# Patient Record
Sex: FEMALE | Race: OTHER | HISPANIC OR LATINO | URBAN - METROPOLITAN AREA
[De-identification: names, ages, dates, MRNs, and addresses within clinical notes are randomized per-mention and may not be internally consistent; named-entity substitution may affect disease eponyms.]

---

## 2017-06-06 RX ORDER — METFORMIN HYDROCHLORIDE 850 MG/1
0 TABLET ORAL
Qty: 30 | Refills: 0 | COMMUNITY
Start: 2017-06-06

## 2017-06-06 RX ORDER — PANTOPRAZOLE SODIUM 20 MG/1
0 TABLET, DELAYED RELEASE ORAL
Qty: 30 | Refills: 0 | COMMUNITY
Start: 2017-06-06

## 2017-06-06 RX ORDER — MECLIZINE HCL 12.5 MG
0 TABLET ORAL
Qty: 30 | Refills: 0 | COMMUNITY
Start: 2017-06-06

## 2017-06-06 RX ORDER — FERROUS GLUCONATE 100 %
0 POWDER (GRAM) MISCELLANEOUS
Qty: 30 | Refills: 0 | COMMUNITY
Start: 2017-06-06

## 2017-06-06 RX ORDER — SIMVASTATIN 20 MG/1
0 TABLET, FILM COATED ORAL
Qty: 30 | Refills: 0 | COMMUNITY
Start: 2017-06-06

## 2017-06-27 ENCOUNTER — INPATIENT (INPATIENT)
Facility: HOSPITAL | Age: 82
LOS: 2 days | Discharge: HOME | DRG: 149 | End: 2017-06-30
Attending: INTERNAL MEDICINE | Admitting: INTERNAL MEDICINE
Payer: MEDICAID

## 2017-06-27 VITALS
WEIGHT: 139.99 LBS | OXYGEN SATURATION: 97 % | SYSTOLIC BLOOD PRESSURE: 161 MMHG | TEMPERATURE: 99 F | HEART RATE: 81 BPM | RESPIRATION RATE: 18 BRPM | HEIGHT: 61 IN | DIASTOLIC BLOOD PRESSURE: 78 MMHG

## 2017-06-27 DIAGNOSIS — Z90.710 ACQUIRED ABSENCE OF BOTH CERVIX AND UTERUS: Chronic | ICD-10-CM

## 2017-06-27 DIAGNOSIS — Z98.51 TUBAL LIGATION STATUS: Chronic | ICD-10-CM

## 2017-06-27 LAB
APPEARANCE UR: CLEAR — SIGNIFICANT CHANGE UP
BASOPHILS # BLD AUTO: 0.1 K/UL — SIGNIFICANT CHANGE UP (ref 0–0.2)
BASOPHILS NFR BLD AUTO: 0.9 % — SIGNIFICANT CHANGE UP (ref 0–2)
BILIRUB UR-MCNC: NEGATIVE — SIGNIFICANT CHANGE UP
COLOR SPEC: YELLOW — SIGNIFICANT CHANGE UP
DIFF PNL FLD: NEGATIVE — SIGNIFICANT CHANGE UP
EOSINOPHIL # BLD AUTO: 0.1 K/UL — SIGNIFICANT CHANGE UP (ref 0–0.5)
EOSINOPHIL NFR BLD AUTO: 1.8 % — SIGNIFICANT CHANGE UP (ref 0–6)
GLUCOSE UR QL: NEGATIVE — SIGNIFICANT CHANGE UP
HCT VFR BLD CALC: 36.5 % — SIGNIFICANT CHANGE UP (ref 34.5–45)
HGB BLD-MCNC: 12.2 G/DL — SIGNIFICANT CHANGE UP (ref 11.5–15.5)
INR BLD: 1.03 RATIO — SIGNIFICANT CHANGE UP (ref 0.88–1.16)
KETONES UR-MCNC: NEGATIVE — SIGNIFICANT CHANGE UP
LEUKOCYTE ESTERASE UR-ACNC: ABNORMAL
LYMPHOCYTES # BLD AUTO: 3.3 K/UL — SIGNIFICANT CHANGE UP (ref 1–3.3)
LYMPHOCYTES # BLD AUTO: 43.2 % — SIGNIFICANT CHANGE UP (ref 13–44)
MCHC RBC-ENTMCNC: 31.8 PG — SIGNIFICANT CHANGE UP (ref 27–34)
MCHC RBC-ENTMCNC: 33.4 GM/DL — SIGNIFICANT CHANGE UP (ref 32–36)
MCV RBC AUTO: 95.3 FL — SIGNIFICANT CHANGE UP (ref 80–100)
MONOCYTES # BLD AUTO: 0.4 K/UL — SIGNIFICANT CHANGE UP (ref 0–0.9)
MONOCYTES NFR BLD AUTO: 5.5 % — SIGNIFICANT CHANGE UP (ref 2–14)
NEUTROPHILS # BLD AUTO: 3.8 K/UL — SIGNIFICANT CHANGE UP (ref 1.8–7.4)
NEUTROPHILS NFR BLD AUTO: 48.7 % — SIGNIFICANT CHANGE UP (ref 43–77)
NITRITE UR-MCNC: NEGATIVE — SIGNIFICANT CHANGE UP
PH UR: 8 — SIGNIFICANT CHANGE UP (ref 5–8)
PLATELET # BLD AUTO: 199 K/UL — SIGNIFICANT CHANGE UP (ref 150–400)
PROT UR-MCNC: NEGATIVE — SIGNIFICANT CHANGE UP
PROTHROM AB SERPL-ACNC: 11.2 SEC — SIGNIFICANT CHANGE UP (ref 9.8–12.7)
RBC # BLD: 3.83 M/UL — SIGNIFICANT CHANGE UP (ref 3.8–5.2)
RBC # FLD: 13 % — SIGNIFICANT CHANGE UP (ref 10.3–14.5)
SP GR SPEC: 1.01 — SIGNIFICANT CHANGE UP (ref 1.01–1.02)
UROBILINOGEN FLD QL: NEGATIVE — SIGNIFICANT CHANGE UP
WBC # BLD: 7.7 K/UL — SIGNIFICANT CHANGE UP (ref 3.8–10.5)
WBC # FLD AUTO: 7.7 K/UL — SIGNIFICANT CHANGE UP (ref 3.8–10.5)

## 2017-06-27 PROCEDURE — 70450 CT HEAD/BRAIN W/O DYE: CPT | Mod: 26

## 2017-06-27 PROCEDURE — 71010: CPT | Mod: 26

## 2017-06-27 RX ORDER — MECLIZINE HCL 12.5 MG
50 TABLET ORAL ONCE
Qty: 0 | Refills: 0 | Status: COMPLETED | OUTPATIENT
Start: 2017-06-27 | End: 2017-06-27

## 2017-06-27 RX ORDER — SODIUM CHLORIDE 9 MG/ML
1000 INJECTION INTRAMUSCULAR; INTRAVENOUS; SUBCUTANEOUS ONCE
Qty: 0 | Refills: 0 | Status: COMPLETED | OUTPATIENT
Start: 2017-06-27 | End: 2017-06-27

## 2017-06-27 RX ORDER — TOBRAMYCIN AND DEXAMETHASONE 1; 3 MG/ML; MG/ML
0 SUSPENSION/ DROPS OPHTHALMIC
Qty: 5 | Refills: 0 | COMMUNITY
Start: 2017-06-27

## 2017-06-27 RX ORDER — ONDANSETRON 8 MG/1
4 TABLET, FILM COATED ORAL ONCE
Qty: 0 | Refills: 0 | Status: COMPLETED | OUTPATIENT
Start: 2017-06-27 | End: 2017-06-27

## 2017-06-27 RX ADMIN — ONDANSETRON 4 MILLIGRAM(S): 8 TABLET, FILM COATED ORAL at 23:48

## 2017-06-27 RX ADMIN — Medication 50 MILLIGRAM(S): at 23:48

## 2017-06-27 RX ADMIN — SODIUM CHLORIDE 1000 MILLILITER(S): 9 INJECTION INTRAMUSCULAR; INTRAVENOUS; SUBCUTANEOUS at 23:43

## 2017-06-27 NOTE — ED PROVIDER NOTE - PROGRESS NOTE DETAILS
Reexamined, still unable to ambulate, unsteady, ataxic. Will admit for further w/u, MRI r/o posterior circulation CVA/insufficiency. Unsafe to DC

## 2017-06-27 NOTE — ED PROVIDER NOTE - MEDICAL DECISION MAKING DETAILS
81 y/o female presents to the ED c/o dizziness x 2 days. Possible vertigo vs posterior. Will get labs, CT head and will likely admit.

## 2017-06-27 NOTE — ED PROVIDER NOTE - INTERPRETATION
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 Per dad, Mom believes  can't see contrast well. + photosensitive. No strabismus. Used to have watery eyes BE, but it is better now         
normal sinus rhythm

## 2017-06-27 NOTE — ED PROVIDER NOTE - CRANIAL NERVE AND PUPILLARY EXAM
tongue is midline/Neural intact, ataxia, positive Romberg/corneal reflex intact/central and peripheral vision intact/central vision intact/extra-ocular movements intact/cranial nerves 2-12 intact

## 2017-06-27 NOTE — ED PROVIDER NOTE - OBJECTIVE STATEMENT
83 y/o female with PMHx of DM, HTN, hypercholesteremia, vertigo and PSHx of hysterectomy and tubal ligation presents to the ED c/o dizziness x 2 days. Pt notes ataxia, nausea, trouble ambulating. Pt denies HA, blurry vision, fever, or any other complaints. NKDA.

## 2017-06-27 NOTE — ED PROVIDER NOTE - PMH
DM (diabetes mellitus)    Gastric adenocarcinoma  s/p resection  HTN (hypertension)    Hypercholesteremia    Vertigo

## 2017-06-28 DIAGNOSIS — I10 ESSENTIAL (PRIMARY) HYPERTENSION: ICD-10-CM

## 2017-06-28 DIAGNOSIS — N39.0 URINARY TRACT INFECTION, SITE NOT SPECIFIED: ICD-10-CM

## 2017-06-28 DIAGNOSIS — Z29.9 ENCOUNTER FOR PROPHYLACTIC MEASURES, UNSPECIFIED: ICD-10-CM

## 2017-06-28 DIAGNOSIS — C16.9 MALIGNANT NEOPLASM OF STOMACH, UNSPECIFIED: ICD-10-CM

## 2017-06-28 DIAGNOSIS — R42 DIZZINESS AND GIDDINESS: ICD-10-CM

## 2017-06-28 DIAGNOSIS — R27.0 ATAXIA, UNSPECIFIED: ICD-10-CM

## 2017-06-28 DIAGNOSIS — E11.9 TYPE 2 DIABETES MELLITUS WITHOUT COMPLICATIONS: ICD-10-CM

## 2017-06-28 DIAGNOSIS — E78.00 PURE HYPERCHOLESTEROLEMIA, UNSPECIFIED: ICD-10-CM

## 2017-06-28 LAB
24R-OH-CALCIDIOL SERPL-MCNC: 40.3 NG/ML — SIGNIFICANT CHANGE UP (ref 30–100)
ALBUMIN SERPL ELPH-MCNC: 3.3 G/DL — LOW (ref 3.5–5)
ALBUMIN SERPL ELPH-MCNC: 3.4 G/DL — LOW (ref 3.5–5)
ALP SERPL-CCNC: 88 U/L — SIGNIFICANT CHANGE UP (ref 40–120)
ALP SERPL-CCNC: 93 U/L — SIGNIFICANT CHANGE UP (ref 40–120)
ALT FLD-CCNC: 26 U/L DA — SIGNIFICANT CHANGE UP (ref 10–60)
ALT FLD-CCNC: 28 U/L DA — SIGNIFICANT CHANGE UP (ref 10–60)
ANION GAP SERPL CALC-SCNC: 6 MMOL/L — SIGNIFICANT CHANGE UP (ref 5–17)
ANION GAP SERPL CALC-SCNC: 7 MMOL/L — SIGNIFICANT CHANGE UP (ref 5–17)
APTT BLD: 25.4 SEC — LOW (ref 27.5–37.4)
AST SERPL-CCNC: 26 U/L — SIGNIFICANT CHANGE UP (ref 10–40)
AST SERPL-CCNC: 29 U/L — SIGNIFICANT CHANGE UP (ref 10–40)
BACTERIA # UR AUTO: ABNORMAL /HPF
BASOPHILS # BLD AUTO: 0 K/UL — SIGNIFICANT CHANGE UP (ref 0–0.2)
BASOPHILS NFR BLD AUTO: 0.6 % — SIGNIFICANT CHANGE UP (ref 0–2)
BILIRUB SERPL-MCNC: 0.2 MG/DL — SIGNIFICANT CHANGE UP (ref 0.2–1.2)
BILIRUB SERPL-MCNC: 0.3 MG/DL — SIGNIFICANT CHANGE UP (ref 0.2–1.2)
BUN SERPL-MCNC: 15 MG/DL — SIGNIFICANT CHANGE UP (ref 7–18)
BUN SERPL-MCNC: 16 MG/DL — SIGNIFICANT CHANGE UP (ref 7–18)
CALCIUM SERPL-MCNC: 8.5 MG/DL — SIGNIFICANT CHANGE UP (ref 8.4–10.5)
CALCIUM SERPL-MCNC: 8.5 MG/DL — SIGNIFICANT CHANGE UP (ref 8.4–10.5)
CHLORIDE SERPL-SCNC: 110 MMOL/L — HIGH (ref 96–108)
CHLORIDE SERPL-SCNC: 110 MMOL/L — HIGH (ref 96–108)
CHOLEST SERPL-MCNC: 125 MG/DL — SIGNIFICANT CHANGE UP (ref 10–199)
CO2 SERPL-SCNC: 26 MMOL/L — SIGNIFICANT CHANGE UP (ref 22–31)
CO2 SERPL-SCNC: 27 MMOL/L — SIGNIFICANT CHANGE UP (ref 22–31)
COMMENT - URINE: SIGNIFICANT CHANGE UP
CREAT SERPL-MCNC: 0.77 MG/DL — SIGNIFICANT CHANGE UP (ref 0.5–1.3)
CREAT SERPL-MCNC: 0.84 MG/DL — SIGNIFICANT CHANGE UP (ref 0.5–1.3)
EOSINOPHIL # BLD AUTO: 0.2 K/UL — SIGNIFICANT CHANGE UP (ref 0–0.5)
EOSINOPHIL NFR BLD AUTO: 1.8 % — SIGNIFICANT CHANGE UP (ref 0–6)
EPI CELLS # UR: ABNORMAL (ref 0–10)
FOLATE SERPL-MCNC: 17.3 NG/ML — SIGNIFICANT CHANGE UP (ref 4.8–24.2)
GLUCOSE SERPL-MCNC: 104 MG/DL — HIGH (ref 70–99)
GLUCOSE SERPL-MCNC: 105 MG/DL — HIGH (ref 70–99)
HBA1C BLD-MCNC: 6.8 % — HIGH (ref 4–5.6)
HCT VFR BLD CALC: 35.9 % — SIGNIFICANT CHANGE UP (ref 34.5–45)
HDLC SERPL-MCNC: 45 MG/DL — SIGNIFICANT CHANGE UP (ref 40–125)
HGB BLD-MCNC: 12.3 G/DL — SIGNIFICANT CHANGE UP (ref 11.5–15.5)
LIPID PNL WITH DIRECT LDL SERPL: 60 MG/DL — SIGNIFICANT CHANGE UP
LYMPHOCYTES # BLD AUTO: 3.8 K/UL — HIGH (ref 1–3.3)
LYMPHOCYTES # BLD AUTO: 44.6 % — HIGH (ref 13–44)
MAGNESIUM SERPL-MCNC: 2.2 MG/DL — SIGNIFICANT CHANGE UP (ref 1.6–2.6)
MCHC RBC-ENTMCNC: 32.3 PG — SIGNIFICANT CHANGE UP (ref 27–34)
MCHC RBC-ENTMCNC: 34.3 GM/DL — SIGNIFICANT CHANGE UP (ref 32–36)
MCV RBC AUTO: 94.3 FL — SIGNIFICANT CHANGE UP (ref 80–100)
MONOCYTES # BLD AUTO: 0.5 K/UL — SIGNIFICANT CHANGE UP (ref 0–0.9)
MONOCYTES NFR BLD AUTO: 5.7 % — SIGNIFICANT CHANGE UP (ref 2–14)
NEUTROPHILS # BLD AUTO: 4 K/UL — SIGNIFICANT CHANGE UP (ref 1.8–7.4)
NEUTROPHILS NFR BLD AUTO: 47.3 % — SIGNIFICANT CHANGE UP (ref 43–77)
PHOSPHATE SERPL-MCNC: 3.2 MG/DL — SIGNIFICANT CHANGE UP (ref 2.5–4.5)
PLATELET # BLD AUTO: 185 K/UL — SIGNIFICANT CHANGE UP (ref 150–400)
POTASSIUM SERPL-MCNC: 4 MMOL/L — SIGNIFICANT CHANGE UP (ref 3.5–5.3)
POTASSIUM SERPL-MCNC: 4 MMOL/L — SIGNIFICANT CHANGE UP (ref 3.5–5.3)
POTASSIUM SERPL-SCNC: 4 MMOL/L — SIGNIFICANT CHANGE UP (ref 3.5–5.3)
POTASSIUM SERPL-SCNC: 4 MMOL/L — SIGNIFICANT CHANGE UP (ref 3.5–5.3)
PROT SERPL-MCNC: 7 G/DL — SIGNIFICANT CHANGE UP (ref 6–8.3)
PROT SERPL-MCNC: 7.6 G/DL — SIGNIFICANT CHANGE UP (ref 6–8.3)
RBC # BLD: 3.81 M/UL — SIGNIFICANT CHANGE UP (ref 3.8–5.2)
RBC # FLD: 13.5 % — SIGNIFICANT CHANGE UP (ref 10.3–14.5)
RBC CASTS # UR COMP ASSIST: SIGNIFICANT CHANGE UP /HPF (ref 0–2)
SODIUM SERPL-SCNC: 143 MMOL/L — SIGNIFICANT CHANGE UP (ref 135–145)
SODIUM SERPL-SCNC: 143 MMOL/L — SIGNIFICANT CHANGE UP (ref 135–145)
TOTAL CHOLESTEROL/HDL RATIO MEASUREMENT: 2.8 RATIO — LOW (ref 3.3–7.1)
TRIGL SERPL-MCNC: 102 MG/DL — SIGNIFICANT CHANGE UP (ref 10–149)
TSH SERPL-MCNC: 1.01 UU/ML — SIGNIFICANT CHANGE UP (ref 0.34–4.82)
VIT B12 SERPL-MCNC: 536 PG/ML — SIGNIFICANT CHANGE UP (ref 243–894)
WBC # BLD: 8.5 K/UL — SIGNIFICANT CHANGE UP (ref 3.8–10.5)
WBC # FLD AUTO: 8.5 K/UL — SIGNIFICANT CHANGE UP (ref 3.8–10.5)
WBC UR QL: ABNORMAL /HPF (ref 0–5)

## 2017-06-28 PROCEDURE — 99285 EMERGENCY DEPT VISIT HI MDM: CPT | Mod: 25

## 2017-06-28 PROCEDURE — 99223 1ST HOSP IP/OBS HIGH 75: CPT

## 2017-06-28 PROCEDURE — 70551 MRI BRAIN STEM W/O DYE: CPT | Mod: 26

## 2017-06-28 RX ORDER — INSULIN LISPRO 100/ML
VIAL (ML) SUBCUTANEOUS
Qty: 0 | Refills: 0 | Status: DISCONTINUED | OUTPATIENT
Start: 2017-06-28 | End: 2017-06-30

## 2017-06-28 RX ORDER — DEXTROSE 50 % IN WATER 50 %
12.5 SYRINGE (ML) INTRAVENOUS ONCE
Qty: 0 | Refills: 0 | Status: DISCONTINUED | OUTPATIENT
Start: 2017-06-28 | End: 2017-06-30

## 2017-06-28 RX ORDER — DEXTROSE 50 % IN WATER 50 %
25 SYRINGE (ML) INTRAVENOUS ONCE
Qty: 0 | Refills: 0 | Status: DISCONTINUED | OUTPATIENT
Start: 2017-06-28 | End: 2017-06-30

## 2017-06-28 RX ORDER — SODIUM CHLORIDE 9 MG/ML
1000 INJECTION, SOLUTION INTRAVENOUS
Qty: 0 | Refills: 0 | Status: DISCONTINUED | OUTPATIENT
Start: 2017-06-28 | End: 2017-06-30

## 2017-06-28 RX ORDER — SIMVASTATIN 20 MG/1
40 TABLET, FILM COATED ORAL AT BEDTIME
Qty: 0 | Refills: 0 | Status: DISCONTINUED | OUTPATIENT
Start: 2017-06-28 | End: 2017-06-30

## 2017-06-28 RX ORDER — PANTOPRAZOLE SODIUM 20 MG/1
40 TABLET, DELAYED RELEASE ORAL
Qty: 0 | Refills: 0 | Status: DISCONTINUED | OUTPATIENT
Start: 2017-06-28 | End: 2017-06-30

## 2017-06-28 RX ORDER — ACETAMINOPHEN 500 MG
650 TABLET ORAL ONCE
Qty: 0 | Refills: 0 | Status: COMPLETED | OUTPATIENT
Start: 2017-06-28 | End: 2017-06-28

## 2017-06-28 RX ORDER — CEFTRIAXONE 500 MG/1
1 INJECTION, POWDER, FOR SOLUTION INTRAMUSCULAR; INTRAVENOUS EVERY 24 HOURS
Qty: 0 | Refills: 0 | Status: DISCONTINUED | OUTPATIENT
Start: 2017-06-28 | End: 2017-06-30

## 2017-06-28 RX ORDER — DEXTROSE 50 % IN WATER 50 %
1 SYRINGE (ML) INTRAVENOUS ONCE
Qty: 0 | Refills: 0 | Status: DISCONTINUED | OUTPATIENT
Start: 2017-06-28 | End: 2017-06-30

## 2017-06-28 RX ORDER — ASPIRIN/CALCIUM CARB/MAGNESIUM 324 MG
81 TABLET ORAL DAILY
Qty: 0 | Refills: 0 | Status: DISCONTINUED | OUTPATIENT
Start: 2017-06-28 | End: 2017-06-30

## 2017-06-28 RX ORDER — ENOXAPARIN SODIUM 100 MG/ML
40 INJECTION SUBCUTANEOUS DAILY
Qty: 0 | Refills: 0 | Status: DISCONTINUED | OUTPATIENT
Start: 2017-06-28 | End: 2017-06-30

## 2017-06-28 RX ORDER — GLUCAGON INJECTION, SOLUTION 0.5 MG/.1ML
1 INJECTION, SOLUTION SUBCUTANEOUS ONCE
Qty: 0 | Refills: 0 | Status: DISCONTINUED | OUTPATIENT
Start: 2017-06-28 | End: 2017-06-30

## 2017-06-28 RX ORDER — TOBRAMYCIN AND DEXAMETHASONE 1; 3 MG/ML; MG/ML
1 SUSPENSION/ DROPS OPHTHALMIC DAILY
Qty: 0 | Refills: 0 | Status: DISCONTINUED | OUTPATIENT
Start: 2017-06-28 | End: 2017-06-28

## 2017-06-28 RX ORDER — ATORVASTATIN CALCIUM 80 MG/1
40 TABLET, FILM COATED ORAL AT BEDTIME
Qty: 0 | Refills: 0 | Status: DISCONTINUED | OUTPATIENT
Start: 2017-06-28 | End: 2017-06-28

## 2017-06-28 RX ORDER — MECLIZINE HCL 12.5 MG
25 TABLET ORAL THREE TIMES A DAY
Qty: 0 | Refills: 0 | Status: DISCONTINUED | OUTPATIENT
Start: 2017-06-28 | End: 2017-06-30

## 2017-06-28 RX ADMIN — Medication 650 MILLIGRAM(S): at 14:43

## 2017-06-28 RX ADMIN — SIMVASTATIN 40 MILLIGRAM(S): 20 TABLET, FILM COATED ORAL at 22:15

## 2017-06-28 RX ADMIN — ENOXAPARIN SODIUM 40 MILLIGRAM(S): 100 INJECTION SUBCUTANEOUS at 14:43

## 2017-06-28 RX ADMIN — CEFTRIAXONE 100 GRAM(S): 500 INJECTION, POWDER, FOR SOLUTION INTRAMUSCULAR; INTRAVENOUS at 12:00

## 2017-06-28 RX ADMIN — Medication 2: at 22:14

## 2017-06-28 RX ADMIN — Medication 650 MILLIGRAM(S): at 15:40

## 2017-06-28 RX ADMIN — Medication: at 14:44

## 2017-06-28 RX ADMIN — Medication 81 MILLIGRAM(S): at 14:43

## 2017-06-28 NOTE — PROGRESS NOTE ADULT - SUBJECTIVE AND OBJECTIVE BOX
Patient is a 82y old  Female who presents with a chief complaint of dizziness (2017 06:25)      INTERVAL HPI/OVERNIGHT EVENTS:    no events     MEDICATIONS  (STANDING):  enoxaparin Injectable 40 milliGRAM(s) SubCutaneous daily  insulin lispro (HumaLOG) corrective regimen sliding scale   SubCutaneous Before meals and at bedtime  dextrose 5%. 1000 milliLiter(s) (50 mL/Hr) IV Continuous <Continuous>  dextrose 50% Injectable 12.5 Gram(s) IV Push once  dextrose 50% Injectable 25 Gram(s) IV Push once  dextrose 50% Injectable 25 Gram(s) IV Push once  aspirin  chewable 81 milliGRAM(s) Oral daily  atorvastatin 40 milliGRAM(s) Oral at bedtime    MEDICATIONS  (PRN):  meclizine 25 milliGRAM(s) Oral three times a day PRN Dizziness  dextrose Gel 1 Dose(s) Oral once PRN Blood Glucose LESS THAN 70 milliGRAM(s)/deciLiter  glucagon  Injectable 1 milliGRAM(s) IntraMuscular once PRN Glucose <70 milliGRAM(s)/deciLiter      Allergies    No Known Allergies    Intolerances        Vital Signs Last 24 Hrs  T(C): 36.8 (2017 09:33), Max: 37.1 (2017 19:25)  T(F): 98.2 (2017 09:33), Max: 98.7 (2017 19:25)  HR: 67 (2017 03:26) (66 - 81)  BP: 136/56 (2017 03:26) (136/56 - 161/78)  BP(mean): --  RR: 16 (2017 09:33) (16 - 18)  SpO2: 98% (2017 09:33) (97% - 100%)    PHYSICAL EXAM:  GENERAL: NAD, well-groomed, well-developed  HEENT: Supple, No JVD, Normal thyroid  NERVOUS SYSTEM:  Alert & Oriented X3, Good concentration  CHEST/LUNG: Clear to percussion bilaterally; No rales, rhonchi, wheezing, or rubs  HEART: Regular rate and rhythm; No murmurs, rubs, or gallops  ABDOMEN: Soft, Nontender, Nondistended; Bowel sounds present, surgery scar at abdomomen   EXTREMITIES:  No clubbing, cyanosis, or edema  SKIN: no rashes      LABS:                        12.3   8.5   )-----------( 185      ( 2017 07:14 )             35.9         143  |  110<H>  |  15  ----------------------------<  104<H>  4.0   |  26  |  0.77    Ca    8.5      2017 07:14  Phos  3.2       Mg     2.2         TPro  7.0  /  Alb  3.3<L>  /  TBili  0.3  /  DBili  x   /  AST  26  /  ALT  26  /  AlkPhos  88      PT/INR - ( 2017 23:47 )   PT: 11.2 sec;   INR: 1.03 ratio         PTT - ( 2017 23:47 )  PTT:25.4 sec  Urinalysis Basic - ( 2017 23:47 )    Color: Yellow / Appearance: Clear / S.015 / pH: x  Gluc: x / Ketone: Negative  / Bili: Negative / Urobili: Negative   Blood: x / Protein: Negative / Nitrite: Negative   Leuk Esterase: Small / RBC: 0-2 /HPF / WBC 6-10 /HPF   Sq Epi: x / Non Sq Epi: Few / Bacteria: Few /HPF      CAPILLARY BLOOD GLUCOSE  137 (2017 08:23)          RADIOLOGY & ADDITIONAL TESTS:    Imaging Personally Reviewed:  [ ] YES  [ ] NO    Consultant(s) Notes Reviewed:  [ ] YES  [ ] NO    Care Discussed with Consultants/Other Providers [ ] YES  [ ] NO    Plan of Care discussed with Attending/PGY2 [+ ]YES [ ] NO Patient is a 82y old  Female who presents with a chief complaint of dizziness (2017 06:25)      INTERVAL HPI/OVERNIGHT EVENTS:    no events     MEDICATIONS  (STANDING):  enoxaparin Injectable 40 milliGRAM(s) SubCutaneous daily  insulin lispro (HumaLOG) corrective regimen sliding scale   SubCutaneous Before meals and at bedtime  dextrose 5%. 1000 milliLiter(s) (50 mL/Hr) IV Continuous <Continuous>  dextrose 50% Injectable 12.5 Gram(s) IV Push once  dextrose 50% Injectable 25 Gram(s) IV Push once  dextrose 50% Injectable 25 Gram(s) IV Push once  aspirin  chewable 81 milliGRAM(s) Oral daily  atorvastatin 40 milliGRAM(s) Oral at bedtime    MEDICATIONS  (PRN):  meclizine 25 milliGRAM(s) Oral three times a day PRN Dizziness  dextrose Gel 1 Dose(s) Oral once PRN Blood Glucose LESS THAN 70 milliGRAM(s)/deciLiter  glucagon  Injectable 1 milliGRAM(s) IntraMuscular once PRN Glucose <70 milliGRAM(s)/deciLiter      Allergies    No Known Allergies    Intolerances        Vital Signs Last 24 Hrs  T(C): 36.8 (2017 09:33), Max: 37.1 (2017 19:25)  T(F): 98.2 (2017 09:33), Max: 98.7 (2017 19:25)  HR: 67 (2017 03:26) (66 - 81)  BP: 136/56 (2017 03:26) (136/56 - 161/78)  BP(mean): --  RR: 16 (2017 09:33) (16 - 18)  SpO2: 98% (2017 09:33) (97% - 100%)    PHYSICAL EXAM:  GENERAL: NAD, well-groomed, well-developed  HEENT: Supple, No JVD, Normal thyroid  NERVOUS SYSTEM:  Alert & Oriented X3, Good concentration  CHEST/LUNG: Clear to percussion bilaterally; No rales, rhonchi, wheezing, or rubs  HEART: Regular rate and rhythm; No murmurs, rubs, or gallops  ABDOMEN: Soft, Nontender, Nondistended; Bowel sounds present, surgery scar at abdomomen   EXTREMITIES:  No clubbing, cyanosis, or edema  SKIN: no rashes      LABS:                        12.3   8.5   )-----------( 185      ( 2017 07:14 )             35.9         143  |  110<H>  |  15  ----------------------------<  104<H>  4.0   |  26  |  0.77    Ca    8.5      2017 07:14  Phos  3.2       Mg     2.2         TPro  7.0  /  Alb  3.3<L>  /  TBili  0.3  /  DBili  x   /  AST  26  /  ALT  26  /  AlkPhos  88      PT/INR - ( 2017 23:47 )   PT: 11.2 sec;   INR: 1.03 ratio         PTT - ( 2017 23:47 )  PTT:25.4 sec  Urinalysis Basic - ( 2017 23:47 )    Color: Yellow / Appearance: Clear / S.015 / pH: x  Gluc: x / Ketone: Negative  / Bili: Negative / Urobili: Negative   Blood: x / Protein: Negative / Nitrite: Negative   Leuk Esterase: Small / RBC: 0-2 /HPF / WBC 6-10 /HPF   Sq Epi: x / Non Sq Epi: Few / Bacteria: Few /HPF      CAPILLARY BLOOD GLUCOSE  137 (2017 08:23)          RADIOLOGY & ADDITIONAL TESTS:    Imaging Personally Reviewed:  [ +] YES  [ ] NO    Consultant(s) Notes Reviewed:  [ +] YES  [ ] NO    Care Discussed with Consultants/Other Providers [+ ] YES  [ ] NO    Plan of Care discussed with Attending/PGY2 [+ ]YES [ ] NO

## 2017-06-28 NOTE — H&P ADULT - PROBLEM SELECTOR PLAN 6
appears stable s/p gastrectomy.  -Primary team to follow-up with family to confirm current management of this issue

## 2017-06-28 NOTE — H&P ADULT - PROBLEM SELECTOR PLAN 1
Symptoms appear to be worsened with movement positional. No discernable ear symptoms. BPPV likely. Though acute onset concerning for CVA.  -Meclizine PRN  -F/up Neuro (Dr. Bello)

## 2017-06-28 NOTE — H&P ADULT - HISTORY OF PRESENT ILLNESS
83 yo F from home, lives with daughter, PMHx DM II, HTN, HLD, Vertigo, Gastric Cancer s/p gastrectomy in 2015 s/p chemotherapy ~ 8 months ago, s/p open cholecystectomy presents with complaint of dizziness. History obtained from patient (Indonesian speaking, poor historian,  phone used) and from chart review; attempted to call patient's daughter, left a message). Patient reports that yesterday morning she had sudden on onset of dizziness when she got out of bed in the morning. Endorses room spinning sensation. Says she has a history of vertigo. She says she was able to get up and go to the bathroom but then she felt. Denies LOC or head trauma. This prompted her coming to the hospital. Patient endorses lightheadedness. Also says she is having trouble walking and that feel she is leaning to one side (to the right). She denies fevers, chills, nausea, vomiting, chest pain, SOB.    In the ED, noted to be ataxic by ED attending, vss, labs wnl, UA-ve, s/p 1L bolus, s/p IV Zofran and PO Meclizine.    Seen by admitting resident on the floor, feeling OK until asked to get up from bed and then felt the dizziness come on. Was not able to get up and stand or walk 2/2 to the dizziness and feeling weak. 83 yo F from home, lives with daughter, PMHx DM II, HTN, HLD, Vertigo, Gastric Cancer s/p gastrectomy in 2015 s/p chemotherapy ~ 8 months ago, s/p open cholecystectomy presents with complaint of dizziness. History obtained from patient ([place]ian speaking, poor historian,  phone used) and from chart review; attempted to call patient's daughter, left a message). Patient reports that yesterday morning she had sudden on onset of dizziness when she got out of bed in the morning. Endorses room spinning sensation. Says she has a history of vertigo. She says she was able to get up and go to the bathroom but then she felt. Denies LOC or head trauma. This prompted her coming to the hospital. Patient endorses lightheadedness. Also says she is having trouble walking and that feel she is leaning to one side (to the right). She denies fevers, chills, nausea, vomiting, chest pain, SOB.    In the ED, noted to be ataxic by ED attending, vss, labs wnl, UA +ve, s/p 1L bolus, s/p IV Zofran and PO Meclizine.    Seen by admitting resident on the floor, feeling OK until asked to get up from bed and then felt the dizziness come on. Was not able to get up and stand or walk 2/2 to the dizziness and feeling weak.

## 2017-06-28 NOTE — H&P ADULT - ASSESSMENT
82 F PMHx DM II, HTN, HLD, Vertigo, Gastric Cancer s/p, s/p cholecystecomy p/w acute onset dizziness c/b fall and concerns for ataxia.

## 2017-06-28 NOTE — CONSULT NOTE ADULT - SUBJECTIVE AND OBJECTIVE BOX
Patient is a 82y old  Female who presents with a chief complaint of dizziness (2017 06:25)      HPI:  81 yo F from home, lives with daughter, PMHx DM II, HTN, HLD, Vertigo, Gastric Cancer s/p gastrectomy in 2015 s/p chemotherapy ~ 8 months ago, s/p open cholecystectomy presents with complaint of dizziness. History obtained from patient (Tanzanian speaking, poor historian,  phone used) and from chart review; attempted to call patient's daughter, left a message). Patient reports that yesterday morning she had sudden on onset of dizziness when she got out of bed in the morning. Endorses room spinning sensation. Says she has a history of vertigo. She says she was able to get up and go to the bathroom but then she felt. Denies LOC or head trauma. This prompted her coming to the hospital. Patient endorses lightheadedness. Also says she is having trouble walking and that feel she is leaning to one side (to the right). She denies fevers, chills, nausea, vomiting, chest pain, SOB.    In the ED, noted to be ataxic by ED attending, vss, labs wnl, UA +ve, s/p 1L bolus, s/p IV Zofran and PO Meclizine.    Seen by admitting resident on the floor, feeling OK until asked to get up from bed and then felt the dizziness come on. Was not able to get up and stand or walk 2/2 to the dizziness and feeling weak. (2017 06:25)         Neurological Review of Systems:  No difficulty with language.  No vision loss or double vision.  No dizziness, vertigo or new hearing loss.  No difficulty with speech or swallowing.  No focal weakness.  No focal sensory changes.  No numbness or tingling in the bilateral lower extremities.  No difficulty with balance.  No difficulty with ambulation.        MEDICATIONS  (STANDING):  enoxaparin Injectable 40 milliGRAM(s) SubCutaneous daily  insulin lispro (HumaLOG) corrective regimen sliding scale   SubCutaneous Before meals and at bedtime  dextrose 5%. 1000 milliLiter(s) (50 mL/Hr) IV Continuous <Continuous>  dextrose 50% Injectable 12.5 Gram(s) IV Push once  dextrose 50% Injectable 25 Gram(s) IV Push once  dextrose 50% Injectable 25 Gram(s) IV Push once  aspirin  chewable 81 milliGRAM(s) Oral daily  simvastatin 40 milliGRAM(s) Oral at bedtime  pantoprazole    Tablet 40 milliGRAM(s) Oral before breakfast  cefTRIAXone   IVPB 1 Gram(s) IV Intermittent every 24 hours    MEDICATIONS  (PRN):  meclizine 25 milliGRAM(s) Oral three times a day PRN Dizziness  dextrose Gel 1 Dose(s) Oral once PRN Blood Glucose LESS THAN 70 milliGRAM(s)/deciLiter  glucagon  Injectable 1 milliGRAM(s) IntraMuscular once PRN Glucose <70 milliGRAM(s)/deciLiter    Allergies    No Known Allergies    Intolerances      PAST MEDICAL & SURGICAL HISTORY:  Vertigo  Gastric adenocarcinoma: s/p resection  Hypercholesteremia  DM (diabetes mellitus)  HTN (hypertension)  S/P tubal ligation  S/P hysterectomy    FAMILY HISTORY:  Family history of early CAD (Grandparent)  Family history of diabetes mellitus (Sibling)    SOCIAL HISTORY: non smoker/ former smoker/ active smoker    Review of Systems:  Constitutional: No generalized weakness. No fevers or chills.                    Eyes, Ears, Mouth, Throat: No vision loss   Respiratory: No shortness of breath or cough.                                Cardiovascular: No chest pain or palpitations  Gastrointestinal: No nausea or vomiting.                                         Genitourinary: No urinary incontinence or burning on urination.  Musculoskeletal: No joint pain.                                                           Dermatologic: No rash.  Neurological: as per HPI                                                                      Psychiatric: No behavioral problems.  Endocrine: No known hypoglycemia.               Hematologic/Lymphatic: No easy bleeding.    O:  Vital Signs Last 24 Hrs  T(C): 36.7 (2017 14:12), Max: 37.1 (2017 19:25)  T(F): 98 (2017 14:12), Max: 98.7 (2017 19:25)  HR: 73 (2017 14:12) (66 - 81)  BP: 133/62 (2017 14:12) (133/62 - 161/78)  BP(mean): --  RR: 16 (2017 14:12) (16 - 18)  SpO2: 97% (2017 14:12) (97% - 100%)    General Exam:   General appearance: No acute distress                 Cardiovascular: Pedal dorsalis pulses intact bilaterally    Mental Status: Orientated to self, date and place.  Attention intact.  No dysarthria, aphasia or neglect.  Knowledge intact.  Registration intact.  Short and long term memory grossly intact.      Cranial Nerves: CN I - not tested.  PERRL, EOMI, VFF, no nystagmus or diplopia.  No APD.  Fundi not visualized.  CN V1-3 intact to light touch and pinprick.  No facial asymmetry.  Hearing intact to finger rub bilaterally.  Tongue, uvula and palate midline.  Sternocleidomastoid and Trapezius intact bilaterally.    Motor:   Tone: normal.                  Strength intact throughout  No pronator drift bilaterally                      No dysmetria on finger-nose-finger or heel-shin-heel  No truncal ataxia.  No resting, postural or action tremor.  No myoclonus.    Sensation: intact to light touch, pinprick, vibration and proprioception    Deep Tendon Reflexes: 1+ bilateral biceps, triceps, brachioradialis, knee and ankle  Toes flexor bilaterally    Gait: normal and stable.  Rhomberg -emily.    Other:     LABS:                        12.3   8.5   )-----------( 185      ( 2017 07:14 )             35.9         143  |  110<H>  |  15  ----------------------------<  104<H>  4.0   |  26  |  0.77    Ca    8.5      2017 07:14  Phos  3.2       Mg     2.2         TPro  7.0  /  Alb  3.3<L>  /  TBili  0.3  /  DBili  x   /  AST  26  /  ALT  26  /  AlkPhos  88      PT/INR - ( 2017 23:47 )   PT: 11.2 sec;   INR: 1.03 ratio         PTT - ( 2017 23:47 )  PTT:25.4 sec  Urinalysis Basic - ( 2017 23:47 )    Color: Yellow / Appearance: Clear / S.015 / pH: x  Gluc: x / Ketone: Negative  / Bili: Negative / Urobili: Negative   Blood: x / Protein: Negative / Nitrite: Negative   Leuk Esterase: Small / RBC: 0-2 /HPF / WBC 6-10 /HPF   Sq Epi: x / Non Sq Epi: Few / Bacteria: Few /HPF    Hemoglobin A1C, Whole Blood in AM (17 @ 09:29)    Hemoglobin A1C, Whole Blood: 6.8: Method: Immunoassay       Reference Range                4.0-5.6%       High risk (prediabetic)        5.7-6.4%       Diabetic, diagnostic             >=6.5%       ADA diabetic treatment goal       <7.0%  The Hemoglobin A1c reference ranges are based6.8: on the 2010 recommendations  of  The American Diabetes Association.  Interpretation may vary for children  and  adolescent %    Lipid Profile (17 @ 07:14)    HDL/Total Cholesterol Ratio Measurement: 2.8 RATIO    Cholesterol, Serum: 125 mg/dL    Triglycerides, Serum: 102 mg/dL    HDL Cholesterol, Serum: 45 mg/dL    Direct LDL: 60: LDL Cholesterol --- Interpretive Comment (for adults 18 and over)  Optimal LDL Level may vary based on clinical situation  Below 70                  Ideal for people at very high risk of heart  disease  Below 100                Ideal for people at ris60: k of heart disease  100 - 129                   Near [place]  130 - 159                   Borderline high  160 - 189                   High  190 and Above          Very high mg/dL          RADIOLOGY & ADDITIONAL STUDIES:    EKG: < from: 12 Lead ECG (17 @ 19:39) >  Diagnosis Line Normal sinus rhythm  Normal ECG    < end of copied text >    CTH (images reviewed < from: CT Head No Cont (17 @ 23:27) >  IMPRESSION:    No intracranial hemorrhage, mass effect or large acute cortical infarct.    < end of copied text >  [id number]    HPI:  83 yo F from home, lives with daughter, PMHx DM II, HTN, HLD, Vertigo, Gastric Cancer s/p gastrectomy in 2015 s/p chemotherapy ~ 8 months ago, s/p open cholecystectomy presents with complaint of vertigo. Patient also feels unsteady and falls to the left side.  Denies dizziness or double vision.  Has recent URI, has tinnitus in left ear.  No hearing loss.  No change of vertigo with position.    Patient also had nausea and vomiting improved.  No change of vertigo.       Neurological Review of Systems:  No difficulty with language.  No vision loss or double vision.  No new hearing loss.  No difficulty with speech or swallowing.  No focal weakness.  No focal sensory changes.  No numbness or tingling in the bilateral lower extremities.  Has difficulty with balance and ambulation.        MEDICATIONS  (STANDING):  enoxaparin Injectable 40 milliGRAM(s) SubCutaneous daily  insulin lispro (HumaLOG) corrective regimen sliding scale   SubCutaneous Before meals and at bedtime  dextrose 5%. 1000 milliLiter(s) (50 mL/Hr) IV Continuous <Continuous>  dextrose 50% Injectable 12.5 Gram(s) IV Push once  dextrose 50% Injectable 25 Gram(s) IV Push once  dextrose 50% Injectable 25 Gram(s) IV Push once  aspirin  chewable 81 milliGRAM(s) Oral daily  simvastatin 40 milliGRAM(s) Oral at bedtime  pantoprazole    Tablet 40 milliGRAM(s) Oral before breakfast  cefTRIAXone   IVPB 1 Gram(s) IV Intermittent every 24 hours    MEDICATIONS  (PRN):  meclizine 25 milliGRAM(s) Oral three times a day PRN Dizziness  dextrose Gel 1 Dose(s) Oral once PRN Blood Glucose LESS THAN 70 milliGRAM(s)/deciLiter  glucagon  Injectable 1 milliGRAM(s) IntraMuscular once PRN Glucose <70 milliGRAM(s)/deciLiter    Allergies    No Known Allergies    Intolerances      PAST MEDICAL & SURGICAL HISTORY:  Vertigo  Gastric adenocarcinoma: s/p resection  Hypercholesteremia  DM (diabetes mellitus)  HTN (hypertension)  S/P tubal ligation  S/P hysterectomy    FAMILY HISTORY:  Family history of early CAD (Grandparent)  Family history of diabetes mellitus (Sibling)    SOCIAL HISTORY: active smoker    Review of Systems:  Constitutional: No generalized weakness. No fevers or chills.                    Eyes, Ears, Mouth, Throat: No vision loss   Respiratory: No shortness of breath or cough.                                Cardiovascular: No chest pain or palpitations  Gastrointestinal: + nausea or vomiting.                                         Genitourinary: No urinary incontinence or burning on urination.  Musculoskeletal: + joint pain.                                                           Dermatologic: No rash.  Neurological: as per HPI                                                                      Psychiatric: No behavioral problems.  Endocrine: No known hypoglycemia.               Hematologic/Lymphatic: No easy bleeding.    O:  Vital Signs Last 24 Hrs  T(C): 36.7 (2017 14:12), Max: 37.1 (2017 19:25)  T(F): 98 (2017 14:12), Max: 98.7 (2017 19:25)  HR: 73 (2017 14:12) (66 - 81)  BP: 133/62 (2017 14:12) (133/62 - 161/78)  BP(mean): --  RR: 16 (2017 14:12) (16 - 18)  SpO2: 97% (2017 14:12) (97% - 100%)    General Exam:   General appearance: No acute distress                 Cardiovascular: Pedal dorsalis pulses intact bilaterally    Mental Status: Orientated to self, date and place.  Attention intact.  No dysarthria, aphasia or neglect.  Knowledge intact.  Registration intact.  Short and long term memory grossly intact.      Cranial Nerves: CN I - not tested.  PERRL, EOMI, VFF.  +nystagmus on left gaze.  No diplopia.  No APD.  Fundi not visualized.  CN V1-3 intact to light touch and pinprick.  No facial asymmetry.  Hearing intact to finger rub bilaterally.  Tongue, uvula and palate midline.  Sternocleidomastoid and Trapezius intact bilaterally.    Motor:   Tone: normal.                  Strength intact throughout  No pronator drift bilaterally                      No dysmetria on finger-nose-finger or heel-shin-heel  No truncal ataxia.  No resting, postural or action tremor.  No myoclonus.    Sensation: intact to light touch, pinprick    Deep Tendon Reflexes: 1+ bilateral biceps, triceps, brachioradialis, knee and ankle  Toes flexor bilaterally    Gait: ataxic, falls to left side    Other:     LABS:                        12.3   8.5   )-----------( 185      ( 2017 07:14 )             35.9     -    143  |  110<H>  |  15  ----------------------------<  104<H>  4.0   |  26  |  0.77    Ca    8.5      2017 07:14  Phos  3.2       Mg     2.2         TPro  7.0  /  Alb  3.3<L>  /  TBili  0.3  /  DBili  x   /  AST  26  /  ALT  26  /  AlkPhos  88      PT/INR - ( 2017 23:47 )   PT: 11.2 sec;   INR: 1.03 ratio         PTT - ( 2017 23:47 )  PTT:25.4 sec  Urinalysis Basic - ( 2017 23:47 )    Color: Yellow / Appearance: Clear / S.015 / pH: x  Gluc: x / Ketone: Negative  / Bili: Negative / Urobili: Negative   Blood: x / Protein: Negative / Nitrite: Negative   Leuk Esterase: Small / RBC: 0-2 /HPF / WBC 6-10 /HPF   Sq Epi: x / Non Sq Epi: Few / Bacteria: Few /HPF    Hemoglobin A1C, Whole Blood in AM (17 @ 09:29)    Hemoglobin A1C, Whole Blood: 6.8: Method: Immunoassay       Reference Range                4.0-5.6%       High risk (prediabetic)        5.7-6.4%       Diabetic, diagnostic             >=6.5%       ADA diabetic treatment goal       <7.0%  The Hemoglobin A1c reference ranges are based6.8: on the 2010 recommendations  of  The American Diabetes Association.  Interpretation may vary for children  and  adolescent %    Lipid Profile (17 @ 07:14)    HDL/Total Cholesterol Ratio Measurement: 2.8 RATIO    Cholesterol, Serum: 125 mg/dL    Triglycerides, Serum: 102 mg/dL    HDL Cholesterol, Serum: 45 mg/dL    Direct LDL: 60: LDL Cholesterol --- Interpretive Comment (for adults 18 and over)  Optimal LDL Level may vary based on clinical situation  Below 70                  Ideal for people at very high risk of heart  disease  Below 100                Ideal for people at ris60: k of heart disease  100 - 129                   [place]  130 - 159                   Borderline high  160 - 189                   High  190 and Above          Very high mg/dL      Hemoglobin A1C, Whole Blood in AM (17 @ 09:29)    Hemoglobin A1C, Whole Blood: 6.8: Method: Immunoassay       Reference Range                4.0-5.6%       High risk (prediabetic)        5.7-6.4%       Diabetic, diagnostic             >=6.5%       ADA diabetic treatment goal       <7.0%  The Hemoglobin A1c reference ranges are based6.8: on the 2010 recommendations  of  The American Diabetes Association.  Interpretation may vary for children  and  adolescent %        RADIOLOGY & ADDITIONAL STUDIES:    EKG: < from: 12 Lead ECG (17 @ 19:39) >  Diagnosis Line Normal sinus rhythm  Normal ECG    < end of copied text >    CTH (images reviewed < from: CT Head No Cont (17 @ 23:27) >  IMPRESSION:    No intracranial hemorrhage, mass effect or large acute cortical infarct.    < end of copied text >

## 2017-06-28 NOTE — PROGRESS NOTE ADULT - PROBLEM SELECTOR PLAN 1
Symptoms appear to be worsened with movement positional. No discernable ear symptoms. BPPV likely. Though acute onset concerning for CVA.  -Meclizine PRN  -will amanda MRI since CT-H is negative   -F/up Neuro (Dr. Bello)

## 2017-06-28 NOTE — H&P ADULT - ATTENDING COMMENTS
81 yo F from home, lives with daughter, PMHx DM II, HTN, HLD, Vertigo, Gastric Cancer s/p gastrectomy in 2015 s/p chemotherapy ~ 8 months ago, s/p open cholecystectomy presents with complaint of dizziness. History obtained from patient (Kaz[place] speaking, poor historian,) and from chart review; resident attempted to call patient's daughter, left a message). Patient reports that yesterday morning she had sudden on onset of dizziness when she got out of bed in the morning. Endorses room spinning sensation. Says she has a history of vertigo. She says she was able to get up and go to the bathroom but then she felt. Denies LOC or head trauma. This prompted her coming to the hospital. Patient endorses lightheadedness. Also says she is having trouble walking and that feel she is leaning to one side (to the right). She denies fevers, chills, nausea, vomiting, chest pain, SOB.    In the ED, noted to be ataxic by ED attending, vss, labs wnl, UA +ve, s/p 1L bolus, s/p IV Zofran and PO Meclizine.    pt seen in bed, vitals stable except for elevated bp on adm, physical exam reveals ataxic gait with right drift, lungs cta b/l, heart s1s2, abd soft nd nt bs+, ext no edema. labs and diagnostic test result reviewed.    assessment  --  ataxic gait 2nd to vbi vs positional vertigo, s/p fall, uti, h/o DM II, HTN, HLD, Vertigo, Gastric Cancer s/p gastrectomy     plan  --  admit to med, meclizine, rocephin, cont preadmit home meds, gi and dvt profilaxis,  cbc, bmp, mg, phos, lipids, tsh, bld cx, ua, ucx, vit b12, folate    mra head and neck    neuro cons

## 2017-06-28 NOTE — CONSULT NOTE ADULT - ASSESSMENT
Vertigo and falling to left concerning for left cerebellar stroke  1.             telemetry   2.             MRI brain, MRA head without contrast, Carotid duplex (CD).  If unable to get MR imaging, please consider CTA head and neck in 24hours (no need for CD in this case).  If the patient is unable to get MR and unable to get IV contrast please repeat the CTH in 24hours and get a CD.  3.             TTE  4.             ASA 81mg and Lipitor 40mg HS  5. LDL goal <70  6.             BP goal of normal  7.             Frequent neurocheck  8.          PT evaluation  9.          STAT CTH IF the patient has sudden change in mental status or neurological exam  10.          DVT PPx    Thank you for the courtesy of this consult.

## 2017-06-28 NOTE — H&P ADULT - PROBLEM SELECTOR PLAN 2
Appears patient may have ataxic gait with R sided drift. Unable to fully confirm with exam.  -F/up Neuro (Dr. Bello)  -F/up MRI-H to r/o CVA and vertebrobasilar insufficiency  -C/w ASA and Statin

## 2017-06-28 NOTE — H&P ADULT - NSHPPHYSICALEXAM_GEN_ALL_CORE
GEN: elderly F, NAD  HEENT: NC AT no nystagmus  CARD: RRR no m r g  RESP: CTAB  ABD: SOFT NT ND +BS  EXT: NO EDEMA  Neuro: grossly non-focal though patient weak and unable to stand 2/2 to dizziness. unable to assess gait.

## 2017-06-28 NOTE — H&P ADULT - PROBLEM SELECTOR PLAN 3
Patient reportedly on metformin at home per pharmacy records, however not confirmed.  -Accuchecks + SSI  -F/up HbA1c  -Primary team to confirm home meds.

## 2017-06-29 ENCOUNTER — TRANSCRIPTION ENCOUNTER (OUTPATIENT)
Age: 82
End: 2017-06-29

## 2017-06-29 LAB
CULTURE RESULTS: SIGNIFICANT CHANGE UP
SPECIMEN SOURCE: SIGNIFICANT CHANGE UP

## 2017-06-29 RX ORDER — METFORMIN HYDROCHLORIDE 850 MG/1
500 TABLET ORAL
Qty: 0 | Refills: 0 | Status: DISCONTINUED | OUTPATIENT
Start: 2017-06-29 | End: 2017-06-30

## 2017-06-29 RX ORDER — CEFUROXIME AXETIL 250 MG
1 TABLET ORAL
Qty: 8 | Refills: 0 | OUTPATIENT
Start: 2017-06-29 | End: 2017-07-03

## 2017-06-29 RX ADMIN — Medication 81 MILLIGRAM(S): at 12:06

## 2017-06-29 RX ADMIN — Medication 1: at 12:06

## 2017-06-29 RX ADMIN — METFORMIN HYDROCHLORIDE 500 MILLIGRAM(S): 850 TABLET ORAL at 18:20

## 2017-06-29 RX ADMIN — PANTOPRAZOLE SODIUM 40 MILLIGRAM(S): 20 TABLET, DELAYED RELEASE ORAL at 06:24

## 2017-06-29 RX ADMIN — SIMVASTATIN 40 MILLIGRAM(S): 20 TABLET, FILM COATED ORAL at 22:25

## 2017-06-29 RX ADMIN — CEFTRIAXONE 100 GRAM(S): 500 INJECTION, POWDER, FOR SOLUTION INTRAMUSCULAR; INTRAVENOUS at 12:06

## 2017-06-29 RX ADMIN — ENOXAPARIN SODIUM 40 MILLIGRAM(S): 100 INJECTION SUBCUTANEOUS at 12:06

## 2017-06-29 NOTE — DISCHARGE NOTE ADULT - PLAN OF CARE
avoid falls Head MRI came back negative for acute stroke. Physical therapy thinks that you are very independent and Symptoms most likely due to vertigo. Don't need any physical therapy at home. Continue with meclizine for dizziness as needed . If symptoms persistent can follow up with outpatient neurologist maintain A1c<7 Your A1c is 6.8 which means your sugars are well controlled. Continue with metformin 500 mg daily maintain BP<140/90 Your BP was under controlled during hospitalization. You can restart your current home Bloor pressure medications and follow up with primary care physician to monitor continue with simvastatin You were found to have an urinary track infection, Continue with oral antibiotics x 3 days. Your BP was under controlled during hospitalization. You can restart your current home Blood pressure medications and follow up with primary care physician to monitor You were found to have an urinary track infection, Continue with oral antibiotics x 4 days. Follow with primary care physician to assess resolutions of symptoms Head MRI came back negative for acute stroke. Physical therapy thinks that you are very independent and Symptoms most likely due to vertigo. Head MRI negative for stroke or acute pathology.  Don't need any physical therapy at home. Continue with meclizine for dizziness as needed . If symptoms persistent can follow up with outpatient neurologist

## 2017-06-29 NOTE — PROGRESS NOTE ADULT - SUBJECTIVE AND OBJECTIVE BOX
Patient is a 82y old  Female who presents with a chief complaint of dizziness (29 Jun 2017 15:26)    pt seen in icu [  ], reg med floor [   ], bed [  ], chair at bedside [   ], a+o x3 [  ], lethargic [  ],  nad [  ]    shea [  ], ngt [  ], peg [  ], et tube [  ], cent line [  ], picc line [  ]        Allergies    No Known Allergies        Vitals    T(F): 98.4 (06-29-17 @ 14:16), Max: 98.4 (06-29-17 @ 14:16)  HR: 73 (06-29-17 @ 14:16) (66 - 83)  BP: 145/67 (06-29-17 @ 14:16) (129/64 - 146/71)  RR: 16 (06-29-17 @ 14:16) (16 - 16)  SpO2: 98% (06-29-17 @ 14:16) (97% - 100%)  Wt(kg): --  CAPILLARY BLOOD GLUCOSE  162 (29 Jun 2017 11:11)          Labs                          12.3   8.5   )-----------( 185      ( 28 Jun 2017 07:14 )             35.9       06-28    143  |  110<H>  |  15  ----------------------------<  104<H>  4.0   |  26  |  0.77    Ca    8.5      28 Jun 2017 07:14  Phos  3.2     06-28  Mg     2.2     06-28    TPro  7.0  /  Alb  3.3<L>  /  TBili  0.3  /  DBili  x   /  AST  26  /  ALT  26  /  AlkPhos  88  06-28    Culture - Urine (06.28.17 @ 09:34)    Specimen Source: .Urine Clean Catch (Midstream)    Culture Results:   <10,000 CFU/ml Normal Urogenital sean present    Culture - Blood in AM (04.03.16 @ 14:47)    Specimen Source: .Blood Blood    Culture Results:   No growth at 5 days.      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 No acute abnormality identified. No   space-occupying lesion seen. No posterior fossa abnormality identified..  2)  mucosal thickening noted in the sinuses. Bilateral mastoid partial   opacification noted. No significant middle ear disease..     < end of copied text >      Meds    MEDICATIONS  (STANDING):  enoxaparin Injectable 40 milliGRAM(s) SubCutaneous daily  insulin lispro (HumaLOG) corrective regimen sliding scale   SubCutaneous Before meals and at bedtime  dextrose 5%. 1000 milliLiter(s) (50 mL/Hr) IV Continuous <Continuous>  dextrose 50% Injectable 12.5 Gram(s) IV Push once  dextrose 50% Injectable 25 Gram(s) IV Push once  dextrose 50% Injectable 25 Gram(s) IV Push once  aspirin  chewable 81 milliGRAM(s) Oral daily  simvastatin 40 milliGRAM(s) Oral at bedtime  pantoprazole    Tablet 40 milliGRAM(s) Oral before breakfast  cefTRIAXone   IVPB 1 Gram(s) IV Intermittent every 24 hours  metFORMIN 500 milliGRAM(s) Oral two times a day with meals      MEDICATIONS  (PRN):  meclizine 25 milliGRAM(s) Oral three times a day PRN Dizziness  dextrose Gel 1 Dose(s) Oral once PRN Blood Glucose LESS THAN 70 milliGRAM(s)/deciLiter  glucagon  Injectable 1 milliGRAM(s) IntraMuscular once PRN Glucose <70 milliGRAM(s)/deciLiter      Physical Exam    Neuro :  no focal deficits  Respiratory: CTA B/L  CV: RRR, S1S2, no murmurs,   Abdominal: Soft, NT, ND +BS,  Extremities: No edema, + peripheral pulses    ASSESSMENT    Ataxic gait 2nd to vbi vs positional vertigo,   s/p fall,   uti  sinusitis,   h/o Vertigo  Gastric adenocarcinoma  Hypercholesteremia  DM (diabetes mellitus)  HTN (hypertension)  S/P tubal ligation  S/P hysterectomy      PLAN    neuro f/u   f/u echo  cont ASA 81mg and Lipitor 40mg HS  PT evaluation  f/u mra  cont rocephin  ucx witn norm sean noted above  bld cx neg noted above  cont current meds

## 2017-06-29 NOTE — DISCHARGE NOTE ADULT - MEDICATION SUMMARY - MEDICATIONS TO STOP TAKING
I will STOP taking the medications listed below when I get home from the hospital:    insulin glargine 100 units/mL subcutaneous solution  -- 15 unit(s) subcutaneous once a day    metoprolol tartrate 25 mg oral tablet  -- 1 tab(s) by mouth 2 times a day  hold if sbp<110 or hr <55    glimepiride  --  by mouth    metroNIDAZOLE 500 mg oral tablet  -- 1 tab(s) by mouth every 8 hours

## 2017-06-29 NOTE — DISCHARGE NOTE ADULT - MEDICATION SUMMARY - MEDICATIONS TO TAKE
I will START or STAY ON the medications listed below when I get home from the hospital:    aspirin 81 mg oral tablet, chewable  -- 1 tab(s) by mouth once a day  -- Indication: For Need for prophylactic measure    lisinopril  --  by mouth   -- Indication: For Hypertension     METFORMIN    TAB 500MG  -- Indication: For Diabetes    MECLIZINE    TAB 25MG  -- Indication: For Dizziness    SIMVASTATIN  TAB 40MG  -- Indication: For Need for prophylactic measure    ferrous gluconate 325 mg oral tablet  --  by mouth 2 times a day  -- Indication: For Anemia    pantoprazole 40 mg oral delayed release tablet  -- 1 tab(s) by mouth once a day (before a meal)  -- Indication: For Need for prophylactic measure I will START or STAY ON the medications listed below when I get home from the hospital:    aspirin 81 mg oral tablet, chewable  -- 1 tab(s) by mouth once a day  -- Indication: For Need for prophylactic measure    lisinopril  --  by mouth   -- Indication: For Hypertension     METFORMIN    TAB 500MG  -- Indication: For Diabetes    MECLIZINE    TAB 25MG  -- Indication: For Dizziness    SIMVASTATIN  TAB 40MG  -- Indication: For Need for prophylactic measure    cefuroxime 500 mg oral tablet  -- 1 tab(s) by mouth every 12 hours  -- Indication: For UTI    ferrous gluconate 325 mg oral tablet  --  by mouth 2 times a day  -- Indication: For Anemia    pantoprazole 40 mg oral delayed release tablet  -- 1 tab(s) by mouth once a day (before a meal)  -- Indication: For Need for prophylactic measure

## 2017-06-29 NOTE — DISCHARGE NOTE ADULT - CARE PLAN
Principal Discharge DX:	Ataxia  Goal:	avoid falls  Instructions for follow-up, activity and diet:	Head MRI came back negative for acute stroke. Physical therapy thinks that you are very independent and Symptoms most likely due to vertigo. Don't need any physical therapy at home. Continue with meclizine for dizziness as needed . If symptoms persistent can follow up with outpatient neurologist  Secondary Diagnosis:	DM (diabetes mellitus)  Goal:	maintain A1c<7  Instructions for follow-up, activity and diet:	Your A1c is 6.8 which means your sugars are well controlled. Continue with metformin 500 mg daily  Secondary Diagnosis:	HTN (hypertension)  Goal:	maintain BP<140/90  Instructions for follow-up, activity and diet:	Your BP was under controlled during hospitalization. You can restart your current home [place] pressure medications and follow up with primary care physician to monitor  Secondary Diagnosis:	Hypercholesteremia  Instructions for follow-up, activity and diet:	continue with simvastatin  Secondary Diagnosis:	Urinary tract infection  Instructions for follow-up, activity and diet:	You were found to have an urinary track infection, Continue with oral antibiotics x 3 days. Principal Discharge DX:	Ataxia  Goal:	avoid falls  Instructions for follow-up, activity and diet:	Head MRI came back negative for acute stroke. Physical therapy thinks that you are very independent and Symptoms most likely due to vertigo. Don't need any physical therapy at home. Continue with meclizine for dizziness as needed . If symptoms persistent can follow up with outpatient neurologist  Secondary Diagnosis:	DM (diabetes mellitus)  Goal:	maintain A1c<7  Instructions for follow-up, activity and diet:	Your A1c is 6.8 which means your sugars are well controlled. Continue with metformin 500 mg daily  Secondary Diagnosis:	HTN (hypertension)  Goal:	maintain BP<140/90  Instructions for follow-up, activity and diet:	Your BP was under controlled during hospitalization. You can restart your current home Blood pressure medications and follow up with primary care physician to monitor  Secondary Diagnosis:	Hypercholesteremia  Instructions for follow-up, activity and diet:	continue with simvastatin  Secondary Diagnosis:	Urinary tract infection  Instructions for follow-up, activity and diet:	You were found to have an urinary track infection, Continue with oral antibiotics x 4 days. Follow with primary care physician to assess resolutions of symptoms Principal Discharge DX:	Ataxia  Goal:	avoid falls  Instructions for follow-up, activity and diet:	Head MRI came back negative for acute stroke. Physical therapy thinks that you are very independent and Symptoms most likely due to vertigo. Head MRI negative for stroke or acute pathology.  Don't need any physical therapy at home. Continue with meclizine for dizziness as needed . If symptoms persistent can follow up with outpatient neurologist  Secondary Diagnosis:	DM (diabetes mellitus)  Goal:	maintain A1c<7  Instructions for follow-up, activity and diet:	Your A1c is 6.8 which means your sugars are well controlled. Continue with metformin 500 mg daily  Secondary Diagnosis:	HTN (hypertension)  Goal:	maintain BP<140/90  Instructions for follow-up, activity and diet:	Your BP was under controlled during hospitalization. You can restart your current home Blood pressure medications and follow up with primary care physician to monitor  Secondary Diagnosis:	Hypercholesteremia  Instructions for follow-up, activity and diet:	continue with simvastatin  Secondary Diagnosis:	Urinary tract infection  Instructions for follow-up, activity and diet:	You were found to have an urinary track infection, Continue with oral antibiotics x 4 days. Follow with primary care physician to assess resolutions of symptoms

## 2017-06-29 NOTE — PROGRESS NOTE ADULT - SUBJECTIVE AND OBJECTIVE BOX
Patient is a 82y old  Female who presents with a chief complaint of dizziness (2017 15:26)      INTERVAL HPI/OVERNIGHT EVENTS:    dizziness improving       MEDICATIONS  (STANDING):  enoxaparin Injectable 40 milliGRAM(s) SubCutaneous daily  insulin lispro (HumaLOG) corrective regimen sliding scale   SubCutaneous Before meals and at bedtime  dextrose 5%. 1000 milliLiter(s) (50 mL/Hr) IV Continuous <Continuous>  dextrose 50% Injectable 12.5 Gram(s) IV Push once  dextrose 50% Injectable 25 Gram(s) IV Push once  dextrose 50% Injectable 25 Gram(s) IV Push once  aspirin  chewable 81 milliGRAM(s) Oral daily  simvastatin 40 milliGRAM(s) Oral at bedtime  pantoprazole    Tablet 40 milliGRAM(s) Oral before breakfast  cefTRIAXone   IVPB 1 Gram(s) IV Intermittent every 24 hours  metFORMIN 500 milliGRAM(s) Oral two times a day with meals    MEDICATIONS  (PRN):  meclizine 25 milliGRAM(s) Oral three times a day PRN Dizziness  dextrose Gel 1 Dose(s) Oral once PRN Blood Glucose LESS THAN 70 milliGRAM(s)/deciLiter  glucagon  Injectable 1 milliGRAM(s) IntraMuscular once PRN Glucose <70 milliGRAM(s)/deciLiter      Allergies    No Known Allergies    Intolerances        Vital Signs Last 24 Hrs  T(C): 36.9 (2017 14:16), Max: 36.9 (2017 14:16)  T(F): 98.4 (2017 14:16), Max: 98.4 (2017 14:16)  HR: 73 (2017 14:16) (66 - 83)  BP: 145/67 (2017 14:16) (129/64 - 146/71)  BP(mean): --  RR: 16 (2017 14:16) (16 - 16)  SpO2: 98% (2017 14:16) (97% - 100%)    PHYSICAL EXAM:  GENERAL: NAD, well-groomed, well-developed  HEENT: Supple, No JVD, Normal thyroid  NERVOUS SYSTEM:  Alert & Oriented X3  CHEST/LUNG: Clear to percussion bilaterally; No rales, rhonchi, wheezing, or rubs  HEART: Regular rate and rhythm; No murmurs, rubs, or gallops  ABDOMEN: Soft, Nontender, Nondistended; Bowel sounds present  EXTREMITIES:  No clubbing, cyanosis, or edema  SKIN: no rashes      LABS:                        12.3   8.5   )-----------( 185      ( 2017 07:14 )             35.9         143  |  110<H>  |  15  ----------------------------<  104<H>  4.0   |  26  |  0.77    Ca    8.5      2017 07:14  Phos  3.2       Mg     2.2         TPro  7.0  /  Alb  3.3<L>  /  TBili  0.3  /  DBili  x   /  AST  26  /  ALT  26  /  AlkPhos  88      PT/INR - ( 2017 23:47 )   PT: 11.2 sec;   INR: 1.03 ratio         PTT - ( 2017 23:47 )  PTT:25.4 sec  Urinalysis Basic - ( 2017 23:47 )    Color: Yellow / Appearance: Clear / S.015 / pH: x  Gluc: x / Ketone: Negative  / Bili: Negative / Urobili: Negative   Blood: x / Protein: Negative / Nitrite: Negative   Leuk Esterase: Small / RBC: 0-2 /HPF / WBC 6-10 /HPF   Sq Epi: x / Non Sq Epi: Few / Bacteria: Few /HPF      CAPILLARY BLOOD GLUCOSE  162 (2017 11:11)  125 (2017 08:42)  242 (2017 21:47)  145 (2017 16:33)          RADIOLOGY & ADDITIONAL TESTS:    Imaging Personally Reviewed:  [ ] YES  [ ] NO    Consultant(s) Notes Reviewed:  [ +] YES  [ ] NO    Care Discussed with Consultants/Other Providers [ +] YES  [ ] NO    Plan of Care discussed with Attending/PGY2 [+ ]YES [ ] NO

## 2017-06-29 NOTE — DISCHARGE NOTE ADULT - HOSPITAL COURSE
83 yo F from home, lives with daughter, PMHx DM II, HTN, HLD, Vertigo, Gastric Cancer s/p gastrectomy in 2015 s/p chemotherapy ~ 8 months ago, s/p open cholecystectomy presents with complaint of dizziness. Patient reports that yesterday morning she had sudden on onset of dizziness when she got out of bed in the morning. Endorses room spinning sensation. Says she has a history of vertigo. She says she was able to get up and go to the bathroom but then she felt. Denies LOC or head trauma. This prompted her coming to the hospital. Patient endorses lightheadedness. Also says she is having trouble walking and that feel she is leaning to one side (to the right). She denies fevers, chills, nausea, vomiting, chest pain, SOB.In the ED, noted to be ataxic by ED attending, vss, labs wnl, UA +ve, s/p 1L bolus, s/p IV Zofran and PO Meclizine. Admitted for further work up and rule out stroke     Problem/Plan - 1:  ·  Problem: Vertigo.  Plan: Symptoms appear to be worsened with movement positional. No discernable ear symptoms. BPPV likely. Though acute onset concerning for CVA Head CT and Head MRI was done which came back negative for acute pathology    -Meclizine PRN  - Physical therapy says no need for PT at home   -c/w ASA and statins           Problem/Plan - 2:  ·  Problem: DM (diabetes mellitus).    Plan:A1c: 6.8   c/w  metformin       Problem/Plan - 4:  ·  Problem: HTN (hypertension).    BP controlled during admission  Follow up with primary care physician to monitor     Problem/Plan - 5:  ·  Problem: Hypercholesteremia.   -C/w simvastatin      Problem/Plan - 7:  ·  Problem: Urinary tract infection.    c/w Ceftin 500 mg BID x 3 days      .

## 2017-06-29 NOTE — DISCHARGE NOTE ADULT - PATIENT PORTAL LINK FT
“You can access the [facility] Patient Portal, offered by [facility], by registering with the following website: [email or web address]”

## 2017-06-29 NOTE — PROGRESS NOTE ADULT - PROBLEM SELECTOR PLAN 1
Dizziness has improved   Symptoms appear to be worsened with movement positional. No discernable ear symptoms. BPPV likely.   Though acute onset concerning for CVA. -Head MRI negative for stroke   -Meclizine PRN   Neuro eval noted. c/w ASA and statins. no need for CD  PT recommended home Dizziness has improved   Symptoms appear to be worsened with movement positional. No discernable ear symptoms. BPPV likely.   Though acute onset concerning for CVA. -Head MRI negative for stroke   -Meclizine PRN   Neuro eval noted. c/w ASA and statins  PT recommended home

## 2017-06-29 NOTE — PHYSICAL THERAPY INITIAL EVALUATION ADULT - PERTINENT HX OF CURRENT PROBLEM, REHAB EVAL
Patient was brought to ED from home secondary to feeling dizzy.MRI Brain negative for acute findings

## 2017-06-30 VITALS
RESPIRATION RATE: 16 BRPM | OXYGEN SATURATION: 98 % | SYSTOLIC BLOOD PRESSURE: 128 MMHG | TEMPERATURE: 98 F | HEART RATE: 73 BPM | DIASTOLIC BLOOD PRESSURE: 61 MMHG

## 2017-06-30 PROCEDURE — 93880 EXTRACRANIAL BILAT STUDY: CPT | Mod: 26

## 2017-06-30 PROCEDURE — 70544 MR ANGIOGRAPHY HEAD W/O DYE: CPT | Mod: 26

## 2017-06-30 RX ORDER — CEFUROXIME AXETIL 250 MG
1 TABLET ORAL
Qty: 8 | Refills: 0 | OUTPATIENT
Start: 2017-06-30 | End: 2017-07-04

## 2017-06-30 RX ADMIN — Medication 25 MILLIGRAM(S): at 06:14

## 2017-06-30 RX ADMIN — METFORMIN HYDROCHLORIDE 500 MILLIGRAM(S): 850 TABLET ORAL at 08:33

## 2017-06-30 RX ADMIN — CEFTRIAXONE 100 GRAM(S): 500 INJECTION, POWDER, FOR SOLUTION INTRAMUSCULAR; INTRAVENOUS at 11:53

## 2017-06-30 RX ADMIN — Medication 81 MILLIGRAM(S): at 11:53

## 2017-06-30 RX ADMIN — ENOXAPARIN SODIUM 40 MILLIGRAM(S): 100 INJECTION SUBCUTANEOUS at 11:53

## 2017-06-30 RX ADMIN — PANTOPRAZOLE SODIUM 40 MILLIGRAM(S): 20 TABLET, DELAYED RELEASE ORAL at 06:15

## 2017-06-30 NOTE — PROGRESS NOTE ADULT - SUBJECTIVE AND OBJECTIVE BOX
Patient is a 82y old  Female who presents with a chief complaint of dizziness (29 Jun 2017 15:26)    pt seen in icu [  ], reg med floor [  x ], bed [ x ], chair at bedside [   ], a+o x3 [x  ], lethargic [  ],  nad [x  ]    Allergies    No Known Allergies        Vitals    T(F): 98.6 (06-30-17 @ 05:54), Max: 98.6 (06-30-17 @ 05:54)  HR: 68 (06-30-17 @ 05:54) (65 - 73)  BP: 128/65 (06-30-17 @ 05:54) (128/65 - 145/67)  RR: 16 (06-30-17 @ 05:54) (16 - 16)  SpO2: 98% (06-30-17 @ 05:54) (98% - 98%)  Wt(kg): --  CAPILLARY BLOOD GLUCOSE  125 (30 Jun 2017 12:18)    Labs    Radiology Results      Meds    MEDICATIONS  (STANDING):  enoxaparin Injectable 40 milliGRAM(s) SubCutaneous daily  insulin lispro (HumaLOG) corrective regimen sliding scale   SubCutaneous Before meals and at bedtime  dextrose 5%. 1000 milliLiter(s) (50 mL/Hr) IV Continuous <Continuous>  dextrose 50% Injectable 12.5 Gram(s) IV Push once  dextrose 50% Injectable 25 Gram(s) IV Push once  dextrose 50% Injectable 25 Gram(s) IV Push once  aspirin  chewable 81 milliGRAM(s) Oral daily  simvastatin 40 milliGRAM(s) Oral at bedtime  pantoprazole    Tablet 40 milliGRAM(s) Oral before breakfast  cefTRIAXone   IVPB 1 Gram(s) IV Intermittent every 24 hours  metFORMIN 500 milliGRAM(s) Oral two times a day with meals      MEDICATIONS  (PRN):  meclizine 25 milliGRAM(s) Oral three times a day PRN Dizziness  dextrose Gel 1 Dose(s) Oral once PRN Blood Glucose LESS THAN 70 milliGRAM(s)/deciLiter  glucagon  Injectable 1 milliGRAM(s) IntraMuscular once PRN Glucose <70 milliGRAM(s)/deciLiter      Physical Exam    Neuro :  no focal deficits  Respiratory: CTA B/L  CV: RRR, S1S2, no murmurs,   Abdominal: Soft, NT, ND +BS,  Extremities: No edema, + peripheral pulses    ASSESSMENT    Ataxic gait 2nd to vbi vs positional vertigo,   s/p fall,   uti  sinusitis,   h/o Vertigo  Gastric adenocarcinoma  Hypercholesteremia  DM (diabetes mellitus)  HTN (hypertension)  S/P tubal ligation  S/P hysterectomy      PLAN    neuro f/u   f/u echo  cont ASA 81mg and Lipitor 40mg HS  PT evaluation  f/u mra  cont rocephin  ucx witn norm sean noted above  bld cx neg noted above  cont current meds Patient is a 82y old  Female who presents with a chief complaint of dizziness (29 Jun 2017 15:26)    pt seen in icu [  ], reg med floor [  x ], bed [ x ], chair at bedside [   ], a+o x3 [x  ], lethargic [  ],  nad [x  ]    Allergies    No Known Allergies        Vitals    T(F): 98.6 (06-30-17 @ 05:54), Max: 98.6 (06-30-17 @ 05:54)  HR: 68 (06-30-17 @ 05:54) (65 - 73)  BP: 128/65 (06-30-17 @ 05:54) (128/65 - 145/67)  RR: 16 (06-30-17 @ 05:54) (16 - 16)  SpO2: 98% (06-30-17 @ 05:54) (98% - 98%)  Wt(kg): --  CAPILLARY BLOOD GLUCOSE  125 (30 Jun 2017 12:18)    Labs    Radiology Results    < from: MRA Head w/o Cont (06.30.17 @ 13:32) >  There is no flow disturbance that would imply stenosis or aneurysm on   either side. The anterior, posterior and middle cerebral circulation   appears roughly symmetric.    Impression:  Normal study.    < end of copied text >        Meds    MEDICATIONS  (STANDING):  enoxaparin Injectable 40 milliGRAM(s) SubCutaneous daily  insulin lispro (HumaLOG) corrective regimen sliding scale   SubCutaneous Before meals and at bedtime  dextrose 5%. 1000 milliLiter(s) (50 mL/Hr) IV Continuous <Continuous>  dextrose 50% Injectable 12.5 Gram(s) IV Push once  dextrose 50% Injectable 25 Gram(s) IV Push once  dextrose 50% Injectable 25 Gram(s) IV Push once  aspirin  chewable 81 milliGRAM(s) Oral daily  simvastatin 40 milliGRAM(s) Oral at bedtime  pantoprazole    Tablet 40 milliGRAM(s) Oral before breakfast  cefTRIAXone   IVPB 1 Gram(s) IV Intermittent every 24 hours  metFORMIN 500 milliGRAM(s) Oral two times a day with meals      MEDICATIONS  (PRN):  meclizine 25 milliGRAM(s) Oral three times a day PRN Dizziness  dextrose Gel 1 Dose(s) Oral once PRN Blood Glucose LESS THAN 70 milliGRAM(s)/deciLiter  glucagon  Injectable 1 milliGRAM(s) IntraMuscular once PRN Glucose <70 milliGRAM(s)/deciLiter      Physical Exam    Neuro :  no focal deficits  Respiratory: CTA B/L  CV: RRR, S1S2, no murmurs,   Abdominal: Soft, NT, ND +BS,  Extremities: No edema, + peripheral pulses    ASSESSMENT    Ataxic gait 2nd to vbi vs positional vertigo,   s/p fall,   uti  sinusitis,   h/o Vertigo  Gastric adenocarcinoma  Hypercholesteremia  DM (diabetes mellitus)  HTN (hypertension)  S/P tubal ligation  S/P hysterectomy      PLAN    neuro f/u   f/u echo  cont ASA 81mg and Lipitor 40mg HS  PT evaluation  f/u mra  d/c rocephin and start ceftin 500 q12 to complete 7 days  ucx witn norm sean noted above  bld cx neg noted above  cont current meds Patient is a 82y old  Female who presents with a chief complaint of dizziness (29 Jun 2017 15:26)    pt seen in icu [  ], reg med floor [  x ], bed [ x ], chair at bedside [   ], a+o x3 [x  ], lethargic [  ],  nad [x  ]    Allergies    No Known Allergies        Vitals    T(F): 98.6 (06-30-17 @ 05:54), Max: 98.6 (06-30-17 @ 05:54)  HR: 68 (06-30-17 @ 05:54) (65 - 73)  BP: 128/65 (06-30-17 @ 05:54) (128/65 - 145/67)  RR: 16 (06-30-17 @ 05:54) (16 - 16)  SpO2: 98% (06-30-17 @ 05:54) (98% - 98%)  Wt(kg): --  CAPILLARY BLOOD GLUCOSE  125 (30 Jun 2017 12:18)    Labs    Radiology Results    < from: MRA Head w/o Cont (06.30.17 @ 13:32) >  There is no flow disturbance that would imply stenosis or aneurysm on   either side. The anterior, posterior and middle cerebral circulation   appears roughly symmetric.    Impression:  Normal study.    < end of copied text >        Meds    MEDICATIONS  (STANDING):  enoxaparin Injectable 40 milliGRAM(s) SubCutaneous daily  insulin lispro (HumaLOG) corrective regimen sliding scale   SubCutaneous Before meals and at bedtime  dextrose 5%. 1000 milliLiter(s) (50 mL/Hr) IV Continuous <Continuous>  dextrose 50% Injectable 12.5 Gram(s) IV Push once  dextrose 50% Injectable 25 Gram(s) IV Push once  dextrose 50% Injectable 25 Gram(s) IV Push once  aspirin  chewable 81 milliGRAM(s) Oral daily  simvastatin 40 milliGRAM(s) Oral at bedtime  pantoprazole    Tablet 40 milliGRAM(s) Oral before breakfast  cefTRIAXone   IVPB 1 Gram(s) IV Intermittent every 24 hours  metFORMIN 500 milliGRAM(s) Oral two times a day with meals      MEDICATIONS  (PRN):  meclizine 25 milliGRAM(s) Oral three times a day PRN Dizziness  dextrose Gel 1 Dose(s) Oral once PRN Blood Glucose LESS THAN 70 milliGRAM(s)/deciLiter  glucagon  Injectable 1 milliGRAM(s) IntraMuscular once PRN Glucose <70 milliGRAM(s)/deciLiter      Physical Exam    Neuro :  no focal deficits  Respiratory: CTA B/L  CV: RRR, S1S2, no murmurs,   Abdominal: Soft, NT, ND +BS,  Extremities: No edema, + peripheral pulses    ASSESSMENT    Ataxic gait 2nd to vbi vs positional vertigo,   s/p fall,   uti  sinusitis,   h/o Vertigo  Gastric adenocarcinoma  Hypercholesteremia  DM (diabetes mellitus)  HTN (hypertension)  S/P tubal ligation  S/P hysterectomy      PLAN    neuro f/u   f/u echo  cont ASA 81mg and Lipitor 40mg HS  PT evaluation  f/u mra  d/c rocephin and start ceftin 500 q12 to complete 7 days  ucx witn norm sean noted above  bld cx neg noted above  cont current meds  d/c planning with disposition as per phys tx eval

## 2017-06-30 NOTE — PROGRESS NOTE ADULT - SUBJECTIVE AND OBJECTIVE BOX
Patient is a 82y old  Female who presents with a chief complaint of dizziness (29 Jun 2017 15:26)      INTERVAL HPI/OVERNIGHT EVENTS:    MEDICATIONS  (STANDING):  enoxaparin Injectable 40 milliGRAM(s) SubCutaneous daily  insulin lispro (HumaLOG) corrective regimen sliding scale   SubCutaneous Before meals and at bedtime  dextrose 5%. 1000 milliLiter(s) (50 mL/Hr) IV Continuous <Continuous>  dextrose 50% Injectable 12.5 Gram(s) IV Push once  dextrose 50% Injectable 25 Gram(s) IV Push once  dextrose 50% Injectable 25 Gram(s) IV Push once  aspirin  chewable 81 milliGRAM(s) Oral daily  simvastatin 40 milliGRAM(s) Oral at bedtime  pantoprazole    Tablet 40 milliGRAM(s) Oral before breakfast  cefTRIAXone   IVPB 1 Gram(s) IV Intermittent every 24 hours  metFORMIN 500 milliGRAM(s) Oral two times a day with meals    MEDICATIONS  (PRN):  meclizine 25 milliGRAM(s) Oral three times a day PRN Dizziness  dextrose Gel 1 Dose(s) Oral once PRN Blood Glucose LESS THAN 70 milliGRAM(s)/deciLiter  glucagon  Injectable 1 milliGRAM(s) IntraMuscular once PRN Glucose <70 milliGRAM(s)/deciLiter      Allergies    No Known Allergies    Intolerances        Vital Signs Last 24 Hrs  T(C): 37 (30 Jun 2017 05:54), Max: 37 (30 Jun 2017 05:54)  T(F): 98.6 (30 Jun 2017 05:54), Max: 98.6 (30 Jun 2017 05:54)  HR: 68 (30 Jun 2017 05:54) (65 - 73)  BP: 128/65 (30 Jun 2017 05:54) (128/65 - 145/67)  BP(mean): --  RR: 16 (30 Jun 2017 05:54) (16 - 16)  SpO2: 98% (30 Jun 2017 05:54) (98% - 98%)    PHYSICAL EXAM:  GENERAL: NAD, well-groomed, well-developed  HEENT: Supple, No JVD, Normal thyroid  NERVOUS SYSTEM:  Alert & Oriented X3  CHEST/LUNG: Clear to percussion bilaterally; No rales, rhonchi, wheezing, or rubs  HEART: Regular rate and rhythm; No murmurs, rubs, or gallops  ABDOMEN: Soft, Nontender, Nondistended; Bowel sounds present  EXTREMITIES:   No clubbing, cyanosis, or edema  SKIN: no rashes      LABS:              CAPILLARY BLOOD GLUCOSE  125 (30 Jun 2017 12:18)  127 (30 Jun 2017 07:33)  126 (29 Jun 2017 21:42)  100 (29 Jun 2017 17:00)          RADIOLOGY & ADDITIONAL TESTS:    Imaging Personally Reviewed:  [ ] YES  [ ] NO    Consultant(s) Notes Reviewed:  [ ] YES  [ ] NO    Care Discussed with Consultants/Other Providers [ ] YES  [ ] NO    Plan of Care discussed with Attending/PGY2 [ ]YES [ ] NO

## 2017-06-30 NOTE — PROGRESS NOTE ADULT - PROBLEM SELECTOR PLAN 1
Dizziness has improved   Symptoms appear to be worsened with movement positional. No discernable ear symptoms. BPPV likely.   Though acute onset concerning for CVA. -Head MRI negative for stroke   -Meclizine PRN   Neuro eval noted. c/w ASA and statins  PT recommended home  f/u MRA results   CD and echo pending

## 2017-06-30 NOTE — PROGRESS NOTE ADULT - PROBLEM SELECTOR PLAN 3
Maintain BP<140/90  -Monitor BP
Maintain BP<140/90  -Monitor BP
Patient reportedly on metformin at home per pharmacy records  - c/w HSS  -f/u A1c

## 2017-06-30 NOTE — PROGRESS NOTE ADULT - PROBLEM SELECTOR PLAN 5
appears stable s/p gastrectomy.  - s/p chemo
appears stable s/p gastrectomy.  - s/p chemo
C/w simvastatin

## 2017-06-30 NOTE — PROGRESS NOTE ADULT - PROBLEM SELECTOR PLAN 7
DVT IMPROVE 2: Lovenox SubQ
DVT IMPROVE 2: Lovenox SubQ
marginally +ve UA. No other signs of infection  -F/up UCx + BCx  -IV Ceftriaxone

## 2017-07-03 DIAGNOSIS — I10 ESSENTIAL (PRIMARY) HYPERTENSION: ICD-10-CM

## 2017-07-03 DIAGNOSIS — Z90.710 ACQUIRED ABSENCE OF BOTH CERVIX AND UTERUS: ICD-10-CM

## 2017-07-03 DIAGNOSIS — Z92.21 PERSONAL HISTORY OF ANTINEOPLASTIC CHEMOTHERAPY: ICD-10-CM

## 2017-07-03 DIAGNOSIS — N39.0 URINARY TRACT INFECTION, SITE NOT SPECIFIED: ICD-10-CM

## 2017-07-03 DIAGNOSIS — Z90.3 ACQUIRED ABSENCE OF STOMACH [PART OF]: ICD-10-CM

## 2017-07-03 DIAGNOSIS — Z79.4 LONG TERM (CURRENT) USE OF INSULIN: ICD-10-CM

## 2017-07-03 DIAGNOSIS — Z90.49 ACQUIRED ABSENCE OF OTHER SPECIFIED PARTS OF DIGESTIVE TRACT: ICD-10-CM

## 2017-07-03 DIAGNOSIS — Z85.028 PERSONAL HISTORY OF OTHER MALIGNANT NEOPLASM OF STOMACH: ICD-10-CM

## 2017-07-03 DIAGNOSIS — H81.10 BENIGN PAROXYSMAL VERTIGO, UNSPECIFIED EAR: ICD-10-CM

## 2017-07-03 DIAGNOSIS — Z79.84 LONG TERM (CURRENT) USE OF ORAL HYPOGLYCEMIC DRUGS: ICD-10-CM

## 2017-07-03 DIAGNOSIS — E11.9 TYPE 2 DIABETES MELLITUS WITHOUT COMPLICATIONS: ICD-10-CM

## 2017-07-03 DIAGNOSIS — E78.5 HYPERLIPIDEMIA, UNSPECIFIED: ICD-10-CM

## 2017-07-03 DIAGNOSIS — Z79.82 LONG TERM (CURRENT) USE OF ASPIRIN: ICD-10-CM

## 2017-07-03 DIAGNOSIS — E78.00 PURE HYPERCHOLESTEROLEMIA, UNSPECIFIED: ICD-10-CM

## 2017-07-03 LAB
CULTURE RESULTS: SIGNIFICANT CHANGE UP
CULTURE RESULTS: SIGNIFICANT CHANGE UP
SPECIMEN SOURCE: SIGNIFICANT CHANGE UP
SPECIMEN SOURCE: SIGNIFICANT CHANGE UP

## 2017-07-23 PROCEDURE — 80061 LIPID PANEL: CPT

## 2017-07-23 PROCEDURE — 36415 COLL VENOUS BLD VENIPUNCTURE: CPT

## 2017-07-23 PROCEDURE — 96374 THER/PROPH/DIAG INJ IV PUSH: CPT

## 2017-07-23 PROCEDURE — 87040 BLOOD CULTURE FOR BACTERIA: CPT

## 2017-07-23 PROCEDURE — 82306 VITAMIN D 25 HYDROXY: CPT

## 2017-07-23 PROCEDURE — 84100 ASSAY OF PHOSPHORUS: CPT

## 2017-07-23 PROCEDURE — 82746 ASSAY OF FOLIC ACID SERUM: CPT

## 2017-07-23 PROCEDURE — 83036 HEMOGLOBIN GLYCOSYLATED A1C: CPT

## 2017-07-23 PROCEDURE — 83735 ASSAY OF MAGNESIUM: CPT

## 2017-07-23 PROCEDURE — 93005 ELECTROCARDIOGRAM TRACING: CPT

## 2017-07-23 PROCEDURE — 85610 PROTHROMBIN TIME: CPT

## 2017-07-23 PROCEDURE — 81001 URINALYSIS AUTO W/SCOPE: CPT

## 2017-07-23 PROCEDURE — 93880 EXTRACRANIAL BILAT STUDY: CPT

## 2017-07-23 PROCEDURE — 99285 EMERGENCY DEPT VISIT HI MDM: CPT | Mod: 25

## 2017-07-23 PROCEDURE — 70450 CT HEAD/BRAIN W/O DYE: CPT

## 2017-07-23 PROCEDURE — 84443 ASSAY THYROID STIM HORMONE: CPT

## 2017-07-23 PROCEDURE — 85027 COMPLETE CBC AUTOMATED: CPT

## 2017-07-23 PROCEDURE — 80053 COMPREHEN METABOLIC PANEL: CPT

## 2017-07-23 PROCEDURE — 70544 MR ANGIOGRAPHY HEAD W/O DYE: CPT

## 2017-07-23 PROCEDURE — 71045 X-RAY EXAM CHEST 1 VIEW: CPT

## 2017-07-23 PROCEDURE — 82607 VITAMIN B-12: CPT

## 2017-07-23 PROCEDURE — 85730 THROMBOPLASTIN TIME PARTIAL: CPT

## 2017-07-23 PROCEDURE — 87086 URINE CULTURE/COLONY COUNT: CPT

## 2017-07-23 PROCEDURE — 70551 MRI BRAIN STEM W/O DYE: CPT

## 2018-05-15 ENCOUNTER — EMERGENCY (EMERGENCY)
Facility: HOSPITAL | Age: 83
LOS: 1 days | Discharge: HOME | End: 2018-05-15
Attending: EMERGENCY MEDICINE
Payer: MEDICAID

## 2018-05-15 VITALS
TEMPERATURE: 98 F | HEIGHT: 59 IN | RESPIRATION RATE: 20 BRPM | HEART RATE: 80 BPM | WEIGHT: 130.07 LBS | OXYGEN SATURATION: 98 % | SYSTOLIC BLOOD PRESSURE: 157 MMHG | DIASTOLIC BLOOD PRESSURE: 75 MMHG

## 2018-05-15 DIAGNOSIS — Z98.51 TUBAL LIGATION STATUS: Chronic | ICD-10-CM

## 2018-05-15 DIAGNOSIS — Z90.710 ACQUIRED ABSENCE OF BOTH CERVIX AND UTERUS: Chronic | ICD-10-CM

## 2018-05-15 PROCEDURE — 73610 X-RAY EXAM OF ANKLE: CPT

## 2018-05-15 PROCEDURE — 73610 X-RAY EXAM OF ANKLE: CPT | Mod: 26,RT

## 2018-05-15 PROCEDURE — 73630 X-RAY EXAM OF FOOT: CPT

## 2018-05-15 PROCEDURE — 99284 EMERGENCY DEPT VISIT MOD MDM: CPT | Mod: 25

## 2018-05-15 PROCEDURE — 73630 X-RAY EXAM OF FOOT: CPT | Mod: 26,RT

## 2018-05-15 PROCEDURE — 99284 EMERGENCY DEPT VISIT MOD MDM: CPT

## 2018-05-15 RX ORDER — IBUPROFEN 200 MG
400 TABLET ORAL ONCE
Qty: 0 | Refills: 0 | Status: COMPLETED | OUTPATIENT
Start: 2018-05-15 | End: 2018-05-15

## 2018-05-15 RX ADMIN — Medication 400 MILLIGRAM(S): at 18:41

## 2018-05-15 NOTE — ED PROVIDER NOTE - MUSCULOSKELETAL, MLM
Spine appears normal, range of motion is not limited, Rt foot-dorsal/lat tenderness to palp with swelling, Rt lat ankle-sl tenderness, pulses intact, Tai-neg

## 2018-05-15 NOTE — ED PROVIDER NOTE - PROGRESS NOTE DETAILS
x-ray-no fracture, will d/c home with aircast & send home with son in law.  Advised to f/u with ortho

## 2018-05-15 NOTE — ED PROVIDER NOTE - PMH
Dementia    DM (diabetes mellitus)    Gastric adenocarcinoma  s/p resection  HTN (hypertension)    Hypercholesteremia    Vertigo

## 2018-05-23 ENCOUNTER — TRANSCRIPTION ENCOUNTER (OUTPATIENT)
Age: 83
End: 2018-05-23

## 2018-05-23 ENCOUNTER — INPATIENT (INPATIENT)
Facility: HOSPITAL | Age: 83
LOS: 1 days | Discharge: HOME | DRG: 854 | End: 2018-05-25
Attending: INTERNAL MEDICINE | Admitting: INTERNAL MEDICINE
Payer: MEDICAID

## 2018-05-23 VITALS
DIASTOLIC BLOOD PRESSURE: 63 MMHG | SYSTOLIC BLOOD PRESSURE: 153 MMHG | RESPIRATION RATE: 18 BRPM | HEART RATE: 92 BPM | TEMPERATURE: 103 F | WEIGHT: 130.07 LBS

## 2018-05-23 DIAGNOSIS — Z98.51 TUBAL LIGATION STATUS: Chronic | ICD-10-CM

## 2018-05-23 DIAGNOSIS — Z90.710 ACQUIRED ABSENCE OF BOTH CERVIX AND UTERUS: Chronic | ICD-10-CM

## 2018-05-23 DIAGNOSIS — R10.9 UNSPECIFIED ABDOMINAL PAIN: ICD-10-CM

## 2018-05-23 DIAGNOSIS — R10.31 RIGHT LOWER QUADRANT PAIN: ICD-10-CM

## 2018-05-23 LAB
ALBUMIN SERPL ELPH-MCNC: 3.6 G/DL — SIGNIFICANT CHANGE UP (ref 3.5–5)
ALP SERPL-CCNC: 94 U/L — SIGNIFICANT CHANGE UP (ref 40–120)
ALT FLD-CCNC: 19 U/L DA — SIGNIFICANT CHANGE UP (ref 10–60)
ANION GAP SERPL CALC-SCNC: 7 MMOL/L — SIGNIFICANT CHANGE UP (ref 5–17)
APPEARANCE UR: CLEAR — SIGNIFICANT CHANGE UP
APTT BLD: 24.8 SEC — LOW (ref 27.5–37.4)
AST SERPL-CCNC: 24 U/L — SIGNIFICANT CHANGE UP (ref 10–40)
BASOPHILS # BLD AUTO: 0.1 K/UL — SIGNIFICANT CHANGE UP (ref 0–0.2)
BASOPHILS NFR BLD AUTO: 0.5 % — SIGNIFICANT CHANGE UP (ref 0–2)
BILIRUB SERPL-MCNC: 0.2 MG/DL — SIGNIFICANT CHANGE UP (ref 0.2–1.2)
BILIRUB UR-MCNC: NEGATIVE — SIGNIFICANT CHANGE UP
BUN SERPL-MCNC: 18 MG/DL — SIGNIFICANT CHANGE UP (ref 7–18)
CALCIUM SERPL-MCNC: 8.9 MG/DL — SIGNIFICANT CHANGE UP (ref 8.4–10.5)
CHLORIDE SERPL-SCNC: 106 MMOL/L — SIGNIFICANT CHANGE UP (ref 96–108)
CO2 SERPL-SCNC: 26 MMOL/L — SIGNIFICANT CHANGE UP (ref 22–31)
COLOR SPEC: YELLOW — SIGNIFICANT CHANGE UP
CREAT SERPL-MCNC: 0.79 MG/DL — SIGNIFICANT CHANGE UP (ref 0.5–1.3)
DIFF PNL FLD: NEGATIVE — SIGNIFICANT CHANGE UP
EOSINOPHIL # BLD AUTO: 0.1 K/UL — SIGNIFICANT CHANGE UP (ref 0–0.5)
EOSINOPHIL NFR BLD AUTO: 0.5 % — SIGNIFICANT CHANGE UP (ref 0–6)
GLUCOSE SERPL-MCNC: 125 MG/DL — HIGH (ref 70–99)
GLUCOSE UR QL: 50 MG/DL
HCT VFR BLD CALC: 38.5 % — SIGNIFICANT CHANGE UP (ref 34.5–45)
HGB BLD-MCNC: 12.5 G/DL — SIGNIFICANT CHANGE UP (ref 11.5–15.5)
INR BLD: 1.03 RATIO — SIGNIFICANT CHANGE UP (ref 0.88–1.16)
KETONES UR-MCNC: NEGATIVE — SIGNIFICANT CHANGE UP
LACTATE SERPL-SCNC: 1.8 MMOL/L — SIGNIFICANT CHANGE UP (ref 0.7–2)
LACTATE SERPL-SCNC: 3.3 MMOL/L — HIGH (ref 0.7–2)
LEUKOCYTE ESTERASE UR-ACNC: NEGATIVE — SIGNIFICANT CHANGE UP
LIDOCAIN IGE QN: 110 U/L — SIGNIFICANT CHANGE UP (ref 73–393)
LYMPHOCYTES # BLD AUTO: 17.5 % — SIGNIFICANT CHANGE UP (ref 13–44)
LYMPHOCYTES # BLD AUTO: 2 K/UL — SIGNIFICANT CHANGE UP (ref 1–3.3)
MCHC RBC-ENTMCNC: 30.9 PG — SIGNIFICANT CHANGE UP (ref 27–34)
MCHC RBC-ENTMCNC: 32.4 GM/DL — SIGNIFICANT CHANGE UP (ref 32–36)
MCV RBC AUTO: 95.4 FL — SIGNIFICANT CHANGE UP (ref 80–100)
MONOCYTES # BLD AUTO: 0.6 K/UL — SIGNIFICANT CHANGE UP (ref 0–0.9)
MONOCYTES NFR BLD AUTO: 5.6 % — SIGNIFICANT CHANGE UP (ref 2–14)
NEUTROPHILS # BLD AUTO: 8.7 K/UL — HIGH (ref 1.8–7.4)
NEUTROPHILS NFR BLD AUTO: 76 % — SIGNIFICANT CHANGE UP (ref 43–77)
NITRITE UR-MCNC: NEGATIVE — SIGNIFICANT CHANGE UP
PH UR: 7 — SIGNIFICANT CHANGE UP (ref 5–8)
PLATELET # BLD AUTO: 233 K/UL — SIGNIFICANT CHANGE UP (ref 150–400)
POTASSIUM SERPL-MCNC: 4.8 MMOL/L — SIGNIFICANT CHANGE UP (ref 3.5–5.3)
POTASSIUM SERPL-SCNC: 4.8 MMOL/L — SIGNIFICANT CHANGE UP (ref 3.5–5.3)
PROT SERPL-MCNC: 8.1 G/DL — SIGNIFICANT CHANGE UP (ref 6–8.3)
PROT UR-MCNC: NEGATIVE — SIGNIFICANT CHANGE UP
PROTHROM AB SERPL-ACNC: 11.2 SEC — SIGNIFICANT CHANGE UP (ref 9.8–12.7)
RBC # BLD: 4.04 M/UL — SIGNIFICANT CHANGE UP (ref 3.8–5.2)
RBC # FLD: 12.5 % — SIGNIFICANT CHANGE UP (ref 10.3–14.5)
SODIUM SERPL-SCNC: 139 MMOL/L — SIGNIFICANT CHANGE UP (ref 135–145)
SP GR SPEC: 1.01 — SIGNIFICANT CHANGE UP (ref 1.01–1.02)
UROBILINOGEN FLD QL: NEGATIVE — SIGNIFICANT CHANGE UP
WBC # BLD: 11.4 K/UL — HIGH (ref 3.8–10.5)
WBC # FLD AUTO: 11.4 K/UL — HIGH (ref 3.8–10.5)

## 2018-05-23 PROCEDURE — 99285 EMERGENCY DEPT VISIT HI MDM: CPT | Mod: 25

## 2018-05-23 PROCEDURE — 74177 CT ABD & PELVIS W/CONTRAST: CPT | Mod: 26

## 2018-05-23 PROCEDURE — 71045 X-RAY EXAM CHEST 1 VIEW: CPT | Mod: 26

## 2018-05-23 RX ORDER — SODIUM CHLORIDE 9 MG/ML
1000 INJECTION INTRAMUSCULAR; INTRAVENOUS; SUBCUTANEOUS ONCE
Qty: 0 | Refills: 0 | Status: COMPLETED | OUTPATIENT
Start: 2018-05-23 | End: 2018-05-23

## 2018-05-23 RX ORDER — PIPERACILLIN AND TAZOBACTAM 4; .5 G/20ML; G/20ML
3.38 INJECTION, POWDER, LYOPHILIZED, FOR SOLUTION INTRAVENOUS ONCE
Qty: 0 | Refills: 0 | Status: COMPLETED | OUTPATIENT
Start: 2018-05-23 | End: 2018-05-23

## 2018-05-23 RX ORDER — ACETAMINOPHEN 500 MG
975 TABLET ORAL ONCE
Qty: 0 | Refills: 0 | Status: COMPLETED | OUTPATIENT
Start: 2018-05-23 | End: 2018-05-23

## 2018-05-23 RX ORDER — PIPERACILLIN AND TAZOBACTAM 4; .5 G/20ML; G/20ML
3.38 INJECTION, POWDER, LYOPHILIZED, FOR SOLUTION INTRAVENOUS ONCE
Qty: 0 | Refills: 0 | Status: DISCONTINUED | OUTPATIENT
Start: 2018-05-23 | End: 2018-05-23

## 2018-05-23 RX ORDER — VANCOMYCIN HCL 1 G
1000 VIAL (EA) INTRAVENOUS ONCE
Qty: 0 | Refills: 0 | Status: COMPLETED | OUTPATIENT
Start: 2018-05-23 | End: 2018-05-23

## 2018-05-23 RX ORDER — SODIUM CHLORIDE 9 MG/ML
250 INJECTION INTRAMUSCULAR; INTRAVENOUS; SUBCUTANEOUS ONCE
Qty: 0 | Refills: 0 | Status: COMPLETED | OUTPATIENT
Start: 2018-05-23 | End: 2018-05-23

## 2018-05-23 RX ADMIN — Medication 975 MILLIGRAM(S): at 16:48

## 2018-05-23 RX ADMIN — SODIUM CHLORIDE 250 MILLILITER(S): 9 INJECTION INTRAMUSCULAR; INTRAVENOUS; SUBCUTANEOUS at 17:33

## 2018-05-23 RX ADMIN — PIPERACILLIN AND TAZOBACTAM 200 GRAM(S): 4; .5 INJECTION, POWDER, LYOPHILIZED, FOR SOLUTION INTRAVENOUS at 16:48

## 2018-05-23 RX ADMIN — SODIUM CHLORIDE 1000 MILLILITER(S): 9 INJECTION INTRAMUSCULAR; INTRAVENOUS; SUBCUTANEOUS at 16:40

## 2018-05-23 RX ADMIN — Medication 250 MILLIGRAM(S): at 16:53

## 2018-05-23 NOTE — CONSULT NOTE ADULT - SUBJECTIVE AND OBJECTIVE BOX
HPI: 82 y/o F pt with a significant PMHx of dementia, DM, HTN, HLD, gastric adenocarcinoma s/p resection, vertigo and a significant PSHx of hysterectomy and tubal ligation, presents to the ED c/o mild, non-radiating rlq abd pain x today. Pt notes she did not eat anything unusual. Pt found to be febrile in the ED. Pt also reports a mild cough. Pt denies aggravating or relieving factors, N/V/D, back pain, urinary symptoms, fever, chills  or any other complaints. NKDA.      PAST MEDICAL & SURGICAL HISTORY:  Dementia  Vertigo  Gastric adenocarcinoma: s/p resection  Hypercholesteremia  DM (diabetes mellitus)  HTN (hypertension)  S/P tubal ligation  S/P hysterectomy      MEDICATIONS  (STANDING):    MEDICATIONS  (PRN):      Allergies    No Known Allergies    Intolerances        SOCIAL HISTORY:  No drug use    FAMILY HISTORY:  Family history of early CAD (Grandparent)  Family history of diabetes mellitus (Sibling)      REVIEW OF SYSTEMS:  CONSTITUTIONAL: In no acute distress.        Vital Signs Last 24 Hrs  T(C): 37.7 (23 May 2018 18:00), Max: 39.4 (23 May 2018 15:35)  T(F): 99.9 (23 May 2018 18:00), Max: 103 (23 May 2018 15:35)  HR: 81 (23 May 2018 18:00) (81 - 94)  BP: 115/45 (23 May 2018 18:00) (115/45 - 153/63)  BP(mean): --  RR: 18 (23 May 2018 18:00) (18 - 18)  SpO2: 100% (23 May 2018 18:00) (97% - 100%)    Daily     Daily     PHYSICAL EXAM:  GENERAL: NAD, well-groomed, well-developed  HEAD:  Atraumatic, Normocephalic  EYES: EOMI, PERRL, conjunctiva and sclera clear  ENMT: Moist mucous membranes, Good dentition, No lesions  NECK: Supple, No JVD  NERVOUS SYSTEM:  Alert & Oriented X3, Good concentration  CHEST/LUNG: Clear to percussion bilaterally; Normal respiratory effort  HEART: Regular rate and rhythm  ABDOMEN: Soft, RLQ tender, Nondistended; upper transverse abd surgical scar, Bowel sounds present  : normal external genitalia  BREASTS: no breast lumps  EXTREMITIES:  No clubbing, cyanosis, or edema  VASC: equal peripheral pulses  LYMPH: No lymphadenopathy noted  SKIN: No rashes or lesions  PSYCH: normal affect    LABS:                        12.5   11.4  )-----------( 233      ( 23 May 2018 16:44 )             38.5     Neutrophil %:       139  |  106  |  18  ----------------------------<  125<H>  4.8   |  26  |  0.79    Ca    8.9      23 May 2018 16:44    TPro  8.1  /  Alb  3.6  /  TBili  0.2  /  DBili  x   /  AST  24  /  ALT  19  /  AlkPhos  94      PT/INR - ( 23 May 2018 16:44 )   PT: 11.2 sec;   INR: 1.03 ratio         PTT - ( 23 May 2018 16:44 )  PTT:24.8 sec  Urinalysis Basic - ( 23 May 2018 18:08 )    Color: Yellow / Appearance: Clear / S.010 / pH: x  Gluc: x / Ketone: Negative  / Bili: Negative / Urobili: Negative   Blood: x / Protein: Negative / Nitrite: Negative   Leuk Esterase: Negative / RBC: x / WBC x   Sq Epi: x / Non Sq Epi: x / Bacteria: x        RADIOLOGY & ADDITIONAL STUDIES:  < from: CT Abdomen and Pelvis w/ Oral Cont and w/ IV Cont (18 @ 20:50) >  BOWEL: The stomach is incompletely distended.  There is no small bowel   obstruction, focal bowel wall thickeningor diverticulitis. The appendix   is mildly dilated up to 9 mm with intraluminal fluid, mild wall   hyperenhancement without surrounding fat stranding. This finding is new   compared to multiple previous studies where it was clearly not dilated   andfilled with air and contrast. Scattered sigmoid diverticulosis.    URINARY BLADDER: Unremarkable.  PELVIC ORGANS: Hysterectomy.    There is no significant adenopathy.  VASCULATURE: The aorta is not dilated. There is mild atherosclerotic   vascular calcification.  RETROPERITONEUM:  There is no mass.  BONES: Chronic degenerative changes of the spine.  ABDOMINAL WALL: Tiny fat-containing umbilical hernia. The containing   supraumbilical ventral hernias.    IMPRESSION:    Equivocal appendix.  The appendix was clearly normal on previous studies   but appears mildly dilated with wall hyperenhancement and intraluminal   fluid on the current exam.  Early acute appendicitis should be excluded   on clinical grounds.    Scattered diverticulosis.  Statuspost hysterectomy without small bowel obstruction.    < end of copied text >

## 2018-05-23 NOTE — ED PROVIDER NOTE - MEDICAL DECISION MAKING DETAILS
82 y/o F pt presents with abd pain and fever. Pt does have a Hx of gastric adenocarcinoma with resection. Will order sepsis workup along with CT abd pelvis and tentative admission.

## 2018-05-23 NOTE — ED PROVIDER NOTE - OBJECTIVE STATEMENT
84 y/o F pt with a significant PMHx of dementia, DM, HTN, HLD, gastric adenocarcinoma s/p resection, vertigo and a significant PSHx of hysterectomy and tubal ligation presents to the ED c/o mild, non-radiating abd pain x today. Pt notes she did not eat anything unusual. Pt found to be febrile in the ED. Pt also reports a mild cough. Pt denies aggravating or relieving factors, N/V/D, back pain, urinary symptoms, fever, chills  or any other complaints. NKDA.

## 2018-05-23 NOTE — ED PROVIDER NOTE - PROGRESS NOTE DETAILS
spoke to dr. Andrews (surgery) and advised that they are unclear if they are giong to be doing surgery.  Spoke to yael/mar and will admit.  Surgery advsied medicine admission.

## 2018-05-24 ENCOUNTER — RESULT REVIEW (OUTPATIENT)
Age: 83
End: 2018-05-24

## 2018-05-24 DIAGNOSIS — Z29.9 ENCOUNTER FOR PROPHYLACTIC MEASURES, UNSPECIFIED: ICD-10-CM

## 2018-05-24 DIAGNOSIS — I10 ESSENTIAL (PRIMARY) HYPERTENSION: ICD-10-CM

## 2018-05-24 DIAGNOSIS — K37 UNSPECIFIED APPENDICITIS: ICD-10-CM

## 2018-05-24 DIAGNOSIS — K35.80 UNSPECIFIED ACUTE APPENDICITIS: ICD-10-CM

## 2018-05-24 DIAGNOSIS — E11.9 TYPE 2 DIABETES MELLITUS WITHOUT COMPLICATIONS: ICD-10-CM

## 2018-05-24 DIAGNOSIS — D64.9 ANEMIA, UNSPECIFIED: ICD-10-CM

## 2018-05-24 LAB
ALBUMIN SERPL ELPH-MCNC: 2.8 G/DL — LOW (ref 3.5–5)
ALP SERPL-CCNC: 76 U/L — SIGNIFICANT CHANGE UP (ref 40–120)
ALT FLD-CCNC: 15 U/L DA — SIGNIFICANT CHANGE UP (ref 10–60)
ANION GAP SERPL CALC-SCNC: 4 MMOL/L — LOW (ref 5–17)
APPEARANCE UR: CLEAR — SIGNIFICANT CHANGE UP
AST SERPL-CCNC: 20 U/L — SIGNIFICANT CHANGE UP (ref 10–40)
BILIRUB SERPL-MCNC: 0.3 MG/DL — SIGNIFICANT CHANGE UP (ref 0.2–1.2)
BILIRUB UR-MCNC: NEGATIVE — SIGNIFICANT CHANGE UP
BUN SERPL-MCNC: 14 MG/DL — SIGNIFICANT CHANGE UP (ref 7–18)
CALCIUM SERPL-MCNC: 8.4 MG/DL — SIGNIFICANT CHANGE UP (ref 8.4–10.5)
CHLORIDE SERPL-SCNC: 111 MMOL/L — HIGH (ref 96–108)
CHOLEST SERPL-MCNC: 129 MG/DL — SIGNIFICANT CHANGE UP (ref 10–199)
CO2 SERPL-SCNC: 28 MMOL/L — SIGNIFICANT CHANGE UP (ref 22–31)
COLOR SPEC: YELLOW — SIGNIFICANT CHANGE UP
CREAT SERPL-MCNC: 0.65 MG/DL — SIGNIFICANT CHANGE UP (ref 0.5–1.3)
CULTURE RESULTS: NO GROWTH — SIGNIFICANT CHANGE UP
DIFF PNL FLD: NEGATIVE — SIGNIFICANT CHANGE UP
GLUCOSE BLDC GLUCOMTR-MCNC: 109 MG/DL — HIGH (ref 70–99)
GLUCOSE BLDC GLUCOMTR-MCNC: 114 MG/DL — HIGH (ref 70–99)
GLUCOSE BLDC GLUCOMTR-MCNC: 78 MG/DL — SIGNIFICANT CHANGE UP (ref 70–99)
GLUCOSE BLDC GLUCOMTR-MCNC: 92 MG/DL — SIGNIFICANT CHANGE UP (ref 70–99)
GLUCOSE SERPL-MCNC: 97 MG/DL — SIGNIFICANT CHANGE UP (ref 70–99)
GLUCOSE UR QL: NEGATIVE — SIGNIFICANT CHANGE UP
HBA1C BLD-MCNC: 6 % — HIGH (ref 4–5.6)
HCT VFR BLD CALC: 33.6 % — LOW (ref 34.5–45)
HDLC SERPL-MCNC: 40 MG/DL — SIGNIFICANT CHANGE UP (ref 40–125)
HGB BLD-MCNC: 10.8 G/DL — LOW (ref 11.5–15.5)
INR BLD: 1.09 RATIO — SIGNIFICANT CHANGE UP (ref 0.88–1.16)
KETONES UR-MCNC: NEGATIVE — SIGNIFICANT CHANGE UP
LEUKOCYTE ESTERASE UR-ACNC: NEGATIVE — SIGNIFICANT CHANGE UP
LIPID PNL WITH DIRECT LDL SERPL: 71 MG/DL — SIGNIFICANT CHANGE UP
MAGNESIUM SERPL-MCNC: 1.9 MG/DL — SIGNIFICANT CHANGE UP (ref 1.6–2.6)
MCHC RBC-ENTMCNC: 30.9 PG — SIGNIFICANT CHANGE UP (ref 27–34)
MCHC RBC-ENTMCNC: 32 GM/DL — SIGNIFICANT CHANGE UP (ref 32–36)
MCV RBC AUTO: 96.5 FL — SIGNIFICANT CHANGE UP (ref 80–100)
NITRITE UR-MCNC: NEGATIVE — SIGNIFICANT CHANGE UP
PH UR: 7 — SIGNIFICANT CHANGE UP (ref 5–8)
PHOSPHATE SERPL-MCNC: 3 MG/DL — SIGNIFICANT CHANGE UP (ref 2.5–4.5)
PLATELET # BLD AUTO: 210 K/UL — SIGNIFICANT CHANGE UP (ref 150–400)
POTASSIUM SERPL-MCNC: 4.1 MMOL/L — SIGNIFICANT CHANGE UP (ref 3.5–5.3)
POTASSIUM SERPL-SCNC: 4.1 MMOL/L — SIGNIFICANT CHANGE UP (ref 3.5–5.3)
PROT SERPL-MCNC: 6.7 G/DL — SIGNIFICANT CHANGE UP (ref 6–8.3)
PROT UR-MCNC: NEGATIVE — SIGNIFICANT CHANGE UP
PROTHROM AB SERPL-ACNC: 11.9 SEC — SIGNIFICANT CHANGE UP (ref 9.8–12.7)
RBC # BLD: 3.48 M/UL — LOW (ref 3.8–5.2)
RBC # FLD: 12.5 % — SIGNIFICANT CHANGE UP (ref 10.3–14.5)
SODIUM SERPL-SCNC: 143 MMOL/L — SIGNIFICANT CHANGE UP (ref 135–145)
SP GR SPEC: 1 — LOW (ref 1.01–1.02)
SPECIMEN SOURCE: SIGNIFICANT CHANGE UP
TOTAL CHOLESTEROL/HDL RATIO MEASUREMENT: 3.2 RATIO — LOW (ref 3.3–7.1)
TRIGL SERPL-MCNC: 92 MG/DL — SIGNIFICANT CHANGE UP (ref 10–149)
TSH SERPL-MCNC: 0.88 UU/ML — SIGNIFICANT CHANGE UP (ref 0.34–4.82)
UROBILINOGEN FLD QL: NEGATIVE — SIGNIFICANT CHANGE UP
VIT B12 SERPL-MCNC: 404 PG/ML — SIGNIFICANT CHANGE UP (ref 232–1245)
WBC # BLD: 9.6 K/UL — SIGNIFICANT CHANGE UP (ref 3.8–10.5)
WBC # FLD AUTO: 9.6 K/UL — SIGNIFICANT CHANGE UP (ref 3.8–10.5)

## 2018-05-24 PROCEDURE — 88304 TISSUE EXAM BY PATHOLOGIST: CPT | Mod: 26

## 2018-05-24 PROCEDURE — 44970 LAPAROSCOPY APPENDECTOMY: CPT | Mod: AS

## 2018-05-24 RX ORDER — HEPARIN SODIUM 5000 [USP'U]/ML
5000 INJECTION INTRAVENOUS; SUBCUTANEOUS EVERY 8 HOURS
Qty: 0 | Refills: 0 | Status: DISCONTINUED | OUTPATIENT
Start: 2018-05-24 | End: 2018-05-25

## 2018-05-24 RX ORDER — CEFOTETAN DISODIUM 1 G
1 VIAL (EA) INJECTION EVERY 12 HOURS
Qty: 0 | Refills: 0 | Status: DISCONTINUED | OUTPATIENT
Start: 2018-05-24 | End: 2018-05-24

## 2018-05-24 RX ORDER — PANTOPRAZOLE SODIUM 20 MG/1
40 TABLET, DELAYED RELEASE ORAL
Qty: 0 | Refills: 0 | Status: DISCONTINUED | OUTPATIENT
Start: 2018-05-24 | End: 2018-05-25

## 2018-05-24 RX ORDER — CEFOTETAN DISODIUM 1 G
1 VIAL (EA) INJECTION EVERY 12 HOURS
Qty: 0 | Refills: 0 | Status: COMPLETED | OUTPATIENT
Start: 2018-05-24 | End: 2018-05-25

## 2018-05-24 RX ORDER — SODIUM CHLORIDE 9 MG/ML
1000 INJECTION INTRAMUSCULAR; INTRAVENOUS; SUBCUTANEOUS
Qty: 0 | Refills: 0 | Status: DISCONTINUED | OUTPATIENT
Start: 2018-05-24 | End: 2018-05-25

## 2018-05-24 RX ORDER — OXYCODONE AND ACETAMINOPHEN 5; 325 MG/1; MG/1
1 TABLET ORAL EVERY 4 HOURS
Qty: 0 | Refills: 0 | Status: DISCONTINUED | OUTPATIENT
Start: 2018-05-24 | End: 2018-05-25

## 2018-05-24 RX ORDER — INSULIN LISPRO 100/ML
VIAL (ML) SUBCUTANEOUS
Qty: 0 | Refills: 0 | Status: DISCONTINUED | OUTPATIENT
Start: 2018-05-24 | End: 2018-05-25

## 2018-05-24 RX ORDER — FERROUS SULFATE 325(65) MG
325 TABLET ORAL DAILY
Qty: 0 | Refills: 0 | Status: DISCONTINUED | OUTPATIENT
Start: 2018-05-24 | End: 2018-05-25

## 2018-05-24 RX ORDER — ACETAMINOPHEN 500 MG
1000 TABLET ORAL ONCE
Qty: 0 | Refills: 0 | Status: DISCONTINUED | OUTPATIENT
Start: 2018-05-24 | End: 2018-05-24

## 2018-05-24 RX ORDER — KETOROLAC TROMETHAMINE 30 MG/ML
15 SYRINGE (ML) INJECTION ONCE
Qty: 0 | Refills: 0 | Status: DISCONTINUED | OUTPATIENT
Start: 2018-05-24 | End: 2018-05-25

## 2018-05-24 RX ORDER — SODIUM CHLORIDE 9 MG/ML
1000 INJECTION, SOLUTION INTRAVENOUS
Qty: 0 | Refills: 0 | Status: DISCONTINUED | OUTPATIENT
Start: 2018-05-24 | End: 2018-05-25

## 2018-05-24 RX ORDER — FENTANYL CITRATE 50 UG/ML
25 INJECTION INTRAVENOUS
Qty: 0 | Refills: 0 | Status: DISCONTINUED | OUTPATIENT
Start: 2018-05-24 | End: 2018-05-24

## 2018-05-24 RX ORDER — PIPERACILLIN AND TAZOBACTAM 4; .5 G/20ML; G/20ML
3.38 INJECTION, POWDER, LYOPHILIZED, FOR SOLUTION INTRAVENOUS EVERY 8 HOURS
Qty: 0 | Refills: 0 | Status: DISCONTINUED | OUTPATIENT
Start: 2018-05-24 | End: 2018-05-24

## 2018-05-24 RX ORDER — ONDANSETRON 8 MG/1
4 TABLET, FILM COATED ORAL EVERY 6 HOURS
Qty: 0 | Refills: 0 | Status: DISCONTINUED | OUTPATIENT
Start: 2018-05-24 | End: 2018-05-25

## 2018-05-24 RX ORDER — SIMVASTATIN 20 MG/1
40 TABLET, FILM COATED ORAL AT BEDTIME
Qty: 0 | Refills: 0 | Status: DISCONTINUED | OUTPATIENT
Start: 2018-05-24 | End: 2018-05-25

## 2018-05-24 RX ORDER — SODIUM CHLORIDE 9 MG/ML
1000 INJECTION INTRAMUSCULAR; INTRAVENOUS; SUBCUTANEOUS
Qty: 0 | Refills: 0 | Status: DISCONTINUED | OUTPATIENT
Start: 2018-05-24 | End: 2018-05-24

## 2018-05-24 RX ADMIN — HEPARIN SODIUM 5000 UNIT(S): 5000 INJECTION INTRAVENOUS; SUBCUTANEOUS at 22:03

## 2018-05-24 RX ADMIN — PIPERACILLIN AND TAZOBACTAM 25 GRAM(S): 4; .5 INJECTION, POWDER, LYOPHILIZED, FOR SOLUTION INTRAVENOUS at 05:54

## 2018-05-24 RX ADMIN — PANTOPRAZOLE SODIUM 40 MILLIGRAM(S): 20 TABLET, DELAYED RELEASE ORAL at 05:54

## 2018-05-24 RX ADMIN — Medication 100 GRAM(S): at 17:15

## 2018-05-24 RX ADMIN — ONDANSETRON 4 MILLIGRAM(S): 8 TABLET, FILM COATED ORAL at 15:00

## 2018-05-24 RX ADMIN — SIMVASTATIN 40 MILLIGRAM(S): 20 TABLET, FILM COATED ORAL at 22:03

## 2018-05-24 NOTE — PROGRESS NOTE ADULT - ASSESSMENT
Condition discussed with patient  Options risks and benefits explained  Laparoscopic appendectomy, possible open today

## 2018-05-24 NOTE — PROGRESS NOTE ADULT - SUBJECTIVE AND OBJECTIVE BOX
INTERVAL HPI/OVERNIGHT EVENTS:  Pt stable. C/O RLQ abdominal pain  NPO  No flatus/BM.    Vital Signs Last 24 Hrs  T(C): 36.8 (24 May 2018 07:23), Max: 39.4 (23 May 2018 15:35)  T(F): 98.3 (24 May 2018 07:23), Max: 103 (23 May 2018 15:35)  HR: 54 (24 May 2018 07:23) (54 - 94)  BP: 123/57 (24 May 2018 07:23) (115/45 - 153/63)  BP(mean): --  RR: 17 (24 May 2018 07:23) (16 - 18)  SpO2: 98% (24 May 2018 07:23) (97% - 100%)    Physical:  Abdomen: nondistended,  RLQ tenderness, guarding, rest of abdomen benign  No masses  CT reviewed    I&O's Summary      LABS:                        10.8   9.6   )-----------( 210      ( 24 May 2018 06:18 )             33.6             05-24    143  |  111<H>  |  14  ----------------------------<  97  4.1   |  28  |  0.65    Ca    8.4      24 May 2018 06:18  Phos  3.0     05-24  Mg     1.9     05-24    TPro  6.7  /  Alb  2.8<L>  /  TBili  0.3  /  DBili  x   /  AST  20  /  ALT  15  /  AlkPhos  76  05-24

## 2018-05-24 NOTE — H&P ADULT - PROBLEM SELECTOR PLAN 4
IMPROVE VTE score: 2, Heparin for dvt ppx. Hold heparin in Am for possible surgery  [ ] Previous VTE                                                3  [ ] Thrombophilia                                             2  [ ] Lower limb paralysis                                  2        (unable to hold up >15 seconds)    [ ] Current Cancer (within 6 months)            2   [x] Immobilization > 24 hrs                              1  [ ] ICU/CCU stay > 24 hours                            1  [x] Age > 60                                                         1

## 2018-05-24 NOTE — PROGRESS NOTE ADULT - ASSESSMENT
82 y/o female s/p Lap Appy, Post-op Stable      1. Advance to regular diet   2. Antibiotics x 2 more doses  3. Out of bed  4. Continue DVT PPx  5. Strict glucose control

## 2018-05-24 NOTE — CONSULT NOTE ADULT - SUBJECTIVE AND OBJECTIVE BOX
CHIEF COMPLAINT:Patient is a 83y old  Female who presents with a chief complaint of Abdominal pain.      HPI:  84 y/o F pt with a significant PMHx of Dementia, DM, HTN, HLD, gastric adenocarcinoma s/p resection, hysterectomy and tubal ligation presents to the ED for abd pain x today. Abdominal pain is mild, 3-4/10, non radiating non referred. Patient also complains of subjective fever. Denies chest pain, SOB, nausea, vomiting, diarrhea, constipation or pedal edema.   SH: Former smoker, quit 10 years ago, denies illicit drug or alcohol abuse. (24 May 2018 00:44)      PAST MEDICAL & SURGICAL HISTORY:  Dementia  Vertigo  Gastric adenocarcinoma: s/p resection  Hypercholesteremia  DM (diabetes mellitus)  HTN (hypertension)  S/P tubal ligation  S/P hysterectomy      MEDICATIONS  (STANDING):  ferrous    sulfate 325 milliGRAM(s) Oral daily  heparin  Injectable 5000 Unit(s) SubCutaneous every 8 hours  insulin lispro (HumaLOG) corrective regimen sliding scale   SubCutaneous three times a day before meals  pantoprazole    Tablet 40 milliGRAM(s) Oral before breakfast  piperacillin/tazobactam IVPB. 3.375 Gram(s) IV Intermittent every 8 hours  simvastatin 40 milliGRAM(s) Oral at bedtime  sodium chloride 0.9%. 1000 milliLiter(s) (90 mL/Hr) IV Continuous <Continuous>    MEDICATIONS  (PRN):      FAMILY HISTORY:  Family history of early CAD (Grandparent)  Family history of diabetes mellitus (Sibling)      SOCIAL HISTORY:    [x ] Non-smoker    [x ] Alcohol-denies    Allergies    No Known Allergies    Intolerances    	    REVIEW OF SYSTEMS:  CONSTITUTIONAL: No fever, weight loss, or fatigue  EYES: No eye pain, visual disturbances, or discharge  ENT:  No difficulty hearing, tinnitus, vertigo; No sinus or throat pain  NECK: No pain or stiffness  RESPIRATORY: No cough, wheezing, chills or hemoptysis; No Shortness of Breath  CARDIOVASCULAR: No chest pain, palpitations, passing out, dizziness, or leg swelling  GASTROINTESTINAL: + abdominal  pain. No nausea, vomiting, or hematemesis; No diarrhea or constipation. No melena or hematochezia.  GENITOURINARY: No dysuria, frequency, hematuria, or incontinence  NEUROLOGICAL: No headaches, memory loss, loss of strength, numbness, or tremors  SKIN: No itching, burning, rashes, or lesions   LYMPH Nodes: No enlarged glands  ENDOCRINE: No heat or cold intolerance; No hair loss  MUSCULOSKELETAL: No joint pain or swelling; No muscle, back, or extremity pain  PSYCHIATRIC: No depression, anxiety, mood swings, or difficulty sleeping  HEME/LYMPH: No easy bruising, or bleeding gums  ALLERGY AND IMMUNOLOGIC: No hives or eczema	      PHYSICAL EXAM:  T(C): 36.7 (05-24-18 @ 11:05), Max: 39.4 (05-23-18 @ 15:35)  HR: 60 (05-24-18 @ 11:05) (54 - 94)  BP: 138/53 (05-24-18 @ 11:05) (115/45 - 153/63)  RR: 17 (05-24-18 @ 07:23) (16 - 18)  SpO2: 98% (05-24-18 @ 11:05) (97% - 100%)  Wt(kg): --  I&O's Summary      Appearance: Normal	  HEENT:   Normal oral mucosa, PERRL, EOMI	  Lymphatic: No lymphadenopathy  Cardiovascular: Normal S1 S2, No JVD, No murmurs, No edema  Respiratory: Lungs clear to auscultation	  Psychiatry: A & O x 3, Mood & affect appropriate  Gastrointestinal:  Soft, + BS	  Skin: No rashes, No ecchymoses, No cyanosis	  Neurologic: Non-focal  Extremities: Normal range of motion, No clubbing, cyanosis or edema  Vascular: Peripheral pulses palpable 2+ bilaterally        ECG:  nsr	    	  LABS:	 	                        10.8   9.6   )-----------( 210      ( 24 May 2018 06:18 )             33.6     05-24    143  |  111<H>  |  14  ----------------------------<  97  4.1   |  28  |  0.65    Ca    8.4      24 May 2018 06:18  Phos  3.0     05-24  Mg     1.9     05-24    TPro  6.7  /  Alb  2.8<L>  /  TBili  0.3  /  DBili  x   /  AST  20  /  ALT  15  /  AlkPhos  76  05-24      Lipid Profile: Cholesterol 129  LDL 71  HDL 40  TG 92    HgA1c: Hemoglobin A1C, Whole Blood: 6.0 % (05-24 @ 10:22)    TSH: Thyroid Stimulating Hormone, Serum: 0.88 uU/mL (05-24 @ 06:18)    Ct scan:     IMPRESSION:    Equivocal appendix.  The appendix was clearly normal on previous studies   but appears mildly dilated with wall hyperenhancement and intraluminal   fluid on the current exam.  Early acute appendicitis should be excluded   on clinical grounds.    Scattered diverticulosis.  Statuspost hysterectomy without small bowel obstruction. CHIEF COMPLAINT:Patient is a 83y old  Female who presents with a chief complaint of Abdominal pain.      HPI:  82 y/o F pt with a significant PMHx of Dementia, DM, HTN, HLD, gastric adenocarcinoma s/p resection, hysterectomy and tubal ligation presents to the ED for abd pain x today. Abdominal pain is mild, 3-4/10, non radiating non referred. Patient also complains of subjective fever. Denies chest pain, SOB, nausea, vomiting, diarrhea, constipation or pedal edema.   SH: Former smoker, quit 10 years ago, denies illicit drug or alcohol abuse. (24 May 2018 00:44)      PAST MEDICAL & SURGICAL HISTORY:  Dementia  Vertigo  Gastric adenocarcinoma: s/p resection  Hypercholesteremia  DM (diabetes mellitus)  HTN (hypertension)  S/P tubal ligation  S/P hysterectomy      MEDICATIONS  (STANDING):  ferrous    sulfate 325 milliGRAM(s) Oral daily  heparin  Injectable 5000 Unit(s) SubCutaneous every 8 hours  insulin lispro (HumaLOG) corrective regimen sliding scale   SubCutaneous three times a day before meals  pantoprazole    Tablet 40 milliGRAM(s) Oral before breakfast  piperacillin/tazobactam IVPB. 3.375 Gram(s) IV Intermittent every 8 hours  simvastatin 40 milliGRAM(s) Oral at bedtime  sodium chloride 0.9%. 1000 milliLiter(s) (90 mL/Hr) IV Continuous <Continuous>    MEDICATIONS  (PRN):      FAMILY HISTORY:  Family history of early CAD (Grandparent)  Family history of diabetes mellitus (Sibling)      SOCIAL HISTORY:    [x ] Non-smoker    [x ] Alcohol-denies    Allergies    No Known Allergies    Intolerances    	    REVIEW OF SYSTEMS:  CONSTITUTIONAL: No fever, weight loss, or fatigue  EYES: No eye pain, visual disturbances, or discharge  ENT:  No difficulty hearing, tinnitus, vertigo; No sinus or throat pain  NECK: No pain or stiffness  RESPIRATORY: No cough, wheezing, chills or hemoptysis; No Shortness of Breath  CARDIOVASCULAR: No chest pain, palpitations, passing out, dizziness, or leg swelling  GASTROINTESTINAL: + abdominal  pain. No nausea, vomiting, or hematemesis; No diarrhea or constipation. No melena or hematochezia.  GENITOURINARY: No dysuria, frequency, hematuria, or incontinence  NEUROLOGICAL: No headaches, memory loss, loss of strength, numbness, or tremors  SKIN: No itching, burning, rashes, or lesions   LYMPH Nodes: No enlarged glands  ENDOCRINE: No heat or cold intolerance; No hair loss  MUSCULOSKELETAL: No joint pain or swelling; No muscle, back, or extremity pain  PSYCHIATRIC: No depression, anxiety, mood swings, or difficulty sleeping  HEME/LYMPH: No easy bruising, or bleeding gums  ALLERGY AND IMMUNOLOGIC: No hives or eczema	      PHYSICAL EXAM:  T(C): 36.7 (05-24-18 @ 11:05), Max: 39.4 (05-23-18 @ 15:35)  HR: 60 (05-24-18 @ 11:05) (54 - 94)  BP: 138/53 (05-24-18 @ 11:05) (115/45 - 153/63)  RR: 17 (05-24-18 @ 07:23) (16 - 18)  SpO2: 98% (05-24-18 @ 11:05) (97% - 100%)      Appearance: Normal	  HEENT:   Normal oral mucosa, PERRL, EOMI	  Lymphatic: No lymphadenopathy  Cardiovascular: Normal S1 S2, No JVD, No murmurs, No edema  Respiratory: Lungs clear to auscultation	  Psychiatry: A & O x 3, Mood & affect appropriate  Gastrointestinal:  Soft, + BS	  Skin: No rashes, No ecchymoses, No cyanosis	  Neurologic: Non-focal  Extremities: Normal range of motion, No clubbing, cyanosis or edema  Vascular: Peripheral pulses palpable 2+ bilaterally        ECG: ordered	    	  LABS:	 	                        10.8   9.6   )-----------( 210      ( 24 May 2018 06:18 )             33.6     05-24    143  |  111<H>  |  14  ----------------------------<  97  4.1   |  28  |  0.65    Ca    8.4      24 May 2018 06:18  Phos  3.0     05-24  Mg     1.9     05-24    TPro  6.7  /  Alb  2.8<L>  /  TBili  0.3  /  DBili  x   /  AST  20  /  ALT  15  /  AlkPhos  76  05-24      Lipid Profile: Cholesterol 129  LDL 71  HDL 40  TG 92    HgA1c: Hemoglobin A1C, Whole Blood: 6.0 % (05-24 @ 10:22)    TSH: Thyroid Stimulating Hormone, Serum: 0.88 uU/mL (05-24 @ 06:18)    Ct scan:     IMPRESSION:    Equivocal appendix.  The appendix was clearly normal on previous studies   but appears mildly dilated with wall hyperenhancement and intraluminal   fluid on the current exam.  Early acute appendicitis should be excluded   on clinical grounds.    Scattered diverticulosis.  Statuspost hysterectomy without small bowel obstruction.    OBSERVATIONS:  Mitral Valve: Normal mitral valve. Trace mitral  regurgitation.  Aortic Root: Normal aortic root.  Aortic Valve: Normal trileaflet aortic valve.  Left Atrium: Normal left atrium.  LA volume index = 33  cc/m2.  Left Ventricle: Normal left ventricular function. Mild  diastolic dysfunction (stage I). Normal left ventricular  internal dimensions and wall thicknesses.  Right Heart: Normal right atrium. Normal right ventricular  size and function. There is mild tricuspid regurgitation.  There is trace pulmonic regurgitation.  Pericardium/PleuraNormal pericardium with no pericardial  effusion.  Hemodynamic: RA Pressure is 8 mm Hg. RV systolic pressure  is 31 mm Hg. CHIEF COMPLAINT:Patient is a 83y old  Female who presents with a chief complaint of Abdominal pain.      HPI:  82 y/o F pt with a significant PMHx of Dementia, DM, HTN, HLD, gastric adenocarcinoma s/p resection, hysterectomy and tubal ligation presents to the ED for abd pain x today. Abdominal pain is mild, 3-4/10, non radiating non referred. Patient also complains of subjective fever. Denies chest pain, SOB, nausea, vomiting, diarrhea, constipation or pedal edema.   SH: Former smoker, quit 10 years ago, denies illicit drug or alcohol abuse. (24 May 2018 00:44)      PAST MEDICAL & SURGICAL HISTORY:  Dementia  Vertigo  Gastric adenocarcinoma: s/p resection  Hypercholesteremia  DM (diabetes mellitus)  HTN (hypertension)  S/P tubal ligation  S/P hysterectomy      MEDICATIONS  (STANDING):  ferrous    sulfate 325 milliGRAM(s) Oral daily  heparin  Injectable 5000 Unit(s) SubCutaneous every 8 hours  insulin lispro (HumaLOG) corrective regimen sliding scale   SubCutaneous three times a day before meals  pantoprazole    Tablet 40 milliGRAM(s) Oral before breakfast  piperacillin/tazobactam IVPB. 3.375 Gram(s) IV Intermittent every 8 hours  simvastatin 40 milliGRAM(s) Oral at bedtime  sodium chloride 0.9%. 1000 milliLiter(s) (90 mL/Hr) IV Continuous <Continuous>    MEDICATIONS  (PRN):      FAMILY HISTORY:  Family history of early CAD (Grandparent)  Family history of diabetes mellitus (Sibling)      SOCIAL HISTORY:    [x ] Non-smoker    [x ] Alcohol-denies    Allergies    No Known Allergies    Intolerances    	    REVIEW OF SYSTEMS:  CONSTITUTIONAL: No fever, weight loss, or fatigue  EYES: No eye pain, visual disturbances, or discharge  ENT:  No difficulty hearing, tinnitus, vertigo; No sinus or throat pain  NECK: No pain or stiffness  RESPIRATORY: No cough, wheezing, chills or hemoptysis; No Shortness of Breath  CARDIOVASCULAR: No chest pain, palpitations, passing out, dizziness, or leg swelling  GASTROINTESTINAL: + abdominal  pain. No nausea, vomiting, or hematemesis; No diarrhea or constipation. No melena or hematochezia.  GENITOURINARY: No dysuria, frequency, hematuria, or incontinence  NEUROLOGICAL: No headaches, memory loss, loss of strength, numbness, or tremors  SKIN: No itching, burning, rashes, or lesions   LYMPH Nodes: No enlarged glands  ENDOCRINE: No heat or cold intolerance; No hair loss  MUSCULOSKELETAL: No joint pain or swelling; No muscle, back, or extremity pain  PSYCHIATRIC: No depression, anxiety, mood swings, or difficulty sleeping  HEME/LYMPH: No easy bruising, or bleeding gums  ALLERGY AND IMMUNOLOGIC: No hives or eczema	      PHYSICAL EXAM:  T(C): 36.7 (05-24-18 @ 11:05), Max: 39.4 (05-23-18 @ 15:35)  HR: 60 (05-24-18 @ 11:05) (54 - 94)  BP: 138/53 (05-24-18 @ 11:05) (115/45 - 153/63)  RR: 17 (05-24-18 @ 07:23) (16 - 18)  SpO2: 98% (05-24-18 @ 11:05) (97% - 100%)      Appearance: Normal	  HEENT:   Normal oral mucosa, PERRL, EOMI	  Lymphatic: No lymphadenopathy  Cardiovascular: Normal S1 S2, No JVD, No murmurs, No edema  Respiratory: Lungs clear to auscultation	  Psychiatry: A & O x 3, Mood & affect appropriate  Gastrointestinal:  Soft, + BS	  Skin: No rashes, No ecchymoses, No cyanosis	  Neurologic: Non-focal  Extremities: Normal range of motion, No clubbing, cyanosis or edema  Vascular: Peripheral pulses palpable 2+ bilaterally        ECG: Sinus bradycardia,nl axis	    	  LABS:	 	                        10.8   9.6   )-----------( 210      ( 24 May 2018 06:18 )             33.6     05-24    143  |  111<H>  |  14  ----------------------------<  97  4.1   |  28  |  0.65    Ca    8.4      24 May 2018 06:18  Phos  3.0     05-24  Mg     1.9     05-24    TPro  6.7  /  Alb  2.8<L>  /  TBili  0.3  /  DBili  x   /  AST  20  /  ALT  15  /  AlkPhos  76  05-24      Lipid Profile: Cholesterol 129  LDL 71  HDL 40  TG 92    HgA1c: Hemoglobin A1C, Whole Blood: 6.0 % (05-24 @ 10:22)    TSH: Thyroid Stimulating Hormone, Serum: 0.88 uU/mL (05-24 @ 06:18)    Ct scan:     IMPRESSION:    Equivocal appendix.  The appendix was clearly normal on previous studies   but appears mildly dilated with wall hyperenhancement and intraluminal   fluid on the current exam.  Early acute appendicitis should be excluded   on clinical grounds.    Scattered diverticulosis.  Statuspost hysterectomy without small bowel obstruction.    OBSERVATIONS:  Mitral Valve: Normal mitral valve. Trace mitral  regurgitation.  Aortic Root: Normal aortic root.  Aortic Valve: Normal trileaflet aortic valve.  Left Atrium: Normal left atrium.  LA volume index = 33  cc/m2.  Left Ventricle: Normal left ventricular function. Mild  diastolic dysfunction (stage I). Normal left ventricular  internal dimensions and wall thicknesses.  Right Heart: Normal right atrium. Normal right ventricular  size and function. There is mild tricuspid regurgitation.  There is trace pulmonic regurgitation.  Pericardium/PleuraNormal pericardium with no pericardial  effusion.  Hemodynamic: RA Pressure is 8 mm Hg. RV systolic pressure  is 31 mm Hg.

## 2018-05-24 NOTE — CONSULT NOTE ADULT - ASSESSMENT
84 y/o F pt with a significant PM Hx of Dementia, DM, HTN, HLD, gastric adenocarcinoma s/p resection, hysterectomy and tubal ligation presents to the ED for abd pain x today. Abdominal pain is mild, 3-4/10, non radiating non referred. Patient also complains of subjective fever.  CT abdomen suggested acute appendicitis.  Patient was started on IV Zosyn and planned for surgery.

## 2018-05-24 NOTE — H&P ADULT - ATTENDING COMMENTS
84 y/o F pt with a significant PMHx of Dementia, DM, HTN, HLD, gastric adenocarcinoma s/p resection, hysterectomy and tubal ligation presents to the ED for abd pain x today. Abdominal pain is mild, 3-4/10, non radiating non referred. Patient also complains of subjective fever. Denies chest pain, SOB, nausea, vomiting, diarrhea, constipation or pedal edema.   SH: Former smoker, quit 10 years ago, denies illicit drug or alcohol abuse.     pt seen in bed, vitals stable except for tmx 103 and elevated bp on adm, physical exam reveals lungs cta b/l, heart s1s2, abd soft nd nt bs+, ext no edema. labs and diagnostic test result reviewed.    assessment  --  sepsis, r/o appendicitis, h/o Dementia, DM, HTN, HLD, gastric adenocarcinoma s/p resection, hysterectomy and tubal ligation    plan  --  admit to , cont preadmit home meds, gi and dvt profilaxis,  cbc, bmp, mg, phos, lipids, tsh, bld cx, ua, ucx,      echo    surg cons 84 y/o F pt with a significant PMHx of Dementia, DM, HTN, HLD, gastric adenocarcinoma s/p resection, hysterectomy and tubal ligation presents to the ED for abd pain x today. Abdominal pain is mild, 3-4/10, non radiating non referred. Patient also complains of subjective fever. Denies chest pain, SOB, nausea, vomiting, diarrhea, constipation or pedal edema.   SH: Former smoker, quit 10 years ago, denies illicit drug or alcohol abuse.     pt seen in bed, vitals stable except for tmx 103 and elevated bp on adm, physical exam reveals lungs cta b/l, heart s1s2, abd soft nd rlq tender bs+, ext no edema. labs and diagnostic test result reviewed.    assessment  --  sepsis, r/o appendicitis, h/o Dementia, DM, HTN, HLD, gastric adenocarcinoma s/p resection, hysterectomy and tubal ligation    plan  --  admit to med, zosyn, cont preadmit home meds, gi and dvt profilaxis,  cbc, bmp, mg, phos, lipids, tsh, bld cx, ua, ucx,      echo    surg cons  cardio cons  id cons

## 2018-05-24 NOTE — PROGRESS NOTE ADULT - SUBJECTIVE AND OBJECTIVE BOX
GENERAL SURGERY POST-OP NOTE    s/p Lap Appy    Patient examined at bedside, no complaints.   Has some nausea initially post-op with some spit up of mucous   Currently has No nausea, no vomiting  Tolerating diet  Has voided but has not ambulated yet  On Cefotetan        T(F): 97.4 (05-24-18 @ 16:19), Max: 98.4 (05-24-18 @ 11:31)  HR: 66 (05-24-18 @ 16:19) (54 - 83)  BP: 127/50 (05-24-18 @ 16:19) (123/57 - 151/74)  RR: 16 (05-24-18 @ 16:19) (12 - 19)  SpO2: 100% (05-24-18 @ 16:19) (96% - 100%)            Physical Exam  General: AAOx3, No acute distress  Skin: No jaundice, no icterus  Abdomen: soft, nontender, mildly distended, no rebound tenderness, no guarding, no palpable masses, steri strips clean, dry and intact  Extremities: non edematous, no calf pain bilaterally

## 2018-05-24 NOTE — H&P ADULT - PROBLEM SELECTOR PLAN 1
Patient presented with abdominal pain,   Labs positive for leucocytosis. Lactate was elevated to 3.3 s/p 1250 ml bolus, improved to 1.8.   Patient is febrile to 103 in ED  Ct scan shows equivocal for appendicitis. Patient is septic, SIRS 2/4.   NPO. Iv antibiotics Zosyn.   Surgery team (Dr Zepeda) assessed the patient at bedside, and no urgent plan for surgery. Requested medical admission and medical clearance for surgery in AM. Patient presented with mild abdominal pain, with tender ness in RLQ  Labs positive for leucocytosis. Lactate was elevated to 3.3 s/p 1250 ml bolus, improved to 1.8.   Patient is febrile to 103 in ED  Ct scan shows equivocal for appendicitis. Patient is septic, SIRS 2/4.   NPO. Iv antibiotics Zosyn.   Surgery team (Dr Zepeda) assessed the patient at bedside, and no urgent plan for surgery. Requested medical admission and medical clearance for surgery in AM. Patient presented with mild abdominal pain, with tender ness in RLQ  Labs positive for leucocytosis. Lactate was elevated to 3.3 s/p 1250 ml bolus, improved to 1.8.   Patient is febrile to 103 in ED  Ct scan shows equivocal for appendicitis. Patient is septic, SIRS 2/4.   NPO. Iv antibiotics Zosyn.   Surgery team (Dr Zepeda) assessed the patient at bedside, and no urgent plan for surgery. Requested medical admission and medical clearance for surgery in AM.  SARAH Estrada Patient presented with mild abdominal pain, with tender ness in RLQ  Labs positive for leucocytosis. Lactate was elevated to 3.3 s/p 1250 ml bolus, improved to 1.8.   Patient is febrile to 103 in ED  Ct scan shows equivocal for appendicitis. Patient is septic, SIRS 2/4.   NPO. Iv antibiotics Zosyn.   Surgery team (Dr Zepeda) assessed the patient at bedside, and no urgent plan for surgery. Requested medical admission and medical clearance for surgery in AM.  SARAH Thomas

## 2018-05-24 NOTE — H&P ADULT - ASSESSMENT
82 y/o F pt with a significant PMHx of Dementia, DM, HTN, HLD, gastric adenocarcinoma s/p resection, hysterectomy and tubal ligation presents to the ED for abd pain x today.

## 2018-05-24 NOTE — H&P ADULT - FAMILY HISTORY
Sibling  Still living? Unknown  Family history of diabetes mellitus, Age at diagnosis: Age Unknown     Grandparent  Still living? No  Family history of early CAD, Age at diagnosis: Age Unknown

## 2018-05-24 NOTE — CONSULT NOTE ADULT - ASSESSMENT
84 y/o F pt with a significant PMHx of Dementia, DM, HTN, HLD, gastric adenocarcinoma s/p resection, hysterectomy and tubal ligation presents with appendicitis.  1.Pt at moderate risk for sesar-operative cardiac complication.  2.ABX.  3.Surgical eval noted.  4.DM-insulin.  5.Gi and DVT prophylaxis.

## 2018-05-24 NOTE — BRIEF OPERATIVE NOTE - PROCEDURE
<<-----Click on this checkbox to enter Procedure Laparoscopic appendectomy  05/24/2018    Active  [id number]

## 2018-05-24 NOTE — CONSULT NOTE ADULT - PROBLEM SELECTOR RECOMMENDATION 9
Right lower abd pain since this afternoon  Fever, leukocystosis  CT equivocal for appendicitis.  Medical admission  Bowel rest, IV Fluid  Repeat WBC in am  Recommend antibiotics: Zosyn  Serial abd exam  Medical clearance for possible surgery.  Will reassess in am
1- UA & CS.  2- Follow Blood culture to final report.  3- Surgical management and follow up.  4- Continue IV Zosyn.  5- Fluid and electrolytes management.

## 2018-05-24 NOTE — CONSULT NOTE ADULT - SUBJECTIVE AND OBJECTIVE BOX
HPI:  84 y/o F pt with a significant PMHx of Dementia, DM, HTN, HLD, gastric adenocarcinoma s/p resection, hysterectomy and tubal ligation presents to the ED for abd pain x today. Abdominal pain is mild, 3-4/10, non radiating non referred. Patient also complains of subjective fever. Denies chest pain, SOB, nausea, vomiting, diarrhea, constipation or pedal edema.   SH: Former smoker, quit 10 years ago, denies illicit drug or alcohol abuse. (24 May 2018 00:44)      PAST MEDICAL & SURGICAL HISTORY:  Dementia  Vertigo  Gastric adenocarcinoma: s/p resection  Hypercholesteremia  DM (diabetes mellitus)  HTN (hypertension)  S/P tubal ligation  S/P hysterectomy      No Known Allergies      ferrous    sulfate 325 milliGRAM(s) Oral daily  heparin  Injectable 5000 Unit(s) SubCutaneous every 8 hours  insulin lispro (HumaLOG) corrective regimen sliding scale   SubCutaneous three times a day before meals  pantoprazole    Tablet 40 milliGRAM(s) Oral before breakfast  piperacillin/tazobactam IVPB. 3.375 Gram(s) IV Intermittent every 8 hours  simvastatin 40 milliGRAM(s) Oral at bedtime  sodium chloride 0.9%. 1000 milliLiter(s) IV Continuous <Continuous>      Social Hx:    FAMILY HISTORY:  Family history of early CAD (Grandparent)  Family history of diabetes mellitus (Sibling)        ROS  [  ] UNABLE TO ELICIT    General:  [  ] None  [ x ] Fever  [  ] Chills  [ x ] Malaise    Skin:  [ x ] None [  ] Rash  [  ] Wound  [  ] Ulcer    HEENT:  [ x ] None  [  ] Sore Throat  [  ] Nasal congestion/ runny nose  [  ] Photophobia [  ] Neck pain      Chest:  [ x ] None   [  ] SOB  [  ] Cough  [  ] None    Cardiovascular:   [ x ] None  [  ] CP  [  ] Palpitation    Gastrointestinal:  [  ] None  [ x ] Abd pain   [  ] Nausea    [  ] Vomiting   [  ] Diarrhea	     Genitourinary:  [ x ] None [  ] Polyuria   [  ] Urgency  [  ] Frequency  [  ] Dysuria    [  ]  Hematuria       Musculoskeletal:  [  ] None [  ] Back Pain	[  ] Body aches  [  ] Joint pain  [ x ] Weakness    Neurological: [  ] None [  ]Dizziness  [  ]Visual Disturbance  [  ]Headaches   [ x ] Weakness      PHYSICAL EXAM:    Vital Signs Last 24 Hrs  T(C): 36.7 (24 May 2018 11:05), Max: 39.4 (23 May 2018 15:35)  T(F): 98 (24 May 2018 11:05), Max: 103 (23 May 2018 15:35)  HR: 60 (24 May 2018 11:05) (54 - 94)  BP: 138/53 (24 May 2018 11:05) (115/45 - 153/63)  BP(mean): --  RR: 17 (24 May 2018 07:23) (16 - 18)  SpO2: 98% (24 May 2018 11:05) (97% - 100%)    Constitutional:    HEENT: [ x ] Wnl  [  ] Pharyngeal congestion    Neck:  [ x ] Supple  [  ]Lymphadenopathy  [  ] No JVD   [  ] JVD  [  ] Masses   [  ] WNL    CHEST/Respiratory:  [ x ]Clear to auscultation  [  ] Rales   [  ] Rhonchi   [  ] Wheezing     [  ] Chest Tenderness      Cardiovascular:  [ x ] Reg S1 S2   [  ] Irreg S1 S2   [ x ]No Murmur  [  ] +ve Murmurs  [  ]Systolic [  ]Diastolic      Abdomen:  [ x ] Soft  [  ] No tendrerness  [ x ] Tenderness lower abdomen  [  ] Organomegaly  [  ] ABD Distention  [  ] Rigidity                       [ x ] No Regidity                       [ x ] No Rebound Tenderness  [  ] No Guarding Rigidity  [  ] Rebound Tenderness[  ] Guarding Rigidity                          [ x ]  +ve Bowel Sounds  [  ] Decreased Bowel Sounds    [  ] Absent Bowel Sounds                            Extremities: [ x ] No edema [  ] Edema  [  ] Clubbing   [  ] Cyanosis                         [ x ] No Tender Calf muscles  [  ] Tender Calf muscles                        [ x ] Palpable peripheral pulses    Neurological: [ x ] Awake  [ x ] Alert  [ x ] Oriented  x  3                           [  ] Confused  [  ] Drowsy  [  ] respond to painful stimuli  [  ] Unresponsive    Skin:  [ x ] Intact [  ] Redness [  ] Thrombophlebitis  [  ] Rashes  [  ] Dry  [  ] Ulcers    Ortho:  [  ] Joint Swelling  [  ] Joint erythema [  ] Joint tenderness                [  ] Increased temp. to touch  [  ] DJD [ x ] WNL      LABS/DIAGNOSTIC TESTS                          10.8   9.6   )-----------( 210      ( 24 May 2018 06:18 )             33.6     WBC Count: 9.6 K/uL ( @ 06:18)  WBC Count: 11.4 K/uL ( @ 16:44)          143  |  111<H>  |  14  ----------------------------<  97  4.1   |  28  |  0.65    Ca    8.4      24 May 2018 06:18  Phos  3.0       Mg     1.9         TPro  6.7  /  Alb  2.8<L>  /  TBili  0.3  /  DBili  x   /  AST  20  /  ALT  15  /  AlkPhos  76  24      Urinalysis Basic - ( 24 May 2018 09:20 )    Color: Yellow / Appearance: Clear / S.005 / pH: x  Gluc: x / Ketone: Negative  / Bili: Negative / Urobili: Negative   Blood: x / Protein: Negative / Nitrite: Negative   Leuk Esterase: Negative / RBC: x / WBC x   Sq Epi: x / Non Sq Epi: x / Bacteria: x        LIVER FUNCTIONS - ( 24 May 2018 06:18 )  Alb: 2.8 g/dL / Pro: 6.7 g/dL / ALK PHOS: 76 U/L / ALT: 15 U/L DA / AST: 20 U/L / GGT: x             PT/INR - ( 24 May 2018 06:18 )   PT: 11.9 sec;   INR: 1.09 ratio         PTT - ( 23 May 2018 16:44 )  PTT:24.8 sec    LACTATE:Lactate, Blood: 1.8 mmol/L ( @ 20:19)  Lactate, Blood: 3.3 mmol/L ( @ 16:44)        CULTURES:       RADIOLOGY      EXAM:  CT ABDOMEN AND PELVIS OC IC                            PROCEDURE DATE:  2018          INTERPRETATION:  Abdominal/Pelvic CT    2018 9:12 PM    Indication: Abdominal pain and fever, history of cancer    Technique: Axial images were obtained following IV contrast from the lung   bases through pubic symphysis.  90 cc of Omnipaque 350 was administered   intravenously without complication and 10 cc was discarded.  Reformatted   coronal and sagittal images are submitted.    Comparison: CT of 2016    FINDINGS:    LUNG BASES:  There are dependent atelectatic changes at the lung bases.   Calcified granuloma in the right middle lobe.  PERITONEUM:  There is no free air or focal collection.  No free fluid.  LIVER: Normal.  SPLEEN: Normal.  GALLBLADDER: Cholecystectomy.  BILIARY TREE: Unremarkable.  PANCREAS: Atrophic.  ADRENAL GLANDS: Normal.  KIDNEYS: Normal.  BOWEL: The stomach is incompletely distended.  There is no small bowel   obstruction, focal bowel wall thickeningor diverticulitis. The appendix   is mildly dilated up to 9 mm with intraluminal fluid, mild wall   hyperenhancement without surrounding fat stranding. This finding is new   compared to multiple previous studies where it was clearly not dilated   andfilled with air and contrast. Scattered sigmoid diverticulosis.    URINARY BLADDER: Unremarkable.  PELVIC ORGANS: Hysterectomy.    There is no significant adenopathy.  VASCULATURE: The aorta is not dilated. There is mild atherosclerotic   vascular calcification.  RETROPERITONEUM:  There is no mass.  BONES: Chronic degenerative changes of the spine.  ABDOMINAL WALL: Tiny fat-containing umbilical hernia. The containing   supraumbilical ventral hernias.    IMPRESSION:    Equivocal appendix.  The appendix was clearly normal on previous studies   but appears mildly dilated with wall hyperenhancement and intraluminal   fluid on the current exam.  Early acute appendicitis should be excluded   on clinical grounds.        EXAM:  XR CHEST AP OR PA 1V                            PROCEDURE DATE:  2018          INTERPRETATION:  Portable chest x-ray    Indication: Fever.    Portable chest x-ray is compared to a previous examination dated   2017.    Impression: Stable right IJ Mediport.    The examination is limited by poor inspiratory effort. No gross pulmonary   consolidation, pleural effusion or pneumothorax.    Stable cardiac silhouette.    Left tracheal deviation may be due to a right goiter; if clinically   indicated, chest CT without IV contrast may be pursued for further   evaluation on a nonemergent outpatient basis.

## 2018-05-24 NOTE — H&P ADULT - NSHPLABSRESULTS_GEN_ALL_CORE
Vital Signs Last 24 Hrs  T(C): 36.8 (23 May 2018 23:58), Max: 39.4 (23 May 2018 15:35)  T(F): 98.2 (23 May 2018 23:58), Max: 103 (23 May 2018 15:35)  HR: 65 (23 May 2018 23:58) (65 - 94)  BP: 136/60 (23 May 2018 23:58) (115/45 - 153/63)  BP(mean): --  RR: 16 (23 May 2018 23:58) (16 - 18)  SpO2: 100% (23 May 2018 23:58) (97% - 100%)

## 2018-05-24 NOTE — H&P ADULT - HISTORY OF PRESENT ILLNESS
84 y/o F pt with a significant PMHx of Dementia, DM, HTN, HLD, gastric adenocarcinoma s/p resection, hysterectomy and tubal ligation presents to the ED for abd pain x today. Abdominal pain is non radiating non referred. 84 y/o F pt with a significant PMHx of Dementia, DM, HTN, HLD, gastric adenocarcinoma s/p resection, hysterectomy and tubal ligation presents to the ED for abd pain x today. Abdominal pain is non radiating non referred. Patient also complains of subjective fever. Denies chest pain, SOB, nausea, vomiting, diarrhea, constipation or pedal edema.   SH: Former smoker, quit 10 years ago, denies illicit drug or alcohol abuse. 82 y/o F pt with a significant PMHx of Dementia, DM, HTN, HLD, gastric adenocarcinoma s/p resection, hysterectomy and tubal ligation presents to the ED for abd pain x today. Abdominal pain is mild, 3-4/10, non radiating non referred. Patient also complains of subjective fever. Denies chest pain, SOB, nausea, vomiting, diarrhea, constipation or pedal edema.   SH: Former smoker, quit 10 years ago, denies illicit drug or alcohol abuse.

## 2018-05-25 ENCOUNTER — TRANSCRIPTION ENCOUNTER (OUTPATIENT)
Age: 83
End: 2018-05-25

## 2018-05-25 VITALS
RESPIRATION RATE: 17 BRPM | DIASTOLIC BLOOD PRESSURE: 62 MMHG | SYSTOLIC BLOOD PRESSURE: 138 MMHG | TEMPERATURE: 98 F | OXYGEN SATURATION: 95 % | HEART RATE: 70 BPM

## 2018-05-25 LAB
ANION GAP SERPL CALC-SCNC: 4 MMOL/L — LOW (ref 5–17)
BASOPHILS # BLD AUTO: 0 K/UL — SIGNIFICANT CHANGE UP (ref 0–0.2)
BASOPHILS NFR BLD AUTO: 0.5 % — SIGNIFICANT CHANGE UP (ref 0–2)
BUN SERPL-MCNC: 9 MG/DL — SIGNIFICANT CHANGE UP (ref 7–18)
CALCIUM SERPL-MCNC: 8.6 MG/DL — SIGNIFICANT CHANGE UP (ref 8.4–10.5)
CHLORIDE SERPL-SCNC: 111 MMOL/L — HIGH (ref 96–108)
CO2 SERPL-SCNC: 27 MMOL/L — SIGNIFICANT CHANGE UP (ref 22–31)
CREAT SERPL-MCNC: 0.74 MG/DL — SIGNIFICANT CHANGE UP (ref 0.5–1.3)
EOSINOPHIL # BLD AUTO: 0.2 K/UL — SIGNIFICANT CHANGE UP (ref 0–0.5)
EOSINOPHIL NFR BLD AUTO: 3.1 % — SIGNIFICANT CHANGE UP (ref 0–6)
GLUCOSE BLDC GLUCOMTR-MCNC: 107 MG/DL — HIGH (ref 70–99)
GLUCOSE BLDC GLUCOMTR-MCNC: 155 MG/DL — HIGH (ref 70–99)
GLUCOSE SERPL-MCNC: 98 MG/DL — SIGNIFICANT CHANGE UP (ref 70–99)
HCT VFR BLD CALC: 34.4 % — LOW (ref 34.5–45)
HGB BLD-MCNC: 10.6 G/DL — LOW (ref 11.5–15.5)
LYMPHOCYTES # BLD AUTO: 3.2 K/UL — SIGNIFICANT CHANGE UP (ref 1–3.3)
LYMPHOCYTES # BLD AUTO: 41.5 % — SIGNIFICANT CHANGE UP (ref 13–44)
MCHC RBC-ENTMCNC: 29.8 PG — SIGNIFICANT CHANGE UP (ref 27–34)
MCHC RBC-ENTMCNC: 30.7 GM/DL — LOW (ref 32–36)
MCV RBC AUTO: 97.3 FL — SIGNIFICANT CHANGE UP (ref 80–100)
MONOCYTES # BLD AUTO: 0.4 K/UL — SIGNIFICANT CHANGE UP (ref 0–0.9)
MONOCYTES NFR BLD AUTO: 4.7 % — SIGNIFICANT CHANGE UP (ref 2–14)
NEUTROPHILS # BLD AUTO: 3.8 K/UL — SIGNIFICANT CHANGE UP (ref 1.8–7.4)
NEUTROPHILS NFR BLD AUTO: 50.2 % — SIGNIFICANT CHANGE UP (ref 43–77)
PLATELET # BLD AUTO: 207 K/UL — SIGNIFICANT CHANGE UP (ref 150–400)
POTASSIUM SERPL-MCNC: 3.8 MMOL/L — SIGNIFICANT CHANGE UP (ref 3.5–5.3)
POTASSIUM SERPL-SCNC: 3.8 MMOL/L — SIGNIFICANT CHANGE UP (ref 3.5–5.3)
RBC # BLD: 3.54 M/UL — LOW (ref 3.8–5.2)
RBC # FLD: 12.3 % — SIGNIFICANT CHANGE UP (ref 10.3–14.5)
SODIUM SERPL-SCNC: 142 MMOL/L — SIGNIFICANT CHANGE UP (ref 135–145)
WBC # BLD: 7.6 K/UL — SIGNIFICANT CHANGE UP (ref 3.8–10.5)
WBC # FLD AUTO: 7.6 K/UL — SIGNIFICANT CHANGE UP (ref 3.8–10.5)

## 2018-05-25 PROCEDURE — 74177 CT ABD & PELVIS W/CONTRAST: CPT

## 2018-05-25 PROCEDURE — 93005 ELECTROCARDIOGRAM TRACING: CPT

## 2018-05-25 PROCEDURE — 82962 GLUCOSE BLOOD TEST: CPT

## 2018-05-25 PROCEDURE — 87040 BLOOD CULTURE FOR BACTERIA: CPT

## 2018-05-25 PROCEDURE — 83690 ASSAY OF LIPASE: CPT

## 2018-05-25 PROCEDURE — 84100 ASSAY OF PHOSPHORUS: CPT

## 2018-05-25 PROCEDURE — 80048 BASIC METABOLIC PNL TOTAL CA: CPT

## 2018-05-25 PROCEDURE — 83036 HEMOGLOBIN GLYCOSYLATED A1C: CPT

## 2018-05-25 PROCEDURE — 82607 VITAMIN B-12: CPT

## 2018-05-25 PROCEDURE — 96374 THER/PROPH/DIAG INJ IV PUSH: CPT

## 2018-05-25 PROCEDURE — 80061 LIPID PANEL: CPT

## 2018-05-25 PROCEDURE — 80053 COMPREHEN METABOLIC PANEL: CPT

## 2018-05-25 PROCEDURE — 85027 COMPLETE CBC AUTOMATED: CPT

## 2018-05-25 PROCEDURE — 83735 ASSAY OF MAGNESIUM: CPT

## 2018-05-25 PROCEDURE — 84443 ASSAY THYROID STIM HORMONE: CPT

## 2018-05-25 PROCEDURE — 88304 TISSUE EXAM BY PATHOLOGIST: CPT

## 2018-05-25 PROCEDURE — 83605 ASSAY OF LACTIC ACID: CPT

## 2018-05-25 PROCEDURE — 87086 URINE CULTURE/COLONY COUNT: CPT

## 2018-05-25 PROCEDURE — 93306 TTE W/DOPPLER COMPLETE: CPT

## 2018-05-25 PROCEDURE — 81003 URINALYSIS AUTO W/O SCOPE: CPT

## 2018-05-25 PROCEDURE — 71045 X-RAY EXAM CHEST 1 VIEW: CPT

## 2018-05-25 PROCEDURE — 96375 TX/PRO/DX INJ NEW DRUG ADDON: CPT

## 2018-05-25 PROCEDURE — 85610 PROTHROMBIN TIME: CPT

## 2018-05-25 PROCEDURE — 99285 EMERGENCY DEPT VISIT HI MDM: CPT | Mod: 25

## 2018-05-25 PROCEDURE — 85730 THROMBOPLASTIN TIME PARTIAL: CPT

## 2018-05-25 RX ORDER — LISINOPRIL 2.5 MG/1
5 TABLET ORAL DAILY
Qty: 0 | Refills: 0 | Status: DISCONTINUED | OUTPATIENT
Start: 2018-05-25 | End: 2018-05-25

## 2018-05-25 RX ORDER — METRONIDAZOLE 500 MG
1 TABLET ORAL
Qty: 21 | Refills: 0 | OUTPATIENT
Start: 2018-05-25 | End: 2018-05-31

## 2018-05-25 RX ORDER — MOXIFLOXACIN HYDROCHLORIDE TABLETS, 400 MG 400 MG/1
1 TABLET, FILM COATED ORAL
Qty: 14 | Refills: 0 | OUTPATIENT
Start: 2018-05-25 | End: 2018-05-31

## 2018-05-25 RX ADMIN — OXYCODONE AND ACETAMINOPHEN 1 TABLET(S): 5; 325 TABLET ORAL at 11:50

## 2018-05-25 RX ADMIN — Medication 100 GRAM(S): at 06:00

## 2018-05-25 RX ADMIN — PANTOPRAZOLE SODIUM 40 MILLIGRAM(S): 20 TABLET, DELAYED RELEASE ORAL at 06:00

## 2018-05-25 RX ADMIN — HEPARIN SODIUM 5000 UNIT(S): 5000 INJECTION INTRAVENOUS; SUBCUTANEOUS at 13:55

## 2018-05-25 RX ADMIN — Medication 1: at 12:02

## 2018-05-25 RX ADMIN — LISINOPRIL 5 MILLIGRAM(S): 2.5 TABLET ORAL at 11:39

## 2018-05-25 RX ADMIN — HEPARIN SODIUM 5000 UNIT(S): 5000 INJECTION INTRAVENOUS; SUBCUTANEOUS at 06:00

## 2018-05-25 RX ADMIN — Medication 325 MILLIGRAM(S): at 11:39

## 2018-05-25 RX ADMIN — OXYCODONE AND ACETAMINOPHEN 1 TABLET(S): 5; 325 TABLET ORAL at 12:15

## 2018-05-25 NOTE — DISCHARGE NOTE ADULT - MEDICATION SUMMARY - MEDICATIONS TO TAKE
I will START or STAY ON the medications listed below when I get home from the hospital:    oxyCODONE-acetaminophen 5 mg-325 mg oral tablet  -- 1 tab(s) by mouth every 4 hours, As needed, Moderate Pain MDD:4  -- Indication: For Moderate pain    LISINOPRIL   TAB 5MG  -- Indication: For HTN (hypertension)    METFORMIN    TAB 1000MG  -- Indication: For DM (diabetes mellitus)    SIMVASTATIN  TAB 40MG  -- Indication: For Hyperlipidemia    FERROUS SULF TAB 325MG  -- Indication: For Anemia    FERROUS GLUC TAB 324MG  -- Indication: For Anemia    PANTOPRAZOLE TAB 40MG  -- Indication: For GERD    VITAMIN D    CAP [id number]  -- Indication: For Supplement I will START or STAY ON the medications listed below when I get home from the hospital:    Flagyl 500 mg oral tablet  -- 1 tab(s) by mouth 3 times a day   -- Do not drink alcoholic beverages when taking this medication.  Finish all this medication unless otherwise directed by prescriber.  May discolor urine or feces.    -- Indication: For Abdominal pain    oxyCODONE-acetaminophen 5 mg-325 mg oral tablet  -- 1 tab(s) by mouth every 4 hours, As needed, Moderate Pain MDD:4  -- Indication: For Moderate pain    LISINOPRIL   TAB 5MG  -- Indication: For HTN (hypertension)    METFORMIN    TAB 1000MG  -- Indication: For DM (diabetes mellitus)    SIMVASTATIN  TAB 40MG  -- Indication: For Hyperlipidemia    FERROUS SULF TAB 325MG  -- Indication: For Anemia    FERROUS GLUC TAB 324MG  -- Indication: For Anemia    PANTOPRAZOLE TAB 40MG  -- Indication: For GERD    Cipro 500 mg oral tablet  -- 1 tab(s) by mouth every 12 hours   -- Avoid prolonged or excessive exposure to direct and/or artificial sunlight while taking this medication.  Check with your doctor before becoming pregnant.  Do not take dairy products, antacids, or iron preparations within one hour of this medication.  Finish all this medication unless otherwise directed by prescriber.  Medication should be taken with plenty of water.    -- Indication: For Abdominal pain    VITAMIN D    CAP [id number]  -- Indication: For Supplement I will START or STAY ON the medications listed below when I get home from the hospital:    Flagyl 500 mg oral tablet  -- 1 tab(s) by mouth 3 times a day   -- Do not drink alcoholic beverages when taking this medication.  Finish all this medication unless otherwise directed by prescriber.  May discolor urine or feces.    -- Indication: For Abdominal pain    oxyCODONE-acetaminophen 5 mg-325 mg oral tablet  -- 1 tab(s) by mouth every 4 hours, As needed, Moderate Pain MDD:4  -- Indication: For Pain    LISINOPRIL   TAB 5MG  -- Indication: For HTN (hypertension)    METFORMIN    TAB 1000MG  -- Indication: For DM (diabetes mellitus)    SIMVASTATIN  TAB 40MG  -- Indication: For Hyperlipidemia    FERROUS SULF TAB 325MG  -- Indication: For Anemia    FERROUS GLUC TAB 324MG  -- Indication: For Anemia    PANTOPRAZOLE TAB 40MG  -- Indication: For GERD    Cipro 500 mg oral tablet  -- 1 tab(s) by mouth every 12 hours   -- Avoid prolonged or excessive exposure to direct and/or artificial sunlight while taking this medication.  Check with your doctor before becoming pregnant.  Do not take dairy products, antacids, or iron preparations within one hour of this medication.  Finish all this medication unless otherwise directed by prescriber.  Medication should be taken with plenty of water.    -- Indication: For Abdominal pain    VITAMIN D    CAP [id number]  -- Indication: For Supplement I will START or STAY ON the medications listed below when I get home from the hospital:    Flagyl 500 mg oral tablet  -- 1 tab(s) by mouth 3 times a day   -- Do not drink alcoholic beverages when taking this medication.  Finish all this medication unless otherwise directed by prescriber.  May discolor urine or feces.    -- Indication: For infection    oxyCODONE-acetaminophen 5 mg-325 mg oral tablet  -- 1 tab(s) by mouth every 4 hours, As needed, Moderate Pain MDD:4  -- Indication: For Pain    LISINOPRIL   TAB 5MG  -- Indication: For HTN (hypertension)    METFORMIN    TAB 1000MG  -- Indication: For DM (diabetes mellitus)    SIMVASTATIN  TAB 40MG  -- Indication: For Hyperlipidemia    FERROUS SULF TAB 325MG  -- Indication: For Anemia    FERROUS GLUC TAB 324MG  -- Indication: For Anemia    PANTOPRAZOLE TAB 40MG  -- Indication: For GERD    Cipro 500 mg oral tablet  -- 1 tab(s) by mouth every 12 hours   -- Avoid prolonged or excessive exposure to direct and/or artificial sunlight while taking this medication.  Check with your doctor before becoming pregnant.  Do not take dairy products, antacids, or iron preparations within one hour of this medication.  Finish all this medication unless otherwise directed by prescriber.  Medication should be taken with plenty of water.    -- Indication: For infection    VITAMIN D    CAP [id number]  -- Indication: For Supplement

## 2018-05-25 NOTE — PROGRESS NOTE ADULT - SUBJECTIVE AND OBJECTIVE BOX
INTERVAL HPI/OVERNIGHT EVENTS:  Pt resting comfortably. No acute complaints.   Tolerating diet.   Denies N/V    MEDICATIONS  (STANDING):  ferrous    sulfate 325 milliGRAM(s) Oral daily  heparin  Injectable 5000 Unit(s) SubCutaneous every 8 hours  insulin lispro (HumaLOG) corrective regimen sliding scale   SubCutaneous three times a day before meals  lactated ringers. 1000 milliLiter(s) (100 mL/Hr) IV Continuous <Continuous>  pantoprazole    Tablet 40 milliGRAM(s) Oral before breakfast  simvastatin 40 milliGRAM(s) Oral at bedtime  sodium chloride 0.9%. 1000 milliLiter(s) (90 mL/Hr) IV Continuous <Continuous>    MEDICATIONS  (PRN):  ketorolac   Injectable 15 milliGRAM(s) IV Push once PRN Moderate Pain  ondansetron Injectable 4 milliGRAM(s) IV Push every 6 hours PRN Nausea  oxyCODONE    5 mG/acetaminophen 325 mG 1 Tablet(s) Oral every 4 hours PRN Moderate Pain      Vital Signs Last 24 Hrs  T(C): 36.9 (25 May 2018 05:44), Max: 37.2 (24 May 2018 22:35)  T(F): 98.5 (25 May 2018 05:44), Max: 99 (24 May 2018 22:35)  HR: 72 (25 May 2018 05:44) (54 - 83)  BP: 149/56 (25 May 2018 05:44) (120/52 - 151/74)  BP(mean): 86 (24 May 2018 14:13) (85 - 96)  RR: 16 (25 May 2018 05:44) (12 - 19)  SpO2: 97% (25 May 2018 05:44) (96% - 100%)    Physical:  General: A&Ox3. NAD.  Abdomen: Soft nondistended, Dressing C/D/I.    I&O's Detail    24 May 2018 07:01  -  25 May 2018 07:00  --------------------------------------------------------  IN:    lactated ringers.: 500 mL  Total IN: 500 mL    OUT:    Voided: 200 mL  Total OUT: 200 mL    Total NET: 300 mL    LABS:                        10.6   7.6   )-----------( 207      ( 25 May 2018 06:24 )             34.4             05-25    142  |  111<H>  |  9   ----------------------------<  98  3.8   |  27  |  0.74    Ca    8.6      25 May 2018 06:24  Phos  3.0     05-24  Mg     1.9     05-24    TPro  6.7  /  Alb  2.8<L>  /  TBili  0.3  /  DBili  x   /  AST  20  /  ALT  15  /  AlkPhos  76  05-24

## 2018-05-25 NOTE — PROGRESS NOTE ADULT - SUBJECTIVE AND OBJECTIVE BOX
CHIEF COMPLAINT:Patient is a 83y old  Female who presents with a chief complaint of Abdominal pain x 1 day .Pt appears comfortable.    	  REVIEW OF SYSTEMS:  CONSTITUTIONAL: No fever, weight loss, or fatigue  EYES: No eye pain, visual disturbances, or discharge  ENT:  No difficulty hearing, tinnitus, vertigo; No sinus or throat pain  NECK: No pain or stiffness  RESPIRATORY: No cough, wheezing, chills or hemoptysis; No Shortness of Breath  CARDIOVASCULAR: No chest pain, palpitations, passing out, dizziness, or leg swelling  GASTROINTESTINAL: No abdominal or epigastric pain. No nausea, vomiting, or hematemesis; No diarrhea or constipation. No melena or hematochezia.  GENITOURINARY: No dysuria, frequency, hematuria, or incontinence  NEUROLOGICAL: No headaches, memory loss, loss of strength, numbness, or tremors  SKIN: No itching, burning, rashes, or lesions   LYMPH Nodes: No enlarged glands  ENDOCRINE: No heat or cold intolerance; No hair loss  MUSCULOSKELETAL: No joint pain or swelling; No muscle, back, or extremity pain  PSYCHIATRIC: No depression, anxiety, mood swings, or difficulty sleeping  HEME/LYMPH: No easy bruising, or bleeding gums  ALLERGY AND IMMUNOLOGIC: No hives or eczema	      PHYSICAL EXAM:  T(C): 36.9 (05-25-18 @ 05:44), Max: 37.2 (05-24-18 @ 22:35)  HR: 72 (05-25-18 @ 05:44) (54 - 83)  BP: 149/56 (05-25-18 @ 05:44) (120/52 - 151/74)  RR: 16 (05-25-18 @ 05:44) (12 - 19)  SpO2: 97% (05-25-18 @ 05:44) (96% - 100%)    I&O's Summary    24 May 2018 07:01  -  25 May 2018 07:00  --------------------------------------------------------  IN: 500 mL / OUT: 200 mL / NET: 300 mL        Appearance: Normal	  HEENT:   Normal oral mucosa, PERRL, EOMI	  Lymphatic: No lymphadenopathy  Cardiovascular: Normal S1 S2, No JVD, No murmurs, No edema  Respiratory: Lungs clear to auscultation	  Psychiatry: A & O x 3, Mood & affect appropriate  Gastrointestinal:  Soft, Non-tender, + BS	  Skin: No rashes, No ecchymoses, No cyanosis	  Neurologic: Non-focal  Extremities: Normal range of motion, No clubbing, cyanosis or edema  Vascular: Peripheral pulses palpable 2+ bilaterally    MEDICATIONS  (STANDING):  ferrous    sulfate 325 milliGRAM(s) Oral daily  heparin  Injectable 5000 Unit(s) SubCutaneous every 8 hours  insulin lispro (HumaLOG) corrective regimen sliding scale   SubCutaneous three times a day before meals  lactated ringers. 1000 milliLiter(s) (100 mL/Hr) IV Continuous <Continuous>  lisinopril 5 milliGRAM(s) Oral daily  pantoprazole    Tablet 40 milliGRAM(s) Oral before breakfast  simvastatin 40 milliGRAM(s) Oral at bedtime  sodium chloride 0.9%. 1000 milliLiter(s) (90 mL/Hr) IV Continuous <Continuous>        	  LABS:	 	                        10.6   7.6   )-----------( 207      ( 25 May 2018 06:24 )             34.4     05-25    142  |  111<H>  |  9   ----------------------------<  98  3.8   |  27  |  0.74    Ca    8.6      25 May 2018 06:24  Phos  3.0     05-24  Mg     1.9     05-24    TPro  6.7  /  Alb  2.8<L>  /  TBili  0.3  /  DBili  x   /  AST  20  /  ALT  15  /  AlkPhos  76  05-24      Lipid Profile: Cholesterol 129  LDL 71  HDL 40  TG 92    HgA1c: Hemoglobin A1C, Whole Blood: 6.0 % (05-24 @ 10:22)    TSH: Thyroid Stimulating Hormone, Serum: 0.88 uU/mL (05-24 @ 06:18)

## 2018-05-25 NOTE — PROGRESS NOTE ADULT - SUBJECTIVE AND OBJECTIVE BOX
Meds:  cefoTEtan  IVPB 1 Gram(s) IV Intermittent every 12 hours    Allergies:  Allergies    No Known Allergies    Intolerances      ROS  [  ] UNABLE TO ELICIT    General:  [  ] None  [ x ] Fever  [  ] Chills  [ x ] Malaise    Skin:  [ x ] None [  ] Rash  [  ] Wound  [  ] Ulcer    HEENT:  [ x ] None  [  ] Sore Throat  [  ] Nasal congestion/ runny nose  [  ] Photophobia [  ] Neck pain      Chest:  [ x ] None   [  ] SOB  [  ] Cough  [  ] None    Cardiovascular:   [ x ] None  [  ] CP  [  ] Palpitation    Gastrointestinal:  [  ] None  [ x ] Abd pain   [  ] Nausea    [  ] Vomiting   [  ] Diarrhea	     Genitourinary:  [ x ] None [  ] Polyuria   [  ] Urgency  [  ] Frequency  [  ] Dysuria    [  ]  Hematuria       Musculoskeletal:  [  ] None [  ] Back Pain	[  ] Body aches  [  ] Joint pain  [ x ] Weakness    Neurological: [  ] None [  ]Dizziness  [  ]Visual Disturbance  [  ]Headaches   [ x ] Weakness          PHYSICAL EXAM:    Vital Signs Last 24 Hrs  T(C): 37.2 (24 May 2018 22:35), Max: 37.2 (24 May 2018 22:35)  T(F): 99 (24 May 2018 22:35), Max: 99 (24 May 2018 22:35)  HR: 66 (24 May 2018 22:35) (54 - 83)  BP: 120/52 (24 May 2018 22:35) (120/52 - 151/74)  BP(mean): 86 (24 May 2018 14:13) (85 - 96)  RR: 18 (24 May 2018 22:35) (12 - 19)  SpO2: 99% (24 May 2018 22:35) (96% - 100%)    Constitutional:    HEENT: [ x ] Wnl  [  ] Pharyngeal congestion    Neck:  [ x ] Supple  [  ]Lymphadenopathy  [  ] No JVD   [  ] JVD  [  ] Masses   [  ] WNL    CHEST/Respiratory:  [ x ]Clear to auscultation  [  ] Rales   [  ] Rhonchi   [  ] Wheezing     [  ] Chest Tenderness      Cardiovascular:  [ x ] Reg S1 S2   [  ] Irreg S1 S2   [ x ]No Murmur  [  ] +ve Murmurs  [  ]Systolic [  ]Diastolic      Abdomen:  [ x ] Soft  [  ] No tendrerness  [ x ] Tenderness lower abdomen  [  ] Organomegaly  [  ] ABD Distention  [  ] Rigidity                       [ x ] No Regidity                       [ x ] No Rebound Tenderness  [  ] No Guarding Rigidity  [  ] Rebound Tenderness[  ] Guarding Rigidity                          [ x ]  +ve Bowel Sounds  [  ] Decreased Bowel Sounds    [  ] Absent Bowel Sounds  [ x ] small dressed surgical wounds.                          Extremities: [ x ] No edema [  ] Edema  [  ] Clubbing   [  ] Cyanosis                         [ x ] No Tender Calf muscles  [  ] Tender Calf muscles                        [ x ] Palpable peripheral pulses    Neurological: [ x ] Awake  [ x ] Alert  [ x ] Oriented  x  3                           [  ] Confused  [  ] Drowsy  [  ] respond to painful stimuli  [  ] Unresponsive    Skin:  [ x ] Intact [  ] Redness [  ] Thrombophlebitis  [  ] Rashes  [  ] Dry  [  ] Ulcers    Ortho:  [  ] Joint Swelling  [  ] Joint erythema [  ] Joint tenderness                [  ] Increased temp. to touch  [  ] DJD [ x ] WNL          LABS/DIAGNOSTIC TESTS                          10.8   9.6   )-----------( 210      ( 24 May 2018 06:18 )             33.6         05-24    143  |  111<H>  |  14  ----------------------------<  97  4.1   |  28  |  0.65    Ca    8.4      24 May 2018 06:18  Phos  3.0     05-24  Mg     1.9     05-24    TPro  6.7  /  Alb  2.8<L>  /  TBili  0.3  /  DBili  x   /  AST  20  /  ALT  15  /  AlkPhos  76  05-24      LIVER FUNCTIONS - ( 24 May 2018 06:18 )  Alb: 2.8 g/dL / Pro: 6.7 g/dL / ALK PHOS: 76 U/L / ALT: 15 U/L DA / AST: 20 U/L / GGT: x               CULTURES:   Culture - Urine (05.23.18 @ 23:29)    Specimen Source: .Urine Clean Catch (Midstream)    Culture Results:   No growth        RADIOLOGY      EXAM:  CT ABDOMEN AND PELVIS OC IC                            PROCEDURE DATE:  05/23/2018          INTERPRETATION:  Abdominal/Pelvic CT    5/23/2018 9:12 PM    Indication: Abdominal pain and fever, history of cancer    Technique: Axial images were obtained following IV contrast from the lung   bases through pubic symphysis.  90 cc of Omnipaque 350 was administered   intravenously without complication and 10 cc was discarded.  Reformatted   coronal and sagittal images are submitted.    Comparison: CT of March 2, 2016    FINDINGS:    LUNG BASES:  There are dependent atelectatic changes at the lung bases.   Calcified granuloma in the right middle lobe.  PERITONEUM:  There is no free air or focal collection.  No free fluid.  LIVER: Normal.  SPLEEN: Normal.  GALLBLADDER: Cholecystectomy.  BILIARY TREE: Unremarkable.  PANCREAS: Atrophic.  ADRENAL GLANDS: Normal.  KIDNEYS: Normal.  BOWEL: The stomach is incompletely distended.  There is no small bowel   obstruction, focal bowel wall thickeningor diverticulitis. The appendix   is mildly dilated up to 9 mm with intraluminal fluid, mild wall   hyperenhancement without surrounding fat stranding. This finding is new   compared to multiple previous studies where it was clearly not dilated   andfilled with air and contrast. Scattered sigmoid diverticulosis.    URINARY BLADDER: Unremarkable.  PELVIC ORGANS: Hysterectomy.    There is no significant adenopathy.  VASCULATURE: The aorta is not dilated. There is mild atherosclerotic   vascular calcification.  RETROPERITONEUM:  There is no mass.  BONES: Chronic degenerative changes of the spine.  ABDOMINAL WALL: Tiny fat-containing umbilical hernia. The containing   supraumbilical ventral hernias.    IMPRESSION:    Equivocal appendix.  The appendix was clearly normal on previous studies   but appears mildly dilated with wall hyperenhancement and intraluminal   fluid on the current exam.  Early acute appendicitis should be excluded   on clinical grounds.        EXAM:  XR CHEST AP OR PA 1V                            PROCEDURE DATE:  05/23/2018          INTERPRETATION:  Portable chest x-ray    Indication: Fever.    Portable chest x-ray is compared to a previous examination dated   6/27/2017.    Impression: Stable right IJ Mediport.    The examination is limited by poor inspiratory effort. No gross pulmonary   consolidation, pleural effusion or pneumothorax.    Stable cardiac silhouette.    Left tracheal deviation may be due to a right goiter; if clinically   indicated, chest CT without IV contrast may be pursued for further   evaluation on a nonemergent outpatient basis.         Assessment and Recommendation:   82 y/o F pt with a significant PM Hx of Dementia, DM, HTN, HLD, gastric adenocarcinoma s/p resection, hysterectomy and tubal ligation presents to the ED for abd pain x today. Abdominal pain is mild, 3-4/10, non radiating non referred. Patient also complains of subjective fever.  CT abdomen suggested acute appendicitis.  Patient was started on IV Zosyn and planned for surgery.  5/24/18 Patient had laparoscopic appendectomy and tolerated it well.    Problem/Recommendation - 1:  Problem: Acute appendicitis, unspecified acute appendicitis type.  Recommendation:   1- UA & CS.  2- Follow Blood culture to final report.  3- Surgical  follow up.  4- Continue IV Zosyn.  5- Fluid and electrolytes management.    Problem/Recommendation - 2:  ·  Problem: Anemia.  Recommendation: 1- Anemia profile.  2- Iron supplements.  3- Closely monitor H & H.  4- Observe for bleeding.     Problem/Recommendation - 3:  ·  Problem: DM (diabetes mellitus).  Recommendation: 1- Blood sugar monitoring and control.  2- Accu-Cheks with coverage.  3- Follow HB A1C.     Problem/Recommendation - 4:  ·  Problem: HTN (hypertension).  Recommendation: 1- Monitor Blood pressure closely.  2- Blood pressure control.  3- BP. meds as per cardiology and primary care team.     Discussed with medical team.

## 2018-05-25 NOTE — DISCHARGE NOTE ADULT - HOSPITAL COURSE
82 y/o F pt with a significant PMHx of Dementia, DM, HTN, HLD, gastric adenocarcinoma s/p resection, hysterectomy and tubal ligation presents to the ED for abd pain 5/24/18. CT abd/pelvis confirmed acute appendicitis. Pt underwent uncomplicated laparoscopic appendectomy same day. Pt stable post operatively and discharged POD#1 82 y/o F pt with a significant PMHx of Dementia, DM, HTN, HLD, gastric adenocarcinoma s/p resection, hysterectomy and tubal ligation presents to the ED for abd pain 5/24/18. CT abd/pelvis confirmed acute appendicitis. Pt underwent uncomplicated laparoscopic appendectomy same day. Pt stable post operatively and discharged POD#1 with oral antibiotics

## 2018-05-25 NOTE — DISCHARGE NOTE ADULT - PLAN OF CARE
wound healing May shower. Remove dressing in 48 hrs. Leave steristrips intact. Resume regular diet. No heavy lifting, straining, exercises for 2 weeks. continue Metformin continue lisinopril continue simvastatin

## 2018-05-25 NOTE — DISCHARGE NOTE ADULT - CARE PLAN
Principal Discharge DX:	Acute appendicitis, unspecified acute appendicitis type  Goal:	wound healing  Assessment and plan of treatment:	May shower. Remove dressing in 48 hrs. Leave steristrips intact. Resume regular diet. No heavy lifting, straining, exercises for 2 weeks.  Secondary Diagnosis:	DM (diabetes mellitus)  Assessment and plan of treatment:	continue Metformin  Secondary Diagnosis:	HTN (hypertension)  Assessment and plan of treatment:	continue lisinopril  Secondary Diagnosis:	Hypercholesteremia  Assessment and plan of treatment:	continue simvastatin  Secondary Diagnosis:	Anemia

## 2018-05-25 NOTE — PROGRESS NOTE ADULT - ASSESSMENT
84 y/o F pt with a significant PMHx of Dementia, DM, HTN, HLD, gastric adenocarcinoma s/p resection, hysterectomy and tubal ligation presents with appendicitis-s/p appendectomy.  1.D/C IVF, tolerating po diet.  2.HTN-resume ace.  3.Surgical   4.DM-insulin.  5.Gi and DVT prophylaxis.

## 2018-05-25 NOTE — DISCHARGE NOTE ADULT - PATIENT PORTAL LINK FT
You can access the [facility] Patient Portal, offered by [facility], by registering with the following website: [email or web address]

## 2018-05-29 LAB
CULTURE RESULTS: SIGNIFICANT CHANGE UP
CULTURE RESULTS: SIGNIFICANT CHANGE UP
SPECIMEN SOURCE: SIGNIFICANT CHANGE UP
SPECIMEN SOURCE: SIGNIFICANT CHANGE UP
SURGICAL PATHOLOGY FINAL REPORT - CH: SIGNIFICANT CHANGE UP

## 2018-06-07 PROBLEM — Z87.19 HISTORY OF APPENDICITIS: Status: RESOLVED | Noted: 2018-06-07 | Resolved: 2018-06-07

## 2018-06-14 ENCOUNTER — APPOINTMENT (OUTPATIENT)
Dept: SURGERY | Facility: CLINIC | Age: 83
End: 2018-06-14
Payer: MEDICAID

## 2018-06-14 DIAGNOSIS — Z87.19 PERSONAL HISTORY OF OTHER DISEASES OF THE DIGESTIVE SYSTEM: ICD-10-CM

## 2018-06-14 PROCEDURE — 99024 POSTOP FOLLOW-UP VISIT: CPT

## 2018-09-01 ENCOUNTER — OUTPATIENT (OUTPATIENT)
Dept: OUTPATIENT SERVICES | Facility: HOSPITAL | Age: 83
LOS: 1 days | End: 2018-09-01
Payer: MEDICAID

## 2018-09-01 DIAGNOSIS — Z98.51 TUBAL LIGATION STATUS: Chronic | ICD-10-CM

## 2018-09-01 DIAGNOSIS — Z90.710 ACQUIRED ABSENCE OF BOTH CERVIX AND UTERUS: Chronic | ICD-10-CM

## 2018-09-01 PROCEDURE — G9001: CPT

## 2018-09-06 ENCOUNTER — EMERGENCY (EMERGENCY)
Facility: HOSPITAL | Age: 83
LOS: 1 days | Discharge: HOME | End: 2018-09-06
Attending: EMERGENCY MEDICINE
Payer: MEDICAID

## 2018-09-06 VITALS
RESPIRATION RATE: 17 BRPM | OXYGEN SATURATION: 99 % | HEART RATE: 85 BPM | DIASTOLIC BLOOD PRESSURE: 57 MMHG | SYSTOLIC BLOOD PRESSURE: 111 MMHG

## 2018-09-06 VITALS
SYSTOLIC BLOOD PRESSURE: 115 MMHG | WEIGHT: 145.06 LBS | DIASTOLIC BLOOD PRESSURE: 70 MMHG | HEIGHT: 61 IN | RESPIRATION RATE: 18 BRPM | HEART RATE: 103 BPM | TEMPERATURE: 100 F | OXYGEN SATURATION: 96 %

## 2018-09-06 DIAGNOSIS — Z98.51 TUBAL LIGATION STATUS: Chronic | ICD-10-CM

## 2018-09-06 DIAGNOSIS — Z90.710 ACQUIRED ABSENCE OF BOTH CERVIX AND UTERUS: Chronic | ICD-10-CM

## 2018-09-06 PROBLEM — F03.90 UNSPECIFIED DEMENTIA WITHOUT BEHAVIORAL DISTURBANCE: Chronic | Status: ACTIVE | Noted: 2018-05-15

## 2018-09-06 PROBLEM — F03.90 UNSPECIFIED DEMENTIA, UNSPECIFIED SEVERITY, WITHOUT BEHAVIORAL DISTURBANCE, PSYCHOTIC DISTURBANCE, MOOD DISTURBANCE, AND ANXIETY: Chronic | Status: ACTIVE | Noted: 2018-05-15

## 2018-09-06 LAB
ALBUMIN SERPL ELPH-MCNC: 4 G/DL — SIGNIFICANT CHANGE UP (ref 3.5–5)
ALP SERPL-CCNC: 76 U/L — SIGNIFICANT CHANGE UP (ref 40–120)
ALT FLD-CCNC: 34 U/L DA — SIGNIFICANT CHANGE UP (ref 10–60)
ANION GAP SERPL CALC-SCNC: 11 MMOL/L — SIGNIFICANT CHANGE UP (ref 5–17)
APPEARANCE UR: CLEAR — SIGNIFICANT CHANGE UP
APTT BLD: 22.7 SEC — LOW (ref 27.5–37.4)
AST SERPL-CCNC: 34 U/L — SIGNIFICANT CHANGE UP (ref 10–40)
BASOPHILS # BLD AUTO: 0 K/UL — SIGNIFICANT CHANGE UP (ref 0–0.2)
BASOPHILS NFR BLD AUTO: 0.1 % — SIGNIFICANT CHANGE UP (ref 0–2)
BILIRUB SERPL-MCNC: 0.3 MG/DL — SIGNIFICANT CHANGE UP (ref 0.2–1.2)
BILIRUB UR-MCNC: NEGATIVE — SIGNIFICANT CHANGE UP
BUN SERPL-MCNC: 24 MG/DL — HIGH (ref 7–18)
CALCIUM SERPL-MCNC: 9.1 MG/DL — SIGNIFICANT CHANGE UP (ref 8.4–10.5)
CHLORIDE SERPL-SCNC: 108 MMOL/L — SIGNIFICANT CHANGE UP (ref 96–108)
CO2 SERPL-SCNC: 22 MMOL/L — SIGNIFICANT CHANGE UP (ref 22–31)
COLOR SPEC: YELLOW — SIGNIFICANT CHANGE UP
CREAT SERPL-MCNC: 1.12 MG/DL — SIGNIFICANT CHANGE UP (ref 0.5–1.3)
DIFF PNL FLD: ABNORMAL
EOSINOPHIL # BLD AUTO: 0.1 K/UL — SIGNIFICANT CHANGE UP (ref 0–0.5)
EOSINOPHIL NFR BLD AUTO: 0.4 % — SIGNIFICANT CHANGE UP (ref 0–6)
GLUCOSE SERPL-MCNC: 196 MG/DL — HIGH (ref 70–99)
GLUCOSE UR QL: NEGATIVE — SIGNIFICANT CHANGE UP
HCT VFR BLD CALC: 40.1 % — SIGNIFICANT CHANGE UP (ref 34.5–45)
HGB BLD-MCNC: 12.7 G/DL — SIGNIFICANT CHANGE UP (ref 11.5–15.5)
INR BLD: 1.03 RATIO — SIGNIFICANT CHANGE UP (ref 0.88–1.16)
KETONES UR-MCNC: ABNORMAL
LEUKOCYTE ESTERASE UR-ACNC: ABNORMAL
LIDOCAIN IGE QN: 105 U/L — SIGNIFICANT CHANGE UP (ref 73–393)
LYMPHOCYTES # BLD AUTO: 1.7 K/UL — SIGNIFICANT CHANGE UP (ref 1–3.3)
LYMPHOCYTES # BLD AUTO: 12.7 % — LOW (ref 13–44)
MCHC RBC-ENTMCNC: 30.5 PG — SIGNIFICANT CHANGE UP (ref 27–34)
MCHC RBC-ENTMCNC: 31.8 GM/DL — LOW (ref 32–36)
MCV RBC AUTO: 96.1 FL — SIGNIFICANT CHANGE UP (ref 80–100)
MONOCYTES # BLD AUTO: 0.6 K/UL — SIGNIFICANT CHANGE UP (ref 0–0.9)
MONOCYTES NFR BLD AUTO: 4.2 % — SIGNIFICANT CHANGE UP (ref 2–14)
NEUTROPHILS # BLD AUTO: 11.2 K/UL — HIGH (ref 1.8–7.4)
NEUTROPHILS NFR BLD AUTO: 82.5 % — HIGH (ref 43–77)
NITRITE UR-MCNC: POSITIVE
PH UR: 5 — SIGNIFICANT CHANGE UP (ref 5–8)
PLATELET # BLD AUTO: 225 K/UL — SIGNIFICANT CHANGE UP (ref 150–400)
POTASSIUM SERPL-MCNC: 4.8 MMOL/L — SIGNIFICANT CHANGE UP (ref 3.5–5.3)
POTASSIUM SERPL-SCNC: 4.8 MMOL/L — SIGNIFICANT CHANGE UP (ref 3.5–5.3)
PROT SERPL-MCNC: 8.5 G/DL — HIGH (ref 6–8.3)
PROT UR-MCNC: 30 MG/DL
PROTHROM AB SERPL-ACNC: 11.2 SEC — SIGNIFICANT CHANGE UP (ref 9.8–12.7)
RBC # BLD: 4.18 M/UL — SIGNIFICANT CHANGE UP (ref 3.8–5.2)
RBC # FLD: 13.5 % — SIGNIFICANT CHANGE UP (ref 10.3–14.5)
SODIUM SERPL-SCNC: 141 MMOL/L — SIGNIFICANT CHANGE UP (ref 135–145)
SP GR SPEC: 1.02 — SIGNIFICANT CHANGE UP (ref 1.01–1.02)
UROBILINOGEN FLD QL: NEGATIVE — SIGNIFICANT CHANGE UP
WBC # BLD: 13.6 K/UL — HIGH (ref 3.8–10.5)
WBC # FLD AUTO: 13.6 K/UL — HIGH (ref 3.8–10.5)

## 2018-09-06 PROCEDURE — 85027 COMPLETE CBC AUTOMATED: CPT

## 2018-09-06 PROCEDURE — 74177 CT ABD & PELVIS W/CONTRAST: CPT | Mod: 26

## 2018-09-06 PROCEDURE — 96374 THER/PROPH/DIAG INJ IV PUSH: CPT | Mod: XU

## 2018-09-06 PROCEDURE — 85730 THROMBOPLASTIN TIME PARTIAL: CPT

## 2018-09-06 PROCEDURE — 74177 CT ABD & PELVIS W/CONTRAST: CPT

## 2018-09-06 PROCEDURE — 99284 EMERGENCY DEPT VISIT MOD MDM: CPT | Mod: 25

## 2018-09-06 PROCEDURE — 93010 ELECTROCARDIOGRAM REPORT: CPT

## 2018-09-06 PROCEDURE — 93005 ELECTROCARDIOGRAM TRACING: CPT

## 2018-09-06 PROCEDURE — 81001 URINALYSIS AUTO W/SCOPE: CPT

## 2018-09-06 PROCEDURE — 96375 TX/PRO/DX INJ NEW DRUG ADDON: CPT

## 2018-09-06 PROCEDURE — 99285 EMERGENCY DEPT VISIT HI MDM: CPT | Mod: 25

## 2018-09-06 PROCEDURE — 36415 COLL VENOUS BLD VENIPUNCTURE: CPT

## 2018-09-06 PROCEDURE — 85610 PROTHROMBIN TIME: CPT

## 2018-09-06 PROCEDURE — 83690 ASSAY OF LIPASE: CPT

## 2018-09-06 PROCEDURE — 80053 COMPREHEN METABOLIC PANEL: CPT

## 2018-09-06 RX ORDER — MORPHINE SULFATE 50 MG/1
4 CAPSULE, EXTENDED RELEASE ORAL ONCE
Qty: 0 | Refills: 0 | Status: DISCONTINUED | OUTPATIENT
Start: 2018-09-06 | End: 2018-09-06

## 2018-09-06 RX ORDER — SODIUM CHLORIDE 9 MG/ML
3 INJECTION INTRAMUSCULAR; INTRAVENOUS; SUBCUTANEOUS ONCE
Qty: 0 | Refills: 0 | Status: COMPLETED | OUTPATIENT
Start: 2018-09-06 | End: 2018-09-06

## 2018-09-06 RX ORDER — CEFUROXIME AXETIL 250 MG
1 TABLET ORAL
Qty: 14 | Refills: 0 | OUTPATIENT
Start: 2018-09-06 | End: 2018-09-12

## 2018-09-06 RX ORDER — SODIUM CHLORIDE 9 MG/ML
1000 INJECTION INTRAMUSCULAR; INTRAVENOUS; SUBCUTANEOUS ONCE
Qty: 0 | Refills: 0 | Status: COMPLETED | OUTPATIENT
Start: 2018-09-06 | End: 2018-09-06

## 2018-09-06 RX ORDER — ONDANSETRON 8 MG/1
4 TABLET, FILM COATED ORAL ONCE
Qty: 0 | Refills: 0 | Status: COMPLETED | OUTPATIENT
Start: 2018-09-06 | End: 2018-09-06

## 2018-09-06 RX ORDER — SODIUM CHLORIDE 9 MG/ML
1000 INJECTION INTRAMUSCULAR; INTRAVENOUS; SUBCUTANEOUS
Qty: 0 | Refills: 0 | Status: DISCONTINUED | OUTPATIENT
Start: 2018-09-06 | End: 2018-09-10

## 2018-09-06 RX ORDER — CEFTRIAXONE 500 MG/1
1 INJECTION, POWDER, FOR SOLUTION INTRAMUSCULAR; INTRAVENOUS ONCE
Qty: 0 | Refills: 0 | Status: COMPLETED | OUTPATIENT
Start: 2018-09-06 | End: 2018-09-06

## 2018-09-06 RX ORDER — PANTOPRAZOLE SODIUM 20 MG/1
40 TABLET, DELAYED RELEASE ORAL ONCE
Qty: 0 | Refills: 0 | Status: COMPLETED | OUTPATIENT
Start: 2018-09-06 | End: 2018-09-06

## 2018-09-06 RX ADMIN — MORPHINE SULFATE 4 MILLIGRAM(S): 50 CAPSULE, EXTENDED RELEASE ORAL at 03:35

## 2018-09-06 RX ADMIN — PANTOPRAZOLE SODIUM 40 MILLIGRAM(S): 20 TABLET, DELAYED RELEASE ORAL at 04:11

## 2018-09-06 RX ADMIN — CEFTRIAXONE 100 GRAM(S): 500 INJECTION, POWDER, FOR SOLUTION INTRAMUSCULAR; INTRAVENOUS at 07:57

## 2018-09-06 RX ADMIN — SODIUM CHLORIDE 1000 MILLILITER(S): 9 INJECTION INTRAMUSCULAR; INTRAVENOUS; SUBCUTANEOUS at 02:44

## 2018-09-06 RX ADMIN — SODIUM CHLORIDE 125 MILLILITER(S): 9 INJECTION INTRAMUSCULAR; INTRAVENOUS; SUBCUTANEOUS at 02:44

## 2018-09-06 RX ADMIN — SODIUM CHLORIDE 3 MILLILITER(S): 9 INJECTION INTRAMUSCULAR; INTRAVENOUS; SUBCUTANEOUS at 02:44

## 2018-09-06 RX ADMIN — MORPHINE SULFATE 4 MILLIGRAM(S): 50 CAPSULE, EXTENDED RELEASE ORAL at 04:11

## 2018-09-06 RX ADMIN — ONDANSETRON 4 MILLIGRAM(S): 8 TABLET, FILM COATED ORAL at 03:35

## 2018-09-06 NOTE — ED PROVIDER NOTE - OBJECTIVE STATEMENT
82 y/o F pt with PMHx Dementia, DM, Gastric adenocarcinoma, HTN, Hypercholesteremia and Vertigo presents to ED with complaints of nausea, vomiting and diarrhea since 5 pm on 9/5/18. Symptoms are associated with generalized abdominal pain. Denies recent outside [place] travels, sick contacts or known spoiled food consumption. 84 y/o F pt with PMHx Dementia, DM, Gastric adenocarcinoma, HTN, Hypercholesteremia and Vertigo presents to ED with complaints of nausea, vomiting and diarrhea since 5 pm on 9/5/18. Symptoms are associated with generalized abdominal pain. Denies recent outside [place] travels, sick contacts or known spoiled food consumption. PMD is Dr. Bonds.

## 2018-09-06 NOTE — ED PROVIDER NOTE - MEDICAL DECISION MAKING DETAILS
8a- Pt feels better. Rx Ceftin. Pt is well appearing walking with normal gait, stable for discharge and follow up with medical doctor. Pt educated on care and need for follow up. Discussed anticipatory guidance and return precautions. Questions answered. I had a detailed discussion with the patient and/or guardian regarding the historical points, exam findings, and any diagnostic results supporting the discharge diagnosis.

## 2018-09-07 DIAGNOSIS — Z71.89 OTHER SPECIFIED COUNSELING: ICD-10-CM

## 2019-02-04 ENCOUNTER — INPATIENT (INPATIENT)
Facility: HOSPITAL | Age: 84
LOS: 1 days | Discharge: HOME | DRG: 690 | End: 2019-02-06
Attending: INTERNAL MEDICINE | Admitting: INTERNAL MEDICINE
Payer: MEDICAID

## 2019-02-04 VITALS
HEIGHT: 61 IN | SYSTOLIC BLOOD PRESSURE: 158 MMHG | RESPIRATION RATE: 20 BRPM | OXYGEN SATURATION: 99 % | TEMPERATURE: 99 F | HEART RATE: 93 BPM | DIASTOLIC BLOOD PRESSURE: 79 MMHG | WEIGHT: 153 LBS

## 2019-02-04 DIAGNOSIS — N39.0 URINARY TRACT INFECTION, SITE NOT SPECIFIED: ICD-10-CM

## 2019-02-04 DIAGNOSIS — Z90.710 ACQUIRED ABSENCE OF BOTH CERVIX AND UTERUS: Chronic | ICD-10-CM

## 2019-02-04 DIAGNOSIS — Z98.51 TUBAL LIGATION STATUS: Chronic | ICD-10-CM

## 2019-02-04 LAB
ALBUMIN SERPL ELPH-MCNC: 3.7 G/DL — SIGNIFICANT CHANGE UP (ref 3.5–5)
ALP SERPL-CCNC: 56 U/L — SIGNIFICANT CHANGE UP (ref 40–120)
ALT FLD-CCNC: 38 U/L DA — SIGNIFICANT CHANGE UP (ref 10–60)
ANION GAP SERPL CALC-SCNC: 8 MMOL/L — SIGNIFICANT CHANGE UP (ref 5–17)
APPEARANCE UR: CLEAR — SIGNIFICANT CHANGE UP
AST SERPL-CCNC: 44 U/L — HIGH (ref 10–40)
BASOPHILS # BLD AUTO: 0.1 K/UL — SIGNIFICANT CHANGE UP (ref 0–0.2)
BASOPHILS NFR BLD AUTO: 1.3 % — SIGNIFICANT CHANGE UP (ref 0–2)
BILIRUB SERPL-MCNC: 0.2 MG/DL — SIGNIFICANT CHANGE UP (ref 0.2–1.2)
BILIRUB UR-MCNC: NEGATIVE — SIGNIFICANT CHANGE UP
BUN SERPL-MCNC: 16 MG/DL — SIGNIFICANT CHANGE UP (ref 7–18)
CALCIUM SERPL-MCNC: 8.9 MG/DL — SIGNIFICANT CHANGE UP (ref 8.4–10.5)
CHLORIDE SERPL-SCNC: 107 MMOL/L — SIGNIFICANT CHANGE UP (ref 96–108)
CO2 SERPL-SCNC: 26 MMOL/L — SIGNIFICANT CHANGE UP (ref 22–31)
COLOR SPEC: YELLOW — SIGNIFICANT CHANGE UP
CREAT SERPL-MCNC: 0.98 MG/DL — SIGNIFICANT CHANGE UP (ref 0.5–1.3)
DIFF PNL FLD: NEGATIVE — SIGNIFICANT CHANGE UP
EOSINOPHIL # BLD AUTO: 0.2 K/UL — SIGNIFICANT CHANGE UP (ref 0–0.5)
EOSINOPHIL NFR BLD AUTO: 1.7 % — SIGNIFICANT CHANGE UP (ref 0–6)
GLUCOSE SERPL-MCNC: 126 MG/DL — HIGH (ref 70–99)
GLUCOSE UR QL: NEGATIVE — SIGNIFICANT CHANGE UP
HCT VFR BLD CALC: 38 % — SIGNIFICANT CHANGE UP (ref 34.5–45)
HGB BLD-MCNC: 12.1 G/DL — SIGNIFICANT CHANGE UP (ref 11.5–15.5)
KETONES UR-MCNC: NEGATIVE — SIGNIFICANT CHANGE UP
LACTATE SERPL-SCNC: 2.8 MMOL/L — HIGH (ref 0.7–2)
LEUKOCYTE ESTERASE UR-ACNC: ABNORMAL
LIDOCAIN IGE QN: 127 U/L — SIGNIFICANT CHANGE UP (ref 73–393)
LYMPHOCYTES # BLD AUTO: 4.3 K/UL — HIGH (ref 1–3.3)
LYMPHOCYTES # BLD AUTO: 45 % — HIGH (ref 13–44)
MCHC RBC-ENTMCNC: 31 PG — SIGNIFICANT CHANGE UP (ref 27–34)
MCHC RBC-ENTMCNC: 31.9 GM/DL — LOW (ref 32–36)
MCV RBC AUTO: 97.3 FL — SIGNIFICANT CHANGE UP (ref 80–100)
MONOCYTES # BLD AUTO: 0.5 K/UL — SIGNIFICANT CHANGE UP (ref 0–0.9)
MONOCYTES NFR BLD AUTO: 5.7 % — SIGNIFICANT CHANGE UP (ref 2–14)
NEUTROPHILS # BLD AUTO: 4.4 K/UL — SIGNIFICANT CHANGE UP (ref 1.8–7.4)
NEUTROPHILS NFR BLD AUTO: 46.3 % — SIGNIFICANT CHANGE UP (ref 43–77)
NITRITE UR-MCNC: NEGATIVE — SIGNIFICANT CHANGE UP
PH UR: 6 — SIGNIFICANT CHANGE UP (ref 5–8)
PLATELET # BLD AUTO: 219 K/UL — SIGNIFICANT CHANGE UP (ref 150–400)
POTASSIUM SERPL-MCNC: 4.4 MMOL/L — SIGNIFICANT CHANGE UP (ref 3.5–5.3)
POTASSIUM SERPL-SCNC: 4.4 MMOL/L — SIGNIFICANT CHANGE UP (ref 3.5–5.3)
PROT SERPL-MCNC: 7.8 G/DL — SIGNIFICANT CHANGE UP (ref 6–8.3)
PROT UR-MCNC: NEGATIVE — SIGNIFICANT CHANGE UP
RBC # BLD: 3.9 M/UL — SIGNIFICANT CHANGE UP (ref 3.8–5.2)
RBC # FLD: 12.9 % — SIGNIFICANT CHANGE UP (ref 10.3–14.5)
SODIUM SERPL-SCNC: 141 MMOL/L — SIGNIFICANT CHANGE UP (ref 135–145)
SP GR SPEC: 1.01 — SIGNIFICANT CHANGE UP (ref 1.01–1.02)
UROBILINOGEN FLD QL: NEGATIVE — SIGNIFICANT CHANGE UP
WBC # BLD: 9.5 K/UL — SIGNIFICANT CHANGE UP (ref 3.8–10.5)
WBC # FLD AUTO: 9.5 K/UL — SIGNIFICANT CHANGE UP (ref 3.8–10.5)

## 2019-02-04 PROCEDURE — 74177 CT ABD & PELVIS W/CONTRAST: CPT | Mod: 26

## 2019-02-04 PROCEDURE — 99284 EMERGENCY DEPT VISIT MOD MDM: CPT

## 2019-02-04 RX ORDER — IOHEXOL 300 MG/ML
30 INJECTION, SOLUTION INTRAVENOUS ONCE
Qty: 0 | Refills: 0 | Status: COMPLETED | OUTPATIENT
Start: 2019-02-04 | End: 2019-02-04

## 2019-02-04 RX ORDER — CEFTRIAXONE 500 MG/1
1 INJECTION, POWDER, FOR SOLUTION INTRAMUSCULAR; INTRAVENOUS ONCE
Qty: 0 | Refills: 0 | Status: COMPLETED | OUTPATIENT
Start: 2019-02-04 | End: 2019-02-04

## 2019-02-04 RX ORDER — SODIUM CHLORIDE 9 MG/ML
1000 INJECTION INTRAMUSCULAR; INTRAVENOUS; SUBCUTANEOUS ONCE
Qty: 0 | Refills: 0 | Status: COMPLETED | OUTPATIENT
Start: 2019-02-04 | End: 2019-02-04

## 2019-02-04 RX ORDER — ONDANSETRON 8 MG/1
4 TABLET, FILM COATED ORAL ONCE
Qty: 0 | Refills: 0 | Status: COMPLETED | OUTPATIENT
Start: 2019-02-04 | End: 2019-02-04

## 2019-02-04 RX ORDER — SODIUM CHLORIDE 9 MG/ML
500 INJECTION INTRAMUSCULAR; INTRAVENOUS; SUBCUTANEOUS ONCE
Qty: 0 | Refills: 0 | Status: COMPLETED | OUTPATIENT
Start: 2019-02-04 | End: 2019-02-04

## 2019-02-04 RX ADMIN — SODIUM CHLORIDE 500 MILLILITER(S): 9 INJECTION INTRAMUSCULAR; INTRAVENOUS; SUBCUTANEOUS at 19:47

## 2019-02-04 RX ADMIN — CEFTRIAXONE 100 GRAM(S): 500 INJECTION, POWDER, FOR SOLUTION INTRAMUSCULAR; INTRAVENOUS at 23:31

## 2019-02-04 RX ADMIN — IOHEXOL 30 MILLILITER(S): 300 INJECTION, SOLUTION INTRAVENOUS at 19:51

## 2019-02-04 RX ADMIN — SODIUM CHLORIDE 1000 MILLILITER(S): 9 INJECTION INTRAMUSCULAR; INTRAVENOUS; SUBCUTANEOUS at 21:07

## 2019-02-04 NOTE — ED PROVIDER NOTE - MEDICAL DECISION MAKING DETAILS
lactic acidosis vomiting and pyelo. admit for IV antibiotics and hydration, antiemetics. multiple comorbidities.

## 2019-02-04 NOTE — ED PROVIDER NOTE - CARE PLAN
Principal Discharge DX:	UTI (urinary tract infection)  Secondary Diagnosis:	Pyelonephritis  Secondary Diagnosis:	Vomiting  Secondary Diagnosis:	Lactic acidosis

## 2019-02-04 NOTE — ED PROVIDER NOTE - OBJECTIVE STATEMENT
patient with vomiting diarrhea for 2 days and diffuse abdominal and flank pain. no fever. notes urinary urgency and dysuria

## 2019-02-04 NOTE — ED ADULT NURSE NOTE - NSIMPLEMENTINTERV_GEN_ALL_ED
Implemented All Universal Safety Interventions:  [place] to call system. Call bell, personal items and telephone within reach. Instruct patient to call for assistance. Room bathroom lighting operational. Non-slip footwear when patient is off stretcher. Physically safe environment: no spills, clutter or unnecessary equipment. Stretcher in lowest position, wheels locked, appropriate side rails in place.

## 2019-02-05 DIAGNOSIS — I10 ESSENTIAL (PRIMARY) HYPERTENSION: ICD-10-CM

## 2019-02-05 DIAGNOSIS — Z29.9 ENCOUNTER FOR PROPHYLACTIC MEASURES, UNSPECIFIED: ICD-10-CM

## 2019-02-05 DIAGNOSIS — N39.0 URINARY TRACT INFECTION, SITE NOT SPECIFIED: ICD-10-CM

## 2019-02-05 DIAGNOSIS — R19.7 DIARRHEA, UNSPECIFIED: ICD-10-CM

## 2019-02-05 DIAGNOSIS — E11.9 TYPE 2 DIABETES MELLITUS WITHOUT COMPLICATIONS: ICD-10-CM

## 2019-02-05 LAB
ALBUMIN SERPL ELPH-MCNC: 3.8 G/DL — SIGNIFICANT CHANGE UP (ref 3.5–5)
ALP SERPL-CCNC: 56 U/L — SIGNIFICANT CHANGE UP (ref 40–120)
ALT FLD-CCNC: 34 U/L DA — SIGNIFICANT CHANGE UP (ref 10–60)
ANION GAP SERPL CALC-SCNC: 8 MMOL/L — SIGNIFICANT CHANGE UP (ref 5–17)
AST SERPL-CCNC: 36 U/L — SIGNIFICANT CHANGE UP (ref 10–40)
BASOPHILS # BLD AUTO: 0.1 K/UL — SIGNIFICANT CHANGE UP (ref 0–0.2)
BASOPHILS NFR BLD AUTO: 0.9 % — SIGNIFICANT CHANGE UP (ref 0–2)
BILIRUB SERPL-MCNC: 0.3 MG/DL — SIGNIFICANT CHANGE UP (ref 0.2–1.2)
BUN SERPL-MCNC: 10 MG/DL — SIGNIFICANT CHANGE UP (ref 7–18)
CALCIUM SERPL-MCNC: 8.7 MG/DL — SIGNIFICANT CHANGE UP (ref 8.4–10.5)
CHLORIDE SERPL-SCNC: 111 MMOL/L — HIGH (ref 96–108)
CHOLEST SERPL-MCNC: 148 MG/DL — SIGNIFICANT CHANGE UP (ref 10–199)
CO2 SERPL-SCNC: 25 MMOL/L — SIGNIFICANT CHANGE UP (ref 22–31)
CREAT SERPL-MCNC: 0.79 MG/DL — SIGNIFICANT CHANGE UP (ref 0.5–1.3)
CULTURE RESULTS: SIGNIFICANT CHANGE UP
EOSINOPHIL # BLD AUTO: 0.2 K/UL — SIGNIFICANT CHANGE UP (ref 0–0.5)
EOSINOPHIL NFR BLD AUTO: 1.9 % — SIGNIFICANT CHANGE UP (ref 0–6)
FOLATE SERPL-MCNC: >20 NG/ML — SIGNIFICANT CHANGE UP
GLUCOSE BLDC GLUCOMTR-MCNC: 145 MG/DL — HIGH (ref 70–99)
GLUCOSE BLDC GLUCOMTR-MCNC: 149 MG/DL — HIGH (ref 70–99)
GLUCOSE BLDC GLUCOMTR-MCNC: 152 MG/DL — HIGH (ref 70–99)
GLUCOSE BLDC GLUCOMTR-MCNC: 157 MG/DL — HIGH (ref 70–99)
GLUCOSE BLDC GLUCOMTR-MCNC: 95 MG/DL — SIGNIFICANT CHANGE UP (ref 70–99)
GLUCOSE SERPL-MCNC: 115 MG/DL — HIGH (ref 70–99)
HBA1C BLD-MCNC: 8.5 % — HIGH (ref 4–5.6)
HCT VFR BLD CALC: 37.7 % — SIGNIFICANT CHANGE UP (ref 34.5–45)
HDLC SERPL-MCNC: 50 MG/DL — SIGNIFICANT CHANGE UP
HGB BLD-MCNC: 12.2 G/DL — SIGNIFICANT CHANGE UP (ref 11.5–15.5)
LACTATE SERPL-SCNC: 1.3 MMOL/L — SIGNIFICANT CHANGE UP (ref 0.7–2)
LIPID PNL WITH DIRECT LDL SERPL: 73 MG/DL — SIGNIFICANT CHANGE UP
LYMPHOCYTES # BLD AUTO: 3.9 K/UL — HIGH (ref 1–3.3)
LYMPHOCYTES # BLD AUTO: 49.5 % — HIGH (ref 13–44)
MAGNESIUM SERPL-MCNC: 2 MG/DL — SIGNIFICANT CHANGE UP (ref 1.6–2.6)
MCHC RBC-ENTMCNC: 30.9 PG — SIGNIFICANT CHANGE UP (ref 27–34)
MCHC RBC-ENTMCNC: 32.2 GM/DL — SIGNIFICANT CHANGE UP (ref 32–36)
MCV RBC AUTO: 95.8 FL — SIGNIFICANT CHANGE UP (ref 80–100)
MONOCYTES # BLD AUTO: 0.5 K/UL — SIGNIFICANT CHANGE UP (ref 0–0.9)
MONOCYTES NFR BLD AUTO: 5.8 % — SIGNIFICANT CHANGE UP (ref 2–14)
NEUTROPHILS # BLD AUTO: 3.3 K/UL — SIGNIFICANT CHANGE UP (ref 1.8–7.4)
NEUTROPHILS NFR BLD AUTO: 41.9 % — LOW (ref 43–77)
PHOSPHATE SERPL-MCNC: 2.8 MG/DL — SIGNIFICANT CHANGE UP (ref 2.5–4.5)
PLATELET # BLD AUTO: 210 K/UL — SIGNIFICANT CHANGE UP (ref 150–400)
POTASSIUM SERPL-MCNC: 3.9 MMOL/L — SIGNIFICANT CHANGE UP (ref 3.5–5.3)
POTASSIUM SERPL-SCNC: 3.9 MMOL/L — SIGNIFICANT CHANGE UP (ref 3.5–5.3)
PROT SERPL-MCNC: 7.7 G/DL — SIGNIFICANT CHANGE UP (ref 6–8.3)
RBC # BLD: 3.94 M/UL — SIGNIFICANT CHANGE UP (ref 3.8–5.2)
RBC # FLD: 12.8 % — SIGNIFICANT CHANGE UP (ref 10.3–14.5)
SODIUM SERPL-SCNC: 144 MMOL/L — SIGNIFICANT CHANGE UP (ref 135–145)
SPECIMEN SOURCE: SIGNIFICANT CHANGE UP
TOTAL CHOLESTEROL/HDL RATIO MEASUREMENT: 3 RATIO — LOW (ref 3.3–7.1)
TRIGL SERPL-MCNC: 124 MG/DL — SIGNIFICANT CHANGE UP (ref 10–149)
TSH SERPL-MCNC: 1.39 UU/ML — SIGNIFICANT CHANGE UP (ref 0.34–4.82)
VIT B12 SERPL-MCNC: 573 PG/ML — SIGNIFICANT CHANGE UP (ref 232–1245)
WBC # BLD: 7.8 K/UL — SIGNIFICANT CHANGE UP (ref 3.8–10.5)
WBC # FLD AUTO: 7.8 K/UL — SIGNIFICANT CHANGE UP (ref 3.8–10.5)

## 2019-02-05 RX ORDER — MECLIZINE HCL 12.5 MG
25 TABLET ORAL THREE TIMES A DAY
Qty: 0 | Refills: 0 | Status: DISCONTINUED | OUTPATIENT
Start: 2019-02-05 | End: 2019-02-06

## 2019-02-05 RX ORDER — CEFTRIAXONE 500 MG/1
1 INJECTION, POWDER, FOR SOLUTION INTRAMUSCULAR; INTRAVENOUS EVERY 24 HOURS
Qty: 0 | Refills: 0 | Status: DISCONTINUED | OUTPATIENT
Start: 2019-02-05 | End: 2019-02-06

## 2019-02-05 RX ORDER — PANTOPRAZOLE SODIUM 20 MG/1
40 TABLET, DELAYED RELEASE ORAL
Qty: 0 | Refills: 0 | Status: DISCONTINUED | OUTPATIENT
Start: 2019-02-05 | End: 2019-02-06

## 2019-02-05 RX ORDER — INSULIN LISPRO 100/ML
VIAL (ML) SUBCUTANEOUS
Qty: 0 | Refills: 0 | Status: DISCONTINUED | OUTPATIENT
Start: 2019-02-05 | End: 2019-02-06

## 2019-02-05 RX ORDER — SIMVASTATIN 20 MG/1
40 TABLET, FILM COATED ORAL AT BEDTIME
Qty: 0 | Refills: 0 | Status: DISCONTINUED | OUTPATIENT
Start: 2019-02-05 | End: 2019-02-06

## 2019-02-05 RX ORDER — ENOXAPARIN SODIUM 100 MG/ML
40 INJECTION SUBCUTANEOUS DAILY
Qty: 0 | Refills: 0 | Status: DISCONTINUED | OUTPATIENT
Start: 2019-02-05 | End: 2019-02-06

## 2019-02-05 RX ORDER — SODIUM CHLORIDE 9 MG/ML
1000 INJECTION INTRAMUSCULAR; INTRAVENOUS; SUBCUTANEOUS
Qty: 0 | Refills: 0 | Status: DISCONTINUED | OUTPATIENT
Start: 2019-02-05 | End: 2019-02-06

## 2019-02-05 RX ORDER — LISINOPRIL 2.5 MG/1
5 TABLET ORAL DAILY
Qty: 0 | Refills: 0 | Status: DISCONTINUED | OUTPATIENT
Start: 2019-02-05 | End: 2019-02-06

## 2019-02-05 RX ORDER — SUCRALFATE 1 G
1 TABLET ORAL
Qty: 0 | Refills: 0 | Status: DISCONTINUED | OUTPATIENT
Start: 2019-02-05 | End: 2019-02-06

## 2019-02-05 RX ORDER — FERROUS GLUCONATE 100 %
0 POWDER (GRAM) MISCELLANEOUS
Qty: 90 | Refills: 0 | COMMUNITY

## 2019-02-05 RX ADMIN — SODIUM CHLORIDE 70 MILLILITER(S): 9 INJECTION INTRAMUSCULAR; INTRAVENOUS; SUBCUTANEOUS at 17:48

## 2019-02-05 RX ADMIN — Medication 1 GRAM(S): at 06:43

## 2019-02-05 RX ADMIN — SIMVASTATIN 40 MILLIGRAM(S): 20 TABLET, FILM COATED ORAL at 23:20

## 2019-02-05 RX ADMIN — CEFTRIAXONE 100 GRAM(S): 500 INJECTION, POWDER, FOR SOLUTION INTRAMUSCULAR; INTRAVENOUS at 14:39

## 2019-02-05 RX ADMIN — Medication 1: at 14:39

## 2019-02-05 RX ADMIN — LISINOPRIL 5 MILLIGRAM(S): 2.5 TABLET ORAL at 06:43

## 2019-02-05 RX ADMIN — Medication 1 GRAM(S): at 14:39

## 2019-02-05 RX ADMIN — Medication 1 GRAM(S): at 23:20

## 2019-02-05 RX ADMIN — Medication 25 MILLIGRAM(S): at 06:43

## 2019-02-05 RX ADMIN — ENOXAPARIN SODIUM 40 MILLIGRAM(S): 100 INJECTION SUBCUTANEOUS at 14:39

## 2019-02-05 RX ADMIN — Medication 1 GRAM(S): at 17:48

## 2019-02-05 RX ADMIN — Medication 0: at 22:20

## 2019-02-05 RX ADMIN — PANTOPRAZOLE SODIUM 40 MILLIGRAM(S): 20 TABLET, DELAYED RELEASE ORAL at 06:43

## 2019-02-05 RX ADMIN — Medication 25 MILLIGRAM(S): at 23:20

## 2019-02-05 NOTE — H&P ADULT - NSHPPHYSICALEXAM_GEN_ALL_CORE
Vital Signs Last 24 Hrs  T(C): 37 (04 Feb 2019 18:33), Max: 37 (04 Feb 2019 18:33)  T(F): 98.6 (04 Feb 2019 18:33), Max: 98.6 (04 Feb 2019 18:33)  HR: 93 (04 Feb 2019 18:33) (93 - 93)  BP: 158/79 (04 Feb 2019 18:33) (158/79 - 158/79)  RR: 20 (04 Feb 2019 18:33) (20 - 20)  SpO2: 99% (04 Feb 2019 18:33) (99% - 99%)  .  GENERAL: Well developed, Well nourished [place] female, NAD  HEENT:  Normocephalic/Atraumatic, reactive light reflex, moist mucous membranes  NECK: Supple, no JVD  RESP: Symmetric movement of the chest, clear to auscultation bilaterally, no wheeze, Rt chest scar maximo   CVS: S1 and S2 audible, no murmur, rubs or gallops noted  GI: Normal active bowel sounds present, abdomen soft, non tender, central scar maximo   EXTREMITIES:  No edema, no clubbing, cyanosis , +varicosities/PVD noted  MSK: 5/5 strength bilateral upper and lower extremities, intact sensations to light & crude touch  PSYCH: Normal mood, normal affect observed    NEURO: Alert and oriented x 3

## 2019-02-05 NOTE — H&P ADULT - ASSESSMENT
84 female from home with PMH of GERD, HTN, DM, Anemia and PSH of Colon cancer resection (4 years ago), appendectomy, gall bladder removal came to hospital for sudden onset of diarrhea associated with vomiting for 2 days. Admitted to medicine for UTI.

## 2019-02-05 NOTE — PHYSICAL THERAPY INITIAL EVALUATION ADULT - GENERAL OBSERVATIONS, REHAB EVAL
Consult received,EMR, radiology and labs reviewed. Patient received supine in bed, NAD, [place] speaking - preferred to translate. Patient agreed to EVALUATION from Physical Therapist.

## 2019-02-05 NOTE — H&P ADULT - HISTORY OF PRESENT ILLNESS
84 female from home with PMH of GERD, HTN, DM, Anemia and PSH of Colon cancer resection (4 years ago), appendectomy, gall bladder removal came to hospital for sudden onset of diarrhea associated with vomiting for 2 days. Her episodes are non bloody and are not associated with fever. Pt denies recent food from outside, sick contacts, chest pain, shortness of breath, abdominal bloating. She noted burning pain with urination and pain at the back. Never had symptoms like this before. Of note, pt was on chemotherapy as well for colon cancer but now her chemo-port is removed.     SH: Ex-smoker, Denies alcohol    ED Course: Hemodynamically stable. Labs showed elevated lactate and infected UA. CT abdomen was normal. Pt was given 1500 bolus and a dose of Rocephin and zofran.

## 2019-02-05 NOTE — PHYSICAL THERAPY INITIAL EVALUATION ADULT - ACTIVE RANGE OF MOTION EXAMINATION, REHAB EVAL
bilateral upper extremity Active ROM was WFL (within functional limits)/grossly assessed WFL AROM/bilateral  lower extremity Active ROM was WFL (within functional limits)

## 2019-02-05 NOTE — H&P ADULT - ATTENDING COMMENTS
pt seen in bed, vitals stable except for , physical exam reveals lungs cta b/l, heart s1s2, abd soft nd nt bs+, ext no edema. labs and diagnostic test result reviewed.    assessment  --  , h/o     plan  --  admit to , cont preadmit home meds, gi and dvt profilaxis,  cbc, bmp, mg, phos, lipids, tsh, bld cx, ua, ucx, 84 female from home with PMH of GERD, HTN, DM, Anemia and PSH of Colon cancer resection (4 years ago), appendectomy, gall bladder removal came to hospital for sudden onset of diarrhea associated with vomiting for 2 days. Her episodes are non bloody and are not associated with fever.  symptoms began after eating egg. Pt denies recent food from outside, sick contacts, chest pain, shortness of breath, abdominal bloating. She noted burning pain with urination and pain at the back. Never had symptoms like this before. Of note, pt was on chemotherapy as well for colon cancer but now her chemo-port is removed.     SH: Ex-smoker, Denies alcohol    ED Course: Hemodynamically stable. Labs showed elevated lactate and infected UA. CT abdomen was normal. Pt was given 1500 bolus and a dose of Rocephin and zofran.     pt seen in bed, vitals stable, physical exam reveals lungs cta b/l, heart s1s2, abd soft nd nt bs+, ext no edema. labs and diagnostic test result reviewed.    assessment  --  acute gastroenteritis possibly food allergy to eggs, uti, h/o GERD, HTN, DM, Anemia,  PSH of gastric adenocarcinoma s/p resection (4 years ago), appendectomy, cholecystectomy    plan  --  admit to med, rocephin, cont preadmit home meds, gi and dvt profilaxis,  cbc, bmp, mg, phos, lipids, tsh, bld cx, ua, ucx,    n/v/d resolved

## 2019-02-05 NOTE — H&P ADULT - PROBLEM SELECTOR PLAN 4
Associated with vomiting  Likely viral gastroenteritis  CT Abdomen: No evidence of colitis or medical pathology   Gentle hydration  F/u Stool studies and C. Diff

## 2019-02-05 NOTE — ED ADULT NURSE REASSESSMENT NOTE - NS ED NURSE REASSESS COMMENT FT1
Pt received resting in bed watching TV, no compliant verbalized no distress noted ed observation continues.

## 2019-02-05 NOTE — H&P ADULT - PROBLEM SELECTOR PLAN 1
Burning urination and pain at back  CT Abdomen: No evidence of cystitis and pyelonephritis  No fever or Leukocytosis   UA infected and elevated lactate  Started Rocephin  F/u Blood and Urine Cx   Trend lactate

## 2019-02-06 ENCOUNTER — TRANSCRIPTION ENCOUNTER (OUTPATIENT)
Age: 84
End: 2019-02-06

## 2019-02-06 VITALS
HEART RATE: 68 BPM | SYSTOLIC BLOOD PRESSURE: 129 MMHG | TEMPERATURE: 99 F | OXYGEN SATURATION: 97 % | DIASTOLIC BLOOD PRESSURE: 74 MMHG | RESPIRATION RATE: 18 BRPM

## 2019-02-06 LAB
ANION GAP SERPL CALC-SCNC: 7 MMOL/L — SIGNIFICANT CHANGE UP (ref 5–17)
BUN SERPL-MCNC: 13 MG/DL — SIGNIFICANT CHANGE UP (ref 7–18)
CALCIUM SERPL-MCNC: 8.3 MG/DL — LOW (ref 8.4–10.5)
CHLORIDE SERPL-SCNC: 111 MMOL/L — HIGH (ref 96–108)
CO2 SERPL-SCNC: 24 MMOL/L — SIGNIFICANT CHANGE UP (ref 22–31)
CREAT SERPL-MCNC: 0.87 MG/DL — SIGNIFICANT CHANGE UP (ref 0.5–1.3)
GLUCOSE BLDC GLUCOMTR-MCNC: 146 MG/DL — HIGH (ref 70–99)
GLUCOSE SERPL-MCNC: 129 MG/DL — HIGH (ref 70–99)
HCT VFR BLD CALC: 34.2 % — LOW (ref 34.5–45)
HGB BLD-MCNC: 10.9 G/DL — LOW (ref 11.5–15.5)
MCHC RBC-ENTMCNC: 30.9 PG — SIGNIFICANT CHANGE UP (ref 27–34)
MCHC RBC-ENTMCNC: 31.7 GM/DL — LOW (ref 32–36)
MCV RBC AUTO: 97.4 FL — SIGNIFICANT CHANGE UP (ref 80–100)
PLATELET # BLD AUTO: 183 K/UL — SIGNIFICANT CHANGE UP (ref 150–400)
POTASSIUM SERPL-MCNC: 3.9 MMOL/L — SIGNIFICANT CHANGE UP (ref 3.5–5.3)
POTASSIUM SERPL-SCNC: 3.9 MMOL/L — SIGNIFICANT CHANGE UP (ref 3.5–5.3)
RBC # BLD: 3.52 M/UL — LOW (ref 3.8–5.2)
RBC # FLD: 13 % — SIGNIFICANT CHANGE UP (ref 10.3–14.5)
SODIUM SERPL-SCNC: 142 MMOL/L — SIGNIFICANT CHANGE UP (ref 135–145)
WBC # BLD: 6.7 K/UL — SIGNIFICANT CHANGE UP (ref 3.8–10.5)
WBC # FLD AUTO: 6.7 K/UL — SIGNIFICANT CHANGE UP (ref 3.8–10.5)

## 2019-02-06 PROCEDURE — 87040 BLOOD CULTURE FOR BACTERIA: CPT

## 2019-02-06 PROCEDURE — 83690 ASSAY OF LIPASE: CPT

## 2019-02-06 PROCEDURE — 36415 COLL VENOUS BLD VENIPUNCTURE: CPT

## 2019-02-06 PROCEDURE — 87086 URINE CULTURE/COLONY COUNT: CPT

## 2019-02-06 PROCEDURE — 99285 EMERGENCY DEPT VISIT HI MDM: CPT | Mod: 25

## 2019-02-06 PROCEDURE — 80061 LIPID PANEL: CPT

## 2019-02-06 PROCEDURE — 83605 ASSAY OF LACTIC ACID: CPT

## 2019-02-06 PROCEDURE — 82607 VITAMIN B-12: CPT

## 2019-02-06 PROCEDURE — 74177 CT ABD & PELVIS W/CONTRAST: CPT

## 2019-02-06 PROCEDURE — 83735 ASSAY OF MAGNESIUM: CPT

## 2019-02-06 PROCEDURE — 82962 GLUCOSE BLOOD TEST: CPT

## 2019-02-06 PROCEDURE — 81001 URINALYSIS AUTO W/SCOPE: CPT

## 2019-02-06 PROCEDURE — 83036 HEMOGLOBIN GLYCOSYLATED A1C: CPT

## 2019-02-06 PROCEDURE — 80048 BASIC METABOLIC PNL TOTAL CA: CPT

## 2019-02-06 PROCEDURE — 82746 ASSAY OF FOLIC ACID SERUM: CPT

## 2019-02-06 PROCEDURE — 97162 PT EVAL MOD COMPLEX 30 MIN: CPT

## 2019-02-06 PROCEDURE — 84100 ASSAY OF PHOSPHORUS: CPT

## 2019-02-06 PROCEDURE — 85027 COMPLETE CBC AUTOMATED: CPT

## 2019-02-06 PROCEDURE — 80053 COMPREHEN METABOLIC PANEL: CPT

## 2019-02-06 PROCEDURE — 84443 ASSAY THYROID STIM HORMONE: CPT

## 2019-02-06 RX ORDER — FERROUS SULFATE 325(65) MG
0 TABLET ORAL
Qty: 90 | Refills: 0 | COMMUNITY

## 2019-02-06 RX ORDER — ERGOCALCIFEROL 1.25 MG/1
0 CAPSULE ORAL
Qty: 4 | Refills: 0 | COMMUNITY

## 2019-02-06 RX ORDER — CEFUROXIME AXETIL 250 MG
1 TABLET ORAL
Qty: 10 | Refills: 0
Start: 2019-02-06 | End: 2019-02-10

## 2019-02-06 RX ADMIN — LISINOPRIL 5 MILLIGRAM(S): 2.5 TABLET ORAL at 06:21

## 2019-02-06 RX ADMIN — Medication 25 MILLIGRAM(S): at 06:21

## 2019-02-06 RX ADMIN — PANTOPRAZOLE SODIUM 40 MILLIGRAM(S): 20 TABLET, DELAYED RELEASE ORAL at 08:20

## 2019-02-06 RX ADMIN — SODIUM CHLORIDE 70 MILLILITER(S): 9 INJECTION INTRAMUSCULAR; INTRAVENOUS; SUBCUTANEOUS at 08:20

## 2019-02-06 RX ADMIN — Medication 1 GRAM(S): at 06:21

## 2019-02-06 NOTE — PROGRESS NOTE ADULT - SUBJECTIVE AND OBJECTIVE BOX
Patient is a 84y old  Female who presents with a chief complaint of Vomiting and diarrhea (05 Feb 2019 00:41)    pt seen in ed holding [x  ], reg med floor [   ], bed [ x ], chair at bedside [   ], a+o x3 [x  ], lethargic [  ],  nad [x  ]        Allergies    No Known Allergies        Vitals    T(F): 99 (02-06-19 @ 08:00), Max: 99 (02-06-19 @ 08:00)  HR: 68 (02-06-19 @ 08:00) (68 - 77)  BP: 129/74 (02-06-19 @ 08:00) (114/54 - 130/76)  RR: 18 (02-06-19 @ 08:00) (18 - 18)  SpO2: 97% (02-06-19 @ 08:00) (97% - 100%)  Wt(kg): --  CAPILLARY BLOOD GLUCOSE      POCT Blood Glucose.: 146 mg/dL (06 Feb 2019 08:10)      Labs                          10.9   6.7   )-----------( 183      ( 06 Feb 2019 06:13 )             34.2       02-06    142  |  111<H>  |  13  ----------------------------<  129<H>  3.9   |  24  |  0.87    Ca    8.3<L>      06 Feb 2019 06:13  Phos  2.8     02-05  Mg     2.0     02-05    TPro  7.7  /  Alb  3.8  /  TBili  0.3  /  DBili  x   /  AST  36  /  ALT  34  /  AlkPhos  56  02-05            .Urine  02-05 @ 00:32   <10,000 CFU/ml Normal Urogenital sean present  --  --          Radiology Results      Meds    MEDICATIONS  (STANDING):  cefTRIAXone   IVPB 1 Gram(s) IV Intermittent every 24 hours  enoxaparin Injectable 40 milliGRAM(s) SubCutaneous daily  insulin lispro (HumaLOG) corrective regimen sliding scale   SubCutaneous Before meals and at bedtime  lisinopril 5 milliGRAM(s) Oral daily  meclizine 25 milliGRAM(s) Oral three times a day  pantoprazole    Tablet 40 milliGRAM(s) Oral before breakfast  simvastatin 40 milliGRAM(s) Oral at bedtime  sodium chloride 0.9%. 1000 milliLiter(s) (70 mL/Hr) IV Continuous <Continuous>  sucralfate 1 Gram(s) Oral four times a day      MEDICATIONS  (PRN):      Physical Exam    Neuro :  no focal deficits  Respiratory: CTA B/L  CV: RRR, S1S2, no murmurs,   Abdominal: Soft, NT, ND +BS,  Extremities: No edema, + peripheral pulses    ASSESSMENT    Urinary tract infection   acute gastroenteritis possibly food reaction to eggs,   h/o GERD, HTN, DM, Anemia,    Dementia  Vertigo  Gastric adenocarcinoma  Hypercholesteremia  DM (diabetes mellitus)  HTN (hypertension)  S/P tubal ligation  S/P hysterectomy   appendectomy, cholecystectomy    PLAN    chane rocephin to ceftin 500mg bid x 5 morew days  ucx with <10,000 CFU/ml Normal Urogenital sean present  n/v/d resolved .pt tolerating full diet  pt stable for d/c

## 2019-02-06 NOTE — DISCHARGE NOTE ADULT - PLAN OF CARE
Remain free of watery stool Oral intake as tolerate  Monitor bowel movements maintain fingerstick 60 to 150 mg/fl continue Metformin as ordered  monitor fingerstick  follow up with PMD Maintain systolic blood pressure 110 - 150 mmhg maintain low sodium diet  Continue Lisinopril  follow up with PMPANKAJ

## 2019-02-06 NOTE — DISCHARGE NOTE ADULT - MEDICATION SUMMARY - MEDICATIONS TO TAKE
I will START or STAY ON the medications listed below when I get home from the hospital:    LISINOPRIL   TAB 5MG  -- Indication: For HTN (hypertension)    METFORMIN    TAB 1000MG  -- Indication: For DM (diabetes mellitus)    meclizine 25 mg oral tablet  -- 1 tab(s) by mouth 3 times a day  -- Indication: For Diziness    SIMVASTATIN  TAB 40MG  -- Indication: For cholesterol    cefuroxime 500 mg oral tablet  -- 1 tab(s) by mouth 2 times a day   -- Finish all this medication unless otherwise directed by prescriber.  Medication should be taken with plenty of water.  Take with food or milk.    -- Indication: For Urinary tract infection    sucralfate 1 g oral tablet  -- 1 tab(s) by mouth 4 times a day (before meals and at bedtime)  -- Indication: For GI    PANTOPRAZOLE TAB 40MG  -- Indication: For GI

## 2019-02-06 NOTE — DISCHARGE NOTE ADULT - CARE PLAN
Principal Discharge DX:	Diarrhea  Goal:	Remain free of watery stool  Assessment and plan of treatment:	Oral intake as tolerate  Monitor bowel movements  Secondary Diagnosis:	DM (diabetes mellitus)  Goal:	maintain fingerstick 60 to 150 mg/fl  Assessment and plan of treatment:	continue Metformin as ordered  monitor fingerstick  follow up with PMD  Secondary Diagnosis:	HTN (hypertension)  Goal:	Maintain systolic blood pressure 110 - 150 mmhg  Assessment and plan of treatment:	maintain low sodium diet  Continue Lisinopril  follow up with PMD

## 2019-02-06 NOTE — DISCHARGE NOTE ADULT - HOSPITAL COURSE
84 female from home with PMH of GERD, HTN, DM, Anemia and PSH of Colon cancer resection (4 years ago), appendectomy, gall bladder removal came to hospital for sudden onset of diarrhea associated with vomiting for 2 days. Her episodes are non bloody and are not associated with fever, urine culture  with <10,000 CFU/ml Normal Urogenital sean present, n/v/d resolved . Tolerating full diet,  stable for d/c on Ceftin 500mg bid x 5  days. Continue all at home medication and follow up with PMD in 2 weeks.

## 2019-02-06 NOTE — DISCHARGE NOTE ADULT - PATIENT PORTAL LINK FT
You can access the [facility] Patient Portal, offered by [facility], by registering with the following website: [email or web address]

## 2019-02-10 LAB
CULTURE RESULTS: SIGNIFICANT CHANGE UP
SPECIMEN SOURCE: SIGNIFICANT CHANGE UP

## 2019-04-05 ENCOUNTER — EMERGENCY (EMERGENCY)
Facility: HOSPITAL | Age: 84
LOS: 1 days | Discharge: HOME | End: 2019-04-05
Attending: EMERGENCY MEDICINE
Payer: MEDICAID

## 2019-04-05 VITALS
HEIGHT: 55 IN | SYSTOLIC BLOOD PRESSURE: 171 MMHG | HEART RATE: 92 BPM | OXYGEN SATURATION: 93 % | DIASTOLIC BLOOD PRESSURE: 79 MMHG | TEMPERATURE: 98 F | RESPIRATION RATE: 18 BRPM | WEIGHT: 139.99 LBS

## 2019-04-05 VITALS
RESPIRATION RATE: 18 BRPM | HEART RATE: 90 BPM | SYSTOLIC BLOOD PRESSURE: 161 MMHG | DIASTOLIC BLOOD PRESSURE: 74 MMHG | OXYGEN SATURATION: 95 % | TEMPERATURE: 99 F

## 2019-04-05 DIAGNOSIS — Z90.710 ACQUIRED ABSENCE OF BOTH CERVIX AND UTERUS: Chronic | ICD-10-CM

## 2019-04-05 DIAGNOSIS — Z98.51 TUBAL LIGATION STATUS: Chronic | ICD-10-CM

## 2019-04-05 LAB
APPEARANCE UR: CLEAR — SIGNIFICANT CHANGE UP
BILIRUB UR-MCNC: NEGATIVE — SIGNIFICANT CHANGE UP
COLOR SPEC: YELLOW — SIGNIFICANT CHANGE UP
DIFF PNL FLD: NEGATIVE — SIGNIFICANT CHANGE UP
GLUCOSE UR QL: NEGATIVE — SIGNIFICANT CHANGE UP
KETONES UR-MCNC: NEGATIVE — SIGNIFICANT CHANGE UP
LEUKOCYTE ESTERASE UR-ACNC: ABNORMAL
NITRITE UR-MCNC: NEGATIVE — SIGNIFICANT CHANGE UP
PH UR: 6 — SIGNIFICANT CHANGE UP (ref 5–8)
PROT UR-MCNC: NEGATIVE — SIGNIFICANT CHANGE UP
SP GR SPEC: 1.01 — SIGNIFICANT CHANGE UP (ref 1.01–1.02)
UROBILINOGEN FLD QL: NEGATIVE — SIGNIFICANT CHANGE UP

## 2019-04-05 PROCEDURE — 99284 EMERGENCY DEPT VISIT MOD MDM: CPT

## 2019-04-05 PROCEDURE — 72100 X-RAY EXAM L-S SPINE 2/3 VWS: CPT | Mod: 26

## 2019-04-05 PROCEDURE — 81001 URINALYSIS AUTO W/SCOPE: CPT

## 2019-04-05 PROCEDURE — 87086 URINE CULTURE/COLONY COUNT: CPT

## 2019-04-05 PROCEDURE — 99283 EMERGENCY DEPT VISIT LOW MDM: CPT | Mod: 25

## 2019-04-05 PROCEDURE — 72100 X-RAY EXAM L-S SPINE 2/3 VWS: CPT

## 2019-04-05 RX ORDER — METHOCARBAMOL 500 MG/1
2 TABLET, FILM COATED ORAL
Qty: 40 | Refills: 0
Start: 2019-04-05 | End: 2019-04-09

## 2019-04-05 RX ORDER — ACETAMINOPHEN 500 MG
650 TABLET ORAL ONCE
Qty: 0 | Refills: 0 | Status: COMPLETED | OUTPATIENT
Start: 2019-04-05 | End: 2019-04-05

## 2019-04-05 RX ORDER — LIDOCAINE 4 G/100G
1 CREAM TOPICAL ONCE
Qty: 0 | Refills: 0 | Status: COMPLETED | OUTPATIENT
Start: 2019-04-05 | End: 2019-04-05

## 2019-04-05 RX ORDER — IBUPROFEN 200 MG
600 TABLET ORAL ONCE
Qty: 0 | Refills: 0 | Status: COMPLETED | OUTPATIENT
Start: 2019-04-05 | End: 2019-04-05

## 2019-04-05 RX ORDER — DIAZEPAM 5 MG
5 TABLET ORAL ONCE
Qty: 0 | Refills: 0 | Status: DISCONTINUED | OUTPATIENT
Start: 2019-04-05 | End: 2019-04-05

## 2019-04-05 RX ORDER — LIDOCAINE 4 G/100G
2 CREAM TOPICAL ONCE
Qty: 0 | Refills: 0 | Status: COMPLETED | OUTPATIENT
Start: 2019-04-05 | End: 2019-04-05

## 2019-04-05 RX ADMIN — Medication 600 MILLIGRAM(S): at 20:01

## 2019-04-05 RX ADMIN — Medication 650 MILLIGRAM(S): at 20:00

## 2019-04-05 RX ADMIN — LIDOCAINE 1 PATCH: 4 CREAM TOPICAL at 22:44

## 2019-04-05 RX ADMIN — Medication 5 MILLIGRAM(S): at 20:00

## 2019-04-05 RX ADMIN — Medication 650 MILLIGRAM(S): at 22:32

## 2019-04-05 RX ADMIN — LIDOCAINE 1 PATCH: 4 CREAM TOPICAL at 20:02

## 2019-04-05 RX ADMIN — Medication 600 MILLIGRAM(S): at 22:32

## 2019-04-05 NOTE — ED ADULT NURSE NOTE - NSIMPLEMENTINTERV_GEN_ALL_ED
Implemented All Universal Safety Interventions:  [place] to call system. Call bell, personal items and telephone within reach. Instruct patient to call for assistance. Room bathroom lighting operational. Non-slip footwear when patient is off stretcher. Physically safe environment: no spills, clutter or unnecessary equipment. Stretcher in lowest position, wheels locked, appropriate side rails in place.

## 2019-04-05 NOTE — ED PROVIDER NOTE - PROGRESS NOTE DETAILS
Calvillo: ua not consistent with infection.  pt without any ua sx.  xr shows spondylosis.  pain imrpoved.  dx lumbar msk pain. rx robaxin, continue with tylenol 650mg every 4 hrs.  heat pack. see md for physical therapy. no heavy lifting. left ambulatory.  return precautions given.

## 2019-04-05 NOTE — ED PROVIDER NOTE - OBJECTIVE STATEMENT
84 yr old female with hx of HTn, Dm, Cholecystectomy, appendectomy, gastric adenocarcinoma s/p surgery, uti presents to ed c/o Right lateral lower lumbar pain x 3 days constant burning sharp pain worse with sitting and pain with getting up.  no fall, no fever, no n/v, no dysuria, no hematuria, no cp. chronic numbness and tingling of extremity. eating normally. tried tylenol with no relieve. having normal BM.

## 2019-04-05 NOTE — ED PROVIDER NOTE - CLINICAL SUMMARY MEDICAL DECISION MAKING FREE TEXT BOX
84 yr old female with hx of HTn, Dm, Cholecystectomy, appendectomy, gastric adenocarcinoma s/p surgery, uti presents to ed c/o Right lateral lower lumbar pain x 3 days constant burning sharp pain worse with sitting and pain with getting up.  no fall, no fever, no n/v, no dysuria, no hematuria, no cp. chronic numbness and tingling of extremity. eating normally. tried tylenol with no relieve. having normal BM.    back pain likely msk r/o uti (although denies any constitutional sx and consistent with more msk) obtain lumbar xr to r/o fracture.  pain meds, re-assess

## 2019-04-06 LAB
CULTURE RESULTS: SIGNIFICANT CHANGE UP
SPECIMEN SOURCE: SIGNIFICANT CHANGE UP

## 2019-06-24 ENCOUNTER — EMERGENCY (EMERGENCY)
Facility: HOSPITAL | Age: 84
LOS: 1 days | Discharge: HOME | End: 2019-06-24
Attending: EMERGENCY MEDICINE
Payer: MEDICAID

## 2019-06-24 VITALS
RESPIRATION RATE: 16 BRPM | HEART RATE: 76 BPM | DIASTOLIC BLOOD PRESSURE: 86 MMHG | OXYGEN SATURATION: 100 % | TEMPERATURE: 98 F | SYSTOLIC BLOOD PRESSURE: 180 MMHG

## 2019-06-24 VITALS
HEIGHT: 56 IN | WEIGHT: 149.91 LBS | HEART RATE: 73 BPM | DIASTOLIC BLOOD PRESSURE: 81 MMHG | SYSTOLIC BLOOD PRESSURE: 173 MMHG | RESPIRATION RATE: 16 BRPM | OXYGEN SATURATION: 100 % | TEMPERATURE: 98 F

## 2019-06-24 DIAGNOSIS — Z90.710 ACQUIRED ABSENCE OF BOTH CERVIX AND UTERUS: Chronic | ICD-10-CM

## 2019-06-24 DIAGNOSIS — Z98.51 TUBAL LIGATION STATUS: Chronic | ICD-10-CM

## 2019-06-24 LAB
ALBUMIN SERPL ELPH-MCNC: 3 G/DL — LOW (ref 3.5–5)
ALP SERPL-CCNC: 56 U/L — SIGNIFICANT CHANGE UP (ref 40–120)
ALT FLD-CCNC: 26 U/L DA — SIGNIFICANT CHANGE UP (ref 10–60)
ANION GAP SERPL CALC-SCNC: 10 MMOL/L — SIGNIFICANT CHANGE UP (ref 5–17)
AST SERPL-CCNC: 35 U/L — SIGNIFICANT CHANGE UP (ref 10–40)
BASOPHILS # BLD AUTO: 0.02 K/UL — SIGNIFICANT CHANGE UP (ref 0–0.2)
BASOPHILS NFR BLD AUTO: 0.3 % — SIGNIFICANT CHANGE UP (ref 0–2)
BILIRUB SERPL-MCNC: 0.2 MG/DL — SIGNIFICANT CHANGE UP (ref 0.2–1.2)
BUN SERPL-MCNC: 20 MG/DL — HIGH (ref 7–18)
CALCIUM SERPL-MCNC: 9 MG/DL — SIGNIFICANT CHANGE UP (ref 8.4–10.5)
CHLORIDE SERPL-SCNC: 108 MMOL/L — SIGNIFICANT CHANGE UP (ref 96–108)
CO2 SERPL-SCNC: 24 MMOL/L — SIGNIFICANT CHANGE UP (ref 22–31)
CREAT SERPL-MCNC: 0.77 MG/DL — SIGNIFICANT CHANGE UP (ref 0.5–1.3)
EOSINOPHIL # BLD AUTO: 0.18 K/UL — SIGNIFICANT CHANGE UP (ref 0–0.5)
EOSINOPHIL NFR BLD AUTO: 2.3 % — SIGNIFICANT CHANGE UP (ref 0–6)
GLUCOSE SERPL-MCNC: 103 MG/DL — HIGH (ref 70–99)
HCT VFR BLD CALC: 31.4 % — LOW (ref 34.5–45)
HGB BLD-MCNC: 10.1 G/DL — LOW (ref 11.5–15.5)
IMM GRANULOCYTES NFR BLD AUTO: 0.5 % — SIGNIFICANT CHANGE UP (ref 0–1.5)
LACTATE SERPL-SCNC: 1.5 MMOL/L — SIGNIFICANT CHANGE UP (ref 0.7–2)
LYMPHOCYTES # BLD AUTO: 3.5 K/UL — HIGH (ref 1–3.3)
LYMPHOCYTES # BLD AUTO: 44.9 % — HIGH (ref 13–44)
MCHC RBC-ENTMCNC: 30.7 PG — SIGNIFICANT CHANGE UP (ref 27–34)
MCHC RBC-ENTMCNC: 32.2 GM/DL — SIGNIFICANT CHANGE UP (ref 32–36)
MCV RBC AUTO: 95.4 FL — SIGNIFICANT CHANGE UP (ref 80–100)
MONOCYTES # BLD AUTO: 0.48 K/UL — SIGNIFICANT CHANGE UP (ref 0–0.9)
MONOCYTES NFR BLD AUTO: 6.2 % — SIGNIFICANT CHANGE UP (ref 2–14)
NEUTROPHILS # BLD AUTO: 3.57 K/UL — SIGNIFICANT CHANGE UP (ref 1.8–7.4)
NEUTROPHILS NFR BLD AUTO: 45.8 % — SIGNIFICANT CHANGE UP (ref 43–77)
NRBC # BLD: 0 /100 WBCS — SIGNIFICANT CHANGE UP (ref 0–0)
PLATELET # BLD AUTO: 278 K/UL — SIGNIFICANT CHANGE UP (ref 150–400)
POTASSIUM SERPL-MCNC: 4.3 MMOL/L — SIGNIFICANT CHANGE UP (ref 3.5–5.3)
POTASSIUM SERPL-SCNC: 4.3 MMOL/L — SIGNIFICANT CHANGE UP (ref 3.5–5.3)
PROT SERPL-MCNC: 7.8 G/DL — SIGNIFICANT CHANGE UP (ref 6–8.3)
RBC # BLD: 3.29 M/UL — LOW (ref 3.8–5.2)
RBC # FLD: 14.3 % — SIGNIFICANT CHANGE UP (ref 10.3–14.5)
SODIUM SERPL-SCNC: 142 MMOL/L — SIGNIFICANT CHANGE UP (ref 135–145)
WBC # BLD: 7.79 K/UL — SIGNIFICANT CHANGE UP (ref 3.8–10.5)
WBC # FLD AUTO: 7.79 K/UL — SIGNIFICANT CHANGE UP (ref 3.8–10.5)

## 2019-06-24 PROCEDURE — 83605 ASSAY OF LACTIC ACID: CPT

## 2019-06-24 PROCEDURE — 80053 COMPREHEN METABOLIC PANEL: CPT

## 2019-06-24 PROCEDURE — 36415 COLL VENOUS BLD VENIPUNCTURE: CPT

## 2019-06-24 PROCEDURE — 99283 EMERGENCY DEPT VISIT LOW MDM: CPT

## 2019-06-24 PROCEDURE — 99284 EMERGENCY DEPT VISIT MOD MDM: CPT

## 2019-06-24 PROCEDURE — 85027 COMPLETE CBC AUTOMATED: CPT

## 2019-06-24 RX ORDER — SODIUM CHLORIDE 9 MG/ML
1000 INJECTION INTRAMUSCULAR; INTRAVENOUS; SUBCUTANEOUS ONCE
Refills: 0 | Status: COMPLETED | OUTPATIENT
Start: 2019-06-24 | End: 2019-06-24

## 2019-06-24 RX ADMIN — SODIUM CHLORIDE 1000 MILLILITER(S): 9 INJECTION INTRAMUSCULAR; INTRAVENOUS; SUBCUTANEOUS at 21:53

## 2019-06-24 NOTE — ED ADULT NURSE NOTE - NSIMPLEMENTINTERV_GEN_ALL_ED
Implemented All Universal Safety Interventions:  [place] to call system. Call bell, personal items and telephone within reach. Instruct patient to call for assistance. Room bathroom lighting operational. Non-slip footwear when patient is off stretcher. Physically safe environment: no spills, clutter or unnecessary equipment. Stretcher in lowest position, wheels locked, appropriate side rails in place.

## 2019-06-24 NOTE — ED ADULT NURSE NOTE - CHPI ED NUR SYMPTOMS NEG
no chills/no rectal pain/no blood in mucus/no vomiting/no bleeding at site/no drainage/no purulent drainage/no redness/no fever

## 2019-06-24 NOTE — ED ADULT NURSE NOTE - ED STAT RN HANDOFF DETAILS
pt bp checked and dr angeles made aware ,d/c information given to family .no acute distress noted ,pt left from er with family

## 2019-06-24 NOTE — ED PROVIDER NOTE - OBJECTIVE STATEMENT
83 y/o F patient with a significant PMHx of Dementia, DM, Gastric adenocarcinoma, HTN, Hypercholesterolemia, Vertigo and a significant PSHx of hysterectomy and tubal ligation presents to the ED with multiple abscesses on the b/l breast and a subjective fever. Patient notes recently traveling back from the [place]. Patient denies any other complaints. NKDA.

## 2019-06-24 NOTE — ED ADULT NURSE NOTE - CHIEF COMPLAINT QUOTE
patient arrived today c/o insect bites  x 8 days to the back and old wound to chest area x 2 been treated in [facility] for pain and until now she is still in pain as per family member

## 2019-06-24 NOTE — ED ADULT TRIAGE NOTE - CHIEF COMPLAINT QUOTE
patient arrived today c/o insect bites  x 8 days to the back and old wound to chest area x 2 been treated in [facility] for pain and until now she is still in pain as per family member

## 2019-08-07 ENCOUNTER — EMERGENCY (EMERGENCY)
Facility: HOSPITAL | Age: 84
LOS: 1 days | Discharge: HOME | End: 2019-08-07
Attending: EMERGENCY MEDICINE
Payer: MEDICAID

## 2019-08-07 VITALS
WEIGHT: 154.98 LBS | HEART RATE: 85 BPM | TEMPERATURE: 98 F | HEIGHT: 56 IN | RESPIRATION RATE: 16 BRPM | OXYGEN SATURATION: 100 % | DIASTOLIC BLOOD PRESSURE: 74 MMHG | SYSTOLIC BLOOD PRESSURE: 136 MMHG

## 2019-08-07 DIAGNOSIS — Z90.710 ACQUIRED ABSENCE OF BOTH CERVIX AND UTERUS: Chronic | ICD-10-CM

## 2019-08-07 DIAGNOSIS — Z98.51 TUBAL LIGATION STATUS: Chronic | ICD-10-CM

## 2019-08-07 LAB
ALBUMIN SERPL ELPH-MCNC: 3.4 G/DL — LOW (ref 3.5–5)
ALP SERPL-CCNC: 63 U/L — SIGNIFICANT CHANGE UP (ref 40–120)
ALT FLD-CCNC: 24 U/L DA — SIGNIFICANT CHANGE UP (ref 10–60)
ANION GAP SERPL CALC-SCNC: 7 MMOL/L — SIGNIFICANT CHANGE UP (ref 5–17)
AST SERPL-CCNC: 17 U/L — SIGNIFICANT CHANGE UP (ref 10–40)
BILIRUB SERPL-MCNC: 0.2 MG/DL — SIGNIFICANT CHANGE UP (ref 0.2–1.2)
BUN SERPL-MCNC: 20 MG/DL — HIGH (ref 7–18)
CALCIUM SERPL-MCNC: 8.8 MG/DL — SIGNIFICANT CHANGE UP (ref 8.4–10.5)
CHLORIDE SERPL-SCNC: 111 MMOL/L — HIGH (ref 96–108)
CO2 SERPL-SCNC: 24 MMOL/L — SIGNIFICANT CHANGE UP (ref 22–31)
CREAT SERPL-MCNC: 0.8 MG/DL — SIGNIFICANT CHANGE UP (ref 0.5–1.3)
GLUCOSE SERPL-MCNC: 127 MG/DL — HIGH (ref 70–99)
HCT VFR BLD CALC: 32.5 % — LOW (ref 34.5–45)
HGB BLD-MCNC: 10.3 G/DL — LOW (ref 11.5–15.5)
MCHC RBC-ENTMCNC: 30.4 PG — SIGNIFICANT CHANGE UP (ref 27–34)
MCHC RBC-ENTMCNC: 31.7 GM/DL — LOW (ref 32–36)
MCV RBC AUTO: 95.9 FL — SIGNIFICANT CHANGE UP (ref 80–100)
NRBC # BLD: 0 /100 WBCS — SIGNIFICANT CHANGE UP (ref 0–0)
PLATELET # BLD AUTO: 207 K/UL — SIGNIFICANT CHANGE UP (ref 150–400)
POTASSIUM SERPL-MCNC: 3.9 MMOL/L — SIGNIFICANT CHANGE UP (ref 3.5–5.3)
POTASSIUM SERPL-SCNC: 3.9 MMOL/L — SIGNIFICANT CHANGE UP (ref 3.5–5.3)
PROT SERPL-MCNC: 7.5 G/DL — SIGNIFICANT CHANGE UP (ref 6–8.3)
RBC # BLD: 3.39 M/UL — LOW (ref 3.8–5.2)
RBC # FLD: 14.6 % — HIGH (ref 10.3–14.5)
SODIUM SERPL-SCNC: 142 MMOL/L — SIGNIFICANT CHANGE UP (ref 135–145)
WBC # BLD: 9.57 K/UL — SIGNIFICANT CHANGE UP (ref 3.8–10.5)
WBC # FLD AUTO: 9.57 K/UL — SIGNIFICANT CHANGE UP (ref 3.8–10.5)

## 2019-08-07 PROCEDURE — 46040 I&D ISCHIORCT&/PERIRCT ABSC: CPT

## 2019-08-07 PROCEDURE — 99284 EMERGENCY DEPT VISIT MOD MDM: CPT

## 2019-08-07 RX ADMIN — Medication 100 MILLIGRAM(S): at 22:49

## 2019-08-07 NOTE — ED PROVIDER NOTE - OBJECTIVE STATEMENT
Patient is a 84 y.o. female with a significant PMHx of diabetes, HTN, HLD, gastric CA, dementia comes in complaining of an abscess to her left buttock which she noticed yesterday. Patient denies fevers, nausea, vomiting, diarrhea. States that the abscess is painful. NKDA.

## 2019-08-07 NOTE — ED ADULT NURSE NOTE - OBJECTIVE STATEMENT
Pt. c/o pain to the left buttock related to "boil" that was noticed yesterday. area is red, swollen, and painful.

## 2019-08-08 PROCEDURE — 74177 CT ABD & PELVIS W/CONTRAST: CPT

## 2019-08-08 PROCEDURE — 36415 COLL VENOUS BLD VENIPUNCTURE: CPT

## 2019-08-08 PROCEDURE — 85027 COMPLETE CBC AUTOMATED: CPT

## 2019-08-08 PROCEDURE — 80053 COMPREHEN METABOLIC PANEL: CPT

## 2019-08-08 PROCEDURE — 46040 I&D ISCHIORCT&/PERIRCT ABSC: CPT

## 2019-08-08 PROCEDURE — 96374 THER/PROPH/DIAG INJ IV PUSH: CPT | Mod: XU

## 2019-08-08 PROCEDURE — 74177 CT ABD & PELVIS W/CONTRAST: CPT | Mod: 26

## 2019-08-08 PROCEDURE — 99284 EMERGENCY DEPT VISIT MOD MDM: CPT | Mod: 25

## 2019-09-13 NOTE — PHYSICAL THERAPY INITIAL EVALUATION ADULT - PERSONAL SAFETY AND JUDGMENT, REHAB EVAL
intact BREE Mcdonald- Spoke with derm fellow, rec to restart valtrex but to hold prednisone if pt is tolerating PO. She will arrange f/u for him in the clinic next week. Pt is stable for dc.

## 2019-11-19 NOTE — ED ADULT TRIAGE NOTE - SOURCE OF INFORMATION
Patient

## 2020-01-01 ENCOUNTER — OUTPATIENT (OUTPATIENT)
Dept: OUTPATIENT SERVICES | Facility: HOSPITAL | Age: 85
LOS: 1 days | End: 2020-01-01
Payer: MEDICAID

## 2020-01-01 DIAGNOSIS — Z90.710 ACQUIRED ABSENCE OF BOTH CERVIX AND UTERUS: Chronic | ICD-10-CM

## 2020-01-01 DIAGNOSIS — Z98.51 TUBAL LIGATION STATUS: Chronic | ICD-10-CM

## 2020-01-01 PROCEDURE — G9001: CPT

## 2020-01-17 ENCOUNTER — EMERGENCY (EMERGENCY)
Facility: HOSPITAL | Age: 85
LOS: 1 days | Discharge: HOME | End: 2020-01-17
Attending: EMERGENCY MEDICINE
Payer: MEDICAID

## 2020-01-17 VITALS
TEMPERATURE: 98 F | HEART RATE: 79 BPM | RESPIRATION RATE: 19 BRPM | SYSTOLIC BLOOD PRESSURE: 132 MMHG | OXYGEN SATURATION: 100 % | DIASTOLIC BLOOD PRESSURE: 65 MMHG

## 2020-01-17 VITALS
SYSTOLIC BLOOD PRESSURE: 148 MMHG | TEMPERATURE: 98 F | OXYGEN SATURATION: 98 % | HEART RATE: 86 BPM | RESPIRATION RATE: 19 BRPM | DIASTOLIC BLOOD PRESSURE: 76 MMHG | WEIGHT: 153 LBS

## 2020-01-17 DIAGNOSIS — Z98.51 TUBAL LIGATION STATUS: Chronic | ICD-10-CM

## 2020-01-17 DIAGNOSIS — Z90.710 ACQUIRED ABSENCE OF BOTH CERVIX AND UTERUS: Chronic | ICD-10-CM

## 2020-01-17 LAB
ALBUMIN SERPL ELPH-MCNC: 3.7 G/DL — SIGNIFICANT CHANGE UP (ref 3.5–5)
ALP SERPL-CCNC: 49 U/L — SIGNIFICANT CHANGE UP (ref 40–120)
ALT FLD-CCNC: 35 U/L DA — SIGNIFICANT CHANGE UP (ref 10–60)
ANION GAP SERPL CALC-SCNC: 6 MMOL/L — SIGNIFICANT CHANGE UP (ref 5–17)
APPEARANCE UR: CLEAR — SIGNIFICANT CHANGE UP
AST SERPL-CCNC: 33 U/L — SIGNIFICANT CHANGE UP (ref 10–40)
BASOPHILS # BLD AUTO: 0.02 K/UL — SIGNIFICANT CHANGE UP (ref 0–0.2)
BASOPHILS NFR BLD AUTO: 0.2 % — SIGNIFICANT CHANGE UP (ref 0–2)
BILIRUB SERPL-MCNC: 0.3 MG/DL — SIGNIFICANT CHANGE UP (ref 0.2–1.2)
BILIRUB UR-MCNC: NEGATIVE — SIGNIFICANT CHANGE UP
BUN SERPL-MCNC: 15 MG/DL — SIGNIFICANT CHANGE UP (ref 7–18)
CALCIUM SERPL-MCNC: 9.1 MG/DL — SIGNIFICANT CHANGE UP (ref 8.4–10.5)
CHLORIDE SERPL-SCNC: 113 MMOL/L — HIGH (ref 96–108)
CO2 SERPL-SCNC: 25 MMOL/L — SIGNIFICANT CHANGE UP (ref 22–31)
COLOR SPEC: YELLOW — SIGNIFICANT CHANGE UP
CREAT SERPL-MCNC: 1.15 MG/DL — SIGNIFICANT CHANGE UP (ref 0.5–1.3)
DIFF PNL FLD: NEGATIVE — SIGNIFICANT CHANGE UP
EOSINOPHIL # BLD AUTO: 0.09 K/UL — SIGNIFICANT CHANGE UP (ref 0–0.5)
EOSINOPHIL NFR BLD AUTO: 1 % — SIGNIFICANT CHANGE UP (ref 0–6)
GLUCOSE SERPL-MCNC: 129 MG/DL — HIGH (ref 70–99)
GLUCOSE UR QL: NEGATIVE — SIGNIFICANT CHANGE UP
HCT VFR BLD CALC: 35.8 % — SIGNIFICANT CHANGE UP (ref 34.5–45)
HGB BLD-MCNC: 11.9 G/DL — SIGNIFICANT CHANGE UP (ref 11.5–15.5)
IMM GRANULOCYTES NFR BLD AUTO: 0.1 % — SIGNIFICANT CHANGE UP (ref 0–1.5)
KETONES UR-MCNC: NEGATIVE — SIGNIFICANT CHANGE UP
LACTATE SERPL-SCNC: 3 MMOL/L — HIGH (ref 0.7–2)
LEUKOCYTE ESTERASE UR-ACNC: ABNORMAL
LIDOCAIN IGE QN: 152 U/L — SIGNIFICANT CHANGE UP (ref 73–393)
LYMPHOCYTES # BLD AUTO: 2.97 K/UL — SIGNIFICANT CHANGE UP (ref 1–3.3)
LYMPHOCYTES # BLD AUTO: 33.3 % — SIGNIFICANT CHANGE UP (ref 13–44)
MCHC RBC-ENTMCNC: 32.7 PG — SIGNIFICANT CHANGE UP (ref 27–34)
MCHC RBC-ENTMCNC: 33.2 GM/DL — SIGNIFICANT CHANGE UP (ref 32–36)
MCV RBC AUTO: 98.4 FL — SIGNIFICANT CHANGE UP (ref 80–100)
MONOCYTES # BLD AUTO: 0.42 K/UL — SIGNIFICANT CHANGE UP (ref 0–0.9)
MONOCYTES NFR BLD AUTO: 4.7 % — SIGNIFICANT CHANGE UP (ref 2–14)
NEUTROPHILS # BLD AUTO: 5.42 K/UL — SIGNIFICANT CHANGE UP (ref 1.8–7.4)
NEUTROPHILS NFR BLD AUTO: 60.7 % — SIGNIFICANT CHANGE UP (ref 43–77)
NITRITE UR-MCNC: NEGATIVE — SIGNIFICANT CHANGE UP
NRBC # BLD: 0 /100 WBCS — SIGNIFICANT CHANGE UP (ref 0–0)
PH UR: 5 — SIGNIFICANT CHANGE UP (ref 5–8)
PLATELET # BLD AUTO: 199 K/UL — SIGNIFICANT CHANGE UP (ref 150–400)
POTASSIUM SERPL-MCNC: 5 MMOL/L — SIGNIFICANT CHANGE UP (ref 3.5–5.3)
POTASSIUM SERPL-SCNC: 5 MMOL/L — SIGNIFICANT CHANGE UP (ref 3.5–5.3)
PROT SERPL-MCNC: 7.5 G/DL — SIGNIFICANT CHANGE UP (ref 6–8.3)
PROT UR-MCNC: NEGATIVE — SIGNIFICANT CHANGE UP
RBC # BLD: 3.64 M/UL — LOW (ref 3.8–5.2)
RBC # FLD: 12.8 % — SIGNIFICANT CHANGE UP (ref 10.3–14.5)
SODIUM SERPL-SCNC: 144 MMOL/L — SIGNIFICANT CHANGE UP (ref 135–145)
SP GR SPEC: 1.01 — SIGNIFICANT CHANGE UP (ref 1.01–1.02)
TROPONIN I SERPL-MCNC: <0.015 NG/ML — SIGNIFICANT CHANGE UP (ref 0–0.04)
UROBILINOGEN FLD QL: NEGATIVE — SIGNIFICANT CHANGE UP
WBC # BLD: 8.93 K/UL — SIGNIFICANT CHANGE UP (ref 3.8–10.5)
WBC # FLD AUTO: 8.93 K/UL — SIGNIFICANT CHANGE UP (ref 3.8–10.5)

## 2020-01-17 PROCEDURE — 36415 COLL VENOUS BLD VENIPUNCTURE: CPT

## 2020-01-17 PROCEDURE — 96361 HYDRATE IV INFUSION ADD-ON: CPT

## 2020-01-17 PROCEDURE — 83605 ASSAY OF LACTIC ACID: CPT

## 2020-01-17 PROCEDURE — 96365 THER/PROPH/DIAG IV INF INIT: CPT | Mod: XU

## 2020-01-17 PROCEDURE — 84484 ASSAY OF TROPONIN QUANT: CPT

## 2020-01-17 PROCEDURE — 85027 COMPLETE CBC AUTOMATED: CPT

## 2020-01-17 PROCEDURE — 74177 CT ABD & PELVIS W/CONTRAST: CPT

## 2020-01-17 PROCEDURE — 87040 BLOOD CULTURE FOR BACTERIA: CPT

## 2020-01-17 PROCEDURE — 99284 EMERGENCY DEPT VISIT MOD MDM: CPT | Mod: 25

## 2020-01-17 PROCEDURE — 96375 TX/PRO/DX INJ NEW DRUG ADDON: CPT

## 2020-01-17 PROCEDURE — 80053 COMPREHEN METABOLIC PANEL: CPT

## 2020-01-17 PROCEDURE — 74177 CT ABD & PELVIS W/CONTRAST: CPT | Mod: 26

## 2020-01-17 PROCEDURE — 76705 ECHO EXAM OF ABDOMEN: CPT | Mod: 26

## 2020-01-17 PROCEDURE — 81001 URINALYSIS AUTO W/SCOPE: CPT

## 2020-01-17 PROCEDURE — 99284 EMERGENCY DEPT VISIT MOD MDM: CPT

## 2020-01-17 PROCEDURE — 76705 ECHO EXAM OF ABDOMEN: CPT

## 2020-01-17 PROCEDURE — 83690 ASSAY OF LIPASE: CPT

## 2020-01-17 RX ORDER — CEPHALEXIN 500 MG
1 CAPSULE ORAL
Qty: 28 | Refills: 0
Start: 2020-01-17 | End: 2020-01-30

## 2020-01-17 RX ORDER — CEFTRIAXONE 500 MG/1
1000 INJECTION, POWDER, FOR SOLUTION INTRAMUSCULAR; INTRAVENOUS ONCE
Refills: 0 | Status: COMPLETED | OUTPATIENT
Start: 2020-01-17 | End: 2020-01-17

## 2020-01-17 RX ORDER — SODIUM CHLORIDE 9 MG/ML
1000 INJECTION INTRAMUSCULAR; INTRAVENOUS; SUBCUTANEOUS ONCE
Refills: 0 | Status: COMPLETED | OUTPATIENT
Start: 2020-01-17 | End: 2020-01-17

## 2020-01-17 RX ORDER — ACETAMINOPHEN 500 MG
1000 TABLET ORAL ONCE
Refills: 0 | Status: COMPLETED | OUTPATIENT
Start: 2020-01-17 | End: 2020-01-17

## 2020-01-17 RX ADMIN — Medication 1000 MILLIGRAM(S): at 16:30

## 2020-01-17 RX ADMIN — Medication 400 MILLIGRAM(S): at 16:13

## 2020-01-17 RX ADMIN — SODIUM CHLORIDE 1000 MILLILITER(S): 9 INJECTION INTRAMUSCULAR; INTRAVENOUS; SUBCUTANEOUS at 16:12

## 2020-01-17 RX ADMIN — SODIUM CHLORIDE 1000 MILLILITER(S): 9 INJECTION INTRAMUSCULAR; INTRAVENOUS; SUBCUTANEOUS at 17:12

## 2020-01-17 RX ADMIN — CEFTRIAXONE 100 MILLIGRAM(S): 500 INJECTION, POWDER, FOR SOLUTION INTRAMUSCULAR; INTRAVENOUS at 17:51

## 2020-01-17 RX ADMIN — CEFTRIAXONE 1000 MILLIGRAM(S): 500 INJECTION, POWDER, FOR SOLUTION INTRAMUSCULAR; INTRAVENOUS at 18:20

## 2020-01-17 RX ADMIN — Medication 1000 MILLIGRAM(S): at 16:43

## 2020-01-17 NOTE — ED PROVIDER NOTE - NS_ATTENDINGSCRIBE_ED_ALL_ED
81.8
I personally performed the service described in the documentation recorded by the scribe in my presence, and it accurately and completely records my words and actions.

## 2020-01-17 NOTE — ED PROVIDER NOTE - CLINICAL SUMMARY MEDICAL DECISION MAKING FREE TEXT BOX
Patient with abdominal pain and diarrhea. Will do abdominal labs, CT, hydrate, pain control and reassess.

## 2020-01-17 NOTE — ED PROVIDER NOTE - OBJECTIVE STATEMENT
85 y/o F patient presents to the ED for abdominal pain and diarrhea. Patient relates abdominal pain is epigastric and right upper quadrant pain. Patient admits to nausea, emesis and multiple loose stools. Patient did not take any medications prior to arrival. Patient denied Azeri translation services and opted for family to translate. Patient denies any dysuria, frequency, urgency, vaginal bleeding, vaginal discharge or any other complaints.

## 2020-01-17 NOTE — ED PROVIDER NOTE - PATIENT PORTAL LINK FT
You can access the FollowMyHealth Patient Portal offered by [facility] by registering at the following website: [email or web address]. By joining [facility]’s FollowMyHealth portal, you will also be able to view your health information using other applications (apps) compatible with our system.

## 2020-01-17 NOTE — ED PROVIDER NOTE - PROGRESS NOTE DETAILS
Workup demonstrates UTI. Discussed admission for IV abx with patient and family. Family declined stating this has happened before and patient responds well to PO abx. Will return if symptoms worsen. Will DC with abx.

## 2020-01-22 DIAGNOSIS — Z71.89 OTHER SPECIFIED COUNSELING: ICD-10-CM

## 2020-02-05 NOTE — ED PROVIDER NOTE - INTERNATIONAL TRAVEL
pt has aides at home, but they don't cook.  pt normally eats: egg with ham for B.  L: deli sandwich.  D: takeout.  daughter is looking into getting mom's meals for her parents./other (specify)
No

## 2020-02-28 NOTE — PATIENT PROFILE ADULT. - LANGUAGE ASSISTANCE NEEDED
Request sent to [facility] lab to add on A1C   No-Patient/Caregiver offered and refused free interpretation services.

## 2020-09-08 NOTE — ED ADULT NURSE NOTE - EXTENSIONS OF SELF_ADULT
Internal Medicine Annual Exam  Emiliana Garcia is a 50 y.o. female who presents today for an annual exam and with concerns as outlined below.    Chief Complaint  Chief Complaint   Patient presents with   • Annual Exam        HPI  Ms. Garcia comes in today for her annual physical. She has recently gotten a promotion at work, son is learning to drive, and all of her half marathons have been cancelled so more stressed. Even though the marathons were cancelled she has still continued to walk 4 times a week. She has cut back on meat in her diet which she feels has helped her hot flashes. Recently saw her GYN and was told she was perimenopausal. She has had good control of chronic sinusitis and vasomotor rhinitis with no new symptoms or medication changes. Continues to follow with Dr. Beckford. In regards to history of MVP, she is uncertain when her last echo was done, maybe 3-4y ago. She denies chest pain, SOA, orthopnea, increase in edema, or palpitations. She has chronic LLE edema that worsens by end of day due to May Thurner syndrome. This is worsened if she sits at work for a long period of time. She has not looked into getting a standing desk. She tries to walk in the afternoon. She is UTD with vision and dental exams. She has also had her mammogram. She declines to scheduled colon cancer screening.       Review of Systems  Review of Systems   Constitutional: Negative.    HENT: Negative.    Eyes: Negative.    Respiratory: Negative.    Cardiovascular: Positive for leg swelling (chronic LLE). Negative for chest pain and palpitations.   Gastrointestinal: Negative.    Endocrine: Positive for heat intolerance (hot flashes, improving).   Genitourinary: Negative.    Musculoskeletal: Negative.    Skin: Negative.    Neurological: Negative.    Psychiatric/Behavioral: Negative.         Past Medical History  Past Medical History:   Diagnosis Date   • Fractures    • May-Thurner syndrome    • Migraine    • Mitral valve prolapse    •  Osteopenia    • Vasomotor rhinitis         Surgical History  Past Surgical History:   Procedure Laterality Date   • AUGMENTATION MAMMAPLASTY      2012   • WRIST FRACTURE SURGERY Left 2016        Family History  Family History   Problem Relation Age of Onset   • Hypertension Father    • Alzheimer's disease Father    • Prostate cancer Father    • Skin cancer Father         melanoma   • Thyroid disease Brother    • Osteoporosis Maternal Grandmother    • Alzheimer's disease Maternal Grandmother    • Stroke Maternal Grandfather    • Heart attack Paternal Grandmother    • Hypertension Paternal Grandmother    • Lupus Mother         skin involvement   • Lupus Sister    • Breast cancer Neg Hx    • Ovarian cancer Neg Hx         Social History  Social History     Socioeconomic History   • Marital status:      Spouse name: Not on file   • Number of children: Not on file   • Years of education: Not on file   • Highest education level: Not on file   Tobacco Use   • Smoking status: Never Smoker   • Smokeless tobacco: Never Used   Substance and Sexual Activity   • Alcohol use: Yes     Frequency: Never     Comment: socially   • Drug use: No        Current Medications  Current Outpatient Medications on File Prior to Visit   Medication Sig Dispense Refill   • Cholecalciferol (VITAMIN D PO) Take 2,000 Units by mouth 2 (Two) Times a Day.     • Glucosamine-Chondroitin (GLUCOSAMINE CHONDR COMPLEX PO) Take 1 tablet by mouth Daily.     • ipratropium (ATROVENT) 0.03 % nasal spray 2 sprays into the nostril(s) as directed by provider Daily As Needed.     • loratadine (CLARITIN) 10 MG tablet Take 10 mg by mouth Every Other Day.     • Montelukast Sodium (SINGULAIR PO) Take 10 mg by mouth Every Other Day.     • MULTIPLE VITAMIN PO Multiple Vitamin capsule     • NASAL SALINE NA into the nostril(s) as directed by provider.     • spironolactone (ALDACTONE) 50 MG tablet   1     No current facility-administered medications on file prior to  visit.        Allergies  Allergies   Allergen Reactions   • Bactrim [Sulfamethoxazole-Trimethoprim] Itching     All over itching, chills, jittery   • Erythromycin Other (See Comments)     Stomach pains, rash   • Lorabid [Loracarbef] Other (See Comments)     Stomach pains, rash     • Penicillins Other (See Comments)     Allergy unknown, happened as a child        Objective  Visit Vitals  /82   Pulse 69   Temp 96.9 °F (36.1 °C)   Wt 86.9 kg (191 lb 9.6 oz)   SpO2 99% Comment: ra   BMI 25.28 kg/m²        Physical Exam  Physical Exam   Constitutional: She is oriented to person, place, and time. She appears well-developed and well-nourished. No distress.   HENT:   Head: Normocephalic and atraumatic.   Right Ear: Tympanic membrane, external ear and ear canal normal.   Left Ear: Tympanic membrane, external ear and ear canal normal.   Nose: Nose normal.   Eyes: Pupils are equal, round, and reactive to light. Conjunctivae and EOM are normal. No scleral icterus.   Neck: Neck supple.   Cardiovascular: Normal rate, regular rhythm and intact distal pulses.   Murmur (faint systolic murmur) heard.  Pulmonary/Chest: Effort normal and breath sounds normal. No respiratory distress.   Abdominal: Soft. Bowel sounds are normal. She exhibits no distension. There is no tenderness.   Musculoskeletal: She exhibits edema (LLE nonpitting edema). She exhibits no deformity.   Lymphadenopathy:     She has no cervical adenopathy.   Neurological: She is alert and oriented to person, place, and time.   Skin: Skin is warm and dry. No rash noted. She is not diaphoretic.   Psychiatric: She has a normal mood and affect. Her behavior is normal. Judgment and thought content normal.   Nursing note and vitals reviewed.       Results  Results for orders placed or performed in visit on 02/28/20   TSH Rfx On Abnormal To Free T4   Result Value Ref Range    TSH 0.747 0.270 - 4.200 uIU/mL   Estradiol   Result Value Ref Range    Estradiol 23.0 pg/mL    Follicle Stimulating Hormone   Result Value Ref Range    FSH 45.60 mIU/mL   Progesterone   Result Value Ref Range    Progesterone 0.15 ng/mL   Testosterone   Result Value Ref Range    Testosterone, Total 4.88 (L) 8.40 - 48.10 ng/dL        Assessment and Plan  Emiliana was seen today for annual exam.    Diagnoses and all orders for this visit:    Annual physical exam  - Counseling was given to patient for the following topics:  appropriate exercise, healthy eating habits, disease prevention, importance of immunizations, including risks and benefits, bone health, sun safety and seatbelt use. Also discussed the importance of regular dental and vision care, as well recommendation for a yearly screening skin exam after age 40.  Written information provided to patient on these topics and other health maintenance issues.  - Labs today    Vitamin D deficiency  - On vitamin D supplementation, has had hypervitaminosis D in the past which corrected after supplement was held. Will repeat level today.    Moderate mixed hyperlipidemia not requiring statin therapy  - Lipid panel last year with , repeat today.    Osteopenia of left hip  - DEXA 10/2019 with osteopenia, FRAX 5.2% and 0.9%  - Continue with vitamin D supplementation and weight bearing exercise    Mitral valve prolapse  - Review of outside records with mild mitral valve prolapse  - Currently asymptomatic  - Will request past echocardiogram results    May-Thurner syndrome  - Affecting LLE, no history of DVT and no signs of clot today  - Recommend obtaining standing desk for use at work to help alleviate some of the edema that occurs by end of day due to prolonged sitting    Vasomotor rhinitis  - Follows with ENT and on ipratropium nasal spray    Chronic sinusitis, unspecified location  - Follows with an ENT and takes claritin, singulair, nasal saline    Health Maintenance   Topic Date Due   • ANNUAL PHYSICAL  08/13/1975   • HEPATITIS C SCREENING  09/06/2019   •  COLONOSCOPY  09/06/2019   • INFLUENZA VACCINE  08/01/2020   • DXA SCAN  10/28/2021   • PAP SMEAR  02/01/2022   • TDAP/TD VACCINES (2 - Td) 10/10/2022     Health Maintenance  - Pap smear: Last 2/2019 at GYN, ASCUS HPV negative. Repeat cotesting in 3 years.  - Mammogram: 7/2020 BIRADS 2  - Colonoscopy: discussed, declined both cscope and cologuard.  - Immunizations: Declined flu vaccine today. Tdap 2012.    Counseling was given to patient for the following topics:  appropriate exercise, healthy eating habits, disease prevention, importance of immunizations, including risks and benefits, bone health, sun safety and seatbelt use. Also discussed the importance of regular dental and vision care, as well recommendation for a yearly screening skin exam after age 40.  Written information provided to patient on these topics and other health maintenance issues.    Return in about 1 year (around 9/8/2021) for Annual, Labs today.            None

## 2020-10-09 NOTE — ED ADULT TRIAGE NOTE - WEIGHT IN KG
Health Maintenance Due   Topic Date Due   • Shingles Vaccine (2 of 3) 12/22/2014   • DTaP/Tdap/Td Vaccine (2 - Td) 07/09/2019   • Diabetes A1C  06/10/2020   • Colorectal Cancer Screening-Colonoscopy  08/23/2020   • Influenza Vaccine (1) 09/01/2020   • Diabetes Foot Exam  12/16/2020   • Medicare Wellness Visit  12/16/2020       Patient is due for topics listed above, he wishes to proceed with Immunization(s) Shingles, but is not proceeding with Immunization(s) Influenza and Colorectal Cancer Screening: Colonoscopy at this time. The following has occurred: Diabetic A1C and Shingles Vaccine ordered today. Patient requested High Dose and stated that he will get it at the pharmacy.            69.4

## 2020-11-12 NOTE — PROGRESS NOTE ADULT - REASON FOR ADMISSION
HPI





- General


Chief Complaint: Medical Clearance


Time Seen by Provider: 11/12/20 06:33





- HPI


HPI: 





This is a 43-year-old  male who presents to the emergency department 

via EMS for alleged suicidal ideations.  The patient was brought in by [place] [id number] after they responded to a local [employer] for the complaint of a 

43-year-old male complaining of suicidal ideations.  PD was also called out at 

this location.  At that time the reports say that the patient advised that he 

did have suicidal thoughts but did not have any plan in motion.  He has a past 

medical history of bipolar disorder and schizophrenia.  At that time he was 

found AAO x3, GCS of 15.  Patient arrived to the emergency department a little 

before 2 AM, well before my shift began.  At the time of my initial examination 

the patient is arousable but is very sleepy and is not forthcoming with 

information.  Patient denies any alcohol or drugs on board.





ED Past Medical Hx





- Past Medical History


Previous Medical History?: Yes


Hx Psychiatric Treatment: Yes (bipolar, schizo)





- Surgical History


Past Surgical History?: No





- Social History


Smoking Status: Never Smoker


Substance Use Type: None





- Medications


Home Medications: 


                                Home Medications











 Medication  Instructions  Recorded  Confirmed  Last Taken  Type


 


FLUoxetine HCL [FLUoxetine] 20 mg PO DAILY #7 tablet 04/16/20  Unknown Rx


 


OLANzapine [Zyprexa] 10 mg PO BID #14 tablet 04/16/20  Unknown Rx


 


hydrOXYzine PAMOATE [Vistaril] 50 mg PO Q12H #14 capsule 04/16/20  Unknown Rx


 


traZODone [Desyrel] 100 mg PO QHS #7 tablet 04/16/20  Unknown Rx


 


Ondansetron [Zofran Odt] 4 mg PO Q8HR PRN #10 tab.rapdis 06/17/20  Unknown Rx














ED Review of Systems


ROS: 


Stated complaint: CHEST PAIN HEARING VOICES


Other details as noted in HPI





Comment: Unobtainable due to pts medical conditions





Physical Exam





- Physical Exam


Physical Exam: 





GENERAL: The patient is well-developed well-nourished.


HENT: Normocephalic.  Atraumatic.    Patient has moist mucous membranes.


EYES: Extraocular motions are intact. 


NECK: Supple. Trachea is midline.


CHEST/LUNGS: Clear to auscultation.  There is no respiratory distress noted.


HEART/CARDIOVASCULAR: Regular.  There is no tachycardia. 


ABDOMEN: Abdomen is soft, nontender.  Patient has normal bowel sounds. 


SKIN: Skin is warm and dry. 


NEURO: The patient is awake, alert, and oriented.  The patient is cooperative.  

Normal speech.


MUSCULOSKELETAL: There is no tenderness or deformity.  There is no limitation 

range of motion.  





ED Medical Decision Making





- Lab Data


Result diagrams: 


                                 11/12/20 06:47





                                 11/12/20 06:47





- Medical Decision Making





This patient presents for a mental health evaluation.  He does have a 

psychiatric history but at this time he is calm and appropriate.  He has no 

complaints of suicidal or homicidal ideations.  He does not express any acute 

psychosis.  He was seen by the mental health assessment team who agrees that he 

does not meet criteria for a 1013 or inpatient stabilization.  Labs have been 

unremarkable including CBC, metabolic panel, blood alcohol level.  Vital signs 

have been reassuring throughout his ED course.


Critical Care Time: No


Critical care attestation.: 


If time is entered above; I have spent that time in minutes in the direct care 

of this critically ill patient, excluding procedure time.








ED Disposition


Clinical Impression: 


 Medical clearance for psychiatric admission, Discharge planning issues, No 

abnormality detected on mental health assessment





Disposition: DC-01 TO HOME OR SELFCARE


Is pt being admited?: No


Condition: Stable


Additional Instructions: 


                Outpatient [facility] Behavioral Health Resources:





[facility] ([facility])


[address] [place] [address] 


Phone: [phone]/ 1 [phone]


Monday thru Friday - 8am - 5pm





[facility]


Address: [address] [place] [address]


Monday thru Friday- 7am-2pm


Phone: [phone]








[facility]


Address: [address] [place] [address]


Monday thru Friday: 8:30AM-5PM


Phone: [phone]





HOMELESS RESOURCES:





[facility] 


NEED HELP?





If you are in need of help or know someone who does, please contact us atin

[email or web address] call [phone], or come to our offices at [address] [place] [phone], Monday-Friday beginning at 8AM.





[facility]


Admission at 7am Mon to Fri


Address: [place] [address]


Client Engagement [phone]


Regular program admission occurs Monday through Friday at 7:00 amand operates 

on a first come, first serve basis.Because we cant anticipate program 

availability in advance andprogram spots are in high demand, we recommend 

arriving early. Space fills up fast!





Next steps can include:


   Assignment to a [facility] program bed


   Connection to and placement in a partner program, or


   Referral to a partner agency











[facility]


Admission at 4:30pm daily





Address: [place] [address]


Phone: [phone]





****CRISIS RESOURCES****


[place] Crisis Line: 1-601.343.8063


Suicide Prevention Line: 1-773.492.2228


Crisis Text Line: Text START to 371360


Emergency: 911














Referrals: 


[facility] [Outside] - 2-3 Days


[facility] [Provider Group] - 2-3 Days


Time of Disposition: 08:32
Vomiting and diarrhea

## 2021-05-21 NOTE — PATIENT PROFILE ADULT - NSPROHMCARDIOSYMPCOND_GEN_A_NUR
bradycardia sepsis left 2nd toe ulcer GOC PCP undre my care many years at [facility] hypertension

## 2021-05-25 NOTE — ED ADULT TRIAGE NOTE - LOCATION:
[No Acute Distress] : no acute distress Left arm; [Alert] : alert [Tired appearing] : tired appearing [Erythematous Oropharynx] : erythematous oropharynx [Soft] : soft [Non Distended] : non distended [Normal Bowel Sounds] : normal bowel sounds [No Hepatosplenomegaly] : no hepatosplenomegaly [Enlarged] : enlarged [Anterior Cervical] : anterior cervical [Moves All Extremities x 4] : moves all extremities x4 [NL] : warm [FreeTextEntry4] : nasal congestion [FreeTextEntry9] : mild periumbilical tenderness Started first trimester

## 2022-01-26 NOTE — PROGRESS NOTE ADULT - SUBJECTIVE AND OBJECTIVE BOX
Patient is a 83y old  Female who presents with a chief complaint of Abdominal pain x 1 day (25 May 2018 07:31)    pt seen in icu [  ], reg med floor [   ], bed [  ], chair at bedside [   ], a+o x3 [  ], lethargic [  ],  nad [  ]    shea [  ], ngt [  ], peg [  ], et tube [  ], cent line [  ], picc line [  ]        Allergies    No Known Allergies        Vitals    T(F): 98.5 (05-25-18 @ 05:44), Max: 99 (05-24-18 @ 22:35)  HR: 72 (05-25-18 @ 05:44) (54 - 83)  BP: 149/56 (05-25-18 @ 05:44) (120/52 - 151/74)  RR: 16 (05-25-18 @ 05:44) (12 - 19)  SpO2: 97% (05-25-18 @ 05:44) (96% - 100%)  Wt(kg): --  CAPILLARY BLOOD GLUCOSE      POCT Blood Glucose.: 107 mg/dL (25 May 2018 07:59)      Labs                          10.6   7.6   )-----------( 207      ( 25 May 2018 06:24 )             34.4       05-25    142  |  111<H>  |  9   ----------------------------<  98  3.8   |  27  |  0.74    Ca    8.6      25 May 2018 06:24  Phos  3.0     05-24  Mg     1.9     05-24    TPro  6.7  /  Alb  2.8<L>  /  TBili  0.3  /  DBili  x   /  AST  20  /  ALT  15  /  AlkPhos  76  05-24            .Blood Blood-Peripheral  05-23 @ 23:30   No growth to date.  --  --      .Urine Clean Catch (Midstream)  05-23 @ 23:29   No growth  --  --          Radiology Results      Meds    MEDICATIONS  (STANDING):  ferrous    sulfate 325 milliGRAM(s) Oral daily  heparin  Injectable 5000 Unit(s) SubCutaneous every 8 hours  insulin lispro (HumaLOG) corrective regimen sliding scale   SubCutaneous three times a day before meals  lactated ringers. 1000 milliLiter(s) (100 mL/Hr) IV Continuous <Continuous>  lisinopril 5 milliGRAM(s) Oral daily  pantoprazole    Tablet 40 milliGRAM(s) Oral before breakfast  simvastatin 40 milliGRAM(s) Oral at bedtime  sodium chloride 0.9%. 1000 milliLiter(s) (90 mL/Hr) IV Continuous <Continuous>      MEDICATIONS  (PRN):  ketorolac   Injectable 15 milliGRAM(s) IV Push once PRN Moderate Pain  ondansetron Injectable 4 milliGRAM(s) IV Push every 6 hours PRN Nausea  oxyCODONE    5 mG/acetaminophen 325 mG 1 Tablet(s) Oral every 4 hours PRN Moderate Pain      Physical Exam    Neuro :  no focal deficits  Respiratory: CTA B/L  CV: RRR, S1S2, no murmurs,   Abdominal: Soft, NT, ND +BS,  Extremities: No edema, + peripheral pulses    ASSESSMENT    Abdominal pain 2nd to appendicitis  h/o Dementia  Vertigo  Gastric adenocarcinoma  Hypercholesteremia  DM (diabetes mellitus)  HTN (hypertension)  S/P tubal ligation  S/P hysterectomy      PLAN    s/p lap appendectomy  surg f/u noted  start reg diet  OOB  incentive spirometry  gi dvt prophylaxis  pain control  cardio f/u  cont zosyn  id f/u noted  cont current meds  d/c plan if cleared by id Patient is a 83y old  Female who presents with a chief complaint of Abdominal pain x 1 day (25 May 2018 07:31)    pt seen in icu [  ], reg med floor [  x ], bed [x  ], chair at bedside [   ], a+o x3 [ x ], lethargic [  ],  nad [ x ]          Allergies    No Known Allergies        Vitals    T(F): 98.5 (05-25-18 @ 05:44), Max: 99 (05-24-18 @ 22:35)  HR: 72 (05-25-18 @ 05:44) (54 - 83)  BP: 149/56 (05-25-18 @ 05:44) (120/52 - 151/74)  RR: 16 (05-25-18 @ 05:44) (12 - 19)  SpO2: 97% (05-25-18 @ 05:44) (96% - 100%)  Wt(kg): --  CAPILLARY BLOOD GLUCOSE      POCT Blood Glucose.: 107 mg/dL (25 May 2018 07:59)      Labs                          10.6   7.6   )-----------( 207      ( 25 May 2018 06:24 )             34.4       05-25    142  |  111<H>  |  9   ----------------------------<  98  3.8   |  27  |  0.74    Ca    8.6      25 May 2018 06:24  Phos  3.0     05-24  Mg     1.9     05-24    TPro  6.7  /  Alb  2.8<L>  /  TBili  0.3  /  DBili  x   /  AST  20  /  ALT  15  /  AlkPhos  76  05-24            .Blood Blood-Peripheral  05-23 @ 23:30   No growth to date.  --  --      .Urine Clean Catch (Midstream)  05-23 @ 23:29   No growth  --  --          Radiology Results      Meds    MEDICATIONS  (STANDING):  ferrous    sulfate 325 milliGRAM(s) Oral daily  heparin  Injectable 5000 Unit(s) SubCutaneous every 8 hours  insulin lispro (HumaLOG) corrective regimen sliding scale   SubCutaneous three times a day before meals  lactated ringers. 1000 milliLiter(s) (100 mL/Hr) IV Continuous <Continuous>  lisinopril 5 milliGRAM(s) Oral daily  pantoprazole    Tablet 40 milliGRAM(s) Oral before breakfast  simvastatin 40 milliGRAM(s) Oral at bedtime  sodium chloride 0.9%. 1000 milliLiter(s) (90 mL/Hr) IV Continuous <Continuous>      MEDICATIONS  (PRN):  ketorolac   Injectable 15 milliGRAM(s) IV Push once PRN Moderate Pain  ondansetron Injectable 4 milliGRAM(s) IV Push every 6 hours PRN Nausea  oxyCODONE    5 mG/acetaminophen 325 mG 1 Tablet(s) Oral every 4 hours PRN Moderate Pain      Physical Exam    Neuro :  no focal deficits  Respiratory: CTA B/L  CV: RRR, S1S2, no murmurs,   Abdominal: Soft, ND +BS, incisional tenderness, scope sit dressing cdi  Extremities: No edema, + peripheral pulses    ASSESSMENT    Abdominal pain 2nd to appendicitis  h/o Dementia  Vertigo  Gastric adenocarcinoma  Hypercholesteremia  DM (diabetes mellitus)  HTN (hypertension)  S/P tubal ligation  S/P hysterectomy      PLAN    s/p lap appendectomy  surg f/u noted  start reg diet  OOB  incentive spirometry  gi dvt prophylaxis  pain control  cardio f/u  cont zosyn  id f/u noted  cont current meds  d/c plan if cleared by id Cheek-To-Nose Interpolation Flap Text: A decision was made to reconstruct the defect utilizing an interpolation axial flap and a staged reconstruction.  A telfa template was made of the defect.  This telfa template was then used to outline the Cheek-To-Nose Interpolation flap.  The donor area for the pedicle flap was then injected with anesthesia.  The flap was excised through the skin and subcutaneous tissue down to the layer of the underlying musculature.  The interpolation flap was carefully excised within this deep plane to maintain its blood supply.  The edges of the donor site were undermined.   The donor site was closed in a primary fashion.  The pedicle was then rotated into position and sutured.  Once the tube was sutured into place, adequate blood supply was confirmed with blanching and refill.  The pedicle was then wrapped with xeroform gauze and dressed appropriately with a telfa and gauze bandage to ensure continued blood supply and protect the attached pedicle.

## 2022-03-04 ENCOUNTER — EMERGENCY (EMERGENCY)
Facility: HOSPITAL | Age: 87
LOS: 1 days | Discharge: HOME | End: 2022-03-04
Attending: STUDENT IN AN ORGANIZED HEALTH CARE EDUCATION/TRAINING PROGRAM
Payer: MEDICAID

## 2022-03-04 VITALS
HEART RATE: 87 BPM | TEMPERATURE: 99 F | SYSTOLIC BLOOD PRESSURE: 154 MMHG | HEIGHT: 56 IN | RESPIRATION RATE: 18 BRPM | OXYGEN SATURATION: 99 % | DIASTOLIC BLOOD PRESSURE: 77 MMHG

## 2022-03-04 DIAGNOSIS — Z90.710 ACQUIRED ABSENCE OF BOTH CERVIX AND UTERUS: Chronic | ICD-10-CM

## 2022-03-04 DIAGNOSIS — Z98.51 TUBAL LIGATION STATUS: Chronic | ICD-10-CM

## 2022-03-04 LAB
ALBUMIN SERPL ELPH-MCNC: 3.5 G/DL — SIGNIFICANT CHANGE UP (ref 3.5–5)
ALP SERPL-CCNC: 50 U/L — SIGNIFICANT CHANGE UP (ref 40–120)
ALT FLD-CCNC: 50 U/L DA — SIGNIFICANT CHANGE UP (ref 10–60)
ANION GAP SERPL CALC-SCNC: 4 MMOL/L — LOW (ref 5–17)
APPEARANCE UR: CLEAR — SIGNIFICANT CHANGE UP
AST SERPL-CCNC: 67 U/L — HIGH (ref 10–40)
BACTERIA # UR AUTO: ABNORMAL /HPF
BASOPHILS # BLD AUTO: 0.02 K/UL — SIGNIFICANT CHANGE UP (ref 0–0.2)
BASOPHILS NFR BLD AUTO: 0.3 % — SIGNIFICANT CHANGE UP (ref 0–2)
BILIRUB SERPL-MCNC: 0.2 MG/DL — SIGNIFICANT CHANGE UP (ref 0.2–1.2)
BILIRUB UR-MCNC: NEGATIVE — SIGNIFICANT CHANGE UP
BUN SERPL-MCNC: 19 MG/DL — HIGH (ref 7–18)
CALCIUM SERPL-MCNC: 9.3 MG/DL — SIGNIFICANT CHANGE UP (ref 8.4–10.5)
CHLORIDE SERPL-SCNC: 107 MMOL/L — SIGNIFICANT CHANGE UP (ref 96–108)
CO2 SERPL-SCNC: 26 MMOL/L — SIGNIFICANT CHANGE UP (ref 22–31)
COLOR SPEC: YELLOW — SIGNIFICANT CHANGE UP
COMMENT - URINE: SIGNIFICANT CHANGE UP
CREAT SERPL-MCNC: 1.05 MG/DL — SIGNIFICANT CHANGE UP (ref 0.5–1.3)
DIFF PNL FLD: ABNORMAL
EGFR: 52 ML/MIN/1.73M2 — LOW
EOSINOPHIL # BLD AUTO: 0.17 K/UL — SIGNIFICANT CHANGE UP (ref 0–0.5)
EOSINOPHIL NFR BLD AUTO: 2.1 % — SIGNIFICANT CHANGE UP (ref 0–6)
EPI CELLS # UR: SIGNIFICANT CHANGE UP /HPF
GLUCOSE SERPL-MCNC: 156 MG/DL — HIGH (ref 70–99)
GLUCOSE UR QL: 1000 MG/DL
HCT VFR BLD CALC: 35.9 % — SIGNIFICANT CHANGE UP (ref 34.5–45)
HGB BLD-MCNC: 11.5 G/DL — SIGNIFICANT CHANGE UP (ref 11.5–15.5)
IMM GRANULOCYTES NFR BLD AUTO: 0.1 % — SIGNIFICANT CHANGE UP (ref 0–1.5)
KETONES UR-MCNC: NEGATIVE — SIGNIFICANT CHANGE UP
LEUKOCYTE ESTERASE UR-ACNC: ABNORMAL
LIDOCAIN IGE QN: 139 U/L — SIGNIFICANT CHANGE UP (ref 73–393)
LYMPHOCYTES # BLD AUTO: 3.74 K/UL — HIGH (ref 1–3.3)
LYMPHOCYTES # BLD AUTO: 46.8 % — HIGH (ref 13–44)
MCHC RBC-ENTMCNC: 29.8 PG — SIGNIFICANT CHANGE UP (ref 27–34)
MCHC RBC-ENTMCNC: 32 GM/DL — SIGNIFICANT CHANGE UP (ref 32–36)
MCV RBC AUTO: 93 FL — SIGNIFICANT CHANGE UP (ref 80–100)
MONOCYTES # BLD AUTO: 0.54 K/UL — SIGNIFICANT CHANGE UP (ref 0–0.9)
MONOCYTES NFR BLD AUTO: 6.8 % — SIGNIFICANT CHANGE UP (ref 2–14)
NEUTROPHILS # BLD AUTO: 3.51 K/UL — SIGNIFICANT CHANGE UP (ref 1.8–7.4)
NEUTROPHILS NFR BLD AUTO: 43.9 % — SIGNIFICANT CHANGE UP (ref 43–77)
NITRITE UR-MCNC: NEGATIVE — SIGNIFICANT CHANGE UP
NRBC # BLD: 0 /100 WBCS — SIGNIFICANT CHANGE UP (ref 0–0)
PH UR: 7 — SIGNIFICANT CHANGE UP (ref 5–8)
PLATELET # BLD AUTO: 224 K/UL — SIGNIFICANT CHANGE UP (ref 150–400)
POTASSIUM SERPL-MCNC: 4.6 MMOL/L — SIGNIFICANT CHANGE UP (ref 3.5–5.3)
POTASSIUM SERPL-SCNC: 4.6 MMOL/L — SIGNIFICANT CHANGE UP (ref 3.5–5.3)
PROT SERPL-MCNC: 8.5 G/DL — HIGH (ref 6–8.3)
PROT UR-MCNC: NEGATIVE — SIGNIFICANT CHANGE UP
RBC # BLD: 3.86 M/UL — SIGNIFICANT CHANGE UP (ref 3.8–5.2)
RBC # FLD: 14.6 % — HIGH (ref 10.3–14.5)
RBC CASTS # UR COMP ASSIST: ABNORMAL /HPF (ref 0–2)
SODIUM SERPL-SCNC: 137 MMOL/L — SIGNIFICANT CHANGE UP (ref 135–145)
SP GR SPEC: 1 — LOW (ref 1.01–1.02)
UROBILINOGEN FLD QL: NEGATIVE — SIGNIFICANT CHANGE UP
WBC # BLD: 7.99 K/UL — SIGNIFICANT CHANGE UP (ref 3.8–10.5)
WBC # FLD AUTO: 7.99 K/UL — SIGNIFICANT CHANGE UP (ref 3.8–10.5)
WBC UR QL: ABNORMAL /HPF (ref 0–5)

## 2022-03-04 PROCEDURE — 99284 EMERGENCY DEPT VISIT MOD MDM: CPT

## 2022-03-04 PROCEDURE — 99284 EMERGENCY DEPT VISIT MOD MDM: CPT | Mod: 25

## 2022-03-04 PROCEDURE — 81001 URINALYSIS AUTO W/SCOPE: CPT

## 2022-03-04 PROCEDURE — 96374 THER/PROPH/DIAG INJ IV PUSH: CPT

## 2022-03-04 PROCEDURE — 83690 ASSAY OF LIPASE: CPT

## 2022-03-04 PROCEDURE — 73502 X-RAY EXAM HIP UNI 2-3 VIEWS: CPT | Mod: 26,RT

## 2022-03-04 PROCEDURE — 87086 URINE CULTURE/COLONY COUNT: CPT

## 2022-03-04 PROCEDURE — 73502 X-RAY EXAM HIP UNI 2-3 VIEWS: CPT

## 2022-03-04 PROCEDURE — 36415 COLL VENOUS BLD VENIPUNCTURE: CPT

## 2022-03-04 PROCEDURE — 80053 COMPREHEN METABOLIC PANEL: CPT

## 2022-03-04 PROCEDURE — 85025 COMPLETE CBC W/AUTO DIFF WBC: CPT

## 2022-03-04 RX ORDER — LIDOCAINE 4 G/100G
1 CREAM TOPICAL ONCE
Refills: 0 | Status: COMPLETED | OUTPATIENT
Start: 2022-03-04 | End: 2022-03-04

## 2022-03-04 RX ORDER — CEFTRIAXONE 500 MG/1
1000 INJECTION, POWDER, FOR SOLUTION INTRAMUSCULAR; INTRAVENOUS ONCE
Refills: 0 | Status: COMPLETED | OUTPATIENT
Start: 2022-03-04 | End: 2022-03-04

## 2022-03-04 RX ORDER — IBUPROFEN 200 MG
600 TABLET ORAL ONCE
Refills: 0 | Status: COMPLETED | OUTPATIENT
Start: 2022-03-04 | End: 2022-03-04

## 2022-03-04 RX ORDER — CEPHALEXIN 500 MG
1 CAPSULE ORAL
Qty: 21 | Refills: 0
Start: 2022-03-04 | End: 2022-03-10

## 2022-03-04 RX ADMIN — LIDOCAINE 1 PATCH: 4 CREAM TOPICAL at 23:05

## 2022-03-04 RX ADMIN — CEFTRIAXONE 100 MILLIGRAM(S): 500 INJECTION, POWDER, FOR SOLUTION INTRAMUSCULAR; INTRAVENOUS at 23:05

## 2022-03-04 RX ADMIN — Medication 600 MILLIGRAM(S): at 23:04

## 2022-03-04 NOTE — ED PROVIDER NOTE - NSICDXFAMILYHX_GEN_ALL_CORE_FT
FAMILY HISTORY:  Sibling  Still living? Unknown  Family history of diabetes mellitus, Age at diagnosis: Age Unknown    Grandparent  Still living? No  Family history of early CAD, Age at diagnosis: Age Unknown

## 2022-03-04 NOTE — ED PROVIDER NOTE - CLINICAL SUMMARY MEDICAL DECISION MAKING FREE TEXT BOX
87F p/w lower back pain radiating to right lower leg and also c/o mild epigastric pain x past few days. Patient well appearing neurovascularly intact, abdomen benign, and no bony tenderness. Will send basic labs, UA and reassess after meds. 87F p/w lower back pain radiating to right lower leg and also c/o mild epigastric pain and dysuria x past few days. Patient well appearing neurovascularly intact, abdomen benign, and no bony tenderness. Low suspicion for fracture, cord compression, or surgical GI pathology based on exam/history. Will send basic labs, UA, and reassess after meds.

## 2022-03-04 NOTE — ED PROVIDER NOTE - NSICDXPASTMEDICALHX_GEN_ALL_CORE_FT
PAST MEDICAL HISTORY:  Dementia     DM (diabetes mellitus)     Gastric adenocarcinoma s/p resection    HTN (hypertension)     Hypercholesteremia     Vertigo

## 2022-03-04 NOTE — ED PROVIDER NOTE - PATIENT PORTAL LINK FT
You can access the FollowMyHealth Patient Portal offered by [facility] by registering at the following website: [email or web address]. By joining [facility] FollowMyHealth portal, you will also be able to view your health information using other applications (apps) compatible with our system.

## 2022-03-04 NOTE — ED PROVIDER NOTE - NS ED ROS FT
ROS:  GENERAL: No fever, no chills  EYES: no change in vision  HEENT: no trouble swallowing, no trouble speaking  CARDIAC: no chest pain  PULMONARY: no cough, no shortness of breath  GI: Epigastric pain (+), no nausea, no vomiting, no diarrhea, no constipation  : No dysuria, no frequency, no change in appearance, or odor of urine  SKIN: no rashes  NEURO: no headache, no weakness  MSK: Back pain (+), No joint pain    Miguelangel Anne DO

## 2022-03-04 NOTE — ED PROVIDER NOTE - PHYSICAL EXAMINATION
Gen: AAOx3, non-toxic  Head: NCAT  HEENT: EOMI, oral mucosa moist, normal conjunctiva  Lung: CTAB, no respiratory distress, no wheezes/rhonchi/rales B/L, speaking in full sentences  CV: RRR, no murmurs, rubs or gallops  Abd: soft, NTND, no guarding, negative Muir, no CVA tenderness  MSK: no visible deformities,  Neuro: No midline spinal tenderness. Mild right lumbar paraspinal tenderness. No focal sensory or motor deficits, normal CN exam   Skin: Warm, well perfused, no rash  Psych: normal affect.     Miguelangel Anne, DO

## 2022-03-04 NOTE — ED PROVIDER NOTE - NSFOLLOWUPINSTRUCTIONS_ED_ALL_ED_FT
Follow up with your PCP in 24-48 hours.   Take Cephalexin 500 mg every 8 hours for 7 days.   May take Tylenol and Motrin as directed on the bottle for pain control.   Return to the ER if you develop any new or worsening symptoms such as fever, chest pain, shortness of breath, numbness, weakness, abdominal pain, nausea, vomiting, or visual changes.

## 2022-03-04 NOTE — ED PROVIDER NOTE - OBJECTIVE STATEMENT
88 y/o female with PMHx including cholecystectomy, HTN, p/w lower back pain radiating down right leg, also endorsing mild epigastric pain x past few days. Patient states pain is worse on ambulation. Patient denies any other symptoms including chest pain, shortness of breath, fever, cough, nausea, vomiting, diarrhea, numbness/weakness, or incontinence. 88 y/o female with PMHx including cholecystectomy, HTN, p/w lower back pain radiating down right leg, also endorsing mild epigastric pain and dysuria x past few days. Patient states pain is worse on ambulation. Patient denies any other symptoms including chest pain, shortness of breath, fever, cough, nausea, vomiting, diarrhea, numbness/weakness, or incontinence.

## 2022-03-07 LAB
CULTURE RESULTS: SIGNIFICANT CHANGE UP
SPECIMEN SOURCE: SIGNIFICANT CHANGE UP

## 2022-03-17 NOTE — ED PROVIDER NOTE - CPE EDP ENMT NORM
normal... Cephalexin Counseling: I counseled the patient regarding use of cephalexin as an antibiotic for prophylactic and/or therapeutic purposes. Cephalexin (commonly prescribed under brand name Keflex) is a cephalosporin antibiotic which is active against numerous classes of bacteria, including most skin bacteria. Side effects may include nausea, diarrhea, gastrointestinal upset, rash, hives, yeast infections, and in rare cases, hepatitis, kidney disease, seizures, fever, confusion, neurologic symptoms, and others. Patients with severe allergies to penicillin medications are cautioned that there is about a 10% incidence of cross-reactivity with cephalosporins. When possible, patients with penicillin allergies should use alternatives to cephalosporins for antibiotic therapy.

## 2022-04-05 ENCOUNTER — INPATIENT (INPATIENT)
Facility: HOSPITAL | Age: 87
LOS: 8 days | Discharge: HOME | DRG: 824 | End: 2022-04-14
Attending: INTERNAL MEDICINE | Admitting: INTERNAL MEDICINE
Payer: MEDICAID

## 2022-04-05 VITALS
DIASTOLIC BLOOD PRESSURE: 67 MMHG | OXYGEN SATURATION: 98 % | RESPIRATION RATE: 18 BRPM | HEIGHT: 56 IN | HEART RATE: 90 BPM | SYSTOLIC BLOOD PRESSURE: 155 MMHG | TEMPERATURE: 99 F | WEIGHT: 139.99 LBS

## 2022-04-05 DIAGNOSIS — Z90.710 ACQUIRED ABSENCE OF BOTH CERVIX AND UTERUS: Chronic | ICD-10-CM

## 2022-04-05 DIAGNOSIS — Z98.51 TUBAL LIGATION STATUS: Chronic | ICD-10-CM

## 2022-04-05 PROCEDURE — 99285 EMERGENCY DEPT VISIT HI MDM: CPT

## 2022-04-05 NOTE — ED ADULT NURSE NOTE - CHIEF COMPLAINT QUOTE
brought in by odell tony. speak  # [id number] c/o swelling & pain to rt. leg x 2 wks. unable to walk. denies fall

## 2022-04-05 NOTE — ED ADULT TRIAGE NOTE - CCCP TRG CHIEF CMPLNT
left ,difficulty walking, swollen to rt .leg/pain, lower leg right ,difficulty walking, swollen to rt .leg/pain, lower leg

## 2022-04-05 NOTE — ED ADULT TRIAGE NOTE - CHIEF COMPLAINT QUOTE
brought in by odell tony. speak  # [id number] c/o swelling & pain to rt. leg x 2 wks. unable to walk. denies fall
Cardiac

## 2022-04-05 NOTE — ED ADULT NURSE NOTE - OBJECTIVE STATEMENT
Pt presents to the ED with c/o swelling and right hip pain radiating to lower extremity x 2weeks. Pt stated she normally ambulates with a cane but now has difficulty walking due to the pain. Denies fall, recent injuries, or other symptoms.

## 2022-04-06 DIAGNOSIS — N39.0 URINARY TRACT INFECTION, SITE NOT SPECIFIED: ICD-10-CM

## 2022-04-06 DIAGNOSIS — Z29.9 ENCOUNTER FOR PROPHYLACTIC MEASURES, UNSPECIFIED: ICD-10-CM

## 2022-04-06 DIAGNOSIS — M79.604 PAIN IN RIGHT LEG: ICD-10-CM

## 2022-04-06 DIAGNOSIS — E78.5 HYPERLIPIDEMIA, UNSPECIFIED: ICD-10-CM

## 2022-04-06 DIAGNOSIS — E11.9 TYPE 2 DIABETES MELLITUS WITHOUT COMPLICATIONS: ICD-10-CM

## 2022-04-06 DIAGNOSIS — I10 ESSENTIAL (PRIMARY) HYPERTENSION: ICD-10-CM

## 2022-04-06 DIAGNOSIS — R09.89 OTHER SPECIFIED SYMPTOMS AND SIGNS INVOLVING THE CIRCULATORY AND RESPIRATORY SYSTEMS: ICD-10-CM

## 2022-04-06 DIAGNOSIS — M48.061 SPINAL STENOSIS, LUMBAR REGION WITHOUT NEUROGENIC CLAUDICATION: ICD-10-CM

## 2022-04-06 DIAGNOSIS — R42 DIZZINESS AND GIDDINESS: ICD-10-CM

## 2022-04-06 LAB
ALBUMIN SERPL ELPH-MCNC: 2.9 G/DL — LOW (ref 3.5–5)
ALBUMIN SERPL ELPH-MCNC: 3.1 G/DL — LOW (ref 3.5–5)
ALP SERPL-CCNC: 53 U/L — SIGNIFICANT CHANGE UP (ref 40–120)
ALP SERPL-CCNC: 57 U/L — SIGNIFICANT CHANGE UP (ref 40–120)
ALT FLD-CCNC: 27 U/L DA — SIGNIFICANT CHANGE UP (ref 10–60)
ALT FLD-CCNC: 29 U/L DA — SIGNIFICANT CHANGE UP (ref 10–60)
ANION GAP SERPL CALC-SCNC: 5 MMOL/L — SIGNIFICANT CHANGE UP (ref 5–17)
ANION GAP SERPL CALC-SCNC: 7 MMOL/L — SIGNIFICANT CHANGE UP (ref 5–17)
APTT BLD: 29.6 SEC — SIGNIFICANT CHANGE UP (ref 27.5–35.5)
AST SERPL-CCNC: 31 U/L — SIGNIFICANT CHANGE UP (ref 10–40)
AST SERPL-CCNC: 34 U/L — SIGNIFICANT CHANGE UP (ref 10–40)
BASOPHILS # BLD AUTO: 0.03 K/UL — SIGNIFICANT CHANGE UP (ref 0–0.2)
BASOPHILS # BLD AUTO: 0.03 K/UL — SIGNIFICANT CHANGE UP (ref 0–0.2)
BASOPHILS NFR BLD AUTO: 0.3 % — SIGNIFICANT CHANGE UP (ref 0–2)
BASOPHILS NFR BLD AUTO: 0.4 % — SIGNIFICANT CHANGE UP (ref 0–2)
BILIRUB SERPL-MCNC: 0.2 MG/DL — SIGNIFICANT CHANGE UP (ref 0.2–1.2)
BILIRUB SERPL-MCNC: 0.2 MG/DL — SIGNIFICANT CHANGE UP (ref 0.2–1.2)
BUN SERPL-MCNC: 22 MG/DL — HIGH (ref 7–18)
BUN SERPL-MCNC: 23 MG/DL — HIGH (ref 7–18)
CALCIUM SERPL-MCNC: 9 MG/DL — SIGNIFICANT CHANGE UP (ref 8.4–10.5)
CALCIUM SERPL-MCNC: 9.3 MG/DL — SIGNIFICANT CHANGE UP (ref 8.4–10.5)
CHLORIDE SERPL-SCNC: 110 MMOL/L — HIGH (ref 96–108)
CHLORIDE SERPL-SCNC: 111 MMOL/L — HIGH (ref 96–108)
CO2 SERPL-SCNC: 23 MMOL/L — SIGNIFICANT CHANGE UP (ref 22–31)
CO2 SERPL-SCNC: 24 MMOL/L — SIGNIFICANT CHANGE UP (ref 22–31)
CREAT SERPL-MCNC: 0.89 MG/DL — SIGNIFICANT CHANGE UP (ref 0.5–1.3)
CREAT SERPL-MCNC: 0.92 MG/DL — SIGNIFICANT CHANGE UP (ref 0.5–1.3)
D DIMER BLD IA.RAPID-MCNC: 513 NG/ML DDU — HIGH
EGFR: 61 ML/MIN/1.73M2 — SIGNIFICANT CHANGE UP
EGFR: 63 ML/MIN/1.73M2 — SIGNIFICANT CHANGE UP
EOSINOPHIL # BLD AUTO: 0.18 K/UL — SIGNIFICANT CHANGE UP (ref 0–0.5)
EOSINOPHIL # BLD AUTO: 0.19 K/UL — SIGNIFICANT CHANGE UP (ref 0–0.5)
EOSINOPHIL NFR BLD AUTO: 2.1 % — SIGNIFICANT CHANGE UP (ref 0–6)
EOSINOPHIL NFR BLD AUTO: 2.2 % — SIGNIFICANT CHANGE UP (ref 0–6)
GLUCOSE BLDC GLUCOMTR-MCNC: 127 MG/DL — HIGH (ref 70–99)
GLUCOSE BLDC GLUCOMTR-MCNC: 144 MG/DL — HIGH (ref 70–99)
GLUCOSE BLDC GLUCOMTR-MCNC: 148 MG/DL — HIGH (ref 70–99)
GLUCOSE BLDC GLUCOMTR-MCNC: 90 MG/DL — SIGNIFICANT CHANGE UP (ref 70–99)
GLUCOSE SERPL-MCNC: 105 MG/DL — HIGH (ref 70–99)
GLUCOSE SERPL-MCNC: 117 MG/DL — HIGH (ref 70–99)
HCT VFR BLD CALC: 31 % — LOW (ref 34.5–45)
HCT VFR BLD CALC: 32.3 % — LOW (ref 34.5–45)
HGB BLD-MCNC: 10 G/DL — LOW (ref 11.5–15.5)
HGB BLD-MCNC: 10.4 G/DL — LOW (ref 11.5–15.5)
IMM GRANULOCYTES NFR BLD AUTO: 0.2 % — SIGNIFICANT CHANGE UP (ref 0–1.5)
IMM GRANULOCYTES NFR BLD AUTO: 0.6 % — SIGNIFICANT CHANGE UP (ref 0–1.5)
INR BLD: 0.94 RATIO — SIGNIFICANT CHANGE UP (ref 0.88–1.16)
LYMPHOCYTES # BLD AUTO: 4.01 K/UL — HIGH (ref 1–3.3)
LYMPHOCYTES # BLD AUTO: 4.22 K/UL — HIGH (ref 1–3.3)
LYMPHOCYTES # BLD AUTO: 47.1 % — HIGH (ref 13–44)
LYMPHOCYTES # BLD AUTO: 48.7 % — HIGH (ref 13–44)
MAGNESIUM SERPL-MCNC: 1.9 MG/DL — SIGNIFICANT CHANGE UP (ref 1.6–2.6)
MCHC RBC-ENTMCNC: 29.9 PG — SIGNIFICANT CHANGE UP (ref 27–34)
MCHC RBC-ENTMCNC: 30.2 PG — SIGNIFICANT CHANGE UP (ref 27–34)
MCHC RBC-ENTMCNC: 32.2 GM/DL — SIGNIFICANT CHANGE UP (ref 32–36)
MCHC RBC-ENTMCNC: 32.3 GM/DL — SIGNIFICANT CHANGE UP (ref 32–36)
MCV RBC AUTO: 92.5 FL — SIGNIFICANT CHANGE UP (ref 80–100)
MCV RBC AUTO: 93.9 FL — SIGNIFICANT CHANGE UP (ref 80–100)
MONOCYTES # BLD AUTO: 0.51 K/UL — SIGNIFICANT CHANGE UP (ref 0–0.9)
MONOCYTES # BLD AUTO: 0.53 K/UL — SIGNIFICANT CHANGE UP (ref 0–0.9)
MONOCYTES NFR BLD AUTO: 5.9 % — SIGNIFICANT CHANGE UP (ref 2–14)
MONOCYTES NFR BLD AUTO: 6.2 % — SIGNIFICANT CHANGE UP (ref 2–14)
NEUTROPHILS # BLD AUTO: 3.71 K/UL — SIGNIFICANT CHANGE UP (ref 1.8–7.4)
NEUTROPHILS # BLD AUTO: 3.71 K/UL — SIGNIFICANT CHANGE UP (ref 1.8–7.4)
NEUTROPHILS NFR BLD AUTO: 42.8 % — LOW (ref 43–77)
NEUTROPHILS NFR BLD AUTO: 43.5 % — SIGNIFICANT CHANGE UP (ref 43–77)
NRBC # BLD: 0 /100 WBCS — SIGNIFICANT CHANGE UP (ref 0–0)
NRBC # BLD: 0 /100 WBCS — SIGNIFICANT CHANGE UP (ref 0–0)
PHOSPHATE SERPL-MCNC: 3.8 MG/DL — SIGNIFICANT CHANGE UP (ref 2.5–4.5)
PLATELET # BLD AUTO: 234 K/UL — SIGNIFICANT CHANGE UP (ref 150–400)
PLATELET # BLD AUTO: 243 K/UL — SIGNIFICANT CHANGE UP (ref 150–400)
POTASSIUM SERPL-MCNC: 4.4 MMOL/L — SIGNIFICANT CHANGE UP (ref 3.5–5.3)
POTASSIUM SERPL-MCNC: 4.4 MMOL/L — SIGNIFICANT CHANGE UP (ref 3.5–5.3)
POTASSIUM SERPL-SCNC: 4.4 MMOL/L — SIGNIFICANT CHANGE UP (ref 3.5–5.3)
POTASSIUM SERPL-SCNC: 4.4 MMOL/L — SIGNIFICANT CHANGE UP (ref 3.5–5.3)
PROT SERPL-MCNC: 7.9 G/DL — SIGNIFICANT CHANGE UP (ref 6–8.3)
PROT SERPL-MCNC: 8.3 G/DL — SIGNIFICANT CHANGE UP (ref 6–8.3)
PROTHROM AB SERPL-ACNC: 11.2 SEC — SIGNIFICANT CHANGE UP (ref 10.5–13.4)
RBC # BLD: 3.35 M/UL — LOW (ref 3.8–5.2)
RBC # BLD: 3.44 M/UL — LOW (ref 3.8–5.2)
RBC # FLD: 14.4 % — SIGNIFICANT CHANGE UP (ref 10.3–14.5)
RBC # FLD: 14.6 % — HIGH (ref 10.3–14.5)
SARS-COV-2 RNA SPEC QL NAA+PROBE: SIGNIFICANT CHANGE UP
SODIUM SERPL-SCNC: 140 MMOL/L — SIGNIFICANT CHANGE UP (ref 135–145)
SODIUM SERPL-SCNC: 140 MMOL/L — SIGNIFICANT CHANGE UP (ref 135–145)
WBC # BLD: 8.52 K/UL — SIGNIFICANT CHANGE UP (ref 3.8–10.5)
WBC # BLD: 8.67 K/UL — SIGNIFICANT CHANGE UP (ref 3.8–10.5)
WBC # FLD AUTO: 8.52 K/UL — SIGNIFICANT CHANGE UP (ref 3.8–10.5)
WBC # FLD AUTO: 8.67 K/UL — SIGNIFICANT CHANGE UP (ref 3.8–10.5)

## 2022-04-06 PROCEDURE — 93971 EXTREMITY STUDY: CPT | Mod: 26,RT

## 2022-04-06 PROCEDURE — 72131 CT LUMBAR SPINE W/O DYE: CPT | Mod: 26

## 2022-04-06 PROCEDURE — 71045 X-RAY EXAM CHEST 1 VIEW: CPT | Mod: 26

## 2022-04-06 PROCEDURE — 99222 1ST HOSP IP/OBS MODERATE 55: CPT

## 2022-04-06 RX ORDER — CEFTRIAXONE 500 MG/1
INJECTION, POWDER, FOR SOLUTION INTRAMUSCULAR; INTRAVENOUS
Refills: 0 | Status: COMPLETED | OUTPATIENT
Start: 2022-04-06 | End: 2022-04-10

## 2022-04-06 RX ORDER — GLUCAGON INJECTION, SOLUTION 0.5 MG/.1ML
1 INJECTION, SOLUTION SUBCUTANEOUS ONCE
Refills: 0 | Status: DISCONTINUED | OUTPATIENT
Start: 2022-04-06 | End: 2022-04-14

## 2022-04-06 RX ORDER — LOSARTAN POTASSIUM 100 MG/1
25 TABLET, FILM COATED ORAL DAILY
Refills: 0 | Status: DISCONTINUED | OUTPATIENT
Start: 2022-04-06 | End: 2022-04-14

## 2022-04-06 RX ORDER — CYCLOBENZAPRINE HYDROCHLORIDE 10 MG/1
10 TABLET, FILM COATED ORAL THREE TIMES A DAY
Refills: 0 | Status: COMPLETED | OUTPATIENT
Start: 2022-04-06 | End: 2022-04-09

## 2022-04-06 RX ORDER — KETOROLAC TROMETHAMINE 30 MG/ML
15 SYRINGE (ML) INJECTION EVERY 6 HOURS
Refills: 0 | Status: DISCONTINUED | OUTPATIENT
Start: 2022-04-06 | End: 2022-04-06

## 2022-04-06 RX ORDER — DEXTROSE 50 % IN WATER 50 %
15 SYRINGE (ML) INTRAVENOUS ONCE
Refills: 0 | Status: DISCONTINUED | OUTPATIENT
Start: 2022-04-06 | End: 2022-04-14

## 2022-04-06 RX ORDER — SUCRALFATE 1 G
1 TABLET ORAL
Qty: 0 | Refills: 0 | DISCHARGE

## 2022-04-06 RX ORDER — MECLIZINE HCL 12.5 MG
1 TABLET ORAL
Qty: 0 | Refills: 0 | DISCHARGE

## 2022-04-06 RX ORDER — SIMVASTATIN 20 MG/1
0 TABLET, FILM COATED ORAL
Qty: 30 | Refills: 0 | DISCHARGE

## 2022-04-06 RX ORDER — METFORMIN HYDROCHLORIDE 850 MG/1
0 TABLET ORAL
Qty: 60 | Refills: 0 | DISCHARGE

## 2022-04-06 RX ORDER — DEXTROSE 50 % IN WATER 50 %
25 SYRINGE (ML) INTRAVENOUS ONCE
Refills: 0 | Status: DISCONTINUED | OUTPATIENT
Start: 2022-04-06 | End: 2022-04-14

## 2022-04-06 RX ORDER — CEFTRIAXONE 500 MG/1
1000 INJECTION, POWDER, FOR SOLUTION INTRAMUSCULAR; INTRAVENOUS EVERY 24 HOURS
Refills: 0 | Status: COMPLETED | OUTPATIENT
Start: 2022-04-07 | End: 2022-04-09

## 2022-04-06 RX ORDER — ACETAMINOPHEN 500 MG
650 TABLET ORAL EVERY 6 HOURS
Refills: 0 | Status: DISCONTINUED | OUTPATIENT
Start: 2022-04-06 | End: 2022-04-06

## 2022-04-06 RX ORDER — GABAPENTIN 400 MG/1
100 CAPSULE ORAL THREE TIMES A DAY
Refills: 0 | Status: DISCONTINUED | OUTPATIENT
Start: 2022-04-06 | End: 2022-04-12

## 2022-04-06 RX ORDER — LIDOCAINE 4 G/100G
1 CREAM TOPICAL EVERY 24 HOURS
Refills: 0 | Status: DISCONTINUED | OUTPATIENT
Start: 2022-04-06 | End: 2022-04-14

## 2022-04-06 RX ORDER — ALOGLIPTIN 12.5 MG/1
45 TABLET, FILM COATED ORAL
Qty: 0 | Refills: 0 | DISCHARGE

## 2022-04-06 RX ORDER — PANTOPRAZOLE SODIUM 20 MG/1
0 TABLET, DELAYED RELEASE ORAL
Qty: 30 | Refills: 0 | DISCHARGE

## 2022-04-06 RX ORDER — LISINOPRIL 2.5 MG/1
0 TABLET ORAL
Qty: 30 | Refills: 0 | DISCHARGE

## 2022-04-06 RX ORDER — CEFTRIAXONE 500 MG/1
1000 INJECTION, POWDER, FOR SOLUTION INTRAMUSCULAR; INTRAVENOUS ONCE
Refills: 0 | Status: COMPLETED | OUTPATIENT
Start: 2022-04-06 | End: 2022-04-06

## 2022-04-06 RX ORDER — DEXTROSE 50 % IN WATER 50 %
12.5 SYRINGE (ML) INTRAVENOUS ONCE
Refills: 0 | Status: DISCONTINUED | OUTPATIENT
Start: 2022-04-06 | End: 2022-04-14

## 2022-04-06 RX ORDER — SODIUM CHLORIDE 9 MG/ML
1000 INJECTION, SOLUTION INTRAVENOUS
Refills: 0 | Status: DISCONTINUED | OUTPATIENT
Start: 2022-04-06 | End: 2022-04-14

## 2022-04-06 RX ORDER — ACETAMINOPHEN 500 MG
650 TABLET ORAL ONCE
Refills: 0 | Status: COMPLETED | OUTPATIENT
Start: 2022-04-06 | End: 2022-04-06

## 2022-04-06 RX ORDER — ACETAMINOPHEN 500 MG
1000 TABLET ORAL EVERY 8 HOURS
Refills: 0 | Status: COMPLETED | OUTPATIENT
Start: 2022-04-06 | End: 2022-04-09

## 2022-04-06 RX ORDER — ENOXAPARIN SODIUM 100 MG/ML
40 INJECTION SUBCUTANEOUS EVERY 24 HOURS
Refills: 0 | Status: DISCONTINUED | OUTPATIENT
Start: 2022-04-06 | End: 2022-04-12

## 2022-04-06 RX ORDER — INSULIN LISPRO 100/ML
VIAL (ML) SUBCUTANEOUS
Refills: 0 | Status: DISCONTINUED | OUTPATIENT
Start: 2022-04-06 | End: 2022-04-14

## 2022-04-06 RX ADMIN — GABAPENTIN 100 MILLIGRAM(S): 400 CAPSULE ORAL at 21:54

## 2022-04-06 RX ADMIN — ENOXAPARIN SODIUM 40 MILLIGRAM(S): 100 INJECTION SUBCUTANEOUS at 05:40

## 2022-04-06 RX ADMIN — Medication 1000 MILLIGRAM(S): at 22:54

## 2022-04-06 RX ADMIN — CYCLOBENZAPRINE HYDROCHLORIDE 10 MILLIGRAM(S): 10 TABLET, FILM COATED ORAL at 22:45

## 2022-04-06 RX ADMIN — LOSARTAN POTASSIUM 25 MILLIGRAM(S): 100 TABLET, FILM COATED ORAL at 05:40

## 2022-04-06 RX ADMIN — LIDOCAINE 1 PATCH: 4 CREAM TOPICAL at 11:49

## 2022-04-06 RX ADMIN — Medication 650 MILLIGRAM(S): at 00:46

## 2022-04-06 RX ADMIN — CEFTRIAXONE 100 MILLIGRAM(S): 500 INJECTION, POWDER, FOR SOLUTION INTRAMUSCULAR; INTRAVENOUS at 03:15

## 2022-04-06 RX ADMIN — Medication 650 MILLIGRAM(S): at 01:10

## 2022-04-06 RX ADMIN — LIDOCAINE 1 PATCH: 4 CREAM TOPICAL at 23:57

## 2022-04-06 RX ADMIN — Medication 1000 MILLIGRAM(S): at 21:54

## 2022-04-06 RX ADMIN — LIDOCAINE 1 PATCH: 4 CREAM TOPICAL at 22:48

## 2022-04-06 RX ADMIN — CYCLOBENZAPRINE HYDROCHLORIDE 10 MILLIGRAM(S): 10 TABLET, FILM COATED ORAL at 15:02

## 2022-04-06 RX ADMIN — GABAPENTIN 100 MILLIGRAM(S): 400 CAPSULE ORAL at 15:02

## 2022-04-06 NOTE — ED PROVIDER NOTE - OBJECTIVE STATEMENT
Chief complaint of right leg pain x 1 month. Denies trauma, no shortness of breath, no chest pain. No fever.  No relief with recently prescribed Flexeril and Mobic.  Pt states unable to ambulate due to pain.

## 2022-04-06 NOTE — H&P ADULT - PROBLEM SELECTOR PLAN 3
- h/o HTN, on unknown medication  - will start on losartan 25mg per surescript  - monitor BP  - primary team to follow up on home meds

## 2022-04-06 NOTE — CHART NOTE - NSCHARTNOTEFT_GEN_A_CORE
[facility] update    OBJECTIVE:  Vital Signs Last 24 Hrs  T(C): 36.6 (06 Apr 2022 11:10), Max: 37 (05 Apr 2022 22:21)  T(F): 97.8 (06 Apr 2022 11:10), Max: 98.6 (05 Apr 2022 22:21)  HR: 84 (06 Apr 2022 11:10) (72 - 90)  BP: 149/75 (06 Apr 2022 11:10) (126/76 - 155/67)  BP(mean): --  RR: 18 (06 Apr 2022 11:10) (17 - 18)  SpO2: 98% (06 Apr 2022 11:10) (97% - 98%)    LABS:                        10.0   8.67  )-----------( 243      ( 06 Apr 2022 05:51 )             31.0     04-06    140  |  111<H>  |  22<H>  ----------------------------<  117<H>  4.4   |  24  |  0.89    Ca    9.0      06 Apr 2022 05:51  Phos  3.8     04-06  Mg     1.9     04-06    TPro  7.9  /  Alb  2.9<L>  /  TBili  0.2  /  DBili  x   /  AST  34  /  ALT  27  /  AlkPhos  53  04-06    Imaging  < from: CT Lumbar Spine No Cont (04.06.22 @ 09:00) >    FINDINGS:   No prior similar studies are available for review    Lumbar vertebral body heights are maintained. Novertebral fracture is   seen. No destructive bone lesion is found.  Alignment is preserved.    Facet joints appear aligned.  The visualized sacral and pelvic bones   appear intact.    Multilevel disc bulging is present with the largest posterior disc  osteophyte complex at L4/L5. There is moderate to severe left foraminal   stenosis and moderate to severe right foraminal stenosis at L4/L5,   without significant spinal canal stenosis. Rest of the neural foramina   are intact.  No high-grade central canal compromise is recognized by the   CT technique.    MR would be required to evaluate the intervertebral   discs at higher sensitivity for disc pathology.    No paraspinal mass is recognized.  Paraspinal soft tissues appear intact.    IMPRESSION:  Posterior disc osteophyte complex at L4/L5 resulting in moderate to   severe bilateral foraminal stenosis without significant spinal canal   stenosis.    < end of copied text >    ASSESSMENT:  HPI:  Patient is a 87yoF, AAOx3 & ambulatory at baseline, w/ PMH of HTN, HLD, T2DM, osteoporosis, p/w RLE pain for 1 month. She complains of sharp, 10/10, constant RLE pain that worsens with standing. She reports pain starts from the back and radiates down to her thigh and the rest of the leg. She states the pain has made it difficult for her to ambulate, and it is associated with numbness and swelling of that leg. She visited her PCP, and prescribed cyclobenzaprine 5mg qHs and Meloxicam 15mg qd, but it has not ease the pain, as per patient. She denies fever, chills, n/v, chest pain, SOB, abdominal pain, urinary or bowel movement changes. (06 Apr 2022 02:26)      PLAN:   Patient is a 87yoF, AAOx3 & ambulatory at baseline, w/ PMH of HTN, HLD, T2DM, osteoporosis, p/w RLE pain for 1 month. Admitted for RLE pain work up.    Right lower leg pain, CT shows L4/L5 osteophyte, disc bulging, mod to severe b/l foraminal stenosis.   Doppler US R leg negative DVT, Xray negative fx  Elevated D dimer  Asymptomatic bacteriuria   HTN  HLD  DM    -continue pain management  -PT consult  -Ortho consulted  -DVT ppx with lovenox  -repeat D dimer  -c/w home meds  -c/w ceftriaxone for now until Ucx, f/u Bcx  -c/w HSS, FS achs

## 2022-04-06 NOTE — ED PROVIDER NOTE - PHYSICAL EXAMINATION
Pt states entire right leg is tender.  LE: no bony deformities, no leg length discrepancy, femoral and pedal pulses intact, cap refill  < 2 secs.

## 2022-04-06 NOTE — PATIENT PROFILE ADULT - FUNCTIONAL ASSESSMENT - BASIC MOBILITY 2.
Confirms +HPT and LMP 8/6. 4w4d Regular cycles every 28-29 days. Confirms taking PNV with DHA and FA. 4w4d advised of rotating female and male practice. Advised first appt is OBN with nurse. Pt requesting in person appt. OBN scheduled on 9/25.  Pt ok with p
Lmp 8/6/21 at home positive test
4 = No assist / stand by assistance

## 2022-04-06 NOTE — H&P ADULT - PROBLEM SELECTOR PLAN 1
- p/w RLE pain x1mo  - ddx: herniated disc vs DVT  - PE: positive straight leg exam, decreased sensation in L1, L2, L3 dermatome on right, calf tenderness  - x-ray of hip unremarkable  - c/w pain management  - f/u CT spine, RLE doppler, dimer - p/w RLE pain x1mo  - ddx: herniated disc vs DVT  - PE: positive straight leg exam, decreased sensation in L1, L2, L3 dermatome on right, calf tenderness  - x-ray of hip unremarkable  - Well score: 1  - c/w pain management  - primary team to start AC baseline on dimer and US given low well score  - f/u CT spine, RLE doppler, dimer

## 2022-04-06 NOTE — CONSULT NOTE ADULT - ASSESSMENT
Confidential Drug Utilization Report  Search Terms: Adri George, 1935   Search Date: 04/06/2022 14:07:13 PM   The Drug Utilization Report below displays all of the controlled substance prescriptions, if any, that your patient has filled in the last twelve months. The information displayed on this report is compiled from pharmacy submissions to the Department, and accurately reflects the information as submitted by the pharmacies.  This report was requested by: Laura Marroquin | Reference #: [id number]   There are no results for the search terms that you entered.

## 2022-04-06 NOTE — PATIENT PROFILE ADULT - FALL HARM RISK - HARM RISK INTERVENTIONS

## 2022-04-06 NOTE — H&P ADULT - ATTENDING COMMENTS
87yoF, AAOx3 & ambulatory at baseline, w/ PMH of HTN, HLD, T2DM, osteoporosis, p/w RLE pain for 1 month. She complains of sharp, 10/10, constant RLE pain that worsens with standing. She reports pain starts from the back and radiates down to her thigh and the rest of the leg. She states the pain has made it difficult for her to ambulate, and it is associated with numbness and swelling of that leg. She visited her PCP, and prescribed cyclobenzaprine 5mg qHs and Meloxicam 15mg qd, but it has not ease the pain, as per patient. She denies fever, chills, n/v, chest pain, SOB, abdominal pain, urinary or bowel movement changes.      assessment  --  r/o lumbar radiculopathy 2nd to degenerated disc disease, le edema r/o dvt, uti, h/o HTN, HLD, T2DM, osteoporosis    plan  --  admit to med, lidocaine patch, gabapentin, flexeril, rocephin, lispro ss, cont preadmit home meds, gi and dvt prophylaxis  cbc, bmp, mg, phos, lipids, tsh, hgba1c, bld cx, ucx,     ct ls spine   doppler le     pain mgmt eval

## 2022-04-06 NOTE — CONSULT NOTE ADULT - PROBLEM SELECTOR RECOMMENDATION 9
Pt with acute right leg pain which is somatic and neuropathic in nature due to moderate lumbar stenosis L4/5.   High risk medications reviewed. Avoid polypharmacy. Avoid IV opioids. Avoid benzodiazepines. Non-pharmacological sleep aides initiated. Non-opioid medications and non-pharmacological pain management measures initiated.    Maximize non-opioid pain recommendations   - Continue Toradol 15mg IVP q 6 hours PRN severe pain  - Continue Acetaminophen 1 gram PO q 8 hours x 3 days.   - Continue flexeril 10mg PO TID  - Continue Gabapentin 100mg po q 8 hours. Monitor renal function.   - Continue Lidoderm 4% patch daily.   Bowel Regimen  - Per primary team  Mild pain   - Non-pharmacological pain treatment recommendations  - Warm/ Cool packs PRN   - Repositioning extremity, elevation, imagery, relaxation, distraction.  - Physical therapy OOB if no contraindications   Recommendations discussed with primary team and RN

## 2022-04-06 NOTE — ED PROVIDER NOTE - CLINICAL SUMMARY MEDICAL DECISION MAKING FREE TEXT BOX
88 yo F with right leg pain x 1 month, states unable to ambulate due to pain.   MAR and Dr. Kramer endorsed. Pt agrees with admission for inpt duplex and possible PT.   I had a detailed discussion with the patient and/or guardian regarding the historical points, exam findings, and any diagnostic results supporting the admit diagnosis.

## 2022-04-06 NOTE — H&P ADULT - HISTORY OF PRESENT ILLNESS
Patient is a 87yoF, AAOx3 & ambulatory at baseline, w/ PMH of HTN, HLD, T2DM, osteoporosis, p/w RLE pain for 1 month. She complains of sharp, 10/10, constant RLE pain that worsens with standing. She reports pain starts from the back and radiates down to her thigh and the rest of the leg. She states the pain has made it difficult for her to ambulate, and it is associated with numbness and swelling of that leg. She visited her PCP, and prescribed cyclobenzaprine 5mg qHs and Meloxicam 15mg qd, but it has not ease the pain, as per patient. She denies fever, chills, n/v, chest pain, SOB, abdominal pain, urinary or bowel movement changes.

## 2022-04-06 NOTE — H&P ADULT - PROBLEM SELECTOR PLAN 4
- h/o HLD, on unknown medications  - will start on simvastatin 40 per surescript  - primary team to follow up on home meds

## 2022-04-07 DIAGNOSIS — M54.50 LOW BACK PAIN, UNSPECIFIED: ICD-10-CM

## 2022-04-07 DIAGNOSIS — R79.89 OTHER SPECIFIED ABNORMAL FINDINGS OF BLOOD CHEMISTRY: ICD-10-CM

## 2022-04-07 DIAGNOSIS — Z02.9 ENCOUNTER FOR ADMINISTRATIVE EXAMINATIONS, UNSPECIFIED: ICD-10-CM

## 2022-04-07 DIAGNOSIS — R91.1 SOLITARY PULMONARY NODULE: ICD-10-CM

## 2022-04-07 DIAGNOSIS — C16.9 MALIGNANT NEOPLASM OF STOMACH, UNSPECIFIED: ICD-10-CM

## 2022-04-07 LAB
A1C WITH ESTIMATED AVERAGE GLUCOSE RESULT: 7.3 % — HIGH (ref 4–5.6)
ALBUMIN SERPL ELPH-MCNC: 3.1 G/DL — LOW (ref 3.5–5)
ALP SERPL-CCNC: 54 U/L — SIGNIFICANT CHANGE UP (ref 40–120)
ALT FLD-CCNC: 34 U/L DA — SIGNIFICANT CHANGE UP (ref 10–60)
ANION GAP SERPL CALC-SCNC: 5 MMOL/L — SIGNIFICANT CHANGE UP (ref 5–17)
AST SERPL-CCNC: 53 U/L — HIGH (ref 10–40)
BILIRUB SERPL-MCNC: 0.2 MG/DL — SIGNIFICANT CHANGE UP (ref 0.2–1.2)
BUN SERPL-MCNC: 30 MG/DL — HIGH (ref 7–18)
CALCIUM SERPL-MCNC: 9.4 MG/DL — SIGNIFICANT CHANGE UP (ref 8.4–10.5)
CHLORIDE SERPL-SCNC: 108 MMOL/L — SIGNIFICANT CHANGE UP (ref 96–108)
CO2 SERPL-SCNC: 25 MMOL/L — SIGNIFICANT CHANGE UP (ref 22–31)
CREAT SERPL-MCNC: 1.13 MG/DL — SIGNIFICANT CHANGE UP (ref 0.5–1.3)
CULTURE RESULTS: SIGNIFICANT CHANGE UP
D DIMER BLD IA.RAPID-MCNC: 496 NG/ML DDU — HIGH
EGFR: 48 ML/MIN/1.73M2 — LOW
ESTIMATED AVERAGE GLUCOSE: 163 MG/DL — HIGH (ref 68–114)
GLUCOSE BLDC GLUCOMTR-MCNC: 129 MG/DL — HIGH (ref 70–99)
GLUCOSE BLDC GLUCOMTR-MCNC: 151 MG/DL — HIGH (ref 70–99)
GLUCOSE BLDC GLUCOMTR-MCNC: 91 MG/DL — SIGNIFICANT CHANGE UP (ref 70–99)
GLUCOSE SERPL-MCNC: 124 MG/DL — HIGH (ref 70–99)
HCT VFR BLD CALC: 33 % — LOW (ref 34.5–45)
HGB BLD-MCNC: 10.7 G/DL — LOW (ref 11.5–15.5)
MCHC RBC-ENTMCNC: 30.4 PG — SIGNIFICANT CHANGE UP (ref 27–34)
MCHC RBC-ENTMCNC: 32.4 GM/DL — SIGNIFICANT CHANGE UP (ref 32–36)
MCV RBC AUTO: 93.8 FL — SIGNIFICANT CHANGE UP (ref 80–100)
NRBC # BLD: 0 /100 WBCS — SIGNIFICANT CHANGE UP (ref 0–0)
PLATELET # BLD AUTO: 245 K/UL — SIGNIFICANT CHANGE UP (ref 150–400)
POTASSIUM SERPL-MCNC: 4.5 MMOL/L — SIGNIFICANT CHANGE UP (ref 3.5–5.3)
POTASSIUM SERPL-SCNC: 4.5 MMOL/L — SIGNIFICANT CHANGE UP (ref 3.5–5.3)
PROT SERPL-MCNC: 8.3 G/DL — SIGNIFICANT CHANGE UP (ref 6–8.3)
RBC # BLD: 3.52 M/UL — LOW (ref 3.8–5.2)
RBC # FLD: 14.3 % — SIGNIFICANT CHANGE UP (ref 10.3–14.5)
SODIUM SERPL-SCNC: 138 MMOL/L — SIGNIFICANT CHANGE UP (ref 135–145)
SPECIMEN SOURCE: SIGNIFICANT CHANGE UP
WBC # BLD: 8.38 K/UL — SIGNIFICANT CHANGE UP (ref 3.8–10.5)
WBC # FLD AUTO: 8.38 K/UL — SIGNIFICANT CHANGE UP (ref 3.8–10.5)

## 2022-04-07 PROCEDURE — 71275 CT ANGIOGRAPHY CHEST: CPT | Mod: 26

## 2022-04-07 PROCEDURE — 99232 SBSQ HOSP IP/OBS MODERATE 35: CPT

## 2022-04-07 RX ADMIN — GABAPENTIN 100 MILLIGRAM(S): 400 CAPSULE ORAL at 13:21

## 2022-04-07 RX ADMIN — Medication 1000 MILLIGRAM(S): at 14:21

## 2022-04-07 RX ADMIN — Medication 1000 MILLIGRAM(S): at 06:02

## 2022-04-07 RX ADMIN — CYCLOBENZAPRINE HYDROCHLORIDE 10 MILLIGRAM(S): 10 TABLET, FILM COATED ORAL at 21:15

## 2022-04-07 RX ADMIN — Medication 1000 MILLIGRAM(S): at 07:02

## 2022-04-07 RX ADMIN — GABAPENTIN 100 MILLIGRAM(S): 400 CAPSULE ORAL at 06:02

## 2022-04-07 RX ADMIN — CYCLOBENZAPRINE HYDROCHLORIDE 10 MILLIGRAM(S): 10 TABLET, FILM COATED ORAL at 13:21

## 2022-04-07 RX ADMIN — GABAPENTIN 100 MILLIGRAM(S): 400 CAPSULE ORAL at 21:15

## 2022-04-07 RX ADMIN — CEFTRIAXONE 100 MILLIGRAM(S): 500 INJECTION, POWDER, FOR SOLUTION INTRAMUSCULAR; INTRAVENOUS at 02:13

## 2022-04-07 RX ADMIN — ENOXAPARIN SODIUM 40 MILLIGRAM(S): 100 INJECTION SUBCUTANEOUS at 06:01

## 2022-04-07 RX ADMIN — Medication 1000 MILLIGRAM(S): at 21:15

## 2022-04-07 RX ADMIN — LOSARTAN POTASSIUM 25 MILLIGRAM(S): 100 TABLET, FILM COATED ORAL at 06:51

## 2022-04-07 RX ADMIN — Medication 1000 MILLIGRAM(S): at 22:15

## 2022-04-07 RX ADMIN — Medication 1000 MILLIGRAM(S): at 13:21

## 2022-04-07 RX ADMIN — LIDOCAINE 1 PATCH: 4 CREAM TOPICAL at 13:21

## 2022-04-07 RX ADMIN — CYCLOBENZAPRINE HYDROCHLORIDE 10 MILLIGRAM(S): 10 TABLET, FILM COATED ORAL at 06:02

## 2022-04-07 RX ADMIN — Medication 1: at 21:47

## 2022-04-07 RX ADMIN — LIDOCAINE 1 PATCH: 4 CREAM TOPICAL at 22:05

## 2022-04-07 NOTE — PROGRESS NOTE ADULT - PROBLEM SELECTOR PLAN 8
- h/o HLD, on unknown medications  - will start on simvastatin 40 per surescript  - primary team to follow up on home meds -hx vertigo pt takes prn Meclizine at home  -give prn Meclizine  -monitor symptoms  -fall precaution.

## 2022-04-07 NOTE — PROGRESS NOTE ADULT - SUBJECTIVE AND OBJECTIVE BOX
NP Note discussed with  Primary Attending    INTERVAL HPI/OVERNIGHT EVENTS: seen at bedside no new complaints, pt states she feels better improving pain with medication.     MEDICATIONS  (STANDING):  acetaminophen     Tablet .. 1000 milliGRAM(s) Oral every 8 hours  cefTRIAXone   IVPB      cefTRIAXone   IVPB 1000 milliGRAM(s) IV Intermittent every 24 hours  cyclobenzaprine 10 milliGRAM(s) Oral three times a day  dextrose 5%. 1000 milliLiter(s) (100 mL/Hr) IV Continuous <Continuous>  dextrose 5%. 1000 milliLiter(s) (50 mL/Hr) IV Continuous <Continuous>  dextrose 50% Injectable 25 Gram(s) IV Push once  dextrose 50% Injectable 12.5 Gram(s) IV Push once  dextrose 50% Injectable 25 Gram(s) IV Push once  enoxaparin Injectable 40 milliGRAM(s) SubCutaneous every 24 hours  gabapentin 100 milliGRAM(s) Oral three times a day  glucagon  Injectable 1 milliGRAM(s) IntraMuscular once  insulin lispro (ADMELOG) corrective regimen sliding scale   SubCutaneous Before meals and at bedtime  lidocaine   4% Patch 1 Patch Transdermal every 24 hours  losartan 25 milliGRAM(s) Oral daily    MEDICATIONS  (PRN):  dextrose Oral Gel 15 Gram(s) Oral once PRN Blood Glucose LESS THAN 70 milliGRAM(s)/deciliter  ketorolac   Injectable 15 milliGRAM(s) IV Push every 6 hours PRN Severe Pain (7 - 10)  __________________________________________________  REVIEW OF SYSTEMS:    CONSTITUTIONAL: No fever,   EYES: no acute visual disturbances  NECK: No pain or stiffness  RESPIRATORY: No cough; No shortness of breath  CARDIOVASCULAR: No chest pain, no palpitations  GASTROINTESTINAL: No pain. No nausea or vomiting; No diarrhea   NEUROLOGICAL: No headache or numbness, no tremors  MUSCULOSKELETAL: No joint pain, no muscle pain  GENITOURINARY: no dysuria, no frequency, no hesitancy  PSYCHIATRY: no depression , no anxiety  ALL OTHER  ROS negative        Vital Signs Last 24 Hrs  T(C): 37 (07 Apr 2022 12:32), Max: 37 (07 Apr 2022 12:32)  T(F): 98.6 (07 Apr 2022 12:32), Max: 98.6 (07 Apr 2022 12:32)  HR: 83 (07 Apr 2022 12:32) (69 - 88)  BP: 148/76 (07 Apr 2022 12:32) (108/- - 157/76)  BP(mean): --  RR: 18 (07 Apr 2022 12:32) (17 - 18)  SpO2: 96% (07 Apr 2022 12:32) (95% - 100%)    ________________________________________________  PHYSICAL EXAM:  GENERAL: NAD, conversant   HEENT: Normocephalic;  conjunctivae and sclerae clear; moist mucous membranes;   NECK : supple  CHEST/LUNG: Clear to auscultation bilaterally with good air entry   HEART: S1 S2  regular; no murmurs, gallops or rubs  ABDOMEN: Soft, Nontender, Nondistended; Bowel sounds present  Musk: right LE movement limited due to pain, full ROM to left leg.   EXTREMITIES: no cyanosis; no edema; no calf tenderness  SKIN: warm and dry; no rash  NERVOUS SYSTEM:  Awake and alert; Oriented  to place, person and time ; no new deficits    _________________________________________________  LABS:                        10.7   8.38  )-----------( 245      ( 07 Apr 2022 05:52 )             33.0     04-07    138  |  108  |  30<H>  ----------------------------<  124<H>  4.5   |  25  |  1.13    Ca    9.4      07 Apr 2022 05:52  Phos  3.8     04-06  Mg     1.9     04-06    TPro  8.3  /  Alb  3.1<L>  /  TBili  0.2  /  DBili  x   /  AST  53<H>  /  ALT  34  /  AlkPhos  54  04-07    PT/INR - ( 06 Apr 2022 00:03 )   PT: 11.2 sec;   INR: 0.94 ratio         PTT - ( 06 Apr 2022 00:03 )  PTT:29.6 sec    CAPILLARY BLOOD GLUCOSE    POCT Blood Glucose.: 91 mg/dL (07 Apr 2022 11:15)  POCT Blood Glucose.: 148 mg/dL (06 Apr 2022 21:13)  POCT Blood Glucose.: 144 mg/dL (06 Apr 2022 16:12)    RADIOLOGY & ADDITIONAL TESTS:    Imaging  Reviewed:  YES  < from: CT Angio Chest PE Protocol w/ IV Cont (04.07.22 @ 12:10) >    ACC: [id number] EXAM:  CT ANGIO CHEST PULM ART [facility]                          PROCEDURE DATE:  04/07/2022          INTERPRETATION:  CLINICAL INDICATION: Lower leg pain with hypertension,   rule out pulmonary embolism. As per EMR, history of gastric cancer.    TECHNIQUE: A volumetric acquisition of the chest was obtained from the   thoracic inlet to the upper abdomen during dynamic administration of 50cc   Omnipaque 350 intravenous contrast according to the PE protocol. 3D MIP   images were provided.    COMPARISON: No prior chest CT available for comparison. CT lumbar spine   4/6/2022. Abdominal CT 1/17/2020.    FINDINGS:  CTA: The study is technically adequate with a good contrast bolus to the   pulmonary arteries. There are no filling defects in the pulmonary artery   or its branches. The heart is normal in size. Mild coronary arterial   calcification. No pericardial effusion. The great vessels are normal in   size. There is a common origin of the innominate artery and left common   carotid artery, normal anatomic variant.    Lungs/Airways/Pleura: The central airways are patent. No pleural   effusion. Mild dependent atelectasis at the bases. There is a 4 mm right   apical nodule on series 5 image 52.    Mediastinum/Lymph nodes: Tiny nonspecific right internal mammary node on   series 5 image 99. Otherwise no thoracic lymphadenopathy.    Upper Abdomen: Status post cholecystectomy.    Bones and Soft Tissues: Numerous lytic lesions throughout the bony   thorax, including T1-T2, T11 as well as involving numerous ribs including   (but not limited to) the left anterior 4th, left lateral 7th, left   lateral 9th with cortical destruction and associated soft tissue   component, right anterior 6th and right lateral 8th rib with (likely  pathologic) fracture. Additionally, there is a 1.8 cm left paraspinal   soft tissue lesion centered at the left ninth costovertebral junction   (5:104) extending through the neuroforamen into the spinal canal without   obvious cord compression; consider further evaluation with MRI as   clinically indicated.    IMPRESSION:  No pulmonary embolism.    Numerous osteolytic lesions concerning for metastases. Consider MRI/bone   scan for further evaluation. This finding was discussed with KATHARINE Mackenzie at   time of dictation.    4 mm right apical nodule; follow-up chest CT in 3 months.    --- End of Report ---    ROMAN VILLAVICENCIO M.D., Attending Radiologist  This document has been electronically signed. Apr 7 2022  1:29PM    < end of copied text >  < from: CT Lumbar Spine No Cont (04.06.22 @ 09:00) >    ACC: [id number] EXAM:  CT LUMBAR SPINE                          PROCEDURE DATE:  04/06/2022          INTERPRETATION:  CT lumbar spine without IV contrast    CLINICAL INFORMATION: Low back pain with positive straight leg test.   herniated disc    TECHNIQUE:  Contiguous axial sections were obtained through the lumbar   spine.   Additional sagittal and coronal reformats were obtained.    FINDINGS:   No prior similar studies are available for review    Lumbar vertebral body heights are maintained. Novertebral fracture is   seen. No destructive bone lesion is found.  Alignment is preserved.    Facet joints appear aligned.  The visualized sacral and pelvic bones   appear intact.    Multilevel disc bulging is present with the largest posterior disc  osteophyte complex at L4/L5. There is moderate to severe left foraminal   stenosis and moderate to severe right foraminal stenosis at L4/L5,   without significant spinal canal stenosis. Rest of the neural foramina   are intact.  No high-grade central canal compromise is recognized by the   CT technique.    MR would be required to evaluate the intervertebral   discs at higher sensitivity for disc pathology.    No paraspinal mass is recognized.  Paraspinal soft tissues appear intact.      IMPRESSION:  Posterior disc osteophyte complex at L4/L5 resulting in moderate to   severe bilateral foraminal stenosis without significant spinal canal   stenosis.    --- End of Report ---    CARLOZ BAEZ MD; Attending Radiologist  This document has been electronically signed. Apr 6 2022  9:07AM    < end of copied text >    Consultant(s) Notes Reviewed:   YES      Plan of care was discussed with patient and /or primary care giver; all questions and concerns were addressed

## 2022-04-07 NOTE — PHYSICAL THERAPY INITIAL EVALUATION ADULT - REFERRAL TO ANOTHER SERVICE NEEDED, PT EVAL
Pt reports significant shoulder pain and demonstrated limited ROM in Left shoulder/occupational therapy

## 2022-04-07 NOTE — PHYSICAL THERAPY INITIAL EVALUATION ADULT - LIVES WITH, PROFILE
Daughter. Pt reports living in a apartment with daughter with "1 step" to enter and no steps required once inside.

## 2022-04-07 NOTE — PROGRESS NOTE ADULT - PROBLEM SELECTOR PLAN 2
-CT LS shows Posterior disc osteophyte complex at L4/L5 resulting in moderate to severe bilateral foraminal stenosis without significant spinal canal stenosis.  -ortho consulted, no intervention needed at this time, pain management  -pain consulted

## 2022-04-07 NOTE — PROGRESS NOTE ADULT - PROBLEM SELECTOR PLAN 10
- DVT: lovenox - pt takes metformin 1000mg BID, Stegatro 15mg QD, Alogliptin 25mg QD  - c/w ISS  - monitor FS qACHs

## 2022-04-07 NOTE — PHYSICAL THERAPY INITIAL EVALUATION ADULT - DISCHARGE DISPOSITION, PT EVAL
home w/ home PT Home PT anticipating improvement in function after resolution of dizziness and pain at discharge/home w/ home PT Home PT anticipating improvement. Pt likely to significantly improve functional mobility as dizziness and pain improves.

## 2022-04-07 NOTE — PROGRESS NOTE ADULT - PROBLEM SELECTOR PLAN 4
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 Numerous osteolytic lesions concerning for metastases. Consider MRI/bone scan for further evaluation. 4 mm right apical nodule; follow-up chest CT in 3 months.  -f/u Bone scan for metastasis evaluation  -outpt heme/onc follow up.

## 2022-04-07 NOTE — PHYSICAL THERAPY INITIAL EVALUATION ADULT - PLANNED THERAPY INTERVENTIONS, PT EVAL
balance training/bed mobility training/gait training/ROM/strengthening/stretching/transfer training balance training/bed mobility training/gait training/neuromuscular re-education/ROM/strengthening/stretching/transfer training

## 2022-04-07 NOTE — PROGRESS NOTE ADULT - PROBLEM SELECTOR PLAN 9
- h/o, DM on unknown medications at home; not on insulin per surescript  - c/w ISS  - FS qACHs - h/o HLD, pt takes Simvastatin 40mg QD  - continue home meds.

## 2022-04-07 NOTE — PHYSICAL THERAPY INITIAL EVALUATION ADULT - ADDITIONAL COMMENTS
Pt reports using a cane at baseline to ambulate at home and in community. Pt also reports her daughter is always available to assist her when needed .

## 2022-04-07 NOTE — PROGRESS NOTE ADULT - SUBJECTIVE AND OBJECTIVE BOX
Source of information: GAYLA ELLIS, Chart review  Patient language: Argentine  : Cori [id number]    HPI:  Patient is a 87yoF, AAOx3 & ambulatory at baseline, w/ PMH of HTN, HLD, T2DM, osteoporosis, p/w RLE pain for 1 month. She complains of sharp, 10/10, constant RLE pain that worsens with standing. She reports pain starts from the back and radiates down to her thigh and the rest of the leg. She states the pain has made it difficult for her to ambulate, and it is associated with numbness and swelling of that leg. She visited her PCP, and prescribed cyclobenzaprine 5mg qHs and Meloxicam 15mg qd, but it has not ease the pain, as per patient. She denies fever, chills, n/v, chest pain, SOB, abdominal pain, urinary or bowel movement changes. (06 Apr 2022 02:26)      CT lumbar spine demonstrates moderate lumbar stenosis L4/5.  RLE doppler negative DVT.   Pain consulted for right leg pain. Pt seen and examined at bedside. Reports right leg pain x 1 month, reports taking cyclobenzaprine 5mg qHs and Meloxicam 15mg qd at home with minimal relief. Currently denies pain while laying in bed. Reports with right leg elevation, she experiences posterior right knee pain 4/10 and tolerable. Reports her pain has improved since being admitted to the hospital.  Pt describes pain as throbbing radiating from lumbar back to right hip to right leg, alleviated by pain medication, exacerbated by movement. Reports numbness/tingling over b/l hands and feet since her chemo txmt. Also reports vertigo upon ambulation, (not knew, reports having for many years). Pt tolerating PO diet. Denies lethargy, nausea, vomiting, constipation, itchiness. Reports last BM 4/7. Patient stated goal for pain control: to be able to take deep breaths, get out of bed to chair and ambulate with tolerable pain control. Pt has been utilizing cane at home for ambulation.     PAST MEDICAL & SURGICAL HISTORY:  HTN (hypertension)    DM (diabetes mellitus)    Hypercholesteremia    Gastric adenocarcinoma  s/p resection    Vertigo    Dementia    S/P hysterectomy    S/P tubal ligation        FAMILY HISTORY:  Family history of diabetes mellitus (Sibling)    Family history of early CAD (Grandparent)        Social History:   [X ] Denies ETOH use, illicit drug use   [X] former smoking quit 15 yrs ago    Allergies    No Known Allergies    MEDICATIONS  (STANDING):  acetaminophen     Tablet .. 1000 milliGRAM(s) Oral every 8 hours  cefTRIAXone   IVPB      cefTRIAXone   IVPB 1000 milliGRAM(s) IV Intermittent every 24 hours  cyclobenzaprine 10 milliGRAM(s) Oral three times a day  dextrose 5%. 1000 milliLiter(s) (100 mL/Hr) IV Continuous <Continuous>  dextrose 5%. 1000 milliLiter(s) (50 mL/Hr) IV Continuous <Continuous>  dextrose 50% Injectable 25 Gram(s) IV Push once  dextrose 50% Injectable 12.5 Gram(s) IV Push once  dextrose 50% Injectable 25 Gram(s) IV Push once  enoxaparin Injectable 40 milliGRAM(s) SubCutaneous every 24 hours  gabapentin 100 milliGRAM(s) Oral three times a day  glucagon  Injectable 1 milliGRAM(s) IntraMuscular once  insulin lispro (ADMELOG) corrective regimen sliding scale   SubCutaneous Before meals and at bedtime  lidocaine   4% Patch 1 Patch Transdermal every 24 hours  losartan 25 milliGRAM(s) Oral daily    MEDICATIONS  (PRN):  dextrose Oral Gel 15 Gram(s) Oral once PRN Blood Glucose LESS THAN 70 milliGRAM(s)/deciliter  ketorolac   Injectable 15 milliGRAM(s) IV Push every 6 hours PRN Severe Pain (7 - 10)      Vital Signs Last 24 Hrs  T(C): 36.4 (07 Apr 2022 05:25), Max: 36.8 (06 Apr 2022 20:23)  T(F): 97.5 (07 Apr 2022 05:25), Max: 98.3 (06 Apr 2022 20:23)  HR: 84 (07 Apr 2022 09:27) (69 - 88)  BP: 136/62 (07 Apr 2022 09:27) (108/- - 157/76)  BP(mean): --  RR: 17 (07 Apr 2022 05:25) (17 - 18)  SpO2: 98% (07 Apr 2022 09:27) (95% - 100%)  COVID-19 PCR: NotDetec (06 Apr 2022 00:03)    LABS: Reviewed                          10.7   8.38  )-----------( 245      ( 07 Apr 2022 05:52 )             33.0     04-07    138  |  108  |  30<H>  ----------------------------<  124<H>  4.5   |  25  |  1.13    Ca    9.4      07 Apr 2022 05:52  Phos  3.8     04-06  Mg     1.9     04-06    TPro  8.3  /  Alb  3.1<L>  /  TBili  0.2  /  DBili  x   /  AST  53<H>  /  ALT  34  /  AlkPhos  54  04-07    PT/INR - ( 06 Apr 2022 00:03 )   PT: 11.2 sec;   INR: 0.94 ratio         PTT - ( 06 Apr 2022 00:03 )  PTT:29.6 sec  LIVER FUNCTIONS - ( 07 Apr 2022 05:52 )  Alb: 3.1 g/dL / Pro: 8.3 g/dL / ALK PHOS: 54 U/L / ALT: 34 U/L DA / AST: 53 U/L / GGT: x             CAPILLARY BLOOD GLUCOSE      POCT Blood Glucose.: 91 mg/dL (07 Apr 2022 11:15)  POCT Blood Glucose.: 148 mg/dL (06 Apr 2022 21:13)  POCT Blood Glucose.: 144 mg/dL (06 Apr 2022 16:12)    COVID-19 PCR: NotDetec (06 Apr 2022 00:03)    Radiology: Reviewed.   < from: US Duplex Venous Lower Ext Ltd, Right (04.06.22 @ 10:09) >    ACC: [id number] EXAM:  US DPLX LWR EXT VEINS LTD RT                          PROCEDURE DATE:  04/06/2022          INTERPRETATION:  CLINICAL INFORMATION: Right lower extremity pain,   positive Liz test    COMPARISON: None available.    TECHNIQUE: Duplex sonography of the RIGHT LOWER extremity veins with   color and spectral Doppler, with and without compression.    FINDINGS:    There is normal compressibility of the right common femoral, femoral and   popliteal veins.  The contralateral common femoral vein is patent.  Doppler examination shows normal spontaneous and phasic flow.    No calf vein thrombosis is detected.    IMPRESSION:  No evidence of right lower extremity deep venous thrombosis.          --- End of Report ---            KRISTA NEVAREZ MD; Attending Radiologist  This document has been electronically signed. Apr 6 2022 10:14AM    < end of copied text >    < from: CT Lumbar Spine No Cont (04.06.22 @ 09:00) >    ACC: [id number] EXAM:  CT LUMBAR SPINE                          PROCEDURE DATE:  04/06/2022          INTERPRETATION:  CT lumbar spine without IV contrast    CLINICAL INFORMATION: Low back pain with positive straight leg test.   herniated disc    TECHNIQUE:  Contiguous axial sections were obtained through the lumbar   spine.   Additional sagittal and coronal reformats were obtained.    FINDINGS:   No prior similar studies are available for review    Lumbar vertebral body heights are maintained. Novertebral fracture is   seen. No destructive bone lesion is found.  Alignment is preserved.    Facet joints appear aligned.  The visualized sacral and pelvic bones   appear intact.    Multilevel disc bulging is present with the largest posterior disc  osteophyte complex at L4/L5. There is moderate to severe left foraminal   stenosis and moderate to severe right foraminal stenosis at L4/L5,   without significant spinal canal stenosis. Rest of the neural foramina   are intact.  No high-grade central canal compromise is recognized by the   CT technique.    MR would be required to evaluate the intervertebral   discs at higher sensitivity for disc pathology.    No paraspinal mass is recognized.  Paraspinal soft tissues appear intact.      IMPRESSION:  Posterior disc osteophyte complex at L4/L5 resulting in moderate to   severe bilateral foraminal stenosis without significant spinal canal   stenosis.    --- End of Report ---            CARLOZ BAEZ MD; Attending Radiologist  This document has been electronically signed. Apr 6 2022  9:07AM    < end of copied text >      ORT Score -   Family Hx of substance abuse	Female	      Male  Alcohol 	                                           1                     3  Illegal drugs	                                   2                     3  Rx drugs                                           4 	                  4  Personal Hx of substance abuse		  Alcohol 	                                          3	                  3  Illegal drugs                                     4	                  4  Rx drugs                                            5 	                  5  Age between 16- 45 years	           1                     1  hx preadolescent sexual abuse	   3 	                  0  Psychological disease		  ADD, OCD, bipolar, schizophrenia   2	          2  Depression                                           1 	          1  Total: 0    a score of 3 or lower indicates low risk for opioid abuse		  a score of 4-7 indicates moderate risk for opioid abuse		  a score of 8 or higher indicates high risk for opioid abuse  	  REVIEW OF SYSTEMS:  CONSTITUTIONAL: No fever No fatigue   RESPIRATORY: No cough, wheezing, chills or hemoptysis; No shortness of breath  CARDIOVASCULAR: No chest pain, palpitations, dizziness, or leg swelling  GASTROINTESTINAL: No loss of appetite, decreased PO intake. No abdominal or epigastric pain. No nausea, vomiting; No diarrhea or constipation.   GENITOURINARY: No dysuria, frequency, hematuria, retention or incontinence  MUSCULOSKELETAL: + lumbar back and right posterior knee pain, no swelling; no upper or left lower motor strength weakness, + right lower motor strength weakness, no saddle anesthesia, bowel/bladder incontinence, no falls   NEURO: No headaches, + numbness/tingling b/l hands and feet, No weakness; + unsteady gait (chronic) + hx vertigo    PHYSICAL EXAM:  GENERAL:  Alert & Oriented X4, cooperative, NAD, Good concentration. Speech is clear.   RESPIRATORY: Respirations even and unlabored. Clear to auscultation bilaterally; No rales, rhonchi, wheezing, or rubs  CARDIOVASCULAR: Normal S1/S2, regular rate and rhythm; No murmurs, rubs, or gallops. No JVD.   GASTROINTESTINAL: + obese per BMI, Soft, Nontender, Nondistended; Bowel sounds present  PERIPHERAL VASCULAR:  Extremities warm without edema. 2+ Peripheral Pulses, No cyanosis, No calf tenderness; No joint swelling.   MUSCULOSKELETAL: Motor Strength 4/5 B/L upper and lower extremities, moves all extremities equally against gravity; ROM intact; negative b/l SLR; + lumbar spine and right lower back tenderness on palpation.   SKIN: Warm, dry, intact. No rashes, lesions, scars or wounds.     Risk factors associated with adverse outcomes related to opioid treatment  [ ]  Concurrent benzodiazepine use  [ ]  History/ Active substance use or alcohol use disorder  [ ] Psychiatric co-morbidity  [ ] Sleep apnea  [ ] COPD  [ ] BMI> 35  [ ] Liver dysfunction  [ ] Renal dysfunction  [ ] CHF  [X ] Former Smoker  [X]  Age > 60 years    [X ]  [place]  Reviewed and Copied to Chart. See below.    Plan of care and goal oriented pain management treatment options were discussed with patient and /or primary care giver; all questions and concerns were addressed and care was aligned with patient's wishes.    Educated patient on goal oriented pain management treatment options     04-07-22 @ 13:06

## 2022-04-07 NOTE — PHYSICAL THERAPY INITIAL EVALUATION ADULT - DIAGNOSIS, PT EVAL
(ICF Model) Pt. present w/impairments in Body Structures/Function, incl: Strength, Balance, ROM, PAIN, leading to deficits in performing the below noted Activities (Limitations).

## 2022-04-07 NOTE — PROGRESS NOTE ADULT - PROBLEM SELECTOR PLAN 1
- p/w RLE pain x1mo  - ddx: herniated disc vs DVT  - PE: positive straight leg exam, decreased sensation in L1, L2, L3 dermatome on right, calf tenderness  - x-ray of hip unremarkable  - Well score: 1  - c/w pain management  - DVT negative on doppler RLE  - CT LS as above- lumbar stenosis, osteophyte  - D dimer 500, c/w lovenox SC

## 2022-04-07 NOTE — PROGRESS NOTE ADULT - ASSESSMENT
Patient is a 87yoF, AAOx3 & ambulatory at baseline, w/ PMH of HTN, HLD, T2DM, osteoporosis, p/w RLE pain for 1 month. Admitted for RLE pain work up.  CT LS resulted Posterior disc osteophyte complex at L4/L5 resulting in moderate to severe bilateral foraminal stenosis without significant spinal canal stenosis. Hip Xray no fx. Ortho and pain management consulted. PT recommended home PT.   UA positive, started Rocephin, Blood/urine Cx negative, will d/c Abt.   Pt D -dimer elevated 500, DVT negative RLE doppler US. on dvt ppx with lovenox. CTA no PE but suspects bone metastasis, apical nodule. ordered bone scan to evaluate metastasis.         Patient is a 87yoF, AAOx3 & ambulatory at baseline, w/ PMH of HTN, HLD, T2DM, osteoporosis, hx gastric adenocarcinoma, s/p resection 2015.   Pt presented with RLE pain for 1 month. Admitted for RLE pain work up.  CT LS resulted Posterior disc osteophyte complex at L4/L5 resulting in moderate to severe bilateral foraminal stenosis without significant spinal canal stenosis. Hip Xray no fx. Ortho and pain management consulted.   PT recommended home PT.   UA positive, started Rocephin, Blood/urine Cx negative, will d/c Abt.   Pt D -dimer elevated 500, DVT negative RLE doppler US. on dvt ppx with lovenox. CTA no PE but suspects bone metastasis, apical nodule. ordered bone scan to evaluate metastasis.     Son Dallin Akins called and updated pt condition, discussed plan of care. answered all questions. no further questions addressed.   Full rei.

## 2022-04-07 NOTE — PHYSICAL THERAPY INITIAL EVALUATION ADULT - MANUAL MUSCLE TESTING RESULTS, REHAB EVAL
RUE grossly 4/5,  LUE grossly 4/5, except L shoulder ~2+/5. BLE grossly 4/5 except right hip 3/5. Left hip 3+/5

## 2022-04-07 NOTE — PHYSICAL THERAPY INITIAL EVALUATION ADULT - GENERAL OBSERVATIONS, REHAB EVAL
Consult received, chart reviewed. Patient received supine in bed, NAD.  Patient agreed to EVALUATION from Physical Therapist.

## 2022-04-07 NOTE — PROGRESS NOTE ADULT - PROBLEM SELECTOR PLAN 7
- h/o HTN, on unknown medication  - will start on losartan 25mg per surescript  - monitor BP  - primary team to follow up on home meds - h/o HTN, Pt takes Metoprolol ER 25mg QD, Losartan 25mg QD  - continue home meds  - monitor BP

## 2022-04-07 NOTE — PHYSICAL THERAPY INITIAL EVALUATION ADULT - LEVEL OF INDEPENDENCE: SIT/STAND, REHAB EVAL
Pt reported significant dizziness. Pt was positioned in Trendelenburg and symptoms resolved./minimum assist (75% patients effort)

## 2022-04-07 NOTE — PROGRESS NOTE ADULT - PROBLEM SELECTOR PLAN 1
Pt with acute right leg pain (posterior right knee pain) and lumbar back pain which is somatic and neuropathic in nature due to moderate lumbar stenosis L4/5.   High risk medications reviewed. Avoid polypharmacy. Avoid IV opioids. Avoid benzodiazepines. Non-pharmacological sleep aides initiated. Non-opioid medications and non-pharmacological pain management measures initiated.    Maximize non-opioid pain recommendations   - Continue Toradol 15mg IVP q 6 hours PRN severe pain  - Continue Acetaminophen 1 gram PO q 8 hours x 3 days.   - Continue flexeril 10mg PO TID  - Continue Gabapentin 100mg po q 8 hours. Monitor renal function.   - Continue Lidoderm 4% patch daily.   Bowel Regimen  - Per primary team  Mild pain   - Non-pharmacological pain treatment recommendations  - Warm/ Cool packs PRN   - Repositioning extremity, elevation, imagery, relaxation, distraction.  - Physical therapy OOB if no contraindications   Recommendations discussed with primary team and RN.

## 2022-04-07 NOTE — PROGRESS NOTE ADULT - SUBJECTIVE AND OBJECTIVE BOX
Patient is a 87y old  Female who presents with a chief complaint of RLE pain (06 Apr 2022 14:05)    pt seen in icu [  ], reg med floor [   ], bed [  ], chair at bedside [   ], a+o x3 [  ], lethargic [  ],  nad [  ]    shea [  ], ngt [  ], peg [  ], et tube [  ], cent line [  ], picc line [  ]        Allergies    No Known Allergies        Vitals    T(F): 97.5 (04-07-22 @ 05:25), Max: 98.3 (04-06-22 @ 20:23)  HR: 69 (04-07-22 @ 05:25) (69 - 88)  BP: 157/76 (04-07-22 @ 05:25) (108/- - 157/76)  RR: 17 (04-07-22 @ 05:25) (17 - 18)  SpO2: 95% (04-07-22 @ 05:25) (95% - 99%)  Wt(kg): --  CAPILLARY BLOOD GLUCOSE      POCT Blood Glucose.: 148 mg/dL (06 Apr 2022 21:13)      Labs                          10.7   8.38  )-----------( 245      ( 07 Apr 2022 05:52 )             33.0       04-07    138  |  108  |  30<H>  ----------------------------<  124<H>  4.5   |  25  |  1.13    Ca    9.4      07 Apr 2022 05:52  Phos  3.8     04-06  Mg     1.9     04-06    TPro  8.3  /  Alb  3.1<L>  /  TBili  0.2  /  DBili  x   /  AST  53<H>  /  ALT  34  /  AlkPhos  54  04-07            .Blood Blood-Peripheral  04-06 @ 06:04   No growth to date.  --  --      Clean Catch Clean Catch (Midstream)  03-05 @ 03:06   >=3 organisms. Probable collection contamination.  --  --          Radiology Results      Meds    MEDICATIONS  (STANDING):  acetaminophen     Tablet .. 1000 milliGRAM(s) Oral every 8 hours  cefTRIAXone   IVPB      cefTRIAXone   IVPB 1000 milliGRAM(s) IV Intermittent every 24 hours  cyclobenzaprine 10 milliGRAM(s) Oral three times a day  dextrose 5%. 1000 milliLiter(s) (100 mL/Hr) IV Continuous <Continuous>  dextrose 5%. 1000 milliLiter(s) (50 mL/Hr) IV Continuous <Continuous>  dextrose 50% Injectable 25 Gram(s) IV Push once  dextrose 50% Injectable 12.5 Gram(s) IV Push once  dextrose 50% Injectable 25 Gram(s) IV Push once  enoxaparin Injectable 40 milliGRAM(s) SubCutaneous every 24 hours  gabapentin 100 milliGRAM(s) Oral three times a day  glucagon  Injectable 1 milliGRAM(s) IntraMuscular once  insulin lispro (ADMELOG) corrective regimen sliding scale   SubCutaneous Before meals and at bedtime  lidocaine   4% Patch 1 Patch Transdermal every 24 hours  losartan 25 milliGRAM(s) Oral daily      MEDICATIONS  (PRN):  dextrose Oral Gel 15 Gram(s) Oral once PRN Blood Glucose LESS THAN 70 milliGRAM(s)/deciliter  ketorolac   Injectable 15 milliGRAM(s) IV Push every 6 hours PRN Severe Pain (7 - 10)      Physical Exam    Neuro :  no focal deficits  Respiratory: CTA B/L  CV: RRR, S1S2, no murmurs,   Abdominal: Soft, NT, ND +BS,  Extremities: No edema, + peripheral pulses    ASSESSMENT    Pain of right lower extremity    HTN (hypertension)    DM (diabetes mellitus)    Hypercholesteremia    Gastric adenocarcinoma    Vertigo    Dementia    S/P hysterectomy    S/P tubal ligation        PLAN     Patient is a 87y old  Female who presents with a chief complaint of RLE pain (06 Apr 2022 14:05)    pt seen in icu [  ], reg med floor [ x  ], bed [ x ], chair at bedside [   ], a+o x3 [x  ], lethargic [  ],  nad [ x ]    pt states pain is improved        Allergies    No Known Allergies        Vitals    T(F): 97.5 (04-07-22 @ 05:25), Max: 98.3 (04-06-22 @ 20:23)  HR: 69 (04-07-22 @ 05:25) (69 - 88)  BP: 157/76 (04-07-22 @ 05:25) (108/- - 157/76)  RR: 17 (04-07-22 @ 05:25) (17 - 18)  SpO2: 95% (04-07-22 @ 05:25) (95% - 99%)  Wt(kg): --  CAPILLARY BLOOD GLUCOSE      POCT Blood Glucose.: 148 mg/dL (06 Apr 2022 21:13)      Labs                          10.7   8.38  )-----------( 245      ( 07 Apr 2022 05:52 )             33.0       04-07    138  |  108  |  30<H>  ----------------------------<  124<H>  4.5   |  25  |  1.13    Ca    9.4      07 Apr 2022 05:52  Phos  3.8     04-06  Mg     1.9     04-06    TPro  8.3  /  Alb  3.1<L>  /  TBili  0.2  /  DBili  x   /  AST  53<H>  /  ALT  34  /  AlkPhos  54  04-07    A1C with Estimated Average Glucose (04.07.22 @ 09:26)   A1C with Estimated Average Glucose Result: 7.3:        .Blood Blood-Peripheral  04-06 @ 06:04   No growth to date.  --  --      Clean Catch Clean Catch (Midstream)  03-05 @ 03:06   >=3 organisms. Probable collection contamination.  --  --          Radiology Results    < from: CT Lumbar Spine No Cont (04.06.22 @ 09:00) >  FINDINGS:   No prior similar studies are available for review    Lumbar vertebral body heights are maintained. Novertebral fracture is   seen. No destructive bone lesion is found.  Alignment is preserved.    Facet joints appear aligned.  The visualized sacral and pelvic bones   appear intact.    Multilevel disc bulging is present with the largest posterior disc  osteophyte complex at L4/L5. There is moderate to severe left foraminal   stenosis and moderate to severe right foraminal stenosis at L4/L5,   without significant spinal canal stenosis. Rest of the neural foramina   are intact.  No high-grade central canal compromise is recognized by the   CT technique.    MR would be required to evaluate the intervertebral   discs at higher sensitivity for disc pathology.    No paraspinal mass is recognized.  Paraspinal soft tissues appear intact.      IMPRESSION:  Posterior disc osteophyte complex at L4/L5 resulting in moderate to   severe bilateral foraminal stenosis without significant spinal canal   stenosis.    < end of copied text >        < from: US Duplex Venous Lower Ext Ltd, Right (04.06.22 @ 10:09) >  IMPRESSION:  No evidence of right lower extremity deep venous thrombosis.    < end of copied text >      Meds    MEDICATIONS  (STANDING):  acetaminophen     Tablet .. 1000 milliGRAM(s) Oral every 8 hours  cefTRIAXone   IVPB      cefTRIAXone   IVPB 1000 milliGRAM(s) IV Intermittent every 24 hours  cyclobenzaprine 10 milliGRAM(s) Oral three times a day  dextrose 5%. 1000 milliLiter(s) (100 mL/Hr) IV Continuous <Continuous>  dextrose 5%. 1000 milliLiter(s) (50 mL/Hr) IV Continuous <Continuous>  dextrose 50% Injectable 25 Gram(s) IV Push once  dextrose 50% Injectable 12.5 Gram(s) IV Push once  dextrose 50% Injectable 25 Gram(s) IV Push once  enoxaparin Injectable 40 milliGRAM(s) SubCutaneous every 24 hours  gabapentin 100 milliGRAM(s) Oral three times a day  glucagon  Injectable 1 milliGRAM(s) IntraMuscular once  insulin lispro (ADMELOG) corrective regimen sliding scale   SubCutaneous Before meals and at bedtime  lidocaine   4% Patch 1 Patch Transdermal every 24 hours  losartan 25 milliGRAM(s) Oral daily      MEDICATIONS  (PRN):  dextrose Oral Gel 15 Gram(s) Oral once PRN Blood Glucose LESS THAN 70 milliGRAM(s)/deciliter  ketorolac   Injectable 15 milliGRAM(s) IV Push every 6 hours PRN Severe Pain (7 - 10)      Physical Exam    Neuro :  no focal deficits  Respiratory: CTA B/L  CV: RRR, S1S2, no murmurs,   Abdominal: Soft, NT, ND +BS,  Extremities: No edema, + peripheral pulses    ASSESSMENT    Pain of right lower extremity  r/o lumbar radiculopathy 2nd to degenerated disc disease,   le edema dvt r/o,   uti,   h/o osteoporosis  HTN (hypertension)  DM (diabetes mellitus)  Hypercholesteremia  Gastric adenocarcinoma  Vertigo  Dementia  S/P hysterectomy  S/P tubal ligation        PLAN    ct ls spine with Posterior disc osteophyte complex at L4/L5 resulting in moderate to severe bilateral foraminal stenosis without significant spinal canal stenosis noted above.  ortho spine cons   pain mgmt f/u   Maximize non-opioid pain recommendations   - Continue Toradol 15mg IVP q 6 hours PRN severe pain  - Continue Acetaminophen 1 gram PO q 8 hours x 3 days.   - Continue flexeril 10mg PO TID  - Continue Gabapentin 100mg po q 8 hours. Monitor renal function.   - Continue Lidoderm 4% patch daily.   Bowel Regimen  - Per primary team  Mild pain   - Non-pharmacological pain treatment recommendations  - Warm/ Cool packs PRN   - Repositioning extremity, elevation, imagery, relaxation, distraction.  - Physical therapy OOB if no contraindications   doppler us with No evidence of right lower extremity deep venous thrombosis noted above.  f/u cta chest given elevated dd  cont rocephin,   f/u ucx   blood cx neg  lispro ss,   hgba1c 7.3   cont current meds

## 2022-04-07 NOTE — PHYSICAL THERAPY INITIAL EVALUATION ADULT - PASSIVE RANGE OF MOTION EXAMINATION, REHAB EVAL
Except Left shoulder: PROM limited to ~95 degrees with pain. Pt reported receiving PT for the shoulder a year ago, unsure when she re-injured it./Left UE Passive ROM was WNL (within normal limits)/Right UE Passive ROM was WNL (within normal limits)/bilateral lower extremity Passive ROM was WNL

## 2022-04-07 NOTE — PHYSICAL THERAPY INITIAL EVALUATION ADULT - ACTIVE RANGE OF MOTION EXAMINATION, REHAB EVAL
Except Left shoulder. Flex+abd limited to ~60-70 degrees/Left UE Active ROM was WNL (within normal limits)/Right UE Active ROM was WNL (within normal limits)/bilateral lower extremity Active ROM was WNL (within normal limits)

## 2022-04-07 NOTE — PHYSICAL THERAPY INITIAL EVALUATION ADULT - FOLLOWS COMMANDS/ANSWERS QUESTIONS, REHAB EVAL
Pt had difficulty following commands at times, likely due to not feeling well and utilization of /75% of the time/able to follow single-step instructions

## 2022-04-07 NOTE — PROGRESS NOTE ADULT - ASSESSMENT
Confidential Drug Utilization Report  Search Terms: Adri George, 1935   Search Date: 04/06/2022 14:07:13 PM   The Drug Utilization Report below displays all of the controlled substance prescriptions, if any, that your patient has filled in the last twelve months. The information displayed on this report is compiled from pharmacy submissions to the Department, and accurately reflects the information as submitted by the pharmacies.  This report was requested by: Laura Marroquin | Reference #: [id number]   There are no results for the search terms that you entered.

## 2022-04-08 LAB
ALBUMIN SERPL ELPH-MCNC: 3.3 G/DL — LOW (ref 3.5–5)
ALP SERPL-CCNC: 56 U/L — SIGNIFICANT CHANGE UP (ref 40–120)
ALT FLD-CCNC: 44 U/L DA — SIGNIFICANT CHANGE UP (ref 10–60)
ANION GAP SERPL CALC-SCNC: 8 MMOL/L — SIGNIFICANT CHANGE UP (ref 5–17)
AST SERPL-CCNC: 72 U/L — HIGH (ref 10–40)
BILIRUB SERPL-MCNC: 0.2 MG/DL — SIGNIFICANT CHANGE UP (ref 0.2–1.2)
BUN SERPL-MCNC: 33 MG/DL — HIGH (ref 7–18)
CALCIUM SERPL-MCNC: 9.1 MG/DL — SIGNIFICANT CHANGE UP (ref 8.4–10.5)
CHLORIDE SERPL-SCNC: 108 MMOL/L — SIGNIFICANT CHANGE UP (ref 96–108)
CO2 SERPL-SCNC: 22 MMOL/L — SIGNIFICANT CHANGE UP (ref 22–31)
CREAT SERPL-MCNC: 1.04 MG/DL — SIGNIFICANT CHANGE UP (ref 0.5–1.3)
EGFR: 53 ML/MIN/1.73M2 — LOW
GLUCOSE BLDC GLUCOMTR-MCNC: 129 MG/DL — HIGH (ref 70–99)
GLUCOSE BLDC GLUCOMTR-MCNC: 144 MG/DL — HIGH (ref 70–99)
GLUCOSE BLDC GLUCOMTR-MCNC: 170 MG/DL — HIGH (ref 70–99)
GLUCOSE BLDC GLUCOMTR-MCNC: 176 MG/DL — HIGH (ref 70–99)
GLUCOSE SERPL-MCNC: 137 MG/DL — HIGH (ref 70–99)
HCT VFR BLD CALC: 33.5 % — LOW (ref 34.5–45)
HGB BLD-MCNC: 10.7 G/DL — LOW (ref 11.5–15.5)
MCHC RBC-ENTMCNC: 29.8 PG — SIGNIFICANT CHANGE UP (ref 27–34)
MCHC RBC-ENTMCNC: 31.9 GM/DL — LOW (ref 32–36)
MCV RBC AUTO: 93.3 FL — SIGNIFICANT CHANGE UP (ref 80–100)
NRBC # BLD: 0 /100 WBCS — SIGNIFICANT CHANGE UP (ref 0–0)
PLATELET # BLD AUTO: 249 K/UL — SIGNIFICANT CHANGE UP (ref 150–400)
POTASSIUM SERPL-MCNC: 4 MMOL/L — SIGNIFICANT CHANGE UP (ref 3.5–5.3)
POTASSIUM SERPL-SCNC: 4 MMOL/L — SIGNIFICANT CHANGE UP (ref 3.5–5.3)
PROT SERPL-MCNC: 8.4 G/DL — HIGH (ref 6–8.3)
RBC # BLD: 3.59 M/UL — LOW (ref 3.8–5.2)
RBC # FLD: 14.6 % — HIGH (ref 10.3–14.5)
SODIUM SERPL-SCNC: 138 MMOL/L — SIGNIFICANT CHANGE UP (ref 135–145)
WBC # BLD: 7.37 K/UL — SIGNIFICANT CHANGE UP (ref 3.8–10.5)
WBC # FLD AUTO: 7.37 K/UL — SIGNIFICANT CHANGE UP (ref 3.8–10.5)

## 2022-04-08 PROCEDURE — 99232 SBSQ HOSP IP/OBS MODERATE 35: CPT

## 2022-04-08 PROCEDURE — 78306 BONE IMAGING WHOLE BODY: CPT | Mod: 26

## 2022-04-08 RX ORDER — DEXAMETHASONE 0.5 MG/5ML
2 ELIXIR ORAL
Refills: 0 | Status: COMPLETED | OUTPATIENT
Start: 2022-04-08 | End: 2022-04-11

## 2022-04-08 RX ORDER — IOHEXOL 300 MG/ML
30 INJECTION, SOLUTION INTRAVENOUS ONCE
Refills: 0 | Status: COMPLETED | OUTPATIENT
Start: 2022-04-09 | End: 2022-04-09

## 2022-04-08 RX ORDER — PANTOPRAZOLE SODIUM 20 MG/1
40 TABLET, DELAYED RELEASE ORAL
Refills: 0 | Status: DISCONTINUED | OUTPATIENT
Start: 2022-04-09 | End: 2022-04-14

## 2022-04-08 RX ADMIN — LIDOCAINE 1 PATCH: 4 CREAM TOPICAL at 00:44

## 2022-04-08 RX ADMIN — GABAPENTIN 100 MILLIGRAM(S): 400 CAPSULE ORAL at 06:02

## 2022-04-08 RX ADMIN — LOSARTAN POTASSIUM 25 MILLIGRAM(S): 100 TABLET, FILM COATED ORAL at 05:59

## 2022-04-08 RX ADMIN — CEFTRIAXONE 100 MILLIGRAM(S): 500 INJECTION, POWDER, FOR SOLUTION INTRAMUSCULAR; INTRAVENOUS at 03:17

## 2022-04-08 RX ADMIN — Medication 1: at 21:29

## 2022-04-08 RX ADMIN — Medication 2 MILLIGRAM(S): at 17:42

## 2022-04-08 RX ADMIN — CYCLOBENZAPRINE HYDROCHLORIDE 10 MILLIGRAM(S): 10 TABLET, FILM COATED ORAL at 14:15

## 2022-04-08 RX ADMIN — CYCLOBENZAPRINE HYDROCHLORIDE 10 MILLIGRAM(S): 10 TABLET, FILM COATED ORAL at 21:30

## 2022-04-08 RX ADMIN — Medication 1000 MILLIGRAM(S): at 07:45

## 2022-04-08 RX ADMIN — Medication 1000 MILLIGRAM(S): at 21:30

## 2022-04-08 RX ADMIN — LIDOCAINE 1 PATCH: 4 CREAM TOPICAL at 14:15

## 2022-04-08 RX ADMIN — Medication 1: at 14:11

## 2022-04-08 RX ADMIN — ENOXAPARIN SODIUM 40 MILLIGRAM(S): 100 INJECTION SUBCUTANEOUS at 05:59

## 2022-04-08 RX ADMIN — Medication 1000 MILLIGRAM(S): at 06:02

## 2022-04-08 RX ADMIN — Medication 1000 MILLIGRAM(S): at 14:12

## 2022-04-08 RX ADMIN — LIDOCAINE 1 PATCH: 4 CREAM TOPICAL at 19:00

## 2022-04-08 RX ADMIN — Medication 1000 MILLIGRAM(S): at 14:42

## 2022-04-08 RX ADMIN — Medication 1000 MILLIGRAM(S): at 22:30

## 2022-04-08 RX ADMIN — GABAPENTIN 100 MILLIGRAM(S): 400 CAPSULE ORAL at 21:30

## 2022-04-08 RX ADMIN — CYCLOBENZAPRINE HYDROCHLORIDE 10 MILLIGRAM(S): 10 TABLET, FILM COATED ORAL at 06:36

## 2022-04-08 RX ADMIN — GABAPENTIN 100 MILLIGRAM(S): 400 CAPSULE ORAL at 14:12

## 2022-04-08 NOTE — PROGRESS NOTE ADULT - SUBJECTIVE AND OBJECTIVE BOX
Patient is a 87y old  Female who presents with a chief complaint of RLE pain (07 Apr 2022 14:24)    pt seen in icu [  ], reg med floor [ x  ], bed [ x ], chair at bedside [   ], a+o x3 [x  ], lethargic [  ],    nad [ x ]      Allergies    No Known Allergies        Vitals    T(F): 97.6 (04-08-22 @ 05:26), Max: 98.6 (04-07-22 @ 12:32)  HR: 72 (04-08-22 @ 05:26) (65 - 88)  BP: 135/63 (04-08-22 @ 05:26) (135/63 - 185/52)  RR: 18 (04-08-22 @ 05:26) (17 - 18)  SpO2: 96% (04-08-22 @ 05:26) (96% - 100%)  Wt(kg): --  CAPILLARY BLOOD GLUCOSE      POCT Blood Glucose.: 151 mg/dL (07 Apr 2022 21:15)      Labs                          10.7   8.38  )-----------( 245      ( 07 Apr 2022 05:52 )             33.0       04-07    138  |  108  |  30<H>  ----------------------------<  124<H>  4.5   |  25  |  1.13    Ca    9.4      07 Apr 2022 05:52    TPro  8.3  /  Alb  3.1<L>  /  TBili  0.2  /  DBili  x   /  AST  53<H>  /  ALT  34  /  AlkPhos  54  04-07            Clean Catch Clean Catch (Midstream)  04-06 @ 06:18   <10,000 CFU/mL Normal Urogenital Natalie  --  --      .Blood Blood-Peripheral  04-06 @ 06:04   No growth to date.  --  --      Clean Catch Clean Catch (Midstream)  03-05 @ 03:06   >=3 organisms. Probable collection contamination.  --  --          Radiology Results      Meds    MEDICATIONS  (STANDING):  acetaminophen     Tablet .. 1000 milliGRAM(s) Oral every 8 hours  cefTRIAXone   IVPB      cefTRIAXone   IVPB 1000 milliGRAM(s) IV Intermittent every 24 hours  cyclobenzaprine 10 milliGRAM(s) Oral three times a day  dextrose 5%. 1000 milliLiter(s) (100 mL/Hr) IV Continuous <Continuous>  dextrose 5%. 1000 milliLiter(s) (50 mL/Hr) IV Continuous <Continuous>  dextrose 50% Injectable 25 Gram(s) IV Push once  dextrose 50% Injectable 12.5 Gram(s) IV Push once  dextrose 50% Injectable 25 Gram(s) IV Push once  enoxaparin Injectable 40 milliGRAM(s) SubCutaneous every 24 hours  gabapentin 100 milliGRAM(s) Oral three times a day  glucagon  Injectable 1 milliGRAM(s) IntraMuscular once  insulin lispro (ADMELOG) corrective regimen sliding scale   SubCutaneous Before meals and at bedtime  lidocaine   4% Patch 1 Patch Transdermal every 24 hours  losartan 25 milliGRAM(s) Oral daily      MEDICATIONS  (PRN):  dextrose Oral Gel 15 Gram(s) Oral once PRN Blood Glucose LESS THAN 70 milliGRAM(s)/deciliter  ketorolac   Injectable 15 milliGRAM(s) IV Push every 6 hours PRN Severe Pain (7 - 10)      Physical Exam    Neuro :  no focal deficits  Respiratory: CTA B/L  CV: RRR, S1S2, no murmurs,   Abdominal: Soft, NT, ND +BS,  Extremities: No edema, + peripheral pulses    ASSESSMENT    Pain of right lower extremity  r/o lumbar radiculopathy 2nd to degenerated disc disease,   le edema dvt r/o,   uti,   h/o osteoporosis  HTN (hypertension)  DM (diabetes mellitus)  Hypercholesteremia  Gastric adenocarcinoma  Vertigo  Dementia  S/P hysterectomy  S/P tubal ligation        PLAN    ct ls spine with Posterior disc osteophyte complex at L4/L5 resulting in moderate to severe bilateral foraminal stenosis without significant spinal canal stenosis noted above.  ortho spine cons   pain mgmt f/u   Maximize non-opioid pain recommendations   - Continue Toradol 15mg IVP q 6 hours PRN severe pain  - Continue Acetaminophen 1 gram PO q 8 hours x 3 days.   - Continue flexeril 10mg PO TID  - Continue Gabapentin 100mg po q 8 hours. Monitor renal function.   - Continue Lidoderm 4% patch daily.   Bowel Regimen  - Per primary team  Mild pain   - Non-pharmacological pain treatment recommendations  - Warm/ Cool packs PRN   - Repositioning extremity, elevation, imagery, relaxation, distraction.  - Physical therapy OOB if no contraindications   doppler us with No evidence of right lower extremity deep venous thrombosis noted above.  f/u cta chest given elevated dd  cont rocephin,   f/u ucx   blood cx neg  lispro ss,   hgba1c 7.3   cont current meds               Patient is a 87y old  Female who presents with a chief complaint of RLE pain (07 Apr 2022 14:24)    pt seen in icu [  ], reg med floor [ x  ], bed [ x ], chair at bedside [   ], a+o x3 [x  ], lethargic [  ],    nad [ x ]      Allergies    No Known Allergies        Vitals    T(F): 97.6 (04-08-22 @ 05:26), Max: 98.6 (04-07-22 @ 12:32)  HR: 72 (04-08-22 @ 05:26) (65 - 88)  BP: 135/63 (04-08-22 @ 05:26) (135/63 - 185/52)  RR: 18 (04-08-22 @ 05:26) (17 - 18)  SpO2: 96% (04-08-22 @ 05:26) (96% - 100%)  Wt(kg): --  CAPILLARY BLOOD GLUCOSE      POCT Blood Glucose.: 151 mg/dL (07 Apr 2022 21:15)      Labs                          10.7   8.38  )-----------( 245      ( 07 Apr 2022 05:52 )             33.0       04-07    138  |  108  |  30<H>  ----------------------------<  124<H>  4.5   |  25  |  1.13    Ca    9.4      07 Apr 2022 05:52    TPro  8.3  /  Alb  3.1<L>  /  TBili  0.2  /  DBili  x   /  AST  53<H>  /  ALT  34  /  AlkPhos  54  04-07            Clean Catch Clean Catch (Midstream)  04-06 @ 06:18   <10,000 CFU/mL Normal Urogenital Natalie  --  --      .Blood Blood-Peripheral  04-06 @ 06:04   No growth to date.  --  --    Radiology Results    < from: CT Angio Chest PE Protocol w/ IV Cont (04.07.22 @ 12:10) >  FINDINGS:  CTA: The study is technically adequate with a good contrast bolus to the   pulmonary arteries. There are no filling defects in the pulmonary artery   or its branches. The heart is normal in size. Mild coronary arterial   calcification. No pericardial effusion. The great vessels are normal in   size. There is a common origin of the innominate artery and left common   carotid artery, normal anatomic variant.    Lungs/Airways/Pleura: The central airways are patent. No pleural   effusion. Mild dependent atelectasis at the bases. There is a 4 mm right   apical nodule on series 5 image 52.    Mediastinum/Lymph nodes: Tiny nonspecific right internal mammary node on   series 5 image 99. Otherwise no thoracic lymphadenopathy.    Upper Abdomen: Status post cholecystectomy.    Bones and Soft Tissues: Numerous lytic lesions throughout the bony   thorax, including T1-T2, T11 as well as involving numerous ribs including   (but not limited to) the left anterior 4th, left lateral 7th, left   lateral 9th with cortical destruction and associated soft tissue   component, right anterior 6th and right lateral 8th rib with (likely  pathologic) fracture. Additionally, there is a 1.8 cm left paraspinal   soft tissue lesion centered at the left ninth costovertebral junction   (5:104) extending through the neuroforamen into the spinal canal without   obvious cord compression; consider further evaluation with MRI as   clinically indicated.    IMPRESSION:  No pulmonary embolism.    Numerous osteolytic lesions concerning for metastases. Consider MRI/bone   scan for further evaluation. This finding was discussed with KATHARINE Mackenzie at   time of dictation.    4 mm right apical nodule; follow-up chest CT in 3 months.    < end of copied text >        Meds    MEDICATIONS  (STANDING):  acetaminophen     Tablet .. 1000 milliGRAM(s) Oral every 8 hours  cefTRIAXone   IVPB      cefTRIAXone   IVPB 1000 milliGRAM(s) IV Intermittent every 24 hours  cyclobenzaprine 10 milliGRAM(s) Oral three times a day  dextrose 5%. 1000 milliLiter(s) (100 mL/Hr) IV Continuous <Continuous>  dextrose 5%. 1000 milliLiter(s) (50 mL/Hr) IV Continuous <Continuous>  dextrose 50% Injectable 25 Gram(s) IV Push once  dextrose 50% Injectable 12.5 Gram(s) IV Push once  dextrose 50% Injectable 25 Gram(s) IV Push once  enoxaparin Injectable 40 milliGRAM(s) SubCutaneous every 24 hours  gabapentin 100 milliGRAM(s) Oral three times a day  glucagon  Injectable 1 milliGRAM(s) IntraMuscular once  insulin lispro (ADMELOG) corrective regimen sliding scale   SubCutaneous Before meals and at bedtime  lidocaine   4% Patch 1 Patch Transdermal every 24 hours  losartan 25 milliGRAM(s) Oral daily      MEDICATIONS  (PRN):  dextrose Oral Gel 15 Gram(s) Oral once PRN Blood Glucose LESS THAN 70 milliGRAM(s)/deciliter  ketorolac   Injectable 15 milliGRAM(s) IV Push every 6 hours PRN Severe Pain (7 - 10)      Physical Exam    Neuro :  no focal deficits  Respiratory: CTA B/L  CV: RRR, S1S2, no murmurs,   Abdominal: Soft, NT, ND +BS,  Extremities: No edema, + peripheral pulses    ASSESSMENT    Pain of right lower extremity  r/o lumbar radiculopathy 2nd to degenerated disc disease,   le edema dvt r/o,   uti,   h/o osteoporosis  HTN (hypertension)  DM (diabetes mellitus)  Hypercholesteremia  Gastric adenocarcinoma  Vertigo  Dementia  S/P hysterectomy  S/P tubal ligation        PLAN    ct ls spine with Posterior disc osteophyte complex at L4/L5 resulting in moderate to severe bilateral foraminal stenosis without significant spinal canal stenosis noted    ortho spine cons   pain mgmt f/u   Maximize non-opioid pain recommendations   - Continue Toradol 15mg IVP q 6 hours PRN severe pain  - Continue Acetaminophen 1 gram PO q 8 hours x 3 days.   - Continue flexeril 10mg PO TID  - Continue Gabapentin 100mg po q 8 hours. Monitor renal function.   - Continue Lidoderm 4% patch daily.   Bowel Regimen  - Per primary team  Mild pain   - Non-pharmacological pain treatment recommendations  - Warm/ Cool packs PRN   - Repositioning extremity, elevation, imagery, relaxation, distraction.  - Physical therapy OOB if no contraindications   doppler us with No evidence of right lower extremity deep venous thrombosis noted    cta chest with No pulmonary embolism. Numerous osteolytic lesions concerning for metastases. Consider MRI/bone scan for further evaluation. 4 mm right apical nodule; follow-up chest CT in 3 months.  f/u bone scan to eval for bone mets  cont rocephin,   ucx neg noted above  blood cx neg  lispro ss,   hgba1c 7.3   phys tx eval noted and rec phys tx 2-3x/week x 4-6 weeks with rolling walker (5 inch wheels) at Home PT anticipating improvement.   3:1 commode;   Pt likely to significantly improve functional mobility as dizziness and pain improves.  cont current meds

## 2022-04-08 NOTE — PROGRESS NOTE ADULT - PROBLEM SELECTOR PLAN 1
- p/w RLE pain x1mo  - ddx: herniated disc vs DVT  - PE: positive straight leg exam, decreased sensation in L1, L2, L3 dermatome on right, calf tenderness  - x-ray of hip unremarkable  - Well score: 1  - c/w pain management  - DVT negative on doppler RLE  - CT LS as above- lumbar stenosis, osteophyte  - D dimer 500, c/w lovenox SC, CTA no PE

## 2022-04-08 NOTE — PROGRESS NOTE ADULT - PROBLEM SELECTOR PLAN 1
Pt with acute right leg pain (posterior right knee pain) and lumbar back pain which is somatic and neuropathic in nature due to moderate lumbar stenosis L4/5. CT angio showed Numerous osteolytic lesions concerning for metastases. Bone scan - suspicious for oss mets. heme/oncology consult - ct abd pelvis tomorrow   High risk medications reviewed. Avoid polypharmacy. Avoid IV opioids. Avoid benzodiazepines. Non-pharmacological sleep aides initiated. Non-opioid medications and non-pharmacological pain management measures initiated.    Maximize non-opioid pain recommendations   - decadron 2mg po 2x a day for 3 days   - Continue Toradol 15mg IVP q 6 hours PRN severe pain  - Continue Acetaminophen 1 gram PO q 8 hours x 3 days.   - Continue flexeril 10mg PO TID  - Continue Gabapentin 100mg po q 8 hours. Monitor renal function.   - Continue Lidoderm 4% patch daily.   Bowel Regimen  - Per primary team  Mild pain   - Non-pharmacological pain treatment recommendations  - Warm/ Cool packs PRN   - Repositioning extremity, elevation, imagery, relaxation, distraction.  - Physical therapy OOB if no contraindications   Recommendations discussed with primary team and RN.

## 2022-04-08 NOTE — PROGRESS NOTE ADULT - PROBLEM SELECTOR PLAN 8
-hx vertigo pt takes prn Meclizine at home  -give prn Meclizine  -monitor symptoms  -fall precaution.

## 2022-04-08 NOTE — PROGRESS NOTE ADULT - ASSESSMENT
Confidential Drug Utilization Report  Search Terms: Adri George, 1935   Search Date: 04/06/2022 14:07:13 PM   The Drug Utilization Report below displays all of the controlled substance prescriptions, if any, that your patient has filled in the last twelve months. The information displayed on this report is compiled from pharmacy submissions to the Department, and accurately reflects the information as submitted by the pharmacies.  This report was requested by: Laura Marroquin | Reference #: [id number]   There are no results for the search terms that you entered.

## 2022-04-08 NOTE — PROGRESS NOTE ADULT - PROBLEM SELECTOR PLAN 10
- pt takes metformin 1000mg BID, Stegatro 15mg QD, Alogliptin 25mg QD  - c/w ISS  - monitor FS qACHs

## 2022-04-08 NOTE — PROGRESS NOTE ADULT - ASSESSMENT
Patient is a 87yoF, AAOx3 & ambulatory at baseline, w/ PMH of HTN, HLD, T2DM, osteoporosis, hx gastric adenocarcinoma, s/p resection 2015.   Pt presented with RLE pain for 1 month. Admitted for RLE pain work up.  CT LS resulted Posterior disc osteophyte complex at L4/L5 resulting in moderate to severe bilateral foraminal stenosis without significant spinal canal stenosis. Hip Xray no fx. Ortho and pain management consulted.   PT recommended home PT.   UA positive, started Rocephin, Blood/urine Cx negative, will d/c Abt.   Pt D -dimer elevated 500, DVT negative RLE doppler US. on dvt ppx with lovenox. CTA no PE but suspects bone metastasis, apical nodule.   ordered bone scan to evaluate metastasis.     Son Dallin Akins called and updated pt condition, discussed plan of care. answered all questions. no further questions addressed.   Full code.

## 2022-04-08 NOTE — PROGRESS NOTE ADULT - SUBJECTIVE AND OBJECTIVE BOX
Source of information: , Chart review  Patient language: English  : n/a    CC:  right leg pain     This is a 87yFemale patient who presents to the hospital for Patient is a 87y old  Female who presents with a chief complaint of RLE pain (08 Apr 2022 16:30)        PAIN SCORE:         SCALE USED: (1-10 NRS)      PAST MEDICAL & SURGICAL HISTORY:  HTN (hypertension)    DM (diabetes mellitus)    Hypercholesteremia    Gastric adenocarcinoma  s/p resection    Vertigo    Dementia    S/P hysterectomy    S/P tubal ligation        FAMILY HISTORY:  Family history of diabetes mellitus (Sibling)    Family history of early CAD (Grandparent)          SOCIAL HISTORY:  [ ] Denies Smoking, Alcohol, or Drug Use    Allergies    No Known Allergies    Intolerances        MEDICATIONS:    MEDICATIONS  (STANDING):  acetaminophen     Tablet .. 1000 milliGRAM(s) Oral every 8 hours  cefTRIAXone   IVPB      cefTRIAXone   IVPB 1000 milliGRAM(s) IV Intermittent every 24 hours  cyclobenzaprine 10 milliGRAM(s) Oral three times a day  dexAMETHasone     Tablet 2 milliGRAM(s) Oral two times a day  dextrose 5%. 1000 milliLiter(s) (100 mL/Hr) IV Continuous <Continuous>  dextrose 5%. 1000 milliLiter(s) (50 mL/Hr) IV Continuous <Continuous>  dextrose 50% Injectable 25 Gram(s) IV Push once  dextrose 50% Injectable 12.5 Gram(s) IV Push once  dextrose 50% Injectable 25 Gram(s) IV Push once  enoxaparin Injectable 40 milliGRAM(s) SubCutaneous every 24 hours  gabapentin 100 milliGRAM(s) Oral three times a day  glucagon  Injectable 1 milliGRAM(s) IntraMuscular once  insulin lispro (ADMELOG) corrective regimen sliding scale   SubCutaneous Before meals and at bedtime  lidocaine   4% Patch 1 Patch Transdermal every 24 hours  losartan 25 milliGRAM(s) Oral daily    MEDICATIONS  (PRN):  dextrose Oral Gel 15 Gram(s) Oral once PRN Blood Glucose LESS THAN 70 milliGRAM(s)/deciliter  ketorolac   Injectable 15 milliGRAM(s) IV Push every 6 hours PRN Severe Pain (7 - 10)      Vital Signs Last 24 Hrs  T(C): 37.2 (08 Apr 2022 20:06), Max: 37.2 (08 Apr 2022 20:06)  T(F): 99 (08 Apr 2022 20:06), Max: 99 (08 Apr 2022 20:06)  HR: 98 (08 Apr 2022 20:06) (65 - 101)  BP: 133/72 (08 Apr 2022 20:06) (120/67 - 185/52)  BP(mean): --  RR: 18 (08 Apr 2022 20:06) (17 - 18)  SpO2: 95% (08 Apr 2022 20:06) (95% - 98%)    LABS:                          10.7   7.37  )-----------( 249      ( 08 Apr 2022 07:18 )             33.5     04-08    138  |  108  |  33<H>  ----------------------------<  137<H>  4.0   |  22  |  1.04    Ca    9.1      08 Apr 2022 07:18    TPro  8.4<H>  /  Alb  3.3<L>  /  TBili  0.2  /  DBili  x   /  AST  72<H>  /  ALT  44  /  AlkPhos  56  04-08      LIVER FUNCTIONS - ( 08 Apr 2022 07:18 )  Alb: 3.3 g/dL / Pro: 8.4 g/dL / ALK PHOS: 56 U/L / ALT: 44 U/L DA / AST: 72 U/L / GGT: x             CAPILLARY BLOOD GLUCOSE      POCT Blood Glucose.: 144 mg/dL (08 Apr 2022 16:29)  POCT Blood Glucose.: 170 mg/dL (08 Apr 2022 14:02)  POCT Blood Glucose.: 129 mg/dL (08 Apr 2022 07:45)  POCT Blood Glucose.: 151 mg/dL (07 Apr 2022 21:15)      Radiology:    Drug Screen:        REVIEW OF SYSTEMS:  CONSTITUTIONAL: No fever or fatigue  RESPIRATORY: No cough, wheezing, chills or hemoptysis; No shortness of breath  CARDIOVASCULAR: No chest pain, palpitations, dizziness, or leg swelling  GASTROINTESTINAL: No abdominal or epigastric pain. No nausea, vomiting; No diarrhea or constipation.   GENITOURINARY: No dysuria, frequency, hematuria, retention or incontinence  MUSCULOSKELETAL: No joint pain or swelling; No muscle, back, or extremity pain, no upper or lower motor strength weakness, no saddle anesthesia, bowel/bladder incontinence, no falls   NEURO: No headaches, No numbness/tingling b/l LE, No weakness  PSYCHIATRIC: No depression, anxiety, mood swings, or difficulty sleeping    PHYSICAL EXAM:  GENERAL:  Alert & Oriented X3, NAD, Good concentration  CHEST/LUNG: Clear to auscultation bilaterally; No rales, rhonchi, wheezing, or rubs  HEART: Regular rate and rhythm; No murmurs, rubs, or gallops  ABDOMEN: Soft, Nontender, Nondistended; Bowel sounds present  EXTREMITIES:  2+ Peripheral Pulses, No cyanosis, or edema  MUSCULOSKELETAL: + decreased rom Motor Strength 5/5 B/L upper and lower extremities; moves all extremities equally against gravity; ROM intact; negative SLR  SKIN: No rashes or lesions    Risk factors associated with adverse outcomes related to opioid treatment  [ ]  Concurrent benzodiazepine use  [ ]  History/ Active substance use or alcohol use disorder  [ ] Psychiatric co-morbidity  [ ] Sleep apnea  [ ] COPD  [ ] BMI> 35  [ ] Liver dysfunction  [ ] Renal dysfunction  [ ] CHF  [ ] Smoker  [ ]  Age > 60 years    [ ]  [place]  Reviewed and Copied to Chart. See below.    Plan of care and goal oriented pain management treatment options were discussed with patient and /or primary care giver; all questions and concerns were addressed and care was aligned with patient's wishes.    Educated patient on goal oriented pain management treatment options     04-08-22 @ 20:58 Source of information: , Chart review  Patient language: English  : n/a    CC:  right leg pain     This is a 87yFemale patient who presents to the hospital for Patient is a 87y old  Female who presents with a chief complaint of RLE pain (08 Apr 2022 16:30)    Pt with right LE pain.  No nausea or vomiting. Pt lying in bed.  nad.  Pt states that pain is better but still present and worsens on exertion.  CT angio shows      PAIN SCORE:   5/10  SCALE USED: (1-10 NRS)      PAST MEDICAL & SURGICAL HISTORY:  HTN (hypertension)    DM (diabetes mellitus)    Hypercholesteremia    Gastric adenocarcinoma  s/p resection    Vertigo    Dementia    S/P hysterectomy    S/P tubal ligation        FAMILY HISTORY:  Family history of diabetes mellitus (Sibling)    Family history of early CAD (Grandparent)          SOCIAL HISTORY:  [ x] Denies Smoking, Alcohol, or Drug Use    Allergies    No Known Allergies    Intolerances        MEDICATIONS:    MEDICATIONS  (STANDING):  acetaminophen     Tablet .. 1000 milliGRAM(s) Oral every 8 hours  cefTRIAXone   IVPB      cefTRIAXone   IVPB 1000 milliGRAM(s) IV Intermittent every 24 hours  cyclobenzaprine 10 milliGRAM(s) Oral three times a day  dexAMETHasone     Tablet 2 milliGRAM(s) Oral two times a day  dextrose 5%. 1000 milliLiter(s) (100 mL/Hr) IV Continuous <Continuous>  dextrose 5%. 1000 milliLiter(s) (50 mL/Hr) IV Continuous <Continuous>  dextrose 50% Injectable 25 Gram(s) IV Push once  dextrose 50% Injectable 12.5 Gram(s) IV Push once  dextrose 50% Injectable 25 Gram(s) IV Push once  enoxaparin Injectable 40 milliGRAM(s) SubCutaneous every 24 hours  gabapentin 100 milliGRAM(s) Oral three times a day  glucagon  Injectable 1 milliGRAM(s) IntraMuscular once  insulin lispro (ADMELOG) corrective regimen sliding scale   SubCutaneous Before meals and at bedtime  lidocaine   4% Patch 1 Patch Transdermal every 24 hours  losartan 25 milliGRAM(s) Oral daily    MEDICATIONS  (PRN):  dextrose Oral Gel 15 Gram(s) Oral once PRN Blood Glucose LESS THAN 70 milliGRAM(s)/deciliter  ketorolac   Injectable 15 milliGRAM(s) IV Push every 6 hours PRN Severe Pain (7 - 10)      Vital Signs Last 24 Hrs  T(C): 37.2 (08 Apr 2022 20:06), Max: 37.2 (08 Apr 2022 20:06)  T(F): 99 (08 Apr 2022 20:06), Max: 99 (08 Apr 2022 20:06)  HR: 98 (08 Apr 2022 20:06) (65 - 101)  BP: 133/72 (08 Apr 2022 20:06) (120/67 - 185/52)  BP(mean): --  RR: 18 (08 Apr 2022 20:06) (17 - 18)  SpO2: 95% (08 Apr 2022 20:06) (95% - 98%)    LABS:                          10.7   7.37  )-----------( 249      ( 08 Apr 2022 07:18 )             33.5     04-08    138  |  108  |  33<H>  ----------------------------<  137<H>  4.0   |  22  |  1.04    Ca    9.1      08 Apr 2022 07:18    TPro  8.4<H>  /  Alb  3.3<L>  /  TBili  0.2  /  DBili  x   /  AST  72<H>  /  ALT  44  /  AlkPhos  56  04-08      LIVER FUNCTIONS - ( 08 Apr 2022 07:18 )  Alb: 3.3 g/dL / Pro: 8.4 g/dL / ALK PHOS: 56 U/L / ALT: 44 U/L DA / AST: 72 U/L / GGT: x             CAPILLARY BLOOD GLUCOSE      POCT Blood Glucose.: 144 mg/dL (08 Apr 2022 16:29)  POCT Blood Glucose.: 170 mg/dL (08 Apr 2022 14:02)  POCT Blood Glucose.: 129 mg/dL (08 Apr 2022 07:45)  POCT Blood Glucose.: 151 mg/dL (07 Apr 2022 21:15)      Radiology:  < from: NM Bone Imaging Total (04.08.22 @ 11:57) >  ACC: [id number] EXAM:  NM BONE IMG WHOLE BODY                          PROCEDURE DATE:  04/08/2022          INTERPRETATION:  CLINICAL INFORMATION: 87 year-old female with numerous   osteolytic lesions on diagnostic CT, referred for further evaluation,    RADIOPHARMACEUTICAL: 20.5 mCi Tc-99m HDP, I.V.    TECHNIQUE:  Whole-body planar images were obtained in the anterior and   posterior projections approximately 2-3 hours after tracer injection.   Static views of the chest and pelvis in the anterior, posterior and   lateral projections were also obtained.    COMPARISON: No previous bone scan for comparison    FINDINGS: There are multiple foci of increased tracer uptake in the ribs   bilaterally, thoracolumbar spine and right acetabulum/iliac bone. These   findings likely correspond to osteolytic lesions seen on diagnostic CT.   There are degenerative changes in the major joints. There is physiologic   tracer distribution in the remainder of the visualized skeleton.    Both kidneys are visualized and appear symmetric.    IMPRESSION: Abnormal bone scan.    Multiple foci of increased uptake in the ribs, spine and right   acetabulum/iliac bone suspicious for osseous metastases.    Degenerative disease in the major joints.    < end of copied text >    < from: CT Angio Chest PE Protocol w/ IV Cont (04.07.22 @ 12:10) >    ACC: [id number] EXAM:  CT ANGIO CHEST PULM [facility]                          PROCEDURE DATE:  04/07/2022          INTERPRETATION:  CLINICAL INDICATION: Lower leg pain with hypertension,   rule out pulmonary embolism. As per EMR, history of gastric cancer.    TECHNIQUE: A volumetric acquisition of the chest was obtained from the   thoracic inlet to the upper abdomen during dynamic administration of 50cc   Omnipaque 350 intravenous contrast according to the PE protocol. 3D MIP   images were provided.    COMPARISON: No prior chest CT available for comparison. CT lumbar spine   4/6/2022. Abdominal CT 1/17/2020.    FINDINGS:  CTA: The study is technically adequate with a good contrast bolus to the   pulmonary arteries. There are no filling defects in the pulmonary artery   or its branches. The heart is normal in size. Mild coronary arterial   calcification. No pericardial effusion. The great vessels are normal in   size. There is a common origin of the innominate artery and left common   carotid artery, normal anatomic variant.    Lungs/Airways/Pleura: The central airways are patent. No pleural   effusion. Mild dependent atelectasis at the bases. There is a 4 mm right   apical nodule on series 5 image 52.    Mediastinum/Lymph nodes: Tiny nonspecific right internal mammary node on   series 5 image 99. Otherwise no thoracic lymphadenopathy.    Upper Abdomen: Status post cholecystectomy.    Bones and Soft Tissues: Numerous lytic lesions throughout the bony   thorax, including T1-T2, T11 as well as involving numerous ribs including   (but not limited to) the left anterior 4th, left lateral 7th, left   lateral 9th with cortical destruction and associated soft tissue   component, right anterior 6th and right lateral 8th rib with (likely  pathologic) fracture. Additionally, there is a 1.8 cm left paraspinal   soft tissue lesion centered at the left ninth costovertebral junction   (5:104) extending through the neuroforamen into the spinal canal without   obvious cord compression; consider further evaluation with MRI as   clinically indicated.    IMPRESSION:  No pulmonary embolism.    Numerous osteolytic lesions concerning for metastases. Consider MRI/bone   scan for further evaluation. This finding was discussed with KATHARINE Mackenzie at   time of dictation.    4 mm right apical nodule; follow-up chest CT in 3 months.        Drug Screen:        REVIEW OF SYSTEMS:  CONSTITUTIONAL: No fever or fatigue  RESPIRATORY: No cough, wheezing, chills or hemoptysis; No shortness of breath  CARDIOVASCULAR: No chest pain, palpitations, dizziness, or leg swelling  GASTROINTESTINAL: No abdominal or epigastric pain. No nausea, vomiting; No diarrhea or constipation.   GENITOURINARY: No dysuria, frequency, hematuria, retention or incontinence  MUSCULOSKELETAL: + right leg pain  + back pain   NEURO: No headaches, No numbness/tingling b/l LE, No weakness  PSYCHIATRIC: No depression, anxiety, mood swings, or difficulty sleeping    PHYSICAL EXAM:  GENERAL:  Alert & Oriented X3, NAD, Good concentration  CHEST/LUNG: Clear to auscultation bilaterally; No rales, rhonchi, wheezing, or rubs  HEART: Regular rate and rhythm; No murmurs, rubs, or gallops  ABDOMEN: Soft, Nontender, Nondistended; Bowel sounds present  EXTREMITIES:  2+ Peripheral Pulses, No cyanosis, or edema  MUSCULOSKELETAL: + decreased rom + right tenderness   SKIN: No rashes or lesions    Risk factors associated with adverse outcomes related to opioid treatment  [ ]  Concurrent benzodiazepine use  [ ]  History/ Active substance use or alcohol use disorder  [ ] Psychiatric co-morbidity  [ ] Sleep apnea  [ ] COPD  [ ] BMI> 35  [ ] Liver dysfunction  [ ] Renal dysfunction  [ ] CHF  [ ] Smoker  [x ]  Age > 60 years    [x ]  [place]  Reviewed and Copied to Chart. See below.    Plan of care and goal oriented pain management treatment options were discussed with patient and /or primary care giver; all questions and concerns were addressed and care was aligned with patient's wishes.    Educated patient on goal oriented pain management treatment options     04-08-22 @ 20:58

## 2022-04-08 NOTE — CONSULT NOTE ADULT - ASSESSMENT
# BONE METASTASIS ( on bone scan and CTA)   # LEG PAIN     # H/O GASTRIC CA, S/P GASTRECTOMY FOR T3N1A, S/P ADJ CHEMO WITH FOLFOX IN 2015  # h/o of early multiple myeloma,( bmbx  2015, last visit 2/2022   M spike  IGG kappa 1.5  with FLC 20.74, IGG 2145)  r/o recurrent gastric ca  lytic lesions d/t MM( less likely with positive  bone scan)  check CT A/P  check CEA,   obtain IR  BX of one of the bone lesions  for tissue diagnosis    further recommendations will depend on findings   cont with pain management/bowel regime  call with questions [phone]     Thank you for the consult

## 2022-04-08 NOTE — CHART NOTE - NSCHARTNOTEFT_GEN_A_CORE
Bone scan resulted   Multiple foci of increased uptake in the ribs, spine and right acetabulum/iliac bone suspicious for osseous metastases.  Degenerative disease in the major joints.    Patient last seen by Heme/onc [facility] dr. Alvarado 2/8/22, contacted and discussed about result. ([phone]), [facility] [phone].  Contacted covering heme/onc DR. Pearson [phone] for consult. She recommends IR bone bx to diagnose, CEA, CA 19-9. order placed.  needs to follow up with IR for Bx on Monday.     Updated son Dallin at [phone] for plan of care. (C.P.-[phone]) Bone scan resulted   Multiple foci of increased uptake in the ribs, spine and right acetabulum/iliac bone suspicious for osseous metastases.  Degenerative disease in the major joints.    Patient last seen by Heme/onc [facility] dr. Alvarado 2/8/22, contacted and discussed about result. ([phone]), [facility] [phone].  Contacted covering heme/onc DR. Pearson [phone] for consult. She recommends IR bone bx for tissue diagnose, CT abd/pelvis w/ PO/IV con for staging, and CEA, CA 19-9. order placed.  needs to follow up with IR for Bx on Monday.     Updated son Dallin at [phone] and patient at bedside with  # [id number] (Marky) for plan of care.

## 2022-04-08 NOTE — PROGRESS NOTE ADULT - SUBJECTIVE AND OBJECTIVE BOX
NP Note discussed with  Primary Attending   #[id number]    INTERVAL HPI/OVERNIGHT EVENTS: no new complaints, seen at  bedside, pt states pain improving with medication.     MEDICATIONS  (STANDING):  acetaminophen     Tablet .. 1000 milliGRAM(s) Oral every 8 hours  cefTRIAXone   IVPB      cefTRIAXone   IVPB 1000 milliGRAM(s) IV Intermittent every 24 hours  cyclobenzaprine 10 milliGRAM(s) Oral three times a day  dextrose 5%. 1000 milliLiter(s) (100 mL/Hr) IV Continuous <Continuous>  dextrose 5%. 1000 milliLiter(s) (50 mL/Hr) IV Continuous <Continuous>  dextrose 50% Injectable 25 Gram(s) IV Push once  dextrose 50% Injectable 12.5 Gram(s) IV Push once  dextrose 50% Injectable 25 Gram(s) IV Push once  enoxaparin Injectable 40 milliGRAM(s) SubCutaneous every 24 hours  gabapentin 100 milliGRAM(s) Oral three times a day  glucagon  Injectable 1 milliGRAM(s) IntraMuscular once  insulin lispro (ADMELOG) corrective regimen sliding scale   SubCutaneous Before meals and at bedtime  lidocaine   4% Patch 1 Patch Transdermal every 24 hours  losartan 25 milliGRAM(s) Oral daily    MEDICATIONS  (PRN):  dextrose Oral Gel 15 Gram(s) Oral once PRN Blood Glucose LESS THAN 70 milliGRAM(s)/deciliter  ketorolac   Injectable 15 milliGRAM(s) IV Push every 6 hours PRN Severe Pain (7 - 10)  __________________________________________________  REVIEW OF SYSTEMS:    CONSTITUTIONAL: No fever,   EYES: no acute visual disturbances  NECK: No pain or stiffness  RESPIRATORY: No cough; No shortness of breath  CARDIOVASCULAR: No chest pain, no palpitations  GASTROINTESTINAL: No pain. No nausea or vomiting; No diarrhea   NEUROLOGICAL: No headache or numbness, no tremors  MUSCULOSKELETAL: No joint pain, Low back pain, right leg pain improving.   GENITOURINARY: no dysuria, no frequency, no hesitancy  PSYCHIATRY: no depression , no anxiety  ALL OTHER  ROS negative        Vital Signs Last 24 Hrs  T(C): 36.4 (08 Apr 2022 05:26), Max: 37 (07 Apr 2022 12:32)  T(F): 97.6 (08 Apr 2022 05:26), Max: 98.6 (07 Apr 2022 12:32)  HR: 72 (08 Apr 2022 05:26) (65 - 83)  BP: 135/63 (08 Apr 2022 05:26) (135/63 - 185/52)  BP(mean): --  RR: 18 (08 Apr 2022 05:26) (17 - 18)  SpO2: 96% (08 Apr 2022 05:26) (96% - 98%)    ________________________________________________  PHYSICAL EXAM:  GENERAL: NAD  HEENT: Normocephalic;  conjunctivae and sclerae clear; moist mucous membranes;   NECK : supple  CHEST/LUNG: Clear to auscultation bilaterally with good air entry   HEART: S1 S2  regular; no murmurs, gallops or rubs  ABDOMEN: Soft, Nontender, Nondistended; Bowel sounds present  EXTREMITIES: no cyanosis; trace edema (R>L) ; no calf tenderness  SKIN: warm and dry; no rash  NERVOUS SYSTEM:  Awake and alert; Oriented  to place, person and time ; no new deficits    _________________________________________________  LABS:                        10.7   7.37  )-----------( 249      ( 08 Apr 2022 07:18 )             33.5     04-08    138  |  108  |  33<H>  ----------------------------<  137<H>  4.0   |  22  |  1.04    Ca    9.1      08 Apr 2022 07:18    TPro  8.4<H>  /  Alb  3.3<L>  /  TBili  0.2  /  DBili  x   /  AST  72<H>  /  ALT  44  /  AlkPhos  56  04-08    CAPILLARY BLOOD GLUCOSE  POCT Blood Glucose.: 129 mg/dL (08 Apr 2022 07:45)  POCT Blood Glucose.: 151 mg/dL (07 Apr 2022 21:15)  POCT Blood Glucose.: 129 mg/dL (07 Apr 2022 16:30)  POCT Blood Glucose.: 91 mg/dL (07 Apr 2022 11:15)    RADIOLOGY & ADDITIONAL TESTS:    Imaging  Reviewed:  YES  < from: CT Angio Chest PE Protocol w/ IV Cont (04.07.22 @ 12:10) >    ACC: [id number] EXAM:  CT ANGIO CHEST PULM JOSR MIRAMONTES                          PROCEDURE DATE:  04/07/2022          INTERPRETATION:  CLINICAL INDICATION: Lower leg pain with hypertension,   rule out pulmonary embolism. As per EMR, history of gastric cancer.    TECHNIQUE: A volumetric acquisition of the chest was obtained from the   thoracic inlet to the upper abdomen during dynamic administration of 50cc   Omnipaque 350 intravenous contrast according to the PE protocol. 3D MIP   images were provided.    COMPARISON: No prior chest CT available for comparison. CT lumbar spine   4/6/2022. Abdominal CT 1/17/2020.    FINDINGS:  CTA: The study is technically adequate with a good contrast bolus to the   pulmonary arteries. There are no filling defects in the pulmonary artery   or its branches. The heart is normal in size. Mild coronary arterial   calcification. No pericardial effusion. The great vessels are normal in   size. There is a common origin of the innominate artery and left common   carotid artery, normal anatomic variant.    Lungs/Airways/Pleura: The central airways are patent. No pleural   effusion. Mild dependent atelectasis at the bases. There is a 4 mm right   apical nodule on series 5 image 52.    Mediastinum/Lymph nodes: Tiny nonspecific right internal mammary node on   series 5 image 99. Otherwise no thoracic lymphadenopathy.    Upper Abdomen: Status post cholecystectomy.    Bones and Soft Tissues: Numerous lytic lesions throughout the bony   thorax, including T1-T2, T11 as well as involving numerous ribs including   (but not limited to) the left anterior 4th, left lateral 7th, left   lateral 9th with cortical destruction and associated soft tissue   component, right anterior 6th and right lateral 8th rib with (likely  pathologic) fracture. Additionally, there is a 1.8 cm left paraspinal   soft tissue lesion centered at the left ninth costovertebral junction   (5:104) extending through the neuroforamen into the spinal canal without   obvious cord compression; consider further evaluation with MRI as   clinically indicated.    IMPRESSION:  No pulmonary embolism.    Numerous osteolytic lesions concerning for metastases. Consider MRI/bone   scan for further evaluation. This finding was discussed with KATHARINE Mackenzie at   time of dictation.    4 mm right apical nodule; follow-up chest CT in 3 months.    --- End of Report ---    ROMAN VILLAVICENCIO M.D., Attending Radiologist  This document has been electronically signed. Apr 7 2022  1:29PM    < end of copied text >  < from: CT Lumbar Spine No Cont (04.06.22 @ 09:00) >    ACC: [id number] EXAM:  CT LUMBAR SPINE                          PROCEDURE DATE:  04/06/2022          INTERPRETATION:  CT lumbar spine without IV contrast    CLINICAL INFORMATION: Low back pain with positive straight leg test.   herniated disc    TECHNIQUE:  Contiguous axial sections were obtained through the lumbar   spine.   Additional sagittal and coronal reformats were obtained.    FINDINGS:   No prior similar studies are available for review    Lumbar vertebral body heights are maintained. Novertebral fracture is   seen. No destructive bone lesion is found.  Alignment is preserved.    Facet joints appear aligned.  The visualized sacral and pelvic bones   appear intact.    Multilevel disc bulging is present with the largest posterior disc  osteophyte complex at L4/L5. There is moderate to severe left foraminal   stenosis and moderate to severe right foraminal stenosis at L4/L5,   without significant spinal canal stenosis. Rest of the neural foramina   are intact.  No high-grade central canal compromise is recognized by the   CT technique.    MR would be required to evaluate the intervertebral   discs at higher sensitivity for disc pathology.    No paraspinal mass is recognized.  Paraspinal soft tissues appear intact.    IMPRESSION:  Posterior disc osteophyte complex at L4/L5 resulting in moderate to   severe bilateral foraminal stenosis without significant spinal canal   stenosis.  --- End of Report ---    CARLOZ BAEZ MD; Attending Radiologist  This document has been electronically signed. Apr 6 2022  9:07AM    < end of copied text >    Consultant(s) Notes Reviewed:   YES      Plan of care was discussed with patient and /or primary care giver; all questions and concerns were addressed

## 2022-04-09 LAB
ANION GAP SERPL CALC-SCNC: 12 MMOL/L — SIGNIFICANT CHANGE UP (ref 5–17)
BUN SERPL-MCNC: 34 MG/DL — HIGH (ref 7–18)
CALCIUM SERPL-MCNC: 9.5 MG/DL — SIGNIFICANT CHANGE UP (ref 8.4–10.5)
CANCER AG19-9 SERPL-ACNC: <2 U/ML — SIGNIFICANT CHANGE UP
CEA SERPL-MCNC: 1.7 NG/ML — SIGNIFICANT CHANGE UP (ref 0–3.8)
CHLORIDE SERPL-SCNC: 107 MMOL/L — SIGNIFICANT CHANGE UP (ref 96–108)
CO2 SERPL-SCNC: 18 MMOL/L — LOW (ref 22–31)
CREAT SERPL-MCNC: 0.97 MG/DL — SIGNIFICANT CHANGE UP (ref 0.5–1.3)
EGFR: 57 ML/MIN/1.73M2 — LOW
GLUCOSE BLDC GLUCOMTR-MCNC: 174 MG/DL — HIGH (ref 70–99)
GLUCOSE BLDC GLUCOMTR-MCNC: 221 MG/DL — HIGH (ref 70–99)
GLUCOSE BLDC GLUCOMTR-MCNC: 245 MG/DL — HIGH (ref 70–99)
GLUCOSE BLDC GLUCOMTR-MCNC: 272 MG/DL — HIGH (ref 70–99)
GLUCOSE SERPL-MCNC: 180 MG/DL — HIGH (ref 70–99)
HCT VFR BLD CALC: 33.7 % — LOW (ref 34.5–45)
HGB BLD-MCNC: 10.9 G/DL — LOW (ref 11.5–15.5)
MCHC RBC-ENTMCNC: 30 PG — SIGNIFICANT CHANGE UP (ref 27–34)
MCHC RBC-ENTMCNC: 32.3 GM/DL — SIGNIFICANT CHANGE UP (ref 32–36)
MCV RBC AUTO: 92.8 FL — SIGNIFICANT CHANGE UP (ref 80–100)
NRBC # BLD: 0 /100 WBCS — SIGNIFICANT CHANGE UP (ref 0–0)
PLATELET # BLD AUTO: 277 K/UL — SIGNIFICANT CHANGE UP (ref 150–400)
POTASSIUM SERPL-MCNC: 4 MMOL/L — SIGNIFICANT CHANGE UP (ref 3.5–5.3)
POTASSIUM SERPL-SCNC: 4 MMOL/L — SIGNIFICANT CHANGE UP (ref 3.5–5.3)
RBC # BLD: 3.63 M/UL — LOW (ref 3.8–5.2)
RBC # FLD: 14.3 % — SIGNIFICANT CHANGE UP (ref 10.3–14.5)
SODIUM SERPL-SCNC: 137 MMOL/L — SIGNIFICANT CHANGE UP (ref 135–145)
WBC # BLD: 7.45 K/UL — SIGNIFICANT CHANGE UP (ref 3.8–10.5)
WBC # FLD AUTO: 7.45 K/UL — SIGNIFICANT CHANGE UP (ref 3.8–10.5)

## 2022-04-09 PROCEDURE — 74177 CT ABD & PELVIS W/CONTRAST: CPT | Mod: 26

## 2022-04-09 PROCEDURE — 99232 SBSQ HOSP IP/OBS MODERATE 35: CPT

## 2022-04-09 RX ADMIN — LIDOCAINE 1 PATCH: 4 CREAM TOPICAL at 23:50

## 2022-04-09 RX ADMIN — Medication 2 MILLIGRAM(S): at 17:33

## 2022-04-09 RX ADMIN — LIDOCAINE 1 PATCH: 4 CREAM TOPICAL at 19:56

## 2022-04-09 RX ADMIN — Medication 1: at 07:50

## 2022-04-09 RX ADMIN — LIDOCAINE 1 PATCH: 4 CREAM TOPICAL at 11:31

## 2022-04-09 RX ADMIN — ENOXAPARIN SODIUM 40 MILLIGRAM(S): 100 INJECTION SUBCUTANEOUS at 05:31

## 2022-04-09 RX ADMIN — Medication 3: at 12:09

## 2022-04-09 RX ADMIN — Medication 2: at 16:22

## 2022-04-09 RX ADMIN — Medication 1000 MILLIGRAM(S): at 06:31

## 2022-04-09 RX ADMIN — CEFTRIAXONE 100 MILLIGRAM(S): 500 INJECTION, POWDER, FOR SOLUTION INTRAMUSCULAR; INTRAVENOUS at 03:44

## 2022-04-09 RX ADMIN — PANTOPRAZOLE SODIUM 40 MILLIGRAM(S): 20 TABLET, DELAYED RELEASE ORAL at 06:12

## 2022-04-09 RX ADMIN — Medication 1000 MILLIGRAM(S): at 14:30

## 2022-04-09 RX ADMIN — Medication 2: at 21:36

## 2022-04-09 RX ADMIN — GABAPENTIN 100 MILLIGRAM(S): 400 CAPSULE ORAL at 05:31

## 2022-04-09 RX ADMIN — LOSARTAN POTASSIUM 25 MILLIGRAM(S): 100 TABLET, FILM COATED ORAL at 05:31

## 2022-04-09 RX ADMIN — Medication 2 MILLIGRAM(S): at 06:13

## 2022-04-09 RX ADMIN — Medication 1000 MILLIGRAM(S): at 13:40

## 2022-04-09 RX ADMIN — LIDOCAINE 1 PATCH: 4 CREAM TOPICAL at 02:58

## 2022-04-09 RX ADMIN — GABAPENTIN 100 MILLIGRAM(S): 400 CAPSULE ORAL at 21:36

## 2022-04-09 RX ADMIN — GABAPENTIN 100 MILLIGRAM(S): 400 CAPSULE ORAL at 13:39

## 2022-04-09 RX ADMIN — CYCLOBENZAPRINE HYDROCHLORIDE 10 MILLIGRAM(S): 10 TABLET, FILM COATED ORAL at 05:31

## 2022-04-09 RX ADMIN — Medication 1000 MILLIGRAM(S): at 05:31

## 2022-04-09 RX ADMIN — IOHEXOL 30 MILLILITER(S): 300 INJECTION, SOLUTION INTRAVENOUS at 13:40

## 2022-04-09 NOTE — PROGRESS NOTE ADULT - SUBJECTIVE AND OBJECTIVE BOX
Patient is a 87y old  Female who presents with a chief complaint of RLE pain (08 Apr 2022 20:58)    pt seen in icu [  ], reg med floor [ x  ], bed [ x ], chair at bedside [   ], a+o x3 [x  ], lethargic [  ],    nad [ x ]      Allergies    No Known Allergies        Vitals    T(F): 97.7 (04-09-22 @ 05:20), Max: 99 (04-08-22 @ 20:06)  HR: 76 (04-09-22 @ 05:20) (76 - 101)  BP: 129/59 (04-09-22 @ 05:20) (120/67 - 134/66)  RR: 18 (04-09-22 @ 05:20) (18 - 18)  SpO2: 98% (04-09-22 @ 05:20) (95% - 98%)  Wt(kg): --  CAPILLARY BLOOD GLUCOSE      POCT Blood Glucose.: 176 mg/dL (08 Apr 2022 21:03)      Labs                          10.7   7.37  )-----------( 249      ( 08 Apr 2022 07:18 )             33.5       04-08    138  |  108  |  33<H>  ----------------------------<  137<H>  4.0   |  22  |  1.04    Ca    9.1      08 Apr 2022 07:18    TPro  8.4<H>  /  Alb  3.3<L>  /  TBili  0.2  /  DBili  x   /  AST  72<H>  /  ALT  44  /  AlkPhos  56  04-08            Clean Catch Clean Catch (Midstream)  04-06 @ 06:18   <10,000 CFU/mL Normal Urogenital Natalie  --  --      .Blood Blood-Peripheral  04-06 @ 06:04   No growth to date.  --  --      Clean Catch Clean Catch (Midstream)  03-05 @ 03:06   >=3 organisms. Probable collection contamination.  --  --          Radiology Results      Meds    MEDICATIONS  (STANDING):  acetaminophen     Tablet .. 1000 milliGRAM(s) Oral every 8 hours  dexAMETHasone     Tablet 2 milliGRAM(s) Oral two times a day  dextrose 5%. 1000 milliLiter(s) (100 mL/Hr) IV Continuous <Continuous>  dextrose 5%. 1000 milliLiter(s) (50 mL/Hr) IV Continuous <Continuous>  dextrose 50% Injectable 25 Gram(s) IV Push once  dextrose 50% Injectable 12.5 Gram(s) IV Push once  dextrose 50% Injectable 25 Gram(s) IV Push once  enoxaparin Injectable 40 milliGRAM(s) SubCutaneous every 24 hours  gabapentin 100 milliGRAM(s) Oral three times a day  glucagon  Injectable 1 milliGRAM(s) IntraMuscular once  insulin lispro (ADMELOG) corrective regimen sliding scale   SubCutaneous Before meals and at bedtime  iohexol 300 mG (iodine)/mL Oral Solution 30 milliLiter(s) Oral once  lidocaine   4% Patch 1 Patch Transdermal every 24 hours  losartan 25 milliGRAM(s) Oral daily  pantoprazole    Tablet 40 milliGRAM(s) Oral before breakfast      MEDICATIONS  (PRN):  dextrose Oral Gel 15 Gram(s) Oral once PRN Blood Glucose LESS THAN 70 milliGRAM(s)/deciliter  ketorolac   Injectable 15 milliGRAM(s) IV Push every 6 hours PRN Severe Pain (7 - 10)      Physical Exam    Neuro :  no focal deficits  Respiratory: CTA B/L  CV: RRR, S1S2, no murmurs,   Abdominal: Soft, NT, ND +BS,  Extremities: No edema, + peripheral pulses    ASSESSMENT    Pain of right lower extremity  r/o lumbar radiculopathy 2nd to degenerated disc disease,   le edema dvt r/o,   uti,   h/o osteoporosis  HTN (hypertension)  DM (diabetes mellitus)  Hypercholesteremia  Gastric adenocarcinoma  Vertigo  Dementia  S/P hysterectomy  S/P tubal ligation        PLAN    ct ls spine with Posterior disc osteophyte complex at L4/L5 resulting in moderate to severe bilateral foraminal stenosis without significant spinal canal stenosis noted    ortho spine cons   pain mgmt f/u   Maximize non-opioid pain recommendations   - Continue Toradol 15mg IVP q 6 hours PRN severe pain  - Continue Acetaminophen 1 gram PO q 8 hours x 3 days.   - Continue flexeril 10mg PO TID  - Continue Gabapentin 100mg po q 8 hours. Monitor renal function.   - Continue Lidoderm 4% patch daily.   Bowel Regimen  - Per primary team  Mild pain   - Non-pharmacological pain treatment recommendations  - Warm/ Cool packs PRN   - Repositioning extremity, elevation, imagery, relaxation, distraction.  - Physical therapy OOB if no contraindications   doppler us with No evidence of right lower extremity deep venous thrombosis noted    cta chest with No pulmonary embolism. Numerous osteolytic lesions concerning for metastases. Consider MRI/bone scan for further evaluation. 4 mm right apical nodule; follow-up chest CT in 3 months.  f/u bone scan to eval for bone mets  cont rocephin,   ucx neg noted above  blood cx neg  lispro ss,   hgba1c 7.3   phys tx eval noted and rec phys tx 2-3x/week x 4-6 weeks with rolling walker (5 inch wheels) at Home PT anticipating improvement.   3:1 commode;   Pt likely to significantly improve functional mobility as dizziness and pain improves.  cont current meds           Patient is a 87y old  Female who presents with a chief complaint of RLE pain (08 Apr 2022 20:58)    pt seen in icu [  ], reg med floor [ x  ], bed [ x ], chair at bedside [   ], a+o x3 [x  ], lethargic [  ],    nad [ x ]      Allergies    No Known Allergies        Vitals    T(F): 97.7 (04-09-22 @ 05:20), Max: 99 (04-08-22 @ 20:06)  HR: 76 (04-09-22 @ 05:20) (76 - 101)  BP: 129/59 (04-09-22 @ 05:20) (120/67 - 134/66)  RR: 18 (04-09-22 @ 05:20) (18 - 18)  SpO2: 98% (04-09-22 @ 05:20) (95% - 98%)  Wt(kg): --  CAPILLARY BLOOD GLUCOSE      POCT Blood Glucose.: 176 mg/dL (08 Apr 2022 21:03)      Labs                          10.7   7.37  )-----------( 249      ( 08 Apr 2022 07:18 )             33.5       04-08    138  |  108  |  33<H>  ----------------------------<  137<H>  4.0   |  22  |  1.04    Ca    9.1      08 Apr 2022 07:18    TPro  8.4<H>  /  Alb  3.3<L>  /  TBili  0.2  /  DBili  x   /  AST  72<H>  /  ALT  44  /  AlkPhos  56  04-08            Clean Catch Clean Catch (Midstream)  04-06 @ 06:18   <10,000 CFU/mL Normal Urogenital Natalie  --  --      .Blood Blood-Peripheral  04-06 @ 06:04   No growth to date.  --  --      Clean Catch Clean Catch (Midstream)  03-05 @ 03:06   >=3 organisms. Probable collection contamination.  --  --          Radiology Results      < from: NM Bone Imaging Total (04.08.22 @ 11:57) >  FINDINGS: There are multiple foci of increased tracer uptake in the ribs   bilaterally, thoracolumbar spine and right acetabulum/iliac bone. These   findings likely correspond to osteolytic lesions seen on diagnostic CT.   There are degenerative changes in the major joints. There is physiologic   tracer distribution in the remainder of the visualized skeleton.    Both kidneys are visualized and appear symmetric.    IMPRESSION: Abnormal bone scan.    Multiple foci of increased uptake in the ribs, spine and right   acetabulum/iliac bone suspicious for osseous metastases.    Degenerative disease in the major joints.    < end of copied text >      Meds    MEDICATIONS  (STANDING):  acetaminophen     Tablet .. 1000 milliGRAM(s) Oral every 8 hours  dexAMETHasone     Tablet 2 milliGRAM(s) Oral two times a day  dextrose 5%. 1000 milliLiter(s) (100 mL/Hr) IV Continuous <Continuous>  dextrose 5%. 1000 milliLiter(s) (50 mL/Hr) IV Continuous <Continuous>  dextrose 50% Injectable 25 Gram(s) IV Push once  dextrose 50% Injectable 12.5 Gram(s) IV Push once  dextrose 50% Injectable 25 Gram(s) IV Push once  enoxaparin Injectable 40 milliGRAM(s) SubCutaneous every 24 hours  gabapentin 100 milliGRAM(s) Oral three times a day  glucagon  Injectable 1 milliGRAM(s) IntraMuscular once  insulin lispro (ADMELOG) corrective regimen sliding scale   SubCutaneous Before meals and at bedtime  iohexol 300 mG (iodine)/mL Oral Solution 30 milliLiter(s) Oral once  lidocaine   4% Patch 1 Patch Transdermal every 24 hours  losartan 25 milliGRAM(s) Oral daily  pantoprazole    Tablet 40 milliGRAM(s) Oral before breakfast      MEDICATIONS  (PRN):  dextrose Oral Gel 15 Gram(s) Oral once PRN Blood Glucose LESS THAN 70 milliGRAM(s)/deciliter  ketorolac   Injectable 15 milliGRAM(s) IV Push every 6 hours PRN Severe Pain (7 - 10)      Physical Exam    Neuro :  no focal deficits  Respiratory: CTA B/L  CV: RRR, S1S2, no murmurs,   Abdominal: Soft, NT, ND +BS,  Extremities: No edema, + peripheral pulses    ASSESSMENT    Pain of right lower extremity poss 2nd to metastatic disease  lumbar degenerative disc disease,   le edema dvt r/o,   r/o recurrent gastric ca  lytic lesions d/t MM( less likely with positive  bone scan)  uti,   h/o osteoporosis  HTN (hypertension)  DM (diabetes mellitus)  Hypercholesteremia  Gastric adenocarcinoma  Vertigo  Dementia  S/P hysterectomy  S/P tubal ligation        PLAN    ct ls spine with Posterior disc osteophyte complex at L4/L5 resulting in moderate to severe bilateral foraminal stenosis without significant spinal canal stenosis noted    ortho spine cons   pain mgmt f/u   Maximize non-opioid pain recommendations   - Continue Toradol 15mg IVP q 6 hours PRN severe pain  - Continue Acetaminophen 1 gram PO q 8 hours x 3 days.   - Continue flexeril 10mg PO TID  - Continue Gabapentin 100mg po q 8 hours. Monitor renal function.   - Continue Lidoderm 4% patch daily.   Bowel Regimen  - Per primary team  Mild pain   - Non-pharmacological pain treatment recommendations  - Warm/ Cool packs PRN   - Repositioning extremity, elevation, imagery, relaxation, distraction.  - Physical therapy OOB if no contraindications   doppler us with No evidence of right lower extremity deep venous thrombosis noted    cta chest with No pulmonary embolism. Numerous osteolytic lesions concerning for metastases. Consider MRI/bone scan for further evaluation. 4 mm right apical nodule; follow-up chest CT in 3 months.  bone scan with Multiple foci of increased uptake in the ribs, spine and right acetabulum/iliac bone suspicious for osseous metastases. Degenerative disease in the major joints noted above.  heme onc f/u   pt with h/o GASTRIC CA, S/P GASTRECTOMY FOR T3N1A, S/P ADJ CHEMO WITH FOLFOX IN 2015  also h/o of early multiple myeloma,( bmbx  2015, last visit 2/2022   M spike  IGG kappa 1.5  with FLC 20.74, IGG 2145)  check CT A/P  check CEA,   IR  BX of one of the bone lesions  for tissue diagnosis    further recommendations will depend on findings   course rocephin completed,   ucx neg noted above  blood cx neg  lispro ss,   hgba1c 7.3   phys tx eval noted and rec phys tx 2-3x/week x 4-6 weeks with rolling walker (5 inch wheels) at Home PT anticipating improvement.   3:1 commode;   Pt likely to significantly improve functional mobility as dizziness and pain improves.  cont current meds

## 2022-04-09 NOTE — PROGRESS NOTE ADULT - PROBLEM SELECTOR PLAN 1
Pt with acute right leg pain (posterior right knee pain) and lumbar back pain which is somatic and neuropathic in nature due to moderate lumbar stenosis L4/5. CT angio showed Numerous osteolytic lesions concerning for metastases. Bone scan - suspicious for oss mets. heme/oncology consult, pending ct abd pelvis   High risk medications reviewed. Avoid polypharmacy. Avoid IV opioids. Avoid benzodiazepines. Non-pharmacological sleep aides initiated. Non-opioid medications and non-pharmacological pain management measures initiated.    Maximize non-opioid pain recommendations   - Continue decadron 2mg po 2x a day for 3 days   - Continue Toradol 15mg IVP q 6 hours PRN severe pain  - Continue Acetaminophen 1 gram PO q 8 hours x 3 days.   - Continue flexeril 10mg PO TID  - Continue Gabapentin 100mg po q 8 hours. Monitor renal function.   - Continue Lidoderm 4% patch daily.   Bowel Regimen  - Per primary team  Mild pain   - Non-pharmacological pain treatment recommendations  - Warm/ Cool packs PRN   - Repositioning extremity, elevation, imagery, relaxation, distraction.  - Physical therapy OOB if no contraindications   Recommendations discussed with primary team and RN.

## 2022-04-09 NOTE — PROGRESS NOTE ADULT - ASSESSMENT
Confidential Drug Utilization Report  Search Terms: Adri George, 1935   Search Date: 04/06/2022 14:07:13 PM   The Drug Utilization Report below displays all of the controlled substance prescriptions, if any, that your patient has filled in the last twelve months. The information displayed on this report is compiled from pharmacy submissions to the Department, and accurately reflects the information as submitted by the pharmacies.  This report was requested by: Laura Marroquin | Reference #: [id number]   There are no results for the search terms that you entered.

## 2022-04-09 NOTE — PROGRESS NOTE ADULT - SUBJECTIVE AND OBJECTIVE BOX
Source of information: GAYLA ELLIS, Chart review  Patient language: Martiniquais  : Kevin [id number]    HPI:  Patient is a 87yoF, AAOx3 & ambulatory at baseline, w/ PMH of HTN, HLD, T2DM, osteoporosis, p/w RLE pain for 1 month. She complains of sharp, 10/10, constant RLE pain that worsens with standing. She reports pain starts from the back and radiates down to her thigh and the rest of the leg. She states the pain has made it difficult for her to ambulate, and it is associated with numbness and swelling of that leg. She visited her PCP, and prescribed cyclobenzaprine 5mg qHs and Meloxicam 15mg qd, but it has not ease the pain, as per patient. She denies fever, chills, n/v, chest pain, SOB, abdominal pain, urinary or bowel movement changes. (06 Apr 2022 02:26)      CT lumbar spine demonstrates moderate lumbar stenosis L4/5.  RLE doppler negative DVT.   CT angio showed Numerous osteolytic lesions concerning for metastases. Bone scan - suspicious for oss mets. heme/oncology consult, pending ct abd pelvis   Pain consulted for right leg pain. Pt seen and examined at bedside. Reports right leg pain x 1 month, reports taking cyclobenzaprine 5mg qHs and Meloxicam 15mg qd at home with minimal relief. Currently denies pain while laying in bed. Reports pain has improved during hospitalization. Reports with right leg elevation, she experiences posterior right knee pain 2/10 and tolerable. Reports her pain has improved since being admitted to the hospital.  Pt describes pain as throbbing radiating from lumbar back to right hip to right leg, alleviated by pain medication, exacerbated by movement. Reports numbness/tingling over b/l hands and feet since her chemo txmt. Also reports vertigo upon ambulation, (not knew, reports having for many years). Pt tolerating PO diet. Denies lethargy, nausea, vomiting, constipation, itchiness. Reports last BM 4/8. Patient stated goal for pain control: to be able to take deep breaths, get out of bed to chair and ambulate with tolerable pain control. Pt has been utilizing cane at home for ambulation.     PAST MEDICAL & SURGICAL HISTORY:  HTN (hypertension)    DM (diabetes mellitus)    Hypercholesteremia    Gastric adenocarcinoma  s/p resection    Vertigo    Dementia    S/P hysterectomy    S/P tubal ligation        FAMILY HISTORY:  Family history of diabetes mellitus (Sibling)    Family history of early CAD (Grandparent)        Social History:   [X ] Denies ETOH use, illicit drug use   [X] former smoking quit 15 yrs ago    Allergies    No Known Allergies    MEDICATIONS  (STANDING):  dexAMETHasone     Tablet 2 milliGRAM(s) Oral two times a day  dextrose 5%. 1000 milliLiter(s) (100 mL/Hr) IV Continuous <Continuous>  dextrose 5%. 1000 milliLiter(s) (50 mL/Hr) IV Continuous <Continuous>  dextrose 50% Injectable 25 Gram(s) IV Push once  dextrose 50% Injectable 12.5 Gram(s) IV Push once  dextrose 50% Injectable 25 Gram(s) IV Push once  enoxaparin Injectable 40 milliGRAM(s) SubCutaneous every 24 hours  gabapentin 100 milliGRAM(s) Oral three times a day  glucagon  Injectable 1 milliGRAM(s) IntraMuscular once  insulin lispro (ADMELOG) corrective regimen sliding scale   SubCutaneous Before meals and at bedtime  lidocaine   4% Patch 1 Patch Transdermal every 24 hours  losartan 25 milliGRAM(s) Oral daily  pantoprazole    Tablet 40 milliGRAM(s) Oral before breakfast    MEDICATIONS  (PRN):  dextrose Oral Gel 15 Gram(s) Oral once PRN Blood Glucose LESS THAN 70 milliGRAM(s)/deciliter  ketorolac   Injectable 15 milliGRAM(s) IV Push every 6 hours PRN Severe Pain (7 - 10)      Vital Signs Last 24 Hrs  T(C): 36.5 (09 Apr 2022 05:20), Max: 37.2 (08 Apr 2022 20:06)  T(F): 97.7 (09 Apr 2022 05:20), Max: 99 (08 Apr 2022 20:06)  HR: 76 (09 Apr 2022 05:20) (76 - 98)  BP: 129/59 (09 Apr 2022 05:20) (123/56 - 133/72)  BP(mean): --  RR: 18 (09 Apr 2022 05:20) (18 - 18)  SpO2: 98% (09 Apr 2022 05:20) (95% - 98%)  COVID-19 PCR: NotDetec (06 Apr 2022 00:03)    LABS: Reviewed                          10.9   7.45  )-----------( 277      ( 09 Apr 2022 06:42 )             33.7     04-09    137  |  107  |  34<H>  ----------------------------<  180<H>  4.0   |  18<L>  |  0.97    Ca    9.5      09 Apr 2022 06:42    TPro  8.4<H>  /  Alb  3.3<L>  /  TBili  0.2  /  DBili  x   /  AST  72<H>  /  ALT  44  /  AlkPhos  56  04-08      LIVER FUNCTIONS - ( 08 Apr 2022 07:18 )  Alb: 3.3 g/dL / Pro: 8.4 g/dL / ALK PHOS: 56 U/L / ALT: 44 U/L DA / AST: 72 U/L / GGT: x             CAPILLARY BLOOD GLUCOSE      POCT Blood Glucose.: 272 mg/dL (09 Apr 2022 11:37)  POCT Blood Glucose.: 174 mg/dL (09 Apr 2022 07:26)  POCT Blood Glucose.: 176 mg/dL (08 Apr 2022 21:03)  POCT Blood Glucose.: 144 mg/dL (08 Apr 2022 16:29)  POCT Blood Glucose.: 170 mg/dL (08 Apr 2022 14:02)    COVID-19 PCR: NotDetec (06 Apr 2022 00:03)    Radiology: Reviewed.   < from: US Duplex Venous Lower Ext Ltd, Right (04.06.22 @ 10:09) >    ACC: [id number] EXAM:  US DPLX LWR EXT VEINS LTD RT                          PROCEDURE DATE:  04/06/2022          INTERPRETATION:  CLINICAL INFORMATION: Right lower extremity pain,   positive Liz test    COMPARISON: None available.    TECHNIQUE: Duplex sonography of the RIGHT LOWER extremity veins with   color and spectral Doppler, with and without compression.    FINDINGS:    There is normal compressibility of the right common femoral, femoral and   popliteal veins.  The contralateral common femoral vein is patent.  Doppler examination shows normal spontaneous and phasic flow.    No calf vein thrombosis is detected.    IMPRESSION:  No evidence of right lower extremity deep venous thrombosis.          --- End of Report ---            KRISTA NEVAREZ MD; Attending Radiologist  This document has been electronically signed. Apr 6 2022 10:14AM    < end of copied text >    < from: CT Lumbar Spine No Cont (04.06.22 @ 09:00) >    ACC: [id number] EXAM:  CT LUMBAR SPINE                          PROCEDURE DATE:  04/06/2022          INTERPRETATION:  CT lumbar spine without IV contrast    CLINICAL INFORMATION: Low back pain with positive straight leg test.   herniated disc    TECHNIQUE:  Contiguous axial sections were obtained through the lumbar   spine.   Additional sagittal and coronal reformats were obtained.    FINDINGS:   No prior similar studies are available for review    Lumbar vertebral body heights are maintained. Novertebral fracture is   seen. No destructive bone lesion is found.  Alignment is preserved.    Facet joints appear aligned.  The visualized sacral and pelvic bones   appear intact.    Multilevel disc bulging is present with the largest posterior disc  osteophyte complex at L4/L5. There is moderate to severe left foraminal   stenosis and moderate to severe right foraminal stenosis at L4/L5,   without significant spinal canal stenosis. Rest of the neural foramina   are intact.  No high-grade central canal compromise is recognized by the   CT technique.    MR would be required to evaluate the intervertebral   discs at higher sensitivity for disc pathology.    No paraspinal mass is recognized.  Paraspinal soft tissues appear intact.      IMPRESSION:  Posterior disc osteophyte complex at L4/L5 resulting in moderate to   severe bilateral foraminal stenosis without significant spinal canal   stenosis.    --- End of Report ---            CARLOZ BAEZ MD; Attending Radiologist  This document has been electronically signed. Apr 6 2022  9:07AM    < end of copied text >      ORT Score -   Family Hx of substance abuse	Female	      Male  Alcohol 	                                           1                     3  Illegal drugs	                                   2                     3  Rx drugs                                           4 	                  4  Personal Hx of substance abuse		  Alcohol 	                                          3	                  3  Illegal drugs                                     4	                  4  Rx drugs                                            5 	                  5  Age between 16- 45 years	           1                     1  hx preadolescent sexual abuse	   3 	                  0  Psychological disease		  ADD, OCD, bipolar, schizophrenia   2	          2  Depression                                           1 	          1  Total: 0    a score of 3 or lower indicates low risk for opioid abuse		  a score of 4-7 indicates moderate risk for opioid abuse		  a score of 8 or higher indicates high risk for opioid abuse  	  REVIEW OF SYSTEMS:  CONSTITUTIONAL: No fever No fatigue   RESPIRATORY: No cough, wheezing, chills or hemoptysis; No shortness of breath  CARDIOVASCULAR: No chest pain, palpitations, dizziness, or leg swelling  GASTROINTESTINAL: No loss of appetite, decreased PO intake. No abdominal or epigastric pain. No nausea, vomiting; No diarrhea or constipation.   GENITOURINARY: No dysuria, frequency, hematuria, retention or incontinence  MUSCULOSKELETAL: + lumbar back and right posterior knee pain, no swelling; no upper or left lower motor strength weakness, + right lower motor strength weakness, no saddle anesthesia, bowel/bladder incontinence, no falls   NEURO: No headaches, + numbness/tingling b/l hands and feet, No weakness; + unsteady gait (chronic) + hx vertigo    PHYSICAL EXAM:  GENERAL:  Alert & Oriented X4, cooperative, NAD, Good concentration. Speech is clear.   RESPIRATORY: Respirations even and unlabored. Clear to auscultation bilaterally; No rales, rhonchi, wheezing, or rubs  CARDIOVASCULAR: Normal S1/S2, regular rate and rhythm; No murmurs, rubs, or gallops. No JVD.   GASTROINTESTINAL: + obese per BMI, Soft, Nontender, Nondistended; Bowel sounds present  PERIPHERAL VASCULAR:  Extremities warm without edema. 2+ Peripheral Pulses, No cyanosis, No calf tenderness; No joint swelling.   MUSCULOSKELETAL: Motor Strength 4/5 B/L upper and lower extremities, moves all extremities equally against gravity; ROM intact; negative b/l SLR; + lumbar spine and right lower back tenderness on palpation. + right knee mild tenderness  SKIN: Warm, dry, intact. No rashes, lesions, scars or wounds.     Risk factors associated with adverse outcomes related to opioid treatment  [ ]  Concurrent benzodiazepine use  [ ]  History/ Active substance use or alcohol use disorder  [ ] Psychiatric co-morbidity  [ ] Sleep apnea  [ ] COPD  [ ] BMI> 35  [ ] Liver dysfunction  [ ] Renal dysfunction  [ ] CHF  [X ] Former Smoker  [X]  Age > 60 years    [X ]  [place]  Reviewed and Copied to Chart. See below.    Plan of care and goal oriented pain management treatment options were discussed with patient and /or primary care giver; all questions and concerns were addressed and care was aligned with patient's wishes.    Educated patient on goal oriented pain management treatment options     04-09-22 @ 13:58

## 2022-04-10 LAB
ANION GAP SERPL CALC-SCNC: 8 MMOL/L — SIGNIFICANT CHANGE UP (ref 5–17)
BUN SERPL-MCNC: 39 MG/DL — HIGH (ref 7–18)
CALCIUM SERPL-MCNC: 9.2 MG/DL — SIGNIFICANT CHANGE UP (ref 8.4–10.5)
CHLORIDE SERPL-SCNC: 109 MMOL/L — HIGH (ref 96–108)
CO2 SERPL-SCNC: 20 MMOL/L — LOW (ref 22–31)
CREAT SERPL-MCNC: 1.15 MG/DL — SIGNIFICANT CHANGE UP (ref 0.5–1.3)
EGFR: 47 ML/MIN/1.73M2 — LOW
GLUCOSE BLDC GLUCOMTR-MCNC: 119 MG/DL — HIGH (ref 70–99)
GLUCOSE BLDC GLUCOMTR-MCNC: 216 MG/DL — HIGH (ref 70–99)
GLUCOSE BLDC GLUCOMTR-MCNC: 253 MG/DL — HIGH (ref 70–99)
GLUCOSE BLDC GLUCOMTR-MCNC: 263 MG/DL — HIGH (ref 70–99)
GLUCOSE BLDC GLUCOMTR-MCNC: 267 MG/DL — HIGH (ref 70–99)
GLUCOSE SERPL-MCNC: 213 MG/DL — HIGH (ref 70–99)
HCT VFR BLD CALC: 34 % — LOW (ref 34.5–45)
HGB BLD-MCNC: 11.2 G/DL — LOW (ref 11.5–15.5)
MCHC RBC-ENTMCNC: 30.1 PG — SIGNIFICANT CHANGE UP (ref 27–34)
MCHC RBC-ENTMCNC: 32.9 GM/DL — SIGNIFICANT CHANGE UP (ref 32–36)
MCV RBC AUTO: 91.4 FL — SIGNIFICANT CHANGE UP (ref 80–100)
NRBC # BLD: 0 /100 WBCS — SIGNIFICANT CHANGE UP (ref 0–0)
PLATELET # BLD AUTO: 308 K/UL — SIGNIFICANT CHANGE UP (ref 150–400)
POTASSIUM SERPL-MCNC: 4.3 MMOL/L — SIGNIFICANT CHANGE UP (ref 3.5–5.3)
POTASSIUM SERPL-SCNC: 4.3 MMOL/L — SIGNIFICANT CHANGE UP (ref 3.5–5.3)
RBC # BLD: 3.72 M/UL — LOW (ref 3.8–5.2)
RBC # FLD: 14.5 % — SIGNIFICANT CHANGE UP (ref 10.3–14.5)
SODIUM SERPL-SCNC: 137 MMOL/L — SIGNIFICANT CHANGE UP (ref 135–145)
WBC # BLD: 9.94 K/UL — SIGNIFICANT CHANGE UP (ref 3.8–10.5)
WBC # FLD AUTO: 9.94 K/UL — SIGNIFICANT CHANGE UP (ref 3.8–10.5)

## 2022-04-10 RX ADMIN — Medication 2 MILLIGRAM(S): at 05:58

## 2022-04-10 RX ADMIN — GABAPENTIN 100 MILLIGRAM(S): 400 CAPSULE ORAL at 13:14

## 2022-04-10 RX ADMIN — GABAPENTIN 100 MILLIGRAM(S): 400 CAPSULE ORAL at 05:57

## 2022-04-10 RX ADMIN — Medication 3: at 21:20

## 2022-04-10 RX ADMIN — LIDOCAINE 1 PATCH: 4 CREAM TOPICAL at 20:17

## 2022-04-10 RX ADMIN — PANTOPRAZOLE SODIUM 40 MILLIGRAM(S): 20 TABLET, DELAYED RELEASE ORAL at 05:57

## 2022-04-10 RX ADMIN — GABAPENTIN 100 MILLIGRAM(S): 400 CAPSULE ORAL at 21:20

## 2022-04-10 RX ADMIN — ENOXAPARIN SODIUM 40 MILLIGRAM(S): 100 INJECTION SUBCUTANEOUS at 05:58

## 2022-04-10 RX ADMIN — Medication 3: at 16:57

## 2022-04-10 RX ADMIN — Medication 3: at 11:44

## 2022-04-10 RX ADMIN — LIDOCAINE 1 PATCH: 4 CREAM TOPICAL at 23:36

## 2022-04-10 RX ADMIN — Medication 2: at 07:57

## 2022-04-10 RX ADMIN — LOSARTAN POTASSIUM 25 MILLIGRAM(S): 100 TABLET, FILM COATED ORAL at 05:58

## 2022-04-10 RX ADMIN — LIDOCAINE 1 PATCH: 4 CREAM TOPICAL at 11:44

## 2022-04-10 RX ADMIN — Medication 2 MILLIGRAM(S): at 17:24

## 2022-04-10 NOTE — PROGRESS NOTE ADULT - SUBJECTIVE AND OBJECTIVE BOX
Patient is a 87y old  Female who presents with a chief complaint of RLE pain (09 Apr 2022 13:54)    pt seen in icu [  ], reg med floor [ x  ], bed [ x ], chair at bedside [   ], a+o x3 [x  ], lethargic [  ],    nad [ x ]        Allergies    No Known Allergies        Vitals    T(F): 98.5 (04-10-22 @ 05:20), Max: 98.5 (04-10-22 @ 05:20)  HR: 80 (04-10-22 @ 05:20) (72 - 85)  BP: 122/55 (04-10-22 @ 05:20) (122/55 - 137/71)  RR: 17 (04-10-22 @ 05:20) (16 - 17)  SpO2: 94% (04-10-22 @ 05:20) (94% - 98%)  Wt(kg): --  CAPILLARY BLOOD GLUCOSE      POCT Blood Glucose.: 245 mg/dL (09 Apr 2022 21:05)      Labs                          10.9   7.45  )-----------( 277      ( 09 Apr 2022 06:42 )             33.7       04-09    137  |  107  |  34<H>  ----------------------------<  180<H>  4.0   |  18<L>  |  0.97    Ca    9.5      09 Apr 2022 06:42    TPro  8.4<H>  /  Alb  3.3<L>  /  TBili  0.2  /  DBili  x   /  AST  72<H>  /  ALT  44  /  AlkPhos  56  04-08            Clean Catch Clean Catch (Midstream)  04-06 @ 06:18   <10,000 CFU/mL Normal Urogenital Natalie  --  --      .Blood Blood-Peripheral  04-06 @ 06:04   No growth to date.  --  --      Clean Catch Clean Catch (Midstream)  03-05 @ 03:06   >=3 organisms. Probable collection contamination.  --  --          Radiology Results      Meds    MEDICATIONS  (STANDING):  dexAMETHasone     Tablet 2 milliGRAM(s) Oral two times a day  dextrose 5%. 1000 milliLiter(s) (100 mL/Hr) IV Continuous <Continuous>  dextrose 5%. 1000 milliLiter(s) (50 mL/Hr) IV Continuous <Continuous>  dextrose 50% Injectable 25 Gram(s) IV Push once  dextrose 50% Injectable 12.5 Gram(s) IV Push once  dextrose 50% Injectable 25 Gram(s) IV Push once  enoxaparin Injectable 40 milliGRAM(s) SubCutaneous every 24 hours  gabapentin 100 milliGRAM(s) Oral three times a day  glucagon  Injectable 1 milliGRAM(s) IntraMuscular once  insulin lispro (ADMELOG) corrective regimen sliding scale   SubCutaneous Before meals and at bedtime  lidocaine   4% Patch 1 Patch Transdermal every 24 hours  losartan 25 milliGRAM(s) Oral daily  pantoprazole    Tablet 40 milliGRAM(s) Oral before breakfast      MEDICATIONS  (PRN):  dextrose Oral Gel 15 Gram(s) Oral once PRN Blood Glucose LESS THAN 70 milliGRAM(s)/deciliter  ketorolac   Injectable 15 milliGRAM(s) IV Push every 6 hours PRN Severe Pain (7 - 10)      Physical Exam    Neuro :  no focal deficits  Respiratory: CTA B/L  CV: RRR, S1S2, no murmurs,   Abdominal: Soft, NT, ND +BS,  Extremities: No edema, + peripheral pulses    ASSESSMENT    Pain of right lower extremity poss 2nd to metastatic disease  lumbar degenerative disc disease,   le edema dvt r/o,   r/o recurrent gastric ca  lytic lesions d/t MM( less likely with positive  bone scan)  uti,   h/o osteoporosis  HTN (hypertension)  DM (diabetes mellitus)  Hypercholesteremia  Gastric adenocarcinoma  Vertigo  Dementia  S/P hysterectomy  S/P tubal ligation        PLAN    ct ls spine with Posterior disc osteophyte complex at L4/L5 resulting in moderate to severe bilateral foraminal stenosis without significant spinal canal stenosis noted    ortho spine cons   pain mgmt f/u   Maximize non-opioid pain recommendations   - Continue Toradol 15mg IVP q 6 hours PRN severe pain  - Continue Acetaminophen 1 gram PO q 8 hours x 3 days.   - Continue flexeril 10mg PO TID  - Continue Gabapentin 100mg po q 8 hours. Monitor renal function.   - Continue Lidoderm 4% patch daily.   Bowel Regimen  - Per primary team  Mild pain   - Non-pharmacological pain treatment recommendations  - Warm/ Cool packs PRN   - Repositioning extremity, elevation, imagery, relaxation, distraction.  - Physical therapy OOB if no contraindications   doppler us with No evidence of right lower extremity deep venous thrombosis noted    cta chest with No pulmonary embolism. Numerous osteolytic lesions concerning for metastases. Consider MRI/bone scan for further evaluation. 4 mm right apical nodule; follow-up chest CT in 3 months.  bone scan with Multiple foci of increased uptake in the ribs, spine and right acetabulum/iliac bone suspicious for osseous metastases. Degenerative disease in the major joints noted above.  heme onc f/u   pt with h/o GASTRIC CA, S/P GASTRECTOMY FOR T3N1A, S/P ADJ CHEMO WITH FOLFOX IN 2015  also h/o of early multiple myeloma,( bmbx  2015, last visit 2/2022   M spike  IGG kappa 1.5  with FLC 20.74, IGG 2145)  check CT A/P  check CEA,   IR  BX of one of the bone lesions  for tissue diagnosis    further recommendations will depend on findings   course rocephin completed,   ucx neg noted above  blood cx neg  lispro ss,   hgba1c 7.3   phys tx eval noted and rec phys tx 2-3x/week x 4-6 weeks with rolling walker (5 inch wheels) at Home PT anticipating improvement.   3:1 commode;   Pt likely to significantly improve functional mobility as dizziness and pain improves.  cont current meds                 Patient is a 87y old  Female who presents with a chief complaint of RLE pain (09 Apr 2022 13:54)    pt seen in icu [  ], reg med floor [ x  ], bed [ x ], chair at bedside [   ], a+o x3 [x  ], lethargic [  ],    nad [ x ]        Allergies    No Known Allergies        Vitals    T(F): 98.5 (04-10-22 @ 05:20), Max: 98.5 (04-10-22 @ 05:20)  HR: 80 (04-10-22 @ 05:20) (72 - 85)  BP: 122/55 (04-10-22 @ 05:20) (122/55 - 137/71)  RR: 17 (04-10-22 @ 05:20) (16 - 17)  SpO2: 94% (04-10-22 @ 05:20) (94% - 98%)  Wt(kg): --  CAPILLARY BLOOD GLUCOSE      POCT Blood Glucose.: 245 mg/dL (09 Apr 2022 21:05)      Labs                          10.9   7.45  )-----------( 277      ( 09 Apr 2022 06:42 )             33.7       04-09    137  |  107  |  34<H>  ----------------------------<  180<H>  4.0   |  18<L>  |  0.97    Ca    9.5      09 Apr 2022 06:42    TPro  8.4<H>  /  Alb  3.3<L>  /  TBili  0.2  /  DBili  x   /  AST  72<H>  /  ALT  44  /  AlkPhos  56  04-08    Cancer Antigen, GI Ca 19-9 (04.09.22 @ 10:20)   Cancer Antigen, GI Ca 19-9: <2:   Carcinoembryonic Antigen (04.09.22 @ 10:20)   Carcinoembryonic Antigen: 1.7:          Clean Catch Clean Catch (Midstream)  04-06 @ 06:18   <10,000 CFU/mL Normal Urogenital Natalie  --  --      .Blood Blood-Peripheral  04-06 @ 06:04   No growth to date.  --  --      Radiology Results    < from: CT Abdomen and Pelvis w/ Oral Cont and w/ IV Cont (04.09.22 @ 16:44) >  IMPRESSION:  *  Lytic bone lesions again noted without additional sites of disease in   the abdomen and pelvis.    < end of copied text >      Meds    MEDICATIONS  (STANDING):  dexAMETHasone     Tablet 2 milliGRAM(s) Oral two times a day  dextrose 5%. 1000 milliLiter(s) (100 mL/Hr) IV Continuous <Continuous>  dextrose 5%. 1000 milliLiter(s) (50 mL/Hr) IV Continuous <Continuous>  dextrose 50% Injectable 25 Gram(s) IV Push once  dextrose 50% Injectable 12.5 Gram(s) IV Push once  dextrose 50% Injectable 25 Gram(s) IV Push once  enoxaparin Injectable 40 milliGRAM(s) SubCutaneous every 24 hours  gabapentin 100 milliGRAM(s) Oral three times a day  glucagon  Injectable 1 milliGRAM(s) IntraMuscular once  insulin lispro (ADMELOG) corrective regimen sliding scale   SubCutaneous Before meals and at bedtime  lidocaine   4% Patch 1 Patch Transdermal every 24 hours  losartan 25 milliGRAM(s) Oral daily  pantoprazole    Tablet 40 milliGRAM(s) Oral before breakfast      MEDICATIONS  (PRN):  dextrose Oral Gel 15 Gram(s) Oral once PRN Blood Glucose LESS THAN 70 milliGRAM(s)/deciliter  ketorolac   Injectable 15 milliGRAM(s) IV Push every 6 hours PRN Severe Pain (7 - 10)      Physical Exam    Neuro :  no focal deficits  Respiratory: CTA B/L  CV: RRR, S1S2, no murmurs,   Abdominal: Soft, NT, ND +BS,  Extremities: No edema, + peripheral pulses    ASSESSMENT    Pain of right lower extremity poss 2nd to metastatic disease  lumbar degenerative disc disease,   le edema dvt r/o,   r/o recurrent gastric ca  lytic lesions d/t MM( less likely with positive  bone scan)  uti,   h/o osteoporosis  HTN (hypertension)  DM (diabetes mellitus)  Hypercholesteremia  Gastric adenocarcinoma  Vertigo  Dementia  S/P hysterectomy  S/P tubal ligation        PLAN    ct ls spine with Posterior disc osteophyte complex at L4/L5 resulting in moderate to severe bilateral foraminal stenosis without significant spinal canal stenosis noted    ortho spine cons   pain mgmt f/u   Maximize non-opioid pain recommendations   - Continue Toradol 15mg IVP q 6 hours PRN severe pain  - Continue Acetaminophen 1 gram PO q 8 hours x 3 days.   - Continue flexeril 10mg PO TID  - Continue Gabapentin 100mg po q 8 hours. Monitor renal function.   - Continue Lidoderm 4% patch daily.   Bowel Regimen  - Per primary team  Mild pain   - Non-pharmacological pain treatment recommendations  - Warm/ Cool packs PRN   - Repositioning extremity, elevation, imagery, relaxation, distraction.  - Physical therapy OOB if no contraindications    pain controlled  doppler us with No evidence of right lower extremity deep venous thrombosis noted    cta chest with No pulmonary embolism. Numerous osteolytic lesions concerning for metastases. Consider MRI/bone scan for further evaluation. 4 mm right apical nodule; follow-up chest CT in 3 months.  bone scan with Multiple foci of increased uptake in the ribs, spine and right acetabulum/iliac bone suspicious for osseous metastases. Degenerative disease in the major joints noted above.  heme onc f/u   pt with h/o GASTRIC CA, S/P GASTRECTOMY FOR T3N1A, S/P ADJ CHEMO WITH FOLFOX IN 2015  also h/o of early multiple myeloma,( bmbx  2015, last visit 2/2022   M spike  IGG kappa 1.5  with FLC 20.74, IGG 2145)  CT A/P with  Lytic bone lesions again noted without additional sites of disease in the abdomen and pelvis noted above.  check CEA,  neg noted above  IR  BX of one of the bone lesions  for tissue diagnosis    further recommendations will depend on findings   course rocephin completed,   ucx neg noted above  blood cx neg  lispro ss,   hgba1c 7.3   phys tx eval noted and rec phys tx 2-3x/week x 4-6 weeks with rolling walker (5 inch wheels) at Home PT anticipating improvement.   3:1 commode;   cont current meds

## 2022-04-11 LAB
ANION GAP SERPL CALC-SCNC: 9 MMOL/L — SIGNIFICANT CHANGE UP (ref 5–17)
BUN SERPL-MCNC: 48 MG/DL — HIGH (ref 7–18)
CALCIUM SERPL-MCNC: 9 MG/DL — SIGNIFICANT CHANGE UP (ref 8.4–10.5)
CHLORIDE SERPL-SCNC: 106 MMOL/L — SIGNIFICANT CHANGE UP (ref 96–108)
CO2 SERPL-SCNC: 20 MMOL/L — LOW (ref 22–31)
CREAT SERPL-MCNC: 1.08 MG/DL — SIGNIFICANT CHANGE UP (ref 0.5–1.3)
CULTURE RESULTS: SIGNIFICANT CHANGE UP
CULTURE RESULTS: SIGNIFICANT CHANGE UP
EGFR: 50 ML/MIN/1.73M2 — LOW
GLUCOSE BLDC GLUCOMTR-MCNC: 208 MG/DL — HIGH (ref 70–99)
GLUCOSE BLDC GLUCOMTR-MCNC: 271 MG/DL — HIGH (ref 70–99)
GLUCOSE BLDC GLUCOMTR-MCNC: 295 MG/DL — HIGH (ref 70–99)
GLUCOSE BLDC GLUCOMTR-MCNC: 312 MG/DL — HIGH (ref 70–99)
GLUCOSE SERPL-MCNC: 207 MG/DL — HIGH (ref 70–99)
HCT VFR BLD CALC: 31.2 % — LOW (ref 34.5–45)
HGB BLD-MCNC: 10.4 G/DL — LOW (ref 11.5–15.5)
MCHC RBC-ENTMCNC: 30.6 PG — SIGNIFICANT CHANGE UP (ref 27–34)
MCHC RBC-ENTMCNC: 33.3 GM/DL — SIGNIFICANT CHANGE UP (ref 32–36)
MCV RBC AUTO: 91.8 FL — SIGNIFICANT CHANGE UP (ref 80–100)
NRBC # BLD: 0 /100 WBCS — SIGNIFICANT CHANGE UP (ref 0–0)
PLATELET # BLD AUTO: 279 K/UL — SIGNIFICANT CHANGE UP (ref 150–400)
POTASSIUM SERPL-MCNC: 4 MMOL/L — SIGNIFICANT CHANGE UP (ref 3.5–5.3)
POTASSIUM SERPL-SCNC: 4 MMOL/L — SIGNIFICANT CHANGE UP (ref 3.5–5.3)
RBC # BLD: 3.4 M/UL — LOW (ref 3.8–5.2)
RBC # FLD: 14.6 % — HIGH (ref 10.3–14.5)
SODIUM SERPL-SCNC: 135 MMOL/L — SIGNIFICANT CHANGE UP (ref 135–145)
SPECIMEN SOURCE: SIGNIFICANT CHANGE UP
SPECIMEN SOURCE: SIGNIFICANT CHANGE UP
WBC # BLD: 8.84 K/UL — SIGNIFICANT CHANGE UP (ref 3.8–10.5)
WBC # FLD AUTO: 8.84 K/UL — SIGNIFICANT CHANGE UP (ref 3.8–10.5)

## 2022-04-11 PROCEDURE — 99232 SBSQ HOSP IP/OBS MODERATE 35: CPT

## 2022-04-11 RX ADMIN — Medication 3: at 16:37

## 2022-04-11 RX ADMIN — ENOXAPARIN SODIUM 40 MILLIGRAM(S): 100 INJECTION SUBCUTANEOUS at 06:27

## 2022-04-11 RX ADMIN — Medication 2 MILLIGRAM(S): at 06:26

## 2022-04-11 RX ADMIN — GABAPENTIN 100 MILLIGRAM(S): 400 CAPSULE ORAL at 22:13

## 2022-04-11 RX ADMIN — LIDOCAINE 1 PATCH: 4 CREAM TOPICAL at 23:00

## 2022-04-11 RX ADMIN — LIDOCAINE 1 PATCH: 4 CREAM TOPICAL at 11:47

## 2022-04-11 RX ADMIN — Medication 4: at 11:45

## 2022-04-11 RX ADMIN — Medication 3: at 22:13

## 2022-04-11 RX ADMIN — Medication 2: at 08:26

## 2022-04-11 RX ADMIN — PANTOPRAZOLE SODIUM 40 MILLIGRAM(S): 20 TABLET, DELAYED RELEASE ORAL at 06:26

## 2022-04-11 RX ADMIN — GABAPENTIN 100 MILLIGRAM(S): 400 CAPSULE ORAL at 14:21

## 2022-04-11 RX ADMIN — LIDOCAINE 1 PATCH: 4 CREAM TOPICAL at 19:00

## 2022-04-11 RX ADMIN — LOSARTAN POTASSIUM 25 MILLIGRAM(S): 100 TABLET, FILM COATED ORAL at 06:26

## 2022-04-11 RX ADMIN — GABAPENTIN 100 MILLIGRAM(S): 400 CAPSULE ORAL at 06:26

## 2022-04-11 NOTE — PROGRESS NOTE ADULT - ASSESSMENT
Patient is a 87yoF, AAOx3 & ambulatory at baseline, w/ PMH of HTN, HLD, T2DM, osteoporosis, hx gastric adenocarcinoma, s/p resection 2015.   Pt presented with RLE pain for 1 month. Admitted for RLE pain work up.  CT LS resulted Posterior disc osteophyte complex at L4/L5 resulting in moderate to severe bilateral foraminal stenosis without significant spinal canal stenosis. Hip Xray no fx. Ortho and pain management consulted.   PT recommended home PT.   UA positive, started Rocephin, Blood/urine Cx negative, will d/c Abt.   Pt D -dimer elevated 500, DVT negative RLE doppler US. on dvt ppx with lovenox. CTA no PE but suspects bone metastasis, apical nodule.   ordered bone scan to evaluate metastasis.     Son Dallin Akins called and updated pt condition, discussed plan of care. answered all questions. no further questions addressed.   Full code.         Patient is a 87yoF, AAOx3 & ambulatory at baseline, w/ PMH of HTN, HLD, T2DM, osteoporosis, hx gastric adenocarcinoma, s/p resection 2015.   Pt presented with RLE pain for 1 month. Admitted for RLE pain work up.  CT LS resulted Posterior disc osteophyte complex at L4/L5 resulting in moderate to severe bilateral foraminal stenosis without significant spinal canal stenosis. Hip Xray no fx. Ortho and pain management consulted.   Pt D -dimer elevated 500, DVT negative RLE doppler US. on dvt ppx with lovenox. CTA no PE but suspects bone metastasis, apical nodule.   Bone scan confirms lesions, IR consulted for bone biopsy   Patient and family agreeable, plan for Wednesday procedure

## 2022-04-11 NOTE — PROGRESS NOTE ADULT - SUBJECTIVE AND OBJECTIVE BOX
Patient is a 87y old  Female who presents with a chief complaint of RLE pain (10 Apr 2022 06:42)    pt seen in icu [  ], reg med floor [   ], bed [  ], chair at bedside [   ], a+o x3 [  ], lethargic [  ],  nad [  ]    shea [  ], ngt [  ], peg [  ], et tube [  ], cent line [  ], picc line [  ]        Allergies    No Known Allergies        Vitals    T(F): 98.1 (04-11-22 @ 05:30), Max: 98.1 (04-11-22 @ 05:30)  HR: 73 (04-11-22 @ 05:30) (73 - 88)  BP: 134/56 (04-11-22 @ 05:30) (134/56 - 143/56)  RR: 17 (04-11-22 @ 05:30) (16 - 17)  SpO2: 98% (04-11-22 @ 05:30) (94% - 98%)  Wt(kg): --  CAPILLARY BLOOD GLUCOSE      POCT Blood Glucose.: 253 mg/dL (10 Apr 2022 20:59)      Labs                          10.4   8.84  )-----------( 279      ( 11 Apr 2022 07:12 )             31.2       04-11    135  |  106  |  48<H>  ----------------------------<  207<H>  4.0   |  20<L>  |  1.08    Ca    9.0      11 Apr 2022 07:12              Clean Catch Clean Catch (Midstream)  04-06 @ 06:18   <10,000 CFU/mL Normal Urogenital Natalie  --  --      .Blood Blood-Peripheral  04-06 @ 06:04   No Growth Final  --  --      Clean Catch Clean Catch (Midstream)  03-05 @ 03:06   >=3 organisms. Probable collection contamination.  --  --          Radiology Results      Meds    MEDICATIONS  (STANDING):  dextrose 5%. 1000 milliLiter(s) (100 mL/Hr) IV Continuous <Continuous>  dextrose 5%. 1000 milliLiter(s) (50 mL/Hr) IV Continuous <Continuous>  dextrose 50% Injectable 25 Gram(s) IV Push once  dextrose 50% Injectable 12.5 Gram(s) IV Push once  dextrose 50% Injectable 25 Gram(s) IV Push once  enoxaparin Injectable 40 milliGRAM(s) SubCutaneous every 24 hours  gabapentin 100 milliGRAM(s) Oral three times a day  glucagon  Injectable 1 milliGRAM(s) IntraMuscular once  insulin lispro (ADMELOG) corrective regimen sliding scale   SubCutaneous Before meals and at bedtime  lidocaine   4% Patch 1 Patch Transdermal every 24 hours  losartan 25 milliGRAM(s) Oral daily  pantoprazole    Tablet 40 milliGRAM(s) Oral before breakfast      MEDICATIONS  (PRN):  dextrose Oral Gel 15 Gram(s) Oral once PRN Blood Glucose LESS THAN 70 milliGRAM(s)/deciliter  ketorolac   Injectable 15 milliGRAM(s) IV Push every 6 hours PRN Severe Pain (7 - 10)      Physical Exam    Neuro :  no focal deficits  Respiratory: CTA B/L  CV: RRR, S1S2, no murmurs,   Abdominal: Soft, NT, ND +BS,  Extremities: No edema, + peripheral pulses    ASSESSMENT    Pain of right lower extremity    HTN (hypertension)    DM (diabetes mellitus)    Hypercholesteremia    Gastric adenocarcinoma    Vertigo    Dementia    S/P hysterectomy    S/P tubal ligation        PLAN     Patient is a 87y old  Female who presents with a chief complaint of RLE pain (10 Apr 2022 06:42)    pt seen in icu [  ], reg med floor [ x  ], bed [  ], chair at bedside [ x  ], a+o x3 [x  ], lethargic [  ],    nad [ x ]      Allergies    No Known Allergies        Vitals    T(F): 98.1 (04-11-22 @ 05:30), Max: 98.1 (04-11-22 @ 05:30)  HR: 73 (04-11-22 @ 05:30) (73 - 88)  BP: 134/56 (04-11-22 @ 05:30) (134/56 - 143/56)  RR: 17 (04-11-22 @ 05:30) (16 - 17)  SpO2: 98% (04-11-22 @ 05:30) (94% - 98%)  Wt(kg): --  CAPILLARY BLOOD GLUCOSE      POCT Blood Glucose.: 253 mg/dL (10 Apr 2022 20:59)      Labs                          10.4   8.84  )-----------( 279      ( 11 Apr 2022 07:12 )             31.2       04-11    135  |  106  |  48<H>  ----------------------------<  207<H>  4.0   |  20<L>  |  1.08    Ca    9.0      11 Apr 2022 07:12              Clean Catch Clean Catch (Midstream)  04-06 @ 06:18   <10,000 CFU/mL Normal Urogenital Natalie  --  --      .Blood Blood-Peripheral  04-06 @ 06:04   No Growth Final  --  --      Clean Catch Clean Catch (Midstream)  03-05 @ 03:06   >=3 organisms. Probable collection contamination.  --  --          Radiology Results      Meds    MEDICATIONS  (STANDING):  dextrose 5%. 1000 milliLiter(s) (100 mL/Hr) IV Continuous <Continuous>  dextrose 5%. 1000 milliLiter(s) (50 mL/Hr) IV Continuous <Continuous>  dextrose 50% Injectable 25 Gram(s) IV Push once  dextrose 50% Injectable 12.5 Gram(s) IV Push once  dextrose 50% Injectable 25 Gram(s) IV Push once  enoxaparin Injectable 40 milliGRAM(s) SubCutaneous every 24 hours  gabapentin 100 milliGRAM(s) Oral three times a day  glucagon  Injectable 1 milliGRAM(s) IntraMuscular once  insulin lispro (ADMELOG) corrective regimen sliding scale   SubCutaneous Before meals and at bedtime  lidocaine   4% Patch 1 Patch Transdermal every 24 hours  losartan 25 milliGRAM(s) Oral daily  pantoprazole    Tablet 40 milliGRAM(s) Oral before breakfast      MEDICATIONS  (PRN):  dextrose Oral Gel 15 Gram(s) Oral once PRN Blood Glucose LESS THAN 70 milliGRAM(s)/deciliter  ketorolac   Injectable 15 milliGRAM(s) IV Push every 6 hours PRN Severe Pain (7 - 10)      Physical Exam    Neuro :  no focal deficits  Respiratory: CTA B/L  CV: RRR, S1S2, no murmurs,   Abdominal: Soft, NT, ND +BS,  Extremities: No edema, + peripheral pulses    ASSESSMENT    Pain of right lower extremity poss 2nd to metastatic disease  lumbar degenerative disc disease,   le edema dvt r/o,   r/o recurrent gastric ca  lytic lesions d/t MM( less likely with positive  bone scan)  uti,   h/o osteoporosis  HTN (hypertension)  DM (diabetes mellitus)  Hypercholesteremia  Gastric adenocarcinoma  Vertigo  Dementia  S/P hysterectomy  S/P tubal ligation        PLAN    ct ls spine with Posterior disc osteophyte complex at L4/L5 resulting in moderate to severe bilateral foraminal stenosis without significant spinal canal stenosis noted    ortho spine cons   pain mgmt f/u   Maximize non-opioid pain recommendations   - Continue Toradol 15mg IVP q 6 hours PRN severe pain  - Continue Acetaminophen 1 gram PO q 8 hours x 3 days.   - Continue flexeril 10mg PO TID  - Continue Gabapentin 100mg po q 8 hours. Monitor renal function.   - Continue Lidoderm 4% patch daily.   Bowel Regimen  - Per primary team  Mild pain   - Non-pharmacological pain treatment recommendations  - Warm/ Cool packs PRN   - Repositioning extremity, elevation, imagery, relaxation, distraction.  - Physical therapy OOB if no contraindications    pain controlled  doppler us with No evidence of right lower extremity deep venous thrombosis noted    cta chest with No pulmonary embolism. Numerous osteolytic lesions concerning for metastases. Consider MRI/bone scan for further evaluation. 4 mm right apical nodule; follow-up chest CT in 3 months.  bone scan with Multiple foci of increased uptake in the ribs, spine and right acetabulum/iliac bone suspicious for osseous metastases. Degenerative disease in the major joints noted above.  heme onc f/u   pt with h/o GASTRIC CA, S/P GASTRECTOMY FOR T3N1A, S/P ADJ CHEMO WITH FOLFOX IN 2015  also h/o of early multiple myeloma,( bmbx  2015, last visit 2/2022   M spike  IGG kappa 1.5  with FLC 20.74, IGG 2145)  CT A/P with  Lytic bone lesions again noted without additional sites of disease in the abdomen and pelvis noted above.  check CEA,  neg noted above  IR  BX of one of the bone lesions  for tissue diagnosis    further recommendations will depend on findings   course rocephin completed,   ucx neg noted above  blood cx neg  lispro ss,   hgba1c 7.3   phys tx eval noted and rec phys tx 2-3x/week x 4-6 weeks with rolling walker (5 inch wheels) at Home PT anticipating improvement.   3:1 commode;   cont current meds

## 2022-04-11 NOTE — PROGRESS NOTE ADULT - PROBLEM SELECTOR PLAN 3
- noted to have positive UA  - c/w ceftriaxone  - Ucx, Bcx negative   - will d/c abt - noted to have positive UA  asymptomatic   - c/w ceftriaxone  - Ucx, Bcx negative   - will d/c abt

## 2022-04-11 NOTE — CONSULT NOTE ADULT - SUBJECTIVE AND OBJECTIVE BOX
Patient  is a 87yoF,w/ PMH of HTN, HLD, T2DM, osteoporosis, p/w RLE pain for 1 month. She complains of sharp, 10/10, constant RLE pain that worsens with standing. She reports pain starts from the back and radiates down to her thigh and the rest of the leg. She states the pain has made it difficult for her to ambulate, and it is associated with numbness and swelling of that leg. She visited her PCP, and prescribed cyclobenzaprine 5mg qHs and Meloxicam 15mg qd, but it has not ease the pain, as per patient. She denies fever, chills, n/v, chest pain, SOB, abdominal pain, urinary or bowel movement changes.  patient had  evaluation  by pain management   w/u was done : DVT negative on doppler RLE; CT LS as above- lumbar stenosis, osteophyte;  D dimer 500, c/w lovenox SC, CTA no PE, + 4 mm R apical nodule  No pulmonary embolism. Numerous osteolytic lesions concerning for metastases.  Patient had  Bone scan +  diffuse bone mets   we were called for further evaluation   Patient  f/u with Dr Alvarado,  pt had h/o of gastric ca, s/p  gastric resection for T3N1a disease in 2015, s/p adjuvant chemo with FOLFOX, pt was  f/u over the years , pt also has h/o fo Multiple myeloma , diagnosed  2015- early MM, did not require treatment so far,   last visit 2/2022  in the office M spike-1.5, IGG kappa , IGG 2145, IGA 36, IGM-232, FLC=20.74            PAST MEDICAL & SURGICAL HISTORY:  HTN (hypertension)    DM (diabetes mellitus)    Hypercholesteremia    Gastric adenocarcinoma  s/p resection    Vertigo    Dementia    S/P hysterectomy    S/P tubal ligation        FAMILY HISTORY:  Family history of diabetes mellitus (Sibling)    Family history of early CAD (Grandparent)        Social History:   [X ] Denies ETOH use, illicit drug use   [X] former smoking quit 15 yrs ago    Allergies    No Known Allergies    MEDICATIONS  (STANDING):  cefTRIAXone   IVPB      cyclobenzaprine 10 milliGRAM(s) Oral three times a day  dextrose 5%. 1000 milliLiter(s) (100 mL/Hr) IV Continuous <Continuous>  dextrose 5%. 1000 milliLiter(s) (50 mL/Hr) IV Continuous <Continuous>  dextrose 50% Injectable 25 Gram(s) IV Push once  dextrose 50% Injectable 12.5 Gram(s) IV Push once  dextrose 50% Injectable 25 Gram(s) IV Push once  enoxaparin Injectable 40 milliGRAM(s) SubCutaneous every 24 hours  gabapentin 100 milliGRAM(s) Oral three times a day  glucagon  Injectable 1 milliGRAM(s) IntraMuscular once  insulin lispro (ADMELOG) corrective regimen sliding scale   SubCutaneous Before meals and at bedtime  lidocaine   4% Patch 1 Patch Transdermal every 24 hours  losartan 25 milliGRAM(s) Oral daily    MEDICATIONS  (PRN):  acetaminophen     Tablet .. 650 milliGRAM(s) Oral every 6 hours PRN Temp greater or equal to 38C (100.4F), Mild Pain (1 - 3)  dextrose Oral Gel 15 Gram(s) Oral once PRN Blood Glucose LESS THAN 70 milliGRAM(s)/deciliter  ketorolac   Injectable 15 milliGRAM(s) IV Push every 6 hours PRN Severe Pain (7 - 10)    ROS  see H&P      ICU Vital Signs Last 24 Hrs  T(C): 36.9 (08 Apr 2022 14:29), Max: 37 (08 Apr 2022 05:23)  T(F): 98.5 (08 Apr 2022 14:29), Max: 98.6 (08 Apr 2022 05:23)  HR: 94 (08 Apr 2022 14:29) (65 - 101)  BP: 123/56 (08 Apr 2022 14:29) (120/67 - 185/52)  BP(mean): --  ABP: --  ABP(mean): --  RR: 18 (08 Apr 2022 14:29) (17 - 18)  SpO2: 95% (08 Apr 2022 14:29) (95% - 98%)    PE a,ox3 nad pleasant   HEENT nc/at  Lungs cta  Cor s1s2 rrr  Abd soft, NT, +BS  extr c/c/e  LN no LAD   skin  no rashes, warm'   neuro  no focal signs  psych no agitation    labs                        10.7   7.37  )-----------( 249      ( 08 Apr 2022 07:18 )             33.5           04-08    138  |  108  |  33<H>  ----------------------------<  137<H>  4.0   |  22  |  1.04    Ca    9.1      08 Apr 2022 07:18    TPro  8.4<H>  /  Alb  3.3<L>  /  TBili  0.2  /  DBili  x   /  AST  72<H>  /  ALT  44  /  AlkPhos  56  04-08      ACC: [id number] EXAM:  CT ANGIO CHEST PULKAREN HAYS                          PROCEDURE DATE:  04/07/2022          INTERPRETATION:  CLINICAL INDICATION: Lower leg pain with hypertension,   rule out pulmonary embolism. As per EMR, history of gastric cancer.    TECHNIQUE: A volumetric acquisition of the chest was obtained from the   thoracic inlet to the upper abdomen during dynamic administration of 50cc   Omnipaque 350 intravenous contrast according to the PE protocol. 3D MIP   images were provided.    COMPARISON: No prior chest CT available for comparison. CT lumbar spine   4/6/2022. Abdominal CT 1/17/2020.    FINDINGS:  CTA: The study is technically adequate with a good contrast bolus to the   pulmonary arteries. There are no filling defects in the pulmonary artery   or its branches. The heart is normal in size. Mild coronary arterial   calcification. No pericardial effusion. The great vessels are normal in   size. There is a common origin of the innominate artery and left common   carotid artery, normal anatomic variant.    Lungs/Airways/Pleura: The central airways are patent. No pleural   effusion. Mild dependent atelectasis at the bases. There is a 4 mm right   apical nodule on series 5 image 52.    Mediastinum/Lymph nodes: Tiny nonspecific right internal mammary node on   series 5 image 99. Otherwise no thoracic lymphadenopathy.    Upper Abdomen: Status post cholecystectomy.    Bones and Soft Tissues: Numerous lytic lesions throughout the bony   thorax, including T1-T2, T11 as well as involving numerous ribs including   (but not limited to) the left anterior 4th, left lateral 7th, left   lateral 9th with cortical destruction and associated soft tissue   component, right anterior 6th and right lateral 8th rib with (likely  pathologic) fracture. Additionally, there is a 1.8 cm left paraspinal   soft tissue lesion centered at the left ninth costovertebral junction   (5:104) extending through the neuroforamen into the spinal canal without   obvious cord compression; consider further evaluation with MRI as   clinically indicated.    IMPRESSION:  No pulmonary embolism.    Numerous osteolytic lesions concerning for metastases. Consider MRI/bone   scan for further evaluation. This finding was discussed with KATHARINE Mackenzie at   time of dictation.    4 mm right apical nodule; follow-up chest CT in 3 months.    --- End of Report ---        ACC: [id number] EXAM:  NM BONE IMG WHOLE BODY                          PROCEDURE DATE:  04/08/2022          INTERPRETATION:  CLINICAL INFORMATION: 87 year-old female with numerous   osteolytic lesions on diagnostic CT, referred for further evaluation,    RADIOPHARMACEUTICAL: 20.5 mCi Tc-99m HDP, I.V.    TECHNIQUE:  Whole-body planar images were obtained in the anterior and   posterior projections approximately 2-3 hours after tracer injection.   Static views of the chest and pelvis in the anterior, posterior and   lateral projections were also obtained.    COMPARISON: No previous bone scan for comparison    FINDINGS: There are multiple foci of increased tracer uptake in the ribs   bilaterally, thoracolumbar spine and right acetabulum/iliac bone. These   findings likely correspond to osteolytic lesions seen on diagnostic CT.   There are degenerative changes in the major joints. There is physiologic   tracer distribution in the remainder of the visualized skeleton.    Both kidneys are visualized and appear symmetric.    IMPRESSION: Abnormal bone scan.    Multiple foci of increased uptake in the ribs, spine and right   acetabulum/iliac bone suspicious for osseous metastases.    Degenerative disease in the major joints.    --- End of Report ---            SARAH BECERRA MD; Attending Nuclear Medicine  This document has been electronically signed. Apr 8 2022  1:31PM          ACC: [id number] EXAM:  US DPLX LWR EXT VEINS LTD RT                          PROCEDURE DATE:  04/06/2022          INTERPRETATION:  CLINICAL INFORMATION: Right lower extremity pain,   positive Liz test    COMPARISON: None available.    TECHNIQUE: Duplex sonography of the RIGHT LOWER extremity veins with   color and spectral Doppler, with and without compression.    FINDINGS:    There is normal compressibility of the right common femoral, femoral and   popliteal veins.  The contralateral common femoral vein is patent.  Doppler examination shows normal spontaneous and phasic flow.    No calf vein thrombosis is detected.    IMPRESSION:  No evidence of right lower extremity deep venous thrombosis.          --- End of Report ---            KRISTA NEVAREZ MD; Attending Radiologist  This document has been electronically signed. Apr 6 2022 10:14AM    ACC: [id number] EXAM:  CT LUMBAR SPINE                          PROCEDURE DATE:  04/06/2022          INTERPRETATION:  CT lumbar spine without IV contrast    CLINICAL INFORMATION: Low back pain with positive straight leg test.   herniated disc    TECHNIQUE:  Contiguous axial sections were obtained through the lumbar   spine.   Additional sagittal and coronal reformats were obtained.    FINDINGS:   No prior similar studies are available for review    Lumbar vertebral body heights are maintained. Novertebral fracture is   seen. No destructive bone lesion is found.  Alignment is preserved.    Facet joints appear aligned.  The visualized sacral and pelvic bones   appear intact.    Multilevel disc bulging is present with the largest posterior disc  osteophyte complex at L4/L5. There is moderate to severe left foraminal   stenosis and moderate to severe right foraminal stenosis at L4/L5,   without significant spinal canal stenosis. Rest of the neural foramina   are intact.  No high-grade central canal compromise is recognized by the   CT technique.    MR would be required to evaluate the intervertebral   discs at higher sensitivity for disc pathology.    No paraspinal mass is recognized.  Paraspinal soft tissues appear intact.      IMPRESSION:  Posterior disc osteophyte complex at L4/L5 resulting in moderate to   severe bilateral foraminal stenosis without significant spinal canal   stenosis.    --- End of Report ---            CARLOZ BAEZ MD; Attending Radiologist  This document has been electronically signed. Apr 6 2022  9:07AM    
Patient is a 87 year old female, AAOx3 & ambulatory at baseline, w/ PMH of HTN, HLD, T2DM, osteoporosis, p/w RLE pain for 1 month. She complains of sharp, 10/10, constant RLE pain that worsens with standing. She reports pain starts from the back and radiates down to her thigh and the rest of the leg. She states the pain has made it difficult for her to ambulate, and it is associated with numbness and swelling of that leg. She visited her PCP, and prescribed cyclobenzaprine 5mg qHs and Meloxicam 15mg qd, but it has not ease the pain, as per patient. She denies fever, chills, n/v, chest pain, SOB, abdominal pain, urinary or bowel movement changes.      Patient had  Bone scan +  diffuse bone mets      Patient  f/u with Dr Alvarado,  pt had h/o of gastric ca, s/p  gastric resection for T3N1a disease in 2015, s/p adjuvant chemo with FOLFOX, pt was  f/u over the years , pt also has h/o fo Multiple myeloma , diagnosed  2015- early MM, did not require treatment so far,   last visit 2/2022  in the office M spike-1.5, IGG kappa , IGG 2145, IGA 36, IGM-232, FLC=20.74    IR was consulted for bone biopsy     Vital Signs Last 24 Hrs  T(C): 36.7 (11 Apr 2022 05:30), Max: 36.7 (11 Apr 2022 05:30)  T(F): 98.1 (11 Apr 2022 05:30), Max: 98.1 (11 Apr 2022 05:30)  HR: 73 (11 Apr 2022 05:30) (73 - 88)  BP: 134/56 (11 Apr 2022 05:30) (134/56 - 143/56)  BP(mean): --  RR: 17 (11 Apr 2022 05:30) (16 - 17)  SpO2: 98% (11 Apr 2022 05:30) (94% - 98%)    LABS:                        10.4   8.84  )-----------( 279      ( 11 Apr 2022 07:12 )             31.2     11 Apr 2022 07:12    135    |  106    |  48     ----------------------------<  207    4.0     |  20     |  1.08     Ca    9.0        11 Apr 2022 07:12    Prothrombin Time and INR, Plasma (04.06.22 @ 00:03)    Prothrombin Time, Plasma: 11.2: Effective February 23,2022, the reference range has changed. sec    INR: 0.94: Recommended targets/ranges for therapeutic INR:  2.0-3.0 Deep vein thrombosis, pulmonary embolism, atrial fibrillation  2.0-3.0 Mechanical aortic valve, antiphospholipid syndrome with previous  arterial or venous thromboembolism  2.5-3.5 Mechanical mitral valve, double mechanical valve (aortic and  mitral positions, high risk valves)  Note: Chest 2012 Feb;141(2 Suppl):7S-47S  Routine coagulation results should be interpreted with caution when  taking Factor Xa inhibitors or direct thrombin inhibitors; blood sampling  prior to drug intake is recommended. ratio    < from: CT Abdomen and Pelvis w/ Oral Cont and w/ IV Cont (04.09.22 @ 16:44) >  PROCEDURE:  CT of the Abdomen and Pelvis was performed.  Sagittal and coronal reformats were performed.    FINDINGS:  LOWER CHEST: Clear.    LIVER: Normal.  BILE DUCTS: Nondilated.  GALLBLADDER: Prior cholecystectomy.  SPLEEN: Normal.  PANCREAS: Normal.  ADRENALS: Normal.  KIDNEYS/URETERS: No calculi, hydronephrosis, or renal mass.    BLADDER: Underdistended limiting evaluation.  REPRODUCTIVE ORGANS: Status post hysterectomy.    BOWEL: Status post distal gastrectomy and Miguel-en-Y gastrojejunostomy. No   bowel obstruction. No evidence of recurrent mass.  PERITONEUM: No ascites or implants.  VESSELS: Aortoiliac atherosclerosis without aneurysm.  RETROPERITONEUM/LYMPH NODES: No adenopathy.  ABDOMINAL WALL: Normal.  BONES: Lytic lesions again noted in the right acetabulum T11 vertebral   body, and left-sidedribs compatible with metastatic disease.    IMPRESSION:  *  Lytic bone lesions again noted without additional sites of disease in   the abdomen and pelvis.      --- End of Report ---      < end of copied text >  MEDICATIONS  (STANDING):  dextrose 5%. 1000 milliLiter(s) (100 mL/Hr) IV Continuous <Continuous>  dextrose 5%. 1000 milliLiter(s) (50 mL/Hr) IV Continuous <Continuous>  dextrose 50% Injectable 25 Gram(s) IV Push once  dextrose 50% Injectable 12.5 Gram(s) IV Push once  dextrose 50% Injectable 25 Gram(s) IV Push once  enoxaparin Injectable 40 milliGRAM(s) SubCutaneous every 24 hours  gabapentin 100 milliGRAM(s) Oral three times a day  glucagon  Injectable 1 milliGRAM(s) IntraMuscular once  insulin lispro (ADMELOG) corrective regimen sliding scale   SubCutaneous Before meals and at bedtime  lidocaine   4% Patch 1 Patch Transdermal every 24 hours  losartan 25 milliGRAM(s) Oral daily  pantoprazole    Tablet 40 milliGRAM(s) Oral before breakfast    MEDICATIONS  (PRN):  dextrose Oral Gel 15 Gram(s) Oral once PRN Blood Glucose LESS THAN 70 milliGRAM(s)/deciliter  ketorolac   Injectable 15 milliGRAM(s) IV Push every 6 hours PRN Severe Pain (7 - 10)              
  Source of information: GAYLA ELLIS, Chart review  Patient language: [place]  : [place] [id number]    HPI:  Patient is a 87yoF, AAOx3 & ambulatory at baseline, w/ PMH of HTN, HLD, T2DM, osteoporosis, p/w RLE pain for 1 month. She complains of sharp, 10/10, constant RLE pain that worsens with standing. She reports pain starts from the back and radiates down to her thigh and the rest of the leg. She states the pain has made it difficult for her to ambulate, and it is associated with numbness and swelling of that leg. She visited her PCP, and prescribed cyclobenzaprine 5mg qHs and Meloxicam 15mg qd, but it has not ease the pain, as per patient. She denies fever, chills, n/v, chest pain, SOB, abdominal pain, urinary or bowel movement changes. (06 Apr 2022 02:26)      CT lumbar spine demonstrates moderate lumbar stenosis L4/5.  RLE doppler negative DVT.   Pain consulted for right leg pain. Pt seen and examined at bedside. Reports right leg pain x 1 month, reports taking cyclobenzaprine 5mg qHs and Meloxicam 15mg qd at home with minimal relief. Currently reporting right leg pain 4/10 and tolerable. Reports her pain has improved since being admitted to the hospital.  Pt describes pain as throbbing radiating from right hip to right leg, alleviated by pain medication, exacerbated by movement. Reports numbness/tingling over b/l hands and feet since her chemo txmt. Pt tolerating PO diet. Denies lethargy, nausea, vomiting, constipation, itchiness. Reports last BM 4/5. Patient stated goal for pain control: to be able to take deep breaths, get out of bed to chair and ambulate with tolerable pain control. Pt has been utilizing cane at home for ambulation.     PAST MEDICAL & SURGICAL HISTORY:  HTN (hypertension)    DM (diabetes mellitus)    Hypercholesteremia    Gastric adenocarcinoma  s/p resection    Vertigo    Dementia    S/P hysterectomy    S/P tubal ligation        FAMILY HISTORY:  Family history of diabetes mellitus (Sibling)    Family history of early CAD (Grandparent)        Social History:   [X ] Denies ETOH use, illicit drug use   [X] former smoking quit 15 yrs ago    Allergies    No Known Allergies    MEDICATIONS  (STANDING):  cefTRIAXone   IVPB      cyclobenzaprine 10 milliGRAM(s) Oral three times a day  dextrose 5%. 1000 milliLiter(s) (100 mL/Hr) IV Continuous <Continuous>  dextrose 5%. 1000 milliLiter(s) (50 mL/Hr) IV Continuous <Continuous>  dextrose 50% Injectable 25 Gram(s) IV Push once  dextrose 50% Injectable 12.5 Gram(s) IV Push once  dextrose 50% Injectable 25 Gram(s) IV Push once  enoxaparin Injectable 40 milliGRAM(s) SubCutaneous every 24 hours  gabapentin 100 milliGRAM(s) Oral three times a day  glucagon  Injectable 1 milliGRAM(s) IntraMuscular once  insulin lispro (ADMELOG) corrective regimen sliding scale   SubCutaneous Before meals and at bedtime  lidocaine   4% Patch 1 Patch Transdermal every 24 hours  losartan 25 milliGRAM(s) Oral daily    MEDICATIONS  (PRN):  acetaminophen     Tablet .. 650 milliGRAM(s) Oral every 6 hours PRN Temp greater or equal to 38C (100.4F), Mild Pain (1 - 3)  dextrose Oral Gel 15 Gram(s) Oral once PRN Blood Glucose LESS THAN 70 milliGRAM(s)/deciliter  ketorolac   Injectable 15 milliGRAM(s) IV Push every 6 hours PRN Severe Pain (7 - 10)      Vital Signs Last 24 Hrs  T(C): 36.6 (06 Apr 2022 11:10), Max: 37 (05 Apr 2022 22:21)  T(F): 97.8 (06 Apr 2022 11:10), Max: 98.6 (05 Apr 2022 22:21)  HR: 84 (06 Apr 2022 11:10) (72 - 90)  BP: 149/75 (06 Apr 2022 11:10) (126/76 - 155/67)  BP(mean): --  RR: 18 (06 Apr 2022 11:10) (17 - 18)  SpO2: 98% (06 Apr 2022 11:10) (97% - 98%)    LABS: Reviewed.                          10.0   8.67  )-----------( 243      ( 06 Apr 2022 05:51 )             31.0     04-06    140  |  111<H>  |  22<H>  ----------------------------<  117<H>  4.4   |  24  |  0.89    Ca    9.0      06 Apr 2022 05:51  Phos  3.8     04-06  Mg     1.9     04-06    TPro  7.9  /  Alb  2.9<L>  /  TBili  0.2  /  DBili  x   /  AST  34  /  ALT  27  /  AlkPhos  53  04-06    PT/INR - ( 06 Apr 2022 00:03 )   PT: 11.2 sec;   INR: 0.94 ratio         PTT - ( 06 Apr 2022 00:03 )  PTT:29.6 sec  LIVER FUNCTIONS - ( 06 Apr 2022 05:51 )  Alb: 2.9 g/dL / Pro: 7.9 g/dL / ALK PHOS: 53 U/L / ALT: 27 U/L DA / AST: 34 U/L / GGT: x             CAPILLARY BLOOD GLUCOSE      POCT Blood Glucose.: 90 mg/dL (06 Apr 2022 11:29)  POCT Blood Glucose.: 127 mg/dL (06 Apr 2022 07:39)    COVID-19 PCR: NotDetec (06 Apr 2022 00:03)      Radiology: Reviewed.   < from: US Duplex Venous Lower Ext Ltd, Right (04.06.22 @ 10:09) >    ACC: [id number] EXAM:  US DPLX LWR EXT VEINS LTD RT                          PROCEDURE DATE:  04/06/2022          INTERPRETATION:  CLINICAL INFORMATION: Right lower extremity pain,   positive Liz test    COMPARISON: None available.    TECHNIQUE: Duplex sonography of the RIGHT LOWER extremity veins with   color and spectral Doppler, with and without compression.    FINDINGS:    There is normal compressibility of the right common femoral, femoral and   popliteal veins.  The contralateral common femoral vein is patent.  Doppler examination shows normal spontaneous and phasic flow.    No calf vein thrombosis is detected.    IMPRESSION:  No evidence of right lower extremity deep venous thrombosis.          --- End of Report ---            KRISTA NEVAREZ MD; Attending Radiologist  This document has been electronically signed. Apr 6 2022 10:14AM    < end of copied text >    < from: CT Lumbar Spine No Cont (04.06.22 @ 09:00) >    ACC: [id number] EXAM:  CT LUMBAR SPINE                          PROCEDURE DATE:  04/06/2022          INTERPRETATION:  CT lumbar spine without IV contrast    CLINICAL INFORMATION: Low back pain with positive straight leg test.   herniated disc    TECHNIQUE:  Contiguous axial sections were obtained through the lumbar   spine.   Additional sagittal and coronal reformats were obtained.    FINDINGS:   No prior similar studies are available for review    Lumbar vertebral body heights are maintained. Novertebral fracture is   seen. No destructive bone lesion is found.  Alignment is preserved.    Facet joints appear aligned.  The visualized sacral and pelvic bones   appear intact.    Multilevel disc bulging is present with the largest posterior disc  osteophyte complex at L4/L5. There is moderate to severe left foraminal   stenosis and moderate to severe right foraminal stenosis at L4/L5,   without significant spinal canal stenosis. Rest of the neural foramina   are intact.  No high-grade central canal compromise is recognized by the   CT technique.    MR would be required to evaluate the intervertebral   discs at higher sensitivity for disc pathology.    No paraspinal mass is recognized.  Paraspinal soft tissues appear intact.      IMPRESSION:  Posterior disc osteophyte complex at L4/L5 resulting in moderate to   severe bilateral foraminal stenosis without significant spinal canal   stenosis.    --- End of Report ---            CARLOZ BAEZ MD; Attending Radiologist  This document has been electronically signed. Apr 6 2022  9:07AM    < end of copied text >      ORT Score -   Family Hx of substance abuse	Female	      Male  Alcohol 	                                           1                     3  Illegal drugs	                                   2                     3  Rx drugs                                           4 	                  4  Personal Hx of substance abuse		  Alcohol 	                                          3	                  3  Illegal drugs                                     4	                  4  Rx drugs                                            5 	                  5  Age between 16- 45 years	           1                     1  hx preadolescent sexual abuse	   3 	                  0  Psychological disease		  ADD, OCD, bipolar, schizophrenia   2	          2  Depression                                           1 	          1  Total: 0    a score of 3 or lower indicates low risk for opioid abuse		  a score of 4-7 indicates moderate risk for opioid abuse		  a score of 8 or higher indicates high risk for opioid abuse  	  REVIEW OF SYSTEMS:  CONSTITUTIONAL: No fever + fatigue  RESPIRATORY: No cough, wheezing, chills or hemoptysis; No shortness of breath  CARDIOVASCULAR: No chest pain, palpitations, dizziness, or leg swelling  GASTROINTESTINAL: No loss of appetite, decreased PO intake. No abdominal or epigastric pain. No nausea, vomiting; No diarrhea or constipation.   GENITOURINARY: No dysuria, frequency, hematuria, retention or incontinence  MUSCULOSKELETAL: + lumbar back and right leg pain, no swelling; no upper or left lower motor strength weakness, + right lower motor strength weakness, no saddle anesthesia, bowel/bladder incontinence, no falls   NEURO: No headaches, + numbness/tingling b/l hands and feet, No weakness    PHYSICAL EXAM:  GENERAL:  Alert & Oriented X4, cooperative, NAD, Good concentration. Speech is clear.   RESPIRATORY: Respirations even and unlabored. Clear to auscultation bilaterally; No rales, rhonchi, wheezing, or rubs  CARDIOVASCULAR: Normal S1/S2, regular rate and rhythm; No murmurs, rubs, or gallops. No JVD.   GASTROINTESTINAL: + obese per BMI, Soft, Nontender, Nondistended; Bowel sounds present  PERIPHERAL VASCULAR:  Extremities warm without edema. 2+ Peripheral Pulses, No cyanosis, No calf tenderness  MUSCULOSKELETAL: Motor Strength 4/5 B/L upper and left lower extremity, 3/5 right lower extremities; moves all extremities equally against gravity; ROM intact; + right SLR; + lumbar spine and right lower back tenderness on palpation.   SKIN: Warm, dry, intact. No rashes, lesions, scars or wounds.     Risk factors associated with adverse outcomes related to opioid treatment  [ ]  Concurrent benzodiazepine use  [ ]  History/ Active substance use or alcohol use disorder  [ ] Psychiatric co-morbidity  [ ] Sleep apnea  [ ] COPD  [ ] BMI> 35  [ ] Liver dysfunction  [ ] Renal dysfunction  [ ] CHF  [X ] Former Smoker  [X]  Age > 60 years    [X ]  [place]  Reviewed and Copied to Chart. See below.    Plan of care and goal oriented pain management treatment options were discussed with patient and /or primary care giver; all questions and concerns were addressed and care was aligned with patient's wishes.    Educated patient on goal oriented pain management treatment options

## 2022-04-11 NOTE — PROGRESS NOTE ADULT - PROBLEM SELECTOR PLAN 4
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 Numerous osteolytic lesions concerning for metastases. Consider MRI/bone scan for further evaluation. 4 mm right apical nodule; follow-up chest CT in 3 months.  -f/u Bone scan for metastasis evaluation  -outpt heme/onc follow up. 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 Numerous osteolytic lesions concerning for metastases. Consider MRI/bone scan for further evaluation. 4 mm right apical nodule; follow-up chest CT in 3 months.  - Bone scan confirms bone lesion   - plan for IR guided bone biopsy Wednesday  -heme onc follows

## 2022-04-11 NOTE — PROGRESS NOTE ADULT - SUBJECTIVE AND OBJECTIVE BOX
-*-*-*incomplete  NP Note discussed with  Primary Attending    INTERVAL HPI/OVERNIGHT EVENTS: continued complaints RLE pain     MEDICATIONS  (STANDING):  dextrose 5%. 1000 milliLiter(s) (100 mL/Hr) IV Continuous <Continuous>  dextrose 5%. 1000 milliLiter(s) (50 mL/Hr) IV Continuous <Continuous>  dextrose 50% Injectable 25 Gram(s) IV Push once  dextrose 50% Injectable 12.5 Gram(s) IV Push once  dextrose 50% Injectable 25 Gram(s) IV Push once  enoxaparin Injectable 40 milliGRAM(s) SubCutaneous every 24 hours  gabapentin 100 milliGRAM(s) Oral three times a day  glucagon  Injectable 1 milliGRAM(s) IntraMuscular once  insulin lispro (ADMELOG) corrective regimen sliding scale   SubCutaneous Before meals and at bedtime  lidocaine   4% Patch 1 Patch Transdermal every 24 hours  losartan 25 milliGRAM(s) Oral daily  pantoprazole    Tablet 40 milliGRAM(s) Oral before breakfast    MEDICATIONS  (PRN):  dextrose Oral Gel 15 Gram(s) Oral once PRN Blood Glucose LESS THAN 70 milliGRAM(s)/deciliter  ketorolac   Injectable 15 milliGRAM(s) IV Push every 6 hours PRN Severe Pain (7 - 10)      __________________________________________________  REVIEW OF SYSTEMS:    CONSTITUTIONAL: No fever,   EYES: no acute visual disturbances  NECK: No pain or stiffness  RESPIRATORY: No cough; No shortness of breath  CARDIOVASCULAR: No chest pain, no palpitations  GASTROINTESTINAL: No pain. No nausea or vomiting; No diarrhea   NEUROLOGICAL: No headache or numbness, no tremors  MUSCULOSKELETAL: +RLE pain  GENITOURINARY: no dysuria, no frequency, no hesitancy  PSYCHIATRY: no depression , no anxiety  ALL OTHER  ROS negative        Vital Signs Last 24 Hrs  T(C): 36.9 (11 Apr 2022 13:27), Max: 36.9 (11 Apr 2022 13:27)  T(F): 98.5 (11 Apr 2022 13:27), Max: 98.5 (11 Apr 2022 13:27)  HR: 83 (11 Apr 2022 13:27) (73 - 88)  BP: 142/67 (11 Apr 2022 13:27) (134/56 - 143/56)  BP(mean): --  RR: 17 (11 Apr 2022 13:27) (16 - 17)  SpO2: 96% (11 Apr 2022 13:27) (95% - 98%)    ________________________________________________  PHYSICAL EXAM:  GENERAL: NAD  HEENT: Normocephalic;  conjunctivae and sclerae clear;  NECK : supple  CHEST/LUNG: Clear to auscultation bilaterally with good air entry   HEART: S1 S2  regular; no murmurs, gallops or rubs  ABDOMEN: Soft, Nontender, Nondistended; Bowel sounds present  EXTREMITIES: no cyanosis; trace edema (R>L) ; no calf tenderness  SKIN: warm and dry; no rash  NERVOUS SYSTEM:  Awake and alert; Oriented  to place, person and time    _________________________________________________  LABS:                        10.4   8.84  )-----------( 279      ( 11 Apr 2022 07:12 )             31.2     04-11    135  |  106  |  48<H>  ----------------------------<  207<H>  4.0   |  20<L>  |  1.08    Ca    9.0      11 Apr 2022 07:12          CAPILLARY BLOOD GLUCOSE      POCT Blood Glucose.: 295 mg/dL (11 Apr 2022 16:20)  POCT Blood Glucose.: 312 mg/dL (11 Apr 2022 11:18)  POCT Blood Glucose.: 208 mg/dL (11 Apr 2022 07:49)  POCT Blood Glucose.: 253 mg/dL (10 Apr 2022 20:59)        RADIOLOGY & ADDITIONAL TESTS:   < from: CT Abdomen and Pelvis w/ Oral Cont and w/ IV Cont (04.09.22 @ 16:44) >  IMPRESSION:  *  Lytic bone lesions again noted without additional sites of disease in   the abdomen and pelvis.      --- End of Report ---    < end of copied text >  < from: [facility] Bone Imaging Total (04.08.22 @ 11:57) >    IMPRESSION: Abnormal bone scan.    Multiple foci of increased uptake in the ribs, spine and right   acetabulum/iliac bone suspicious for osseous metastases.    Degenerative disease in the major joints.    --- End of Report ---    < end of copied text >  < from: CT Angio Chest PE Protocol w/ IV Cont (04.07.22 @ 12:10) >  IMPRESSION:  No pulmonary embolism.    Numerous osteolytic lesions concerning for metastases. Consider MRI/bone   scan for further evaluation. This finding was discussed with KATHARINE Mackenzie at   time of dictation.    4 mm right apical nodule; follow-up chest CT in 3 months.    --- End of Report ---    < end of copied text >  < from: US Duplex Venous Lower Ext Ltd, Right (04.06.22 @ 10:09) >    IMPRESSION:  No evidence of right lower extremity deep venous thrombosis.          --- End of Report ---    < end of copied text >  < from: CT Lumbar Spine No Cont (04.06.22 @ 09:00) >  IMPRESSION:  Posterior disc osteophyte complex at L4/L5 resulting in moderate to   severe bilateral foraminal stenosis without significant spinal canal   stenosis.    --- End of Report ---    < end of copied text >  < from: Xray Chest 1 View-PORTABLE IMMEDIATE (Xray Chest 1 View-PORTABLE IMMEDIATE .) (04.06.22 @ 01:31) >  IMPRESSION: No evidence for focal infiltrate or lobar consolidation.    --- End of Report ---        < end of copied text >    Imaging Personally Reviewed:  YES    Consultant(s) Notes Reviewed:   YES    Care Discussed with Consultants : IR    Plan of care was discussed with patient and /or primary care giver; all questions and concerns were addressed and care was aligned with patient's wishes.

## 2022-04-11 NOTE — PROGRESS NOTE ADULT - PROBLEM SELECTOR PLAN 2
-CT LS shows Posterior disc osteophyte complex at L4/L5 resulting in moderate to severe bilateral foraminal stenosis without significant spinal canal stenosis.  -ortho consulted, no intervention needed at this time, pain management  -pain consulted -CT LS shows Posterior disc osteophyte complex at L4/L5 resulting in moderate to severe bilateral foraminal stenosis without significant spinal canal stenosis.  -ortho consulted, no intervention needed at this time, pain management  -pain follows

## 2022-04-11 NOTE — PROGRESS NOTE ADULT - PROBLEM SELECTOR PLAN 1
- p/w RLE pain x1mo  - ddx: herniated disc vs DVT  - PE: positive straight leg exam, decreased sensation in L1, L2, L3 dermatome on right, calf tenderness  - x-ray of hip unremarkable  - Well score: 1  - c/w pain management  - DVT negative on doppler RLE  - CT LS as above- lumbar stenosis, osteophyte  - D dimer 500, c/w lovenox SC, CTA no PE - p/w RLE pain x1mo  - PE: positive straight leg exam, decreased sensation in L1, L2, L3 dermatome on right, calf tenderness  - c/w pain management  - DVT negative on doppler RLE  - CT LS as above- lumbar stenosis, osteophyte, bone lesions  - D dimer 500, c/w lovenox SC, CTA no PE

## 2022-04-11 NOTE — PROGRESS NOTE ADULT - ASSESSMENT
complete note to follow     Assessment and Recommendation:   · Assessment	    # BONE METASTASIS ( on bone scan and CTA)   # LEG PAIN     # H/O GASTRIC CA, S/P GASTRECTOMY FOR T3N1A, S/P ADJ CHEMO WITH FOLFOX IN 2015  # h/o of early multiple myeloma,( bmbx  2015, last visit 2/2022   M spike  IGG kappa 1.5  with FLC 20.74, IGG 2145)  r/o recurrent gastric ca  lytic lesions d/t MM( less likely with positive  bone scan)  check CT A/P shows lytic lesions T11 and left ribs  CEA,  are nl  -IR consult for BX of one of the bone lesions  for tissue diagnosis    -further recommendations will depend on findings   -cont with pain management/bowel regime    Thank you for the referral. Will continue to monitor the patient.  Please call with any questions [phone]  Above reviewed with Attending Dr. Alberto FLOREZ/[facility]  [address] [facility], [address], [place], [place]  [phone]  *Note not finalized until signed by Attending Physician       Assessment and Recommendation:   · Assessment	    # BONE METASTASIS ( on bone scan and CTA)   # LEG PAIN     # H/O GASTRIC CA, S/P GASTRECTOMY FOR T3N1A, S/P ADJ CHEMO WITH FOLFOX IN 2015  # h/o of early multiple myeloma,( bmbx  2015, last visit 2/2022   M spike  IGG kappa 1.5  with FLC 20.74, IGG 2145)  r/o recurrent gastric ca  lytic lesions d/t MM( less likely with positive  bone scan)  check CT A/P shows lytic lesions T11 and left ribs  CEA,  are nl  -IR consult for BX of one of the bone lesions  for tissue diagnosis appreciated and planned for Wednesday (discussed with pt)  -further recommendations will depend on findings   -cont with pain management/bowel regime    Thank you for the referral. Will continue to monitor the patient.  Please call with any questions [phone]  Above reviewed with Attending Dr. Alberto FLOREZ/[facility]  [address] [facility], [address], [place], [place]  [phone]  *Note not finalized until signed by Attending Physician

## 2022-04-11 NOTE — PROGRESS NOTE ADULT - PROBLEM SELECTOR PLAN 12
from home  f/u bone scan  PT reccs-home PT.   pain management plan for IR guided bone biopsy Wednesday  PT reccs-home PT.   pain management  will D.C once optimized

## 2022-04-11 NOTE — CONSULT NOTE ADULT - ASSESSMENT
Patient is a 87 year old female  AAOx3 & ambulatory at baseline, w/ PMH of HTN, HLD, T2DM, osteoporosis, p/w RLE pain for 1 month. Admitted for RLE pain work up. IR was consulted for bone biopsy      Patient is a 87 year old female  AAOx3 & ambulatory at baseline, w/ PMH of HTN, HLD, T2DM, osteoporosis, p/w RLE pain for 1 month. Admitted for RLE pain work up. IR was consulted for bone biopsy       Plan for left rib lesion biopsy where there is expansile soft tissue component

## 2022-04-11 NOTE — PROGRESS NOTE ADULT - SUBJECTIVE AND OBJECTIVE BOX
Patient is a 87y old  Female who presents with a chief complaint of RLE pain (11 Apr 2022 07:59)      SUBJECTIVE / OVERNIGHT EVENTS:      MEDICATIONS  (STANDING):  dextrose 5%. 1000 milliLiter(s) (100 mL/Hr) IV Continuous <Continuous>  dextrose 5%. 1000 milliLiter(s) (50 mL/Hr) IV Continuous <Continuous>  dextrose 50% Injectable 25 Gram(s) IV Push once  dextrose 50% Injectable 12.5 Gram(s) IV Push once  dextrose 50% Injectable 25 Gram(s) IV Push once  enoxaparin Injectable 40 milliGRAM(s) SubCutaneous every 24 hours  gabapentin 100 milliGRAM(s) Oral three times a day  glucagon  Injectable 1 milliGRAM(s) IntraMuscular once  insulin lispro (ADMELOG) corrective regimen sliding scale   SubCutaneous Before meals and at bedtime  lidocaine   4% Patch 1 Patch Transdermal every 24 hours  losartan 25 milliGRAM(s) Oral daily  pantoprazole    Tablet 40 milliGRAM(s) Oral before breakfast    MEDICATIONS  (PRN):  dextrose Oral Gel 15 Gram(s) Oral once PRN Blood Glucose LESS THAN 70 milliGRAM(s)/deciliter  ketorolac   Injectable 15 milliGRAM(s) IV Push every 6 hours PRN Severe Pain (7 - 10)          PHYSICAL EXAM:  Vital Signs Last 24 Hrs  T(C): 36.7 (11 Apr 2022 05:30), Max: 36.7 (11 Apr 2022 05:30)  T(F): 98.1 (11 Apr 2022 05:30), Max: 98.1 (11 Apr 2022 05:30)  HR: 73 (11 Apr 2022 05:30) (73 - 88)  BP: 134/56 (11 Apr 2022 05:30) (134/56 - 143/56)  BP(mean): --  RR: 17 (11 Apr 2022 05:30) (16 - 17)  SpO2: 98% (11 Apr 2022 05:30) (94% - 98%)    LABS:                        10.4   8.84  )-----------( 279      ( 11 Apr 2022 07:12 )             31.2     04-11    135  |  106  |  48<H>  ----------------------------<  207<H>  4.0   |  20<L>  |  1.08    Ca    9.0      11 Apr 2022 07:12                COVID-19 PCR: NotDetec (06 Apr 2022 00:03)           Patient is a 87y old  Female who presents with a chief complaint of RLE pain     SUBJECTIVE / OVERNIGHT EVENTS: events noted. No new complaints. Pt concerned about her glucose level. Leg pain well controlled  #[id number]    MEDICATIONS  (STANDING):  dextrose 5%. 1000 milliLiter(s) (100 mL/Hr) IV Continuous <Continuous>  dextrose 5%. 1000 milliLiter(s) (50 mL/Hr) IV Continuous <Continuous>  dextrose 50% Injectable 25 Gram(s) IV Push once  dextrose 50% Injectable 12.5 Gram(s) IV Push once  dextrose 50% Injectable 25 Gram(s) IV Push once  enoxaparin Injectable 40 milliGRAM(s) SubCutaneous every 24 hours  gabapentin 100 milliGRAM(s) Oral three times a day  glucagon  Injectable 1 milliGRAM(s) IntraMuscular once  insulin lispro (ADMELOG) corrective regimen sliding scale   SubCutaneous Before meals and at bedtime  lidocaine   4% Patch 1 Patch Transdermal every 24 hours  losartan 25 milliGRAM(s) Oral daily  pantoprazole    Tablet 40 milliGRAM(s) Oral before breakfast    MEDICATIONS  (PRN):  dextrose Oral Gel 15 Gram(s) Oral once PRN Blood Glucose LESS THAN 70 milliGRAM(s)/deciliter  ketorolac   Injectable 15 milliGRAM(s) IV Push every 6 hours PRN Severe Pain (7 - 10)          PHYSICAL EXAM:  Vital Signs Last 24 Hrs  T(C): 36.7 (11 Apr 2022 05:30), Max: 36.7 (11 Apr 2022 05:30)  T(F): 98.1 (11 Apr 2022 05:30), Max: 98.1 (11 Apr 2022 05:30)  HR: 73 (11 Apr 2022 05:30) (73 - 88)  BP: 134/56 (11 Apr 2022 05:30) (134/56 - 143/56)  BP(mean): --  RR: 17 (11 Apr 2022 05:30) (16 - 17)  SpO2: 98% (11 Apr 2022 05:30) (94% - 98%)    GEN: NAD; A and O x 3, OOB to chair  LUNGS: CTA B/L  HEART: S1 S2  ABDOMEN: soft, non-tender, non-distended, + BS  EXTREMITIES: no edema  NERVOUS SYSTEM:  Awake and alert; no focal neuro deficits    LABS:                        10.4   8.84  )-----------( 279      ( 11 Apr 2022 07:12 )             31.2     04-11    135  |  106  |  48<H>  ----------------------------<  207<H>  4.0   |  20<L>  |  1.08    Ca    9.0      11 Apr 2022 07:12                COVID-19 PCR: Jing (06 Apr 2022 00:03)           Patient is a 87y old  Female who presents with a chief complaint of RLE pain     SUBJECTIVE / OVERNIGHT EVENTS: events noted. No new complaints. Pt concerned about her glucose level. Leg pain well controlled  #[id number] & [id number]    MEDICATIONS  (STANDING):  dextrose 5%. 1000 milliLiter(s) (100 mL/Hr) IV Continuous <Continuous>  dextrose 5%. 1000 milliLiter(s) (50 mL/Hr) IV Continuous <Continuous>  dextrose 50% Injectable 25 Gram(s) IV Push once  dextrose 50% Injectable 12.5 Gram(s) IV Push once  dextrose 50% Injectable 25 Gram(s) IV Push once  enoxaparin Injectable 40 milliGRAM(s) SubCutaneous every 24 hours  gabapentin 100 milliGRAM(s) Oral three times a day  glucagon  Injectable 1 milliGRAM(s) IntraMuscular once  insulin lispro (ADMELOG) corrective regimen sliding scale   SubCutaneous Before meals and at bedtime  lidocaine   4% Patch 1 Patch Transdermal every 24 hours  losartan 25 milliGRAM(s) Oral daily  pantoprazole    Tablet 40 milliGRAM(s) Oral before breakfast    MEDICATIONS  (PRN):  dextrose Oral Gel 15 Gram(s) Oral once PRN Blood Glucose LESS THAN 70 milliGRAM(s)/deciliter  ketorolac   Injectable 15 milliGRAM(s) IV Push every 6 hours PRN Severe Pain (7 - 10)          PHYSICAL EXAM:  Vital Signs Last 24 Hrs  T(C): 36.7 (11 Apr 2022 05:30), Max: 36.7 (11 Apr 2022 05:30)  T(F): 98.1 (11 Apr 2022 05:30), Max: 98.1 (11 Apr 2022 05:30)  HR: 73 (11 Apr 2022 05:30) (73 - 88)  BP: 134/56 (11 Apr 2022 05:30) (134/56 - 143/56)  BP(mean): --  RR: 17 (11 Apr 2022 05:30) (16 - 17)  SpO2: 98% (11 Apr 2022 05:30) (94% - 98%)    GEN: NAD; A and O x 3, OOB to chair  LUNGS: CTA B/L  HEART: S1 S2  ABDOMEN: soft, non-tender, non-distended, + BS  EXTREMITIES: no edema  NERVOUS SYSTEM:  Awake and alert; no focal neuro deficits    LABS:                        10.4   8.84  )-----------( 279      ( 11 Apr 2022 07:12 )             31.2     04-11    135  |  106  |  48<H>  ----------------------------<  207<H>  4.0   |  20<L>  |  1.08    Ca    9.0      11 Apr 2022 07:12                COVID-19 PCR: Jing (06 Apr 2022 00:03)

## 2022-04-12 ENCOUNTER — TRANSCRIPTION ENCOUNTER (OUTPATIENT)
Age: 87
End: 2022-04-12

## 2022-04-12 LAB
ANION GAP SERPL CALC-SCNC: 11 MMOL/L — SIGNIFICANT CHANGE UP (ref 5–17)
BUN SERPL-MCNC: 42 MG/DL — HIGH (ref 7–18)
CALCIUM SERPL-MCNC: 9.1 MG/DL — SIGNIFICANT CHANGE UP (ref 8.4–10.5)
CHLORIDE SERPL-SCNC: 109 MMOL/L — HIGH (ref 96–108)
CO2 SERPL-SCNC: 19 MMOL/L — LOW (ref 22–31)
CREAT SERPL-MCNC: 1.08 MG/DL — SIGNIFICANT CHANGE UP (ref 0.5–1.3)
EGFR: 50 ML/MIN/1.73M2 — LOW
GLUCOSE BLDC GLUCOMTR-MCNC: 118 MG/DL — HIGH (ref 70–99)
GLUCOSE BLDC GLUCOMTR-MCNC: 152 MG/DL — HIGH (ref 70–99)
GLUCOSE BLDC GLUCOMTR-MCNC: 198 MG/DL — HIGH (ref 70–99)
GLUCOSE BLDC GLUCOMTR-MCNC: 200 MG/DL — HIGH (ref 70–99)
GLUCOSE SERPL-MCNC: 159 MG/DL — HIGH (ref 70–99)
HCT VFR BLD CALC: 33.1 % — LOW (ref 34.5–45)
HGB BLD-MCNC: 10.7 G/DL — LOW (ref 11.5–15.5)
MCHC RBC-ENTMCNC: 30.1 PG — SIGNIFICANT CHANGE UP (ref 27–34)
MCHC RBC-ENTMCNC: 32.3 GM/DL — SIGNIFICANT CHANGE UP (ref 32–36)
MCV RBC AUTO: 93 FL — SIGNIFICANT CHANGE UP (ref 80–100)
NRBC # BLD: 0 /100 WBCS — SIGNIFICANT CHANGE UP (ref 0–0)
PLATELET # BLD AUTO: 275 K/UL — SIGNIFICANT CHANGE UP (ref 150–400)
POTASSIUM SERPL-MCNC: 3.7 MMOL/L — SIGNIFICANT CHANGE UP (ref 3.5–5.3)
POTASSIUM SERPL-SCNC: 3.7 MMOL/L — SIGNIFICANT CHANGE UP (ref 3.5–5.3)
RBC # BLD: 3.56 M/UL — LOW (ref 3.8–5.2)
RBC # FLD: 14.8 % — HIGH (ref 10.3–14.5)
SARS-COV-2 RNA SPEC QL NAA+PROBE: SIGNIFICANT CHANGE UP
SODIUM SERPL-SCNC: 139 MMOL/L — SIGNIFICANT CHANGE UP (ref 135–145)
WBC # BLD: 7.36 K/UL — SIGNIFICANT CHANGE UP (ref 3.8–10.5)
WBC # FLD AUTO: 7.36 K/UL — SIGNIFICANT CHANGE UP (ref 3.8–10.5)

## 2022-04-12 PROCEDURE — 99232 SBSQ HOSP IP/OBS MODERATE 35: CPT

## 2022-04-12 RX ORDER — ACETAMINOPHEN 500 MG
1000 TABLET ORAL EVERY 8 HOURS
Refills: 0 | Status: DISCONTINUED | OUTPATIENT
Start: 2022-04-12 | End: 2022-04-14

## 2022-04-12 RX ORDER — GABAPENTIN 400 MG/1
100 CAPSULE ORAL
Refills: 0 | Status: DISCONTINUED | OUTPATIENT
Start: 2022-04-12 | End: 2022-04-14

## 2022-04-12 RX ORDER — GABAPENTIN 400 MG/1
300 CAPSULE ORAL AT BEDTIME
Refills: 0 | Status: DISCONTINUED | OUTPATIENT
Start: 2022-04-12 | End: 2022-04-14

## 2022-04-12 RX ORDER — SENNA PLUS 8.6 MG/1
2 TABLET ORAL AT BEDTIME
Refills: 0 | Status: DISCONTINUED | OUTPATIENT
Start: 2022-04-12 | End: 2022-04-14

## 2022-04-12 RX ORDER — OXYCODONE HYDROCHLORIDE 5 MG/1
5 TABLET ORAL EVERY 6 HOURS
Refills: 0 | Status: DISCONTINUED | OUTPATIENT
Start: 2022-04-12 | End: 2022-04-14

## 2022-04-12 RX ORDER — POLYETHYLENE GLYCOL 3350 17 G/17G
17 POWDER, FOR SOLUTION ORAL DAILY
Refills: 0 | Status: DISCONTINUED | OUTPATIENT
Start: 2022-04-12 | End: 2022-04-14

## 2022-04-12 RX ADMIN — POLYETHYLENE GLYCOL 3350 17 GRAM(S): 17 POWDER, FOR SOLUTION ORAL at 11:41

## 2022-04-12 RX ADMIN — Medication 1: at 22:56

## 2022-04-12 RX ADMIN — LIDOCAINE 1 PATCH: 4 CREAM TOPICAL at 19:00

## 2022-04-12 RX ADMIN — LIDOCAINE 1 PATCH: 4 CREAM TOPICAL at 23:00

## 2022-04-12 RX ADMIN — SENNA PLUS 2 TABLET(S): 8.6 TABLET ORAL at 22:56

## 2022-04-12 RX ADMIN — Medication 1: at 08:01

## 2022-04-12 RX ADMIN — ENOXAPARIN SODIUM 40 MILLIGRAM(S): 100 INJECTION SUBCUTANEOUS at 05:30

## 2022-04-12 RX ADMIN — Medication 1000 MILLIGRAM(S): at 23:00

## 2022-04-12 RX ADMIN — GABAPENTIN 100 MILLIGRAM(S): 400 CAPSULE ORAL at 05:31

## 2022-04-12 RX ADMIN — Medication 1000 MILLIGRAM(S): at 23:30

## 2022-04-12 RX ADMIN — Medication 1000 MILLIGRAM(S): at 12:10

## 2022-04-12 RX ADMIN — LIDOCAINE 1 PATCH: 4 CREAM TOPICAL at 11:16

## 2022-04-12 RX ADMIN — GABAPENTIN 100 MILLIGRAM(S): 400 CAPSULE ORAL at 14:30

## 2022-04-12 RX ADMIN — GABAPENTIN 300 MILLIGRAM(S): 400 CAPSULE ORAL at 22:56

## 2022-04-12 RX ADMIN — PANTOPRAZOLE SODIUM 40 MILLIGRAM(S): 20 TABLET, DELAYED RELEASE ORAL at 05:31

## 2022-04-12 RX ADMIN — Medication 1000 MILLIGRAM(S): at 11:40

## 2022-04-12 RX ADMIN — LOSARTAN POTASSIUM 25 MILLIGRAM(S): 100 TABLET, FILM COATED ORAL at 05:31

## 2022-04-12 RX ADMIN — Medication 1: at 16:58

## 2022-04-12 NOTE — PROGRESS NOTE ADULT - SUBJECTIVE AND OBJECTIVE BOX
Patient for a Bone biopsy in IR tomorrow.  Please keep patient NPO, send early AM labs including CBC, BMP, and PT, INR / PTT.  Hold all anticoagulation, NSAIDS, and antiplatelet medication.

## 2022-04-12 NOTE — PROGRESS NOTE ADULT - PROBLEM SELECTOR PLAN 1
Pt with acute right hip and lumbar back pain which is somatic and neuropathic in nature due to moderate lumbar stenosis L4/5. CT angio showed Numerous osteolytic lesions concerning for metastases. Bone scan - suspicious for oss mets. heme/oncology consult.   CT abd pelvis 4/9-  Lytic bone lesions again noted without additional sites of disease in the abdomen and pelvis.  IR  BX of one of the bone lesions  for tissue diagnosis on 4/13  High risk medications reviewed. Avoid polypharmacy. Avoid IV opioids. Avoid benzodiazepines. Non-pharmacological sleep aides initiated. Non-opioid medications and non-pharmacological pain management measures initiated.    Maximize non-opioid pain recommendations   - decadron 2mg po 2x a day for 3 days completed 4/11.   - Renew Acetaminophen 1 gram PO q 8 hours x 3 days.   - Flexeril 10mg PO TID x 3 days completed 4/9  - Increase Gabapentin 100mg po BID and 300mg PO at bedtime. (CrCl 38, max dose 900mg/d) Monitor renal function.   - Continue Lidoderm 4% patch daily.   Bowel Regimen  - Start senna and miralax  Mild pain   - Non-pharmacological pain treatment recommendations  - Warm/ Cool packs PRN   - Repositioning extremity, elevation, imagery, relaxation, distraction.  - Physical therapy OOB if no contraindications   Recommendations discussed with primary team and RN.

## 2022-04-12 NOTE — PROGRESS NOTE ADULT - SUBJECTIVE AND OBJECTIVE BOX
Patient is a 87y old  Female who presents with a chief complaint of RLE pain (11 Apr 2022 13:44)    pt seen in icu [  ], reg med floor [ x  ], bed [  ], chair at bedside [ x  ], a+o x3 [x  ], lethargic [  ],    nad [ x ]      Allergies    No Known Allergies        Vitals    T(F): 97.6 (04-12-22 @ 05:16), Max: 98.5 (04-11-22 @ 13:27)  HR: 74 (04-12-22 @ 05:16) (74 - 88)  BP: 146/72 (04-12-22 @ 05:16) (142/67 - 146/72)  RR: 17 (04-12-22 @ 05:16) (16 - 17)  SpO2: 97% (04-12-22 @ 05:16) (96% - 97%)  Wt(kg): --  CAPILLARY BLOOD GLUCOSE      POCT Blood Glucose.: 271 mg/dL (11 Apr 2022 21:45)      Labs                          10.4   8.84  )-----------( 279      ( 11 Apr 2022 07:12 )             31.2       04-11    135  |  106  |  48<H>  ----------------------------<  207<H>  4.0   |  20<L>  |  1.08    Ca    9.0      11 Apr 2022 07:12              Clean Catch Clean Catch (Midstream)  04-06 @ 06:18   <10,000 CFU/mL Normal Urogenital Natalie  --  --      .Blood Blood-Peripheral  04-06 @ 06:04   No Growth Final  --  --      Clean Catch Clean Catch (Midstream)  03-05 @ 03:06   >=3 organisms. Probable collection contamination.  --  --          Radiology Results      Meds    MEDICATIONS  (STANDING):  dextrose 5%. 1000 milliLiter(s) (100 mL/Hr) IV Continuous <Continuous>  dextrose 5%. 1000 milliLiter(s) (50 mL/Hr) IV Continuous <Continuous>  dextrose 50% Injectable 25 Gram(s) IV Push once  dextrose 50% Injectable 12.5 Gram(s) IV Push once  dextrose 50% Injectable 25 Gram(s) IV Push once  enoxaparin Injectable 40 milliGRAM(s) SubCutaneous every 24 hours  gabapentin 100 milliGRAM(s) Oral three times a day  glucagon  Injectable 1 milliGRAM(s) IntraMuscular once  insulin lispro (ADMELOG) corrective regimen sliding scale   SubCutaneous Before meals and at bedtime  lidocaine   4% Patch 1 Patch Transdermal every 24 hours  losartan 25 milliGRAM(s) Oral daily  pantoprazole    Tablet 40 milliGRAM(s) Oral before breakfast      MEDICATIONS  (PRN):  dextrose Oral Gel 15 Gram(s) Oral once PRN Blood Glucose LESS THAN 70 milliGRAM(s)/deciliter      Physical Exam    Neuro :  no focal deficits  Respiratory: CTA B/L  CV: RRR, S1S2, no murmurs,   Abdominal: Soft, NT, ND +BS,  Extremities: No edema, + peripheral pulses    ASSESSMENT    Pain of right lower extremity poss 2nd to metastatic disease  lumbar degenerative disc disease,   le edema dvt r/o,   r/o recurrent gastric ca  lytic lesions d/t MM( less likely with positive  bone scan)  uti,   h/o osteoporosis  HTN (hypertension)  DM (diabetes mellitus)  Hypercholesteremia  Gastric adenocarcinoma  Vertigo  Dementia  S/P hysterectomy  S/P tubal ligation        PLAN    ct ls spine with Posterior disc osteophyte complex at L4/L5 resulting in moderate to severe bilateral foraminal stenosis without significant spinal canal stenosis noted    ortho spine cons   pain mgmt f/u   Maximize non-opioid pain recommendations   - Continue Toradol 15mg IVP q 6 hours PRN severe pain  - Continue Acetaminophen 1 gram PO q 8 hours x 3 days.   - Continue flexeril 10mg PO TID  - Continue Gabapentin 100mg po q 8 hours. Monitor renal function.   - Continue Lidoderm 4% patch daily.   Bowel Regimen  - Per primary team  Mild pain   - Non-pharmacological pain treatment recommendations  - Warm/ Cool packs PRN   - Repositioning extremity, elevation, imagery, relaxation, distraction.  - Physical therapy OOB if no contraindications    pain controlled  doppler us with No evidence of right lower extremity deep venous thrombosis noted    cta chest with No pulmonary embolism. Numerous osteolytic lesions concerning for metastases. Consider MRI/bone scan for further evaluation. 4 mm right apical nodule; follow-up chest CT in 3 months.  bone scan with Multiple foci of increased uptake in the ribs, spine and right acetabulum/iliac bone suspicious for osseous metastases. Degenerative disease in the major joints noted above.  heme onc f/u   pt with h/o GASTRIC CA, S/P GASTRECTOMY FOR T3N1A, S/P ADJ CHEMO WITH FOLFOX IN 2015  also h/o of early multiple myeloma,( bmbx  2015, last visit 2/2022   M spike  IGG kappa 1.5  with FLC 20.74, IGG 2145)  CT A/P with  Lytic bone lesions again noted without additional sites of disease in the abdomen and pelvis noted above.  check CEA,  neg noted above  IR  BX of one of the bone lesions  for tissue diagnosis    further recommendations will depend on findings   course rocephin completed,   ucx neg noted above  blood cx neg  lispro ss,   hgba1c 7.3   phys tx eval noted and rec phys tx 2-3x/week x 4-6 weeks with rolling walker (5 inch wheels) at Home PT anticipating improvement.   3:1 commode;   cont current meds       Patient is a 87y old  Female who presents with a chief complaint of RLE pain (11 Apr 2022 13:44)    pt seen in icu [  ], reg med floor [ x  ], bed [  ], chair at bedside [ x  ], a+o x3 [x  ], lethargic [  ],    nad [ x ]      Allergies    No Known Allergies        Vitals    T(F): 97.6 (04-12-22 @ 05:16), Max: 98.5 (04-11-22 @ 13:27)  HR: 74 (04-12-22 @ 05:16) (74 - 88)  BP: 146/72 (04-12-22 @ 05:16) (142/67 - 146/72)  RR: 17 (04-12-22 @ 05:16) (16 - 17)  SpO2: 97% (04-12-22 @ 05:16) (96% - 97%)  Wt(kg): --  CAPILLARY BLOOD GLUCOSE      POCT Blood Glucose.: 271 mg/dL (11 Apr 2022 21:45)      Labs                          10.4   8.84  )-----------( 279      ( 11 Apr 2022 07:12 )             31.2       04-11    135  |  106  |  48<H>  ----------------------------<  207<H>  4.0   |  20<L>  |  1.08    Ca    9.0      11 Apr 2022 07:12              Clean Catch Clean Catch (Midstream)  04-06 @ 06:18   <10,000 CFU/mL Normal Urogenital Natalie  --  --      .Blood Blood-Peripheral  04-06 @ 06:04   No Growth Final  --  --      Clean Catch Clean Catch (Midstream)  03-05 @ 03:06   >=3 organisms. Probable collection contamination.  --  --          Radiology Results      Meds    MEDICATIONS  (STANDING):  dextrose 5%. 1000 milliLiter(s) (100 mL/Hr) IV Continuous <Continuous>  dextrose 5%. 1000 milliLiter(s) (50 mL/Hr) IV Continuous <Continuous>  dextrose 50% Injectable 25 Gram(s) IV Push once  dextrose 50% Injectable 12.5 Gram(s) IV Push once  dextrose 50% Injectable 25 Gram(s) IV Push once  enoxaparin Injectable 40 milliGRAM(s) SubCutaneous every 24 hours  gabapentin 100 milliGRAM(s) Oral three times a day  glucagon  Injectable 1 milliGRAM(s) IntraMuscular once  insulin lispro (ADMELOG) corrective regimen sliding scale   SubCutaneous Before meals and at bedtime  lidocaine   4% Patch 1 Patch Transdermal every 24 hours  losartan 25 milliGRAM(s) Oral daily  pantoprazole    Tablet 40 milliGRAM(s) Oral before breakfast      MEDICATIONS  (PRN):  dextrose Oral Gel 15 Gram(s) Oral once PRN Blood Glucose LESS THAN 70 milliGRAM(s)/deciliter      Physical Exam    Neuro :  no focal deficits  Respiratory: CTA B/L  CV: RRR, S1S2, no murmurs,   Abdominal: Soft, NT, ND +BS,  Extremities: No edema, + peripheral pulses    ASSESSMENT    Pain of right lower extremity poss 2nd to metastatic disease  lumbar degenerative disc disease,   le edema dvt r/o,   r/o recurrent gastric ca  lytic lesions d/t MM( less likely with positive  bone scan)  uti,   h/o osteoporosis  HTN (hypertension)  DM (diabetes mellitus)  Hypercholesteremia  Gastric adenocarcinoma  Vertigo  Dementia  S/P hysterectomy  S/P tubal ligation        PLAN    ct ls spine with Posterior disc osteophyte complex at L4/L5 resulting in moderate to severe bilateral foraminal stenosis without significant spinal canal stenosis noted    ortho spine cons   pain mgmt f/u   Maximize non-opioid pain recommendations   - Continue Toradol 15mg IVP q 6 hours PRN severe pain  - Continue Acetaminophen 1 gram PO q 8 hours x 3 days.   - Continue flexeril 10mg PO TID  - Continue Gabapentin 100mg po q 8 hours. Monitor renal function.   - Continue Lidoderm 4% patch daily.   Bowel Regimen  - Per primary team  Mild pain   - Non-pharmacological pain treatment recommendations  - Warm/ Cool packs PRN   - Repositioning extremity, elevation, imagery, relaxation, distraction.  - Physical therapy OOB if no contraindications    pain controlled  doppler us with No evidence of right lower extremity deep venous thrombosis noted    cta chest with No pulmonary embolism. Numerous osteolytic lesions concerning for metastases. Consider MRI/bone scan for further evaluation. 4 mm right apical nodule; follow-up chest CT in 3 months.  bone scan with Multiple foci of increased uptake in the ribs, spine and right acetabulum/iliac bone suspicious for osseous metastases. Degenerative disease in the major joints noted above.  heme onc f/u   pt with h/o GASTRIC CA, S/P GASTRECTOMY FOR T3N1A, S/P ADJ CHEMO WITH FOLFOX IN 2015  also h/o of early multiple myeloma,( bmbx  2015, last visit 2/2022   M spike  IGG kappa 1.5  with FLC 20.74, IGG 2145)  CT A/P with  Lytic bone lesions again noted without additional sites of disease in the abdomen and pelvis noted above.  check CEA,  neg noted above  IR  BX of one of the bone lesions  for tissue diagnosis  in am  further recommendations will depend on findings   lovenox held  course rocephin completed,   ucx neg noted above  blood cx neg  lispro ss,   hgba1c 7.3   phys tx eval noted and rec phys tx 2-3x/week x 4-6 weeks with rolling walker (5 inch wheels) at Home PT anticipating improvement.   3:1 commode;   cont current meds

## 2022-04-12 NOTE — PROGRESS NOTE ADULT - PROBLEM SELECTOR PLAN 12
plan for IR guided bone biopsy Wednesday  PT reccs-home PT.   pain management  will D.C once optimized

## 2022-04-12 NOTE — PROGRESS NOTE ADULT - PROBLEM SELECTOR PLAN 4
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 Numerous osteolytic lesions concerning for metastases. Consider MRI/bone scan for further evaluation. 4 mm right apical nodule; follow-up chest CT in 3 months.  - Bone scan confirms bone lesion   - plan for IR guided bone biopsy Wednesday  -heme onc follows

## 2022-04-12 NOTE — DISCHARGE NOTE PROVIDER - HOSPITAL COURSE
Patient is a 87yoF, AAOx3 & ambulatory at baseline, w/ PMH of HTN, HLD, T2DM, osteoporosis, hx gastric adenocarcinoma, s/p resection 2015.   Pt presented with RLE pain for 1 month. Admitted for RLE pain work up.  CT LS resulted Posterior disc osteophyte complex at L4/L5 resulting in moderate to severe bilateral foraminal stenosis without significant spinal canal stenosis. Hip Xray no fx. Ortho and pain management consulted.   Pt D -dimer elevated 500, DVT negative RLE doppler US. on dvt ppx with lovenox. CTA no PE but suspects bone metastasis, apical nodule.   Bone scan confirms lesions, IR consulted for bone biopsy   Patient and family agreeable, plan for Wednesday procedure +)+)+)+)+) incomplere************* Patient is a 87yoF, AAOx3 & ambulatory at baseline, w/ PMH of HTN, HLD, T2DM, osteoporosis, hx gastric adenocarcinoma, s/p resection 2015.   Pt presented with RLE pain for 1 month. Admitted for RLE pain work up.  CT LS resulted Posterior disc osteophyte complex at L4/L5 resulting in moderate to severe bilateral foraminal stenosis without significant spinal canal stenosis. Hip Xray no fx. Ortho and pain management consulted.   Pt D -dimer elevated 500, DVT negative RLE doppler US. on dvt ppx with lovenox. CTA no PE but suspects bone metastasis, apical nodule.   Bone scan confirms lesions, IR consulted for bone biopsy.  Pt s/p left rib bone biopsy on 4/13, will follow up with heme/onc Dr. Alvarado with pathology results.   PT recommended home PT with DMEs (commode and walker).   RLE pain resolved.   Given patient's improved clinical status and current hemodynamic stability, decision was made to discharge. Please note that this a brief summary of hospital course please refer to daily progress notes and consult notes for full course and events

## 2022-04-12 NOTE — DISCHARGE NOTE PROVIDER - PROVIDER TOKENS
PROVIDER:[TOKEN:[[id number]],FOLLOWUP:[1 week]] PROVIDER:[TOKEN:[[id number]],FOLLOWUP:[1 week]]

## 2022-04-12 NOTE — DISCHARGE NOTE PROVIDER - CARE PROVIDER_API CALL
Tennille Dominguez)  Hematology; Medical Oncology  [address] [facility] [address]  [place] [address]  Phone: [phone]  Fax: [phone]  Follow Up Time: 1 week   Evgeny Alvarado  HEMATOLOGY  [address] [facility], [address]  [place] [address]  Phone: [phone]  Fax: [phone]  Follow Up Time: 1 week

## 2022-04-12 NOTE — DISCHARGE NOTE PROVIDER - NSDCCPCAREPLAN_GEN_ALL_CORE_FT
PRINCIPAL DISCHARGE DIAGNOSIS  Diagnosis: Bone lesion  Assessment and Plan of Treatment: You presented to the hospital with complaints of right leg pain. Your imaging found lesions to the pelvis, ribs, and thoracic spine. You were followed by the pain management team for pain control, and oncology followed your case. You has a bone bipsy on Wednesday 4/13, you should follow up with your oncologist in 1 week for the pathology results      SECONDARY DISCHARGE DIAGNOSES  Diagnosis: HTN (hypertension)  Assessment and Plan of Treatment: Low salt diet  Activity as tolerated.  Take all medication as prescribed.  Follow up with your medical doctor for routine blood pressure monitoring at your next visit.  Notify your doctor if you have any of the following symptoms:   Dizziness, Lightheadedness, Blurry vision, Headache, Chest pain, Shortness of breath      Diagnosis: DM (diabetes mellitus)  Assessment and Plan of Treatment: Your HgA1C was 7.6  this admission.  Make sure you get your HgA1c checked every three months.  If you take oral diabetes medications, check your blood glucose two times a day.  If you take insulin, check your blood glucose before meals and at bedtime.  It's important not to skip any meals.  Keep a log of your blood glucose results and always take it with you to your doctor appointments.  Keep a list of your current medications including injectables and over the counter medications and bring this medication list with you to all your doctor appointments.  If you have not seen your ophthalmologist this year call for appointment.  Check your feet daily for redness, sores, or openings. Do not self treat. If no improvement in two days call your primary care physician for an appointment.  Low blood sugar (hypoglycemia) is a blood sugar below 70mg/dl. Check your blood sugar if you feel signs/symptoms of hypoglycemia. If your blood sugar is below 70 take 15 grams of carbohydrates (ex 4 oz of apple juice, 3-4 glucose tablets, or 4-6 oz of regular soda) wait 15 minutes and repeat blood sugar to make sure it comes up above 70.  If your blood sugar is above 70 and you are due for a meal, have a meal.  If you are not due for a meal have a snack.  This snack helps keeps your blood sugar at a safe range.      Diagnosis: Unable to ambulate  Assessment and Plan of Treatment: You should continue home physical theraphy at home as reccomended  2-3 times a week for 4-6 weeks     PRINCIPAL DISCHARGE DIAGNOSIS  Diagnosis: Acute pain of lower extremity, right  Assessment and Plan of Treatment: you have a history of gastric adenocarcinoma with possible metastasis to the bone as shown in your CT scan of your lumbar spine, we will know more when your pathology results come back, follow up with Dr. Alvarado. You were seen by pain management and you are to continue take gabapentin, lidocaine patches as prescribed. You can also continue to take tylenol 650 mg every 6 hours as needed only if you have mild pain. you were seen by physical therapy and recommended home physical therapy, the  also arranged for you to get a bedside commode and a rolling walker.      SECONDARY DISCHARGE DIAGNOSES  Diagnosis: Bone lesion  Assessment and Plan of Treatment: You presented to the hospital with complaints of right leg pain. Your imaging found lesions to the pelvis, ribs, and thoracic spine. You were followed by the pain management team for pain control, and oncology followed your case. You has a bone bipsy on Wednesday 4/13, you should follow up with your oncologist in 1 week for the pathology results with Dr. Alvarado.  for wound care around the left rib biopsy site:  You will have a dressing over the biopsy site. A dressing helps the site heal and protects it.   Change the bandage every day.  You may shower, Pat the biopsy site dry.    Diagnosis: Gastric adenocarcinoma  Assessment and Plan of Treatment: follow up with Dr. Alvarado    Diagnosis: Elevated d-dimer  Assessment and Plan of Treatment: you were found to have elevated d-dimer likely due to your history of cancer. You were checked to make sure you did not have any blood clots.   you had a ultrasound of your right lower leg, there was no evidence of blood clot.   You also had a CT angiogram of your chest that did not show any blood clot in your lungs.    Diagnosis: Apical lung nodule  Assessment and Plan of Treatment: on your CT scan of your chest you were found to have a 4mm right apical nodule, follow up with your primary care doctor and follow up with another CT scan in 3 months.    Diagnosis: Acute UTI  Assessment and Plan of Treatment: you were also found to have a urinary tract infection and finished a 4 day course of antibiotics on ceftriaxone.  your blood culture and urine cultures were negative for any bacteria.   A UTI is caused by bacteria that get inside your urinary tract. Your urinary tract includes your kidneys, ureters, bladder, and urethra. Urine is made in your kidneys, and it flows from the ureters to the bladder. Urine leaves the bladder through the urethra. A UTI is more common in your lower urinary tract, which includes your bladder and urethra.  What can I do to prevent a UTI?  Empty your bladder often. Urinate and empty your bladder as soon as you feel the need. Do not hold your urine for long periods of time.  Wipe from front to back after you urinate or have a bowel movement. This will help prevent germs from getting into your urinary tract through your urethra.  Drink liquids as directed. Ask how much liquid to drink each day and which liquids are best for you. You may need to drink more liquids than usual to help flush out the bacteria. Do not drink alcohol, caffeine, or citrus juices. These can irritate your bladder and increase your symptoms. Your healthcare provider may recommend cranberry juice to help prevent a UTI.  Change sanitary pads or tampons often. This will help prevent germs from getting into your urinary tract.  Wear cotton underwear and clothes that are loose. Tight pants and nylon underwear can trap moisture and cause bacteria to grow.    Diagnosis: Lumbar stenosis  Assessment and Plan of Treatment: you had a CTscan of your Lumbar Spine that  shows Posterior disc osteophyte complex at L4/L5 resulting in moderate to severe bilateral foraminal stenosis without significant spinal canal stenosis.  you were seen by orthopedics doctor, no additional intervention needed at this time.    Diagnosis: HTN (hypertension)  Assessment and Plan of Treatment: your blood pressure ranged between: (114/61 - 139/63)  continue Metoprolol and Losartan  follow up with your primary care doctor or cardiologist.  Low salt diet  Activity as tolerated.  Take all medication as prescribed.  Follow up with your medical doctor for routine blood pressure monitoring at your next visit.  Notify your doctor if you have any of the following symptoms:   Dizziness, Lightheadedness, Blurry vision, Headache, Chest pain, Shortness of breath    Diagnosis: DM (diabetes mellitus)  Assessment and Plan of Treatment: continue metformin, Stegatro, Alogliptin  Your HgA1C was 7.6  this admission.  Make sure you get your HgA1c checked every three months.  If you take oral diabetes medications, check your blood glucose two times a day.  If you take insulin, check your blood glucose before meals and at bedtime.  It's important not to skip any meals.  Keep a log of your blood glucose results and always take it with you to your doctor appointments.  Keep a list of your current medications including injectables and over the counter medications and bring this medication list with you to all your doctor appointments.  If you have not seen your ophthalmologist this year call for appointment.  Check your feet daily for redness, sores, or openings. Do not self treat. If no improvement in two days call your primary care physician for an appointment.  Low blood sugar (hypoglycemia) is a blood sugar below 70mg/dl. Check your blood sugar if you feel signs/symptoms of hypoglycemia. If your blood sugar is below 70 take 15 grams of carbohydrates (ex 4 oz of apple juice, 3-4 glucose tablets, or 4-6 oz of regular soda) wait 15 minutes and repeat blood sugar to make sure it comes up above 70.  If your blood sugar is above 70 and you are due for a meal, have a meal.  If you are not due for a meal have a snack.  This snack helps keeps your blood sugar at a safe range.

## 2022-04-12 NOTE — PROGRESS NOTE ADULT - PROBLEM SELECTOR PLAN 1
- p/w RLE pain x1mo  - PE: positive straight leg exam, decreased sensation in L1, L2, L3 dermatome on right, calf tenderness  - c/w pain management  - DVT negative on doppler RLE  - CT LS as above- lumbar stenosis, osteophyte, bone lesions  - D dimer 500, c/w lovenox SC, CTA no PE - p/w RLE pain x1mo  - PE: positive straight leg exam, decreased sensation in L1, L2, L3 dermatome on right, calf tenderness  - c/w pain management reccs   - DVT negative on doppler RLE  - CT LS as above- lumbar stenosis, osteophyte, bone lesions  - D dimer 500, c/w lovenox SC, CTA no PE

## 2022-04-12 NOTE — DISCHARGE NOTE PROVIDER - NSDCADMDATE_GEN_ALL_CORE_FT
Abdomen , soft, nontender, nondistended , no guarding or rigidity , no masses palpable , normal bowel sounds , Liver and Spleen , no hepatomegaly present , no hepatosplenomegaly , liver nontender , spleen not palpable 06-Apr-2022 00:39

## 2022-04-12 NOTE — DISCHARGE NOTE PROVIDER - NSDCMRMEDTOKEN_GEN_ALL_CORE_FT
Alogliptin: 25 milligram(s) orally once a day  Calcium 500+D: 1 tab(s) orally once a day  Fosamax 70 mg oral tablet: 1 tab(s) orally once a week  losartan 25 mg oral tablet: 1 tab(s) orally once a day  meclizine 25 mg oral tablet: 1 tab(s) orally once a day, As Needed  metFORMIN 1000 mg oral tablet: 1 tab(s) orally 2 times a day  Metoprolol Succinate ER 25 mg oral tablet, extended release: 1 tab(s) orally once a day  simvastatin 40 mg oral tablet: 1 tab(s) orally once a day (at bedtime)  Steglatro 15 mg oral tablet: 1 tab(s) orally once a day (in the morning)  Vitamin D3 1250 mcg (50,000 intl units) oral capsule: 1 cap(s) orally once a week   acetaminophen 500 mg oral tablet: 2 tab(s) orally every 8 hours  Alogliptin: 25 milligram(s) orally once a day  Calcium 500+D: 1 tab(s) orally once a day  Fosamax 70 mg oral tablet: 1 tab(s) orally once a week  gabapentin 100 mg oral capsule: 1 cap(s) orally every 12 hours  gabapentin 300 mg oral capsule: 1 cap(s) orally once a day (at bedtime)  lidocaine 4% topical film: Apply topically to affected area every 12 hours  losartan 25 mg oral tablet: 1 tab(s) orally once a day  meclizine 25 mg oral tablet: 1 tab(s) orally once a day, As Needed  metFORMIN 1000 mg oral tablet: 1 tab(s) orally 2 times a day  Metoprolol Succinate ER 25 mg oral tablet, extended release: 1 tab(s) orally once a day  simvastatin 40 mg oral tablet: 1 tab(s) orally once a day (at bedtime)  Steglatro 15 mg oral tablet: 1 tab(s) orally once a day (in the morning)  Vitamin D3 1250 mcg (50,000 intl units) oral capsule: 1 cap(s) orally once a week

## 2022-04-12 NOTE — PROGRESS NOTE ADULT - SUBJECTIVE AND OBJECTIVE BOX
-*-*-*INCOMPLETE  NP Note discussed with  primary attending    Patient is a 87y old  Female who presents with a chief complaint of RLE pain (12 Apr 2022 12:20)      INTERVAL HPI/OVERNIGHT EVENTS: abdominal distention     MEDICATIONS  (STANDING):  acetaminophen     Tablet .. 1000 milliGRAM(s) Oral every 8 hours  dextrose 5%. 1000 milliLiter(s) (100 mL/Hr) IV Continuous <Continuous>  dextrose 5%. 1000 milliLiter(s) (50 mL/Hr) IV Continuous <Continuous>  dextrose 50% Injectable 25 Gram(s) IV Push once  dextrose 50% Injectable 12.5 Gram(s) IV Push once  dextrose 50% Injectable 25 Gram(s) IV Push once  gabapentin 300 milliGRAM(s) Oral at bedtime  gabapentin 100 milliGRAM(s) Oral <User Schedule>  glucagon  Injectable 1 milliGRAM(s) IntraMuscular once  insulin lispro (ADMELOG) corrective regimen sliding scale   SubCutaneous Before meals and at bedtime  lidocaine   4% Patch 1 Patch Transdermal every 24 hours  losartan 25 milliGRAM(s) Oral daily  pantoprazole    Tablet 40 milliGRAM(s) Oral before breakfast  polyethylene glycol 3350 17 Gram(s) Oral daily  senna 2 Tablet(s) Oral at bedtime    MEDICATIONS  (PRN):  dextrose Oral Gel 15 Gram(s) Oral once PRN Blood Glucose LESS THAN 70 milliGRAM(s)/deciliter  oxyCODONE    IR 5 milliGRAM(s) Oral every 6 hours PRN Severe Pain (7 - 10)      __________________________________________________  REVIEW OF SYSTEMS:    CONSTITUTIONAL: No fever,   EYES: no acute visual disturbances  NECK: No pain or stiffness  RESPIRATORY: No cough; No shortness of breath  CARDIOVASCULAR: No chest pain, no palpitations  GASTROINTESTINAL: No pain. No nausea or vomiting; No diarrhea + abd distension   NEUROLOGICAL: No headache or numbness, no tremors  MUSCULOSKELETAL: No joint pain, no muscle pain  GENITOURINARY: no dysuria, no frequency, no hesitancy  PSYCHIATRY: no depression , no anxiety  ALL OTHER  ROS negative        Vital Signs Last 24 Hrs  T(C): 36.4 (12 Apr 2022 14:14), Max: 36.8 (11 Apr 2022 20:38)  T(F): 97.6 (12 Apr 2022 14:14), Max: 98.3 (11 Apr 2022 20:38)  HR: 78 (12 Apr 2022 14:14) (74 - 88)  BP: 115/61 (12 Apr 2022 14:14) (115/61 - 146/72)  BP(mean): --  RR: 16 (12 Apr 2022 14:14) (16 - 17)  SpO2: 98% (12 Apr 2022 14:14) (97% - 98%)    ________________________________________________  PHYSICAL EXAM:  GENERAL: NAD  HEENT: Normocephalic;  conjunctivae and sclerae clear;   NECK : supple  CHEST/LUNG: Clear to ausculitation bilaterally with good air entry   HEART: S1 S2  regular; no murmurs, gallops or rubs  ABDOMEN: Soft, Nontender, +distended; Bowel sounds present  EXTREMITIES: no cyanosis; no edema; no calf tenderness  SKIN: warm and dry; no rash  NERVOUS SYSTEM:  Awake and alert; Oriented  to place, person and time    _________________________________________________  LABS:                        10.7   7.36  )-----------( 275      ( 12 Apr 2022 07:51 )             33.1     04-12    139  |  109<H>  |  42<H>  ----------------------------<  159<H>  3.7   |  19<L>  |  1.08    Ca    9.1      12 Apr 2022 07:51          CAPILLARY BLOOD GLUCOSE      POCT Blood Glucose.: 118 mg/dL (12 Apr 2022 11:50)  POCT Blood Glucose.: 152 mg/dL (12 Apr 2022 07:32)  POCT Blood Glucose.: 271 mg/dL (11 Apr 2022 21:45)  POCT Blood Glucose.: 295 mg/dL (11 Apr 2022 16:20)        RADIOLOGY & ADDITIONAL TESTS:< from: CT Abdomen and Pelvis w/ Oral Cont and w/ IV Cont (04.09.22 @ 16:44) >  IMPRESSION:  *  Lytic bone lesions again noted without additional sites of disease in   the abdomen and pelvis.      --- End of Report ---    < end of copied text >  < from: NM Bone Imaging Total (04.08.22 @ 11:57) >    IMPRESSION: Abnormal bone scan.    Multiple foci of increased uptake in the ribs, spine and right   acetabulum/iliac bone suspicious for osseous metastases.    Degenerative disease in the major joints.    --- End of Report ---    < end of copied text >  < from: CT Angio Chest PE Protocol w/ IV Cont (04.07.22 @ 12:10) >  IMPRESSION:  No pulmonary embolism.    Numerous osteolytic lesions concerning for metastases. Consider MRI/bone   scan for further evaluation. This finding was discussed with KATHARINE Mackenzie at   time of dictation.    4 mm right apical nodule; follow-up chest CT in 3 months.    --- End of Report ---    < end of copied text >  < from: US Duplex Venous Lower Ext Ltd, Right (04.06.22 @ 10:09) >    IMPRESSION:  No evidence of right lower extremity deep venous thrombosis.          --- End of Report ---    < end of copied text >  < from: CT Lumbar Spine No Cont (04.06.22 @ 09:00) >  IMPRESSION:  Posterior disc osteophyte complex at L4/L5 resulting in moderate to   severe bilateral foraminal stenosis without significant spinal canal   stenosis.    --- End of Report ---    < end of copied text >  < from: Xray Chest 1 View-PORTABLE IMMEDIATE (Xray Chest 1 View-PORTABLE IMMEDIATE .) (04.06.22 @ 01:31) >  IMPRESSION: No evidence for focal infiltrate or lobar consolidation.    --- End of Report ---        < end of copied text >    Imaging Personally Reviewed:  YES/    Consultant(s) Notes Reviewed:   YES/    Care Discussed with Consultants : IR     Plan of care was discussed with patient and /or primary care giver; all questions and concerns were addressed and care was aligned with patient's wishes.

## 2022-04-12 NOTE — PROGRESS NOTE ADULT - ASSESSMENT
Patient is a 87yoF, AAOx3 & ambulatory at baseline, w/ PMH of HTN, HLD, T2DM, osteoporosis, hx gastric adenocarcinoma, s/p resection 2015.   Pt presented with RLE pain for 1 month. Admitted for RLE pain work up.  CT LS resulted Posterior disc osteophyte complex at L4/L5 resulting in moderate to severe bilateral foraminal stenosis without significant spinal canal stenosis. Hip Xray no fx. Ortho and pain management consulted.   Pt D -dimer elevated 500, DVT negative RLE doppler US. on dvt ppx with lovenox. CTA no PE but suspects bone metastasis, apical nodule.   Bone scan confirms lesions, IR consulted for bone biopsy   Patient and family agreeable, plan for Wednesday procedure

## 2022-04-12 NOTE — PROGRESS NOTE ADULT - SUBJECTIVE AND OBJECTIVE BOX
Source of information: GAYLA ELLIS, Chart review  Patient language: Omani  : Neal [id number]    HPI:  Patient is a 87yoF, AAOx3 & ambulatory at baseline, w/ PMH of HTN, HLD, T2DM, osteoporosis, p/w RLE pain for 1 month. She complains of sharp, 10/10, constant RLE pain that worsens with standing. She reports pain starts from the back and radiates down to her thigh and the rest of the leg. She states the pain has made it difficult for her to ambulate, and it is associated with numbness and swelling of that leg. She visited her PCP, and prescribed cyclobenzaprine 5mg qHs and Meloxicam 15mg qd, but it has not ease the pain, as per patient. She denies fever, chills, n/v, chest pain, SOB, abdominal pain, urinary or bowel movement changes. (06 Apr 2022 02:26)      CT lumbar spine demonstrates moderate lumbar stenosis L4/5.  RLE doppler negative DVT.   CT angio showed Numerous osteolytic lesions concerning for metastases. Bone scan - suspicious for oss mets. heme/oncology consult  CT abd pelvis 4/9- *  Lytic bone lesions again noted without additional sites of disease in the abdomen and pelvis.  IR  BX of one of the bone lesions  for tissue diagnosis on 4/13.   Pain consulted for right leg pain. Reports right leg pain x 1 month, reports taking cyclobenzaprine 5mg qHs and Meloxicam 15mg qd at home with minimal relief. Pt seen and examined at bedside. Reports pain has improved during hospitalization. Currently reports right hip pain 5/10, aching, radiating from right hip to right lumbar back occasionally down right leg, alleviated by pain medication, exacerbated by movement. Reports numbness/tingling over b/l hands and feet since her chemo txmt. Also reports vertigo upon ambulation, (not new, reports having for many years). Pt tolerating PO diet. Denies lethargy, chest pain, SOB, abdominal pain, nausea, vomiting, itchiness. Reports constipation, last BM 4/10. Patient stated goal for pain control: to be able to take deep breaths, get out of bed to chair and ambulate with tolerable pain control. Pt has been utilizing cane at home for ambulation. Pt reports she has been able to ambulate with walker and assistance.     PAST MEDICAL & SURGICAL HISTORY:  HTN (hypertension)    DM (diabetes mellitus)    Hypercholesteremia    Gastric adenocarcinoma  s/p resection    Vertigo    Dementia    S/P hysterectomy    S/P tubal ligation        FAMILY HISTORY:  Family history of diabetes mellitus (Sibling)    Family history of early CAD (Grandparent)        Social History:   [X ] Denies ETOH use, illicit drug use   [X] former smoking quit 15 yrs ago    Allergies    No Known Allergies    MEDICATIONS  (STANDING):  acetaminophen     Tablet .. 1000 milliGRAM(s) Oral every 8 hours  dextrose 5%. 1000 milliLiter(s) (50 mL/Hr) IV Continuous <Continuous>  dextrose 5%. 1000 milliLiter(s) (100 mL/Hr) IV Continuous <Continuous>  dextrose 50% Injectable 25 Gram(s) IV Push once  dextrose 50% Injectable 12.5 Gram(s) IV Push once  dextrose 50% Injectable 25 Gram(s) IV Push once  gabapentin 300 milliGRAM(s) Oral at bedtime  gabapentin 100 milliGRAM(s) Oral <User Schedule>  glucagon  Injectable 1 milliGRAM(s) IntraMuscular once  insulin lispro (ADMELOG) corrective regimen sliding scale   SubCutaneous Before meals and at bedtime  lidocaine   4% Patch 1 Patch Transdermal every 24 hours  losartan 25 milliGRAM(s) Oral daily  pantoprazole    Tablet 40 milliGRAM(s) Oral before breakfast  polyethylene glycol 3350 17 Gram(s) Oral daily  senna 2 Tablet(s) Oral at bedtime    MEDICATIONS  (PRN):  dextrose Oral Gel 15 Gram(s) Oral once PRN Blood Glucose LESS THAN 70 milliGRAM(s)/deciliter  oxyCODONE    IR 5 milliGRAM(s) Oral every 6 hours PRN Severe Pain (7 - 10)      Vital Signs Last 24 Hrs  T(C): 36.4 (12 Apr 2022 05:16), Max: 36.9 (11 Apr 2022 13:27)  T(F): 97.6 (12 Apr 2022 05:16), Max: 98.5 (11 Apr 2022 13:27)  HR: 74 (12 Apr 2022 05:16) (74 - 88)  BP: 146/72 (12 Apr 2022 05:16) (142/67 - 146/72)  BP(mean): --  RR: 17 (12 Apr 2022 05:16) (16 - 17)  SpO2: 97% (12 Apr 2022 05:16) (96% - 97%)  COVID-19 PCR: NotDetec (12 Apr 2022 07:07)  COVID-19 PCR: NotDetec (06 Apr 2022 00:03)    LABS: Reviewed                          10.7   7.36  )-----------( 275      ( 12 Apr 2022 07:51 )             33.1     04-12    139  |  109<H>  |  42<H>  ----------------------------<  159<H>  3.7   |  19<L>  |  1.08    Ca    9.1      12 Apr 2022 07:51    CAPILLARY BLOOD GLUCOSE      POCT Blood Glucose.: 152 mg/dL (12 Apr 2022 07:32)  POCT Blood Glucose.: 271 mg/dL (11 Apr 2022 21:45)  POCT Blood Glucose.: 295 mg/dL (11 Apr 2022 16:20)    Radiology: Reviewed.   < from: CT Abdomen and Pelvis w/ Oral Cont and w/ IV Cont (04.09.22 @ 16:44) >    ACC: [id number] EXAM:  CT ABDOMEN AND PELVIS OC IC                          PROCEDURE DATE:  04/09/2022          INTERPRETATION:  CLINICAL INFORMATION: Gastric carcinoma status post   resection with bone lesions    COMPARISON: CT abdomen pelvis 1/17/2020    CONTRAST/COMPLICATIONS:  IV Contrast: Omnipaque 350  90 cc administered   10 cc discarded  Oral Contrast: Omnipaque 300  Complications: None reported at time of study completion    PROCEDURE:  CT of the Abdomen and Pelvis was performed.  Sagittal and coronal reformats were performed.    FINDINGS:  LOWER CHEST: Clear.    LIVER: Normal.  BILE DUCTS: Nondilated.  GALLBLADDER: Prior cholecystectomy.  SPLEEN: Normal.  PANCREAS: Normal.  ADRENALS: Normal.  KIDNEYS/URETERS: No calculi, hydronephrosis, or renal mass.    BLADDER: Underdistended limiting evaluation.  REPRODUCTIVE ORGANS: Status post hysterectomy.    BOWEL: Status post distal gastrectomy and Miguel-en-Y gastrojejunostomy. No   bowel obstruction. No evidence of recurrent mass.  PERITONEUM: No ascites or implants.  VESSELS: Aortoiliac atherosclerosis without aneurysm.  RETROPERITONEUM/LYMPH NODES: No adenopathy.  ABDOMINAL WALL: Normal.  BONES: Lytic lesions again noted in the right acetabulum T11 vertebral   body, and left-sidedribs compatible with metastatic disease.    IMPRESSION:  *  Lytic bone lesions again noted without additional sites of disease in   the abdomen and pelvis.      --- End of Report ---            TERESA POLANCO MD; Attending Radiologist  This document hasbeen electronically signed. Apr 9 2022  6:09PM    < end of copied text >    < from: US Duplex Venous Lower Ext Ltd, Right (04.06.22 @ 10:09) >    ACC: [id number] EXAM:  US DPLX LWR EXT VEINS LTD RT                          PROCEDURE DATE:  04/06/2022          INTERPRETATION:  CLINICAL INFORMATION: Right lower extremity pain,   positive Liz test    COMPARISON: None available.    TECHNIQUE: Duplex sonography of the RIGHT LOWER extremity veins with   color and spectral Doppler, with and without compression.    FINDINGS:    There is normal compressibility of the right common femoral, femoral and   popliteal veins.  The contralateral common femoral vein is patent.  Doppler examination shows normal spontaneous and phasic flow.    No calf vein thrombosis is detected.    IMPRESSION:  No evidence of right lower extremity deep venous thrombosis.          --- End of Report ---            KRISTA NEVAREZ MD; Attending Radiologist  This document has been electronically signed. Apr 6 2022 10:14AM    < end of copied text >    < from: CT Lumbar Spine No Cont (04.06.22 @ 09:00) >    ACC: [id number] EXAM:  CT LUMBAR SPINE                          PROCEDURE DATE:  04/06/2022          INTERPRETATION:  CT lumbar spine without IV contrast    CLINICAL INFORMATION: Low back pain with positive straight leg test.   herniated disc    TECHNIQUE:  Contiguous axial sections were obtained through the lumbar   spine.   Additional sagittal and coronal reformats were obtained.    FINDINGS:   No prior similar studies are available for review    Lumbar vertebral body heights are maintained. Novertebral fracture is   seen. No destructive bone lesion is found.  Alignment is preserved.    Facet joints appear aligned.  The visualized sacral and pelvic bones   appear intact.    Multilevel disc bulging is present with the largest posterior disc  osteophyte complex at L4/L5. There is moderate to severe left foraminal   stenosis and moderate to severe right foraminal stenosis at L4/L5,   without significant spinal canal stenosis. Rest of the neural foramina   are intact.  No high-grade central canal compromise is recognized by the   CT technique.    MR would be required to evaluate the intervertebral   discs at higher sensitivity for disc pathology.    No paraspinal mass is recognized.  Paraspinal soft tissues appear intact.      IMPRESSION:  Posterior disc osteophyte complex at L4/L5 resulting in moderate to   severe bilateral foraminal stenosis without significant spinal canal   stenosis.    --- End of Report ---            CARLOZ BAEZ MD; Attending Radiologist  This document has been electronically signed. Apr 6 2022  9:07AM    < end of copied text >      ORT Score -   Family Hx of substance abuse	Female	      Male  Alcohol 	                                           1                     3  Illegal drugs	                                   2                     3  Rx drugs                                           4 	                  4  Personal Hx of substance abuse		  Alcohol 	                                          3	                  3  Illegal drugs                                     4	                  4  Rx drugs                                            5 	                  5  Age between 16- 45 years	           1                     1  hx preadolescent sexual abuse	   3 	                  0  Psychological disease		  ADD, OCD, bipolar, schizophrenia   2	          2  Depression                                           1 	          1  Total: 0    a score of 3 or lower indicates low risk for opioid abuse		  a score of 4-7 indicates moderate risk for opioid abuse		  a score of 8 or higher indicates high risk for opioid abuse  	  REVIEW OF SYSTEMS:  CONSTITUTIONAL: No fever No fatigue   RESPIRATORY: No cough, wheezing, chills or hemoptysis; No shortness of breath  CARDIOVASCULAR: No chest pain, palpitations, dizziness, or leg swelling  GASTROINTESTINAL: No loss of appetite, decreased PO intake. No abdominal or epigastric pain. No nausea, vomiting; No diarrhea + constipation.   GENITOURINARY: No dysuria, frequency, hematuria, retention or incontinence  MUSCULOSKELETAL: + lumbar back and right hip pain, no swelling; no upper or left lower motor strength weakness, + right lower motor strength weakness, no saddle anesthesia, bowel/bladder incontinence, no falls   NEURO: No headaches, + numbness/tingling b/l hands and feet, No weakness; + unsteady gait (chronic) + hx vertigo    PHYSICAL EXAM:  GENERAL:  Alert & Oriented X4, cooperative, NAD, Good concentration. Speech is clear.   RESPIRATORY: Respirations even and unlabored. Clear to auscultation bilaterally; No rales, rhonchi, wheezing, or rubs  CARDIOVASCULAR: Normal S1/S2, regular rate and rhythm; No murmurs, rubs, or gallops. No JVD.   GASTROINTESTINAL: + obese per BMI, Soft, Nontender, Nondistended; Bowel sounds present  PERIPHERAL VASCULAR:  Extremities warm without edema. 2+ Peripheral Pulses, No cyanosis, No calf tenderness; No joint swelling.   MUSCULOSKELETAL: Motor Strength 4/5 B/L upper and lower extremities, moves all extremities equally against gravity; + decreased right hip ROM; negative b/l SLR; + lumbar spine and right hip tenderness on palpation.   SKIN: Warm, dry, intact. No rashes, lesions, scars or wounds.     Risk factors associated with adverse outcomes related to opioid treatment  [ ]  Concurrent benzodiazepine use  [ ]  History/ Active substance use or alcohol use disorder  [ ] Psychiatric co-morbidity  [ ] Sleep apnea  [ ] COPD  [ ] BMI> 35  [ ] Liver dysfunction  [ ] Renal dysfunction  [ ] CHF  [X ] Former Smoker  [X]  Age > 60 years    [X ]  [place]  Reviewed and Copied to Chart. See below.    Plan of care and goal oriented pain management treatment options were discussed with patient and /or primary care giver; all questions and concerns were addressed and care was aligned with patient's wishes.    Educated patient on goal oriented pain management treatment options     04-12-22 @ 11:32

## 2022-04-12 NOTE — PROGRESS NOTE ADULT - PROBLEM SELECTOR PLAN 2
-CT LS shows Posterior disc osteophyte complex at L4/L5 resulting in moderate to severe bilateral foraminal stenosis without significant spinal canal stenosis.  -ortho consulted, no intervention needed at this time, pain management  -pain follows

## 2022-04-12 NOTE — PROGRESS NOTE ADULT - ASSESSMENT
Confidential Drug Utilization Report  Search Terms: Adri George, 1935   Search Date: 04/06/2022 14:07:13 PM   The Drug Utilization Report below displays all of the controlled substance prescriptions, if any, that your patient has filled in the last twelve months. The information displayed on this report is compiled from pharmacy submissions to the Department, and accurately reflects the information as submitted by the pharmacies.  This report was requested by: Laura Marroquin | Reference #: [id number]   There are no results for the search terms that you entered.

## 2022-04-13 ENCOUNTER — RESULT REVIEW (OUTPATIENT)
Age: 87
End: 2022-04-13

## 2022-04-13 LAB
ALBUMIN SERPL ELPH-MCNC: 3.1 G/DL — LOW (ref 3.5–5)
ALP SERPL-CCNC: 48 U/L — SIGNIFICANT CHANGE UP (ref 40–120)
ALT FLD-CCNC: 77 U/L DA — HIGH (ref 10–60)
ANION GAP SERPL CALC-SCNC: 11 MMOL/L — SIGNIFICANT CHANGE UP (ref 5–17)
APTT BLD: 26.4 SEC — LOW (ref 27.5–35.5)
AST SERPL-CCNC: 86 U/L — HIGH (ref 10–40)
BILIRUB DIRECT SERPL-MCNC: <0.1 MG/DL — SIGNIFICANT CHANGE UP (ref 0–0.3)
BILIRUB SERPL-MCNC: 0.2 MG/DL — SIGNIFICANT CHANGE UP (ref 0.2–1.2)
BUN SERPL-MCNC: 44 MG/DL — HIGH (ref 7–18)
CALCIUM SERPL-MCNC: 9.1 MG/DL — SIGNIFICANT CHANGE UP (ref 8.4–10.5)
CHLORIDE SERPL-SCNC: 108 MMOL/L — SIGNIFICANT CHANGE UP (ref 96–108)
CO2 SERPL-SCNC: 19 MMOL/L — LOW (ref 22–31)
CREAT SERPL-MCNC: 1.09 MG/DL — SIGNIFICANT CHANGE UP (ref 0.5–1.3)
EGFR: 50 ML/MIN/1.73M2 — LOW
GLUCOSE BLDC GLUCOMTR-MCNC: 133 MG/DL — HIGH (ref 70–99)
GLUCOSE BLDC GLUCOMTR-MCNC: 149 MG/DL — HIGH (ref 70–99)
GLUCOSE BLDC GLUCOMTR-MCNC: 151 MG/DL — HIGH (ref 70–99)
GLUCOSE BLDC GLUCOMTR-MCNC: 158 MG/DL — HIGH (ref 70–99)
GLUCOSE BLDC GLUCOMTR-MCNC: 189 MG/DL — HIGH (ref 70–99)
GLUCOSE SERPL-MCNC: 161 MG/DL — HIGH (ref 70–99)
HCT VFR BLD CALC: 32.4 % — LOW (ref 34.5–45)
HGB BLD-MCNC: 10.5 G/DL — LOW (ref 11.5–15.5)
INR BLD: 0.98 RATIO — SIGNIFICANT CHANGE UP (ref 0.88–1.16)
MAGNESIUM SERPL-MCNC: 2.1 MG/DL — SIGNIFICANT CHANGE UP (ref 1.6–2.6)
MCHC RBC-ENTMCNC: 30.3 PG — SIGNIFICANT CHANGE UP (ref 27–34)
MCHC RBC-ENTMCNC: 32.4 GM/DL — SIGNIFICANT CHANGE UP (ref 32–36)
MCV RBC AUTO: 93.4 FL — SIGNIFICANT CHANGE UP (ref 80–100)
NRBC # BLD: 0 /100 WBCS — SIGNIFICANT CHANGE UP (ref 0–0)
PHOSPHATE SERPL-MCNC: 3.4 MG/DL — SIGNIFICANT CHANGE UP (ref 2.5–4.5)
PLATELET # BLD AUTO: 255 K/UL — SIGNIFICANT CHANGE UP (ref 150–400)
POTASSIUM SERPL-MCNC: 3.9 MMOL/L — SIGNIFICANT CHANGE UP (ref 3.5–5.3)
POTASSIUM SERPL-SCNC: 3.9 MMOL/L — SIGNIFICANT CHANGE UP (ref 3.5–5.3)
PROT SERPL-MCNC: 7.9 G/DL — SIGNIFICANT CHANGE UP (ref 6–8.3)
PROTHROM AB SERPL-ACNC: 11.7 SEC — SIGNIFICANT CHANGE UP (ref 10.5–13.4)
RBC # BLD: 3.47 M/UL — LOW (ref 3.8–5.2)
RBC # FLD: 15.2 % — HIGH (ref 10.3–14.5)
SODIUM SERPL-SCNC: 138 MMOL/L — SIGNIFICANT CHANGE UP (ref 135–145)
WBC # BLD: 8.24 K/UL — SIGNIFICANT CHANGE UP (ref 3.8–10.5)
WBC # FLD AUTO: 8.24 K/UL — SIGNIFICANT CHANGE UP (ref 3.8–10.5)

## 2022-04-13 PROCEDURE — 20225 BONE BIOPSY TROCAR/NDL DEEP: CPT

## 2022-04-13 PROCEDURE — 99232 SBSQ HOSP IP/OBS MODERATE 35: CPT

## 2022-04-13 PROCEDURE — 88305 TISSUE EXAM BY PATHOLOGIST: CPT | Mod: 26

## 2022-04-13 PROCEDURE — 77012 CT SCAN FOR NEEDLE BIOPSY: CPT | Mod: 26

## 2022-04-13 RX ORDER — FENTANYL CITRATE 50 UG/ML
50 INJECTION INTRAVENOUS
Refills: 0 | Status: DISCONTINUED | OUTPATIENT
Start: 2022-04-13 | End: 2022-04-13

## 2022-04-13 RX ORDER — ONDANSETRON 8 MG/1
4 TABLET, FILM COATED ORAL ONCE
Refills: 0 | Status: DISCONTINUED | OUTPATIENT
Start: 2022-04-13 | End: 2022-04-13

## 2022-04-13 RX ORDER — FENTANYL CITRATE 50 UG/ML
25 INJECTION INTRAVENOUS
Refills: 0 | Status: DISCONTINUED | OUTPATIENT
Start: 2022-04-13 | End: 2022-04-13

## 2022-04-13 RX ADMIN — POLYETHYLENE GLYCOL 3350 17 GRAM(S): 17 POWDER, FOR SOLUTION ORAL at 14:07

## 2022-04-13 RX ADMIN — LIDOCAINE 1 PATCH: 4 CREAM TOPICAL at 20:25

## 2022-04-13 RX ADMIN — SENNA PLUS 2 TABLET(S): 8.6 TABLET ORAL at 21:19

## 2022-04-13 RX ADMIN — GABAPENTIN 100 MILLIGRAM(S): 400 CAPSULE ORAL at 05:33

## 2022-04-13 RX ADMIN — Medication 1000 MILLIGRAM(S): at 14:37

## 2022-04-13 RX ADMIN — GABAPENTIN 300 MILLIGRAM(S): 400 CAPSULE ORAL at 21:19

## 2022-04-13 RX ADMIN — Medication 1000 MILLIGRAM(S): at 14:07

## 2022-04-13 RX ADMIN — LOSARTAN POTASSIUM 25 MILLIGRAM(S): 100 TABLET, FILM COATED ORAL at 05:33

## 2022-04-13 RX ADMIN — Medication 1: at 21:49

## 2022-04-13 RX ADMIN — LIDOCAINE 1 PATCH: 4 CREAM TOPICAL at 11:47

## 2022-04-13 RX ADMIN — Medication 1000 MILLIGRAM(S): at 21:49

## 2022-04-13 RX ADMIN — GABAPENTIN 100 MILLIGRAM(S): 400 CAPSULE ORAL at 14:07

## 2022-04-13 RX ADMIN — Medication 1000 MILLIGRAM(S): at 21:19

## 2022-04-13 RX ADMIN — Medication 1: at 11:45

## 2022-04-13 RX ADMIN — LIDOCAINE 1 PATCH: 4 CREAM TOPICAL at 23:59

## 2022-04-13 RX ADMIN — PANTOPRAZOLE SODIUM 40 MILLIGRAM(S): 20 TABLET, DELAYED RELEASE ORAL at 05:34

## 2022-04-13 NOTE — PROGRESS NOTE ADULT - SUBJECTIVE AND OBJECTIVE BOX
Patient is a 87y old  Female who presents with a chief complaint of RLE pain (13 Apr 2022 12:27)      SUBJECTIVE / OVERNIGHT EVENTS:      MEDICATIONS  (STANDING):  acetaminophen     Tablet .. 1000 milliGRAM(s) Oral every 8 hours  dextrose 5%. 1000 milliLiter(s) (50 mL/Hr) IV Continuous <Continuous>  dextrose 5%. 1000 milliLiter(s) (100 mL/Hr) IV Continuous <Continuous>  dextrose 50% Injectable 25 Gram(s) IV Push once  dextrose 50% Injectable 12.5 Gram(s) IV Push once  dextrose 50% Injectable 25 Gram(s) IV Push once  gabapentin 300 milliGRAM(s) Oral at bedtime  gabapentin 100 milliGRAM(s) Oral <User Schedule>  glucagon  Injectable 1 milliGRAM(s) IntraMuscular once  insulin lispro (ADMELOG) corrective regimen sliding scale   SubCutaneous Before meals and at bedtime  lidocaine   4% Patch 1 Patch Transdermal every 24 hours  losartan 25 milliGRAM(s) Oral daily  pantoprazole    Tablet 40 milliGRAM(s) Oral before breakfast  polyethylene glycol 3350 17 Gram(s) Oral daily  senna 2 Tablet(s) Oral at bedtime    MEDICATIONS  (PRN):  dextrose Oral Gel 15 Gram(s) Oral once PRN Blood Glucose LESS THAN 70 milliGRAM(s)/deciliter  oxyCODONE    IR 5 milliGRAM(s) Oral every 6 hours PRN Severe Pain (7 - 10)          PHYSICAL EXAM:  Vital Signs Last 24 Hrs  T(C): 36.6 (13 Apr 2022 09:50), Max: 36.6 (13 Apr 2022 09:50)  T(F): 97.9 (13 Apr 2022 09:50), Max: 97.9 (13 Apr 2022 09:50)  HR: 77 (13 Apr 2022 10:50) (71 - 78)  BP: 139/63 (13 Apr 2022 10:50) (114/70 - 147/70)  BP(mean): 86 (13 Apr 2022 10:50) (86 - 93)  RR: 16 (13 Apr 2022 10:50) (14 - 19)  SpO2: 95% (13 Apr 2022 10:50) (94% - 98%)      LABS:                        10.5   8.24  )-----------( 255      ( 13 Apr 2022 07:45 )             32.4     04-13    138  |  108  |  44<H>  ----------------------------<  161<H>  3.9   |  19<L>  |  1.09    Ca    9.1      13 Apr 2022 07:45  Phos  3.4     04-13  Mg     2.1     04-13    TPro  7.9  /  Alb  3.1<L>  /  TBili  0.2  /  DBili  <0.1  /  AST  86<H>  /  ALT  77<H>  /  AlkPhos  48  04-13    PT/INR - ( 13 Apr 2022 08:08 )   PT: 11.7 sec;   INR: 0.98 ratio         PTT - ( 13 Apr 2022 08:08 )  PTT:26.4 sec          COVID-19 PCR: NotDetec (12 Apr 2022 07:07)  COVID-19 PCR: NotDetec (06 Apr 2022 00:03)           Patient is a 87y old  Female who presents with a chief complaint of RLE pain       SUBJECTIVE / OVERNIGHT EVENTS: events noted. No new complaints. Leg pain much improved, pt ambulating to and from the bathroom without assistance. She just returned from bone bx and feels well.   #[id number]      MEDICATIONS  (STANDING):  acetaminophen     Tablet .. 1000 milliGRAM(s) Oral every 8 hours  dextrose 5%. 1000 milliLiter(s) (50 mL/Hr) IV Continuous <Continuous>  dextrose 5%. 1000 milliLiter(s) (100 mL/Hr) IV Continuous <Continuous>  dextrose 50% Injectable 25 Gram(s) IV Push once  dextrose 50% Injectable 12.5 Gram(s) IV Push once  dextrose 50% Injectable 25 Gram(s) IV Push once  gabapentin 300 milliGRAM(s) Oral at bedtime  gabapentin 100 milliGRAM(s) Oral <User Schedule>  glucagon  Injectable 1 milliGRAM(s) IntraMuscular once  insulin lispro (ADMELOG) corrective regimen sliding scale   SubCutaneous Before meals and at bedtime  lidocaine   4% Patch 1 Patch Transdermal every 24 hours  losartan 25 milliGRAM(s) Oral daily  pantoprazole    Tablet 40 milliGRAM(s) Oral before breakfast  polyethylene glycol 3350 17 Gram(s) Oral daily  senna 2 Tablet(s) Oral at bedtime    MEDICATIONS  (PRN):  dextrose Oral Gel 15 Gram(s) Oral once PRN Blood Glucose LESS THAN 70 milliGRAM(s)/deciliter  oxyCODONE    IR 5 milliGRAM(s) Oral every 6 hours PRN Severe Pain (7 - 10)          PHYSICAL EXAM:  Vital Signs Last 24 Hrs  T(C): 36.6 (13 Apr 2022 09:50), Max: 36.6 (13 Apr 2022 09:50)  T(F): 97.9 (13 Apr 2022 09:50), Max: 97.9 (13 Apr 2022 09:50)  HR: 77 (13 Apr 2022 10:50) (71 - 78)  BP: 139/63 (13 Apr 2022 10:50) (114/70 - 147/70)  BP(mean): 86 (13 Apr 2022 10:50) (86 - 93)  RR: 16 (13 Apr 2022 10:50) (14 - 19)  SpO2: 95% (13 Apr 2022 10:50) (94% - 98%)      GEN: NAD; A and O x 3, sitting in bed  LUNGS: CTA B/L  HEART: S1 S2  ABDOMEN: soft, non-tender, non-distended, + BS  EXTREMITIES: no edema  DERM: Left thorax bandage c/d/i  NERVOUS SYSTEM:  Awake and alert; no focal neuro deficits    LABS:                        10.5   8.24  )-----------( 255      ( 13 Apr 2022 07:45 )             32.4     04-13    138  |  108  |  44<H>  ----------------------------<  161<H>  3.9   |  19<L>  |  1.09    Ca    9.1      13 Apr 2022 07:45  Phos  3.4     04-13  Mg     2.1     04-13    TPro  7.9  /  Alb  3.1<L>  /  TBili  0.2  /  DBili  <0.1  /  AST  86<H>  /  ALT  77<H>  /  AlkPhos  48  04-13    PT/INR - ( 13 Apr 2022 08:08 )   PT: 11.7 sec;   INR: 0.98 ratio         PTT - ( 13 Apr 2022 08:08 )  PTT:26.4 sec          COVID-19 PCR: NotDetec (12 Apr 2022 07:07)  COVID-19 PCR: NotDetec (06 Apr 2022 00:03)

## 2022-04-13 NOTE — PROGRESS NOTE ADULT - PROBLEM SELECTOR PLAN 6
-CT chest found 4mm right apical nodule  -f/u CT in 3 months.

## 2022-04-13 NOTE — PROGRESS NOTE ADULT - PROBLEM SELECTOR PROBLEM 6
Apical lung nodule
DM (diabetes mellitus)
DM (diabetes mellitus)
Apical lung nodule
DM (diabetes mellitus)
Apical lung nodule

## 2022-04-13 NOTE — PROGRESS NOTE ADULT - PROBLEM SELECTOR PLAN 5
-D-dimer 500, repeat 496  -on Lovenox ppx  -DVT negative on doppler RLE  -CTA chest no PE  -monitor D dimenr
-D-dimer 500, repeat 496  -on Lovenox ppx  -DVT negative on doppler RLE  -CTA chest no PE

## 2022-04-13 NOTE — PRE PROCEDURE NOTE - PRE PROCEDURE EVALUATION
Interventional Radiology Pre-Procedure Note    Diagnosis/Indication: Patient is a 87y old Female with h/o gastric cancer and multiple myeloma, found to have osseous lesions. Biopsy of left rib mass was requested.     PAST MEDICAL & SURGICAL HISTORY:  HTN (hypertension)  DM (diabetes mellitus)  Hypercholesteremia  Gastric adenocarcinoma  s/p resection  Vertigo  Dementia  S/P hysterectomy  S/P tubal ligation       Allergies: No Known Allergies      LABS:  CBC Full  -  ( 13 Apr 2022 07:45 )  WBC Count : 8.24 K/uL  RBC Count : 3.47 M/uL  Hemoglobin : 10.5 g/dL  Hematocrit : 32.4 %  Platelet Count - Automated : 255 K/uL  Mean Cell Volume : 93.4 fl  Mean Cell Hemoglobin : 30.3 pg  Mean Cell Hemoglobin Concentration : 32.4 gm/dL    04-13    138  |  108  |  44<H>  ----------------------------<  161<H>  3.9   |  19<L>  |  1.09    Ca    9.1      13 Apr 2022 07:45  Phos  3.4     04-13  Mg     2.1     04-13    TPro  7.9  /  Alb  3.1<L>  /  TBili  0.2  /  DBili  <0.1  /  AST  86<H>  /  ALT  77<H>  /  AlkPhos  48  04-13    PT/INR - ( 13 Apr 2022 08:08 )   PT: 11.7 sec;   INR: 0.98 ratio       PTT - ( 13 Apr 2022 08:08 )  PTT:26.4 sec    Procedure/ risks/ benefits/ alternatives were discussed with the patient using the telephone  service. The patient verbalizes understanding, and witnessed informed consent was obtained.

## 2022-04-13 NOTE — PROGRESS NOTE ADULT - PROBLEM/PLAN-9
Patient is eligible for case management through employer insurance.     Attempted to contact patient three times by telephone in effort to continue care as previously started by Nurse Navigator Onel Balbuena.  Attempts were unsuccessful.     Messages left with each call.  Introductory letter with Nurse Navigator contact information mailed to home address previously.     Will close case at this time.  If needs arise in future would be happy to work with patient.    TREV Curran, RN  Nurse Navigator-[facility]  [phone]      
DISPLAY PLAN FREE TEXT

## 2022-04-13 NOTE — PROGRESS NOTE ADULT - PROBLEM SELECTOR PLAN 10
- pt takes metformin 1000mg BID, Stegatro 15mg QD, Alogliptin 25mg QD  - c/w ISS  - monitor FS qACHs  - glucose goal 140-180

## 2022-04-13 NOTE — PROCEDURE NOTE - PROCEDURE FINDINGS AND DETAILS
Left rib mass again identified. Core biopsy x4 obtained. Specimens handed to and confirmed for adequacy by the pathologist in attendance.

## 2022-04-13 NOTE — PROGRESS NOTE ADULT - ASSESSMENT
Confidential Drug Utilization Report  Search Terms: Adri George, 1935   Search Date: 04/06/2022 14:07:13 PM   The Drug Utilization Report below displays all of the controlled substance prescriptions, if any, that your patient has filled in the last twelve months. The information displayed on this report is compiled from pharmacy submissions to the Department, and accurately reflects the information as submitted by the pharmacies.  This report was requested by: Laura Marroquin | Reference #: [id number]   There are no results for the search terms that you entered.

## 2022-04-13 NOTE — PROGRESS NOTE ADULT - NS ATTEND AMEND GEN_ALL_CORE FT
# BONE METS  # H/O GATRIC CA  IN 2015 S/P gastrectomy, s/p adj chemo with FOLFOX  # G/O OF  SMOLDERING MULTIPLE MYELOMA- last  IEP/FLC stable form 2/2022 with Dr Alvarado    cont pain management,  s/p bone bx fo tissue diagnosis  today  pending path  further recommendations will depend on findings       f/u with Dr Alvarado 1 week post d/c   for further management
# BONE METS  # H/O GATRIC CA  IN 2015 S/P gastrectomy, s/p adj chemo with FOLFOX  # G/O OF  SMOLDERING MULTIPLE MYELOMA- last  IEP/FLC stable form 2/2022 with Dr Alvarado    cont pain management,  for bone bx fo tissue diagnosis  further recommendations will depend on findings   f/u with Dr Alvarado 1 week post d/c   for further management

## 2022-04-13 NOTE — PROGRESS NOTE ADULT - PROBLEM SELECTOR PLAN 4
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 Numerous osteolytic lesions concerning for metastases. Consider MRI/bone scan for further evaluation. 4 mm right apical nodule; follow-up chest CT in 3 months.  - Bone scan confirms bone lesion   - s/p IR left rib bone biopsy  - heme/onc QMA following

## 2022-04-13 NOTE — PROGRESS NOTE ADULT - SUBJECTIVE AND OBJECTIVE BOX
Patient is a 87y old  Female who presents with a chief complaint of RLE pain (13 Apr 2022 13:12)    OVERNIGHT EVENTS: no acute changes    REVIEW OF SYSTEMS:  CONSTITUTIONAL: No fever, chills  ENMT:  No difficulty hearing, no change in vision  NECK: No pain or stiffness  RESPIRATORY: No cough, SOB  CARDIOVASCULAR: No chest pain, palpitations  GASTROINTESTINAL: No abdominal pain. No nausea, vomiting, or diarrhea  GENITOURINARY: No dysuria  NEUROLOGICAL: No HA  SKIN: No itching, burning, rashes, or lesions   LYMPH NODES: No enlarged glands  ENDOCRINE: No heat or cold intolerance; No hair loss  MUSCULOSKELETAL: No joint pain or swelling; No muscle, back, or extremity pain  PSYCHIATRIC: No depression, anxiety  HEME/LYMPH: No easy bruising, or bleeding gums    T(C): 37.1 (04-13-22 @ 14:02), Max: 37.1 (04-13-22 @ 14:02)  HR: 87 (04-13-22 @ 14:02) (71 - 87)  BP: 114/61 (04-13-22 @ 14:02) (114/61 - 147/70)  RR: 16 (04-13-22 @ 14:02) (14 - 19)  SpO2: 95% (04-13-22 @ 14:02) (94% - 96%)  Wt(kg): --Vital Signs Last 24 Hrs  T(C): 37.1 (13 Apr 2022 14:02), Max: 37.1 (13 Apr 2022 14:02)  T(F): 98.8 (13 Apr 2022 14:02), Max: 98.8 (13 Apr 2022 14:02)  HR: 87 (13 Apr 2022 14:02) (71 - 87)  BP: 114/61 (13 Apr 2022 14:02) (114/61 - 147/70)  BP(mean): 86 (13 Apr 2022 10:50) (86 - 93)  RR: 16 (13 Apr 2022 14:02) (14 - 19)  SpO2: 95% (13 Apr 2022 14:02) (94% - 96%)    MEDICATIONS  (STANDING):  acetaminophen     Tablet .. 1000 milliGRAM(s) Oral every 8 hours  dextrose 5%. 1000 milliLiter(s) (50 mL/Hr) IV Continuous <Continuous>  dextrose 5%. 1000 milliLiter(s) (100 mL/Hr) IV Continuous <Continuous>  dextrose 50% Injectable 25 Gram(s) IV Push once  dextrose 50% Injectable 12.5 Gram(s) IV Push once  dextrose 50% Injectable 25 Gram(s) IV Push once  gabapentin 300 milliGRAM(s) Oral at bedtime  gabapentin 100 milliGRAM(s) Oral <User Schedule>  glucagon  Injectable 1 milliGRAM(s) IntraMuscular once  insulin lispro (ADMELOG) corrective regimen sliding scale   SubCutaneous Before meals and at bedtime  lidocaine   4% Patch 1 Patch Transdermal every 24 hours  losartan 25 milliGRAM(s) Oral daily  pantoprazole    Tablet 40 milliGRAM(s) Oral before breakfast  polyethylene glycol 3350 17 Gram(s) Oral daily  senna 2 Tablet(s) Oral at bedtime    MEDICATIONS  (PRN):  dextrose Oral Gel 15 Gram(s) Oral once PRN Blood Glucose LESS THAN 70 milliGRAM(s)/deciliter  oxyCODONE    IR 5 milliGRAM(s) Oral every 6 hours PRN Severe Pain (7 - 10)      PHYSICAL EXAM:  GENERAL: NAD  EYES: clear conjunctiva; EOMI  ENMT: Moist mucous membranes  NECK: Supple, No JVD, Normal thyroid  CHEST/LUNG: Clear to auscultation bilaterally; No rales, rhonchi, wheezing, or rubs  HEART: S1, S2, Regular rate and rhythm  ABDOMEN: Soft, Nontender, Nondistended; Bowel sounds present  NEURO: Alert & Oriented X3  EXTREMITIES: No LE edema, no calf tenderness  LYMPH: No lymphadenopathy noted  SKIN: No rashes or lesions    Consultant(s) Notes Reviewed:  [x ] YES  [ ] NO  Care Discussed with Consultants/Other Providers [ x] YES  [ ] NO    LABS:                        10.5   8.24  )-----------( 255      ( 13 Apr 2022 07:45 )             32.4     04-13    138  |  108  |  44<H>  ----------------------------<  161<H>  3.9   |  19<L>  |  1.09    Ca    9.1      13 Apr 2022 07:45  Phos  3.4     04-13  Mg     2.1     04-13    TPro  7.9  /  Alb  3.1<L>  /  TBili  0.2  /  DBili  <0.1  /  AST  86<H>  /  ALT  77<H>  /  AlkPhos  48  04-13    PT/INR - ( 13 Apr 2022 08:08 )   PT: 11.7 sec;   INR: 0.98 ratio         PTT - ( 13 Apr 2022 08:08 )  PTT:26.4 sec  CAPILLARY BLOOD GLUCOSE      POCT Blood Glucose.: 158 mg/dL (13 Apr 2022 11:09)  POCT Blood Glucose.: 151 mg/dL (13 Apr 2022 10:18)  POCT Blood Glucose.: 149 mg/dL (13 Apr 2022 07:53)  POCT Blood Glucose.: 200 mg/dL (12 Apr 2022 22:17)  POCT Blood Glucose.: 198 mg/dL (12 Apr 2022 16:42)            RADIOLOGY & ADDITIONAL TESTS:    Imaging Personally Reviewed:  [ ] YES  [ ] NO   Patient is a 87y old  Female who presents with a chief complaint of RLE pain (13 Apr 2022 13:12)    OVERNIGHT EVENTS: no acute changes    REVIEW OF SYSTEMS:  cristina #[id number]  CONSTITUTIONAL: No fever, chills  ENMT:  No difficulty hearing, no change in vision  NECK: No pain or stiffness  RESPIRATORY: No cough, SOB  CARDIOVASCULAR: No chest pain, palpitations  GASTROINTESTINAL: No abdominal pain. No nausea, vomiting, or diarrhea  GENITOURINARY: No dysuria  NEUROLOGICAL: +HA  SKIN: No itching, burning, rashes, or lesions   MUSCULOSKELETAL: +back pain. No joint pain or swelling; No muscle, or extremity pain  PSYCHIATRIC: No depression, anxiety  HEME/LYMPH: No easy bruising, or bleeding gums    T(C): 37.1 (04-13-22 @ 14:02), Max: 37.1 (04-13-22 @ 14:02)  HR: 87 (04-13-22 @ 14:02) (71 - 87)  BP: 114/61 (04-13-22 @ 14:02) (114/61 - 147/70)  RR: 16 (04-13-22 @ 14:02) (14 - 19)  SpO2: 95% (04-13-22 @ 14:02) (94% - 96%)  Wt(kg): --Vital Signs Last 24 Hrs  T(C): 37.1 (13 Apr 2022 14:02), Max: 37.1 (13 Apr 2022 14:02)  T(F): 98.8 (13 Apr 2022 14:02), Max: 98.8 (13 Apr 2022 14:02)  HR: 87 (13 Apr 2022 14:02) (71 - 87)  BP: 114/61 (13 Apr 2022 14:02) (114/61 - 147/70)  BP(mean): 86 (13 Apr 2022 10:50) (86 - 93)  RR: 16 (13 Apr 2022 14:02) (14 - 19)  SpO2: 95% (13 Apr 2022 14:02) (94% - 96%)    MEDICATIONS  (STANDING):  acetaminophen     Tablet .. 1000 milliGRAM(s) Oral every 8 hours  dextrose 5%. 1000 milliLiter(s) (50 mL/Hr) IV Continuous <Continuous>  dextrose 5%. 1000 milliLiter(s) (100 mL/Hr) IV Continuous <Continuous>  dextrose 50% Injectable 25 Gram(s) IV Push once  dextrose 50% Injectable 12.5 Gram(s) IV Push once  dextrose 50% Injectable 25 Gram(s) IV Push once  gabapentin 300 milliGRAM(s) Oral at bedtime  gabapentin 100 milliGRAM(s) Oral <User Schedule>  glucagon  Injectable 1 milliGRAM(s) IntraMuscular once  insulin lispro (ADMELOG) corrective regimen sliding scale   SubCutaneous Before meals and at bedtime  lidocaine   4% Patch 1 Patch Transdermal every 24 hours  losartan 25 milliGRAM(s) Oral daily  pantoprazole    Tablet 40 milliGRAM(s) Oral before breakfast  polyethylene glycol 3350 17 Gram(s) Oral daily  senna 2 Tablet(s) Oral at bedtime    MEDICATIONS  (PRN):  dextrose Oral Gel 15 Gram(s) Oral once PRN Blood Glucose LESS THAN 70 milliGRAM(s)/deciliter  oxyCODONE    IR 5 milliGRAM(s) Oral every 6 hours PRN Severe Pain (7 - 10)    PHYSICAL EXAM:  GENERAL: well-appearing, NAD  EYES: clear conjunctiva  ENMT: Moist mucous membranes  NECK: Supple, No JVD  CHEST/LUNG: +left rib biopsy wound, dressing clean dry intact. Clear to auscultation bilaterally; No rales, rhonchi, wheezing, or rubs  HEART: S1, S2, Regular rate and rhythm  ABDOMEN: Soft, Nontender, Nondistended; Bowel sounds present  NEURO: Alert & Oriented X3  EXTREMITIES: no tenderness, erythema or swelling.   SKIN: No rashes or lesions    Consultant(s) Notes Reviewed:  [x ] YES  [ ] NO  Care Discussed with Consultants/Other Providers [ x] YES  [ ] NO    LABS:                        10.5   8.24  )-----------( 255      ( 13 Apr 2022 07:45 )             32.4     04-13    138  |  108  |  44<H>  ----------------------------<  161<H>  3.9   |  19<L>  |  1.09    Ca    9.1      13 Apr 2022 07:45  Phos  3.4     04-13  Mg     2.1     04-13    TPro  7.9  /  Alb  3.1<L>  /  TBili  0.2  /  DBili  <0.1  /  AST  86<H>  /  ALT  77<H>  /  AlkPhos  48  04-13    PT/INR - ( 13 Apr 2022 08:08 )   PT: 11.7 sec;   INR: 0.98 ratio         PTT - ( 13 Apr 2022 08:08 )  PTT:26.4 sec  CAPILLARY BLOOD GLUCOSE      POCT Blood Glucose.: 158 mg/dL (13 Apr 2022 11:09)  POCT Blood Glucose.: 151 mg/dL (13 Apr 2022 10:18)  POCT Blood Glucose.: 149 mg/dL (13 Apr 2022 07:53)  POCT Blood Glucose.: 200 mg/dL (12 Apr 2022 22:17)  POCT Blood Glucose.: 198 mg/dL (12 Apr 2022 16:42)            RADIOLOGY & ADDITIONAL TESTS:  < from: CT Biopsy Bone Deep (04.13.22 @ 10:04) >    ACC: [id number] EXAM:  CT BX BONE NDL DEEP #                          PROCEDURE DATE:  04/13/2022          INTERPRETATION:  PROCEDURE: COMPUTED TOMOGRAPHY GUIDED CORE NEEDLE BIOPSY   OF LEFT RIB MASS.    : SCOTT Paulino MD    CLINICAL INDICATION: 87-year-old female with history of gastric cancer   and multiple myeloma, who was found to have diffuse osseous lesions   concerning for metastatic disease. Biopsy of the left 9th rib mass was   requested.    ANESTHESIA: Intravenous sedation was administered by the anesthesiology   attending. A total of 9 mL of 1% lidocaine was used for local anesthesia.    TECHNIQUE: Following discussions of the risks, benefits, and alternatives   to the procedure informed consent was obtained. A timeout was performed.    The patient was placed supine on the CT examination table and connected   to physiologic monitoring. Transaxial images of the upper abdomen were   obtained without the use of oral or intravenous contrast materials to   select the best path for percutaneous biopsy. The destructive left 9th   rib mass that was seen on recent CT imaging was again identified and   targeted for biopsy. The overlying skin was prepped and draped in the   usual sterile fashion.    Using CT guidance, a 17-gauge trocar needle was advanced to the targeted   left 9th rib mass. An 18-gauge core needle gun was advanced via the   trocar into the left 9th rib mass, and 4 core specimens were obtained.   The obtained specimens were deemed adequate by the pathologist in   attendance. The needle was removed and hemostasis was achieved with   manual compression. Completion CT images demonstrated expected postbiopsy   changes with no evidence of acute complications. A dry sterile dressing   was applied.    The patient tolerated the procedure well and was transferred to the   recovery area in stable condition. There were no immediate complications.    IMPRESSION: SUCCESSFUL COMPUTED TOMOGRAPHY GUIDED CORE NEEDLE BIOPSY OF   LEFT RIB MASS.    --- End of Report ---            SARA PAULINO MD; Attending Interventional Radiologist  This document has been electronically signed. Apr 13 2022  2:44PM    < end of copied text >      Imaging Personally Reviewed:  [ ] YES  [ ] NO

## 2022-04-13 NOTE — PROGRESS NOTE ADULT - SUBJECTIVE AND OBJECTIVE BOX
Patient is a 87y old  Female who presents with a chief complaint of RLE pain (12 Apr 2022 21:53)    pt seen in icu [  ], reg med floor [   ], bed [  ], chair at bedside [   ], a+o x3 [  ], lethargic [  ],  nad [  ]    shea [  ], ngt [  ], peg [  ], et tube [  ], cent line [  ], picc line [  ]        Allergies    No Known Allergies        Vitals    T(F): 97.6 (04-13-22 @ 05:20), Max: 97.6 (04-12-22 @ 14:14)  HR: 71 (04-13-22 @ 05:20) (71 - 78)  BP: 118/67 (04-13-22 @ 05:20) (115/61 - 144/65)  RR: 18 (04-13-22 @ 05:20) (16 - 18)  SpO2: 96% (04-13-22 @ 05:20) (95% - 98%)  Wt(kg): --  CAPILLARY BLOOD GLUCOSE      POCT Blood Glucose.: 149 mg/dL (13 Apr 2022 07:53)      Labs                          10.5   8.24  )-----------( 255      ( 13 Apr 2022 07:45 )             32.4       04-13    138  |  108  |  44<H>  ----------------------------<  161<H>  3.9   |  19<L>  |  1.09    Ca    9.1      13 Apr 2022 07:45  Phos  3.4     04-13  Mg     2.1     04-13    TPro  7.9  /  Alb  3.1<L>  /  TBili  0.2  /  DBili  <0.1  /  AST  86<H>  /  ALT  77<H>  /  AlkPhos  48  04-13            Clean Catch Clean Catch (Midstream)  04-06 @ 06:18   <10,000 CFU/mL Normal Urogenital Natalie  --  --      .Blood Blood-Peripheral  04-06 @ 06:04   No Growth Final  --  --      Clean Catch Clean Catch (Midstream)  03-05 @ 03:06   >=3 organisms. Probable collection contamination.  --  --          Radiology Results      Meds    MEDICATIONS  (STANDING):  acetaminophen     Tablet .. 1000 milliGRAM(s) Oral every 8 hours  dextrose 5%. 1000 milliLiter(s) (50 mL/Hr) IV Continuous <Continuous>  dextrose 5%. 1000 milliLiter(s) (100 mL/Hr) IV Continuous <Continuous>  dextrose 50% Injectable 25 Gram(s) IV Push once  dextrose 50% Injectable 12.5 Gram(s) IV Push once  dextrose 50% Injectable 25 Gram(s) IV Push once  gabapentin 300 milliGRAM(s) Oral at bedtime  gabapentin 100 milliGRAM(s) Oral <User Schedule>  glucagon  Injectable 1 milliGRAM(s) IntraMuscular once  insulin lispro (ADMELOG) corrective regimen sliding scale   SubCutaneous Before meals and at bedtime  lidocaine   4% Patch 1 Patch Transdermal every 24 hours  losartan 25 milliGRAM(s) Oral daily  pantoprazole    Tablet 40 milliGRAM(s) Oral before breakfast  polyethylene glycol 3350 17 Gram(s) Oral daily  senna 2 Tablet(s) Oral at bedtime      MEDICATIONS  (PRN):  dextrose Oral Gel 15 Gram(s) Oral once PRN Blood Glucose LESS THAN 70 milliGRAM(s)/deciliter  oxyCODONE    IR 5 milliGRAM(s) Oral every 6 hours PRN Severe Pain (7 - 10)      Physical Exam    Neuro :  no focal deficits  Respiratory: CTA B/L  CV: RRR, S1S2, no murmurs,   Abdominal: Soft, NT, ND +BS,  Extremities: No edema, + peripheral pulses    ASSESSMENT    Pain of right lower extremity    HTN (hypertension)    DM (diabetes mellitus)    Hypercholesteremia    Gastric adenocarcinoma    Vertigo    Dementia    S/P hysterectomy    S/P tubal ligation        PLAN     Patient is a 87y old  Female who presents with a chief complaint of RLE pain (12 Apr 2022 21:53)    pt seen in icu [  ], reg med floor [ x  ], bed [  ], chair at bedside [ x  ], a+o x3 [x  ], lethargic [  ],    nad [ x ]      Allergies    No Known Allergies        Vitals    T(F): 97.6 (04-13-22 @ 05:20), Max: 97.6 (04-12-22 @ 14:14)  HR: 71 (04-13-22 @ 05:20) (71 - 78)  BP: 118/67 (04-13-22 @ 05:20) (115/61 - 144/65)  RR: 18 (04-13-22 @ 05:20) (16 - 18)  SpO2: 96% (04-13-22 @ 05:20) (95% - 98%)  Wt(kg): --  CAPILLARY BLOOD GLUCOSE      POCT Blood Glucose.: 149 mg/dL (13 Apr 2022 07:53)      Labs                          10.5   8.24  )-----------( 255      ( 13 Apr 2022 07:45 )             32.4       04-13    138  |  108  |  44<H>  ----------------------------<  161<H>  3.9   |  19<L>  |  1.09    Ca    9.1      13 Apr 2022 07:45  Phos  3.4     04-13  Mg     2.1     04-13    TPro  7.9  /  Alb  3.1<L>  /  TBili  0.2  /  DBili  <0.1  /  AST  86<H>  /  ALT  77<H>  /  AlkPhos  48  04-13            Clean Catch Clean Catch (Midstream)  04-06 @ 06:18   <10,000 CFU/mL Normal Urogenital Natalie  --  --      .Blood Blood-Peripheral  04-06 @ 06:04   No Growth Final  --  --      Clean Catch Clean Catch (Midstream)  03-05 @ 03:06   >=3 organisms. Probable collection contamination.  --  --          Radiology Results      Meds    MEDICATIONS  (STANDING):  acetaminophen     Tablet .. 1000 milliGRAM(s) Oral every 8 hours  dextrose 5%. 1000 milliLiter(s) (50 mL/Hr) IV Continuous <Continuous>  dextrose 5%. 1000 milliLiter(s) (100 mL/Hr) IV Continuous <Continuous>  dextrose 50% Injectable 25 Gram(s) IV Push once  dextrose 50% Injectable 12.5 Gram(s) IV Push once  dextrose 50% Injectable 25 Gram(s) IV Push once  gabapentin 300 milliGRAM(s) Oral at bedtime  gabapentin 100 milliGRAM(s) Oral <User Schedule>  glucagon  Injectable 1 milliGRAM(s) IntraMuscular once  insulin lispro (ADMELOG) corrective regimen sliding scale   SubCutaneous Before meals and at bedtime  lidocaine   4% Patch 1 Patch Transdermal every 24 hours  losartan 25 milliGRAM(s) Oral daily  pantoprazole    Tablet 40 milliGRAM(s) Oral before breakfast  polyethylene glycol 3350 17 Gram(s) Oral daily  senna 2 Tablet(s) Oral at bedtime      MEDICATIONS  (PRN):  dextrose Oral Gel 15 Gram(s) Oral once PRN Blood Glucose LESS THAN 70 milliGRAM(s)/deciliter  oxyCODONE    IR 5 milliGRAM(s) Oral every 6 hours PRN Severe Pain (7 - 10)      Physical Exam    Neuro :  no focal deficits  Respiratory: CTA B/L  CV: RRR, S1S2, no murmurs,   Abdominal: Soft, NT, ND +BS,  Extremities: No edema, + peripheral pulses    ASSESSMENT    Pain of right lower extremity poss 2nd to metastatic disease  lumbar degenerative disc disease,   le edema dvt r/o,   r/o recurrent gastric ca  lytic lesions d/t MM( less likely with positive  bone scan)  uti,   h/o osteoporosis  HTN (hypertension)  DM (diabetes mellitus)  Hypercholesteremia  Gastric adenocarcinoma  Vertigo  Dementia  S/P hysterectomy  S/P tubal ligation        PLAN    ct ls spine with Posterior disc osteophyte complex at L4/L5 resulting in moderate to severe bilateral foraminal stenosis without significant spinal canal stenosis noted    ortho spine cons   pain mgmt f/u   Maximize non-opioid pain recommendations   - decadron 2mg po 2x a day for 3 days completed 4/11.   - Renew Acetaminophen 1 gram PO q 8 hours x 3 days.   - Flexeril 10mg PO TID x 3 days completed 4/9  - Increase Gabapentin 100mg po BID and 300mg PO at bedtime. (CrCl 38, max dose 900mg/d) Monitor renal function.   - Continue Lidoderm 4% patch daily.   Bowel Regimen  - Start senna and miralax  Mild pain   - Non-pharmacological pain treatment recommendations  - Warm/ Cool packs PRN   - Repositioning extremity, elevation, imagery, relaxation, distraction.  - Physical therapy OOB if no contraindications    pain controlled  doppler us with No evidence of right lower extremity deep venous thrombosis noted    cta chest with No pulmonary embolism. Numerous osteolytic lesions concerning for metastases. Consider MRI/bone scan for further evaluation. 4 mm right apical nodule; follow-up chest CT in 3 months.  bone scan with Multiple foci of increased uptake in the ribs, spine and right acetabulum/iliac bone suspicious for osseous metastases. Degenerative disease in the major joints noted above.  heme onc f/u   pt with h/o GASTRIC CA, S/P GASTRECTOMY FOR T3N1A, S/P ADJ CHEMO WITH FOLFOX IN 2015  also h/o of early multiple myeloma,( bmbx  2015, last visit 2/2022   M spike  IGG kappa 1.5  with FLC 20.74, IGG 2145)  CT A/P with  Lytic bone lesions again noted without additional sites of disease in the abdomen and pelvis noted above.  check CEA,  neg noted above  IR  BX of one of the bone lesions  for tissue diagnosis  today  further recommendations will depend on findings   lovenox held  course rocephin completed,   ucx neg noted above  blood cx neg  lispro ss,   hgba1c 7.3   phys tx eval noted and rec phys tx 2-3x/week x 4-6 weeks with rolling walker (5 inch wheels) at Home PT anticipating improvement.   3:1 commode;   cont current meds    d/c plan after bx   f/u with heme-onc outpt for results

## 2022-04-13 NOTE — PROCEDURE NOTE - PLAN
- F/u final pathology results.   - Global care as per primary team.   - Call IR with questions/concerns regarding procedure.     Official report to follow.

## 2022-04-13 NOTE — PROGRESS NOTE ADULT - PROBLEM SELECTOR PROBLEM 7
Vertigo
HTN (hypertension)
Vertigo
HTN (hypertension)

## 2022-04-13 NOTE — PROGRESS NOTE ADULT - PROBLEM SELECTOR PLAN 1
- p/w RLE pain x1mo  - PE: positive straight leg exam, decreased sensation in L1, L2, L3 dermatome on right, calf tenderness  - DVT negative on doppler RLE  - CT LS as above- lumbar stenosis, osteophyte, bone lesions  - D dimer 500, c/w lovenox SC, CTA no PE  - c/w pain management reccs   - pain now resolved

## 2022-04-13 NOTE — PROGRESS NOTE ADULT - PROBLEM SELECTOR PLAN 7
- h/o HTN, Pt takes Metoprolol ER 25mg QD, Losartan 25mg QD  - continue home meds  - monitor BP  - ckd goal <140/90

## 2022-04-13 NOTE — PROGRESS NOTE ADULT - PROBLEM SELECTOR PLAN 1
Pt with acute right hip and lumbar back pain which is somatic and neuropathic in nature due to moderate lumbar stenosis L4/5. CT angio showed Numerous osteolytic lesions concerning for metastases. Bone scan - suspicious for oss mets. heme/oncology consult. Pt with left rib mass s/p core biopsy x4 obtained bone bx in IR 4/13.   CT abd pelvis 4/9-  Lytic bone lesions again noted without additional sites of disease in the abdomen and pelvis.  IR  BX of one of the bone lesions  for tissue diagnosis on 4/13  High risk medications reviewed. Avoid polypharmacy. Avoid IV opioids. Avoid benzodiazepines. Non-pharmacological sleep aides initiated. Non-opioid medications and non-pharmacological pain management measures initiated.    Opioid pain recommendations:  - Continue oxycodone 5mg PO q6h PRN severe pain.   Maximize non-opioid pain recommendations   - decadron 2mg po 2x a day for 3 days completed 4/11.   - Continue Acetaminophen 1 gram PO q 8 hours x 3 days.   - Flexeril 10mg PO TID x 3 days completed 4/9  - Continue Gabapentin 100mg po BID and 300mg PO at bedtime (increased 4/12). (CrCl 38, max dose 900mg/d) Monitor renal function.   - Continue Lidoderm 4% patch daily.   Bowel Regimen  - Continue senna and miralax  Mild pain   - Non-pharmacological pain treatment recommendations  - Warm/ Cool packs PRN   - Repositioning extremity, elevation, imagery, relaxation, distraction.  - Physical therapy OOB if no contraindications   Recommendations discussed with primary team and RN.

## 2022-04-13 NOTE — PROGRESS NOTE ADULT - ASSESSMENT
complete note to follow     Assessment and Plan:   · Assessment	    # BONE METASTASIS ( on bone scan and CTA)   # LEG PAIN     # H/O GASTRIC CA, S/P GASTRECTOMY FOR T3N1A, S/P ADJ CHEMO WITH FOLFOX IN 2015  # h/o of early multiple myeloma,( bmbx  2015, last visit 2/2022   M spike  IGG kappa 1.5  with FLC 20.74, IGG 2145)  r/o recurrent gastric ca  lytic lesions d/t MM( less likely with positive  bone scan)  check CT A/P shows lytic lesions T11 and left ribs  CEA,  are nl  -s/p BX of Left rib lesions for tissue diagnosis today 4/13  -further recommendations will depend on findings   -cont with pain management/bowel regime  -d/c planning for tmrw  -upon discharge pt to f/u with Dr. Alvarado next week to discuss path results    Thank you for the referral. Will continue to monitor the patient.  Please call with any questions [phone]  Above reviewed with Attending Dr. Dominguez  [facility]  [id number] [facility], [address], [place], [place]  [phone]  *Note not finalized until signed by Attending Physician

## 2022-04-13 NOTE — PROGRESS NOTE ADULT - PROBLEM SELECTOR PROBLEM 5
Elevated d-dimer
HLD (hyperlipidemia)
Elevated d-dimer
HLD (hyperlipidemia)
HLD (hyperlipidemia)
Elevated d-dimer

## 2022-04-13 NOTE — PROGRESS NOTE ADULT - SUBJECTIVE AND OBJECTIVE BOX
Source of information: GAYLA ELLIS, Chart review  Patient language: Salvadorean  : Juliet [id number]    HPI:  Patient is a 87yoF, AAOx3 & ambulatory at baseline, w/ PMH of HTN, HLD, T2DM, osteoporosis, p/w RLE pain for 1 month. She complains of sharp, 10/10, constant RLE pain that worsens with standing. She reports pain starts from the back and radiates down to her thigh and the rest of the leg. She states the pain has made it difficult for her to ambulate, and it is associated with numbness and swelling of that leg. She visited her PCP, and prescribed cyclobenzaprine 5mg qHs and Meloxicam 15mg qd, but it has not ease the pain, as per patient. She denies fever, chills, n/v, chest pain, SOB, abdominal pain, urinary or bowel movement changes. (06 Apr 2022 02:26)      CT lumbar spine demonstrates moderate lumbar stenosis L4/5.  RLE doppler negative DVT.   CT angio showed Numerous osteolytic lesions concerning for metastases. Bone scan - suspicious for oss mets. heme/oncology consult  CT abd pelvis 4/9- *  Lytic bone lesions again noted without additional sites of disease in the abdomen and pelvis.  IR  BX of one of the bone lesions  for tissue diagnosis on 4/13.   Pain consulted for right leg pain. Reports right leg pain x 1 month, reports taking cyclobenzaprine 5mg qHs and Meloxicam 15mg qd at home with minimal relief. Pt seen and examined at bedside. Reports pain has improved during hospitalization. Pt with left rib mass s/p core biopsy x4 obtained bone bx in IR 4/13. Currently denies pain. Reports numbness/tingling over b/l hands and feet since her chemo txmt. Of note, pt reported vertigo upon ambulation, (not new, reports having for many years). Denies vertigo at this time. Pt tolerating PO diet. Denies lethargy, chest pain, SOB, abdominal pain, nausea, vomiting, itchiness. Reports constipation, last BM 4/10. Patient stated goal for pain control: to be able to take deep breaths, get out of bed to chair and ambulate with tolerable pain control. Pt has been utilizing cane at home for ambulation. Pt reports she has been able to ambulate with walker and assistance.     PAST MEDICAL & SURGICAL HISTORY:  HTN (hypertension)    DM (diabetes mellitus)    Hypercholesteremia    Gastric adenocarcinoma  s/p resection    Vertigo    Dementia    S/P hysterectomy    S/P tubal ligation        FAMILY HISTORY:  Family history of diabetes mellitus (Sibling)    Family history of early CAD (Grandparent)        Social History:   [X ] Denies ETOH use, illicit drug use   [X] former smoking quit 15 yrs ago    Allergies    No Known Allergies    MEDICATIONS  (STANDING):  acetaminophen     Tablet .. 1000 milliGRAM(s) Oral every 8 hours  dextrose 5%. 1000 milliLiter(s) (50 mL/Hr) IV Continuous <Continuous>  dextrose 5%. 1000 milliLiter(s) (100 mL/Hr) IV Continuous <Continuous>  dextrose 50% Injectable 25 Gram(s) IV Push once  dextrose 50% Injectable 12.5 Gram(s) IV Push once  dextrose 50% Injectable 25 Gram(s) IV Push once  gabapentin 300 milliGRAM(s) Oral at bedtime  gabapentin 100 milliGRAM(s) Oral <User Schedule>  glucagon  Injectable 1 milliGRAM(s) IntraMuscular once  insulin lispro (ADMELOG) corrective regimen sliding scale   SubCutaneous Before meals and at bedtime  lidocaine   4% Patch 1 Patch Transdermal every 24 hours  losartan 25 milliGRAM(s) Oral daily  pantoprazole    Tablet 40 milliGRAM(s) Oral before breakfast  polyethylene glycol 3350 17 Gram(s) Oral daily  senna 2 Tablet(s) Oral at bedtime    MEDICATIONS  (PRN):  dextrose Oral Gel 15 Gram(s) Oral once PRN Blood Glucose LESS THAN 70 milliGRAM(s)/deciliter  oxyCODONE    IR 5 milliGRAM(s) Oral every 6 hours PRN Severe Pain (7 - 10)      Vital Signs Last 24 Hrs  T(C): 36.4 (12 Apr 2022 05:16), Max: 36.9 (11 Apr 2022 13:27)  T(F): 97.6 (12 Apr 2022 05:16), Max: 98.5 (11 Apr 2022 13:27)  HR: 74 (12 Apr 2022 05:16) (74 - 88)  BP: 146/72 (12 Apr 2022 05:16) (142/67 - 146/72)  BP(mean): --  RR: 17 (12 Apr 2022 05:16) (16 - 17)  SpO2: 97% (12 Apr 2022 05:16) (96% - 97%)  COVID-19 PCR: NotDetec (12 Apr 2022 07:07)  COVID-19 PCR: NotDetec (06 Apr 2022 00:03)    LABS: Reviewed                          10.7   7.36  )-----------( 275      ( 12 Apr 2022 07:51 )             33.1     04-12    139  |  109<H>  |  42<H>  ----------------------------<  159<H>  3.7   |  19<L>  |  1.08    Ca    9.1      12 Apr 2022 07:51    CAPILLARY BLOOD GLUCOSE      POCT Blood Glucose.: 152 mg/dL (12 Apr 2022 07:32)  POCT Blood Glucose.: 271 mg/dL (11 Apr 2022 21:45)  POCT Blood Glucose.: 295 mg/dL (11 Apr 2022 16:20)    Radiology: Reviewed.   < from: CT Abdomen and Pelvis w/ Oral Cont and w/ IV Cont (04.09.22 @ 16:44) >    ACC: [id number] EXAM:  CT ABDOMEN AND PELVIS OC IC                          PROCEDURE DATE:  04/09/2022          INTERPRETATION:  CLINICAL INFORMATION: Gastric carcinoma status post   resection with bone lesions    COMPARISON: CT abdomen pelvis 1/17/2020    CONTRAST/COMPLICATIONS:  IV Contrast: Omnipaque 350  90 cc administered   10 cc discarded  Oral Contrast: Omnipaque 300  Complications: None reported at time of study completion    PROCEDURE:  CT of the Abdomen and Pelvis was performed.  Sagittal and coronal reformats were performed.    FINDINGS:  LOWER CHEST: Clear.    LIVER: Normal.  BILE DUCTS: Nondilated.  GALLBLADDER: Prior cholecystectomy.  SPLEEN: Normal.  PANCREAS: Normal.  ADRENALS: Normal.  KIDNEYS/URETERS: No calculi, hydronephrosis, or renal mass.    BLADDER: Underdistended limiting evaluation.  REPRODUCTIVE ORGANS: Status post hysterectomy.    BOWEL: Status post distal gastrectomy and Miguel-en-Y gastrojejunostomy. No   bowel obstruction. No evidence of recurrent mass.  PERITONEUM: No ascites or implants.  VESSELS: Aortoiliac atherosclerosis without aneurysm.  RETROPERITONEUM/LYMPH NODES: No adenopathy.  ABDOMINAL WALL: Normal.  BONES: Lytic lesions again noted in the right acetabulum T11 vertebral   body, and left-sidedribs compatible with metastatic disease.    IMPRESSION:  *  Lytic bone lesions again noted without additional sites of disease in   the abdomen and pelvis.      --- End of Report ---            TERESA POLANCO MD; Attending Radiologist  This document hasbeen electronically signed. Apr 9 2022  6:09PM    < end of copied text >    < from: US Duplex Venous Lower Ext Ltd, Right (04.06.22 @ 10:09) >    ACC: [id number] EXAM:  US DPLX LWR EXT VEINS LTD RT                          PROCEDURE DATE:  04/06/2022          INTERPRETATION:  CLINICAL INFORMATION: Right lower extremity pain,   positive Liz test    COMPARISON: None available.    TECHNIQUE: Duplex sonography of the RIGHT LOWER extremity veins with   color and spectral Doppler, with and without compression.    FINDINGS:    There is normal compressibility of the right common femoral, femoral and   popliteal veins.  The contralateral common femoral vein is patent.  Doppler examination shows normal spontaneous and phasic flow.    No calf vein thrombosis is detected.    IMPRESSION:  No evidence of right lower extremity deep venous thrombosis.          --- End of Report ---            KRISTA NEVAREZ MD; Attending Radiologist  This document has been electronically signed. Apr 6 2022 10:14AM    < end of copied text >    < from: CT Lumbar Spine No Cont (04.06.22 @ 09:00) >    ACC: [id number] EXAM:  CT LUMBAR SPINE                          PROCEDURE DATE:  04/06/2022          INTERPRETATION:  CT lumbar spine without IV contrast    CLINICAL INFORMATION: Low back pain with positive straight leg test.   herniated disc    TECHNIQUE:  Contiguous axial sections were obtained through the lumbar   spine.   Additional sagittal and coronal reformats were obtained.    FINDINGS:   No prior similar studies are available for review    Lumbar vertebral body heights are maintained. Novertebral fracture is   seen. No destructive bone lesion is found.  Alignment is preserved.    Facet joints appear aligned.  The visualized sacral and pelvic bones   appear intact.    Multilevel disc bulging is present with the largest posterior disc  osteophyte complex at L4/L5. There is moderate to severe left foraminal   stenosis and moderate to severe right foraminal stenosis at L4/L5,   without significant spinal canal stenosis. Rest of the neural foramina   are intact.  No high-grade central canal compromise is recognized by the   CT technique.    MR would be required to evaluate the intervertebral   discs at higher sensitivity for disc pathology.    No paraspinal mass is recognized.  Paraspinal soft tissues appear intact.      IMPRESSION:  Posterior disc osteophyte complex at L4/L5 resulting in moderate to   severe bilateral foraminal stenosis without significant spinal canal   stenosis.    --- End of Report ---            CARLOZ BAEZ MD; Attending Radiologist  This document has been electronically signed. Apr 6 2022  9:07AM    < end of copied text >      ORT Score -   Family Hx of substance abuse	Female	      Male  Alcohol 	                                           1                     3  Illegal drugs	                                   2                     3  Rx drugs                                           4 	                  4  Personal Hx of substance abuse		  Alcohol 	                                          3	                  3  Illegal drugs                                     4	                  4  Rx drugs                                            5 	                  5  Age between 16- 45 years	           1                     1  hx preadolescent sexual abuse	   3 	                  0  Psychological disease		  ADD, OCD, bipolar, schizophrenia   2	          2  Depression                                           1 	          1  Total: 0    a score of 3 or lower indicates low risk for opioid abuse		  a score of 4-7 indicates moderate risk for opioid abuse		  a score of 8 or higher indicates high risk for opioid abuse  	  REVIEW OF SYSTEMS:  CONSTITUTIONAL: No fever + fatigue   RESPIRATORY: No cough, wheezing, chills or hemoptysis; No shortness of breath  CARDIOVASCULAR: No chest pain, palpitations, dizziness, or leg swelling  GASTROINTESTINAL: No loss of appetite, decreased PO intake. No abdominal or epigastric pain. No nausea, vomiting; No diarrhea + constipation.   GENITOURINARY: No dysuria, frequency, hematuria, retention or incontinence  MUSCULOSKELETAL: + occasional lumbar back and right hip pain (denies at this time), no swelling; no upper or left lower motor strength weakness, + right lower motor strength weakness, no saddle anesthesia, bowel/bladder incontinence, no falls   NEURO: No headaches, + numbness/tingling b/l hands and feet, No weakness; + unsteady gait (chronic) + hx vertigo    PHYSICAL EXAM:  GENERAL:  Lethargic & Oriented X4, cooperative, NAD, Good concentration. Speech is clear.   RESPIRATORY: Respirations even and unlabored. Clear to auscultation bilaterally; No rales, rhonchi, wheezing, or rubs  CARDIOVASCULAR: Normal S1/S2, regular rate and rhythm; No murmurs, rubs, or gallops. No JVD.   GASTROINTESTINAL: + obese per BMI, Soft, Nontender, Nondistended; Bowel sounds present left rib surgical dressing c/d/i.   PERIPHERAL VASCULAR:  Extremities warm without edema. 2+ Peripheral Pulses, No cyanosis, No calf tenderness; No joint swelling.   MUSCULOSKELETAL: Motor Strength 4/5 B/L upper and lower extremities, moves all extremities equally against gravity; + decreased right hip ROM; negative b/l SLR; + lumbar spine and right hip tenderness on palpation.   SKIN: Warm, dry, intact. No rashes, lesions, scars or wounds.     Risk factors associated with adverse outcomes related to opioid treatment  [ ]  Concurrent benzodiazepine use  [ ]  History/ Active substance use or alcohol use disorder  [ ] Psychiatric co-morbidity  [ ] Sleep apnea  [ ] COPD  [ ] BMI> 35  [ ] Liver dysfunction  [ ] Renal dysfunction  [ ] CHF  [X ] Former Smoker  [X]  Age > 60 years    [X ]  [place]  Reviewed and Copied to Chart. See below.    Plan of care and goal oriented pain management treatment options were discussed with patient and /or primary care giver; all questions and concerns were addressed and care was aligned with patient's wishes.    Educated patient on goal oriented pain management treatment options     04-12-22 @ 11:32

## 2022-04-13 NOTE — PROGRESS NOTE ADULT - ASSESSMENT
Patient is a 87yoF, AAOx3 & ambulatory at baseline, w/ PMH of HTN, HLD, T2DM, osteoporosis, hx gastric adenocarcinoma, s/p resection 2015.   Pt presented with RLE pain for 1 month. Admitted for RLE pain work up.  CT LS resulted Posterior disc osteophyte complex at L4/L5 resulting in moderate to severe bilateral foraminal stenosis without significant spinal canal stenosis. Hip Xray no fx. Ortho and pain management consulted.   Pt D -dimer elevated 500, DVT negative RLE doppler US. on dvt ppx with lovenox. CTA no PE but suspects bone metastasis, apical nodule.   Bone scan confirms lesions, IR consulted for bone biopsy.  Pt s/p left rib bone biopsy on 4/13, will follow up with heme/onc Dr. Alvarado with pathology results.   PT recommended home PT with DMEs (commode and walker).     Pt is afebrile, reports RLE pain now resolved. Pt is medically optimized for discharge.

## 2022-04-13 NOTE — PACU DISCHARGE NOTE - THE ANESTHESIA ORDERS USED IN THE PACU ORDER SET WILL BE DISCONTINUED UPON TRANSFER OF THIS PATIENT
Statement Selected
No adverse reaction to first time med in ED/Awake/Alert and oriented to person, place and time

## 2022-04-14 ENCOUNTER — TRANSCRIPTION ENCOUNTER (OUTPATIENT)
Age: 87
End: 2022-04-14

## 2022-04-14 VITALS
SYSTOLIC BLOOD PRESSURE: 125 MMHG | RESPIRATION RATE: 16 BRPM | TEMPERATURE: 98 F | DIASTOLIC BLOOD PRESSURE: 65 MMHG | HEART RATE: 90 BPM | OXYGEN SATURATION: 98 %

## 2022-04-14 LAB
GLUCOSE BLDC GLUCOMTR-MCNC: 153 MG/DL — HIGH (ref 70–99)
GLUCOSE BLDC GLUCOMTR-MCNC: 161 MG/DL — HIGH (ref 70–99)
GLUCOSE BLDC GLUCOMTR-MCNC: 193 MG/DL — HIGH (ref 70–99)

## 2022-04-14 PROCEDURE — 99232 SBSQ HOSP IP/OBS MODERATE 35: CPT

## 2022-04-14 RX ORDER — ACETAMINOPHEN 500 MG
2 TABLET ORAL
Qty: 0 | Refills: 0 | DISCHARGE
Start: 2022-04-14

## 2022-04-14 RX ORDER — GABAPENTIN 400 MG/1
1 CAPSULE ORAL
Qty: 0 | Refills: 0 | DISCHARGE
Start: 2022-04-14

## 2022-04-14 RX ORDER — GABAPENTIN 400 MG/1
1 CAPSULE ORAL
Qty: 60 | Refills: 0
Start: 2022-04-14 | End: 2022-05-13

## 2022-04-14 RX ORDER — GABAPENTIN 400 MG/1
1 CAPSULE ORAL
Qty: 30 | Refills: 0
Start: 2022-04-14 | End: 2022-05-13

## 2022-04-14 RX ORDER — LIDOCAINE 4 G/100G
1 CREAM TOPICAL
Qty: 0 | Refills: 0 | DISCHARGE
Start: 2022-04-14

## 2022-04-14 RX ADMIN — LIDOCAINE 1 PATCH: 4 CREAM TOPICAL at 11:54

## 2022-04-14 RX ADMIN — Medication 1000 MILLIGRAM(S): at 06:19

## 2022-04-14 RX ADMIN — LOSARTAN POTASSIUM 25 MILLIGRAM(S): 100 TABLET, FILM COATED ORAL at 05:49

## 2022-04-14 RX ADMIN — GABAPENTIN 100 MILLIGRAM(S): 400 CAPSULE ORAL at 14:21

## 2022-04-14 RX ADMIN — Medication 1000 MILLIGRAM(S): at 05:49

## 2022-04-14 RX ADMIN — Medication 1000 MILLIGRAM(S): at 15:17

## 2022-04-14 RX ADMIN — Medication 1: at 11:53

## 2022-04-14 RX ADMIN — PANTOPRAZOLE SODIUM 40 MILLIGRAM(S): 20 TABLET, DELAYED RELEASE ORAL at 05:49

## 2022-04-14 RX ADMIN — GABAPENTIN 100 MILLIGRAM(S): 400 CAPSULE ORAL at 05:49

## 2022-04-14 RX ADMIN — Medication 1: at 16:42

## 2022-04-14 RX ADMIN — Medication 1000 MILLIGRAM(S): at 14:20

## 2022-04-14 RX ADMIN — Medication 1: at 07:32

## 2022-04-14 NOTE — PROGRESS NOTE ADULT - PROBLEM SELECTOR PLAN 1
Pt with acute right hip and lumbar back pain which is somatic and neuropathic in nature due to moderate lumbar stenosis L4/5. CT angio showed Numerous osteolytic lesions concerning for metastases. Bone scan - suspicious for mets. heme/oncology consult. Pt with left rib mass s/p core biopsy x4 obtained bone bx in IR 4/13.   CT abd pelvis 4/9-  Lytic bone lesions again noted without additional sites of disease in the abdomen and pelvis.  IR  BX of one of the bone lesions  for tissue diagnosis on 4/13  High risk medications reviewed. Avoid polypharmacy. Avoid IV opioids. Avoid benzodiazepines. Non-pharmacological sleep aides initiated. Non-opioid medications and non-pharmacological pain management measures initiated.    Opioid pain recommendations:  - Continue oxycodone 5mg PO q6h PRN severe pain.   Maximize non-opioid pain recommendations   - Continue Acetaminophen 1 gram PO q 8 hours x 3 days.   - Continue Gabapentin 100mg po BID and 300mg PO at bedtime (increased 4/12). (CrCl 38, max dose 900mg/d) Monitor renal function.   - Continue Lidoderm 4% patch daily.   Bowel Regimen  - Continue senna and miralax  Mild pain   - Non-pharmacological pain treatment recommendations  - Warm/ Cool packs PRN   - Repositioning extremity, elevation, imagery, relaxation, distraction.  - Physical therapy OOB if no contraindications   Recommendations discussed with primary team and RN.

## 2022-04-14 NOTE — PROGRESS NOTE ADULT - REASON FOR ADMISSION
RLE pain

## 2022-04-14 NOTE — PROGRESS NOTE ADULT - PROBLEM SELECTOR PROBLEM 4
HTN (hypertension)
Vertigo
Gastric adenocarcinoma
Gastric adenocarcinoma
HTN (hypertension)
Gastric adenocarcinoma

## 2022-04-14 NOTE — DISCHARGE NOTE NURSING/CASE MANAGEMENT/SOCIAL WORK - NSDCVIVACCINE_GEN_ALL_CORE_FT
influenza, injectable, quadrivalent, preservative free; 18-Mar-2015 14:46; Isaak Levin (RN); [employer]; ; IntraMuscular; Deltoid Right.; 0.5 milliLiter(s); VIS (VIS Published: 19-Aug-2014, VIS Presented: 18-Mar-2015);

## 2022-04-14 NOTE — PROGRESS NOTE ADULT - PROBLEM SELECTOR PROBLEM 2
Acute pain of lower extremity, right
Lumbar stenosis

## 2022-04-14 NOTE — DIETITIAN INITIAL EVALUATION ADULT - PERTINENT LABORATORY DATA
Nonstress Test   Patient: Rico Vu    Gestation: 38w4d    NST: ama     Variability: Moderate           Accelerations: Yes           Decelerations: None            Baseline: 150 BPM           Uterine Irritability: No           Contractions: Irregular 04-13    138  |  108  |  44<H>  ----------------------------<  161<H>  3.9   |  19<L>  |  1.09    Ca    9.1      13 Apr 2022 07:45  Phos  3.4     04-13  Mg     2.1     04-13    TPro  7.9  /  Alb  3.1<L>  /  TBili  0.2  /  DBili  <0.1  /  AST  86<H>  /  ALT  77<H>  /  AlkPhos  48  04-13  POCT Blood Glucose.: 153 mg/dL (04-14-22 @ 07:24)  A1C with Estimated Average Glucose Result: 7.3 % (04-07-22 @ 09:26)

## 2022-04-14 NOTE — PROGRESS NOTE ADULT - PROBLEM SELECTOR PROBLEM 3
Lumbar stenosis
UTI (urinary tract infection)
Lumbar stenosis
Lumbar stenosis
UTI (urinary tract infection)

## 2022-04-14 NOTE — PROGRESS NOTE ADULT - SUBJECTIVE AND OBJECTIVE BOX
Source of information: GAYLA ELLIS, Chart review  Patient language: [place]  : none at this time. Provider fluent in [place].    HPI:  Patient is a 87yoF, AAOx3 & ambulatory at baseline, w/ PMH of HTN, HLD, T2DM, osteoporosis, p/w RLE pain for 1 month. She complains of sharp, 10/10, constant RLE pain that worsens with standing. She reports pain starts from the back and radiates down to her thigh and the rest of the leg. She states the pain has made it difficult for her to ambulate, and it is associated with numbness and swelling of that leg. She visited her PCP, and prescribed cyclobenzaprine 5mg qHs and Meloxicam 15mg qd, but it has not ease the pain, as per patient. She denies fever, chills, n/v, chest pain, SOB, abdominal pain, urinary or bowel movement changes. (06 Apr 2022 02:26)      This is a Patient is a 87y old  Female who presents with a chief complaint of Pain of right lower extremity      Pain consulted for right leg pain. Pt seen and examined at bedside. Pt is [place] speaking. Patient found lying in bed, watching TV, no acute distress noted. Pt reports PAIN SCORE:  5/10 tolerable SCALE USED: (1-10 VNRS). Pain adequately managed on current pain regimen. Pt describes pain as sharp alleviated by pain medications and exacerbated by movement worts when OOB. Pt tolerating PO diet. Pt denies lethargy, nausea, vomiting, constipation and itchiness. Reports last BM 4/14. Patient stated goal for pain control: to be able to take deep breaths, get out of bed to chair and ambulate with tolerable pain control.     PAST MEDICAL & SURGICAL HISTORY:  HTN (hypertension)    DM (diabetes mellitus)    Hypercholesteremia    Gastric adenocarcinoma  s/p resection    Vertigo    Dementia    S/P hysterectomy    S/P tubal ligation    FAMILY HISTORY:  Family history of diabetes mellitus (Sibling)    Family history of early CAD (Grandparent)    Social History:   [x]Denies ETOH use, illicit drug use, and smoking     Allergies    No Known Allergies    Intolerances    MEDICATIONS  (STANDING):  acetaminophen     Tablet .. 1000 milliGRAM(s) Oral every 8 hours  dextrose 5%. 1000 milliLiter(s) (50 mL/Hr) IV Continuous <Continuous>  dextrose 5%. 1000 milliLiter(s) (100 mL/Hr) IV Continuous <Continuous>  dextrose 50% Injectable 25 Gram(s) IV Push once  dextrose 50% Injectable 12.5 Gram(s) IV Push once  dextrose 50% Injectable 25 Gram(s) IV Push once  gabapentin 300 milliGRAM(s) Oral at bedtime  gabapentin 100 milliGRAM(s) Oral <User Schedule>  glucagon  Injectable 1 milliGRAM(s) IntraMuscular once  insulin lispro (ADMELOG) corrective regimen sliding scale   SubCutaneous Before meals and at bedtime  lidocaine   4% Patch 1 Patch Transdermal every 24 hours  losartan 25 milliGRAM(s) Oral daily  pantoprazole    Tablet 40 milliGRAM(s) Oral before breakfast  polyethylene glycol 3350 17 Gram(s) Oral daily  senna 2 Tablet(s) Oral at bedtime    MEDICATIONS  (PRN):  dextrose Oral Gel 15 Gram(s) Oral once PRN Blood Glucose LESS THAN 70 milliGRAM(s)/deciliter  oxyCODONE    IR 5 milliGRAM(s) Oral every 6 hours PRN Severe Pain (7 - 10)    Vital Signs Last 24 Hrs  T(C): 36.5 (14 Apr 2022 05:10), Max: 37.1 (13 Apr 2022 14:02)  T(F): 97.7 (14 Apr 2022 05:10), Max: 98.8 (13 Apr 2022 14:02)  HR: 89 (14 Apr 2022 11:40) (79 - 92)  BP: 159/75 (14 Apr 2022 11:40) (114/61 - 159/75)  BP(mean): --  RR: 17 (14 Apr 2022 05:10) (16 - 17)  SpO2: 98% (14 Apr 2022 11:40) (95% - 98%)  COVID-19 PCR: NotDetec (12 Apr 2022 07:07)  COVID-19 PCR: NotDetec (06 Apr 2022 00:03)    LABS: Reviewed                          10.5   8.24  )-----------( 255      ( 13 Apr 2022 07:45 )             32.4     04-13    138  |  108  |  44<H>  ----------------------------<  161<H>  3.9   |  19<L>  |  1.09    Ca    9.1      13 Apr 2022 07:45  Phos  3.4     04-13  Mg     2.1     04-13    TPro  7.9  /  Alb  3.1<L>  /  TBili  0.2  /  DBili  <0.1  /  AST  86<H>  /  ALT  77<H>  /  AlkPhos  48  04-13    PT/INR - ( 13 Apr 2022 08:08 )   PT: 11.7 sec;   INR: 0.98 ratio         PTT - ( 13 Apr 2022 08:08 )  PTT:26.4 sec  LIVER FUNCTIONS - ( 13 Apr 2022 07:45 )  Alb: 3.1 g/dL / Pro: 7.9 g/dL / ALK PHOS: 48 U/L / ALT: 77 U/L DA / AST: 86 U/L / GGT: x             CAPILLARY BLOOD GLUCOSE      POCT Blood Glucose.: 161 mg/dL (14 Apr 2022 11:44)  POCT Blood Glucose.: 153 mg/dL (14 Apr 2022 07:24)  POCT Blood Glucose.: 189 mg/dL (13 Apr 2022 21:39)  POCT Blood Glucose.: 133 mg/dL (13 Apr 2022 16:39)    COVID-19 PCR: NotDetec (12 Apr 2022 07:07)  COVID-19 PCR: NotDetec (06 Apr 2022 00:03)      Radiology:   rom: CT Abdomen and Pelvis w/ Oral Cont and w/ IV Cont (04.09.22 @ 16:44) >    ACC: [id number] EXAM:  CT ABDOMEN AND PELVIS OC IC                          PROCEDURE DATE:  04/09/2022          INTERPRETATION:  CLINICAL INFORMATION: Gastric carcinoma status post   resection with bone lesions    COMPARISON: CT abdomen pelvis 1/17/2020    CONTRAST/COMPLICATIONS:  IV Contrast: Omnipaque 350  90 cc administered   10 cc discarded  Oral Contrast: Omnipaque 300  Complications: None reported at time of study completion    PROCEDURE:  CT of the Abdomen and Pelvis was performed.  Sagittal and coronal reformats were performed.    FINDINGS:  LOWER CHEST: Clear.    LIVER: Normal.  BILE DUCTS: Nondilated.  GALLBLADDER: Prior cholecystectomy.  SPLEEN: Normal.  PANCREAS: Normal.  ADRENALS: Normal.  KIDNEYS/URETERS: No calculi, hydronephrosis, or renal mass.    BLADDER: Underdistended limiting evaluation.  REPRODUCTIVE ORGANS: Status post hysterectomy.    BOWEL: Status post distal gastrectomy and Miguel-en-Y gastrojejunostomy. No   bowel obstruction. No evidence of recurrent mass.  PERITONEUM: No ascites or implants.  VESSELS: Aortoiliac atherosclerosis without aneurysm.  RETROPERITONEUM/LYMPH NODES: No adenopathy.  ABDOMINAL WALL: Normal.  BONES: Lytic lesions again noted in the right acetabulum T11 vertebral   body, and left-sidedribs compatible with metastatic disease.    IMPRESSION:  *  Lytic bone lesions again noted without additional sites of disease in   the abdomen and pelvis.      --- End of Report ---            TERESA POLANCO MD; Attending Radiologist  This document hasbeen electronically signed. Apr 9 2022  6:09PM    < end of copied text >    < from: US Duplex Venous Lower Ext Ltd, Right (04.06.22 @ 10:09) >    ACC: [id number] EXAM:  US DPLX LWR EXT VEINS LTD RT                          PROCEDURE DATE:  04/06/2022          INTERPRETATION:  CLINICAL INFORMATION: Right lower extremity pain,   positive Liz test    COMPARISON: None available.    TECHNIQUE: Duplex sonography of the RIGHT LOWER extremity veins with   color and spectral Doppler, with and without compression.    FINDINGS:    There is normal compressibility of the right common femoral, femoral and   popliteal veins.  The contralateral common femoral vein is patent.  Doppler examination shows normal spontaneous and phasic flow.    No calf vein thrombosis is detected.    IMPRESSION:  No evidence of right lower extremity deep venous thrombosis.    --- End of Report ---    KRISTA NEVAREZ MD; Attending Radiologist  This document has been electronically signed. Apr 6 2022 10:14AM    < end of copied text >    < from: CT Lumbar Spine No Cont (04.06.22 @ 09:00) >    ACC: [id number] EXAM:  CT LUMBAR SPINE                          PROCEDURE DATE:  04/06/2022      INTERPRETATION:  CT lumbar spine without IV contrast    CLINICAL INFORMATION: Low back pain with positive straight leg test.   herniated disc    TECHNIQUE:  Contiguous axial sections were obtained through the lumbar   spine.   Additional sagittal and coronal reformats were obtained.    FINDINGS:   No prior similar studies are available for review    Lumbar vertebral body heights are maintained. Novertebral fracture is   seen. No destructive bone lesion is found.  Alignment is preserved.    Facet joints appear aligned.  The visualized sacral and pelvic bones   appear intact.    Multilevel disc bulging is present with the largest posterior disc  osteophyte complex at L4/L5. There is moderate to severe left foraminal   stenosis and moderate to severe right foraminal stenosis at L4/L5,   without significant spinal canal stenosis. Rest of the neural foramina   are intact.  No high-grade central canal compromise is recognized by the   CT technique.    MR would be required to evaluate the intervertebral   discs at higher sensitivity for disc pathology.    No paraspinal mass is recognized.  Paraspinal soft tissues appear intact.    IMPRESSION:  Posterior disc osteophyte complex at L4/L5 resulting in moderate to   severe bilateral foraminal stenosis without significant spinal canal   stenosis.    --- End of Report ---    CARLOZ BAEZ MD; Attending Radiologist  This document has been electronically signed. Apr 6 2022  9:07AM    ORT Score -   Family Hx of substance abuse	Female	      Male  Alcohol 	                                           1                     3  Illegal drugs	                                   2                     3  Rx drugs                                           4 	                  4  Personal Hx of substance abuse		  Alcohol 	                                          3	                  3  Illegal drugs                                     4	                  4  Rx drugs                                            5 	                  5  Age between 16- 45 years	           1                     1  hx preadolescent sexual abuse	   3 	                  0  Psychological disease		  ADD, OCD, bipolar, schizophrenia   2	          2  Depression                                           1 	          1  Total: 0    a score of 3 or lower indicates low risk for opioid abuse		  a score of 4-7 indicates moderate risk for opioid abuse		  a score of 8 or higher indicates high risk for opioid abuse    REVIEW OF SYSTEMS:  CONSTITUTIONAL: No fever, no chills  HEENT:  No difficulty hearing, no change in vision  NECK: No pain or stiffness  RESPIRATORY: No cough, wheezing, chills or hemoptysis; No shortness of breath  CARDIOVASCULAR: No chest pain, palpitations, dizziness, or leg swelling  GASTROINTESTINAL: No loss of appetite, decreased PO intake. No abdominal or epigastric pain. No nausea, vomiting; No diarrhea or constipation.   GENITOURINARY: No dysuria, frequency, hematuria, retention or incontinence  MUSCULOSKELETAL: +R hip pain, denies at this time when lying in bed, No joint pain or swelling; occasional back pain, or extremity pain, +lower motor strength weakness uses walker, no saddle anesthesia, bowel/bladder incontinence, no falls   NEURO: No headaches, +numbness/tingling b/l LE, + unsteady gait (chronic) + hx vertigo  ENDOCRINE: No polyuria, polydipsia, heat or cold intolerance; No hair loss  PSYCHIATRIC: No depression, anxiety or difficulty sleeping    PHYSICAL EXAM:  GENERAL:  Alert & Oriented X4, cooperative, NAD. Speech is clear. Patient is [place] speaking  RESPIRATORY: Respirations even and unlabored. Clear to auscultation bilaterally; No rales, rhonchi, wheezing, or rubs  CARDIOVASCULAR: Normal S1/S2, regular rate and rhythm; No murmurs, rubs, or gallops. No JVD.   GASTROINTESTINAL:  Soft, Nontender, +distended; +obese, Bowel sounds present  PERIPHERAL VASCULAR:  Extremities warm without edema. 2+ Peripheral Pulses, No cyanosis, No calf tenderness  MUSCULOSKELETAL: Motor Strength 4/5 B/L upper and lower extremities; moves all extremities equally against gravity; decreased ROM R hip; negative SLR; + tenderness on palpation R hip and lumbar spine.   SKIN: Warm, dry, intact. No rashes, lesions, +abdominal scar noted.     Risk factors associated with adverse outcomes related to opioid treatment  [ ]  Concurrent benzodiazepine use  [ ]  History/ Active substance use or alcohol use disorder  [ ] Psychiatric co-morbidity  [ ] Sleep apnea  [ ] COPD  [ ] BMI> 35  [ ] Liver dysfunction  [ ] Renal dysfunction  [ ] CHF  [x] Smoker  (x]  Age > 60 years    [x]  [place]  Reviewed and Copied to Chart. See below.    Plan of care and goal oriented pain management treatment options were discussed with patient and /or primary care giver; all questions and concerns were addressed and care was aligned with patient's wishes.    Educated patient on goal oriented pain management treatment options     04-14-22 @ 13:44

## 2022-04-14 NOTE — DISCHARGE NOTE NURSING/CASE MANAGEMENT/SOCIAL WORK - NSDCPEFALRISK_GEN_ALL_CORE
For information on Fall & Injury Prevention, visit: [email or web address] OR  [email or web address] OR  https://www.cdc.gov/steadi/patient.html

## 2022-04-14 NOTE — PROGRESS NOTE ADULT - PROBLEM SELECTOR PROBLEM 1
Acute pain of lower extremity, right
Lumbar back pain
Acute pain of lower extremity, right

## 2022-04-14 NOTE — PROGRESS NOTE ADULT - SUBJECTIVE AND OBJECTIVE BOX
Patient is a 87y old  Female who presents with a chief complaint of RLE pain (13 Apr 2022 14:52)    pt seen in icu [  ], reg med floor [   ], bed [  ], chair at bedside [   ], a+o x3 [  ], lethargic [  ],  nad [  ]    shea [  ], ngt [  ], peg [  ], et tube [  ], cent line [  ], picc line [  ]        Allergies    No Known Allergies        Vitals    T(F): 97.7 (04-14-22 @ 05:10), Max: 98.8 (04-13-22 @ 14:02)  HR: 79 (04-14-22 @ 05:10) (71 - 88)  BP: 127/69 (04-14-22 @ 05:10) (114/61 - 147/70)  RR: 17 (04-14-22 @ 05:10) (14 - 19)  SpO2: 96% (04-14-22 @ 05:10) (94% - 96%)  Wt(kg): --  CAPILLARY BLOOD GLUCOSE      POCT Blood Glucose.: 153 mg/dL (14 Apr 2022 07:24)      Labs                          10.5   8.24  )-----------( 255      ( 13 Apr 2022 07:45 )             32.4       04-13    138  |  108  |  44<H>  ----------------------------<  161<H>  3.9   |  19<L>  |  1.09    Ca    9.1      13 Apr 2022 07:45  Phos  3.4     04-13  Mg     2.1     04-13    TPro  7.9  /  Alb  3.1<L>  /  TBili  0.2  /  DBili  <0.1  /  AST  86<H>  /  ALT  77<H>  /  AlkPhos  48  04-13            Clean Catch Clean Catch (Midstream)  04-06 @ 06:18   <10,000 CFU/mL Normal Urogenital Natalie  --  --      .Blood Blood-Peripheral  04-06 @ 06:04   No Growth Final  --  --      Clean Catch Clean Catch (Midstream)  03-05 @ 03:06   >=3 organisms. Probable collection contamination.  --  --          Radiology Results      Meds    MEDICATIONS  (STANDING):  acetaminophen     Tablet .. 1000 milliGRAM(s) Oral every 8 hours  dextrose 5%. 1000 milliLiter(s) (50 mL/Hr) IV Continuous <Continuous>  dextrose 5%. 1000 milliLiter(s) (100 mL/Hr) IV Continuous <Continuous>  dextrose 50% Injectable 25 Gram(s) IV Push once  dextrose 50% Injectable 12.5 Gram(s) IV Push once  dextrose 50% Injectable 25 Gram(s) IV Push once  gabapentin 300 milliGRAM(s) Oral at bedtime  gabapentin 100 milliGRAM(s) Oral <User Schedule>  glucagon  Injectable 1 milliGRAM(s) IntraMuscular once  insulin lispro (ADMELOG) corrective regimen sliding scale   SubCutaneous Before meals and at bedtime  lidocaine   4% Patch 1 Patch Transdermal every 24 hours  losartan 25 milliGRAM(s) Oral daily  pantoprazole    Tablet 40 milliGRAM(s) Oral before breakfast  polyethylene glycol 3350 17 Gram(s) Oral daily  senna 2 Tablet(s) Oral at bedtime      MEDICATIONS  (PRN):  dextrose Oral Gel 15 Gram(s) Oral once PRN Blood Glucose LESS THAN 70 milliGRAM(s)/deciliter  oxyCODONE    IR 5 milliGRAM(s) Oral every 6 hours PRN Severe Pain (7 - 10)      Physical Exam    Neuro :  no focal deficits  Respiratory: CTA B/L  CV: RRR, S1S2, no murmurs,   Abdominal: Soft, NT, ND +BS,  Extremities: No edema, + peripheral pulses    ASSESSMENT    Pain of right lower extremity    HTN (hypertension)    DM (diabetes mellitus)    Hypercholesteremia    Gastric adenocarcinoma    Vertigo    Dementia    S/P hysterectomy    S/P tubal ligation        PLAN     Patient is a 87y old  Female who presents with a chief complaint of RLE pain (13 Apr 2022 14:52)    pt seen in icu [  ], reg med floor [ x  ], bed [  ], chair at bedside [ x  ], a+o x3 [x  ], lethargic [  ],    nad [ x ]        Allergies    No Known Allergies        Vitals    T(F): 97.7 (04-14-22 @ 05:10), Max: 98.8 (04-13-22 @ 14:02)  HR: 79 (04-14-22 @ 05:10) (71 - 88)  BP: 127/69 (04-14-22 @ 05:10) (114/61 - 147/70)  RR: 17 (04-14-22 @ 05:10) (14 - 19)  SpO2: 96% (04-14-22 @ 05:10) (94% - 96%)  Wt(kg): --  CAPILLARY BLOOD GLUCOSE      POCT Blood Glucose.: 153 mg/dL (14 Apr 2022 07:24)      Labs                          10.5   8.24  )-----------( 255      ( 13 Apr 2022 07:45 )             32.4       04-13    138  |  108  |  44<H>  ----------------------------<  161<H>  3.9   |  19<L>  |  1.09    Ca    9.1      13 Apr 2022 07:45  Phos  3.4     04-13  Mg     2.1     04-13    TPro  7.9  /  Alb  3.1<L>  /  TBili  0.2  /  DBili  <0.1  /  AST  86<H>  /  ALT  77<H>  /  AlkPhos  48  04-13            Clean Catch Clean Catch (Midstream)  04-06 @ 06:18   <10,000 CFU/mL Normal Urogenital Natalie  --  --      .Blood Blood-Peripheral  04-06 @ 06:04   No Growth Final  --  --      Clean Catch Clean Catch (Midstream)  03-05 @ 03:06   >=3 organisms. Probable collection contamination.  --  --          Radiology Results      Meds    MEDICATIONS  (STANDING):  acetaminophen     Tablet .. 1000 milliGRAM(s) Oral every 8 hours  dextrose 5%. 1000 milliLiter(s) (50 mL/Hr) IV Continuous <Continuous>  dextrose 5%. 1000 milliLiter(s) (100 mL/Hr) IV Continuous <Continuous>  dextrose 50% Injectable 25 Gram(s) IV Push once  dextrose 50% Injectable 12.5 Gram(s) IV Push once  dextrose 50% Injectable 25 Gram(s) IV Push once  gabapentin 300 milliGRAM(s) Oral at bedtime  gabapentin 100 milliGRAM(s) Oral <User Schedule>  glucagon  Injectable 1 milliGRAM(s) IntraMuscular once  insulin lispro (ADMELOG) corrective regimen sliding scale   SubCutaneous Before meals and at bedtime  lidocaine   4% Patch 1 Patch Transdermal every 24 hours  losartan 25 milliGRAM(s) Oral daily  pantoprazole    Tablet 40 milliGRAM(s) Oral before breakfast  polyethylene glycol 3350 17 Gram(s) Oral daily  senna 2 Tablet(s) Oral at bedtime      MEDICATIONS  (PRN):  dextrose Oral Gel 15 Gram(s) Oral once PRN Blood Glucose LESS THAN 70 milliGRAM(s)/deciliter  oxyCODONE    IR 5 milliGRAM(s) Oral every 6 hours PRN Severe Pain (7 - 10)      Physical Exam    Neuro :  no focal deficits  Respiratory: CTA B/L  CV: RRR, S1S2, no murmurs,   Abdominal: Soft, NT, ND +BS,  Extremities: No edema, + peripheral pulses    ASSESSMENT    Pain of right lower extremity poss 2nd to metastatic disease  lumbar degenerative disc disease,   le edema dvt r/o,   r/o recurrent gastric ca  lytic lesions d/t MM( less likely with positive  bone scan)  uti,   h/o osteoporosis  HTN (hypertension)  DM (diabetes mellitus)  Hypercholesteremia  Gastric adenocarcinoma  Vertigo  Dementia  S/P hysterectomy  S/P tubal ligation        PLAN    ct ls spine with Posterior disc osteophyte complex at L4/L5 resulting in moderate to severe bilateral foraminal stenosis without significant spinal canal stenosis noted    ortho spine cons   pain mgmt f/u   Maximize non-opioid pain recommendations   - decadron 2mg po 2x a day for 3 days completed 4/11.   - Renew Acetaminophen 1 gram PO q 8 hours x 3 days.   - Flexeril 10mg PO TID x 3 days completed 4/9  - Increase Gabapentin 100mg po BID and 300mg PO at bedtime. (CrCl 38, max dose 900mg/d) Monitor renal function.   - Continue Lidoderm 4% patch daily.   Bowel Regimen  - Start senna and miralax  Mild pain   - Non-pharmacological pain treatment recommendations  - Warm/ Cool packs PRN   - Repositioning extremity, elevation, imagery, relaxation, distraction.  - Physical therapy OOB if no contraindications    pain controlled  doppler us with No evidence of right lower extremity deep venous thrombosis noted    cta chest with No pulmonary embolism. Numerous osteolytic lesions concerning for metastases. Consider MRI/bone scan for further evaluation. 4 mm right apical nodule; follow-up chest CT in 3 months.  bone scan with Multiple foci of increased uptake in the ribs, spine and right acetabulum/iliac bone suspicious for osseous metastases. Degenerative disease in the major joints noted above.  heme onc f/u   pt with h/o GASTRIC CA, S/P GASTRECTOMY FOR T3N1A, S/P ADJ CHEMO WITH FOLFOX IN 2015  also h/o of early multiple myeloma,( bmbx  2015, last visit 2/2022   M spike  IGG kappa 1.5  with FLC 20.74, IGG 2145)  CT A/P with  Lytic bone lesions again noted without additional sites of disease in the abdomen and pelvis noted above.  check CEA,  neg noted above  IR  BX of one of the bone lesions  for tissue diagnosis  today  further recommendations will depend on findings   lovenox held  course rocephin completed,   ucx neg noted above  blood cx neg  lispro ss,   hgba1c 7.3   phys tx eval noted and rec phys tx 2-3x/week x 4-6 weeks with rolling walker (5 inch wheels) at Home PT anticipating improvement.   3:1 commode;   cont current meds    d/c plan after bx   f/u with heme-onc outpt for results           Patient is a 87y old  Female who presents with a chief complaint of RLE pain (13 Apr 2022 14:52)    pt seen in icu [  ], reg med floor [ x  ], bed [  ], chair at bedside [ x  ], a+o x3 [x  ], lethargic [  ],    nad [ x ]        Allergies    No Known Allergies        Vitals    T(F): 97.7 (04-14-22 @ 05:10), Max: 98.8 (04-13-22 @ 14:02)  HR: 79 (04-14-22 @ 05:10) (71 - 88)  BP: 127/69 (04-14-22 @ 05:10) (114/61 - 147/70)  RR: 17 (04-14-22 @ 05:10) (14 - 19)  SpO2: 96% (04-14-22 @ 05:10) (94% - 96%)  Wt(kg): --  CAPILLARY BLOOD GLUCOSE      POCT Blood Glucose.: 153 mg/dL (14 Apr 2022 07:24)      Labs                          10.5   8.24  )-----------( 255      ( 13 Apr 2022 07:45 )             32.4       04-13    138  |  108  |  44<H>  ----------------------------<  161<H>  3.9   |  19<L>  |  1.09    Ca    9.1      13 Apr 2022 07:45  Phos  3.4     04-13  Mg     2.1     04-13    TPro  7.9  /  Alb  3.1<L>  /  TBili  0.2  /  DBili  <0.1  /  AST  86<H>  /  ALT  77<H>  /  AlkPhos  48  04-13            Clean Catch Clean Catch (Midstream)  04-06 @ 06:18   <10,000 CFU/mL Normal Urogenital Natalie  --  --      .Blood Blood-Peripheral  04-06 @ 06:04   No Growth Final  --  --      Clean Catch Clean Catch (Midstream)  03-05 @ 03:06   >=3 organisms. Probable collection contamination.  --  --          Radiology Results      Meds    MEDICATIONS  (STANDING):  acetaminophen     Tablet .. 1000 milliGRAM(s) Oral every 8 hours  dextrose 5%. 1000 milliLiter(s) (50 mL/Hr) IV Continuous <Continuous>  dextrose 5%. 1000 milliLiter(s) (100 mL/Hr) IV Continuous <Continuous>  dextrose 50% Injectable 25 Gram(s) IV Push once  dextrose 50% Injectable 12.5 Gram(s) IV Push once  dextrose 50% Injectable 25 Gram(s) IV Push once  gabapentin 300 milliGRAM(s) Oral at bedtime  gabapentin 100 milliGRAM(s) Oral <User Schedule>  glucagon  Injectable 1 milliGRAM(s) IntraMuscular once  insulin lispro (ADMELOG) corrective regimen sliding scale   SubCutaneous Before meals and at bedtime  lidocaine   4% Patch 1 Patch Transdermal every 24 hours  losartan 25 milliGRAM(s) Oral daily  pantoprazole    Tablet 40 milliGRAM(s) Oral before breakfast  polyethylene glycol 3350 17 Gram(s) Oral daily  senna 2 Tablet(s) Oral at bedtime      MEDICATIONS  (PRN):  dextrose Oral Gel 15 Gram(s) Oral once PRN Blood Glucose LESS THAN 70 milliGRAM(s)/deciliter  oxyCODONE    IR 5 milliGRAM(s) Oral every 6 hours PRN Severe Pain (7 - 10)      Physical Exam    Neuro :  no focal deficits  Respiratory: CTA B/L  CV: RRR, S1S2, no murmurs,   Abdominal: Soft, NT, ND +BS,  Extremities: No edema, + peripheral pulses    ASSESSMENT    Pain of right lower extremity poss 2nd to metastatic disease  lumbar degenerative disc disease,   le edema dvt r/o,   r/o recurrent gastric ca  lytic lesions d/t MM( less likely with positive  bone scan)  uti,   h/o osteoporosis  HTN (hypertension)  DM (diabetes mellitus)  Hypercholesteremia  Gastric adenocarcinoma  Vertigo  Dementia  S/P hysterectomy  S/P tubal ligation        PLAN    ct ls spine with Posterior disc osteophyte complex at L4/L5 resulting in moderate to severe bilateral foraminal stenosis without significant spinal canal stenosis noted    ortho spine cons   pain mgmt f/u   Maximize non-opioid pain recommendations   - decadron 2mg po 2x a day for 3 days completed 4/11.   - Renew Acetaminophen 1 gram PO q 8 hours x 3 days.   - Flexeril 10mg PO TID x 3 days completed 4/9  - Increase Gabapentin 100mg po BID and 300mg PO at bedtime. (CrCl 38, max dose 900mg/d) Monitor renal function.   - Continue Lidoderm 4% patch daily.   Bowel Regimen  - Start senna and miralax  Mild pain   - Non-pharmacological pain treatment recommendations  - Warm/ Cool packs PRN   - Repositioning extremity, elevation, imagery, relaxation, distraction.  - Physical therapy OOB if no contraindications    pain controlled  doppler us with No evidence of right lower extremity deep venous thrombosis noted    cta chest with No pulmonary embolism. Numerous osteolytic lesions concerning for metastases. Consider MRI/bone scan for further evaluation. 4 mm right apical nodule; follow-up chest CT in 3 months.  bone scan with Multiple foci of increased uptake in the ribs, spine and right acetabulum/iliac bone suspicious for osseous metastases. Degenerative disease in the major joints noted above.  heme onc f/u   pt with h/o GASTRIC CA, S/P GASTRECTOMY FOR T3N1A, S/P ADJ CHEMO WITH FOLFOX IN 2015  also h/o of early multiple myeloma,( bmbx  2015, last visit 2/2022   M spike  IGG kappa 1.5  with FLC 20.74, IGG 2145)  CT A/P with  Lytic bone lesions again noted without additional sites of disease in the abdomen and pelvis noted    check CEA,  neg noted    IR  BX of one of the bone lesions  for tissue diagnosis  4/13/22  further recommendations will depend on findings   upon discharge pt to f/u with Dr. Alvarado next week to discuss path results  lovenox held  course rocephin completed,   ucx neg noted above  blood cx neg  lispro ss,   hgba1c 7.3   phys tx eval noted and rec phys tx 2-3x/week x 4-6 weeks with rolling walker (5 inch wheels) at Home PT anticipating improvement.   3:1 commode;   cont current meds    pt stable for d/c

## 2022-04-14 NOTE — PROGRESS NOTE ADULT - ASSESSMENT
Confidential Drug Utilization Report  Search Terms: Adri George, 1935   Search Date: 04/06/2022 14:07:13 PM   The Drug Utilization Report below displays all of the controlled substance prescriptions, if any, that your patient has filled in the last twelve months. The information displayed on this report is compiled from pharmacy submissions to the Department, and accurately reflects the information as submitted by the pharmacies.  This report was requested by: Laura Marroquin | Reference #: [id number]   There are no results for the search terms that you entered.

## 2022-04-14 NOTE — PROGRESS NOTE ADULT - PROVIDER SPECIALTY LIST ADULT
Internal Medicine
Heme/Onc
Heme/Onc
Internal Medicine
Pain Medicine
Internal Medicine
Internal Medicine
Intervent Radiology
Pain Medicine
Internal Medicine
Pain Medicine
Internal Medicine
Internal Medicine

## 2022-04-14 NOTE — DIETITIAN INITIAL EVALUATION ADULT - OTHER INFO
Patient from home. Visited pt. Patient from home. Visited pt. out of bed in chair, Macedonian Speaking, also speaking  with  Patient from home. Visited pt. out of bed in chair, Azeri Speaking, also speaking with Son, reports of good po intake, 9 Patient from home. Visited pt. out of bed in chair, Hong [place]er Speaking, also speaking with Son, reports of good po intake at home & consumes 3 meals daily, also stated pt. with no weight changes & has been stable >1 years? dosing wt. 153 Lbs on 04/13, inquiring at what time pt. is going home? d/w RN. S/p left rib mass bx. on 04/13/22 by IR with surgical wound care. Per flow sheet intake % noted.

## 2022-04-14 NOTE — DISCHARGE NOTE NURSING/CASE MANAGEMENT/SOCIAL WORK - PATIENT PORTAL LINK FT
You can access the FollowMyHealth Patient Portal offered by Health system by registering at the following website: [email or web address]. By joining [facility] FollowMyHealth portal, you will also be able to view your health information using other applications (apps) compatible with our system.

## 2022-04-14 NOTE — DIETITIAN INITIAL EVALUATION ADULT - PERTINENT MEDS FT
MEDICATIONS  (STANDING):  acetaminophen     Tablet .. 1000 milliGRAM(s) Oral every 8 hours  dextrose 5%. 1000 milliLiter(s) (50 mL/Hr) IV Continuous <Continuous>  dextrose 5%. 1000 milliLiter(s) (100 mL/Hr) IV Continuous <Continuous>  dextrose 50% Injectable 25 Gram(s) IV Push once  dextrose 50% Injectable 12.5 Gram(s) IV Push once  dextrose 50% Injectable 25 Gram(s) IV Push once  gabapentin 300 milliGRAM(s) Oral at bedtime  gabapentin 100 milliGRAM(s) Oral <User Schedule>  glucagon  Injectable 1 milliGRAM(s) IntraMuscular once  insulin lispro (ADMELOG) corrective regimen sliding scale   SubCutaneous Before meals and at bedtime  lidocaine   4% Patch 1 Patch Transdermal every 24 hours  losartan 25 milliGRAM(s) Oral daily  pantoprazole    Tablet 40 milliGRAM(s) Oral before breakfast  polyethylene glycol 3350 17 Gram(s) Oral daily  senna 2 Tablet(s) Oral at bedtime    MEDICATIONS  (PRN):  dextrose Oral Gel 15 Gram(s) Oral once PRN Blood Glucose LESS THAN 70 milliGRAM(s)/deciliter  oxyCODONE    IR 5 milliGRAM(s) Oral every 6 hours PRN Severe Pain (7 - 10)

## 2022-05-12 ENCOUNTER — INPATIENT (INPATIENT)
Facility: HOSPITAL | Age: 87
LOS: 5 days | Discharge: HOME | DRG: 690 | End: 2022-05-18
Attending: INTERNAL MEDICINE | Admitting: INTERNAL MEDICINE
Payer: MEDICAID

## 2022-05-12 VITALS
SYSTOLIC BLOOD PRESSURE: 121 MMHG | DIASTOLIC BLOOD PRESSURE: 65 MMHG | OXYGEN SATURATION: 94 % | RESPIRATION RATE: 20 BRPM | TEMPERATURE: 100 F | HEART RATE: 96 BPM | HEIGHT: 56 IN | WEIGHT: 139.99 LBS

## 2022-05-12 DIAGNOSIS — Z98.51 TUBAL LIGATION STATUS: Chronic | ICD-10-CM

## 2022-05-12 DIAGNOSIS — Z90.710 ACQUIRED ABSENCE OF BOTH CERVIX AND UTERUS: Chronic | ICD-10-CM

## 2022-05-12 LAB
ALBUMIN SERPL ELPH-MCNC: 2.4 G/DL — LOW (ref 3.5–5)
ALP SERPL-CCNC: 95 U/L — SIGNIFICANT CHANGE UP (ref 40–120)
ALT FLD-CCNC: 16 U/L DA — SIGNIFICANT CHANGE UP (ref 10–60)
ANION GAP SERPL CALC-SCNC: 7 MMOL/L — SIGNIFICANT CHANGE UP (ref 5–17)
ANISOCYTOSIS BLD QL: SIGNIFICANT CHANGE UP
APPEARANCE UR: CLEAR — SIGNIFICANT CHANGE UP
APTT BLD: 26.7 SEC — LOW (ref 27.5–35.5)
AST SERPL-CCNC: 14 U/L — SIGNIFICANT CHANGE UP (ref 10–40)
BACTERIA # UR AUTO: ABNORMAL /HPF
BASOPHILS # BLD AUTO: 0 K/UL — SIGNIFICANT CHANGE UP (ref 0–0.2)
BASOPHILS NFR BLD AUTO: 0 % — SIGNIFICANT CHANGE UP (ref 0–2)
BILIRUB SERPL-MCNC: 0.4 MG/DL — SIGNIFICANT CHANGE UP (ref 0.2–1.2)
BILIRUB UR-MCNC: NEGATIVE — SIGNIFICANT CHANGE UP
BUN SERPL-MCNC: 15 MG/DL — SIGNIFICANT CHANGE UP (ref 7–18)
CALCIUM SERPL-MCNC: 8 MG/DL — LOW (ref 8.4–10.5)
CHLORIDE SERPL-SCNC: 106 MMOL/L — SIGNIFICANT CHANGE UP (ref 96–108)
CO2 SERPL-SCNC: 22 MMOL/L — SIGNIFICANT CHANGE UP (ref 22–31)
COLOR SPEC: YELLOW — SIGNIFICANT CHANGE UP
CREAT SERPL-MCNC: 0.93 MG/DL — SIGNIFICANT CHANGE UP (ref 0.5–1.3)
DIFF PNL FLD: NEGATIVE — SIGNIFICANT CHANGE UP
EGFR: 60 ML/MIN/1.73M2 — SIGNIFICANT CHANGE UP
EOSINOPHIL # BLD AUTO: 0 K/UL — SIGNIFICANT CHANGE UP (ref 0–0.5)
EOSINOPHIL NFR BLD AUTO: 0 % — SIGNIFICANT CHANGE UP (ref 0–6)
EPI CELLS # UR: ABNORMAL /HPF
GLUCOSE SERPL-MCNC: 148 MG/DL — HIGH (ref 70–99)
GLUCOSE UR QL: 1000 MG/DL
HCT VFR BLD CALC: 28.9 % — LOW (ref 34.5–45)
HGB BLD-MCNC: 9.4 G/DL — LOW (ref 11.5–15.5)
HYPOCHROMIA BLD QL: SIGNIFICANT CHANGE UP
INR BLD: 1.08 RATIO — SIGNIFICANT CHANGE UP (ref 0.88–1.16)
KETONES UR-MCNC: NEGATIVE — SIGNIFICANT CHANGE UP
LEUKOCYTE ESTERASE UR-ACNC: NEGATIVE — SIGNIFICANT CHANGE UP
LYMPHOCYTES # BLD AUTO: 1.46 K/UL — SIGNIFICANT CHANGE UP (ref 1–3.3)
LYMPHOCYTES # BLD AUTO: 71 % — HIGH (ref 13–44)
MANUAL SMEAR VERIFICATION: SIGNIFICANT CHANGE UP
MCHC RBC-ENTMCNC: 29.9 PG — SIGNIFICANT CHANGE UP (ref 27–34)
MCHC RBC-ENTMCNC: 32.5 GM/DL — SIGNIFICANT CHANGE UP (ref 32–36)
MCV RBC AUTO: 92 FL — SIGNIFICANT CHANGE UP (ref 80–100)
MONOCYTES # BLD AUTO: 0.31 K/UL — SIGNIFICANT CHANGE UP (ref 0–0.9)
MONOCYTES NFR BLD AUTO: 15 % — HIGH (ref 2–14)
NEUTROPHILS # BLD AUTO: 0.21 K/UL — LOW (ref 1.8–7.4)
NEUTROPHILS NFR BLD AUTO: 10 % — LOW (ref 43–77)
NITRITE UR-MCNC: POSITIVE
NRBC # BLD: 0 /100 — SIGNIFICANT CHANGE UP (ref 0–0)
PH UR: 5 — SIGNIFICANT CHANGE UP (ref 5–8)
PLAT MORPH BLD: NORMAL — SIGNIFICANT CHANGE UP
PLATELET # BLD AUTO: 209 K/UL — SIGNIFICANT CHANGE UP (ref 150–400)
POIKILOCYTOSIS BLD QL AUTO: SLIGHT — SIGNIFICANT CHANGE UP
POTASSIUM SERPL-MCNC: 4.2 MMOL/L — SIGNIFICANT CHANGE UP (ref 3.5–5.3)
POTASSIUM SERPL-SCNC: 4.2 MMOL/L — SIGNIFICANT CHANGE UP (ref 3.5–5.3)
PROT SERPL-MCNC: 6.1 G/DL — SIGNIFICANT CHANGE UP (ref 6–8.3)
PROT UR-MCNC: NEGATIVE — SIGNIFICANT CHANGE UP
PROTHROM AB SERPL-ACNC: 12.9 SEC — SIGNIFICANT CHANGE UP (ref 10.5–13.4)
RBC # BLD: 3.14 M/UL — LOW (ref 3.8–5.2)
RBC # FLD: 15 % — HIGH (ref 10.3–14.5)
RBC BLD AUTO: ABNORMAL
RBC CASTS # UR COMP ASSIST: SIGNIFICANT CHANGE UP /HPF (ref 0–2)
SARS-COV-2 RNA SPEC QL NAA+PROBE: SIGNIFICANT CHANGE UP
SODIUM SERPL-SCNC: 135 MMOL/L — SIGNIFICANT CHANGE UP (ref 135–145)
SP GR SPEC: 1.01 — SIGNIFICANT CHANGE UP (ref 1.01–1.02)
UROBILINOGEN FLD QL: NEGATIVE — SIGNIFICANT CHANGE UP
VARIANT LYMPHS # BLD: 4 % — SIGNIFICANT CHANGE UP (ref 0–6)
WBC # BLD: 2.06 K/UL — LOW (ref 3.8–10.5)
WBC # FLD AUTO: 2.06 K/UL — LOW (ref 3.8–10.5)
WBC UR QL: SIGNIFICANT CHANGE UP /HPF (ref 0–5)

## 2022-05-12 PROCEDURE — 71045 X-RAY EXAM CHEST 1 VIEW: CPT | Mod: 26

## 2022-05-12 PROCEDURE — 99285 EMERGENCY DEPT VISIT HI MDM: CPT

## 2022-05-12 RX ORDER — SODIUM CHLORIDE 9 MG/ML
1000 INJECTION INTRAMUSCULAR; INTRAVENOUS; SUBCUTANEOUS ONCE
Refills: 0 | Status: COMPLETED | OUTPATIENT
Start: 2022-05-12 | End: 2022-05-12

## 2022-05-12 RX ORDER — ACETAMINOPHEN 500 MG
1000 TABLET ORAL ONCE
Refills: 0 | Status: COMPLETED | OUTPATIENT
Start: 2022-05-12 | End: 2022-05-12

## 2022-05-12 RX ADMIN — SODIUM CHLORIDE 1000 MILLILITER(S): 9 INJECTION INTRAMUSCULAR; INTRAVENOUS; SUBCUTANEOUS at 23:04

## 2022-05-12 RX ADMIN — Medication 400 MILLIGRAM(S): at 23:14

## 2022-05-13 DIAGNOSIS — D64.9 ANEMIA, UNSPECIFIED: ICD-10-CM

## 2022-05-13 DIAGNOSIS — Z51.5 ENCOUNTER FOR PALLIATIVE CARE: ICD-10-CM

## 2022-05-13 DIAGNOSIS — R91.1 SOLITARY PULMONARY NODULE: ICD-10-CM

## 2022-05-13 DIAGNOSIS — I10 ESSENTIAL (PRIMARY) HYPERTENSION: ICD-10-CM

## 2022-05-13 DIAGNOSIS — C90.00 MULTIPLE MYELOMA NOT HAVING ACHIEVED REMISSION: ICD-10-CM

## 2022-05-13 DIAGNOSIS — N39.0 URINARY TRACT INFECTION, SITE NOT SPECIFIED: ICD-10-CM

## 2022-05-13 DIAGNOSIS — J98.11 ATELECTASIS: ICD-10-CM

## 2022-05-13 DIAGNOSIS — D70.9 NEUTROPENIA, UNSPECIFIED: ICD-10-CM

## 2022-05-13 DIAGNOSIS — E11.9 TYPE 2 DIABETES MELLITUS WITHOUT COMPLICATIONS: ICD-10-CM

## 2022-05-13 DIAGNOSIS — R53.81 OTHER MALAISE: ICD-10-CM

## 2022-05-13 DIAGNOSIS — Z29.9 ENCOUNTER FOR PROPHYLACTIC MEASURES, UNSPECIFIED: ICD-10-CM

## 2022-05-13 DIAGNOSIS — C16.9 MALIGNANT NEOPLASM OF STOMACH, UNSPECIFIED: ICD-10-CM

## 2022-05-13 DIAGNOSIS — E78.5 HYPERLIPIDEMIA, UNSPECIFIED: ICD-10-CM

## 2022-05-13 LAB
ALBUMIN SERPL ELPH-MCNC: 2.1 G/DL — LOW (ref 3.5–5)
ALP SERPL-CCNC: 91 U/L — SIGNIFICANT CHANGE UP (ref 40–120)
ALT FLD-CCNC: 16 U/L DA — SIGNIFICANT CHANGE UP (ref 10–60)
ANION GAP SERPL CALC-SCNC: 7 MMOL/L — SIGNIFICANT CHANGE UP (ref 5–17)
AST SERPL-CCNC: 16 U/L — SIGNIFICANT CHANGE UP (ref 10–40)
BILIRUB SERPL-MCNC: 0.4 MG/DL — SIGNIFICANT CHANGE UP (ref 0.2–1.2)
BUN SERPL-MCNC: 13 MG/DL — SIGNIFICANT CHANGE UP (ref 7–18)
CALCIUM SERPL-MCNC: 7.7 MG/DL — LOW (ref 8.4–10.5)
CHLORIDE SERPL-SCNC: 110 MMOL/L — HIGH (ref 96–108)
CO2 SERPL-SCNC: 21 MMOL/L — LOW (ref 22–31)
CREAT SERPL-MCNC: 0.82 MG/DL — SIGNIFICANT CHANGE UP (ref 0.5–1.3)
EGFR: 70 ML/MIN/1.73M2 — SIGNIFICANT CHANGE UP
GLUCOSE BLDC GLUCOMTR-MCNC: 113 MG/DL — HIGH (ref 70–99)
GLUCOSE BLDC GLUCOMTR-MCNC: 118 MG/DL — HIGH (ref 70–99)
GLUCOSE BLDC GLUCOMTR-MCNC: 126 MG/DL — HIGH (ref 70–99)
GLUCOSE SERPL-MCNC: 173 MG/DL — HIGH (ref 70–99)
HCT VFR BLD CALC: 27.6 % — LOW (ref 34.5–45)
HGB BLD-MCNC: 8.8 G/DL — LOW (ref 11.5–15.5)
LACTATE SERPL-SCNC: 1 MMOL/L — SIGNIFICANT CHANGE UP (ref 0.7–2)
MAGNESIUM SERPL-MCNC: 1.7 MG/DL — SIGNIFICANT CHANGE UP (ref 1.6–2.6)
MCHC RBC-ENTMCNC: 29.5 PG — SIGNIFICANT CHANGE UP (ref 27–34)
MCHC RBC-ENTMCNC: 31.9 GM/DL — LOW (ref 32–36)
MCV RBC AUTO: 92.6 FL — SIGNIFICANT CHANGE UP (ref 80–100)
NRBC # BLD: 0 /100 WBCS — SIGNIFICANT CHANGE UP (ref 0–0)
PHOSPHATE SERPL-MCNC: 2.5 MG/DL — SIGNIFICANT CHANGE UP (ref 2.5–4.5)
PLATELET # BLD AUTO: 211 K/UL — SIGNIFICANT CHANGE UP (ref 150–400)
POTASSIUM SERPL-MCNC: 3.7 MMOL/L — SIGNIFICANT CHANGE UP (ref 3.5–5.3)
POTASSIUM SERPL-SCNC: 3.7 MMOL/L — SIGNIFICANT CHANGE UP (ref 3.5–5.3)
PROT SERPL-MCNC: 5.7 G/DL — LOW (ref 6–8.3)
RBC # BLD: 2.98 M/UL — LOW (ref 3.8–5.2)
RBC # FLD: 14.9 % — HIGH (ref 10.3–14.5)
SODIUM SERPL-SCNC: 138 MMOL/L — SIGNIFICANT CHANGE UP (ref 135–145)
WBC # BLD: 2.26 K/UL — LOW (ref 3.8–10.5)
WBC # FLD AUTO: 2.26 K/UL — LOW (ref 3.8–10.5)

## 2022-05-13 PROCEDURE — 74176 CT ABD & PELVIS W/O CONTRAST: CPT | Mod: 26

## 2022-05-13 PROCEDURE — 99223 1ST HOSP IP/OBS HIGH 75: CPT

## 2022-05-13 PROCEDURE — 99497 ADVNCD CARE PLAN 30 MIN: CPT | Mod: 25

## 2022-05-13 RX ORDER — ENOXAPARIN SODIUM 100 MG/ML
40 INJECTION SUBCUTANEOUS EVERY 24 HOURS
Refills: 0 | Status: DISCONTINUED | OUTPATIENT
Start: 2022-05-13 | End: 2022-05-18

## 2022-05-13 RX ORDER — ACETAMINOPHEN 500 MG
650 TABLET ORAL EVERY 6 HOURS
Refills: 0 | Status: DISCONTINUED | OUTPATIENT
Start: 2022-05-13 | End: 2022-05-18

## 2022-05-13 RX ORDER — INSULIN LISPRO 100/ML
VIAL (ML) SUBCUTANEOUS
Refills: 0 | Status: DISCONTINUED | OUTPATIENT
Start: 2022-05-13 | End: 2022-05-18

## 2022-05-13 RX ORDER — INSULIN LISPRO 100/ML
VIAL (ML) SUBCUTANEOUS AT BEDTIME
Refills: 0 | Status: DISCONTINUED | OUTPATIENT
Start: 2022-05-13 | End: 2022-05-18

## 2022-05-13 RX ORDER — PIPERACILLIN AND TAZOBACTAM 4; .5 G/20ML; G/20ML
3.38 INJECTION, POWDER, LYOPHILIZED, FOR SOLUTION INTRAVENOUS ONCE
Refills: 0 | Status: COMPLETED | OUTPATIENT
Start: 2022-05-13 | End: 2022-05-13

## 2022-05-13 RX ORDER — SIMVASTATIN 20 MG/1
40 TABLET, FILM COATED ORAL AT BEDTIME
Refills: 0 | Status: DISCONTINUED | OUTPATIENT
Start: 2022-05-13 | End: 2022-05-18

## 2022-05-13 RX ORDER — PIPERACILLIN AND TAZOBACTAM 4; .5 G/20ML; G/20ML
3.38 INJECTION, POWDER, LYOPHILIZED, FOR SOLUTION INTRAVENOUS EVERY 8 HOURS
Refills: 0 | Status: DISCONTINUED | OUTPATIENT
Start: 2022-05-13 | End: 2022-05-18

## 2022-05-13 RX ORDER — GABAPENTIN 400 MG/1
100 CAPSULE ORAL EVERY 12 HOURS
Refills: 0 | Status: DISCONTINUED | OUTPATIENT
Start: 2022-05-13 | End: 2022-05-18

## 2022-05-13 RX ADMIN — ENOXAPARIN SODIUM 40 MILLIGRAM(S): 100 INJECTION SUBCUTANEOUS at 09:05

## 2022-05-13 RX ADMIN — PIPERACILLIN AND TAZOBACTAM 25 GRAM(S): 4; .5 INJECTION, POWDER, LYOPHILIZED, FOR SOLUTION INTRAVENOUS at 09:05

## 2022-05-13 RX ADMIN — SIMVASTATIN 40 MILLIGRAM(S): 20 TABLET, FILM COATED ORAL at 21:39

## 2022-05-13 RX ADMIN — Medication 1000 MILLIGRAM(S): at 00:11

## 2022-05-13 RX ADMIN — GABAPENTIN 100 MILLIGRAM(S): 400 CAPSULE ORAL at 18:01

## 2022-05-13 RX ADMIN — Medication 650 MILLIGRAM(S): at 08:04

## 2022-05-13 RX ADMIN — PIPERACILLIN AND TAZOBACTAM 25 GRAM(S): 4; .5 INJECTION, POWDER, LYOPHILIZED, FOR SOLUTION INTRAVENOUS at 17:49

## 2022-05-13 RX ADMIN — SODIUM CHLORIDE 1000 MILLILITER(S): 9 INJECTION INTRAMUSCULAR; INTRAVENOUS; SUBCUTANEOUS at 00:11

## 2022-05-13 RX ADMIN — Medication 650 MILLIGRAM(S): at 08:34

## 2022-05-13 RX ADMIN — GABAPENTIN 100 MILLIGRAM(S): 400 CAPSULE ORAL at 06:35

## 2022-05-13 RX ADMIN — PIPERACILLIN AND TAZOBACTAM 200 GRAM(S): 4; .5 INJECTION, POWDER, LYOPHILIZED, FOR SOLUTION INTRAVENOUS at 01:57

## 2022-05-13 NOTE — CONSULT NOTE ADULT - SUBJECTIVE AND OBJECTIVE BOX
PULMONARY CONSULT NOTE      GAYLA ELLIS  MRN-[id number]      History of Present Illness:  Reason for Admission: Neutropenic fever  History of Present Illness:   86 yo F, from home ,ambulates with walker , PMHx of HTN, HLD, T2DM, osteoporosis, hx gastric adenocarcinoma, s/p resection  (on chemo), early multiple myeloma,( bmbx  , last visit 2022  M spike  IGG kappa 1.5  with FLC 20.74, IGG 2145), follows [facility] gp came with chief complain of fevers at home and lower abdominal pain for 5 and 3 days respectively. Pt stated that she had subjective fevers at home and started getting pain in her lower abdomen radiating to her right leg, 10/10 in intensity at home, burning in nature, constant associated with nausea. Pt denied any chest pain, sob, diarrhea, constipation or vomiting. Endorsed to have loss of appetite. Last chemo was last Friday.     ED Co  HISTORY OF PRESENT ILLNESS: As Above. Awake, alert, comfortable in bed in NAD    MEDICATIONS  (STANDING):  enoxaparin Injectable 40 milliGRAM(s) SubCutaneous every 24 hours  gabapentin 100 milliGRAM(s) Oral every 12 hours  insulin lispro (ADMELOG) corrective regimen sliding scale   SubCutaneous three times a day before meals  insulin lispro (ADMELOG) corrective regimen sliding scale   SubCutaneous at bedtime  piperacillin/tazobactam IVPB.. 3.375 Gram(s) IV Intermittent every 8 hours  simvastatin 40 milliGRAM(s) Oral at bedtime      MEDICATIONS  (PRN):  acetaminophen     Tablet .. 650 milliGRAM(s) Oral every 6 hours PRN Temp greater or equal to 38C (100.4F), Mild Pain (1 - 3)      Allergies    No Known Allergies    Intolerances        PAST MEDICAL & SURGICAL HISTORY:  HTN (hypertension)      DM (diabetes mellitus)      Hypercholesteremia      Gastric adenocarcinoma  s/p resection      Vertigo      Dementia      S/P hysterectomy      S/P tubal ligation          FAMILY HISTORY:  Family history of diabetes mellitus (Sibling)    Family history of early CAD (Grandparent)        SOCIAL HISTORY  Smoking History:     REVIEW OF SYSTEMS:    CONSTITUTIONAL:  No fevers, chills, sweats    HEENT:  Eyes:  No diplopia or blurred vision. ENT:  No earache, sore throat or runny nose.    CARDIOVASCULAR:  No pressure, squeezing, tightness, or heaviness about the chest; no palpitations.    RESPIRATORY:  Per HPI    GASTROINTESTINAL:  No abdominal pain, nausea, vomiting or diarrhea.    GENITOURINARY:  No dysuria, frequency or urgency.    NEUROLOGIC:  No paresthesias, fasciculations, seizures or weakness.    PSYCHIATRIC:  No disorder of thought or mood.    Vital Signs Last 24 Hrs  T(C): 38.4 (13 May 2022 07:19), Max: 38.4 (13 May 2022 07:19)  T(F): 101.1 (13 May 2022 07:19), Max: 101.1 (13 May 2022 07:19)  HR: 84 (13 May 2022 07:19) (78 - 96)  BP: 113/66 (13 May 2022 07:19) (113/66 - 124/74)  BP(mean): --  RR: 17 (13 May 2022 07:19) (17 - 20)  SpO2: 97% (13 May 2022 07:) (94% - 97%)  I&O's Detail      PHYSICAL EXAMINATION:    GENERAL: The patient is a well-developed, well-nourished _____in no apparent distress.     HEENT: Head is normocephalic and atraumatic. Extraocular muscles are intact. Mucous membranes are moist.     NECK: Supple.     LUNGS: Clear to auscultation without wheezing, rales, or rhonchi. Respirations unlabored    HEART: Regular rate and rhythm without murmur.    ABDOMEN: Soft, nontender, and nondistended.  No hepatosplenomegaly is noted.    EXTREMITIES: Without any cyanosis, clubbing, rash, lesions or edema.    NEUROLOGIC: Grossly intact.      LABS:                        8.8    2.26  )-----------( 211      ( 13 May 2022 10:21 )             27.6     05-13    138  |  110<H>  |  13  ----------------------------<  173<H>  3.7   |  21<L>  |  0.82    Ca    7.7<L>      13 May 2022 10:21  Phos  2.5     05-  Mg     1.7         TPro  5.7<L>  /  Alb  2.1<L>  /  TBili  0.4  /  DBili  x   /  AST  16  /  ALT  16  /  AlkPhos  91  05-13    PT/INR - ( 12 May 2022 22:11 )   PT: 12.9 sec;   INR: 1.08 ratio         PTT - ( 12 May 2022 22:11 )  PTT:26.7 sec  Urinalysis Basic - ( 12 May 2022 22:58 )    Color: Yellow / Appearance: Clear / S.010 / pH: x  Gluc: x / Ketone: Negative  / Bili: Negative / Urobili: Negative   Blood: x / Protein: Negative / Nitrite: Positive   Leuk Esterase: Negative / RBC: 0-2 /HPF / WBC 3-5 /HPF   Sq Epi: x / Non Sq Epi: Moderate /HPF / Bacteria: Many /HPF                Lactate, Blood: 1.0 mmol/L (22 @ 00:20)        MICROBIOLOGY:    RADIOLOGY & ADDITIONAL STUDIES:    CXR:    < from: Xray Chest 1 View- PORTABLE-Urgent (22 @ 23:34) >  IMPRESSION:  No acute radiographic findings and no change      < end of copied text >  Ct scan chest;  < from: CT Abdomen and Pelvis No Cont (22 @ 04:27) >  LOWER CHEST: Atelectasis.    < end of copied text >  < from: CT Angio Chest PE Protocol w/ IV Cont (22 @ 12:10) >    Lungs/Airways/Pleura: The central airways are patent. No pleural   effusion. Mild dependent atelectasis at the bases. There is a 4 mm right   apical nodule on series 5 image 52.      < end of copied text >    ekg;    echo:

## 2022-05-13 NOTE — PATIENT PROFILE ADULT - DO YOU NEED ADDITIONAL SERVICES TO MANAGE ANY OF THESE MEDICAL CONDITIONS AT HOME?
Father  Still living? No  Family history of prostate cancer in father, Age at diagnosis: Age Unknown no

## 2022-05-13 NOTE — CONSULT NOTE ADULT - PROBLEM SELECTOR RECOMMENDATION 5
GOC discussion as above. Patient expressed wishes for DNR/DNI, daughter/granddaughter in agreement, GUANACO drafted and placed in chart. Daughter Linjason is primary surrogate. They wish to continue w current medical management, disease modifying cancer treatments. ECOG 2-3. Onc following. Palliative will follow for supportive care.
Hem/Onc eval

## 2022-05-13 NOTE — H&P ADULT - PROBLEM SELECTOR PLAN 3
Gastric adenocarcinoma, s/p resection 2015 (on chemo),  Heme/Onc Dr cervantes  Heme/Onc consult in morning

## 2022-05-13 NOTE — CONSULT NOTE ADULT - PROBLEM SELECTOR RECOMMENDATION 4
Hem/Onc eval
ambulatory w RW at baseline, lives w daughter who assists w some ADLs. PT eval as tolerated.

## 2022-05-13 NOTE — H&P ADULT - NSHPPHYSICALEXAM_GEN_ALL_CORE
Vital Signs Last 24 Hrs  T(C): 36.9 (13 May 2022 00:43), Max: 37.9 (12 May 2022 21:06)  T(F): 98.5 (13 May 2022 00:43), Max: 100.3 (12 May 2022 21:06)  HR: 78 (13 May 2022 00:43) (78 - 96)  BP: 124/74 (13 May 2022 00:43) (121/65 - 124/74)  BP(mean): --  RR: 18 (13 May 2022 00:43) (18 - 20)  SpO2: 97% (13 May 2022 00:43) (94% - 97%)    GENERAL: NAD  EYES: EOMI, PERRLA,   NECK: Supple, No JVD  CHEST/LUNG: Clear to auscultation b/l  No rales, rhonchi, wheezing   HEART: Regular rate and rhythm; No murmurs, +ve S1 S2  ABDOMEN: Soft, Nontender, Nondistended; Bowel sounds present, +ve surgical scar on abdomen   NERVOUS SYSTEM:  Alert & Oriented X3, no focal neurologic deficit   EXTREMITIES:   No clubbing, cyanosis, or edema

## 2022-05-13 NOTE — H&P ADULT - HISTORY OF PRESENT ILLNESS
88 yo F, from home ,ambulates with walker , PMHx of HTN, HLD, T2DM, osteoporosis, hx gastric adenocarcinoma, s/p resection 2015 (on chemo), early multiple myeloma,( bmbx  2015, last visit 2/2022  M spike  IGG kappa 1.5  with FLC 20.74, IGG 2145), follows [facility] came with chief complain of fevers at home and lower abdominal pain for 5 and 3 days respectively. Pt stated that she had subjective fevers at home and started getting pain in her lower abdomen radiating to her right leg, 10/10 in intensity at home, burning in nature, constant associated with nausea. Pt denied any chest pain, sob, diarrhea, constipation or vomiting. Endorsed to have loss of appetite. Last chemo was last Friday.     ED Course: Fever 100.3, UA -ve, WBC 2.06,

## 2022-05-13 NOTE — PATIENT PROFILE ADULT - FALL HARM RISK - HARM RISK INTERVENTIONS
Assistance with ambulation/Assistance OOB with selected safe patient handling equipment/Communicate Risk of Fall with Harm to all staff/Discuss with provider need for PT consult/Monitor gait and stability/Provide patient with walking aids - walker, cane, crutches/Reinforce activity limits and safety measures with patient and family/Review medications for side effects contributing to fall risk/Sit up slowly, dangle for a short time, stand at bedside before walking/Tailored Fall Risk Interventions/Toileting schedule using arm’s reach rule for commode and bathroom/Visual Cue: Yellow wristband and red socks/Bed in lowest position, wheels locked, appropriate side rails in place/Call bell, personal items and telephone in reach/Instruct patient to call for assistance before getting out of bed or chair/Non-slip footwear when patient is out of bed/[place] to call system/Physically safe environment - no spills, clutter or unnecessary equipment/Purposeful Proactive Rounding/Room/bathroom lighting operational, light cord in reach

## 2022-05-13 NOTE — CONSULT NOTE ADULT - CONSULT REASON
Fever/ neutropenia/ ? diverticulitis
Multiple myeloma, neutropenic fever, GOC
Neutropenic Fever / Multiple Myeloma
Lung nodule

## 2022-05-13 NOTE — H&P ADULT - NSHPROSALLOTHERNEGRD_GEN_ALL_CORE
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  Was scheduled for pulmonary rehab outpatient, but was deferred secondary to insurance coverage problems. PT ordered for deconditioning while inpatient. Encourage ambulation throughout the day with assistance as tolerated.     Will need colonoscopy as outpatient workup. Will plan to continue current immunosuppression and prophylaxis.   All other review of systems negative, except as noted in HPI

## 2022-05-13 NOTE — CONSULT NOTE ADULT - CONVERSATION DETAILS
Spoke with patient in [place]anian regarding current condition, goals of care. She stated that she has 10 children, lives w her daughter Ivett who assists w medical decisions. She expressed that she ambulates w walker, daughter and granddaughter assist w some ADLs. Has been tolerating her chemotherapy fairly well, last treatment was 1 wk ago. Regarding advance directives, pt expressed that if an acute event occurs where her heart stops or she stops breathing, she would not want to be placed on ventilator or have CPR done, prefers to allow for natural death. Discussed w daughter and granddaughter who stated patient has her good and bad days, has been on treatment x4 wk for myeloma, tolerating fairly well, fair appetite, no need for frequent transfusions. Advised of patient's wishes, they are in agreement that patient should "go with God." and prefer she go naturally as well, no machines. GUANACO drafted for DNR/DNI and placed in chart. Support provided.

## 2022-05-13 NOTE — H&P ADULT - ATTENDING COMMENTS
88 yo F, from home ,ambulates with walker , PMHx of HTN, HLD, T2DM, osteoporosis, hx gastric adenocarcinoma, s/p resection 2015 (on chemo), early multiple myeloma,( bmbx  2015, last visit 2/2022  M spike  IGG kappa 1.5  with FLC 20.74, IGG 2145), follows [facility] came with chief complain of fevers at home and lower abdominal pain for 5 and 3 days respectively. Pt stated that she had subjective fevers at home and started getting pain in her lower abdomen radiating to her right leg, 10/10 in intensity at home, burning in nature, constant associated with nausea. Pt denied any chest pain, sob, diarrhea, constipation or vomiting. Endorsed to have loss of appetite. Last chemo was last Friday.     ED Course: Fever 100.3, UA -ve, WBC 2.06,        assessment  --  sigmoid diverticulitis, pyelonephritis, r/o neutropenic fever, h/o HTN, HLD, T2DM, osteoporosis, hx gastric adenocarcinoma, s/p resection 2015 (on chemo), early multiple myeloma,( bmbx  2015,)     plan  --  admit to med, cont preadmit home meds, gi and dvt prophylaxis  cbc, bmp, mg, phos, lipids, tsh, bld cx, ucx,    ua neg  ct abd with diverticulitis, colonic distention and perinephric stranding    id cons  gi cons  heme-onc cons 86 yo F, from home ,ambulates with walker , PMHx of HTN, HLD, T2DM, osteoporosis, hx gastric adenocarcinoma, s/p resection 2015 (on chemo), early multiple myeloma,( bmbx  2015, last visit 2/2022  M spike  IGG kappa 1.5  with FLC 20.74, IGG 2145), follows [facility] came with chief complain of fevers at home and lower abdominal pain for 5 and 3 days respectively. Pt stated that she had subjective fevers at home and started getting pain in her lower abdomen radiating to her right leg, 10/10 in intensity at home, burning in nature, constant associated with nausea. Pt denied any chest pain, sob, diarrhea, constipation or vomiting. Endorsed to have loss of appetite. Last chemo was last Friday.     ED Course: Fever 100.3, UA -ve, WBC 2.06,        assessment  --  sigmoid diverticulitis, pyelonephritis, r/o neutropenic fever, h/o HTN, HLD, T2DM, osteoporosis, hx gastric adenocarcinoma, s/p resection 2015 (on chemo), early multiple myeloma,( bmbx  2015,)     plan  --  admit to med, zosyn, cont preadmit home meds, gi and dvt prophylaxis  cbc, bmp, mg, phos, lipids, tsh, bld cx, ucx,    ua neg  ct abd with diverticulitis, colonic distention and perinephric stranding    id cons  gi cons  heme-onc cons

## 2022-05-13 NOTE — ED ADULT NURSE NOTE - NSIMPLEMENTINTERV_GEN_ALL_ED
Implemented All Universal Safety Interventions:  [place] to call system. Call bell, personal items and telephone within reach. Instruct patient to call for assistance. Room bathroom lighting operational. Non-slip footwear when patient is off stretcher. Physically safe environment: no spills, clutter or unnecessary equipment. Stretcher in lowest position, wheels locked, appropriate side rails in place.

## 2022-05-13 NOTE — CONSULT NOTE ADULT - ASSESSMENT
88 yo F, from home ,ambulates with walker , PMHx of HTN, HLD, T2DM, osteoporosis, hx gastric adenocarcinoma, s/p resection 2015 (on chemo), early multiple myeloma,( bmbx  2015, last visit 2/2022 BMBx 30% Plasma cell w/ p17del began treatment w/ Daratumumab RVD   M spike  IGG kappa 1.5  with FLC 20.74, IGG 2145), follows [facility] gp came with chief complain of fevers at home and lower abdominal pain for 5 and 3 days respectively. Pt stated that she had subjective fevers at home and started getting pain in her lower abdomen radiating to her right leg, 10/10 in intensity at home, burning in nature, constant associated with nausea. Pt denied any chest pain, sob, diarrhea, constipation or vomiting. Endorsed to have loss of appetite. Last chemo was last Friday.     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   
Sigmoid Diverticulitis  Fevers  Neutropenia      Plan - Cont Zosyn 3.375 gms iv q8hrs.  await culture results.

## 2022-05-13 NOTE — CHART NOTE - NSCHARTNOTEFT_GEN_A_CORE
CT showed Findings of mid/distal sigmoid colon diverticulitis. Trace pelvic free fluid. No bowel obstruction, drainable fluid collection or   intraperitoneal free air. Recommend correlation with colonoscopy to exclude potential underlying neoplasm, following resolution of acute   episode. Diffusely prominent colon wall without significant inflammatory change, favoring partial distention over colitis. Recommend clinical correlation. Nonspecific mild bilateral perinephric stranding without hydroureteronephrosis or radiopaque urinary tract stone. Prominent bladder wall. Recommend correlation with urinalysis to assess urinary tract infection.    Pt is on Zosyn abx . Dr. Vallejo has been consulted. Will consult GI Dr. Evans

## 2022-05-13 NOTE — CHART NOTE - NSCHARTNOTEFT_GEN_A_CORE
86 yo F, from home ,ambulates with walker , PMHx of HTN, HLD, T2DM, osteoporosis, hx gastric adenocarcinoma, s/p resection 2015 (on chemo), early multiple myeloma,( bmbx  2015, last visit 2/2022  M spike  IGG kappa 1.5  with FLC 20.74, IGG 2145), follows [facility] came with chief complain of fevers at home and lower abdominal pain for 5 and 3 days respectively. Admitted for neutropenic fever   Followed by ID dr. Reddy, GI dr. Evans, heme/onc [facility] dr. Alvarado. continues on abt.  Pt is afebrile this time. cultures are testing.   pt c/o abdominal pain in ED, CT abdomen shows sigmoid colon diverticulitis. assessed pt at bedside, denies fever, chills, chest pian or cough. no GI complaints at this time in  [id number].    called and updated pt condition to family daughter Bekah Root [phone], used  # [id number]  unable to confirm home medications as family not able to read medications in  English. Called [facility] pharmacy [phone], no answer this time. will follow up for Med Rec.     Per patient meds have not changed since last discharge . Primary team to reconfirm meds from pharmacy

## 2022-05-13 NOTE — H&P ADULT - PROBLEM SELECTOR PLAN 6
-On metformin , Sgarlato, and Alogliptin  sliding scale   fs achs   HgbA1C 7.3 on last admission in April

## 2022-05-13 NOTE — H&P ADULT - PROBLEM SELECTOR PLAN 7
-Early multiple myeloma,( bmbx  2015, last visit 2/2022  M spike  IGG kappa 1.5  with FLC 20.74, IGG 2145)  -Pt recently admitted at [facility] and had bone biopsy due to lytic lesions   -biopsy showed MM  -Heme/Onc consult in morning

## 2022-05-13 NOTE — ED PROVIDER NOTE - OBJECTIVE STATEMENT
86 yo F h/o cancer (?gastric, pt and daughter uncertain) with last chemo 6 days ago, DM, HTN, HLD, rick, appy p/w suprapubic pain and fever x 1 day. No recent ABx. Pt denies associated Vaginal Bleeding or Discharge, Bloody or Black Stools, N/V, Diarrhea/Constipation, Dysuria or other Urinary symptoms, Syncope, Chest Pain, SOB, Focal Numbness/Weakness. No other recent illness/ hospitalizations.

## 2022-05-13 NOTE — CONSULT NOTE ADULT - SUBJECTIVE AND OBJECTIVE BOX
Consult to: Discuss complex medical decision making related to goals of care    [facility] Geriatric and Palliative Consult Service:  Dr. Enriqueta Mackenzie: cell ([phone])  Dr. Billie Real: cell ([phone])   Ryder Tyson NP: cell ([phone])   Mary Jain NP: cell ([phone])  Vitor Atiya LMSW: cell ([phone])     HPI:  88 yo F, from home ,ambulates with walker , PMHx of HTN, HLD, T2DM, osteoporosis, hx gastric adenocarcinoma, s/p resection  (on chemo), early multiple myeloma,( bmbx  , last visit 2022  M spike  IGG kappa 1.5  with FLC 20.74, IGG 2145), follows [facility] gp came with chief complain of fevers at home and lower abdominal pain for 5 and 3 days respectively. Pt stated that she had subjective fevers at home and started getting pain in her lower abdomen radiating to her right leg, 10/10 in intensity at home, burning in nature, constant associated with nausea. Pt denied any chest pain, sob, diarrhea, constipation or vomiting. Endorsed to have loss of appetite. Last chemo was last Friday.     ED Course: Fever 100.3, UA -ve, WBC 2.06,   (13 May 2022 05:14)      PAST MEDICAL & SURGICAL HISTORY:  HTN (hypertension)      DM (diabetes mellitus)      Hypercholesteremia      Gastric adenocarcinoma  s/p resection      Vertigo      Dementia      S/P hysterectomy      S/P tubal ligation          SOCIAL HISTORY:    Admitted from:  home  (with HHA)           assisted living          [facility]   [ none ] Substance abuse, [ none ] Tobacco hx, [ none ] Alcohol hx, [ none ] Home Opioid Hx    FAMILY HISTORY:  Family history of diabetes mellitus (Sibling)    Family history of early CAD (Grandparent)     unable to obtain from patient due to poor mentation, family unable to give information, see H&P for history  Baseline ADLs (prior to admission):    Allergies    No Known Allergies    Intolerances      Present Symptoms:   Pain:  Fatigue:  Nausea:  Lack of Appetite:   SOB:  Depression:  Anxiety:  Review of Systems: [All others negative or Unable to obtain due to poor mentation]    MEDICATIONS  (STANDING):  enoxaparin Injectable 40 milliGRAM(s) SubCutaneous every 24 hours  gabapentin 100 milliGRAM(s) Oral every 12 hours  insulin lispro (ADMELOG) corrective regimen sliding scale   SubCutaneous three times a day before meals  insulin lispro (ADMELOG) corrective regimen sliding scale   SubCutaneous at bedtime  piperacillin/tazobactam IVPB.. 3.375 Gram(s) IV Intermittent every 8 hours  simvastatin 40 milliGRAM(s) Oral at bedtime    MEDICATIONS  (PRN):  acetaminophen     Tablet .. 650 milliGRAM(s) Oral every 6 hours PRN Temp greater or equal to 38C (100.4F), Mild Pain (1 - 3)      PHYSICAL EXAM:  Vital Signs Last 24 Hrs  T(C): 38.4 (13 May 2022 07:19), Max: 38.4 (13 May 2022 07:19)  T(F): 101.1 (13 May 2022 07:19), Max: 101.1 (13 May 2022 07:19)  HR: 84 (13 May 2022 07:19) (78 - 96)  BP: 113/66 (13 May 2022 07:19) (113/66 - 124/74)  BP(mean): --  RR: 17 (13 May 2022 07:19) (17 - 20)  SpO2: 97% (13 May 2022 07:19) (94% - 97%)    General: alert  oriented x ____    lethargic distressed cachexia  nonverbal  unarousable verbal    Palliative Performance Scale/Karnofsky Score:  ECOG Performance:    HEENT: no abnormal lesion, dry mouth  ET tube/trach oral lesions:  Lungs: tachypnea/labored breathing, audible excessive secretions  CV: RRR, S1S2, tachycardia  GI: soft non distended non tender  incontinent               PEG/NG/OG tube  constipation  last BM:   : incontinent  oliguria/anuria  shea  Musculoskeletal: weakness x4 edema x4    ambulatory with assistance   mostly/fully bedbound/wheelchair bound  Skin: no abnormal skin lesions, poor skin turgor, pressure ulcer stage:   Neuro: no deficits, mild cognitive impairment dsyphagia/dysarthria paresis  Oral intake ability: unable/only mouth care, minimal moderate full capability    LABS:                        8.8    2.26  )-----------( 211      ( 13 May 2022 10:21 )             27.6     05-13    138  |  110<H>  |  13  ----------------------------<  173<H>  3.7   |  21<L>  |  0.82    Ca    7.7<L>      13 May 2022 10:21  Phos  2.5     -  Mg     1.7         TPro  5.7<L>  /  Alb  2.1<L>  /  TBili  0.4  /  DBili  x   /  AST  16  /  ALT  16  /  AlkPhos  91      Urinalysis Basic - ( 12 May 2022 22:58 )    Color: Yellow / Appearance: Clear / S.010 / pH: x  Gluc: x / Ketone: Negative  / Bili: Negative / Urobili: Negative   Blood: x / Protein: Negative / Nitrite: Positive   Leuk Esterase: Negative / RBC: 0-2 /HPF / WBC 3-5 /HPF   Sq Epi: x / Non Sq Epi: Moderate /HPF / Bacteria: Many /HPF        RADIOLOGY & ADDITIONAL STUDIES:     Consult to: Discuss complex medical decision making related to goals of care    [facility] Geriatric and Palliative Consult Service:  Dr. Enriqueta Mackenzie: cell ([phone])  Dr. Billie Real: cell ([phone])   Ryder Tyson NP: cell ([phone])   Mary Jain NP: cell ([phone])  Vitor Rajput LMSW: cell ([phone])     HPI:  86 yo F, from home ,ambulates with walker , PMHx of HTN, HLD, T2DM, osteoporosis, hx gastric adenocarcinoma, s/p resection  (on chemo), early multiple myeloma,( bmbx  , last visit 2022  M spike  IGG kappa 1.5  with FLC 20.74, IGG 2145), follows [facility] gp came with chief complain of fevers at home and lower abdominal pain for 5 and 3 days respectively. Pt stated that she had subjective fevers at home and started getting pain in her lower abdomen radiating to her right leg, 10/10 in intensity at home, burning in nature, constant associated with nausea. Pt denied any chest pain, sob, diarrhea, constipation or vomiting. Endorsed to have loss of appetite. Last chemo was last Friday.     ED Course: Fever 100.3, UA -ve, WBC 2.06,   (13 May 2022 05:14)    Interval history: pt seen and examined in ED, denies pain, reports feeling better today.       PAST MEDICAL & SURGICAL HISTORY:  HTN (hypertension)      DM (diabetes mellitus)      Hypercholesteremia      Gastric adenocarcinoma  s/p resection      Vertigo      Dementia      S/P hysterectomy      S/P tubal ligation          SOCIAL HISTORY:  has 10 children  Admitted from:  home   [ none ] Substance abuse, [ none ] Tobacco hx, [ none ] Alcohol hx, [ none ] Home Opioid Hx none  Primary language: Slovenian    FAMILY HISTORY:  Family history of diabetes mellitus (Sibling)    Family history of early CAD (Grandparent)        Baseline ADLs (prior to admission): alert, ambulatory w walker    Allergies    No Known Allergies    Intolerances      Present Symptoms:   Pain: denies  Fatigue: denies  Nausea: denies  Lack of Appetite: denies  SOB: denies  Depression: denies  Anxiety: denies  Review of Systems:  All others negative      MEDICATIONS  (STANDING):  enoxaparin Injectable 40 milliGRAM(s) SubCutaneous every 24 hours  gabapentin 100 milliGRAM(s) Oral every 12 hours  insulin lispro (ADMELOG) corrective regimen sliding scale   SubCutaneous three times a day before meals  insulin lispro (ADMELOG) corrective regimen sliding scale   SubCutaneous at bedtime  piperacillin/tazobactam IVPB.. 3.375 Gram(s) IV Intermittent every 8 hours  simvastatin 40 milliGRAM(s) Oral at bedtime    MEDICATIONS  (PRN):  acetaminophen     Tablet .. 650 milliGRAM(s) Oral every 6 hours PRN Temp greater or equal to 38C (100.4F), Mild Pain (1 - 3)      PHYSICAL EXAM:  Vital Signs Last 24 Hrs  T(C): 38.4 (13 May 2022 07:19), Max: 38.4 (13 May 2022 07:19)  T(F): 101.1 (13 May 2022 07:19), Max: 101.1 (13 May 2022 07:19)  HR: 84 (13 May 2022 07:19) (78 - 96)  BP: 113/66 (13 May 2022 07:19) (113/66 - 124/74)  BP(mean): --  RR: 17 (13 May 2022 07:19) (17 - 20)  SpO2: 97% (13 May 2022 07:19) (94% - 97%)       Palliative Performance Scale/Karnofsky Score: 50  ECOG Performance: 2-3     GENERAL: alert, NAD  HEENT: Atraumatic, oropharynx clear, neck supple  CHEST/LUNG: unlabored  HEART: Regular rate and rhythm    ABDOMEN: Soft, Nontender, Nondistended   MUSCULOSKELETAL:  No  edema   NERVOUS SYSTEM:  Alert & Oriented X3,  follows commands  SKIN: No rashes or lesions noted  Oral intake: fair         LABS:                        8.8    2.26  )-----------( 211      ( 13 May 2022 10:21 )             27.6     05-13    138  |  110<H>  |  13  ----------------------------<  173<H>  3.7   |  21<L>  |  0.82    Ca    7.7<L>      13 May 2022 10:21  Phos  2.5     05-13  Mg     1.7     05-13    TPro  5.7<L>  /  Alb  2.1<L>  /  TBili  0.4  /  DBili  x   /  AST  16  /  ALT  16  /  AlkPhos  91      Urinalysis Basic - ( 12 May 2022 22:58 )    Color: Yellow / Appearance: Clear / S.010 / pH: x  Gluc: x / Ketone: Negative  / Bili: Negative / Urobili: Negative   Blood: x / Protein: Negative / Nitrite: Positive   Leuk Esterase: Negative / RBC: 0-2 /HPF / WBC 3-5 /HPF   Sq Epi: x / Non Sq Epi: Moderate /HPF / Bacteria: Many /HPF        RADIOLOGY & ADDITIONAL STUDIES:  < from: CT Abdomen and Pelvis No Cont (22 @ 04:27) >  ACC: [id number] EXAM:  CT ABDOMEN AND PELVIS                          PROCEDURE DATE:  2022          INTERPRETATION:  CLINICAL INFORMATION: came with fever and suprapubic   pain. Cancer status post recent chemotherapy.    PROCEDURE:  Helical axial images were obtained from the domes of the diaphragm   through the pubic symphysis without intravenous or oral contrast. Coronal   and sagittal reformats were also obtained.    CONTRAST/COMPLICATIONS:  IV Contrast: NONE  Oral Contrast: NONE  Complications: None reported at time of study completion    COMPARISON: 2022.    FINDINGS: Evaluation of the abdominal/pelvic organs, viscera and   vasculature is limited without intravenous contrast. Patient's   respiratory motion degrades images.    LOWER CHEST: Atelectasis.    LIVER: Calcified granuloma.  GALLBLADDER/BILE DUCTS: No intrahepatic biliary dilatation. Again noted,   prominent common bile duct, reflecting postcholecystectomy state.  PANCREAS: Unremarkable.  SPLEEN: Unremarkable.    ADRENALS: Unremarkable.  KIDNEYS/URETERS: Nonspecific mild bilateral perinephric stranding without   hydroureteronephrosis. No radiopaque urinary tract stone.  BLADDER: Partially distended. Prominent wall.  REPRODUCTIVE ORGANS: Hysterectomy.    BOWEL: Possible small hiatal hernia. Partial gastrectomy with   gastrojejunostomy and appendectomy. Colon diverticulosis. Mid/distal   sigmoid colon wall thickening with peridiverticular inflammatory change.   Diffusely prominent colon wall without significant inflammatory change.   No bowel obstruction.  PERITONEUM: No organized fluid collection or free air. Trace pelvic free   fluid.  VESSELS: Atherosclerosis. Normal caliber of the abdominal aorta.  RETROPERITONEUM: No lymphadenopathy.  ABDOMINAL WALL/SOFT TISSUES: Postsurgical changes along the anterior   abdominal and pelvic wall soft tissue. Small fat-containing umbilical   supraumbilical ventral hernia. Persistent small fat attenuation the left   buttock soft tissue, presumably sequela of priorinjection/fat necrosis.  BONES: Transitional lumbosacral anatomy. Degenerative changes of the   spine. Stable minimal retrolisthesis of L1 on L2. Stable endplate   depression of the visualized T9. More prominent, lytic lesions scattered   throughout the visualized bones including the ribs, spine (notably at   T11, L3 and L5), pelvic bones and femurs. Again noted, left ninth   anterolateral rib lesion with decreased extent of extraosseous soft   tissue component. Minimally displaced, likely pathologic fracture of the   right eighth and ninth anterolateral ribs. Nonspecific small sclerotic   foci scattered in the pelvic bones.    IMPRESSION:    Findings of mid/distal sigmoid colon diverticulitis. Trace pelvic free   fluid. No bowel obstruction,drainable fluid collection or   intraperitoneal free air. Recommend correlation with colonoscopy to   exclude potential underlying neoplasm, following resolution of acute   episode.    Diffusely prominent colon wall without significant inflammatory change,   favoring partial distention over colitis. Recommend clinical correlation.    Nonspecific mild bilateral perinephric stranding without   hydroureteronephrosis or radiopaque urinary tract stone. Prominent   bladder wall. Recommend correlation with urinalysis to assess urinary   tract infection.    Osseous metastasis as described.    Additional findings as described.    --- End of Report ---            SMITH DANG MD; Attending Radiologist  This document has been electronically signed. May 13 2022  6:30AM    < end of copied text >

## 2022-05-13 NOTE — H&P ADULT - PROBLEM SELECTOR PLAN 5
On metoprolol and losartan   Held all medications due to SIRS/sepsis   Resume meds if clinically indicates

## 2022-05-13 NOTE — H&P ADULT - ASSESSMENT
86 yo F, from home ,ambulates with walker , PMHx of HTN, HLD, T2DM, osteoporosis, hx gastric adenocarcinoma, s/p resection 2015 (on chemo), early multiple myeloma,( bmbx  2015, last visit 2/2022  M spike  IGG kappa 1.5  with FLC 20.74, IGG 2145), follows [facility] gp came with chief complain of fevers at home and lower abdominal pain for 5 and 3 days respectively. Admitted for neutropenic fever     Per patient meds have not changed since last discharge . Primary team to reconfirm meds from pharmacy

## 2022-05-13 NOTE — CONSULT NOTE ADULT - SUBJECTIVE AND OBJECTIVE BOX
HPI:  86 yo F, from home ,ambulates with walker , PMHx of HTN, HLD, T2DM, osteoporosis, hx gastric adenocarcinoma, s/p resection  (on chemo), early multiple myeloma,( bmbx  , last visit 2022  M spike  IGG kappa 1.5  with FLC 20.74, IGG 2145), follows [facility] gp came with chief complain of fevers at home and lower abdominal pain for 5 and 3 days respectively. Pt stated that she had subjective fevers at home and started getting pain in her lower abdomen radiating to her right leg, 10/10 in intensity at home, burning in nature, constant associated with nausea. Pt denied any chest pain, sob, diarrhea, constipation or vomiting. Endorsed to have loss of appetite. Last chemo was last Friday.     ED Course: Fever 100.3, UA -ve, WBC 2.06,   (13 May 2022 05:14)      PAST MEDICAL & SURGICAL HISTORY:  HTN (hypertension)      DM (diabetes mellitus)      Hypercholesteremia      Gastric adenocarcinoma  s/p resection      Vertigo      Dementia      S/P hysterectomy      S/P tubal ligation          No Known Allergies      Meds:  acetaminophen     Tablet .. 650 milliGRAM(s) Oral every 6 hours PRN  enoxaparin Injectable 40 milliGRAM(s) SubCutaneous every 24 hours  gabapentin 100 milliGRAM(s) Oral every 12 hours  insulin lispro (ADMELOG) corrective regimen sliding scale   SubCutaneous three times a day before meals  insulin lispro (ADMELOG) corrective regimen sliding scale   SubCutaneous at bedtime  piperacillin/tazobactam IVPB.. 3.375 Gram(s) IV Intermittent every 8 hours  simvastatin 40 milliGRAM(s) Oral at bedtime      SOCIAL HISTORY:  Smoker:  YES / NO        PACK YEARS:                         WHEN QUIT?  ETOH use:  YES / NO               FREQUENCY / QUANTITY:  Ilicit Drug use:  YES / NO  Occupation:  Assisted device use (Cane / Walker):  Live with:    FAMILY HISTORY:  Family history of diabetes mellitus (Sibling)    Family history of early CAD (Grandparent)        VITALS:  Vital Signs Last 24 Hrs  T(C): 37.6 (13 May 2022 13:04), Max: 38.4 (13 May 2022 07:19)  T(F): 99.6 (13 May 2022 13:04), Max: 101.1 (13 May 2022 07:19)  HR: 73 (13 May 2022 13:04) (73 - 96)  BP: 103/58 (13 May 2022 13:04) (103/58 - 124/74)  BP(mean): --  RR: 17 (13 May 2022 13:04) (17 - 20)  SpO2: 98% (13 May 2022 13:04) (94% - 98%)    LABS/DIAGNOSTIC TESTS:                          8.8    2.26  )-----------( 211      ( 13 May 2022 10:21 )             27.6     WBC Count: 2.26 K/uL ( @ 10:21)  WBC Count: 2.06 K/uL ( @ 22:11)          138  |  110<H>  |  13  ----------------------------<  173<H>  3.7   |  21<L>  |  0.82    Ca    7.7<L>      13 May 2022 10:21  Phos  2.5       Mg     1.7         TPro  5.7<L>  /  Alb  2.1<L>  /  TBili  0.4  /  DBili  x   /  AST  16  /  ALT  16  /  AlkPhos  91        Urinalysis Basic - ( 12 May 2022 22:58 )    Color: Yellow / Appearance: Clear / S.010 / pH: x  Gluc: x / Ketone: Negative  / Bili: Negative / Urobili: Negative   Blood: x / Protein: Negative / Nitrite: Positive   Leuk Esterase: Negative / RBC: 0-2 /HPF / WBC 3-5 /HPF   Sq Epi: x / Non Sq Epi: Moderate /HPF / Bacteria: Many /HPF        LIVER FUNCTIONS - ( 13 May 2022 10:21 )  Alb: 2.1 g/dL / Pro: 5.7 g/dL / ALK PHOS: 91 U/L / ALT: 16 U/L DA / AST: 16 U/L / GGT: x             PT/INR - ( 12 May 2022 22:11 )   PT: 12.9 sec;   INR: 1.08 ratio         PTT - ( 12 May 2022 22:11 )  PTT:26.7 sec    LACTATE:Lactate, Blood: 1.0 mmol/L ( @ 00:20)      ABG -     CULTURES:         RADIOLOGY:< from: CT Abdomen and Pelvis No Cont (22 @ 04:27) >  ACC: [id number] EXAM:  CT ABDOMEN AND PELVIS                          PROCEDURE DATE:  2022          INTERPRETATION:  CLINICAL INFORMATION: came with fever and suprapubic   pain. Cancer status post recent chemotherapy.    PROCEDURE:  Helical axial images were obtained from the domes of the diaphragm   through the pubic symphysis without intravenous or oral contrast. Coronal   and sagittal reformats were also obtained.    CONTRAST/COMPLICATIONS:  IV Contrast: NONE  Oral Contrast: NONE  Complications: None reported at time of study completion    COMPARISON: 2022.    FINDINGS: Evaluation of the abdominal/pelvic organs, viscera and   vasculature is limited without intravenous contrast. Patient's   respiratory motion degrades images.    LOWER CHEST: Atelectasis.    LIVER: Calcified granuloma.  GALLBLADDER/BILE DUCTS: No intrahepatic biliary dilatation. Again noted,   prominent common bile duct, reflecting postcholecystectomy state.  PANCREAS: Unremarkable.  SPLEEN: Unremarkable.    ADRENALS: Unremarkable.  KIDNEYS/URETERS: Nonspecific mild bilateral perinephric stranding without   hydroureteronephrosis. No radiopaque urinary tract stone.  BLADDER: Partially distended. Prominent wall.  REPRODUCTIVE ORGANS: Hysterectomy.    BOWEL: Possible small hiatal hernia. Partial gastrectomy with   gastrojejunostomy and appendectomy. Colon diverticulosis. Mid/distal   sigmoid colon wall thickening with peridiverticular inflammatory change.   Diffusely prominent colon wall without significant inflammatory change.   No bowel obstruction.  PERITONEUM: No organized fluid collection or free air. Trace pelvic free   fluid.  VESSELS: Atherosclerosis. Normal caliber of the abdominal aorta.  RETROPERITONEUM: No lymphadenopathy.  ABDOMINAL WALL/SOFT TISSUES: Postsurgical changes along the anterior   abdominal and pelvic wall soft tissue. Small fat-containing umbilical   supraumbilical ventral hernia. Persistent small fat attenuation the left   buttock soft tissue, presumably sequela of priorinjection/fat necrosis.  BONES: Transitional lumbosacral anatomy. Degenerative changes of the   spine. Stable minimal retrolisthesis of L1 on L2. Stable endplate   depression of the visualized T9. More prominent, lytic lesions scattered   throughout the visualized bones including the ribs, spine (notably at   T11, L3 and L5), pelvic bones and femurs. Again noted, left ninth   anterolateral rib lesion with decreased extent of extraosseous soft   tissue component. Minimally displaced, likely pathologic fracture of the   right eighth and ninth anterolateral ribs. Nonspecific small sclerotic   foci scattered in the pelvic bones.    IMPRESSION:    Findings of mid/distal sigmoid colon diverticulitis. Trace pelvic free   fluid. No bowel obstruction,drainable fluid collection or   intraperitoneal free air. Recommend correlation with colonoscopy to   exclude potential underlying neoplasm, following resolution of acute   episode.    Diffusely prominent colon wall without significant inflammatory change,   favoring partial distention over colitis. Recommend clinical correlation.    Nonspecific mild bilateral perinephric stranding without   hydroureteronephrosis or radiopaque urinary tract stone. Prominent   bladder wall. Recommend correlation with urinalysis to assess urinary   tract infection.    Osseous metastasis as described.    Additional findings as described.    --- End of Report ---            SMITH DANG MD; Attending Radiologist  This document has been electronically signed. May 13 2022  6:30AM    < end of copied text >  ------------------------------------------------------------------------------------------------------------------------------------------------------------------------------------------------------  ACC: [id number] EXAM:  XR CHEST PORTABLE URGENT 1V                          PROCEDURE DATE:  2022          INTERPRETATION:  Conical history: 87-year-old female, sepsis.    Portable view of the chest is correlated with a concurrent abdominal CT   and the chest CT of 2022.    FINDINGS: Normal cardiac silhouette and normal pulmonary vasculature with   no consolidation, effusion, pneumothorax or acute osseous finding.    IMPRESSION:  No acute radiographic findings and no change    --- End of Report ---            LYDIA ACUNA DO; Attending Radiologist    < end of copied text >        SHARONA  [  ] UNABLE TO ELICIT               HPI:  86 yo F, from home ,ambulates with walker , PMHx of HTN, HLD, T2DM, osteoporosis, hx gastric adenocarcinoma, s/p resection  (on chemo), early multiple myeloma,( bmbx  , last visit 2022  M spike  IGG kappa 1.5  with FLC 20.74, IGG 2145), follows [facility] gp came with chief complain of fevers at home and lower abdominal pain for 5 and 3 days respectively. Pt stated that she had subjective fevers at home and started getting pain in her lower abdomen radiating to her right leg, 10/10 in intensity at home, burning in nature, constant associated with nausea. Pt denied any chest pain, sob, diarrhea, constipation or vomiting. Endorsed to have loss of appetite. Last chemo was last Friday.   ED Course: Fever 100.3, UA -ve, WBC 2.06,   (13 May 2022 05:14)      History as above, asked to see this patient with H/O multiple Myeloma who is on Chemo and presented with fevers x 4-5 days and lower abdominal pain x 3 days, she denies any nausea or vomiting , no diarrhea , chills + , no urinary symptoms, no coughing or SOB. She was found to have fevers here and also to have neutropenia and her CT abdomen shows that she has mild sigmoid diverticulitis also. She has been started on Zosyn here.        PAST MEDICAL & SURGICAL HISTORY:  HTN (hypertension)      DM (diabetes mellitus)      Hypercholesteremia      Gastric adenocarcinoma  s/p resection      Vertigo      Dementia      S/P hysterectomy      S/P tubal ligation          No Known Allergies      Meds:  acetaminophen     Tablet .. 650 milliGRAM(s) Oral every 6 hours PRN  enoxaparin Injectable 40 milliGRAM(s) SubCutaneous every 24 hours  gabapentin 100 milliGRAM(s) Oral every 12 hours  insulin lispro (ADMELOG) corrective regimen sliding scale   SubCutaneous three times a day before meals  insulin lispro (ADMELOG) corrective regimen sliding scale   SubCutaneous at bedtime  piperacillin/tazobactam IVPB.. 3.375 Gram(s) IV Intermittent every 8 hours  simvastatin 40 milliGRAM(s) Oral at bedtime      SOCIAL HISTORY:  Smoker:  no  ETOH use:  no      FAMILY HISTORY:  Family history of diabetes mellitus (Sibling)    Family history of early CAD (Grandparent)        VITALS:  Vital Signs Last 24 Hrs  T(C): 37.6 (13 May 2022 13:04), Max: 38.4 (13 May 2022 07:19)  T(F): 99.6 (13 May 2022 13:04), Max: 101.1 (13 May 2022 07:19)  HR: 73 (13 May 2022 13:04) (73 - 96)  BP: 103/58 (13 May 2022 13:04) (103/58 - 124/74)  BP(mean): --  RR: 17 (13 May 2022 13:04) (17 - 20)  SpO2: 98% (13 May 2022 13:04) (94% - 98%)    LABS/DIAGNOSTIC TESTS:                          8.8    2.26  )-----------( 211      ( 13 May 2022 10:21 )             27.6     WBC Count: 2.26 K/uL ( @ 10:21)  WBC Count: 2.06 K/uL ( @ 22:11)          138  |  110<H>  |  13  ----------------------------<  173<H>  3.7   |  21<L>  |  0.82    Ca    7.7<L>      13 May 2022 10:21  Phos  2.5       Mg     1.7         TPro  5.7<L>  /  Alb  2.1<L>  /  TBili  0.4  /  DBili  x   /  AST  16  /  ALT  16  /  AlkPhos  91  13      Urinalysis Basic - ( 12 May 2022 22:58 )    Color: Yellow / Appearance: Clear / S.010 / pH: x  Gluc: x / Ketone: Negative  / Bili: Negative / Urobili: Negative   Blood: x / Protein: Negative / Nitrite: Positive   Leuk Esterase: Negative / RBC: 0-2 /HPF / WBC 3-5 /HPF   Sq Epi: x / Non Sq Epi: Moderate /HPF / Bacteria: Many /HPF        LIVER FUNCTIONS - ( 13 May 2022 10:21 )  Alb: 2.1 g/dL / Pro: 5.7 g/dL / ALK PHOS: 91 U/L / ALT: 16 U/L DA / AST: 16 U/L / GGT: x             PT/INR - ( 12 May 2022 22:11 )   PT: 12.9 sec;   INR: 1.08 ratio         PTT - ( 12 May 2022 22:11 )  PTT:26.7 sec    LACTATE:Lactate, Blood: 1.0 mmol/L ( @ 00:20)      ABG -     CULTURES:         RADIOLOGY:< from: CT Abdomen and Pelvis No Cont (22 @ 04:27) >  ACC: [id number] EXAM:  CT ABDOMEN AND PELVIS                          PROCEDURE DATE:  2022          INTERPRETATION:  CLINICAL INFORMATION: came with fever and suprapubic   pain. Cancer status post recent chemotherapy.    PROCEDURE:  Helical axial images were obtained from the domes of the diaphragm   through the pubic symphysis without intravenous or oral contrast. Coronal   and sagittal reformats were also obtained.    CONTRAST/COMPLICATIONS:  IV Contrast: NONE  Oral Contrast: NONE  Complications: None reported at time of study completion    COMPARISON: 2022.    FINDINGS: Evaluation of the abdominal/pelvic organs, viscera and   vasculature is limited without intravenous contrast. Patient's   respiratory motion degrades images.    LOWER CHEST: Atelectasis.    LIVER: Calcified granuloma.  GALLBLADDER/BILE DUCTS: No intrahepatic biliary dilatation. Again noted,   prominent common bile duct, reflecting postcholecystectomy state.  PANCREAS: Unremarkable.  SPLEEN: Unremarkable.    ADRENALS: Unremarkable.  KIDNEYS/URETERS: Nonspecific mild bilateral perinephric stranding without   hydroureteronephrosis. No radiopaque urinary tract stone.  BLADDER: Partially distended. Prominent wall.  REPRODUCTIVE ORGANS: Hysterectomy.    BOWEL: Possible small hiatal hernia. Partial gastrectomy with   gastrojejunostomy and appendectomy. Colon diverticulosis. Mid/distal   sigmoid colon wall thickening with peridiverticular inflammatory change.   Diffusely prominent colon wall without significant inflammatory change.   No bowel obstruction.  PERITONEUM: No organized fluid collection or free air. Trace pelvic free   fluid.  VESSELS: Atherosclerosis. Normal caliber of the abdominal aorta.  RETROPERITONEUM: No lymphadenopathy.  ABDOMINAL WALL/SOFT TISSUES: Postsurgical changes along the anterior   abdominal and pelvic wall soft tissue. Small fat-containing umbilical   supraumbilical ventral hernia. Persistent small fat attenuation the left   buttock soft tissue, presumably sequela of priorinjection/fat necrosis.  BONES: Transitional lumbosacral anatomy. Degenerative changes of the   spine. Stable minimal retrolisthesis of L1 on L2. Stable endplate   depression of the visualized T9. More prominent, lytic lesions scattered   throughout the visualized bones including the ribs, spine (notably at   T11, L3 and L5), pelvic bones and femurs. Again noted, left ninth   anterolateral rib lesion with decreased extent of extraosseous soft   tissue component. Minimally displaced, likely pathologic fracture of the   right eighth and ninth anterolateral ribs. Nonspecific small sclerotic   foci scattered in the pelvic bones.    IMPRESSION:    Findings of mid/distal sigmoid colon diverticulitis. Trace pelvic free   fluid. No bowel obstruction,drainable fluid collection or   intraperitoneal free air. Recommend correlation with colonoscopy to   exclude potential underlying neoplasm, following resolution of acute   episode.    Diffusely prominent colon wall without significant inflammatory change,   favoring partial distention over colitis. Recommend clinical correlation.    Nonspecific mild bilateral perinephric stranding without   hydroureteronephrosis or radiopaque urinary tract stone. Prominent   bladder wall. Recommend correlation with urinalysis to assess urinary   tract infection.    Osseous metastasis as described.    Additional findings as described.    --- End of Report ---            SMITH DANG MD; Attending Radiologist  This document has been electronically signed. May 13 2022  6:30AM    < end of copied text >  ------------------------------------------------------------------------------------------------------------------------------------------------------------------------------------------------------  ACC: [id number] EXAM:  XR CHEST PORTABLE URGENT 1V                          PROCEDURE DATE:  2022          INTERPRETATION:  Conical history: 87-year-old female, sepsis.    Portable view of the chest is correlated with a concurrent abdominal CT   and the chest CT of 2022.    FINDINGS: Normal cardiac silhouette and normal pulmonary vasculature with   no consolidation, effusion, pneumothorax or acute osseous finding.    IMPRESSION:  No acute radiographic findings and no change    --- End of Report ---            LYDIA ACUNA DO; Attending Radiologist    < end of copied text >        ROS  [  ] UNABLE TO ELICIT

## 2022-05-13 NOTE — ED PROVIDER NOTE - CLINICAL SUMMARY MEDICAL DECISION MAKING FREE TEXT BOX
Pt p/w suprapubic pain and fever 6 days after last chemo. Completely benign ABD exam. Labs showing neutropenia and pos UTI. Treating with zosyn given recent chemo. Lactate result pending. Plan to admit. Pt stable. Will reassess. Pt p/w suprapubic pain and fever 6 days after last chemo. Completely benign ABD exam. Labs showing neutropenia and pos UTI. Treating with zosyn given recent chemo. Lactate result pending. Plan to admit. Pt stable. Will reassess.  Lactate wnl. On reassessment pt's ABD pain improved. No indication for CT given the resolved pain, UTI, and no tenderness on exam. Admitting to Dr Kramer's service for UTI with neutropenia after recent chemo. Pt stable and endorsed to [facility].

## 2022-05-13 NOTE — CONSULT NOTE ADULT - SUBJECTIVE AND OBJECTIVE BOX
Patient is a 87y old  Female who presents with a chief complaint of Neutropenic fever (13 May 2022 05:14)      HPI:  88 yo F, from home ,ambulates with walker , PMHx of HTN, HLD, T2DM, osteoporosis, hx gastric adenocarcinoma, s/p resection 2015 (on chemo), early multiple myeloma,( bmbx  2015, last visit 2/2022 BMBx 30% Plasma cell w/ p17del began treatment w/ Daratumumab RVD   M spike  IGG kappa 1.5  with FLC 20.74, IGG 2145), follows [facility] gp came with chief complain of fevers at home and lower abdominal pain for 5 and 3 days respectively. Pt stated that she had subjective fevers at home and started getting pain in her lower abdomen radiating to her right leg, 10/10 in intensity at home, burning in nature, constant associated with nausea. Pt denied any chest pain, sob, diarrhea, constipation or vomiting. Endorsed to have loss of appetite. Last chemo was last Friday.     ED Course: Fever 100.3, UA -ve, WBC 2.06,   (13 May 2022 05:14)       ROS:  Negative except for:    PAST MEDICAL & SURGICAL HISTORY:  HTN (hypertension)      DM (diabetes mellitus)      Hypercholesteremia      Gastric adenocarcinoma  s/p resection      Vertigo      Dementia      S/P hysterectomy      S/P tubal ligation          SOCIAL HISTORY:    FAMILY HISTORY:  Family history of diabetes mellitus (Sibling)    Family history of early CAD (Grandparent)        MEDICATIONS  (STANDING):  enoxaparin Injectable 40 milliGRAM(s) SubCutaneous every 24 hours  gabapentin 100 milliGRAM(s) Oral every 12 hours  insulin lispro (ADMELOG) corrective regimen sliding scale   SubCutaneous three times a day before meals  insulin lispro (ADMELOG) corrective regimen sliding scale   SubCutaneous at bedtime  piperacillin/tazobactam IVPB.. 3.375 Gram(s) IV Intermittent every 8 hours  simvastatin 40 milliGRAM(s) Oral at bedtime    MEDICATIONS  (PRN):  acetaminophen     Tablet .. 650 milliGRAM(s) Oral every 6 hours PRN Temp greater or equal to 38C (100.4F), Mild Pain (1 - 3)      Allergies    No Known Allergies    Intolerances        Vital Signs Last 24 Hrs  T(C): 38.4 (13 May 2022 07:19), Max: 38.4 (13 May 2022 07:19)  T(F): 101.1 (13 May 2022 07:19), Max: 101.1 (13 May 2022 07:19)  HR: 84 (13 May 2022 07:19) (78 - 96)  BP: 113/66 (13 May 2022 07:19) (113/66 - 124/74)  BP(mean): --  RR: 17 (13 May 2022 07:19) (17 - 20)  SpO2: 97% (13 May 2022 07:19) (94% - 97%)    PHYSICAL EXAM  General: adult in NAD  HEENT: clear oropharynx, anicteric sclera, pink conjunctiva  Neck: supple  CV: normal S1/S2 with no murmur rubs or gallops  Lungs: positive air movement b/l ant lungs,clear to auscultation, no wheezes, no rales  Abdomen: soft non-tender non-distended, no hepatosplenomegaly  Ext: no clubbing cyanosis or edema  Skin: no rashes and no petechiae  Neuro: alert and oriented X 4, no focal deficits      LABS:                          9.4    2.06  )-----------( 209      ( 12 May 2022 22:11 )             28.9         Mean Cell Volume : 92.0 fl  Mean Cell Hemoglobin : 29.9 pg  Mean Cell Hemoglobin Concentration : 32.5 gm/dL  Auto Neutrophil # : 0.21 K/uL  Auto Lymphocyte # : 1.46 K/uL  Auto Monocyte # : 0.31 K/uL  Auto Eosinophil # : 0.00 K/uL  Auto Basophil # : 0.00 K/uL  Auto Neutrophil % : 10.0 %  Auto Lymphocyte % : 71.0 %  Auto Monocyte % : 15.0 %  Auto Eosinophil % : 0.0 %  Auto Basophil % : 0.0 %      Serial CBC's  05-12 @ 22:11  Hct-28.9 / Hgb-9.4 / Plat-209 / RBC-3.14 / WBC-2.06      05-12    135  |  106  |  15  ----------------------------<  148<H>  4.2   |  22  |  0.93    Ca    8.0<L>      12 May 2022 22:11    TPro  6.1  /  Alb  2.4<L>  /  TBili  0.4  /  DBili  x   /  AST  14  /  ALT  16  /  AlkPhos  95  05-12      PT/INR - ( 12 May 2022 22:11 )   PT: 12.9 sec;   INR: 1.08 ratio         PTT - ( 12 May 2022 22:11 )  PTT:26.7 sec                BLOOD SMEAR INTERPRETATION:       RADIOLOGY & ADDITIONAL STUDIES:

## 2022-05-13 NOTE — H&P ADULT - PROBLEM SELECTOR PLAN 1
-Came for fever and lower abdomen pain   -Fever 100.3, HR 96, leukopenia ,   -s/p zosyn in ED  -UA neg   -Chest X-ray without any consolidation (f/u official read)  -will continue with Zosyn  -F/U CT A/P  -F/U Bcx and Ucx  -ID consult Dr. Vallejo

## 2022-05-13 NOTE — CONSULT NOTE ADULT - PROBLEM SELECTOR RECOMMENDATION 9
last chemo 1 wk ago, pt reports fever for several days. CT showing sigmoid diverticulitis,mild perinephric stranding. f/u cultures. On antibiotics. Onc recomm neupogen equivalent until ANC >1000  DNR/DNI. Continue current medical  management.
Panculture  antibiotics  ID consult

## 2022-05-13 NOTE — CONSULT NOTE ADULT - PROBLEM SELECTOR RECOMMENDATION 2
incentive spirometry  Bronchodilators  chest pt
BM bx  2015, last visit 2/2022 BMBx 30% Plasma cell w/ p17del began treatment w/ Daratumumab RVD, last treatment 1 wk ago. Foll by Dr Jenny FLOREZ. ECOG 2-3.

## 2022-05-13 NOTE — CONSULT NOTE ADULT - PROBLEM SELECTOR RECOMMENDATION 3
Quantiferon TB Gold test  follow up CT chest without  contrast
s/p resection, chemo 2015. Currently in remission

## 2022-05-14 LAB
ALBUMIN SERPL ELPH-MCNC: 2.3 G/DL — LOW (ref 3.5–5)
ALP SERPL-CCNC: 91 U/L — SIGNIFICANT CHANGE UP (ref 40–120)
ALT FLD-CCNC: 14 U/L DA — SIGNIFICANT CHANGE UP (ref 10–60)
ANION GAP SERPL CALC-SCNC: 6 MMOL/L — SIGNIFICANT CHANGE UP (ref 5–17)
AST SERPL-CCNC: 15 U/L — SIGNIFICANT CHANGE UP (ref 10–40)
BILIRUB SERPL-MCNC: 0.4 MG/DL — SIGNIFICANT CHANGE UP (ref 0.2–1.2)
BUN SERPL-MCNC: 14 MG/DL — SIGNIFICANT CHANGE UP (ref 7–18)
CALCIUM SERPL-MCNC: 8.1 MG/DL — LOW (ref 8.4–10.5)
CHLORIDE SERPL-SCNC: 109 MMOL/L — HIGH (ref 96–108)
CO2 SERPL-SCNC: 23 MMOL/L — SIGNIFICANT CHANGE UP (ref 22–31)
CREAT SERPL-MCNC: 0.88 MG/DL — SIGNIFICANT CHANGE UP (ref 0.5–1.3)
EGFR: 64 ML/MIN/1.73M2 — SIGNIFICANT CHANGE UP
GLUCOSE BLDC GLUCOMTR-MCNC: 102 MG/DL — HIGH (ref 70–99)
GLUCOSE BLDC GLUCOMTR-MCNC: 116 MG/DL — HIGH (ref 70–99)
GLUCOSE BLDC GLUCOMTR-MCNC: 121 MG/DL — HIGH (ref 70–99)
GLUCOSE BLDC GLUCOMTR-MCNC: 160 MG/DL — HIGH (ref 70–99)
GLUCOSE SERPL-MCNC: 125 MG/DL — HIGH (ref 70–99)
HCT VFR BLD CALC: 27.2 % — LOW (ref 34.5–45)
HGB BLD-MCNC: 8.8 G/DL — LOW (ref 11.5–15.5)
MCHC RBC-ENTMCNC: 30 PG — SIGNIFICANT CHANGE UP (ref 27–34)
MCHC RBC-ENTMCNC: 32.4 GM/DL — SIGNIFICANT CHANGE UP (ref 32–36)
MCV RBC AUTO: 92.8 FL — SIGNIFICANT CHANGE UP (ref 80–100)
NRBC # BLD: 0 /100 WBCS — SIGNIFICANT CHANGE UP (ref 0–0)
PLATELET # BLD AUTO: 230 K/UL — SIGNIFICANT CHANGE UP (ref 150–400)
POTASSIUM SERPL-MCNC: 3.8 MMOL/L — SIGNIFICANT CHANGE UP (ref 3.5–5.3)
POTASSIUM SERPL-SCNC: 3.8 MMOL/L — SIGNIFICANT CHANGE UP (ref 3.5–5.3)
PROT SERPL-MCNC: 5.9 G/DL — LOW (ref 6–8.3)
RBC # BLD: 2.93 M/UL — LOW (ref 3.8–5.2)
RBC # FLD: 15.1 % — HIGH (ref 10.3–14.5)
SODIUM SERPL-SCNC: 138 MMOL/L — SIGNIFICANT CHANGE UP (ref 135–145)
WBC # BLD: 3.15 K/UL — LOW (ref 3.8–10.5)
WBC # FLD AUTO: 3.15 K/UL — LOW (ref 3.8–10.5)

## 2022-05-14 RX ADMIN — Medication 650 MILLIGRAM(S): at 11:50

## 2022-05-14 RX ADMIN — PIPERACILLIN AND TAZOBACTAM 25 GRAM(S): 4; .5 INJECTION, POWDER, LYOPHILIZED, FOR SOLUTION INTRAVENOUS at 01:02

## 2022-05-14 RX ADMIN — GABAPENTIN 100 MILLIGRAM(S): 400 CAPSULE ORAL at 17:36

## 2022-05-14 RX ADMIN — PIPERACILLIN AND TAZOBACTAM 25 GRAM(S): 4; .5 INJECTION, POWDER, LYOPHILIZED, FOR SOLUTION INTRAVENOUS at 14:35

## 2022-05-14 RX ADMIN — ENOXAPARIN SODIUM 40 MILLIGRAM(S): 100 INJECTION SUBCUTANEOUS at 09:11

## 2022-05-14 RX ADMIN — Medication 1: at 11:53

## 2022-05-14 RX ADMIN — PIPERACILLIN AND TAZOBACTAM 25 GRAM(S): 4; .5 INJECTION, POWDER, LYOPHILIZED, FOR SOLUTION INTRAVENOUS at 21:44

## 2022-05-14 RX ADMIN — Medication 650 MILLIGRAM(S): at 12:30

## 2022-05-14 RX ADMIN — GABAPENTIN 100 MILLIGRAM(S): 400 CAPSULE ORAL at 05:20

## 2022-05-14 RX ADMIN — SIMVASTATIN 40 MILLIGRAM(S): 20 TABLET, FILM COATED ORAL at 21:44

## 2022-05-14 RX ADMIN — PIPERACILLIN AND TAZOBACTAM 25 GRAM(S): 4; .5 INJECTION, POWDER, LYOPHILIZED, FOR SOLUTION INTRAVENOUS at 09:51

## 2022-05-14 NOTE — PROGRESS NOTE ADULT - SUBJECTIVE AND OBJECTIVE BOX
Patient is a 87y old  Female who presents with a chief complaint of Neutropenic fever (14 May 2022 11:16)       Pt is seen and examined  pt is awake and lying in bed  pt seems comfortable and denies any complaints at this time  Abdominal pain resolved          ROS:  Negative except for:    MEDICATIONS  (STANDING):  enoxaparin Injectable 40 milliGRAM(s) SubCutaneous every 24 hours  gabapentin 100 milliGRAM(s) Oral every 12 hours  insulin lispro (ADMELOG) corrective regimen sliding scale   SubCutaneous three times a day before meals  insulin lispro (ADMELOG) corrective regimen sliding scale   SubCutaneous at bedtime  piperacillin/tazobactam IVPB.. 3.375 Gram(s) IV Intermittent every 8 hours  simvastatin 40 milliGRAM(s) Oral at bedtime    MEDICATIONS  (PRN):  acetaminophen     Tablet .. 650 milliGRAM(s) Oral every 6 hours PRN Temp greater or equal to 38C (100.4F), Mild Pain (1 - 3)      Allergies    No Known Allergies    Intolerances        Vital Signs Last 24 Hrs  T(C): 37.1 (14 May 2022 13:49), Max: 37.7 (13 May 2022 16:35)  T(F): 98.7 (14 May 2022 13:49), Max: 99.9 (13 May 2022 16:35)  HR: 76 (14 May 2022 13:49) (76 - 85)  BP: 96/54 (14 May 2022 13:49) (96/54 - 126/46)  BP(mean): --  RR: 17 (14 May 2022 13:49) (16 - 18)  SpO2: 95% (14 May 2022 13:49) (95% - 99%)    PHYSICAL EXAM  General: adult in NAD  HEENT: clear oropharynx, anicteric sclera, pink conjunctiva  Neck: supple  CV: normal S1/S2 with no murmur rubs or gallops  Lungs: positive air movement b/l ant lungs,clear to auscultation, no wheezes, no rales  Abdomen: soft non-tender non-distended, no hepatosplenomegaly  Ext: no clubbing cyanosis or edema  Skin: no rashes and no petechiae  Neuro: alert and oriented X 4, no focal deficits  LABS:                          8.8    3.15  )-----------( 230      ( 14 May 2022 07:00 )             27.2         Mean Cell Volume : 92.8 fl  Mean Cell Hemoglobin : 30.0 pg  Mean Cell Hemoglobin Concentration : 32.4 gm/dL  Auto Neutrophil # : x  Auto Lymphocyte # : x  Auto Monocyte # : x  Auto Eosinophil # : x  Auto Basophil # : x  Auto Neutrophil % : x  Auto Lymphocyte % : x  Auto Monocyte % : x  Auto Eosinophil % : x  Auto Basophil % : x    Serial CBC's  05-14 @ 07:00  Hct-27.2 / Hgb-8.8 / Plat-230 / RBC-2.93 / WBC-3.15          Serial CBC's  05-13 @ 10:21  Hct-27.6 / Hgb-8.8 / Plat-211 / RBC-2.98 / WBC-2.26          Serial CBC's  05-12 @ 22:11  Hct-28.9 / Hgb-9.4 / Plat-209 / RBC-3.14 / WBC-2.06            05-14    138  |  109<H>  |  14  ----------------------------<  125<H>  3.8   |  23  |  0.88    Ca    8.1<L>      14 May 2022 07:00  Phos  2.5     05-13  Mg     1.7     05-13    TPro  5.9<L>  /  Alb  2.3<L>  /  TBili  0.4  /  DBili  x   /  AST  15  /  ALT  14  /  AlkPhos  91  05-14      PT/INR - ( 12 May 2022 22:11 )   PT: 12.9 sec;   INR: 1.08 ratio         PTT - ( 12 May 2022 22:11 )  PTT:26.7 sec    WBC Count: 3.15 K/uL (05-14-22 @ 07:00)  Hemoglobin: 8.8 g/dL (05-14-22 @ 07:00)  Hematocrit: 27.2 % (05-14-22 @ 07:00)  Platelet Count - Automated: 230 K/uL (05-14-22 @ 07:00)  WBC Count: 2.26 K/uL (05-13-22 @ 10:21)  Hemoglobin: 8.8 g/dL (05-13-22 @ 10:21)  Hematocrit: 27.6 % (05-13-22 @ 10:21)  Platelet Count - Automated: 211 K/uL (05-13-22 @ 10:21)  WBC Count: 2.06 K/uL (05-12-22 @ 22:11)  Hematocrit: 28.9 % (05-12-22 @ 22:11)  Hemoglobin: 9.4 g/dL (05-12-22 @ 22:11)  Platelet Count - Automated: 209 K/uL (05-12-22 @ 22:11)                BLOOD SMEAR INTERPRETATION:       RADIOLOGY & ADDITIONAL STUDIES:

## 2022-05-14 NOTE — PROGRESS NOTE ADULT - SUBJECTIVE AND OBJECTIVE BOX
Patient is a 87y old  Female who presents with a chief complaint of Neutropenic fever (14 May 2022 06:41)    Patient is alert, awake, lying comfortably in the bed in no acute distress, Doing good at room air.    INTERVAL HPI/OVERNIGHT EVENTS:      VITAL SIGNS:  T(F): 98.2 (22 @ 09:52)  HR: 85 (22 @ 09:52)  BP: 126/46 (22 @ 09:52)  RR: 16 (22 @ 09:52)  SpO2: 95% (22 @ 09:52)  Wt(kg): --  I&O's Detail          REVIEW OF SYSTEMS:    CONSTITUTIONAL:  No fevers, chills, sweats    HEENT:  Eyes:  No diplopia or blurred vision. ENT:  No earache, sore throat or runny nose.    CARDIOVASCULAR:  No pressure, squeezing, tightness, or heaviness about the chest; no palpitations.    RESPIRATORY:  Per HPI    GASTROINTESTINAL:  No abdominal pain, nausea, vomiting or diarrhea.    GENITOURINARY:  No dysuria, frequency or urgency.    NEUROLOGIC:  No paresthesias, fasciculations, seizures or weakness.    PSYCHIATRIC:  No disorder of thought or mood.      PHYSICAL EXAM:    Constitutional: Well developed and nourished  Eyes:Perrla  ENMT: normal  Neck:supple  Respiratory: good air entry  Cardiovascular: S1 S2 regular  Gastrointestinal: Soft, Non tender  Extremities: No edema  Vascular:normal  Neurological:Awake, alert,Ox3  Musculoskeletal:Normal      MEDICATIONS  (STANDING):  enoxaparin Injectable 40 milliGRAM(s) SubCutaneous every 24 hours  gabapentin 100 milliGRAM(s) Oral every 12 hours  insulin lispro (ADMELOG) corrective regimen sliding scale   SubCutaneous three times a day before meals  insulin lispro (ADMELOG) corrective regimen sliding scale   SubCutaneous at bedtime  piperacillin/tazobactam IVPB.. 3.375 Gram(s) IV Intermittent every 8 hours  simvastatin 40 milliGRAM(s) Oral at bedtime    MEDICATIONS  (PRN):  acetaminophen     Tablet .. 650 milliGRAM(s) Oral every 6 hours PRN Temp greater or equal to 38C (100.4F), Mild Pain (1 - 3)      Allergies    No Known Allergies    Intolerances        LABS:                        8.8    3.15  )-----------( 230      ( 14 May 2022 07:00 )             27.2     05-14    138  |  109<H>  |  14  ----------------------------<  125<H>  3.8   |  23  |  0.88    Ca    8.1<L>      14 May 2022 07:00  Phos  2.5     05-13  Mg     1.7     05-13    TPro  5.9<L>  /  Alb  2.3<L>  /  TBili  0.4  /  DBili  x   /  AST  15  /  ALT  14  /  AlkPhos  91  05-14    PT/INR - ( 12 May 2022 22:11 )   PT: 12.9 sec;   INR: 1.08 ratio         PTT - ( 12 May 2022 22:11 )  PTT:26.7 sec  Urinalysis Basic - ( 12 May 2022 22:58 )    Color: Yellow / Appearance: Clear / S.010 / pH: x  Gluc: x / Ketone: Negative  / Bili: Negative / Urobili: Negative   Blood: x / Protein: Negative / Nitrite: Positive   Leuk Esterase: Negative / RBC: 0-2 /HPF / WBC 3-5 /HPF   Sq Epi: x / Non Sq Epi: Moderate /HPF / Bacteria: Many /HPF            CAPILLARY BLOOD GLUCOSE      POCT Blood Glucose.: 116 mg/dL (14 May 2022 08:27)  POCT Blood Glucose.: 113 mg/dL (13 May 2022 21:44)  POCT Blood Glucose.: 126 mg/dL (13 May 2022 17:07)        RADIOLOGY & ADDITIONAL TESTS:    CXR:  < from: Xray Chest 1 View- PORTABLE-Urgent (22 @ 23:34) >  IMPRESSION:  No acute radiographic findings and no change    --- End of Report ---    < end of copied text >    Ct scan :    < from: CT Abdomen and Pelvis No Cont (22 @ 04:27) >  IMPRESSION:    Findings of mid/distal sigmoid colon diverticulitis. Trace pelvic free   fluid. No bowel obstruction,drainable fluid collection or   intraperitoneal free air. Recommend correlation with colonoscopy to   exclude potential underlying neoplasm, following resolution of acute   episode.    Diffusely prominent colon wall without significant inflammatory change,   favoring partial distention over colitis. Recommend clinical correlation.    Nonspecific mild bilateral perinephric stranding without   hydroureteronephrosis or radiopaque urinary tract stone. Prominent   bladder wall. Recommend correlation with urinalysis to assess urinary   tract infection.    Osseous metastasis as described.    Additional findings as described.    --- End of Report ---    < end of copied text >      ekg;    echo:

## 2022-05-14 NOTE — PROGRESS NOTE ADULT - ASSESSMENT
88 yo F, from home ,ambulates with walker , PMHx of HTN, HLD, T2DM, osteoporosis, hx gastric adenocarcinoma, s/p resection 2015 (on chemo), early multiple myeloma,( bmbx  2015, last visit 2/2022 BMBx 30% Plasma cell w/ p17del began treatment w/ Daratumumab RVD   M spike  IGG kappa 1.5  with FLC 20.74, IGG 2145), follows [facility] gp came with chief complain of fevers at home and lower abdominal pain for 5 and 3 days respectively. Pt stated that she had subjective fevers at home and started getting pain in her lower abdomen radiating to her right leg, 10/10 in intensity at home, burning in nature, constant associated with nausea. Pt denied any chest pain, sob, diarrhea, constipation or vomiting. Endorsed to have loss of appetite. Last chemo was last Friday.     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   -Clinically improved.  -Await final cultures.    Problem # Multiple Myeloma  -No treatment while hospitalized including Revlimid      Thank you for the consult   For questions please call [phone]  Evgeny Alvarado M.D.

## 2022-05-14 NOTE — PROGRESS NOTE ADULT - SUBJECTIVE AND OBJECTIVE BOX
Patient is a 87y old  Female who presents with a chief complaint of Neutropenic fever (13 May 2022 15:02)    pt seen in icu [  ], reg med floor [   ], bed [  ], chair at bedside [   ], a+o x3 [  ], lethargic [  ],  nad [  ]    shea [  ], ngt [  ], peg [  ], et tube [  ], cent line [  ], picc line [  ]        Allergies    No Known Allergies        Vitals    T(F): 99.2 (05-14-22 @ 05:27), Max: 101.1 (05-13-22 @ 07:19)  HR: 80 (05-14-22 @ 05:27) (73 - 84)  BP: 114/45 (05-14-22 @ 05:27) (103/58 - 122/51)  RR: 17 (05-14-22 @ 05:27) (17 - 18)  SpO2: 99% (05-14-22 @ 05:27) (96% - 99%)  Wt(kg): --  CAPILLARY BLOOD GLUCOSE      POCT Blood Glucose.: 113 mg/dL (13 May 2022 21:44)      Labs                          8.8    2.26  )-----------( 211      ( 13 May 2022 10:21 )             27.6       05-13    138  |  110<H>  |  13  ----------------------------<  173<H>  3.7   |  21<L>  |  0.82    Ca    7.7<L>      13 May 2022 10:21  Phos  2.5     05-13  Mg     1.7     05-13    TPro  5.7<L>  /  Alb  2.1<L>  /  TBili  0.4  /  DBili  x   /  AST  16  /  ALT  16  /  AlkPhos  91  05-13            .Blood Blood-Peripheral  05-13 @ 03:39   No growth to date.  --  --      Clean Catch Clean Catch (Midstream)  04-06 @ 06:18   <10,000 CFU/mL Normal Urogenital Natalie  --  --      .Blood Blood-Peripheral  04-06 @ 06:04   No Growth Final  --  --      Clean Catch Clean Catch (Midstream)  03-05 @ 03:06   >=3 organisms. Probable collection contamination.  --  --          Radiology Results      Meds    MEDICATIONS  (STANDING):  enoxaparin Injectable 40 milliGRAM(s) SubCutaneous every 24 hours  gabapentin 100 milliGRAM(s) Oral every 12 hours  insulin lispro (ADMELOG) corrective regimen sliding scale   SubCutaneous three times a day before meals  insulin lispro (ADMELOG) corrective regimen sliding scale   SubCutaneous at bedtime  piperacillin/tazobactam IVPB.. 3.375 Gram(s) IV Intermittent every 8 hours  simvastatin 40 milliGRAM(s) Oral at bedtime      MEDICATIONS  (PRN):  acetaminophen     Tablet .. 650 milliGRAM(s) Oral every 6 hours PRN Temp greater or equal to 38C (100.4F), Mild Pain (1 - 3)      Physical Exam    Neuro :  no focal deficits  Respiratory: CTA B/L  CV: RRR, S1S2, no murmurs,   Abdominal: Soft, NT, ND +BS,  Extremities: No edema, + peripheral pulses    ASSESSMENT    sigmoid diverticulitis,   pyelonephritis,   r/o neutropenic fever,   h/o HTN,   HLD,   T2DM,   osteoporosis,   hx gastric adenocarcinoma,   s/p resection 2015 (on chemo),   early multiple myeloma,( bmbx  2015,)     Urinary tract infection    Yes    HTN (hypertension)    DM (diabetes mellitus)    Hypercholesteremia    Gastric adenocarcinoma    Vertigo    Dementia    S/P hysterectomy    S/P tubal ligation        PLAN    admit to med,   zosyn,   cont preadmit home meds,   gi and dvt prophylaxis  cbc,   bmp,   mg,   phos,   lipids,   tsh,   bld cx,   ucx,    ua neg  ct abd with diverticulitis, colonic distention and perinephric stranding    id cons  gi cons  heme-onc cons.        Patient is a 87y old  Female who presents with a chief complaint of Neutropenic fever (13 May 2022 15:02)    pt seen in icu [  ], reg med floor [ x  ], bed [x  ], chair at bedside [   ], a+o x3 [x  ], lethargic [  ],  nad [ x ]      Allergies    No Known Allergies        Vitals    T(F): 99.2 (05-14-22 @ 05:27), Max: 101.1 (05-13-22 @ 07:19)  HR: 80 (05-14-22 @ 05:27) (73 - 84)  BP: 114/45 (05-14-22 @ 05:27) (103/58 - 122/51)  RR: 17 (05-14-22 @ 05:27) (17 - 18)  SpO2: 99% (05-14-22 @ 05:27) (96% - 99%)  Wt(kg): --  CAPILLARY BLOOD GLUCOSE      POCT Blood Glucose.: 113 mg/dL (13 May 2022 21:44)      Labs                          8.8    2.26  )-----------( 211      ( 13 May 2022 10:21 )             27.6       05-13    138  |  110<H>  |  13  ----------------------------<  173<H>  3.7   |  21<L>  |  0.82    Ca    7.7<L>      13 May 2022 10:21  Phos  2.5     05-13  Mg     1.7     05-13    TPro  5.7<L>  /  Alb  2.1<L>  /  TBili  0.4  /  DBili  x   /  AST  16  /  ALT  16  /  AlkPhos  91  05-13            .Blood Blood-Peripheral  05-13 @ 03:39   No growth to date.  --  --          Radiology Results      Meds    MEDICATIONS  (STANDING):  enoxaparin Injectable 40 milliGRAM(s) SubCutaneous every 24 hours  gabapentin 100 milliGRAM(s) Oral every 12 hours  insulin lispro (ADMELOG) corrective regimen sliding scale   SubCutaneous three times a day before meals  insulin lispro (ADMELOG) corrective regimen sliding scale   SubCutaneous at bedtime  piperacillin/tazobactam IVPB.. 3.375 Gram(s) IV Intermittent every 8 hours  simvastatin 40 milliGRAM(s) Oral at bedtime      MEDICATIONS  (PRN):  acetaminophen     Tablet .. 650 milliGRAM(s) Oral every 6 hours PRN Temp greater or equal to 38C (100.4F), Mild Pain (1 - 3)      Physical Exam    Neuro :  no focal deficits  Respiratory: CTA B/L  CV: RRR, S1S2, no murmurs,   Abdominal: Soft, NT, ND +BS,  Extremities: No edema, + peripheral pulses    ASSESSMENT    sigmoid diverticulitis,   pyelonephritis,   r/o neutropenic fever,   h/o HTN,   HLD,   T2DM,   osteoporosis,   gastric adenocarcinoma,   s/p resection 2015 (on chemo),   early multiple myeloma,( bmbx  2015,)   Vertigo  Dementia  S/P hysterectomy  S/P tubal ligation        PLAN    zosyn   id f/u   f/u blood and ucx   ua neg  ct abd with diverticulitis, colonic distention and perinephric stranding  gi cons    f/u cbd  heme-onc cons.   cont current meds   Patient is a 87y old  Female who presents with a chief complaint of Neutropenic fever (13 May 2022 15:02)    pt seen in icu [  ], reg med floor [ x  ], bed [x  ], chair at bedside [   ], a+o x3 [x  ], lethargic [  ],  nad [ x ]      Allergies    No Known Allergies        Vitals    T(F): 99.2 (05-14-22 @ 05:27), Max: 101.1 (05-13-22 @ 07:19)  HR: 80 (05-14-22 @ 05:27) (73 - 84)  BP: 114/45 (05-14-22 @ 05:27) (103/58 - 122/51)  RR: 17 (05-14-22 @ 05:27) (17 - 18)  SpO2: 99% (05-14-22 @ 05:27) (96% - 99%)  Wt(kg): --  CAPILLARY BLOOD GLUCOSE      POCT Blood Glucose.: 113 mg/dL (13 May 2022 21:44)      Labs                          8.8    2.26  )-----------( 211      ( 13 May 2022 10:21 )             27.6       05-13    138  |  110<H>  |  13  ----------------------------<  173<H>  3.7   |  21<L>  |  0.82    Ca    7.7<L>      13 May 2022 10:21  Phos  2.5     05-13  Mg     1.7     05-13    TPro  5.7<L>  /  Alb  2.1<L>  /  TBili  0.4  /  DBili  x   /  AST  16  /  ALT  16  /  AlkPhos  91  05-13            .Blood Blood-Peripheral  05-13 @ 03:39   No growth to date.  --  --          Radiology Results      Meds    MEDICATIONS  (STANDING):  enoxaparin Injectable 40 milliGRAM(s) SubCutaneous every 24 hours  gabapentin 100 milliGRAM(s) Oral every 12 hours  insulin lispro (ADMELOG) corrective regimen sliding scale   SubCutaneous three times a day before meals  insulin lispro (ADMELOG) corrective regimen sliding scale   SubCutaneous at bedtime  piperacillin/tazobactam IVPB.. 3.375 Gram(s) IV Intermittent every 8 hours  simvastatin 40 milliGRAM(s) Oral at bedtime      MEDICATIONS  (PRN):  acetaminophen     Tablet .. 650 milliGRAM(s) Oral every 6 hours PRN Temp greater or equal to 38C (100.4F), Mild Pain (1 - 3)      Physical Exam    Neuro :  no focal deficits  Respiratory: CTA B/L  CV: RRR, S1S2, no murmurs,   Abdominal: Soft, NT, ND +BS,  Extremities: No edema, + peripheral pulses    ASSESSMENT    sigmoid diverticulitis,   pyelonephritis,   r/o neutropenic fever,   h/o HTN,   HLD,   T2DM,   osteoporosis,   gastric adenocarcinoma,   s/p resection 2015 (on chemo),   early multiple myeloma,( bmbx  2015,)   Vertigo  Dementia  S/P hysterectomy  S/P tubal ligation        PLAN    zosyn   id f/u   blood cx neg onted above   f/u ucx   ua neg  ct abd with diverticulitis, colonic distention and perinephric stranding  gi cons    f/u cbd  heme-onc cons.   cont current meds

## 2022-05-15 LAB
ALBUMIN SERPL ELPH-MCNC: 2.2 G/DL — LOW (ref 3.5–5)
ALP SERPL-CCNC: 92 U/L — SIGNIFICANT CHANGE UP (ref 40–120)
ALT FLD-CCNC: 15 U/L DA — SIGNIFICANT CHANGE UP (ref 10–60)
ANION GAP SERPL CALC-SCNC: 7 MMOL/L — SIGNIFICANT CHANGE UP (ref 5–17)
AST SERPL-CCNC: 15 U/L — SIGNIFICANT CHANGE UP (ref 10–40)
BILIRUB SERPL-MCNC: 0.2 MG/DL — SIGNIFICANT CHANGE UP (ref 0.2–1.2)
BUN SERPL-MCNC: 10 MG/DL — SIGNIFICANT CHANGE UP (ref 7–18)
CALCIUM SERPL-MCNC: 8.2 MG/DL — LOW (ref 8.4–10.5)
CHLORIDE SERPL-SCNC: 113 MMOL/L — HIGH (ref 96–108)
CO2 SERPL-SCNC: 23 MMOL/L — SIGNIFICANT CHANGE UP (ref 22–31)
CREAT SERPL-MCNC: 0.78 MG/DL — SIGNIFICANT CHANGE UP (ref 0.5–1.3)
EGFR: 74 ML/MIN/1.73M2 — SIGNIFICANT CHANGE UP
GLUCOSE BLDC GLUCOMTR-MCNC: 125 MG/DL — HIGH (ref 70–99)
GLUCOSE BLDC GLUCOMTR-MCNC: 143 MG/DL — HIGH (ref 70–99)
GLUCOSE BLDC GLUCOMTR-MCNC: 153 MG/DL — HIGH (ref 70–99)
GLUCOSE BLDC GLUCOMTR-MCNC: 98 MG/DL — SIGNIFICANT CHANGE UP (ref 70–99)
GLUCOSE SERPL-MCNC: 121 MG/DL — HIGH (ref 70–99)
HCT VFR BLD CALC: 28.3 % — LOW (ref 34.5–45)
HGB BLD-MCNC: 9 G/DL — LOW (ref 11.5–15.5)
MCHC RBC-ENTMCNC: 29.1 PG — SIGNIFICANT CHANGE UP (ref 27–34)
MCHC RBC-ENTMCNC: 31.8 GM/DL — LOW (ref 32–36)
MCV RBC AUTO: 91.6 FL — SIGNIFICANT CHANGE UP (ref 80–100)
NRBC # BLD: 0 /100 WBCS — SIGNIFICANT CHANGE UP (ref 0–0)
PLATELET # BLD AUTO: 306 K/UL — SIGNIFICANT CHANGE UP (ref 150–400)
POTASSIUM SERPL-MCNC: 4 MMOL/L — SIGNIFICANT CHANGE UP (ref 3.5–5.3)
POTASSIUM SERPL-SCNC: 4 MMOL/L — SIGNIFICANT CHANGE UP (ref 3.5–5.3)
PROT SERPL-MCNC: 6.1 G/DL — SIGNIFICANT CHANGE UP (ref 6–8.3)
RBC # BLD: 3.09 M/UL — LOW (ref 3.8–5.2)
RBC # FLD: 14.6 % — HIGH (ref 10.3–14.5)
SODIUM SERPL-SCNC: 143 MMOL/L — SIGNIFICANT CHANGE UP (ref 135–145)
WBC # BLD: 3 K/UL — LOW (ref 3.8–10.5)
WBC # FLD AUTO: 3 K/UL — LOW (ref 3.8–10.5)

## 2022-05-15 RX ADMIN — SIMVASTATIN 40 MILLIGRAM(S): 20 TABLET, FILM COATED ORAL at 21:20

## 2022-05-15 RX ADMIN — ENOXAPARIN SODIUM 40 MILLIGRAM(S): 100 INJECTION SUBCUTANEOUS at 09:12

## 2022-05-15 RX ADMIN — PIPERACILLIN AND TAZOBACTAM 25 GRAM(S): 4; .5 INJECTION, POWDER, LYOPHILIZED, FOR SOLUTION INTRAVENOUS at 15:12

## 2022-05-15 RX ADMIN — PIPERACILLIN AND TAZOBACTAM 25 GRAM(S): 4; .5 INJECTION, POWDER, LYOPHILIZED, FOR SOLUTION INTRAVENOUS at 21:20

## 2022-05-15 RX ADMIN — GABAPENTIN 100 MILLIGRAM(S): 400 CAPSULE ORAL at 18:24

## 2022-05-15 RX ADMIN — PIPERACILLIN AND TAZOBACTAM 25 GRAM(S): 4; .5 INJECTION, POWDER, LYOPHILIZED, FOR SOLUTION INTRAVENOUS at 05:31

## 2022-05-15 RX ADMIN — GABAPENTIN 100 MILLIGRAM(S): 400 CAPSULE ORAL at 05:30

## 2022-05-15 NOTE — PROGRESS NOTE ADULT - ASSESSMENT
86 yo F, from home ,ambulates with walker , PMHx of HTN, HLD, T2DM, osteoporosis, hx gastric adenocarcinoma, s/p resection 2015 (on chemo), early multiple myeloma,( bmbx  2015, last visit 2/2022 BMBx 30% Plasma cell w/ p17del began treatment w/ Daratumumab RVD   M spike  IGG kappa 1.5  with FLC 20.74, IGG 2145), follows [facility] gp came with chief complain of fevers at home and lower abdominal pain for 5 and 3 days respectively. Pt stated that she had subjective fevers at home and started getting pain in her lower abdomen radiating to her right leg, 10/10 in intensity at home, burning in nature, constant associated with nausea. Pt denied any chest pain, sob, diarrhea, constipation or vomiting. Endorsed to have loss of appetite. Last chemo was last Friday.     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   -Clinically improved.  -cultures negative to date    Problem # Multiple Myeloma  -No treatment while hospitalized including Revlimid      Thank you for the consult   For questions please call [phone]  Evgeny Alvarado M.D.

## 2022-05-15 NOTE — PROGRESS NOTE ADULT - SUBJECTIVE AND OBJECTIVE BOX
Patient is a 87y old  Female who presents with a chief complaint of Neutropenic fever (15 May 2022 06:34)       Pt is seen and examined  pt is awake and lying in bed  pt seems comfortable and denies any complaints at this time          ROS:  Negative except for:    MEDICATIONS  (STANDING):  enoxaparin Injectable 40 milliGRAM(s) SubCutaneous every 24 hours  gabapentin 100 milliGRAM(s) Oral every 12 hours  insulin lispro (ADMELOG) corrective regimen sliding scale   SubCutaneous three times a day before meals  insulin lispro (ADMELOG) corrective regimen sliding scale   SubCutaneous at bedtime  piperacillin/tazobactam IVPB.. 3.375 Gram(s) IV Intermittent every 8 hours  simvastatin 40 milliGRAM(s) Oral at bedtime    MEDICATIONS  (PRN):  acetaminophen     Tablet .. 650 milliGRAM(s) Oral every 6 hours PRN Temp greater or equal to 38C (100.4F), Mild Pain (1 - 3)      Allergies    No Known Allergies    Intolerances        Vital Signs Last 24 Hrs  T(C): 36.9 (15 May 2022 06:21), Max: 37.2 (14 May 2022 20:50)  T(F): 98.5 (15 May 2022 06:21), Max: 99 (14 May 2022 20:50)  HR: 77 (15 May 2022 06:21) (76 - 85)  BP: 114/58 (15 May 2022 06:21) (96/54 - 126/46)  BP(mean): --  RR: 16 (15 May 2022 06:21) (16 - 18)  SpO2: 96% (15 May 2022 06:21) (95% - 98%)    PHYSICAL EXAM  General: adult in NAD  HEENT: clear oropharynx, anicteric sclera, pink conjunctiva  Neck: supple  CV: normal S1/S2 with no murmur rubs or gallops  Lungs: positive air movement b/l ant lungs,clear to auscultation, no wheezes, no rales  Abdomen: soft non-tender non-distended, no hepatosplenomegaly  Ext: no clubbing cyanosis or edema  Skin: no rashes and no petechiae  Neuro: alert and oriented X 4, no focal deficits  LABS:                          9.0    3.00  )-----------( 306      ( 15 May 2022 05:36 )             28.3         Mean Cell Volume : 91.6 fl  Mean Cell Hemoglobin : 29.1 pg  Mean Cell Hemoglobin Concentration : 31.8 gm/dL  Auto Neutrophil # : x  Auto Lymphocyte # : x  Auto Monocyte # : x  Auto Eosinophil # : x  Auto Basophil # : x  Auto Neutrophil % : x  Auto Lymphocyte % : x  Auto Monocyte % : x  Auto Eosinophil % : x  Auto Basophil % : x    Serial CBC's  05-15 @ 05:36  Hct-28.3 / Hgb-9.0 / Plat-306 / RBC-3.09 / WBC-3.00          Serial CBC's  05-14 @ 07:00  Hct-27.2 / Hgb-8.8 / Plat-230 / RBC-2.93 / WBC-3.15          Serial CBC's  05-13 @ 10:21  Hct-27.6 / Hgb-8.8 / Plat-211 / RBC-2.98 / WBC-2.26          Serial CBC's  05-12 @ 22:11  Hct-28.9 / Hgb-9.4 / Plat-209 / RBC-3.14 / WBC-2.06            05-15    143  |  113<H>  |  10  ----------------------------<  121<H>  4.0   |  23  |  0.78    Ca    8.2<L>      15 May 2022 05:36  Phos  2.5     05-13  Mg     1.7     05-13    TPro  6.1  /  Alb  2.2<L>  /  TBili  0.2  /  DBili  x   /  AST  15  /  ALT  15  /  AlkPhos  92  05-15          WBC Count: 3.00 K/uL (05-15-22 @ 05:36)  Hemoglobin: 9.0 g/dL (05-15-22 @ 05:36)  Hematocrit: 28.3 % (05-15-22 @ 05:36)  Platelet Count - Automated: 306 K/uL (05-15-22 @ 05:36)  WBC Count: 3.15 K/uL (05-14-22 @ 07:00)  Hemoglobin: 8.8 g/dL (05-14-22 @ 07:00)  Hematocrit: 27.2 % (05-14-22 @ 07:00)  Platelet Count - Automated: 230 K/uL (05-14-22 @ 07:00)  WBC Count: 2.26 K/uL (05-13-22 @ 10:21)  Hemoglobin: 8.8 g/dL (05-13-22 @ 10:21)  Hematocrit: 27.6 % (05-13-22 @ 10:21)  Platelet Count - Automated: 211 K/uL (05-13-22 @ 10:21)  WBC Count: 2.06 K/uL (05-12-22 @ 22:11)  Hematocrit: 28.9 % (05-12-22 @ 22:11)  Hemoglobin: 9.4 g/dL (05-12-22 @ 22:11)  Platelet Count - Automated: 209 K/uL (05-12-22 @ 22:11)                BLOOD SMEAR INTERPRETATION:       RADIOLOGY & ADDITIONAL STUDIES:

## 2022-05-15 NOTE — PROGRESS NOTE ADULT - SUBJECTIVE AND OBJECTIVE BOX
Patient is a 87y old  Female who presents with a chief complaint of Neutropenic fever (14 May 2022 15:09)    pt seen in icu [  ], reg med floor [ x  ], bed [x  ], chair at bedside [   ], a+o x3 [x  ], lethargic [  ],    nad [ x ]      Allergies    No Known Allergies        Vitals    T(F): 98.5 (05-15-22 @ 06:21), Max: 99 (05-14-22 @ 20:50)  HR: 77 (05-15-22 @ 06:21) (76 - 85)  BP: 114/58 (05-15-22 @ 06:21) (96/54 - 126/46)  RR: 16 (05-15-22 @ 06:21) (16 - 18)  SpO2: 96% (05-15-22 @ 06:21) (95% - 98%)  Wt(kg): --  CAPILLARY BLOOD GLUCOSE      POCT Blood Glucose.: 121 mg/dL (14 May 2022 21:11)      Labs                          9.0    3.00  )-----------( 306      ( 15 May 2022 05:36 )             28.3       05-15    143  |  113<H>  |  10  ----------------------------<  121<H>  4.0   |  23  |  0.78    Ca    8.2<L>      15 May 2022 05:36  Phos  2.5     05-13  Mg     1.7     05-13    TPro  6.1  /  Alb  2.2<L>  /  TBili  0.2  /  DBili  x   /  AST  15  /  ALT  15  /  AlkPhos  92  05-15            .Blood Blood-Peripheral  05-13 @ 03:39   No growth to date.  --  --      Clean Catch Clean Catch (Midstream)  04-06 @ 06:18   <10,000 CFU/mL Normal Urogenital Natalie  --  --      .Blood Blood-Peripheral  04-06 @ 06:04   No Growth Final  --  --      Clean Catch Clean Catch (Midstream)  03-05 @ 03:06   >=3 organisms. Probable collection contamination.  --  --          Radiology Results      Meds    MEDICATIONS  (STANDING):  enoxaparin Injectable 40 milliGRAM(s) SubCutaneous every 24 hours  gabapentin 100 milliGRAM(s) Oral every 12 hours  insulin lispro (ADMELOG) corrective regimen sliding scale   SubCutaneous three times a day before meals  insulin lispro (ADMELOG) corrective regimen sliding scale   SubCutaneous at bedtime  piperacillin/tazobactam IVPB.. 3.375 Gram(s) IV Intermittent every 8 hours  simvastatin 40 milliGRAM(s) Oral at bedtime      MEDICATIONS  (PRN):  acetaminophen     Tablet .. 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   cont current meds         Patient is a 87y old  Female who presents with a chief complaint of Neutropenic fever (14 May 2022 15:09)    pt seen in icu [  ], reg med floor [ x  ], bed [x  ], chair at bedside [   ], a+o x3 [x  ], lethargic [  ],    nad [ x ]      Allergies    No Known Allergies        Vitals    T(F): 98.5 (05-15-22 @ 06:21), Max: 99 (05-14-22 @ 20:50)  HR: 77 (05-15-22 @ 06:21) (76 - 85)  BP: 114/58 (05-15-22 @ 06:21) (96/54 - 126/46)  RR: 16 (05-15-22 @ 06:21) (16 - 18)  SpO2: 96% (05-15-22 @ 06:21) (95% - 98%)  Wt(kg): --  CAPILLARY BLOOD GLUCOSE      POCT Blood Glucose.: 121 mg/dL (14 May 2022 21:11)      Labs                          9.0    3.00  )-----------( 306      ( 15 May 2022 05:36 )             28.3       05-15    143  |  113<H>  |  10  ----------------------------<  121<H>  4.0   |  23  |  0.78    Ca    8.2<L>      15 May 2022 05:36  Phos  2.5     05-13  Mg     1.7     05-13    TPro  6.1  /  Alb  2.2<L>  /  TBili  0.2  /  DBili  x   /  AST  15  /  ALT  15  /  AlkPhos  92  05-15            .Blood Blood-Peripheral  05-13 @ 03:39   No growth to date.  --  --          Radiology Results      Meds    MEDICATIONS  (STANDING):  enoxaparin Injectable 40 milliGRAM(s) SubCutaneous every 24 hours  gabapentin 100 milliGRAM(s) Oral every 12 hours  insulin lispro (ADMELOG) corrective regimen sliding scale   SubCutaneous three times a day before meals  insulin lispro (ADMELOG) corrective regimen sliding scale   SubCutaneous at bedtime  piperacillin/tazobactam IVPB.. 3.375 Gram(s) IV Intermittent every 8 hours  simvastatin 40 milliGRAM(s) Oral at bedtime      MEDICATIONS  (PRN):  acetaminophen     Tablet .. 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   pt Clinically improved.  No onc treatment while hospitalized including Revlimid   pulm f/u   Quantiferon TB Gold test  CT chest without contrast  incentive spirometer  palliative f/u   Patient expressed wishes for DNR/DNI, daughter/granddaughter in agreement, GUANACO drafted and placed in chart.   Daughter Ivett is primary surrogate.   They wish to continue w current medical management, disease modifying cancer treatments.   ECOG 2-3. Onc following.   Palliative will follow for supportive care.  cont current meds

## 2022-05-15 NOTE — PROGRESS NOTE ADULT - SUBJECTIVE AND OBJECTIVE BOX
Patient is a 87y old  Female who presents with a chief complaint of Neutropenic fever (15 May 2022 07:52)  Awake, alert, comfortable in bed in NAD. No cough or sob    INTERVAL HPI/OVERNIGHT EVENTS:      VITAL SIGNS:  T(F): 98.5 (05-15-22 @ 06:21)  HR: 77 (05-15-22 @ 06:21)  BP: 114/58 (05-15-22 @ 06:21)  RR: 16 (05-15-22 @ 06:21)  SpO2: 96% (05-15-22 @ 06:21)  Wt(kg): --  I&O's Detail    14 May 2022 07:01  -  15 May 2022 07:00  --------------------------------------------------------  IN:  Total IN: 0 mL    OUT:    Voided (mL): 1 mL  Total OUT: 1 mL    Total NET: -1 mL              REVIEW OF SYSTEMS:    CONSTITUTIONAL:  No fevers, chills, sweats    HEENT:  Eyes:  No diplopia or blurred vision. ENT:  No earache, sore throat or runny nose.    CARDIOVASCULAR:  No pressure, squeezing, tightness, or heaviness about the chest; no palpitations.    RESPIRATORY:  Per HPI    GASTROINTESTINAL:  No abdominal pain, nausea, vomiting or diarrhea.    GENITOURINARY:  No dysuria, frequency or urgency.    NEUROLOGIC:  No paresthesias, fasciculations, seizures or weakness.    PSYCHIATRIC:  No disorder of thought or mood.      PHYSICAL EXAM:    Constitutional: Well developed and nourished  Eyes:Perrla  ENMT: normal  Neck:supple  Respiratory: good air entry  Cardiovascular: S1 S2 regular  Gastrointestinal: Soft, Non tender  Extremities: No edema  Vascular:normal  Neurological:Awake, alert,Ox3  Musculoskeletal:Normal      MEDICATIONS  (STANDING):  enoxaparin Injectable 40 milliGRAM(s) SubCutaneous every 24 hours  gabapentin 100 milliGRAM(s) Oral every 12 hours  insulin lispro (ADMELOG) corrective regimen sliding scale   SubCutaneous three times a day before meals  insulin lispro (ADMELOG) corrective regimen sliding scale   SubCutaneous at bedtime  piperacillin/tazobactam IVPB.. 3.375 Gram(s) IV Intermittent every 8 hours  simvastatin 40 milliGRAM(s) Oral at bedtime    MEDICATIONS  (PRN):  acetaminophen     Tablet .. 650 milliGRAM(s) Oral every 6 hours PRN Temp greater or equal to 38C (100.4F), Mild Pain (1 - 3)      Allergies    No Known Allergies    Intolerances        LABS:                        9.0    3.00  )-----------( 306      ( 15 May 2022 05:36 )             28.3     05-15    143  |  113<H>  |  10  ----------------------------<  121<H>  4.0   |  23  |  0.78    Ca    8.2<L>      15 May 2022 05:36    TPro  6.1  /  Alb  2.2<L>  /  TBili  0.2  /  DBili  x   /  AST  15  /  ALT  15  /  AlkPhos  92  05-15              CAPILLARY BLOOD GLUCOSE      POCT Blood Glucose.: 125 mg/dL (15 May 2022 08:16)  POCT Blood Glucose.: 121 mg/dL (14 May 2022 21:11)  POCT Blood Glucose.: 102 mg/dL (14 May 2022 17:06)  POCT Blood Glucose.: 160 mg/dL (14 May 2022 11:24)    Culture - Urine (05.13.22 @ 03:42)   Specimen Source: Clean Catch Clean Catch (Midstream)   Culture Results:   >100,000 CFU/ml Gram Negative Rods Culture - Blood (05.13.22 @ 03:39)   Specimen Source: .Blood Blood-Peripheral   Culture Results:   No growth to date. Culture - Blood (05.13.22 @ 03:39)   Specimen Source: .Blood Blood-Peripheral   Culture Results:   No growth to date.     RADIOLOGY & ADDITIONAL TESTS:    CXR:    < from: Xray Chest 1 View- PORTABLE-Urgent (05.12.22 @ 23:34) >  IMPRESSION:  No acute radiographic findings and no change      < end of copied text >  Ct scan chest:    ekg;    echo:

## 2022-05-16 ENCOUNTER — TRANSCRIPTION ENCOUNTER (OUTPATIENT)
Age: 87
End: 2022-05-16

## 2022-05-16 DIAGNOSIS — R42 DIZZINESS AND GIDDINESS: ICD-10-CM

## 2022-05-16 LAB
-  AMIKACIN: SIGNIFICANT CHANGE UP
-  AMOXICILLIN/CLAVULANIC ACID: SIGNIFICANT CHANGE UP
-  AMPICILLIN/SULBACTAM: SIGNIFICANT CHANGE UP
-  AMPICILLIN: SIGNIFICANT CHANGE UP
-  AZTREONAM: SIGNIFICANT CHANGE UP
-  CEFAZOLIN: SIGNIFICANT CHANGE UP
-  CEFEPIME: SIGNIFICANT CHANGE UP
-  CEFOXITIN: SIGNIFICANT CHANGE UP
-  CEFTRIAXONE: SIGNIFICANT CHANGE UP
-  CIPROFLOXACIN: SIGNIFICANT CHANGE UP
-  ERTAPENEM: SIGNIFICANT CHANGE UP
-  GENTAMICIN: SIGNIFICANT CHANGE UP
-  IMIPENEM: SIGNIFICANT CHANGE UP
-  LEVOFLOXACIN: SIGNIFICANT CHANGE UP
-  MEROPENEM: SIGNIFICANT CHANGE UP
-  NITROFURANTOIN: SIGNIFICANT CHANGE UP
-  PIPERACILLIN/TAZOBACTAM: SIGNIFICANT CHANGE UP
-  TIGECYCLINE: SIGNIFICANT CHANGE UP
-  TOBRAMYCIN: SIGNIFICANT CHANGE UP
-  TRIMETHOPRIM/SULFAMETHOXAZOLE: SIGNIFICANT CHANGE UP
ALBUMIN SERPL ELPH-MCNC: 2.4 G/DL — LOW (ref 3.5–5)
ALP SERPL-CCNC: 93 U/L — SIGNIFICANT CHANGE UP (ref 40–120)
ALT FLD-CCNC: 15 U/L DA — SIGNIFICANT CHANGE UP (ref 10–60)
ANION GAP SERPL CALC-SCNC: 8 MMOL/L — SIGNIFICANT CHANGE UP (ref 5–17)
AST SERPL-CCNC: 20 U/L — SIGNIFICANT CHANGE UP (ref 10–40)
BASOPHILS # BLD AUTO: 0.01 K/UL — SIGNIFICANT CHANGE UP (ref 0–0.2)
BASOPHILS NFR BLD AUTO: 0.4 % — SIGNIFICANT CHANGE UP (ref 0–2)
BILIRUB SERPL-MCNC: 0.2 MG/DL — SIGNIFICANT CHANGE UP (ref 0.2–1.2)
BUN SERPL-MCNC: 11 MG/DL — SIGNIFICANT CHANGE UP (ref 7–18)
CALCIUM SERPL-MCNC: 8.6 MG/DL — SIGNIFICANT CHANGE UP (ref 8.4–10.5)
CHLORIDE SERPL-SCNC: 112 MMOL/L — HIGH (ref 96–108)
CO2 SERPL-SCNC: 22 MMOL/L — SIGNIFICANT CHANGE UP (ref 22–31)
CREAT SERPL-MCNC: 0.68 MG/DL — SIGNIFICANT CHANGE UP (ref 0.5–1.3)
CULTURE RESULTS: SIGNIFICANT CHANGE UP
EGFR: 85 ML/MIN/1.73M2 — SIGNIFICANT CHANGE UP
EOSINOPHIL # BLD AUTO: 0.04 K/UL — SIGNIFICANT CHANGE UP (ref 0–0.5)
EOSINOPHIL NFR BLD AUTO: 1.4 % — SIGNIFICANT CHANGE UP (ref 0–6)
GLUCOSE BLDC GLUCOMTR-MCNC: 129 MG/DL — HIGH (ref 70–99)
GLUCOSE BLDC GLUCOMTR-MCNC: 130 MG/DL — HIGH (ref 70–99)
GLUCOSE BLDC GLUCOMTR-MCNC: 130 MG/DL — HIGH (ref 70–99)
GLUCOSE BLDC GLUCOMTR-MCNC: 147 MG/DL — HIGH (ref 70–99)
GLUCOSE SERPL-MCNC: 137 MG/DL — HIGH (ref 70–99)
HCT VFR BLD CALC: 27 % — LOW (ref 34.5–45)
HCT VFR BLD CALC: 27.9 % — LOW (ref 34.5–45)
HGB BLD-MCNC: 8.9 G/DL — LOW (ref 11.5–15.5)
HGB BLD-MCNC: 8.9 G/DL — LOW (ref 11.5–15.5)
IMM GRANULOCYTES NFR BLD AUTO: 0 % — SIGNIFICANT CHANGE UP (ref 0–1.5)
LYMPHOCYTES # BLD AUTO: 2.25 K/UL — SIGNIFICANT CHANGE UP (ref 1–3.3)
LYMPHOCYTES # BLD AUTO: 79.8 % — HIGH (ref 13–44)
MCHC RBC-ENTMCNC: 29.4 PG — SIGNIFICANT CHANGE UP (ref 27–34)
MCHC RBC-ENTMCNC: 29.8 PG — SIGNIFICANT CHANGE UP (ref 27–34)
MCHC RBC-ENTMCNC: 31.9 GM/DL — LOW (ref 32–36)
MCHC RBC-ENTMCNC: 33 GM/DL — SIGNIFICANT CHANGE UP (ref 32–36)
MCV RBC AUTO: 90.3 FL — SIGNIFICANT CHANGE UP (ref 80–100)
MCV RBC AUTO: 92.1 FL — SIGNIFICANT CHANGE UP (ref 80–100)
METHOD TYPE: SIGNIFICANT CHANGE UP
MONOCYTES # BLD AUTO: 0.24 K/UL — SIGNIFICANT CHANGE UP (ref 0–0.9)
MONOCYTES NFR BLD AUTO: 8.5 % — SIGNIFICANT CHANGE UP (ref 2–14)
NEUTROPHILS # BLD AUTO: 0.28 K/UL — LOW (ref 1.8–7.4)
NEUTROPHILS NFR BLD AUTO: 9.9 % — LOW (ref 43–77)
NRBC # BLD: 0 /100 WBCS — SIGNIFICANT CHANGE UP (ref 0–0)
NRBC # BLD: 0 /100 WBCS — SIGNIFICANT CHANGE UP (ref 0–0)
ORGANISM # SPEC MICROSCOPIC CNT: SIGNIFICANT CHANGE UP
ORGANISM # SPEC MICROSCOPIC CNT: SIGNIFICANT CHANGE UP
PLATELET # BLD AUTO: 364 K/UL — SIGNIFICANT CHANGE UP (ref 150–400)
PLATELET # BLD AUTO: 369 K/UL — SIGNIFICANT CHANGE UP (ref 150–400)
POTASSIUM SERPL-MCNC: 3.7 MMOL/L — SIGNIFICANT CHANGE UP (ref 3.5–5.3)
POTASSIUM SERPL-SCNC: 3.7 MMOL/L — SIGNIFICANT CHANGE UP (ref 3.5–5.3)
PROT SERPL-MCNC: 6.2 G/DL — SIGNIFICANT CHANGE UP (ref 6–8.3)
RBC # BLD: 2.99 M/UL — LOW (ref 3.8–5.2)
RBC # BLD: 3.03 M/UL — LOW (ref 3.8–5.2)
RBC # FLD: 14.8 % — HIGH (ref 10.3–14.5)
RBC # FLD: 15 % — HIGH (ref 10.3–14.5)
SODIUM SERPL-SCNC: 142 MMOL/L — SIGNIFICANT CHANGE UP (ref 135–145)
SPECIMEN SOURCE: SIGNIFICANT CHANGE UP
WBC # BLD: 2.73 K/UL — LOW (ref 3.8–10.5)
WBC # BLD: 2.82 K/UL — LOW (ref 3.8–10.5)
WBC # FLD AUTO: 2.73 K/UL — LOW (ref 3.8–10.5)
WBC # FLD AUTO: 2.82 K/UL — LOW (ref 3.8–10.5)

## 2022-05-16 PROCEDURE — 71250 CT THORAX DX C-: CPT | Mod: 26

## 2022-05-16 RX ORDER — PANTOPRAZOLE SODIUM 20 MG/1
40 TABLET, DELAYED RELEASE ORAL
Refills: 0 | Status: DISCONTINUED | OUTPATIENT
Start: 2022-05-16 | End: 2022-05-18

## 2022-05-16 RX ORDER — LOSARTAN POTASSIUM 100 MG/1
25 TABLET, FILM COATED ORAL DAILY
Refills: 0 | Status: DISCONTINUED | OUTPATIENT
Start: 2022-05-17 | End: 2022-05-18

## 2022-05-16 RX ADMIN — ENOXAPARIN SODIUM 40 MILLIGRAM(S): 100 INJECTION SUBCUTANEOUS at 09:17

## 2022-05-16 RX ADMIN — PIPERACILLIN AND TAZOBACTAM 25 GRAM(S): 4; .5 INJECTION, POWDER, LYOPHILIZED, FOR SOLUTION INTRAVENOUS at 21:54

## 2022-05-16 RX ADMIN — SIMVASTATIN 40 MILLIGRAM(S): 20 TABLET, FILM COATED ORAL at 21:54

## 2022-05-16 RX ADMIN — PIPERACILLIN AND TAZOBACTAM 25 GRAM(S): 4; .5 INJECTION, POWDER, LYOPHILIZED, FOR SOLUTION INTRAVENOUS at 05:40

## 2022-05-16 RX ADMIN — GABAPENTIN 100 MILLIGRAM(S): 400 CAPSULE ORAL at 18:01

## 2022-05-16 RX ADMIN — GABAPENTIN 100 MILLIGRAM(S): 400 CAPSULE ORAL at 05:38

## 2022-05-16 RX ADMIN — PIPERACILLIN AND TAZOBACTAM 25 GRAM(S): 4; .5 INJECTION, POWDER, LYOPHILIZED, FOR SOLUTION INTRAVENOUS at 15:37

## 2022-05-16 RX ADMIN — PANTOPRAZOLE SODIUM 40 MILLIGRAM(S): 20 TABLET, DELAYED RELEASE ORAL at 21:54

## 2022-05-16 NOTE — PROGRESS NOTE ADULT - SUBJECTIVE AND OBJECTIVE BOX
87y Female    Meds:  piperacillin/tazobactam IVPB.. 3.375 Gram(s) IV Intermittent every 8 hours    Allergies    No Known Allergies    Intolerances        VITALS:  Vital Signs Last 24 Hrs  T(C): 36.7 (16 May 2022 13:08), Max: 36.9 (15 May 2022 21:20)  T(F): 98.1 (16 May 2022 13:08), Max: 98.4 (15 May 2022 21:20)  HR: 82 (16 May 2022 13:08) (77 - 85)  BP: 139/73 (16 May 2022 13:08) (124/52 - 139/73)  BP(mean): --  RR: 18 (16 May 2022 13:08) (16 - 18)  SpO2: 96% (16 May 2022 13:08) (96% - 97%)    LABS/DIAGNOSTIC TESTS:                          8.9    2.82  )-----------( 369      ( 16 May 2022 11:11 )             27.0         05-16    142  |  112<H>  |  11  ----------------------------<  137<H>  3.7   |  22  |  0.68    Ca    8.6      16 May 2022 06:13    TPro  6.2  /  Alb  2.4<L>  /  TBili  0.2  /  DBili  x   /  AST  20  /  ALT  15  /  AlkPhos  93  05-16      LIVER FUNCTIONS - ( 16 May 2022 06:13 )  Alb: 2.4 g/dL / Pro: 6.2 g/dL / ALK PHOS: 93 U/L / ALT: 15 U/L DA / AST: 20 U/L / GGT: x             CULTURES: Clean Catch Clean Catch (Midstream)  05-13 @ 03:42   >100,000 CFU/ml Klebsiella pneumoniae ESBL  --  Klebsiella pneumoniae ESBL      .Blood Blood-Peripheral  05-13 @ 03:39   No growth to date.  --  --              RADIOLOGY:      ROS:  [  ] UNABLE TO ELICIT 87y Female who is feeling better overall, she has less abdominal pain , no nausea , vomiting or diarrhea. No fevers or chills. She has no urinary symptoms and so her urine culture showing an ESBL Klebsiella is colonisation. Her WBC count is low.    Meds:  piperacillin/tazobactam IVPB.. 3.375 Gram(s) IV Intermittent every 8 hours    Allergies    No Known Allergies    Intolerances        VITALS:  Vital Signs Last 24 Hrs  T(C): 36.7 (16 May 2022 13:08), Max: 36.9 (15 May 2022 21:20)  T(F): 98.1 (16 May 2022 13:08), Max: 98.4 (15 May 2022 21:20)  HR: 82 (16 May 2022 13:08) (77 - 85)  BP: 139/73 (16 May 2022 13:08) (124/52 - 139/73)  BP(mean): --  RR: 18 (16 May 2022 13:08) (16 - 18)  SpO2: 96% (16 May 2022 13:08) (96% - 97%)    LABS/DIAGNOSTIC TESTS:                          8.9    2.82  )-----------( 369      ( 16 May 2022 11:11 )             27.0         05-16    142  |  112<H>  |  11  ----------------------------<  137<H>  3.7   |  22  |  0.68    Ca    8.6      16 May 2022 06:13    TPro  6.2  /  Alb  2.4<L>  /  TBili  0.2  /  DBili  x   /  AST  20  /  ALT  15  /  AlkPhos  93  05-16      LIVER FUNCTIONS - ( 16 May 2022 06:13 )  Alb: 2.4 g/dL / Pro: 6.2 g/dL / ALK PHOS: 93 U/L / ALT: 15 U/L DA / AST: 20 U/L / GGT: x             CULTURES: Clean Catch Clean Catch (Midstream)  05-13 @ 03:42   >100,000 CFU/ml Klebsiella pneumoniae ESBL  --  Klebsiella pneumoniae ESBL      .Blood Blood-Peripheral  05-13 @ 03:39   No growth to date.  --  --              RADIOLOGY:      ROS:  [  ] UNABLE TO ELICIT

## 2022-05-16 NOTE — PROGRESS NOTE ADULT - SUBJECTIVE AND OBJECTIVE BOX
Patient is a 87y old  Female who presents with a chief complaint of Neutropenic fever (16 May 2022 11:01)      SUBJECTIVE / OVERNIGHT EVENTS:        MEDICATIONS  (STANDING):  enoxaparin Injectable 40 milliGRAM(s) SubCutaneous every 24 hours  gabapentin 100 milliGRAM(s) Oral every 12 hours  insulin lispro (ADMELOG) corrective regimen sliding scale   SubCutaneous three times a day before meals  insulin lispro (ADMELOG) corrective regimen sliding scale   SubCutaneous at bedtime  piperacillin/tazobactam IVPB.. 3.375 Gram(s) IV Intermittent every 8 hours  simvastatin 40 milliGRAM(s) Oral at bedtime    MEDICATIONS  (PRN):  acetaminophen     Tablet .. 650 milliGRAM(s) Oral every 6 hours PRN Temp greater or equal to 38C (100.4F), Mild Pain (1 - 3)          PHYSICAL EXAM:  Vital Signs Last 24 Hrs  T(C): 36.8 (16 May 2022 10:25), Max: 36.9 (15 May 2022 21:20)  T(F): 98.3 (16 May 2022 10:25), Max: 98.4 (15 May 2022 21:20)  HR: 80 (16 May 2022 10:25) (75 - 85)  BP: 125/71 (16 May 2022 10:25) (110/60 - 127/67)  BP(mean): --  RR: 16 (16 May 2022 10:25) (16 - 17)  SpO2: 96% (16 May 2022 10:25) (96% - 97%)    LABS:                        8.9    2.82  )-----------( 369      ( 16 May 2022 11:11 )             27.0     05-16    142  |  112<H>  |  11  ----------------------------<  137<H>  3.7   |  22  |  0.68    Ca    8.6      16 May 2022 06:13    TPro  6.2  /  Alb  2.4<L>  /  TBili  0.2  /  DBili  x   /  AST  20  /  ALT  15  /  AlkPhos  93  05-16              COVID-19 PCR: NotDetec (12 May 2022 22:11)  COVID-19 PCR: NotDetec (12 Apr 2022 07:07)  COVID-19 PCR: NotDetec (06 Apr 2022 00:03)         Patient is a 87y old  Female who presents with a chief complaint of Neutropenic fever    #[id number]  SUBJECTIVE / OVERNIGHT EVENTS: events noted. Pt c/o "I feel out of it, when I look up I see black". Denies dizziness, H/A, N/V, blurry vision, no abdominal pain.      MEDICATIONS  (STANDING):  enoxaparin Injectable 40 milliGRAM(s) SubCutaneous every 24 hours  gabapentin 100 milliGRAM(s) Oral every 12 hours  insulin lispro (ADMELOG) corrective regimen sliding scale   SubCutaneous three times a day before meals  insulin lispro (ADMELOG) corrective regimen sliding scale   SubCutaneous at bedtime  piperacillin/tazobactam IVPB.. 3.375 Gram(s) IV Intermittent every 8 hours  simvastatin 40 milliGRAM(s) Oral at bedtime    MEDICATIONS  (PRN):  acetaminophen     Tablet .. 650 milliGRAM(s) Oral every 6 hours PRN Temp greater or equal to 38C (100.4F), Mild Pain (1 - 3)      PHYSICAL EXAM:  Vital Signs Last 24 Hrs  T(C): 36.8 (16 May 2022 10:25), Max: 36.9 (15 May 2022 21:20)  T(F): 98.3 (16 May 2022 10:25), Max: 98.4 (15 May 2022 21:20)  HR: 80 (16 May 2022 10:25) (75 - 85)  BP: 125/71 (16 May 2022 10:25) (110/60 - 127/67)  BP(mean): --  RR: 16 (16 May 2022 10:25) (16 - 17)  SpO2: 96% (16 May 2022 10:25) (96% - 97%)    GEN: NAD; A and O x 3  LUNGS: CTA B/L  HEART: S1 S2  ABDOMEN: soft, non-tender, non-distended, + BS  EXTREMITIES: no edema  NERVOUS SYSTEM:  Awake and alert; no focal neuro deficits    LABS:                        8.9    2.82  )-----------( 369      ( 16 May 2022 11:11 )             27.0     05-16    142  |  112<H>  |  11  ----------------------------<  137<H>  3.7   |  22  |  0.68    Ca    8.6      16 May 2022 06:13    TPro  6.2  /  Alb  2.4<L>  /  TBili  0.2  /  DBili  x   /  AST  20  /  ALT  15  /  AlkPhos  93  05-16              COVID-19 PCR: NotDetec (12 May 2022 22:11)  COVID-19 PCR: NotDetec (12 Apr 2022 07:07)  COVID-19 PCR: NotDetec (06 Apr 2022 00:03)

## 2022-05-16 NOTE — DISCHARGE NOTE PROVIDER - NSDCCPCAREPLAN_GEN_ALL_CORE_FT
PRINCIPAL DISCHARGE DIAGNOSIS  Diagnosis: Acute UTI  Assessment and Plan of Treatment: Your urine test and urine culture was positive for urinary tract infection despite not exhibiting symptoms and you were treated with IV antibiotic.  Continue taking all prescribed medication.  Report any symptoms of fever, chills, nausea or vomiting, back or flank pain, pain or burnig with urination.      SECONDARY DISCHARGE DIAGNOSES  Diagnosis: Multiple myeloma  Assessment and Plan of Treatment: Please follow up with your oncologist as outpatient for your ongoing treatment.    Diagnosis: HLD (hyperlipidemia)  Assessment and Plan of Treatment: Continue taking your medication as prescribed and report any changes in your condition such as muscle cramps, changes in the color of your skin, abdominal pain.  Make sure to follow up with your doctor for ongoing liver enzyme test.    Diagnosis: DM (diabetes mellitus)  Assessment and Plan of Treatment: Continue taking your medication as prescribed and follow up with your doctor.  Report signs and symptoms of hypo and hyperglycemia to your doctor such as dizziness, lightheadedness, increasing thirst, appetite and increase urination, cold, clammy skin.      Diagnosis: HTN (hypertension)  Assessment and Plan of Treatment: You are being treated for high blood pressure.  Continue taking your medication as prescribed to help prevent or minimize your risk of end organ damage.  Follow up with your doctor for routine blood pressure evaluation and medication regimen.  Report any symptoms of headaces with nausea or vomiting, visual changes, heart palpitation, chest pain or shortness.      Diagnosis: Neutropenic fever  Assessment and Plan of Treatment: Please continue to follow up with your oncologist as outpatient to continue your cancer treatment.  Report any symptoms of fever or chills, temperatute of 101.0F or higher.     PRINCIPAL DISCHARGE DIAGNOSIS  Diagnosis: Neutropenic fever  Assessment and Plan of Treatment: You were treated for Neutropenic fever along with Diverticultis and positive Urine Culture for Klebsiella. Continue taking your antibiotic as prescibed until complete.  Please seek immediate help if you exhibit from the problem site if:  -fever, chills, shivering  -rapid heart rate, difficulty breathing  -worsening redness, drainage, foul odor  Please continue to follow up with your oncologist as outpatient to continue your cancer treatment.  Report any symptoms of fever or chills, temperatute of 101.0F or higher.      SECONDARY DISCHARGE DIAGNOSES  Diagnosis: Diverticulitis  Assessment and Plan of Treatment: Colitis is swelling and irritation of your colon. Colitis may be caused by ulcers or a problem with your immune system. Bacteria, a virus, or a parasite may also cause colitis. The cause may not be known. You may have diarrhea, abdominal pain, fever, or blood or mucus in your bowel movement.  DISCHARGE INSTRUCTIONS:  Return to the emergency department if:  You have sudden trouble breathing.  Your bowel movements are black or have blood in them.  You have blood in your vomit.  You have severe abdominal pain or your abdomen is swollen and feels hard.  You have any of the following signs of dehydration:  Dizziness or weakness  Dry mouth, cracked lips, or severe thirst  Fast heartbeat or breathing  Urinating very little or not at all  Manage your symptoms:  Drink liquids as directed to help prevent dehydration. .  Eat a variety of healthy foods. Healthy foods include fruits, vegetables, whole-grain breads, beans, low-fat dairy products, lean meats, and fish.  Talk to your healthcare provider before you take NSAIDs. NSAIDs can cause worsen your symptoms if ulcers are causing your colitis.  Start to exercise when you feel better. Regular exercise helps your bowels work normally. Ask about the best exercise plan for you.  Please follow up with your Primary Care Physician and Gastroentrologist in 1 week.   Please continue taking your medication as prescribed. If you have any questions or concerns about your medication please direct them to your prescribing Healthcare Provider.      Diagnosis: Acute UTI  Assessment and Plan of Treatment: Klebsiella UTI.  You were found to have UTI (urinary tract infection) and were treated with IV antibiotics.  There are preventative measures like:  -wiping front to back after using the toilet.  -urination after sexual intercourse.  -take showers instead of baths, avoid bath oils  -drink pleanty of fluids   Please monitor for common symptoms:  -bad urine odor  -pain or burning when you urinate  -needing to urinate more often  -hard to empty your bladde all the way  -strong need to empty your bladder  Please seek immediate attention for:  -fever, palpitations, abnormal breathing,   -dizziness, lethary, confusion  Please follow up with your primary care physician within 1 week.      Diagnosis: Multiple myeloma  Assessment and Plan of Treatment: Please follow up with your oncologist as outpatient for your ongoing treatment.    Diagnosis: HTN (hypertension)  Assessment and Plan of Treatment: You are being treated for high blood pressure.  Continue taking your medication as prescribed to help prevent or minimize your risk of end organ damage.  Follow up with your doctor for routine blood pressure evaluation and medication regimen.  Report any symptoms of headaces with nausea or vomiting, visual changes, heart palpitation, chest pain or shortness.  Only Your Metoprolol was held as your blood pressure was low to normal without Metoprolol, please follow up with your Primary Care Physician to resume medication.      Diagnosis: HLD (hyperlipidemia)  Assessment and Plan of Treatment: Continue taking your medication as prescribed and report any changes in your condition such as muscle cramps, changes in the color of your skin, abdominal pain.  Make sure to follow up with your doctor for ongoing liver enzyme test.    Diagnosis: DM (diabetes mellitus)  Assessment and Plan of Treatment: Continue taking your medication as prescribed and follow up with your doctor.  Report signs and symptoms of hypo and hyperglycemia to your doctor such as dizziness, lightheadedness, increasing thirst, appetite and increase urination, cold, clammy skin.

## 2022-05-16 NOTE — PROGRESS NOTE ADULT - NS ATTEND AMEND GEN_ALL_CORE FT
I have examined the patient at bedside and reviewed patient's data and participated in the management of the patient along with Susanna ALCANTAR as well as hemotology/med oncology faculty consisting of Dr. Hammad King, Dr. SKYLER Flores, Dr. MERY Storey, Dr. Mychal Solorzano, Dr. Tennille Dominguez, Dr. Braeden Peters as well as myself during the daily heme/onc case review. I reviewed pertinent clinical information, PE,  labs as well as A/P as outline above.     Continues to clinically improve. UCx GNR found await sensitivities. Hospitalization was due to UTI and not chemotherapy side effect.

## 2022-05-16 NOTE — PROGRESS NOTE ADULT - SUBJECTIVE AND OBJECTIVE BOX
Patient is a 87y old  Female who presents with a chief complaint of Neutropenic fever (16 May 2022 09:23)    Patient is alert, awake, laying comfortably in the bed in No acute distress. Patient is feeling better.    INTERVAL HPI/OVERNIGHT EVENTS:      VITAL SIGNS:  T(F): 98.3 (05-16-22 @ 10:25)  HR: 80 (05-16-22 @ 10:25)  BP: 125/71 (05-16-22 @ 10:25)  RR: 16 (05-16-22 @ 10:25)  SpO2: 96% (05-16-22 @ 10:25)  Wt(kg): --  I&O's Detail          REVIEW OF SYSTEMS:    CONSTITUTIONAL:  No fevers, chills, sweats    HEENT:  Eyes:  No diplopia or blurred vision. ENT:  No earache, sore throat or runny nose.    CARDIOVASCULAR:  No pressure, squeezing, tightness, or heaviness about the chest; no palpitations.    RESPIRATORY:  Per HPI    GASTROINTESTINAL:  No abdominal pain, nausea, vomiting or diarrhea.    GENITOURINARY:  No dysuria, frequency or urgency.    NEUROLOGIC:  No paresthesias, fasciculations, seizures or weakness.    PSYCHIATRIC:  No disorder of thought or mood.      PHYSICAL EXAM:    Constitutional: Well developed and nourished  Eyes:Perrla  ENMT: normal  Neck:supple  Respiratory: good air entry  Cardiovascular: S1 S2 regular  Gastrointestinal: Soft, Non tender  Extremities: No edema  Vascular:normal  Neurological:Awake, alert,Ox3  Musculoskeletal:Normal      MEDICATIONS  (STANDING):  enoxaparin Injectable 40 milliGRAM(s) SubCutaneous every 24 hours  gabapentin 100 milliGRAM(s) Oral every 12 hours  insulin lispro (ADMELOG) corrective regimen sliding scale   SubCutaneous three times a day before meals  insulin lispro (ADMELOG) corrective regimen sliding scale   SubCutaneous at bedtime  piperacillin/tazobactam IVPB.. 3.375 Gram(s) IV Intermittent every 8 hours  simvastatin 40 milliGRAM(s) Oral at bedtime    MEDICATIONS  (PRN):  acetaminophen     Tablet .. 650 milliGRAM(s) Oral every 6 hours PRN Temp greater or equal to 38C (100.4F), Mild Pain (1 - 3)      Allergies    No Known Allergies    Intolerances        LABS:                        8.9    2.73  )-----------( 364      ( 16 May 2022 06:13 )             27.9     05-16    142  |  112<H>  |  11  ----------------------------<  137<H>  3.7   |  22  |  0.68    Ca    8.6      16 May 2022 06:13    TPro  6.2  /  Alb  2.4<L>  /  TBili  0.2  /  DBili  x   /  AST  20  /  ALT  15  /  AlkPhos  93  05-16              CAPILLARY BLOOD GLUCOSE      POCT Blood Glucose.: 130 mg/dL (16 May 2022 07:37)  POCT Blood Glucose.: 98 mg/dL (15 May 2022 22:12)  POCT Blood Glucose.: 153 mg/dL (15 May 2022 17:13)  POCT Blood Glucose.: 143 mg/dL (15 May 2022 11:43)        RADIOLOGY & ADDITIONAL TESTS:    CXR:    Ct scan chest:    ekg;    echo: Patient is a 87y old  Female who presents with a chief complaint of Neutropenic fever (16 May 2022 09:23)    Patient is alert, awake, laying comfortably in the bed in No acute distress. Patient is complaining of dizziness    INTERVAL HPI/OVERNIGHT EVENTS:      VITAL SIGNS:  T(F): 98.3 (05-16-22 @ 10:25)  HR: 80 (05-16-22 @ 10:25)  BP: 125/71 (05-16-22 @ 10:25)  RR: 16 (05-16-22 @ 10:25)  SpO2: 96% (05-16-22 @ 10:25)  Wt(kg): --  I&O's Detail          REVIEW OF SYSTEMS:    CONSTITUTIONAL:  No fevers, chills, sweats    HEENT:  Eyes:  No diplopia or blurred vision. ENT:  No earache, sore throat or runny nose.    CARDIOVASCULAR:  No pressure, squeezing, tightness, or heaviness about the chest; no palpitations.    RESPIRATORY:  Per HPI    GASTROINTESTINAL:  No abdominal pain, nausea, vomiting or diarrhea.    GENITOURINARY:  No dysuria, frequency or urgency.    NEUROLOGIC:  No paresthesias, fasciculations, seizures or weakness.    PSYCHIATRIC:  No disorder of thought or mood.      PHYSICAL EXAM:    Constitutional: Well developed and nourished  Eyes:Perrla  ENMT: normal  Neck:supple  Respiratory: good air entry  Cardiovascular: S1 S2 regular  Gastrointestinal: Soft, Non tender  Extremities: No edema  Vascular:normal  Neurological:Awake, alert,Ox3  Musculoskeletal:Normal      MEDICATIONS  (STANDING):  enoxaparin Injectable 40 milliGRAM(s) SubCutaneous every 24 hours  gabapentin 100 milliGRAM(s) Oral every 12 hours  insulin lispro (ADMELOG) corrective regimen sliding scale   SubCutaneous three times a day before meals  insulin lispro (ADMELOG) corrective regimen sliding scale   SubCutaneous at bedtime  piperacillin/tazobactam IVPB.. 3.375 Gram(s) IV Intermittent every 8 hours  simvastatin 40 milliGRAM(s) Oral at bedtime    MEDICATIONS  (PRN):  acetaminophen     Tablet .. 650 milliGRAM(s) Oral every 6 hours PRN Temp greater or equal to 38C (100.4F), Mild Pain (1 - 3)      Allergies    No Known Allergies    Intolerances        LABS:                        8.9    2.73  )-----------( 364      ( 16 May 2022 06:13 )             27.9     05-16    142  |  112<H>  |  11  ----------------------------<  137<H>  3.7   |  22  |  0.68    Ca    8.6      16 May 2022 06:13    TPro  6.2  /  Alb  2.4<L>  /  TBili  0.2  /  DBili  x   /  AST  20  /  ALT  15  /  AlkPhos  93  05-16              CAPILLARY BLOOD GLUCOSE      POCT Blood Glucose.: 130 mg/dL (16 May 2022 07:37)  POCT Blood Glucose.: 98 mg/dL (15 May 2022 22:12)  POCT Blood Glucose.: 153 mg/dL (15 May 2022 17:13)  POCT Blood Glucose.: 143 mg/dL (15 May 2022 11:43)        RADIOLOGY & ADDITIONAL TESTS:    CXR:    Ct scan chest:    ekg;    echo:

## 2022-05-16 NOTE — PROGRESS NOTE ADULT - PROBLEM SELECTOR PLAN 1
-Came for fever and lower abdomen pain   -Fever 100.3, HR 96, leukopenia ,   -s/p zosyn in ED- continue w/ zosyn  -UA cx with NGR- Asymptomatic    -Chest X-ray without any consolidation (f/u official read)  -CT chest- b/l ground glass and stable skeletal mets- recs repeat scan in 3 months  -CT A/P- sigmoid colon diverticulitis  -F/U Bcx and Ucx  -ID Dr. Vallejo following

## 2022-05-16 NOTE — DISCHARGE NOTE PROVIDER - CARE PROVIDER_API CALL
Evgeny Alvaardo  HEMATOLOGY  [address] [facility], [address]  [place] [address]  Phone: [phone]  Fax: [phone]  Follow Up Time:     Cristina Bonds  CARDIOVASCULAR DISEASE  [address] [place] [address]  Phone: [phone]  Fax: [phone]  Follow Up Time:

## 2022-05-16 NOTE — DISCHARGE NOTE PROVIDER - NSDCMRMEDTOKEN_GEN_ALL_CORE_FT
acetaminophen 500 mg oral tablet: 2 tab(s) orally every 8 hours  Alogliptin: 25 milligram(s) orally once a day  Calcium 500+D: 1 tab(s) orally once a day  Fosamax 70 mg oral tablet: 1 tab(s) orally once a week  gabapentin 100 mg oral capsule: 1 cap(s) orally every 12 hours  gabapentin 300 mg oral capsule: 1 cap(s) orally once a day (at bedtime)  lidocaine 4% topical film: Apply topically to affected area every 12 hours  losartan 25 mg oral tablet: 1 tab(s) orally once a day  meclizine 25 mg oral tablet: 1 tab(s) orally once a day, As Needed  metFORMIN 1000 mg oral tablet: 1 tab(s) orally 2 times a day  Metoprolol Succinate ER 25 mg oral tablet, extended release: 1 tab(s) orally once a day  simvastatin 40 mg oral tablet: 1 tab(s) orally once a day (at bedtime)  Steglatro 15 mg oral tablet: 1 tab(s) orally once a day (in the morning)  Vitamin D3 1250 mcg (50,000 intl units) oral capsule: 1 cap(s) orally once a week   acetaminophen 500 mg oral tablet: 2 tab(s) orally every 8 hours  Alogliptin: 25 milligram(s) orally once a day  amoxicillin-clavulanate 875 mg-125 mg oral tablet: 1 tab(s) orally 2 times a day   Fosamax 70 mg oral tablet: 1 tab(s) orally once a week  gabapentin 100 mg oral capsule: 1 cap(s) orally every 12 hours  gabapentin 300 mg oral capsule: 1 cap(s) orally once a day (at bedtime)  lidocaine 4% topical film: Apply topically to affected area every 12 hours  losartan 25 mg oral tablet: 1 tab(s) orally once a day  meclizine 25 mg oral tablet: 1 tab(s) orally once a day, As Needed  metFORMIN 1000 mg oral tablet: 1 tab(s) orally 2 times a day  simvastatin 40 mg oral tablet: 1 tab(s) orally once a day (at bedtime)  Steglatro 15 mg oral tablet: 1 tab(s) orally once a day (in the morning)  Vitamin D3 1250 mcg (50,000 intl units) oral capsule: 1 cap(s) orally once a week

## 2022-05-16 NOTE — PROGRESS NOTE ADULT - SUBJECTIVE AND OBJECTIVE BOX
NP Note discussed with  Primary Attending    Patient is a 87y old  Female who presents with a chief complaint of Neutropenic fever (16 May 2022 08:03)      INTERVAL HPI/OVERNIGHT EVENTS: no new complaints    MEDICATIONS  (STANDING):  enoxaparin Injectable 40 milliGRAM(s) SubCutaneous every 24 hours  gabapentin 100 milliGRAM(s) Oral every 12 hours  insulin lispro (ADMELOG) corrective regimen sliding scale   SubCutaneous three times a day before meals  insulin lispro (ADMELOG) corrective regimen sliding scale   SubCutaneous at bedtime  piperacillin/tazobactam IVPB.. 3.375 Gram(s) IV Intermittent every 8 hours  simvastatin 40 milliGRAM(s) Oral at bedtime    MEDICATIONS  (PRN):  acetaminophen     Tablet .. 650 milliGRAM(s) Oral every 6 hours PRN Temp greater or equal to 38C (100.4F), Mild Pain (1 - 3)      __________________________________________________  REVIEW OF SYSTEMS:    CONSTITUTIONAL: No fever,   EYES: no acute visual disturbances  NECK: No pain or stiffness  RESPIRATORY: No cough; No shortness of breath  CARDIOVASCULAR: No chest pain, no palpitations  GASTROINTESTINAL: No pain. No nausea or vomiting; No diarrhea   NEUROLOGICAL: No headache or numbness, no tremors  MUSCULOSKELETAL: No joint pain, no muscle pain  GENITOURINARY: no dysuria, no frequency, no hesitancy  PSYCHIATRY: no depression , no anxiety  ALL OTHER  ROS negative        Vital Signs Last 24 Hrs  T(C): 36.8 (16 May 2022 05:12), Max: 36.9 (15 May 2022 21:20)  T(F): 98.3 (16 May 2022 05:12), Max: 98.4 (15 May 2022 21:20)  HR: 85 (16 May 2022 05:12) (75 - 85)  BP: 127/67 (16 May 2022 05:12) (110/60 - 127/67)  BP(mean): --  RR: 16 (16 May 2022 05:12) (16 - 17)  SpO2: 96% (16 May 2022 05:12) (96% - 97%)    ________________________________________________  PHYSICAL EXAM:  GENERAL: NAD  HEENT: Normocephalic;  conjunctivae and sclerae clear; moist mucous membranes;   NECK : supple  CHEST/LUNG: Clear to auscultation bilaterally with good air entry   HEART: S1 S2  regular; no murmurs, gallops or rubs  ABDOMEN: Soft, Nontender, Nondistended; Bowel sounds present  EXTREMITIES: no cyanosis; no edema; no calf tenderness  SKIN: warm and dry; no rash  NERVOUS SYSTEM:  Awake and alert; Oriented  to place, person and time ; no new deficits    _________________________________________________  LABS:                        8.9    2.73  )-----------( 364      ( 16 May 2022 06:13 )             27.9     05-16    142  |  112<H>  |  11  ----------------------------<  137<H>  3.7   |  22  |  0.68    Ca    8.6      16 May 2022 06:13    TPro  6.2  /  Alb  2.4<L>  /  TBili  0.2  /  DBili  x   /  AST  20  /  ALT  15  /  AlkPhos  93  05-16        CAPILLARY BLOOD GLUCOSE      POCT Blood Glucose.: 130 mg/dL (16 May 2022 07:37)  POCT Blood Glucose.: 98 mg/dL (15 May 2022 22:12)  POCT Blood Glucose.: 153 mg/dL (15 May 2022 17:13)  POCT Blood Glucose.: 143 mg/dL (15 May 2022 11:43)        RADIOLOGY & ADDITIONAL TESTS:    Imaging  Reviewed:  YES/NO    Consultant(s) Notes Reviewed:   YES/ No      Plan of care was discussed with patient and /or primary care giver; all questions and concerns were addressed  NP Note discussed with Primary Attending    Patient is a 87y old  Female who presents with a chief complaint of Neutropenic fever (16 May 2022 09:30)      INTERVAL HPI/OVERNIGHT EVENTS: no new complaints    MEDICATIONS  (STANDING):  enoxaparin Injectable 40 milliGRAM(s) SubCutaneous every 24 hours  gabapentin 100 milliGRAM(s) Oral every 12 hours  insulin lispro (ADMELOG) corrective regimen sliding scale   SubCutaneous three times a day before meals  insulin lispro (ADMELOG) corrective regimen sliding scale   SubCutaneous at bedtime  piperacillin/tazobactam IVPB.. 3.375 Gram(s) IV Intermittent every 8 hours  simvastatin 40 milliGRAM(s) Oral at bedtime    MEDICATIONS  (PRN):  acetaminophen     Tablet .. 650 milliGRAM(s) Oral every 6 hours PRN Temp greater or equal to 38C (100.4F), Mild Pain (1 - 3)      __________________________________________________  REVIEW OF SYSTEMS:    CONSTITUTIONAL: No fever,   EYES: no acute visual disturbances  NECK: No pain or stiffness  RESPIRATORY: No cough; No shortness of breath  CARDIOVASCULAR: No chest pain, no palpitations  GASTROINTESTINAL: No pain. No nausea or vomiting; No diarrhea   NEUROLOGICAL: No headache or numbness, no tremors  MUSCULOSKELETAL: No joint pain, no muscle pain  GENITOURINARY: no dysuria, no frequency, no hesitancy  PSYCHIATRY: no depression , no anxiety  ALL OTHER  ROS negative        Vital Signs Last 24 Hrs  T(C): 36.8 (16 May 2022 05:12), Max: 36.9 (15 May 2022 21:20)  T(F): 98.3 (16 May 2022 05:12), Max: 98.4 (15 May 2022 21:20)  HR: 85 (16 May 2022 05:12) (75 - 85)  BP: 127/67 (16 May 2022 05:12) (110/60 - 127/67)  BP(mean): --  RR: 16 (16 May 2022 05:12) (16 - 17)  SpO2: 96% (16 May 2022 05:12) (96% - 97%)    ________________________________________________  PHYSICAL EXAM:  GENERAL: NAD  HEENT: Normocephalic;  conjunctivae and sclerae clear; moist mucous membranes;   NECK : supple  CHEST/LUNG: Clear to auscultation bilaterally with good air entry   HEART: S1 S2  regular; no murmurs, gallops or rubs  ABDOMEN: Soft, Nontender, Nondistended; Bowel sounds present  EXTREMITIES: no cyanosis; no edema; no calf tenderness  SKIN: warm and dry; no rash  NERVOUS SYSTEM:  Awake and alert; Oriented  to place, person and time ; no new deficits    _________________________________________________  LABS:                        8.9    2.73  )-----------( 364      ( 16 May 2022 06:13 )             27.9     05-16    142  |  112<H>  |  11  ----------------------------<  137<H>  3.7   |  22  |  0.68    Ca    8.6      16 May 2022 06:13    TPro  6.2  /  Alb  2.4<L>  /  TBili  0.2  /  DBili  x   /  AST  20  /  ALT  15  /  AlkPhos  93  05-16        CAPILLARY BLOOD GLUCOSE      POCT Blood Glucose.: 130 mg/dL (16 May 2022 07:37)  POCT Blood Glucose.: 98 mg/dL (15 May 2022 22:12)  POCT Blood Glucose.: 153 mg/dL (15 May 2022 17:13)  POCT Blood Glucose.: 143 mg/dL (15 May 2022 11:43)        RADIOLOGY & ADDITIONAL TESTS:    Imaging  Reviewed:  YES/NO    Consultant(s) Notes Reviewed:   YES/ No      Plan of care was discussed with patient and /or primary care giver; all questions and concerns were addressed

## 2022-05-16 NOTE — PROGRESS NOTE ADULT - SUBJECTIVE AND OBJECTIVE BOX
Patient is a 87y old  Female who presents with a chief complaint of Neutropenic fever (15 May 2022 10:31)    pt seen in icu [  ], reg med floor [   ], bed [  ], chair at bedside [   ], a+o x3 [  ], lethargic [  ],  nad [  ]    shea [  ], ngt [  ], peg [  ], et tube [  ], cent line [  ], picc line [  ]        Allergies    No Known Allergies        Vitals    T(F): 98.3 (05-16-22 @ 05:12), Max: 98.4 (05-15-22 @ 21:20)  HR: 85 (05-16-22 @ 05:12) (75 - 85)  BP: 127/67 (05-16-22 @ 05:12) (110/60 - 127/67)  RR: 16 (05-16-22 @ 05:12) (16 - 17)  SpO2: 96% (05-16-22 @ 05:12) (96% - 97%)  Wt(kg): --  CAPILLARY BLOOD GLUCOSE      POCT Blood Glucose.: 130 mg/dL (16 May 2022 07:37)      Labs                          8.9    2.73  )-----------( 364      ( 16 May 2022 06:13 )             27.9       05-16    142  |  112<H>  |  11  ----------------------------<  137<H>  3.7   |  22  |  0.68    Ca    8.6      16 May 2022 06:13    TPro  6.2  /  Alb  2.4<L>  /  TBili  0.2  /  DBili  x   /  AST  20  /  ALT  15  /  AlkPhos  93  05-16            Clean Catch Clean Catch (Midstream)  05-13 @ 03:42   >100,000 CFU/ml Gram Negative Rods  --  --      .Blood Blood-Peripheral  05-13 @ 03:39   No growth to date.  --  --      Clean Catch Clean Catch (Midstream)  04-06 @ 06:18   <10,000 CFU/mL Normal Urogenital Natalie  --  --      .Blood Blood-Peripheral  04-06 @ 06:04   No Growth Final  --  --      Clean Catch Clean Catch (Midstream)  03-05 @ 03:06   >=3 organisms. Probable collection contamination.  --  --          Radiology Results      Meds    MEDICATIONS  (STANDING):  enoxaparin Injectable 40 milliGRAM(s) SubCutaneous every 24 hours  gabapentin 100 milliGRAM(s) Oral every 12 hours  insulin lispro (ADMELOG) corrective regimen sliding scale   SubCutaneous three times a day before meals  insulin lispro (ADMELOG) corrective regimen sliding scale   SubCutaneous at bedtime  piperacillin/tazobactam IVPB.. 3.375 Gram(s) IV Intermittent every 8 hours  simvastatin 40 milliGRAM(s) Oral at bedtime      MEDICATIONS  (PRN):  acetaminophen     Tablet .. 650 milliGRAM(s) Oral every 6 hours PRN Temp greater or equal to 38C (100.4F), Mild Pain (1 - 3)      Physical Exam    Neuro :  no focal deficits  Respiratory: CTA B/L  CV: RRR, S1S2, no murmurs,   Abdominal: Soft, NT, ND +BS,  Extremities: No edema, + peripheral pulses    ASSESSMENT    Urinary tract infection    Yes    HTN (hypertension)    DM (diabetes mellitus)    Hypercholesteremia    Gastric adenocarcinoma    Vertigo    Dementia    S/P hysterectomy    S/P tubal ligation        PLAN     Patient is a 87y old  Female who presents with a chief complaint of Neutropenic fever (15 May 2022 10:31)    pt seen in icu [  ], reg med floor [ x  ], bed [x  ], chair at bedside [   ], a+o x3 [x  ], lethargic [  ],    nad [ x ]      Allergies    No Known Allergies        Vitals    T(F): 98.3 (05-16-22 @ 05:12), Max: 98.4 (05-15-22 @ 21:20)  HR: 85 (05-16-22 @ 05:12) (75 - 85)  BP: 127/67 (05-16-22 @ 05:12) (110/60 - 127/67)  RR: 16 (05-16-22 @ 05:12) (16 - 17)  SpO2: 96% (05-16-22 @ 05:12) (96% - 97%)  Wt(kg): --  CAPILLARY BLOOD GLUCOSE      POCT Blood Glucose.: 130 mg/dL (16 May 2022 07:37)      Labs                          8.9    2.73  )-----------( 364      ( 16 May 2022 06:13 )             27.9       05-16    142  |  112<H>  |  11  ----------------------------<  137<H>  3.7   |  22  |  0.68    Ca    8.6      16 May 2022 06:13    TPro  6.2  /  Alb  2.4<L>  /  TBili  0.2  /  DBili  x   /  AST  20  /  ALT  15  /  AlkPhos  93  05-16            Clean Catch Clean Catch (Midstream)  05-13 @ 03:42   >100,000 CFU/ml Gram Negative Rods  --  --      .Blood Blood-Peripheral  05-13 @ 03:39   No growth to date.  --  --          Radiology Results      Meds    MEDICATIONS  (STANDING):  enoxaparin Injectable 40 milliGRAM(s) SubCutaneous every 24 hours  gabapentin 100 milliGRAM(s) Oral every 12 hours  insulin lispro (ADMELOG) corrective regimen sliding scale   SubCutaneous three times a day before meals  insulin lispro (ADMELOG) corrective regimen sliding scale   SubCutaneous at bedtime  piperacillin/tazobactam IVPB.. 3.375 Gram(s) IV Intermittent every 8 hours  simvastatin 40 milliGRAM(s) Oral at bedtime      MEDICATIONS  (PRN):  acetaminophen     Tablet .. 650 milliGRAM(s) Oral every 6 hours PRN Temp greater or equal to 38C (100.4F), Mild Pain (1 - 3)      Physical Exam    Neuro :  no focal deficits  Respiratory: CTA B/L  CV: RRR, S1S2, no murmurs,   Abdominal: Soft, NT, ND +BS,  Extremities: No edema, + peripheral pulses    ASSESSMENT    sigmoid diverticulitis,   pyelonephritis,   r/o neutropenic fever,   h/o HTN,   HLD,   T2DM,   osteoporosis,   gastric adenocarcinoma,   s/p resection 2015 (on chemo),   early multiple myeloma,( bmbx  2015,)   Vertigo  Dementia  S/P hysterectomy  S/P tubal ligation  pulm nodule        PLAN    cont zosyn until 5/20/22  id f/u   blood cx neg noted above   ucx with >100,000 CFU/ml Gram Negative Rods noted above   ua neg  ct abd with diverticulitis, colonic distention and perinephric stranding noted  gi cons    h/h stable   heme-onc f/u   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   pt Clinically improved.  No onc treatment while hospitalized including Revlimid   pulm f/u   Quantiferon TB Gold test  CT chest without contrast  incentive spirometer  palliative f/u   Patient expressed wishes for DNR/DNI, daughter/granddaughter in agreement, MOLST drafted and placed in chart.   Daughter Ivett is primary surrogate.   They wish to continue w current medical management, disease modifying cancer treatments.   ECOG 2-3. Onc following.   Palliative will follow for supportive care.  cont current meds

## 2022-05-16 NOTE — DISCHARGE NOTE PROVIDER - HOSPITAL COURSE
Pt is an 88 y/o F, from home, ambulates with walker,  PMHx of HTN, HLD, T2DM, osteoporosis, hx gastric adenocarcinoma, s/p resection 2015 (on chemo), early multiple myeloma,( bmbx  2015, last visit 2/2022  M spike  IGG kappa 1.5  with FLC 20.74, IGG 2145), follows [facility] gp came with chief complain of fevers at home and lower abdominal pain for 5 and 3 days respectively. Pt stated that she had subjective fevers at home and started getting pain in her lower abdomen radiating to her right leg, 10/10 in intensity at home, burning in nature, constant associated with nausea. Pt denied any chest pain, sob, diarrhea, constipation or vomiting. Endorsed to have loss of appetite. Last chemo was last Friday.     ED Course: Fever 100.3, UA -ve, WBC 2.06, .  Pt had a CTscan which showed diverticulitis and was treated with Zosyn.  Her urine culture grew Klebs. and did not have any urinary symptoms.  Pt also had a CT chest which revealed b/l peripheral ground glass and recommended f/U CT in 3 months.  Pt was followed her her oncologist and recommended Neupogen; however, ANC started to improve prior to starting Neupogen.  Pt is to follow up as out patient with her oncologist for her ongoing treatment.      Please note that this a brief summary of hospital course, please refer to daily progress notes and consult notes for full course and events.     Pt is an 88 y/o F, from home, ambulates with walker,  PMHx of HTN, HLD, T2DM, osteoporosis, hx gastric adenocarcinoma, s/p resection 2015 (on chemo), early multiple myeloma,( bmbx  2015, last visit 2/2022  M spike  IGG kappa 1.5  with FLC 20.74, IGG 2145), follows [facility] gp came with chief complain of fevers at home and lower abdominal pain for 5 and 3 days respectively. Pt stated that she had subjective fevers at home and started getting pain in her lower abdomen radiating to her right leg, 10/10 in intensity at home, burning in nature, constant associated with nausea. Pt denied any chest pain, sob, diarrhea, constipation or vomiting. Endorsed to have loss of appetite. Last chemo was last Friday.     ED Course: Fever 100.3, UA -ve, WBC 2.06, .  Pt had a CTscan which showed diverticulitis and was treated with Zosyn, can transition to Augmentin.  Her urine culture grew Klebs. and did not have any urinary symptoms.  Pt also had a CT chest which revealed b/l peripheral ground glass and recommended f/U CT in 3 months.  Pt was followed her her oncologist and recommended Neupogen; however, ANC started to improve prior to starting Neupogen.  Pt is to follow up as out patient with her oncologist for her ongoing treatment.      Please note that this a brief summary of hospital course please refer to daily progress notes and consult notes for full course and events. Patient seen and examined at bedside, discussed with medical attending. Patient medically cleared for discharge to home with outpatient follow up with HemOnc and Primary Care Physician.      Pt is an 88 y/o F, from home, ambulates with walker,  PMHx of HTN, HLD, T2DM, osteoporosis, hx gastric adenocarcinoma, s/p resection 2015 (on chemo), early multiple myeloma,( bmbx  2015, last visit 2/2022  M spike  IGG kappa 1.5  with FLC 20.74, IGG 2145), follows [facility] gp came with chief complain of fevers at home and lower abdominal pain for 5 and 3 days respectively. Pt stated that she had subjective fevers at home and started getting pain in her lower abdomen radiating to her right leg, 10/10 in intensity at home, burning in nature, constant associated with nausea. Pt denied any chest pain, sob, diarrhea, constipation or vomiting. Endorsed to have loss of appetite. Last chemo was last Friday.     ED Course: Fever 100.3, UA -ve, WBC 2.06, .  Pt had a CTscan which showed diverticulitis and was treated with Zosyn, can transition to Augmentin.  Her urine culture grew Klebs. and did not have any urinary symptoms so culture was attributed to colonization.  Pt also had a CT chest which revealed b/l peripheral ground glass and recommended f/U CT in 3 months.  Pt was followed her her oncologist and recommended Neupogen; however, ANC started to improve prior to starting Neupogen.  pt was diagnosed and treated for acute diverticulitis and neutropenia 2nd to chemo therapy.  pyelonephritis r/o since ucx was considered to be colonization. Sepsis was not considered. Pt is to follow up as out patient with her oncologist for her ongoing treatment.      Please note that this a brief summary of hospital course please refer to daily progress notes and consult notes for full course and events. Patient seen and examined at bedside, discussed with medical attending. Patient medically cleared for discharge to home with outpatient follow up with HemOnc and Primary Care Physician.

## 2022-05-16 NOTE — PROGRESS NOTE ADULT - ASSESSMENT
complete note to follow   Assessment and Plan:   · Assessment	    88 yo F, from home ,ambulates with walker , PMHx of HTN, HLD, T2DM, osteoporosis, hx gastric adenocarcinoma, s/p resection 2015 (on chemo), early multiple myeloma,( bmbx  2015, last visit 2/2022 BMBx 30% Plasma cell w/ p17del began treatment w/ Daratumumab RVD   M spike  IGG kappa 1.5  with FLC 20.74, IGG 2145), follows [facility] gp came with chief complain of fevers at home and lower abdominal pain for 5 and 3 days respectively. Pt stated that she had subjective fevers at home and started getting pain in her lower abdomen radiating to her right leg, 10/10 in intensity at home, burning in nature, constant associated with nausea. Pt denied any chest pain, sob, diarrhea, constipation or vomiting. Endorsed to have loss of appetite. Last chemo was last Friday.     Problem # Neutropenic Fever   -On broad spectrum antibiotics  -UCx + GNR, sensitivity pending  -BCx (-)  -recommend neupogen equivalent d/c when ANC > 1000, not given and today WBC=2.8 with ANC 28- and monocytes 8.5%, recovering  -Anemia 2/2 myeloma -tx Hgb < 7  - CT Abdomen and Pelvis No Cont Findings of mid/distal sigmoid colon diverticulitis.   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   -Clinically improved.    Problem # Multiple Myeloma  -No treatment while hospitalized including Revlimid    Thank you for the referral. Will continue to monitor the patient.  Please call with any questions [phone]  Above reviewed with Attending Dr. Alvarado  [facility]  [address] [facility], [address], [place], [place]  [phone]  *Note not finalized until signed by Attending Physician

## 2022-05-16 NOTE — PROGRESS NOTE ADULT - ASSESSMENT
88 yo F, from home ,ambulates with walker , PMHx of HTN, HLD, T2DM, osteoporosis, hx gastric adenocarcinoma, s/p resection 2015 (on chemo), early multiple myeloma,( bmbx  2015, last visit 2/2022  M spike  IGG kappa 1.5  with FLC 20.74, IGG 2145), follows [facility] gp came with chief complain of fevers at home and lower abdominal pain for 5 and 3 days respectively. Admitted for neutropenic fever.    5/16-  Pt evaluated at bedside, denies acute medical complaints.  Currently on Zosyn for Divertic on CT-scan w/ IV following.  Bld cx NGTD and urine cx >100,000 GNR w/ sensitivity pending.  ID following.  Pt is otherwise stable and cleared from onconlogy standpoint to follow up as outpatient.

## 2022-05-17 DIAGNOSIS — Z29.9 ENCOUNTER FOR PROPHYLACTIC MEASURES, UNSPECIFIED: ICD-10-CM

## 2022-05-17 DIAGNOSIS — Z02.9 ENCOUNTER FOR ADMINISTRATIVE EXAMINATIONS, UNSPECIFIED: ICD-10-CM

## 2022-05-17 DIAGNOSIS — K57.92 DIVERTICULITIS OF INTESTINE, PART UNSPECIFIED, WITHOUT PERFORATION OR ABSCESS WITHOUT BLEEDING: ICD-10-CM

## 2022-05-17 LAB
ANION GAP SERPL CALC-SCNC: 6 MMOL/L — SIGNIFICANT CHANGE UP (ref 5–17)
BASOPHILS # BLD AUTO: 0.01 K/UL — SIGNIFICANT CHANGE UP (ref 0–0.2)
BASOPHILS NFR BLD AUTO: 0.3 % — SIGNIFICANT CHANGE UP (ref 0–2)
BUN SERPL-MCNC: 11 MG/DL — SIGNIFICANT CHANGE UP (ref 7–18)
CALCIUM SERPL-MCNC: 9 MG/DL — SIGNIFICANT CHANGE UP (ref 8.4–10.5)
CHLORIDE SERPL-SCNC: 112 MMOL/L — HIGH (ref 96–108)
CO2 SERPL-SCNC: 23 MMOL/L — SIGNIFICANT CHANGE UP (ref 22–31)
CREAT SERPL-MCNC: 0.7 MG/DL — SIGNIFICANT CHANGE UP (ref 0.5–1.3)
EGFR: 85 ML/MIN/1.73M2 — SIGNIFICANT CHANGE UP
EOSINOPHIL # BLD AUTO: 0.03 K/UL — SIGNIFICANT CHANGE UP (ref 0–0.5)
EOSINOPHIL NFR BLD AUTO: 0.9 % — SIGNIFICANT CHANGE UP (ref 0–6)
GLUCOSE BLDC GLUCOMTR-MCNC: 115 MG/DL — HIGH (ref 70–99)
GLUCOSE BLDC GLUCOMTR-MCNC: 139 MG/DL — HIGH (ref 70–99)
GLUCOSE BLDC GLUCOMTR-MCNC: 143 MG/DL — HIGH (ref 70–99)
GLUCOSE BLDC GLUCOMTR-MCNC: 148 MG/DL — HIGH (ref 70–99)
GLUCOSE SERPL-MCNC: 141 MG/DL — HIGH (ref 70–99)
HCT VFR BLD CALC: 29.6 % — LOW (ref 34.5–45)
HGB BLD-MCNC: 9.7 G/DL — LOW (ref 11.5–15.5)
IMM GRANULOCYTES NFR BLD AUTO: 0 % — SIGNIFICANT CHANGE UP (ref 0–1.5)
LYMPHOCYTES # BLD AUTO: 2.6 K/UL — SIGNIFICANT CHANGE UP (ref 1–3.3)
LYMPHOCYTES # BLD AUTO: 77.2 % — HIGH (ref 13–44)
MCHC RBC-ENTMCNC: 29.8 PG — SIGNIFICANT CHANGE UP (ref 27–34)
MCHC RBC-ENTMCNC: 32.8 GM/DL — SIGNIFICANT CHANGE UP (ref 32–36)
MCV RBC AUTO: 91.1 FL — SIGNIFICANT CHANGE UP (ref 80–100)
MONOCYTES # BLD AUTO: 0.35 K/UL — SIGNIFICANT CHANGE UP (ref 0–0.9)
MONOCYTES NFR BLD AUTO: 10.4 % — SIGNIFICANT CHANGE UP (ref 2–14)
NEUTROPHILS # BLD AUTO: 0.38 K/UL — LOW (ref 1.8–7.4)
NEUTROPHILS NFR BLD AUTO: 11.2 % — LOW (ref 43–77)
NRBC # BLD: 0 /100 WBCS — SIGNIFICANT CHANGE UP (ref 0–0)
PLATELET # BLD AUTO: 427 K/UL — HIGH (ref 150–400)
POTASSIUM SERPL-MCNC: 3.7 MMOL/L — SIGNIFICANT CHANGE UP (ref 3.5–5.3)
POTASSIUM SERPL-SCNC: 3.7 MMOL/L — SIGNIFICANT CHANGE UP (ref 3.5–5.3)
RBC # BLD: 3.25 M/UL — LOW (ref 3.8–5.2)
RBC # FLD: 14.7 % — HIGH (ref 10.3–14.5)
SODIUM SERPL-SCNC: 141 MMOL/L — SIGNIFICANT CHANGE UP (ref 135–145)
WBC # BLD: 3.34 K/UL — LOW (ref 3.8–10.5)
WBC # FLD AUTO: 3.34 K/UL — LOW (ref 3.8–10.5)

## 2022-05-17 RX ORDER — FILGRASTIM 480MCG/1.6
318 VIAL (ML) INJECTION DAILY
Refills: 0 | Status: DISCONTINUED | OUTPATIENT
Start: 2022-05-17 | End: 2022-05-17

## 2022-05-17 RX ORDER — FILGRASTIM 480MCG/1.6
300 VIAL (ML) INJECTION DAILY
Refills: 0 | Status: DISCONTINUED | OUTPATIENT
Start: 2022-05-17 | End: 2022-05-18

## 2022-05-17 RX ADMIN — GABAPENTIN 100 MILLIGRAM(S): 400 CAPSULE ORAL at 18:30

## 2022-05-17 RX ADMIN — PANTOPRAZOLE SODIUM 40 MILLIGRAM(S): 20 TABLET, DELAYED RELEASE ORAL at 06:30

## 2022-05-17 RX ADMIN — PIPERACILLIN AND TAZOBACTAM 25 GRAM(S): 4; .5 INJECTION, POWDER, LYOPHILIZED, FOR SOLUTION INTRAVENOUS at 13:13

## 2022-05-17 RX ADMIN — PIPERACILLIN AND TAZOBACTAM 25 GRAM(S): 4; .5 INJECTION, POWDER, LYOPHILIZED, FOR SOLUTION INTRAVENOUS at 21:46

## 2022-05-17 RX ADMIN — PIPERACILLIN AND TAZOBACTAM 25 GRAM(S): 4; .5 INJECTION, POWDER, LYOPHILIZED, FOR SOLUTION INTRAVENOUS at 05:39

## 2022-05-17 RX ADMIN — GABAPENTIN 100 MILLIGRAM(S): 400 CAPSULE ORAL at 05:39

## 2022-05-17 RX ADMIN — ENOXAPARIN SODIUM 40 MILLIGRAM(S): 100 INJECTION SUBCUTANEOUS at 09:30

## 2022-05-17 RX ADMIN — Medication 300 MICROGRAM(S): at 13:13

## 2022-05-17 RX ADMIN — SIMVASTATIN 40 MILLIGRAM(S): 20 TABLET, FILM COATED ORAL at 21:46

## 2022-05-17 RX ADMIN — LOSARTAN POTASSIUM 25 MILLIGRAM(S): 100 TABLET, FILM COATED ORAL at 05:39

## 2022-05-17 NOTE — PROGRESS NOTE ADULT - SUBJECTIVE AND OBJECTIVE BOX
Patient is a 87y old  Female who presents with a chief complaint of Neutropenic fever (16 May 2022 18:48)    pt seen in icu [  ], reg med floor [   ], bed [  ], chair at bedside [   ], a+o x3 [  ], lethargic [  ],  nad [  ]    shea [  ], ngt [  ], peg [  ], et tube [  ], cent line [  ], picc line [  ]        Allergies    No Known Allergies        Vitals    T(F): 98.3 (05-17-22 @ 04:57), Max: 98.7 (05-16-22 @ 20:25)  HR: 74 (05-17-22 @ 04:57) (74 - 84)  BP: 137/68 (05-17-22 @ 04:57) (122/71 - 139/73)  RR: 17 (05-17-22 @ 04:57) (16 - 18)  SpO2: 95% (05-17-22 @ 04:57) (95% - 100%)  Wt(kg): --  CAPILLARY BLOOD GLUCOSE      POCT Blood Glucose.: 148 mg/dL (17 May 2022 07:23)      Labs                          8.9    2.82  )-----------( 369      ( 16 May 2022 11:11 )             27.0       05-16    142  |  112<H>  |  11  ----------------------------<  137<H>  3.7   |  22  |  0.68    Ca    8.6      16 May 2022 06:13    TPro  6.2  /  Alb  2.4<L>  /  TBili  0.2  /  DBili  x   /  AST  20  /  ALT  15  /  AlkPhos  93  05-16            Clean Catch Clean Catch (Midstream)  05-13 @ 03:42   >100,000 CFU/ml Klebsiella pneumoniae ESBL  --  Klebsiella pneumoniae ESBL      .Blood Blood-Peripheral  05-13 @ 03:39   No growth to date.  --  --      Clean Catch Clean Catch (Midstream)  04-06 @ 06:18   <10,000 CFU/mL Normal Urogenital Natalie  --  --      .Blood Blood-Peripheral  04-06 @ 06:04   No Growth Final  --  --      Clean Catch Clean Catch (Midstream)  03-05 @ 03:06   >=3 organisms. Probable collection contamination.  --  --          Radiology Results      Meds    MEDICATIONS  (STANDING):  enoxaparin Injectable 40 milliGRAM(s) SubCutaneous every 24 hours  filgrastim-sndz (ZARXIO) Injectable 300 MICROGram(s) SubCutaneous daily  gabapentin 100 milliGRAM(s) Oral every 12 hours  insulin lispro (ADMELOG) corrective regimen sliding scale   SubCutaneous three times a day before meals  insulin lispro (ADMELOG) corrective regimen sliding scale   SubCutaneous at bedtime  losartan 25 milliGRAM(s) Oral daily  pantoprazole    Tablet 40 milliGRAM(s) Oral before breakfast  piperacillin/tazobactam IVPB.. 3.375 Gram(s) IV Intermittent every 8 hours  simvastatin 40 milliGRAM(s) Oral at bedtime      MEDICATIONS  (PRN):  acetaminophen     Tablet .. 650 milliGRAM(s) Oral every 6 hours PRN Temp greater or equal to 38C (100.4F), Mild Pain (1 - 3)      Physical Exam    Neuro :  no focal deficits  Respiratory: CTA B/L  CV: RRR, S1S2, no murmurs,   Abdominal: Soft, NT, ND +BS,  Extremities: No edema, + peripheral pulses    ASSESSMENT    Urinary tract infection    Yes    HTN (hypertension)    DM (diabetes mellitus)    Hypercholesteremia    Gastric adenocarcinoma    Vertigo    Dementia    S/P hysterectomy    S/P tubal ligation        PLAN     Patient is a 87y old  Female who presents with a chief complaint of Neutropenic fever (16 May 2022 18:48)    pt seen in icu [  ], reg med floor [ x  ], bed [x  ], chair at bedside [   ], a+o x3 [x  ], lethargic [  ],    nad [ x ]      Allergies    No Known Allergies        Vitals    T(F): 98.3 (05-17-22 @ 04:57), Max: 98.7 (05-16-22 @ 20:25)  HR: 74 (05-17-22 @ 04:57) (74 - 84)  BP: 137/68 (05-17-22 @ 04:57) (122/71 - 139/73)  RR: 17 (05-17-22 @ 04:57) (16 - 18)  SpO2: 95% (05-17-22 @ 04:57) (95% - 100%)  Wt(kg): --  CAPILLARY BLOOD GLUCOSE      POCT Blood Glucose.: 148 mg/dL (17 May 2022 07:23)      Labs                          8.9    2.82  )-----------( 369      ( 16 May 2022 11:11 )             27.0       05-16    142  |  112<H>  |  11  ----------------------------<  137<H>  3.7   |  22  |  0.68    Ca    8.6      16 May 2022 06:13    TPro  6.2  /  Alb  2.4<L>  /  TBili  0.2  /  DBili  x   /  AST  20  /  ALT  15  /  AlkPhos  93  05-16            Clean Catch Clean Catch (Midstream)  05-13 @ 03:42   >100,000 CFU/ml Klebsiella pneumoniae ESBL  --  Klebsiella pneumoniae ESBL      .Blood Blood-Peripheral  05-13 @ 03:39   No growth to date.  --  --          Radiology Results    < from: CT Chest No Cont (05.16.22 @ 10:36) >  Mild bilateral peripheral groundglass. 3 month follow-up is recommended.    Stable skeletal metastases.    < end of copied text >      Meds    MEDICATIONS  (STANDING):  enoxaparin Injectable 40 milliGRAM(s) SubCutaneous every 24 hours  filgrastim-sndz (ZARXIO) Injectable 300 MICROGram(s) SubCutaneous daily  gabapentin 100 milliGRAM(s) Oral every 12 hours  insulin lispro (ADMELOG) corrective regimen sliding scale   SubCutaneous three times a day before meals  insulin lispro (ADMELOG) corrective regimen sliding scale   SubCutaneous at bedtime  losartan 25 milliGRAM(s) Oral daily  pantoprazole    Tablet 40 milliGRAM(s) Oral before breakfast  piperacillin/tazobactam IVPB.. 3.375 Gram(s) IV Intermittent every 8 hours  simvastatin 40 milliGRAM(s) Oral at bedtime      MEDICATIONS  (PRN):  acetaminophen     Tablet .. 650 milliGRAM(s) Oral every 6 hours PRN Temp greater or equal to 38C (100.4F), Mild Pain (1 - 3)      Physical Exam    Neuro :  no focal deficits  Respiratory: CTA B/L  CV: RRR, S1S2, no murmurs,   Abdominal: Soft, NT, ND +BS,  Extremities: No edema, + peripheral pulses    ASSESSMENT    sigmoid diverticulitis,   pyelonephritis,   r/o neutropenic fever,   h/o HTN,   HLD,   T2DM,   osteoporosis,   gastric adenocarcinoma,   s/p resection 2015 (on chemo),   early multiple myeloma,( bmbx  2015,)   Vertigo  Dementia  S/P hysterectomy  S/P tubal ligation  pulm nodule        PLAN    cont zosyn until 5/20/22  id f/u   blood cx neg noted above   ucx with >100,000 CFU/ml Gram Negative Rods noted above   ua neg  ct abd with diverticulitis, colonic distention and perinephric stranding noted  gi cons    h/h stable   heme-onc f/u   CT finding suggest chemotherapy induced diverticulitis with fever.   pt Clinically improved.  No onc treatment while hospitalized including Revlimid   pt started on neupogen equivalent (ZARXIO) to be d/c'd when ANC > 1000  pulm f/u   Quantiferon TB Gold test  CT chest with Mild bilateral peripheral groundglass. 3 month follow-up is recommended. Stable skeletal metastases noted above.  incentive spirometer  palliative f/u   Patient expressed wishes for DNR/DNI, daughter/granddaughter in agreement, MOLST drafted and placed in chart.   Daughter Ivett is primary surrogate.   They wish to continue w current medical management, disease modifying cancer treatments.   ECOG 2-3. Onc following.   Palliative will follow for supportive care.  cont current meds

## 2022-05-17 NOTE — PROGRESS NOTE ADULT - SUBJECTIVE AND OBJECTIVE BOX
Patient is a 87y old  Female who presents with a chief complaint of Neutropenic fever (17 May 2022 08:29)    Patient is alert, awake, laying comfortably in the bed in no acute distress. Patient is doing well at room air and feeling good .    INTERVAL HPI/OVERNIGHT EVENTS:      VITAL SIGNS:  T(F): 98.3 (05-17-22 @ 04:57)  HR: 74 (05-17-22 @ 04:57)  BP: 137/68 (05-17-22 @ 04:57)  RR: 17 (05-17-22 @ 04:57)  SpO2: 95% (05-17-22 @ 04:57)  Wt(kg): --  I&O's Detail          REVIEW OF SYSTEMS:    CONSTITUTIONAL:  No fevers, chills, sweats    HEENT:  Eyes:  No diplopia or blurred vision. ENT:  No earache, sore throat or runny nose.    CARDIOVASCULAR:  No pressure, squeezing, tightness, or heaviness about the chest; no palpitations.    RESPIRATORY:  Per HPI    GASTROINTESTINAL:  No abdominal pain, nausea, vomiting or diarrhea.    GENITOURINARY:  No dysuria, frequency or urgency.    NEUROLOGIC:  No paresthesias, fasciculations, seizures or weakness.    PSYCHIATRIC:  No disorder of thought or mood.      PHYSICAL EXAM:    Constitutional: Well developed and nourished  Eyes:Perrla  ENMT: normal  Neck:supple  Respiratory: good air entry  Cardiovascular: S1 S2 regular  Gastrointestinal: Soft, Non tender  Extremities: No edema  Vascular:normal  Neurological:Awake, alert,Ox3  Musculoskeletal:Normal      MEDICATIONS  (STANDING):  enoxaparin Injectable 40 milliGRAM(s) SubCutaneous every 24 hours  filgrastim-sndz (ZARXIO) Injectable 300 MICROGram(s) SubCutaneous daily  gabapentin 100 milliGRAM(s) Oral every 12 hours  insulin lispro (ADMELOG) corrective regimen sliding scale   SubCutaneous three times a day before meals  insulin lispro (ADMELOG) corrective regimen sliding scale   SubCutaneous at bedtime  losartan 25 milliGRAM(s) Oral daily  pantoprazole    Tablet 40 milliGRAM(s) Oral before breakfast  piperacillin/tazobactam IVPB.. 3.375 Gram(s) IV Intermittent every 8 hours  simvastatin 40 milliGRAM(s) Oral at bedtime    MEDICATIONS  (PRN):  acetaminophen     Tablet .. 650 milliGRAM(s) Oral every 6 hours PRN Temp greater or equal to 38C (100.4F), Mild Pain (1 - 3)      Allergies    No Known Allergies    Intolerances        LABS:                        9.7    3.34  )-----------( 427      ( 17 May 2022 08:48 )             29.6     05-17    141  |  112<H>  |  11  ----------------------------<  141<H>  3.7   |  23  |  0.70    Ca    9.0      17 May 2022 08:48    TPro  6.2  /  Alb  2.4<L>  /  TBili  0.2  /  DBili  x   /  AST  20  /  ALT  15  /  AlkPhos  93  05-16              CAPILLARY BLOOD GLUCOSE      POCT Blood Glucose.: 148 mg/dL (17 May 2022 07:23)  POCT Blood Glucose.: 129 mg/dL (16 May 2022 21:47)  POCT Blood Glucose.: 130 mg/dL (16 May 2022 17:02)  POCT Blood Glucose.: 147 mg/dL (16 May 2022 11:23)        RADIOLOGY & ADDITIONAL TESTS:    < from: CT Chest No Cont (05.16.22 @ 10:36) >  IMPRESSION:    Mild bilateral peripheral groundglass. 3 month follow-up is recommended.    Stable skeletal metastases.    --- End of Report ---    < end of copied text >          ekg;    echo: Patient is a 87y old  Female who presents with a chief complaint of Neutropenic fever (17 May 2022 08:29)    Patient is alert, awake, laying comfortably in the bed in no acute distress. Patient is doing well at room air .    INTERVAL HPI/OVERNIGHT EVENTS:      VITAL SIGNS:  T(F): 98.3 (05-17-22 @ 04:57)  HR: 74 (05-17-22 @ 04:57)  BP: 137/68 (05-17-22 @ 04:57)  RR: 17 (05-17-22 @ 04:57)  SpO2: 95% (05-17-22 @ 04:57)  Wt(kg): --  I&O's Detail          REVIEW OF SYSTEMS:    CONSTITUTIONAL:  No fevers, chills, sweats    HEENT:  Eyes:  No diplopia or blurred vision. ENT:  No earache, sore throat or runny nose.    CARDIOVASCULAR:  No pressure, squeezing, tightness, or heaviness about the chest; no palpitations.    RESPIRATORY:  Per HPI    GASTROINTESTINAL:  No abdominal pain, nausea, vomiting or diarrhea.    GENITOURINARY:  No dysuria, frequency or urgency.    NEUROLOGIC:  No paresthesias, fasciculations, seizures or weakness.    PSYCHIATRIC:  No disorder of thought or mood.      PHYSICAL EXAM:    Constitutional: Well developed and nourished  Eyes:Perrla  ENMT: normal  Neck:supple  Respiratory: good air entry  Cardiovascular: S1 S2 regular  Gastrointestinal: Soft, Non tender  Extremities: No edema  Vascular:normal  Neurological:Awake, alert,Ox3  Musculoskeletal:Normal      MEDICATIONS  (STANDING):  enoxaparin Injectable 40 milliGRAM(s) SubCutaneous every 24 hours  filgrastim-sndz (ZARXIO) Injectable 300 MICROGram(s) SubCutaneous daily  gabapentin 100 milliGRAM(s) Oral every 12 hours  insulin lispro (ADMELOG) corrective regimen sliding scale   SubCutaneous three times a day before meals  insulin lispro (ADMELOG) corrective regimen sliding scale   SubCutaneous at bedtime  losartan 25 milliGRAM(s) Oral daily  pantoprazole    Tablet 40 milliGRAM(s) Oral before breakfast  piperacillin/tazobactam IVPB.. 3.375 Gram(s) IV Intermittent every 8 hours  simvastatin 40 milliGRAM(s) Oral at bedtime    MEDICATIONS  (PRN):  acetaminophen     Tablet .. 650 milliGRAM(s) Oral every 6 hours PRN Temp greater or equal to 38C (100.4F), Mild Pain (1 - 3)      Allergies    No Known Allergies    Intolerances        LABS:                        9.7    3.34  )-----------( 427      ( 17 May 2022 08:48 )             29.6     05-17    141  |  112<H>  |  11  ----------------------------<  141<H>  3.7   |  23  |  0.70    Ca    9.0      17 May 2022 08:48    TPro  6.2  /  Alb  2.4<L>  /  TBili  0.2  /  DBili  x   /  AST  20  /  ALT  15  /  AlkPhos  93  05-16              CAPILLARY BLOOD GLUCOSE      POCT Blood Glucose.: 148 mg/dL (17 May 2022 07:23)  POCT Blood Glucose.: 129 mg/dL (16 May 2022 21:47)  POCT Blood Glucose.: 130 mg/dL (16 May 2022 17:02)  POCT Blood Glucose.: 147 mg/dL (16 May 2022 11:23)        RADIOLOGY & ADDITIONAL TESTS:    < from: CT Chest No Cont (05.16.22 @ 10:36) >  IMPRESSION:    Mild bilateral peripheral groundglass. 3 month follow-up is recommended.    Stable skeletal metastases.    --- End of Report ---    < end of copied text >          ekg;    echo:

## 2022-05-17 NOTE — PROGRESS NOTE ADULT - PROBLEM SELECTOR PLAN 1
afebrile    started on filgrastim therapy  continue zosyn - augmentin 875mg BID total 7 3-4 more days discharge  UA cx with NGR- Asymptomatic    Chest X-ray without any consolidation (f/u official read)  CT chest- b/l ground glass and stable skeletal mets- recs repeat scan in 3 months  CT A/P- sigmoid colon diverticulitis  blood culture negative  urine culture - Klebsiella sensitive to Zosyn and Augmentin    -ID Dr. Vallejo following

## 2022-05-17 NOTE — PROGRESS NOTE ADULT - SUBJECTIVE AND OBJECTIVE BOX
Patient is a 87y old  Female who presents with a chief complaint of Neutropenic fever (17 May 2022 10:17)      SUBJECTIVE / OVERNIGHT EVENTS:      MEDICATIONS  (STANDING):  enoxaparin Injectable 40 milliGRAM(s) SubCutaneous every 24 hours  filgrastim-sndz (ZARXIO) Injectable 300 MICROGram(s) SubCutaneous daily  gabapentin 100 milliGRAM(s) Oral every 12 hours  insulin lispro (ADMELOG) corrective regimen sliding scale   SubCutaneous three times a day before meals  insulin lispro (ADMELOG) corrective regimen sliding scale   SubCutaneous at bedtime  losartan 25 milliGRAM(s) Oral daily  pantoprazole    Tablet 40 milliGRAM(s) Oral before breakfast  piperacillin/tazobactam IVPB.. 3.375 Gram(s) IV Intermittent every 8 hours  simvastatin 40 milliGRAM(s) Oral at bedtime    MEDICATIONS  (PRN):  acetaminophen     Tablet .. 650 milliGRAM(s) Oral every 6 hours PRN Temp greater or equal to 38C (100.4F), Mild Pain (1 - 3)          PHYSICAL EXAM:  Vital Signs Last 24 Hrs  T(C): 36.8 (17 May 2022 04:57), Max: 37.1 (16 May 2022 20:25)  T(F): 98.3 (17 May 2022 04:57), Max: 98.7 (16 May 2022 20:25)  HR: 74 (17 May 2022 04:57) (74 - 84)  BP: 137/68 (17 May 2022 04:57) (122/71 - 139/73)  BP(mean): --  RR: 17 (17 May 2022 04:57) (17 - 18)  SpO2: 95% (17 May 2022 04:57) (95% - 100%)      LABS:                        9.7    3.34  )-----------( 427      ( 17 May 2022 08:48 )             29.6     05-17    141  |  112<H>  |  11  ----------------------------<  141<H>  3.7   |  23  |  0.70    Ca    9.0      17 May 2022 08:48    TPro  6.2  /  Alb  2.4<L>  /  TBili  0.2  /  DBili  x   /  AST  20  /  ALT  15  /  AlkPhos  93  05-16              COVID-19 PCR: NotDetec (12 May 2022 22:11)  COVID-19 PCR: NotDetec (12 Apr 2022 07:07)  COVID-19 PCR: NotDetec (06 Apr 2022 00:03)       Patient is a 87y old  Female who presents with a chief complaint of Neutropenic fever     SUBJECTIVE / OVERNIGHT EVENTS: events noted. No new complaints. Denies abdominal pain, dysuria  #[id number]      MEDICATIONS  (STANDING):  enoxaparin Injectable 40 milliGRAM(s) SubCutaneous every 24 hours  filgrastim-sndz (ZARXIO) Injectable 300 MICROGram(s) SubCutaneous daily  gabapentin 100 milliGRAM(s) Oral every 12 hours  insulin lispro (ADMELOG) corrective regimen sliding scale   SubCutaneous three times a day before meals  insulin lispro (ADMELOG) corrective regimen sliding scale   SubCutaneous at bedtime  losartan 25 milliGRAM(s) Oral daily  pantoprazole    Tablet 40 milliGRAM(s) Oral before breakfast  piperacillin/tazobactam IVPB.. 3.375 Gram(s) IV Intermittent every 8 hours  simvastatin 40 milliGRAM(s) Oral at bedtime    MEDICATIONS  (PRN):  acetaminophen     Tablet .. 650 milliGRAM(s) Oral every 6 hours PRN Temp greater or equal to 38C (100.4F), Mild Pain (1 - 3)      PHYSICAL EXAM:  Vital Signs Last 24 Hrs  T(C): 36.8 (17 May 2022 04:57), Max: 37.1 (16 May 2022 20:25)  T(F): 98.3 (17 May 2022 04:57), Max: 98.7 (16 May 2022 20:25)  HR: 74 (17 May 2022 04:57) (74 - 84)  BP: 137/68 (17 May 2022 04:57) (122/71 - 139/73)  BP(mean): --  RR: 17 (17 May 2022 04:57) (17 - 18)  SpO2: 95% (17 May 2022 04:57) (95% - 100%)      GEN: NAD; A and O x 3  LUNGS: CTA B/L  HEART: S1 S2  ABDOMEN: soft, non-tender, non-distended, + BS  EXTREMITIES: no edema  NERVOUS SYSTEM:  Awake and alert; no focal neuro deficits      LABS:                        9.7    3.34  )-----------( 427      ( 17 May 2022 08:48 )             29.6     05-17    141  |  112<H>  |  11  ----------------------------<  141<H>  3.7   |  23  |  0.70    Ca    9.0      17 May 2022 08:48    TPro  6.2  /  Alb  2.4<L>  /  TBili  0.2  /  DBili  x   /  AST  20  /  ALT  15  /  AlkPhos  93  05-16              COVID-19 PCR: NotDetec (12 May 2022 22:11)  COVID-19 PCR: NotDetec (12 Apr 2022 07:07)  COVID-19 PCR: NotDetec (06 Apr 2022 00:03)

## 2022-05-17 NOTE — PROGRESS NOTE ADULT - ASSESSMENT
complete note to follow   complete note to follow    Neutropenia recovering, WBC=3.3 and KFA=344 with 10.4% monocytes  No indication for CSF, d/c zarxio  ok to d/c from Oncology standpoint  await ID recommendations for PO abx UCx sensitivity Assessment and Plan:   · Assessment	    88 yo F, from home ,ambulates with walker , PMHx of HTN, HLD, T2DM, osteoporosis, hx gastric adenocarcinoma, s/p resection 2015 (on chemo), early multiple myeloma,( bmbx  2015, last visit 2/2022 BMBx 30% Plasma cell w/ p17del began treatment w/ Daratumumab RVD   M spike  IGG kappa 1.5  with FLC 20.74, IGG 2145), follows [facility] gp came with chief complain of fevers at home and lower abdominal pain for 5 and 3 days respectively. Pt stated that she had subjective fevers at home and started getting pain in her lower abdomen radiating to her right leg, 10/10 in intensity at home, burning in nature, constant associated with nausea. Pt denied any chest pain, sob, diarrhea, constipation or vomiting. Endorsed to have loss of appetite. Last chemo was last Friday.     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   Clinically improved.  Neutropenia recovering, WBC=3.3 and SIA=951 with 10.4% monocytes  No indication for CSF, d/c zarxio  ok to d/c from Oncology standpoint  await ID recommendations for PO abx UCx sensitivity  upon discharge pt to f/u with Dr. Alvarado    Problem # Multiple Myeloma  -No treatment while hospitalized including Revlimid    Thank you for the referral. Will continue to monitor the patient.  Please call with any questions [phone]  Above reviewed with Attending Dr. Alvarado  [facility]  [address] [facility], [address], [place], [place]  [phone]  *Note not finalized until signed by Attending Physician

## 2022-05-17 NOTE — PROGRESS NOTE ADULT - NS PANP COMMENT GEN_ALL_CORE FT
I have examined the patient at bedside and reviewed patient's data and participated in the management of the patient along with Susanna ALCANTAR as well as hemotology/med oncology faculty consisting of Dr. Hammad King, Dr. SKYLER Flores, Dr. MERY Storey, Dr. Mychal Solorzano, Dr. Tennille Dominguez, Dr. Braeden Peters as well as myself during the daily heme/onc case review. I reviewed pertinent clinical information, PE,  labs as well as A/P as outline above.     Feeling better. Ambulatory. UCx sensitive to Augmentin. To resume chemotherapy as an outpatient.

## 2022-05-17 NOTE — PROGRESS NOTE ADULT - SUBJECTIVE AND OBJECTIVE BOX
NP Note discussed with  Primary Attending    Patient is a 87y old  Female who presents with a chief complaint of Neutropenic fever (17 May 2022 12:35)      INTERVAL HPI/OVERNIGHT EVENTS: Patient seen and examined at bedside. Patient Liberian speaking  ID# [id number].    MEDICATIONS  (STANDING):  enoxaparin Injectable 40 milliGRAM(s) SubCutaneous every 24 hours  filgrastim-sndz (ZARXIO) Injectable 300 MICROGram(s) SubCutaneous daily  gabapentin 100 milliGRAM(s) Oral every 12 hours  insulin lispro (ADMELOG) corrective regimen sliding scale   SubCutaneous three times a day before meals  insulin lispro (ADMELOG) corrective regimen sliding scale   SubCutaneous at bedtime  losartan 25 milliGRAM(s) Oral daily  pantoprazole    Tablet 40 milliGRAM(s) Oral before breakfast  piperacillin/tazobactam IVPB.. 3.375 Gram(s) IV Intermittent every 8 hours  simvastatin 40 milliGRAM(s) Oral at bedtime    MEDICATIONS  (PRN):  acetaminophen     Tablet .. 650 milliGRAM(s) Oral every 6 hours PRN Temp greater or equal to 38C (100.4F), Mild Pain (1 - 3)      __________________________________________________  REVIEW OF SYSTEMS:    CONSTITUTIONAL: No fever,   EYES: no acute visual disturbances  NECK: No pain or stiffness  RESPIRATORY: No cough; No shortness of breath  CARDIOVASCULAR: No chest pain, no palpitations  GASTROINTESTINAL: No pain. No nausea or vomiting; No diarrhea   NEUROLOGICAL: No headache or numbness, no tremors  MUSCULOSKELETAL: No joint pain, no muscle pain  GENITOURINARY: no dysuria, no frequency, no hesitancy  PSYCHIATRY: no depression , no anxiety  ALL OTHER  ROS negative        Vital Signs Last 24 Hrs  T(C): 36.3 (17 May 2022 14:23), Max: 37.1 (16 May 2022 20:25)  T(F): 97.4 (17 May 2022 14:23), Max: 98.7 (16 May 2022 20:25)  HR: 83 (17 May 2022 14:23) (74 - 84)  BP: 121/57 (17 May 2022 14:23) (121/57 - 137/68)  BP(mean): --  RR: 18 (17 May 2022 14:23) (17 - 18)  SpO2: 97% (17 May 2022 14:23) (95% - 100%)    ________________________________________________  PHYSICAL EXAM:  GENERAL: NAD  HEENT: Normocephalic;  conjunctivae and sclerae clear; moist mucous membranes;   NECK : supple  CHEST/LUNG: Clear to auscultation bilaterally with good air entry   HEART: S1 S2  regular; no murmurs, gallops or rubs  ABDOMEN: Soft, Nontender, Nondistended; Bowel sounds present  EXTREMITIES: no cyanosis; no edema; no calf tenderness  SKIN: warm and dry; no rash  NERVOUS SYSTEM:  Awake and alert; Oriented  to place, person and time ; no new deficits    _________________________________________________  LABS:                        9.7    3.34  )-----------( 427      ( 17 May 2022 08:48 )             29.6     05-17    141  |  112<H>  |  11  ----------------------------<  141<H>  3.7   |  23  |  0.70    Ca    9.0      17 May 2022 08:48    TPro  6.2  /  Alb  2.4<L>  /  TBili  0.2  /  DBili  x   /  AST  20  /  ALT  15  /  AlkPhos  93  05-16        CAPILLARY BLOOD GLUCOSE      POCT Blood Glucose.: 143 mg/dL (17 May 2022 11:02)  POCT Blood Glucose.: 148 mg/dL (17 May 2022 07:23)  POCT Blood Glucose.: 129 mg/dL (16 May 2022 21:47)  POCT Blood Glucose.: 130 mg/dL (16 May 2022 17:02)        RADIOLOGY & ADDITIONAL TESTS:  < from: CT Chest No Cont (05.16.22 @ 10:36) >  FINDINGS:    LUNGS/AIRWAYS/PLEURA: Patent trachea and bronchi. Mild bilateral   peripheral groundglass, difficult to compare to prior due to differences   in lung volume (prior acquired in exhalation). Unremarkable pleura.    LYMPH NODES/MEDIASTINUM: No enlarged lymph nodes.    HEART/VASCULATURE: Normal heart size. Unremarkable pericardium. Normal   caliber aorta. Subjectively mild amount of coronary calcified plaque.    UPPER ABDOMEN: Cholecystectomy. Calcified granuloma in the liver.    BONES/SOFT TISSUES: Stable multiple lytic bone lesions. Unchanged remote   mildly displaced fractures of the right eighth and ninth ribs.      IMPRESSION:    Mild bilateral peripheral groundglass. 3 month follow-up is recommended.    Stable skeletal metastases.    --- End of Report ---    < end of copied text >  < from: CT Abdomen and Pelvis No Cont (05.13.22 @ 04:27) >    FINDINGS: Evaluation of the abdominal/pelvic organs, viscera and   vasculature is limited without intravenous contrast. Patient's   respiratory motion degrades images.    LOWER CHEST: Atelectasis.    LIVER: Calcified granuloma.  GALLBLADDER/BILE DUCTS: No intrahepatic biliary dilatation. Again noted,   prominent common bile duct, reflecting postcholecystectomy state.  PANCREAS: Unremarkable.  SPLEEN: Unremarkable.    ADRENALS: Unremarkable.  KIDNEYS/URETERS: Nonspecific mild bilateral perinephric stranding without   hydroureteronephrosis. No radiopaque urinary tract stone.  BLADDER: Partially distended. Prominent wall.  REPRODUCTIVE ORGANS: Hysterectomy.    BOWEL: Possible small hiatal hernia. Partial gastrectomy with   gastrojejunostomy and appendectomy. Colon diverticulosis. Mid/distal   sigmoid colon wall thickening with peridiverticular inflammatory change.   Diffusely prominent colon wall without significant inflammatory change.   No bowel obstruction.  PERITONEUM: No organized fluid collection or free air. Trace pelvic free   fluid.  VESSELS: Atherosclerosis. Normal caliber of the abdominal aorta.  RETROPERITONEUM: No lymphadenopathy.  ABDOMINAL WALL/SOFT TISSUES: Postsurgical changes along the anterior   abdominal and pelvic wall soft tissue. Small fat-containing umbilical   supraumbilical ventral hernia. Persistent small fat attenuation the left   buttock soft tissue, presumably sequela of priorinjection/fat necrosis.  BONES: Transitional lumbosacral anatomy. Degenerative changes of the   spine. Stable minimal retrolisthesis of L1 on L2. Stable endplate   depression of the visualized T9. More prominent, lytic lesions scattered   throughout the visualized bones including the ribs, spine (notably at   T11, L3 and L5), pelvic bones and femurs. Again noted, left ninth   anterolateral rib lesion with decreased extent of extraosseous soft   tissue component. Minimally displaced, likely pathologic fracture of the   right eighth and ninth anterolateral ribs. Nonspecific small sclerotic   foci scattered in the pelvic bones.    IMPRESSION:    Findings of mid/distal sigmoid colon diverticulitis. Trace pelvic free   fluid. No bowel obstruction,drainable fluid collection or   intraperitoneal free air. Recommend correlation with colonoscopy to   exclude potential underlying neoplasm, following resolution of acute   episode.    Diffusely prominent colon wall without significant inflammatory change,   favoring partial distention over colitis. Recommend clinical correlation.    Nonspecific mild bilateral perinephric stranding without   hydroureteronephrosis or radiopaque urinary tract stone. Prominent   bladder wall. Recommend correlation with urinalysis to assess urinary   tract infection.    Osseous metastasis as described.    Additional findings as described.    --- End of Report ---    < end of copied text >  < from: Xray Chest 1 View- PORTABLE-Urgent (05.12.22 @ 23:34) >    FINDINGS: Normal cardiac silhouette and normal pulmonary vasculature with   no consolidation, effusion, pneumothorax or acute osseous finding.    IMPRESSION:  No acute radiographic findings and no change    --- End of Report ---    < end of copied text >  < from: Transthoracic Echocardiogram (05.24.18 @ 08:28) >  Derived Variables:  LVMI: 67 g/m2  RWT: 0.45  Ejection Fraction Visual Estimate: >55 %    ------------------------------------------------------------------------  OBSERVATIONS:  Mitral Valve: Normal mitral valve. Trace mitral  regurgitation.  Aortic Root: Normal aortic root.  Aortic Valve: Normal trileaflet aortic valve.  Left Atrium: Normal left atrium.  LA volume index = 33  cc/m2.  Left Ventricle: Normal left ventricular function. Mild  diastolic dysfunction (stage I). Normal left ventricular  internal dimensions and wall thicknesses.  Right Heart: Normal right atrium. Normal right ventricular  size and function. There is mild tricuspid regurgitation.  There is trace pulmonic regurgitation.  Pericardium/PleuraNormal pericardium with no pericardial  effusion.  Hemodynamic: RA Pressure is 8 mm Hg. RV systolic pressure  is 31 mm Hg.  ------------------------------------------------------------------------  CONCLUSIONS:  1. Trace mitral regurgitation.  2. Normal left ventricular internal dimensions and wall  thicknesses.  3. Normal left ventricular function. Mild diastolic  dysfunction (stage I).  4. Normal right ventricular size and function.      < end of copied text >    Imaging  Reviewed:  YES    Consultant(s) Notes Reviewed:   YES      Plan of care was discussed with patient and /or primary care giver; all questions and concerns were addressed

## 2022-05-17 NOTE — PROGRESS NOTE ADULT - ASSESSMENT
Patient is a 87 year old Female, from home ,ambulates with walker , PMHx of HTN, HLD, T2DM, osteoporosis, hx gastric adenocarcinoma, s/p resection 2015 (on chemo), early multiple myeloma,(bmbx  2015, last visit 2/2022  M spike  IGG kappa 1.5  with FLC 20.74, IGG 2145), [facility]. Patient presented to ED with chief complain of fevers at home and lower abdominal pain. Patient admitted to medicine for Neutropenic fever. CT abdomen and pelvis diverticulitis. Urine culture resulting Klebsiella, ID consulted.

## 2022-05-18 ENCOUNTER — TRANSCRIPTION ENCOUNTER (OUTPATIENT)
Age: 87
End: 2022-05-18

## 2022-05-18 VITALS
DIASTOLIC BLOOD PRESSURE: 55 MMHG | RESPIRATION RATE: 17 BRPM | SYSTOLIC BLOOD PRESSURE: 124 MMHG | OXYGEN SATURATION: 96 % | TEMPERATURE: 99 F | HEART RATE: 79 BPM

## 2022-05-18 LAB
ANION GAP SERPL CALC-SCNC: 7 MMOL/L — SIGNIFICANT CHANGE UP (ref 5–17)
ANISOCYTOSIS BLD QL: SLIGHT — SIGNIFICANT CHANGE UP
BASOPHILS # BLD AUTO: 0 K/UL — SIGNIFICANT CHANGE UP (ref 0–0.2)
BASOPHILS NFR BLD AUTO: 0 % — SIGNIFICANT CHANGE UP (ref 0–2)
BUN SERPL-MCNC: 10 MG/DL — SIGNIFICANT CHANGE UP (ref 7–18)
CALCIUM SERPL-MCNC: 8.8 MG/DL — SIGNIFICANT CHANGE UP (ref 8.4–10.5)
CHLORIDE SERPL-SCNC: 110 MMOL/L — HIGH (ref 96–108)
CO2 SERPL-SCNC: 22 MMOL/L — SIGNIFICANT CHANGE UP (ref 22–31)
CREAT SERPL-MCNC: 0.69 MG/DL — SIGNIFICANT CHANGE UP (ref 0.5–1.3)
CULTURE RESULTS: SIGNIFICANT CHANGE UP
CULTURE RESULTS: SIGNIFICANT CHANGE UP
EGFR: 85 ML/MIN/1.73M2 — SIGNIFICANT CHANGE UP
EOSINOPHIL # BLD AUTO: 0.11 K/UL — SIGNIFICANT CHANGE UP (ref 0–0.5)
EOSINOPHIL NFR BLD AUTO: 2 % — SIGNIFICANT CHANGE UP (ref 0–6)
GAMMA INTERFERON BACKGROUND BLD IA-ACNC: 0.09 IU/ML — SIGNIFICANT CHANGE UP
GLUCOSE BLDC GLUCOMTR-MCNC: 126 MG/DL — HIGH (ref 70–99)
GLUCOSE BLDC GLUCOMTR-MCNC: 163 MG/DL — HIGH (ref 70–99)
GLUCOSE SERPL-MCNC: 144 MG/DL — HIGH (ref 70–99)
HCT VFR BLD CALC: 28.4 % — LOW (ref 34.5–45)
HGB BLD-MCNC: 9.3 G/DL — LOW (ref 11.5–15.5)
HYPOCHROMIA BLD QL: SLIGHT — SIGNIFICANT CHANGE UP
HYPOSEGMENTATION: PRESENT — SIGNIFICANT CHANGE UP
LYMPHOCYTES # BLD AUTO: 3.22 K/UL — SIGNIFICANT CHANGE UP (ref 1–3.3)
LYMPHOCYTES # BLD AUTO: 60 % — HIGH (ref 13–44)
M TB IFN-G BLD-IMP: NEGATIVE — SIGNIFICANT CHANGE UP
M TB IFN-G CD4+ BCKGRND COR BLD-ACNC: -0.01 IU/ML — SIGNIFICANT CHANGE UP
M TB IFN-G CD4+CD8+ BCKGRND COR BLD-ACNC: -0.01 IU/ML — SIGNIFICANT CHANGE UP
MANUAL SMEAR VERIFICATION: SIGNIFICANT CHANGE UP
MCHC RBC-ENTMCNC: 29.6 PG — SIGNIFICANT CHANGE UP (ref 27–34)
MCHC RBC-ENTMCNC: 32.7 GM/DL — SIGNIFICANT CHANGE UP (ref 32–36)
MCV RBC AUTO: 90.4 FL — SIGNIFICANT CHANGE UP (ref 80–100)
MONOCYTES # BLD AUTO: 0.48 K/UL — SIGNIFICANT CHANGE UP (ref 0–0.9)
MONOCYTES NFR BLD AUTO: 9 % — SIGNIFICANT CHANGE UP (ref 2–14)
NEUTROPHILS # BLD AUTO: 1.56 K/UL — LOW (ref 1.8–7.4)
NEUTROPHILS NFR BLD AUTO: 26 % — LOW (ref 43–77)
NEUTS BAND # BLD: 3 % — SIGNIFICANT CHANGE UP (ref 0–8)
NRBC # BLD: 0 /100 — SIGNIFICANT CHANGE UP (ref 0–0)
PLAT MORPH BLD: NORMAL — SIGNIFICANT CHANGE UP
PLATELET # BLD AUTO: 452 K/UL — HIGH (ref 150–400)
PLATELET COUNT - ESTIMATE: ABNORMAL
POIKILOCYTOSIS BLD QL AUTO: SLIGHT — SIGNIFICANT CHANGE UP
POLYCHROMASIA BLD QL SMEAR: SLIGHT — SIGNIFICANT CHANGE UP
POTASSIUM SERPL-MCNC: 3.4 MMOL/L — LOW (ref 3.5–5.3)
POTASSIUM SERPL-SCNC: 3.4 MMOL/L — LOW (ref 3.5–5.3)
QUANT TB PLUS MITOGEN MINUS NIL: 9.58 IU/ML — SIGNIFICANT CHANGE UP
RBC # BLD: 3.14 M/UL — LOW (ref 3.8–5.2)
RBC # FLD: 14.9 % — HIGH (ref 10.3–14.5)
RBC BLD AUTO: ABNORMAL
SARS-COV-2 RNA SPEC QL NAA+PROBE: SIGNIFICANT CHANGE UP
SODIUM SERPL-SCNC: 139 MMOL/L — SIGNIFICANT CHANGE UP (ref 135–145)
SPECIMEN SOURCE: SIGNIFICANT CHANGE UP
SPECIMEN SOURCE: SIGNIFICANT CHANGE UP
WBC # BLD: 5.37 K/UL — SIGNIFICANT CHANGE UP (ref 3.8–10.5)
WBC # FLD AUTO: 5.37 K/UL — SIGNIFICANT CHANGE UP (ref 3.8–10.5)

## 2022-05-18 PROCEDURE — 96365 THER/PROPH/DIAG IV INF INIT: CPT

## 2022-05-18 PROCEDURE — 80053 COMPREHEN METABOLIC PANEL: CPT

## 2022-05-18 PROCEDURE — 85610 PROTHROMBIN TIME: CPT

## 2022-05-18 PROCEDURE — 87077 CULTURE AEROBIC IDENTIFY: CPT

## 2022-05-18 PROCEDURE — 85027 COMPLETE CBC AUTOMATED: CPT

## 2022-05-18 PROCEDURE — 87086 URINE CULTURE/COLONY COUNT: CPT

## 2022-05-18 PROCEDURE — 99285 EMERGENCY DEPT VISIT HI MDM: CPT

## 2022-05-18 PROCEDURE — 85025 COMPLETE CBC W/AUTO DIFF WBC: CPT

## 2022-05-18 PROCEDURE — 71045 X-RAY EXAM CHEST 1 VIEW: CPT

## 2022-05-18 PROCEDURE — 87186 SC STD MICRODIL/AGAR DIL: CPT

## 2022-05-18 PROCEDURE — 81001 URINALYSIS AUTO W/SCOPE: CPT

## 2022-05-18 PROCEDURE — 84100 ASSAY OF PHOSPHORUS: CPT

## 2022-05-18 PROCEDURE — 83735 ASSAY OF MAGNESIUM: CPT

## 2022-05-18 PROCEDURE — 83605 ASSAY OF LACTIC ACID: CPT

## 2022-05-18 PROCEDURE — 74176 CT ABD & PELVIS W/O CONTRAST: CPT

## 2022-05-18 PROCEDURE — 36415 COLL VENOUS BLD VENIPUNCTURE: CPT

## 2022-05-18 PROCEDURE — 85730 THROMBOPLASTIN TIME PARTIAL: CPT

## 2022-05-18 PROCEDURE — 82962 GLUCOSE BLOOD TEST: CPT

## 2022-05-18 PROCEDURE — 93005 ELECTROCARDIOGRAM TRACING: CPT

## 2022-05-18 PROCEDURE — 71250 CT THORAX DX C-: CPT

## 2022-05-18 PROCEDURE — 86481 TB AG RESPONSE T-CELL SUSP: CPT

## 2022-05-18 PROCEDURE — 80048 BASIC METABOLIC PNL TOTAL CA: CPT

## 2022-05-18 PROCEDURE — 87635 SARS-COV-2 COVID-19 AMP PRB: CPT

## 2022-05-18 PROCEDURE — 87040 BLOOD CULTURE FOR BACTERIA: CPT

## 2022-05-18 RX ORDER — METOPROLOL TARTRATE 50 MG
1 TABLET ORAL
Qty: 0 | Refills: 0 | DISCHARGE

## 2022-05-18 RX ORDER — POTASSIUM CHLORIDE 20 MEQ
40 PACKET (EA) ORAL ONCE
Refills: 0 | Status: COMPLETED | OUTPATIENT
Start: 2022-05-18 | End: 2022-05-18

## 2022-05-18 RX ADMIN — Medication 40 MILLIEQUIVALENT(S): at 12:11

## 2022-05-18 RX ADMIN — PIPERACILLIN AND TAZOBACTAM 25 GRAM(S): 4; .5 INJECTION, POWDER, LYOPHILIZED, FOR SOLUTION INTRAVENOUS at 05:20

## 2022-05-18 RX ADMIN — GABAPENTIN 100 MILLIGRAM(S): 400 CAPSULE ORAL at 05:19

## 2022-05-18 RX ADMIN — Medication 1: at 08:35

## 2022-05-18 RX ADMIN — ENOXAPARIN SODIUM 40 MILLIGRAM(S): 100 INJECTION SUBCUTANEOUS at 08:35

## 2022-05-18 RX ADMIN — PANTOPRAZOLE SODIUM 40 MILLIGRAM(S): 20 TABLET, DELAYED RELEASE ORAL at 06:09

## 2022-05-18 NOTE — PROGRESS NOTE ADULT - PROBLEM SELECTOR PLAN 2
Bronchodilators  incentive spirometry  chest pt
see CT abdomen and pelvis as above  see plan as above
Hgb 9.4  Baseline around 10   No s/s of active bleeding   daily CBC

## 2022-05-18 NOTE — PROGRESS NOTE ADULT - PROBLEM SELECTOR PLAN 7
monitor BP  cont meds
-Early multiple myeloma,( bmbx  2015, last visit 2/2022  M spike  IGG kappa 1.5  with FLC 20.74, IGG 2145)  -Pt recently admitted at [facility] and had bone biopsy due to lytic lesions   -biopsy showed MM  -Heme/Onc consult following and cleared to follow up at outpatient
monitor BP  cont meds
Insulin Sliding Scale  ACCU check ACHS  HgbA1C 7.3 April 2022
monitor BP  cont meds

## 2022-05-18 NOTE — PROGRESS NOTE ADULT - PROBLEM SELECTOR PLAN 1
cultures noted  cont antibiotics  cont lytes  ID follow up cultures noted  cont antibiotics  Replace lytes  ID follow up

## 2022-05-18 NOTE — PROGRESS NOTE ADULT - SUBJECTIVE AND OBJECTIVE BOX
Patient is a 87y old  Female who presents with a chief complaint of Neutropenic fever (18 May 2022 08:39)  Awake, alert, comfortable in bed in NAD    INTERVAL HPI/OVERNIGHT EVENTS:      VITAL SIGNS:  T(F): 98.7 (05-18-22 @ 05:05)  HR: 79 (05-18-22 @ 05:05)  BP: 124/55 (05-18-22 @ 05:05)  RR: 17 (05-18-22 @ 05:05)  SpO2: 96% (05-18-22 @ 05:05)  Wt(kg): --  I&O's Detail          REVIEW OF SYSTEMS:    CONSTITUTIONAL:  No fevers, chills, sweats    HEENT:  Eyes:  No diplopia or blurred vision. ENT:  No earache, sore throat or runny nose.    CARDIOVASCULAR:  No pressure, squeezing, tightness, or heaviness about the chest; no palpitations.    RESPIRATORY:  Per HPI    GASTROINTESTINAL:  No abdominal pain, nausea, vomiting or diarrhea.    GENITOURINARY:  No dysuria, frequency or urgency.    NEUROLOGIC:  No paresthesias, fasciculations, seizures or weakness.    PSYCHIATRIC:  No disorder of thought or mood.      PHYSICAL EXAM:    Constitutional: Well developed and nourished  Eyes:Perrla  ENMT: normal  Neck:supple  Respiratory: good air entry  Cardiovascular: S1 S2 regular  Gastrointestinal: Soft, Non tender  Extremities: No edema  Vascular:normal  Neurological:Awake, alert,Ox3  Musculoskeletal:Normal      MEDICATIONS  (STANDING):  amoxicillin  875 milliGRAM(s)/clavulanate 1 Tablet(s) Oral two times a day  enoxaparin Injectable 40 milliGRAM(s) SubCutaneous every 24 hours  gabapentin 100 milliGRAM(s) Oral every 12 hours  insulin lispro (ADMELOG) corrective regimen sliding scale   SubCutaneous three times a day before meals  insulin lispro (ADMELOG) corrective regimen sliding scale   SubCutaneous at bedtime  losartan 25 milliGRAM(s) Oral daily  pantoprazole    Tablet 40 milliGRAM(s) Oral before breakfast  potassium chloride   Powder 40 milliEquivalent(s) Oral once  simvastatin 40 milliGRAM(s) Oral at bedtime    MEDICATIONS  (PRN):  acetaminophen     Tablet .. 650 milliGRAM(s) Oral every 6 hours PRN Temp greater or equal to 38C (100.4F), Mild Pain (1 - 3)      Allergies    No Known Allergies    Intolerances        LABS:                        9.3    x     )-----------( 452      ( 18 May 2022 07:20 )             28.4     05-18    139  |  110<H>  |  10  ----------------------------<  144<H>  3.4<L>   |  22  |  0.69    Ca    8.8      18 May 2022 07:20                CAPILLARY BLOOD GLUCOSE      POCT Blood Glucose.: 163 mg/dL (18 May 2022 08:28)  POCT Blood Glucose.: 139 mg/dL (17 May 2022 21:32)  POCT Blood Glucose.: 115 mg/dL (17 May 2022 17:02)  POCT Blood Glucose.: 143 mg/dL (17 May 2022 11:02)        RADIOLOGY & ADDITIONAL TESTS:    CXR:    Ct scan chest:    ekg;    echo:

## 2022-05-18 NOTE — PROGRESS NOTE ADULT - PROBLEM SELECTOR PLAN 3
Quantiferon TB Gold test  CT chest without contrast
likely anemia of chronic disease  Baseline around 10   No s/s of active bleeding   daily CBC
Gastric adenocarcinoma, s/p resection 2015 (on chemo),  Heme/Onc Dr cervantes  Heme/Onc recs outpt f/u

## 2022-05-18 NOTE — PROGRESS NOTE ADULT - PROBLEM SELECTOR PROBLEM 5
Multiple myeloma
Multiple myeloma
HTN (hypertension)
Multiple myeloma
HLD (hyperlipidemia)

## 2022-05-18 NOTE — PROGRESS NOTE ADULT - PROVIDER SPECIALTY LIST ADULT
Heme/Onc
Infectious Disease
Internal Medicine
Heme/Onc
Internal Medicine
Heme/Onc
Internal Medicine
Internal Medicine
Heme/Onc
Pulmonology

## 2022-05-18 NOTE — PROGRESS NOTE ADULT - PROBLEM SELECTOR PROBLEM 4
Gastric cancer
HLD (hyperlipidemia)
Gastric cancer

## 2022-05-18 NOTE — PROGRESS NOTE ADULT - PROBLEM SELECTOR PLAN 4
Hem/Onc follow up
Gastric adenocarcinoma, s/p resection 2015 (on chemo),  Heme/Onc Dr cervantes  Heme/Onc recs outpt f/u
Hem/Onc follow up
On simvastatin   Resume home med

## 2022-05-18 NOTE — PROGRESS NOTE ADULT - PROBLEM SELECTOR PLAN 8
Lovenox dvt ppx  GI protection w/ Protonix
-Early multiple myeloma,( bmbx  2015, last visit 2/2022  M spike  IGG kappa 1.5  with FLC 20.74, IGG 2145)  -Pt recently admitted at [facility] and had bone biopsy due to lytic lesions   -biopsy showed MM  -Heme/Onc consult following and cleared to follow up at outpatient
CT head without contrast  meclizine 12.5 mgs po daily prn

## 2022-05-18 NOTE — PROGRESS NOTE ADULT - PROBLEM SELECTOR PLAN 6
Anemia panel  monitor CBC  PRBCs as needed
continue Losartan   controlled
Anemia panel  monitor CBC  PRBCs as needed
-On metformin , Sgarlato, and Alogliptin  c/w sliding scale   fs achs   HgbA1C 7.3 on last admission in April

## 2022-05-18 NOTE — PROGRESS NOTE ADULT - PROBLEM SELECTOR PROBLEM 7
HTN (hypertension)
Multiple myeloma
HTN (hypertension)
HTN (hypertension)
DM (diabetes mellitus)

## 2022-05-18 NOTE — DISCHARGE NOTE NURSING/CASE MANAGEMENT/SOCIAL WORK - PATIENT PORTAL LINK FT
You can access the FollowMyHealth Patient Portal offered by [facility] by registering at the following website: [email or web address]. By joining [facility]’s FollowMyHealth portal, you will also be able to view your health information using other applications (apps) compatible with our system.

## 2022-05-18 NOTE — PROGRESS NOTE ADULT - PROBLEM/PLAN-3
2/3/2021: Pt's LOF listed above following evaluation and initial ADL  Barriers include decrease balance, strength, endurance, ROM and difficulty expressing needs causing decrease in independence with self-care  Pt would benefit from continued skilled OT services to increase independence with self-care/daily tasks, functional mobility/transfers and activity tolerance  2/9/2021: Pt's current LOF listed above  Barriers include decrease balance, strength, endurance, coordination and ROM  Pt participates in ADL completion of showering, dressing, grooming, toileting and adaptive equipment training, therapeutic activity/exercises and functional mobility/transfers  Pt would benefit from continued skilled OT services to increase independence with daily tasks, functional mobility/transfers and activity tolerance  2/17/21: Pt's current LOF listed above  Continues with barriers of decreased balance, strength, endurance, coordination and ROM  Pt participates in ADL completion of showering, dressing, grooming, toileting and adaptive equipment training, therapeutic activity/exercises and functional mobility/transfers  Pt would benefit from continued skilled OT services to increase independence with daily tasks, functional mobility/transfers and activity tolerance 
DISPLAY PLAN FREE TEXT

## 2022-05-18 NOTE — DISCHARGE NOTE NURSING/CASE MANAGEMENT/SOCIAL WORK - NSDCPEFALRISK_GEN_ALL_CORE
For information on Fall & Injury Prevention, visit: [email or web address] OR  [email or web address] OR  https://www.cdc.gov/steadi/patient.html

## 2022-05-18 NOTE — PROGRESS NOTE ADULT - PROBLEM SELECTOR PLAN 5
Hem/Onc follow up
continue simvastatin
On metoprolol and losartan   Held all medications due to SIRS/sepsis   BP stable for now   Slowly resume BP meds w/ parameters- starting w/ Losartan in AM

## 2022-05-18 NOTE — PROGRESS NOTE ADULT - SUBJECTIVE AND OBJECTIVE BOX
Patient is a 87y old  Female who presents with a chief complaint of Neutropenic fever (17 May 2022 15:47)    pt seen in icu [  ], reg med floor [   ], bed [  ], chair at bedside [   ], a+o x3 [  ], lethargic [  ],  nad [  ]    shea [  ], ngt [  ], peg [  ], et tube [  ], cent line [  ], picc line [  ]        Allergies    No Known Allergies        Vitals    T(F): 98.7 (05-18-22 @ 05:05), Max: 99.1 (05-17-22 @ 21:00)  HR: 79 (05-18-22 @ 05:05) (74 - 83)  BP: 124/55 (05-18-22 @ 05:05) (102/54 - 124/55)  RR: 17 (05-18-22 @ 05:05) (17 - 18)  SpO2: 96% (05-18-22 @ 05:05) (96% - 97%)  Wt(kg): --  CAPILLARY BLOOD GLUCOSE      POCT Blood Glucose.: 163 mg/dL (18 May 2022 08:28)      Labs                          9.3    x     )-----------( 452      ( 18 May 2022 07:20 )             28.4       05-18    139  |  110<H>  |  10  ----------------------------<  144<H>  3.4<L>   |  22  |  0.69    Ca    8.8      18 May 2022 07:20              Clean Catch Clean Catch (Midstream)  05-13 @ 03:42   >100,000 CFU/ml Klebsiella pneumoniae ESBL  --  Klebsiella pneumoniae ESBL      .Blood Blood-Peripheral  05-13 @ 03:39   No Growth Final  --  --      Clean Catch Clean Catch (Midstream)  04-06 @ 06:18   <10,000 CFU/mL Normal Urogenital Natalie  --  --      .Blood Blood-Peripheral  04-06 @ 06:04   No Growth Final  --  --      Clean Catch Clean Catch (Midstream)  03-05 @ 03:06   >=3 organisms. Probable collection contamination.  --  --          Radiology Results      Meds    MEDICATIONS  (STANDING):  enoxaparin Injectable 40 milliGRAM(s) SubCutaneous every 24 hours  gabapentin 100 milliGRAM(s) Oral every 12 hours  insulin lispro (ADMELOG) corrective regimen sliding scale   SubCutaneous three times a day before meals  insulin lispro (ADMELOG) corrective regimen sliding scale   SubCutaneous at bedtime  losartan 25 milliGRAM(s) Oral daily  pantoprazole    Tablet 40 milliGRAM(s) Oral before breakfast  piperacillin/tazobactam IVPB.. 3.375 Gram(s) IV Intermittent every 8 hours  simvastatin 40 milliGRAM(s) Oral at bedtime      MEDICATIONS  (PRN):  acetaminophen     Tablet .. 650 milliGRAM(s) Oral every 6 hours PRN Temp greater or equal to 38C (100.4F), Mild Pain (1 - 3)      Physical Exam    Neuro :  no focal deficits  Respiratory: CTA B/L  CV: RRR, S1S2, no murmurs,   Abdominal: Soft, NT, ND +BS,  Extremities: No edema, + peripheral pulses    ASSESSMENT    Urinary tract infection    Yes    HTN (hypertension)    DM (diabetes mellitus)    Hypercholesteremia    Gastric adenocarcinoma    Vertigo    Dementia    S/P hysterectomy    S/P tubal ligation        PLAN     Patient is a 87y old  Female who presents with a chief complaint of Neutropenic fever (17 May 2022 15:47)    pt seen in icu [  ], reg med floor [ x  ], bed [x  ], chair at bedside [   ], a+o x3 [x  ], lethargic [  ],    nad [ x ]      Allergies    No Known Allergies        Vitals    T(F): 98.7 (05-18-22 @ 05:05), Max: 99.1 (05-17-22 @ 21:00)  HR: 79 (05-18-22 @ 05:05) (74 - 83)  BP: 124/55 (05-18-22 @ 05:05) (102/54 - 124/55)  RR: 17 (05-18-22 @ 05:05) (17 - 18)  SpO2: 96% (05-18-22 @ 05:05) (96% - 97%)  Wt(kg): --  CAPILLARY BLOOD GLUCOSE      POCT Blood Glucose.: 163 mg/dL (18 May 2022 08:28)      Labs                          9.3    x     )-----------( 452      ( 18 May 2022 07:20 )             28.4       05-18    139  |  110<H>  |  10  ----------------------------<  144<H>  3.4<L>   |  22  |  0.69    Ca    8.8      18 May 2022 07:20        Quantiferon Gold Plus TB (05.16.22 @ 19:43)   Quantiferon TB Plus: Negative: NEGATIVE:      Clean Catch Clean Catch (Midstream)  05-13 @ 03:42   >100,000 CFU/ml Klebsiella pneumoniae ESBL  --  Klebsiella pneumoniae ESBL  Culture - Urine (05.13.22 @ 03:42)   - Amikacin: S <=16   - Amoxicillin/Clavulanic Acid: S <=8/4   - Ampicillin: R >16 These ampicillin results predict results for amoxicillin   - Ampicillin/Sulbactam: S 8/4 Enterobacter, Klebsiella aerogenes, Citrobacter, and Serratia may develop resistance during prolonged therapy (3-4 days)   - Aztreonam: R 16   - Cefazolin: R >16 (MIC_CL_COM_ENTERIC_CEFAZU) For uncomplicated UTI with K. pneumoniae, E. coli, or P. mirablis: RYAN <=16 is sensitive and RYAN >=32 is resistant. This also predicts results for oral agents cefaclor, cefdinir, cefpodoxime, cefprozil, cefuroxime axetil, cephalexin and locarbef for uncomplicated UTI. Note that some isolates may be susceptible to these agents while testing resistant to cefazolin.   - Cefepime: R 16   - Ceftriaxone: R >32 Enterobacter, Klebsiella aerogenes, Citrobacter, and Serratia may develop resistance during prolonged therapy   - Ciprofloxacin: I 0.5   - Cefoxitin: S <=8   - Ertapenem: S <=0.5   - Gentamicin: S <=2   - Imipenem: S <=1   - Levofloxacin: S <=0.5   - Meropenem: S <=1   - Nitrofurantoin: S <=32 Should not be used to treat pyelonephritis   - Piperacillin/Tazobactam: S <=8   - Tigecycline: S <=2   - Tobramycin: S <=2   - Trimethoprim/Sulfamethoxazole: R >2/38     .Blood Blood-Peripheral  05-13 @ 03:39   No Growth Final  --  --          Radiology Results      Meds    MEDICATIONS  (STANDING):  enoxaparin Injectable 40 milliGRAM(s) SubCutaneous every 24 hours  gabapentin 100 milliGRAM(s) Oral every 12 hours  insulin lispro (ADMELOG) corrective regimen sliding scale   SubCutaneous three times a day before meals  insulin lispro (ADMELOG) corrective regimen sliding scale   SubCutaneous at bedtime  losartan 25 milliGRAM(s) Oral daily  pantoprazole    Tablet 40 milliGRAM(s) Oral before breakfast  piperacillin/tazobactam IVPB.. 3.375 Gram(s) IV Intermittent every 8 hours  simvastatin 40 milliGRAM(s) Oral at bedtime      MEDICATIONS  (PRN):  acetaminophen     Tablet .. 650 milliGRAM(s) Oral every 6 hours PRN Temp greater or equal to 38C (100.4F), Mild Pain (1 - 3)      Physical Exam    Neuro :  no focal deficits  Respiratory: CTA B/L  CV: RRR, S1S2, no murmurs,   Abdominal: Soft, NT, ND +BS,  Extremities: No edema, + peripheral pulses    ASSESSMENT    sigmoid diverticulitis,   pyelonephritis,   r/o neutropenic fever,   h/o HTN,   HLD,   T2DM,   osteoporosis,   gastric adenocarcinoma,   s/p resection 2015 (on chemo),   early multiple myeloma,( bmbx  2015,)   Vertigo  Dementia  S/P hysterectomy  S/P tubal ligation  pulm nodule        PLAN    cont zosyn until 5/20/22   may change abx to augmentin 875mg bid on d/c to complete course  id f/u   blood cx neg noted above   ucx and sens with >100,000 CFU/ml Gram Negative Rods noted above   ua neg  ct abd with diverticulitis, colonic distention and perinephric stranding noted  h/h stable   heme-onc f/u   CT finding suggest chemotherapy induced diverticulitis with fever.   pt Clinically improved.  No onc treatment while hospitalized including Revlimid   Clinically improved.  Neutropenia recovering, WBC=3.3 and AIN=790 with 10.4% monocytes  No indication for CSF, d/c zarxio  ok to d/c from Oncology standpoint  upon discharge pt to f/u with Dr. Alvarado  pulm f/u   Quantiferon TB Gold test neg noted above  CT chest with Mild bilateral peripheral groundglass. 3 month follow-up is recommended. Stable skeletal metastases noted above.  incentive spirometer  palliative f/u   Patient expressed wishes for DNR/DNI, daughter/granddaughter in agreement, MOLST drafted and placed in chart.   Daughter Ivett is primary surrogate.   They wish to continue w current medical management, disease modifying cancer treatments.   ECOG 2-3. Onc following.   Palliative will follow for supportive care.  cont current meds   pt stable for d/c

## 2022-05-30 PROCEDURE — 87086 URINE CULTURE/COLONY COUNT: CPT

## 2022-05-30 PROCEDURE — 85610 PROTHROMBIN TIME: CPT

## 2022-05-30 PROCEDURE — 82962 GLUCOSE BLOOD TEST: CPT

## 2022-05-30 PROCEDURE — A9503: CPT

## 2022-05-30 PROCEDURE — 88305 TISSUE EXAM BY PATHOLOGIST: CPT

## 2022-05-30 PROCEDURE — 93971 EXTREMITY STUDY: CPT

## 2022-05-30 PROCEDURE — 86301 IMMUNOASSAY TUMOR CA 19-9: CPT

## 2022-05-30 PROCEDURE — 85025 COMPLETE CBC W/AUTO DIFF WBC: CPT

## 2022-05-30 PROCEDURE — 20225 BONE BIOPSY TROCAR/NDL DEEP: CPT

## 2022-05-30 PROCEDURE — 97162 PT EVAL MOD COMPLEX 30 MIN: CPT

## 2022-05-30 PROCEDURE — 93005 ELECTROCARDIOGRAM TRACING: CPT

## 2022-05-30 PROCEDURE — 80053 COMPREHEN METABOLIC PANEL: CPT

## 2022-05-30 PROCEDURE — 82248 BILIRUBIN DIRECT: CPT

## 2022-05-30 PROCEDURE — 71275 CT ANGIOGRAPHY CHEST: CPT

## 2022-05-30 PROCEDURE — 85379 FIBRIN DEGRADATION QUANT: CPT

## 2022-05-30 PROCEDURE — 87635 SARS-COV-2 COVID-19 AMP PRB: CPT

## 2022-05-30 PROCEDURE — 85027 COMPLETE CBC AUTOMATED: CPT

## 2022-05-30 PROCEDURE — 78306 BONE IMAGING WHOLE BODY: CPT

## 2022-05-30 PROCEDURE — 97116 GAIT TRAINING THERAPY: CPT

## 2022-05-30 PROCEDURE — 97530 THERAPEUTIC ACTIVITIES: CPT

## 2022-05-30 PROCEDURE — 97110 THERAPEUTIC EXERCISES: CPT

## 2022-05-30 PROCEDURE — 83735 ASSAY OF MAGNESIUM: CPT

## 2022-05-30 PROCEDURE — 82378 CARCINOEMBRYONIC ANTIGEN: CPT

## 2022-05-30 PROCEDURE — 84100 ASSAY OF PHOSPHORUS: CPT

## 2022-05-30 PROCEDURE — 85730 THROMBOPLASTIN TIME PARTIAL: CPT

## 2022-05-30 PROCEDURE — 72131 CT LUMBAR SPINE W/O DYE: CPT

## 2022-05-30 PROCEDURE — 83036 HEMOGLOBIN GLYCOSYLATED A1C: CPT

## 2022-05-30 PROCEDURE — 77012 CT SCAN FOR NEEDLE BIOPSY: CPT

## 2022-05-30 PROCEDURE — 87040 BLOOD CULTURE FOR BACTERIA: CPT

## 2022-05-30 PROCEDURE — 74177 CT ABD & PELVIS W/CONTRAST: CPT

## 2022-05-30 PROCEDURE — 36415 COLL VENOUS BLD VENIPUNCTURE: CPT

## 2022-05-30 PROCEDURE — 71045 X-RAY EXAM CHEST 1 VIEW: CPT

## 2022-05-30 PROCEDURE — 99285 EMERGENCY DEPT VISIT HI MDM: CPT | Mod: 25

## 2022-05-30 PROCEDURE — 80048 BASIC METABOLIC PNL TOTAL CA: CPT

## 2022-06-09 LAB — GLUCOSE BLDC GLUCOMTR-MCNC: 101 MG/DL — HIGH (ref 70–99)

## 2022-06-10 ENCOUNTER — INPATIENT (INPATIENT)
Facility: HOSPITAL | Age: 87
LOS: 4 days | Discharge: HOME | DRG: 194 | End: 2022-06-15
Attending: INTERNAL MEDICINE | Admitting: INTERNAL MEDICINE
Payer: MEDICAID

## 2022-06-10 VITALS
OXYGEN SATURATION: 95 % | TEMPERATURE: 99 F | RESPIRATION RATE: 18 BRPM | DIASTOLIC BLOOD PRESSURE: 58 MMHG | WEIGHT: 149.91 LBS | SYSTOLIC BLOOD PRESSURE: 113 MMHG | HEIGHT: 56 IN | HEART RATE: 94 BPM

## 2022-06-10 DIAGNOSIS — Z34.80 ENCOUNTER FOR SUPERVISION OF OTHER NORMAL PREGNANCY, UNSPECIFIED TRIMESTER: ICD-10-CM

## 2022-06-10 DIAGNOSIS — Z90.710 ACQUIRED ABSENCE OF BOTH CERVIX AND UTERUS: Chronic | ICD-10-CM

## 2022-06-10 DIAGNOSIS — Z98.51 TUBAL LIGATION STATUS: Chronic | ICD-10-CM

## 2022-06-10 LAB
ALBUMIN SERPL ELPH-MCNC: 2.4 G/DL — LOW (ref 3.5–5)
ALP SERPL-CCNC: 70 U/L — SIGNIFICANT CHANGE UP (ref 40–120)
ALT FLD-CCNC: 22 U/L DA — SIGNIFICANT CHANGE UP (ref 10–60)
ANION GAP SERPL CALC-SCNC: 9 MMOL/L — SIGNIFICANT CHANGE UP (ref 5–17)
ANISOCYTOSIS BLD QL: SLIGHT — SIGNIFICANT CHANGE UP
APTT BLD: 27.5 SEC — SIGNIFICANT CHANGE UP (ref 27.5–35.5)
AST SERPL-CCNC: 22 U/L — SIGNIFICANT CHANGE UP (ref 10–40)
BASOPHILS # BLD AUTO: 0 K/UL — SIGNIFICANT CHANGE UP (ref 0–0.2)
BASOPHILS NFR BLD AUTO: 0 % — SIGNIFICANT CHANGE UP (ref 0–2)
BILIRUB SERPL-MCNC: 0.3 MG/DL — SIGNIFICANT CHANGE UP (ref 0.2–1.2)
BUN SERPL-MCNC: 21 MG/DL — HIGH (ref 7–18)
CALCIUM SERPL-MCNC: 8.9 MG/DL — SIGNIFICANT CHANGE UP (ref 8.4–10.5)
CHLORIDE SERPL-SCNC: 114 MMOL/L — HIGH (ref 96–108)
CO2 SERPL-SCNC: 15 MMOL/L — LOW (ref 22–31)
CREAT SERPL-MCNC: 0.94 MG/DL — SIGNIFICANT CHANGE UP (ref 0.5–1.3)
EGFR: 59 ML/MIN/1.73M2 — LOW
EOSINOPHIL # BLD AUTO: 0 K/UL — SIGNIFICANT CHANGE UP (ref 0–0.5)
EOSINOPHIL NFR BLD AUTO: 0 % — SIGNIFICANT CHANGE UP (ref 0–6)
FLUAV AG NPH QL: SIGNIFICANT CHANGE UP
FLUBV AG NPH QL: SIGNIFICANT CHANGE UP
GLUCOSE SERPL-MCNC: 286 MG/DL — HIGH (ref 70–99)
HCT VFR BLD CALC: 28.3 % — LOW (ref 34.5–45)
HGB BLD-MCNC: 9 G/DL — LOW (ref 11.5–15.5)
HYPOCHROMIA BLD QL: SLIGHT — SIGNIFICANT CHANGE UP
INR BLD: 1.29 RATIO — HIGH (ref 0.88–1.16)
LACTATE SERPL-SCNC: 2 MMOL/L — SIGNIFICANT CHANGE UP (ref 0.7–2)
LG PLATELETS BLD QL AUTO: SLIGHT — SIGNIFICANT CHANGE UP
LIDOCAIN IGE QN: 100 U/L — SIGNIFICANT CHANGE UP (ref 73–393)
LYMPHOCYTES # BLD AUTO: 0.96 K/UL — LOW (ref 1–3.3)
LYMPHOCYTES # BLD AUTO: 28 % — SIGNIFICANT CHANGE UP (ref 13–44)
MANUAL SMEAR VERIFICATION: SIGNIFICANT CHANGE UP
MCHC RBC-ENTMCNC: 28.9 PG — SIGNIFICANT CHANGE UP (ref 27–34)
MCHC RBC-ENTMCNC: 31.8 GM/DL — LOW (ref 32–36)
MCV RBC AUTO: 91 FL — SIGNIFICANT CHANGE UP (ref 80–100)
MICROCYTES BLD QL: SLIGHT — SIGNIFICANT CHANGE UP
MONOCYTES # BLD AUTO: 0.21 K/UL — SIGNIFICANT CHANGE UP (ref 0–0.9)
MONOCYTES NFR BLD AUTO: 6 % — SIGNIFICANT CHANGE UP (ref 2–14)
NEUTROPHILS # BLD AUTO: 2.22 K/UL — SIGNIFICANT CHANGE UP (ref 1.8–7.4)
NEUTROPHILS NFR BLD AUTO: 57 % — SIGNIFICANT CHANGE UP (ref 43–77)
NEUTS BAND # BLD: 8 % — SIGNIFICANT CHANGE UP (ref 0–8)
NRBC # BLD: 0 /100 — SIGNIFICANT CHANGE UP (ref 0–0)
NT-PROBNP SERPL-SCNC: 1937 PG/ML — HIGH (ref 0–450)
PLAT MORPH BLD: NORMAL — SIGNIFICANT CHANGE UP
PLATELET # BLD AUTO: 429 K/UL — HIGH (ref 150–400)
POIKILOCYTOSIS BLD QL AUTO: SLIGHT — SIGNIFICANT CHANGE UP
POLYCHROMASIA BLD QL SMEAR: SLIGHT — SIGNIFICANT CHANGE UP
POTASSIUM SERPL-MCNC: 4.5 MMOL/L — SIGNIFICANT CHANGE UP (ref 3.5–5.3)
POTASSIUM SERPL-SCNC: 4.5 MMOL/L — SIGNIFICANT CHANGE UP (ref 3.5–5.3)
PROT SERPL-MCNC: 6.7 G/DL — SIGNIFICANT CHANGE UP (ref 6–8.3)
PROTHROM AB SERPL-ACNC: 15.4 SEC — HIGH (ref 10.5–13.4)
RBC # BLD: 3.11 M/UL — LOW (ref 3.8–5.2)
RBC # FLD: 16.8 % — HIGH (ref 10.3–14.5)
RBC BLD AUTO: ABNORMAL
SARS-COV-2 RNA SPEC QL NAA+PROBE: SIGNIFICANT CHANGE UP
SODIUM SERPL-SCNC: 138 MMOL/L — SIGNIFICANT CHANGE UP (ref 135–145)
SPHEROCYTES BLD QL SMEAR: SLIGHT — SIGNIFICANT CHANGE UP
TROPONIN I, HIGH SENSITIVITY RESULT: 33.8 NG/L — SIGNIFICANT CHANGE UP
VARIANT LYMPHS # BLD: 1 % — SIGNIFICANT CHANGE UP (ref 0–6)
WBC # BLD: 3.42 K/UL — LOW (ref 3.8–10.5)
WBC # FLD AUTO: 3.42 K/UL — LOW (ref 3.8–10.5)

## 2022-06-10 PROCEDURE — 71045 X-RAY EXAM CHEST 1 VIEW: CPT | Mod: 26

## 2022-06-10 PROCEDURE — 99285 EMERGENCY DEPT VISIT HI MDM: CPT

## 2022-06-10 RX ORDER — SODIUM CHLORIDE 9 MG/ML
500 INJECTION INTRAMUSCULAR; INTRAVENOUS; SUBCUTANEOUS ONCE
Refills: 0 | Status: COMPLETED | OUTPATIENT
Start: 2022-06-10 | End: 2022-06-10

## 2022-06-10 RX ORDER — CEFEPIME 1 G/1
1000 INJECTION, POWDER, FOR SOLUTION INTRAMUSCULAR; INTRAVENOUS ONCE
Refills: 0 | Status: COMPLETED | OUTPATIENT
Start: 2022-06-10 | End: 2022-06-10

## 2022-06-10 RX ORDER — VANCOMYCIN HCL 1 G
1000 VIAL (EA) INTRAVENOUS ONCE
Refills: 0 | Status: COMPLETED | OUTPATIENT
Start: 2022-06-10 | End: 2022-06-10

## 2022-06-10 RX ORDER — ACETAMINOPHEN 500 MG
650 TABLET ORAL ONCE
Refills: 0 | Status: COMPLETED | OUTPATIENT
Start: 2022-06-10 | End: 2022-06-10

## 2022-06-10 RX ADMIN — CEFEPIME 100 MILLIGRAM(S): 1 INJECTION, POWDER, FOR SOLUTION INTRAMUSCULAR; INTRAVENOUS at 21:38

## 2022-06-10 RX ADMIN — Medication 650 MILLIGRAM(S): at 19:05

## 2022-06-10 RX ADMIN — Medication 250 MILLIGRAM(S): at 21:38

## 2022-06-10 RX ADMIN — Medication 650 MILLIGRAM(S): at 17:35

## 2022-06-10 RX ADMIN — SODIUM CHLORIDE 500 MILLILITER(S): 9 INJECTION INTRAMUSCULAR; INTRAVENOUS; SUBCUTANEOUS at 17:35

## 2022-06-10 NOTE — ED ADULT NURSE NOTE - NSIMPLEMENTINTERV_GEN_ALL_ED
Implemented All Universal Safety Interventions:  [place] to call system. Call bell, personal items and telephone within reach. Instruct patient to call for assistance. Room bathroom lighting operational. Non-slip footwear when patient is off stretcher. Physically safe environment: no spills, clutter or unnecessary equipment. Stretcher in lowest position, wheels locked, appropriate side rails in place.

## 2022-06-10 NOTE — ED PROVIDER NOTE - OBJECTIVE STATEMENT
87 y.o female with pmhx of hypertension, hyperlipidemia, diabetes mellitus, gastric CA s/p resection in 2015, and multiple myelomas on chemo (last session today) presents with cough over the past several days. Patient states cough sometimes brings up sputum without blood. Patient reports chest pain only on coughing and mild shortness of breath. Patient was admitted for diverticulitis from 5/13 to 5/18 and denies any current abdominal pain. 87 y.o female with pmhx of hypertension, hyperlipidemia, diabetes mellitus, gastric CA s/p resection in 2015, and multiple myelomas on chemo (last session today) presents with cough over the past several days. Patient states cough sometimes brings up yellow sputum. Patient reports chest pain only on coughing and mild shortness of breath. Patient was admitted for diverticulitis from 5/13 to 5/18 and denies any current abdominal pain.

## 2022-06-10 NOTE — ED PROVIDER NOTE - CLINICAL SUMMARY MEDICAL DECISION MAKING FREE TEXT BOX
Patient presenting with cough and associated chest pain and shortness of breath. Patient is well appearing and hemodynamically stable. Will assess for pneumonia and atypical ACS. Patient presenting with cough and associated mild chest pain on coughing and mild shortness of breath. Patient is well appearing and hemodynamically stable. Will assess for pneumonia and atypical ACS.

## 2022-06-10 NOTE — ED ADULT TRIAGE NOTE - ARRIVAL FROM
Home
Detail Level: Zone
Otc Regimen: -Vaseline, as needed to aa on lips for dryness. Avoid licking. \\n- Hydrocortisone cream once daily.
Render In Strict Bullet Format?: No

## 2022-06-11 DIAGNOSIS — J18.9 PNEUMONIA, UNSPECIFIED ORGANISM: ICD-10-CM

## 2022-06-11 DIAGNOSIS — C16.9 MALIGNANT NEOPLASM OF STOMACH, UNSPECIFIED: ICD-10-CM

## 2022-06-11 DIAGNOSIS — Z29.9 ENCOUNTER FOR PROPHYLACTIC MEASURES, UNSPECIFIED: ICD-10-CM

## 2022-06-11 DIAGNOSIS — I10 ESSENTIAL (PRIMARY) HYPERTENSION: ICD-10-CM

## 2022-06-11 DIAGNOSIS — C90.00 MULTIPLE MYELOMA NOT HAVING ACHIEVED REMISSION: ICD-10-CM

## 2022-06-11 DIAGNOSIS — E11.9 TYPE 2 DIABETES MELLITUS WITHOUT COMPLICATIONS: ICD-10-CM

## 2022-06-11 DIAGNOSIS — E78.5 HYPERLIPIDEMIA, UNSPECIFIED: ICD-10-CM

## 2022-06-11 LAB
ALBUMIN SERPL ELPH-MCNC: 2.2 G/DL — LOW (ref 3.5–5)
ALP SERPL-CCNC: 68 U/L — SIGNIFICANT CHANGE UP (ref 40–120)
ALT FLD-CCNC: 21 U/L DA — SIGNIFICANT CHANGE UP (ref 10–60)
ANION GAP SERPL CALC-SCNC: 10 MMOL/L — SIGNIFICANT CHANGE UP (ref 5–17)
ANION GAP SERPL CALC-SCNC: 12 MMOL/L — SIGNIFICANT CHANGE UP (ref 5–17)
APPEARANCE UR: CLEAR — SIGNIFICANT CHANGE UP
AST SERPL-CCNC: 16 U/L — SIGNIFICANT CHANGE UP (ref 10–40)
BACTERIA # UR AUTO: ABNORMAL /HPF
BASE EXCESS BLDA CALC-SCNC: -9 MMOL/L — LOW (ref -2–3)
BASOPHILS # BLD AUTO: 0.03 K/UL — SIGNIFICANT CHANGE UP (ref 0–0.2)
BASOPHILS NFR BLD AUTO: 0.6 % — SIGNIFICANT CHANGE UP (ref 0–2)
BILIRUB SERPL-MCNC: 0.3 MG/DL — SIGNIFICANT CHANGE UP (ref 0.2–1.2)
BILIRUB UR-MCNC: NEGATIVE — SIGNIFICANT CHANGE UP
BLOOD GAS COMMENTS ARTERIAL: SIGNIFICANT CHANGE UP
BUN SERPL-MCNC: 28 MG/DL — HIGH (ref 7–18)
BUN SERPL-MCNC: 33 MG/DL — HIGH (ref 7–18)
CALCIUM SERPL-MCNC: 8.5 MG/DL — SIGNIFICANT CHANGE UP (ref 8.4–10.5)
CALCIUM SERPL-MCNC: 8.9 MG/DL — SIGNIFICANT CHANGE UP (ref 8.4–10.5)
CHLORIDE SERPL-SCNC: 116 MMOL/L — HIGH (ref 96–108)
CHLORIDE SERPL-SCNC: 117 MMOL/L — HIGH (ref 96–108)
CO2 SERPL-SCNC: 15 MMOL/L — LOW (ref 22–31)
CO2 SERPL-SCNC: 15 MMOL/L — LOW (ref 22–31)
COLOR SPEC: YELLOW — SIGNIFICANT CHANGE UP
CREAT SERPL-MCNC: 0.82 MG/DL — SIGNIFICANT CHANGE UP (ref 0.5–1.3)
CREAT SERPL-MCNC: 0.91 MG/DL — SIGNIFICANT CHANGE UP (ref 0.5–1.3)
DIFF PNL FLD: ABNORMAL
EGFR: 62 ML/MIN/1.73M2 — SIGNIFICANT CHANGE UP
EGFR: 70 ML/MIN/1.73M2 — SIGNIFICANT CHANGE UP
EOSINOPHIL # BLD AUTO: 0 K/UL — SIGNIFICANT CHANGE UP (ref 0–0.5)
EOSINOPHIL NFR BLD AUTO: 0 % — SIGNIFICANT CHANGE UP (ref 0–6)
EPI CELLS # UR: SIGNIFICANT CHANGE UP /HPF
GLUCOSE BLDC GLUCOMTR-MCNC: 222 MG/DL — HIGH (ref 70–99)
GLUCOSE BLDC GLUCOMTR-MCNC: 251 MG/DL — HIGH (ref 70–99)
GLUCOSE BLDC GLUCOMTR-MCNC: 259 MG/DL — HIGH (ref 70–99)
GLUCOSE BLDC GLUCOMTR-MCNC: 264 MG/DL — HIGH (ref 70–99)
GLUCOSE SERPL-MCNC: 201 MG/DL — HIGH (ref 70–99)
GLUCOSE SERPL-MCNC: 206 MG/DL — HIGH (ref 70–99)
GLUCOSE UR QL: 1000 MG/DL
HCO3 BLDA-SCNC: 14 MMOL/L — LOW (ref 21–28)
HCT VFR BLD CALC: 26.7 % — LOW (ref 34.5–45)
HGB BLD-MCNC: 8.6 G/DL — LOW (ref 11.5–15.5)
HOROWITZ INDEX BLDA+IHG-RTO: 21 — SIGNIFICANT CHANGE UP
IMM GRANULOCYTES NFR BLD AUTO: 0.6 % — SIGNIFICANT CHANGE UP (ref 0–1.5)
KETONES UR-MCNC: ABNORMAL
LEUKOCYTE ESTERASE UR-ACNC: NEGATIVE — SIGNIFICANT CHANGE UP
LYMPHOCYTES # BLD AUTO: 1.63 K/UL — SIGNIFICANT CHANGE UP (ref 1–3.3)
LYMPHOCYTES # BLD AUTO: 34.2 % — SIGNIFICANT CHANGE UP (ref 13–44)
MAGNESIUM SERPL-MCNC: 2.3 MG/DL — SIGNIFICANT CHANGE UP (ref 1.6–2.6)
MCHC RBC-ENTMCNC: 29.1 PG — SIGNIFICANT CHANGE UP (ref 27–34)
MCHC RBC-ENTMCNC: 32.2 GM/DL — SIGNIFICANT CHANGE UP (ref 32–36)
MCV RBC AUTO: 90.2 FL — SIGNIFICANT CHANGE UP (ref 80–100)
MONOCYTES # BLD AUTO: 0.51 K/UL — SIGNIFICANT CHANGE UP (ref 0–0.9)
MONOCYTES NFR BLD AUTO: 10.7 % — SIGNIFICANT CHANGE UP (ref 2–14)
NEUTROPHILS # BLD AUTO: 2.56 K/UL — SIGNIFICANT CHANGE UP (ref 1.8–7.4)
NEUTROPHILS NFR BLD AUTO: 53.9 % — SIGNIFICANT CHANGE UP (ref 43–77)
NITRITE UR-MCNC: POSITIVE
NRBC # BLD: 0 /100 WBCS — SIGNIFICANT CHANGE UP (ref 0–0)
PCO2 BLDA: 23 MMHG — LOW (ref 32–35)
PH BLDA: 7.39 — SIGNIFICANT CHANGE UP (ref 7.35–7.45)
PH UR: 5 — SIGNIFICANT CHANGE UP (ref 5–8)
PHOSPHATE SERPL-MCNC: 3.4 MG/DL — SIGNIFICANT CHANGE UP (ref 2.5–4.5)
PLATELET # BLD AUTO: 408 K/UL — HIGH (ref 150–400)
PO2 BLDA: 113 MMHG — HIGH (ref 83–108)
POTASSIUM SERPL-MCNC: 4 MMOL/L — SIGNIFICANT CHANGE UP (ref 3.5–5.3)
POTASSIUM SERPL-MCNC: 4.4 MMOL/L — SIGNIFICANT CHANGE UP (ref 3.5–5.3)
POTASSIUM SERPL-SCNC: 4 MMOL/L — SIGNIFICANT CHANGE UP (ref 3.5–5.3)
POTASSIUM SERPL-SCNC: 4.4 MMOL/L — SIGNIFICANT CHANGE UP (ref 3.5–5.3)
PROCALCITONIN SERPL-MCNC: 0.35 NG/ML — HIGH (ref 0.02–0.1)
PROT SERPL-MCNC: 6.3 G/DL — SIGNIFICANT CHANGE UP (ref 6–8.3)
PROT UR-MCNC: 30 MG/DL
RAPID RVP RESULT: SIGNIFICANT CHANGE UP
RBC # BLD: 2.96 M/UL — LOW (ref 3.8–5.2)
RBC # FLD: 16.8 % — HIGH (ref 10.3–14.5)
RBC CASTS # UR COMP ASSIST: NEGATIVE /HPF — SIGNIFICANT CHANGE UP (ref 0–2)
SAO2 % BLDA: 99 % — SIGNIFICANT CHANGE UP
SARS-COV-2 RNA SPEC QL NAA+PROBE: SIGNIFICANT CHANGE UP
SODIUM SERPL-SCNC: 142 MMOL/L — SIGNIFICANT CHANGE UP (ref 135–145)
SODIUM SERPL-SCNC: 143 MMOL/L — SIGNIFICANT CHANGE UP (ref 135–145)
SP GR SPEC: 1.01 — SIGNIFICANT CHANGE UP (ref 1.01–1.02)
TROPONIN I, HIGH SENSITIVITY RESULT: 22 NG/L — SIGNIFICANT CHANGE UP
UROBILINOGEN FLD QL: NEGATIVE — SIGNIFICANT CHANGE UP
WBC # BLD: 4.76 K/UL — SIGNIFICANT CHANGE UP (ref 3.8–10.5)
WBC # FLD AUTO: 4.76 K/UL — SIGNIFICANT CHANGE UP (ref 3.8–10.5)
WBC UR QL: SIGNIFICANT CHANGE UP /HPF (ref 0–5)

## 2022-06-11 RX ORDER — GABAPENTIN 400 MG/1
100 CAPSULE ORAL EVERY 12 HOURS
Refills: 0 | Status: DISCONTINUED | OUTPATIENT
Start: 2022-06-11 | End: 2022-06-15

## 2022-06-11 RX ORDER — ENOXAPARIN SODIUM 100 MG/ML
40 INJECTION SUBCUTANEOUS EVERY 24 HOURS
Refills: 0 | Status: DISCONTINUED | OUTPATIENT
Start: 2022-06-11 | End: 2022-06-15

## 2022-06-11 RX ORDER — CEFTRIAXONE 500 MG/1
1000 INJECTION, POWDER, FOR SOLUTION INTRAMUSCULAR; INTRAVENOUS EVERY 24 HOURS
Refills: 0 | Status: DISCONTINUED | OUTPATIENT
Start: 2022-06-11 | End: 2022-06-13

## 2022-06-11 RX ORDER — MECLIZINE HCL 12.5 MG
25 TABLET ORAL DAILY
Refills: 0 | Status: DISCONTINUED | OUTPATIENT
Start: 2022-06-11 | End: 2022-06-15

## 2022-06-11 RX ORDER — AZITHROMYCIN 500 MG/1
500 TABLET, FILM COATED ORAL EVERY 24 HOURS
Refills: 0 | Status: DISCONTINUED | OUTPATIENT
Start: 2022-06-11 | End: 2022-06-13

## 2022-06-11 RX ORDER — LOSARTAN POTASSIUM 100 MG/1
25 TABLET, FILM COATED ORAL DAILY
Refills: 0 | Status: DISCONTINUED | OUTPATIENT
Start: 2022-06-11 | End: 2022-06-15

## 2022-06-11 RX ORDER — INSULIN LISPRO 100/ML
VIAL (ML) SUBCUTANEOUS
Refills: 0 | Status: DISCONTINUED | OUTPATIENT
Start: 2022-06-11 | End: 2022-06-15

## 2022-06-11 RX ORDER — SIMVASTATIN 20 MG/1
40 TABLET, FILM COATED ORAL AT BEDTIME
Refills: 0 | Status: DISCONTINUED | OUTPATIENT
Start: 2022-06-11 | End: 2022-06-15

## 2022-06-11 RX ORDER — ACETAMINOPHEN 500 MG
650 TABLET ORAL EVERY 6 HOURS
Refills: 0 | Status: DISCONTINUED | OUTPATIENT
Start: 2022-06-11 | End: 2022-06-15

## 2022-06-11 RX ADMIN — GABAPENTIN 100 MILLIGRAM(S): 400 CAPSULE ORAL at 05:59

## 2022-06-11 RX ADMIN — CEFTRIAXONE 100 MILLIGRAM(S): 500 INJECTION, POWDER, FOR SOLUTION INTRAMUSCULAR; INTRAVENOUS at 06:00

## 2022-06-11 RX ADMIN — GABAPENTIN 100 MILLIGRAM(S): 400 CAPSULE ORAL at 17:57

## 2022-06-11 RX ADMIN — Medication 100 MILLIGRAM(S): at 21:38

## 2022-06-11 RX ADMIN — Medication 200 MILLIGRAM(S): at 01:28

## 2022-06-11 RX ADMIN — Medication 3: at 11:20

## 2022-06-11 RX ADMIN — Medication 2: at 07:50

## 2022-06-11 RX ADMIN — Medication 3: at 17:15

## 2022-06-11 RX ADMIN — SIMVASTATIN 40 MILLIGRAM(S): 20 TABLET, FILM COATED ORAL at 21:38

## 2022-06-11 RX ADMIN — Medication 100 MILLIGRAM(S): at 13:16

## 2022-06-11 RX ADMIN — ENOXAPARIN SODIUM 40 MILLIGRAM(S): 100 INJECTION SUBCUTANEOUS at 03:16

## 2022-06-11 RX ADMIN — AZITHROMYCIN 255 MILLIGRAM(S): 500 TABLET, FILM COATED ORAL at 06:21

## 2022-06-11 RX ADMIN — Medication 200 MILLIGRAM(S): at 21:38

## 2022-06-11 NOTE — H&P ADULT - ASSESSMENT
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

## 2022-06-11 NOTE — H&P ADULT - NSHPPHYSICALEXAM_GEN_ALL_CORE
PHYSICAL EXAM:  GENERAL: NAD, well-developed  HEAD:  Atraumatic, Normocephalic  EYES: EOMI, PERRLA, conjunctiva and sclera clear  NECK: Supple, No JVD  CHEST/LUNG: Right sided basal crackles  HEART: Regular rate and rhythm; S1+ S2+  ABDOMEN: Soft, Nontender, Nondistended; Bowel sounds present  EXTREMITIES:, No clubbing, cyanosis, or edema  NEUROLOGY:AAOx3 non-focal  SKIN: No rashes or lesions

## 2022-06-11 NOTE — H&P ADULT - ATTENDING COMMENTS
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 Pt says the cough is sometimes dry, sometimes associated with clear sputum, nasal congestion+ Pt experiences SOB whenever she coughs, otherwise is comfortably ambulating at home. Also reports chest pain only while she coughs, appears pleuritic. Pt reports no leg pain/swelling, abdominal pain, nausea, vomiting, diarrhea, dysuria, constipation, fever, sick contacts, recent travel. COVID vaccinated x 3. Pt  has been taking Guaifenesin cough syrup outpatient.    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 tessalon perles, magda preadmit home meds, gi and dvt prophylaxis  cbc, bmp, mg, phos, lipids, tsh, bld cx,          pulm cons  heme-onc cons

## 2022-06-11 NOTE — H&P ADULT - PROBLEM SELECTOR PLAN 4
Pt has hx of Gastric cancer, on active chemo, last chemo Caty 10 at [facility]  S/p Distal gastrectomy in 2015

## 2022-06-11 NOTE — CONSULT NOTE ADULT - PROBLEM SELECTOR RECOMMENDATION 9
CXR noted  supplemental o2 PRN  monitor o2 sat Panculture  antibiotics  oxygen supp  monitor oxygen sat  monitor WBC and temp  follow up CXR

## 2022-06-11 NOTE — CONSULT NOTE ADULT - SUBJECTIVE AND OBJECTIVE BOX
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 Pt says the cough is sometimes dry, sometimes associated with clear sputum, nasal congestion+ Pt experiences SOB whenever she coughs, otherwise is comfortably ambulating at home. Also reports chest pain only while she coughs, appears pleuritic. Pt reports no leg pain/swelling, abdominal pain, nausea, vomiting, diarrhea, dysuria, constipation, fever, sick contacts, recent travel. COVID vaccinated x 3. Pt  has been taking Guaifenesin cough syrup outpatient.    Cxray shows no focal infiltrate, labs show no white count or fevers    HISTORY OF PRESENT ILLNESS: As above.    Patient is awake, alert and comfortably laying in the bed in no acute distress. Breathing well on RA. She complains of dry cough. No h/o fever.    MEDICATIONS  (STANDING):  azithromycin  IVPB 500 milliGRAM(s) IV Intermittent every 24 hours  benzonatate 100 milliGRAM(s) Oral every 8 hours  cefTRIAXone   IVPB 1000 milliGRAM(s) IV Intermittent every 24 hours  enoxaparin Injectable 40 milliGRAM(s) SubCutaneous every 24 hours  gabapentin 100 milliGRAM(s) Oral every 12 hours  insulin lispro (ADMELOG) corrective regimen sliding scale   SubCutaneous three times a day before meals  losartan 25 milliGRAM(s) Oral daily  simvastatin 40 milliGRAM(s) Oral at bedtime      MEDICATIONS  (PRN):  acetaminophen     Tablet .. 650 milliGRAM(s) Oral every 6 hours PRN Temp greater or equal to 38C (100.4F), Mild Pain (1 - 3)  guaiFENesin Oral Liquid (Sugar-Free) 200 milliGRAM(s) Oral every 6 hours PRN Cough  meclizine 25 milliGRAM(s) Oral daily PRN Dizziness      Allergies    No Known Allergies    Intolerances        PAST MEDICAL & SURGICAL HISTORY:  HTN (hypertension)      DM (diabetes mellitus)      Hypercholesteremia      Gastric adenocarcinoma  s/p resection      Vertigo      Dementia      S/P hysterectomy      S/P tubal ligation          FAMILY HISTORY:  Family history of diabetes mellitus (Sibling)    Family history of early CAD (Grandparent)        SOCIAL HISTORY  Smoking History:     REVIEW OF SYSTEMS:    CONSTITUTIONAL:  No fevers, chills, sweats    HEENT:  Eyes:  No diplopia or blurred vision. ENT:  No earache, sore throat or runny nose.    CARDIOVASCULAR:  No pressure, squeezing, tightness, or heaviness about the chest; no palpitations.    RESPIRATORY:  Per HPI    GASTROINTESTINAL:  No abdominal pain, nausea, vomiting or diarrhea.    GENITOURINARY:  No dysuria, frequency or urgency.    NEUROLOGIC:  No paresthesias, fasciculations, seizures or weakness.    PSYCHIATRIC:  No disorder of thought or mood.    Vital Signs Last 24 Hrs  T(C): 36.7 (2022 06:24), Max: 37.1 (10 [place] 2022 16:53)  T(F): 98.1 (2022 06:24), Max: 98.8 (10 [place] 2022 16:53)  HR: 77 (2022 06:24) (70 - 94)  BP: 133/56 (2022 06:24) (97/56 - 133/56)  BP(mean): --  RR: 18 (2022 06:24) (16 - 18)  SpO2: 100% (2022 06:24) (95% - 100%)  I&O's Detail      PHYSICAL EXAMINATION:    GENERAL: The patient is a well-developed, well-nourished _____in no apparent distress.     HEENT: Head is normocephalic and atraumatic. Extraocular muscles are intact. Mucous membranes are moist.     NECK: Supple.     LUNGS: Clear to auscultation without wheezing, rales, or rhonchi. Respirations unlabored    HEART: Regular rate and rhythm without murmur.    ABDOMEN: Soft, nontender, and nondistended.  No hepatosplenomegaly is noted.    EXTREMITIES: Without any cyanosis, clubbing, rash, lesions or edema.    NEUROLOGIC: Grossly intact.      LABS:                        8.6    4.76  )-----------( 408      ( 2022 05:48 )             26.7     06-11    143  |  116<H>  |  33<H>  ----------------------------<  206<H>  4.4   |  15<L>  |  0.91    Ca    8.9      2022 05:48  Phos  3.4       Mg     2.3         TPro  6.3  /  Alb  2.2<L>  /  TBili  0.3  /  DBili  x   /  AST  16  /  ALT  21  /  AlkPhos  68      PT/INR - ( 10 [place] 2022 17:37 )   PT: 15.4 sec;   INR: 1.29 ratio         PTT - ( 10 [place] 2022 17:37 )  PTT:27.5 sec  Urinalysis Basic - ( 2022 00:42 )    Color: Yellow / Appearance: Clear / S.010 / pH: x  Gluc: x / Ketone: Trace  / Bili: Negative / Urobili: Negative   Blood: x / Protein: 30 mg/dL / Nitrite: Positive   Leuk Esterase: Negative / RBC: Negative /HPF / WBC 0-2 /HPF   Sq Epi: x / Non Sq Epi: Few /HPF / Bacteria: Moderate /HPF      ABG - ( 2022 03:47 )  pH, Arterial: 7.39  pH, Blood: x     /  pCO2: 23    /  pO2: 113   / HCO3: 14    / Base Excess: -9.0  /  SaO2: 99                    D-Dimer Assay, Quantitative: 1277 ng/mL DDU (06-10-22 @ 17:37)    Serum Pro-Brain Natriuretic Peptide: 1937 pg/mL (06-10-22 @ 17:37)    Lactate, Blood: 2.0 mmol/L (06-10-22 @ 17:37)        MICROBIOLOGY:    RADIOLOGY & ADDITIONAL STUDIES    :< from: Xray Chest 1 View- PORTABLE-Urgent (06.10.22 @ 18:06) >    IMPRESSION:  Patchy left lung infiltrate.        Thank you for the courtesy of this referral.    --- End of Report ---           < from: CT Chest No Cont (22 @ 10:36) >  FINDINGS:    LUNGS/AIRWAYS/PLEURA: Patent trachea and bronchi. Mild bilateral   peripheral groundglass, difficult to compare to prior due to differences   in lung volume (prior acquired in exhalation). Unremarkable pleura.    LYMPH NODES/MEDIASTINUM: No enlarged lymph nodes.    HEART/VASCULATURE: Normal heart size. Unremarkable pericardium. Normal   caliber aorta. Subjectively mild amount of coronary calcified plaque.    UPPER ABDOMEN: Cholecystectomy. Calcified granuloma in the liver.    BONES/SOFT TISSUES: Stable multiple lytic bone lesions. Unchanged remote   mildly displaced fractures of the right eighth and ninth ribs.      IMPRESSION:    Mild bilateral peripheral groundglass. 3 month follow-up is recommended.    Stable skeletal metastases.    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 Pt says the cough is sometimes dry, sometimes associated with clear sputum, nasal congestion+ Pt experiences SOB whenever she coughs, otherwise is comfortably ambulating at home. Also reports chest pain only while she coughs, appears pleuritic. Pt reports no leg pain/swelling, abdominal pain, nausea, vomiting, diarrhea, dysuria, constipation, fever, sick contacts, recent travel. COVID vaccinated x 3. Pt  has been taking Guaifenesin cough syrup outpatient.    Cxray shows no focal infiltrate, labs show no white count or fevers    HISTORY OF PRESENT ILLNESS: As above.    Patient is awake, alert and comfortably laying in the bed in no acute distress. Breathing well on RA. She complains of dry cough. No h/o fever.    MEDICATIONS  (STANDING):  azithromycin  IVPB 500 milliGRAM(s) IV Intermittent every 24 hours  benzonatate 100 milliGRAM(s) Oral every 8 hours  cefTRIAXone   IVPB 1000 milliGRAM(s) IV Intermittent every 24 hours  enoxaparin Injectable 40 milliGRAM(s) SubCutaneous every 24 hours  gabapentin 100 milliGRAM(s) Oral every 12 hours  insulin lispro (ADMELOG) corrective regimen sliding scale   SubCutaneous three times a day before meals  losartan 25 milliGRAM(s) Oral daily  simvastatin 40 milliGRAM(s) Oral at bedtime      MEDICATIONS  (PRN):  acetaminophen     Tablet .. 650 milliGRAM(s) Oral every 6 hours PRN Temp greater or equal to 38C (100.4F), Mild Pain (1 - 3)  guaiFENesin Oral Liquid (Sugar-Free) 200 milliGRAM(s) Oral every 6 hours PRN Cough  meclizine 25 milliGRAM(s) Oral daily PRN Dizziness      Allergies    No Known Allergies    Intolerances        PAST MEDICAL & SURGICAL HISTORY:  HTN (hypertension)      DM (diabetes mellitus)      Hypercholesteremia      Gastric adenocarcinoma  s/p resection      Vertigo      Dementia      S/P hysterectomy      S/P tubal ligation          FAMILY HISTORY:  Family history of diabetes mellitus (Sibling)    Family history of early CAD (Grandparent)        SOCIAL HISTORY  Smoking History:     REVIEW OF SYSTEMS:    CONSTITUTIONAL:  No fevers, chills, sweats    HEENT:  Eyes:  No diplopia or blurred vision. ENT:  No earache, sore throat or runny nose.    CARDIOVASCULAR:  No pressure, squeezing, tightness, or heaviness about the chest; no palpitations.    RESPIRATORY:  Per HPI    GASTROINTESTINAL:  No abdominal pain, nausea, vomiting or diarrhea.    GENITOURINARY:  No dysuria, frequency or urgency.    NEUROLOGIC:  No paresthesias, fasciculations, seizures or weakness.    PSYCHIATRIC:  No disorder of thought or mood.    Vital Signs Last 24 Hrs  T(C): 36.7 (2022 06:24), Max: 37.1 (10 [place] 2022 16:53)  T(F): 98.1 (2022 06:24), Max: 98.8 (10 [place] 2022 16:53)  HR: 77 (2022 06:24) (70 - 94)  BP: 133/56 (2022 06:24) (97/56 - 133/56)  BP(mean): --  RR: 18 (2022 06:24) (16 - 18)  SpO2: 100% (2022 06:24) (95% - 100%)  I&O's Detail      PHYSICAL EXAMINATION:    GENERAL: The patient is a well-developed, well-nourished _____in no apparent distress.     HEENT: Head is normocephalic and atraumatic. Extraocular muscles are intact. Mucous membranes are moist.     NECK: Supple.     LUNGS: Clear to auscultation without wheezing, rales, or rhonchi. Respirations unlabored    HEART: Regular rate and rhythm without murmur.    ABDOMEN: Soft, nontender, and nondistended.  No hepatosplenomegaly is noted.    EXTREMITIES: Without any cyanosis, clubbing, rash, lesions or edema.    NEUROLOGIC: Grossly intact.      LABS:                        8.6    4.76  )-----------( 408      ( 2022 05:48 )             26.7     06-11    143  |  116<H>  |  33<H>  ----------------------------<  206<H>  4.4   |  15<L>  |  0.91    Ca    8.9      2022 05:48  Phos  3.4       Mg     2.3         TPro  6.3  /  Alb  2.2<L>  /  TBili  0.3  /  DBili  x   /  AST  16  /  ALT  21  /  AlkPhos  68      PT/INR - ( 10 [place] 2022 17:37 )   PT: 15.4 sec;   INR: 1.29 ratio         PTT - ( 10 [place] 2022 17:37 )  PTT:27.5 sec  Urinalysis Basic - ( 2022 00:42 )    Color: Yellow / Appearance: Clear / S.010 / pH: x  Gluc: x / Ketone: Trace  / Bili: Negative / Urobili: Negative   Blood: x / Protein: 30 mg/dL / Nitrite: Positive   Leuk Esterase: Negative / RBC: Negative /HPF / WBC 0-2 /HPF   Sq Epi: x / Non Sq Epi: Few /HPF / Bacteria: Moderate /HPF      ABG - ( 2022 03:47 )  pH, Arterial: 7.39  pH, Blood: x     /  pCO2: 23    /  pO2: 113   / HCO3: 14    / Base Excess: -9.0  /  SaO2: 99                    D-Dimer Assay, Quantitative: 1277 ng/mL DDU (06-10-22 @ 17:37)    Serum Pro-Brain Natriuretic Peptide: 1937 pg/mL (06-10-22 @ 17:37)    Lactate, Blood: 2.0 mmol/L (06-10-22 @ 17:37)        MICROBIOLOGY:    RADIOLOGY & ADDITIONAL STUDIES    :< from: Xray Chest 1 View- PORTABLE-Urgent (06.10.22 @ 18:06) >    IMPRESSION:  Patchy left lung infiltrate.        Thank you for the courtesy of this referral.    --- End of Report ---           < from: CT Chest No Cont (22 @ 10:36) >  FINDINGS:    LUNGS/AIRWAYS/PLEURA: Patent trachea and bronchi. Mild bilateral   peripheral groundglass, difficult to compare to prior due to differences   in lung volume (prior acquired in exhalation). Unremarkable pleura.    LYMPH NODES/MEDIASTINUM: No enlarged lymph nodes.    HEART/VASCULATURE: Normal heart size. Unremarkable pericardium. Normal   caliber aorta. Subjectively mild amount of coronary calcified plaque.    UPPER ABDOMEN: Cholecystectomy. Calcified granuloma in the liver.    BONES/SOFT TISSUES: Stable multiple lytic bone lesions. Unchanged remote   mildly displaced fractures of the right eighth and ninth ribs.      IMPRESSION:    Mild bilateral peripheral groundglass. 3 month follow-up is recommended.    Stable skeletal metastases.    --- End of Report ---            CXR:  < from: Xray Chest 1 View- PORTABLE-Urgent (06.10.22 @ 18:06) >  IMPRESSION:  Patchy left lung infiltrate.      < end of copied text >    Ct scan chest;    ekg;    echo:

## 2022-06-11 NOTE — H&P ADULT - HISTORY OF PRESENT ILLNESS
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 Pt says the cough is sometimes dry, sometimes associated with clear sputum, nasal congestion+ Pt experiences SOB whenever she coughs, otherwise is comfortably ambulating at home. Also reports chest pain only while she coughs, appears pleuritic. Pt reports no leg pain/swelling, abdominal pain, nausea, vomiting, diarrhea, dysuria, constipation, fever, sick contacts, recent travel. COVID vaccinated x 3. Pt  has been taking Guaifenesin cough syrup outpatient.    Cxray shows no focal infiltrate, labs show no white count or fevers

## 2022-06-11 NOTE — H&P ADULT - PROBLEM SELECTOR PLAN 6
Pt has hx of Early myeloma, takes Lenolamide 10mg at home 7 days on, 7 days off  Will hold in patient

## 2022-06-11 NOTE — H&P ADULT - PARTICIPANTS
Discussed GOC with patient, patient feels like she is not fully ready to make any decisions. Also thinks she needs to talk to her daughter. FULL CODE for now/Patient

## 2022-06-11 NOTE — H&P ADULT - PROBLEM SELECTOR PLAN 1
Pt p/w persistent cough with sputum production, unclear whether white or clear sputum, not hypoxic or tachycardia, no fevers  White count 3.42k lower than normal, however ANC is not below 500  Cxray no infiltrate however due to concern of right basal crackles, will initiate 1 g Rocephin and 500mg Azithromycin to cover for Pneumonia  F/u procalcitonin in AM, cultures  Given no hypoxia, tachycardia--elevated Dimers are likely in the setting of infection. Pt also ambulates with a walker, less concern for PE at this time

## 2022-06-11 NOTE — H&P ADULT - NSHPREVIEWOFSYSTEMS_GEN_ALL_CORE
REVIEW OF SYSTEMS:    CONSTITUTIONAL: No weakness, fevers or chills  EYES/ENT: No visual changes;  No vertigo or throat pain   NECK: No pain or stiffness  RESPIRATORY: +cough, no wheezing, hemoptysis; No shortness of breath  CARDIOVASCULAR: No chest pain or palpitations  GASTROINTESTINAL: No abdominal or epigastric pain. No nausea, vomiting, or hematemesis; No diarrhea or constipation. No melena or hematochezia.  GENITOURINARY: No dysuria, frequency or hematuria  NEUROLOGICAL: No numbness or weakness  SKIN: No itching, burning, rashes, or lesions   All other review of systems is negative unless indicated above.

## 2022-06-11 NOTE — PATIENT PROFILE ADULT - FALL HARM RISK - RISK INTERVENTIONS

## 2022-06-11 NOTE — H&P ADULT - PROBLEM SELECTOR PLAN 5
Pt has hx of DM, takes Alogliptin 25 mg, Steglatro 15 mg in AM, Metformin 1000 BID  C/w Insulin sliding scale in patient

## 2022-06-12 ENCOUNTER — TRANSCRIPTION ENCOUNTER (OUTPATIENT)
Age: 87
End: 2022-06-12

## 2022-06-12 LAB
ANION GAP SERPL CALC-SCNC: 11 MMOL/L — SIGNIFICANT CHANGE UP (ref 5–17)
BASOPHILS # BLD AUTO: 0.02 K/UL — SIGNIFICANT CHANGE UP (ref 0–0.2)
BASOPHILS NFR BLD AUTO: 0.3 % — SIGNIFICANT CHANGE UP (ref 0–2)
BUN SERPL-MCNC: 45 MG/DL — HIGH (ref 7–18)
CALCIUM SERPL-MCNC: 9.2 MG/DL — SIGNIFICANT CHANGE UP (ref 8.4–10.5)
CHLORIDE SERPL-SCNC: 113 MMOL/L — HIGH (ref 96–108)
CO2 SERPL-SCNC: 16 MMOL/L — LOW (ref 22–31)
CREAT SERPL-MCNC: 1.11 MG/DL — SIGNIFICANT CHANGE UP (ref 0.5–1.3)
EGFR: 49 ML/MIN/1.73M2 — LOW
EOSINOPHIL # BLD AUTO: 0.03 K/UL — SIGNIFICANT CHANGE UP (ref 0–0.5)
EOSINOPHIL NFR BLD AUTO: 0.5 % — SIGNIFICANT CHANGE UP (ref 0–6)
GLUCOSE BLDC GLUCOMTR-MCNC: 190 MG/DL — HIGH (ref 70–99)
GLUCOSE BLDC GLUCOMTR-MCNC: 247 MG/DL — HIGH (ref 70–99)
GLUCOSE BLDC GLUCOMTR-MCNC: 265 MG/DL — HIGH (ref 70–99)
GLUCOSE BLDC GLUCOMTR-MCNC: 363 MG/DL — HIGH (ref 70–99)
GLUCOSE SERPL-MCNC: 202 MG/DL — HIGH (ref 70–99)
HCT VFR BLD CALC: 27.7 % — LOW (ref 34.5–45)
HGB BLD-MCNC: 9.1 G/DL — LOW (ref 11.5–15.5)
IMM GRANULOCYTES NFR BLD AUTO: 0.3 % — SIGNIFICANT CHANGE UP (ref 0–1.5)
LYMPHOCYTES # BLD AUTO: 2.7 K/UL — SIGNIFICANT CHANGE UP (ref 1–3.3)
LYMPHOCYTES # BLD AUTO: 45.5 % — HIGH (ref 13–44)
MAGNESIUM SERPL-MCNC: 2.2 MG/DL — SIGNIFICANT CHANGE UP (ref 1.6–2.6)
MCHC RBC-ENTMCNC: 29.2 PG — SIGNIFICANT CHANGE UP (ref 27–34)
MCHC RBC-ENTMCNC: 32.9 GM/DL — SIGNIFICANT CHANGE UP (ref 32–36)
MCV RBC AUTO: 88.8 FL — SIGNIFICANT CHANGE UP (ref 80–100)
MONOCYTES # BLD AUTO: 0.54 K/UL — SIGNIFICANT CHANGE UP (ref 0–0.9)
MONOCYTES NFR BLD AUTO: 9.1 % — SIGNIFICANT CHANGE UP (ref 2–14)
NEUTROPHILS # BLD AUTO: 2.63 K/UL — SIGNIFICANT CHANGE UP (ref 1.8–7.4)
NEUTROPHILS NFR BLD AUTO: 44.3 % — SIGNIFICANT CHANGE UP (ref 43–77)
NRBC # BLD: 0 /100 WBCS — SIGNIFICANT CHANGE UP (ref 0–0)
PHOSPHATE SERPL-MCNC: 2.1 MG/DL — LOW (ref 2.5–4.5)
PLATELET # BLD AUTO: 447 K/UL — HIGH (ref 150–400)
POTASSIUM SERPL-MCNC: 4 MMOL/L — SIGNIFICANT CHANGE UP (ref 3.5–5.3)
POTASSIUM SERPL-SCNC: 4 MMOL/L — SIGNIFICANT CHANGE UP (ref 3.5–5.3)
RBC # BLD: 3.12 M/UL — LOW (ref 3.8–5.2)
RBC # FLD: 17.3 % — HIGH (ref 10.3–14.5)
SODIUM SERPL-SCNC: 140 MMOL/L — SIGNIFICANT CHANGE UP (ref 135–145)
WBC # BLD: 5.94 K/UL — SIGNIFICANT CHANGE UP (ref 3.8–10.5)
WBC # FLD AUTO: 5.94 K/UL — SIGNIFICANT CHANGE UP (ref 3.8–10.5)

## 2022-06-12 RX ORDER — POTASSIUM PHOSPHATE, MONOBASIC POTASSIUM PHOSPHATE, DIBASIC 236; 224 MG/ML; MG/ML
15 INJECTION, SOLUTION INTRAVENOUS ONCE
Refills: 0 | Status: COMPLETED | OUTPATIENT
Start: 2022-06-12 | End: 2022-06-12

## 2022-06-12 RX ORDER — GUAIFENESIN/DEXTROMETHORPHAN 600MG-30MG
10 TABLET, EXTENDED RELEASE 12 HR ORAL EVERY 4 HOURS
Refills: 0 | Status: COMPLETED | OUTPATIENT
Start: 2022-06-12 | End: 2022-06-14

## 2022-06-12 RX ORDER — ALBUTEROL 90 UG/1
2 AEROSOL, METERED ORAL EVERY 6 HOURS
Refills: 0 | Status: DISCONTINUED | OUTPATIENT
Start: 2022-06-12 | End: 2022-06-15

## 2022-06-12 RX ADMIN — GABAPENTIN 100 MILLIGRAM(S): 400 CAPSULE ORAL at 17:31

## 2022-06-12 RX ADMIN — Medication 200 MILLIGRAM(S): at 03:56

## 2022-06-12 RX ADMIN — Medication 10 MILLILITER(S): at 17:31

## 2022-06-12 RX ADMIN — Medication 100 MILLIGRAM(S): at 14:45

## 2022-06-12 RX ADMIN — ENOXAPARIN SODIUM 40 MILLIGRAM(S): 100 INJECTION SUBCUTANEOUS at 00:31

## 2022-06-12 RX ADMIN — Medication 1: at 08:11

## 2022-06-12 RX ADMIN — Medication 100 MILLIGRAM(S): at 05:33

## 2022-06-12 RX ADMIN — Medication 40 MILLIGRAM(S): at 11:15

## 2022-06-12 RX ADMIN — Medication 3: at 11:57

## 2022-06-12 RX ADMIN — SIMVASTATIN 40 MILLIGRAM(S): 20 TABLET, FILM COATED ORAL at 21:41

## 2022-06-12 RX ADMIN — Medication 100 MILLIGRAM(S): at 21:41

## 2022-06-12 RX ADMIN — Medication 30 MILLILITER(S): at 08:26

## 2022-06-12 RX ADMIN — LOSARTAN POTASSIUM 25 MILLIGRAM(S): 100 TABLET, FILM COATED ORAL at 05:32

## 2022-06-12 RX ADMIN — Medication 5: at 16:58

## 2022-06-12 RX ADMIN — CEFTRIAXONE 100 MILLIGRAM(S): 500 INJECTION, POWDER, FOR SOLUTION INTRAMUSCULAR; INTRAVENOUS at 06:49

## 2022-06-12 RX ADMIN — GABAPENTIN 100 MILLIGRAM(S): 400 CAPSULE ORAL at 05:35

## 2022-06-12 RX ADMIN — Medication 10 MILLILITER(S): at 21:41

## 2022-06-12 RX ADMIN — POTASSIUM PHOSPHATE, MONOBASIC POTASSIUM PHOSPHATE, DIBASIC 62.5 MILLIMOLE(S): 236; 224 INJECTION, SOLUTION INTRAVENOUS at 11:15

## 2022-06-12 RX ADMIN — Medication 200 MILLIGRAM(S): at 11:28

## 2022-06-12 RX ADMIN — AZITHROMYCIN 255 MILLIGRAM(S): 500 TABLET, FILM COATED ORAL at 05:33

## 2022-06-12 NOTE — DISCHARGE NOTE PROVIDER - CARE PROVIDER_API CALL
Nikunj Hernandes)  Internal Medicine; Pulmonary Disease  [address] [place] [address]  Phone: [phone]  Fax: [phone]  Follow Up Time:     Evgeny Alvarado  HEMATOLOGY  [address] [facility], [address]  [place] [address]  Phone: [phone]  Fax: [phone]  Follow Up Time:     Cristina Bonds  CARDIOVASCULAR DISEASE  [address] [place] [address]  Phone: [phone]  Fax: [phone]  Follow Up Time:

## 2022-06-12 NOTE — DISCHARGE NOTE PROVIDER - NSDCCPCAREPLAN_GEN_ALL_CORE_FT
PRINCIPAL DISCHARGE DIAGNOSIS  Diagnosis: Pneumonia  Assessment and Plan of Treatment:       SECONDARY DISCHARGE DIAGNOSES  Diagnosis: Hypertension  Assessment and Plan of Treatment:     Diagnosis: Hyperlipidemia  Assessment and Plan of Treatment:     Diagnosis: Multiple myeloma  Assessment and Plan of Treatment:     Diagnosis: Diabetes mellitus  Assessment and Plan of Treatment:      PRINCIPAL DISCHARGE DIAGNOSIS  Diagnosis: Pneumonia  Assessment and Plan of Treatment: Pneumonia is a lung infection that can cause a fever, cough, and trouble breathing.  Continue all antibiotics as ordered until complete.  Nutrition is important, eat small frequent meals.  Get lots of rest and drink fluids.  Call your health care provider upon arrival home from hospital and make a follow up appointment for one week.  If your cough worsens, you develop fever greater than 101', you have shaking chills, a fast heartbeat, trouble breathing and/or feel your are breathing much faster than usual, call your healthcare provider.  Make sure you wash your hands frequently.        SECONDARY DISCHARGE DIAGNOSES  Diagnosis: Hypertension  Assessment and Plan of Treatment: You are being treated for high blood pressure.  Continue taking your medication as prescribed to help prevent or minimize your risk of end organ damage.  Follow up with your doctor for routine blood pressure evaluation and medication regimen.  Report any symptoms of headaces with nausea or vomiting, visual changes, heart palpitation, chest pain or shortness.      Diagnosis: Hyperlipidemia  Assessment and Plan of Treatment: Continue taking yourmedication as prescribed and follow up with your doctor for routine blood work to evaluate your liver function and your lipid profile.  Report any symptoms of muscle cramps, abdominal pain with nausea or vomiting, changes in the color of your skin or yellowing of your eyes.    Diagnosis: Multiple myeloma  Assessment and Plan of Treatment: Please follow up with your oncologist Dr. Andrade as outpatient for your ongoing managament of your multiple myeloma.  Report any new symptoms of fever or chills, temperature of 101.0F or higher, chest pain or shortness of breath, bleeding gum or bloody nose, headaches with nausea, vomiting or visual changes.    Diagnosis: Diabetes mellitus  Assessment and Plan of Treatment: Continue taking your medication as prescribed and follow up with your doctor.  Report signs and symptoms of hypo and hyperglycemia to your doctor such as dizziness, lightheadedness, increasing thirst, appetite and increase urination, cold, clammy skin.

## 2022-06-12 NOTE — PROGRESS NOTE ADULT - PROBLEM SELECTOR PLAN 1
Panculture antibiotics  Follow up CXR  monitor oxygen stat  Monitor Respiratory status  Bronchodilaors  IV dexamethasone    monitor WBCs Panculture antibiotics  Follow up CXR  monitor oxygen stat  Monitor Respiratory status  Bronchodilators  Steroids

## 2022-06-12 NOTE — DISCHARGE NOTE PROVIDER - HOSPITAL COURSE
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 Pt says the cough is sometimes dry, sometimes associated with clear sputum, nasal congestion+ Pt experiences SOB whenever she coughs, otherwise is comfortably ambulating at home. Also reports chest pain only while she coughs, appears pleuritic. Pt reports no leg pain/swelling, abdominal pain, nausea, vomiting, diarrhea, dysuria, constipation, fever, sick contacts, recent travel. COVID vaccinated x 3. Pt  has been taking Guaifenesin cough syrup outpatient.    Pt admitted for PNA and her chest xray showed a left lung infiltrate.  She was treated with Ceftriaxone and Azithromycin.  She was also followed by pulmonology and received two days of Solumedrol, albuterol and Mucinex with improvements in her symptoms.        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 Pt says the cough is sometimes dry, sometimes associated with clear sputum, nasal congestion+ Pt experiences SOB whenever she coughs, otherwise is comfortably ambulating at home. Also reports chest pain only while she coughs, appears pleuritic. Pt reports no leg pain/swelling, abdominal pain, nausea, vomiting, diarrhea, dysuria, constipation, fever, sick contacts, recent travel. COVID vaccinated x 3. Pt  has been taking Guaifenesin cough syrup outpatient.    Pt admitted for PNA and her chest xray showed a left lung infiltrate.  She was treated with Ceftriaxone and Azithromycin.  She was also followed by pulmonology and received two days of Solumedrol, Albuterol and Mucinex with improvements in her symptoms.   Her blood culture from 6/10 showed NGTD as of 6/12.      Please note that this a brief summary of hospital course, please refer to daily progress notes and consult notes for full course and events.         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 cxr with left lung infiltrate. Initially on Azithromycin and Ceftriaxone and steroids. blood cultures NTD. Urine cx growing Klebsiella ESBL and Antibiotics changed to Levofloxacin. d/c held pt c/o coughing and congestion and Steroid frequency modified.  hycodan added. repeat chest x ray negative for active pulmonary disease. Hemodynamically stable and afebrile. In no acute respiratory distress.       Please note that this a brief summary of hospital course, please refer to daily progress notes and consult notes for full course and events.

## 2022-06-12 NOTE — PROGRESS NOTE ADULT - SUBJECTIVE AND OBJECTIVE BOX
Patient is a 87y old  Female who presents with a chief complaint of Cough (2022 06:12)    Patient is alert , awake, laying comfortably in the bed in no acute distress. Patient is complaining of chest pain and dry cough.  INTERVAL HPI/OVERNIGHT EVENTS:      VITAL SIGNS:  T(F): 97 (22 @ 05:22)  HR: 76 (22 @ 05:22)  BP: 125/48 (22 @ 05:22)  RR: 20 (22 @ 05:22)  SpO2: 97% (22 @ 05:22)  Wt(kg): --  I&O's Detail          REVIEW OF SYSTEMS:    CONSTITUTIONAL:  No fevers, chills, sweats    HEENT:  Eyes:  No diplopia or blurred vision. ENT:  No earache, sore throat or runny nose.    CARDIOVASCULAR:  No pressure, squeezing, tightness, or heaviness about the chest; no palpitations.    RESPIRATORY:  Per HPI    GASTROINTESTINAL:  No abdominal pain, nausea, vomiting or diarrhea.    GENITOURINARY:  No dysuria, frequency or urgency.    NEUROLOGIC:  No paresthesias, fasciculations, seizures or weakness.    PSYCHIATRIC:  No disorder of thought or mood.      PHYSICAL EXAM:    Constitutional: Well developed and nourished  Eyes:Perrla  ENMT: normal  Neck:supple  Respiratory: good air entry  Cardiovascular: S1 S2 regular  Gastrointestinal: Soft, Non tender  Extremities: No edema  Vascular:normal  Neurological:Awake, alert,Ox3  Musculoskeletal:Normal      MEDICATIONS  (STANDING):  ALBUTerol    90 MICROgram(s) HFA Inhaler 2 Puff(s) Inhalation every 6 hours  azithromycin  IVPB 500 milliGRAM(s) IV Intermittent every 24 hours  benzonatate 100 milliGRAM(s) Oral every 8 hours  cefTRIAXone   IVPB 1000 milliGRAM(s) IV Intermittent every 24 hours  enoxaparin Injectable 40 milliGRAM(s) SubCutaneous every 24 hours  gabapentin 100 milliGRAM(s) Oral every 12 hours  insulin lispro (ADMELOG) corrective regimen sliding scale   SubCutaneous three times a day before meals  losartan 25 milliGRAM(s) Oral daily  methylPREDNISolone sodium succinate Injectable 40 milliGRAM(s) IV Push daily  simvastatin 40 milliGRAM(s) Oral at bedtime    MEDICATIONS  (PRN):  acetaminophen     Tablet .. 650 milliGRAM(s) Oral every 6 hours PRN Temp greater or equal to 38C (100.4F), Mild Pain (1 - 3)  aluminum hydroxide/magnesium hydroxide/simethicone Suspension 30 milliLiter(s) Oral every 4 hours PRN Dyspepsia  guaiFENesin Oral Liquid (Sugar-Free) 200 milliGRAM(s) Oral every 6 hours PRN Cough  meclizine 25 milliGRAM(s) Oral daily PRN Dizziness      Allergies    No Known Allergies    Intolerances        LABS:                        8.6    4.76  )-----------( 408      ( 2022 05:48 )             26.7     06-11    143  |  116<H>  |  33<H>  ----------------------------<  206<H>  4.4   |  15<L>  |  0.91    Ca    8.9      2022 05:48  Phos  3.4     -11  Mg     2.3     -11    TPro  6.3  /  Alb  2.2<L>  /  TBili  0.3  /  DBili  x   /  AST  16  /  ALT  21  /  AlkPhos  68  06-11    PT/INR - ( [address] [place] 2022 17:37 )   PT: 15.4 sec;   INR: 1.29 ratio         PTT - ( [address] [place] 2022 17:37 )  PTT:27.5 sec  Urinalysis Basic - ( 2022 00:42 )    Color: Yellow / Appearance: Clear / S.010 / pH: x  Gluc: x / Ketone: Trace  / Bili: Negative / Urobili: Negative   Blood: x / Protein: 30 mg/dL / Nitrite: Positive   Leuk Esterase: Negative / RBC: Negative /HPF / WBC 0-2 /HPF   Sq Epi: x / Non Sq Epi: Few /HPF / Bacteria: Moderate /HPF      ABG - ( 2022 03:47 )  pH, Arterial: 7.39  pH, Blood: x     /  pCO2: 23    /  pO2: 113   / HCO3: 14    / Base Excess: -9.0  /  SaO2: 99                    CAPILLARY BLOOD GLUCOSE      POCT Blood Glucose.: 190 mg/dL (2022 07:38)  POCT Blood Glucose.: 264 mg/dL (2022 21:24)  POCT Blood Glucose.: 251 mg/dL (2022 16:47)  POCT Blood Glucose.: 259 mg/dL (2022 11:13)    pro-bnp 1937 @ 17:37     d-dimer 1277  06-10 @ 17:37      RADIOLOGY & ADDITIONAL TESTS:    CXR:  < from: Xray Chest 1 View- PORTABLE-Urgent (06.10.22 @ 18:06) >  IMPRESSION:  Patchy left lung infiltrate.        Thank you for the courtesy of this referral.      < end of copied text >    Ct scan chest:    ekg;    echo: Patient is a 87y old  Female who presents with a chief complaint of Cough (2022 06:12)    Patient is alert , awake, comfortable out of the bed to chair  in no acute distress. Patient is complaining of chest pain and dry cough.  INTERVAL HPI/OVERNIGHT EVENTS:      VITAL SIGNS:  T(F): 97 (22 @ 05:22)  HR: 76 (22 @ 05:22)  BP: 125/48 (22 @ 05:22)  RR: 20 (22 @ 05:22)  SpO2: 97% (22 @ 05:22)  Wt(kg): --  I&O's Detail          REVIEW OF SYSTEMS:    CONSTITUTIONAL:  No fevers, chills, sweats    HEENT:  Eyes:  No diplopia or blurred vision. ENT:  No earache, sore throat or runny nose.    CARDIOVASCULAR:  No pressure, squeezing, tightness, or heaviness about the chest; no palpitations.    RESPIRATORY:  Per HPI    GASTROINTESTINAL:  No abdominal pain, nausea, vomiting or diarrhea.    GENITOURINARY:  No dysuria, frequency or urgency.    NEUROLOGIC:  No paresthesias, fasciculations, seizures or weakness.    PSYCHIATRIC:  No disorder of thought or mood.      PHYSICAL EXAM:    Constitutional: Well developed and nourished  Eyes:Perrla  ENMT: normal  Neck:supple  Respiratory: good air entry  Cardiovascular: S1 S2 regular  Gastrointestinal: Soft, Non tender  Extremities: No edema  Vascular:normal  Neurological:Awake, alert,Ox3  Musculoskeletal:Normal      MEDICATIONS  (STANDING):  ALBUTerol    90 MICROgram(s) HFA Inhaler 2 Puff(s) Inhalation every 6 hours  azithromycin  IVPB 500 milliGRAM(s) IV Intermittent every 24 hours  benzonatate 100 milliGRAM(s) Oral every 8 hours  cefTRIAXone   IVPB 1000 milliGRAM(s) IV Intermittent every 24 hours  enoxaparin Injectable 40 milliGRAM(s) SubCutaneous every 24 hours  gabapentin 100 milliGRAM(s) Oral every 12 hours  insulin lispro (ADMELOG) corrective regimen sliding scale   SubCutaneous three times a day before meals  losartan 25 milliGRAM(s) Oral daily  methylPREDNISolone sodium succinate Injectable 40 milliGRAM(s) IV Push daily  simvastatin 40 milliGRAM(s) Oral at bedtime    MEDICATIONS  (PRN):  acetaminophen     Tablet .. 650 milliGRAM(s) Oral every 6 hours PRN Temp greater or equal to 38C (100.4F), Mild Pain (1 - 3)  aluminum hydroxide/magnesium hydroxide/simethicone Suspension 30 milliLiter(s) Oral every 4 hours PRN Dyspepsia  guaiFENesin Oral Liquid (Sugar-Free) 200 milliGRAM(s) Oral every 6 hours PRN Cough  meclizine 25 milliGRAM(s) Oral daily PRN Dizziness      Allergies    No Known Allergies    Intolerances        LABS:                        8.6    4.76  )-----------( 408      ( 2022 05:48 )             26.7     06-11    143  |  116<H>  |  33<H>  ----------------------------<  206<H>  4.4   |  15<L>  |  0.91    Ca    8.9      2022 05:48  Phos  3.4     -11  Mg     2.3     -11    TPro  6.3  /  Alb  2.2<L>  /  TBili  0.3  /  DBili  x   /  AST  16  /  ALT  21  /  AlkPhos  68  06-11    PT/INR - ( [address] [place] 2022 17:37 )   PT: 15.4 sec;   INR: 1.29 ratio         PTT - ( [address] [place] 2022 17:37 )  PTT:27.5 sec  Urinalysis Basic - ( 2022 00:42 )    Color: Yellow / Appearance: Clear / S.010 / pH: x  Gluc: x / Ketone: Trace  / Bili: Negative / Urobili: Negative   Blood: x / Protein: 30 mg/dL / Nitrite: Positive   Leuk Esterase: Negative / RBC: Negative /HPF / WBC 0-2 /HPF   Sq Epi: x / Non Sq Epi: Few /HPF / Bacteria: Moderate /HPF      ABG - ( 2022 03:47 )  pH, Arterial: 7.39  pH, Blood: x     /  pCO2: 23    /  pO2: 113   / HCO3: 14    / Base Excess: -9.0  /  SaO2: 99                    CAPILLARY BLOOD GLUCOSE      POCT Blood Glucose.: 190 mg/dL (2022 07:38)  POCT Blood Glucose.: 264 mg/dL (2022 21:24)  POCT Blood Glucose.: 251 mg/dL (2022 16:47)  POCT Blood Glucose.: 259 mg/dL (2022 11:13)    pro-bnp 1937 @ 17:37     d-dimer 1277  06-10 @ 17:37      RADIOLOGY & ADDITIONAL TESTS:    CXR:  < from: Xray Chest 1 View- PORTABLE-Urgent (06.10.22 @ 18:06) >  IMPRESSION:  Patchy left lung infiltrate.        Thank you for the courtesy of this referral.      < end of copied text >    Ct scan chest:    ekg;    echo:

## 2022-06-12 NOTE — DISCHARGE NOTE PROVIDER - NSDCMRMEDTOKEN_GEN_ALL_CORE_FT
acetaminophen 500 mg oral tablet: 2 tab(s) orally every 8 hours  Alogliptin: 25 milligram(s) orally once a day  Fosamax 70 mg oral tablet: 1 tab(s) orally once a week  gabapentin 100 mg oral capsule: 1 cap(s) orally every 12 hours  lidocaine 4% topical film: Apply topically to affected area every 12 hours  losartan 25 mg oral tablet: 1 tab(s) orally once a day  meclizine 25 mg oral tablet: 1 tab(s) orally once a day, As Needed  metFORMIN 1000 mg oral tablet: 1 tab(s) orally 2 times a day  simvastatin 40 mg oral tablet: 1 tab(s) orally once a day (at bedtime)  Steglatro 15 mg oral tablet: 1 tab(s) orally once a day (in the morning)  Vitamin D3 1250 mcg (50,000 intl units) oral capsule: 1 cap(s) orally once a week   acetaminophen 500 mg oral tablet: 2 tab(s) orally every 8 hours  Alogliptin: 25 milligram(s) orally once a day  Fosamax 70 mg oral tablet: 1 tab(s) orally once a week  gabapentin 100 mg oral capsule: 1 cap(s) orally every 12 hours  guaiFENesin 600 mg oral tablet, extended release: 1 tab(s) orally every 12 hours  levoFLOXacin 500 mg oral tablet: 1 tab(s) orally once a day MDD:1  lidocaine 4% topical film: Apply topically to affected area every 12 hours  losartan 25 mg oral tablet: 1 tab(s) orally once a day  meclizine 25 mg oral tablet: 1 tab(s) orally once a day, As Needed  metFORMIN 1000 mg oral tablet: 1 tab(s) orally 2 times a day  predniSONE 10 mg oral tablet: 4 tab(s) orally once a day x 1 days  3 tab(s) orally once a day x 1 days  2 tab(s) orally once a day x 1 days  1 tab(s) orally once a day x 1 days  simvastatin 40 mg oral tablet: 1 tab(s) orally once a day (at bedtime)  Steglatro 15 mg oral tablet: 1 tab(s) orally once a day (in the morning)  Vitamin D3 1250 mcg (50,000 intl units) oral capsule: 1 cap(s) orally once a week

## 2022-06-12 NOTE — PROGRESS NOTE ADULT - SUBJECTIVE AND OBJECTIVE BOX
Patient is a 87y old  Female who presents with a chief complaint of Cough (11 Jun 2022 10:45)    pt seen in icu [  ], reg med floor [   ], bed [  ], chair at bedside [   ], a+o x3 [  ], lethargic [  ],  nad [  ]    shea [  ], ngt [  ], peg [  ], et tube [  ], cent line [  ], picc line [  ]        Allergies    No Known Allergies        Vitals    T(F): 97 (06-12-22 @ 05:22), Max: 98.1 (06-11-22 @ 06:24)  HR: 76 (06-12-22 @ 05:22) (72 - 82)  BP: 125/48 (06-12-22 @ 05:22) (116/54 - 133/56)  RR: 20 (06-12-22 @ 05:22) (18 - 20)  SpO2: 97% (06-12-22 @ 05:22) (97% - 100%)  Wt(kg): --  CAPILLARY BLOOD GLUCOSE      POCT Blood Glucose.: 264 mg/dL (11 Jun 2022 21:24)      Labs                          8.6    4.76  )-----------( 408      ( 11 Jun 2022 05:48 )             26.7       06-11    143  |  116<H>  |  33<H>  ----------------------------<  206<H>  4.4   |  15<L>  |  0.91    Ca    8.9      11 Jun 2022 05:48  Phos  3.4     06-11  Mg     2.3     06-11    TPro  6.3  /  Alb  2.2<L>  /  TBili  0.3  /  DBili  x   /  AST  16  /  ALT  21  /  AlkPhos  68  06-11          Troponin I, High Sensitivity Result: 22.0 ng/L (06-11-22 @ 01:28)  Troponin I, High Sensitivity Result: 33.8 ng/L (06-10-22 @ 17:37)    .Blood Blood-Peripheral  06-10 @ 21:32   No growth to date.  --  --      Clean Catch Clean Catch (Midstream)  05-13 @ 03:42   >100,000 CFU/ml Klebsiella pneumoniae ESBL  --  Klebsiella pneumoniae ESBL      .Blood Blood-Peripheral  05-13 @ 03:39   No Growth Final  --  --      Clean Catch Clean Catch (Midstream)  04-06 @ 06:18   <10,000 CFU/mL Normal Urogenital Natalie  --  --      .Blood Blood-Peripheral  04-06 @ 06:04   No Growth Final  --  --          Radiology Results      Meds    MEDICATIONS  (STANDING):  azithromycin  IVPB 500 milliGRAM(s) IV Intermittent every 24 hours  benzonatate 100 milliGRAM(s) Oral every 8 hours  cefTRIAXone   IVPB 1000 milliGRAM(s) IV Intermittent every 24 hours  enoxaparin Injectable 40 milliGRAM(s) SubCutaneous every 24 hours  gabapentin 100 milliGRAM(s) Oral every 12 hours  insulin lispro (ADMELOG) corrective regimen sliding scale   SubCutaneous three times a day before meals  losartan 25 milliGRAM(s) Oral daily  simvastatin 40 milliGRAM(s) Oral at bedtime      MEDICATIONS  (PRN):  acetaminophen     Tablet .. 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   tessalon perles,   cont preadmit home meds,   gi and dvt prophylaxis  cbc,   bmp,   mg,   phos,   lipids,   tsh,   bld cx,          pulm cons  heme-onc cons .   Patient is a 87y old  Female who presents with a chief complaint of Cough (11 Jun 2022 10:45)    pt seen in icu [  ], reg med floor [  x ], bed [ x ], chair at bedside [   ], a+o x3 [ x ], lethargic [  ],  nad [ x ]        Allergies    No Known Allergies        Vitals    T(F): 97 (06-12-22 @ 05:22), Max: 98.1 (06-11-22 @ 06:24)  HR: 76 (06-12-22 @ 05:22) (72 - 82)  BP: 125/48 (06-12-22 @ 05:22) (116/54 - 133/56)  RR: 20 (06-12-22 @ 05:22) (18 - 20)  SpO2: 97% (06-12-22 @ 05:22) (97% - 100%)  Wt(kg): --  CAPILLARY BLOOD GLUCOSE      POCT Blood Glucose.: 264 mg/dL (11 Jun 2022 21:24)      Labs                          8.6    4.76  )-----------( 408      ( 11 Jun 2022 05:48 )             26.7       06-11    143  |  116<H>  |  33<H>  ----------------------------<  206<H>  4.4   |  15<L>  |  0.91    Ca    8.9      11 Jun 2022 05:48  Phos  3.4     06-11  Mg     2.3     06-11    TPro  6.3  /  Alb  2.2<L>  /  TBili  0.3  /  DBili  x   /  AST  16  /  ALT  21  /  AlkPhos  68  06-11          Troponin I, High Sensitivity Result: 22.0 ng/L (06-11-22 @ 01:28)  Troponin I, High Sensitivity Result: 33.8 ng/L (06-10-22 @ 17:37)    .Blood Blood-Peripheral  06-10 @ 21:32   No growth to date.  --  --          Radiology Results    < from: Xray Chest 1 View- PORTABLE-Urgent (06.10.22 @ 18:06) >  IMPRESSION:  Patchy left lung infiltrate.    < end of copied text >      Meds    MEDICATIONS  (STANDING):  azithromycin  IVPB 500 milliGRAM(s) IV Intermittent every 24 hours  benzonatate 100 milliGRAM(s) Oral every 8 hours  cefTRIAXone   IVPB 1000 milliGRAM(s) IV Intermittent every 24 hours  enoxaparin Injectable 40 milliGRAM(s) SubCutaneous every 24 hours  gabapentin 100 milliGRAM(s) Oral every 12 hours  insulin lispro (ADMELOG) corrective regimen sliding scale   SubCutaneous three times a day before meals  losartan 25 milliGRAM(s) Oral daily  simvastatin 40 milliGRAM(s) Oral at bedtime      MEDICATIONS  (PRN):  acetaminophen     Tablet .. 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   tessalon perles,   pulm f/u   solumedrol 40 mg q 8  blood cx neg noted above  heme-onc cons .  cont current meds

## 2022-06-13 DIAGNOSIS — J18.9 PNEUMONIA, UNSPECIFIED ORGANISM: ICD-10-CM

## 2022-06-13 DIAGNOSIS — Z02.9 ENCOUNTER FOR ADMINISTRATIVE EXAMINATIONS, UNSPECIFIED: ICD-10-CM

## 2022-06-13 LAB
-  AMIKACIN: SIGNIFICANT CHANGE UP
-  AMOXICILLIN/CLAVULANIC ACID: SIGNIFICANT CHANGE UP
-  AMPICILLIN/SULBACTAM: SIGNIFICANT CHANGE UP
-  AMPICILLIN: SIGNIFICANT CHANGE UP
-  AZTREONAM: SIGNIFICANT CHANGE UP
-  CEFAZOLIN: SIGNIFICANT CHANGE UP
-  CEFEPIME: SIGNIFICANT CHANGE UP
-  CEFTRIAXONE: SIGNIFICANT CHANGE UP
-  CIPROFLOXACIN: SIGNIFICANT CHANGE UP
-  ERTAPENEM: SIGNIFICANT CHANGE UP
-  GENTAMICIN: SIGNIFICANT CHANGE UP
-  IMIPENEM: SIGNIFICANT CHANGE UP
-  LEVOFLOXACIN: SIGNIFICANT CHANGE UP
-  MEROPENEM: SIGNIFICANT CHANGE UP
-  NITROFURANTOIN: SIGNIFICANT CHANGE UP
-  PIPERACILLIN/TAZOBACTAM: SIGNIFICANT CHANGE UP
-  TIGECYCLINE: SIGNIFICANT CHANGE UP
-  TOBRAMYCIN: SIGNIFICANT CHANGE UP
-  TRIMETHOPRIM/SULFAMETHOXAZOLE: SIGNIFICANT CHANGE UP
ANION GAP SERPL CALC-SCNC: 9 MMOL/L — SIGNIFICANT CHANGE UP (ref 5–17)
BASOPHILS # BLD AUTO: 0.01 K/UL — SIGNIFICANT CHANGE UP (ref 0–0.2)
BASOPHILS NFR BLD AUTO: 0.2 % — SIGNIFICANT CHANGE UP (ref 0–2)
BUN SERPL-MCNC: 30 MG/DL — HIGH (ref 7–18)
CALCIUM SERPL-MCNC: 8.6 MG/DL — SIGNIFICANT CHANGE UP (ref 8.4–10.5)
CHLORIDE SERPL-SCNC: 111 MMOL/L — HIGH (ref 96–108)
CO2 SERPL-SCNC: 18 MMOL/L — LOW (ref 22–31)
CREAT SERPL-MCNC: 0.78 MG/DL — SIGNIFICANT CHANGE UP (ref 0.5–1.3)
CULTURE RESULTS: SIGNIFICANT CHANGE UP
EGFR: 74 ML/MIN/1.73M2 — SIGNIFICANT CHANGE UP
EOSINOPHIL # BLD AUTO: 0 K/UL — SIGNIFICANT CHANGE UP (ref 0–0.5)
EOSINOPHIL NFR BLD AUTO: 0 % — SIGNIFICANT CHANGE UP (ref 0–6)
GLUCOSE BLDC GLUCOMTR-MCNC: 202 MG/DL — HIGH (ref 70–99)
GLUCOSE BLDC GLUCOMTR-MCNC: 283 MG/DL — HIGH (ref 70–99)
GLUCOSE BLDC GLUCOMTR-MCNC: 295 MG/DL — HIGH (ref 70–99)
GLUCOSE SERPL-MCNC: 231 MG/DL — HIGH (ref 70–99)
HCT VFR BLD CALC: 26.6 % — LOW (ref 34.5–45)
HGB BLD-MCNC: 8.8 G/DL — LOW (ref 11.5–15.5)
IMM GRANULOCYTES NFR BLD AUTO: 0.2 % — SIGNIFICANT CHANGE UP (ref 0–1.5)
LYMPHOCYTES # BLD AUTO: 1.34 K/UL — SIGNIFICANT CHANGE UP (ref 1–3.3)
LYMPHOCYTES # BLD AUTO: 31.1 % — SIGNIFICANT CHANGE UP (ref 13–44)
MAGNESIUM SERPL-MCNC: 2 MG/DL — SIGNIFICANT CHANGE UP (ref 1.6–2.6)
MCHC RBC-ENTMCNC: 28.9 PG — SIGNIFICANT CHANGE UP (ref 27–34)
MCHC RBC-ENTMCNC: 33.1 GM/DL — SIGNIFICANT CHANGE UP (ref 32–36)
MCV RBC AUTO: 87.5 FL — SIGNIFICANT CHANGE UP (ref 80–100)
METHOD TYPE: SIGNIFICANT CHANGE UP
MONOCYTES # BLD AUTO: 0.28 K/UL — SIGNIFICANT CHANGE UP (ref 0–0.9)
MONOCYTES NFR BLD AUTO: 6.5 % — SIGNIFICANT CHANGE UP (ref 2–14)
MRSA PCR RESULT.: SIGNIFICANT CHANGE UP
NEUTROPHILS # BLD AUTO: 2.67 K/UL — SIGNIFICANT CHANGE UP (ref 1.8–7.4)
NEUTROPHILS NFR BLD AUTO: 62 % — SIGNIFICANT CHANGE UP (ref 43–77)
NRBC # BLD: 0 /100 WBCS — SIGNIFICANT CHANGE UP (ref 0–0)
ORGANISM # SPEC MICROSCOPIC CNT: SIGNIFICANT CHANGE UP
ORGANISM # SPEC MICROSCOPIC CNT: SIGNIFICANT CHANGE UP
PHOSPHATE SERPL-MCNC: 2.4 MG/DL — LOW (ref 2.5–4.5)
PLATELET # BLD AUTO: 450 K/UL — HIGH (ref 150–400)
POTASSIUM SERPL-MCNC: 4.1 MMOL/L — SIGNIFICANT CHANGE UP (ref 3.5–5.3)
POTASSIUM SERPL-SCNC: 4.1 MMOL/L — SIGNIFICANT CHANGE UP (ref 3.5–5.3)
RBC # BLD: 3.04 M/UL — LOW (ref 3.8–5.2)
RBC # FLD: 17 % — HIGH (ref 10.3–14.5)
S AUREUS DNA NOSE QL NAA+PROBE: SIGNIFICANT CHANGE UP
SODIUM SERPL-SCNC: 138 MMOL/L — SIGNIFICANT CHANGE UP (ref 135–145)
SPECIMEN SOURCE: SIGNIFICANT CHANGE UP
WBC # BLD: 4.31 K/UL — SIGNIFICANT CHANGE UP (ref 3.8–10.5)
WBC # FLD AUTO: 4.31 K/UL — SIGNIFICANT CHANGE UP (ref 3.8–10.5)

## 2022-06-13 PROCEDURE — 71045 X-RAY EXAM CHEST 1 VIEW: CPT | Mod: 26

## 2022-06-13 RX ADMIN — AZITHROMYCIN 255 MILLIGRAM(S): 500 TABLET, FILM COATED ORAL at 06:37

## 2022-06-13 RX ADMIN — Medication 10 MILLILITER(S): at 05:24

## 2022-06-13 RX ADMIN — Medication 10 MILLILITER(S): at 20:45

## 2022-06-13 RX ADMIN — Medication 100 MILLIGRAM(S): at 22:09

## 2022-06-13 RX ADMIN — Medication 600 MILLIGRAM(S): at 17:04

## 2022-06-13 RX ADMIN — Medication 100 MILLIGRAM(S): at 13:23

## 2022-06-13 RX ADMIN — CEFTRIAXONE 100 MILLIGRAM(S): 500 INJECTION, POWDER, FOR SOLUTION INTRAMUSCULAR; INTRAVENOUS at 05:16

## 2022-06-13 RX ADMIN — Medication 3: at 11:50

## 2022-06-13 RX ADMIN — Medication 2: at 08:16

## 2022-06-13 RX ADMIN — Medication 100 MILLIGRAM(S): at 05:19

## 2022-06-13 RX ADMIN — Medication 3: at 17:04

## 2022-06-13 RX ADMIN — GABAPENTIN 100 MILLIGRAM(S): 400 CAPSULE ORAL at 05:20

## 2022-06-13 RX ADMIN — GABAPENTIN 100 MILLIGRAM(S): 400 CAPSULE ORAL at 17:04

## 2022-06-13 RX ADMIN — SIMVASTATIN 40 MILLIGRAM(S): 20 TABLET, FILM COATED ORAL at 22:09

## 2022-06-13 RX ADMIN — ENOXAPARIN SODIUM 40 MILLIGRAM(S): 100 INJECTION SUBCUTANEOUS at 00:53

## 2022-06-13 RX ADMIN — LOSARTAN POTASSIUM 25 MILLIGRAM(S): 100 TABLET, FILM COATED ORAL at 05:19

## 2022-06-13 RX ADMIN — Medication 40 MILLIGRAM(S): at 05:17

## 2022-06-13 NOTE — PHYSICAL THERAPY INITIAL EVALUATION ADULT - GENERAL OBSERVATIONS, REHAB EVAL
Consult received, EMR, radiology and labs reviewed. Patient received supine in bed, NAD, Samoan speaking , Int ID [id number]. Patient agreed to EVALUATION from Physical Therapist.

## 2022-06-13 NOTE — PROGRESS NOTE ADULT - PROBLEM SELECTOR PLAN 1
- C/o coughing and congestion. Clear on auscultation.   - Mucinex BID, Robitussin and Tessalon Anupama   - Start Levofloxacin  - D/c Azithromycin   - CXR  - monitor oxygen and keep Sat > 94%  - Monitor Respiratory status  - Bronchodilators  - Steroids increased to Q 8 hrs x 2 days.   - Repeat COVID PCR

## 2022-06-13 NOTE — PROGRESS NOTE ADULT - SUBJECTIVE AND OBJECTIVE BOX
Patient is a 87y old  Female who presents with a chief complaint of Cough (13 Jun 2022 08:52)    Patient is awake, alert, comfortably out of bed in no acute distress. Still complains of dry cough; breathing well on room air.     INTERVAL HPI/OVERNIGHT EVENTS:      VITAL SIGNS:  T(F): 96.8 (06-13-22 @ 05:46)  HR: 96 (06-13-22 @ 05:46)  BP: 137/53 (06-13-22 @ 05:46)  RR: 20 (06-13-22 @ 05:46)  SpO2: 98% (06-13-22 @ 05:46)  Wt(kg): --  I&O's Detail          REVIEW OF SYSTEMS:    CONSTITUTIONAL:  No fevers, chills, sweats    HEENT:  Eyes:  No diplopia or blurred vision. ENT:  No earache, sore throat or runny nose.    CARDIOVASCULAR:  No pressure, squeezing, tightness, or heaviness about the chest; no palpitations.    RESPIRATORY:  Per HPI    GASTROINTESTINAL:  No abdominal pain, nausea, vomiting or diarrhea.    GENITOURINARY:  No dysuria, frequency or urgency.    NEUROLOGIC:  No paresthesias, fasciculations, seizures or weakness.    PSYCHIATRIC:  No disorder of thought or mood.      PHYSICAL EXAM:    Constitutional: Well developed and nourished  Eyes:Perrla  ENMT: normal  Neck:supple  Respiratory: good air entry  Cardiovascular: S1 S2 regular  Gastrointestinal: Soft, Non tender  Extremities: No edema  Vascular:normal  Neurological:Awake, alert,Ox3  Musculoskeletal:Normal      MEDICATIONS  (STANDING):  ALBUTerol    90 MICROgram(s) HFA Inhaler 2 Puff(s) Inhalation every 6 hours  azithromycin  IVPB 500 milliGRAM(s) IV Intermittent every 24 hours  benzonatate 100 milliGRAM(s) Oral every 8 hours  cefTRIAXone   IVPB 1000 milliGRAM(s) IV Intermittent every 24 hours  enoxaparin Injectable 40 milliGRAM(s) SubCutaneous every 24 hours  gabapentin 100 milliGRAM(s) Oral every 12 hours  insulin lispro (ADMELOG) corrective regimen sliding scale   SubCutaneous three times a day before meals  losartan 25 milliGRAM(s) Oral daily  simvastatin 40 milliGRAM(s) Oral at bedtime    MEDICATIONS  (PRN):  acetaminophen     Tablet .. 650 milliGRAM(s) Oral every 6 hours PRN Temp greater or equal to 38C (100.4F), Mild Pain (1 - 3)  aluminum hydroxide/magnesium hydroxide/simethicone Suspension 30 milliLiter(s) Oral every 4 hours PRN Dyspepsia  guaifenesin/dextromethorphan Oral Liquid 10 milliLiter(s) Oral every 4 hours PRN Cough  meclizine 25 milliGRAM(s) Oral daily PRN Dizziness      Allergies    No Known Allergies    Intolerances        LABS:                        8.8    4.31  )-----------( 450      ( 13 Jun 2022 08:27 )             26.6     06-13    138  |  111<H>  |  30<H>  ----------------------------<  231<H>  4.1   |  18<L>  |  0.78    Ca    8.6      13 Jun 2022 08:27  Phos  2.4     06-13  Mg     2.0     06-13                CAPILLARY BLOOD GLUCOSE      POCT Blood Glucose.: 202 mg/dL (13 Jun 2022 08:03)  POCT Blood Glucose.: 247 mg/dL (12 Jun 2022 21:38)  POCT Blood Glucose.: 363 mg/dL (12 Jun 2022 16:30)  POCT Blood Glucose.: 265 mg/dL (12 Jun 2022 11:39)    pro-bnp 1937 @ 17:37     d-dimer 1277  06-10 @ 17:37      RADIOLOGY & ADDITIONAL TESTS:    CXR:    Ct scan chest:    ekg;    echo: Patient is a 87y old  Female who presents with a chief complaint of Cough (13 Jun 2022 08:52)    Patient is awake, alert, comfortably out of bed in no acute distress. Still complains of dry cough; breathing well on room air.     INTERVAL HPI/OVERNIGHT EVENTS:      VITAL SIGNS:  T(F): 96.8 (06-13-22 @ 05:46)  HR: 96 (06-13-22 @ 05:46)  BP: 137/53 (06-13-22 @ 05:46)  RR: 20 (06-13-22 @ 05:46)  SpO2: 98% (06-13-22 @ 05:46)  Wt(kg): --  I&O's Detail          REVIEW OF SYSTEMS:    CONSTITUTIONAL:  No fevers, chills, sweats    HEENT:  Eyes:  No diplopia or blurred vision. ENT:  No earache, sore throat or runny nose.    CARDIOVASCULAR:  No pressure, squeezing, tightness, or heaviness about the chest; no palpitations.    RESPIRATORY:  Per HPI    GASTROINTESTINAL:  No abdominal pain, nausea, vomiting or diarrhea.    GENITOURINARY:  No dysuria, frequency or urgency.    NEUROLOGIC:  No paresthesias, fasciculations, seizures or weakness.    PSYCHIATRIC:  No disorder of thought or mood.      PHYSICAL EXAM:    Constitutional: Well developed and nourished  Eyes:Perrla  ENMT: normal  Neck:supple  Respiratory: good air entry  Cardiovascular: S1 S2 regular  Gastrointestinal: Soft, Non tender  Extremities: No edema  Vascular:normal  Neurological:Awake, alert,Ox3  Musculoskeletal:Normal      MEDICATIONS  (STANDING):  ALBUTerol    90 MICROgram(s) HFA Inhaler 2 Puff(s) Inhalation every 6 hours  azithromycin  IVPB 500 milliGRAM(s) IV Intermittent every 24 hours  benzonatate 100 milliGRAM(s) Oral every 8 hours  cefTRIAXone   IVPB 1000 milliGRAM(s) IV Intermittent every 24 hours  enoxaparin Injectable 40 milliGRAM(s) SubCutaneous every 24 hours  gabapentin 100 milliGRAM(s) Oral every 12 hours  insulin lispro (ADMELOG) corrective regimen sliding scale   SubCutaneous three times a day before meals  losartan 25 milliGRAM(s) Oral daily  simvastatin 40 milliGRAM(s) Oral at bedtime    MEDICATIONS  (PRN):  acetaminophen     Tablet .. 650 milliGRAM(s) Oral every 6 hours PRN Temp greater or equal to 38C (100.4F), Mild Pain (1 - 3)  aluminum hydroxide/magnesium hydroxide/simethicone Suspension 30 milliLiter(s) Oral every 4 hours PRN Dyspepsia  guaifenesin/dextromethorphan Oral Liquid 10 milliLiter(s) Oral every 4 hours PRN Cough  meclizine 25 milliGRAM(s) Oral daily PRN Dizziness      Allergies    No Known Allergies    Intolerances        LABS:                        8.8    4.31  )-----------( 450      ( 13 Jun 2022 08:27 )             26.6     06-13    138  |  111<H>  |  30<H>  ----------------------------<  231<H>  4.1   |  18<L>  |  0.78    Ca    8.6      13 Jun 2022 08:27  Phos  2.4     06-13  Mg     2.0     06-13        Culture Results:   10,000 - 49,000 CFU/mL Klebsiella pneumoniae ESBL   <10,000 CFU/ml Normal Urogenital sean present   Organism Identification: Klebsiella pneumoniae ESBL   Organism: Klebsiella pneumoniae ESBL   Method Type: RYAN Culture - Blood (06.10.22 @ 21:32)   Specimen Source: .Blood Blood-Peripheral   Culture Results:   No growth to date. Culture - Blood (06.10.22 @ 21:32)   Specimen Source: .Blood Blood-Peripheral   Culture Results:   No growth to date.         CAPILLARY BLOOD GLUCOSE      POCT Blood Glucose.: 202 mg/dL (13 Jun 2022 08:03)  POCT Blood Glucose.: 247 mg/dL (12 Jun 2022 21:38)  POCT Blood Glucose.: 363 mg/dL (12 Jun 2022 16:30)  POCT Blood Glucose.: 265 mg/dL (12 Jun 2022 11:39)    pro-bnp 1937 @ 17:37     d-dimer 1277  06-10 @ 17:37      RADIOLOGY & ADDITIONAL TESTS:    CXR:    Ct scan chest:    ekg;    echo:

## 2022-06-13 NOTE — PROGRESS NOTE ADULT - NUTRITIONAL ASSESSMENT
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 Initially on Azithromycin and Ceftriaxone and steroids. Urine cx growing Klebsiella ESBL and Antibiotics changed to Levofloxacin.     No events overnight. C/o coughing and congestion and Steroid frequency modified. Hemodynamically stable and afebrile. In no acute respiratory distress.

## 2022-06-13 NOTE — PROGRESS NOTE ADULT - PROBLEM SELECTOR PLAN 4
-s/p gastrectomy in  2015  -hem/onc follow up -s/p gastrectomy in  2015  -hem/onc follow up Dr Adams.

## 2022-06-13 NOTE — PROGRESS NOTE ADULT - PROBLEM SELECTOR PLAN 1
Panculture antibiotics  Follow up CXR  monitor oxygen stat  Monitor Respiratory status  Bronchodilators  Steroids Cultures noted   antibiotics  Follow up CXR  monitor oxygen stat  Monitor Respiratory status  Bronchodilators  Steroids

## 2022-06-13 NOTE — PROGRESS NOTE ADULT - SUBJECTIVE AND OBJECTIVE BOX
Patient is a 87y old  Female who presents with a chief complaint of Cough (12 Jun 2022 16:14)    pt seen in icu [  ], reg med floor [   ], bed [  ], chair at bedside [   ], a+o x3 [  ], lethargic [  ],  nad [  ]    shea [  ], ngt [  ], peg [  ], et tube [  ], cent line [  ], picc line [  ]        Allergies    No Known Allergies        Vitals    T(F): 96.8 (06-13-22 @ 05:46), Max: 97.6 (06-12-22 @ 20:53)  HR: 96 (06-13-22 @ 05:46) (76 - 96)  BP: 137/53 (06-13-22 @ 05:46) (128/48 - 141/61)  RR: 20 (06-13-22 @ 05:46) (18 - 20)  SpO2: 98% (06-13-22 @ 05:46) (97% - 99%)  Wt(kg): --  CAPILLARY BLOOD GLUCOSE      POCT Blood Glucose.: 202 mg/dL (13 Jun 2022 08:03)      Labs                          8.8    4.31  )-----------( 450      ( 13 Jun 2022 08:27 )             26.6       06-13    138  |  111<H>  |  30<H>  ----------------------------<  231<H>  4.1   |  18<L>  |  x     Ca    8.6      13 Jun 2022 08:27  Phos  2.1     06-12  Mg     2.0     06-13            Troponin I, High Sensitivity Result: 22.0 ng/L (06-11-22 @ 01:28)  Troponin I, High Sensitivity Result: 33.8 ng/L (06-10-22 @ 17:37)    Clean Catch Clean Catch (Midstream)  06-11 @ 06:08   10,000 - 49,000 CFU/mL Klebsiella pneumoniae ESBL  <10,000 CFU/ml Normal Urogenital sean present  --  Klebsiella pneumoniae ESBL      .Blood Blood-Peripheral  06-10 @ 21:32   No growth to date.  --  --      Clean Catch Clean Catch (Midstream)  05-13 @ 03:42   >100,000 CFU/ml Klebsiella pneumoniae ESBL  --  Klebsiella pneumoniae ESBL      .Blood Blood-Peripheral  05-13 @ 03:39   No Growth Final  --  --      Clean Catch Clean Catch (Midstream)  04-06 @ 06:18   <10,000 CFU/mL Normal Urogenital Sean  --  --      .Blood Blood-Peripheral  04-06 @ 06:04   No Growth Final  --  --          Radiology Results      Meds    MEDICATIONS  (STANDING):  ALBUTerol    90 MICROgram(s) HFA Inhaler 2 Puff(s) Inhalation every 6 hours  azithromycin  IVPB 500 milliGRAM(s) IV Intermittent every 24 hours  benzonatate 100 milliGRAM(s) Oral every 8 hours  cefTRIAXone   IVPB 1000 milliGRAM(s) IV Intermittent every 24 hours  enoxaparin Injectable 40 milliGRAM(s) SubCutaneous every 24 hours  gabapentin 100 milliGRAM(s) Oral every 12 hours  insulin lispro (ADMELOG) corrective regimen sliding scale   SubCutaneous three times a day before meals  losartan 25 milliGRAM(s) Oral daily  methylPREDNISolone sodium succinate Injectable 40 milliGRAM(s) IV Push daily  simvastatin 40 milliGRAM(s) Oral at bedtime      MEDICATIONS  (PRN):  acetaminophen     Tablet .. 650 milliGRAM(s) Oral every 6 hours PRN Temp greater or equal to 38C (100.4F), Mild Pain (1 - 3)  aluminum hydroxide/magnesium hydroxide/simethicone Suspension 30 milliLiter(s) Oral every 4 hours PRN Dyspepsia  guaifenesin/dextromethorphan Oral Liquid 10 milliLiter(s) Oral every 4 hours PRN Cough  meclizine 25 milliGRAM(s) Oral daily PRN Dizziness      Physical Exam    Neuro :  no focal deficits  Respiratory: CTA B/L  CV: RRR, S1S2, no murmurs,   Abdominal: Soft, NT, ND +BS,  Extremities: No edema, + peripheral pulses    ASSESSMENT    Supervision of other normal pregnancy, antepartum    HTN (hypertension)    DM (diabetes mellitus)    Hypercholesteremia    Gastric adenocarcinoma    Vertigo    Dementia    S/P hysterectomy    S/P tubal ligation        PLAN     Patient is a 87y old  Female who presents with a chief complaint of Cough (12 Jun 2022 16:14)    pt seen in icu [  ], reg med floor [  x ], bed [  ], chair at bedside [ x  ], a+o x3 [ x ], lethargic [  ],  nad [ x ]        Allergies    No Known Allergies        Vitals    T(F): 96.8 (06-13-22 @ 05:46), Max: 97.6 (06-12-22 @ 20:53)  HR: 96 (06-13-22 @ 05:46) (76 - 96)  BP: 137/53 (06-13-22 @ 05:46) (128/48 - 141/61)  RR: 20 (06-13-22 @ 05:46) (18 - 20)  SpO2: 98% (06-13-22 @ 05:46) (97% - 99%)  Wt(kg): --  CAPILLARY BLOOD GLUCOSE      POCT Blood Glucose.: 202 mg/dL (13 Jun 2022 08:03)      Labs                          8.8    4.31  )-----------( 450      ( 13 Jun 2022 08:27 )             26.6       06-13    138  |  111<H>  |  30<H>  ----------------------------<  231<H>  4.1   |  18<L>  |  x     Ca    8.6      13 Jun 2022 08:27  Phos  2.1     06-12  Mg     2.0     06-13            Troponin I, High Sensitivity Result: 22.0 ng/L (06-11-22 @ 01:28)  Troponin I, High Sensitivity Result: 33.8 ng/L (06-10-22 @ 17:37)    Clean Catch Clean Catch (Midstream)  06-11 @ 06:08   10,000 - 49,000 CFU/mL Klebsiella pneumoniae ESBL  <10,000 CFU/ml Normal Urogenital sean present  --  Klebsiella pneumoniae ESBL  - Amikacin: S <=16   - Amoxicillin/Clavulanic Acid: S <=8/4   - Ampicillin: R >16 These ampicillin results predict results for amoxicillin   - Aztreonam: R >16   - Ampicillin/Sulbactam: I 16/8 Enterobacter, Klebsiella aerogenes, Citrobacter, and Serratia may develop resistance during prolonged therapy (3-4 days)   - Cefazolin: R >16 (MIC_CL_COM_ENTERIC_CEFAZU) For uncomplicated UTI with K. pneumoniae, E. coli, or P. mirablis: RYAN <=16 is sensitive and RYAN >=32 is resistant. This also predicts results for oral agents cefaclor, cefdinir, cefpodoxime, cefprozil, cefuroxime axetil, cephalexin and locarbef for uncomplicated UTI. Note that some isolates may be susceptible to these agents while testing resistant to cefazolin.   - Cefepime: R >16   - Ceftriaxone: R >32 Enterobacter, Klebsiella aerogenes, Citrobacter, and Serratia may develop resistance during prolonged therapy   - Ciprofloxacin: I 0.5   - Ertapenem: S <=0.5   - Gentamicin: S <=2   - Imipenem: S <=1   - Levofloxacin: S <=0.5   - Meropenem: S <=1   - Nitrofurantoin: I 64 Should not be used to treat pyelonephritis   - Piperacillin/Tazobactam: S <=8   - Tigecycline: S <=2   - Tobramycin: S <=2   - Trimethoprim/Sulfamethoxazole: R >2/38     .Blood Blood-Peripheral  06-10 @ 21:32   No growth to date.  --  --          Radiology Results      Meds    MEDICATIONS  (STANDING):  ALBUTerol    90 MICROgram(s) HFA Inhaler 2 Puff(s) Inhalation every 6 hours  azithromycin  IVPB 500 milliGRAM(s) IV Intermittent every 24 hours  benzonatate 100 milliGRAM(s) Oral every 8 hours  cefTRIAXone   IVPB 1000 milliGRAM(s) IV Intermittent every 24 hours  enoxaparin Injectable 40 milliGRAM(s) SubCutaneous every 24 hours  gabapentin 100 milliGRAM(s) Oral every 12 hours  insulin lispro (ADMELOG) corrective regimen sliding scale   SubCutaneous three times a day before meals  losartan 25 milliGRAM(s) Oral daily  methylPREDNISolone sodium succinate Injectable 40 milliGRAM(s) IV Push daily  simvastatin 40 milliGRAM(s) Oral at bedtime      MEDICATIONS  (PRN):  acetaminophen     Tablet .. 650 milliGRAM(s) Oral every 6 hours PRN Temp greater or equal to 38C (100.4F), Mild Pain (1 - 3)  aluminum hydroxide/magnesium hydroxide/simethicone Suspension 30 milliLiter(s) Oral every 4 hours PRN Dyspepsia  guaifenesin/dextromethorphan Oral Liquid 10 milliLiter(s) Oral every 4 hours PRN Cough  meclizine 25 milliGRAM(s) Oral daily PRN Dizziness      Physical Exam    Neuro :  no focal deficits  Respiratory: cta /L   CV: RRR, S1S2, no murmurs,   Abdominal: Soft, NT, ND +BS,  Extremities: No edema, + peripheral pulses    ASSESSMENT    cough,   pneumonia  h/o HTN,   HLD,   DM,   Osteoporosis,   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   start levaquin 500 mg daily x 7 days  tessalon perles,   cont mucinex prn  pulm f/u   change steroids to prednisone 40 mg daily and taper by 10 mg daily   blood cx neg noted above  heme-onc cons .  cont current meds

## 2022-06-13 NOTE — PROGRESS NOTE ADULT - SUBJECTIVE AND OBJECTIVE BOX
NP Note discussed with  Primary Attending    Patient is a 87y old  Female who presents with a chief complaint of Cough (13 Jun 2022 09:51)      INTERVAL HPI/OVERNIGHT EVENTS: no new complaints    MEDICATIONS  (STANDING):  ALBUTerol    90 MICROgram(s) HFA Inhaler 2 Puff(s) Inhalation every 6 hours  benzonatate 100 milliGRAM(s) Oral every 8 hours  enoxaparin Injectable 40 milliGRAM(s) SubCutaneous every 24 hours  gabapentin 100 milliGRAM(s) Oral every 12 hours  guaiFENesin  milliGRAM(s) Oral every 12 hours  insulin lispro (ADMELOG) corrective regimen sliding scale   SubCutaneous three times a day before meals  losartan 25 milliGRAM(s) Oral daily  simvastatin 40 milliGRAM(s) Oral at bedtime    MEDICATIONS  (PRN):  acetaminophen     Tablet .. 650 milliGRAM(s) Oral every 6 hours PRN Temp greater or equal to 38C (100.4F), Mild Pain (1 - 3)  aluminum hydroxide/magnesium hydroxide/simethicone Suspension 30 milliLiter(s) Oral every 4 hours PRN Dyspepsia  guaifenesin/dextromethorphan Oral Liquid 10 milliLiter(s) Oral every 4 hours PRN Cough  meclizine 25 milliGRAM(s) Oral daily PRN Dizziness      __________________________________________________  REVIEW OF SYSTEMS: coughing.     CONSTITUTIONAL: No fever,   EYES: no acute visual disturbances  NECK: No pain or stiffness  RESPIRATORY: No shortness of breath  CARDIOVASCULAR: No chest pain, no palpitations  GASTROINTESTINAL: No pain. No nausea or vomiting; No diarrhea   NEUROLOGICAL: No headache or numbness, no tremors  MUSCULOSKELETAL: No joint pain, no muscle pain  GENITOURINARY: no dysuria, no frequency, no hesitancy  PSYCHIATRY: no depression , no anxiety  ALL OTHER  ROS negative        Vital Signs Last 24 Hrs  T(C): 36.2 (13 Jun 2022 13:57), Max: 36.4 (12 Jun 2022 20:53)  T(F): 97.2 (13 Jun 2022 13:57), Max: 97.6 (12 Jun 2022 20:53)  HR: 85 (13 Jun 2022 13:57) (76 - 96)  BP: 142/62 (13 Jun 2022 13:57) (137/53 - 142/62)  BP(mean): --  RR: 18 (13 Jun 2022 13:57) (18 - 20)  SpO2: 98% (13 Jun 2022 13:57) (97% - 98%)    ________________________________________________  PHYSICAL EXAM:  GENERAL: NAD  HEENT: Normocephalic;  conjunctivae and sclerae clear; moist mucous membranes;   NECK : supple  CHEST/LUNG: Clear to auscultation bilaterally with good air entry   HEART: S1 S2  regular; no murmurs, gallops or rubs  ABDOMEN: Soft, Nontender, Nondistended; Bowel sounds present  EXTREMITIES: no cyanosis; no edema; no calf tenderness  SKIN: warm and dry; no rash  NERVOUS SYSTEM:  Awake and alert; Oriented  to place, person and time ; no new deficits    _________________________________________________  LABS:                        8.8    4.31  )-----------( 450      ( 13 Jun 2022 08:27 )             26.6     06-13    138  |  111<H>  |  30<H>  ----------------------------<  231<H>  4.1   |  18<L>  |  0.78    Ca    8.6      13 Jun 2022 08:27  Phos  2.4     06-13  Mg     2.0     06-13          CAPILLARY BLOOD GLUCOSE      POCT Blood Glucose.: 295 mg/dL (13 Jun 2022 16:31)  POCT Blood Glucose.: 283 mg/dL (13 Jun 2022 11:47)  POCT Blood Glucose.: 202 mg/dL (13 Jun 2022 08:03)  POCT Blood Glucose.: 247 mg/dL (12 Jun 2022 21:38)        RADIOLOGY & ADDITIONAL TESTS:    Imaging Personally Reviewed:  YES/NO    Consultant(s) Notes Reviewed:   YES/ No    Care Discussed with Consultants :     Plan of care was discussed with patient and /or primary care giver; all questions and concerns were addressed and care was aligned with patient's wishes.     NP Note discussed with  Primary Attending    Patient is a 87y old  Female who presents with a chief complaint of Cough (13 Jun 2022 09:51)      INTERVAL HPI/OVERNIGHT EVENTS: no new complaints    MEDICATIONS  (STANDING):  ALBUTerol    90 MICROgram(s) HFA Inhaler 2 Puff(s) Inhalation every 6 hours  benzonatate 100 milliGRAM(s) Oral every 8 hours  enoxaparin Injectable 40 milliGRAM(s) SubCutaneous every 24 hours  gabapentin 100 milliGRAM(s) Oral every 12 hours  guaiFENesin  milliGRAM(s) Oral every 12 hours  insulin lispro (ADMELOG) corrective regimen sliding scale   SubCutaneous three times a day before meals  losartan 25 milliGRAM(s) Oral daily  simvastatin 40 milliGRAM(s) Oral at bedtime    MEDICATIONS  (PRN):  acetaminophen     Tablet .. 650 milliGRAM(s) Oral every 6 hours PRN Temp greater or equal to 38C (100.4F), Mild Pain (1 - 3)  aluminum hydroxide/magnesium hydroxide/simethicone Suspension 30 milliLiter(s) Oral every 4 hours PRN Dyspepsia  guaifenesin/dextromethorphan Oral Liquid 10 milliLiter(s) Oral every 4 hours PRN Cough  meclizine 25 milliGRAM(s) Oral daily PRN Dizziness      __________________________________________________  REVIEW OF SYSTEMS: Dry cough.     CONSTITUTIONAL: No fever,   EYES: no acute visual disturbances  NECK: No pain or stiffness  RESPIRATORY: No shortness of breath  CARDIOVASCULAR: No chest pain, no palpitations  GASTROINTESTINAL: No pain. No nausea or vomiting; No diarrhea   NEUROLOGICAL: No headache or numbness, no tremors  MUSCULOSKELETAL: No joint pain, no muscle pain  GENITOURINARY: no dysuria, no frequency, no hesitancy  PSYCHIATRY: no depression , no anxiety  ALL OTHER  ROS negative        Vital Signs Last 24 Hrs  T(C): 36.2 (13 Jun 2022 13:57), Max: 36.4 (12 Jun 2022 20:53)  T(F): 97.2 (13 Jun 2022 13:57), Max: 97.6 (12 Jun 2022 20:53)  HR: 85 (13 Jun 2022 13:57) (76 - 96)  BP: 142/62 (13 Jun 2022 13:57) (137/53 - 142/62)  BP(mean): --  RR: 18 (13 Jun 2022 13:57) (18 - 20)  SpO2: 98% (13 Jun 2022 13:57) (97% - 98%)    ________________________________________________  PHYSICAL EXAM:  GENERAL: NAD  HEENT: Normocephalic;  conjunctivae and sclerae clear; moist mucous membranes;   NECK : supple  CHEST/LUNG: Clear to auscultation bilaterally with good air entry   HEART: S1 S2  regular; no murmurs, gallops or rubs  ABDOMEN: Soft, Nontender, Nondistended; Bowel sounds present  EXTREMITIES: no cyanosis; no edema; no calf tenderness  SKIN: warm and dry; no rash  NERVOUS SYSTEM:  Awake and alert; Oriented  to place, person and time ; no new deficits    _________________________________________________  LABS:                        8.8    4.31  )-----------( 450      ( 13 Jun 2022 08:27 )             26.6     06-13    138  |  111<H>  |  30<H>  ----------------------------<  231<H>  4.1   |  18<L>  |  0.78    Ca    8.6      13 Jun 2022 08:27  Phos  2.4     06-13  Mg     2.0     06-13      CAPILLARY BLOOD GLUCOSe  POCT Blood Glucose.: 295 mg/dL (13 Jun 2022 16:31)  POCT Blood Glucose.: 283 mg/dL (13 Jun 2022 11:47)  POCT Blood Glucose.: 202 mg/dL (13 Jun 2022 08:03)  POCT Blood Glucose.: 247 mg/dL (12 Jun 2022 21:38)        RADIOLOGY & ADDITIONAL TESTS:    < from: Xray Chest 1 View- PORTABLE-Urgent (06.10.22 @ 18:06) >    ACC: [id number] EXAM:  XR CHEST PORTABLE URGENT 1V                          PROCEDURE DATE:  06/10/2022          INTERPRETATION:  Chest one view    HISTORY: Sepsis    COMPARISON STUDY: 5/12/2022    Frontal expiratory view of the chest shows the heartto be similar in   size. The lungs show small patchy infiltrate of the lateral left lung and   there is no evidence of pneumothorax nor pleural effusion.    IMPRESSION:  Patchy left lung infiltrate.      Thank you for the courtesy of this referral.    --- End of Report ---      DEEDEE HOUGH MD; Attending Interventional Radiologist  This document has been electronically signed. Jun 11 2022 10:42AM    < end of copied text >    Imaging Personally Reviewed:  YES    Consultant(s) Notes Reviewed:   YES    Care Discussed with Consultants : Yes    Plan of care was discussed with patient and /or primary care giver; all questions and concerns were addressed and care was aligned with patient's wishes.

## 2022-06-14 LAB
A1C WITH ESTIMATED AVERAGE GLUCOSE RESULT: 7.8 % — HIGH (ref 4–5.6)
ALBUMIN SERPL ELPH-MCNC: 2.4 G/DL — LOW (ref 3.5–5)
ALP SERPL-CCNC: 57 U/L — SIGNIFICANT CHANGE UP (ref 40–120)
ALT FLD-CCNC: 34 U/L DA — SIGNIFICANT CHANGE UP (ref 10–60)
ANION GAP SERPL CALC-SCNC: 9 MMOL/L — SIGNIFICANT CHANGE UP (ref 5–17)
AST SERPL-CCNC: 36 U/L — SIGNIFICANT CHANGE UP (ref 10–40)
BILIRUB SERPL-MCNC: 0.3 MG/DL — SIGNIFICANT CHANGE UP (ref 0.2–1.2)
BUN SERPL-MCNC: 28 MG/DL — HIGH (ref 7–18)
CALCIUM SERPL-MCNC: 8.6 MG/DL — SIGNIFICANT CHANGE UP (ref 8.4–10.5)
CHLORIDE SERPL-SCNC: 111 MMOL/L — HIGH (ref 96–108)
CHOLEST SERPL-MCNC: 139 MG/DL — SIGNIFICANT CHANGE UP
CO2 SERPL-SCNC: 19 MMOL/L — LOW (ref 22–31)
CREAT SERPL-MCNC: 0.86 MG/DL — SIGNIFICANT CHANGE UP (ref 0.5–1.3)
EGFR: 66 ML/MIN/1.73M2 — SIGNIFICANT CHANGE UP
ESTIMATED AVERAGE GLUCOSE: 177 MG/DL — HIGH (ref 68–114)
GLUCOSE BLDC GLUCOMTR-MCNC: 156 MG/DL — HIGH (ref 70–99)
GLUCOSE BLDC GLUCOMTR-MCNC: 268 MG/DL — HIGH (ref 70–99)
GLUCOSE BLDC GLUCOMTR-MCNC: 273 MG/DL — HIGH (ref 70–99)
GLUCOSE BLDC GLUCOMTR-MCNC: 307 MG/DL — HIGH (ref 70–99)
GLUCOSE SERPL-MCNC: 164 MG/DL — HIGH (ref 70–99)
HCT VFR BLD CALC: 27.3 % — LOW (ref 34.5–45)
HDLC SERPL-MCNC: 31 MG/DL — LOW
HGB BLD-MCNC: 8.7 G/DL — LOW (ref 11.5–15.5)
LIPID PNL WITH DIRECT LDL SERPL: 75 MG/DL — SIGNIFICANT CHANGE UP
MCHC RBC-ENTMCNC: 28.5 PG — SIGNIFICANT CHANGE UP (ref 27–34)
MCHC RBC-ENTMCNC: 31.9 GM/DL — LOW (ref 32–36)
MCV RBC AUTO: 89.5 FL — SIGNIFICANT CHANGE UP (ref 80–100)
NON HDL CHOLESTEROL: 108 MG/DL — SIGNIFICANT CHANGE UP
NRBC # BLD: 0 /100 WBCS — SIGNIFICANT CHANGE UP (ref 0–0)
PLATELET # BLD AUTO: 405 K/UL — HIGH (ref 150–400)
POTASSIUM SERPL-MCNC: 3.8 MMOL/L — SIGNIFICANT CHANGE UP (ref 3.5–5.3)
POTASSIUM SERPL-SCNC: 3.8 MMOL/L — SIGNIFICANT CHANGE UP (ref 3.5–5.3)
PROT SERPL-MCNC: 6.2 G/DL — SIGNIFICANT CHANGE UP (ref 6–8.3)
RBC # BLD: 3.05 M/UL — LOW (ref 3.8–5.2)
RBC # FLD: 17 % — HIGH (ref 10.3–14.5)
SARS-COV-2 RNA SPEC QL NAA+PROBE: SIGNIFICANT CHANGE UP
SODIUM SERPL-SCNC: 139 MMOL/L — SIGNIFICANT CHANGE UP (ref 135–145)
TRIGL SERPL-MCNC: 163 MG/DL — HIGH
WBC # BLD: 4.7 K/UL — SIGNIFICANT CHANGE UP (ref 3.8–10.5)
WBC # FLD AUTO: 4.7 K/UL — SIGNIFICANT CHANGE UP (ref 3.8–10.5)

## 2022-06-14 RX ORDER — INSULIN LISPRO 100/ML
VIAL (ML) SUBCUTANEOUS AT BEDTIME
Refills: 0 | Status: DISCONTINUED | OUTPATIENT
Start: 2022-06-14 | End: 2022-06-15

## 2022-06-14 RX ORDER — ACETAMINOPHEN 500 MG
650 TABLET ORAL ONCE
Refills: 0 | Status: COMPLETED | OUTPATIENT
Start: 2022-06-14 | End: 2022-06-14

## 2022-06-14 RX ADMIN — Medication 600 MILLIGRAM(S): at 05:07

## 2022-06-14 RX ADMIN — Medication 10 MILLILITER(S): at 09:11

## 2022-06-14 RX ADMIN — Medication 3: at 12:04

## 2022-06-14 RX ADMIN — GABAPENTIN 100 MILLIGRAM(S): 400 CAPSULE ORAL at 17:14

## 2022-06-14 RX ADMIN — Medication 100 MILLIGRAM(S): at 05:07

## 2022-06-14 RX ADMIN — Medication 40 MILLIGRAM(S): at 16:08

## 2022-06-14 RX ADMIN — Medication 40 MILLIGRAM(S): at 21:20

## 2022-06-14 RX ADMIN — LOSARTAN POTASSIUM 25 MILLIGRAM(S): 100 TABLET, FILM COATED ORAL at 05:08

## 2022-06-14 RX ADMIN — Medication 3: at 16:32

## 2022-06-14 RX ADMIN — GABAPENTIN 100 MILLIGRAM(S): 400 CAPSULE ORAL at 05:07

## 2022-06-14 RX ADMIN — SIMVASTATIN 40 MILLIGRAM(S): 20 TABLET, FILM COATED ORAL at 21:20

## 2022-06-14 RX ADMIN — Medication 10 MILLILITER(S): at 00:32

## 2022-06-14 RX ADMIN — Medication 600 MILLIGRAM(S): at 17:17

## 2022-06-14 RX ADMIN — Medication 1: at 09:00

## 2022-06-14 RX ADMIN — Medication 650 MILLIGRAM(S): at 01:40

## 2022-06-14 RX ADMIN — Medication 650 MILLIGRAM(S): at 06:36

## 2022-06-14 RX ADMIN — ENOXAPARIN SODIUM 40 MILLIGRAM(S): 100 INJECTION SUBCUTANEOUS at 00:09

## 2022-06-14 RX ADMIN — Medication 650 MILLIGRAM(S): at 05:08

## 2022-06-14 RX ADMIN — Medication 650 MILLIGRAM(S): at 00:40

## 2022-06-14 NOTE — PROGRESS NOTE ADULT - PROBLEM SELECTOR PLAN 1
Cultures noted   antibiotics  Follow up CXR  monitor oxygen sat  Monitor Respiratory status  Bronchodilators  Steroids

## 2022-06-14 NOTE — PROGRESS NOTE ADULT - PROBLEM SELECTOR PLAN 1
- c/w Mucinex BID  - added HYCODAN 6/14/22  - c/w Levofloxacin  - D/c Azithromycin   - CXR neg for active pulmondary disease 6/13/22  - monitor oxygen and keep Sat > 94%  - c/w Bronchodilators  - c/w Steroids increased to Q 8 hrs x 2 days.   - Repeat COVID PCR- negative 6/14/22  - Pulm following- Dr. Hernandes

## 2022-06-14 NOTE — CONSULT NOTE ADULT - MUSCULOSKELETAL
negative normal/ROM intact/normal gait/strength 5/5 bilateral upper extremities/strength 5/5 bilateral lower extremities

## 2022-06-14 NOTE — PROGRESS NOTE ADULT - SUBJECTIVE AND OBJECTIVE BOX
Patient is a 87y old  Female who presents with a chief complaint of Cough (14 Jun 2022 08:56)  Awake, alert, comfortable out of bed in NAD. Cough improved as well as CP    INTERVAL HPI/OVERNIGHT EVENTS:      VITAL SIGNS:  T(F): 97 (06-14-22 @ 05:30)  HR: 92 (06-14-22 @ 05:30)  BP: 165/75 (06-14-22 @ 05:30)  RR: 19 (06-14-22 @ 05:30)  SpO2: 97% (06-14-22 @ 05:30)  Wt(kg): --  I&O's Detail          REVIEW OF SYSTEMS:    CONSTITUTIONAL:  No fevers, chills, sweats    HEENT:  Eyes:  No diplopia or blurred vision. ENT:  No earache, sore throat or runny nose.    CARDIOVASCULAR:  No pressure, squeezing, tightness, or heaviness about the chest; no palpitations.    RESPIRATORY:  Per HPI    GASTROINTESTINAL:  No abdominal pain, nausea, vomiting or diarrhea.    GENITOURINARY:  No dysuria, frequency or urgency.    NEUROLOGIC:  No paresthesias, fasciculations, seizures or weakness.    PSYCHIATRIC:  No disorder of thought or mood.      PHYSICAL EXAM:    Constitutional: Well developed and nourished  Eyes:Perrla  ENMT: normal  Neck:supple  Respiratory: good air entry  Cardiovascular: S1 S2 regular  Gastrointestinal: Soft, Non tender  Extremities: No edema  Vascular:normal  Neurological:Awake, alert,Ox3  Musculoskeletal:Normal      MEDICATIONS  (STANDING):  ALBUTerol    90 MICROgram(s) HFA Inhaler 2 Puff(s) Inhalation every 6 hours  enoxaparin Injectable 40 milliGRAM(s) SubCutaneous every 24 hours  gabapentin 100 milliGRAM(s) Oral every 12 hours  guaiFENesin  milliGRAM(s) Oral every 12 hours  insulin lispro (ADMELOG) corrective regimen sliding scale   SubCutaneous three times a day before meals  levoFLOXacin IVPB 750 milliGRAM(s) IV Intermittent every 24 hours  losartan 25 milliGRAM(s) Oral daily  methylPREDNISolone sodium succinate Injectable 40 milliGRAM(s) IV Push every 8 hours  simvastatin 40 milliGRAM(s) Oral at bedtime    MEDICATIONS  (PRN):  acetaminophen     Tablet .. 650 milliGRAM(s) Oral every 6 hours PRN Temp greater or equal to 38C (100.4F), Mild Pain (1 - 3)  aluminum hydroxide/magnesium hydroxide/simethicone Suspension 30 milliLiter(s) Oral every 4 hours PRN Dyspepsia  guaifenesin/dextromethorphan Oral Liquid 10 milliLiter(s) Oral every 4 hours PRN Cough  meclizine 25 milliGRAM(s) Oral daily PRN Dizziness      Allergies    No Known Allergies    Intolerances        LABS:                        8.7    4.70  )-----------( 405      ( 14 Jun 2022 08:36 )             27.3     06-14    139  |  111<H>  |  28<H>  ----------------------------<  164<H>  3.8   |  19<L>  |  0.86    Ca    8.6      14 Jun 2022 08:36  Phos  2.4     06-13  Mg     2.0     06-13    TPro  6.2  /  Alb  2.4<L>  /  TBili  0.3  /  DBili  x   /  AST  36  /  ALT  34  /  AlkPhos  57  06-14              CAPILLARY BLOOD GLUCOSE      POCT Blood Glucose.: 156 mg/dL (14 Jun 2022 08:14)  POCT Blood Glucose.: 295 mg/dL (13 Jun 2022 16:31)  POCT Blood Glucose.: 283 mg/dL (13 Jun 2022 11:47)    pro-bnp 1937 @ 17:37     d-dimer 1277  06-10 @ 17:37      RADIOLOGY & ADDITIONAL TESTS:    CXR:  < from: Xray Chest 1 View- PORTABLE-Routine (Xray Chest 1 View- PORTABLE-Routine .) (06.13.22 @ 18:32) >  IMPRESSION:   No radiographic evidence of active chest disease.    < end of copied text >    Ct scan chest:    ekg;    echo:

## 2022-06-14 NOTE — CONSULT NOTE ADULT - ASSESSMENT
86y/o female with H/O gastric cancer stage II with no evidence of disease and currently received treatment for MM admit for coughing with ? pneumonia     MM  on Revlimid/decadron   hold treatment now till pt d/c home   h/o gastric cancer   MARISELA    coughing   rule out pneumonia   CT of chest on 5/2022 b/l infiltration   CT of abd/pelvic multiple lytic lesion in the bone   Pulmonary on case   on iv antibiotics and steroid   continue treatment as per primary team 86y/o female with H/O gastric cancer stage II with no evidence of disease and currently received treatment for MM admit for coughing with ? pneumonia     MM  on Revlimid/decadron   hold treatment now till pt d/c home   h/o gastric cancer   MARISELA    coughing   rule out pneumonia   CT of chest on 5/2022 b/l infiltration   CT of abd/pelvic multiple lytic lesion in the bone   Pulmonary on case   on iv antibiotics and steroid   feels much better   continue treatment as per primary team

## 2022-06-14 NOTE — PROGRESS NOTE ADULT - SUBJECTIVE AND OBJECTIVE BOX
Patient is a 87y old  Female who presents with a chief complaint of Cough (13 Jun 2022 17:18)    pt seen in icu [  ], reg med floor [   ], bed [  ], chair at bedside [   ], a+o x3 [  ], lethargic [  ],  nad [  ]    shea [  ], ngt [  ], peg [  ], et tube [  ], cent line [  ], picc line [  ]        Allergies    No Known Allergies        Vitals    T(F): 97 (06-14-22 @ 05:30), Max: 97.9 (06-13-22 @ 21:21)  HR: 92 (06-14-22 @ 05:30) (77 - 92)  BP: 165/75 (06-14-22 @ 05:30) (142/62 - 165/75)  RR: 19 (06-14-22 @ 05:30) (17 - 19)  SpO2: 97% (06-14-22 @ 05:30) (97% - 100%)  Wt(kg): --  CAPILLARY BLOOD GLUCOSE      POCT Blood Glucose.: 156 mg/dL (14 Jun 2022 08:14)      Labs                          8.7    4.70  )-----------( 405      ( 14 Jun 2022 08:36 )             27.3       06-13    138  |  111<H>  |  30<H>  ----------------------------<  231<H>  4.1   |  18<L>  |  0.78    Ca    8.6      13 Jun 2022 08:27  Phos  2.4     06-13  Mg     2.0     06-13              Clean Catch Clean Catch (Midstream)  06-11 @ 06:08   10,000 - 49,000 CFU/mL Klebsiella pneumoniae ESBL  <10,000 CFU/ml Normal Urogenital sean present  --  Klebsiella pneumoniae ESBL      .Blood Blood-Peripheral  06-10 @ 21:32   No growth to date.  --  --      Clean Catch Clean Catch (Midstream)  05-13 @ 03:42   >100,000 CFU/ml Klebsiella pneumoniae ESBL  --  Klebsiella pneumoniae ESBL      .Blood Blood-Peripheral  05-13 @ 03:39   No Growth Final  --  --      Clean Catch Clean Catch (Midstream)  04-06 @ 06:18   <10,000 CFU/mL Normal Urogenital Sean  --  --      .Blood Blood-Peripheral  04-06 @ 06:04   No Growth Final  --  --          Radiology Results      Meds    MEDICATIONS  (STANDING):  ALBUTerol    90 MICROgram(s) HFA Inhaler 2 Puff(s) Inhalation every 6 hours  enoxaparin Injectable 40 milliGRAM(s) SubCutaneous every 24 hours  gabapentin 100 milliGRAM(s) Oral every 12 hours  guaiFENesin  milliGRAM(s) Oral every 12 hours  insulin lispro (ADMELOG) corrective regimen sliding scale   SubCutaneous three times a day before meals  levoFLOXacin IVPB 750 milliGRAM(s) IV Intermittent every 24 hours  losartan 25 milliGRAM(s) Oral daily  methylPREDNISolone sodium succinate Injectable 40 milliGRAM(s) IV Push every 8 hours  simvastatin 40 milliGRAM(s) Oral at bedtime      MEDICATIONS  (PRN):  acetaminophen     Tablet .. 650 milliGRAM(s) Oral every 6 hours PRN Temp greater or equal to 38C (100.4F), Mild Pain (1 - 3)  aluminum hydroxide/magnesium hydroxide/simethicone Suspension 30 milliLiter(s) Oral every 4 hours PRN Dyspepsia  guaifenesin/dextromethorphan Oral Liquid 10 milliLiter(s) Oral every 4 hours PRN Cough  meclizine 25 milliGRAM(s) Oral daily PRN Dizziness      Physical Exam    Neuro :  no focal deficits  Respiratory: CTA B/L  CV: RRR, S1S2, no murmurs,   Abdominal: Soft, NT, ND +BS,  Extremities: No edema, + peripheral pulses    ASSESSMENT    Supervision of other normal pregnancy, antepartum    Pneumonia due to organism    HTN (hypertension)    DM (diabetes mellitus)    Hypercholesteremia    Gastric adenocarcinoma    Vertigo    Dementia    S/P hysterectomy    S/P tubal ligation        PLAN     Patient is a 87y old  Female who presents with a chief complaint of Cough (13 Jun 2022 17:18)      pt seen in icu [  ], reg med floor [  x ], bed [  ], chair at bedside [ x  ], a+o x3 [ x ], lethargic [  ],  nad [ x ]    pt still c/o persistent cough    Allergies    No Known Allergies        Vitals    T(F): 97 (06-14-22 @ 05:30), Max: 97.9 (06-13-22 @ 21:21)  HR: 92 (06-14-22 @ 05:30) (77 - 92)  BP: 165/75 (06-14-22 @ 05:30) (142/62 - 165/75)  RR: 19 (06-14-22 @ 05:30) (17 - 19)  SpO2: 97% (06-14-22 @ 05:30) (97% - 100%)  Wt(kg): --  CAPILLARY BLOOD GLUCOSE      POCT Blood Glucose.: 156 mg/dL (14 Jun 2022 08:14)      Labs                          8.7    4.70  )-----------( 405      ( 14 Jun 2022 08:36 )             27.3       06-13    138  |  111<H>  |  30<H>  ----------------------------<  231<H>  4.1   |  18<L>  |  0.78    Ca    8.6      13 Jun 2022 08:27  Phos  2.4     06-13  Mg     2.0     06-13              Clean Catch Clean Catch (Midstream)  06-11 @ 06:08   10,000 - 49,000 CFU/mL Klebsiella pneumoniae ESBL  <10,000 CFU/ml Normal Urogenital sean present  --  Klebsiella pneumoniae ESBL      .Blood Blood-Peripheral  06-10 @ 21:32   No growth to date.  --  --        Radiology Results    < from: Xray Chest 1 View- PORTABLE-Routine (Xray Chest 1 View- PORTABLE-Routine .) (06.13.22 @ 18:32) >  IMPRESSION:   No radiographic evidence of active chest disease.    < end of copied text >        Meds    MEDICATIONS  (STANDING):  ALBUTerol    90 MICROgram(s) HFA Inhaler 2 Puff(s) Inhalation every 6 hours  enoxaparin Injectable 40 milliGRAM(s) SubCutaneous every 24 hours  gabapentin 100 milliGRAM(s) Oral every 12 hours  guaiFENesin  milliGRAM(s) Oral every 12 hours  insulin lispro (ADMELOG) corrective regimen sliding scale   SubCutaneous three times a day before meals  levoFLOXacin IVPB 750 milliGRAM(s) IV Intermittent every 24 hours  losartan 25 milliGRAM(s) Oral daily  methylPREDNISolone sodium succinate Injectable 40 milliGRAM(s) IV Push every 8 hours  simvastatin 40 milliGRAM(s) Oral at bedtime      MEDICATIONS  (PRN):  acetaminophen     Tablet .. 650 milliGRAM(s) Oral every 6 hours PRN Temp greater or equal to 38C (100.4F), Mild Pain (1 - 3)  aluminum hydroxide/magnesium hydroxide/simethicone Suspension 30 milliLiter(s) Oral every 4 hours PRN Dyspepsia  guaifenesin/dextromethorphan Oral Liquid 10 milliLiter(s) Oral every 4 hours PRN Cough  meclizine 25 milliGRAM(s) Oral daily PRN Dizziness      Physical Exam    Neuro :  no focal deficits  Respiratory: cta B/L   CV: RRR, S1S2, no murmurs,   Abdominal: Soft, NT, ND +BS,  Extremities: No edema, + peripheral pulses      ASSESSMENT    cough,   pneumonia   esbl kleb uti  h/o HTN,   HLD,   DM,   Osteoporosis,   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  add hycodan prn cough  cont mucinex prn  pulm f/u   cont prednisone 40 mg daily and taper by 10 mg daily   blood cx neg noted above   ucx and sens with esbl klebsiella noted above    cont levaquin 500 mg daily x 7 days   heme-onc cons .  cont current meds   d/c plan for am if stable

## 2022-06-14 NOTE — PROGRESS NOTE ADULT - PROBLEM SELECTOR PLAN 8
- Discharge home with PT when optimized.   - D/c pending improvement in cough added hycodan 6/14/22, planning for AM
- Discharge home with PT when optimized.   - Patient c/o worsening coughing and congestion.   - Discharge pending F/u CXR and COVID PCR.

## 2022-06-14 NOTE — PROGRESS NOTE ADULT - ASSESSMENT
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 Initially on Azithromycin and Ceftriaxone and steroids. Urine cx growing Klebsiella ESBL and Antibiotics changed to Levofloxacin.     No events overnight. C/o coughing and congestion and Steroid frequency modified. repeat chest x ray negative for active pulmonary disease. Hemodynamically stable and afebrile. In no acute respiratory distress. dispo planning for AM.

## 2022-06-14 NOTE — CONSULT NOTE ADULT - SUBJECTIVE AND OBJECTIVE BOX
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 new MM on revlimid/decadron with last visit on  6/10/22 follows [facility] Dr Adams). Surgical history of ASHU w/BSO, Cholecystectomy, presenting to the ED due to complaints of cough over the last one week. Pt says the cough is sometimes dry, sometimes associated with clear sputum, nasal congestion+ Pt experiences SOB whenever she coughs, otherwise is comfortably ambulating at home. Also reports chest pain only while she coughs, appears pleuritic. Pt reports no leg pain/swelling, abdominal pain, nausea, vomiting, diarrhea, dysuria, constipation, fever, sick contacts, recent travel. COVID vaccinated x 3. Pt  has been taking Guaifenesin cough syrup outpatient.    CXR shows no focal infiltrate, labs show no white count or fevers.  admit for possible pneumonia     PAST MEDICAL & SURGICAL HISTORY:  HTN (hypertension)      DM (diabetes mellitus)      Hypercholesteremia      Gastric adenocarcinoma  s/p resection      Vertigo      Dementia      S/P hysterectomy      S/P tubal ligation      FAMILY HISTORY:  Family history of diabetes mellitus (Sibling)    Family history of early CAD (Grandparent)  Allergies    No Known Allergies    Intolerances        CBC Full  -  ( 14 Jun 2022 08:36 )  WBC Count : 4.70 K/uL  RBC Count : 3.05 M/uL  Hemoglobin : 8.7 g/dL  Hematocrit : 27.3 %  Platelet Count - Automated : 405 K/uL  Mean Cell Volume : 89.5 fl  Mean Cell Hemoglobin : 28.5 pg  Mean Cell Hemoglobin Concentration : 31.9 gm/dL  Auto Neutrophil # : x  Auto Lymphocyte # : x  Auto Monocyte # : x  Auto Eosinophil # : x  Auto Basophil # : x  Auto Neutrophil % : x  Auto Lymphocyte % : x  Auto Monocyte % : x  Auto Eosinophil % : x  Auto Basophil % : x  06-14    139  |  111<H>  |  28<H>  ----------------------------<  164<H>  3.8   |  19<L>  |  0.86    Ca    8.6      14 Jun 2022 08:36  Phos  2.4     06-13  Mg     2.0     06-13    TPro  6.2  /  Alb  2.4<L>  /  TBili  0.3  /  DBili  x   /  AST  36  /  ALT  34  /  AlkPhos  57  06-14  Vital Signs Last 24 Hrs  T(C): 36.1 (14 Jun 2022 05:30), Max: 36.6 (13 Jun 2022 21:21)  T(F): 97 (14 Jun 2022 05:30), Max: 97.9 (13 Jun 2022 21:21)  HR: 92 (14 Jun 2022 05:30) (79 - 92)  BP: 165/75 (14 Jun 2022 05:30) (142/62 - 165/75)  BP(mean): --  RR: 19 (14 Jun 2022 05:30) (17 - 19)  SpO2: 97% (14 Jun 2022 05:30) (97% - 100%)  MEDICATIONS  (STANDING):  ALBUTerol    90 MICROgram(s) HFA Inhaler 2 Puff(s) Inhalation every 6 hours  enoxaparin Injectable 40 milliGRAM(s) SubCutaneous every 24 hours  gabapentin 100 milliGRAM(s) Oral every 12 hours  guaiFENesin  milliGRAM(s) Oral every 12 hours  insulin lispro (ADMELOG) corrective regimen sliding scale   SubCutaneous three times a day before meals  levoFLOXacin IVPB 750 milliGRAM(s) IV Intermittent every 24 hours  losartan 25 milliGRAM(s) Oral daily  methylPREDNISolone sodium succinate Injectable 40 milliGRAM(s) IV Push every 8 hours  simvastatin 40 milliGRAM(s) Oral at bedtime    MEDICATIONS  (PRN):  acetaminophen     Tablet .. 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 new MM on revlimid/decadron with last visit on  6/10/22 follows [facility] Dr Adams). Surgical history of ASHU w/BSO, Cholecystectomy, presenting to the ED due to complaints of cough over the last one week. Pt says the cough is sometimes dry, sometimes associated with clear sputum, nasal congestion+ Pt experiences SOB whenever she coughs, otherwise is comfortably ambulating at home. Also reports chest pain only while she coughs, appears pleuritic. Pt reports no leg pain/swelling, abdominal pain, nausea, vomiting, diarrhea, dysuria, constipation, fever, sick contacts, recent travel. COVID vaccinated x 3. Pt  has been taking Guaifenesin cough syrup outpatient. coughing is much better now and appears very comfortable     CXR shows no focal infiltrate, labs show no white count or fevers.  admit for possible pneumonia     PAST MEDICAL & SURGICAL HISTORY:  HTN (hypertension)      DM (diabetes mellitus)      Hypercholesteremia      Gastric adenocarcinoma  s/p resection      Vertigo      Dementia      S/P hysterectomy      S/P tubal ligation      FAMILY HISTORY:  Family history of diabetes mellitus (Sibling)    Family history of early CAD (Grandparent)  Allergies    No Known Allergies    Intolerances        CBC Full  -  ( 14 Jun 2022 08:36 )  WBC Count : 4.70 K/uL  RBC Count : 3.05 M/uL  Hemoglobin : 8.7 g/dL  Hematocrit : 27.3 %  Platelet Count - Automated : 405 K/uL  Mean Cell Volume : 89.5 fl  Mean Cell Hemoglobin : 28.5 pg  Mean Cell Hemoglobin Concentration : 31.9 gm/dL  Auto Neutrophil # : x  Auto Lymphocyte # : x  Auto Monocyte # : x  Auto Eosinophil # : x  Auto Basophil # : x  Auto Neutrophil % : x  Auto Lymphocyte % : x  Auto Monocyte % : x  Auto Eosinophil % : x  Auto Basophil % : x  06-14    139  |  111<H>  |  28<H>  ----------------------------<  164<H>  3.8   |  19<L>  |  0.86    Ca    8.6      14 Jun 2022 08:36  Phos  2.4     06-13  Mg     2.0     06-13    TPro  6.2  /  Alb  2.4<L>  /  TBili  0.3  /  DBili  x   /  AST  36  /  ALT  34  /  AlkPhos  57  06-14  Vital Signs Last 24 Hrs  T(C): 36.1 (14 Jun 2022 05:30), Max: 36.6 (13 Jun 2022 21:21)  T(F): 97 (14 Jun 2022 05:30), Max: 97.9 (13 Jun 2022 21:21)  HR: 92 (14 Jun 2022 05:30) (79 - 92)  BP: 165/75 (14 Jun 2022 05:30) (142/62 - 165/75)  BP(mean): --  RR: 19 (14 Jun 2022 05:30) (17 - 19)  SpO2: 97% (14 Jun 2022 05:30) (97% - 100%)  MEDICATIONS  (STANDING):  ALBUTerol    90 MICROgram(s) HFA Inhaler 2 Puff(s) Inhalation every 6 hours  enoxaparin Injectable 40 milliGRAM(s) SubCutaneous every 24 hours  gabapentin 100 milliGRAM(s) Oral every 12 hours  guaiFENesin  milliGRAM(s) Oral every 12 hours  insulin lispro (ADMELOG) corrective regimen sliding scale   SubCutaneous three times a day before meals  levoFLOXacin IVPB 750 milliGRAM(s) IV Intermittent every 24 hours  losartan 25 milliGRAM(s) Oral daily  methylPREDNISolone sodium succinate Injectable 40 milliGRAM(s) IV Push every 8 hours  simvastatin 40 milliGRAM(s) Oral at bedtime    MEDICATIONS  (PRN):  acetaminophen     Tablet .. 650 milliGRAM(s) Oral every 6 hours PRN Temp greater or equal to 38C (100.4F), Mild Pain (1 - 3)  aluminum hydroxide/magnesium hydroxide/simethicone Suspension 30 milliLiter(s) Oral every 4 hours PRN Dyspepsia  hydrocodone/homatropine Syrup 4 milliLiter(s) Oral every 8 hours PRN Cough  meclizine 25 milliGRAM(s) Oral daily PRN Dizziness

## 2022-06-14 NOTE — PROGRESS NOTE ADULT - SUBJECTIVE AND OBJECTIVE BOX
Patient is a 87y old  Female who presents with a chief complaint of Cough (14 Jun 2022 13:14)      INTERVAL HPI/OVERNIGHT EVENTS: no overnight events    I&O's Summary    14 Jun 2022 07:01  -  14 Jun 2022 14:53  --------------------------------------------------------  IN: 520 mL / OUT: 0 mL / NET: 520 mL      Vital Signs Last 24 Hrs  T(C): 36.6 (14 Jun 2022 14:29), Max: 36.6 (13 Jun 2022 21:21)  T(F): 97.8 (14 Jun 2022 14:29), Max: 97.9 (13 Jun 2022 21:21)  HR: 102 (14 Jun 2022 14:29) (79 - 102)  BP: 147/71 (14 Jun 2022 14:29) (142/62 - 165/75)  BP(mean): --  RR: 20 (14 Jun 2022 14:29) (17 - 20)  SpO2: 97% (14 Jun 2022 14:29) (97% - 100%)  PAST MEDICAL & SURGICAL HISTORY:  HTN (hypertension)      DM (diabetes mellitus)      Hypercholesteremia      Gastric adenocarcinoma  s/p resection      Vertigo      Dementia      S/P hysterectomy      S/P tubal ligation          SOCIAL HISTORY  Alcohol:  Tobacco:  Illicit substance use:      FAMILY HISTORY:      LABS:                        8.7    4.70  )-----------( 405      ( 14 Jun 2022 08:36 )             27.3     06-14    139  |  111<H>  |  28<H>  ----------------------------<  164<H>  3.8   |  19<L>  |  0.86    Ca    8.6      14 Jun 2022 08:36  Phos  2.4     06-13  Mg     2.0     06-13    TPro  6.2  /  Alb  2.4<L>  /  TBili  0.3  /  DBili  x   /  AST  36  /  ALT  34  /  AlkPhos  57  06-14        CAPILLARY BLOOD GLUCOSE      POCT Blood Glucose.: 268 mg/dL (14 Jun 2022 11:39)  POCT Blood Glucose.: 156 mg/dL (14 Jun 2022 08:14)  POCT Blood Glucose.: 295 mg/dL (13 Jun 2022 16:31)            MEDICATIONS  (STANDING):  ALBUTerol    90 MICROgram(s) HFA Inhaler 2 Puff(s) Inhalation every 6 hours  enoxaparin Injectable 40 milliGRAM(s) SubCutaneous every 24 hours  gabapentin 100 milliGRAM(s) Oral every 12 hours  guaiFENesin  milliGRAM(s) Oral every 12 hours  insulin lispro (ADMELOG) corrective regimen sliding scale   SubCutaneous three times a day before meals  levoFLOXacin IVPB 750 milliGRAM(s) IV Intermittent every 24 hours  losartan 25 milliGRAM(s) Oral daily  methylPREDNISolone sodium succinate Injectable 40 milliGRAM(s) IV Push every 8 hours  simvastatin 40 milliGRAM(s) Oral at bedtime    MEDICATIONS  (PRN):  acetaminophen     Tablet .. 650 milliGRAM(s) Oral every 6 hours PRN Temp greater or equal to 38C (100.4F), Mild Pain (1 - 3)  aluminum hydroxide/magnesium hydroxide/simethicone Suspension 30 milliLiter(s) Oral every 4 hours PRN Dyspepsia  hydrocodone/homatropine Syrup 4 milliLiter(s) Oral every 8 hours PRN Cough  meclizine 25 milliGRAM(s) Oral daily PRN Dizziness      REVIEW OF SYSTEMS:  CONSTITUTIONAL: No fever, or fatigue  EYES: No eye pain, visual disturbances  ENMT:  No difficulty hearing, tinnitus, vertigo; No sinus or throat pain  NECK: No pain or stiffness  RESPIRATORY: + cough  CARDIOVASCULAR: + chest pain. No palpitations, dizziness, or leg swelling  GASTROINTESTINAL: No abdominal pain. No nausea, vomiting, No diarrhea or constipation.  GENITOURINARY: No dysuria, frequency  NEUROLOGICAL: + tremors  SKIN: No itching, burning, rashes, or lesions   MUSCULOSKELETAL: No joint pain or swelling; No muscle, back, or extremity pain      RADIOLOGY & ADDITIONAL TESTS:    < from: Xray Chest 1 View- PORTABLE-Routine (Xray Chest 1 View- PORTABLE-Routine .) (06.13.22 @ 18:32) >  ACC: [id number] EXAM:  XR CHEST PORTABLE ROUTINE 1V                          PROCEDURE DATE:  06/13/2022          INTERPRETATION:  Portable chest radiograph    CLINICAL INFORMATION: Coughing. Congestion.    TECHNIQUE:  Portable  AP chest radiograph.    COMPARISON: 6/10/2022 chest .    FINDINGS:  CATHETERS AND TUBES: None    PULMONARY: The visualized lungs are clear of airspace consolidations or   effusions.   No pneumothorax.    HEART/VASCULAR: The heart and mediastinum size and configuration are  within normal limits.    BONES: Visualized osseous structures are intact.    IMPRESSION:   No radiographic evidence of active chest disease.    --- End of Report ---            TAO CABAN MD; Attending Radiologist  This document has been electronically signed. Jun 14 2022  8:52AM    < end of copied text >  < from: Xray Chest 1 View- PORTABLE-Urgent (06.10.22 @ 18:06) >  ACC: [id number] EXAM:  XR CHEST PORTABLE URGENT 1V                          PROCEDURE DATE:  06/10/2022          INTERPRETATION:  Chest one view    HISTORY: Sepsis    COMPARISON STUDY: 5/12/2022    Frontal expiratory view of the chest shows the heartto be similar in   size. The lungs show small patchy infiltrate of the lateral left lung and   there is no evidence of pneumothorax nor pleural effusion.    IMPRESSION:  Patchy left lung infiltrate.        Thank you for the courtesy of this referral.    --- End of Report ---            DEEDEE HOUGH MD; Attending Interventional Radiologist  This document has been electronically signed. Jun 11 2022 10:42AM    < end of copied text >  Imaging Personally Reviewed:  [x ] YES  [ ] NO    Consultant(s) Notes Reviewed:  [ x] YES  [ ] NO    PHYSICAL EXAM:  GENERAL: NAD  HEAD:  Atraumatic, Normocephalic  EYES: conjunctiva and sclera clear  ENMT: Moist mucous membranes  NECK: Supple, No JVD  NERVOUS SYSTEM:  Alert & Oriented X3  CHEST/LUNG: Clear to auscultation bilaterally; No rales, rhonchi, wheezing, or rubs  HEART: Regular rate and rhythm; No murmurs, rubs, or gallops  ABDOMEN: Soft, Nontender, Nondistended; Bowel sounds present  EXTREMITIES:  2+ Peripheral Pulses, No clubbing, cyanosis, or edema  SKIN: No rashes or lesions    Care Collaborated Discussed with Consultants/Other Providers [ x] YES  [ ] NO

## 2022-06-15 ENCOUNTER — TRANSCRIPTION ENCOUNTER (OUTPATIENT)
Age: 87
End: 2022-06-15

## 2022-06-15 VITALS
RESPIRATION RATE: 18 BRPM | HEART RATE: 93 BPM | OXYGEN SATURATION: 98 % | DIASTOLIC BLOOD PRESSURE: 71 MMHG | SYSTOLIC BLOOD PRESSURE: 139 MMHG | TEMPERATURE: 98 F

## 2022-06-15 LAB
ANION GAP SERPL CALC-SCNC: 12 MMOL/L — SIGNIFICANT CHANGE UP (ref 5–17)
BUN SERPL-MCNC: 34 MG/DL — HIGH (ref 7–18)
CALCIUM SERPL-MCNC: 9 MG/DL — SIGNIFICANT CHANGE UP (ref 8.4–10.5)
CHLORIDE SERPL-SCNC: 108 MMOL/L — SIGNIFICANT CHANGE UP (ref 96–108)
CO2 SERPL-SCNC: 17 MMOL/L — LOW (ref 22–31)
CREAT SERPL-MCNC: 0.8 MG/DL — SIGNIFICANT CHANGE UP (ref 0.5–1.3)
CULTURE RESULTS: SIGNIFICANT CHANGE UP
CULTURE RESULTS: SIGNIFICANT CHANGE UP
EGFR: 72 ML/MIN/1.73M2 — SIGNIFICANT CHANGE UP
GLUCOSE BLDC GLUCOMTR-MCNC: 254 MG/DL — HIGH (ref 70–99)
GLUCOSE BLDC GLUCOMTR-MCNC: 276 MG/DL — HIGH (ref 70–99)
GLUCOSE BLDC GLUCOMTR-MCNC: 330 MG/DL — HIGH (ref 70–99)
GLUCOSE SERPL-MCNC: 245 MG/DL — HIGH (ref 70–99)
HCT VFR BLD CALC: 30 % — LOW (ref 34.5–45)
HGB BLD-MCNC: 9.7 G/DL — LOW (ref 11.5–15.5)
MCHC RBC-ENTMCNC: 28.8 PG — SIGNIFICANT CHANGE UP (ref 27–34)
MCHC RBC-ENTMCNC: 32.3 GM/DL — SIGNIFICANT CHANGE UP (ref 32–36)
MCV RBC AUTO: 89 FL — SIGNIFICANT CHANGE UP (ref 80–100)
NRBC # BLD: 0 /100 WBCS — SIGNIFICANT CHANGE UP (ref 0–0)
PLATELET # BLD AUTO: 429 K/UL — HIGH (ref 150–400)
POTASSIUM SERPL-MCNC: 4 MMOL/L — SIGNIFICANT CHANGE UP (ref 3.5–5.3)
POTASSIUM SERPL-SCNC: 4 MMOL/L — SIGNIFICANT CHANGE UP (ref 3.5–5.3)
RBC # BLD: 3.37 M/UL — LOW (ref 3.8–5.2)
RBC # FLD: 17.1 % — HIGH (ref 10.3–14.5)
SODIUM SERPL-SCNC: 137 MMOL/L — SIGNIFICANT CHANGE UP (ref 135–145)
SPECIMEN SOURCE: SIGNIFICANT CHANGE UP
SPECIMEN SOURCE: SIGNIFICANT CHANGE UP
WBC # BLD: 5.47 K/UL — SIGNIFICANT CHANGE UP (ref 3.8–10.5)
WBC # FLD AUTO: 5.47 K/UL — SIGNIFICANT CHANGE UP (ref 3.8–10.5)

## 2022-06-15 PROCEDURE — 87086 URINE CULTURE/COLONY COUNT: CPT

## 2022-06-15 PROCEDURE — 87641 MR-STAPH DNA AMP PROBE: CPT

## 2022-06-15 PROCEDURE — 82962 GLUCOSE BLOOD TEST: CPT

## 2022-06-15 PROCEDURE — 85730 THROMBOPLASTIN TIME PARTIAL: CPT

## 2022-06-15 PROCEDURE — 85027 COMPLETE CBC AUTOMATED: CPT

## 2022-06-15 PROCEDURE — 85379 FIBRIN DEGRADATION QUANT: CPT

## 2022-06-15 PROCEDURE — 83735 ASSAY OF MAGNESIUM: CPT

## 2022-06-15 PROCEDURE — 71045 X-RAY EXAM CHEST 1 VIEW: CPT

## 2022-06-15 PROCEDURE — 81001 URINALYSIS AUTO W/SCOPE: CPT

## 2022-06-15 PROCEDURE — 83880 ASSAY OF NATRIURETIC PEPTIDE: CPT

## 2022-06-15 PROCEDURE — 87637 SARSCOV2&INF A&B&RSV AMP PRB: CPT

## 2022-06-15 PROCEDURE — 36415 COLL VENOUS BLD VENIPUNCTURE: CPT

## 2022-06-15 PROCEDURE — 80053 COMPREHEN METABOLIC PANEL: CPT

## 2022-06-15 PROCEDURE — 85025 COMPLETE CBC W/AUTO DIFF WBC: CPT

## 2022-06-15 PROCEDURE — 85610 PROTHROMBIN TIME: CPT

## 2022-06-15 PROCEDURE — 82803 BLOOD GASES ANY COMBINATION: CPT

## 2022-06-15 PROCEDURE — 87635 SARS-COV-2 COVID-19 AMP PRB: CPT

## 2022-06-15 PROCEDURE — 84484 ASSAY OF TROPONIN QUANT: CPT

## 2022-06-15 PROCEDURE — 83605 ASSAY OF LACTIC ACID: CPT

## 2022-06-15 PROCEDURE — 84100 ASSAY OF PHOSPHORUS: CPT

## 2022-06-15 PROCEDURE — 87186 SC STD MICRODIL/AGAR DIL: CPT

## 2022-06-15 PROCEDURE — 83690 ASSAY OF LIPASE: CPT

## 2022-06-15 PROCEDURE — 84145 PROCALCITONIN (PCT): CPT

## 2022-06-15 PROCEDURE — 80048 BASIC METABOLIC PNL TOTAL CA: CPT

## 2022-06-15 PROCEDURE — 93005 ELECTROCARDIOGRAM TRACING: CPT

## 2022-06-15 PROCEDURE — 99285 EMERGENCY DEPT VISIT HI MDM: CPT

## 2022-06-15 PROCEDURE — 80061 LIPID PANEL: CPT

## 2022-06-15 PROCEDURE — 0225U NFCT DS DNA&RNA 21 SARSCOV2: CPT

## 2022-06-15 PROCEDURE — 97162 PT EVAL MOD COMPLEX 30 MIN: CPT

## 2022-06-15 PROCEDURE — 83036 HEMOGLOBIN GLYCOSYLATED A1C: CPT

## 2022-06-15 PROCEDURE — 87040 BLOOD CULTURE FOR BACTERIA: CPT

## 2022-06-15 PROCEDURE — 87640 STAPH A DNA AMP PROBE: CPT

## 2022-06-15 PROCEDURE — 87077 CULTURE AEROBIC IDENTIFY: CPT

## 2022-06-15 RX ORDER — LEVOFLOXACIN 5 MG/ML
1 INJECTION, SOLUTION INTRAVENOUS
Qty: 4 | Refills: 0
Start: 2022-06-15 | End: 2022-06-18

## 2022-06-15 RX ADMIN — ENOXAPARIN SODIUM 40 MILLIGRAM(S): 100 INJECTION SUBCUTANEOUS at 00:42

## 2022-06-15 RX ADMIN — Medication 40 MILLIGRAM(S): at 14:13

## 2022-06-15 RX ADMIN — GABAPENTIN 100 MILLIGRAM(S): 400 CAPSULE ORAL at 05:21

## 2022-06-15 RX ADMIN — Medication 600 MILLIGRAM(S): at 05:22

## 2022-06-15 RX ADMIN — Medication 3: at 08:11

## 2022-06-15 RX ADMIN — Medication 1: at 00:53

## 2022-06-15 RX ADMIN — Medication 4: at 11:43

## 2022-06-15 RX ADMIN — LOSARTAN POTASSIUM 25 MILLIGRAM(S): 100 TABLET, FILM COATED ORAL at 05:31

## 2022-06-15 RX ADMIN — Medication 40 MILLIGRAM(S): at 05:22

## 2022-06-15 NOTE — PROGRESS NOTE ADULT - PROVIDER SPECIALTY LIST ADULT
Internal Medicine
Heme/Onc
Internal Medicine
Pulmonology

## 2022-06-15 NOTE — DISCHARGE NOTE NURSING/CASE MANAGEMENT/SOCIAL WORK - NSDCPEFALRISK_GEN_ALL_CORE
For information on Fall & Injury Prevention, visit: [email or web address] OR  [email or web address] OR  https://www.cdc.gov/steadi/patient.html

## 2022-06-15 NOTE — DISCHARGE NOTE NURSING/CASE MANAGEMENT/SOCIAL WORK - PATIENT PORTAL LINK FT
You can access the FollowMyHealth Patient Portal offered by [facility] by registering at the following website: [email or web address]. By joining [facility]’s FollowMyHealth portal, you will also be able to view your health information using other applications (apps) compatible with our system.

## 2022-06-15 NOTE — PROGRESS NOTE ADULT - SUBJECTIVE AND OBJECTIVE BOX
Patient is a 87y old  Female who presents with a chief complaint of Cough (14 Jun 2022 14:52)    pt seen in icu [  ], reg med floor [  x ], bed [  ], chair at bedside [ x  ], a+o x3 [ x ], lethargic [  ],    nad [ x ]    Allergies    No Known Allergies        Vitals    T(F): 98.1 (06-14-22 @ 21:21), Max: 98.1 (06-14-22 @ 21:21)  HR: 84 (06-14-22 @ 21:21) (84 - 102)  BP: 131/65 (06-14-22 @ 21:21) (131/65 - 147/71)  RR: 18 (06-14-22 @ 21:21) (18 - 20)  SpO2: 97% (06-14-22 @ 21:21) (97% - 97%)  Wt(kg): --  CAPILLARY BLOOD GLUCOSE      POCT Blood Glucose.: 276 mg/dL (15 [place] 2022 00:49)      Labs                          8.7    4.70  )-----------( 405      ( 14 Jun 2022 08:36 )             27.3       06-14    139  |  111<H>  |  28<H>  ----------------------------<  164<H>  3.8   |  19<L>  |  0.86    Ca    8.6      14 Jun 2022 08:36  Phos  2.4     06-13  Mg     2.0     06-13    TPro  6.2  /  Alb  2.4<L>  /  TBili  0.3  /  DBili  x   /  AST  36  /  ALT  34  /  AlkPhos  57  06-14            Clean Catch Clean Catch (Midstream)  06-11 @ 06:08   10,000 - 49,000 CFU/mL Klebsiella pneumoniae ESBL  <10,000 CFU/ml Normal Urogenital natalie present  --  Klebsiella pneumoniae ESBL      .Blood Blood-Peripheral  06-10 @ 21:32   No growth to date.  --  --      Clean Catch Clean Catch (Midstream)  05-13 @ 03:42   >100,000 CFU/ml Klebsiella pneumoniae ESBL  --  Klebsiella pneumoniae ESBL      .Blood Blood-Peripheral  05-13 @ 03:39   No Growth Final  --  --      Clean Catch Clean Catch (Midstream)  04-06 @ 06:18   <10,000 CFU/mL Normal Urogenital Natalie  --  --      .Blood Blood-Peripheral  04-06 @ 06:04   No Growth Final  --  --          Radiology Results      Meds    MEDICATIONS  (STANDING):  ALBUTerol    90 MICROgram(s) HFA Inhaler 2 Puff(s) Inhalation every 6 hours  enoxaparin Injectable 40 milliGRAM(s) SubCutaneous every 24 hours  gabapentin 100 milliGRAM(s) Oral every 12 hours  guaiFENesin  milliGRAM(s) Oral every 12 hours  insulin lispro (ADMELOG) corrective regimen sliding scale   SubCutaneous at bedtime  insulin lispro (ADMELOG) corrective regimen sliding scale   SubCutaneous three times a day before meals  levoFLOXacin IVPB 750 milliGRAM(s) IV Intermittent every 24 hours  losartan 25 milliGRAM(s) Oral daily  methylPREDNISolone sodium succinate Injectable 40 milliGRAM(s) IV Push every 8 hours  simvastatin 40 milliGRAM(s) Oral at bedtime      MEDICATIONS  (PRN):  acetaminophen     Tablet .. 650 milliGRAM(s) Oral every 6 hours PRN Temp greater or equal to 38C (100.4F), Mild Pain (1 - 3)  aluminum hydroxide/magnesium hydroxide/simethicone Suspension 30 milliLiter(s) Oral every 4 hours PRN Dyspepsia  hydrocodone/homatropine Syrup 4 milliLiter(s) Oral every 8 hours PRN Cough  meclizine 25 milliGRAM(s) Oral daily PRN Dizziness      Physical Exam    Neuro :  no focal deficits  Respiratory: cta B/L   CV: RRR, S1S2, no murmurs,   Abdominal: Soft, NT, ND +BS,  Extremities: No edema, + peripheral pulses      ASSESSMENT    cough,   pneumonia   esbl kleb uti  h/o HTN,   HLD,   DM,   Osteoporosis,   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  add hycodan prn cough  cont mucinex prn  pulm f/u   cont prednisone 40 mg daily and taper by 10 mg daily   blood cx neg noted above   ucx and sens with esbl klebsiella noted above    cont levaquin 500 mg daily x 7 days   heme-onc cons .  cont current meds   d/c plan for am if stable       Patient is a 87y old  Female who presents with a chief complaint of Cough (14 Jun 2022 14:52)    pt seen in icu [  ], reg med floor [  x ], bed [  ], chair at bedside [ x  ], a+o x3 [ x ], lethargic [  ],    nad [ x ]    Allergies    No Known Allergies        Vitals    T(F): 98.1 (06-14-22 @ 21:21), Max: 98.1 (06-14-22 @ 21:21)  HR: 84 (06-14-22 @ 21:21) (84 - 102)  BP: 131/65 (06-14-22 @ 21:21) (131/65 - 147/71)  RR: 18 (06-14-22 @ 21:21) (18 - 20)  SpO2: 97% (06-14-22 @ 21:21) (97% - 97%)  Wt(kg): --  CAPILLARY BLOOD GLUCOSE      POCT Blood Glucose.: 276 mg/dL (15 [place] 2022 00:49)      Labs                          8.7    4.70  )-----------( 405      ( 14 Jun 2022 08:36 )             27.3       06-14    139  |  111<H>  |  28<H>  ----------------------------<  164<H>  3.8   |  19<L>  |  0.86    Ca    8.6      14 Jun 2022 08:36  Phos  2.4     06-13  Mg     2.0     06-13    TPro  6.2  /  Alb  2.4<L>  /  TBili  0.3  /  DBili  x   /  AST  36  /  ALT  34  /  AlkPhos  57  06-14            Clean Catch Clean Catch (Midstream)  06-11 @ 06:08   10,000 - 49,000 CFU/mL Klebsiella pneumoniae ESBL  <10,000 CFU/ml Normal Urogenital natalie present  --  Klebsiella pneumoniae ESBL      .Blood Blood-Peripheral  06-10 @ 21:32   No growth to date.  --  --      Clean Catch Clean Catch (Midstream)  05-13 @ 03:42   >100,000 CFU/ml Klebsiella pneumoniae ESBL  --  Klebsiella pneumoniae ESBL      .Blood Blood-Peripheral  05-13 @ 03:39   No Growth Final  --  --      Clean Catch Clean Catch (Midstream)  04-06 @ 06:18   <10,000 CFU/mL Normal Urogenital Natalie  --  --      .Blood Blood-Peripheral  04-06 @ 06:04   No Growth Final  --  --          Radiology Results      Meds    MEDICATIONS  (STANDING):  ALBUTerol    90 MICROgram(s) HFA Inhaler 2 Puff(s) Inhalation every 6 hours  enoxaparin Injectable 40 milliGRAM(s) SubCutaneous every 24 hours  gabapentin 100 milliGRAM(s) Oral every 12 hours  guaiFENesin  milliGRAM(s) Oral every 12 hours  insulin lispro (ADMELOG) corrective regimen sliding scale   SubCutaneous at bedtime  insulin lispro (ADMELOG) corrective regimen sliding scale   SubCutaneous three times a day before meals  levoFLOXacin IVPB 750 milliGRAM(s) IV Intermittent every 24 hours  losartan 25 milliGRAM(s) Oral daily  methylPREDNISolone sodium succinate Injectable 40 milliGRAM(s) IV Push every 8 hours  simvastatin 40 milliGRAM(s) Oral at bedtime      MEDICATIONS  (PRN):  acetaminophen     Tablet .. 650 milliGRAM(s) Oral every 6 hours PRN Temp greater or equal to 38C (100.4F), Mild Pain (1 - 3)  aluminum hydroxide/magnesium hydroxide/simethicone Suspension 30 milliLiter(s) Oral every 4 hours PRN Dyspepsia  hydrocodone/homatropine Syrup 4 milliLiter(s) Oral every 8 hours PRN Cough  meclizine 25 milliGRAM(s) Oral daily PRN Dizziness      Physical Exam    Neuro :  no focal deficits  Respiratory: cta B/L   CV: RRR, S1S2, no murmurs,   Abdominal: Soft, NT, ND +BS,  Extremities: No edema, + peripheral pulses      ASSESSMENT    cough,   pneumonia   esbl kleb uti  h/o HTN,   HLD,   DM,   Osteoporosis,   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  cont current meds   pt stable for d/c

## 2022-06-15 NOTE — PROGRESS NOTE ADULT - PROBLEM SELECTOR PLAN 3
Lipid profile  Cont meds
- Continue statin   - Lipid panel   - DASH/TLC diet
Lipid profile  Cont meds
on home atorvastatin  - Continue statin   - Lipid panel   - DASH/TLC diet

## 2022-06-15 NOTE — PROGRESS NOTE ADULT - PROBLEM SELECTOR PLAN 6
hem/onc follow up
-hem/onc follow up outpatient.   - follows with [facility] Dr Adams.   - Heme/ onc following
-hem/onc follow up outpatient.   - follows with [facility] Dr Adams.   - Discussed case with KATHARINE Mullins (Hem/Onc).   - Consult initiated.
hem/onc follow up

## 2022-06-15 NOTE — PROGRESS NOTE ADULT - PROBLEM SELECTOR PLAN 1
Cultures noted   antibiotics  Follow up CXR noted  monitor oxygen sat  Monitor Respiratory status  Bronchodilators  Steroids Cultures noted   antibiotics  Follow up CXR noted  monitor oxygen sat  Monitor Respiratory status  Bronchodilators  Steroids  [facility] as OP

## 2022-06-15 NOTE — PROGRESS NOTE ADULT - ASSESSMENT
complete note to follow   Assessment and Recommendation:   · Assessment	  88y/o female with H/O gastric cancer stage II with no evidence of disease and currently received treatment for MM admit for coughing with ? pneumonia     #MM  on Revlimid/decadron   hold treatment now till pt d/c home   h/o gastric cancer   MARISELA  upon discharge pt to f/u with Dr. Alvarado    #PNA  #UTI  CT of chest on 5/2022 b/l infiltration   CT of abd/pelvic multiple lytic lesion in the bone   6/10 CXR Left lung infiltrate  Pulmonary following  on iv antibiotics and steroid   feels much better   continue treatment as per primary team    Thank you for the referral. Will continue to monitor the patient.  Please call with any questions [phone]  Above reviewed with Attending Dr. Rashad FLOREZ/[facility]  [address] [facility], [address], [place], [place]  [phone]  *Note not finalized until signed by Attending Physician

## 2022-06-15 NOTE — PROGRESS NOTE ADULT - SUBJECTIVE AND OBJECTIVE BOX
Patient is a 87y old  Female who presents with a chief complaint of Cough       SUBJECTIVE / OVERNIGHT EVENTS:      MEDICATIONS  (STANDING):  ALBUTerol    90 MICROgram(s) HFA Inhaler 2 Puff(s) Inhalation every 6 hours  enoxaparin Injectable 40 milliGRAM(s) SubCutaneous every 24 hours  gabapentin 100 milliGRAM(s) Oral every 12 hours  guaiFENesin  milliGRAM(s) Oral every 12 hours  insulin lispro (ADMELOG) corrective regimen sliding scale   SubCutaneous at bedtime  insulin lispro (ADMELOG) corrective regimen sliding scale   SubCutaneous three times a day before meals  levoFLOXacin IVPB 750 milliGRAM(s) IV Intermittent every 24 hours  losartan 25 milliGRAM(s) Oral daily  methylPREDNISolone sodium succinate Injectable 40 milliGRAM(s) IV Push every 8 hours  simvastatin 40 milliGRAM(s) Oral at bedtime    MEDICATIONS  (PRN):  acetaminophen     Tablet .. 650 milliGRAM(s) Oral every 6 hours PRN Temp greater or equal to 38C (100.4F), Mild Pain (1 - 3)  aluminum hydroxide/magnesium hydroxide/simethicone Suspension 30 milliLiter(s) Oral every 4 hours PRN Dyspepsia  hydrocodone/homatropine Syrup 4 milliLiter(s) Oral every 8 hours PRN Cough  meclizine 25 milliGRAM(s) Oral daily PRN Dizziness            PHYSICAL EXAM:  Vital Signs Last 24 Hrs  T(C): 36.8 (15 [place] 2022 06:26), Max: 36.8 (15 [place] 2022 06:26)  T(F): 98.3 (15 [place] 2022 06:26), Max: 98.3 (15 [place] 2022 06:26)  HR: 87 (15 [place] 2022 06:26) (84 - 102)  BP: 159/50 (15 [place] 2022 06:26) (131/65 - 159/50)  BP(mean): --  RR: 19 (15 [place] 2022 06:26) (18 - 20)  SpO2: 100% (15 [place] 2022 06:26) (97% - 100%)    LABS:                        9.7    5.47  )-----------( 429      ( 15 [place] 2022 08:36 )             30.0     06-15    137  |  108  |  34<H>  ----------------------------<  245<H>  4.0   |  17<L>  |  0.80    Ca    9.0      15 [place] 2022 08:36    TPro  6.2  /  Alb  2.4<L>  /  TBili  0.3  /  DBili  x   /  AST  36  /  ALT  34  /  AlkPhos  57  06-14              COVID-19 PCR: NotDetec (14 Jun 2022 09:57)  SARS-CoV-2: NotDetec (11 Jun 2022 03:36)  COVID-19 PCR: NotDetec (18 May 2022 11:13)  COVID-19 PCR: NotDetec (12 May 2022 22:11)  COVID-19 PCR: NotDetec (12 Apr 2022 07:07)  COVID-19 PCR: NotDetec (06 Apr 2022 00:03)         Patient is a 87y old  Female who presents with a chief complaint of Cough     SUBJECTIVE / OVERNIGHT EVENTS: events noted. Pt feeling better, no new complaints. Denies SOB/dyspnea, not req'g O2    #[id number]      MEDICATIONS  (STANDING):  ALBUTerol    90 MICROgram(s) HFA Inhaler 2 Puff(s) Inhalation every 6 hours  enoxaparin Injectable 40 milliGRAM(s) SubCutaneous every 24 hours  gabapentin 100 milliGRAM(s) Oral every 12 hours  guaiFENesin  milliGRAM(s) Oral every 12 hours  insulin lispro (ADMELOG) corrective regimen sliding scale   SubCutaneous at bedtime  insulin lispro (ADMELOG) corrective regimen sliding scale   SubCutaneous three times a day before meals  levoFLOXacin IVPB 750 milliGRAM(s) IV Intermittent every 24 hours  losartan 25 milliGRAM(s) Oral daily  methylPREDNISolone sodium succinate Injectable 40 milliGRAM(s) IV Push every 8 hours  simvastatin 40 milliGRAM(s) Oral at bedtime    MEDICATIONS  (PRN):  acetaminophen     Tablet .. 650 milliGRAM(s) Oral every 6 hours PRN Temp greater or equal to 38C (100.4F), Mild Pain (1 - 3)  aluminum hydroxide/magnesium hydroxide/simethicone Suspension 30 milliLiter(s) Oral every 4 hours PRN Dyspepsia  hydrocodone/homatropine Syrup 4 milliLiter(s) Oral every 8 hours PRN Cough  meclizine 25 milliGRAM(s) Oral daily PRN Dizziness      PHYSICAL EXAM:  Vital Signs Last 24 Hrs  T(C): 36.8 (15 [place] 2022 06:26), Max: 36.8 (15 [place] 2022 06:26)  T(F): 98.3 (15 [place] 2022 06:26), Max: 98.3 (15 [place] 2022 06:26)  HR: 87 (15 [place] 2022 06:26) (84 - 102)  BP: 159/50 (15 [place] 2022 06:26) (131/65 - 159/50)  BP(mean): --  RR: 19 (15 [place] 2022 06:26) (18 - 20)  SpO2: 100% (15 [place] 2022 06:26) (97% - 100%)    GEN: NAD; A and O x 3  LUNGS: CTA B/L  HEART: S1 S2  ABDOMEN: soft, non-tender, non-distended, + BS  EXTREMITIES: no edema    LABS:                        9.7    5.47  )-----------( 429      ( 15 [place] 2022 08:36 )             30.0     06-15    137  |  108  |  34<H>  ----------------------------<  245<H>  4.0   |  17<L>  |  0.80    Ca    9.0      15 [place] 2022 08:36    TPro  6.2  /  Alb  2.4<L>  /  TBili  0.3  /  DBili  x   /  AST  36  /  ALT  34  /  AlkPhos  57  06-14              COVID-19 PCR: NotDetec (14 Jun 2022 09:57)  SARS-CoV-2: NotDetec (11 Jun 2022 03:36)  COVID-19 PCR: NotDetec (18 May 2022 11:13)  COVID-19 PCR: NotDetec (12 May 2022 22:11)  COVID-19 PCR: NotDetec (12 Apr 2022 07:07)  COVID-19 PCR: NotDetec (06 Apr 2022 00:03)

## 2022-06-15 NOTE — PROGRESS NOTE ADULT - PROBLEM SELECTOR PLAN 2
Uncontrolled  - Continue Losartan   - Monitor bp
- BP boderline  - Continue Losartan   - Monitor bp per unit protocol
BP monitoring  Cont meds

## 2022-06-15 NOTE — PROGRESS NOTE ADULT - SUBJECTIVE AND OBJECTIVE BOX
Patient is a 87y old  Female who presents with a chief complaint of Cough (15 [place] 2022 06:18)    Patient is awake, alert, lying comfortably in bed in no acute distress. She is feeling well and breathing well on room air. Cough has improved.    INTERVAL HPI/OVERNIGHT EVENTS:      VITAL SIGNS:  T(F): 98.3 (06-15-22 @ 06:26)  HR: 87 (06-15-22 @ 06:26)  BP: 159/50 (06-15-22 @ 06:26)  RR: 19 (06-15-22 @ 06:26)  SpO2: 100% (06-15-22 @ 06:26)  Wt(kg): --  I&O's Detail    14 Jun 2022 07:01  -  15 [place] 2022 07:00  --------------------------------------------------------  IN:    Oral Fluid: 520 mL  Total IN: 520 mL    OUT:  Total OUT: 0 mL    Total NET: 520 mL              REVIEW OF SYSTEMS:    CONSTITUTIONAL:  No fevers, chills, sweats    HEENT:  Eyes:  No diplopia or blurred vision. ENT:  No earache, sore throat or runny nose.    CARDIOVASCULAR:  No pressure, squeezing, tightness, or heaviness about the chest; no palpitations.    RESPIRATORY:  Per HPI    GASTROINTESTINAL:  No abdominal pain, nausea, vomiting or diarrhea.    GENITOURINARY:  No dysuria, frequency or urgency.    NEUROLOGIC:  No paresthesias, fasciculations, seizures or weakness.    PSYCHIATRIC:  No disorder of thought or mood.      PHYSICAL EXAM:    Constitutional: Well developed and nourished  Eyes:Perrla  ENMT: normal  Neck:supple  Respiratory: good air entry  Cardiovascular: S1 S2 regular  Gastrointestinal: Soft, Non tender  Extremities: No edema  Vascular:normal  Neurological:Awake, alert,Ox3  Musculoskeletal:Normal      MEDICATIONS  (STANDING):  ALBUTerol    90 MICROgram(s) HFA Inhaler 2 Puff(s) Inhalation every 6 hours  enoxaparin Injectable 40 milliGRAM(s) SubCutaneous every 24 hours  gabapentin 100 milliGRAM(s) Oral every 12 hours  guaiFENesin  milliGRAM(s) Oral every 12 hours  insulin lispro (ADMELOG) corrective regimen sliding scale   SubCutaneous at bedtime  insulin lispro (ADMELOG) corrective regimen sliding scale   SubCutaneous three times a day before meals  levoFLOXacin IVPB 750 milliGRAM(s) IV Intermittent every 24 hours  losartan 25 milliGRAM(s) Oral daily  methylPREDNISolone sodium succinate Injectable 40 milliGRAM(s) IV Push every 8 hours  simvastatin 40 milliGRAM(s) Oral at bedtime    MEDICATIONS  (PRN):  acetaminophen     Tablet .. 650 milliGRAM(s) Oral every 6 hours PRN Temp greater or equal to 38C (100.4F), Mild Pain (1 - 3)  aluminum hydroxide/magnesium hydroxide/simethicone Suspension 30 milliLiter(s) Oral every 4 hours PRN Dyspepsia  hydrocodone/homatropine Syrup 4 milliLiter(s) Oral every 8 hours PRN Cough  meclizine 25 milliGRAM(s) Oral daily PRN Dizziness      Allergies    No Known Allergies    Intolerances        LABS:                        9.7    5.47  )-----------( 429      ( 15 [place] 2022 08:36 )             30.0     06-15    137  |  108  |  34<H>  ----------------------------<  245<H>  4.0   |  17<L>  |  0.80    Ca    9.0      15 [place] 2022 08:36    TPro  6.2  /  Alb  2.4<L>  /  TBili  0.3  /  DBili  x   /  AST  36  /  ALT  34  /  AlkPhos  57  06-14              CAPILLARY BLOOD GLUCOSE      POCT Blood Glucose.: 254 mg/dL (15 [place] 2022 08:06)  POCT Blood Glucose.: 276 mg/dL (15 [place] 2022 00:49)  POCT Blood Glucose.: 307 mg/dL (14 Jun 2022 21:36)  POCT Blood Glucose.: 273 mg/dL (14 Jun 2022 16:26)  POCT Blood Glucose.: 268 mg/dL (14 Jun 2022 11:39)    pro-bnp 1937 @ 17:37     d-dimer 1277  06-10 @ 17:37      RADIOLOGY & ADDITIONAL TESTS:    CXR:  < from: Xray Chest 1 View- PORTABLE-Routine (Xray Chest 1 View- PORTABLE-Routine .) (06.13.22 @ 18:32) >  IMPRESSION:   No radiographic evidence of active chest disease.    --- End of Report ---    < end of copied text >    Ct scan chest:    ekg;    echo: DISPLAY PLAN FREE TEXT DISPLAY PLAN FREE TEXT DISPLAY PLAN FREE TEXT DISPLAY PLAN FREE TEXT

## 2022-06-15 NOTE — PROGRESS NOTE ADULT - PROBLEM SELECTOR PROBLEM 6
Multiple myeloma

## 2022-06-22 ENCOUNTER — EMERGENCY (EMERGENCY)
Facility: HOSPITAL | Age: 87
LOS: 1 days | Discharge: HOME | End: 2022-06-22
Attending: STUDENT IN AN ORGANIZED HEALTH CARE EDUCATION/TRAINING PROGRAM
Payer: MEDICAID

## 2022-06-22 ENCOUNTER — OUTPATIENT (OUTPATIENT)
Dept: OUTPATIENT SERVICES | Facility: HOSPITAL | Age: 87
LOS: 1 days | End: 2022-06-22

## 2022-06-22 VITALS
HEART RATE: 107 BPM | OXYGEN SATURATION: 98 % | SYSTOLIC BLOOD PRESSURE: 125 MMHG | DIASTOLIC BLOOD PRESSURE: 69 MMHG | TEMPERATURE: 98 F | RESPIRATION RATE: 18 BRPM

## 2022-06-22 VITALS
HEART RATE: 120 BPM | RESPIRATION RATE: 20 BRPM | SYSTOLIC BLOOD PRESSURE: 108 MMHG | HEIGHT: 56 IN | WEIGHT: 132.06 LBS | TEMPERATURE: 99 F | DIASTOLIC BLOOD PRESSURE: 59 MMHG | OXYGEN SATURATION: 97 %

## 2022-06-22 DIAGNOSIS — Z90.710 ACQUIRED ABSENCE OF BOTH CERVIX AND UTERUS: Chronic | ICD-10-CM

## 2022-06-22 DIAGNOSIS — Z98.51 TUBAL LIGATION STATUS: Chronic | ICD-10-CM

## 2022-06-22 DIAGNOSIS — C90.00 MULTIPLE MYELOMA NOT HAVING ACHIEVED REMISSION: ICD-10-CM

## 2022-06-22 DIAGNOSIS — C16.6 MALIGNANT NEOPLASM OF GREATER CURVATURE OF STOMACH, UNSPECIFIED: ICD-10-CM

## 2022-06-22 DIAGNOSIS — D51.1 VITAMIN B12 DEFICIENCY ANEMIA DUE TO SELECTIVE VITAMIN B12 MALABSORPTION WITH PROTEINURIA: ICD-10-CM

## 2022-06-22 LAB
ALBUMIN SERPL ELPH-MCNC: 2.8 G/DL — LOW (ref 3.5–5)
ALP SERPL-CCNC: 53 U/L — SIGNIFICANT CHANGE UP (ref 40–120)
ALT FLD-CCNC: 20 U/L DA — SIGNIFICANT CHANGE UP (ref 10–60)
ANION GAP SERPL CALC-SCNC: 13 MMOL/L — SIGNIFICANT CHANGE UP (ref 5–17)
AST SERPL-CCNC: 12 U/L — SIGNIFICANT CHANGE UP (ref 10–40)
BASE EXCESS BLDV CALC-SCNC: -4.4 MMOL/L — SIGNIFICANT CHANGE UP
BASOPHILS # BLD AUTO: 0.01 K/UL — SIGNIFICANT CHANGE UP (ref 0–0.2)
BASOPHILS NFR BLD AUTO: 0.1 % — SIGNIFICANT CHANGE UP (ref 0–2)
BILIRUB SERPL-MCNC: 0.2 MG/DL — SIGNIFICANT CHANGE UP (ref 0.2–1.2)
BUN SERPL-MCNC: 23 MG/DL — HIGH (ref 7–18)
CALCIUM SERPL-MCNC: 8.9 MG/DL — SIGNIFICANT CHANGE UP (ref 8.4–10.5)
CHLORIDE SERPL-SCNC: 108 MMOL/L — SIGNIFICANT CHANGE UP (ref 96–108)
CO2 SERPL-SCNC: 19 MMOL/L — LOW (ref 22–31)
CREAT SERPL-MCNC: 0.94 MG/DL — SIGNIFICANT CHANGE UP (ref 0.5–1.3)
D DIMER BLD IA.RAPID-MCNC: 939 NG/ML DDU — HIGH
EGFR: 59 ML/MIN/1.73M2 — LOW
EOSINOPHIL # BLD AUTO: 0.05 K/UL — SIGNIFICANT CHANGE UP (ref 0–0.5)
EOSINOPHIL NFR BLD AUTO: 0.6 % — SIGNIFICANT CHANGE UP (ref 0–6)
GLUCOSE SERPL-MCNC: 162 MG/DL — HIGH (ref 70–99)
HCO3 BLDV-SCNC: 20 MMOL/L — LOW (ref 22–29)
HCT VFR BLD CALC: 34.6 % — SIGNIFICANT CHANGE UP (ref 34.5–45)
HGB BLD-MCNC: 10.8 G/DL — LOW (ref 11.5–15.5)
HMPV RNA SPEC QL NAA+PROBE: DETECTED
IMM GRANULOCYTES NFR BLD AUTO: 0.5 % — SIGNIFICANT CHANGE UP (ref 0–1.5)
LIDOCAIN IGE QN: 149 U/L — SIGNIFICANT CHANGE UP (ref 73–393)
LYMPHOCYTES # BLD AUTO: 2.72 K/UL — SIGNIFICANT CHANGE UP (ref 1–3.3)
LYMPHOCYTES # BLD AUTO: 30.6 % — SIGNIFICANT CHANGE UP (ref 13–44)
MAGNESIUM SERPL-MCNC: 1.9 MG/DL — SIGNIFICANT CHANGE UP (ref 1.6–2.6)
MCHC RBC-ENTMCNC: 28.9 PG — SIGNIFICANT CHANGE UP (ref 27–34)
MCHC RBC-ENTMCNC: 31.2 GM/DL — LOW (ref 32–36)
MCV RBC AUTO: 92.5 FL — SIGNIFICANT CHANGE UP (ref 80–100)
MONOCYTES # BLD AUTO: 0.29 K/UL — SIGNIFICANT CHANGE UP (ref 0–0.9)
MONOCYTES NFR BLD AUTO: 3.3 % — SIGNIFICANT CHANGE UP (ref 2–14)
NEUTROPHILS # BLD AUTO: 5.77 K/UL — SIGNIFICANT CHANGE UP (ref 1.8–7.4)
NEUTROPHILS NFR BLD AUTO: 64.9 % — SIGNIFICANT CHANGE UP (ref 43–77)
NRBC # BLD: 0 /100 WBCS — SIGNIFICANT CHANGE UP (ref 0–0)
NT-PROBNP SERPL-SCNC: 96 PG/ML — SIGNIFICANT CHANGE UP (ref 0–450)
PCO2 BLDV: 35 MMHG — LOW (ref 39–42)
PH BLDV: 7.37 — SIGNIFICANT CHANGE UP (ref 7.32–7.43)
PLATELET # BLD AUTO: 202 K/UL — SIGNIFICANT CHANGE UP (ref 150–400)
PO2 BLDV: 20 MMHG — SIGNIFICANT CHANGE UP
POTASSIUM SERPL-MCNC: 4.2 MMOL/L — SIGNIFICANT CHANGE UP (ref 3.5–5.3)
POTASSIUM SERPL-SCNC: 4.2 MMOL/L — SIGNIFICANT CHANGE UP (ref 3.5–5.3)
PROT SERPL-MCNC: 6.5 G/DL — SIGNIFICANT CHANGE UP (ref 6–8.3)
RAPID RVP RESULT: DETECTED
RBC # BLD: 3.74 M/UL — LOW (ref 3.8–5.2)
RBC # FLD: 17.8 % — HIGH (ref 10.3–14.5)
SAO2 % BLDV: 28.6 % — SIGNIFICANT CHANGE UP
SARS-COV-2 RNA SPEC QL NAA+PROBE: SIGNIFICANT CHANGE UP
SODIUM SERPL-SCNC: 140 MMOL/L — SIGNIFICANT CHANGE UP (ref 135–145)
TROPONIN I, HIGH SENSITIVITY RESULT: 9.5 NG/L — SIGNIFICANT CHANGE UP
WBC # BLD: 8.88 K/UL — SIGNIFICANT CHANGE UP (ref 3.8–10.5)
WBC # FLD AUTO: 8.88 K/UL — SIGNIFICANT CHANGE UP (ref 3.8–10.5)

## 2022-06-22 PROCEDURE — 83690 ASSAY OF LIPASE: CPT

## 2022-06-22 PROCEDURE — 71275 CT ANGIOGRAPHY CHEST: CPT | Mod: 26,MA

## 2022-06-22 PROCEDURE — 93005 ELECTROCARDIOGRAM TRACING: CPT

## 2022-06-22 PROCEDURE — 85379 FIBRIN DEGRADATION QUANT: CPT

## 2022-06-22 PROCEDURE — 82803 BLOOD GASES ANY COMBINATION: CPT

## 2022-06-22 PROCEDURE — 71275 CT ANGIOGRAPHY CHEST: CPT | Mod: MA

## 2022-06-22 PROCEDURE — 85025 COMPLETE CBC W/AUTO DIFF WBC: CPT

## 2022-06-22 PROCEDURE — 94640 AIRWAY INHALATION TREATMENT: CPT

## 2022-06-22 PROCEDURE — 99285 EMERGENCY DEPT VISIT HI MDM: CPT

## 2022-06-22 PROCEDURE — 99285 EMERGENCY DEPT VISIT HI MDM: CPT | Mod: 25

## 2022-06-22 PROCEDURE — 82962 GLUCOSE BLOOD TEST: CPT

## 2022-06-22 PROCEDURE — 36415 COLL VENOUS BLD VENIPUNCTURE: CPT

## 2022-06-22 PROCEDURE — 80053 COMPREHEN METABOLIC PANEL: CPT

## 2022-06-22 PROCEDURE — 0225U NFCT DS DNA&RNA 21 SARSCOV2: CPT

## 2022-06-22 PROCEDURE — 71045 X-RAY EXAM CHEST 1 VIEW: CPT | Mod: 26

## 2022-06-22 PROCEDURE — 71045 X-RAY EXAM CHEST 1 VIEW: CPT

## 2022-06-22 PROCEDURE — 83735 ASSAY OF MAGNESIUM: CPT

## 2022-06-22 PROCEDURE — 83880 ASSAY OF NATRIURETIC PEPTIDE: CPT

## 2022-06-22 PROCEDURE — 84484 ASSAY OF TROPONIN QUANT: CPT

## 2022-06-22 RX ORDER — IPRATROPIUM/ALBUTEROL SULFATE 18-103MCG
3 AEROSOL WITH ADAPTER (GRAM) INHALATION ONCE
Refills: 0 | Status: COMPLETED | OUTPATIENT
Start: 2022-06-22 | End: 2022-06-22

## 2022-06-22 RX ADMIN — Medication 3 MILLILITER(S): at 12:09

## 2022-06-22 NOTE — ED ADULT NURSE NOTE - NSIMPLEMENTINTERV_GEN_ALL_ED
Implemented All Fall Risk Interventions:  [place] to call system. Call bell, personal items and telephone within reach. Instruct patient to call for assistance. Room bathroom lighting operational. Non-slip footwear when patient is off stretcher. Physically safe environment: no spills, clutter or unnecessary equipment. Stretcher in lowest position, wheels locked, appropriate side rails in place. Provide visual cue, wrist band, yellow gown, etc. Monitor gait and stability. Monitor for mental status changes and reorient to person, place, and time. Review medications for side effects contributing to fall risk. Reinforce activity limits and safety measures with patient and family.

## 2022-06-22 NOTE — ED PROVIDER NOTE - PHYSICAL EXAMINATION
General: uncomfortable appearing female, no acute distress   HEENT: normocephalic, atraumatic   Respiratory: normal work of breathing, wh  Cardiac: regular rate and rhythm   Abdomen: soft, non-tender, no guarding or rebound   MSK: no swelling or tenderness of lower extremities, moving all extremities spontaneously   Skin: warm, dry   Neuro: A&Ox3  Psych: appropriate affect General: uncomfortable appearing female, no acute distress   HEENT: normocephalic, atraumatic   Respiratory: normal work of breathing, wheezing on exam  Cardiac: regular rate and rhythm   Abdomen: soft, non-tender, no guarding or rebound   MSK: no swelling or tenderness of lower extremities, moving all extremities spontaneously   Skin: warm, dry   Neuro: A&Ox3  Psych: appropriate affect

## 2022-06-22 NOTE — ED PROVIDER NOTE - PROGRESS NOTE DETAILS
updated patient's family on results over the phone. awaiting CT results. Trino Tyson updated patient's family on results over the phone. awaiting CT results. Trino Tyson    home: [phone]  granddaughter: [phone] Pt was received as a sign out from Dr. Tyson pending CTA to r/o PE after presenting w/ cp. CT negative. Pt reports resolution of symptoms at bedside and denies any CP at present. Pt found to have hMNV. stable for dc home Pt was received as a sign out from Dr. Tyson pending CTA to r/o PE after presenting w/ cp. CT negative. Pt reports resolution of symptoms at bedside and denies any CP at present. Pt found to have hMPV. stable for dc home

## 2022-06-22 NOTE — ED PROVIDER NOTE - OBJECTIVE STATEMENT
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 no fever, abdominal pain, pain or swelling of lower extremities.

## 2022-06-22 NOTE — ED PROVIDER NOTE - PATIENT PORTAL LINK FT
You can access the FollowMyHealth Patient Portal offered by [facility] by registering at the following website: [email or web address] By joining [facility]’s FollowMyHealth portal, you will also be able to view your health information using other applications (apps) compatible with our system.

## 2022-06-22 NOTE — ED PROVIDER NOTE - CLINICAL SUMMARY MEDICAL DECISION MAKING FREE TEXT BOX
87F presenting with chest pain and shortness of breath. concern for PE, ACS. will get labs, CTa chest, will reassess.

## 2022-06-22 NOTE — ED ADULT NURSE NOTE - OBJECTIVE STATEMENT
I [id number] Christopher.   c/o of chest tightness and SOB x last night. Currently receiving chemo therapy.

## 2022-06-22 NOTE — ED ADULT TRIAGE NOTE - GLASGOW COMA SCALE: BEST MOTOR RESPONSE, MLM
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 Luis Fernando AC order date: 1/10/19    Assessment/ Plan    Pt presents with daughter today.      Significant Findings:  Patient and daughter states that he eats brussel sprouts with tomatoes every day.      INR is subtherapeutic today based on INR goal range of 2.0-3.0.  Etiology:  Increase in vitamin K foods.    Patient was given written/verbal instructions to take warfarin 5 mg today only and then continue taking 2.5 mg Sundays and Wednesdays, 5 mg ROW using the warfarin 5 mg tablets.  Pt has history of cardioembolic stroke so gave one time boost.  May consider increase in dose if patient continues his daily intake of Vitamin K.      Follow up in 4 weeks, then sees PCP.  Pt relies on daughter for transportation.    Counseling    Stressed importance of compliance with exact recommended dosage regimen  Stressed importance of compliance with consistent vitamin K intake  Instructed to notify clinic about medication changes or health status changes  Instructed to notify clinic about scheduled procedures    Patient and daughter verbalized understanding and agreement with plan.    Provided patient with blue dosing sheet.    (M6) obeys commands

## 2022-08-29 NOTE — PRE-OP CHECKLIST - HEART RATE (BEATS/MIN)
[facility]    Medicine Progress Note - Hospitalist Service    Date of Admission:  8/23/2022    Assessment & Plan          Cm Lundberg is a 56 year old male admitted on 8/23/2022.     Assessment:  #Infected right BKA stump.  CT right leg showed evolving abscess.  Patient not septic.  Blood cultures obtained.  On vancomycin and Zosyn.  S/p I&D 8/24, culture sent.  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 Valderrama.      #MRSA bacteremia, likely due to right BKA stump abscess.  Positive BC on 8/23, 8/25.  Surveillance BC obtained 8/26, 8/27, 8/28.    PICC on 8/29.  Plan for 28 days of IV vancomycin post clearance of blood, till September 23.    #Acute renal failure, creat peaked at 1.47 on 8/24, was 1.04 on admission.  Likely due to sepsis and dehydration, given rising BUN.  With IVF renal function normalizing.    #Chronic normocytic anemia, hemoglobin baseline 8-9.  History of iron deficiency.  Iron checked low at 16 on 8/25/2022.    Replacing with Venofer 2 doses on 8/27 and 8/28, then p.o. replacement.    #Acute anemia, hemoglobin 6.4 on 8/25.  Reported intraoperative EBL 10 mL.  No postop bleeding.  S/p 1 unit PRBCs 8/25.HgB in upper 8's.    #CVD, history of stroke.  On Plavix, statin.    #Atrial fibrillation.  Was on Xarelto, held preoperatively.  Resume when okay per surgery.    #Coronary artery disease, status post CABG and stents.  On Plavix, beta-blockers, fibrate.    #Non-insulin-dependent diabetes mellitus.  Well-controlled with Glucophage and glipizide.  Initially held due to RANDAL.  Resumed now.  A1c 5.9 on 8/26.  A1C 5.9 but Hgb 8.2 at the time so less accurate.     #Mood disorder:general anxiety and depression, stable on Abilify, Cymbalta, Ativan, Remeron, trazodone.    Essential hypertension.  BP controlled on losartan and metoprolol.  Peripheral neuropathy, due to diabetes.  Resident of [facility] prior to recent past hospitalization  TCU.    Plan:  Continue Vancomycin.  Pain management with p.o. oxycodone, scheduled Tylenol, as needed IV Dilaudid  Resumed oral antidiabetic meds, since patient is n.p.o.  SSI NovoLog.  Wound treatment plan WOCN/vascular surgery.  Wound VAC changed today.  Continue PTA scheduled Tylenol, Abilify, Lipitor, Questran, Plavix, Cymbalta, fenofibrate, Neurontin, Protonix, mental health medications.  Resume Xarelto on 8/30.  Discharge to TCU on 8/30.        Diet: Room Service  Advance Diet as Tolerated: Regular Diet Adult; Moderate Consistent Carb (60 g CHO per Meal) Diet    DVT Prophylaxis: VTE Prophylaxis contraindicated due to surgery today  Murray Catheter: Not present  Central Lines: PRESENT  PICC Double Lumen 08/29/22 Right Basilic-Site Assessment: WDL  Cardiac Monitoring: None  Code Status: Full Code      Disposition Plan      Expected Discharge Date: 08/30/2022    Discharge Delays: IV Medication - consider oral or Home Infusion    Discharge Comments: 8/29 PICC to be placed, pain managed on oral pain meds, can not discharge with continued need for IV pain meds        The patient's care was discussed with the Bedside Nurse, Patient.    Gisel Santoyo MD  Hospitalist Service  [facility]  Securely message with the Vocera Web Console (learn more here)  Text page via [employer] Paging/Directory     Clinically Significant Risk Factors Present on Admission                   ______________________________________________________________________    Interval History   Admitted on 8/20 history for BKA infected wound with davey pus.  S/p I&D on 8/24.  BC from 8/23 growing MRSA.  Positive BC on 8/25.  Patient remains hemodynamically stable, afebrile.  S/p 1 unit PRBCs on 8/25 for hemoglobin of 6.4.  Hemoglobin 8 today.  Reported intraoperative EBL 10 cc.  Surveillance BC obtained 8/26 and 8/27.  ID following.  Abs reviewed.  Renal function normalized.  Incisional pain controlled.  No abdominal pain, nausea,  vomiting, cough.  8/26 s/p washout and partial closure of right BKA and wound VAC application with plan of wound VAC change and PICC on Monday.    Data reviewed today: I reviewed all medications, new labs and imaging results over the last 24 hours. I personally reviewed    Physical Exam   Vital Signs: Temp: 98.3  F (36.8  C) Temp src: Oral BP: 113/64 Pulse: 75   Resp: 16 SpO2: 97 % O2 Device: None (Room air)    Weight: 153 lbs 3.2 oz  General: Alert and oriented x 3. Not in obvious distress.  HEENT: NC, AT. Neck- supple, No JVP elevation, lymphadenopathy or thyromegaly. Trachea-central.  Chest: Clear to auscultation bilaterally.  Heart: S1S2 regular. No M/R/G.  Abdomen: Soft. NT, ND. No organomegaly. Bowel sounds- active.  Back: No spine tenderness. No CVA tenderness.  Extremities: Right BKA dressing was not removed.   Neuro: Cranial nerves 1-12 grossly normal. No focal neurological deficit    Data   Recent Labs   Lab 08/29/22  1150 08/29/22  0747 08/29/22  0613 08/28/22  0808 08/28/22  0622 08/27/22  0816 08/27/22  0739 08/26/22  0827 08/26/22  0815 08/25/22  0652 08/25/22  0605 08/24/22  0825 08/24/22  0611   WBC  --   --  4.0  --   --   --  5.3  --  4.6   < > 6.0   < > 6.5   HGB  --   --  8.6*  --   --   --  8.8*  --  8.2*   < > 6.4*   < > 8.9*   MCV  --   --  85  --   --   --  86  --  86   < > 84   < > 83   PLT  --   --  264  --   --   --  296  --  251   < > 249   < > 323   INR  --   --  1.13  --   --   --  1.12  --   --   --  1.22*  --  1.25*   NA  --   --  140  --   --   --   --   --  139  --  137  --  136   POTASSIUM  --   --  4.3  --  4.3  --  4.3  --  4.4  --  4.3   < > 4.7   CHLORIDE  --   --  109*  --   --   --   --   --  110*  --  108*  --  103   CO2  --   --  25  --   --   --   --   --  25  --  25  --  25   BUN  --   --  12  --   --   --   --   --  16  --  20  --  26*   CR  --   --  1.04  --  0.97  --  0.99  --  1.00  --  1.09   < > 1.47*   ANIONGAP  --   --  6  --   --   --   --   --  4*  --  4*  --  8  "  TO  --   --  9.1  --   --   --   --   --  8.7  --  8.6  --  10.0   * 98 94   < >  --    < >  --    < > 102   < > 155*   < > 142*   ALBUMIN  --   --   --   --   --   --   --   --   --   --   --   --  2.8*   PROTTOTAL  --   --   --   --   --   --   --   --   --   --   --   --  6.8   BILITOTAL  --   --   --   --   --   --   --   --   --   --   --   --  0.3   ALKPHOS  --   --   --   --   --   --   --   --   --   --   --   --  61   ALT  --   --   --   --   --   --   --   --   --   --   --   --  11   AST  --   --   --   --   --   --   --   --   --   --   --   --  13    < > = values in this interval not displayed.     Recent Results (from the past 24 hour(s))   POC US Guidance Needle Placement    Narrative    Ultrasound was performed as guidance to an anesthesia procedure.  Click   \"PACS images\" hyperlink below to view any stored images.  For specific   procedure details, view procedure note authored by anesthesia.     Medications       acetaminophen  975 mg Oral Q8H     ARIPiprazole  2 mg Oral Daily     atorvastatin  80 mg Oral Daily     cholestyramine  1 packet Oral Every Other Day     clopidogrel  75 mg Oral Daily     DULoxetine  120 mg Oral Daily     fenofibrate  160 mg Oral Daily     [START ON 8/30/2022] ferrous sulfate  325 mg Oral Daily with breakfast     gabapentin  100 mg Oral BID AC     gabapentin  600 mg Oral At Bedtime     glipiZIDE  2.5 mg Oral QAM AC     insulin aspart  1-3 Units Subcutaneous TID AC     insulin aspart  1-3 Units Subcutaneous At Bedtime     losartan  25 mg Oral At Bedtime     magnesium oxide  800 mg Oral BID     metFORMIN  1,000 mg Oral Daily with breakfast     metFORMIN  500 mg Oral Daily with supper     metoprolol succinate ER  75 mg Oral Daily     mirtazapine  7.5 mg Oral At Bedtime     pantoprazole  40 mg Oral Daily     piperacillin-tazobactam  3.375 g Intravenous Q8H     polyethylene glycol  17 g Oral Daily     senna-docusate  1 tablet Oral BID     sodium chloride (PF)  3 mL " Intracatheter Q8H     sodium chloride (PF)  3 mL Intracatheter Q8H     traZODone  50 mg Oral At Bedtime     vancomycin  1,500 mg Intravenous Q24H     vitamin C  500 mg Oral Daily     Gisel Santoyo MD   [facility] Medicine Service   [phone]   Pager [phone]   [email or web address]     54

## 2022-10-08 ENCOUNTER — EMERGENCY (EMERGENCY)
Facility: HOSPITAL | Age: 87
LOS: 1 days | Discharge: HOME | End: 2022-10-08
Attending: EMERGENCY MEDICINE
Payer: MEDICAID

## 2022-10-08 VITALS
HEART RATE: 98 BPM | SYSTOLIC BLOOD PRESSURE: 113 MMHG | TEMPERATURE: 101 F | WEIGHT: 123.02 LBS | HEIGHT: 56 IN | DIASTOLIC BLOOD PRESSURE: 67 MMHG | OXYGEN SATURATION: 97 % | RESPIRATION RATE: 20 BRPM

## 2022-10-08 DIAGNOSIS — Z90.710 ACQUIRED ABSENCE OF BOTH CERVIX AND UTERUS: Chronic | ICD-10-CM

## 2022-10-08 DIAGNOSIS — Z98.51 TUBAL LIGATION STATUS: Chronic | ICD-10-CM

## 2022-10-08 PROCEDURE — 70450 CT HEAD/BRAIN W/O DYE: CPT | Mod: 26,MA

## 2022-10-08 PROCEDURE — 71101 X-RAY EXAM UNILAT RIBS/CHEST: CPT | Mod: 26

## 2022-10-08 PROCEDURE — 99284 EMERGENCY DEPT VISIT MOD MDM: CPT

## 2022-10-08 PROCEDURE — 71101 X-RAY EXAM UNILAT RIBS/CHEST: CPT

## 2022-10-08 PROCEDURE — 70450 CT HEAD/BRAIN W/O DYE: CPT | Mod: MA

## 2022-10-08 PROCEDURE — 99284 EMERGENCY DEPT VISIT MOD MDM: CPT | Mod: 25

## 2022-10-08 RX ORDER — ACETAMINOPHEN 500 MG
650 TABLET ORAL ONCE
Refills: 0 | Status: COMPLETED | OUTPATIENT
Start: 2022-10-08 | End: 2022-10-08

## 2022-10-08 RX ADMIN — Medication 650 MILLIGRAM(S): at 08:36

## 2022-10-08 NOTE — ED PROVIDER NOTE - PATIENT PORTAL LINK FT
You can access the FollowMyHealth Patient Portal offered by [facility] by registering at the following website: [email or web address]. By joining [facility]’s FollowMyHealth portal, you will also be able to view your health information using other applications (apps) compatible with our system.

## 2022-10-08 NOTE — ED PROVIDER NOTE - PROGRESS NOTE DETAILS
Calvillo: pt work up neg. cxr clear.  dx contusion from fall. please use heat packs to area 3x/day 20 mins each session, take motrin 600mg every 6 hrs as needed, tylenol 650mg every 4 hrs as needed, stay active, no heavy lifting and return if symptoms  worsens. see your MD for physical therapy. should last about 1 week.

## 2022-10-08 NOTE — ED PROVIDER NOTE - MUSCULOSKELETAL, MLM
Spine appears normal, range of motion is not limited, no muscle or joint tenderness, able to bear weight and stand

## 2022-10-08 NOTE — ED PROVIDER NOTE - NSFOLLOWUPINSTRUCTIONS_ED_ALL_ED_FT
contusion from fall. please use heat packs to area 3x/day 20 mins each session, take motrin 600mg every 6 hrs as needed, tylenol 650mg every 4 hrs as needed, stay active, no heavy lifting and return if symptoms  worsens. see your MD for physical therapy. should last about 1 week.    contusión por caída. use bolsas de calor en el área 3 veces al día alexandre 20 minutos cada sesión, tome motrin 600 mg cada 6 horas según sea necesario, tylenol 650 mg cada 4 horas según sea necesario, manténgase activo, no levante objetos pesados ??y regrese si los síntomas empeoran. consulte a forrest médico para terapia física. debe durar alrededor de 1 semana.

## 2022-10-08 NOTE — ED PROVIDER NOTE - OBJECTIVE STATEMENT
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 pt was using the bedside commode at her room when she bend down to grab something on floor while on commode causing her to fall forward to ground. hit her right side of head and left lower rib. now with pain to those area. no loc, no nv, no change in mental state.

## 2022-10-08 NOTE — ED PROVIDER NOTE - CLINICAL SUMMARY MEDICAL DECISION MAKING FREE TEXT BOX
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 pt was using the bedside commode at her room when she bend down to grab something on floor while on commode causing her to fall forward to ground. hit her right side of head and left lower rib. now with pain to those area. no loc, no nv, no change in mental state.    mechanical fall- ct head, rib xr, tylenol, dc

## 2022-10-14 ENCOUNTER — EMERGENCY (EMERGENCY)
Facility: HOSPITAL | Age: 87
LOS: 1 days | Discharge: HOME | End: 2022-10-14
Attending: EMERGENCY MEDICINE
Payer: MEDICAID

## 2022-10-14 VITALS
SYSTOLIC BLOOD PRESSURE: 128 MMHG | RESPIRATION RATE: 18 BRPM | HEART RATE: 78 BPM | DIASTOLIC BLOOD PRESSURE: 77 MMHG | OXYGEN SATURATION: 98 % | WEIGHT: 115.08 LBS | HEIGHT: 56 IN

## 2022-10-14 VITALS — TEMPERATURE: 99 F

## 2022-10-14 DIAGNOSIS — Z98.51 TUBAL LIGATION STATUS: Chronic | ICD-10-CM

## 2022-10-14 DIAGNOSIS — Z90.710 ACQUIRED ABSENCE OF BOTH CERVIX AND UTERUS: Chronic | ICD-10-CM

## 2022-10-14 LAB
ALBUMIN SERPL ELPH-MCNC: 3 G/DL — LOW (ref 3.5–5)
ALP SERPL-CCNC: 55 U/L — SIGNIFICANT CHANGE UP (ref 40–120)
ALT FLD-CCNC: 13 U/L DA — SIGNIFICANT CHANGE UP (ref 10–60)
ANION GAP SERPL CALC-SCNC: 7 MMOL/L — SIGNIFICANT CHANGE UP (ref 5–17)
AST SERPL-CCNC: 13 U/L — SIGNIFICANT CHANGE UP (ref 10–40)
BASOPHILS # BLD AUTO: 0.05 K/UL — SIGNIFICANT CHANGE UP (ref 0–0.2)
BASOPHILS NFR BLD AUTO: 0.8 % — SIGNIFICANT CHANGE UP (ref 0–2)
BILIRUB SERPL-MCNC: 0.3 MG/DL — SIGNIFICANT CHANGE UP (ref 0.2–1.2)
BUN SERPL-MCNC: 13 MG/DL — SIGNIFICANT CHANGE UP (ref 7–18)
CALCIUM SERPL-MCNC: 8.7 MG/DL — SIGNIFICANT CHANGE UP (ref 8.4–10.5)
CHLORIDE SERPL-SCNC: 113 MMOL/L — HIGH (ref 96–108)
CO2 SERPL-SCNC: 21 MMOL/L — LOW (ref 22–31)
CREAT SERPL-MCNC: 0.64 MG/DL — SIGNIFICANT CHANGE UP (ref 0.5–1.3)
EGFR: 87 ML/MIN/1.73M2 — SIGNIFICANT CHANGE UP
EOSINOPHIL # BLD AUTO: 0.11 K/UL — SIGNIFICANT CHANGE UP (ref 0–0.5)
EOSINOPHIL NFR BLD AUTO: 1.8 % — SIGNIFICANT CHANGE UP (ref 0–6)
GLUCOSE SERPL-MCNC: 83 MG/DL — SIGNIFICANT CHANGE UP (ref 70–99)
HCT VFR BLD CALC: 33.5 % — LOW (ref 34.5–45)
HGB BLD-MCNC: 11.1 G/DL — LOW (ref 11.5–15.5)
IMM GRANULOCYTES NFR BLD AUTO: 0.3 % — SIGNIFICANT CHANGE UP (ref 0–0.9)
LACTATE SERPL-SCNC: 1.3 MMOL/L — SIGNIFICANT CHANGE UP (ref 0.7–2)
LIDOCAIN IGE QN: 76 U/L — SIGNIFICANT CHANGE UP (ref 73–393)
LYMPHOCYTES # BLD AUTO: 3 K/UL — SIGNIFICANT CHANGE UP (ref 1–3.3)
LYMPHOCYTES # BLD AUTO: 49.2 % — HIGH (ref 13–44)
MCHC RBC-ENTMCNC: 28.8 PG — SIGNIFICANT CHANGE UP (ref 27–34)
MCHC RBC-ENTMCNC: 33.1 GM/DL — SIGNIFICANT CHANGE UP (ref 32–36)
MCV RBC AUTO: 86.8 FL — SIGNIFICANT CHANGE UP (ref 80–100)
MONOCYTES # BLD AUTO: 0.57 K/UL — SIGNIFICANT CHANGE UP (ref 0–0.9)
MONOCYTES NFR BLD AUTO: 9.3 % — SIGNIFICANT CHANGE UP (ref 2–14)
NEUTROPHILS # BLD AUTO: 2.35 K/UL — SIGNIFICANT CHANGE UP (ref 1.8–7.4)
NEUTROPHILS NFR BLD AUTO: 38.6 % — LOW (ref 43–77)
NRBC # BLD: 0 /100 WBCS — SIGNIFICANT CHANGE UP (ref 0–0)
PLATELET # BLD AUTO: 304 K/UL — SIGNIFICANT CHANGE UP (ref 150–400)
POTASSIUM SERPL-MCNC: 3.7 MMOL/L — SIGNIFICANT CHANGE UP (ref 3.5–5.3)
POTASSIUM SERPL-SCNC: 3.7 MMOL/L — SIGNIFICANT CHANGE UP (ref 3.5–5.3)
PROT SERPL-MCNC: 7 G/DL — SIGNIFICANT CHANGE UP (ref 6–8.3)
RBC # BLD: 3.86 M/UL — SIGNIFICANT CHANGE UP (ref 3.8–5.2)
RBC # FLD: 17.4 % — HIGH (ref 10.3–14.5)
SODIUM SERPL-SCNC: 141 MMOL/L — SIGNIFICANT CHANGE UP (ref 135–145)
WBC # BLD: 6.1 K/UL — SIGNIFICANT CHANGE UP (ref 3.8–10.5)
WBC # FLD AUTO: 6.1 K/UL — SIGNIFICANT CHANGE UP (ref 3.8–10.5)

## 2022-10-14 PROCEDURE — 83605 ASSAY OF LACTIC ACID: CPT

## 2022-10-14 PROCEDURE — 99284 EMERGENCY DEPT VISIT MOD MDM: CPT

## 2022-10-14 PROCEDURE — 36415 COLL VENOUS BLD VENIPUNCTURE: CPT

## 2022-10-14 PROCEDURE — 74019 RADEX ABDOMEN 2 VIEWS: CPT | Mod: 26

## 2022-10-14 PROCEDURE — 74019 RADEX ABDOMEN 2 VIEWS: CPT

## 2022-10-14 PROCEDURE — 99283 EMERGENCY DEPT VISIT LOW MDM: CPT | Mod: 25

## 2022-10-14 PROCEDURE — 85025 COMPLETE CBC W/AUTO DIFF WBC: CPT

## 2022-10-14 PROCEDURE — 80053 COMPREHEN METABOLIC PANEL: CPT

## 2022-10-14 PROCEDURE — 83690 ASSAY OF LIPASE: CPT

## 2022-10-14 RX ORDER — SODIUM CHLORIDE 9 MG/ML
1000 INJECTION INTRAMUSCULAR; INTRAVENOUS; SUBCUTANEOUS ONCE
Refills: 0 | Status: COMPLETED | OUTPATIENT
Start: 2022-10-14 | End: 2022-10-14

## 2022-10-14 RX ADMIN — SODIUM CHLORIDE 1000 MILLILITER(S): 9 INJECTION INTRAMUSCULAR; INTRAVENOUS; SUBCUTANEOUS at 18:45

## 2022-10-14 NOTE — ED PROVIDER NOTE - CLINICAL SUMMARY MEDICAL DECISION MAKING FREE TEXT BOX
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 no fever, no n/v. had mild left mid abd sharp pain that resolved after fluids at clinic.     watery diarrhea- possibly se from chemo vs colitis vs constipation vs sbo although no sx. labs, fluids, stool study, re-assess

## 2022-10-14 NOTE — ED PROVIDER NOTE - OBJECTIVE STATEMENT
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 no fever, no n/v. had mild left mid abd sharp pain that resolved after fluids at clinic.

## 2022-10-14 NOTE — ED PROVIDER NOTE - NSFOLLOWUPINSTRUCTIONS_ED_ALL_ED_FT
diarrhea. replace loss with gatorade/coconut water/pedialyte/soups, slowly advance diet (bread/wheat/toast/soft diet).  see your MD.

## 2022-10-14 NOTE — ED PROVIDER NOTE - PATIENT PORTAL LINK FT
You can access the FollowMyHealth Patient Portal offered by [facility] by registering at the following website: [email or web address]. By joining [facility]’s FollowMyHealth portal, you will also be able to view your health information using other applications (apps) compatible with our system.

## 2022-10-14 NOTE — ED ADULT NURSE NOTE - NSIMPLEMENTINTERV_GEN_ALL_ED
Implemented All Fall with Harm Risk Interventions:  [place] to call system. Call bell, personal items and telephone within reach. Instruct patient to call for assistance. Room bathroom lighting operational. Non-slip footwear when patient is off stretcher. Physically safe environment: no spills, clutter or unnecessary equipment. Stretcher in lowest position, wheels locked, appropriate side rails in place. Provide visual cue, wrist band, yellow gown, etc. Monitor gait and stability. Monitor for mental status changes and reorient to person, place, and time. Review medications for side effects contributing to fall risk. Reinforce activity limits and safety measures with patient and family. Provide visual clues: red socks.

## 2022-10-14 NOTE — ED PROVIDER NOTE - PROGRESS NOTE DETAILS
Calvillo: pt asx. no diarrhea. axr no air fluid level. pt abd exam s/nt/nd. pt comfortable. tolerated po  dx diarrhea. replace loss with gatorade/coconut water/pedialyte/soups, slowly advance diet (bread/wheat/toast/soft diet).  see your MD.

## 2022-11-19 NOTE — PHYSICAL THERAPY INITIAL EVALUATION ADULT - WEIGHT-BEARING RESTRICTIONS: SIT/STAND, REHAB EVAL
Patient: Abelardo Donovan    Procedure: Procedure(s):  ROBOTIC ASSISTED PROSTATECTOMY WITH PELVIC LYMPHADENECTOMY       Diagnosis: Prostate cancer (H) [C61]  Diagnosis Additional Information: No value filed.    Anesthesia Type:   General     Note:    Oropharynx: oropharynx clear of all foreign objects  Level of Consciousness: awake  Oxygen Supplementation: nasal cannula    Independent Airway: airway patency satisfactory and stable  Dentition: dentition unchanged  Vital Signs Stable: post-procedure vital signs reviewed and stable  Report to RN Given: handoff report given  Patient transferred to: PACU    Handoff Report: Identifed the Patient, Identified the Reponsible Provider, Reviewed the pertinent medical history, Discussed the surgical course, Reviewed Intra-OP anesthesia mangement and issues during anesthesia, Set expectations for post-procedure period and Allowed opportunity for questions and acknowledgement of understanding      Vitals:  Vitals Value Taken Time     11/18/22 1810   Temp     Pulse 83 11/18/22 1810   Resp 13 11/18/22 1809   SpO2 95 % 11/18/22 1815   Vitals shown include unvalidated device data.    Electronically Signed By: YAMIL Catherine CRNA  November 18, 2022  6:16 PM   full weight-bearing

## 2022-11-22 NOTE — ED PROVIDER NOTE - CPE EDP MUSC NORM
Problem: Falls - Risk of  Goal: *Absence of Falls  Description: Document Linwood Bee Fall Risk and appropriate interventions in the flowsheet.   Outcome: Resolved/Met  Note: Fall Risk Interventions:            Medication Interventions: Teach patient to arise slowly         History of Falls Interventions: Bed/chair exit alarm, Consult care management for discharge planning, Door open when patient unattended, Investigate reason for fall, Room close to nurse's station, Utilize gait belt for transfer/ambulation normal...

## 2022-11-25 NOTE — ED PROVIDER NOTE - RESPIRATORY, MLM
Spoke with Patient   Note: Patient states she is currently in the ER for shortness of breath, she was at the hospital visiting her mother and went downstairs for something and was very winded and was taken to the ER for evaluation.  She agreed to a follow up appointment with PCP if she is discharged from the ER by then.    Patient verbalized understanding all information presented and in agreement. All questions answered.     Breath sounds clear and equal bilaterally.

## 2022-11-27 ENCOUNTER — INPATIENT (INPATIENT)
Facility: HOSPITAL | Age: 87
LOS: 4 days | Discharge: HOME | DRG: 871 | End: 2022-12-02
Attending: INTERNAL MEDICINE | Admitting: INTERNAL MEDICINE
Payer: MEDICAID

## 2022-11-27 VITALS
HEIGHT: 56 IN | WEIGHT: 119.93 LBS | TEMPERATURE: 100 F | SYSTOLIC BLOOD PRESSURE: 125 MMHG | DIASTOLIC BLOOD PRESSURE: 77 MMHG | RESPIRATION RATE: 20 BRPM | HEART RATE: 105 BPM | OXYGEN SATURATION: 95 %

## 2022-11-27 DIAGNOSIS — F03.90 UNSPECIFIED DEMENTIA WITHOUT BEHAVIORAL DISTURBANCE: ICD-10-CM

## 2022-11-27 DIAGNOSIS — E11.9 TYPE 2 DIABETES MELLITUS WITHOUT COMPLICATIONS: ICD-10-CM

## 2022-11-27 DIAGNOSIS — A41.9 SEPSIS, UNSPECIFIED ORGANISM: ICD-10-CM

## 2022-11-27 DIAGNOSIS — Z29.9 ENCOUNTER FOR PROPHYLACTIC MEASURES, UNSPECIFIED: ICD-10-CM

## 2022-11-27 DIAGNOSIS — J18.9 PNEUMONIA, UNSPECIFIED ORGANISM: ICD-10-CM

## 2022-11-27 DIAGNOSIS — Z98.51 TUBAL LIGATION STATUS: Chronic | ICD-10-CM

## 2022-11-27 DIAGNOSIS — I10 ESSENTIAL (PRIMARY) HYPERTENSION: ICD-10-CM

## 2022-11-27 DIAGNOSIS — Z90.710 ACQUIRED ABSENCE OF BOTH CERVIX AND UTERUS: Chronic | ICD-10-CM

## 2022-11-27 DIAGNOSIS — E78.5 HYPERLIPIDEMIA, UNSPECIFIED: ICD-10-CM

## 2022-11-27 DIAGNOSIS — C90.00 MULTIPLE MYELOMA NOT HAVING ACHIEVED REMISSION: ICD-10-CM

## 2022-11-27 LAB
ACETONE SERPL-MCNC: NEGATIVE — SIGNIFICANT CHANGE UP
ALBUMIN SERPL ELPH-MCNC: 2.5 G/DL — LOW (ref 3.5–5)
ALP SERPL-CCNC: 39 U/L — LOW (ref 40–120)
ALT FLD-CCNC: 17 U/L DA — SIGNIFICANT CHANGE UP (ref 10–60)
ANION GAP SERPL CALC-SCNC: 13 MMOL/L — SIGNIFICANT CHANGE UP (ref 5–17)
ANISOCYTOSIS BLD QL: SLIGHT — SIGNIFICANT CHANGE UP
AST SERPL-CCNC: 38 U/L — SIGNIFICANT CHANGE UP (ref 10–40)
BASOPHILS # BLD AUTO: 0 K/UL — SIGNIFICANT CHANGE UP (ref 0–0.2)
BASOPHILS NFR BLD AUTO: 0 % — SIGNIFICANT CHANGE UP (ref 0–2)
BILIRUB SERPL-MCNC: 0.6 MG/DL — SIGNIFICANT CHANGE UP (ref 0.2–1.2)
BUN SERPL-MCNC: 17 MG/DL — SIGNIFICANT CHANGE UP (ref 7–18)
BURR CELLS BLD QL SMEAR: SIGNIFICANT CHANGE UP
CALCIUM SERPL-MCNC: 8.2 MG/DL — LOW (ref 8.4–10.5)
CHLORIDE SERPL-SCNC: 110 MMOL/L — HIGH (ref 96–108)
CO2 SERPL-SCNC: 14 MMOL/L — LOW (ref 22–31)
CREAT SERPL-MCNC: 0.83 MG/DL — SIGNIFICANT CHANGE UP (ref 0.5–1.3)
EGFR: 69 ML/MIN/1.73M2 — SIGNIFICANT CHANGE UP
EOSINOPHIL # BLD AUTO: 0 K/UL — SIGNIFICANT CHANGE UP (ref 0–0.5)
EOSINOPHIL NFR BLD AUTO: 0 % — SIGNIFICANT CHANGE UP (ref 0–6)
FLUAV H1 2009 PAND RNA SPEC QL NAA+PROBE: DETECTED
GLUCOSE BLDC GLUCOMTR-MCNC: 92 MG/DL — SIGNIFICANT CHANGE UP (ref 70–99)
GLUCOSE SERPL-MCNC: 106 MG/DL — HIGH (ref 70–99)
HCT VFR BLD CALC: 33 % — LOW (ref 34.5–45)
HGB BLD-MCNC: 10.7 G/DL — LOW (ref 11.5–15.5)
HYPOSEGMENTATION: PRESENT — SIGNIFICANT CHANGE UP
LACTATE SERPL-SCNC: 2.5 MMOL/L — HIGH (ref 0.7–2)
LACTATE SERPL-SCNC: 3.8 MMOL/L — HIGH (ref 0.7–2)
LACTATE SERPL-SCNC: 4.5 MMOL/L — CRITICAL HIGH (ref 0.7–2)
LG PLATELETS BLD QL AUTO: SLIGHT — SIGNIFICANT CHANGE UP
LYMPHOCYTES # BLD AUTO: 1.2 K/UL — SIGNIFICANT CHANGE UP (ref 1–3.3)
LYMPHOCYTES # BLD AUTO: 53 % — HIGH (ref 13–44)
MAGNESIUM SERPL-MCNC: 1.3 MG/DL — LOW (ref 1.6–2.6)
MANUAL SMEAR VERIFICATION: SIGNIFICANT CHANGE UP
MCHC RBC-ENTMCNC: 29.6 PG — SIGNIFICANT CHANGE UP (ref 27–34)
MCHC RBC-ENTMCNC: 32.4 GM/DL — SIGNIFICANT CHANGE UP (ref 32–36)
MCV RBC AUTO: 91.4 FL — SIGNIFICANT CHANGE UP (ref 80–100)
METAMYELOCYTES # FLD: 5 % — HIGH (ref 0–0)
MONOCYTES # BLD AUTO: 0.11 K/UL — SIGNIFICANT CHANGE UP (ref 0–0.9)
MONOCYTES NFR BLD AUTO: 5 % — SIGNIFICANT CHANGE UP (ref 2–14)
NEUTROPHILS # BLD AUTO: 0.7 K/UL — LOW (ref 1.8–7.4)
NEUTROPHILS NFR BLD AUTO: 29 % — LOW (ref 43–77)
NEUTS BAND # BLD: 2 % — SIGNIFICANT CHANGE UP (ref 0–8)
NRBC # BLD: 0 /100 — SIGNIFICANT CHANGE UP (ref 0–0)
NT-PROBNP SERPL-SCNC: 1408 PG/ML — HIGH (ref 0–450)
OVALOCYTES BLD QL SMEAR: SLIGHT — SIGNIFICANT CHANGE UP
PLAT MORPH BLD: ABNORMAL
PLATELET # BLD AUTO: 161 K/UL — SIGNIFICANT CHANGE UP (ref 150–400)
POIKILOCYTOSIS BLD QL AUTO: SLIGHT — SIGNIFICANT CHANGE UP
POTASSIUM SERPL-MCNC: 4.2 MMOL/L — SIGNIFICANT CHANGE UP (ref 3.5–5.3)
POTASSIUM SERPL-SCNC: 4.2 MMOL/L — SIGNIFICANT CHANGE UP (ref 3.5–5.3)
PROT SERPL-MCNC: 5.5 G/DL — LOW (ref 6–8.3)
RAPID RVP RESULT: DETECTED
RBC # BLD: 3.61 M/UL — LOW (ref 3.8–5.2)
RBC # FLD: 18.3 % — HIGH (ref 10.3–14.5)
RBC BLD AUTO: ABNORMAL
SARS-COV-2 RNA SPEC QL NAA+PROBE: SIGNIFICANT CHANGE UP
SODIUM SERPL-SCNC: 137 MMOL/L — SIGNIFICANT CHANGE UP (ref 135–145)
TROPONIN I, HIGH SENSITIVITY RESULT: 26.5 NG/L — SIGNIFICANT CHANGE UP
VARIANT LYMPHS # BLD: 6 % — SIGNIFICANT CHANGE UP (ref 0–6)
WBC # BLD: 2.27 K/UL — LOW (ref 3.8–10.5)
WBC # FLD AUTO: 2.27 K/UL — LOW (ref 3.8–10.5)

## 2022-11-27 PROCEDURE — 99291 CRITICAL CARE FIRST HOUR: CPT

## 2022-11-27 PROCEDURE — 93010 ELECTROCARDIOGRAM REPORT: CPT

## 2022-11-27 PROCEDURE — 71045 X-RAY EXAM CHEST 1 VIEW: CPT | Mod: 26

## 2022-11-27 RX ORDER — INSULIN LISPRO 100/ML
VIAL (ML) SUBCUTANEOUS AT BEDTIME
Refills: 0 | Status: DISCONTINUED | OUTPATIENT
Start: 2022-11-27 | End: 2022-12-02

## 2022-11-27 RX ORDER — LOSARTAN POTASSIUM 100 MG/1
1 TABLET, FILM COATED ORAL
Qty: 0 | Refills: 0 | DISCHARGE

## 2022-11-27 RX ORDER — METFORMIN HYDROCHLORIDE 850 MG/1
1 TABLET ORAL
Qty: 0 | Refills: 0 | DISCHARGE

## 2022-11-27 RX ORDER — MECLIZINE HCL 12.5 MG
25 TABLET ORAL DAILY
Refills: 0 | Status: DISCONTINUED | OUTPATIENT
Start: 2022-11-27 | End: 2022-12-02

## 2022-11-27 RX ORDER — ACETAMINOPHEN 500 MG
650 TABLET ORAL ONCE
Refills: 0 | Status: COMPLETED | OUTPATIENT
Start: 2022-11-27 | End: 2022-11-27

## 2022-11-27 RX ORDER — MAGNESIUM SULFATE 500 MG/ML
2 VIAL (ML) INJECTION ONCE
Refills: 0 | Status: COMPLETED | OUTPATIENT
Start: 2022-11-27 | End: 2022-11-27

## 2022-11-27 RX ORDER — SODIUM CHLORIDE 9 MG/ML
1000 INJECTION, SOLUTION INTRAVENOUS
Refills: 0 | Status: DISCONTINUED | OUTPATIENT
Start: 2022-11-27 | End: 2022-11-30

## 2022-11-27 RX ORDER — SIMVASTATIN 20 MG/1
1 TABLET, FILM COATED ORAL
Qty: 0 | Refills: 0 | DISCHARGE

## 2022-11-27 RX ORDER — ALOGLIPTIN 12.5 MG/1
25 TABLET, FILM COATED ORAL
Qty: 0 | Refills: 0 | DISCHARGE

## 2022-11-27 RX ORDER — ENOXAPARIN SODIUM 100 MG/ML
40 INJECTION SUBCUTANEOUS EVERY 24 HOURS
Refills: 0 | Status: DISCONTINUED | OUTPATIENT
Start: 2022-11-27 | End: 2022-12-02

## 2022-11-27 RX ORDER — AZITHROMYCIN 500 MG/1
500 TABLET, FILM COATED ORAL EVERY 24 HOURS
Refills: 0 | Status: DISCONTINUED | OUTPATIENT
Start: 2022-11-27 | End: 2022-11-28

## 2022-11-27 RX ORDER — ATORVASTATIN CALCIUM 80 MG/1
10 TABLET, FILM COATED ORAL AT BEDTIME
Refills: 0 | Status: DISCONTINUED | OUTPATIENT
Start: 2022-11-27 | End: 2022-12-02

## 2022-11-27 RX ORDER — CEFTRIAXONE 500 MG/1
1000 INJECTION, POWDER, FOR SOLUTION INTRAMUSCULAR; INTRAVENOUS ONCE
Refills: 0 | Status: COMPLETED | OUTPATIENT
Start: 2022-11-27 | End: 2022-11-27

## 2022-11-27 RX ORDER — CEFTRIAXONE 500 MG/1
1000 INJECTION, POWDER, FOR SOLUTION INTRAMUSCULAR; INTRAVENOUS EVERY 24 HOURS
Refills: 0 | Status: DISCONTINUED | OUTPATIENT
Start: 2022-11-27 | End: 2022-11-28

## 2022-11-27 RX ORDER — ERTUGLIFLOZIN 5 MG/1
1 TABLET, FILM COATED ORAL
Qty: 0 | Refills: 0 | DISCHARGE

## 2022-11-27 RX ORDER — SODIUM CHLORIDE 9 MG/ML
1700 INJECTION INTRAMUSCULAR; INTRAVENOUS; SUBCUTANEOUS ONCE
Refills: 0 | Status: COMPLETED | OUTPATIENT
Start: 2022-11-27 | End: 2022-11-27

## 2022-11-27 RX ORDER — ALENDRONATE SODIUM 70 MG/1
1 TABLET ORAL
Qty: 0 | Refills: 0 | DISCHARGE

## 2022-11-27 RX ORDER — LANOLIN ALCOHOL/MO/W.PET/CERES
3 CREAM (GRAM) TOPICAL AT BEDTIME
Refills: 0 | Status: DISCONTINUED | OUTPATIENT
Start: 2022-11-27 | End: 2022-12-02

## 2022-11-27 RX ORDER — AZITHROMYCIN 500 MG/1
500 TABLET, FILM COATED ORAL ONCE
Refills: 0 | Status: COMPLETED | OUTPATIENT
Start: 2022-11-27 | End: 2022-11-27

## 2022-11-27 RX ORDER — INSULIN LISPRO 100/ML
VIAL (ML) SUBCUTANEOUS
Refills: 0 | Status: DISCONTINUED | OUTPATIENT
Start: 2022-11-27 | End: 2022-12-02

## 2022-11-27 RX ORDER — ONDANSETRON 8 MG/1
4 TABLET, FILM COATED ORAL EVERY 8 HOURS
Refills: 0 | Status: DISCONTINUED | OUTPATIENT
Start: 2022-11-27 | End: 2022-12-02

## 2022-11-27 RX ORDER — LOSARTAN POTASSIUM 100 MG/1
25 TABLET, FILM COATED ORAL DAILY
Refills: 0 | Status: DISCONTINUED | OUTPATIENT
Start: 2022-11-27 | End: 2022-12-02

## 2022-11-27 RX ORDER — ACETAMINOPHEN 500 MG
650 TABLET ORAL EVERY 6 HOURS
Refills: 0 | Status: DISCONTINUED | OUTPATIENT
Start: 2022-11-27 | End: 2022-12-02

## 2022-11-27 RX ADMIN — Medication 650 MILLIGRAM(S): at 15:17

## 2022-11-27 RX ADMIN — SODIUM CHLORIDE 1700 MILLILITER(S): 9 INJECTION INTRAMUSCULAR; INTRAVENOUS; SUBCUTANEOUS at 15:16

## 2022-11-27 RX ADMIN — ATORVASTATIN CALCIUM 10 MILLIGRAM(S): 80 TABLET, FILM COATED ORAL at 23:27

## 2022-11-27 RX ADMIN — Medication 25 GRAM(S): at 18:23

## 2022-11-27 RX ADMIN — SODIUM CHLORIDE 1700 MILLILITER(S): 9 INJECTION INTRAMUSCULAR; INTRAVENOUS; SUBCUTANEOUS at 16:50

## 2022-11-27 RX ADMIN — AZITHROMYCIN 500 MILLIGRAM(S): 500 TABLET, FILM COATED ORAL at 16:45

## 2022-11-27 RX ADMIN — AZITHROMYCIN 255 MILLIGRAM(S): 500 TABLET, FILM COATED ORAL at 15:17

## 2022-11-27 RX ADMIN — CEFTRIAXONE 100 MILLIGRAM(S): 500 INJECTION, POWDER, FOR SOLUTION INTRAMUSCULAR; INTRAVENOUS at 15:00

## 2022-11-27 RX ADMIN — SODIUM CHLORIDE 75 MILLILITER(S): 9 INJECTION, SOLUTION INTRAVENOUS at 23:28

## 2022-11-27 RX ADMIN — Medication 650 MILLIGRAM(S): at 16:45

## 2022-11-27 RX ADMIN — Medication 75 MILLIGRAM(S): at 18:23

## 2022-11-27 RX ADMIN — CEFTRIAXONE 1000 MILLIGRAM(S): 500 INJECTION, POWDER, FOR SOLUTION INTRAMUSCULAR; INTRAVENOUS at 15:30

## 2022-11-27 NOTE — H&P ADULT - PROBLEM SELECTOR PLAN 6
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 Alvarado  [facility] consult in morning by primary team

## 2022-11-27 NOTE — ED PROVIDER NOTE - OBJECTIVE STATEMENT
87 y.o. female with h/o NIDDM, last dose today, BIBA c/o coughing up yellowish sputum for ? time, fever, chills, weakness, no appetite, nausea, sob, myalgia, watery stool, pt has COVID vaccine with 1 booster.  Very poor historian

## 2022-11-27 NOTE — H&P ADULT - PROBLEM SELECTOR PLAN 1
pt p/w fever, tachycardia, decreased WBC (pt on chemo)  lactate 4.5  CXR wnl though with L sided crackles on exam  RVP+ for influenza  will treat for PNA as pt high risk  pt also with dysuria  f/u UA  started on ceftriaxone and azithromycin  f/u blood, sputum and urine cultures  f/u procal, legionella, mycoplasma, and strep ur ag  trend lactate to resolution

## 2022-11-27 NOTE — H&P ADULT - ASSESSMENT
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 Admitted for PNA. 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 Admitted for PNA.    Primary team to confirm meds in the AM.

## 2022-11-27 NOTE — H&P ADULT - NSHPPHYSICALEXAM_GEN_ALL_CORE
T(C): 37.8 (11-27-22 @ 16:15), Max: 38.2 (11-27-22 @ 14:55)  HR: 95 (11-27-22 @ 16:15) (95 - 105)  BP: 114/66 (11-27-22 @ 16:15) (114/66 - 125/77)  RR: 20 (11-27-22 @ 16:15) (18 - 20)  SpO2: 95% (11-27-22 @ 16:15) (95% - 96%)    GENERAL: patient appears well, NAD, pleasant  HEAD: normocephalic, atraumatic  EYES: sclera clear, no exudates  ENMT: oropharynx clear without erythema, no exudates, moist mucous membranes  NECK: supple, soft, no thyromegaly noted  LUNGS: clear to auscultation bilaterally, no wheezing, rhonchi or rales appreciated  HEART: S1/S2, regular rate and rhythm, no murmurs noted, no lower extremity edema  GASTROINTESTINAL: abdomen is soft, nondistended, nontender, normoactive bowel sounds, no palpable masses  INTEGUMENT: good skin turgor, no lesions noted  MUSCULOSKELETAL: no clubbing or cyanosis, no obvious deformity  NEUROLOGIC: AAO x2, CNII-XII intact, no obvious sensorimotor deficits noted

## 2022-11-27 NOTE — ED ADULT NURSE NOTE - NSIMPLEMENTINTERV_GEN_ALL_ED
Implemented All Fall with Harm Risk Interventions:  [place] to call system. Call bell, personal items and telephone within reach. Instruct patient to call for assistance. Room bathroom lighting operational. Non-slip footwear when patient is off stretcher. Physically safe environment: no spills, clutter or unnecessary equipment. Stretcher in lowest position, wheels locked, appropriate side rails in place. Provide visual cue, wrist band, yellow gown, etc. Monitor gait and stability. Monitor for mental status changes and reorient to person, place, and time. Review medications for side effects contributing to fall risk. Reinforce activity limits and safety measures with patient and family. Provide visual clues: red socks.

## 2022-11-27 NOTE — H&P ADULT - HISTORY OF PRESENT ILLNESS
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 Collateral obtained from son-in law (Raquel shelton) over the phone. States that patient has had high fever at home, coughing, short of breath, fatigue, lethargic with poor oral intake and unable to sleep at night. Pt usually ambulates at home with walker but now ts difficult to stand up from bed. Pts last chemo was November 16th. NKDA. 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 Collateral obtained from son-in law (Raquel shelton) over the phone. States that patient has had high fever at home, coughing, shortness of breath, fatigue, and lethargy with poor oral intake and insomnia. Pt usually ambulates at home with walker but lately it has been difficult for her to stand up from bed. Pt endorses dizziness, chest tightness and dysuria, denies headache, palpitations, n/v/d/c, rahes or leg swelling. NKDA. Last chemo was November 16th. NKDA.

## 2022-11-27 NOTE — ED ADULT NURSE NOTE - OBJECTIVE STATEMENT
Pt presents to the ED with c/o fever, cough, and shortness of breath. Pt appears to be lethargic and not engaging in conversations.

## 2022-11-27 NOTE — ED PROVIDER NOTE - CARE PLAN
1 Principal Discharge DX:	Sepsis  Secondary Diagnosis:	PNA (pneumonia)  Secondary Diagnosis:	Hypomagnesemia  Secondary Diagnosis:	Dehydration   Principal Discharge DX:	Sepsis  Secondary Diagnosis:	PNA (pneumonia)  Secondary Diagnosis:	Hypomagnesemia  Secondary Diagnosis:	Dehydration  Secondary Diagnosis:	Influenza A

## 2022-11-28 DIAGNOSIS — J10.00 INFLUENZA DUE TO OTHER IDENTIFIED INFLUENZA VIRUS WITH UNSPECIFIED TYPE OF PNEUMONIA: ICD-10-CM

## 2022-11-28 LAB
A1C WITH ESTIMATED AVERAGE GLUCOSE RESULT: 5.5 % — SIGNIFICANT CHANGE UP (ref 4–5.6)
ANION GAP SERPL CALC-SCNC: 12 MMOL/L — SIGNIFICANT CHANGE UP (ref 5–17)
APPEARANCE UR: CLEAR — SIGNIFICANT CHANGE UP
BACTERIA # UR AUTO: ABNORMAL /HPF
BILIRUB UR-MCNC: NEGATIVE — SIGNIFICANT CHANGE UP
BUN SERPL-MCNC: 15 MG/DL — SIGNIFICANT CHANGE UP (ref 7–18)
CALCIUM SERPL-MCNC: 8.4 MG/DL — SIGNIFICANT CHANGE UP (ref 8.4–10.5)
CHLORIDE SERPL-SCNC: 112 MMOL/L — HIGH (ref 96–108)
CO2 SERPL-SCNC: 19 MMOL/L — LOW (ref 22–31)
COLOR SPEC: YELLOW — SIGNIFICANT CHANGE UP
COMMENT - URINE: SIGNIFICANT CHANGE UP
CREAT SERPL-MCNC: 0.68 MG/DL — SIGNIFICANT CHANGE UP (ref 0.5–1.3)
DIFF PNL FLD: NEGATIVE — SIGNIFICANT CHANGE UP
EGFR: 85 ML/MIN/1.73M2 — SIGNIFICANT CHANGE UP
EPI CELLS # UR: SIGNIFICANT CHANGE UP /HPF
ESTIMATED AVERAGE GLUCOSE: 111 MG/DL — SIGNIFICANT CHANGE UP (ref 68–114)
GLUCOSE BLDC GLUCOMTR-MCNC: 71 MG/DL — SIGNIFICANT CHANGE UP (ref 70–99)
GLUCOSE BLDC GLUCOMTR-MCNC: 81 MG/DL — SIGNIFICANT CHANGE UP (ref 70–99)
GLUCOSE BLDC GLUCOMTR-MCNC: 84 MG/DL — SIGNIFICANT CHANGE UP (ref 70–99)
GLUCOSE BLDC GLUCOMTR-MCNC: 89 MG/DL — SIGNIFICANT CHANGE UP (ref 70–99)
GLUCOSE SERPL-MCNC: 79 MG/DL — SIGNIFICANT CHANGE UP (ref 70–99)
GLUCOSE UR QL: 1000 MG/DL
GRAM STN FLD: SIGNIFICANT CHANGE UP
HCT VFR BLD CALC: 29 % — LOW (ref 34.5–45)
HGB BLD-MCNC: 9.3 G/DL — LOW (ref 11.5–15.5)
KETONES UR-MCNC: ABNORMAL
LACTATE SERPL-SCNC: 1.9 MMOL/L — SIGNIFICANT CHANGE UP (ref 0.7–2)
LEUKOCYTE ESTERASE UR-ACNC: ABNORMAL
MAGNESIUM SERPL-MCNC: 1.9 MG/DL — SIGNIFICANT CHANGE UP (ref 1.6–2.6)
MCHC RBC-ENTMCNC: 29 PG — SIGNIFICANT CHANGE UP (ref 27–34)
MCHC RBC-ENTMCNC: 32.1 GM/DL — SIGNIFICANT CHANGE UP (ref 32–36)
MCV RBC AUTO: 90.3 FL — SIGNIFICANT CHANGE UP (ref 80–100)
NITRITE UR-MCNC: NEGATIVE — SIGNIFICANT CHANGE UP
NRBC # BLD: 0 /100 WBCS — SIGNIFICANT CHANGE UP (ref 0–0)
PH UR: 6.5 — SIGNIFICANT CHANGE UP (ref 5–8)
PHOSPHATE SERPL-MCNC: 2.4 MG/DL — LOW (ref 2.5–4.5)
PLATELET # BLD AUTO: 195 K/UL — SIGNIFICANT CHANGE UP (ref 150–400)
POTASSIUM SERPL-MCNC: 3.4 MMOL/L — LOW (ref 3.5–5.3)
POTASSIUM SERPL-SCNC: 3.4 MMOL/L — LOW (ref 3.5–5.3)
PROCALCITONIN SERPL-MCNC: 20.2 NG/ML — HIGH (ref 0.02–0.1)
PROT UR-MCNC: 15
RBC # BLD: 3.21 M/UL — LOW (ref 3.8–5.2)
RBC # FLD: 18.2 % — HIGH (ref 10.3–14.5)
RBC CASTS # UR COMP ASSIST: SIGNIFICANT CHANGE UP /HPF (ref 0–2)
SODIUM SERPL-SCNC: 143 MMOL/L — SIGNIFICANT CHANGE UP (ref 135–145)
SP GR SPEC: 1 — LOW (ref 1.01–1.02)
SPECIMEN SOURCE: SIGNIFICANT CHANGE UP
UROBILINOGEN FLD QL: NEGATIVE — SIGNIFICANT CHANGE UP
WBC # BLD: 3.28 K/UL — LOW (ref 3.8–10.5)
WBC # FLD AUTO: 3.28 K/UL — LOW (ref 3.8–10.5)
WBC UR QL: SIGNIFICANT CHANGE UP /HPF (ref 0–5)

## 2022-11-28 RX ORDER — CEFUROXIME AXETIL 250 MG
500 TABLET ORAL EVERY 12 HOURS
Refills: 0 | Status: COMPLETED | OUTPATIENT
Start: 2022-11-28 | End: 2022-12-02

## 2022-11-28 RX ORDER — AZITHROMYCIN 500 MG/1
500 TABLET, FILM COATED ORAL DAILY
Refills: 0 | Status: COMPLETED | OUTPATIENT
Start: 2022-11-28 | End: 2022-12-02

## 2022-11-28 RX ORDER — POTASSIUM PHOSPHATE, MONOBASIC POTASSIUM PHOSPHATE, DIBASIC 236; 224 MG/ML; MG/ML
15 INJECTION, SOLUTION INTRAVENOUS ONCE
Refills: 0 | Status: COMPLETED | OUTPATIENT
Start: 2022-11-28 | End: 2022-11-28

## 2022-11-28 RX ADMIN — CEFTRIAXONE 100 MILLIGRAM(S): 500 INJECTION, POWDER, FOR SOLUTION INTRAMUSCULAR; INTRAVENOUS at 12:23

## 2022-11-28 RX ADMIN — AZITHROMYCIN 255 MILLIGRAM(S): 500 TABLET, FILM COATED ORAL at 12:23

## 2022-11-28 RX ADMIN — Medication 75 MILLIGRAM(S): at 05:56

## 2022-11-28 RX ADMIN — Medication 30 MILLIGRAM(S): at 18:24

## 2022-11-28 RX ADMIN — POTASSIUM PHOSPHATE, MONOBASIC POTASSIUM PHOSPHATE, DIBASIC 62.5 MILLIMOLE(S): 236; 224 INJECTION, SOLUTION INTRAVENOUS at 10:52

## 2022-11-28 RX ADMIN — ATORVASTATIN CALCIUM 10 MILLIGRAM(S): 80 TABLET, FILM COATED ORAL at 23:18

## 2022-11-28 RX ADMIN — LOSARTAN POTASSIUM 25 MILLIGRAM(S): 100 TABLET, FILM COATED ORAL at 05:56

## 2022-11-28 RX ADMIN — ENOXAPARIN SODIUM 40 MILLIGRAM(S): 100 INJECTION SUBCUTANEOUS at 12:23

## 2022-11-28 RX ADMIN — SODIUM CHLORIDE 75 MILLILITER(S): 9 INJECTION, SOLUTION INTRAVENOUS at 15:11

## 2022-11-28 RX ADMIN — Medication 500 MILLIGRAM(S): at 18:24

## 2022-11-28 NOTE — CONSULT NOTE ADULT - ASSESSMENT
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 Collateral obtained from son-in law (Raquel shelton) over the phone. States that patient has had high fever at home, coughing, shortness of breath, fatigue, and lethargy with poor oral intake and insomnia. Pt usually ambulates at home with walker but lately it has been difficult for her to stand up from bed. Pt endorses dizziness, chest tightness and dysuria, denies headache, palpitations, n/v/d/c, rahes or leg swelling. NKDA. Last chemo was November 16th. NKDA.    #Gastric stage IIB in remission  #Multiple Myeloma  follows with our colleague Dr. Alvarado  currently on Darzalex/Velcade/Rev/Dex last given 11/16/22  p/w with cough and fever, sepsis -->Influenza +  Rec's:  -Hold chemotherapy during admission  -Influenza mgmt as per Medicine Team  -Pain at IV site, RN decreased infusion rate of antibiotic and K+ to see if improves  -f/u with Dr. Alvarado upon discharge    Thank you for the referral. Will continue to monitor the patient.  Please call with any questions [phone]  Above reviewed with Attending Dr. Mark FLOREZ/[facility]  [address] [facility], [address], [place], [place]  [phone]  *Note not finalized until signed by Attending Physician

## 2022-11-28 NOTE — CONSULT NOTE ADULT - NS ATTEND AMEND GEN_ALL_CORE FT
CC: MM    HPI/Subjective: As above; Pt feels improved since admission    INTERVAL HPI: Patient seen and examined at bedside; Events noted; Patient still feels weak    PMH/PSH as above    FmHx/Sox Hx NC    ROS as above; pt is not able to provide detailed review of systems  General: Noncontributory; Skin/Breast: NC;Ophthalmologic:NC; ENMT: NC; Respiratory and Thorax: NC; Cardiovascular: NC; 	  Gastrointestinal: s/p gastrectomy; Genitourinary:NC;  Musculoskeletal:NC; Neurological: NC; Psychiatric: NC; Hematology/Lymphatics: h/o MM; Endocrine: NC; Allergic/Immunologic: NC    MEDICATIONS  (STANDING):  atorvastatin 10 milliGRAM(s) Oral at bedtime  azithromycin   Tablet 500 milliGRAM(s) Oral daily  cefuroxime   Tablet 500 milliGRAM(s) Oral every 12 hours  enoxaparin Injectable 40 milliGRAM(s) SubCutaneous every 24 hours  insulin lispro (ADMELOG) corrective regimen sliding scale   SubCutaneous three times a day before meals  insulin lispro (ADMELOG) corrective regimen sliding scale   SubCutaneous at bedtime  lactated ringers. 1000 milliLiter(s) (75 mL/Hr) IV Continuous <Continuous>  losartan 25 milliGRAM(s) Oral daily  oseltamivir 30 milliGRAM(s) Oral every 12 hours    MEDICATIONS  (PRN):  acetaminophen     Tablet .. 650 milliGRAM(s) Oral every 6 hours PRN Temp greater or equal to 38C (100.4F), Mild Pain (1 - 3)  aluminum hydroxide/magnesium hydroxide/simethicone Suspension 30 milliLiter(s) Oral every 4 hours PRN Dyspepsia  meclizine 25 milliGRAM(s) Oral daily PRN Dizziness  melatonin 3 milliGRAM(s) Oral at bedtime PRN Insomnia  ondansetron Injectable 4 milliGRAM(s) IV Push every 8 hours PRN Nausea and/or Vomiting      Vital Signs Last 24 Hrs  T(C): 37.2 (28 Nov 2022 14:12), Max: 37.6 (28 Nov 2022 05:04)  T(F): 99 (28 Nov 2022 14:12), Max: 99.7 (28 Nov 2022 05:04)  HR: 86 (28 Nov 2022 14:12) (85 - 99)  BP: 109/67 (28 Nov 2022 14:12) (109/67 - 135/67)  BP(mean): --  RR: 17 (28 Nov 2022 14:12) (17 - 22)  SpO2: 97% (28 Nov 2022 14:12) (95% - 97%)    Parameters below as of 28 Nov 2022 14:12  Patient On (Oxygen Delivery Method): nasal cannula  O2 Flow (L/min): 2    _________________  PHYSICAL EXAM:  ---------------------------  GEN: NAD; NC/AT; A and O x 3  HEENT: PERRL; EOMI  LUNGS: no wheezing; decreased bilateral air entry; no use of accessory muscles for breathing  HEART: Nl S1 S2; no M   ABDOMEN: Soft, Nontender, non distended  EXTREMITIES: no cyanosis; no edema; warm and dry  NERVOUS SYSTEM:  Awake and alert; no focal neuro  deficits    _________________________________________________  LABS:                        9.3    3.28  )-----------( 195      ( 28 Nov 2022 05:25 )             29.0     11-28    143  |  112<H>  |  15  ----------------------------<  79  3.4<L>   |  19<L>  |  0.68    Ca    8.4      28 Nov 2022 05:25  Phos  2.4     11-28  Mg     1.9     11-28    TPro  5.5<L>  /  Alb  2.5<L>  /  TBili  0.6  /  DBili  x   /  AST  38  /  ALT  17  /  AlkPhos  39<L>  11-27    A/P    Problem #1 ID - URI - influenza; continue w/ supportive as per ID and primary team  Problem #2 MM- on active anti-MM therapy which will be on hold during the acute infection    I have examined the patient at bedside and reviewed patient's data and participated in the management of the patient along with Susanna ALCANTAR as well as hemotology/med oncology faculty consisting of Dr. Hammad King, Dr. MERY Alvarado, Dr. MERY Storey, Dr. Mychal Solorzano, Dr. Tennille Dominguez, Dr. Braeden Peters as well as myself during the daily heme/onc case review. I reviewed pertinent clinical information, PE,  labs as well as A/P as outline above.     Julio Flores MD  [phone]--

## 2022-11-28 NOTE — PROGRESS NOTE ADULT - SUBJECTIVE AND OBJECTIVE BOX
Patient is a 87y old  Female who presents with a chief complaint of Cough (27 Nov 2022 18:29)    pt seen in icu [  ], reg med floor [   ], bed [  ], chair at bedside [   ], a+o x3 [  ], lethargic [  ],  nad [  ]    shea [  ], ngt [  ], peg [  ], et tube [  ], cent line [  ], picc line [  ]        Allergies    gabapentin (Unknown)        Vitals    T(F): 99.2 (11-28-22 @ 07:05), Max: 100.8 (11-27-22 @ 14:55)  HR: 97 (11-28-22 @ 07:05) (87 - 105)  BP: 133/78 (11-28-22 @ 07:05) (114/66 - 135/67)  RR: 18 (11-28-22 @ 07:05) (18 - 22)  SpO2: 95% (11-28-22 @ 07:05) (95% - 97%)  Wt(kg): --  CAPILLARY BLOOD GLUCOSE      POCT Blood Glucose.: 71 mg/dL (28 Nov 2022 07:30)      Labs                          9.3    3.28  )-----------( 195      ( 28 Nov 2022 05:25 )             29.0       11-28    143  |  112<H>  |  15  ----------------------------<  79  3.4<L>   |  19<L>  |  0.68    Ca    8.4      28 Nov 2022 05:25  Phos  2.4     11-28  Mg     1.9     11-28    TPro  5.5<L>  /  Alb  2.5<L>  /  TBili  0.6  /  DBili  x   /  AST  38  /  ALT  17  /  AlkPhos  39<L>  11-27          Troponin I, High Sensitivity Result: 26.5 ng/L (11-27-22 @ 14:30)        Radiology Results      Meds    MEDICATIONS  (STANDING):  atorvastatin 10 milliGRAM(s) Oral at bedtime  azithromycin  IVPB 500 milliGRAM(s) IV Intermittent every 24 hours  cefTRIAXone   IVPB 1000 milliGRAM(s) IV Intermittent every 24 hours  enoxaparin Injectable 40 milliGRAM(s) SubCutaneous every 24 hours  insulin lispro (ADMELOG) corrective regimen sliding scale   SubCutaneous three times a day before meals  insulin lispro (ADMELOG) corrective regimen sliding scale   SubCutaneous at bedtime  lactated ringers. 1000 milliLiter(s) (75 mL/Hr) IV Continuous <Continuous>  losartan 25 milliGRAM(s) Oral daily  oseltamivir 75 milliGRAM(s) Oral two times a day  potassium phosphate IVPB 15 milliMole(s) IV Intermittent once      MEDICATIONS  (PRN):  acetaminophen     Tablet .. 650 milliGRAM(s) Oral every 6 hours PRN Temp greater or equal to 38C (100.4F), Mild Pain (1 - 3)  aluminum hydroxide/magnesium hydroxide/simethicone Suspension 30 milliLiter(s) Oral every 4 hours PRN Dyspepsia  meclizine 25 milliGRAM(s) Oral daily PRN Dizziness  melatonin 3 milliGRAM(s) Oral at bedtime PRN Insomnia  ondansetron Injectable 4 milliGRAM(s) IV Push every 8 hours PRN Nausea and/or Vomiting      Physical Exam    Neuro :  no focal deficits  Respiratory: CTA B/L  CV: RRR, S1S2, no murmurs,   Abdominal: Soft, NT, ND +BS,  Extremities: No edema, + peripheral pulses    ASSESSMENT    Sepsis    HTN (hypertension)    DM (diabetes mellitus)    Hypercholesteremia    Gastric adenocarcinoma    Vertigo    Dementia    S/P hysterectomy    S/P tubal ligation        PLAN     Patient is a 87y old  Female who presents with a chief complaint of Cough (27 Nov 2022 18:29)    pt seen in icu [  ], reg med floor [ x  ], bed [ x ], chair at bedside [   ], a+o x3 [ x ], lethargic [  ],  nad [ x ]      Allergies    gabapentin (Unknown)        Vitals    T(F): 99.2 (11-28-22 @ 07:05), Max: 100.8 (11-27-22 @ 14:55)  HR: 97 (11-28-22 @ 07:05) (87 - 105)  BP: 133/78 (11-28-22 @ 07:05) (114/66 - 135/67)  RR: 18 (11-28-22 @ 07:05) (18 - 22)  SpO2: 95% (11-28-22 @ 07:05) (95% - 97%)  Wt(kg): --  CAPILLARY BLOOD GLUCOSE      POCT Blood Glucose.: 71 mg/dL (28 Nov 2022 07:30)      Labs                          9.3    3.28  )-----------( 195      ( 28 Nov 2022 05:25 )             29.0       11-28    143  |  112<H>  |  15  ----------------------------<  79  3.4<L>   |  19<L>  |  0.68    Ca    8.4      28 Nov 2022 05:25  Phos  2.4     11-28  Mg     1.9     11-28    TPro  5.5<L>  /  Alb  2.5<L>  /  TBili  0.6  /  DBili  x   /  AST  38  /  ALT  17  /  AlkPhos  39<L>  11-27          Troponin I, High Sensitivity Result: 26.5 ng/L (11-27-22 @ 14:30)        Radiology Results      Meds    MEDICATIONS  (STANDING):  atorvastatin 10 milliGRAM(s) Oral at bedtime  azithromycin  IVPB 500 milliGRAM(s) IV Intermittent every 24 hours  cefTRIAXone   IVPB 1000 milliGRAM(s) IV Intermittent every 24 hours  enoxaparin Injectable 40 milliGRAM(s) SubCutaneous every 24 hours  insulin lispro (ADMELOG) corrective regimen sliding scale   SubCutaneous three times a day before meals  insulin lispro (ADMELOG) corrective regimen sliding scale   SubCutaneous at bedtime  lactated ringers. 1000 milliLiter(s) (75 mL/Hr) IV Continuous <Continuous>  losartan 25 milliGRAM(s) Oral daily  oseltamivir 75 milliGRAM(s) Oral two times a day  potassium phosphate IVPB 15 milliMole(s) IV Intermittent once      MEDICATIONS  (PRN):  acetaminophen     Tablet .. 650 milliGRAM(s) Oral every 6 hours PRN Temp greater or equal to 38C (100.4F), Mild Pain (1 - 3)  aluminum hydroxide/magnesium hydroxide/simethicone Suspension 30 milliLiter(s) Oral every 4 hours PRN Dyspepsia  meclizine 25 milliGRAM(s) Oral daily PRN Dizziness  melatonin 3 milliGRAM(s) Oral at bedtime PRN Insomnia  ondansetron Injectable 4 milliGRAM(s) IV Push every 8 hours PRN Nausea and/or Vomiting      Physical Exam    Neuro :  no focal deficits  Respiratory: CTA B/L  CV: RRR, S1S2, no murmurs,   Abdominal: Soft, NT, ND +BS,  Extremities: No edema, + peripheral pulses        ASSESSMENT    Sepsis  flu a  pna  h/o HTN,   HLD,   DM,   Osteoporosis,   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

## 2022-11-28 NOTE — PROGRESS NOTE ADULT - PROBLEM SELECTOR PLAN 1
pt p/w fever, tachycardia, decreased WBC (pt on chemo)  lactate 4.5  CXR wnl though with L sided crackles on exam  RVP+ for influenza  will treat for PNA as pt high risk  pt also with dysuria  f/u UA  started on ceftriaxone and azithromycin  started on Tamiflu  f/u blood, sputum and urine cultures  f/u procal, legionella, mycoplasma, and strep ur ag  trend lactate to resolution.  pulmonary dr. Hernandes consulted

## 2022-11-28 NOTE — CONSULT NOTE ADULT - SUBJECTIVE AND OBJECTIVE BOX
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 Collateral obtained from son-in law (Raquel shelton) over the phone. States that patient has had high fever at home, coughing, shortness of breath, fatigue, and lethargy with poor oral intake and insomnia. Pt usually ambulates at home with walker but lately it has been difficult for her to stand up from bed. Pt endorses dizziness, chest tightness and dysuria, denies headache, palpitations, n/v/d/c, rahes or leg swelling. NKDA. Last chemo was November 16th. NKDA.    #[id number]    REVIEW OF SYSTEMS:    CONSTITUTIONAL: No fever, no loss of appetite. no chills, no weight loss,   EYES: no acute visual disturbances  NECK: No pain or stiffness  RESPIRATORY: + cough; No shortness of breath  CARDIOVASCULAR: No chest pain, no palpitations  GASTROINTESTINAL: No pain. No nausea or vomiting; No diarrhea   NEUROLOGICAL: No headache or numbness, no tremors  MUSCULOSKELETAL:right wrist pain at site of IV line, No joint pain, no muscle pain  GENITOURINARY: no dysuria, no frequency, no hesitancy  PSYCHIATRY: no depression, no anxiety  ALL OTHER  ROS negative    MEDICATIONS  (STANDING):  atorvastatin 10 milliGRAM(s) Oral at bedtime  azithromycin  IVPB 500 milliGRAM(s) IV Intermittent every 24 hours  cefTRIAXone   IVPB 1000 milliGRAM(s) IV Intermittent every 24 hours  enoxaparin Injectable 40 milliGRAM(s) SubCutaneous every 24 hours  insulin lispro (ADMELOG) corrective regimen sliding scale   SubCutaneous three times a day before meals  insulin lispro (ADMELOG) corrective regimen sliding scale   SubCutaneous at bedtime  lactated ringers. 1000 milliLiter(s) (75 mL/Hr) IV Continuous <Continuous>  losartan 25 milliGRAM(s) Oral daily  oseltamivir 75 milliGRAM(s) Oral two times a day    MEDICATIONS  (PRN):  acetaminophen     Tablet .. 650 milliGRAM(s) Oral every 6 hours PRN Temp greater or equal to 38C (100.4F), Mild Pain (1 - 3)  aluminum hydroxide/magnesium hydroxide/simethicone Suspension 30 milliLiter(s) Oral every 4 hours PRN Dyspepsia  meclizine 25 milliGRAM(s) Oral daily PRN Dizziness  melatonin 3 milliGRAM(s) Oral at bedtime PRN Insomnia  ondansetron Injectable 4 milliGRAM(s) IV Push every 8 hours PRN Nausea and/or Vomiting    CAPILLARY BLOOD GLUCOSE      POCT Blood Glucose.: 84 mg/dL (28 Nov 2022 11:56)  POCT Blood Glucose.: 71 mg/dL (28 Nov 2022 07:30)  POCT Blood Glucose.: 92 mg/dL (27 Nov 2022 21:29)    I&O's Summary      PHYSICAL EXAM:  Vital Signs Last 24 Hrs  T(C): 37.2 (28 Nov 2022 14:12), Max: 38.2 (27 Nov 2022 14:55)  T(F): 99 (28 Nov 2022 14:12), Max: 100.8 (27 Nov 2022 14:55)  HR: 86 (28 Nov 2022 14:12) (85 - 99)  BP: 109/67 (28 Nov 2022 14:12) (109/67 - 135/67)  BP(mean): --  RR: 17 (28 Nov 2022 14:12) (17 - 22)  SpO2: 97% (28 Nov 2022 14:12) (95% - 97%)    Parameters below as of 28 Nov 2022 14:12  Patient On (Oxygen Delivery Method): nasal cannula  O2 Flow (L/min): 2    GEN: NAD; A and O x 3  LUNGS: CTA B/L  HEART: S1 S2  ABDOMEN: soft, non-tender, non-distended, + BS  EXTREMITIES: no edema  NERVOUS SYSTEM:  Awake and alert; no focal neuro deficits    LABS:                        9.3    3.28  )-----------( 195      ( 28 Nov 2022 05:25 )             29.0     11-28    143  |  112<H>  |  15  ----------------------------<  79  3.4<L>   |  19<L>  |  0.68    Ca    8.4      28 Nov 2022 05:25  Phos  2.4     11-28  Mg     1.9     11-28    TPro  5.5<L>  /  Alb  2.5<L>  /  TBili  0.6  /  DBili  x   /  AST  38  /  ALT  17  /  AlkPhos  39<L>  11-27              SARS-CoV-2: NotDetec (27 Nov 2022 14:30)  SARS-CoV-2: NotDetec (22 Jun 2022 11:18)  COVID-19 PCR: NotDetec (14 Jun 2022 09:57)  SARS-CoV-2: NotDetec (11 Jun 2022 03:36)      RADIOLOGY & ADDITIONAL TESTS:  < from: Xray Chest 1 View-PORTABLE IMMEDIATE (11.27.22 @ 14:19) >    ACC: [id number] EXAM:  XR CHEST PORTABLE IMMED 1V                          PROCEDURE DATE:  11/27/2022          INTERPRETATION:  AP semierect chest on November 27, 2022 at 2:06 PM.   Patient has sepsis.    Heart magnified by technique.    On October 8 this year there was a scattered small right upper lobe   infiltrate which has largely resolved.    IMPRESSION: As above.    < end of copied text >  
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 Collateral obtained from son-in law (Raquel shelton) over the phone. States that patient has had high fever at home, coughing, shortness of breath, fatigue, and lethargy with poor oral intake and insomnia. Pt usually ambulates at home with walker but lately it has been difficult for her to stand up from bed. Pt endorses dizziness, chest tightness and dysuria, denies headache, palpitations, n/v/d/c, rahes or leg swelling. NKDA. Last chemo was November 16th. NKDA.        HISTORY OF PRESENT ILLNESS: As Above. Awake, alert, comfortable in bed in NAD    MEDICATIONS  (STANDING):  atorvastatin 10 milliGRAM(s) Oral at bedtime  azithromycin  IVPB 500 milliGRAM(s) IV Intermittent every 24 hours  cefTRIAXone   IVPB 1000 milliGRAM(s) IV Intermittent every 24 hours  enoxaparin Injectable 40 milliGRAM(s) SubCutaneous every 24 hours  insulin lispro (ADMELOG) corrective regimen sliding scale   SubCutaneous three times a day before meals  insulin lispro (ADMELOG) corrective regimen sliding scale   SubCutaneous at bedtime  lactated ringers. 1000 milliLiter(s) (75 mL/Hr) IV Continuous <Continuous>  losartan 25 milliGRAM(s) Oral daily  oseltamivir 75 milliGRAM(s) Oral two times a day  potassium phosphate IVPB 15 milliMole(s) IV Intermittent once      MEDICATIONS  (PRN):  acetaminophen     Tablet .. 650 milliGRAM(s) Oral every 6 hours PRN Temp greater or equal to 38C (100.4F), Mild Pain (1 - 3)  aluminum hydroxide/magnesium hydroxide/simethicone Suspension 30 milliLiter(s) Oral every 4 hours PRN Dyspepsia  meclizine 25 milliGRAM(s) Oral daily PRN Dizziness  melatonin 3 milliGRAM(s) Oral at bedtime PRN Insomnia  ondansetron Injectable 4 milliGRAM(s) IV Push every 8 hours PRN Nausea and/or Vomiting      Allergies    gabapentin (Unknown)    Intolerances        PAST MEDICAL & SURGICAL HISTORY:  HTN (hypertension)      DM (diabetes mellitus)      Hypercholesteremia      Gastric adenocarcinoma  s/p resection      Vertigo      Dementia      S/P hysterectomy      S/P tubal ligation          FAMILY HISTORY:  Family history of diabetes mellitus (Sibling)    Family history of early CAD (Grandparent)        SOCIAL HISTORY  Smoking History:     REVIEW OF SYSTEMS:    CONSTITUTIONAL:  No fevers, chills, sweats    HEENT:  Eyes:  No diplopia or blurred vision. ENT:  No earache, sore throat or runny nose.    CARDIOVASCULAR:  No pressure, squeezing, tightness, or heaviness about the chest; no palpitations.    RESPIRATORY:  Per HPI    GASTROINTESTINAL:  No abdominal pain, nausea, vomiting or diarrhea.    GENITOURINARY:  No dysuria, frequency or urgency.    NEUROLOGIC:  No paresthesias, fasciculations, seizures or weakness.    PSYCHIATRIC:  No disorder of thought or mood.    Vital Signs Last 24 Hrs  T(C): 37.3 (28 Nov 2022 07:05), Max: 38.2 (27 Nov 2022 14:55)  T(F): 99.2 (28 Nov 2022 07:05), Max: 100.8 (27 Nov 2022 14:55)  HR: 97 (28 Nov 2022 07:05) (87 - 105)  BP: 133/78 (28 Nov 2022 07:05) (114/66 - 135/67)  BP(mean): --  RR: 18 (28 Nov 2022 07:05) (18 - 22)  SpO2: 95% (28 Nov 2022 07:05) (95% - 97%)    Parameters below as of 28 Nov 2022 07:05  Patient On (Oxygen Delivery Method): nasal cannula  O2 Flow (L/min): 2    I&O's Detail      PHYSICAL EXAMINATION:    GENERAL: The patient is a well-developed, well-nourished _____in no apparent distress.     HEENT: Head is normocephalic and atraumatic. Extraocular muscles are intact. Mucous membranes are moist.     NECK: Supple.     LUNGS: Clear to auscultation without wheezing, rales, or rhonchi. Respirations unlabored    HEART: Regular rate and rhythm without murmur.    ABDOMEN: Soft, nontender, and nondistended.  No hepatosplenomegaly is noted.    EXTREMITIES: Without any cyanosis, clubbing, rash, lesions or edema.    NEUROLOGIC: Grossly intact.      LABS:                        9.3    3.28  )-----------( 195      ( 28 Nov 2022 05:25 )             29.0     11-28    143  |  112<H>  |  15  ----------------------------<  79  3.4<L>   |  19<L>  |  0.68    Ca    8.4      28 Nov 2022 05:25  Phos  2.4     11-28  Mg     1.9     11-28    TPro  5.5<L>  /  Alb  2.5<L>  /  TBili  0.6  /  DBili  x   /  AST  38  /  ALT  17  /  AlkPhos  39<L>  11-27    Respiratory Viral Panel with COVID-19 by ALEX (11.27.22 @ 14:30)   Rapid RVP Result: Detected   SARS-CoV-2: NotDetec: This Respiratory Panel uses polymerase chain reaction (PCR) to detect for   adenovirus; coronavirus (HKU1, NL63, 229E, OC43); human metapneumovirus   (hMPV); human enterovirus/rhinovirus (Entero/RV); influenza A; influenza   A/H1; influenza A/H3; influenza A/H1-2009; influenza B; parainfluenza   viruses 1, 2, 3, 4; respiratory syncytial virus; Mycoplasma pneumoniae;   Chlamydophila pneumoniae; and SARS-CoV-2.   Influenza AH3 (RapRVP): Detected             Serum Pro-Brain Natriuretic Peptide: 1408 pg/mL (11-27-22 @ 14:30)    Lactate, Blood: 1.9 mmol/L (11-28-22 @ 02:00)  Lactate, Blood: 2.5 mmol/L (11-27-22 @ 22:00)  Lactate, Blood: 3.8 mmol/L (11-27-22 @ 18:30)  Lactate, Blood: 4.5 mmol/L (11-27-22 @ 14:30)        MICROBIOLOGY:    RADIOLOGY & ADDITIONAL STUDIES:    CXR:  < from: Xray Chest 1 View-PORTABLE IMMEDIATE (11.27.22 @ 14:19) >  INTERPRETATION:  AP semierect chest on November 27, 2022 at 2:06 PM.   Patient has sepsis.    Heart magnified by technique.    On October 8 this year there was a scattered small right upper lobe   infiltrate which has largely resolved.    IMPRESSION: As above.    < end of copied text >    Ct scan chest;    ekg;    echo:

## 2022-11-28 NOTE — PHYSICAL THERAPY INITIAL EVALUATION ADULT - GENERAL OBSERVATIONS, REHAB EVAL
Consult received,EMR, radiology and labs reviewed. Patient received supine in bed, NAD, Nepalese Speaking Int ID [id number]. Patient agreed to EVALUATION from Physical Therapist.

## 2022-11-28 NOTE — PROGRESS NOTE ADULT - SUBJECTIVE AND OBJECTIVE BOX
NP Note discussed with Primary Attending.    Patient is a 87y old  Female who presents with a chief complaint of Cough (28 Nov 2022 09:24)      INTERVAL HPI/OVERNIGHT EVENTS: no new complaints    MEDICATIONS  (STANDING):  atorvastatin 10 milliGRAM(s) Oral at bedtime  azithromycin  IVPB 500 milliGRAM(s) IV Intermittent every 24 hours  cefTRIAXone   IVPB 1000 milliGRAM(s) IV Intermittent every 24 hours  enoxaparin Injectable 40 milliGRAM(s) SubCutaneous every 24 hours  insulin lispro (ADMELOG) corrective regimen sliding scale   SubCutaneous three times a day before meals  insulin lispro (ADMELOG) corrective regimen sliding scale   SubCutaneous at bedtime  lactated ringers. 1000 milliLiter(s) (75 mL/Hr) IV Continuous <Continuous>  losartan 25 milliGRAM(s) Oral daily  oseltamivir 75 milliGRAM(s) Oral two times a day    MEDICATIONS  (PRN):  acetaminophen     Tablet .. 650 milliGRAM(s) Oral every 6 hours PRN Temp greater or equal to 38C (100.4F), Mild Pain (1 - 3)  aluminum hydroxide/magnesium hydroxide/simethicone Suspension 30 milliLiter(s) Oral every 4 hours PRN Dyspepsia  meclizine 25 milliGRAM(s) Oral daily PRN Dizziness  melatonin 3 milliGRAM(s) Oral at bedtime PRN Insomnia  ondansetron Injectable 4 milliGRAM(s) IV Push every 8 hours PRN Nausea and/or Vomiting      __________________________________________________  REVIEW OF SYSTEMS:    CONSTITUTIONAL: No fever,   EYES: no acute visual disturbances  NECK: No pain or stiffness  RESPIRATORY: No cough; No shortness of breath  CARDIOVASCULAR: No chest pain, no palpitations  GASTROINTESTINAL: No pain. No nausea or vomiting; No diarrhea   NEUROLOGICAL: No headache or numbness, no tremors  MUSCULOSKELETAL: No joint pain, no muscle pain  GENITOURINARY: no dysuria, no frequency, no hesitancy  PSYCHIATRY: no depression , no anxiety  ALL OTHER  ROS negative        Vital Signs Last 24 Hrs  T(C): 37.3 (28 Nov 2022 07:05), Max: 38.2 (27 Nov 2022 14:55)  T(F): 99.2 (28 Nov 2022 07:05), Max: 100.8 (27 Nov 2022 14:55)  HR: 85 (28 Nov 2022 10:34) (85 - 105)  BP: 133/78 (28 Nov 2022 07:05) (114/66 - 135/67)  BP(mean): --  RR: 18 (28 Nov 2022 07:05) (18 - 22)  SpO2: 97% (28 Nov 2022 10:34) (95% - 97%)    Parameters below as of 28 Nov 2022 10:34  Patient On (Oxygen Delivery Method): nasal cannula,2L        ________________________________________________  PHYSICAL EXAM:  GENERAL: NAD  HEENT: Normocephalic;  conjunctivae and sclerae clear; moist mucous membranes;   NECK : supple  CHEST/LUNG: Clear to auscultation bilaterally with good air entry   HEART: S1 S2  regular; no murmurs, gallops or rubs  ABDOMEN: Soft, Nontender, Nondistended; Bowel sounds present  EXTREMITIES: no cyanosis; no edema; no calf tenderness  SKIN: warm and dry; no rash  NERVOUS SYSTEM:  Awake and alert; Oriented  to place, person and time ; no new deficits    _________________________________________________  LABS:                        9.3    3.28  )-----------( 195      ( 28 Nov 2022 05:25 )             29.0     11-28    143  |  112<H>  |  15  ----------------------------<  79  3.4<L>   |  19<L>  |  0.68    Ca    8.4      28 Nov 2022 05:25  Phos  2.4     11-28  Mg     1.9     11-28    TPro  5.5<L>  /  Alb  2.5<L>  /  TBili  0.6  /  DBili  x   /  AST  38  /  ALT  17  /  AlkPhos  39<L>  11-27        CAPILLARY BLOOD GLUCOSE      POCT Blood Glucose.: 84 mg/dL (28 Nov 2022 11:56)  POCT Blood Glucose.: 71 mg/dL (28 Nov 2022 07:30)  POCT Blood Glucose.: 92 mg/dL (27 Nov 2022 21:29)        RADIOLOGY & ADDITIONAL TESTS:    Imaging  Reviewed:  YES/NO    Consultant(s) Notes Reviewed:   YES/ No      Plan of care was discussed with patient and /or primary care giver; all questions and concerns were addressed

## 2022-11-28 NOTE — PHARMACOTHERAPY INTERVENTION NOTE - COMMENTS
88 y/o F is on Tamiflu for "viral PNA, positive for INfluenza A, as crcl=41, the recommendation was to renally adjust the dose from 75mg q12h to 30mg q12h
86 y/o F is on Ceftriaxone Azithromycin and Tamiflu for "viral PNA, the recommendation was to d/c antibiotics as no bacteria etiology  
IV Azithromycin and ceftriaxone for PNA, day #2, recommended po switch as pt qualifies

## 2022-11-29 DIAGNOSIS — E87.6 HYPOKALEMIA: ICD-10-CM

## 2022-11-29 LAB
ALBUMIN SERPL ELPH-MCNC: 2.1 G/DL — LOW (ref 3.5–5)
ALP SERPL-CCNC: 43 U/L — SIGNIFICANT CHANGE UP (ref 40–120)
ALT FLD-CCNC: 11 U/L DA — SIGNIFICANT CHANGE UP (ref 10–60)
ANION GAP SERPL CALC-SCNC: 11 MMOL/L — SIGNIFICANT CHANGE UP (ref 5–17)
AST SERPL-CCNC: 13 U/L — SIGNIFICANT CHANGE UP (ref 10–40)
BILIRUB SERPL-MCNC: 0.5 MG/DL — SIGNIFICANT CHANGE UP (ref 0.2–1.2)
BUN SERPL-MCNC: 11 MG/DL — SIGNIFICANT CHANGE UP (ref 7–18)
CALCIUM SERPL-MCNC: 8.3 MG/DL — LOW (ref 8.4–10.5)
CHLORIDE SERPL-SCNC: 110 MMOL/L — HIGH (ref 96–108)
CO2 SERPL-SCNC: 20 MMOL/L — LOW (ref 22–31)
CREAT SERPL-MCNC: 0.57 MG/DL — SIGNIFICANT CHANGE UP (ref 0.5–1.3)
CULTURE RESULTS: SIGNIFICANT CHANGE UP
EGFR: 89 ML/MIN/1.73M2 — SIGNIFICANT CHANGE UP
GLUCOSE BLDC GLUCOMTR-MCNC: 80 MG/DL — SIGNIFICANT CHANGE UP (ref 70–99)
GLUCOSE BLDC GLUCOMTR-MCNC: 82 MG/DL — SIGNIFICANT CHANGE UP (ref 70–99)
GLUCOSE BLDC GLUCOMTR-MCNC: 85 MG/DL — SIGNIFICANT CHANGE UP (ref 70–99)
GLUCOSE BLDC GLUCOMTR-MCNC: 98 MG/DL — SIGNIFICANT CHANGE UP (ref 70–99)
GLUCOSE SERPL-MCNC: 90 MG/DL — SIGNIFICANT CHANGE UP (ref 70–99)
HCT VFR BLD CALC: 28.5 % — LOW (ref 34.5–45)
HGB BLD-MCNC: 9.5 G/DL — LOW (ref 11.5–15.5)
LEGIONELLA AG UR QL: NEGATIVE — SIGNIFICANT CHANGE UP
M PNEUMO IGM SER-ACNC: 0.06 INDEX — SIGNIFICANT CHANGE UP (ref 0–0.9)
MCHC RBC-ENTMCNC: 30 PG — SIGNIFICANT CHANGE UP (ref 27–34)
MCHC RBC-ENTMCNC: 33.3 GM/DL — SIGNIFICANT CHANGE UP (ref 32–36)
MCV RBC AUTO: 89.9 FL — SIGNIFICANT CHANGE UP (ref 80–100)
MYCOPLASMA AG SPEC QL: NEGATIVE — SIGNIFICANT CHANGE UP
NRBC # BLD: 0 /100 WBCS — SIGNIFICANT CHANGE UP (ref 0–0)
PLATELET # BLD AUTO: 171 K/UL — SIGNIFICANT CHANGE UP (ref 150–400)
POTASSIUM SERPL-MCNC: 3.3 MMOL/L — LOW (ref 3.5–5.3)
POTASSIUM SERPL-SCNC: 3.3 MMOL/L — LOW (ref 3.5–5.3)
PROT SERPL-MCNC: 5.1 G/DL — LOW (ref 6–8.3)
RBC # BLD: 3.17 M/UL — LOW (ref 3.8–5.2)
RBC # FLD: 18.1 % — HIGH (ref 10.3–14.5)
S PNEUM AG UR QL: NEGATIVE — SIGNIFICANT CHANGE UP
SODIUM SERPL-SCNC: 141 MMOL/L — SIGNIFICANT CHANGE UP (ref 135–145)
SPECIMEN SOURCE: SIGNIFICANT CHANGE UP
WBC # BLD: 3.9 K/UL — SIGNIFICANT CHANGE UP (ref 3.8–10.5)
WBC # FLD AUTO: 3.9 K/UL — SIGNIFICANT CHANGE UP (ref 3.8–10.5)

## 2022-11-29 RX ORDER — BENZOCAINE AND MENTHOL 5; 1 G/100ML; G/100ML
1 LIQUID ORAL THREE TIMES A DAY
Refills: 0 | Status: DISCONTINUED | OUTPATIENT
Start: 2022-11-29 | End: 2022-11-29

## 2022-11-29 RX ORDER — POTASSIUM CHLORIDE 20 MEQ
40 PACKET (EA) ORAL ONCE
Refills: 0 | Status: COMPLETED | OUTPATIENT
Start: 2022-11-29 | End: 2022-11-29

## 2022-11-29 RX ORDER — INFLUENZA VIRUS VACCINE 15; 15; 15; 15 UG/.5ML; UG/.5ML; UG/.5ML; UG/.5ML
0.7 SUSPENSION INTRAMUSCULAR ONCE
Refills: 0 | Status: DISCONTINUED | OUTPATIENT
Start: 2022-11-29 | End: 2022-12-02

## 2022-11-29 RX ADMIN — Medication 40 MILLIEQUIVALENT(S): at 11:52

## 2022-11-29 RX ADMIN — Medication 30 MILLIGRAM(S): at 17:04

## 2022-11-29 RX ADMIN — SODIUM CHLORIDE 75 MILLILITER(S): 9 INJECTION, SOLUTION INTRAVENOUS at 06:39

## 2022-11-29 RX ADMIN — ATORVASTATIN CALCIUM 10 MILLIGRAM(S): 80 TABLET, FILM COATED ORAL at 21:40

## 2022-11-29 RX ADMIN — AZITHROMYCIN 500 MILLIGRAM(S): 500 TABLET, FILM COATED ORAL at 11:52

## 2022-11-29 RX ADMIN — ENOXAPARIN SODIUM 40 MILLIGRAM(S): 100 INJECTION SUBCUTANEOUS at 11:52

## 2022-11-29 RX ADMIN — Medication 500 MILLIGRAM(S): at 17:04

## 2022-11-29 RX ADMIN — LOSARTAN POTASSIUM 25 MILLIGRAM(S): 100 TABLET, FILM COATED ORAL at 06:40

## 2022-11-29 RX ADMIN — Medication 100 MILLIGRAM(S): at 17:04

## 2022-11-29 RX ADMIN — Medication 30 MILLIGRAM(S): at 05:35

## 2022-11-29 RX ADMIN — Medication 500 MILLIGRAM(S): at 05:42

## 2022-11-29 NOTE — PATIENT PROFILE ADULT - FALL HARM RISK - HARM RISK INTERVENTIONS
Assistance OOB with selected safe patient handling equipment/Communicate Risk of Fall with Harm to all staff/Discuss with provider need for PT consult/Monitor gait and stability/Provide patient with walking aids - walker, cane, crutches/Reinforce activity limits and safety measures with patient and family/Tailored Fall Risk Interventions/Use of alarms - bed, chair and/or voice tab/Visual Cue: Yellow wristband and red socks/Bed in lowest position, wheels locked, appropriate side rails in place/Call bell, personal items and telephone in reach/Instruct patient to call for assistance before getting out of bed or chair/Non-slip footwear when patient is out of bed/[place] to call system/Physically safe environment - no spills, clutter or unnecessary equipment/Purposeful Proactive Rounding/Room/bathroom lighting operational, light cord in reach

## 2022-11-29 NOTE — PROGRESS NOTE ADULT - SUBJECTIVE AND OBJECTIVE BOX
Patient is a 87y old  Female who presents with a chief complaint of Cough (28 Nov 2022 14:31)    pt seen in icu [  ], reg med floor [   ], bed [  ], chair at bedside [   ], a+o x3 [  ], lethargic [  ],  nad [  ]    shea [  ], ngt [  ], peg [  ], et tube [  ], cent line [  ], picc line [  ]        Allergies    gabapentin (Unknown)        Vitals    T(F): 98.6 (11-29-22 @ 05:38), Max: 99.2 (11-28-22 @ 07:05)  HR: 75 (11-29-22 @ 05:38) (75 - 97)  BP: 152/64 (11-29-22 @ 05:38) (109/67 - 152/64)  RR: 20 (11-29-22 @ 05:38) (17 - 20)  SpO2: 94% (11-29-22 @ 05:38) (94% - 99%)  Wt(kg): --  CAPILLARY BLOOD GLUCOSE      POCT Blood Glucose.: 89 mg/dL (28 Nov 2022 22:16)      Labs                          9.3    3.28  )-----------( 195      ( 28 Nov 2022 05:25 )             29.0       11-28    143  |  112<H>  |  15  ----------------------------<  79  3.4<L>   |  19<L>  |  0.68    Ca    8.4      28 Nov 2022 05:25  Phos  2.4     11-28  Mg     1.9     11-28    TPro  5.5<L>  /  Alb  2.5<L>  /  TBili  0.6  /  DBili  x   /  AST  38  /  ALT  17  /  AlkPhos  39<L>  11-27          Troponin I, High Sensitivity Result: 26.5 ng/L (11-27-22 @ 14:30)    .Sputum Sputum  11-28 @ 15:53 --  --    No polymorphonuclear leukocytes per low power field  Few Squamous epithelial cells per low power field  Rare Gram Variable Rods seen per oil power field      .Blood Blood-Peripheral  11-27 @ 14:45   No growth to date.  --  --      .Blood Blood-Peripheral  11-27 @ 14:30   No growth to date.  --  --          Radiology Results      Meds    MEDICATIONS  (STANDING):  atorvastatin 10 milliGRAM(s) Oral at bedtime  azithromycin   Tablet 500 milliGRAM(s) Oral daily  cefuroxime   Tablet 500 milliGRAM(s) Oral every 12 hours  enoxaparin Injectable 40 milliGRAM(s) SubCutaneous every 24 hours  influenza  Vaccine (HIGH DOSE) 0.7 milliLiter(s) IntraMuscular once  insulin lispro (ADMELOG) corrective regimen sliding scale   SubCutaneous three times a day before meals  insulin lispro (ADMELOG) corrective regimen sliding scale   SubCutaneous at bedtime  lactated ringers. 1000 milliLiter(s) (75 mL/Hr) IV Continuous <Continuous>  losartan 25 milliGRAM(s) Oral daily  oseltamivir 30 milliGRAM(s) Oral every 12 hours      MEDICATIONS  (PRN):  acetaminophen     Tablet .. 650 milliGRAM(s) Oral every 6 hours PRN Temp greater or equal to 38C (100.4F), Mild Pain (1 - 3)  aluminum hydroxide/magnesium hydroxide/simethicone Suspension 30 milliLiter(s) Oral every 4 hours PRN Dyspepsia  meclizine 25 milliGRAM(s) Oral daily PRN Dizziness  melatonin 3 milliGRAM(s) Oral at bedtime PRN Insomnia  ondansetron Injectable 4 milliGRAM(s) IV Push every 8 hours PRN Nausea and/or Vomiting      Physical Exam    Neuro :  no focal deficits  Respiratory: CTA B/L  CV: RRR, S1S2, no murmurs,   Abdominal: Soft, NT, ND +BS,  Extremities: No edema, + peripheral pulses    ASSESSMENT    Sepsis    HTN (hypertension)    DM (diabetes mellitus)    Hypercholesteremia    Gastric adenocarcinoma    Vertigo    Dementia    S/P hysterectomy    S/P tubal ligation        PLAN     Patient is a 87y old  Female who presents with a chief complaint of Cough (28 Nov 2022 14:31)    pt seen in icu [  ], reg med floor [ x  ], bed [ x ], chair at bedside [   ], a+o x3 [ x ], lethargic [  ],  nad [ x ]    Allergies    gabapentin (Unknown)        Vitals    T(F): 98.6 (11-29-22 @ 05:38), Max: 99.2 (11-28-22 @ 07:05)  HR: 75 (11-29-22 @ 05:38) (75 - 97)  BP: 152/64 (11-29-22 @ 05:38) (109/67 - 152/64)  RR: 20 (11-29-22 @ 05:38) (17 - 20)  SpO2: 94% (11-29-22 @ 05:38) (94% - 99%)  Wt(kg): --  CAPILLARY BLOOD GLUCOSE      POCT Blood Glucose.: 89 mg/dL (28 Nov 2022 22:16)      Labs                          9.3    3.28  )-----------( 195      ( 28 Nov 2022 05:25 )             29.0       11-28    143  |  112<H>  |  15  ----------------------------<  79  3.4<L>   |  19<L>  |  0.68    Ca    8.4      28 Nov 2022 05:25  Phos  2.4     11-28  Mg     1.9     11-28    TPro  5.5<L>  /  Alb  2.5<L>  /  TBili  0.6  /  DBili  x   /  AST  38  /  ALT  17  /  AlkPhos  39<L>  11-27    A1C with Estimated Average Glucose (11.28.22 @ 05:25)   A1C with Estimated Average Glucose Result: 5.5  Procalcitonin, Serum (11.28.22 @ 05:25)   Procalcitonin, Serum: 20.20:      Troponin I, High Sensitivity Result: 26.5 ng/L (11-27-22 @ 14:30)    .Sputum Sputum  11-28 @ 15:53 --  --    No polymorphonuclear leukocytes per low power field  Few Squamous epithelial cells per low power field  Rare Gram Variable Rods seen per oil power field      .Blood Blood-Peripheral  11-27 @ 14:45   No growth to date.  --  --      .Blood Blood-Peripheral  11-27 @ 14:30   No growth to date.  --  --          Radiology Results    < from: Xray Chest 1 View-PORTABLE IMMEDIATE (11.27.22 @ 14:19) >  INTERPRETATION:  AP semierect chest on November 27, 2022 at 2:06 PM.   Patient has sepsis.    Heart magnified by technique.    On October 8 this year there was a scattered small right upper lobe   infiltrate which has largely resolved.    < end of copied text >      Meds    MEDICATIONS  (STANDING):  atorvastatin 10 milliGRAM(s) Oral at bedtime  azithromycin   Tablet 500 milliGRAM(s) Oral daily  cefuroxime   Tablet 500 milliGRAM(s) Oral every 12 hours  enoxaparin Injectable 40 milliGRAM(s) SubCutaneous every 24 hours  influenza  Vaccine (HIGH DOSE) 0.7 milliLiter(s) IntraMuscular once  insulin lispro (ADMELOG) corrective regimen sliding scale   SubCutaneous three times a day before meals  insulin lispro (ADMELOG) corrective regimen sliding scale   SubCutaneous at bedtime  lactated ringers. 1000 milliLiter(s) (75 mL/Hr) IV Continuous <Continuous>  losartan 25 milliGRAM(s) Oral daily  oseltamivir 30 milliGRAM(s) Oral every 12 hours      MEDICATIONS  (PRN):  acetaminophen     Tablet .. 650 milliGRAM(s) Oral every 6 hours PRN Temp greater or equal to 38C (100.4F), Mild Pain (1 - 3)  aluminum hydroxide/magnesium hydroxide/simethicone Suspension 30 milliLiter(s) Oral every 4 hours PRN Dyspepsia  meclizine 25 milliGRAM(s) Oral daily PRN Dizziness  melatonin 3 milliGRAM(s) Oral at bedtime PRN Insomnia  ondansetron Injectable 4 milliGRAM(s) IV Push every 8 hours PRN Nausea and/or Vomiting      Physical Exam    Neuro :  no focal deficits  Respiratory: CTA B/L  CV: RRR, S1S2, no murmurs,   Abdominal: Soft, NT, ND +BS,  Extremities: No edema, + peripheral pulses        ASSESSMENT    Sepsis  flu a  pna  h/o HTN,   HLD,   DM,   Osteoporosis,   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  [facility] onc f/u   pt on active anti-MM therapy (Darzalex/Velcade/Rev/Dex last given 11/16/22) which will be on hold during the acute infection  pt to f/u with Dr. Alvarado upon discharge  phys tx eval noted and rec phys tx eval 3-5x/week x 2 weeks [facility]   hgba1c 5.5 noted above  cont lispro ss  cont current meds

## 2022-11-29 NOTE — PROGRESS NOTE ADULT - SUBJECTIVE AND OBJECTIVE BOX
NP Note discussed with Primary Attending.    Patient is a 87y old  Female who presents with a chief complaint of Cough (2022 06:29)      INTERVAL HPI/OVERNIGHT EVENTS: no new complaints    MEDICATIONS  (STANDING):  atorvastatin 10 milliGRAM(s) Oral at bedtime  azithromycin   Tablet 500 milliGRAM(s) Oral daily  cefuroxime   Tablet 500 milliGRAM(s) Oral every 12 hours  enoxaparin Injectable 40 milliGRAM(s) SubCutaneous every 24 hours  influenza  Vaccine (HIGH DOSE) 0.7 milliLiter(s) IntraMuscular once  insulin lispro (ADMELOG) corrective regimen sliding scale   SubCutaneous three times a day before meals  insulin lispro (ADMELOG) corrective regimen sliding scale   SubCutaneous at bedtime  lactated ringers. 1000 milliLiter(s) (75 mL/Hr) IV Continuous <Continuous>  losartan 25 milliGRAM(s) Oral daily  oseltamivir 30 milliGRAM(s) Oral every 12 hours  potassium chloride   Powder 40 milliEquivalent(s) Oral once    MEDICATIONS  (PRN):  acetaminophen     Tablet .. 650 milliGRAM(s) Oral every 6 hours PRN Temp greater or equal to 38C (100.4F), Mild Pain (1 - 3)  aluminum hydroxide/magnesium hydroxide/simethicone Suspension 30 milliLiter(s) Oral every 4 hours PRN Dyspepsia  meclizine 25 milliGRAM(s) Oral daily PRN Dizziness  melatonin 3 milliGRAM(s) Oral at bedtime PRN Insomnia  ondansetron Injectable 4 milliGRAM(s) IV Push every 8 hours PRN Nausea and/or Vomiting      __________________________________________________  REVIEW OF SYSTEMS:    CONSTITUTIONAL: No fever,   EYES: no acute visual disturbances  NECK: No pain or stiffness  RESPIRATORY: + cough; +shortness of breath  CARDIOVASCULAR: No chest pain, no palpitations  GASTROINTESTINAL: No pain. No nausea or vomiting; No diarrhea   NEUROLOGICAL: No headache or numbness, no tremors  MUSCULOSKELETAL: No joint pain, no muscle pain  GENITOURINARY: no dysuria, no frequency, no hesitancy  PSYCHIATRY: no depression , no anxiety  ALL OTHER  ROS negative        Vital Signs Last 24 Hrs  T(C): 37 (2022 05:38), Max: 37.2 (2022 14:12)  T(F): 98.6 (2022 05:38), Max: 99 (2022 14:12)  HR: 75 (2022 05:38) (75 - 86)  BP: 152/64 (2022 05:38) (109/67 - 152/64)  BP(mean): --  RR: 20 (2022 05:38) (17 - 20)  SpO2: 94% (2022 05:38) (94% - 99%)    Parameters below as of 2022 05:38  Patient On (Oxygen Delivery Method): nasal cannula  O2 Flow (L/min): 2      ________________________________________________  PHYSICAL EXAM:  GENERAL: NAD  HEENT: Normocephalic;  conjunctivae and sclerae clear; moist mucous membranes;   NECK : supple  CHEST/LUNG: Clear to auscultation bilaterally with good air entry   HEART: S1 S2  regular; no murmurs, gallops or rubs  ABDOMEN: Soft, Nontender, Nondistended; Bowel sounds present  EXTREMITIES: no cyanosis; no edema; no calf tenderness  SKIN: warm and dry; no rash  NERVOUS SYSTEM:  Awake and alert; Oriented  to place, person and time ; no new deficits    _________________________________________________  LABS:                        9.5    3.90  )-----------( 171      ( 2022 07:38 )             28.5     11-    141  |  110<H>  |  11  ----------------------------<  90  3.3<L>   |  20<L>  |  0.57    Ca    8.3<L>      2022 07:38  Phos  2.4       Mg     1.9         TPro  5.1<L>  /  Alb  2.1<L>  /  TBili  0.5  /  DBili  x   /  AST  13  /  ALT  11  /  AlkPhos  43  11-29      Urinalysis Basic - ( 2022 15:00 )    Color: Yellow / Appearance: Clear / S.005 / pH: x  Gluc: x / Ketone: Trace  / Bili: Negative / Urobili: Negative   Blood: x / Protein: 15 / Nitrite: Negative   Leuk Esterase: Trace / RBC: 0-2 /HPF / WBC 3-5 /HPF   Sq Epi: x / Non Sq Epi: Few /HPF / Bacteria: Moderate /HPF      CAPILLARY BLOOD GLUCOSE      POCT Blood Glucose.: 80 mg/dL (2022 07:51)  POCT Blood Glucose.: 89 mg/dL (2022 22:16)  POCT Blood Glucose.: 81 mg/dL (2022 17:01)  POCT Blood Glucose.: 84 mg/dL (2022 11:56)        RADIOLOGY & ADDITIONAL TESTS:  ACC: [id number] EXAM:  XR CHEST PORTABLE IMMED 1V                          PROCEDURE DATE:  2022          INTERPRETATION:  AP semierect chest on 2022 at 2:06 PM.   Patient has sepsis.    Heart magnified by technique.    On  this year there was a scattered small right upper lobe   infiltrate which has largely resolved.    IMPRESSION: As above.    --- End of Report ---            TAO NAIK MD; Attending Radiologist  This document has been electronically signed. 2022  3:14PM      Imaging  Reviewed:  YES/NO    Consultant(s) Notes Reviewed:   YES/ No      Plan of care was discussed with patient and /or primary care giver; all questions and concerns were addressed

## 2022-11-29 NOTE — PATIENT PROFILE ADULT - CENTRAL VENOUS CATHETER/PICC LINE
4/12 COVID PCR (+); 5/15 repeat PCR (+); 5/23 repeat PCR negative. S/p hydroxychloroquine + azithromycin 4/13-4/16. Stable on room air.  - continue supportive care  - contact/droplet precautions no

## 2022-11-29 NOTE — PROGRESS NOTE ADULT - SUBJECTIVE AND OBJECTIVE BOX
Patient is a 87y old  Female who presents with a chief complaint of Cough (2022 06:29)  Awake, alert, laying in bed in NAD    INTERVAL HPI/OVERNIGHT EVENTS:      VITAL SIGNS:  T(F): 98.6 (22 @ 05:38)  HR: 75 (22 @ 05:38)  BP: 152/64 (22 @ 05:38)  RR: 20 (22 @ 05:38)  SpO2: 94% (22 @ 05:38)  Wt(kg): --  I&O's Detail          REVIEW OF SYSTEMS:    CONSTITUTIONAL:  No fevers, chills, sweats    HEENT:  Eyes:  No diplopia or blurred vision. ENT:  No earache, sore throat or runny nose.    CARDIOVASCULAR:  No pressure, squeezing, tightness, or heaviness about the chest; no palpitations.    RESPIRATORY:  Per HPI    GASTROINTESTINAL:  No abdominal pain, nausea, vomiting or diarrhea.    GENITOURINARY:  No dysuria, frequency or urgency.    NEUROLOGIC:  No paresthesias, fasciculations, seizures or weakness.    PSYCHIATRIC:  No disorder of thought or mood.      PHYSICAL EXAM:    Constitutional: Well developed and nourished  Eyes:Perrla  ENMT: normal  Neck:supple  Respiratory: Rhonchi bilaterally  Cardiovascular: S1 S2 regular  Gastrointestinal: Soft, Non tender  Extremities: No edema  Vascular:normal  Neurological:Awake, alert,Ox3  Musculoskeletal:Normal      MEDICATIONS  (STANDING):  atorvastatin 10 milliGRAM(s) Oral at bedtime  azithromycin   Tablet 500 milliGRAM(s) Oral daily  cefuroxime   Tablet 500 milliGRAM(s) Oral every 12 hours  enoxaparin Injectable 40 milliGRAM(s) SubCutaneous every 24 hours  influenza  Vaccine (HIGH DOSE) 0.7 milliLiter(s) IntraMuscular once  insulin lispro (ADMELOG) corrective regimen sliding scale   SubCutaneous three times a day before meals  insulin lispro (ADMELOG) corrective regimen sliding scale   SubCutaneous at bedtime  lactated ringers. 1000 milliLiter(s) (75 mL/Hr) IV Continuous <Continuous>  losartan 25 milliGRAM(s) Oral daily  oseltamivir 30 milliGRAM(s) Oral every 12 hours  potassium chloride   Powder 40 milliEquivalent(s) Oral once    MEDICATIONS  (PRN):  acetaminophen     Tablet .. 650 milliGRAM(s) Oral every 6 hours PRN Temp greater or equal to 38C (100.4F), Mild Pain (1 - 3)  aluminum hydroxide/magnesium hydroxide/simethicone Suspension 30 milliLiter(s) Oral every 4 hours PRN Dyspepsia  meclizine 25 milliGRAM(s) Oral daily PRN Dizziness  melatonin 3 milliGRAM(s) Oral at bedtime PRN Insomnia  ondansetron Injectable 4 milliGRAM(s) IV Push every 8 hours PRN Nausea and/or Vomiting      Allergies    gabapentin (Unknown)    Intolerances        LABS:                        9.5    3.90  )-----------( 171      ( 2022 07:38 )             28.5         141  |  110<H>  |  11  ----------------------------<  90  3.3<L>   |  20<L>  |  0.57    Ca    8.3<L>      2022 07:38  Phos  2.4       Mg     1.9         TPro  5.1<L>  /  Alb  2.1<L>  /  TBili  0.5  /  DBili  x   /  AST  13  /  ALT  11  /  AlkPhos  43        Urinalysis Basic - ( 2022 15:00 )    Color: Yellow / Appearance: Clear / S.005 / pH: x  Gluc: x / Ketone: Trace  / Bili: Negative / Urobili: Negative   Blood: x / Protein: 15 / Nitrite: Negative   Leuk Esterase: Trace / RBC: 0-2 /HPF / WBC 3-5 /HPF   Sq Epi: x / Non Sq Epi: Few /HPF / Bacteria: Moderate /HPF            CAPILLARY BLOOD GLUCOSE      POCT Blood Glucose.: 80 mg/dL (2022 07:51)  POCT Blood Glucose.: 89 mg/dL (2022 22:16)  POCT Blood Glucose.: 81 mg/dL (2022 17:01)  POCT Blood Glucose.: 84 mg/dL (2022 11:56)    pro-bnp 1408  @ 14:30     d-dimer --   @ 14:30      RADIOLOGY & ADDITIONAL TESTS:    CXR:    Ct scan chest:    ekg;    echo:

## 2022-11-30 LAB
ANION GAP SERPL CALC-SCNC: 13 MMOL/L — SIGNIFICANT CHANGE UP (ref 5–17)
BUN SERPL-MCNC: 9 MG/DL — SIGNIFICANT CHANGE UP (ref 7–18)
CALCIUM SERPL-MCNC: 8.6 MG/DL — SIGNIFICANT CHANGE UP (ref 8.4–10.5)
CHLORIDE SERPL-SCNC: 110 MMOL/L — HIGH (ref 96–108)
CO2 SERPL-SCNC: 19 MMOL/L — LOW (ref 22–31)
CREAT SERPL-MCNC: 0.5 MG/DL — SIGNIFICANT CHANGE UP (ref 0.5–1.3)
CULTURE RESULTS: SIGNIFICANT CHANGE UP
EGFR: 92 ML/MIN/1.73M2 — SIGNIFICANT CHANGE UP
GLUCOSE BLDC GLUCOMTR-MCNC: 103 MG/DL — HIGH (ref 70–99)
GLUCOSE BLDC GLUCOMTR-MCNC: 84 MG/DL — SIGNIFICANT CHANGE UP (ref 70–99)
GLUCOSE BLDC GLUCOMTR-MCNC: 84 MG/DL — SIGNIFICANT CHANGE UP (ref 70–99)
GLUCOSE BLDC GLUCOMTR-MCNC: 94 MG/DL — SIGNIFICANT CHANGE UP (ref 70–99)
GLUCOSE SERPL-MCNC: 97 MG/DL — SIGNIFICANT CHANGE UP (ref 70–99)
HCT VFR BLD CALC: 28.6 % — LOW (ref 34.5–45)
HGB BLD-MCNC: 9.4 G/DL — LOW (ref 11.5–15.5)
MAGNESIUM SERPL-MCNC: 1.7 MG/DL — SIGNIFICANT CHANGE UP (ref 1.6–2.6)
MCHC RBC-ENTMCNC: 29.8 PG — SIGNIFICANT CHANGE UP (ref 27–34)
MCHC RBC-ENTMCNC: 32.9 GM/DL — SIGNIFICANT CHANGE UP (ref 32–36)
MCV RBC AUTO: 90.8 FL — SIGNIFICANT CHANGE UP (ref 80–100)
NRBC # BLD: 0 /100 WBCS — SIGNIFICANT CHANGE UP (ref 0–0)
PHOSPHATE SERPL-MCNC: 2.3 MG/DL — LOW (ref 2.5–4.5)
PLATELET # BLD AUTO: 162 K/UL — SIGNIFICANT CHANGE UP (ref 150–400)
POTASSIUM SERPL-MCNC: 3 MMOL/L — LOW (ref 3.5–5.3)
POTASSIUM SERPL-SCNC: 3 MMOL/L — LOW (ref 3.5–5.3)
RBC # BLD: 3.15 M/UL — LOW (ref 3.8–5.2)
RBC # FLD: 18.3 % — HIGH (ref 10.3–14.5)
SODIUM SERPL-SCNC: 142 MMOL/L — SIGNIFICANT CHANGE UP (ref 135–145)
SPECIMEN SOURCE: SIGNIFICANT CHANGE UP
WBC # BLD: 4.18 K/UL — SIGNIFICANT CHANGE UP (ref 3.8–10.5)
WBC # FLD AUTO: 4.18 K/UL — SIGNIFICANT CHANGE UP (ref 3.8–10.5)

## 2022-11-30 RX ORDER — POTASSIUM CHLORIDE 20 MEQ
40 PACKET (EA) ORAL ONCE
Refills: 0 | Status: DISCONTINUED | OUTPATIENT
Start: 2022-11-30 | End: 2022-11-30

## 2022-11-30 RX ORDER — POTASSIUM PHOSPHATE, MONOBASIC POTASSIUM PHOSPHATE, DIBASIC 236; 224 MG/ML; MG/ML
30 INJECTION, SOLUTION INTRAVENOUS ONCE
Refills: 0 | Status: COMPLETED | OUTPATIENT
Start: 2022-11-30 | End: 2022-11-30

## 2022-11-30 RX ORDER — POTASSIUM CHLORIDE 20 MEQ
10 PACKET (EA) ORAL
Refills: 0 | Status: DISCONTINUED | OUTPATIENT
Start: 2022-11-30 | End: 2022-11-30

## 2022-11-30 RX ADMIN — SODIUM CHLORIDE 75 MILLILITER(S): 9 INJECTION, SOLUTION INTRAVENOUS at 11:00

## 2022-11-30 RX ADMIN — ENOXAPARIN SODIUM 40 MILLIGRAM(S): 100 INJECTION SUBCUTANEOUS at 12:16

## 2022-11-30 RX ADMIN — LOSARTAN POTASSIUM 25 MILLIGRAM(S): 100 TABLET, FILM COATED ORAL at 05:28

## 2022-11-30 RX ADMIN — AZITHROMYCIN 500 MILLIGRAM(S): 500 TABLET, FILM COATED ORAL at 11:01

## 2022-11-30 RX ADMIN — Medication 30 MILLIGRAM(S): at 05:32

## 2022-11-30 RX ADMIN — Medication 30 MILLIGRAM(S): at 17:20

## 2022-11-30 RX ADMIN — Medication 100 MILLIEQUIVALENT(S): at 09:48

## 2022-11-30 RX ADMIN — Medication 500 MILLIGRAM(S): at 17:20

## 2022-11-30 RX ADMIN — Medication 100 MILLIGRAM(S): at 05:34

## 2022-11-30 RX ADMIN — ONDANSETRON 4 MILLIGRAM(S): 8 TABLET, FILM COATED ORAL at 08:37

## 2022-11-30 RX ADMIN — POTASSIUM PHOSPHATE, MONOBASIC POTASSIUM PHOSPHATE, DIBASIC 83.33 MILLIMOLE(S): 236; 224 INJECTION, SOLUTION INTRAVENOUS at 11:01

## 2022-11-30 RX ADMIN — ATORVASTATIN CALCIUM 10 MILLIGRAM(S): 80 TABLET, FILM COATED ORAL at 21:07

## 2022-11-30 RX ADMIN — Medication 500 MILLIGRAM(S): at 05:27

## 2022-11-30 NOTE — PROGRESS NOTE ADULT - SUBJECTIVE AND OBJECTIVE BOX
Patient is a 87y old  Female who presents with a chief complaint of Cough (29 Nov 2022 11:03)    pt seen in icu [  ], reg med floor [ x  ], bed [ x ], chair at bedside [   ], a+o x3 [ x ], lethargic [  ],  nad [ x ]      Allergies    gabapentin (Unknown)        Vitals    T(F): 98.5 (11-30-22 @ 04:59), Max: 99 (11-29-22 @ 12:27)  HR: 67 (11-30-22 @ 04:59) (67 - 71)  BP: 138/67 (11-30-22 @ 04:59) (138/67 - 153/70)  RR: 15 (11-30-22 @ 04:59) (15 - 20)  SpO2: 96% (11-30-22 @ 04:59) (96% - 98%)  Wt(kg): --  CAPILLARY BLOOD GLUCOSE      POCT Blood Glucose.: 98 mg/dL (29 Nov 2022 21:37)      Labs                          9.5    3.90  )-----------( 171      ( 29 Nov 2022 07:38 )             28.5       11-29    141  |  110<H>  |  11  ----------------------------<  90  3.3<L>   |  20<L>  |  0.57    Ca    8.3<L>      29 Nov 2022 07:38    TPro  5.1<L>  /  Alb  2.1<L>  /  TBili  0.5  /  DBili  x   /  AST  13  /  ALT  11  /  AlkPhos  43  11-29          Troponin I, High Sensitivity Result: 26.5 ng/L (11-27-22 @ 14:30)    .Sputum Sputum  11-28 @ 15:53   Normal Respiratory Natalie present  --    No polymorphonuclear leukocytes per low power field  Few Squamous epithelial cells per low power field  Rare Gram Variable Rods seen per oil power field      Catheterized Catheterized  11-28 @ 15:00   Culture grew 3 or more types of organisms which indicate  collection contamination; consider recollection only if clinically  indicated.  --  --      .Blood Blood-Peripheral  11-27 @ 14:45   No growth to date.  --  --      .Blood Blood-Peripheral  11-27 @ 14:30   No growth to date.  --  --          Radiology Results      Meds    MEDICATIONS  (STANDING):  atorvastatin 10 milliGRAM(s) Oral at bedtime  azithromycin   Tablet 500 milliGRAM(s) Oral daily  cefuroxime   Tablet 500 milliGRAM(s) Oral every 12 hours  enoxaparin Injectable 40 milliGRAM(s) SubCutaneous every 24 hours  influenza  Vaccine (HIGH DOSE) 0.7 milliLiter(s) IntraMuscular once  insulin lispro (ADMELOG) corrective regimen sliding scale   SubCutaneous three times a day before meals  insulin lispro (ADMELOG) corrective regimen sliding scale   SubCutaneous at bedtime  lactated ringers. 1000 milliLiter(s) (75 mL/Hr) IV Continuous <Continuous>  losartan 25 milliGRAM(s) Oral daily  oseltamivir 30 milliGRAM(s) Oral every 12 hours      MEDICATIONS  (PRN):  acetaminophen     Tablet .. 650 milliGRAM(s) Oral every 6 hours PRN Temp greater or equal to 38C (100.4F), Mild Pain (1 - 3)  aluminum hydroxide/magnesium hydroxide/simethicone Suspension 30 milliLiter(s) Oral every 4 hours PRN Dyspepsia  guaiFENesin Oral Liquid (Sugar-Free) 100 milliGRAM(s) Oral every 6 hours PRN Cough  meclizine 25 milliGRAM(s) Oral daily PRN Dizziness  melatonin 3 milliGRAM(s) Oral at bedtime PRN Insomnia  ondansetron Injectable 4 milliGRAM(s) IV Push every 8 hours PRN Nausea and/or Vomiting      Physical Exam    Neuro :  no focal deficits  Respiratory: CTA B/L  CV: RRR, S1S2, no murmurs,   Abdominal: Soft, NT, ND +BS,  Extremities: No edema, + peripheral pulses        ASSESSMENT    Sepsis  flu a  pna  h/o HTN,   HLD,   DM,   Osteoporosis,   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  heme onc f/u   pt on active anti-MM therapy (Darzalex/Velcade/Rev/Dex last given 11/16/22) which will be on hold during the acute infection  pt to f/u with Dr. Alvarado upon discharge  phys tx eval noted and rec phys tx eval 3-5x/week x 2 weeks at [facility]   hgba1c 5.5 noted above  cont lispro ss  cont current meds       Patient is a 87y old  Female who presents with a chief complaint of Cough (29 Nov 2022 11:03)    pt seen in icu [  ], reg med floor [ x  ], bed [ x ], chair at bedside [   ], a+o x3 [ x ], lethargic [  ],  nad [ x ]      Allergies    gabapentin (Unknown)        Vitals    T(F): 98.5 (11-30-22 @ 04:59), Max: 99 (11-29-22 @ 12:27)  HR: 67 (11-30-22 @ 04:59) (67 - 71)  BP: 138/67 (11-30-22 @ 04:59) (138/67 - 153/70)  RR: 15 (11-30-22 @ 04:59) (15 - 20)  SpO2: 96% (11-30-22 @ 04:59) (96% - 98%)  Wt(kg): --  CAPILLARY BLOOD GLUCOSE      POCT Blood Glucose.: 98 mg/dL (29 Nov 2022 21:37)      Labs                          9.5    3.90  )-----------( 171      ( 29 Nov 2022 07:38 )             28.5       11-29    141  |  110<H>  |  11  ----------------------------<  90  3.3<L>   |  20<L>  |  0.57    Ca    8.3<L>      29 Nov 2022 07:38    TPro  5.1<L>  /  Alb  2.1<L>  /  TBili  0.5  /  DBili  x   /  AST  13  /  ALT  11  /  AlkPhos  43  11-29          Troponin I, High Sensitivity Result: 26.5 ng/L (11-27-22 @ 14:30)    .Sputum Sputum  11-28 @ 15:53   Normal Respiratory Natalie present  --    No polymorphonuclear leukocytes per low power field  Few Squamous epithelial cells per low power field  Rare Gram Variable Rods seen per oil power field      Catheterized Catheterized  11-28 @ 15:00   Culture grew 3 or more types of organisms which indicate  collection contamination; consider recollection only if clinically  indicated.  --  --      .Blood Blood-Peripheral  11-27 @ 14:45   No growth to date.  --  --      .Blood Blood-Peripheral  11-27 @ 14:30   No growth to date.  --  --          Radiology Results      Meds    MEDICATIONS  (STANDING):  atorvastatin 10 milliGRAM(s) Oral at bedtime  azithromycin   Tablet 500 milliGRAM(s) Oral daily  cefuroxime   Tablet 500 milliGRAM(s) Oral every 12 hours  enoxaparin Injectable 40 milliGRAM(s) SubCutaneous every 24 hours  influenza  Vaccine (HIGH DOSE) 0.7 milliLiter(s) IntraMuscular once  insulin lispro (ADMELOG) corrective regimen sliding scale   SubCutaneous three times a day before meals  insulin lispro (ADMELOG) corrective regimen sliding scale   SubCutaneous at bedtime  lactated ringers. 1000 milliLiter(s) (75 mL/Hr) IV Continuous <Continuous>  losartan 25 milliGRAM(s) Oral daily  oseltamivir 30 milliGRAM(s) Oral every 12 hours      MEDICATIONS  (PRN):  acetaminophen     Tablet .. 650 milliGRAM(s) Oral every 6 hours PRN Temp greater or equal to 38C (100.4F), Mild Pain (1 - 3)  aluminum hydroxide/magnesium hydroxide/simethicone Suspension 30 milliLiter(s) Oral every 4 hours PRN Dyspepsia  guaiFENesin Oral Liquid (Sugar-Free) 100 milliGRAM(s) Oral every 6 hours PRN Cough  meclizine 25 milliGRAM(s) Oral daily PRN Dizziness  melatonin 3 milliGRAM(s) Oral at bedtime PRN Insomnia  ondansetron Injectable 4 milliGRAM(s) IV Push every 8 hours PRN Nausea and/or Vomiting      Physical Exam    Neuro :  no focal deficits  Respiratory: CTA B/L  CV: RRR, S1S2, no murmurs,   Abdominal: Soft, NT, ND +BS,  Extremities: No edema, + peripheral pulses        ASSESSMENT    Sepsis  flu a  pna  h/o HTN,   HLD,   DM,   Osteoporosis,   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 Alvarado upon discharge  phys tx eval noted and rec phys tx eval 3-5x/week x 2 weeks at [facility]   hgba1c 5.5 noted    cont lispro ss  cont current meds

## 2022-11-30 NOTE — PROGRESS NOTE ADULT - SUBJECTIVE AND OBJECTIVE BOX
Patient is a 87y old  Female who presents with a chief complaint of Cough (2022 10:48)  Awake, alert, comfortable in bed in NAD    INTERVAL HPI/OVERNIGHT EVENTS:      VITAL SIGNS:  T(F): 98.5 (22 @ 04:59)  HR: 67 (22 @ 04:59)  BP: 138/67 (22 @ 04:59)  RR: 15 (22 @ 04:59)  SpO2: 96% (22 @ 04:59)  Wt(kg): --  I&O's Detail          REVIEW OF SYSTEMS:    CONSTITUTIONAL:  No fevers, chills, sweats    HEENT:  Eyes:  No diplopia or blurred vision. ENT:  No earache, sore throat or runny nose.    CARDIOVASCULAR:  No pressure, squeezing, tightness, or heaviness about the chest; no palpitations.    RESPIRATORY:  Per HPI    GASTROINTESTINAL:  No abdominal pain, nausea, vomiting or diarrhea.    GENITOURINARY:  No dysuria, frequency or urgency.    NEUROLOGIC:  No paresthesias, fasciculations, seizures or weakness.    PSYCHIATRIC:  No disorder of thought or mood.      PHYSICAL EXAM:    Constitutional: Well developed and nourished  Eyes:Perrla  ENMT: normal  Neck:supple  Respiratory: B/L Rhonchi  Cardiovascular: S1 S2 regular  Gastrointestinal: Soft, Non tender  Extremities: No edema  Vascular:normal  Neurological:Awake, alert,Ox3  Musculoskeletal:Normal      MEDICATIONS  (STANDING):  atorvastatin 10 milliGRAM(s) Oral at bedtime  azithromycin   Tablet 500 milliGRAM(s) Oral daily  cefuroxime   Tablet 500 milliGRAM(s) Oral every 12 hours  enoxaparin Injectable 40 milliGRAM(s) SubCutaneous every 24 hours  influenza  Vaccine (HIGH DOSE) 0.7 milliLiter(s) IntraMuscular once  insulin lispro (ADMELOG) corrective regimen sliding scale   SubCutaneous three times a day before meals  insulin lispro (ADMELOG) corrective regimen sliding scale   SubCutaneous at bedtime  lactated ringers. 1000 milliLiter(s) (75 mL/Hr) IV Continuous <Continuous>  losartan 25 milliGRAM(s) Oral daily  oseltamivir 30 milliGRAM(s) Oral every 12 hours    MEDICATIONS  (PRN):  acetaminophen     Tablet .. 650 milliGRAM(s) Oral every 6 hours PRN Temp greater or equal to 38C (100.4F), Mild Pain (1 - 3)  aluminum hydroxide/magnesium hydroxide/simethicone Suspension 30 milliLiter(s) Oral every 4 hours PRN Dyspepsia  guaiFENesin Oral Liquid (Sugar-Free) 100 milliGRAM(s) Oral every 6 hours PRN Cough  meclizine 25 milliGRAM(s) Oral daily PRN Dizziness  melatonin 3 milliGRAM(s) Oral at bedtime PRN Insomnia  ondansetron Injectable 4 milliGRAM(s) IV Push every 8 hours PRN Nausea and/or Vomiting      Allergies    gabapentin (Unknown)    Intolerances        LABS:                        9.4    4.18  )-----------( 162      ( 2022 06:15 )             28.6     1130    142  |  110<H>  |  9   ----------------------------<  97  3.0<L>   |  19<L>  |  0.50    Ca    8.6      2022 06:15  Phos  2.3       Mg     1.7         TPro  5.1<L>  /  Alb  2.1<L>  /  TBili  0.5  /  DBili  x   /  AST  13  /  ALT  11  /  AlkPhos  43        Urinalysis Basic - ( 2022 15:00 )    Color: Yellow / Appearance: Clear / S.005 / pH: x  Gluc: x / Ketone: Trace  / Bili: Negative / Urobili: Negative   Blood: x / Protein: 15 / Nitrite: Negative   Leuk Esterase: Trace / RBC: 0-2 /HPF / WBC 3-5 /HPF   Sq Epi: x / Non Sq Epi: Few /HPF / Bacteria: Moderate /HPF    Culture - Sputum . (22 @ 15:53)   Gram Stain:   No polymorphonuclear leukocytes per low power field   Few Squamous epithelial cells per low power field   Rare Gram Variable Rods seen per oil power field   Specimen Source: .Sputum Sputum   Culture Results:   Normal Respiratory Natalie present Culture - Urine (22 @ 15:00)   Specimen Source: Catheterized Catheterized   Culture Results:   Culture grew 3 or more types of organisms which indicate   collection contamination; consider recollection only if clinically   indicated. Culture - Blood (22 @ 14:45)   Specimen Source: .Blood Blood-Peripheral   Culture Results:   No growth to date. Culture - Blood (22 @ 14:30)   Specimen Source: .Blood Blood-Peripheral   Culture Results:   No growth to date.         CAPILLARY BLOOD GLUCOSE      POCT Blood Glucose.: 84 mg/dL (2022 11:23)  POCT Blood Glucose.: 84 mg/dL (2022 07:36)  POCT Blood Glucose.: 98 mg/dL (2022 21:37)  POCT Blood Glucose.: 82 mg/dL (2022 17:07)    pro-bnp 1408  @ 14:30     d-dimer --   @ 14:30      RADIOLOGY & ADDITIONAL TESTS:    CXR:    Ct scan chest:    ekg;    echo:

## 2022-11-30 NOTE — PROGRESS NOTE ADULT - SUBJECTIVE AND OBJECTIVE BOX
NP Note discussed with Primary Attending.    Patient is a 87y old  Female who presents with a chief complaint of Cough (2022 06:19)      INTERVAL HPI/OVERNIGHT EVENTS: no new complaints    MEDICATIONS  (STANDING):  atorvastatin 10 milliGRAM(s) Oral at bedtime  azithromycin   Tablet 500 milliGRAM(s) Oral daily  cefuroxime   Tablet 500 milliGRAM(s) Oral every 12 hours  enoxaparin Injectable 40 milliGRAM(s) SubCutaneous every 24 hours  influenza  Vaccine (HIGH DOSE) 0.7 milliLiter(s) IntraMuscular once  insulin lispro (ADMELOG) corrective regimen sliding scale   SubCutaneous three times a day before meals  insulin lispro (ADMELOG) corrective regimen sliding scale   SubCutaneous at bedtime  lactated ringers. 1000 milliLiter(s) (75 mL/Hr) IV Continuous <Continuous>  losartan 25 milliGRAM(s) Oral daily  oseltamivir 30 milliGRAM(s) Oral every 12 hours  potassium phosphate IVPB 30 milliMole(s) IV Intermittent once    MEDICATIONS  (PRN):  acetaminophen     Tablet .. 650 milliGRAM(s) Oral every 6 hours PRN Temp greater or equal to 38C (100.4F), Mild Pain (1 - 3)  aluminum hydroxide/magnesium hydroxide/simethicone Suspension 30 milliLiter(s) Oral every 4 hours PRN Dyspepsia  guaiFENesin Oral Liquid (Sugar-Free) 100 milliGRAM(s) Oral every 6 hours PRN Cough  meclizine 25 milliGRAM(s) Oral daily PRN Dizziness  melatonin 3 milliGRAM(s) Oral at bedtime PRN Insomnia  ondansetron Injectable 4 milliGRAM(s) IV Push every 8 hours PRN Nausea and/or Vomiting      __________________________________________________  REVIEW OF SYSTEMS:    CONSTITUTIONAL: No fever,   EYES: no acute visual disturbances  NECK: No pain or stiffness  RESPIRATORY: No cough; No shortness of breath  CARDIOVASCULAR: No chest pain, no palpitations  GASTROINTESTINAL: No pain. No nausea or vomiting; No diarrhea   NEUROLOGICAL: No headache or numbness, no tremors  MUSCULOSKELETAL: No joint pain, no muscle pain  GENITOURINARY: no dysuria, no frequency, no hesitancy  PSYCHIATRY: no depression , no anxiety  ALL OTHER  ROS negative        Vital Signs Last 24 Hrs  T(C): 36.9 (2022 04:59), Max: 37.2 (2022 12:27)  T(F): 98.5 (2022 04:59), Max: 99 (2022 12:27)  HR: 67 (2022 04:59) (67 - 71)  BP: 138/67 (2022 04:59) (138/67 - 153/70)  BP(mean): --  RR: 15 (2022 04:59) (15 - 20)  SpO2: 96% (2022 04:59) (96% - 98%)    Parameters below as of 2022 04:59  Patient On (Oxygen Delivery Method): room air        ________________________________________________  PHYSICAL EXAM:  GENERAL: NAD  HEENT: Normocephalic;  conjunctivae and sclerae clear; moist mucous membranes;   NECK : supple  CHEST/LUNG: Clear to auscultation bilaterally with good air entry   HEART: S1 S2  regular; no murmurs, gallops or rubs  ABDOMEN: Soft, Nontender, Nondistended; Bowel sounds present  EXTREMITIES: no cyanosis; no edema; no calf tenderness  SKIN: warm and dry; no rash  NERVOUS SYSTEM:  Awake and alert; Oriented  to place, person and time ; no new deficits    _________________________________________________  LABS:                        9.4    4.18  )-----------( 162      ( 2022 06:15 )             28.6     11-30    142  |  110<H>  |  9   ----------------------------<  97  3.0<L>   |  19<L>  |  0.50    Ca    8.6      2022 06:15  Phos  2.3     11-30  Mg     1.7     11-30    TPro  5.1<L>  /  Alb  2.1<L>  /  TBili  0.5  /  DBili  x   /  AST  13  /  ALT  11  /  AlkPhos  43        Urinalysis Basic - ( 2022 15:00 )    Color: Yellow / Appearance: Clear / S.005 / pH: x  Gluc: x / Ketone: Trace  / Bili: Negative / Urobili: Negative   Blood: x / Protein: 15 / Nitrite: Negative   Leuk Esterase: Trace / RBC: 0-2 /HPF / WBC 3-5 /HPF   Sq Epi: x / Non Sq Epi: Few /HPF / Bacteria: Moderate /HPF      CAPILLARY BLOOD GLUCOSE      POCT Blood Glucose.: 84 mg/dL (2022 07:36)  POCT Blood Glucose.: 98 mg/dL (2022 21:37)  POCT Blood Glucose.: 82 mg/dL (2022 17:07)  POCT Blood Glucose.: 85 mg/dL (2022 11:31)        RADIOLOGY & ADDITIONAL TESTS:  ACC: [id number] EXAM:  XR CHEST PORTABLE IMMED 1V                          PROCEDURE DATE:  2022          INTERPRETATION:  AP semierect chest on 2022 at 2:06 PM.   Patient has sepsis.    Heart magnified by technique.    On  this year there was a scattered small right upper lobe   infiltrate which has largely resolved.    IMPRESSION: As above.    --- End of Report ---            TAO NAIK MD; Attending Radiologist  This document has been electronically signed. 2022  3:14PM      Imaging  Reviewed:  YES/NO    Consultant(s) Notes Reviewed:   YES/ No      Plan of care was discussed with patient and /or primary care giver; all questions and concerns were addressed

## 2022-12-01 ENCOUNTER — TRANSCRIPTION ENCOUNTER (OUTPATIENT)
Age: 87
End: 2022-12-01

## 2022-12-01 LAB
ALBUMIN SERPL ELPH-MCNC: 2.2 G/DL — LOW (ref 3.5–5)
ALP SERPL-CCNC: 44 U/L — SIGNIFICANT CHANGE UP (ref 40–120)
ALT FLD-CCNC: 22 U/L DA — SIGNIFICANT CHANGE UP (ref 10–60)
ANION GAP SERPL CALC-SCNC: 12 MMOL/L — SIGNIFICANT CHANGE UP (ref 5–17)
AST SERPL-CCNC: 27 U/L — SIGNIFICANT CHANGE UP (ref 10–40)
BILIRUB SERPL-MCNC: 0.4 MG/DL — SIGNIFICANT CHANGE UP (ref 0.2–1.2)
BUN SERPL-MCNC: 7 MG/DL — SIGNIFICANT CHANGE UP (ref 7–18)
CALCIUM SERPL-MCNC: 8.5 MG/DL — SIGNIFICANT CHANGE UP (ref 8.4–10.5)
CHLORIDE SERPL-SCNC: 109 MMOL/L — HIGH (ref 96–108)
CO2 SERPL-SCNC: 20 MMOL/L — LOW (ref 22–31)
CREAT SERPL-MCNC: 0.45 MG/DL — LOW (ref 0.5–1.3)
EGFR: 94 ML/MIN/1.73M2 — SIGNIFICANT CHANGE UP
GLUCOSE BLDC GLUCOMTR-MCNC: 101 MG/DL — HIGH (ref 70–99)
GLUCOSE BLDC GLUCOMTR-MCNC: 104 MG/DL — HIGH (ref 70–99)
GLUCOSE BLDC GLUCOMTR-MCNC: 119 MG/DL — HIGH (ref 70–99)
GLUCOSE BLDC GLUCOMTR-MCNC: 97 MG/DL — SIGNIFICANT CHANGE UP (ref 70–99)
GLUCOSE SERPL-MCNC: 106 MG/DL — HIGH (ref 70–99)
HCT VFR BLD CALC: 29.9 % — LOW (ref 34.5–45)
HGB BLD-MCNC: 9.8 G/DL — LOW (ref 11.5–15.5)
MCHC RBC-ENTMCNC: 29.3 PG — SIGNIFICANT CHANGE UP (ref 27–34)
MCHC RBC-ENTMCNC: 32.8 GM/DL — SIGNIFICANT CHANGE UP (ref 32–36)
MCV RBC AUTO: 89.3 FL — SIGNIFICANT CHANGE UP (ref 80–100)
NRBC # BLD: 0 /100 WBCS — SIGNIFICANT CHANGE UP (ref 0–0)
PLATELET # BLD AUTO: 193 K/UL — SIGNIFICANT CHANGE UP (ref 150–400)
POTASSIUM SERPL-MCNC: 3.2 MMOL/L — LOW (ref 3.5–5.3)
POTASSIUM SERPL-SCNC: 3.2 MMOL/L — LOW (ref 3.5–5.3)
PROT SERPL-MCNC: 5.8 G/DL — LOW (ref 6–8.3)
RBC # BLD: 3.35 M/UL — LOW (ref 3.8–5.2)
RBC # FLD: 17.8 % — HIGH (ref 10.3–14.5)
SODIUM SERPL-SCNC: 141 MMOL/L — SIGNIFICANT CHANGE UP (ref 135–145)
WBC # BLD: 5.86 K/UL — SIGNIFICANT CHANGE UP (ref 3.8–10.5)
WBC # FLD AUTO: 5.86 K/UL — SIGNIFICANT CHANGE UP (ref 3.8–10.5)

## 2022-12-01 RX ORDER — ALOGLIPTIN 12.5 MG/1
1 TABLET, FILM COATED ORAL
Qty: 0 | Refills: 0 | DISCHARGE

## 2022-12-01 RX ORDER — DEXAMETHASONE 0.5 MG/5ML
0 ELIXIR ORAL
Qty: 0 | Refills: 0 | DISCHARGE

## 2022-12-01 RX ORDER — ALBUTEROL 90 UG/1
0 AEROSOL, METERED ORAL
Qty: 0 | Refills: 0 | DISCHARGE

## 2022-12-01 RX ORDER — ALOGLIPTIN 12.5 MG/1
0 TABLET, FILM COATED ORAL
Qty: 0 | Refills: 0 | DISCHARGE

## 2022-12-01 RX ORDER — LOPERAMIDE HCL 2 MG
0 TABLET ORAL
Qty: 0 | Refills: 0 | DISCHARGE

## 2022-12-01 RX ORDER — SIMVASTATIN 20 MG/1
0 TABLET, FILM COATED ORAL
Qty: 0 | Refills: 0 | DISCHARGE

## 2022-12-01 RX ORDER — ERGOCALCIFEROL 1.25 MG/1
0 CAPSULE ORAL
Qty: 0 | Refills: 0 | DISCHARGE

## 2022-12-01 RX ORDER — MONTELUKAST 4 MG/1
0 TABLET, CHEWABLE ORAL
Qty: 0 | Refills: 0 | DISCHARGE

## 2022-12-01 RX ORDER — POTASSIUM CHLORIDE 20 MEQ
10 PACKET (EA) ORAL
Refills: 0 | Status: COMPLETED | OUTPATIENT
Start: 2022-12-01 | End: 2022-12-01

## 2022-12-01 RX ORDER — MEGESTROL ACETATE 40 MG/ML
0 SUSPENSION ORAL
Qty: 0 | Refills: 0 | DISCHARGE

## 2022-12-01 RX ORDER — ERTUGLIFLOZIN 5 MG/1
0 TABLET, FILM COATED ORAL
Qty: 0 | Refills: 0 | DISCHARGE

## 2022-12-01 RX ORDER — ASPIRIN/CALCIUM CARB/MAGNESIUM 324 MG
0 TABLET ORAL
Qty: 0 | Refills: 0 | DISCHARGE

## 2022-12-01 RX ORDER — OMEPRAZOLE 10 MG/1
0 CAPSULE, DELAYED RELEASE ORAL
Qty: 0 | Refills: 0 | DISCHARGE

## 2022-12-01 RX ORDER — VALACYCLOVIR 500 MG/1
0 TABLET, FILM COATED ORAL
Qty: 0 | Refills: 0 | DISCHARGE

## 2022-12-01 RX ORDER — ONDANSETRON 8 MG/1
0 TABLET, FILM COATED ORAL
Qty: 0 | Refills: 0 | DISCHARGE

## 2022-12-01 RX ORDER — METFORMIN HYDROCHLORIDE 850 MG/1
0 TABLET ORAL
Qty: 0 | Refills: 0 | DISCHARGE

## 2022-12-01 RX ORDER — LENALIDOMIDE 5 MG/1
0 CAPSULE ORAL
Qty: 0 | Refills: 0 | DISCHARGE

## 2022-12-01 RX ORDER — ACETAMINOPHEN 500 MG
2 TABLET ORAL
Qty: 0 | Refills: 0 | DISCHARGE
Start: 2022-12-01

## 2022-12-01 RX ORDER — LOSARTAN POTASSIUM 100 MG/1
0 TABLET, FILM COATED ORAL
Qty: 0 | Refills: 0 | DISCHARGE

## 2022-12-01 RX ORDER — ALENDRONATE SODIUM 70 MG/1
0 TABLET ORAL
Qty: 0 | Refills: 0 | DISCHARGE

## 2022-12-01 RX ORDER — FAMOTIDINE 10 MG/ML
0 INJECTION INTRAVENOUS
Qty: 0 | Refills: 0 | DISCHARGE

## 2022-12-01 RX ORDER — POTASSIUM CHLORIDE 20 MEQ
40 PACKET (EA) ORAL ONCE
Refills: 0 | Status: COMPLETED | OUTPATIENT
Start: 2022-12-01 | End: 2022-12-01

## 2022-12-01 RX ADMIN — Medication 100 MILLIGRAM(S): at 05:03

## 2022-12-01 RX ADMIN — ENOXAPARIN SODIUM 40 MILLIGRAM(S): 100 INJECTION SUBCUTANEOUS at 11:42

## 2022-12-01 RX ADMIN — Medication 100 MILLIEQUIVALENT(S): at 11:40

## 2022-12-01 RX ADMIN — Medication 100 MILLIEQUIVALENT(S): at 12:57

## 2022-12-01 RX ADMIN — ATORVASTATIN CALCIUM 10 MILLIGRAM(S): 80 TABLET, FILM COATED ORAL at 21:08

## 2022-12-01 RX ADMIN — Medication 30 MILLIGRAM(S): at 05:04

## 2022-12-01 RX ADMIN — ONDANSETRON 4 MILLIGRAM(S): 8 TABLET, FILM COATED ORAL at 05:27

## 2022-12-01 RX ADMIN — Medication 30 MILLIGRAM(S): at 17:57

## 2022-12-01 RX ADMIN — Medication 100 MILLIGRAM(S): at 21:08

## 2022-12-01 RX ADMIN — Medication 650 MILLIGRAM(S): at 12:17

## 2022-12-01 RX ADMIN — Medication 500 MILLIGRAM(S): at 05:04

## 2022-12-01 RX ADMIN — Medication 650 MILLIGRAM(S): at 12:55

## 2022-12-01 RX ADMIN — Medication 40 MILLIEQUIVALENT(S): at 11:50

## 2022-12-01 RX ADMIN — AZITHROMYCIN 500 MILLIGRAM(S): 500 TABLET, FILM COATED ORAL at 11:40

## 2022-12-01 RX ADMIN — Medication 500 MILLIGRAM(S): at 17:56

## 2022-12-01 RX ADMIN — LOSARTAN POTASSIUM 25 MILLIGRAM(S): 100 TABLET, FILM COATED ORAL at 05:04

## 2022-12-01 NOTE — PROGRESS NOTE ADULT - PROBLEM SELECTOR PLAN 1
Tasmiflu 75 mgs po BID for 5 days  analgesics prn  follow up CXR Tamiflu 75 mgs po BID for 5 days  analgesics prn  follow up CXR

## 2022-12-01 NOTE — PROGRESS NOTE ADULT - ASSESSMENT
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  Last chemo was November 16th. NKDA. Patient admitted to medicine for shortness of breath with cough found to have RVP + and Influenza + and was put on droplet precautions.  Pulmonology dr. Hernandes consulted, patient CXR show small scattered infiltrates patient was started on  Azithromycin, Ceftriaxone and Tamiflu. PT consulted, recommending CLAUDIA after discharge.    
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  Last chemo was November 16th. NKDA. Patient admitted to medicine for shortness of breath with cough found to have RVP + and Influenza + and was put on droplet precautions.  Pulmonology dr. Hernandes consulted, patient CXR show small scattered infiltrates patient was started on  Azithromycin, Ceftriaxone and Tamiflu. PT consulted, recommending CLAUDIA after discharge.    11/30- patient seen and examined at bedside, potassium noted 3.0 replaced, phosphorous noted 2.3 replaced, patient on RA saturating 96%. Continue with Ceftriaxone, Azithromycin and Tamiflu renally dosed.    
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  Last chemo was November 16th. NKDA. Patient admitted to medicine for shortness of breath with cough found to have RVP + and Influenza + and was put on droplet precautions.  Pulmonology dr. Hernandes consulted, patient CXR show small scattered infiltrates patient was started on  Azithromycin, Ceftriaxone and Tamiflu. PT consulted, recommending CLAUDIA after discharge.    
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  Last chemo was November 16th. NKDA. Patient admitted to medicine for shortness of breath with cough found to have RVP + and Influenza + and was put on droplet precautions.  Pulmonology dr. Hernandes consulted, patient CXR show small scattered infiltrates patient was started on  Azithromycin, Ceftriaxone and Tamiflu. PT consulted, recommending CLAUDIA after discharge.

## 2022-12-01 NOTE — DISCHARGE NOTE PROVIDER - CARE PROVIDER_API CALL
Cristina Bonds  CARDIOVASCULAR DISEASE  [address] [place] [address]  Phone: [phone]  Fax: [phone]  Follow Up Time: 2 weeks    Nikunj Hernandes)  Internal Medicine; Pulmonary Disease  [address] [place] [address]  Phone: [phone]  Fax: [phone]  Follow Up Time: 1 week   Cristina Bonds  CARDIOVASCULAR DISEASE  [address] [place] [id number]  Phone: [phone]  Fax: [phone]  Scheduled Appointment: 12/22/2022 03:30 PM    Nikunj Hernandes)  Internal Medicine; Pulmonary Disease  [address] [place], [place] [id number]  Phone: [phone]  Fax: [phone]  Follow Up Time: 1 week

## 2022-12-01 NOTE — DISCHARGE NOTE PROVIDER - PROVIDER TOKENS
PROVIDER:[TOKEN:[[id number]],FOLLOWUP:[2 weeks]],PROVIDER:[TOKEN:[[id number]],FOLLOWUP:[1 week]] PROVIDER:[TOKEN:[[id number]],SCHEDULEDAPPT:[12/22/2022],SCHEDULEDAPPTTIME:[03:30 PM]],PROVIDER:[TOKEN:[[id number]],FOLLOWUP:[1 week]]

## 2022-12-01 NOTE — PROGRESS NOTE ADULT - PROBLEM SELECTOR PLAN 1
pt p/w fever, tachycardia, decreased WBC (pt on chemo)  CXR wnl though with L sided crackles on exam  RVP+ for influenza  will treat for PNA as pt high risk  cont ceftriaxone and azithromycin  cont Tamiflu last dose tomorrow  pulmonary dr. Hernandes

## 2022-12-01 NOTE — PROGRESS NOTE ADULT - SUBJECTIVE AND OBJECTIVE BOX
Patient is a 87y old  Female who presents with a chief complaint of Cough (30 Nov 2022 12:03)    pt seen in icu [  ], reg med floor [ x  ], bed [ x ], chair at bedside [   ], a+o x3 [ x ], lethargic [  ],    nad [ x ]      Allergies    gabapentin (Unknown)        Vitals    T(F): 98.3 (12-01-22 @ 04:55), Max: 98.7 (11-30-22 @ 21:29)  HR: 74 (12-01-22 @ 04:55) (59 - 74)  BP: 154/70 (12-01-22 @ 04:55) (142/60 - 159/68)  RR: 16 (12-01-22 @ 04:55) (16 - 20)  SpO2: 95% (12-01-22 @ 04:55) (95% - 97%)  Wt(kg): --  CAPILLARY BLOOD GLUCOSE      POCT Blood Glucose.: 94 mg/dL (30 Nov 2022 21:30)      Labs                          9.4    4.18  )-----------( 162      ( 30 Nov 2022 06:15 )             28.6       11-30    142  |  110<H>  |  9   ----------------------------<  97  3.0<L>   |  19<L>  |  0.50    Ca    8.6      30 Nov 2022 06:15  Phos  2.3     11-30  Mg     1.7     11-30    TPro  5.1<L>  /  Alb  2.1<L>  /  TBili  0.5  /  DBili  x   /  AST  13  /  ALT  11  /  AlkPhos  43  11-29            .Sputum Sputum  11-28 @ 15:53   Normal Respiratory Natalie present  --    No polymorphonuclear leukocytes per low power field  Few Squamous epithelial cells per low power field  Rare Gram Variable Rods seen per oil power field      Catheterized Catheterized  11-28 @ 15:00   Culture grew 3 or more types of organisms which indicate  collection contamination; consider recollection only if clinically  indicated.  --  --      .Blood Blood-Peripheral  11-27 @ 14:45   No growth to date.  --  --      .Blood Blood-Peripheral  11-27 @ 14:30   No growth to date.  --  --          Radiology Results      Meds    MEDICATIONS  (STANDING):  atorvastatin 10 milliGRAM(s) Oral at bedtime  azithromycin   Tablet 500 milliGRAM(s) Oral daily  cefuroxime   Tablet 500 milliGRAM(s) Oral every 12 hours  enoxaparin Injectable 40 milliGRAM(s) SubCutaneous every 24 hours  influenza  Vaccine (HIGH DOSE) 0.7 milliLiter(s) IntraMuscular once  insulin lispro (ADMELOG) corrective regimen sliding scale   SubCutaneous three times a day before meals  insulin lispro (ADMELOG) corrective regimen sliding scale   SubCutaneous at bedtime  losartan 25 milliGRAM(s) Oral daily  oseltamivir 30 milliGRAM(s) Oral every 12 hours      MEDICATIONS  (PRN):  acetaminophen     Tablet .. 650 milliGRAM(s) Oral every 6 hours PRN Temp greater or equal to 38C (100.4F), Mild Pain (1 - 3)  aluminum hydroxide/magnesium hydroxide/simethicone Suspension 30 milliLiter(s) Oral every 4 hours PRN Dyspepsia  guaiFENesin Oral Liquid (Sugar-Free) 100 milliGRAM(s) Oral every 6 hours PRN Cough  meclizine 25 milliGRAM(s) Oral daily PRN Dizziness  melatonin 3 milliGRAM(s) Oral at bedtime PRN Insomnia  ondansetron Injectable 4 milliGRAM(s) IV Push every 8 hours PRN Nausea and/or Vomiting      Physical Exam    Neuro :  no focal deficits  Respiratory: CTA B/L  CV: RRR, S1S2, no murmurs,   Abdominal: Soft, NT, ND +BS,  Extremities: No edema, + peripheral pulses        ASSESSMENT    Sepsis  flu a  pna  h/o HTN,   HLD,   DM,   Osteoporosis,   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 Alvarado upon discharge  phys tx eval noted and rec phys tx eval 3-5x/week x 2 weeks at [facility]   hgba1c 5.5 noted    cont lispro ss  cont current meds           Patient is a 87y old  Female who presents with a chief complaint of Cough (30 Nov 2022 12:03)    pt seen in icu [  ], reg med floor [ x  ], bed [ x ], chair at bedside [   ], a+o x3 [ x ], lethargic [  ],    nad [ x ]      Allergies    gabapentin (Unknown)        Vitals    T(F): 98.3 (12-01-22 @ 04:55), Max: 98.7 (11-30-22 @ 21:29)  HR: 74 (12-01-22 @ 04:55) (59 - 74)  BP: 154/70 (12-01-22 @ 04:55) (142/60 - 159/68)  RR: 16 (12-01-22 @ 04:55) (16 - 20)  SpO2: 95% (12-01-22 @ 04:55) (95% - 97%)  Wt(kg): --  CAPILLARY BLOOD GLUCOSE      POCT Blood Glucose.: 94 mg/dL (30 Nov 2022 21:30)      Labs                          9.4    4.18  )-----------( 162      ( 30 Nov 2022 06:15 )             28.6       11-30    142  |  110<H>  |  9   ----------------------------<  97  3.0<L>   |  19<L>  |  0.50    Ca    8.6      30 Nov 2022 06:15  Phos  2.3     11-30  Mg     1.7     11-30    TPro  5.1<L>  /  Alb  2.1<L>  /  TBili  0.5  /  DBili  x   /  AST  13  /  ALT  11  /  AlkPhos  43  11-29            .Sputum Sputum  11-28 @ 15:53   Normal Respiratory Natalie present  --    No polymorphonuclear leukocytes per low power field  Few Squamous epithelial cells per low power field  Rare Gram Variable Rods seen per oil power field      Catheterized Catheterized  11-28 @ 15:00   Culture grew 3 or more types of organisms which indicate  collection contamination; consider recollection only if clinically  indicated.  --  --      .Blood Blood-Peripheral  11-27 @ 14:45   No growth to date.  --  --      .Blood Blood-Peripheral  11-27 @ 14:30   No growth to date.  --  --          Radiology Results      Meds    MEDICATIONS  (STANDING):  atorvastatin 10 milliGRAM(s) Oral at bedtime  azithromycin   Tablet 500 milliGRAM(s) Oral daily  cefuroxime   Tablet 500 milliGRAM(s) Oral every 12 hours  enoxaparin Injectable 40 milliGRAM(s) SubCutaneous every 24 hours  influenza  Vaccine (HIGH DOSE) 0.7 milliLiter(s) IntraMuscular once  insulin lispro (ADMELOG) corrective regimen sliding scale   SubCutaneous three times a day before meals  insulin lispro (ADMELOG) corrective regimen sliding scale   SubCutaneous at bedtime  losartan 25 milliGRAM(s) Oral daily  oseltamivir 30 milliGRAM(s) Oral every 12 hours      MEDICATIONS  (PRN):  acetaminophen     Tablet .. 650 milliGRAM(s) Oral every 6 hours PRN Temp greater or equal to 38C (100.4F), Mild Pain (1 - 3)  aluminum hydroxide/magnesium hydroxide/simethicone Suspension 30 milliLiter(s) Oral every 4 hours PRN Dyspepsia  guaiFENesin Oral Liquid (Sugar-Free) 100 milliGRAM(s) Oral every 6 hours PRN Cough  meclizine 25 milliGRAM(s) Oral daily PRN Dizziness  melatonin 3 milliGRAM(s) Oral at bedtime PRN Insomnia  ondansetron Injectable 4 milliGRAM(s) IV Push every 8 hours PRN Nausea and/or Vomiting      Physical Exam    Neuro :  no focal deficits  Respiratory: CTA B/L  CV: RRR, S1S2, no murmurs,   Abdominal: Soft, NT, ND +BS,  Extremities: No edema, + peripheral pulses        ASSESSMENT    Sepsis  flu a  pna  h/o HTN,   HLD,   DM,   Osteoporosis,   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 Alvarado upon discharge  phys tx eval noted and rec phys tx eval 3-5x/week x 2 weeks at [facility]   hgba1c 5.5 noted    cont lispro ss   supplement potassium prn for hypokalemia   cont current meds  d/c plan for am if stable

## 2022-12-01 NOTE — DISCHARGE NOTE PROVIDER - HOSPITAL COURSE
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  Last chemo was November 16th. NKDA.   Patient admitted to medicine for shortness of breath with cough found to have RVP + and Influenza + and was put on droplet precautions.  Pulmonology dr. Hernandes consulted, patient CXR show small scattered RUL infiltrate which has largely resolved from 10/7/22.  Oxygen titrated to room air and well tolerated.  Patient was started on  Azithromycin, Ceftriaxone and Tamiflu. PT consulted and recommends CLAUDIA after discharge. However, family wishes to take patient home.    Patient completed ABT and Tamiflu for 5 day course.   Patient is medically optimized for discharge home.   Refer to medical records/consult/progress notes for in depth hospital course as this is a brief summary.    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  Last chemo was November 16th. NKDA.   Patient admitted to medicine for shortness of breath with cough found to have RVP + and Influenza + and was put on droplet precautions.  Pulmonology dr. Hernandes consulted, patient CXR show small scattered RUL infiltrate which has largely resolved from 10/7/22.  Oxygen titrated to room air and well tolerated.  Patient was started on  Azithromycin, Ceftriaxone and Tamiflu. PT consulted and recommends CLAUDIA after discharge. However, family wishes to take patient home.    Patient completed ABT and Tamiflu for 5 day course.   Patient is medically optimized for discharge home.   Refer to medical records/consult/progress notes for in depth hospital course as this is a brief summary.     pt had two diarrhea today, Pt told attending  Dr Kramer that she still wants to go home. imodium ordered. and Probiotic ordered for pt.  called daughter, son in law answered/ discussed pt condition and plan of care and discharge planing.    NP called pt primary care Cristina Anglin, # [phone], was able to get follow up appointment on 12/22/2022 at 3330pm

## 2022-12-01 NOTE — PROGRESS NOTE ADULT - SUBJECTIVE AND OBJECTIVE BOX
Patient is a 87y old  Female who presents with a chief complaint of Cough (01 Dec 2022 11:05)      INTERVAL HPI/OVERNIGHT EVENTS: no acute events overnight    REVIEW OF SYSTEMS:  CONSTITUTIONAL: No fever, chills  ENMT:  No difficulty hearing, no change in vision  NECK: No pain or stiffness  RESPIRATORY: No cough, SOB  CARDIOVASCULAR: No chest pain, palpitations  GASTROINTESTINAL: No abdominal pain. No nausea, vomiting, or diarrhea  GENITOURINARY: No dysuria  NEUROLOGICAL: No HA  SKIN: No itching, burning, rashes, or lesions   LYMPH NODES: No enlarged glands  ENDOCRINE: No heat or cold intolerance; No hair loss  MUSCULOSKELETAL: No joint pain or swelling; No muscle, back, or extremity pain  PSYCHIATRIC: No depression, anxiety  HEME/LYMPH: No easy bruising, or bleeding gums    T(C): 36.8 (12-01-22 @ 04:55), Max: 37.1 (11-30-22 @ 21:29)  HR: 74 (12-01-22 @ 04:55) (59 - 74)  BP: 154/70 (12-01-22 @ 04:55) (142/60 - 159/68)  RR: 16 (12-01-22 @ 04:55) (16 - 20)  SpO2: 95% (12-01-22 @ 04:55) (95% - 97%)  Wt(kg): --Vital Signs Last 24 Hrs  T(C): 36.8 (01 Dec 2022 04:55), Max: 37.1 (30 Nov 2022 21:29)  T(F): 98.3 (01 Dec 2022 04:55), Max: 98.7 (30 Nov 2022 21:29)  HR: 74 (01 Dec 2022 04:55) (59 - 74)  BP: 154/70 (01 Dec 2022 04:55) (142/60 - 159/68)  BP(mean): --  RR: 16 (01 Dec 2022 04:55) (16 - 20)  SpO2: 95% (01 Dec 2022 04:55) (95% - 97%)    Parameters below as of 01 Dec 2022 04:55  Patient On (Oxygen Delivery Method): room air    MEDICATIONS  (STANDING):  atorvastatin 10 milliGRAM(s) Oral at bedtime  azithromycin   Tablet 500 milliGRAM(s) Oral daily  cefuroxime   Tablet 500 milliGRAM(s) Oral every 12 hours  enoxaparin Injectable 40 milliGRAM(s) SubCutaneous every 24 hours  influenza  Vaccine (HIGH DOSE) 0.7 milliLiter(s) IntraMuscular once  insulin lispro (ADMELOG) corrective regimen sliding scale   SubCutaneous three times a day before meals  insulin lispro (ADMELOG) corrective regimen sliding scale   SubCutaneous at bedtime  losartan 25 milliGRAM(s) Oral daily  oseltamivir 30 milliGRAM(s) Oral every 12 hours  potassium chloride    Tablet ER 40 milliEquivalent(s) Oral once  potassium chloride  10 mEq/100 mL IVPB 10 milliEquivalent(s) IV Intermittent every 1 hour    MEDICATIONS  (PRN):  acetaminophen     Tablet .. 650 milliGRAM(s) Oral every 6 hours PRN Temp greater or equal to 38C (100.4F), Mild Pain (1 - 3)  aluminum hydroxide/magnesium hydroxide/simethicone Suspension 30 milliLiter(s) Oral every 4 hours PRN Dyspepsia  guaiFENesin Oral Liquid (Sugar-Free) 100 milliGRAM(s) Oral every 6 hours PRN Cough  meclizine 25 milliGRAM(s) Oral daily PRN Dizziness  melatonin 3 milliGRAM(s) Oral at bedtime PRN Insomnia  ondansetron Injectable 4 milliGRAM(s) IV Push every 8 hours PRN Nausea and/or Vomiting      PHYSICAL EXAM:  GENERAL: NAD  EYES: clear conjunctiva; EOMI  ENMT: Moist mucous membranes  NECK: Supple, No JVD, Normal thyroid  CHEST/LUNG: Clear to auscultation bilaterally; No rales, rhonchi, wheezing, or rubs  HEART: S1, S2, Regular rate and rhythm  ABDOMEN: Soft, Nontender, Nondistended; Bowel sounds present  NEURO: Alert & Oriented X3  EXTREMITIES: No LE edema, no calf tenderness  LYMPH: No lymphadenopathy noted  SKIN: No rashes or lesions    Consultant(s) Notes Reviewed:  [x ] YES  [ ] NO  Care Discussed with Consultants/Other Providers [ x] YES  [ ] NO    LABS:                        9.8    5.86  )-----------( 193      ( 01 Dec 2022 07:36 )             29.9     12-01    141  |  109<H>  |  7   ----------------------------<  106<H>  3.2<L>   |  20<L>  |  0.45<L>    Ca    8.5      01 Dec 2022 07:36  Phos  2.3     11-30  Mg     1.7     11-30    TPro  5.8<L>  /  Alb  2.2<L>  /  TBili  0.4  /  DBili  x   /  AST  27  /  ALT  22  /  AlkPhos  44  12-01      CAPILLARY BLOOD GLUCOSE      POCT Blood Glucose.: 104 mg/dL (01 Dec 2022 11:40)  POCT Blood Glucose.: 101 mg/dL (01 Dec 2022 07:55)  POCT Blood Glucose.: 94 mg/dL (30 Nov 2022 21:30)  POCT Blood Glucose.: 103 mg/dL (30 Nov 2022 17:05)    RADIOLOGY & ADDITIONAL TESTS:    Imaging Personally Reviewed:  [ x] YES  [ ] NO  < from: Xray Chest 1 View-PORTABLE IMMEDIATE (11.27.22 @ 14:19) >    ACC: [id number] EXAM:  XR CHEST PORTABLE IMMED 1V                          PROCEDURE DATE:  11/27/2022          INTERPRETATION:  AP semierect chest on November 27, 2022 at 2:06 PM.   Patient has sepsis.    Heart magnified by technique.    On October 8 this year there was a scattered small right upper lobe   infiltrate which has largely resolved.    IMPRESSION: As above.    --- End of Report ---            TAO NAIK MD; Attending Radiologist  This document has been electronically signed. Nov 27 2022  3:14PM    < end of copied text >

## 2022-12-01 NOTE — DISCHARGE NOTE PROVIDER - NSDCMRMEDTOKEN_GEN_ALL_CORE_FT
acetaminophen 325 mg oral tablet: 2 tab(s) orally every 6 hours, As needed, Temp greater or equal to 38C (100.4F), Mild Pain (1 - 3)  ALBUTEROL PV  INH: 1 gram(s) inhaled every 8 hours, As Needed for sob  ALENDRONATE 70MG TAB: 1 tab(s) orally once a week  ANTI-DIARRHE 2MG TAB: 1 tab(s) orally once a day, As Needed  ASPIRIN LOW 81MG EC TAB: 1 tab(s) orally once a day  FAMOTIDINE 40MG TAB: 1 tab(s) orally once a day  guaiFENesin 100 mg/5 mL oral liquid: 5 milliliter(s) orally every 6 hours, As needed, Cough  LOSARTAN POT 25MG TAB: 1 tab(s) orally once a day  meclizine 25 mg oral tablet: 1 tab(s) orally once a day, As Needed  MEGESTROL AC 40MG/ML JAIR: 10 milliliter(s) orally once a day  METFORMIN 1000MG TAB: 1 tab(s) orally 2 times a day  MONTELUKAST 10MG TAB: 1 tab(s) orally once a day  OMEPRAZOL RX 40MG CAP: 1 cap(s) orally once a day  ONDANSETRON 4MG TAB: 1 tab(s) orally every 8 hours, As Needed for nausea/vomiting  SIMVASTATIN 10MG TAB: 1 tab(s) orally once a day (at bedtime)  STEGLATRO 15MG TAB: 1 tab(s) orally once a day  ULTRA EYE 0.4-0.3% SELENE: 1 milliliter(s) to each affected eye 2 times a day, As Needed  VALACYCLOVIR 500MG TAB: 1 tab(s) orally once a day  Vitamin D3 1250 mcg (50,000 intl units) oral capsule: 1 cap(s) orally once a week   acetaminophen 325 mg oral tablet: 2 tab(s) orally every 6 hours, As needed, Temp greater or equal to 38C (100.4F), Mild Pain (1 - 3)  ALBUTEROL PV  INH: 1 gram(s) inhaled every 8 hours, As Needed for sob  ALENDRONATE 70MG TAB: 1 tab(s) orally once a week  ASPIRIN LOW 81MG EC TAB: 1 tab(s) orally once a day  FAMOTIDINE 40MG TAB: 1 tab(s) orally once a day  guaiFENesin 100 mg/5 mL oral liquid: 5 milliliter(s) orally every 6 hours, As needed, Cough  lactobacillus acidophilus oral capsule: 1 tab(s) orally 2 times a day   loperamide 2 mg oral capsule: 1 cap(s) orally 2 times a day  LOSARTAN POT 25MG TAB: 1 tab(s) orally once a day  meclizine 25 mg oral tablet: 1 tab(s) orally once a day, As Needed  MEGESTROL AC 40MG/ML JAIR: 10 milliliter(s) orally once a day  METFORMIN 1000MG TAB: 1 tab(s) orally 2 times a day  MONTELUKAST 10MG TAB: 1 tab(s) orally once a day  OMEPRAZOL RX 40MG CAP: 1 cap(s) orally once a day  ONDANSETRON 4MG TAB: 1 tab(s) orally every 8 hours, As Needed for nausea/vomiting  SIMVASTATIN 10MG TAB: 1 tab(s) orally once a day (at bedtime)  STEGLATRO 15MG TAB: 1 tab(s) orally once a day  ULTRA EYE 0.4-0.3% SELENE: 1 milliliter(s) to each affected eye 2 times a day, As Needed  VALACYCLOVIR 500MG TAB: 1 tab(s) orally once a day  Vitamin D3 1250 mcg (50,000 intl units) oral capsule: 1 cap(s) orally once a week

## 2022-12-01 NOTE — DISCHARGE NOTE PROVIDER - NSDCCPCAREPLAN_GEN_ALL_CORE_FT
PRINCIPAL DISCHARGE DIAGNOSIS  Diagnosis: Pneumonia due to influenza A virus  Assessment and Plan of Treatment: You were admitted for pneumonia from influenza A virus. Treated with antibiotic and Tamiflu, completed 5 days course.   Chest xray shows a scattered small right upper lobe infiltrate which has largely resolved from October 8 this year. You were followed by pulmonary doctor.   Nutrition is important, eat small frequent meals.  Get lots of rest and drink fluids.  Follow up with pulmonary doctor in one week after discharge. make a follow up appointment.   If your cough worsens, you develop fever greater than 101', you have shaking chills, a fast heartbeat, trouble breathing and/or feel your are breathing much faster than usual, report to your healthcare provider.  Make sure you wash your hands frequently.        SECONDARY DISCHARGE DIAGNOSES  Diagnosis: HTN (hypertension)  Assessment and Plan of Treatment: Take  losartan (home med) as prescribed.  Low salt diet  Activity as tolerated.  Follow up with your PMD rouinely for routine blood pressure monitoring.   Notify your doctor if you have any of the following symptoms:   Dizziness, Lightheadedness, Blurry vision, Headache, Chest pain, Shortness of breath          Diagnosis: HLD (hyperlipidemia)  Assessment and Plan of Treatment: continue lipitor (home med).  Follow up with PMD for treatment goals, continue medication, have liver function testing every 3 months as anti lipid medications can cause liver irritation, eat low fat, low cholesterol meals      Diagnosis: DM (diabetes mellitus)  Assessment and Plan of Treatment: continue home meds. HgA1C 5.5 this admission.  Follow up with your PMD routinely.   Make sure you get your HgA1c checked every three months.  If you take oral diabetes medications, check your blood glucose two times a day.  It's important not to skip any meals.  If you have not seen your ophthalmologist this year call for appointment.  Check your feet daily for redness, sores, or openings. Do not self treat. If no improvement in two days call your primary care physician for an appointment.  Low blood sugar (hypoglycemia) is a blood sugar below 70mg/dl. Check your blood sugar if you feel signs/symptoms of hypoglycemia. If your blood sugar is below 70 take 15 grams of carbohydrates (ex 4 oz of apple juice, 3-4 glucose tablets, or 4-6 oz of regular soda) wait 15 minutes and repeat blood sugar to make sure it comes up above 70.  If your blood sugar is above 70 and you are due for a meal, have a meal.  If you are not due for a meal have a snack.  This snack helps keeps your blood sugar at a safe range.      Diagnosis: Multiple myeloma  Assessment and Plan of Treatment: You have history of multiple myeloma, Gastric adenocarcinoma, underwent resection 2015 (on chemo).   continue to follow up with your oncology outpatient.     PRINCIPAL DISCHARGE DIAGNOSIS  Diagnosis: Pneumonia due to influenza A virus  Assessment and Plan of Treatment: You were admitted for pneumonia from influenza A virus. Treated with antibiotic and Tamiflu, completed 5 days course.   Chest xray shows a scattered small right upper lobe infiltrate which has largely resolved from October 8 this year. You were followed by pulmonary doctor.   Please folow up with your doctor. Your appointment is  on 12/22/22 at 330pm   Nutrition is important, eat small frequent meals.  Get lots of rest and drink fluids.  Follow up with pulmonary doctor in one week after discharge. make a follow up appointment.   If your cough worsens, you develop fever greater than 101', you have shaking chills, a fast heartbeat, trouble breathing and/or feel your are breathing much faster than usual, report to your healthcare provider.  Make sure you wash your hands frequently.        SECONDARY DISCHARGE DIAGNOSES  Diagnosis: HTN (hypertension)  Assessment and Plan of Treatment: Take  losartan (home med) as prescribed.  Low salt diet  Activity as tolerated.  Follow up with your PMD rouinely for routine blood pressure monitoring.   Notify your doctor if you have any of the following symptoms:   Dizziness, Lightheadedness, Blurry vision, Headache, Chest pain, Shortness of breath          Diagnosis: HLD (hyperlipidemia)  Assessment and Plan of Treatment: continue lipitor (home med).  Follow up with PMD for treatment goals, continue medication, have liver function testing every 3 months as anti lipid medications can cause liver irritation, eat low fat, low cholesterol meals      Diagnosis: DM (diabetes mellitus)  Assessment and Plan of Treatment: continue home meds. HgA1C 5.5 this admission.  Follow up with your PMD routinely.   Make sure you get your HgA1c checked every three months.  If you take oral diabetes medications, check your blood glucose two times a day.  It's important not to skip any meals.  If you have not seen your ophthalmologist this year call for appointment.  Check your feet daily for redness, sores, or openings. Do not self treat. If no improvement in two days call your primary care physician for an appointment.  Low blood sugar (hypoglycemia) is a blood sugar below 70mg/dl. Check your blood sugar if you feel signs/symptoms of hypoglycemia. If your blood sugar is below 70 take 15 grams of carbohydrates (ex 4 oz of apple juice, 3-4 glucose tablets, or 4-6 oz of regular soda) wait 15 minutes and repeat blood sugar to make sure it comes up above 70.  If your blood sugar is above 70 and you are due for a meal, have a meal.  If you are not due for a meal have a snack.  This snack helps keeps your blood sugar at a safe range.      Diagnosis: Multiple myeloma  Assessment and Plan of Treatment: You have history of multiple myeloma, Gastric adenocarcinoma, underwent resection 2015 (on chemo).   continue to follow up with your oncology outpatient.

## 2022-12-02 ENCOUNTER — TRANSCRIPTION ENCOUNTER (OUTPATIENT)
Age: 87
End: 2022-12-02

## 2022-12-02 VITALS
OXYGEN SATURATION: 97 % | SYSTOLIC BLOOD PRESSURE: 150 MMHG | RESPIRATION RATE: 17 BRPM | DIASTOLIC BLOOD PRESSURE: 82 MMHG | TEMPERATURE: 98 F | HEART RATE: 69 BPM

## 2022-12-02 DIAGNOSIS — R19.7 DIARRHEA, UNSPECIFIED: ICD-10-CM

## 2022-12-02 LAB
ANION GAP SERPL CALC-SCNC: 10 MMOL/L — SIGNIFICANT CHANGE UP (ref 5–17)
BUN SERPL-MCNC: 12 MG/DL — SIGNIFICANT CHANGE UP (ref 7–18)
CALCIUM SERPL-MCNC: 8.9 MG/DL — SIGNIFICANT CHANGE UP (ref 8.4–10.5)
CHLORIDE SERPL-SCNC: 110 MMOL/L — HIGH (ref 96–108)
CO2 SERPL-SCNC: 19 MMOL/L — LOW (ref 22–31)
CREAT SERPL-MCNC: 0.49 MG/DL — LOW (ref 0.5–1.3)
CULTURE RESULTS: SIGNIFICANT CHANGE UP
CULTURE RESULTS: SIGNIFICANT CHANGE UP
EGFR: 92 ML/MIN/1.73M2 — SIGNIFICANT CHANGE UP
GLUCOSE BLDC GLUCOMTR-MCNC: 107 MG/DL — HIGH (ref 70–99)
GLUCOSE BLDC GLUCOMTR-MCNC: 125 MG/DL — HIGH (ref 70–99)
GLUCOSE SERPL-MCNC: 122 MG/DL — HIGH (ref 70–99)
HCT VFR BLD CALC: 32 % — LOW (ref 34.5–45)
HGB BLD-MCNC: 10.5 G/DL — LOW (ref 11.5–15.5)
MCHC RBC-ENTMCNC: 28.8 PG — SIGNIFICANT CHANGE UP (ref 27–34)
MCHC RBC-ENTMCNC: 32.8 GM/DL — SIGNIFICANT CHANGE UP (ref 32–36)
MCV RBC AUTO: 87.9 FL — SIGNIFICANT CHANGE UP (ref 80–100)
NRBC # BLD: 0 /100 WBCS — SIGNIFICANT CHANGE UP (ref 0–0)
PLATELET # BLD AUTO: 246 K/UL — SIGNIFICANT CHANGE UP (ref 150–400)
POTASSIUM SERPL-MCNC: 3.4 MMOL/L — LOW (ref 3.5–5.3)
POTASSIUM SERPL-SCNC: 3.4 MMOL/L — LOW (ref 3.5–5.3)
RBC # BLD: 3.64 M/UL — LOW (ref 3.8–5.2)
RBC # FLD: 17.6 % — HIGH (ref 10.3–14.5)
SODIUM SERPL-SCNC: 139 MMOL/L — SIGNIFICANT CHANGE UP (ref 135–145)
SPECIMEN SOURCE: SIGNIFICANT CHANGE UP
SPECIMEN SOURCE: SIGNIFICANT CHANGE UP
WBC # BLD: 5.61 K/UL — SIGNIFICANT CHANGE UP (ref 3.8–10.5)
WBC # FLD AUTO: 5.61 K/UL — SIGNIFICANT CHANGE UP (ref 3.8–10.5)

## 2022-12-02 PROCEDURE — 83735 ASSAY OF MAGNESIUM: CPT

## 2022-12-02 PROCEDURE — 83880 ASSAY OF NATRIURETIC PEPTIDE: CPT

## 2022-12-02 PROCEDURE — 36415 COLL VENOUS BLD VENIPUNCTURE: CPT

## 2022-12-02 PROCEDURE — 82962 GLUCOSE BLOOD TEST: CPT

## 2022-12-02 PROCEDURE — 83036 HEMOGLOBIN GLYCOSYLATED A1C: CPT

## 2022-12-02 PROCEDURE — 99285 EMERGENCY DEPT VISIT HI MDM: CPT

## 2022-12-02 PROCEDURE — 97163 PT EVAL HIGH COMPLEX 45 MIN: CPT

## 2022-12-02 PROCEDURE — 87899 AGENT NOS ASSAY W/OPTIC: CPT

## 2022-12-02 PROCEDURE — 87086 URINE CULTURE/COLONY COUNT: CPT

## 2022-12-02 PROCEDURE — 85025 COMPLETE CBC W/AUTO DIFF WBC: CPT

## 2022-12-02 PROCEDURE — 82009 KETONE BODYS QUAL: CPT

## 2022-12-02 PROCEDURE — 84100 ASSAY OF PHOSPHORUS: CPT

## 2022-12-02 PROCEDURE — 80048 BASIC METABOLIC PNL TOTAL CA: CPT

## 2022-12-02 PROCEDURE — 80053 COMPREHEN METABOLIC PANEL: CPT

## 2022-12-02 PROCEDURE — 87070 CULTURE OTHR SPECIMN AEROBIC: CPT

## 2022-12-02 PROCEDURE — 84484 ASSAY OF TROPONIN QUANT: CPT

## 2022-12-02 PROCEDURE — 83605 ASSAY OF LACTIC ACID: CPT

## 2022-12-02 PROCEDURE — 85027 COMPLETE CBC AUTOMATED: CPT

## 2022-12-02 PROCEDURE — 71045 X-RAY EXAM CHEST 1 VIEW: CPT

## 2022-12-02 PROCEDURE — 84145 PROCALCITONIN (PCT): CPT

## 2022-12-02 PROCEDURE — 97110 THERAPEUTIC EXERCISES: CPT

## 2022-12-02 PROCEDURE — 0225U NFCT DS DNA&RNA 21 SARSCOV2: CPT

## 2022-12-02 PROCEDURE — 86738 MYCOPLASMA ANTIBODY: CPT

## 2022-12-02 PROCEDURE — 93005 ELECTROCARDIOGRAM TRACING: CPT

## 2022-12-02 PROCEDURE — 81001 URINALYSIS AUTO W/SCOPE: CPT

## 2022-12-02 PROCEDURE — 97530 THERAPEUTIC ACTIVITIES: CPT

## 2022-12-02 PROCEDURE — 87040 BLOOD CULTURE FOR BACTERIA: CPT

## 2022-12-02 PROCEDURE — 96365 THER/PROPH/DIAG IV INF INIT: CPT

## 2022-12-02 PROCEDURE — 96367 TX/PROPH/DG ADDL SEQ IV INF: CPT

## 2022-12-02 PROCEDURE — 87449 NOS EACH ORGANISM AG IA: CPT

## 2022-12-02 RX ORDER — LOPERAMIDE HCL 2 MG
1 TABLET ORAL
Qty: 0 | Refills: 0 | DISCHARGE
Start: 2022-12-02

## 2022-12-02 RX ORDER — LOPERAMIDE HCL 2 MG
1 TABLET ORAL
Qty: 0 | Refills: 0 | DISCHARGE

## 2022-12-02 RX ORDER — LACTOBACILLUS ACIDOPHILUS 100MM CELL
1 CAPSULE ORAL
Refills: 0 | Status: DISCONTINUED | OUTPATIENT
Start: 2022-12-02 | End: 2022-12-02

## 2022-12-02 RX ORDER — LOPERAMIDE HCL 2 MG
2 TABLET ORAL
Refills: 0 | Status: DISCONTINUED | OUTPATIENT
Start: 2022-12-02 | End: 2022-12-02

## 2022-12-02 RX ORDER — LACTOBACILLUS ACIDOPHILUS 100MM CELL
1 CAPSULE ORAL
Qty: 28 | Refills: 0
Start: 2022-12-02 | End: 2022-12-15

## 2022-12-02 RX ADMIN — LOSARTAN POTASSIUM 25 MILLIGRAM(S): 100 TABLET, FILM COATED ORAL at 05:24

## 2022-12-02 RX ADMIN — ENOXAPARIN SODIUM 40 MILLIGRAM(S): 100 INJECTION SUBCUTANEOUS at 12:15

## 2022-12-02 RX ADMIN — Medication 2 MILLIGRAM(S): at 10:48

## 2022-12-02 RX ADMIN — Medication 500 MILLIGRAM(S): at 05:24

## 2022-12-02 RX ADMIN — Medication 30 MILLIGRAM(S): at 05:24

## 2022-12-02 RX ADMIN — AZITHROMYCIN 500 MILLIGRAM(S): 500 TABLET, FILM COATED ORAL at 11:06

## 2022-12-02 RX ADMIN — Medication 1 TABLET(S): at 10:48

## 2022-12-02 NOTE — PROGRESS NOTE ADULT - PROBLEM SELECTOR PROBLEM 1
Pneumonia due to influenza A virus

## 2022-12-02 NOTE — PROGRESS NOTE ADULT - PROBLEM SELECTOR PLAN 6
c/w lipitor (home med).
c/w lipitor (home med).
Hem/Onc follow up
Accuchecks with reg insulin coverage  HBA1C
Hem/Onc follow up
c/w lipitor (home med).
Hem/Onc follow up
Lovenox for dvt ppx.

## 2022-12-02 NOTE — PROGRESS NOTE ADULT - PROBLEM SELECTOR PLAN 5
c/w losartan (home med).
Accuchecks with reg insulin coverage  HBA1C
Accuchecks with reg insulin coverage  HBA1C
c/w losartan (home med).
Accuchecks with reg insulin coverage  HBA1C
c/w lipitor (home med).
c/w losartan (home med).
lipid panel  statin

## 2022-12-02 NOTE — DIETITIAN INITIAL EVALUATION ADULT - PERTINENT MEDS FT
MEDICATIONS  (STANDING):  atorvastatin 10 milliGRAM(s) Oral at bedtime  enoxaparin Injectable 40 milliGRAM(s) SubCutaneous every 24 hours  influenza  Vaccine (HIGH DOSE) 0.7 milliLiter(s) IntraMuscular once  insulin lispro (ADMELOG) corrective regimen sliding scale   SubCutaneous three times a day before meals  insulin lispro (ADMELOG) corrective regimen sliding scale   SubCutaneous at bedtime  lactobacillus acidophilus 1 Tablet(s) Oral two times a day with meals  loperamide 2 milliGRAM(s) Oral two times a day  losartan 25 milliGRAM(s) Oral daily  oseltamivir 30 milliGRAM(s) Oral every 12 hours    MEDICATIONS  (PRN):  acetaminophen     Tablet .. 650 milliGRAM(s) Oral every 6 hours PRN Temp greater or equal to 38C (100.4F), Mild Pain (1 - 3)  aluminum hydroxide/magnesium hydroxide/simethicone Suspension 30 milliLiter(s) Oral every 4 hours PRN Dyspepsia  guaiFENesin Oral Liquid (Sugar-Free) 100 milliGRAM(s) Oral every 6 hours PRN Cough  meclizine 25 milliGRAM(s) Oral daily PRN Dizziness  melatonin 3 milliGRAM(s) Oral at bedtime PRN Insomnia  ondansetron Injectable 4 milliGRAM(s) IV Push every 8 hours PRN Nausea and/or Vomiting

## 2022-12-02 NOTE — PROGRESS NOTE ADULT - PROBLEM SELECTOR PLAN 4
lipid panel  statin
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 Alvarado  [facility] consulted
c/w losartan (home med).
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 Alvarado  [facility] consulted
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 Alvarado  [facility] consulted
monitor BP  cont meds

## 2022-12-02 NOTE — DIETITIAN INITIAL EVALUATION ADULT - FACTORS AFF FOOD INTAKE
weakness, fever, cough, SOB, on chemo. tx (last tx. on 11/16/22), Influenza A virus/change in mental status/difficulty with food procurement/preparation/other (specify)

## 2022-12-02 NOTE — PROGRESS NOTE ADULT - PROVIDER SPECIALTY LIST ADULT
Internal Medicine
Pulmonology
Internal Medicine
Pulmonology
Internal Medicine
Pulmonology
Pulmonology

## 2022-12-02 NOTE — PROGRESS NOTE ADULT - PROBLEM SELECTOR PROBLEM 6
DM (diabetes mellitus)
Multiple myeloma
HLD (hyperlipidemia)
HLD (hyperlipidemia)
Prophylactic measure
HLD (hyperlipidemia)

## 2022-12-02 NOTE — PROGRESS NOTE ADULT - SUBJECTIVE AND OBJECTIVE BOX
Patient is a 87y old  Female who presents with a chief complaint of Cough (01 Dec 2022 12:46)    pt seen in icu [  ], reg med floor [ x  ], bed [ x ], chair at bedside [   ], a+o x3 [ x ], lethargic [  ],    nad [ x ]      Allergies    gabapentin (Unknown)        Vitals    T(F): 99 (12-02-22 @ 05:20), Max: 99 (12-02-22 @ 05:20)  HR: 101 (12-02-22 @ 05:20) (77 - 101)  BP: 156/80 (12-02-22 @ 05:20) (132/73 - 167/84)  RR: 15 (12-02-22 @ 05:20) (15 - 18)  SpO2: 96% (12-02-22 @ 05:20) (96% - 98%)  Wt(kg): --  CAPILLARY BLOOD GLUCOSE      POCT Blood Glucose.: 119 mg/dL (01 Dec 2022 21:13)      Labs                          9.8    5.86  )-----------( 193      ( 01 Dec 2022 07:36 )             29.9       12-01    141  |  109<H>  |  7   ----------------------------<  106<H>  3.2<L>   |  20<L>  |  0.45<L>    Ca    8.5      01 Dec 2022 07:36    TPro  5.8<L>  /  Alb  2.2<L>  /  TBili  0.4  /  DBili  x   /  AST  27  /  ALT  22  /  AlkPhos  44  12-01            .Sputum Sputum  11-28 @ 15:53   Normal Respiratory Natalie present  --    No polymorphonuclear leukocytes per low power field  Few Squamous epithelial cells per low power field  Rare Gram Variable Rods seen per oil power field      Catheterized Catheterized  11-28 @ 15:00   Culture grew 3 or more types of organisms which indicate  collection contamination; consider recollection only if clinically  indicated.  --  --      .Blood Blood-Peripheral  11-27 @ 14:45   No growth to date.  --  --      .Blood Blood-Peripheral  11-27 @ 14:30   No growth to date.  --  --          Radiology Results      Meds    MEDICATIONS  (STANDING):  atorvastatin 10 milliGRAM(s) Oral at bedtime  azithromycin   Tablet 500 milliGRAM(s) Oral daily  enoxaparin Injectable 40 milliGRAM(s) SubCutaneous every 24 hours  influenza  Vaccine (HIGH DOSE) 0.7 milliLiter(s) IntraMuscular once  insulin lispro (ADMELOG) corrective regimen sliding scale   SubCutaneous three times a day before meals  insulin lispro (ADMELOG) corrective regimen sliding scale   SubCutaneous at bedtime  losartan 25 milliGRAM(s) Oral daily  oseltamivir 30 milliGRAM(s) Oral every 12 hours      MEDICATIONS  (PRN):  acetaminophen     Tablet .. 650 milliGRAM(s) Oral every 6 hours PRN Temp greater or equal to 38C (100.4F), Mild Pain (1 - 3)  aluminum hydroxide/magnesium hydroxide/simethicone Suspension 30 milliLiter(s) Oral every 4 hours PRN Dyspepsia  guaiFENesin Oral Liquid (Sugar-Free) 100 milliGRAM(s) Oral every 6 hours PRN Cough  meclizine 25 milliGRAM(s) Oral daily PRN Dizziness  melatonin 3 milliGRAM(s) Oral at bedtime PRN Insomnia  ondansetron Injectable 4 milliGRAM(s) IV Push every 8 hours PRN Nausea and/or Vomiting      Physical Exam    Neuro :  no focal deficits  Respiratory: CTA B/L  CV: RRR, S1S2, no murmurs,   Abdominal: Soft, NT, ND +BS,  Extremities: No edema, + peripheral pulses        ASSESSMENT    Sepsis  flu a  pna  h/o HTN,   HLD,   DM,   Osteoporosis,   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 Alvarado upon discharge  phys tx eval noted and rec phys tx eval 3-5x/week x 2 weeks at [facility]   hgba1c 5.5 noted    cont lispro ss   supplement potassium prn for hypokalemia   cont current meds  d/c plan for am if stable           Patient is a 87y old  Female who presents with a chief complaint of Cough (01 Dec 2022 12:46)    pt seen in icu [  ], reg med floor [ x  ], bed [ x ], chair at bedside [   ], a+o x3 [ x ], lethargic [  ],    nad [ x ]    pt c/o 2 episodes loose stools    Allergies    gabapentin (Unknown)        Vitals    T(F): 99 (12-02-22 @ 05:20), Max: 99 (12-02-22 @ 05:20)  HR: 101 (12-02-22 @ 05:20) (77 - 101)  BP: 156/80 (12-02-22 @ 05:20) (132/73 - 167/84)  RR: 15 (12-02-22 @ 05:20) (15 - 18)  SpO2: 96% (12-02-22 @ 05:20) (96% - 98%)  Wt(kg): --  CAPILLARY BLOOD GLUCOSE      POCT Blood Glucose.: 119 mg/dL (01 Dec 2022 21:13)      Labs                          9.8    5.86  )-----------( 193      ( 01 Dec 2022 07:36 )             29.9       12-01    141  |  109<H>  |  7   ----------------------------<  106<H>  3.2<L>   |  20<L>  |  0.45<L>    Ca    8.5      01 Dec 2022 07:36    TPro  5.8<L>  /  Alb  2.2<L>  /  TBili  0.4  /  DBili  x   /  AST  27  /  ALT  22  /  AlkPhos  44  12-01            .Sputum Sputum  11-28 @ 15:53   Normal Respiratory Natalie present  --    No polymorphonuclear leukocytes per low power field  Few Squamous epithelial cells per low power field  Rare Gram Variable Rods seen per oil power field      Catheterized Catheterized  11-28 @ 15:00   Culture grew 3 or more types of organisms which indicate  collection contamination; consider recollection only if clinically  indicated.  --  --      .Blood Blood-Peripheral  11-27 @ 14:45   No growth to date.  --  --      .Blood Blood-Peripheral  11-27 @ 14:30   No growth to date.  --  --          Radiology Results      Meds    MEDICATIONS  (STANDING):  atorvastatin 10 milliGRAM(s) Oral at bedtime  azithromycin   Tablet 500 milliGRAM(s) Oral daily  enoxaparin Injectable 40 milliGRAM(s) SubCutaneous every 24 hours  influenza  Vaccine (HIGH DOSE) 0.7 milliLiter(s) IntraMuscular once  insulin lispro (ADMELOG) corrective regimen sliding scale   SubCutaneous three times a day before meals  insulin lispro (ADMELOG) corrective regimen sliding scale   SubCutaneous at bedtime  losartan 25 milliGRAM(s) Oral daily  oseltamivir 30 milliGRAM(s) Oral every 12 hours      MEDICATIONS  (PRN):  acetaminophen     Tablet .. 650 milliGRAM(s) Oral every 6 hours PRN Temp greater or equal to 38C (100.4F), Mild Pain (1 - 3)  aluminum hydroxide/magnesium hydroxide/simethicone Suspension 30 milliLiter(s) Oral every 4 hours PRN Dyspepsia  guaiFENesin Oral Liquid (Sugar-Free) 100 milliGRAM(s) Oral every 6 hours PRN Cough  meclizine 25 milliGRAM(s) Oral daily PRN Dizziness  melatonin 3 milliGRAM(s) Oral at bedtime PRN Insomnia  ondansetron Injectable 4 milliGRAM(s) IV Push every 8 hours PRN Nausea and/or Vomiting      Physical Exam    Neuro :  no focal deficits  Respiratory: CTA B/L  CV: RRR, S1S2, no murmurs,   Abdominal: Soft, NT, ND +BS,  Extremities: No edema, + peripheral pulses        ASSESSMENT    Sepsis  flu a  pna  h/o HTN,   HLD,   DM,   Osteoporosis,   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 Alvarado upon discharge  phys tx eval noted and rec phys tx eval 3-5x/week x 2 weeks at [facility]    pt declining to go to rehab facitily  hgba1c 5.5 noted    cont lispro ss   supplement potassium prn for hypokalemia   cont current meds  d/c planing if no further loose stools

## 2022-12-02 NOTE — PROGRESS NOTE ADULT - PROBLEM SELECTOR PLAN 2
Pt with potassium 3.4  potassium replacement order  monitor BMP qd.
cultures noted  antibiotics  Replace lytes  ID consult
cultures noted  antibiotics  Replace lytes  ID consult
pt on metformin and alogliptin and stelglatro per surscripts  c/w SSI   FS ACHS.
cultures noted  antibiotics  Replace lytes  ID consult
Pt with potassium 3.4  replete  trend K+
check pending cultures  antibiotics  ID consult
Pt with potassium 3.4  potassium replacement order  monitor BMP qd.

## 2022-12-02 NOTE — PROGRESS NOTE ADULT - PROBLEM SELECTOR PROBLEM 4
HLD (hyperlipidemia)
Multiple myeloma
Multiple myeloma
HTN (hypertension)
Multiple myeloma
HTN (hypertension)

## 2022-12-02 NOTE — PROGRESS NOTE ADULT - PROBLEM SELECTOR PLAN 7
Lovenox for dvt ppx.
Hem/Onc follow up
Lovenox for dvt ppx.
Lovenox for dvt ppx.    Dispo: PT recs [facility], however pt family wants to take her home. likely d/c in am

## 2022-12-02 NOTE — PROGRESS NOTE ADULT - PROBLEM SELECTOR PROBLEM 3
DM (diabetes mellitus)
HTN (hypertension)
DM (diabetes mellitus)
HTN (hypertension)
HTN (hypertension)
Multiple myeloma
DM (diabetes mellitus)
Diarrhea

## 2022-12-02 NOTE — DISCHARGE NOTE NURSING/CASE MANAGEMENT/SOCIAL WORK - NSDCPEFALRISK_GEN_ALL_CORE
For information on Fall & Injury Prevention, visit: [email or web address] OR  [email or web address] OR  https://www.cdc.gov/steadi/patient.html

## 2022-12-02 NOTE — DIETITIAN INITIAL EVALUATION ADULT - OTHER INFO
Patient from home lives with family admitted Pneumonia due to influenza A virus. Pt. in isolation, visited pt. alert & awake, breathing on room air, lunch meal set up & observed intake ~50%, also pt. can benefit with "easy to chew' consistency noted, dosing wt.120 Lbs on 12/02/22, per flow sheet intake 51-75%, followed Pulmonary/team, d/w PCA/nursing, had BM earlier today with improved n/vomiting, possible discharge today? Patient from home lives with family admitted Pneumonia due to influenza A virus. Pt. in isolation, visited pt. alert & awake, breathing on room air, breakfast meal set up & observed intake ~45%, also pt. can benefit with "easy to chew' consistency noted, dosing wt.120 Lbs on 12/02/22, per flow sheet intake 51-75%, followed Pulmonary/team, d/w PCA/nursing, had BM earlier today with improved n/vomiting, possible discharge today?

## 2022-12-02 NOTE — PROGRESS NOTE ADULT - PROBLEM SELECTOR PROBLEM 5
HLD (hyperlipidemia)
DM (diabetes mellitus)
HTN (hypertension)
DM (diabetes mellitus)
HLD (hyperlipidemia)
DM (diabetes mellitus)
HTN (hypertension)
HTN (hypertension)

## 2022-12-02 NOTE — DIETITIAN INITIAL EVALUATION ADULT - PERTINENT LABORATORY DATA
12-01    141  |  109<H>  |  7   ----------------------------<  106<H>  3.2<L>   |  20<L>  |  0.45<L>    Ca    8.5      01 Dec 2022 07:36    TPro  5.8<L>  /  Alb  2.2<L>  /  TBili  0.4  /  DBili  x   /  AST  27  /  ALT  22  /  AlkPhos  44  12-01  POCT Blood Glucose.: 107 mg/dL (12-02-22 @ 11:47)  A1C with Estimated Average Glucose Result: 5.5 % (11-28-22 @ 05:25)  A1C with Estimated Average Glucose Result: 7.8 % (06-14-22 @ 20:20)  A1C with Estimated Average Glucose Result: 7.3 % (04-07-22 @ 09:26)

## 2022-12-02 NOTE — DISCHARGE NOTE NURSING/CASE MANAGEMENT/SOCIAL WORK - PATIENT PORTAL LINK FT
You can access the FollowMyHealth Patient Portal offered by [facility] by registering at the following website: [email or web address]. By joining [employer]’s FollowMyHealth portal, you will also be able to view your health information using other applications (apps) compatible with our system.

## 2022-12-02 NOTE — PROGRESS NOTE ADULT - PROBLEM SELECTOR PLAN 3
monitor BP  cont meds
pt on metformin and alogliptin and stelglatro per surscripts  c/w SSI   FS ACHS.
IVF  GI eval  check occult blood   PPI.
pt on metformin and alogliptin and stelglatro per surscripts  c/w SSI   FS ACHS.
pt on metformin and alogliptin and stelglatro per surscripts  c/w SSI   FS ACHS.
monitor BP  cont meds
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 Alvarado  [facility] consulted
monitor BP  cont meds

## 2022-12-02 NOTE — PROGRESS NOTE ADULT - SUBJECTIVE AND OBJECTIVE BOX
Patient is a 87y old  Female who presents with a chief complaint of Cough (02 Dec 2022 06:19)    Patient is awake, alert, comfortable, laying in bed, not in acute distress, doing well on RA. Complains of diarrhea.     INTERVAL HPI/OVERNIGHT EVENTS:      VITAL SIGNS:  T(F): 99 (12-02-22 @ 05:20)  HR: 101 (12-02-22 @ 05:20)  BP: 156/80 (12-02-22 @ 05:20)  RR: 15 (12-02-22 @ 05:20)  SpO2: 96% (12-02-22 @ 05:20)  Wt(kg): --  I&O's Detail          REVIEW OF SYSTEMS:    CONSTITUTIONAL:  No fevers, chills, sweats    HEENT:  Eyes:  No diplopia or blurred vision. ENT:  No earache, sore throat or runny nose.    CARDIOVASCULAR:  No pressure, squeezing, tightness, or heaviness about the chest; no palpitations.    RESPIRATORY:  Per HPI    GASTROINTESTINAL:  No abdominal pain, nausea, vomiting or diarrhea.    GENITOURINARY:  No dysuria, frequency or urgency.    NEUROLOGIC:  No paresthesias, fasciculations, seizures or weakness.    PSYCHIATRIC:  No disorder of thought or mood.      PHYSICAL EXAM:    Constitutional: Well developed and nourished  Eyes:Perrla  ENMT: normal  Neck:supple  Respiratory: good air entry  Cardiovascular: S1 S2 regular  Gastrointestinal: Soft, Non tender  Extremities: No edema  Vascular:normal  Neurological:Awake, alert,Ox3  Musculoskeletal:Normal      MEDICATIONS  (STANDING):  atorvastatin 10 milliGRAM(s) Oral at bedtime  azithromycin   Tablet 500 milliGRAM(s) Oral daily  enoxaparin Injectable 40 milliGRAM(s) SubCutaneous every 24 hours  influenza  Vaccine (HIGH DOSE) 0.7 milliLiter(s) IntraMuscular once  insulin lispro (ADMELOG) corrective regimen sliding scale   SubCutaneous three times a day before meals  insulin lispro (ADMELOG) corrective regimen sliding scale   SubCutaneous at bedtime  lactobacillus acidophilus 1 Tablet(s) Oral two times a day with meals  loperamide 2 milliGRAM(s) Oral two times a day  losartan 25 milliGRAM(s) Oral daily  oseltamivir 30 milliGRAM(s) Oral every 12 hours    MEDICATIONS  (PRN):  acetaminophen     Tablet .. 650 milliGRAM(s) Oral every 6 hours PRN Temp greater or equal to 38C (100.4F), Mild Pain (1 - 3)  aluminum hydroxide/magnesium hydroxide/simethicone Suspension 30 milliLiter(s) Oral every 4 hours PRN Dyspepsia  guaiFENesin Oral Liquid (Sugar-Free) 100 milliGRAM(s) Oral every 6 hours PRN Cough  meclizine 25 milliGRAM(s) Oral daily PRN Dizziness  melatonin 3 milliGRAM(s) Oral at bedtime PRN Insomnia  ondansetron Injectable 4 milliGRAM(s) IV Push every 8 hours PRN Nausea and/or Vomiting      Allergies    gabapentin (Unknown)    Intolerances        LABS:                        9.8    5.86  )-----------( 193      ( 01 Dec 2022 07:36 )             29.9     12-01    141  |  109<H>  |  7   ----------------------------<  106<H>  3.2<L>   |  20<L>  |  0.45<L>    Ca    8.5      01 Dec 2022 07:36    TPro  5.8<L>  /  Alb  2.2<L>  /  TBili  0.4  /  DBili  x   /  AST  27  /  ALT  22  /  AlkPhos  44  12-01              CAPILLARY BLOOD GLUCOSE      POCT Blood Glucose.: 125 mg/dL (02 Dec 2022 07:46)  POCT Blood Glucose.: 119 mg/dL (01 Dec 2022 21:13)  POCT Blood Glucose.: 97 mg/dL (01 Dec 2022 16:31)  POCT Blood Glucose.: 104 mg/dL (01 Dec 2022 11:40)    pro-bnp 1408 11-27 @ 14:30     d-dimer --  11-27 @ 14:30      RADIOLOGY & ADDITIONAL TESTS:    CXR:    Ct scan chest:    ekg;    echo:

## 2023-01-21 ENCOUNTER — INPATIENT (INPATIENT)
Facility: HOSPITAL | Age: 88
LOS: 4 days | Discharge: HOME | DRG: 177 | End: 2023-01-26
Attending: INTERNAL MEDICINE | Admitting: INTERNAL MEDICINE
Payer: MEDICAID

## 2023-01-21 VITALS
DIASTOLIC BLOOD PRESSURE: 66 MMHG | HEIGHT: 56 IN | RESPIRATION RATE: 30 BRPM | WEIGHT: 111.11 LBS | HEART RATE: 94 BPM | OXYGEN SATURATION: 86 % | SYSTOLIC BLOOD PRESSURE: 156 MMHG

## 2023-01-21 DIAGNOSIS — E78.5 HYPERLIPIDEMIA, UNSPECIFIED: ICD-10-CM

## 2023-01-21 DIAGNOSIS — Z90.710 ACQUIRED ABSENCE OF BOTH CERVIX AND UTERUS: Chronic | ICD-10-CM

## 2023-01-21 DIAGNOSIS — I10 ESSENTIAL (PRIMARY) HYPERTENSION: ICD-10-CM

## 2023-01-21 DIAGNOSIS — C90.00 MULTIPLE MYELOMA NOT HAVING ACHIEVED REMISSION: ICD-10-CM

## 2023-01-21 DIAGNOSIS — I26.99 OTHER PULMONARY EMBOLISM WITHOUT ACUTE COR PULMONALE: ICD-10-CM

## 2023-01-21 DIAGNOSIS — J96.01 ACUTE RESPIRATORY FAILURE WITH HYPOXIA: ICD-10-CM

## 2023-01-21 DIAGNOSIS — Z98.51 TUBAL LIGATION STATUS: Chronic | ICD-10-CM

## 2023-01-21 DIAGNOSIS — R77.8 OTHER SPECIFIED ABNORMALITIES OF PLASMA PROTEINS: ICD-10-CM

## 2023-01-21 DIAGNOSIS — Z29.9 ENCOUNTER FOR PROPHYLACTIC MEASURES, UNSPECIFIED: ICD-10-CM

## 2023-01-21 DIAGNOSIS — E11.9 TYPE 2 DIABETES MELLITUS WITHOUT COMPLICATIONS: ICD-10-CM

## 2023-01-21 DIAGNOSIS — U07.1 COVID-19: ICD-10-CM

## 2023-01-21 LAB
ALBUMIN SERPL ELPH-MCNC: 3.1 G/DL — LOW (ref 3.5–5)
ALP SERPL-CCNC: 49 U/L — SIGNIFICANT CHANGE UP (ref 40–120)
ALT FLD-CCNC: 20 U/L DA — SIGNIFICANT CHANGE UP (ref 10–60)
ANION GAP SERPL CALC-SCNC: 12 MMOL/L — SIGNIFICANT CHANGE UP (ref 5–17)
APTT BLD: 26 SEC — LOW (ref 27.5–35.5)
APTT BLD: 80.4 SEC — HIGH (ref 27.5–35.5)
AST SERPL-CCNC: 11 U/L — SIGNIFICANT CHANGE UP (ref 10–40)
BASE EXCESS BLDA CALC-SCNC: -5.5 MMOL/L — LOW (ref -2–3)
BASE EXCESS BLDV CALC-SCNC: -3.8 MMOL/L — SIGNIFICANT CHANGE UP
BASOPHILS # BLD AUTO: 0.03 K/UL — SIGNIFICANT CHANGE UP (ref 0–0.2)
BASOPHILS NFR BLD AUTO: 0.5 % — SIGNIFICANT CHANGE UP (ref 0–2)
BILIRUB SERPL-MCNC: 0.3 MG/DL — SIGNIFICANT CHANGE UP (ref 0.2–1.2)
BLOOD GAS COMMENTS ARTERIAL: SIGNIFICANT CHANGE UP
BUN SERPL-MCNC: 14 MG/DL — SIGNIFICANT CHANGE UP (ref 7–18)
CALCIUM SERPL-MCNC: 8.9 MG/DL — SIGNIFICANT CHANGE UP (ref 8.4–10.5)
CHLORIDE SERPL-SCNC: 113 MMOL/L — HIGH (ref 96–108)
CO2 SERPL-SCNC: 19 MMOL/L — LOW (ref 22–31)
CREAT SERPL-MCNC: 0.82 MG/DL — SIGNIFICANT CHANGE UP (ref 0.5–1.3)
D DIMER BLD IA.RAPID-MCNC: 6720 NG/ML DDU — HIGH
EGFR: 70 ML/MIN/1.73M2 — SIGNIFICANT CHANGE UP
EOSINOPHIL # BLD AUTO: 0.2 K/UL — SIGNIFICANT CHANGE UP (ref 0–0.5)
EOSINOPHIL NFR BLD AUTO: 3.4 % — SIGNIFICANT CHANGE UP (ref 0–6)
GLUCOSE BLDC GLUCOMTR-MCNC: 160 MG/DL — HIGH (ref 70–99)
GLUCOSE SERPL-MCNC: 97 MG/DL — SIGNIFICANT CHANGE UP (ref 70–99)
HCO3 BLDA-SCNC: 17 MMOL/L — LOW (ref 21–28)
HCO3 BLDV-SCNC: 22 MMOL/L — SIGNIFICANT CHANGE UP (ref 22–29)
HCT VFR BLD CALC: 34.6 % — SIGNIFICANT CHANGE UP (ref 34.5–45)
HCT VFR BLD CALC: 36.4 % — SIGNIFICANT CHANGE UP (ref 34.5–45)
HGB BLD-MCNC: 11.2 G/DL — LOW (ref 11.5–15.5)
HGB BLD-MCNC: 11.5 G/DL — SIGNIFICANT CHANGE UP (ref 11.5–15.5)
HOROWITZ INDEX BLDA+IHG-RTO: 100 — SIGNIFICANT CHANGE UP
IMM GRANULOCYTES NFR BLD AUTO: 0.2 % — SIGNIFICANT CHANGE UP (ref 0–0.9)
INR BLD: 1.1 RATIO — SIGNIFICANT CHANGE UP (ref 0.88–1.16)
LYMPHOCYTES # BLD AUTO: 3.19 K/UL — SIGNIFICANT CHANGE UP (ref 1–3.3)
LYMPHOCYTES # BLD AUTO: 54.7 % — HIGH (ref 13–44)
MAGNESIUM SERPL-MCNC: 1.7 MG/DL — SIGNIFICANT CHANGE UP (ref 1.6–2.6)
MCHC RBC-ENTMCNC: 30.9 PG — SIGNIFICANT CHANGE UP (ref 27–34)
MCHC RBC-ENTMCNC: 31 PG — SIGNIFICANT CHANGE UP (ref 27–34)
MCHC RBC-ENTMCNC: 31.6 GM/DL — LOW (ref 32–36)
MCHC RBC-ENTMCNC: 32.4 GM/DL — SIGNIFICANT CHANGE UP (ref 32–36)
MCV RBC AUTO: 95.6 FL — SIGNIFICANT CHANGE UP (ref 80–100)
MCV RBC AUTO: 98.1 FL — SIGNIFICANT CHANGE UP (ref 80–100)
MONOCYTES # BLD AUTO: 0.46 K/UL — SIGNIFICANT CHANGE UP (ref 0–0.9)
MONOCYTES NFR BLD AUTO: 7.9 % — SIGNIFICANT CHANGE UP (ref 2–14)
NEUTROPHILS # BLD AUTO: 1.94 K/UL — SIGNIFICANT CHANGE UP (ref 1.8–7.4)
NEUTROPHILS NFR BLD AUTO: 33.3 % — LOW (ref 43–77)
NRBC # BLD: 0 /100 WBCS — SIGNIFICANT CHANGE UP (ref 0–0)
NRBC # BLD: 0 /100 WBCS — SIGNIFICANT CHANGE UP (ref 0–0)
NT-PROBNP SERPL-SCNC: 830 PG/ML — HIGH (ref 0–450)
PCO2 BLDA: 24 MMHG — LOW (ref 32–35)
PCO2 BLDV: 39 MMHG — SIGNIFICANT CHANGE UP (ref 39–42)
PH BLDA: 7.45 — SIGNIFICANT CHANGE UP (ref 7.35–7.45)
PH BLDV: 7.35 — SIGNIFICANT CHANGE UP (ref 7.32–7.43)
PLATELET # BLD AUTO: 165 K/UL — SIGNIFICANT CHANGE UP (ref 150–400)
PLATELET # BLD AUTO: 182 K/UL — SIGNIFICANT CHANGE UP (ref 150–400)
PO2 BLDA: 315 MMHG — HIGH (ref 83–108)
PO2 BLDV: 28 MMHG — SIGNIFICANT CHANGE UP
POTASSIUM SERPL-MCNC: 3.9 MMOL/L — SIGNIFICANT CHANGE UP (ref 3.5–5.3)
POTASSIUM SERPL-SCNC: 3.9 MMOL/L — SIGNIFICANT CHANGE UP (ref 3.5–5.3)
PROT SERPL-MCNC: 6.6 G/DL — SIGNIFICANT CHANGE UP (ref 6–8.3)
PROTHROM AB SERPL-ACNC: 13.1 SEC — SIGNIFICANT CHANGE UP (ref 10.5–13.4)
RAPID RVP RESULT: DETECTED
RBC # BLD: 3.62 M/UL — LOW (ref 3.8–5.2)
RBC # BLD: 3.71 M/UL — LOW (ref 3.8–5.2)
RBC # FLD: 16.5 % — HIGH (ref 10.3–14.5)
RBC # FLD: 16.9 % — HIGH (ref 10.3–14.5)
SAO2 % BLDA: 98 % — SIGNIFICANT CHANGE UP
SAO2 % BLDV: 28.9 % — SIGNIFICANT CHANGE UP
SARS-COV-2 RNA SPEC QL NAA+PROBE: DETECTED
SODIUM SERPL-SCNC: 144 MMOL/L — SIGNIFICANT CHANGE UP (ref 135–145)
TROPONIN I, HIGH SENSITIVITY RESULT: 179.4 NG/L — HIGH
TROPONIN I, HIGH SENSITIVITY RESULT: 275.2 NG/L — HIGH
TROPONIN I, HIGH SENSITIVITY RESULT: 307.1 NG/L — HIGH
WBC # BLD: 3.58 K/UL — LOW (ref 3.8–10.5)
WBC # BLD: 5.83 K/UL — SIGNIFICANT CHANGE UP (ref 3.8–10.5)
WBC # FLD AUTO: 3.58 K/UL — LOW (ref 3.8–10.5)
WBC # FLD AUTO: 5.83 K/UL — SIGNIFICANT CHANGE UP (ref 3.8–10.5)

## 2023-01-21 PROCEDURE — 93010 ELECTROCARDIOGRAM REPORT: CPT

## 2023-01-21 PROCEDURE — 71275 CT ANGIOGRAPHY CHEST: CPT | Mod: 26,MA

## 2023-01-21 PROCEDURE — 99223 1ST HOSP IP/OBS HIGH 75: CPT

## 2023-01-21 PROCEDURE — 99291 CRITICAL CARE FIRST HOUR: CPT

## 2023-01-21 PROCEDURE — 71045 X-RAY EXAM CHEST 1 VIEW: CPT | Mod: 26

## 2023-01-21 RX ORDER — REMDESIVIR 5 MG/ML
100 INJECTION INTRAVENOUS EVERY 24 HOURS
Refills: 0 | Status: COMPLETED | OUTPATIENT
Start: 2023-01-22 | End: 2023-01-25

## 2023-01-21 RX ORDER — HEPARIN SODIUM 5000 [USP'U]/ML
4000 INJECTION INTRAVENOUS; SUBCUTANEOUS ONCE
Refills: 0 | Status: COMPLETED | OUTPATIENT
Start: 2023-01-21 | End: 2023-01-21

## 2023-01-21 RX ORDER — REMDESIVIR 5 MG/ML
INJECTION INTRAVENOUS
Refills: 0 | Status: COMPLETED | OUTPATIENT
Start: 2023-01-21 | End: 2023-01-25

## 2023-01-21 RX ORDER — INSULIN LISPRO 100/ML
VIAL (ML) SUBCUTANEOUS AT BEDTIME
Refills: 0 | Status: DISCONTINUED | OUTPATIENT
Start: 2023-01-21 | End: 2023-01-26

## 2023-01-21 RX ORDER — INSULIN LISPRO 100/ML
VIAL (ML) SUBCUTANEOUS
Refills: 0 | Status: DISCONTINUED | OUTPATIENT
Start: 2023-01-21 | End: 2023-01-26

## 2023-01-21 RX ORDER — ACETAMINOPHEN 500 MG
650 TABLET ORAL EVERY 6 HOURS
Refills: 0 | Status: DISCONTINUED | OUTPATIENT
Start: 2023-01-21 | End: 2023-01-26

## 2023-01-21 RX ORDER — HEPARIN SODIUM 5000 [USP'U]/ML
INJECTION INTRAVENOUS; SUBCUTANEOUS
Qty: 25000 | Refills: 0 | Status: DISCONTINUED | OUTPATIENT
Start: 2023-01-21 | End: 2023-01-25

## 2023-01-21 RX ORDER — DEXAMETHASONE 0.5 MG/5ML
6 ELIXIR ORAL ONCE
Refills: 0 | Status: COMPLETED | OUTPATIENT
Start: 2023-01-21 | End: 2023-01-21

## 2023-01-21 RX ORDER — DEXAMETHASONE 0.5 MG/5ML
6 ELIXIR ORAL DAILY
Refills: 0 | Status: DISCONTINUED | OUTPATIENT
Start: 2023-01-21 | End: 2023-01-26

## 2023-01-21 RX ORDER — REMDESIVIR 5 MG/ML
200 INJECTION INTRAVENOUS EVERY 24 HOURS
Refills: 0 | Status: COMPLETED | OUTPATIENT
Start: 2023-01-21 | End: 2023-01-21

## 2023-01-21 RX ORDER — IPRATROPIUM/ALBUTEROL SULFATE 18-103MCG
3 AEROSOL WITH ADAPTER (GRAM) INHALATION ONCE
Refills: 0 | Status: COMPLETED | OUTPATIENT
Start: 2023-01-21 | End: 2023-01-21

## 2023-01-21 RX ADMIN — REMDESIVIR 200 MILLIGRAM(S): 5 INJECTION INTRAVENOUS at 23:23

## 2023-01-21 RX ADMIN — HEPARIN SODIUM 900 UNIT(S)/HR: 5000 INJECTION INTRAVENOUS; SUBCUTANEOUS at 23:51

## 2023-01-21 RX ADMIN — Medication 6 MILLIGRAM(S): at 11:43

## 2023-01-21 RX ADMIN — Medication 3 MILLILITER(S): at 11:42

## 2023-01-21 RX ADMIN — HEPARIN SODIUM 900 UNIT(S)/HR: 5000 INJECTION INTRAVENOUS; SUBCUTANEOUS at 22:25

## 2023-01-21 RX ADMIN — Medication 6 MILLIGRAM(S): at 23:23

## 2023-01-21 RX ADMIN — HEPARIN SODIUM 900 UNIT(S)/HR: 5000 INJECTION INTRAVENOUS; SUBCUTANEOUS at 14:59

## 2023-01-21 RX ADMIN — HEPARIN SODIUM 4000 UNIT(S): 5000 INJECTION INTRAVENOUS; SUBCUTANEOUS at 15:00

## 2023-01-21 RX ADMIN — HEPARIN SODIUM 900 UNIT(S)/HR: 5000 INJECTION INTRAVENOUS; SUBCUTANEOUS at 19:13

## 2023-01-21 NOTE — ED ADULT NURSE NOTE - PAIN: PRESENCE, MLM
I ADVISED PT TO STOP BY HER WORK AND  THIS PAPERWORK AND JUST BRING TO HER APPT ON 12/13.   denies pain/discomfort

## 2023-01-21 NOTE — H&P ADULT - PROBLEM SELECTOR PLAN 1
patient presents with acute shortness of breath and productive cough and hypoxia to 86% on Room air. s/p BiPAP in ED, de-escalated to 4 L NC and saturating to 98% now  CXR negative, RVP + for COVID  also CT angio performed in ED which showed bilateral pulmonary embolism in the distral R main PA, R upper/middle/lower lobes and left upper segmental arteries, left lower lobar artery and left lower segmental arteries with likely underlying heart strain  started on heparin drip in ED  s/p dexamethasone IV 6 mg in ED    - isolation precautions  - c/w IV decadron  - start remdesivir  - c/w heparin drip for AC  - [facility] consult in AM (follows with Dr. Alvarado)  - wean oxygen as tolerated  - obtain TTE to rule out right heart strain patient presents with acute shortness of breath and productive cough and hypoxia to 86% on Room air. s/p BiPAP in ED, de-escalated to 4 L NC and saturating to 98% now  CXR negative, RVP + for COVID  also CT angio performed in ED which showed bilateral pulmonary embolism in the distral R main PA, R upper/middle/lower lobes and left upper segmental arteries, left lower lobar artery and left lower segmental arteries with likely underlying heart strain  started on heparin drip in ED  s/p dexamethasone IV 6 mg in ED  PE likely provoked in the setting of multiple myeloma  pt is COVID vaccinated x 3    - isolation precautions  - c/w IV decadron  - start remdesivir  - c/w heparin drip for AC  - [facility] consult in AM (follows with Dr. Alvarado)  - wean oxygen as tolerated  - obtain TTE to rule out right heart strain

## 2023-01-21 NOTE — ED PROVIDER NOTE - CLINICAL SUMMARY MEDICAL DECISION MAKING FREE TEXT BOX
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 + productive phlegm/cough, sob x 2 days. was told she has asthma and given montelukast and albuterol pump. no sick contact, no fever, never been intubated.    resp distress concerning for PE vs pna vs viral uri vs chf? vs anemia vs copd exacerbation- labs, cta, bipap, meds, admit

## 2023-01-21 NOTE — ED ADULT NURSE NOTE - OBJECTIVE STATEMENT
brought in by eliane c/o cough and SOB x 2 days. pt has hx of dementia. is ANO x2 but poor historian. hx obtained mainly from daughter. hx of gastric CA mets to the bone recently chemo monthly. last chemo 1/18/23

## 2023-01-21 NOTE — H&P ADULT - NSHPPHYSICALEXAM_GEN_ALL_CORE
GENERAL: patient appears well, NAD, pleasant  HEAD: normocephalic, atraumatic  EYES: sclera clear, no exudates  ENMT: oropharynx clear without erythema, no exudates, moist mucous membranes  NECK: supple, soft, no thyromegaly noted  LUNGS: clear to auscultation bilaterally, no wheezing, rhonchi or rales appreciated  HEART: S1/S2, regular rate and rhythm, no murmurs noted, no lower extremity edema  GASTROINTESTINAL: abdomen is soft, nondistended, nontender, normoactive bowel sounds, no palpable masses  INTEGUMENT: good skin turgor, no lesions noted  MUSCULOSKELETAL: no clubbing or cyanosis, no obvious deformity  NEUROLOGIC: AAO x3, CNII-XII intact, no obvious sensorimotor deficits noted

## 2023-01-21 NOTE — CONSULT NOTE ADULT - SUBJECTIVE AND OBJECTIVE BOX
Patient is a 87y old  Female who presents with a chief complaint of     Initial HPI on admission:  HPI:    84 y.o F with PMH of Stage II B gastric cancer (followed by [facility] on chemo), Multiple myeloma, HTN, HLD, DM, osteoporosis. who presented to the Ed with complains of SOB. Pt states that she had a endoscopy today with Dr. Ziegler, and on her way home got SOB and "tight" and was brought to ED by daughter. She endorses having cough with black sputum for 2 days, and chest tightness, was being treated for asthma sxs.     In ED patient found to be COVID +, and had CT angio showing Pulmonary embolisim. She was hypoxic to 80% on RA and was placed on BIPAP. ICU consulted for evaluation.    On exam patient is looking comfortable, BIPAP was turned off and patient was started on 4L NC saturaitng 96%, and comfortable.       PAST MEDICAL & SURGICAL HISTORY:  HTN (hypertension)      DM (diabetes mellitus)      Hypercholesteremia      Gastric adenocarcinoma  s/p resection      Vertigo      Dementia      S/P hysterectomy      S/P tubal ligation            FAMILY HISTORY:  Family history of diabetes mellitus (Sibling)    Family history of early CAD (Grandparent)    :       ROS:  See HPI     Allergies    gabapentin (Unknown)    Intolerances          PHYSICAL EXAM    ICU Vital Signs Last 24 Hrs  T(C): 36.6 (21 Jan 2023 15:23), Max: 37.1 (21 Jan 2023 11:31)  T(F): 97.8 (21 Jan 2023 15:23), Max: 98.8 (21 Jan 2023 11:31)  HR: 80 (21 Jan 2023 15:23) (80 - 94)  BP: 156/66 (21 Jan 2023 11:00) (156/66 - 156/66)  BP(mean): --  ABP: --  ABP(mean): --  RR: 18 (21 Jan 2023 15:23) (18 - 30)  SpO2: 96% (21 Jan 2023 15:23) (86% - 100%)    O2 Parameters below as of 21 Jan 2023 15:23  Patient On (Oxygen Delivery Method): room air        GENERAL: NAD, well-groomed, well-developed  HEAD:  Atraumatic, Normocephalic  EYES: EOMI, PERRLA, conjunctiva and sclera clear  ENMT: No tonsillar erythema, exudates, or enlargement; Moist mucous membranes, Good dentition, No lesions, NC 4L in place  NECK: Supple, normal appearance, No JVD; Normal thyroid; Trachea midline  NERVOUS SYSTEM:  Alert & Oriented X3,  Motor Strength 5/5 B/L upper and lower extremities, sensation intact  CHEST/LUNG: Lungs clear to auscultation bilaterally, No rales, rhonchi, wheezing   HEART: Regular rate and rhythm; No murmurs, rubs, or gallops  ABDOMEN: Soft, Nontender, Nondistended; Bowel sounds present  EXTREMITIES:  2+ Peripheral Pulses, No clubbing, cyanosis, or edema  LYMPH: No lymphadenopathy noted  SKIN: No rashes or lesions;  Good capillary refill       LABS:                          11.5   5.83  )-----------( 165      ( 21 Jan 2023 11:30 )             36.4                                               01-21    144  |  113<H>  |  14  ----------------------------<  97  3.9   |  19<L>  |  0.82    Ca    8.9      21 Jan 2023 11:30  Mg     1.7     01-21    TPro  6.6  /  Alb  3.1<L>  /  TBili  0.3  /  DBili  x   /  AST  11  /  ALT  20  /  AlkPhos  49  01-21      PT/INR - ( 21 Jan 2023 11:30 )   PT: 13.1 sec;   INR: 1.10 ratio         PTT - ( 21 Jan 2023 11:30 )  PTT:26.0 sec                                                                                     LIVER FUNCTIONS - ( 21 Jan 2023 11:30 )  Alb: 3.1 g/dL / Pro: 6.6 g/dL / ALK PHOS: 49 U/L / ALT: 20 U/L DA / AST: 11 U/L / GGT: x                                                                                    ABG - ( 21 Jan 2023 11:53 )  pH, Arterial: 7.45  pH, Blood: x     /  pCO2: 24    /  pO2: 315   / HCO3: 17    / Base Excess: -5.5  /  SaO2: 98          CXR:    ECHO:    MEDICATIONS  (STANDING):  heparin  Infusion.  Unit(s)/Hr (9 mL/Hr) IV Continuous <Continuous>    MEDICATIONS  (PRN):         Patient is a 87y old  Female who presents with a chief complaint of     Initial HPI on admission:  HPI:    84 y.o F with PMH of Stage II B gastric cancer (followed by [facility] on chemo), Multiple myeloma, HTN, HLD, DM, osteoporosis. who presented to the Ed with complains of SOB. Pt states that she had a endoscopy today with Dr. Ziegler, and on her way home got SOB and "tight" and was brought to ED by daughter. She endorses having cough with black sputum for 2 days, and chest tightness, was being treated for asthma sxs.     In ED patient found to be COVID +, and had CT angio showing Pulmonary embolisim. She was hypoxic to 80% on RA and was placed on BIPAP. ICU consulted for evaluation.    On exam patient is looking comfortable, BIPAP was turned off and patient was started on 4L NC saturaitng 96%, and comfortable.       PAST MEDICAL & SURGICAL HISTORY:  HTN (hypertension)      DM (diabetes mellitus)      Hypercholesteremia      Gastric adenocarcinoma  s/p resection      Vertigo      Dementia      S/P hysterectomy      S/P tubal ligation            FAMILY HISTORY:  Family history of diabetes mellitus (Sibling)    Family history of early CAD (Grandparent)    :       ROS:  See HPI     Allergies    gabapentin (Unknown)    Intolerances          PHYSICAL EXAM    ICU Vital Signs Last 24 Hrs  T(C): 36.6 (21 Jan 2023 15:23), Max: 37.1 (21 Jan 2023 11:31)  T(F): 97.8 (21 Jan 2023 15:23), Max: 98.8 (21 Jan 2023 11:31)  HR: 80 (21 Jan 2023 15:23) (80 - 94)  BP: 156/66 (21 Jan 2023 11:00) (156/66 - 156/66)  BP(mean): --  ABP: --  ABP(mean): --  RR: 18 (21 Jan 2023 15:23) (18 - 30)  SpO2: 96% (21 Jan 2023 15:23) (86% - 100%)    O2 Parameters below as of 21 Jan 2023 15:23  Patient On (Oxygen Delivery Method): room air        GENERAL: NAD, well-groomed, well-developed  HEAD:  Atraumatic, Normocephalic  EYES: EOMI, PERRLA, conjunctiva and sclera clear  ENMT: No tonsillar erythema, exudates, or enlargement; Moist mucous membranes, Good dentition, No lesions, NC 4L in place  NECK: Supple, normal appearance, No JVD; Normal thyroid; Trachea midline  NERVOUS SYSTEM:  Alert & Oriented X3,  Motor Strength 5/5 B/L upper and lower extremities, sensation intact  CHEST/LUNG: Lungs clear to auscultation bilaterally, No rales, rhonchi, wheezing   HEART: Regular rate and rhythm; No murmurs, rubs, or gallops  ABDOMEN: Soft, Nontender, Nondistended; Bowel sounds present  EXTREMITIES:  2+ Peripheral Pulses, No clubbing, cyanosis, or edema  LYMPH: No lymphadenopathy noted  SKIN: No rashes or lesions;  Good capillary refill       LABS:                          11.5   5.83  )-----------( 165      ( 21 Jan 2023 11:30 )             36.4                                               01-21    144  |  113<H>  |  14  ----------------------------<  97  3.9   |  19<L>  |  0.82    Ca    8.9      21 Jan 2023 11:30  Mg     1.7     01-21    TPro  6.6  /  Alb  3.1<L>  /  TBili  0.3  /  DBili  x   /  AST  11  /  ALT  20  /  AlkPhos  49  01-21      PT/INR - ( 21 Jan 2023 11:30 )   PT: 13.1 sec;   INR: 1.10 ratio         PTT - ( 21 Jan 2023 11:30 )  PTT:26.0 sec                                                                                     LIVER FUNCTIONS - ( 21 Jan 2023 11:30 )  Alb: 3.1 g/dL / Pro: 6.6 g/dL / ALK PHOS: 49 U/L / ALT: 20 U/L DA / AST: 11 U/L / GGT: x                                                                                    ABG - ( 21 Jan 2023 11:53 )  pH, Arterial: 7.45  pH, Blood: x     /  pCO2: 24    /  pO2: 315   / HCO3: 17    / Base Excess: -5.5  /  SaO2: 98          CXR:      ECHO:    MEDICATIONS  (STANDING):  heparin  Infusion.  Unit(s)/Hr (9 mL/Hr) IV Continuous <Continuous>    MEDICATIONS  (PRN):    RADIOLOGY:  C< from: CT Angio Chest PE Protocol w/ IV Cont (01.21.23 @ 13:02) >    ACC: [id number] EXAM:  CT ANGIO CHEST PULM ART [facility]   ORDERED BY: YADIRA CALVILLO     PROCEDURE DATE:  01/21/2023          INTERPRETATION:  CTA CHEST    INDICATION: History of hypertension, hyperlipidemia, diabetes. Status   post resection of adenocarcinomaof the stomach, currently on   chemotherapy. Cough and shortness of breath. Evaluate for pulmonary   embolus.    TECHNIQUE: Enhanced helical images were obtained of the chest. Coronal   and sagittal images were reconstructed.  Images were obtained after the   uneventful administration of 70 cc of nonionic intravenous contrast   (Omnipaque 350). 30 cc of nonionic intravenous contrast (Omnipaque 350)   was discarded. Maximum intensity projection images were generated.    COMPARISON: Radiograph 1/21/2023. CT chest 6/22/2022.    FINDINGS:    Pulmonary Artery:  Within the distal right main pulmonary artery, right   upper lobe are, middle lobar, lower lobar and the segmental arteries the   right upper, middle, and lower lobe are pulmonary emboli.    In addition, there are pulmonary emboli within the left upper lobe   segmental arteries, the left lower lobar artery and the segmental   arteries of the left lower lobe.    Tubes/Lines: None.    Lungs, airways and pleura: The lungs are clear. The airways and pleura   are normal.    Mediastinum: The interventricular septum is slightly straightened and the   right ventricle is noted in size than the left ventricle in the axial   plane. No pericardial effusion. Left-sided aortic arch and left-sided  descending thoracic aorta. Aorta is normal in caliber. Aortic   calcifications.    The visualized thyroid gland is normal. The chest lymph nodes measure   less than 10 mm the short axis. Hiatal hernia.    Upper Abdomen: Cholecystectomy.    Bones And Soft Tissues: The bones are unremarkable.  The soft tissues are   unremarkable.      IMPRESSION:    1.  Bilateral pulmonary emboli.  2.  Likely underlying right heart strain.  3.  The findings were discussed with and read back verification obtained   from Dr. Calvillo on 1/21/2023 at 1315 hours.    --- End of Report ---            SRIDHAR NELSON MD; Attending Radiologist  This document has been electronically signed. Jan 21 2023  4:04PM    < end of copied text >

## 2023-01-21 NOTE — ED PROVIDER NOTE - PROGRESS NOTE DETAILS
Calvillo: on BIPAP. cta shows b/l PE without saddle some findings suggestive of right heart strain. pending icu eval. heparin started. chance: improve. icu saw and now off bipap. admit to floors.

## 2023-01-21 NOTE — ED ADULT NURSE REASSESSMENT NOTE - NS ED NURSE REASSESS COMMENT FT1
aPTT result was 80.4. MERY Paredes NP made aware of new result. NP states to f/u the nomogram.  As per the nomogram the aPTT is in therapeutic range between 58-99 sec and the pt's weight of 50.4 kg. Heparin continues to infuse at 900unit/hr. No signs of bleeding and SOB noted.

## 2023-01-21 NOTE — H&P ADULT - PROBLEM SELECTOR PLAN 4
pt denies chest pain, EKG without ischemic changes  Trop 179 > 307    - serial EKG, troponin  - telemetry monitoring pt denies chest pain, EKG without ischemic changes  Trop 179 > 307    - serial EKG, troponin  - telemetry monitoring  - Cardio consulted, Dr. Jennings

## 2023-01-21 NOTE — H&P ADULT - ASSESSMENT
88 yo Female, from home, lives with daughter, uses a walker, with medical history significant for HTN, HLD, DM, Osteoporosis, Stage II B Gastric Adenocarcinoma s/p distal gastrectomy in 2015 on active chemo (follows [facility] Dr Adams), Early Multiple myeloma, surgical history of [facility] w/BSO, Cholecystectomy, presenting to the ED with shortness of breath with cough and productive sputum for 2 days as well as chest tightness admitted for acute hypoxic respiratory failure 2/2 new submassive PE and COVID

## 2023-01-21 NOTE — ED PROVIDER NOTE - OBJECTIVE STATEMENT
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 + productive phlegm/cough, sob x 2 days. was told she has asthma and given montelukast and albuterol pump. no sick contact, no fever, never been intubated.

## 2023-01-21 NOTE — CONSULT NOTE ADULT - ATTENDING COMMENTS
87F former smoker (20pkyrs, quit >30y ago) with PMH HTN, HLD, DM, Osteoporosis, Stage II B Gastric Adenocarcinoma s/p distal gastrectomy in 2015 on active chemo, early MM, p/w 2d dyspnea, productive cough (yellow/white). Found to be hypoxemic to 80s on RA, +COVID-19, elevated ddimer. On CTA f/w b/l PE, questionable RH strain, also of unclear chronicity. Slight elevation of trop and proBNP. Initially on bilevel NIPPV, now deescalated to NC, improved. ICU consulted for submassive PE. Pt remains HDS, never hypotensive, on exam without acute complaints, only occasional cough. No acute EKG changes. Suspect subacute submassive PE provoked in h/o active malignancy, with respiratory sx compounded by +COVID-19 infx.    # Acute hypoxemic respiratory failure  # b/l pulmonary embolus  # COVID-19 infx  # gastric adenoca  # MM      Plan:  - On heparin gtt; c/w full dose anticoagulation  - Follow serial troponin and EKGs  - Obtain TTE  - Would initiate COVID-19 tx with remdesivir/decadron  - Titrate supplemental O2 with goal SpO2 92-95%; monitor ambulatory/exertional SpO2 and document; now breathing comfortably RR low 20s on 4LNC, not requiring NIPPV  - Would admit to tele, does not require ICU level of care at this time, continue close hemodynamic and respiratory monitoring

## 2023-01-21 NOTE — ED PROVIDER NOTE - CONSTITUTIONAL, MLM
awake, alert, oriented to person, place, time/situation and in moderate apparent distress. normal...

## 2023-01-21 NOTE — H&P ADULT - PROBLEM SELECTOR PLAN 8
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 Alvarado as outpatient    - [facility] consult in AM

## 2023-01-21 NOTE — H&P ADULT - HISTORY OF PRESENT ILLNESS
88 yo Female, from home, lives with daughter, uses a walker, with medical history significant for HTN, HLD, DM, Osteoporosis, 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 Patient states she feels fine now but this morning when she was having an endoscopy she was increasingly short of breath and was sent to the ED.     ED Course  Vitals: /66 P 94 R 30 T 97.8 F SPo2 86% RA  Meds: heparin, duonebs, dexamethasone IV 6 mg  s/p BiPAP for hypoxia  critical care consult: recommend telemetry admission, heparin drip, TTE, serial EKG/trop, remdesivir, decadron

## 2023-01-21 NOTE — ED PROVIDER NOTE - CRITICAL CARE ATTENDING CONTRIBUTION TO CARE
Calvillo: Due to the presence of rep distress and the risk of sudden rapid deterioration due to my attendance to this patient required critical care time of approx 35 minutes including assessment/reassessment, documentation, ordering and interpreting ancillary studies, discussion with ED staff and consultants, patient and their family, and excludes time spent in teaching of Residents and time spent on separately billable procedures.

## 2023-01-21 NOTE — PATIENT PROFILE ADULT - FALL HARM RISK - HARM RISK INTERVENTIONS

## 2023-01-21 NOTE — CONSULT NOTE ADULT - ASSESSMENT
84 y.o F with PMH of Stage II B gastric cancer (followed by [facility] on chemo), Multiple myeloma, HTN, HLD, DM, osteoporosis. who presented to the Ed with complains of SOB. Pt states that she had a endoscopy today with Dr. Ziegler, and on her way home got SOB and "tight" and was brought to ED by daughter. She endorses having cough with black sputum for 2 days, and chest tightness, was being treated for asthma sxs.     In ED patient found to be COVID +, and had CT angio showing Pulmonary embolisim. She was hypoxic to 80% on RA and was placed on BIPAP. ICU consulted for evaluation.    On exam patient is looking comfortable, BIPAP was turned off and patient was started on 4L NC saturaitng 96%, and comfortable.       #PE  #COVID  #Gastric Cancer  #Multiple Myeloma  # HTN  # HLD  # DM  # Osteoporosis    Recommendations:  - Trend troponin to peak with serial EKGs  - EKG wnl, Sinus Rhythm,   - ECHO  - Heparin drip for PE  - Supplemental O2 PRN  - Hematology consult -  [facility]  - Remdesivir, Decadron, isolation precautions, covid labs  - albuterol, robitusin prn  - HTN meds  - Insulin ss  - f/u results of outpatient endoscopy study    DISPO:   - Pt currently hemodynamically stable, needing minimal O2 supplementation. very comfortable. Can admit to telemetry. Reconsult PRN.   84 y.o F with PMH of Stage II B gastric cancer (followed by [facility] on chemo), Multiple myeloma, HTN, HLD, DM, osteoporosis. who presented to the Ed with complains of SOB. Pt states that she had a endoscopy today with Dr. Ziegler, and on her way home got SOB and "tight" and was brought to ED by daughter. She endorses having cough with black sputum for 2 days, and chest tightness, was being treated for asthma sxs.     In ED patient found to be COVID +, and had CT angio showing Pulmonary embolisim. She was hypoxic to 80% on RA and was placed on BIPAP. ICU consulted for evaluation.    On exam patient is looking comfortable, BIPAP was turned off and patient was started on 4L NC saturaitng 96%, and comfortable.       # Submassive PE  #COVID  #Gastric Cancer  #Multiple Myeloma  # HTN  # HLD  # DM  # Osteoporosis    Recommendations:  - CT angio reviewed, labs reviwed  - Trend troponin to peak with serial EKGs  - EKG wnl, Sinus Rhythm,   - ECHO  - Heparin drip for PE  - Supplemental O2 PRN  - Hematology consult -  [facility]  - Remdesivir, Decadron, isolation precautions, covid labs  - albuterol, robitusin prn  - HTN meds  - Insulin ss  - f/u results of outpatient endoscopy study    DISPO:   - Pt currently hemodynamically stable, needing minimal O2 supplementation. very comfortable. Can admit to telemetry. Reconsult PRN.

## 2023-01-22 LAB
ANION GAP SERPL CALC-SCNC: 14 MMOL/L — SIGNIFICANT CHANGE UP (ref 5–17)
APTT BLD: 50.6 SEC — HIGH (ref 27.5–35.5)
APTT BLD: 53.7 SEC — HIGH (ref 27.5–35.5)
APTT BLD: 61.8 SEC — HIGH (ref 27.5–35.5)
APTT BLD: 62.5 SEC — HIGH (ref 27.5–35.5)
BUN SERPL-MCNC: 23 MG/DL — HIGH (ref 7–18)
CALCIUM SERPL-MCNC: 9.3 MG/DL — SIGNIFICANT CHANGE UP (ref 8.4–10.5)
CHLORIDE SERPL-SCNC: 110 MMOL/L — HIGH (ref 96–108)
CO2 SERPL-SCNC: 18 MMOL/L — LOW (ref 22–31)
CREAT SERPL-MCNC: 0.8 MG/DL — SIGNIFICANT CHANGE UP (ref 0.5–1.3)
EGFR: 72 ML/MIN/1.73M2 — SIGNIFICANT CHANGE UP
GLUCOSE BLDC GLUCOMTR-MCNC: 164 MG/DL — HIGH (ref 70–99)
GLUCOSE BLDC GLUCOMTR-MCNC: 170 MG/DL — HIGH (ref 70–99)
GLUCOSE BLDC GLUCOMTR-MCNC: 182 MG/DL — HIGH (ref 70–99)
GLUCOSE BLDC GLUCOMTR-MCNC: 191 MG/DL — HIGH (ref 70–99)
GLUCOSE SERPL-MCNC: 152 MG/DL — HIGH (ref 70–99)
HCT VFR BLD CALC: 32 % — LOW (ref 34.5–45)
HGB BLD-MCNC: 10.8 G/DL — LOW (ref 11.5–15.5)
MCHC RBC-ENTMCNC: 31.6 PG — SIGNIFICANT CHANGE UP (ref 27–34)
MCHC RBC-ENTMCNC: 33.8 GM/DL — SIGNIFICANT CHANGE UP (ref 32–36)
MCV RBC AUTO: 93.6 FL — SIGNIFICANT CHANGE UP (ref 80–100)
NRBC # BLD: 0 /100 WBCS — SIGNIFICANT CHANGE UP (ref 0–0)
PLATELET # BLD AUTO: 182 K/UL — SIGNIFICANT CHANGE UP (ref 150–400)
POTASSIUM SERPL-MCNC: 4.2 MMOL/L — SIGNIFICANT CHANGE UP (ref 3.5–5.3)
POTASSIUM SERPL-SCNC: 4.2 MMOL/L — SIGNIFICANT CHANGE UP (ref 3.5–5.3)
RBC # BLD: 3.42 M/UL — LOW (ref 3.8–5.2)
RBC # FLD: 16.8 % — HIGH (ref 10.3–14.5)
SODIUM SERPL-SCNC: 142 MMOL/L — SIGNIFICANT CHANGE UP (ref 135–145)
WBC # BLD: 3.66 K/UL — LOW (ref 3.8–10.5)
WBC # FLD AUTO: 3.66 K/UL — LOW (ref 3.8–10.5)

## 2023-01-22 PROCEDURE — 93306 TTE W/DOPPLER COMPLETE: CPT | Mod: 26

## 2023-01-22 RX ORDER — INFLUENZA VIRUS VACCINE 15; 15; 15; 15 UG/.5ML; UG/.5ML; UG/.5ML; UG/.5ML
0.7 SUSPENSION INTRAMUSCULAR ONCE
Refills: 0 | Status: DISCONTINUED | OUTPATIENT
Start: 2023-01-22 | End: 2023-01-26

## 2023-01-22 RX ADMIN — Medication 6 MILLIGRAM(S): at 05:29

## 2023-01-22 RX ADMIN — HEPARIN SODIUM 1000 UNIT(S)/HR: 5000 INJECTION INTRAVENOUS; SUBCUTANEOUS at 11:00

## 2023-01-22 RX ADMIN — HEPARIN SODIUM 1000 UNIT(S)/HR: 5000 INJECTION INTRAVENOUS; SUBCUTANEOUS at 04:50

## 2023-01-22 RX ADMIN — HEPARIN SODIUM 1000 UNIT(S)/HR: 5000 INJECTION INTRAVENOUS; SUBCUTANEOUS at 16:59

## 2023-01-22 RX ADMIN — HEPARIN SODIUM 1100 UNIT(S)/HR: 5000 INJECTION INTRAVENOUS; SUBCUTANEOUS at 19:23

## 2023-01-22 RX ADMIN — HEPARIN SODIUM 1100 UNIT(S)/HR: 5000 INJECTION INTRAVENOUS; SUBCUTANEOUS at 23:58

## 2023-01-22 RX ADMIN — REMDESIVIR 200 MILLIGRAM(S): 5 INJECTION INTRAVENOUS at 23:42

## 2023-01-22 RX ADMIN — Medication 1: at 17:00

## 2023-01-22 RX ADMIN — HEPARIN SODIUM 1100 UNIT(S)/HR: 5000 INJECTION INTRAVENOUS; SUBCUTANEOUS at 17:24

## 2023-01-22 RX ADMIN — Medication 1: at 12:07

## 2023-01-22 RX ADMIN — HEPARIN SODIUM 1000 UNIT(S)/HR: 5000 INJECTION INTRAVENOUS; SUBCUTANEOUS at 07:01

## 2023-01-22 RX ADMIN — Medication 1: at 07:59

## 2023-01-22 NOTE — CONSULT NOTE ADULT - ASSESSMENT
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  1.Tele  monitoring.  2.Echocardiogram-eval RV fx.  3.COVID+-ID eval,remdesmivir,dexamethasone.  4.Gastric ca-Heme/Onc.  5.DM-Insulin.  6.HTN-hold bp medication.  7.B/L PE-heparin drip for now.  8.PPI.

## 2023-01-22 NOTE — PROGRESS NOTE ADULT - PROBLEM SELECTOR PLAN 1
patient presents with acute shortness of breath and productive cough and hypoxia to 86% on Room air. s/p BiPAP in ED, de-escalated to 4 L NC and saturating to 98% now  CXR negative, RVP + for COVID  also CT angio performed in ED which showed bilateral pulmonary embolism in the distral R main PA, R upper/middle/lower lobes and left upper segmental arteries, left lower lobar artery and left lower segmental arteries with likely underlying heart strain  started on heparin drip in ED  s/p dexamethasone IV 6 mg in ED  PE likely provoked in the setting of multiple myeloma  pt is COVID vaccinated x 3    - isolation precautions  - c/w IV decadron  - start remdesivir  - c/w heparin drip for AC  - [facility] consult in AM (follows with Dr. Alvarado)  - wean oxygen as tolerated  - obtain TTE to rule out right heart strain

## 2023-01-22 NOTE — CONSULT NOTE ADULT - SUBJECTIVE AND OBJECTIVE BOX
CHIEF COMPLAINT:Patient is a 88y old  Female who presents with a chief complaint of shortness of breath.      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 Patient states she feels fine now but this morning when she was having an endoscopy she was increasingly short of breath and was sent to the ED.     ED Course  Vitals: /66 P 94 R 30 T 97.8 F SPo2 86% RA  Meds: heparin, duonebs, dexamethasone IV 6 mg  s/p BiPAP for hypoxia  critical care consult: recommend telemetry admission, heparin drip, TTE, serial EKG/trop, remdesivir, decadron (21 Jan 2023 19:03)      PAST MEDICAL & SURGICAL HISTORY:  HTN (hypertension)      DM (diabetes mellitus)      Hypercholesteremia      Gastric adenocarcinoma  s/p resection      Vertigo      Dementia      S/P hysterectomy      S/P tubal ligation          MEDICATIONS  (STANDING):  dexAMETHasone  Injectable 6 milliGRAM(s) IV Push daily  heparin  Infusion.  Unit(s)/Hr (9 mL/Hr) IV Continuous <Continuous>  influenza  Vaccine (HIGH DOSE) 0.7 milliLiter(s) IntraMuscular once  insulin lispro (ADMELOG) corrective regimen sliding scale   SubCutaneous three times a day before meals  insulin lispro (ADMELOG) corrective regimen sliding scale   SubCutaneous at bedtime  remdesivir  IVPB 100 milliGRAM(s) IV Intermittent every 24 hours  remdesivir  IVPB   IV Intermittent     MEDICATIONS  (PRN):  acetaminophen     Tablet .. 650 milliGRAM(s) Oral every 6 hours PRN Temp greater or equal to 38C (100.4F), Mild Pain (1 - 3)      FAMILY HISTORY:  Family history of diabetes mellitus (Sibling)    Family history of early CAD (Grandparent)        SOCIAL HISTORY:    [x ] Non-smoker    [x ] Alcohol-denies    Allergies    gabapentin (Unknown)    Intolerances    	    REVIEW OF SYSTEMS:  CONSTITUTIONAL: No fever, weight loss, or fatigue  EYES: No eye pain, visual disturbances, or discharge  ENT:  No difficulty hearing, tinnitus, vertigo; No sinus or throat pain  NECK: No pain or stiffness  RESPIRATORY: No cough, wheezing, chills or hemoptysis; + Shortness of Breath  CARDIOVASCULAR: No chest pain, palpitations, passing out, dizziness, or leg swelling  GASTROINTESTINAL: No abdominal or epigastric pain. No nausea, vomiting, or hematemesis; No diarrhea or constipation. No melena or hematochezia.  GENITOURINARY: No dysuria, frequency, hematuria, or incontinence  NEUROLOGICAL: No headaches, memory loss, loss of strength, numbness, or tremors  SKIN: No itching, burning, rashes, or lesions   LYMPH Nodes: No enlarged glands  ENDOCRINE: No heat or cold intolerance; No hair loss  MUSCULOSKELETAL: No joint pain or swelling; No muscle, back, or extremity pain  PSYCHIATRIC: No depression, anxiety, mood swings, or difficulty sleeping  HEME/LYMPH: No easy bruising, or bleeding gums  ALLERGY AND IMMUNOLOGIC: No hives or eczema	        PHYSICAL EXAM:  T(C): 36.7 (01-22-23 @ 10:40), Max: 37.1 (01-21-23 @ 11:31)  HR: 82 (01-22-23 @ 10:40) (72 - 90)  BP: 120/65 (01-22-23 @ 10:40) (120/65 - 148/55)  RR: 18 (01-22-23 @ 10:40) (18 - 23)  SpO2: 94% (01-22-23 @ 10:40) (94% - 100%)  Wt(kg): --  I&O's Summary      Appearance: Normal	  HEENT:   Normal oral mucosa, PERRL, EOMI	  Lymphatic: No lymphadenopathy  Cardiovascular: Normal S1 S2, No JVD, No murmurs, No edema  Respiratory: Lungs clear to auscultation	  Psychiatry: A & O x 3, Mood & affect appropriate  Gastrointestinal:  Soft, Non-tender, + BS	  Skin: No rashes, No ecchymoses, No cyanosis	  Neurologic: Non-focal  Extremities: Normal range of motion, No clubbing, cyanosis or edema  Vascular: Peripheral pulses palpable 2+ bilaterally    	    ECG:  	nsr  	  	  LABS:	 	        Troponin I, High Sensitivity Result: 275.2 ng/L (01-21-23 @ 21:02)  Troponin I, High Sensitivity Result: 307.1 ng/L (01-21-23 @ 16:40)  Troponin I, High Sensitivity Result: 179.4 ng/L (01-21-23 @ 11:30)                          10.8   3.66  )-----------( 182      ( 22 Jan 2023 06:15 )             32.0     01-22    142  |  110<H>  |  23<H>  ----------------------------<  152<H>  4.2   |  18<L>  |  0.80    Ca    9.3      22 Jan 2023 06:15  Mg     1.7     01-21    TPro  6.6  /  Alb  3.1<L>  /  TBili  0.3  /  DBili  x   /  AST  11  /  ALT  20  /  AlkPhos  49  01-21    proBNP: Serum Pro-Brain Natriuretic Peptide: 830 pg/mL (01-21 @ 11:30)      < from: CT Angio Chest PE Protocol w/ IV Cont (01.21.23 @ 13:02) >  ACC: [id number] EXAM:  CT ANGIO CHEST PULM ART [facility]   ORDERED BY: YADIRA GAMEZ     PROCEDURE DATE:  01/21/2023          INTERPRETATION:  CTA CHEST    INDICATION: History of hypertension, hyperlipidemia, diabetes. Status   post resection of adenocarcinomaof the stomach, currently on   chemotherapy. Cough and shortness of breath. Evaluate for pulmonary   embolus.    TECHNIQUE: Enhanced helical images were obtained of the chest. Coronal   and sagittal images were reconstructed.  Images were obtained after the   uneventful administration of 70 cc of nonionic intravenous contrast   (Omnipaque 350). 30 cc of nonionic intravenous contrast (Omnipaque 350)   was discarded. Maximum intensity projection images were generated.    COMPARISON: Radiograph 1/21/2023. CT chest 6/22/2022.    FINDINGS:    Pulmonary Artery:  Within the distal right main pulmonary artery, right   upper lobe are, middle lobar, lower lobar and the segmental arteries the   right upper, middle, and lower lobe are pulmonary emboli.    In addition, there are pulmonary emboli within the left upper lobe   segmental arteries, the left lower lobar artery and the segmental   arteries of the left lower lobe.    Tubes/Lines: None.    Lungs, airways and pleura: The lungs are clear. The airways and pleura   are normal.    Mediastinum: The interventricular septum is slightly straightened and the   right ventricle is noted in size than the left ventricle in the axial   plane. No pericardial effusion. Left-sided aortic arch and left-sided  descending thoracic aorta. Aorta is normal in caliber. Aortic   calcifications.    The visualized thyroid gland is normal. The chest lymph nodes measure   less than 10 mm the short axis. Hiatal hernia.    Upper Abdomen: Cholecystectomy.    Bones And Soft Tissues: The bones are unremarkable.  The soft tissues are   unremarkable.      IMPRESSION:    1.  Bilateral pulmonary emboli.  2.  Likely underlying right heart strain.  3.  The findings were discussed with and read back verification obtained   from Dr. Gamez on 1/21/2023 at 1315 hours.    --- End of Report ---    < end of copied text >

## 2023-01-22 NOTE — PROGRESS NOTE ADULT - SUBJECTIVE AND OBJECTIVE BOX
PGY-1 Progress Note discussed with attending    PAGER #: [phone]] TILL 5:00 PM  PLEASE CONTACT ON CALL TEAM:  - On Call Team (Please refer to Brynn) FROM 5:00 PM - 8:30PM  - Nightfloat Team FROM 8:30 -7:30 AM    CHIEF COMPLAINT & BRIEF HOSPITAL COURSE:    INTERVAL HPI/OVERNIGHT EVENTS:   MEDICATIONS  (STANDING):  dexAMETHasone  Injectable 6 milliGRAM(s) IV Push daily  heparin  Infusion.  Unit(s)/Hr (9 mL/Hr) IV Continuous <Continuous>  influenza  Vaccine (HIGH DOSE) 0.7 milliLiter(s) IntraMuscular once  insulin lispro (ADMELOG) corrective regimen sliding scale   SubCutaneous three times a day before meals  insulin lispro (ADMELOG) corrective regimen sliding scale   SubCutaneous at bedtime  remdesivir  IVPB 100 milliGRAM(s) IV Intermittent every 24 hours  remdesivir  IVPB   IV Intermittent     MEDICATIONS  (PRN):  acetaminophen     Tablet .. 650 milliGRAM(s) Oral every 6 hours PRN Temp greater or equal to 38C (100.4F), Mild Pain (1 - 3)      REVIEW OF SYSTEMS:  CONSTITUTIONAL: No fever, weight loss, or fatigue  RESPIRATORY: No cough, wheezing, chills or hemoptysis; No shortness of breath  CARDIOVASCULAR: No chest pain, palpitations, dizziness, or leg swelling  GASTROINTESTINAL: No abdominal pain. No nausea, vomiting, or diarrhea.  GENITOURINARY: No dysuria or hematuria, urinary frequency  NEUROLOGICAL: No headaches, memory loss, loss of strength, numbness, or tremors  SKIN: No itching, burning, rashes, or lesions     Vital Signs Last 24 Hrs  T(C): 36.8 (22 Jan 2023 05:21), Max: 37.1 (21 Jan 2023 11:31)  T(F): 98.3 (22 Jan 2023 05:21), Max: 98.8 (21 Jan 2023 11:31)  HR: 74 (22 Jan 2023 05:21) (72 - 94)  BP: 122/59 (22 Jan 2023 05:21) (122/59 - 156/66)  BP(mean): --  RR: 18 (22 Jan 2023 05:21) (18 - 30)  SpO2: 96% (22 Jan 2023 05:21) (86% - 100%)    Parameters below as of 22 Jan 2023 05:21  Patient On (Oxygen Delivery Method): nasal cannula  O2 Flow (L/min): 3      PHYSICAL EXAMINATION:  GENERAL: NAD, well built  HEAD:  Atraumatic, Normocephalic  EYES:  conjunctiva and sclera clear  NECK: Supple, No JVD, Normal thyroid  CHEST/LUNG: Clear to auscultation. No rales, rhonchi, wheezing, or rubs  HEART: Regular rate and rhythm; No murmurs, rubs, or gallops  ABDOMEN: Soft, Nontender, Nondistended; Bowel sounds present  NERVOUS SYSTEM:  Alert & Oriented X3,    EXTREMITIES:  2+ Peripheral Pulses, No clubbing, cyanosis, or edema  SKIN: warm dry                          10.8   3.66  )-----------( 182      ( 22 Jan 2023 06:15 )             32.0     01-22    142  |  110<H>  |  23<H>  ----------------------------<  152<H>  4.2   |  18<L>  |  0.80    Ca    9.3      22 Jan 2023 06:15  Mg     1.7     01-21    TPro  6.6  /  Alb  3.1<L>  /  TBili  0.3  /  DBili  x   /  AST  11  /  ALT  20  /  AlkPhos  49  01-21    LIVER FUNCTIONS - ( 21 Jan 2023 11:30 )  Alb: 3.1 g/dL / Pro: 6.6 g/dL / ALK PHOS: 49 U/L / ALT: 20 U/L DA / AST: 11 U/L / GGT: x               PT/INR - ( 21 Jan 2023 11:30 )   PT: 13.1 sec;   INR: 1.10 ratio         PTT - ( 22 Jan 2023 04:00 )  PTT:50.6 sec    CAPILLARY BLOOD GLUCOSE      RADIOLOGY & ADDITIONAL TESTS:

## 2023-01-22 NOTE — PROGRESS NOTE ADULT - PROBLEM SELECTOR PLAN 4
pt denies chest pain, EKG without ischemic changes  Trop 179 > 307    - serial EKG, troponin  - telemetry monitoring  - Cardio consulted, Dr. Jennings

## 2023-01-22 NOTE — CONSULT NOTE ADULT - PROBLEM SELECTOR RECOMMENDATION 9
Monitor Oxygen status  Monitor Respiratory status  Oxygen supplement  Bronchodilators  Steroids  CXR noted  CT angio chest noted

## 2023-01-22 NOTE — CONSULT NOTE ADULT - PROBLEM SELECTOR RECOMMENDATION 3
CXR noted  Monitor Oxygen sat  Monitor Respiratory status  Check LDH, ferritin  Vitamin C supplement  zinc supplement CXR noted  Monitor Oxygen sat  Monitor Respiratory status  Check LDH, ferritin, CRP, LFTs, D-Dimer and procalcitonin  Vitamin C supplement. Vit D  zinc supplement  Follow up Covid PCR  IV Dexa/Remdesivir

## 2023-01-22 NOTE — CONSULT NOTE ADULT - ASSESSMENT
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 Patient states she feels fine now but this morning when she was having an endoscopy she was increasingly short of breath and was sent to the ER. On admission, she found to have no fever, but hypoxia, 86% in Room air, requiring supplemental oxygenation. Also found to have positive COVID PCR and B/L Pulmonary embolism. She has started on Remdesivir and Dexamethasone, and the ID consult requested to assist with further evaluation and antibiotic management.    # COVID Pneumonitis  # Acute Hypoxic Respiratory failure  # B/L Pulmonary  embolisms    would recommend:    1. Continue Remdesivir and Dexamethasone  2. Monitor ALT while on Remdesivir  3. Continue Anti coagulation  4. Supplemental oxygenation and Bronchodilator as needed  5, COVID precaution    will follow the patient with you and make further recommendation based on the clinical course and Lab results  Thank you for the opportunity to participate in Ms. Lundy's care    Attending Attestation:    Spent more than 65 minutes on total encounter, more than 50 % of the visit was spent counseling and/or coordinating care by the Attending physician.       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 Patient states she feels fine now but this morning when she was having an endoscopy she was increasingly short of breath and was sent to the ER. On admission, she found to have no fever, but hypoxia, 86% in Room air, requiring supplemental oxygenation. Also found to have positive COVID PCR and B/L Pulmonary embolism. She has started on Remdesivir and Dexamethasone, and the ID consult requested to assist with further evaluation and antibiotic management.    # COVID Pneumonitis  # Acute Hypoxic Respiratory failure- on oxygen via NC  # B/L Pulmonary  embolisms    would recommend:    1. Continue Remdesivir and Dexamethasone  2. Monitor ALT while on Remdesivir  3. Continue supportive care including Anti coagulation  4. Supplemental oxygenation and Bronchodilator as needed  5, COVID precaution    will follow the patient with you and make further recommendation based on the clinical course and Lab results  Thank you for the opportunity to participate in Ms. Lundy's care    Attending Attestation:    Spent more than 65 minutes on total encounter, more than 50 % of the visit was spent counseling and/or coordinating care by the Attending physician.

## 2023-01-22 NOTE — CONSULT NOTE ADULT - PROBLEM SELECTOR RECOMMENDATION 2
Monitor Respiratory status  Anti coagulants  CXR noted  CT angio chest noted Monitor Respiratory status  Anticoagulant  monitor for bleeding  Hem/onc eval

## 2023-01-23 LAB
ANION GAP SERPL CALC-SCNC: 9 MMOL/L — SIGNIFICANT CHANGE UP (ref 5–17)
APTT BLD: 62.9 SEC — HIGH (ref 27.5–35.5)
BUN SERPL-MCNC: 33 MG/DL — HIGH (ref 7–18)
CALCIUM SERPL-MCNC: 8.5 MG/DL — SIGNIFICANT CHANGE UP (ref 8.4–10.5)
CHLORIDE SERPL-SCNC: 111 MMOL/L — HIGH (ref 96–108)
CO2 SERPL-SCNC: 22 MMOL/L — SIGNIFICANT CHANGE UP (ref 22–31)
CREAT SERPL-MCNC: 0.81 MG/DL — SIGNIFICANT CHANGE UP (ref 0.5–1.3)
EGFR: 71 ML/MIN/1.73M2 — SIGNIFICANT CHANGE UP
GLUCOSE BLDC GLUCOMTR-MCNC: 132 MG/DL — HIGH (ref 70–99)
GLUCOSE BLDC GLUCOMTR-MCNC: 142 MG/DL — HIGH (ref 70–99)
GLUCOSE BLDC GLUCOMTR-MCNC: 162 MG/DL — HIGH (ref 70–99)
GLUCOSE BLDC GLUCOMTR-MCNC: 163 MG/DL — HIGH (ref 70–99)
GLUCOSE BLDC GLUCOMTR-MCNC: 172 MG/DL — HIGH (ref 70–99)
GLUCOSE BLDC GLUCOMTR-MCNC: 212 MG/DL — HIGH (ref 70–99)
GLUCOSE SERPL-MCNC: 126 MG/DL — HIGH (ref 70–99)
HCT VFR BLD CALC: 30.1 % — LOW (ref 34.5–45)
HGB BLD-MCNC: 10 G/DL — LOW (ref 11.5–15.5)
MAGNESIUM SERPL-MCNC: 1.7 MG/DL — SIGNIFICANT CHANGE UP (ref 1.6–2.6)
MCHC RBC-ENTMCNC: 31.2 PG — SIGNIFICANT CHANGE UP (ref 27–34)
MCHC RBC-ENTMCNC: 33.2 GM/DL — SIGNIFICANT CHANGE UP (ref 32–36)
MCV RBC AUTO: 93.8 FL — SIGNIFICANT CHANGE UP (ref 80–100)
NRBC # BLD: 0 /100 WBCS — SIGNIFICANT CHANGE UP (ref 0–0)
PHOSPHATE SERPL-MCNC: 2.9 MG/DL — SIGNIFICANT CHANGE UP (ref 2.5–4.5)
PLATELET # BLD AUTO: 176 K/UL — SIGNIFICANT CHANGE UP (ref 150–400)
POTASSIUM SERPL-MCNC: 3.4 MMOL/L — LOW (ref 3.5–5.3)
POTASSIUM SERPL-SCNC: 3.4 MMOL/L — LOW (ref 3.5–5.3)
RBC # BLD: 3.21 M/UL — LOW (ref 3.8–5.2)
RBC # FLD: 16.5 % — HIGH (ref 10.3–14.5)
SODIUM SERPL-SCNC: 142 MMOL/L — SIGNIFICANT CHANGE UP (ref 135–145)
WBC # BLD: 4.63 K/UL — SIGNIFICANT CHANGE UP (ref 3.8–10.5)
WBC # FLD AUTO: 4.63 K/UL — SIGNIFICANT CHANGE UP (ref 3.8–10.5)

## 2023-01-23 RX ORDER — POTASSIUM CHLORIDE 20 MEQ
20 PACKET (EA) ORAL
Refills: 0 | Status: COMPLETED | OUTPATIENT
Start: 2023-01-23 | End: 2023-01-23

## 2023-01-23 RX ADMIN — Medication 2: at 12:18

## 2023-01-23 RX ADMIN — Medication 20 MILLIEQUIVALENT(S): at 10:32

## 2023-01-23 RX ADMIN — Medication 6 MILLIGRAM(S): at 06:24

## 2023-01-23 RX ADMIN — Medication 1: at 08:50

## 2023-01-23 RX ADMIN — HEPARIN SODIUM 1100 UNIT(S)/HR: 5000 INJECTION INTRAVENOUS; SUBCUTANEOUS at 19:14

## 2023-01-23 RX ADMIN — HEPARIN SODIUM 1100 UNIT(S)/HR: 5000 INJECTION INTRAVENOUS; SUBCUTANEOUS at 08:17

## 2023-01-23 RX ADMIN — Medication 20 MILLIEQUIVALENT(S): at 12:19

## 2023-01-23 RX ADMIN — REMDESIVIR 200 MILLIGRAM(S): 5 INJECTION INTRAVENOUS at 23:55

## 2023-01-23 RX ADMIN — HEPARIN SODIUM 1100 UNIT(S)/HR: 5000 INJECTION INTRAVENOUS; SUBCUTANEOUS at 16:20

## 2023-01-23 RX ADMIN — HEPARIN SODIUM 1100 UNIT(S)/HR: 5000 INJECTION INTRAVENOUS; SUBCUTANEOUS at 06:17

## 2023-01-23 NOTE — CONSULT NOTE ADULT - SUBJECTIVE AND OBJECTIVE BOX
MEDICATIONS  (STANDING):  dexAMETHasone  Injectable 6 milliGRAM(s) IV Push daily  heparin  Infusion.  Unit(s)/Hr (9 mL/Hr) IV Continuous <Continuous>  influenza  Vaccine (HIGH DOSE) 0.7 milliLiter(s) IntraMuscular once  insulin lispro (ADMELOG) corrective regimen sliding scale   SubCutaneous three times a day before meals  insulin lispro (ADMELOG) corrective regimen sliding scale   SubCutaneous at bedtime  remdesivir  IVPB 100 milliGRAM(s) IV Intermittent every 24 hours  remdesivir  IVPB   IV Intermittent     MEDICATIONS  (PRN):  acetaminophen     Tablet .. 650 milliGRAM(s) Oral every 6 hours PRN Temp greater or equal to 38C (100.4F), Mild Pain (1 - 3)    CAPILLARY BLOOD GLUCOSE      POCT Blood Glucose.: 212 mg/dL (23 Jan 2023 11:35)  POCT Blood Glucose.: 172 mg/dL (23 Jan 2023 08:19)  POCT Blood Glucose.: 164 mg/dL (22 Jan 2023 21:14)  POCT Blood Glucose.: 191 mg/dL (22 Jan 2023 16:44)    I&O's Summary    23 Jan 2023 07:01  -  23 Jan 2023 12:38  --------------------------------------------------------  IN: 430 mL / OUT: 0 mL / NET: 430 mL        PHYSICAL EXAM:  Vital Signs Last 24 Hrs  T(C): 37.2 (23 Jan 2023 11:00), Max: 37.2 (22 Jan 2023 23:41)  T(F): 99 (23 Jan 2023 11:00), Max: 99 (23 Jan 2023 11:00)  HR: 67 (23 Jan 2023 11:00) (60 - 88)  BP: 115/85 (23 Jan 2023 11:00) (115/85 - 153/72)  BP(mean): --  RR: 18 (23 Jan 2023 11:00) (18 - 18)  SpO2: 98% (23 Jan 2023 11:00) (95% - 98%)    Parameters below as of 23 Jan 2023 11:00  Patient On (Oxygen Delivery Method): nasal cannula  O2 Flow (L/min): 3      LABS:                        10.0   4.63  )-----------( 176      ( 23 Jan 2023 05:30 )             30.1     01-23    142  |  111<H>  |  33<H>  ----------------------------<  126<H>  3.4<L>   |  22  |  0.81    Ca    8.5      23 Jan 2023 05:30  Phos  2.9     01-23  Mg     1.7     01-23      PTT - ( 23 Jan 2023 05:50 )  PTT:62.9 sec          SARS-CoV-2: Detected (21 Jan 2023 11:25)  SARS-CoV-2: NotDetec (27 Nov 2022 14:30)      RADIOLOGY & ADDITIONAL TESTS:       Reason for Admission: shortness of breath  History of Present Illness:   86 yo Female, from home, lives with daughter, uses a walker, with medical history significant for HTN, HLD, DM, Osteoporosis, 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 Patient states she feels fine now but this morning when she was having an endoscopy she was increasingly short of breath and was sent to the ED.     ED Course  Vitals: /66 P 94 R 30 T 97.8 F SPo2 86% RA  Meds: heparin, duonebs, dexamethasone IV 6 mg  s/p BiPAP for hypoxia  critical care consult: recommend telemetry admission, heparin drip, TTE, serial EKG/trop, remdesivir, decadron    #[id number]    REVIEW OF SYSTEMS:    CONSTITUTIONAL: No fever, no loss of appetite. no chills, no weight loss   EYES: no acute visual disturbances  NECK: No pain or stiffness  RESPIRATORY: + dry cough; No shortness of breath  CARDIOVASCULAR: No chest pain, no palpitations  GASTROINTESTINAL: No pain. No nausea or vomiting; No diarrhea   NEUROLOGICAL: No headache or numbness, no tremors  MUSCULOSKELETAL: No joint pain, no muscle pain  GENITOURINARY: no dysuria, no frequency, no hesitancy  PSYCHIATRY: no depression, no anxiety  ALL OTHER  ROS negative      Allergies and Intolerances:        Allergies:  	gabapentin: Drug, Unknown    Home Medications:   * Patient Currently Takes Medications as of 21-Jan-2023 19:09 documented in Structured Notes  · 	lactobacillus acidophilus oral capsule: Last Dose Taken:  , 1 tab(s) orally 2 times a day   · 	loperamide 2 mg oral capsule: Last Dose Taken:  , 1 cap(s) orally 2 times a day  · 	guaiFENesin 100 mg/5 mL oral liquid: Last Dose Taken:  , 5 milliliter(s) orally every 6 hours, As needed, Cough  · 	meclizine 25 mg oral tablet: Last Dose Taken:  , 1 tab(s) orally once a day, As Needed  · 	Vitamin D3 1250 mcg (50,000 intl units) oral capsule: Last Dose Taken:  , 1 cap(s) orally once a week  · 	ALBUTEROL PV  INH: Last Dose Taken:  , 1 gram(s) inhaled every 8 hours, As Needed for sob  · 	ALENDRONATE 70MG TAB: Last Dose Taken:  , 1 tab(s) orally once a week  · 	ASPIRIN LOW 81MG EC TAB: Last Dose Taken:  , 1 tab(s) orally once a day  · 	FAMOTIDINE 40MG TAB: Last Dose Taken:  , 1 tab(s) orally once a day  · 	LOSARTAN POT 25MG TAB: Last Dose Taken:  , 1 tab(s) orally once a day  · 	MEGESTROL AC 40MG/ML JAIR: Last Dose Taken:  , 10 milliliter(s) orally once a day  · 	MONTELUKAST 10MG TAB: Last Dose Taken:  , 1 tab(s) orally once a day  · 	METFORMIN 1000MG TAB: Last Dose Taken:  , 1 tab(s) orally 2 times a day  · 	OMEPRAZOL RX 40MG CAP: Last Dose Taken:  , 1 cap(s) orally once a day  · 	ONDANSETRON 4MG TAB: Last Dose Taken:  , 1 tab(s) orally every 8 hours, As Needed for nausea/vomiting  · 	SIMVASTATIN 10MG TAB: Last Dose Taken:  , 1 tab(s) orally once a day (at bedtime)  · 	STEGLATRO 15MG TAB: Last Dose Taken:  , 1 tab(s) orally once a day  · 	ULTRA EYE 0.4-0.3% SELENE: Last Dose Taken:  , 1 milliliter(s) to each affected eye 2 times a day, As Needed  · 	VALACYCLOVIR 500MG TAB: Last Dose Taken:  , 1 tab(s) orally once a day    Patient History:    Past Medical, Past Surgical, and Family History:  PAST MEDICAL HISTORY:  Dementia     DM (diabetes mellitus)     Gastric adenocarcinoma s/p resection    HTN (hypertension)     Hypercholesteremia     Vertigo.     PAST SURGICAL HISTORY:  S/P hysterectomy     S/P tubal ligation.     FAMILY HISTORY:  Sibling  Still living? Unknown  Family history of diabetes mellitus, Age at diagnosis: Age Unknown    Grandparent  Still living? No  Family history of early CAD, Age at diagnosis: Age Unknown.     Social History:  · Substance use	No  · Social History (marital status, living situation, occupation, and sexual history)	Pt lives with daughter. Denies smoking, EtOH or recreational drug use.      MEDICATIONS  (STANDING):  dexAMETHasone  Injectable 6 milliGRAM(s) IV Push daily  heparin  Infusion.  Unit(s)/Hr (9 mL/Hr) IV Continuous <Continuous>  influenza  Vaccine (HIGH DOSE) 0.7 milliLiter(s) IntraMuscular once  insulin lispro (ADMELOG) corrective regimen sliding scale   SubCutaneous three times a day before meals  insulin lispro (ADMELOG) corrective regimen sliding scale   SubCutaneous at bedtime  remdesivir  IVPB 100 milliGRAM(s) IV Intermittent every 24 hours  remdesivir  IVPB   IV Intermittent     MEDICATIONS  (PRN):  acetaminophen     Tablet .. 650 milliGRAM(s) Oral every 6 hours PRN Temp greater or equal to 38C (100.4F), Mild Pain (1 - 3)    CAPILLARY BLOOD GLUCOSE      POCT Blood Glucose.: 212 mg/dL (23 Jan 2023 11:35)  POCT Blood Glucose.: 172 mg/dL (23 Jan 2023 08:19)  POCT Blood Glucose.: 164 mg/dL (22 Jan 2023 21:14)  POCT Blood Glucose.: 191 mg/dL (22 Jan 2023 16:44)    I&O's Summary    23 Jan 2023 07:01  -  23 Jan 2023 12:38  --------------------------------------------------------  IN: 430 mL / OUT: 0 mL / NET: 430 mL        PHYSICAL EXAM:  Vital Signs Last 24 Hrs  T(C): 37.2 (23 Jan 2023 11:00), Max: 37.2 (22 Jan 2023 23:41)  T(F): 99 (23 Jan 2023 11:00), Max: 99 (23 Jan 2023 11:00)  HR: 67 (23 Jan 2023 11:00) (60 - 88)  BP: 115/85 (23 Jan 2023 11:00) (115/85 - 153/72)  BP(mean): --  RR: 18 (23 Jan 2023 11:00) (18 - 18)  SpO2: 98% (23 Jan 2023 11:00) (95% - 98%)    Parameters below as of 23 Jan 2023 11:00  Patient On (Oxygen Delivery Method): nasal cannula  O2 Flow (L/min): 3    GEN: NAD; A and O x 3  LUNGS: B/L rhonchi on NC 3L  HEART: S1 S2  ABDOMEN: soft, non-tender, non-distended, + BS  EXTREMITIES: no edema      LABS:                        10.0   4.63  )-----------( 176      ( 23 Jan 2023 05:30 )             30.1     01-23    142  |  111<H>  |  33<H>  ----------------------------<  126<H>  3.4<L>   |  22  |  0.81    Ca    8.5      23 Jan 2023 05:30  Phos  2.9     01-23  Mg     1.7     01-23      PTT - ( 23 Jan 2023 05:50 )  PTT:62.9 sec          SARS-CoV-2: Detected (21 Jan 2023 11:25)  SARS-CoV-2: NotDetec (27 Nov 2022 14:30)      RADIOLOGY & ADDITIONAL TESTS:    < from: CT Angio Chest PE Protocol w/ IV Cont (01.21.23 @ 13:02) >    ACC: [id number] EXAM:  CT ANGIO CHEST PULM ART [facility]   ORDERED BY: YADIRA GAMEZ     PROCEDURE DATE:  01/21/2023          INTERPRETATION:  CTA CHEST    INDICATION: History of hypertension, hyperlipidemia, diabetes. Status   post resection of adenocarcinomaof the stomach, currently on   chemotherapy. Cough and shortness of breath. Evaluate for pulmonary   embolus.    TECHNIQUE: Enhanced helical images were obtained of the chest. Coronal   and sagittal images were reconstructed.  Images were obtained after the   uneventful administration of 70 cc of nonionic intravenous contrast   (Omnipaque 350). 30 cc of nonionic intravenous contrast (Omnipaque 350)   was discarded. Maximum intensity projection images were generated.    COMPARISON: Radiograph 1/21/2023. CT chest 6/22/2022.    FINDINGS:    Pulmonary Artery:  Within the distal right main pulmonary artery, right   upper lobe are, middle lobar, lower lobar and the segmental arteries the   right upper, middle, and lower lobe are pulmonary emboli.    In addition, there are pulmonary emboli within the left upper lobe   segmental arteries, the left lower lobar artery and the segmental   arteries of the left lower lobe.    Tubes/Lines: None.    Lungs, airways and pleura: The lungs are clear. The airways and pleura   are normal.    Mediastinum: The interventricular septum is slightly straightened and the   right ventricle is noted in size than the left ventricle in the axial   plane. No pericardial effusion. Left-sided aortic arch and left-sided  descending thoracic aorta. Aorta is normal in caliber. Aortic   calcifications.    The visualized thyroid gland is normal. The chest lymph nodes measure   less than 10 mm the short axis. Hiatal hernia.    Upper Abdomen: Cholecystectomy.    Bones And Soft Tissues: The bones are unremarkable.  The soft tissues are   unremarkable.      IMPRESSION:    1.  Bilateral pulmonary emboli.  2.  Likely underlying right heart strain.  3.  The findings were discussed with and read back verification obtained   from Dr. Gamez on 1/21/2023 at 1315 hours.    < end of copied text >  < from: Xray Chest 1 View- PORTABLE-Urgent (01.21.23 @ 13:16) >    ACC: [id number] EXAM:  XR CHEST PORTABLE URGENT 1V   ORDERED BY: YADIRA GAMEZ     PROCEDURE DATE:  01/21/2023          INTERPRETATION:  CLINICAL HISTORY: Chest pain.    TECHNIQUE: Single upright AP chest radiograph.    COMPARISON: No priors for comparison.    COMMENT:  There are no vascular or support catheters within the image.    The lungs are clear without discrete infiltrate.  There is no pulmonary   vascular congestion.  There are no pleural effusions.    The heart and mediastinal contours are within normal limits.  The trachea   is midline.    The osseous structures are intact. Diffuse osteopenia.    IMPRESSION:  No acute pulmonary pathology.    < end of copied text >

## 2023-01-23 NOTE — PROGRESS NOTE ADULT - ASSESSMENT
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 Patient states she feels fine now but this morning when she was having an endoscopy she was increasingly short of breath and was sent to the ER. On admission, she found to have no fever, but hypoxia, 86% in Room air, requiring supplemental oxygenation. Also found to have positive COVID PCR and B/L Pulmonary embolism. She has started on Remdesivir and Dexamethasone, and the ID consult requested to assist with further evaluation and antibiotic management.    # COVID Pneumonitis  # Acute Hypoxic Respiratory failure- on oxygen via NC  # B/L Pulmonary  embolisms    would recommend:    1. Monitor ALT while on Remdesivir  2. Continue Remdesivir and Dexamethasone  3. Continue supportive care including Anti coagulation  4. Supplemental oxygenation and Bronchodilator as needed  5, COVID precaution    Attending Attestation:    Spent more than 35 minutes on total encounter, more than 50 % of the visit was spent counseling and/or coordinating care by the Attending physician.

## 2023-01-23 NOTE — CONSULT NOTE ADULT - ASSESSMENT
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 Patient states she feels fine now but this morning when she was having an endoscopy she was increasingly short of breath and was sent to the ED.     #Multiple Myeloma  #Covid +  #PE  pt follows with Oncologist Dr. Alvarado  currently on maintenance Velcade/Daratumumab/Revlimid/Dex, last given 1/18/23  D-dimer 6720  CTA shows B/L PE, likely Right heart strain  Rec's:  -hold chemo tx during admission  -daily CBC  -continue heparin gtt for PE  -Echo  -Covid mgmt as per Medicine/ID Teams on remdesivir/dex    Thank you for the referral. Will continue to monitor the patient.  Please call with any questions [phone]  Above reviewed with Attending Dr. Alvarado  [facility]  [id number] [facility], [address], [place], [place]  [phone]  *Note not finalized until signed by Attending Physician

## 2023-01-23 NOTE — PROGRESS NOTE ADULT - SUBJECTIVE AND OBJECTIVE BOX
PGY-1 Progress Note discussed with attending    PAGER #: [[phone]] TILL 5:00 PM  PLEASE CONTACT ON CALL TEAM:  - On Call Team (Please refer to Brynn) FROM 5:00 PM - 8:30PM  - Nightfloat Team FROM 8:30 -7:30 AM    CHIEF COMPLAINT & BRIEF HOSPITAL COURSE:    INTERVAL HPI/OVERNIGHT EVENTS:   MEDICATIONS  (STANDING):  dexAMETHasone  Injectable 6 milliGRAM(s) IV Push daily  heparin  Infusion.  Unit(s)/Hr (9 mL/Hr) IV Continuous <Continuous>  influenza  Vaccine (HIGH DOSE) 0.7 milliLiter(s) IntraMuscular once  insulin lispro (ADMELOG) corrective regimen sliding scale   SubCutaneous three times a day before meals  insulin lispro (ADMELOG) corrective regimen sliding scale   SubCutaneous at bedtime  remdesivir  IVPB 100 milliGRAM(s) IV Intermittent every 24 hours  remdesivir  IVPB   IV Intermittent     MEDICATIONS  (PRN):  acetaminophen     Tablet .. 650 milliGRAM(s) Oral every 6 hours PRN Temp greater or equal to 38C (100.4F), Mild Pain (1 - 3)      REVIEW OF SYSTEMS:  CONSTITUTIONAL: No fever, weight loss, or fatigue  RESPIRATORY: No cough, wheezing, chills or hemoptysis; No shortness of breath  CARDIOVASCULAR: No chest pain, palpitations, dizziness, or leg swelling  GASTROINTESTINAL: No abdominal pain. No nausea, vomiting, or diarrhea.  GENITOURINARY: No dysuria or hematuria, urinary frequency  NEUROLOGICAL: No headaches, memory loss, loss of strength, numbness, or tremors  SKIN: No itching, burning, rashes, or lesions     Vital Signs Last 24 Hrs  T(C): 36.8 (23 Jan 2023 04:50), Max: 37.2 (22 Jan 2023 23:41)  T(F): 98.2 (23 Jan 2023 04:50), Max: 98.9 (22 Jan 2023 23:41)  HR: 80 (23 Jan 2023 04:50) (73 - 88)  BP: 130/64 (23 Jan 2023 04:50) (120/65 - 153/72)  BP(mean): --  RR: 18 (23 Jan 2023 04:50) (18 - 18)  SpO2: 96% (23 Jan 2023 04:50) (94% - 97%)    Parameters below as of 23 Jan 2023 04:50  Patient On (Oxygen Delivery Method): nasal cannula  O2 Flow (L/min): 3      PHYSICAL EXAMINATION:  GENERAL: NAD, well built  HEAD:  Atraumatic, Normocephalic  EYES:  conjunctiva and sclera clear  NECK: Supple, No JVD, Normal thyroid  CHEST/LUNG: Clear to auscultation. No rales, rhonchi, wheezing, or rubs  HEART: Regular rate and rhythm; No murmurs, rubs, or gallops  ABDOMEN: Soft, Nontender, Nondistended; Bowel sounds present  NERVOUS SYSTEM:  Alert & Oriented X3,    EXTREMITIES:  2+ Peripheral Pulses, No clubbing, cyanosis, or edema  SKIN: warm dry                          10.0   4.63  )-----------( 176      ( 23 Jan 2023 05:30 )             30.1     01-23    142  |  111<H>  |  33<H>  ----------------------------<  126<H>  3.4<L>   |  22  |  0.81    Ca    8.5      23 Jan 2023 05:30  Phos  2.9     01-23  Mg     1.7     01-23    TPro  6.6  /  Alb  3.1<L>  /  TBili  0.3  /  DBili  x   /  AST  11  /  ALT  20  /  AlkPhos  49  01-21    LIVER FUNCTIONS - ( 21 Jan 2023 11:30 )  Alb: 3.1 g/dL / Pro: 6.6 g/dL / ALK PHOS: 49 U/L / ALT: 20 U/L DA / AST: 11 U/L / GGT: x               PT/INR - ( 21 Jan 2023 11:30 )   PT: 13.1 sec;   INR: 1.10 ratio         PTT - ( 23 Jan 2023 05:50 )  PTT:62.9 sec    CAPILLARY BLOOD GLUCOSE      RADIOLOGY & ADDITIONAL TESTS:                   PGY-1 Progress Note discussed with attending    PAGER #: [[phone]] TILL 5:00 PM  PLEASE CONTACT ON CALL TEAM:  - On Call Team (Please refer to Brynn) FROM 5:00 PM - 8:30PM  - Nightfloat Team FROM 8:30 -7:30 AM    CHIEF COMPLAINT & BRIEF HOSPITAL COURSE:    INTERVAL HPI/OVERNIGHT EVENTS:  unhappy with food  MEDICATIONS  (STANDING):  dexAMETHasone  Injectable 6 milliGRAM(s) IV Push daily  heparin  Infusion.  Unit(s)/Hr (9 mL/Hr) IV Continuous <Continuous>  influenza  Vaccine (HIGH DOSE) 0.7 milliLiter(s) IntraMuscular once  insulin lispro (ADMELOG) corrective regimen sliding scale   SubCutaneous three times a day before meals  insulin lispro (ADMELOG) corrective regimen sliding scale   SubCutaneous at bedtime  remdesivir  IVPB 100 milliGRAM(s) IV Intermittent every 24 hours  remdesivir  IVPB   IV Intermittent     MEDICATIONS  (PRN):  acetaminophen     Tablet .. 650 milliGRAM(s) Oral every 6 hours PRN Temp greater or equal to 38C (100.4F), Mild Pain (1 - 3)      REVIEW OF SYSTEMS:  CONSTITUTIONAL: No fever, weight loss, or fatigue  RESPIRATORY: No cough, wheezing, chills or hemoptysis; No shortness of breath  CARDIOVASCULAR: No chest pain, palpitations, dizziness, or leg swelling  GASTROINTESTINAL: No abdominal pain. No nausea, vomiting, or diarrhea.  GENITOURINARY: No dysuria or hematuria, urinary frequency  NEUROLOGICAL: No headaches, memory loss, loss of strength, numbness, or tremors  SKIN: No itching, burning, rashes, or lesions     Vital Signs Last 24 Hrs  T(C): 36.8 (23 Jan 2023 04:50), Max: 37.2 (22 Jan 2023 23:41)  T(F): 98.2 (23 Jan 2023 04:50), Max: 98.9 (22 Jan 2023 23:41)  HR: 80 (23 Jan 2023 04:50) (73 - 88)  BP: 130/64 (23 Jan 2023 04:50) (120/65 - 153/72)  BP(mean): --  RR: 18 (23 Jan 2023 04:50) (18 - 18)  SpO2: 96% (23 Jan 2023 04:50) (94% - 97%)    Parameters below as of 23 Jan 2023 04:50  Patient On (Oxygen Delivery Method): nasal cannula  O2 Flow (L/min): 3      PHYSICAL EXAMINATION:  GENERAL: NAD, elderly  HEAD:  Atraumatic, Normocephalic  EYES:  conjunctiva and sclera clear  NECK: Supple, No JVD, Normal thyroid  CHEST/LUNG: Clear to auscultation. No rales, rhonchi, wheezing, or rubs  HEART: Regular rate and rhythm; No murmurs, rubs, or gallops  ABDOMEN: Soft, Nontender, Nondistended; Bowel sounds present  NERVOUS SYSTEM:  Alert & Oriented X3,    EXTREMITIES:  2+ Peripheral Pulses, No clubbing, cyanosis, or edema  SKIN: warm dry                          10.0   4.63  )-----------( 176      ( 23 Jan 2023 05:30 )             30.1     01-23    142  |  111<H>  |  33<H>  ----------------------------<  126<H>  3.4<L>   |  22  |  0.81    Ca    8.5      23 Jan 2023 05:30  Phos  2.9     01-23  Mg     1.7     01-23    TPro  6.6  /  Alb  3.1<L>  /  TBili  0.3  /  DBili  x   /  AST  11  /  ALT  20  /  AlkPhos  49  01-21    LIVER FUNCTIONS - ( 21 Jan 2023 11:30 )  Alb: 3.1 g/dL / Pro: 6.6 g/dL / ALK PHOS: 49 U/L / ALT: 20 U/L DA / AST: 11 U/L / GGT: x               PT/INR - ( 21 Jan 2023 11:30 )   PT: 13.1 sec;   INR: 1.10 ratio         PTT - ( 23 Jan 2023 05:50 )  PTT:62.9 sec    CAPILLARY BLOOD GLUCOSE      RADIOLOGY & ADDITIONAL TESTS:

## 2023-01-23 NOTE — PROGRESS NOTE ADULT - PROBLEM SELECTOR PLAN 3
CXR noted  Monitor Oxygen sat  Monitor Respiratory status  Check LDH, ferritin, CRP, LFTs, D-Dimer and procalcitonin  Vitamin C supplement. Vit D  zinc supplement  Follow up Covid PCR  IV Dexa/Remdesivir. CXR noted  Monitor Oxygen sat  Monitor Respiratory status  Check LDH, ferritin, CRP, LFTs, D-Dimer and procalcitonin  Vitamin C supplement. Vit D  zinc supplement  Follow up Covid PCR  IV Dexa/Remdesivir.  ID follow up

## 2023-01-23 NOTE — PROGRESS NOTE ADULT - PROBLEM SELECTOR PLAN 8
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 Alvarado as outpatient    - [facility] consult in AM Gastric adenocarcinoma, s/p resection 2015 (on chemo) and multiple myeloma  Heme/Onc Dr. Alvarado as outpatient    - [facility] consulted

## 2023-01-23 NOTE — PROGRESS NOTE ADULT - SUBJECTIVE AND OBJECTIVE BOX
pt seen in icu [  ], reg med floor [   ], bed [  ], chair at bedside [   ]  Awake, alert, comfortable in bed in NAD.  REVIEW OF SYSTEMS:    CONSTITUTIONAL: No weakness, fevers or chills  EYES/ENT: No visual changes;  No vertigo or throat pain   NECK: No pain or stiffness  RESPIRATORY: No cough, wheezing, hemoptysis; No shortness of breath  CARDIOVASCULAR: No chest pain or palpitations  GASTROINTESTINAL: No abdominal or epigastric pain. No nausea, vomiting, or hematemesis; No diarrhea or constipation. No melena or hematochezia.  GENITOURINARY: No dysuria, frequency or hematuria  NEUROLOGICAL: No numbness or weakness  SKIN: No itching, burning, rashes, or lesions   All other review of systems is negative unless indicated above.    Physical Exam    General: WN/WD NAD  Neurology: A&Ox3, nonfocal, JACKMAN x 4  Respiratory: CTA B/L  CV: RRR, S1S2, no murmurs, rubs or gallops  Abdominal: Soft, NT, ND +BS, Last BM  Extremities: No edema, + peripheral pulses      Allergies  gabapentin (Unknown)      Health Issues  Other pulmonary embolism without acute cor pulmonale    HTN (hypertension)    DM (diabetes mellitus)    Hypercholesteremia    Gastric adenocarcinoma    Vertigo    Dementia    S/P hysterectomy    S/P tubal ligation        Vitals  T(F): 98.2 (01-23-23 @ 04:50), Max: 98.9 (01-22-23 @ 23:41)  HR: 80 (01-23-23 @ 04:50) (73 - 88)  BP: 130/64 (01-23-23 @ 04:50) (120/65 - 153/72)  RR: 18 (01-23-23 @ 04:50) (18 - 18)  SpO2: 96% (01-23-23 @ 04:50) (94% - 97%)  Wt(kg): --  CAPILLARY BLOOD GLUCOSE      POCT Blood Glucose.: 164 mg/dL (22 Jan 2023 21:14)      Labs                          10.8   3.66  )-----------( 182      ( 22 Jan 2023 06:15 )             32.0       01-22    142  |  110<H>  |  23<H>  ----------------------------<  152<H>  4.2   |  18<L>  |  0.80    Ca    9.3      22 Jan 2023 06:15  Mg     1.7     01-21    TPro  6.6  /  Alb  3.1<L>  /  TBili  0.3  /  DBili  x   /  AST  11  /  ALT  20  /  AlkPhos  49  01-21            Radiology Results      Meds    MEDICATIONS  (STANDING):  dexAMETHasone  Injectable 6 milliGRAM(s) IV Push daily  heparin  Infusion.  Unit(s)/Hr (9 mL/Hr) IV Continuous <Continuous>  influenza  Vaccine (HIGH DOSE) 0.7 milliLiter(s) IntraMuscular once  insulin lispro (ADMELOG) corrective regimen sliding scale   SubCutaneous three times a day before meals  insulin lispro (ADMELOG) corrective regimen sliding scale   SubCutaneous at bedtime  remdesivir  IVPB 100 milliGRAM(s) IV Intermittent every 24 hours  remdesivir  IVPB   IV Intermittent       MEDICATIONS  (PRN):  acetaminophen     Tablet .. 650 milliGRAM(s) Oral every 6 hours PRN Temp greater or equal to 38C (100.4F), Mild Pain (1 - 3)         pt seen in icu [  ], reg med floor [  x ], bed [ x ], chair at bedside [   ]  Awake, alert, comfortable, laying  in bed in NAD. Less sob and no cough  REVIEW OF SYSTEMS:    CONSTITUTIONAL: No weakness, fevers or chills  EYES/ENT: No visual changes;  No vertigo or throat pain   NECK: No pain or stiffness  RESPIRATORY: No cough, wheezing, hemoptysis; No shortness of breath  CARDIOVASCULAR: No chest pain or palpitations  GASTROINTESTINAL: No abdominal or epigastric pain. No nausea, vomiting, or hematemesis; No diarrhea or constipation. No melena or hematochezia.  GENITOURINARY: No dysuria, frequency or hematuria  NEUROLOGICAL: No numbness or weakness  SKIN: No itching, burning, rashes, or lesions   All other review of systems is negative unless indicated above.    Physical Exam    General: WN/WD NAD  Neurology: A&Ox3, nonfocal, JACKMAN x 4  Respiratory: CTA B/L  CV: RRR, S1S2, no murmurs, rubs or gallops  Abdominal: Soft, NT, ND +BS, Last BM  Extremities: No edema, + peripheral pulses      Allergies  gabapentin (Unknown)      Health Issues  Other pulmonary embolism without acute cor pulmonale    HTN (hypertension)    DM (diabetes mellitus)    Hypercholesteremia    Gastric adenocarcinoma    Vertigo    Dementia    S/P hysterectomy    S/P tubal ligation        Vitals  T(F): 98.2 (01-23-23 @ 04:50), Max: 98.9 (01-22-23 @ 23:41)  HR: 80 (01-23-23 @ 04:50) (73 - 88)  BP: 130/64 (01-23-23 @ 04:50) (120/65 - 153/72)  RR: 18 (01-23-23 @ 04:50) (18 - 18)  SpO2: 96% (01-23-23 @ 04:50) (94% - 97%)  Wt(kg): --  CAPILLARY BLOOD GLUCOSE      POCT Blood Glucose.: 164 mg/dL (22 Jan 2023 21:14)      Labs                          10.8   3.66  )-----------( 182      ( 22 Jan 2023 06:15 )             32.0       01-22    142  |  110<H>  |  23<H>  ----------------------------<  152<H>  4.2   |  18<L>  |  0.80    Ca    9.3      22 Jan 2023 06:15  Mg     1.7     01-21    TPro  6.6  /  Alb  3.1<L>  /  TBili  0.3  /  DBili  x   /  AST  11  /  ALT  20  /  AlkPhos  49  01-21            Radiology Results      Meds    MEDICATIONS  (STANDING):  dexAMETHasone  Injectable 6 milliGRAM(s) IV Push daily  heparin  Infusion.  Unit(s)/Hr (9 mL/Hr) IV Continuous <Continuous>  influenza  Vaccine (HIGH DOSE) 0.7 milliLiter(s) IntraMuscular once  insulin lispro (ADMELOG) corrective regimen sliding scale   SubCutaneous three times a day before meals  insulin lispro (ADMELOG) corrective regimen sliding scale   SubCutaneous at bedtime  remdesivir  IVPB 100 milliGRAM(s) IV Intermittent every 24 hours  remdesivir  IVPB   IV Intermittent       MEDICATIONS  (PRN):  acetaminophen     Tablet .. 650 milliGRAM(s) Oral every 6 hours PRN Temp greater or equal to 38C (100.4F), Mild Pain (1 - 3)

## 2023-01-23 NOTE — PROGRESS NOTE ADULT - PROBLEM SELECTOR PLAN 1
patient presents with acute shortness of breath and productive cough and hypoxia to 86% on Room air. s/p BiPAP in ED, de-escalated to 4 L NC and saturating to 98% now  CXR negative, RVP + for COVID  also CT angio performed in ED which showed bilateral pulmonary embolism in the distral R main PA, R upper/middle/lower lobes and left upper segmental arteries, left lower lobar artery and left lower segmental arteries with likely underlying heart strain  started on heparin drip in ED  s/p dexamethasone IV 6 mg in ED  PE likely provoked in the setting of multiple myeloma  pt is COVID vaccinated x 3    - isolation precautions  - c/w IV decadron  - start remdesivir  - c/w heparin drip for AC  - [facility] consult in AM (follows with Dr. Alvarado)  - wean oxygen as tolerated  - obtain TTE to rule out right heart strain patient presents with acute shortness of breath and productive cough and hypoxia to 86% on Room air. s/p BiPAP in ED, de-escalated to 4 L NC and saturating to 98% now  CXR negative, RVP + for COVID  also CT angio performed in ED which showed bilateral pulmonary embolism in the distral R main PA, R upper/middle/lower lobes and left upper segmental arteries, left lower lobar artery and left lower segmental arteries with likely underlying heart strain  started on heparin drip in ED  s/p dexamethasone IV 6 mg in ED  PE likely provoked in the setting of multiple myeloma  pt is COVID vaccinated x 3    - isolation precautions  - IV decadron  - remdesivir  - c/w heparin drip for AC  - [facility] consulted (follows with Dr. Alvarado)  - wean oxygen as tolerated  - TTE neg for right heart strain

## 2023-01-23 NOTE — PROGRESS NOTE ADULT - SUBJECTIVE AND OBJECTIVE BOX
Patient is seen and examined at the bed side, is afebrile. The WBC count and kidney function is normal.       REVIEW OF SYSTEMS: All other review systems are negative        ALLERGIES: gabapentin (Unknown)        Vital Signs Last 24 Hrs  T(C): 36.8 (23 Jan 2023 15:40), Max: 37.2 (22 Jan 2023 23:41)  T(F): 98.3 (23 Jan 2023 15:40), Max: 99 (23 Jan 2023 11:00)  HR: 75 (23 Jan 2023 15:40) (60 - 80)  BP: 130/62 (23 Jan 2023 15:40) (115/85 - 138/64)  BP(mean): --  RR: 18 (23 Jan 2023 15:40) (18 - 18)  SpO2: 98% (23 Jan 2023 15:40) (95% - 98%)    Parameters below as of 23 Jan 2023 15:40  Patient On (Oxygen Delivery Method): nasal cannula  O2 Flow (L/min): 3        PHYSICAL EXAM:  GENERAL: Not in distress   CHEST/LUNG: Not using accessory muscles  HEART: s1 and s2 present  ABDOMEN:  Nontender and  Nondistended  EXTREMITIES: No pedal  edema  CNS: Awake and Alert      LABS:                        10.0   4.63  )-----------( 176      ( 23 Jan 2023 05:30 )             30.1                           10.8   3.66  )-----------( 182      ( 22 Jan 2023 06:15 )             32.0       01-23    142  |  111<H>  |  33<H>  ----------------------------<  126<H>  3.4<L>   |  22  |  0.81    Ca    8.5      23 Jan 2023 05:30  Phos  2.9     01-23  Mg     1.7     01-23      01-22    142  |  110<H>  |  23<H>  ----------------------------<  152<H>  4.2   |  18<L>  |  0.80    Ca    9.3      22 Jan 2023 06:15  Mg     1.7     01-21    TPro  6.6  /  Alb  3.1<L>  /  TBili  0.3  /  DBili  x   /  AST  11  /  ALT  20  /  AlkPhos  49  01-21    PT/INR - ( 21 Jan 2023 11:30 )   PT: 13.1 sec;   INR: 1.10 ratio       PTT - ( 22 Jan 2023 10:29 )  PTT:62.5 sec        CAPILLARY BLOOD GLUCOSE  POCT Blood Glucose.: 182 mg/dL (22 Jan 2023 11:43)  POCT Blood Glucose.: 170 mg/dL (22 Jan 2023 07:56)  POCT Blood Glucose.: 160 mg/dL (21 Jan 2023 22:33)    ABG - ( 21 Jan 2023 11:53 )  pH, Arterial: 7.45  pH, Blood: x     /  pCO2: 24    /  pO2: 315   / HCO3: 17    / Base Excess: -5.5  /  SaO2: 98            MEDICATIONS  (STANDING):    dexAMETHasone  Injectable 6 milliGRAM(s) IV Push daily  heparin  Infusion.  Unit(s)/Hr (9 mL/Hr) IV Continuous <Continuous>  influenza  Vaccine (HIGH DOSE) 0.7 milliLiter(s) IntraMuscular once  insulin lispro (ADMELOG) corrective regimen sliding scale   SubCutaneous three times a day before meals  insulin lispro (ADMELOG) corrective regimen sliding scale   SubCutaneous at bedtime  remdesivir  IVPB 100 milliGRAM(s) IV Intermittent every 24 hours  remdesivir  IVPB   IV Intermittent           RADIOLOGY & ADDITIONAL TESTS:    1/21/23: Xray Chest 1 View- PORTABLE-Urgent (01.21.23 @ 13:16) No acute pulmonary pathology.      1/21/23: CT Angio Chest PE Protocol w/ IV Cont (01.21.23 @ 13:02) >    1.  Bilateral pulmonary emboli.  2.  Likely underlying right heart strain.  3.  The findings were discussed with and read back verification obtained   from Dr. Calvillo on 1/21/2023 at 1315 hours.          MICROBIOLOGY DATA:    Respiratory Viral Panel with COVID-19 by ALEX (01.21.23 @ 11:25)   Rapid RVP Result: Detected   SARS-CoV-2: Detected:

## 2023-01-23 NOTE — PROGRESS NOTE ADULT - SUBJECTIVE AND OBJECTIVE BOX
CHIEF COMPLAINT:Patient is a 88y old  Female who presents with a chief complaint of shortness of breath .Pt appears comfortable.    	  REVIEW OF SYSTEMS:  CONSTITUTIONAL: No fever, weight loss, or fatigue  EYES: No eye pain, visual disturbances, or discharge  ENT:  No difficulty hearing, tinnitus, vertigo; No sinus or throat pain  NECK: No pain or stiffness  RESPIRATORY: No cough, wheezing, chills or hemoptysis; No Shortness of Breath  CARDIOVASCULAR: No chest pain, palpitations, passing out, dizziness, or leg swelling  GASTROINTESTINAL: No abdominal or epigastric pain. No nausea, vomiting, or hematemesis; No diarrhea or constipation. No melena or hematochezia.  GENITOURINARY: No dysuria, frequency, hematuria, or incontinence  NEUROLOGICAL: No headaches, memory loss, loss of strength, numbness, or tremors  SKIN: No itching, burning, rashes, or lesions   LYMPH Nodes: No enlarged glands  ENDOCRINE: No heat or cold intolerance; No hair loss  MUSCULOSKELETAL: No joint pain or swelling; No muscle, back, or extremity pain  PSYCHIATRIC: No depression, anxiety, mood swings, or difficulty sleeping  HEME/LYMPH: No easy bruising, or bleeding gums  ALLERGY AND IMMUNOLOGIC: No hives or eczema	        PHYSICAL EXAM:  T(C): 36.6 (01-23-23 @ 08:22), Max: 37.2 (01-22-23 @ 23:41)  HR: 63 (01-23-23 @ 08:35) (60 - 88)  BP: 138/64 (01-23-23 @ 08:22) (120/65 - 153/72)  RR: 18 (01-23-23 @ 08:22) (18 - 18)  SpO2: 97% (01-23-23 @ 08:35) (94% - 97%)  Wt(kg): --  I&O's Summary    23 Jan 2023 07:01  -  23 Jan 2023 09:16  --------------------------------------------------------  IN: 200 mL / OUT: 0 mL / NET: 200 mL        Appearance: Normal	  HEENT:   Normal oral mucosa, PERRL, EOMI	  Lymphatic: No lymphadenopathy  Cardiovascular: Normal S1 S2, No JVD, No murmurs, No edema  Respiratory: Lungs clear to auscultation	  Psychiatry: A & O x 3, Mood & affect appropriate  Gastrointestinal:  Soft, Non-tender, + BS	  Skin: No rashes, No ecchymoses, No cyanosis	  Neurologic: Non-focal  Extremities: Normal range of motion, No clubbing, cyanosis or edema  Vascular: Peripheral pulses palpable 2+ bilaterally    MEDICATIONS  (STANDING):  dexAMETHasone  Injectable 6 milliGRAM(s) IV Push daily  heparin  Infusion.  Unit(s)/Hr (9 mL/Hr) IV Continuous <Continuous>  influenza  Vaccine (HIGH DOSE) 0.7 milliLiter(s) IntraMuscular once  insulin lispro (ADMELOG) corrective regimen sliding scale   SubCutaneous three times a day before meals  insulin lispro (ADMELOG) corrective regimen sliding scale   SubCutaneous at bedtime  potassium chloride    Tablet ER 20 milliEquivalent(s) Oral every 2 hours  remdesivir  IVPB 100 milliGRAM(s) IV Intermittent every 24 hours  remdesivir  IVPB   IV Intermittent       LABS:	 	      Troponin I, High Sensitivity Result: 275.2 ng/L (01-21 @ 21:02)  Troponin I, High Sensitivity Result: 307.1 ng/L (01-21 @ 16:40)  Troponin I, High Sensitivity Result: 179.4 ng/L (01-21 @ 11:30)                            10.0   4.63  )-----------( 176      ( 23 Jan 2023 05:30 )             30.1     01-23    142  |  111<H>  |  33<H>  ----------------------------<  126<H>  3.4<L>   |  22  |  0.81    Ca    8.5      23 Jan 2023 05:30  Phos  2.9     01-23  Mg     1.7     01-23    TPro  6.6  /  Alb  3.1<L>  /  TBili  0.3  /  DBili  x   /  AST  11  /  ALT  20  /  AlkPhos  49  01-21    proBNP: Serum Pro-Brain Natriuretic Peptide: 830 pg/mL (01-21 @ 11:30)        	           CHIEF COMPLAINT:Patient is a 88y old  Female who presents with a chief complaint of shortness of breath .Pt appears comfortable.    	  REVIEW OF SYSTEMS:  CONSTITUTIONAL: No fever, weight loss, or fatigue  EYES: No eye pain, visual disturbances, or discharge  ENT:  No difficulty hearing, tinnitus, vertigo; No sinus or throat pain  NECK: No pain or stiffness  RESPIRATORY: No cough, wheezing, chills or hemoptysis; No Shortness of Breath  CARDIOVASCULAR: No chest pain, palpitations, passing out, dizziness, or leg swelling  GASTROINTESTINAL: No abdominal or epigastric pain. No nausea, vomiting, or hematemesis; No diarrhea or constipation. No melena or hematochezia.  GENITOURINARY: No dysuria, frequency, hematuria, or incontinence  NEUROLOGICAL: No headaches, memory loss, loss of strength, numbness, or tremors  SKIN: No itching, burning, rashes, or lesions   LYMPH Nodes: No enlarged glands  ENDOCRINE: No heat or cold intolerance; No hair loss  MUSCULOSKELETAL: No joint pain or swelling; No muscle, back, or extremity pain  PSYCHIATRIC: No depression, anxiety, mood swings, or difficulty sleeping  HEME/LYMPH: No easy bruising, or bleeding gums  ALLERGY AND IMMUNOLOGIC: No hives or eczema	        PHYSICAL EXAM:  T(C): 36.6 (01-23-23 @ 08:22), Max: 37.2 (01-22-23 @ 23:41)  HR: 63 (01-23-23 @ 08:35) (60 - 88)  BP: 138/64 (01-23-23 @ 08:22) (120/65 - 153/72)  RR: 18 (01-23-23 @ 08:22) (18 - 18)  SpO2: 97% (01-23-23 @ 08:35) (94% - 97%)  Wt(kg): --  I&O's Summary    23 Jan 2023 07:01  -  23 Jan 2023 09:16  --------------------------------------------------------  IN: 200 mL / OUT: 0 mL / NET: 200 mL        Appearance: Normal	  HEENT:   Normal oral mucosa, PERRL, EOMI	  Lymphatic: No lymphadenopathy  Cardiovascular: Normal S1 S2, No JVD, No murmurs, No edema  Respiratory: Lungs clear to auscultation	  Psychiatry: A & O x 3, Mood & affect appropriate  Gastrointestinal:  Soft, Non-tender, + BS	  Skin: No rashes, No ecchymoses, No cyanosis	  Neurologic: Non-focal  Extremities: Normal range of motion, No clubbing, cyanosis or edema  Vascular: Peripheral pulses palpable 2+ bilaterally    MEDICATIONS  (STANDING):  dexAMETHasone  Injectable 6 milliGRAM(s) IV Push daily  heparin  Infusion.  Unit(s)/Hr (9 mL/Hr) IV Continuous <Continuous>  influenza  Vaccine (HIGH DOSE) 0.7 milliLiter(s) IntraMuscular once  insulin lispro (ADMELOG) corrective regimen sliding scale   SubCutaneous three times a day before meals  insulin lispro (ADMELOG) corrective regimen sliding scale   SubCutaneous at bedtime  potassium chloride    Tablet ER 20 milliEquivalent(s) Oral every 2 hours  remdesivir  IVPB 100 milliGRAM(s) IV Intermittent every 24 hours  remdesivir  IVPB   IV Intermittent       LABS:	 	      Troponin I, High Sensitivity Result: 275.2 ng/L (01-21 @ 21:02)  Troponin I, High Sensitivity Result: 307.1 ng/L (01-21 @ 16:40)  Troponin I, High Sensitivity Result: 179.4 ng/L (01-21 @ 11:30)                            10.0   4.63  )-----------( 176      ( 23 Jan 2023 05:30 )             30.1     01-23    142  |  111<H>  |  33<H>  ----------------------------<  126<H>  3.4<L>   |  22  |  0.81    Ca    8.5      23 Jan 2023 05:30  Phos  2.9     01-23  Mg     1.7     01-23    TPro  6.6  /  Alb  3.1<L>  /  TBili  0.3  /  DBili  x   /  AST  11  /  ALT  20  /  AlkPhos  49  01-21    proBNP: Serum Pro-Brain Natriuretic Peptide: 830 pg/mL (01-21 @ 11:30)        	    PT/INR - ( 21 Jan 2023 11:30 )   PT: 13.1 sec;   INR: 1.10 ratio         PTT - ( 23 Jan 2023 05:50 )  PTT:62.9 sec    < from: Transthoracic Echocardiogram (01.22.23 @ 07:01) >  OBSERVATIONS:  Mitral Valve: Normal mitral valve. Trace mitral  regurgitation.  Aortic Root: Normal aortic root.  Aortic Valve: Normal trileaflet aortic valve. No aortic  stenosis. No aortic valve regurgitation seen.  Left Atrium: Normal left atrium.  LA volume index = 27  cc/m2.  Left Ventricle: Normal Left Ventricular Systolic Function,  (EF = 59% by biplane). Not all LV wall segments were seen.  Normal left ventricular internal dimensions and wall  thicknesses. The diastolic function is indeterminate based  on current diagnostic criteria.  Right Heart: Normal right atrium. Normal right ventricular  size and systolic function (TAPSE 2.0 cm). Normal tricuspid  valve. Moderate tricuspid regurgitation.Pulmonic valve  not well seen. Trace pulmonic insufficiency is noted.  Pericardium/PleuraNo pericardial effusion. A prominent  epicardial fat pad is noted.  Hemodynamic: RA Pressure is 3 mm Hg. RV systolic pressure  is mildly increased at  44 mm Hg.    < end of copied text >

## 2023-01-23 NOTE — PROGRESS NOTE ADULT - ASSESSMENT
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  1.Tele  monitoring.  2.Echocardiogram-eval RV fx.  3.COVID+-ID eval,remdesmivir,dexamethasone.  4.Gastric ca-Heme/Onc.  5.DM-Insulin.  6.HTN-hold bp medication.  7.B/L PE-heparin drip for now.  8.PPI. 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  1.Tele  monitoring.  2.Echocardiogram-nl RV fx.  3.COVID+-ID eval,remdesmivir,dexamethasone.  4.Gastric ca-Heme/Onc.  5.DM-Insulin.  6.HTN-hold bp medication.  7.B/L PE-heparin drip for now.  8.PPI.

## 2023-01-23 NOTE — PROGRESS NOTE ADULT - PROBLEM SELECTOR PLAN 1
Monitor Oxygen status  Monitor Respiratory status  Oxygen supplement  Bronchodilators  Steroids  CXR noted  CT angio chest noted.

## 2023-01-23 NOTE — PROGRESS NOTE ADULT - ASSESSMENT
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

## 2023-01-24 LAB
ANION GAP SERPL CALC-SCNC: 12 MMOL/L — SIGNIFICANT CHANGE UP (ref 5–17)
APTT BLD: 72.9 SEC — HIGH (ref 27.5–35.5)
BUN SERPL-MCNC: 31 MG/DL — HIGH (ref 7–18)
CALCIUM SERPL-MCNC: 8.8 MG/DL — SIGNIFICANT CHANGE UP (ref 8.4–10.5)
CHLORIDE SERPL-SCNC: 109 MMOL/L — HIGH (ref 96–108)
CO2 SERPL-SCNC: 19 MMOL/L — LOW (ref 22–31)
CREAT SERPL-MCNC: 0.76 MG/DL — SIGNIFICANT CHANGE UP (ref 0.5–1.3)
D DIMER BLD IA.RAPID-MCNC: 2435 NG/ML DDU — HIGH
EGFR: 76 ML/MIN/1.73M2 — SIGNIFICANT CHANGE UP
FERRITIN SERPL-MCNC: 148 NG/ML — SIGNIFICANT CHANGE UP (ref 15–150)
GLUCOSE BLDC GLUCOMTR-MCNC: 131 MG/DL — HIGH (ref 70–99)
GLUCOSE BLDC GLUCOMTR-MCNC: 163 MG/DL — HIGH (ref 70–99)
GLUCOSE BLDC GLUCOMTR-MCNC: 178 MG/DL — HIGH (ref 70–99)
GLUCOSE BLDC GLUCOMTR-MCNC: 219 MG/DL — HIGH (ref 70–99)
GLUCOSE SERPL-MCNC: 152 MG/DL — HIGH (ref 70–99)
HCT VFR BLD CALC: 31.6 % — LOW (ref 34.5–45)
HGB BLD-MCNC: 10.4 G/DL — LOW (ref 11.5–15.5)
LDH SERPL L TO P-CCNC: 218 U/L — SIGNIFICANT CHANGE UP (ref 120–225)
MAGNESIUM SERPL-MCNC: 1.6 MG/DL — SIGNIFICANT CHANGE UP (ref 1.6–2.6)
MCHC RBC-ENTMCNC: 31.1 PG — SIGNIFICANT CHANGE UP (ref 27–34)
MCHC RBC-ENTMCNC: 32.9 GM/DL — SIGNIFICANT CHANGE UP (ref 32–36)
MCV RBC AUTO: 94.6 FL — SIGNIFICANT CHANGE UP (ref 80–100)
NRBC # BLD: 0 /100 WBCS — SIGNIFICANT CHANGE UP (ref 0–0)
PHOSPHATE SERPL-MCNC: 2.4 MG/DL — LOW (ref 2.5–4.5)
PLATELET # BLD AUTO: 174 K/UL — SIGNIFICANT CHANGE UP (ref 150–400)
POTASSIUM SERPL-MCNC: 3.7 MMOL/L — SIGNIFICANT CHANGE UP (ref 3.5–5.3)
POTASSIUM SERPL-SCNC: 3.7 MMOL/L — SIGNIFICANT CHANGE UP (ref 3.5–5.3)
RBC # BLD: 3.34 M/UL — LOW (ref 3.8–5.2)
RBC # FLD: 16.1 % — HIGH (ref 10.3–14.5)
SODIUM SERPL-SCNC: 140 MMOL/L — SIGNIFICANT CHANGE UP (ref 135–145)
WBC # BLD: 3.24 K/UL — LOW (ref 3.8–10.5)
WBC # FLD AUTO: 3.24 K/UL — LOW (ref 3.8–10.5)

## 2023-01-24 RX ORDER — METOPROLOL TARTRATE 50 MG
12.5 TABLET ORAL
Refills: 0 | Status: DISCONTINUED | OUTPATIENT
Start: 2023-01-24 | End: 2023-01-25

## 2023-01-24 RX ADMIN — HEPARIN SODIUM 1100 UNIT(S)/HR: 5000 INJECTION INTRAVENOUS; SUBCUTANEOUS at 07:06

## 2023-01-24 RX ADMIN — HEPARIN SODIUM 1100 UNIT(S)/HR: 5000 INJECTION INTRAVENOUS; SUBCUTANEOUS at 19:33

## 2023-01-24 RX ADMIN — HEPARIN SODIUM 1100 UNIT(S)/HR: 5000 INJECTION INTRAVENOUS; SUBCUTANEOUS at 15:53

## 2023-01-24 RX ADMIN — Medication 6 MILLIGRAM(S): at 06:09

## 2023-01-24 RX ADMIN — Medication 12.5 MILLIGRAM(S): at 18:13

## 2023-01-24 RX ADMIN — REMDESIVIR 200 MILLIGRAM(S): 5 INJECTION INTRAVENOUS at 23:57

## 2023-01-24 RX ADMIN — Medication 2: at 11:47

## 2023-01-24 RX ADMIN — HEPARIN SODIUM 1100 UNIT(S)/HR: 5000 INJECTION INTRAVENOUS; SUBCUTANEOUS at 09:06

## 2023-01-24 NOTE — PHYSICAL THERAPY INITIAL EVALUATION ADULT - ADDITIONAL COMMENTS
usually given assistance, handheld assist when walking around the house using a cane. Assisted by family on stairs. She was receiving home PT services recently prior to this hospital admission.

## 2023-01-24 NOTE — PHYSICAL THERAPY INITIAL EVALUATION ADULT - DIAGNOSIS, PT EVAL
generalized weakness; shortness of breath with activity; difficulty with supine-sit, sit-stand, and ambulation tasks

## 2023-01-24 NOTE — PROGRESS NOTE ADULT - SUBJECTIVE AND OBJECTIVE BOX
PGY-1 Progress Note discussed with attending    PAGER #: [[phone]] TILL 5:00 PM  PLEASE CONTACT ON CALL TEAM:  - On Call Team (Please refer to Brynn) FROM 5:00 PM - 8:30PM  - Nightfloat Team FROM 8:30 -7:30 AM    CHIEF COMPLAINT & BRIEF HOSPITAL COURSE:    INTERVAL HPI/OVERNIGHT EVENTS:   MEDICATIONS  (STANDING):  dexAMETHasone  Injectable 6 milliGRAM(s) IV Push daily  heparin  Infusion.  Unit(s)/Hr (9 mL/Hr) IV Continuous <Continuous>  influenza  Vaccine (HIGH DOSE) 0.7 milliLiter(s) IntraMuscular once  insulin lispro (ADMELOG) corrective regimen sliding scale   SubCutaneous three times a day before meals  insulin lispro (ADMELOG) corrective regimen sliding scale   SubCutaneous at bedtime  remdesivir  IVPB 100 milliGRAM(s) IV Intermittent every 24 hours  remdesivir  IVPB   IV Intermittent     MEDICATIONS  (PRN):  acetaminophen     Tablet .. 650 milliGRAM(s) Oral every 6 hours PRN Temp greater or equal to 38C (100.4F), Mild Pain (1 - 3)      REVIEW OF SYSTEMS:  CONSTITUTIONAL: No fever, weight loss, or fatigue  RESPIRATORY: No cough, wheezing, chills or hemoptysis; No shortness of breath  CARDIOVASCULAR: No chest pain, palpitations, dizziness, or leg swelling  GASTROINTESTINAL: No abdominal pain. No nausea, vomiting, or diarrhea.  GENITOURINARY: No dysuria or hematuria, urinary frequency  NEUROLOGICAL: No headaches, memory loss, loss of strength, numbness, or tremors  SKIN: No itching, burning, rashes, or lesions     Vital Signs Last 24 Hrs  T(C): 36.9 (24 Jan 2023 05:47), Max: 37.2 (23 Jan 2023 11:00)  T(F): 98.4 (24 Jan 2023 05:47), Max: 99 (23 Jan 2023 11:00)  HR: 74 (24 Jan 2023 05:47) (60 - 82)  BP: 122/71 (24 Jan 2023 05:47) (115/85 - 161/80)  BP(mean): --  RR: 18 (24 Jan 2023 05:47) (18 - 19)  SpO2: 91% (24 Jan 2023 05:47) (91% - 100%)    Parameters below as of 24 Jan 2023 05:47  Patient On (Oxygen Delivery Method): nasal cannula  O2 Flow (L/min): 3      PHYSICAL EXAMINATION:  GENERAL: NAD, well built  HEAD:  Atraumatic, Normocephalic  EYES:  conjunctiva and sclera clear  NECK: Supple, No JVD, Normal thyroid  CHEST/LUNG: Clear to auscultation. No rales, rhonchi, wheezing, or rubs  HEART: Regular rate and rhythm; No murmurs, rubs, or gallops  ABDOMEN: Soft, Nontender, Nondistended; Bowel sounds present  NERVOUS SYSTEM:  Alert & Oriented X3,    EXTREMITIES:  2+ Peripheral Pulses, No clubbing, cyanosis, or edema  SKIN: warm dry                          10.0   4.63  )-----------( 176      ( 23 Jan 2023 05:30 )             30.1     01-23    142  |  111<H>  |  33<H>  ----------------------------<  126<H>  3.4<L>   |  22  |  0.81    Ca    8.5      23 Jan 2023 05:30  Phos  2.9     01-23  Mg     1.7     01-23            PTT - ( 23 Jan 2023 05:50 )  PTT:62.9 sec    CAPILLARY BLOOD GLUCOSE      RADIOLOGY & ADDITIONAL TESTS:                   PGY-1 Progress Note discussed with attending    PAGER #: [[phone]] TILL 5:00 PM  PLEASE CONTACT ON CALL TEAM:  - On Call Team (Please refer to Brynn) FROM 5:00 PM - 8:30PM  - Nightfloat Team FROM 8:30 -7:30 AM    CHIEF COMPLAINT & BRIEF HOSPITAL COURSE:    INTERVAL HPI/OVERNIGHT EVENTS:   MEDICATIONS  (STANDING):  dexAMETHasone  Injectable 6 milliGRAM(s) IV Push daily  heparin  Infusion.  Unit(s)/Hr (9 mL/Hr) IV Continuous <Continuous>  influenza  Vaccine (HIGH DOSE) 0.7 milliLiter(s) IntraMuscular once  insulin lispro (ADMELOG) corrective regimen sliding scale   SubCutaneous three times a day before meals  insulin lispro (ADMELOG) corrective regimen sliding scale   SubCutaneous at bedtime  remdesivir  IVPB 100 milliGRAM(s) IV Intermittent every 24 hours  remdesivir  IVPB   IV Intermittent     MEDICATIONS  (PRN):  acetaminophen     Tablet .. 650 milliGRAM(s) Oral every 6 hours PRN Temp greater or equal to 38C (100.4F), Mild Pain (1 - 3)      REVIEW OF SYSTEMS:  CONSTITUTIONAL: No fever  RESPIRATORY: No cough  CARDIOVASCULAR: No chest pain  GASTROINTESTINAL: No abdominal pain  GENITOURINARY: No dysuria  NEUROLOGICAL: No headaches  SKIN: No itching    Vital Signs Last 24 Hrs  T(C): 36.9 (24 Jan 2023 05:47), Max: 37.2 (23 Jan 2023 11:00)  T(F): 98.4 (24 Jan 2023 05:47), Max: 99 (23 Jan 2023 11:00)  HR: 74 (24 Jan 2023 05:47) (60 - 82)  BP: 122/71 (24 Jan 2023 05:47) (115/85 - 161/80)  BP(mean): --  RR: 18 (24 Jan 2023 05:47) (18 - 19)  SpO2: 91% (24 Jan 2023 05:47) (91% - 100%)    Parameters below as of 24 Jan 2023 05:47  Patient On (Oxygen Delivery Method): nasal cannula  O2 Flow (L/min): 3      PHYSICAL EXAMINATION:  GENERAL: NAD, elderly  HEAD:  Atraumatic  EYES:  conjunctiva  NECK: Supple  CHEST/LUNG: Clear to auscultation  HEART: Regular rate and rhythm  ABDOMEN: Soft, Nontender, Nondistended; Bowel sounds present  NERVOUS SYSTEM:  Alert & Oriented X3,    EXTREMITIES:  2+ Peripheral Pulses, No clubbing, cyanosis, or edema  SKIN: warm dry                          10.0   4.63  )-----------( 176      ( 23 Jan 2023 05:30 )             30.1     01-23    142  |  111<H>  |  33<H>  ----------------------------<  126<H>  3.4<L>   |  22  |  0.81    Ca    8.5      23 Jan 2023 05:30  Phos  2.9     01-23  Mg     1.7     01-23            PTT - ( 23 Jan 2023 05:50 )  PTT:62.9 sec    CAPILLARY BLOOD GLUCOSE      RADIOLOGY & ADDITIONAL TESTS:

## 2023-01-24 NOTE — PROGRESS NOTE ADULT - SUBJECTIVE AND OBJECTIVE BOX
CHIEF COMPLAINT:Patient is a 88y old  Female who presents with a chief complaint of shortness of breath.Pt appears comfortable.    	  REVIEW OF SYSTEMS:  CONSTITUTIONAL: No fever, weight loss, or fatigue  EYES: No eye pain, visual disturbances, or discharge  ENT:  No difficulty hearing, tinnitus, vertigo; No sinus or throat pain  NECK: No pain or stiffness  RESPIRATORY: No cough, wheezing, chills or hemoptysis; No Shortness of Breath  CARDIOVASCULAR: No chest pain, palpitations, passing out, dizziness, or leg swelling  GASTROINTESTINAL: No abdominal or epigastric pain. No nausea, vomiting, or hematemesis; No diarrhea or constipation. No melena or hematochezia.  GENITOURINARY: No dysuria, frequency, hematuria, or incontinence  NEUROLOGICAL: No headaches, memory loss, loss of strength, numbness, or tremors  SKIN: No itching, burning, rashes, or lesions   LYMPH Nodes: No enlarged glands  ENDOCRINE: No heat or cold intolerance; No hair loss  MUSCULOSKELETAL: No joint pain or swelling; No muscle, back, or extremity pain  PSYCHIATRIC: No depression, anxiety, mood swings, or difficulty sleeping  HEME/LYMPH: No easy bruising, or bleeding gums  ALLERGY AND IMMUNOLOGIC: No hives or eczema	        PHYSICAL EXAM:  T(C): 36.4 (01-24-23 @ 07:20), Max: 37.2 (01-23-23 @ 11:00)  HR: 72 (01-24-23 @ 07:20) (67 - 82)  BP: 130/60 (01-24-23 @ 07:20) (115/85 - 161/80)  RR: 17 (01-24-23 @ 07:20) (17 - 19)  SpO2: 97% (01-24-23 @ 07:20) (91% - 100%)  Wt(kg): --  I&O's Summary    23 Jan 2023 07:01  -  24 Jan 2023 07:00  --------------------------------------------------------  IN: 630 mL / OUT: 0 mL / NET: 630 mL        Appearance: Normal	  HEENT:   Normal oral mucosa, PERRL, EOMI	  Lymphatic: No lymphadenopathy  Cardiovascular: Normal S1 S2, No JVD, No murmurs, No edema  Respiratory: B/L ronchi	  Psychiatry: A & O x 3, Mood & affect appropriate  Gastrointestinal:  Soft, Non-tender, + BS	  Skin: No rashes, No ecchymoses, No cyanosis	  Neurologic: Non-focal  Extremities: Normal range of motion, No clubbing, cyanosis or edema  Vascular: Peripheral pulses palpable 2+ bilaterally    MEDICATIONS  (STANDING):  dexAMETHasone  Injectable 6 milliGRAM(s) IV Push daily  heparin  Infusion.  Unit(s)/Hr (9 mL/Hr) IV Continuous <Continuous>  influenza  Vaccine (HIGH DOSE) 0.7 milliLiter(s) IntraMuscular once  insulin lispro (ADMELOG) corrective regimen sliding scale   SubCutaneous three times a day before meals  insulin lispro (ADMELOG) corrective regimen sliding scale   SubCutaneous at bedtime  remdesivir  IVPB 100 milliGRAM(s) IV Intermittent every 24 hours  remdesivir  IVPB   IV Intermittent       TELEMETRY: nsr,sinus tach	      	  LABS:	 	    Troponin I, High Sensitivity Result: 275.2 ng/L (01-21 @ 21:02)  Troponin I, High Sensitivity Result: 307.1 ng/L (01-21 @ 16:40)  Troponin I, High Sensitivity Result: 179.4 ng/L (01-21 @ 11:30)                            10.4   3.24  )-----------( 174      ( 24 Jan 2023 07:25 )             31.6     01-24    140  |  109<H>  |  31<H>  ----------------------------<  152<H>  3.7   |  19<L>  |  0.76    Ca    8.8      24 Jan 2023 07:25  Phos  2.4     01-24  Mg     1.6     01-24      proBNP: Serum Pro-Brain Natriuretic Peptide: 830 pg/mL (01-21 @ 11:30)         	      PTT - ( 24 Jan 2023 07:25 )  PTT:72.9 sec

## 2023-01-24 NOTE — PROGRESS NOTE ADULT - SUBJECTIVE AND OBJECTIVE BOX
Patient is seen and examined at the bed side, is afebrile. She is tolerating Remdesivir and Dexamethasone well., and OFF supplemental oxygenation,.      REVIEW OF SYSTEMS: All other review systems are negative        ALLERGIES: gabapentin (Unknown)        Vital Signs Last 24 Hrs  T(C): 36.6 (24 Jan 2023 15:45), Max: 37.2 (24 Jan 2023 04:29)  T(F): 97.9 (24 Jan 2023 15:45), Max: 99 (24 Jan 2023 04:29)  HR: 73 (24 Jan 2023 15:45) (72 - 92)  BP: 111/61 (24 Jan 2023 15:45) (111/61 - 161/80)  BP(mean): --  RR: 18 (24 Jan 2023 15:45) (17 - 19)  SpO2: 98% (24 Jan 2023 15:45) (91% - 100%)    Parameters below as of 24 Jan 2023 15:45  Patient On (Oxygen Delivery Method): room air        PHYSICAL EXAM:  GENERAL: Not in distress   CHEST/LUNG: Not using accessory muscles  EXTREMITIES: No pedal  edema  CNS: Awake and Alert      LABS:                        10.4   3.24  )-----------( 174      ( 24 Jan 2023 07:25 )             31.6                           10.0   4.63  )-----------( 176      ( 23 Jan 2023 05:30 )             30.1       01-24    140  |  109<H>  |  31<H>  ----------------------------<  152<H>  3.7   |  19<L>  |  0.76    Ca    8.8      24 Jan 2023 07:25  Phos  2.4     01-24  Mg     1.6     01-24      01-23    142  |  111<H>  |  33<H>  ----------------------------<  126<H>  3.4<L>   |  22  |  0.81    Ca    8.5      23 Jan 2023 05:30  Phos  2.9     01-23  Mg     1.7     01-23    TPro  6.6  /  Alb  3.1<L>  /  TBili  0.3  /  DBili  x   /  AST  11  /  ALT  20  /  AlkPhos  49  01-21    PT/INR - ( 21 Jan 2023 11:30 )   PT: 13.1 sec;   INR: 1.10 ratio       PTT - ( 22 Jan 2023 10:29 )  PTT:62.5 sec        CAPILLARY BLOOD GLUCOSE  POCT Blood Glucose.: 182 mg/dL (22 Jan 2023 11:43)  POCT Blood Glucose.: 170 mg/dL (22 Jan 2023 07:56)  POCT Blood Glucose.: 160 mg/dL (21 Jan 2023 22:33)    ABG - ( 21 Jan 2023 11:53 )  pH, Arterial: 7.45  pH, Blood: x     /  pCO2: 24    /  pO2: 315   / HCO3: 17    / Base Excess: -5.5  /  SaO2: 98            MEDICATIONS  (STANDING):    dexAMETHasone  Injectable 6 milliGRAM(s) IV Push daily  heparin  Infusion.  Unit(s)/Hr (9 mL/Hr) IV Continuous <Continuous>  influenza  Vaccine (HIGH DOSE) 0.7 milliLiter(s) IntraMuscular once  insulin lispro (ADMELOG) corrective regimen sliding scale   SubCutaneous three times a day before meals  insulin lispro (ADMELOG) corrective regimen sliding scale   SubCutaneous at bedtime  metoprolol tartrate 12.5 milliGRAM(s) Oral two times a day  remdesivir  IVPB 100 milliGRAM(s) IV Intermittent every 24 hours  remdesivir  IVPB   IV Intermittent         RADIOLOGY & ADDITIONAL TESTS:    1/21/23: Xray Chest 1 View- PORTABLE-Urgent (01.21.23 @ 13:16) No acute pulmonary pathology.      1/21/23: CT Angio Chest PE Protocol w/ IV Cont (01.21.23 @ 13:02) >    1.  Bilateral pulmonary emboli.  2.  Likely underlying right heart strain.  3.  The findings were discussed with and read back verification obtained   from Dr. Calvillo on 1/21/2023 at 1315 hours.          MICROBIOLOGY DATA:    Respiratory Viral Panel with COVID-19 by ALEX (01.21.23 @ 11:25)   Rapid RVP Result: Detected   SARS-CoV-2: Detected:

## 2023-01-24 NOTE — PROGRESS NOTE ADULT - ASSESSMENT
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 Patient states she feels fine now but this morning when she was having an endoscopy she was increasingly short of breath and was sent to the ER. On admission, she found to have no fever, but hypoxia, 86% in Room air, requiring supplemental oxygenation. Also found to have positive COVID PCR and B/L Pulmonary embolism. She has started on Remdesivir and Dexamethasone, and the ID consult requested to assist with further evaluation and antibiotic management.    # COVID Pneumonitis - on Remdesivir and Dexamethasone   # Acute Hypoxic Respiratory failure- on oxygen via NC  # B/L Pulmonary  embolisms    would recommend:    1. Supplemental oxygenation and Bronchodilator as needed  2. Monitor ALT while on Remdesivir  3. Continue Remdesivir and Dexamethasone  4. Continue supportive care including Anti coagulation  5, COVID precaution    Attending Attestation:    Spent more than 35 minutes on total encounter, more than 50 % of the visit was spent counseling and/or coordinating care by the Attending physician.

## 2023-01-24 NOTE — PROGRESS NOTE ADULT - PROBLEM SELECTOR PLAN 8
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 Alvarado as outpatient    - [facility] consulted

## 2023-01-24 NOTE — PHYSICAL THERAPY INITIAL EVALUATION ADULT - PERTINENT HX OF CURRENT PROBLEM, REHAB EVAL
admitted due to shorness of breath, chest tightness, cough  COVID-19 infection (1/21/23)  h/o HTN  HLD  DM  Osteoporosis  Stage IIB Gastric Adenocardinoma - s/p distal gastrectomy in 2015 - on active chemo  early multiple myeloma  surgical history of ASH[facility] with BSO  cholecystectomy

## 2023-01-24 NOTE — PROGRESS NOTE ADULT - PROBLEM SELECTOR PLAN 3
CXR noted  Monitor Oxygen sat  Monitor Respiratory status  Check LDH, ferritin, CRP, LFTs, D-Dimer and procalcitonin  Vitamin C supplement. Vit D  zinc supplement  Follow up Covid PCR  IV Dexa/Remdesivir.  ID follow up CXR noted  Monitor Oxygen sat  Monitor Respiratory status  Monitor LDH, ferritin, CRP, LFTs, D-Dimer and procalcitonin  Vitamin C supplement. Vit D  zinc supplement  Follow up Covid PCR  IV Dexa/Remdesivir.  ID follow up

## 2023-01-24 NOTE — PROGRESS NOTE ADULT - ASSESSMENT
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 Patient states she feels fine now but this morning when she was having an endoscopy she was increasingly short of breath and was sent to the ED.     #Multiple Myeloma  #Covid +  #PE  pt follows with Oncologist Dr. Alvarado  currently on maintenance Velcade/Daratumumab/Revlimid/Dex, last given 1/18/23  D-dimer 6720-->2435  CTA shows B/L PE, likely Right heart strain  Rec's:  -hold chemo tx during admission  -daily CBC  -continue heparin gtt for PE  -Echo is nl, no right heart failure  -Covid mgmt as per Medicine/ID Teams on remdesivir/dex    Thank you for the referral. Will continue to monitor the patient.  Please call with any questions [phone]  Above reviewed with Attending Dr. 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 Patient states she feels fine now but this morning when she was having an endoscopy she was increasingly short of breath and was sent to the ED.     #Multiple Myeloma  #Covid +  #PE  pt follows with Oncologist Dr. Alvarado  currently on maintenance Velcade/Daratumumab/Revlimid/Dex, last given 1/18/23  D-dimer 6720-->2435  CTA shows B/L PE, likely Right heart strain  Rec's:  -hold chemo tx during admission  -daily CBC  -change heparin gtt to PO---> DOAC  -Echo is nl, no right heart failure  -Covid mgmt as per Medicine/ID Teams on remdesivir/dex    Thank you for the referral. Will continue to monitor the patient.  Please call with any questions [phone]  Above reviewed with Attending Dr. Alvarado  [facility]  [address] [facility], [address], [place], [place]  [phone]  *Note not finalized until signed by Attending Physician

## 2023-01-24 NOTE — PROGRESS NOTE ADULT - PROBLEM SELECTOR PLAN 1
patient presents with acute shortness of breath and productive cough and hypoxia to 86% on Room air. s/p BiPAP in ED, de-escalated to 4 L NC and saturating to 98% now  CXR negative, RVP + for COVID  also CT angio performed in ED which showed bilateral pulmonary embolism in the distral R main PA, R upper/middle/lower lobes and left upper segmental arteries, left lower lobar artery and left lower segmental arteries with likely underlying heart strain  started on heparin drip in ED  s/p dexamethasone IV 6 mg in ED  PE likely provoked in the setting of multiple myeloma  pt is COVID vaccinated x 3    - isolation precautions  - IV decadron  - remdesivir  - c/w heparin drip for AC  - [facility] consulted (follows with Dr. Alvarado)  - wean oxygen as tolerated  - TTE neg for right heart strain patient presents with acute shortness of breath and productive cough and hypoxia to 86% on Room air. s/p BiPAP in ED, de-escalated to 4 L NC and saturating to 98% now  CXR negative, RVP + for COVID  also CT angio performed in ED which showed bilateral pulmonary embolism in the distral R main PA, R upper/middle/lower lobes and left upper segmental arteries, left lower lobar artery and left lower segmental arteries with likely underlying heart strain  started on heparin drip in ED  s/p dexamethasone IV 6 mg in ED  PE likely provoked in the setting of multiple myeloma  pt is COVID vaccinated x 3    - isolation precautions  - IV decadron  - remdesivir  - c/w heparin drip for AC  - [facility] consulted (follows with Dr. Alvarado): no treatment for MM while inpatient  - wean oxygen as tolerated; on ROOM AIR  - TTE neg for right heart strain

## 2023-01-24 NOTE — PHYSICAL THERAPY INITIAL EVALUATION ADULT - TRANSFER SAFETY CONCERNS NOTED: SIT/STAND, REHAB EVAL
patient verbalized fear of falling/decreased safety awareness/losing balance/decreased sequencing ability

## 2023-01-24 NOTE — PROGRESS NOTE ADULT - SUBJECTIVE AND OBJECTIVE BOX
MEDICATIONS  (STANDING):  dexAMETHasone  Injectable 6 milliGRAM(s) IV Push daily  heparin  Infusion.  Unit(s)/Hr (9 mL/Hr) IV Continuous <Continuous>  influenza  Vaccine (HIGH DOSE) 0.7 milliLiter(s) IntraMuscular once  insulin lispro (ADMELOG) corrective regimen sliding scale   SubCutaneous three times a day before meals  insulin lispro (ADMELOG) corrective regimen sliding scale   SubCutaneous at bedtime  metoprolol tartrate 12.5 milliGRAM(s) Oral two times a day  remdesivir  IVPB 100 milliGRAM(s) IV Intermittent every 24 hours  remdesivir  IVPB   IV Intermittent     MEDICATIONS  (PRN):  acetaminophen     Tablet .. 650 milliGRAM(s) Oral every 6 hours PRN Temp greater or equal to 38C (100.4F), Mild Pain (1 - 3)    CAPILLARY BLOOD GLUCOSE      POCT Blood Glucose.: 219 mg/dL (24 Jan 2023 11:27)  POCT Blood Glucose.: 162 mg/dL (23 Jan 2023 21:41)  POCT Blood Glucose.: 163 mg/dL (23 Jan 2023 21:05)  POCT Blood Glucose.: 132 mg/dL (23 Jan 2023 17:18)  POCT Blood Glucose.: 142 mg/dL (23 Jan 2023 17:02)    I&O's Summary    23 Jan 2023 07:01  -  24 Jan 2023 07:00  --------------------------------------------------------  IN: 630 mL / OUT: 0 mL / NET: 630 mL    24 Jan 2023 07:01  -  24 Jan 2023 12:26  --------------------------------------------------------  IN: 236 mL / OUT: 0 mL / NET: 236 mL        PHYSICAL EXAM:  Vital Signs Last 24 Hrs  T(C): 36.4 (24 Jan 2023 07:20), Max: 37.2 (24 Jan 2023 04:29)  T(F): 97.6 (24 Jan 2023 07:20), Max: 99 (24 Jan 2023 04:29)  HR: 72 (24 Jan 2023 07:20) (72 - 82)  BP: 130/60 (24 Jan 2023 07:20) (122/71 - 161/80)  BP(mean): --  RR: 17 (24 Jan 2023 07:20) (17 - 19)  SpO2: 97% (24 Jan 2023 07:20) (91% - 100%)    Parameters below as of 24 Jan 2023 07:20  Patient On (Oxygen Delivery Method): nasal cannula  O2 Flow (L/min): 3      LABS:                        10.4   3.24  )-----------( 174      ( 24 Jan 2023 07:25 )             31.6     01-24    140  |  109<H>  |  31<H>  ----------------------------<  152<H>  3.7   |  19<L>  |  0.76    Ca    8.8      24 Jan 2023 07:25  Phos  2.4     01-24  Mg     1.6     01-24      PTT - ( 24 Jan 2023 07:25 )  PTT:72.9 sec          SARS-CoV-2: Detected (21 Jan 2023 11:25)  SARS-CoV-2: NotDetec (27 Nov 2022 14:30)      RADIOLOGY & ADDITIONAL TESTS:     Medicine Progress Note    Patient is a 88y old  Female who presents with a chief complaint of shortness of breath      SUBJECTIVE / OVERNIGHT EVENTS: events noted      MEDICATIONS  (STANDING):  dexAMETHasone  Injectable 6 milliGRAM(s) IV Push daily  heparin  Infusion.  Unit(s)/Hr (9 mL/Hr) IV Continuous <Continuous>  influenza  Vaccine (HIGH DOSE) 0.7 milliLiter(s) IntraMuscular once  insulin lispro (ADMELOG) corrective regimen sliding scale   SubCutaneous three times a day before meals  insulin lispro (ADMELOG) corrective regimen sliding scale   SubCutaneous at bedtime  metoprolol tartrate 12.5 milliGRAM(s) Oral two times a day  remdesivir  IVPB 100 milliGRAM(s) IV Intermittent every 24 hours  remdesivir  IVPB   IV Intermittent     MEDICATIONS  (PRN):  acetaminophen     Tablet .. 650 milliGRAM(s) Oral every 6 hours PRN Temp greater or equal to 38C (100.4F), Mild Pain (1 - 3)    CAPILLARY BLOOD GLUCOSE      POCT Blood Glucose.: 219 mg/dL (24 Jan 2023 11:27)  POCT Blood Glucose.: 162 mg/dL (23 Jan 2023 21:41)  POCT Blood Glucose.: 163 mg/dL (23 Jan 2023 21:05)  POCT Blood Glucose.: 132 mg/dL (23 Jan 2023 17:18)  POCT Blood Glucose.: 142 mg/dL (23 Jan 2023 17:02)    I&O's Summary    23 Jan 2023 07:01  -  24 Jan 2023 07:00  --------------------------------------------------------  IN: 630 mL / OUT: 0 mL / NET: 630 mL    24 Jan 2023 07:01  -  24 Jan 2023 13:16  --------------------------------------------------------  IN: 236 mL / OUT: 0 mL / NET: 236 mL        PHYSICAL EXAM:  Vital Signs Last 24 Hrs  T(C): 36.4 (24 Jan 2023 07:20), Max: 37.2 (24 Jan 2023 04:29)  T(F): 97.6 (24 Jan 2023 07:20), Max: 99 (24 Jan 2023 04:29)  HR: 92 (24 Jan 2023 13:01) (72 - 92)  BP: 130/60 (24 Jan 2023 07:20) (122/71 - 161/80)  BP(mean): --  RR: 17 (24 Jan 2023 07:20) (17 - 19)  SpO2: 97% (24 Jan 2023 13:01) (91% - 100%)    Parameters below as of 24 Jan 2023 13:01  Patient On (Oxygen Delivery Method): room air    GEN: NAD; A and O x 3  LUNGS: CTA, on RA  HEART: S1 S2  ABDOMEN: soft, non-tender, non-distended, + BS  EXTREMITIES: no edema      LABS:                        10.4   3.24  )-----------( 174      ( 24 Jan 2023 07:25 )             31.6     01-24    140  |  109<H>  |  31<H>  ----------------------------<  152<H>  3.7   |  19<L>  |  0.76    Ca    8.8      24 Jan 2023 07:25  Phos  2.4     01-24  Mg     1.6     01-24      PTT - ( 24 Jan 2023 07:25 )  PTT:72.9 sec          SARS-CoV-2: Detected (21 Jan 2023 11:25)  SARS-CoV-2: NotDetec (27 Nov 2022 14:30)                 Patient is a 88y old  Female who presents with a chief complaint of shortness of breath      SUBJECTIVE / OVERNIGHT EVENTS: events noted      MEDICATIONS  (STANDING):  dexAMETHasone  Injectable 6 milliGRAM(s) IV Push daily  heparin  Infusion.  Unit(s)/Hr (9 mL/Hr) IV Continuous <Continuous>  influenza  Vaccine (HIGH DOSE) 0.7 milliLiter(s) IntraMuscular once  insulin lispro (ADMELOG) corrective regimen sliding scale   SubCutaneous three times a day before meals  insulin lispro (ADMELOG) corrective regimen sliding scale   SubCutaneous at bedtime  metoprolol tartrate 12.5 milliGRAM(s) Oral two times a day  remdesivir  IVPB 100 milliGRAM(s) IV Intermittent every 24 hours  remdesivir  IVPB   IV Intermittent     MEDICATIONS  (PRN):  acetaminophen     Tablet .. 650 milliGRAM(s) Oral every 6 hours PRN Temp greater or equal to 38C (100.4F), Mild Pain (1 - 3)    CAPILLARY BLOOD GLUCOSE      POCT Blood Glucose.: 219 mg/dL (24 Jan 2023 11:27)  POCT Blood Glucose.: 162 mg/dL (23 Jan 2023 21:41)  POCT Blood Glucose.: 163 mg/dL (23 Jan 2023 21:05)  POCT Blood Glucose.: 132 mg/dL (23 Jan 2023 17:18)  POCT Blood Glucose.: 142 mg/dL (23 Jan 2023 17:02)    I&O's Summary    23 Jan 2023 07:01  -  24 Jan 2023 07:00  --------------------------------------------------------  IN: 630 mL / OUT: 0 mL / NET: 630 mL    24 Jan 2023 07:01  -  24 Jan 2023 13:16  --------------------------------------------------------  IN: 236 mL / OUT: 0 mL / NET: 236 mL        PHYSICAL EXAM:  Vital Signs Last 24 Hrs  T(C): 36.4 (24 Jan 2023 07:20), Max: 37.2 (24 Jan 2023 04:29)  T(F): 97.6 (24 Jan 2023 07:20), Max: 99 (24 Jan 2023 04:29)  HR: 92 (24 Jan 2023 13:01) (72 - 92)  BP: 130/60 (24 Jan 2023 07:20) (122/71 - 161/80)  BP(mean): --  RR: 17 (24 Jan 2023 07:20) (17 - 19)  SpO2: 97% (24 Jan 2023 13:01) (91% - 100%)    Parameters below as of 24 Jan 2023 13:01  Patient On (Oxygen Delivery Method): room air    GEN: NAD; A and O x 3  LUNGS: CTA, on RA  HEART: S1 S2  ABDOMEN: soft, non-tender, non-distended, + BS  EXTREMITIES: no edema      LABS:                        10.4   3.24  )-----------( 174      ( 24 Jan 2023 07:25 )             31.6     01-24    140  |  109<H>  |  31<H>  ----------------------------<  152<H>  3.7   |  19<L>  |  0.76    Ca    8.8      24 Jan 2023 07:25  Phos  2.4     01-24  Mg     1.6     01-24      PTT - ( 24 Jan 2023 07:25 )  PTT:72.9 sec          SARS-CoV-2: Detected (21 Jan 2023 11:25)  SARS-CoV-2: NotDetec (27 Nov 2022 14:30)

## 2023-01-24 NOTE — PROGRESS NOTE ADULT - SUBJECTIVE AND OBJECTIVE BOX
pt seen in icu [  ], reg med floor [   ], bed [  ], chair at bedside [   ]  Awake, alert, comfortable, laying  in bed in NAD. Less sob and no cough.    REVIEW OF SYSTEMS:    CONSTITUTIONAL: No weakness, fevers or chills  EYES/ENT: No visual changes;  No vertigo or throat pain   NECK: No pain or stiffness  RESPIRATORY: No cough, wheezing, hemoptysis; No shortness of breath  CARDIOVASCULAR: No chest pain or palpitations  GASTROINTESTINAL: No abdominal or epigastric pain. No nausea, vomiting, or hematemesis; No diarrhea or constipation. No melena or hematochezia.  GENITOURINARY: No dysuria, frequency or hematuria  NEUROLOGICAL: No numbness or weakness  SKIN: No itching, burning, rashes, or lesions   All other review of systems is negative unless indicated above.    Physical Exam    General: WN/WD NAD  Neurology: A&Ox3, nonfocal, JACKMAN x 4  Respiratory: CTA B/L  CV: RRR, S1S2, no murmurs, rubs or gallops  Abdominal: Soft, NT, ND +BS, Last BM  Extremities: No edema, + peripheral pulses      Allergies  gabapentin (Unknown)      Health Issues  Other pulmonary embolism without acute cor pulmonale    HTN (hypertension)    DM (diabetes mellitus)    Hypercholesteremia    Gastric adenocarcinoma    Vertigo    Dementia    S/P hysterectomy    S/P tubal ligation        Vitals  T(F): 98.6 (01-23-23 @ 23:40), Max: 99 (01-23-23 @ 11:00)  HR: 78 (01-23-23 @ 23:40) (60 - 82)  BP: 155/84 (01-23-23 @ 23:40) (115/85 - 161/80)  RR: 18 (01-23-23 @ 23:40) (18 - 19)  SpO2: 97% (01-23-23 @ 23:40) (97% - 100%)  Wt(kg): --  CAPILLARY BLOOD GLUCOSE      POCT Blood Glucose.: 162 mg/dL (23 Jan 2023 21:41)      Labs                          10.0   4.63  )-----------( 176      ( 23 Jan 2023 05:30 )             30.1       01-23    142  |  111<H>  |  33<H>  ----------------------------<  126<H>  3.4<L>   |  22  |  0.81    Ca    8.5      23 Jan 2023 05:30  Phos  2.9     01-23  Mg     1.7     01-23              Radiology Results      Meds    MEDICATIONS  (STANDING):  dexAMETHasone  Injectable 6 milliGRAM(s) IV Push daily  heparin  Infusion.  Unit(s)/Hr (9 mL/Hr) IV Continuous <Continuous>  influenza  Vaccine (HIGH DOSE) 0.7 milliLiter(s) IntraMuscular once  insulin lispro (ADMELOG) corrective regimen sliding scale   SubCutaneous three times a day before meals  insulin lispro (ADMELOG) corrective regimen sliding scale   SubCutaneous at bedtime  remdesivir  IVPB 100 milliGRAM(s) IV Intermittent every 24 hours  remdesivir  IVPB   IV Intermittent       MEDICATIONS  (PRN):  acetaminophen     Tablet .. 650 milliGRAM(s) Oral every 6 hours PRN Temp greater or equal to 38C (100.4F), Mild Pain (1 - 3)         pt seen in icu [  ], reg med floor [ x  ], bed [ x ], chair at bedside [   ]  Awake, alert, comfortable, laying  in bed in NAD. Less sob and no cough. Good appetite. Remains on isolation due to Covid    REVIEW OF SYSTEMS:    CONSTITUTIONAL: No weakness, fevers or chills  EYES/ENT: No visual changes;  No vertigo or throat pain   NECK: No pain or stiffness  RESPIRATORY: No cough, wheezing, hemoptysis; No shortness of breath  CARDIOVASCULAR: No chest pain or palpitations  GASTROINTESTINAL: No abdominal or epigastric pain. No nausea, vomiting, or hematemesis; No diarrhea or constipation. No melena or hematochezia.  GENITOURINARY: No dysuria, frequency or hematuria  NEUROLOGICAL: No numbness or weakness  SKIN: No itching, burning, rashes, or lesions   All other review of systems is negative unless indicated above.    Physical Exam    General: WN/WD NAD  Neurology: A&Ox3, nonfocal, JACKMAN x 4  Respiratory: CTA B/L  CV: RRR, S1S2, no murmurs, rubs or gallops  Abdominal: Soft, NT, ND +BS, Last BM  Extremities: No edema, + peripheral pulses      Allergies  gabapentin (Unknown)      Health Issues  Other pulmonary embolism without acute cor pulmonale    HTN (hypertension)    DM (diabetes mellitus)    Hypercholesteremia    Gastric adenocarcinoma    Vertigo    Dementia    S/P hysterectomy    S/P tubal ligation        Vitals  T(F): 98.6 (01-23-23 @ 23:40), Max: 99 (01-23-23 @ 11:00)  HR: 78 (01-23-23 @ 23:40) (60 - 82)  BP: 155/84 (01-23-23 @ 23:40) (115/85 - 161/80)  RR: 18 (01-23-23 @ 23:40) (18 - 19)  SpO2: 97% (01-23-23 @ 23:40) (97% - 100%)  Wt(kg): --  CAPILLARY BLOOD GLUCOSE      POCT Blood Glucose.: 162 mg/dL (23 Jan 2023 21:41)      Labs                          10.0   4.63  )-----------( 176      ( 23 Jan 2023 05:30 )             30.1       01-23    142  |  111<H>  |  33<H>  ----------------------------<  126<H>  3.4<L>   |  22  |  0.81    Ca    8.5      23 Jan 2023 05:30  Phos  2.9     01-23  Mg     1.7     01-23              Radiology Results      Meds    MEDICATIONS  (STANDING):  dexAMETHasone  Injectable 6 milliGRAM(s) IV Push daily  heparin  Infusion.  Unit(s)/Hr (9 mL/Hr) IV Continuous <Continuous>  influenza  Vaccine (HIGH DOSE) 0.7 milliLiter(s) IntraMuscular once  insulin lispro (ADMELOG) corrective regimen sliding scale   SubCutaneous three times a day before meals  insulin lispro (ADMELOG) corrective regimen sliding scale   SubCutaneous at bedtime  remdesivir  IVPB 100 milliGRAM(s) IV Intermittent every 24 hours  remdesivir  IVPB   IV Intermittent       MEDICATIONS  (PRN):  acetaminophen     Tablet .. 650 milliGRAM(s) Oral every 6 hours PRN Temp greater or equal to 38C (100.4F), Mild Pain (1 - 3)

## 2023-01-24 NOTE — PHYSICAL THERAPY INITIAL EVALUATION ADULT - GENERAL OBSERVATIONS, REHAB EVAL
Janna Majano     Can you assist me with getting patient scheduled for an EMG.   awake, alert, NAD; +peripheral IV access on left forearm;

## 2023-01-24 NOTE — PROGRESS NOTE ADULT - ASSESSMENT
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  1.Tele  monitoring.  2.Echocardiogram-nl RV fx.  3.COVID+-ID f/u,remdesmivir,dexamethasone.  4.Gastric ca and MM-Heme/Onc eval noted..  5.DM-Insulin.  6.HTN-add low dose b blocker.  7.B/L PE-heparin drip for now.  8.PPI.

## 2023-01-25 ENCOUNTER — TRANSCRIPTION ENCOUNTER (OUTPATIENT)
Age: 88
End: 2023-01-25

## 2023-01-25 DIAGNOSIS — R19.7 DIARRHEA, UNSPECIFIED: ICD-10-CM

## 2023-01-25 LAB
ANION GAP SERPL CALC-SCNC: 10 MMOL/L — SIGNIFICANT CHANGE UP (ref 5–17)
APTT BLD: 42.3 SEC — HIGH (ref 27.5–35.5)
BUN SERPL-MCNC: 39 MG/DL — HIGH (ref 7–18)
CALCIUM SERPL-MCNC: 9.2 MG/DL — SIGNIFICANT CHANGE UP (ref 8.4–10.5)
CHLORIDE SERPL-SCNC: 110 MMOL/L — HIGH (ref 96–108)
CO2 SERPL-SCNC: 19 MMOL/L — LOW (ref 22–31)
CREAT SERPL-MCNC: 0.81 MG/DL — SIGNIFICANT CHANGE UP (ref 0.5–1.3)
EGFR: 71 ML/MIN/1.73M2 — SIGNIFICANT CHANGE UP
GLUCOSE BLDC GLUCOMTR-MCNC: 120 MG/DL — HIGH (ref 70–99)
GLUCOSE BLDC GLUCOMTR-MCNC: 164 MG/DL — HIGH (ref 70–99)
GLUCOSE BLDC GLUCOMTR-MCNC: 171 MG/DL — HIGH (ref 70–99)
GLUCOSE BLDC GLUCOMTR-MCNC: 195 MG/DL — HIGH (ref 70–99)
GLUCOSE SERPL-MCNC: 189 MG/DL — HIGH (ref 70–99)
HCT VFR BLD CALC: 34.6 % — SIGNIFICANT CHANGE UP (ref 34.5–45)
HGB BLD-MCNC: 11.3 G/DL — LOW (ref 11.5–15.5)
MCHC RBC-ENTMCNC: 31.3 PG — SIGNIFICANT CHANGE UP (ref 27–34)
MCHC RBC-ENTMCNC: 32.7 GM/DL — SIGNIFICANT CHANGE UP (ref 32–36)
MCV RBC AUTO: 95.8 FL — SIGNIFICANT CHANGE UP (ref 80–100)
NRBC # BLD: 0 /100 WBCS — SIGNIFICANT CHANGE UP (ref 0–0)
PLATELET # BLD AUTO: 189 K/UL — SIGNIFICANT CHANGE UP (ref 150–400)
POTASSIUM SERPL-MCNC: 3.6 MMOL/L — SIGNIFICANT CHANGE UP (ref 3.5–5.3)
POTASSIUM SERPL-SCNC: 3.6 MMOL/L — SIGNIFICANT CHANGE UP (ref 3.5–5.3)
RBC # BLD: 3.61 M/UL — LOW (ref 3.8–5.2)
RBC # FLD: 16.2 % — HIGH (ref 10.3–14.5)
SODIUM SERPL-SCNC: 139 MMOL/L — SIGNIFICANT CHANGE UP (ref 135–145)
WBC # BLD: 3.15 K/UL — LOW (ref 3.8–10.5)
WBC # FLD AUTO: 3.15 K/UL — LOW (ref 3.8–10.5)

## 2023-01-25 RX ORDER — LOPERAMIDE HCL 2 MG
2 TABLET ORAL ONCE
Refills: 0 | Status: COMPLETED | OUTPATIENT
Start: 2023-01-25 | End: 2023-01-25

## 2023-01-25 RX ORDER — MEGESTROL ACETATE 40 MG/ML
10 SUSPENSION ORAL
Qty: 0 | Refills: 0 | DISCHARGE

## 2023-01-25 RX ORDER — METOPROLOL TARTRATE 50 MG
25 TABLET ORAL
Refills: 0 | Status: DISCONTINUED | OUTPATIENT
Start: 2023-01-25 | End: 2023-01-26

## 2023-01-25 RX ORDER — APIXABAN 2.5 MG/1
1 TABLET, FILM COATED ORAL
Qty: 60 | Refills: 0
Start: 2023-01-25 | End: 2023-02-23

## 2023-01-25 RX ORDER — APIXABAN 2.5 MG/1
5 TABLET, FILM COATED ORAL EVERY 12 HOURS
Refills: 0 | Status: DISCONTINUED | OUTPATIENT
Start: 2023-01-25 | End: 2023-01-26

## 2023-01-25 RX ORDER — VALACYCLOVIR 500 MG/1
1 TABLET, FILM COATED ORAL
Qty: 0 | Refills: 0 | DISCHARGE

## 2023-01-25 RX ADMIN — Medication 1: at 18:10

## 2023-01-25 RX ADMIN — Medication 12.5 MILLIGRAM(S): at 05:29

## 2023-01-25 RX ADMIN — APIXABAN 5 MILLIGRAM(S): 2.5 TABLET, FILM COATED ORAL at 18:10

## 2023-01-25 RX ADMIN — HEPARIN SODIUM 1100 UNIT(S)/HR: 5000 INJECTION INTRAVENOUS; SUBCUTANEOUS at 09:51

## 2023-01-25 RX ADMIN — Medication 6 MILLIGRAM(S): at 05:27

## 2023-01-25 RX ADMIN — Medication 1: at 12:53

## 2023-01-25 RX ADMIN — HEPARIN SODIUM 1100 UNIT(S)/HR: 5000 INJECTION INTRAVENOUS; SUBCUTANEOUS at 07:19

## 2023-01-25 RX ADMIN — Medication 25 MILLIGRAM(S): at 18:12

## 2023-01-25 RX ADMIN — Medication 1: at 08:13

## 2023-01-25 RX ADMIN — Medication 2 MILLIGRAM(S): at 18:10

## 2023-01-25 NOTE — PROGRESS NOTE ADULT - PROBLEM SELECTOR PLAN 8
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 Alvarado as outpatient    - [facility] consulted pt has h/o HTN, takes simvastatin 10 mg qhs    - c/w home meds

## 2023-01-25 NOTE — DISCHARGE NOTE PROVIDER - HOSPITAL COURSE
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 No heme/onc treatement while inpatient. Pt with DM controlled with ISS.    Acute respiratory failure with hypoxia secondary to COVID + PE  Pt p/w sob + productive cough and hypoxia to 86% on Room air. s/p BiPAP in ED, de-escalated to 4 L NC and saturating to 98% now. CXR negative, RVP + for COVID . CTA chest showing b/l PE in distal R main PA, R upper/middle/lower lobes and left upper segmental arteries, left lower lobar artery and left lower segmental arteries with likely underlying heart strain. Pt started on heparin gtt in ed. PE likely provoked in setting of COVID and multiple myeloma. Pt is COVID vaccinated x 3. TTE neg for right heart strain. Pt with elevated troponin on admission peaked at 307 and downtrended.     Patient is able to ambulate and tolerate diet prior to discharge. Patient is stable for discharge per attending and is advised to follow up with PCP as outpatient. Please refer to patient's complete medical chart with documents for a full hospital course, for this is only a brief summary.   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 No heme/onc treatement while inpatient. Pt with DM controlled with ISS.    Acute respiratory failure with hypoxia secondary to COVID + PE  Pt p/w sob + productive cough and hypoxia to 86% on Room air. s/p BiPAP in ED, de-escalated to 4 L NC and saturating to 98% now. CXR negative, RVP + for COVID . CTA chest showing b/l PE in distal R main PA, R upper/middle/lower lobes and left upper segmental arteries, left lower lobar artery and left lower segmental arteries with likely underlying heart strain. Pt started on heparin gtt in ed. PE likely provoked in setting of COVID and multiple myeloma. Pt is COVID vaccinated x 3. TTE neg for right heart strain. Pt with elevated troponin on admission peaked at 307 and downtrended.  Pt to bedischarged on eliquis 5 bid Heme/onc consulted for PE.    Patient is able to ambulate and tolerate diet prior to discharge. Patient is stable for discharge per attending and is advised to follow up with PCP as outpatient. Please refer to patient's complete medical chart with documents for a full hospital course, for this is only a brief summary.

## 2023-01-25 NOTE — PROGRESS NOTE ADULT - PROBLEM SELECTOR PLAN 9
dvt ppx on heparin drip Gastric adenocarcinoma, s/p resection 2015 (on chemo) and multiple myeloma  Heme/Onc Dr. Alvarado as outpatient    - [facility] consulted

## 2023-01-25 NOTE — PROGRESS NOTE ADULT - SUBJECTIVE AND OBJECTIVE BOX
PGY-1 Progress Note discussed with attending    PAGER #: [[phone]] TILL 5:00 PM  PLEASE CONTACT ON CALL TEAM:  - On Call Team (Please refer to Brynn) FROM 5:00 PM - 8:30PM  - Nightfloat Team FROM 8:30 -7:30 AM    CHIEF COMPLAINT & BRIEF HOSPITAL COURSE:    INTERVAL HPI/OVERNIGHT EVENTS:   MEDICATIONS  (STANDING):  dexAMETHasone  Injectable 6 milliGRAM(s) IV Push daily  heparin  Infusion.  Unit(s)/Hr (9 mL/Hr) IV Continuous <Continuous>  influenza  Vaccine (HIGH DOSE) 0.7 milliLiter(s) IntraMuscular once  insulin lispro (ADMELOG) corrective regimen sliding scale   SubCutaneous three times a day before meals  insulin lispro (ADMELOG) corrective regimen sliding scale   SubCutaneous at bedtime  metoprolol tartrate 12.5 milliGRAM(s) Oral two times a day  remdesivir  IVPB 100 milliGRAM(s) IV Intermittent every 24 hours  remdesivir  IVPB   IV Intermittent     MEDICATIONS  (PRN):  acetaminophen     Tablet .. 650 milliGRAM(s) Oral every 6 hours PRN Temp greater or equal to 38C (100.4F), Mild Pain (1 - 3)      REVIEW OF SYSTEMS:  CONSTITUTIONAL: No fever, weight loss, or fatigue  RESPIRATORY: No cough, wheezing, chills or hemoptysis; No shortness of breath  CARDIOVASCULAR: No chest pain, palpitations, dizziness, or leg swelling  GASTROINTESTINAL: No abdominal pain. No nausea, vomiting, or diarrhea.  GENITOURINARY: No dysuria or hematuria, urinary frequency  NEUROLOGICAL: No headaches, memory loss, loss of strength, numbness, or tremors  SKIN: No itching, burning, rashes, or lesions     Vital Signs Last 24 Hrs  T(C): 36.4 (25 Jan 2023 04:54), Max: 36.7 (24 Jan 2023 19:32)  T(F): 97.5 (25 Jan 2023 04:54), Max: 98.1 (24 Jan 2023 23:54)  HR: 72 (25 Jan 2023 04:54) (72 - 92)  BP: 149/71 (25 Jan 2023 04:54) (111/61 - 153/79)  BP(mean): --  RR: 19 (25 Jan 2023 04:54) (18 - 19)  SpO2: 95% (25 Jan 2023 04:54) (95% - 98%)    Parameters below as of 25 Jan 2023 04:54  Patient On (Oxygen Delivery Method): room air        PHYSICAL EXAMINATION:  GENERAL: NAD, well built  HEAD:  Atraumatic, Normocephalic  EYES:  conjunctiva and sclera clear  NECK: Supple, No JVD, Normal thyroid  CHEST/LUNG: Clear to auscultation. No rales, rhonchi, wheezing, or rubs  HEART: Regular rate and rhythm; No murmurs, rubs, or gallops  ABDOMEN: Soft, Nontender, Nondistended; Bowel sounds present  NERVOUS SYSTEM:  Alert & Oriented X3,    EXTREMITIES:  2+ Peripheral Pulses, No clubbing, cyanosis, or edema  SKIN: warm dry                          10.4   3.24  )-----------( 174      ( 24 Jan 2023 07:25 )             31.6     01-24    140  |  109<H>  |  31<H>  ----------------------------<  152<H>  3.7   |  19<L>  |  0.76    Ca    8.8      24 Jan 2023 07:25  Phos  2.4     01-24  Mg     1.6     01-24            PTT - ( 24 Jan 2023 07:25 )  PTT:72.9 sec    CAPILLARY BLOOD GLUCOSE      RADIOLOGY & ADDITIONAL TESTS:                   PGY-1 Progress Note discussed with attending    PAGER #: [[phone]] TILL 5:00 PM  PLEASE CONTACT ON CALL TEAM:  - On Call Team (Please refer to Brynn) FROM 5:00 PM - 8:30PM  - Nightfloat Team FROM 8:30 -7:30 AM    INTERVAL HPI/OVERNIGHT EVENTS:   MEDICATIONS  (STANDING):  dexAMETHasone  Injectable 6 milliGRAM(s) IV Push daily  heparin  Infusion.  Unit(s)/Hr (9 mL/Hr) IV Continuous <Continuous>  influenza  Vaccine (HIGH DOSE) 0.7 milliLiter(s) IntraMuscular once  insulin lispro (ADMELOG) corrective regimen sliding scale   SubCutaneous three times a day before meals  insulin lispro (ADMELOG) corrective regimen sliding scale   SubCutaneous at bedtime  metoprolol tartrate 12.5 milliGRAM(s) Oral two times a day  remdesivir  IVPB 100 milliGRAM(s) IV Intermittent every 24 hours  remdesivir  IVPB   IV Intermittent     MEDICATIONS  (PRN):  acetaminophen     Tablet .. 650 milliGRAM(s) Oral every 6 hours PRN Temp greater or equal to 38C (100.4F), Mild Pain (1 - 3)      REVIEW OF SYSTEMS:  CONSTITUTIONAL: No fever, weight loss, or fatigue  RESPIRATORY: No cough, wheezing, chills or hemoptysis; No shortness of breath  CARDIOVASCULAR: No chest pain, palpitations, dizziness, or leg swelling  GASTROINTESTINAL: DIARRHEA   GENITOURINARY: No dysuria or hematuria, urinary frequency  NEUROLOGICAL: No headaches, memory loss, loss of strength, numbness, or tremors  SKIN: No itching, burning, rashes, or lesions     Vital Signs Last 24 Hrs  T(C): 36.4 (25 Jan 2023 04:54), Max: 36.7 (24 Jan 2023 19:32)  T(F): 97.5 (25 Jan 2023 04:54), Max: 98.1 (24 Jan 2023 23:54)  HR: 72 (25 Jan 2023 04:54) (72 - 92)  BP: 149/71 (25 Jan 2023 04:54) (111/61 - 153/79)  BP(mean): --  RR: 19 (25 Jan 2023 04:54) (18 - 19)  SpO2: 95% (25 Jan 2023 04:54) (95% - 98%)    Parameters below as of 25 Jan 2023 04:54  Patient On (Oxygen Delivery Method): room air        PHYSICAL EXAMINATION:  GENERAL: NAD, elderly  HEAD:  Atraumatic, Normocephalic  EYES:  conjunctiva and sclera clear  CHEST/LUNG: Clear to auscultation. No rales, rhonchi, wheezing, or rubs  HEART: Regular rate and rhythm; No murmurs, rubs, or gallops  ABDOMEN: Soft, Nontender, Nondistended; Bowel sounds present  NERVOUS SYSTEM:  Alert & Oriented X3,    EXTREMITIES:  2+ Peripheral Pulses, No clubbing, cyanosis, or edema  SKIN: warm dry                          10.4   3.24  )-----------( 174      ( 24 Jan 2023 07:25 )             31.6     01-24    140  |  109<H>  |  31<H>  ----------------------------<  152<H>  3.7   |  19<L>  |  0.76    Ca    8.8      24 Jan 2023 07:25  Phos  2.4     01-24  Mg     1.6     01-24            PTT - ( 24 Jan 2023 07:25 )  PTT:72.9 sec    CAPILLARY BLOOD GLUCOSE      RADIOLOGY & ADDITIONAL TESTS:

## 2023-01-25 NOTE — PROGRESS NOTE ADULT - SUBJECTIVE AND OBJECTIVE BOX
Patient is seen and examined at the bed side, is afebrile. She is doping very well and remains OFF supplemental oxygenation,.      REVIEW OF SYSTEMS: All other review systems are negative      ALLERGIES: gabapentin (Unknown)      Vital Signs Last 24 Hrs  T(C): 36.4 (25 Jan 2023 11:18), Max: 36.7 (24 Jan 2023 19:32)  T(F): 97.5 (25 Jan 2023 11:18), Max: 98.1 (24 Jan 2023 23:54)  HR: 73 (25 Jan 2023 11:18) (72 - 83)  BP: 130/62 (25 Jan 2023 11:18) (111/61 - 149/71)  BP(mean): --  RR: 18 (25 Jan 2023 11:18) (18 - 19)  SpO2: 99% (25 Jan 2023 11:18) (95% - 99%)    Parameters below as of 25 Jan 2023 11:18  Patient On (Oxygen Delivery Method): room air        PHYSICAL EXAM:  GENERAL: Not in distress , off supplemental oxygenation  CHEST/LUNG: Not using accessory muscles  EXTREMITIES: No pedal  edema  CNS: Awake and Alert      LABS:                        11.3   3.15  )-----------( 189      ( 25 Jan 2023 08:34 )             34.6                           10.4   3.24  )-----------( 174      ( 24 Jan 2023 07:25 )             31.6       01-25    139  |  110<H>  |  39<H>  ----------------------------<  189<H>  3.6   |  19<L>  |  0.81    Ca    9.2      25 Jan 2023 08:34  Phos  2.4     01-24  Mg     1.6     01-24      01-24    140  |  109<H>  |  31<H>  ----------------------------<  152<H>  3.7   |  19<L>  |  0.76    Ca    8.8      24 Jan 2023 07:25  Phos  2.4     01-24  Mg     1.6     01-24    TPro  6.6  /  Alb  3.1<L>  /  TBili  0.3  /  DBili  x   /  AST  11  /  ALT  20  /  AlkPhos  49  01-21    PT/INR - ( 21 Jan 2023 11:30 )   PT: 13.1 sec;   INR: 1.10 ratio       PTT - ( 22 Jan 2023 10:29 )  PTT:62.5 sec        CAPILLARY BLOOD GLUCOSE  POCT Blood Glucose.: 182 mg/dL (22 Jan 2023 11:43)  POCT Blood Glucose.: 170 mg/dL (22 Jan 2023 07:56)  POCT Blood Glucose.: 160 mg/dL (21 Jan 2023 22:33)    ABG - ( 21 Jan 2023 11:53 )  pH, Arterial: 7.45  pH, Blood: x     /  pCO2: 24    /  pO2: 315   / HCO3: 17    / Base Excess: -5.5  /  SaO2: 98            MEDICATIONS  (STANDING):    apixaban 5 milliGRAM(s) Oral every 12 hours  dexAMETHasone  Injectable 6 milliGRAM(s) IV Push daily  influenza  Vaccine (HIGH DOSE) 0.7 milliLiter(s) IntraMuscular once  insulin lispro (ADMELOG) corrective regimen sliding scale   SubCutaneous three times a day before meals  insulin lispro (ADMELOG) corrective regimen sliding scale   SubCutaneous at bedtime  loperamide 2 milliGRAM(s) Oral once  metoprolol tartrate 25 milliGRAM(s) Oral two times a day  remdesivir  IVPB 100 milliGRAM(s) IV Intermittent every 24 hours  remdesivir  IVPB   IV Intermittent         RADIOLOGY & ADDITIONAL TESTS:    1/21/23: Xray Chest 1 View- PORTABLE-Urgent (01.21.23 @ 13:16) No acute pulmonary pathology.      1/21/23: CT Angio Chest PE Protocol w/ IV Cont (01.21.23 @ 13:02) >    1.  Bilateral pulmonary emboli.  2.  Likely underlying right heart strain.  3.  The findings were discussed with and read back verification obtained   from Dr. Calvillo on 1/21/2023 at 1315 hours.          MICROBIOLOGY DATA:    Respiratory Viral Panel with COVID-19 by ALEX (01.21.23 @ 11:25)   Rapid RVP Result: Detected   SARS-CoV-2: Detected:

## 2023-01-25 NOTE — DISCHARGE NOTE PROVIDER - CARE PROVIDER_API CALL
Cristina Bonds  CARDIOVASCULAR DISEASE  [address] [place] [id number]  Phone: [phone]  Fax: [phone]  Follow Up Time: 1 week

## 2023-01-25 NOTE — PROGRESS NOTE ADULT - PROBLEM SELECTOR PLAN 1
patient presents with acute shortness of breath and productive cough and hypoxia to 86% on Room air. s/p BiPAP in ED, de-escalated to 4 L NC and saturating to 98% now  CXR negative, RVP + for COVID  also CT angio performed in ED which showed bilateral pulmonary embolism in the distral R main PA, R upper/middle/lower lobes and left upper segmental arteries, left lower lobar artery and left lower segmental arteries with likely underlying heart strain  started on heparin drip in ED  s/p dexamethasone IV 6 mg in ED  PE likely provoked in the setting of multiple myeloma  pt is COVID vaccinated x 3    - isolation precautions  - IV decadron  - remdesivir  - c/w heparin drip for AC  - [facility] consulted (follows with Dr. Alvarado): no treatment for MM while inpatient  - wean oxygen as tolerated; on ROOM AIR  - TTE neg for right heart strain patient presents with acute shortness of breath and productive cough and hypoxia to 86% on Room air. s/p BiPAP in ED, de-escalated to 4 L NC and saturating to 98% now  CXR negative, RVP + for COVID  also CT angio performed in ED which showed bilateral pulmonary embolism in the distral R main PA, R upper/middle/lower lobes and left upper segmental arteries, left lower lobar artery and left lower segmental arteries with likely underlying heart strain  started on heparin drip in ED  s/p dexamethasone IV 6 mg in ED  PE likely provoked in the setting of multiple myeloma  pt is COVID vaccinated x 3    - isolation precautions  - IV decadron  - remdesivir  - [facility] consulted (follows with Dr. Alvarado): no treatment for MM while inpatient  - wean oxygen as tolerated; on ROOM AIR  - TTE neg for right heart strain  - Patient transitioned to Eliquis 5mg BID for treatment of PE

## 2023-01-25 NOTE — PROGRESS NOTE ADULT - ASSESSMENT
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  1.Tele  monitoring.  2.Echocardiogram-nl RV fx.  3.COVID+-ID f/u,remdesmivir,dexamethasone.  4.Gastric ca and MM-Heme/Onc f/u regarding use of NOAC.  5.DM-Insulin.  6.HTN- inc lopressor 25mg bid.  7.B/L PE-heparin drip for now.  8.PPI.

## 2023-01-25 NOTE — PROGRESS NOTE ADULT - SUBJECTIVE AND OBJECTIVE BOX
CHIEF COMPLAINT:Patient is a 88y old  Female who presents with a chief complaint of shortness of breath .pt appears comfortable.    	  REVIEW OF SYSTEMS:  CONSTITUTIONAL: No fever, weight loss, or fatigue  EYES: No eye pain, visual disturbances, or discharge  ENT:  No difficulty hearing, tinnitus, vertigo; No sinus or throat pain  NECK: No pain or stiffness  RESPIRATORY: No cough, wheezing, chills or hemoptysis; No Shortness of Breath  CARDIOVASCULAR: No chest pain, palpitations, passing out, dizziness, or leg swelling  GASTROINTESTINAL: No abdominal or epigastric pain. No nausea, vomiting, or hematemesis; No diarrhea or constipation. No melena or hematochezia.  GENITOURINARY: No dysuria, frequency, hematuria, or incontinence  NEUROLOGICAL: No headaches, memory loss, loss of strength, numbness, or tremors  SKIN: No itching, burning, rashes, or lesions   LYMPH Nodes: No enlarged glands  ENDOCRINE: No heat or cold intolerance; No hair loss  MUSCULOSKELETAL: No joint pain or swelling; No muscle, back, or extremity pain  PSYCHIATRIC: No depression, anxiety, mood swings, or difficulty sleeping  HEME/LYMPH: No easy bruising, or bleeding gums  ALLERGY AND IMMUNOLOGIC: No hives or eczema	      PHYSICAL EXAM:  T(C): 36.6 (01-25-23 @ 08:05), Max: 36.7 (01-24-23 @ 19:32)  HR: 74 (01-25-23 @ 08:05) (72 - 92)  BP: 148/75 (01-25-23 @ 08:05) (111/61 - 153/79)  RR: 18 (01-25-23 @ 08:05) (18 - 19)  SpO2: 97% (01-25-23 @ 08:05) (95% - 98%)  Wt(kg): --  I&O's Summary    24 Jan 2023 07:01  -  25 Jan 2023 07:00  --------------------------------------------------------  IN: 647 mL / OUT: 0 mL / NET: 647 mL    TELE-NSR,sinus tach    Appearance: Normal	  HEENT:   Normal oral mucosa, PERRL, EOMI	  Lymphatic: No lymphadenopathy  Cardiovascular: Normal S1 S2, No JVD, No murmurs, No edema  Respiratory: Lungs clear to auscultation	  Psychiatry: A & O x 3, Mood & affect appropriate  Gastrointestinal:  Soft, Non-tender, + BS	  Skin: No rashes, No ecchymoses, No cyanosis	  Neurologic: Non-focal  Extremities: Normal range of motion, No clubbing, cyanosis or edema  Vascular: Peripheral pulses palpable 2+ bilaterally    MEDICATIONS  (STANDING):  dexAMETHasone  Injectable 6 milliGRAM(s) IV Push daily  heparin  Infusion.  Unit(s)/Hr (9 mL/Hr) IV Continuous <Continuous>  influenza  Vaccine (HIGH DOSE) 0.7 milliLiter(s) IntraMuscular once  insulin lispro (ADMELOG) corrective regimen sliding scale   SubCutaneous three times a day before meals  insulin lispro (ADMELOG) corrective regimen sliding scale   SubCutaneous at bedtime  metoprolol tartrate 12.5 milliGRAM(s) Oral two times a day  remdesivir  IVPB 100 milliGRAM(s) IV Intermittent every 24 hours  remdesivir  IVPB   IV Intermittent         LABS:	 	                          11.3   3.15  )-----------( 189      ( 25 Jan 2023 08:34 )             34.6     01-25    139  |  110<H>  |  39<H>  ----------------------------<  189<H>  3.6   |  19<L>  |  0.81    Ca    9.2      25 Jan 2023 08:34  Phos  2.4     01-24  Mg     1.6     01-24      proBNP: Serum Pro-Brain Natriuretic Peptide: 830 pg/mL (01-21 @ 11:30)        	    PTT - ( 25 Jan 2023 08:34 )  PTT:42.3 sec

## 2023-01-25 NOTE — PROGRESS NOTE ADULT - PROBLEM SELECTOR PLAN 2
Monitor Respiratory status  Anticoagulant  monitor for bleeding  Hem/onc eval. Monitor Respiratory status  Anticoagulant  monitor for bleeding  Hem/onc Follow up

## 2023-01-25 NOTE — PROGRESS NOTE ADULT - PROBLEM SELECTOR PLAN 4
pt denies chest pain, EKG without ischemic changes  Trop 179 > 307    - serial EKG, troponin  - telemetry monitoring  - Cardio consulted, Dr. Jennings - plan as above

## 2023-01-25 NOTE — PROGRESS NOTE ADULT - ASSESSMENT
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 Patient states she feels fine now but this morning when she was having an endoscopy she was increasingly short of breath and was sent to the ER. On admission, she found to have no fever, but hypoxia, 86% in Room air, requiring supplemental oxygenation. Also found to have positive COVID PCR and B/L Pulmonary embolism. She has started on Remdesivir and Dexamethasone, and the ID consult requested to assist with further evaluation and antibiotic management.    # COVID Pneumonitis - on Remdesivir and Dexamethasone   # Acute Hypoxic Respiratory failure- on oxygen via NC  # B/L Pulmonary  embolisms    would recommend:    1. PT/OOB to chair   2. Monitor ALT while on Remdesivir  3. Continue Remdesivir and Dexamethasone to complete the course  4. Continue supportive care including Anti coagulation  5, COVID precaution    Attending Attestation:    Spent more than 35 minutes on total encounter, more than 50 % of the visit was spent counseling and/or coordinating care by the Attending physician.

## 2023-01-25 NOTE — PROGRESS NOTE ADULT - PROBLEM SELECTOR PLAN 5
pt has h/o DM, takes metformin 1000 mg bid, steglatro    - iss  - fs acqhs pt denies chest pain, EKG without ischemic changes  Trop 179 > 307    - serial EKG, troponin  - telemetry monitoring  - Cardio consulted, Dr. Jennings

## 2023-01-25 NOTE — PROGRESS NOTE ADULT - ASSESSMENT
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 Patient states she feels fine now but this morning when she was having an endoscopy she was increasingly short of breath and was sent to the ED.     #Multiple Myeloma  #Covid +  #PE  pt follows with Oncologist Dr. Alvarado  currently on maintenance Velcade/Daratumumab/Revlimid/Dex, last given 1/18/23  D-dimer 6720-->2435  CTA shows B/L PE, likely Right heart strain  Rec's:  -hold chemo tx during admission  -daily CBC  -changed a/c to DOAC---> Eliquis 5mg PO BID  -Echo is nl, no right heart failure  -Covid mgmt as per Medicine/ID Teams on remdesivir/dex  upon discharge pt to f/u with Dr. Alvarado when covid (-)    Thank you for the referral. Will continue to monitor the patient.  Please call with any questions [phone]  Above reviewed with Attending Dr. 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 Patient states she feels fine now but this morning when she was having an endoscopy she was increasingly short of breath and was sent to the ED.     #Multiple Myeloma  #Covid +  #PE  pt follows with Oncologist Dr. Alvarado  currently on maintenance Velcade/Daratumumab/Revlimid/Dex, last given 1/18/23  D-dimer 6720-->2435  CTA shows B/L PE, likely Right heart strain  Rec's:  -hold chemo tx during admission  -daily CBC  -changed a/c to DOAC---> Eliquis 5mg PO BID  -Echo is nl, no right heart failure  -Covid mgmt as per Medicine/ID Teams on remdesivir/dex  upon discharge pt to f/u with Dr. Alvarado when covid (-)    At this time will sign-off, please reconsult if needed  Thank you for the referral. Will continue to monitor the patient.  Please call with any questions [phone]  Above reviewed with Attending Dr. 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 Patient states she feels fine now but this morning when she was having an endoscopy she was increasingly short of breath and was sent to the ED.     #Multiple Myeloma  #Covid +  #PE  pt follows with Oncologist Dr. Alvarado  currently on maintenance Velcade/Daratumumab/Revlimid/Dex, last given 1/18/23  D-dimer 6720-->2435  CTA shows B/L PE, likely Right heart strain  Rec's:  -hold chemo tx during admission  -change a/c to DOAC---> Eliquis 5mg PO BID  -Echo is nl, no right heart failure  -Covid mgmt as per Medicine/ID Teams on remdesivir/dex  upon discharge pt to f/u with Dr. Alvarado when covid (-)    At this time will sign-off, please reconsult if needed  Thank you for the referral. Will continue to monitor the patient.  Please call with any questions [phone]  Above reviewed with Attending Dr. Raleigh FLOREZ/[facility]  [address] [facility], [address], [place], [place]  [phone]  *Note not finalized until signed by Attending Physician

## 2023-01-25 NOTE — DISCHARGE NOTE PROVIDER - NSDCCPCAREPLAN_GEN_ALL_CORE_FT
PRINCIPAL DISCHARGE DIAGNOSIS  Diagnosis: Pulmonary embolism  Assessment and Plan of Treatment: You were found to have blood clots in the lung blood vessels. You were placed on heparin during your hospitalization. You will be discharged on a blood thinner ELIQUIS 2.5mg to be taken TWICE DAILY. Please follow up with your primary care physician in one week to inform them of your recent hospitalization and further management of your medical conditions.        SECONDARY DISCHARGE DIAGNOSES  Diagnosis: Acute respiratory failure with hypoxia  Assessment and Plan of Treatment: You had low oxygen levels during your hospitalization likely due to the clot in your lungs. You are now on room air. Please follow up with your primary care physician in one week to inform them of your recent hospitalization and further management of your medical conditions.      Diagnosis: 2019 novel coronavirus disease (COVID-19)  Assessment and Plan of Treatment: You tested positive for COVID 19. You required oxygen during your hospitalization. This puts you at an increased risk for forming clots.      Diagnosis: Diabetes  Assessment and Plan of Treatment: Continue with your blood sugar medication.   You must maintain a healthy diet that consists of low sugar and low fat. Your diet must consist of mostly vegetables. Please limit your intake of high sugar and high carbohydrate foods such as pasta, rice, and bread. Exercise frequently if possible. Follow up with primary care physician within one week of your discharge to further manage your diabetes and monitor your Hemoglobin A1c levels after 3 months to evaluate your diabetes control.      Diagnosis: Hypertension  Assessment and Plan of Treatment: You have high blood pressure. Please continue to taking your medications as prescribed. Please measure your blood pressure at least once daily. Maintain a healthy diet which includes incorporating more vegetables and decreasing the amount of salt (low sodium) and sugar in your diet such as rice, bread, and pasta low sugar, low fat, low sodium diet. Exercise frequently if possible.  Please follow up with your primary care provider within one week of your discharge.      Diagnosis: Multiple myeloma  Assessment and Plan of Treatment: You were diagnosed with Multiple Myeloma before your hospitalization. Please follow up with your oncologist for further management of your multiple myeloma. Please follow up with your primary care physician in one week to inform them of your recent hospitalization and further management of your medical conditions.       PRINCIPAL DISCHARGE DIAGNOSIS  Diagnosis: Pulmonary embolism  Assessment and Plan of Treatment: You were found to have blood clots in the lung blood vessels. You were placed on heparin during your hospitalization. You will be discharged on a blood thinner ELIQUIS 5.0 mg to be taken TWICE DAILY. Please follow up with your primary care physician in one week to inform them of your recent hospitalization and further management of your medical conditions.        SECONDARY DISCHARGE DIAGNOSES  Diagnosis: Acute respiratory failure with hypoxia  Assessment and Plan of Treatment: You had low oxygen levels during your hospitalization likely due to the clot in your lungs. You are now on room air. Please follow up with your primary care physician in one week to inform them of your recent hospitalization and further management of your medical conditions.      Diagnosis: 2019 novel coronavirus disease (COVID-19)  Assessment and Plan of Treatment: You tested positive for COVID 19. You required oxygen during your hospitalization. This puts you at an increased risk for forming clots.      Diagnosis: Diabetes  Assessment and Plan of Treatment: Continue with your blood sugar medication.   You must maintain a healthy diet that consists of low sugar and low fat. Your diet must consist of mostly vegetables. Please limit your intake of high sugar and high carbohydrate foods such as pasta, rice, and bread. Exercise frequently if possible. Follow up with primary care physician within one week of your discharge to further manage your diabetes and monitor your Hemoglobin A1c levels after 3 months to evaluate your diabetes control.      Diagnosis: Multiple myeloma  Assessment and Plan of Treatment: You were diagnosed with Multiple Myeloma before your hospitalization. Please follow up with your oncologist for further management of your multiple myeloma. Please follow up with your primary care physician in one week to inform them of your recent hospitalization and further management of your medical conditions.      Diagnosis: Hypertension  Assessment and Plan of Treatment: You have high blood pressure. Please continue to taking your medications as prescribed. Please measure your blood pressure at least once daily. Maintain a healthy diet which includes incorporating more vegetables and decreasing the amount of salt (low sodium) and sugar in your diet such as rice, bread, and pasta low sugar, low fat, low sodium diet. Exercise frequently if possible.  Please follow up with your primary care provider within one week of your discharge.

## 2023-01-25 NOTE — PROGRESS NOTE ADULT - SUBJECTIVE AND OBJECTIVE BOX
Patient is a 88y old  Female who presents with a chief complaint of shortness of breath       SUBJECTIVE / OVERNIGHT EVENTS:      MEDICATIONS  (STANDING):  dexAMETHasone  Injectable 6 milliGRAM(s) IV Push daily  heparin  Infusion.  Unit(s)/Hr (9 mL/Hr) IV Continuous <Continuous>  influenza  Vaccine (HIGH DOSE) 0.7 milliLiter(s) IntraMuscular once  insulin lispro (ADMELOG) corrective regimen sliding scale   SubCutaneous three times a day before meals  insulin lispro (ADMELOG) corrective regimen sliding scale   SubCutaneous at bedtime  metoprolol tartrate 25 milliGRAM(s) Oral two times a day  remdesivir  IVPB 100 milliGRAM(s) IV Intermittent every 24 hours  remdesivir  IVPB   IV Intermittent     MEDICATIONS  (PRN):  acetaminophen     Tablet .. 650 milliGRAM(s) Oral every 6 hours PRN Temp greater or equal to 38C (100.4F), Mild Pain (1 - 3)    CAPILLARY BLOOD GLUCOSE      POCT Blood Glucose.: 195 mg/dL (25 Jan 2023 11:13)  POCT Blood Glucose.: 171 mg/dL (25 Jan 2023 07:41)  POCT Blood Glucose.: 178 mg/dL (24 Jan 2023 21:21)  POCT Blood Glucose.: 131 mg/dL (24 Jan 2023 16:37)    I&O's Summary    24 Jan 2023 07:01  -  25 Jan 2023 07:00  --------------------------------------------------------  IN: 647 mL / OUT: 0 mL / NET: 647 mL        PHYSICAL EXAM:  Vital Signs Last 24 Hrs  T(C): 36.4 (25 Jan 2023 11:18), Max: 36.7 (24 Jan 2023 19:32)  T(F): 97.5 (25 Jan 2023 11:18), Max: 98.1 (24 Jan 2023 23:54)  HR: 73 (25 Jan 2023 11:18) (72 - 92)  BP: 130/62 (25 Jan 2023 11:18) (111/61 - 149/71)  BP(mean): --  RR: 18 (25 Jan 2023 11:18) (18 - 19)  SpO2: 99% (25 Jan 2023 11:18) (95% - 99%)    Parameters below as of 25 Jan 2023 11:18  Patient On (Oxygen Delivery Method): room air          LABS:                        11.3   3.15  )-----------( 189      ( 25 Jan 2023 08:34 )             34.6     01-25    139  |  110<H>  |  39<H>  ----------------------------<  189<H>  3.6   |  19<L>  |  0.81    Ca    9.2      25 Jan 2023 08:34  Phos  2.4     01-24  Mg     1.6     01-24      PTT - ( 25 Jan 2023 08:34 )  PTT:42.3 sec          SARS-CoV-2: Detected (21 Jan 2023 11:25)  SARS-CoV-2: NotDetec (27 Nov 2022 14:30)             Patient is a 88y old  Female who presents with a chief complaint of shortness of breath       SUBJECTIVE / OVERNIGHT EVENTS: events noted. One episode of diarrhea. Eating and drinking well    #[id number]      MEDICATIONS  (STANDING):  dexAMETHasone  Injectable 6 milliGRAM(s) IV Push daily  heparin  Infusion.  Unit(s)/Hr (9 mL/Hr) IV Continuous <Continuous>  influenza  Vaccine (HIGH DOSE) 0.7 milliLiter(s) IntraMuscular once  insulin lispro (ADMELOG) corrective regimen sliding scale   SubCutaneous three times a day before meals  insulin lispro (ADMELOG) corrective regimen sliding scale   SubCutaneous at bedtime  metoprolol tartrate 25 milliGRAM(s) Oral two times a day  remdesivir  IVPB 100 milliGRAM(s) IV Intermittent every 24 hours  remdesivir  IVPB   IV Intermittent     MEDICATIONS  (PRN):  acetaminophen     Tablet .. 650 milliGRAM(s) Oral every 6 hours PRN Temp greater or equal to 38C (100.4F), Mild Pain (1 - 3)    CAPILLARY BLOOD GLUCOSE      POCT Blood Glucose.: 195 mg/dL (25 Jan 2023 11:13)  POCT Blood Glucose.: 171 mg/dL (25 Jan 2023 07:41)  POCT Blood Glucose.: 178 mg/dL (24 Jan 2023 21:21)  POCT Blood Glucose.: 131 mg/dL (24 Jan 2023 16:37)    I&O's Summary    24 Jan 2023 07:01  -  25 Jan 2023 07:00  --------------------------------------------------------  IN: 647 mL / OUT: 0 mL / NET: 647 mL        PHYSICAL EXAM:  Vital Signs Last 24 Hrs  T(C): 36.4 (25 Jan 2023 11:18), Max: 36.7 (24 Jan 2023 19:32)  T(F): 97.5 (25 Jan 2023 11:18), Max: 98.1 (24 Jan 2023 23:54)  HR: 73 (25 Jan 2023 11:18) (72 - 92)  BP: 130/62 (25 Jan 2023 11:18) (111/61 - 149/71)  BP(mean): --  RR: 18 (25 Jan 2023 11:18) (18 - 19)  SpO2: 99% (25 Jan 2023 11:18) (95% - 99%)    Parameters below as of 25 Jan 2023 11:18  Patient On (Oxygen Delivery Method): room air    GEN: NAD; A and O x 3  LUNGS: CTA, on RA  HEART: S1 S2  ABDOMEN: soft, non-tender, non-distended, + BS  EXTREMITIES: no edema      LABS:                        11.3   3.15  )-----------( 189      ( 25 Jan 2023 08:34 )             34.6     01-25    139  |  110<H>  |  39<H>  ----------------------------<  189<H>  3.6   |  19<L>  |  0.81    Ca    9.2      25 Jan 2023 08:34  Phos  2.4     01-24  Mg     1.6     01-24      PTT - ( 25 Jan 2023 08:34 )  PTT:42.3 sec          SARS-CoV-2: Detected (21 Jan 2023 11:25)  SARS-CoV-2: NotDetec (27 Nov 2022 14:30)           Patient is a 88y old  Female who presents with a chief complaint of shortness of breath       SUBJECTIVE / OVERNIGHT EVENTS: events noted. One episode of diarrhea. Eating and drinking well    #[id number]      MEDICATIONS  (STANDING):  dexAMETHasone  Injectable 6 milliGRAM(s) IV Push daily  heparin  Infusion.  Unit(s)/Hr (9 mL/Hr) IV Continuous <Continuous>  influenza  Vaccine (HIGH DOSE) 0.7 milliLiter(s) IntraMuscular once  insulin lispro (ADMELOG) corrective regimen sliding scale   SubCutaneous three times a day before meals  insulin lispro (ADMELOG) corrective regimen sliding scale   SubCutaneous at bedtime  metoprolol tartrate 25 milliGRAM(s) Oral two times a day  remdesivir  IVPB 100 milliGRAM(s) IV Intermittent every 24 hours  remdesivir  IVPB   IV Intermittent     MEDICATIONS  (PRN):  acetaminophen     Tablet .. 650 milliGRAM(s) Oral every 6 hours PRN Temp greater or equal to 38C (100.4F), Mild Pain (1 - 3)    CAPILLARY BLOOD GLUCOSE      POCT Blood Glucose.: 195 mg/dL (25 Jan 2023 11:13)  POCT Blood Glucose.: 171 mg/dL (25 Jan 2023 07:41)  POCT Blood Glucose.: 178 mg/dL (24 Jan 2023 21:21)  POCT Blood Glucose.: 131 mg/dL (24 Jan 2023 16:37)    I&O's Summary    24 Jan 2023 07:01  -  25 Jan 2023 07:00  --------------------------------------------------------  IN: 647 mL / OUT: 0 mL / NET: 647 mL        PHYSICAL EXAM:  Vital Signs Last 24 Hrs  T(C): 36.4 (25 Jan 2023 11:18), Max: 36.7 (24 Jan 2023 19:32)  T(F): 97.5 (25 Jan 2023 11:18), Max: 98.1 (24 Jan 2023 23:54)  HR: 73 (25 Jan 2023 11:18) (72 - 92)  BP: 130/62 (25 Jan 2023 11:18) (111/61 - 149/71)  BP(mean): --  RR: 18 (25 Jan 2023 11:18) (18 - 19)  SpO2: 99% (25 Jan 2023 11:18) (95% - 99%)    Parameters below as of 25 Jan 2023 11:18  Patient On (Oxygen Delivery Method): room air    GEN: NAD; A and O x 3  LUNGS: CTA, on RA  HEART: S1 S2  ABDOMEN: soft, non-tender, non-distended, + BS  EXTREMITIES: no edema      LABS:                        11.3   3.15  )-----------( 189      ( 25 Jan 2023 08:34 )             34.6     01-25    139  |  110<H>  |  39<H>  ----------------------------<  189<H>  3.6   |  19<L>  |  0.81    Ca    9.2      25 Jan 2023 08:34  Phos  2.4     01-24  Mg     1.6     01-24      PTT - ( 25 Jan 2023 08:34 )  PTT:42.3 sec          SARS-CoV-2: Detected (21 Jan 2023 11:25)  SARS-CoV-2: NotDetec (27 Nov 2022 14:30)

## 2023-01-25 NOTE — PROGRESS NOTE ADULT - PROBLEM SELECTOR PLAN 6
pt has h/o HTN, takes losartan 25 mg qd    - c/w home meds with parameters pt has h/o DM, takes metformin 1000 mg bid, steglatro    - iss  - fs acqhs

## 2023-01-25 NOTE — DISCHARGE NOTE PROVIDER - NSDCMRMEDTOKEN_GEN_ALL_CORE_FT
ALBUTEROL PV  INH: 1 gram(s) inhaled every 8 hours, As Needed for sob  ALENDRONATE 70MG TAB: 1 tab(s) orally once a week  ASPIRIN LOW 81MG EC TAB: 1 tab(s) orally once a day  Eliquis 2.5 mg oral tablet: 1 tab(s) orally 2 times a day   FAMOTIDINE 40MG TAB: 1 tab(s) orally once a day  guaiFENesin 100 mg/5 mL oral liquid: 5 milliliter(s) orally every 6 hours, As needed, Cough  lactobacillus acidophilus oral capsule: 1 tab(s) orally 2 times a day   LOSARTAN POT 25MG TAB: 1 tab(s) orally once a day  meclizine 25 mg oral tablet: 1 tab(s) orally once a day, As Needed  METFORMIN 1000MG TAB: 1 tab(s) orally 2 times a day  MONTELUKAST 10MG TAB: 1 tab(s) orally once a day  OMEPRAZOL RX 40MG CAP: 1 cap(s) orally once a day  ONDANSETRON 4MG TAB: 1 tab(s) orally every 8 hours, As Needed for nausea/vomiting  SIMVASTATIN 10MG TAB: 1 tab(s) orally once a day (at bedtime)  STEGLATRO 15MG TAB: 1 tab(s) orally once a day  ULTRA EYE 0.4-0.3% SELENE: 1 milliliter(s) to each affected eye 2 times a day, As Needed  VALACYCLOVIR 500MG TAB: 1 tab(s) orally once a day  Vitamin D3 1250 mcg (50,000 intl units) oral capsule: 1 cap(s) orally once a week   ALBUTEROL PV  INH: 1 gram(s) inhaled every 8 hours, As Needed for sob  ALENDRONATE 70MG TAB: 1 tab(s) orally once a week  ASPIRIN LOW 81MG EC TAB: 1 tab(s) orally once a day  Eliquis 2.5 mg oral tablet: 1 tab(s) orally 2 times a day   FAMOTIDINE 40MG TAB: 1 tab(s) orally once a day  guaiFENesin 100 mg/5 mL oral liquid: 5 milliliter(s) orally every 6 hours, As needed, Cough  lactobacillus acidophilus oral capsule: 1 tab(s) orally 2 times a day   LOSARTAN POT 25MG TAB: 1 tab(s) orally once a day  meclizine 25 mg oral tablet: 1 tab(s) orally once a day, As Needed  METFORMIN 1000MG TAB: 1 tab(s) orally 2 times a day  MONTELUKAST 10MG TAB: 1 tab(s) orally once a day  OMEPRAZOL RX 40MG CAP: 1 cap(s) orally once a day  ONDANSETRON 4MG TAB: 1 tab(s) orally every 8 hours, As Needed for nausea/vomiting  SIMVASTATIN 10MG TAB: 1 tab(s) orally once a day (at bedtime)  STEGLATRO 15MG TAB: 1 tab(s) orally once a day  ULTRA EYE 0.4-0.3% SELENE: 1 milliliter(s) to each affected eye 2 times a day, As Needed  Vitamin D3 1250 mcg (50,000 intl units) oral capsule: 1 cap(s) orally once a week   ALBUTEROL PV  INH: 1 gram(s) inhaled every 8 hours, As Needed for sob  ALENDRONATE 70MG TAB: 1 tab(s) orally once a week  apixaban 5 mg oral tablet: 1 tab(s) orally every 12 hours  ASPIRIN LOW 81MG EC TAB: 1 tab(s) orally once a day  FAMOTIDINE 40MG TAB: 1 tab(s) orally once a day  guaiFENesin 100 mg/5 mL oral liquid: 5 milliliter(s) orally every 6 hours, As needed, Cough  lactobacillus acidophilus oral capsule: 1 tab(s) orally 2 times a day   LOSARTAN POT 25MG TAB: 1 tab(s) orally once a day  meclizine 25 mg oral tablet: 1 tab(s) orally once a day, As Needed  METFORMIN 1000MG TAB: 1 tab(s) orally 2 times a day  MONTELUKAST 10MG TAB: 1 tab(s) orally once a day  OMEPRAZOL RX 40MG CAP: 1 cap(s) orally once a day  ONDANSETRON 4MG TAB: 1 tab(s) orally every 8 hours, As Needed for nausea/vomiting  SIMVASTATIN 10MG TAB: 1 tab(s) orally once a day (at bedtime)  STEGLATRO 15MG TAB: 1 tab(s) orally once a day  ULTRA EYE 0.4-0.3% SELENE: 1 milliliter(s) to each affected eye 2 times a day, As Needed  Vitamin D3 1250 mcg (50,000 intl units) oral capsule: 1 cap(s) orally once a week

## 2023-01-25 NOTE — PROGRESS NOTE ADULT - PROBLEM SELECTOR PLAN 3
CXR noted  Monitor Oxygen sat  Monitor Respiratory status  Monitor LDH, ferritin, CRP, LFTs, D-Dimer and procalcitonin  Vitamin C supplement. Vit D  zinc supplement  Follow up Covid PCR  IV Dexa/Remdesivir.  ID follow up

## 2023-01-25 NOTE — DISCHARGE NOTE PROVIDER - DISCHARGE SERVICE FOR PATIENT
on the discharge service for the patient. I have reviewed and made amendments to the documentation where necessary. 6

## 2023-01-25 NOTE — PROGRESS NOTE ADULT - PROBLEM SELECTOR PROBLEM 8
SORAIDA/CKD  COVID19 PNA  Acute respiratory failure  DM  HTN  Hyperkalemia      -Cr 1.35 on 2017 suggest CKDIII baseline.  -SORAIDA clinically pre-renal. Urine studies suggest pre-renal etiology. However, cannot r/o COVID direct renal injury though less likely.  -Avoid excessive IVF with COVID pulmonary status  -No indication for RRT at this time  -Renal indices are stable, back at baseline. Monitor for now  -Hyperkalemia resolved. S/p Lokelma 10 mg x1. Continue to monitor.   -Low K diet         Multiple myeloma HLD (hyperlipidemia)

## 2023-01-25 NOTE — PROGRESS NOTE ADULT - SUBJECTIVE AND OBJECTIVE BOX
pt seen in icu [  ], reg med floor [   ], bed [  ], chair at bedside [   ]  Awake, alert, comfortable, laying  in bed in NAD. Less sob and no cough. Good appetite. Remains on isolation due to Covid.    REVIEW OF SYSTEMS:    CONSTITUTIONAL: No weakness, fevers or chills  EYES/ENT: No visual changes;  No vertigo or throat pain   NECK: No pain or stiffness  RESPIRATORY: No cough, wheezing, hemoptysis; No shortness of breath  CARDIOVASCULAR: No chest pain or palpitations  GASTROINTESTINAL: No abdominal or epigastric pain. No nausea, vomiting, or hematemesis; No diarrhea or constipation. No melena or hematochezia.  GENITOURINARY: No dysuria, frequency or hematuria  NEUROLOGICAL: No numbness or weakness  SKIN: No itching, burning, rashes, or lesions   All other review of systems is negative unless indicated above.    Physical Exam    General: WN/WD NAD  Neurology: A&Ox3, nonfocal, JACKMAN x 4  Respiratory: CTA B/L  CV: RRR, S1S2, no murmurs, rubs or gallops  Abdominal: Soft, NT, ND +BS, Last BM  Extremities: No edema, + peripheral pulses      Allergies  gabapentin (Unknown)      Health Issues  Other pulmonary embolism without acute cor pulmonale    HTN (hypertension)    DM (diabetes mellitus)    Hypercholesteremia    Gastric adenocarcinoma    Vertigo    Dementia    S/P hysterectomy    S/P tubal ligation        Vitals  T(F): 97.5 (01-25-23 @ 04:54), Max: 98.4 (01-24-23 @ 05:47)  HR: 72 (01-25-23 @ 04:54) (72 - 92)  BP: 149/71 (01-25-23 @ 04:54) (111/61 - 153/79)  RR: 19 (01-25-23 @ 04:54) (17 - 19)  SpO2: 95% (01-25-23 @ 04:54) (91% - 98%)  Wt(kg): --  CAPILLARY BLOOD GLUCOSE      POCT Blood Glucose.: 178 mg/dL (24 Jan 2023 21:21)      Labs                          10.4   3.24  )-----------( 174      ( 24 Jan 2023 07:25 )             31.6       01-24    140  |  109<H>  |  31<H>  ----------------------------<  152<H>  3.7   |  19<L>  |  0.76    Ca    8.8      24 Jan 2023 07:25  Phos  2.4     01-24  Mg     1.6     01-24              Radiology Results      Meds    MEDICATIONS  (STANDING):  dexAMETHasone  Injectable 6 milliGRAM(s) IV Push daily  heparin  Infusion.  Unit(s)/Hr (9 mL/Hr) IV Continuous <Continuous>  influenza  Vaccine (HIGH DOSE) 0.7 milliLiter(s) IntraMuscular once  insulin lispro (ADMELOG) corrective regimen sliding scale   SubCutaneous at bedtime  insulin lispro (ADMELOG) corrective regimen sliding scale   SubCutaneous three times a day before meals  metoprolol tartrate 12.5 milliGRAM(s) Oral two times a day  remdesivir  IVPB   IV Intermittent   remdesivir  IVPB 100 milliGRAM(s) IV Intermittent every 24 hours      MEDICATIONS  (PRN):  acetaminophen     Tablet .. 650 milliGRAM(s) Oral every 6 hours PRN Temp greater or equal to 38C (100.4F), Mild Pain (1 - 3)         pt seen in icu [  ], reg med floor [ x  ], bed [ x ], chair at bedside [   ]  Awake, alert, comfortable, laying  in bed in NAD. Less sob and no cough. Good appetite. Remains on isolation due to Covid.  Complaining of diarrhea today  REVIEW OF SYSTEMS:    CONSTITUTIONAL: No weakness, fevers or chills  EYES/ENT: No visual changes;  No vertigo or throat pain   NECK: No pain or stiffness  RESPIRATORY: No cough, wheezing, hemoptysis; No shortness of breath  CARDIOVASCULAR: No chest pain or palpitations  GASTROINTESTINAL: No abdominal or epigastric pain. No nausea, vomiting, or hematemesis; + diarrhea no constipation. No melena or hematochezia.  GENITOURINARY: No dysuria, frequency or hematuria  NEUROLOGICAL: No numbness or weakness  SKIN: No itching, burning, rashes, or lesions   All other review of systems is negative unless indicated above.    Physical Exam    General: WN/WD NAD  Neurology: A&Ox3, nonfocal, JACKMAN x 4  Respiratory: CTA B/L  CV: RRR, S1S2, no murmurs, rubs or gallops  Abdominal: Soft, NT, ND +BS, Last BM  Extremities: No edema, + peripheral pulses      Allergies  gabapentin (Unknown)      Health Issues  Other pulmonary embolism without acute cor pulmonale    HTN (hypertension)    DM (diabetes mellitus)    Hypercholesteremia    Gastric adenocarcinoma    Vertigo    Dementia    S/P hysterectomy    S/P tubal ligation        Vitals  T(F): 97.5 (01-25-23 @ 04:54), Max: 98.4 (01-24-23 @ 05:47)  HR: 72 (01-25-23 @ 04:54) (72 - 92)  BP: 149/71 (01-25-23 @ 04:54) (111/61 - 153/79)  RR: 19 (01-25-23 @ 04:54) (17 - 19)  SpO2: 95% (01-25-23 @ 04:54) (91% - 98%)  Wt(kg): --  CAPILLARY BLOOD GLUCOSE      POCT Blood Glucose.: 178 mg/dL (24 Jan 2023 21:21)      Labs                          10.4   3.24  )-----------( 174      ( 24 Jan 2023 07:25 )             31.6       01-24    140  |  109<H>  |  31<H>  ----------------------------<  152<H>  3.7   |  19<L>  |  0.76    Ca    8.8      24 Jan 2023 07:25  Phos  2.4     01-24  Mg     1.6     01-24              Radiology Results      Meds    MEDICATIONS  (STANDING):  dexAMETHasone  Injectable 6 milliGRAM(s) IV Push daily  heparin  Infusion.  Unit(s)/Hr (9 mL/Hr) IV Continuous <Continuous>  influenza  Vaccine (HIGH DOSE) 0.7 milliLiter(s) IntraMuscular once  insulin lispro (ADMELOG) corrective regimen sliding scale   SubCutaneous at bedtime  insulin lispro (ADMELOG) corrective regimen sliding scale   SubCutaneous three times a day before meals  metoprolol tartrate 12.5 milliGRAM(s) Oral two times a day  remdesivir  IVPB   IV Intermittent   remdesivir  IVPB 100 milliGRAM(s) IV Intermittent every 24 hours      MEDICATIONS  (PRN):  acetaminophen     Tablet .. 650 milliGRAM(s) Oral every 6 hours PRN Temp greater or equal to 38C (100.4F), Mild Pain (1 - 3)

## 2023-01-25 NOTE — PROGRESS NOTE ADULT - PROBLEM SELECTOR PLAN 7
pt has h/o HTN, takes simvastatin 10 mg qhs    - c/w home meds pt has h/o HTN, takes losartan 25 mg qd    - c/w home meds with parameters

## 2023-01-26 ENCOUNTER — TRANSCRIPTION ENCOUNTER (OUTPATIENT)
Age: 88
End: 2023-01-26

## 2023-01-26 VITALS
DIASTOLIC BLOOD PRESSURE: 68 MMHG | RESPIRATION RATE: 18 BRPM | OXYGEN SATURATION: 98 % | TEMPERATURE: 98 F | HEART RATE: 67 BPM | SYSTOLIC BLOOD PRESSURE: 123 MMHG

## 2023-01-26 LAB
ANION GAP SERPL CALC-SCNC: 11 MMOL/L — SIGNIFICANT CHANGE UP (ref 5–17)
BUN SERPL-MCNC: 38 MG/DL — HIGH (ref 7–18)
CALCIUM SERPL-MCNC: 9 MG/DL — SIGNIFICANT CHANGE UP (ref 8.4–10.5)
CHLORIDE SERPL-SCNC: 110 MMOL/L — HIGH (ref 96–108)
CO2 SERPL-SCNC: 19 MMOL/L — LOW (ref 22–31)
CREAT SERPL-MCNC: 0.81 MG/DL — SIGNIFICANT CHANGE UP (ref 0.5–1.3)
EGFR: 71 ML/MIN/1.73M2 — SIGNIFICANT CHANGE UP
GLUCOSE BLDC GLUCOMTR-MCNC: 138 MG/DL — HIGH (ref 70–99)
GLUCOSE BLDC GLUCOMTR-MCNC: 182 MG/DL — HIGH (ref 70–99)
GLUCOSE SERPL-MCNC: 137 MG/DL — HIGH (ref 70–99)
HCT VFR BLD CALC: 33.3 % — LOW (ref 34.5–45)
HGB BLD-MCNC: 10.8 G/DL — LOW (ref 11.5–15.5)
MCHC RBC-ENTMCNC: 30.9 PG — SIGNIFICANT CHANGE UP (ref 27–34)
MCHC RBC-ENTMCNC: 32.4 GM/DL — SIGNIFICANT CHANGE UP (ref 32–36)
MCV RBC AUTO: 95.4 FL — SIGNIFICANT CHANGE UP (ref 80–100)
NRBC # BLD: 0 /100 WBCS — SIGNIFICANT CHANGE UP (ref 0–0)
PLATELET # BLD AUTO: 203 K/UL — SIGNIFICANT CHANGE UP (ref 150–400)
POTASSIUM SERPL-MCNC: 3.9 MMOL/L — SIGNIFICANT CHANGE UP (ref 3.5–5.3)
POTASSIUM SERPL-SCNC: 3.9 MMOL/L — SIGNIFICANT CHANGE UP (ref 3.5–5.3)
RBC # BLD: 3.49 M/UL — LOW (ref 3.8–5.2)
RBC # FLD: 16.3 % — HIGH (ref 10.3–14.5)
SODIUM SERPL-SCNC: 140 MMOL/L — SIGNIFICANT CHANGE UP (ref 135–145)
WBC # BLD: 4.24 K/UL — SIGNIFICANT CHANGE UP (ref 3.8–10.5)
WBC # FLD AUTO: 4.24 K/UL — SIGNIFICANT CHANGE UP (ref 3.8–10.5)

## 2023-01-26 PROCEDURE — 85610 PROTHROMBIN TIME: CPT

## 2023-01-26 PROCEDURE — 71045 X-RAY EXAM CHEST 1 VIEW: CPT

## 2023-01-26 PROCEDURE — 97162 PT EVAL MOD COMPLEX 30 MIN: CPT

## 2023-01-26 PROCEDURE — 96365 THER/PROPH/DIAG IV INF INIT: CPT

## 2023-01-26 PROCEDURE — 82728 ASSAY OF FERRITIN: CPT

## 2023-01-26 PROCEDURE — 71275 CT ANGIOGRAPHY CHEST: CPT | Mod: MA

## 2023-01-26 PROCEDURE — 83735 ASSAY OF MAGNESIUM: CPT

## 2023-01-26 PROCEDURE — 94660 CPAP INITIATION&MGMT: CPT

## 2023-01-26 PROCEDURE — 93306 TTE W/DOPPLER COMPLETE: CPT

## 2023-01-26 PROCEDURE — 84484 ASSAY OF TROPONIN QUANT: CPT

## 2023-01-26 PROCEDURE — 99285 EMERGENCY DEPT VISIT HI MDM: CPT

## 2023-01-26 PROCEDURE — 85730 THROMBOPLASTIN TIME PARTIAL: CPT

## 2023-01-26 PROCEDURE — 93005 ELECTROCARDIOGRAM TRACING: CPT

## 2023-01-26 PROCEDURE — 85379 FIBRIN DEGRADATION QUANT: CPT

## 2023-01-26 PROCEDURE — 84100 ASSAY OF PHOSPHORUS: CPT

## 2023-01-26 PROCEDURE — 83615 LACTATE (LD) (LDH) ENZYME: CPT

## 2023-01-26 PROCEDURE — 82803 BLOOD GASES ANY COMBINATION: CPT

## 2023-01-26 PROCEDURE — 80053 COMPREHEN METABOLIC PANEL: CPT

## 2023-01-26 PROCEDURE — 36415 COLL VENOUS BLD VENIPUNCTURE: CPT

## 2023-01-26 PROCEDURE — 83880 ASSAY OF NATRIURETIC PEPTIDE: CPT

## 2023-01-26 PROCEDURE — 85027 COMPLETE CBC AUTOMATED: CPT

## 2023-01-26 PROCEDURE — 82962 GLUCOSE BLOOD TEST: CPT

## 2023-01-26 PROCEDURE — 80048 BASIC METABOLIC PNL TOTAL CA: CPT

## 2023-01-26 PROCEDURE — 0225U NFCT DS DNA&RNA 21 SARSCOV2: CPT

## 2023-01-26 PROCEDURE — 85025 COMPLETE CBC W/AUTO DIFF WBC: CPT

## 2023-01-26 PROCEDURE — 94640 AIRWAY INHALATION TREATMENT: CPT

## 2023-01-26 RX ORDER — APIXABAN 2.5 MG/1
1 TABLET, FILM COATED ORAL
Qty: 60 | Refills: 0
Start: 2023-01-26 | End: 2023-02-24

## 2023-01-26 RX ADMIN — APIXABAN 5 MILLIGRAM(S): 2.5 TABLET, FILM COATED ORAL at 06:40

## 2023-01-26 RX ADMIN — Medication 1: at 12:01

## 2023-01-26 RX ADMIN — Medication 6 MILLIGRAM(S): at 06:40

## 2023-01-26 RX ADMIN — Medication 25 MILLIGRAM(S): at 06:40

## 2023-01-26 RX ADMIN — REMDESIVIR 200 MILLIGRAM(S): 5 INJECTION INTRAVENOUS at 00:40

## 2023-01-26 NOTE — PROGRESS NOTE ADULT - SUBJECTIVE AND OBJECTIVE BOX
Patient is seen and examined at the bed side, is afebrile. She remains off supplemental oxygenation. The discharge plan noted.       REVIEW OF SYSTEMS: All other review systems are negative      ALLERGIES: gabapentin (Unknown)      Vital Signs Last 24 Hrs  T(C): 36.7 (26 Jan 2023 12:00), Max: 36.8 (26 Jan 2023 05:13)  T(F): 98.1 (26 Jan 2023 12:00), Max: 98.3 (26 Jan 2023 05:13)  HR: 67 (26 Jan 2023 12:00) (60 - 79)  BP: 123/68 (26 Jan 2023 12:00) (112/60 - 124/84)  BP(mean): --  RR: 18 (26 Jan 2023 12:00) (18 - 18)  SpO2: 98% (26 Jan 2023 12:00) (98% - 100%)    Parameters below as of 26 Jan 2023 12:00  Patient On (Oxygen Delivery Method): room air        PHYSICAL EXAM:  GENERAL: Not in distress , off supplemental oxygenation  CHEST/LUNG: Not using accessory muscles  EXTREMITIES: No pedal  edema  CNS: Awake and Alert      LABS:                        10.8   4.24  )-----------( 203      ( 26 Jan 2023 08:00 )             33.3                           11.3   3.15  )-----------( 189      ( 25 Jan 2023 08:34 )             34.6       01-26    140  |  110<H>  |  38<H>  ----------------------------<  137<H>  3.9   |  19<L>  |  0.81    Ca    9.0      26 Jan 2023 08:00      01-25    139  |  110<H>  |  39<H>  ----------------------------<  189<H>  3.6   |  19<L>  |  0.81    Ca    9.2      25 Jan 2023 08:34  Phos  2.4     01-24  Mg     1.6     01-24      TPro  6.6  /  Alb  3.1<L>  /  TBili  0.3  /  DBili  x   /  AST  11  /  ALT  20  /  AlkPhos  49  01-21    PT/INR - ( 21 Jan 2023 11:30 )   PT: 13.1 sec;   INR: 1.10 ratio       PTT - ( 22 Jan 2023 10:29 )  PTT:62.5 sec        CAPILLARY BLOOD GLUCOSE  POCT Blood Glucose.: 182 mg/dL (22 Jan 2023 11:43)  POCT Blood Glucose.: 170 mg/dL (22 Jan 2023 07:56)  POCT Blood Glucose.: 160 mg/dL (21 Jan 2023 22:33)    ABG - ( 21 Jan 2023 11:53 )  pH, Arterial: 7.45  pH, Blood: x     /  pCO2: 24    /  pO2: 315   / HCO3: 17    / Base Excess: -5.5  /  SaO2: 98            MEDICATIONS  (STANDING):    apixaban 5 milliGRAM(s) Oral every 12 hours  dexAMETHasone  Injectable 6 milliGRAM(s) IV Push daily  influenza  Vaccine (HIGH DOSE) 0.7 milliLiter(s) IntraMuscular once  insulin lispro (ADMELOG) corrective regimen sliding scale   SubCutaneous three times a day before meals  insulin lispro (ADMELOG) corrective regimen sliding scale   SubCutaneous at bedtime  metoprolol tartrate 25 milliGRAM(s) Oral two times a day        RADIOLOGY & ADDITIONAL TESTS:    1/21/23: Xray Chest 1 View- PORTABLE-Urgent (01.21.23 @ 13:16) No acute pulmonary pathology.      1/21/23: CT Angio Chest PE Protocol w/ IV Cont (01.21.23 @ 13:02) >    1.  Bilateral pulmonary emboli.  2.  Likely underlying right heart strain.  3.  The findings were discussed with and read back verification obtained   from Dr. Calvillo on 1/21/2023 at 1315 hours.          MICROBIOLOGY DATA:    Respiratory Viral Panel with COVID-19 by ALEX (01.21.23 @ 11:25)   Rapid RVP Result: Detected   SARS-CoV-2: Detected:

## 2023-01-26 NOTE — PROGRESS NOTE ADULT - PROBLEM/PLAN-10
Range of motion is not limited, no muscle or joint tenderness
DISPLAY PLAN FREE TEXT
DISPLAY PLAN FREE TEXT

## 2023-01-26 NOTE — PROGRESS NOTE ADULT - SUBJECTIVE AND OBJECTIVE BOX
CHIEF COMPLAINT:Patient is a 88y old  Female who presents with a chief complaint of shortness of breath.Pt appears comfortable.    	  REVIEW OF SYSTEMS:  CONSTITUTIONAL: No fever, weight loss, or fatigue  EYES: No eye pain, visual disturbances, or discharge  ENT:  No difficulty hearing, tinnitus, vertigo; No sinus or throat pain  NECK: No pain or stiffness  RESPIRATORY: No cough, wheezing, chills or hemoptysis; No Shortness of Breath  CARDIOVASCULAR: No chest pain, palpitations, passing out, dizziness, or leg swelling  GASTROINTESTINAL: No abdominal or epigastric pain. No nausea, vomiting, or hematemesis; No diarrhea or constipation. No melena or hematochezia.  GENITOURINARY: No dysuria, frequency, hematuria, or incontinence  NEUROLOGICAL: No headaches, memory loss, loss of strength, numbness, or tremors  SKIN: No itching, burning, rashes, or lesions   LYMPH Nodes: No enlarged glands  ENDOCRINE: No heat or cold intolerance; No hair loss  MUSCULOSKELETAL: No joint pain or swelling; No muscle, back, or extremity pain  PSYCHIATRIC: No depression, anxiety, mood swings, or difficulty sleeping  HEME/LYMPH: No easy bruising, or bleeding gums  ALLERGY AND IMMUNOLOGIC: No hives or eczema	        PHYSICAL EXAM:  T(C): 36.7 (01-26-23 @ 12:00), Max: 36.8 (01-26-23 @ 05:13)  HR: 67 (01-26-23 @ 12:00) (60 - 79)  BP: 123/68 (01-26-23 @ 12:00) (112/60 - 124/84)  RR: 18 (01-26-23 @ 12:00) (18 - 18)  SpO2: 98% (01-26-23 @ 12:00) (98% - 100%)  Wt(kg): --  I&O's Summary    25 Jan 2023 07:01  -  26 Jan 2023 07:00  --------------------------------------------------------  IN: 118 mL / OUT: 0 mL / NET: 118 mL    Tele-nsr,sinus tach 120's    Appearance: Normal	  HEENT:   Normal oral mucosa, PERRL, EOMI	  Lymphatic: No lymphadenopathy  Cardiovascular: Normal S1 S2, No JVD, No murmurs, No edema  Respiratory: Lungs clear to auscultation	  Psychiatry: A & O x 3, Mood & affect appropriate  Gastrointestinal:  Soft, Non-tender, + BS	  Skin: No rashes, No ecchymoses, No cyanosis	  Neurologic: Non-focal  Extremities: Normal range of motion, No clubbing, cyanosis or edema  Vascular: Peripheral pulses palpable 2+ bilaterally    MEDICATIONS  (STANDING):  apixaban 5 milliGRAM(s) Oral every 12 hours  dexAMETHasone  Injectable 6 milliGRAM(s) IV Push daily  influenza  Vaccine (HIGH DOSE) 0.7 milliLiter(s) IntraMuscular once  insulin lispro (ADMELOG) corrective regimen sliding scale   SubCutaneous three times a day before meals  insulin lispro (ADMELOG) corrective regimen sliding scale   SubCutaneous at bedtime  metoprolol tartrate 25 milliGRAM(s) Oral two times a day      	  	  LABS:	 	                                  10.8   4.24  )-----------( 203      ( 26 Jan 2023 08:00 )             33.3     01-26    140  |  110<H>  |  38<H>  ----------------------------<  137<H>  3.9   |  19<L>  |  0.81    Ca    9.0      26 Jan 2023 08:00      proBNP: Serum Pro-Brain Natriuretic Peptide: 830 pg/mL (01-21 @ 11:30)

## 2023-01-26 NOTE — PROGRESS NOTE ADULT - SUBJECTIVE AND OBJECTIVE BOX
PGY-1 Progress Note discussed with attending    PAGER #: [phone]] TILL 5:00 PM  PLEASE CONTACT ON CALL TEAM:  - On Call Team (Please refer to Brynn) FROM 5:00 PM - 8:30PM  - Nightfloat Team FROM 8:30 -7:30 AM    CHIEF COMPLAINT & BRIEF HOSPITAL COURSE:    INTERVAL HPI/OVERNIGHT EVENTS:   MEDICATIONS  (STANDING):  apixaban 5 milliGRAM(s) Oral every 12 hours  dexAMETHasone  Injectable 6 milliGRAM(s) IV Push daily  influenza  Vaccine (HIGH DOSE) 0.7 milliLiter(s) IntraMuscular once  insulin lispro (ADMELOG) corrective regimen sliding scale   SubCutaneous three times a day before meals  insulin lispro (ADMELOG) corrective regimen sliding scale   SubCutaneous at bedtime  metoprolol tartrate 25 milliGRAM(s) Oral two times a day    MEDICATIONS  (PRN):  acetaminophen     Tablet .. 650 milliGRAM(s) Oral every 6 hours PRN Temp greater or equal to 38C (100.4F), Mild Pain (1 - 3)      REVIEW OF SYSTEMS:  CONSTITUTIONAL: No fever, weight loss, or fatigue  RESPIRATORY: No cough, wheezing, chills or hemoptysis; No shortness of breath  CARDIOVASCULAR: No chest pain, palpitations, dizziness, or leg swelling  GASTROINTESTINAL: No abdominal pain. No nausea, vomiting, or diarrhea.  GENITOURINARY: No dysuria or hematuria, urinary frequency  NEUROLOGICAL: No headaches, memory loss, loss of strength, numbness, or tremors  SKIN: No itching, burning, rashes, or lesions     Vital Signs Last 24 Hrs  T(C): 36.8 (26 Jan 2023 05:13), Max: 36.8 (26 Jan 2023 05:13)  T(F): 98.3 (26 Jan 2023 05:13), Max: 98.3 (26 Jan 2023 05:13)  HR: 70 (26 Jan 2023 05:13) (60 - 79)  BP: 112/60 (26 Jan 2023 05:13) (112/60 - 148/75)  BP(mean): --  RR: 18 (26 Jan 2023 05:13) (18 - 18)  SpO2: 100% (26 Jan 2023 05:13) (97% - 100%)    Parameters below as of 26 Jan 2023 05:13  Patient On (Oxygen Delivery Method): room air        PHYSICAL EXAMINATION:  GENERAL: NAD, well built  HEAD:  Atraumatic, Normocephalic  EYES:  conjunctiva and sclera clear  NECK: Supple, No JVD, Normal thyroid  CHEST/LUNG: Clear to auscultation. No rales, rhonchi, wheezing, or rubs  HEART: Regular rate and rhythm; No murmurs, rubs, or gallops  ABDOMEN: Soft, Nontender, Nondistended; Bowel sounds present  NERVOUS SYSTEM:  Alert & Oriented X3,    EXTREMITIES:  2+ Peripheral Pulses, No clubbing, cyanosis, or edema  SKIN: warm dry                          11.3   3.15  )-----------( 189      ( 25 Jan 2023 08:34 )             34.6     01-25    139  |  110<H>  |  39<H>  ----------------------------<  189<H>  3.6   |  19<L>  |  0.81    Ca    9.2      25 Jan 2023 08:34            PTT - ( 25 Jan 2023 08:34 )  PTT:42.3 sec    CAPILLARY BLOOD GLUCOSE      RADIOLOGY & ADDITIONAL TESTS:

## 2023-01-26 NOTE — PROGRESS NOTE ADULT - ASSESSMENT
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  1.Tele  monitoring.Holter as outpatient.  2.Echocardiogram-nl RV fx.  3.COVID+-ID f/u,dexamethasone.  4.Gastric ca and MM-Heme/Onc f/u rec  NOAC.  5.DM-Insulin.  6.HTN-  lopressor 25mg bid.  7.B/L PE-NOAC  8.PPI.

## 2023-01-26 NOTE — DISCHARGE NOTE NURSING/CASE MANAGEMENT/SOCIAL WORK - PATIENT PORTAL LINK FT
You can access the FollowMyHealth Patient Portal offered by [facility] by registering at the following website: [email or web address]. By joining [facility]’s FollowMyHealth portal, you will also be able to view your health information using other applications (apps) compatible with our system.

## 2023-01-26 NOTE — PROGRESS NOTE ADULT - PROBLEM SELECTOR PLAN 3
CXR noted  Monitor Oxygen sat  Monitor Respiratory status  Monitor LDH, ferritin, CRP, LFTs, D-Dimer and procalcitonin  Vitamin C supplement. Vit D  zinc supplement  Follow up Covid PCR  IV Dexa/Remdesivir.  ID follow up CXR noted  Monitor Oxygen sat  Monitor Respiratory status  Monitor LDH, ferritin, CRP, LFTs, D-Dimer and procalcitonin  Vitamin C supplement. Vit D  zinc supplement  Follow up Covid PCR  IV Dexa  ID follow up

## 2023-01-26 NOTE — PROGRESS NOTE ADULT - PROBLEM SELECTOR PLAN 2
Monitor Respiratory status  Anticoagulant  monitor for bleeding  Hem/onc Follow up Monitor Respiratory status  Anticoagulant Po  monitor for bleeding  Hem/onc Follow up

## 2023-01-26 NOTE — PROGRESS NOTE ADULT - ASSESSMENT
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 Patient states she feels fine now but this morning when she was having an endoscopy she was increasingly short of breath and was sent to the ER. On admission, she found to have no fever, but hypoxia, 86% in Room air, requiring supplemental oxygenation. Also found to have positive COVID PCR and B/L Pulmonary embolism. She has started on Remdesivir and Dexamethasone, and the ID consult requested to assist with further evaluation and antibiotic management.    # COVID Pneumonitis - on Remdesivir and Dexamethasone   # Acute Hypoxic Respiratory failure- on oxygen via NC  # B/L Pulmonary  embolisms    would recommend:    1. Please taper off  Dexamethasone as tolerated   2. Supportive care including Anti coagulation  3. OOB to chair   4, COVID precaution    Attending Attestation:    Spent more than 35 minutes on total encounter, more than 50 % of the visit was spent counseling and/or coordinating care by the Attending physician.

## 2023-01-26 NOTE — PROGRESS NOTE ADULT - SUBJECTIVE AND OBJECTIVE BOX
pt seen in icu [  ], reg med floor [   ], bed [  ], chair at bedside [   ]  Awake, alert, comfortable, laying  in bed in NAD. Less sob and no cough. Good appetite. Remains on isolation due to Covid.    REVIEW OF SYSTEMS:    CONSTITUTIONAL: No weakness, fevers or chills  EYES/ENT: No visual changes;  No vertigo or throat pain   NECK: No pain or stiffness  RESPIRATORY: No cough, wheezing, hemoptysis; No shortness of breath  CARDIOVASCULAR: No chest pain or palpitations  GASTROINTESTINAL: No abdominal or epigastric pain. No nausea, vomiting, or hematemesis; No diarrhea or constipation. No melena or hematochezia.  GENITOURINARY: No dysuria, frequency or hematuria  NEUROLOGICAL: No numbness or weakness  SKIN: No itching, burning, rashes, or lesions   All other review of systems is negative unless indicated above.    Physical Exam    General: WN/WD NAD  Neurology: A&Ox3, nonfocal, JACKMAN x 4  Respiratory: CTA B/L  CV: RRR, S1S2, no murmurs, rubs or gallops  Abdominal: Soft, NT, ND +BS, Last BM  Extremities: No edema, + peripheral pulses      Allergies  gabapentin (Unknown)      Health Issues  Other pulmonary embolism without acute cor pulmonale    HTN (hypertension)    DM (diabetes mellitus)    Hypercholesteremia    Gastric adenocarcinoma    Vertigo    Dementia    S/P hysterectomy    S/P tubal ligation        Vitals  T(F): 98.3 (01-26-23 @ 05:13), Max: 98.3 (01-26-23 @ 05:13)  HR: 70 (01-26-23 @ 05:13) (60 - 79)  BP: 112/60 (01-26-23 @ 05:13) (112/60 - 148/75)  RR: 18 (01-26-23 @ 05:13) (18 - 18)  SpO2: 100% (01-26-23 @ 05:13) (97% - 100%)  Wt(kg): --  CAPILLARY BLOOD GLUCOSE      POCT Blood Glucose.: 120 mg/dL (25 Jan 2023 20:59)      Labs                          11.3   3.15  )-----------( 189      ( 25 Jan 2023 08:34 )             34.6       01-25    139  |  110<H>  |  39<H>  ----------------------------<  189<H>  3.6   |  19<L>  |  0.81    Ca    9.2      25 Jan 2023 08:34  Phos  2.4     01-24  Mg     1.6     01-24              Radiology Results      Meds    MEDICATIONS  (STANDING):  apixaban 5 milliGRAM(s) Oral every 12 hours  dexAMETHasone  Injectable 6 milliGRAM(s) IV Push daily  influenza  Vaccine (HIGH DOSE) 0.7 milliLiter(s) IntraMuscular once  insulin lispro (ADMELOG) corrective regimen sliding scale   SubCutaneous three times a day before meals  insulin lispro (ADMELOG) corrective regimen sliding scale   SubCutaneous at bedtime  metoprolol tartrate 25 milliGRAM(s) Oral two times a day      MEDICATIONS  (PRN):  acetaminophen     Tablet .. 650 milliGRAM(s) Oral every 6 hours PRN Temp greater or equal to 38C (100.4F), Mild Pain (1 - 3)         pt seen in icu [  ], reg med floor [  x ], bed [ x ], chair at bedside [   ]  Awake, alert, comfortable, laying  in bed in NAD. No sob and no cough. Remains on isolation due to Covid.  No diarrhea today  REVIEW OF SYSTEMS:    CONSTITUTIONAL: No weakness, fevers or chills  EYES/ENT: No visual changes;  No vertigo or throat pain   NECK: No pain or stiffness  RESPIRATORY: No cough, wheezing, hemoptysis; No shortness of breath  CARDIOVASCULAR: No chest pain or palpitations  GASTROINTESTINAL: No abdominal or epigastric pain. No nausea, vomiting, or hematemesis; No diarrhea or constipation. No melena or hematochezia.  GENITOURINARY: No dysuria, frequency or hematuria  NEUROLOGICAL: No numbness or weakness  SKIN: No itching, burning, rashes, or lesions   All other review of systems is negative unless indicated above.    Physical Exam    General: WN/WD NAD  Neurology: A&Ox3, nonfocal, JACKMAN x 4  Respiratory: CTA B/L  CV: RRR, S1S2, no murmurs, rubs or gallops  Abdominal: Soft, NT, ND +BS, Last BM  Extremities: No edema, + peripheral pulses      Allergies  gabapentin (Unknown)      Health Issues  Other pulmonary embolism without acute cor pulmonale    HTN (hypertension)    DM (diabetes mellitus)    Hypercholesteremia    Gastric adenocarcinoma    Vertigo    Dementia    S/P hysterectomy    S/P tubal ligation        Vitals  T(F): 98.3 (01-26-23 @ 05:13), Max: 98.3 (01-26-23 @ 05:13)  HR: 70 (01-26-23 @ 05:13) (60 - 79)  BP: 112/60 (01-26-23 @ 05:13) (112/60 - 148/75)  RR: 18 (01-26-23 @ 05:13) (18 - 18)  SpO2: 100% (01-26-23 @ 05:13) (97% - 100%)  Wt(kg): --  CAPILLARY BLOOD GLUCOSE      POCT Blood Glucose.: 120 mg/dL (25 Jan 2023 20:59)      Labs                          11.3   3.15  )-----------( 189      ( 25 Jan 2023 08:34 )             34.6       01-25    139  |  110<H>  |  39<H>  ----------------------------<  189<H>  3.6   |  19<L>  |  0.81    Ca    9.2      25 Jan 2023 08:34  Phos  2.4     01-24  Mg     1.6     01-24              Radiology Results      Meds    MEDICATIONS  (STANDING):  apixaban 5 milliGRAM(s) Oral every 12 hours  dexAMETHasone  Injectable 6 milliGRAM(s) IV Push daily  influenza  Vaccine (HIGH DOSE) 0.7 milliLiter(s) IntraMuscular once  insulin lispro (ADMELOG) corrective regimen sliding scale   SubCutaneous three times a day before meals  insulin lispro (ADMELOG) corrective regimen sliding scale   SubCutaneous at bedtime  metoprolol tartrate 25 milliGRAM(s) Oral two times a day      MEDICATIONS  (PRN):  acetaminophen     Tablet .. 650 milliGRAM(s) Oral every 6 hours PRN Temp greater or equal to 38C (100.4F), Mild Pain (1 - 3)

## 2023-01-26 NOTE — PROGRESS NOTE ADULT - PROVIDER SPECIALTY LIST ADULT
Cardiology
Heme/Onc
Cardiology
Infectious Disease
Cardiology
Cardiology
Heme/Onc
Infectious Disease
Internal Medicine

## 2023-01-26 NOTE — PROGRESS NOTE ADULT - PROBLEM SELECTOR PLAN 1
patient presents with acute shortness of breath and productive cough and hypoxia to 86% on Room air. s/p BiPAP in ED, de-escalated to 4 L NC and saturating to 98% now  CXR negative, RVP + for COVID  also CT angio performed in ED which showed bilateral pulmonary embolism in the distral R main PA, R upper/middle/lower lobes and left upper segmental arteries, left lower lobar artery and left lower segmental arteries with likely underlying heart strain  started on heparin drip in ED  s/p dexamethasone IV 6 mg in ED  PE likely provoked in the setting of multiple myeloma  pt is COVID vaccinated x 3    - isolation precautions  - IV decadron  - remdesivir  - [facility] consulted (follows with Dr. Alvarado): no treatment for MM while inpatient  - wean oxygen as tolerated; on ROOM AIR  - TTE neg for right heart strain  - Patient transitioned to Eliquis 5mg BID for treatment of PE

## 2023-01-26 NOTE — DISCHARGE NOTE NURSING/CASE MANAGEMENT/SOCIAL WORK - NSDCPEFALRISK_GEN_ALL_CORE
For information on Fall & Injury Prevention, visit: [email or web address] OR  [email or web address] OR  https://www.cdc.gov/steadi/patient.html

## 2023-03-28 ENCOUNTER — INPATIENT (INPATIENT)
Facility: HOSPITAL | Age: 88
LOS: 6 days | Discharge: HOME | DRG: 176 | End: 2023-04-04
Attending: INTERNAL MEDICINE | Admitting: INTERNAL MEDICINE
Payer: MEDICAID

## 2023-03-28 VITALS
TEMPERATURE: 98 F | DIASTOLIC BLOOD PRESSURE: 62 MMHG | RESPIRATION RATE: 20 BRPM | HEART RATE: 100 BPM | SYSTOLIC BLOOD PRESSURE: 120 MMHG | OXYGEN SATURATION: 95 %

## 2023-03-28 DIAGNOSIS — I26.99 OTHER PULMONARY EMBOLISM WITHOUT ACUTE COR PULMONALE: ICD-10-CM

## 2023-03-28 DIAGNOSIS — Z90.710 ACQUIRED ABSENCE OF BOTH CERVIX AND UTERUS: Chronic | ICD-10-CM

## 2023-03-28 DIAGNOSIS — Z98.51 TUBAL LIGATION STATUS: Chronic | ICD-10-CM

## 2023-03-28 LAB
ALBUMIN SERPL ELPH-MCNC: 2.5 G/DL — LOW (ref 3.5–5)
ALP SERPL-CCNC: 49 U/L — SIGNIFICANT CHANGE UP (ref 40–120)
ALT FLD-CCNC: 14 U/L DA — SIGNIFICANT CHANGE UP (ref 10–60)
ANION GAP SERPL CALC-SCNC: 8 MMOL/L — SIGNIFICANT CHANGE UP (ref 5–17)
APTT BLD: 28.2 SEC — SIGNIFICANT CHANGE UP (ref 27.5–35.5)
AST SERPL-CCNC: 14 U/L — SIGNIFICANT CHANGE UP (ref 10–40)
BASE EXCESS BLDV CALC-SCNC: -7.1 MMOL/L — SIGNIFICANT CHANGE UP
BASOPHILS # BLD AUTO: 0.25 K/UL — HIGH (ref 0–0.2)
BASOPHILS NFR BLD AUTO: 4 % — HIGH (ref 0–2)
BILIRUB SERPL-MCNC: 0.2 MG/DL — SIGNIFICANT CHANGE UP (ref 0.2–1.2)
BUN SERPL-MCNC: 24 MG/DL — HIGH (ref 7–18)
CALCIUM SERPL-MCNC: 9 MG/DL — SIGNIFICANT CHANGE UP (ref 8.4–10.5)
CHLORIDE SERPL-SCNC: 112 MMOL/L — HIGH (ref 96–108)
CO2 SERPL-SCNC: 18 MMOL/L — LOW (ref 22–31)
CREAT SERPL-MCNC: 1.11 MG/DL — SIGNIFICANT CHANGE UP (ref 0.5–1.3)
EGFR: 48 ML/MIN/1.73M2 — LOW
EOSINOPHIL # BLD AUTO: 0 K/UL — SIGNIFICANT CHANGE UP (ref 0–0.5)
EOSINOPHIL NFR BLD AUTO: 0 % — SIGNIFICANT CHANGE UP (ref 0–6)
FLUAV AG NPH QL: SIGNIFICANT CHANGE UP
FLUBV AG NPH QL: SIGNIFICANT CHANGE UP
GLUCOSE SERPL-MCNC: 100 MG/DL — HIGH (ref 70–99)
HCO3 BLDV-SCNC: 18 MMOL/L — LOW (ref 22–29)
HCT VFR BLD CALC: 30.1 % — LOW (ref 34.5–45)
HGB BLD-MCNC: 9.5 G/DL — LOW (ref 11.5–15.5)
INR BLD: 1.08 RATIO — SIGNIFICANT CHANGE UP (ref 0.88–1.16)
LACTATE SERPL-SCNC: 3.9 MMOL/L — HIGH (ref 0.7–2)
LYMPHOCYTES # BLD AUTO: 0.87 K/UL — LOW (ref 1–3.3)
LYMPHOCYTES # BLD AUTO: 14 % — SIGNIFICANT CHANGE UP (ref 13–44)
MCHC RBC-ENTMCNC: 30.3 PG — SIGNIFICANT CHANGE UP (ref 27–34)
MCHC RBC-ENTMCNC: 31.6 GM/DL — LOW (ref 32–36)
MCV RBC AUTO: 95.9 FL — SIGNIFICANT CHANGE UP (ref 80–100)
MONOCYTES # BLD AUTO: 1.49 K/UL — HIGH (ref 0–0.9)
MONOCYTES NFR BLD AUTO: 24 % — HIGH (ref 2–14)
NEUTROPHILS # BLD AUTO: 1.86 K/UL — SIGNIFICANT CHANGE UP (ref 1.8–7.4)
NEUTROPHILS NFR BLD AUTO: 28 % — LOW (ref 43–77)
NT-PROBNP SERPL-SCNC: 253 PG/ML — SIGNIFICANT CHANGE UP (ref 0–450)
PCO2 BLDV: 34 MMHG — LOW (ref 39–42)
PH BLDV: 7.33 — SIGNIFICANT CHANGE UP (ref 7.32–7.43)
PLATELET # BLD AUTO: 270 K/UL — SIGNIFICANT CHANGE UP (ref 150–400)
PO2 BLDV: 21 MMHG — SIGNIFICANT CHANGE UP
POTASSIUM SERPL-MCNC: 4.2 MMOL/L — SIGNIFICANT CHANGE UP (ref 3.5–5.3)
POTASSIUM SERPL-SCNC: 4.2 MMOL/L — SIGNIFICANT CHANGE UP (ref 3.5–5.3)
PROT SERPL-MCNC: 5.9 G/DL — LOW (ref 6–8.3)
PROTHROM AB SERPL-ACNC: 12.9 SEC — SIGNIFICANT CHANGE UP (ref 10.5–13.4)
RBC # BLD: 3.14 M/UL — LOW (ref 3.8–5.2)
RBC # FLD: 15.1 % — HIGH (ref 10.3–14.5)
SAO2 % BLDV: 30.8 % — SIGNIFICANT CHANGE UP
SARS-COV-2 RNA SPEC QL NAA+PROBE: SIGNIFICANT CHANGE UP
SODIUM SERPL-SCNC: 138 MMOL/L — SIGNIFICANT CHANGE UP (ref 135–145)
TROPONIN I, HIGH SENSITIVITY RESULT: 10.3 NG/L — SIGNIFICANT CHANGE UP
WBC # BLD: 6.21 K/UL — SIGNIFICANT CHANGE UP (ref 3.8–10.5)
WBC # FLD AUTO: 6.21 K/UL — SIGNIFICANT CHANGE UP (ref 3.8–10.5)

## 2023-03-28 PROCEDURE — 71275 CT ANGIOGRAPHY CHEST: CPT | Mod: 26,MA

## 2023-03-28 PROCEDURE — 99285 EMERGENCY DEPT VISIT HI MDM: CPT

## 2023-03-28 RX ORDER — ACETAMINOPHEN 500 MG
975 TABLET ORAL ONCE
Refills: 0 | Status: COMPLETED | OUTPATIENT
Start: 2023-03-28 | End: 2023-03-28

## 2023-03-28 RX ORDER — ENOXAPARIN SODIUM 100 MG/ML
60 INJECTION SUBCUTANEOUS ONCE
Refills: 0 | Status: COMPLETED | OUTPATIENT
Start: 2023-03-28 | End: 2023-03-28

## 2023-03-28 RX ORDER — IPRATROPIUM/ALBUTEROL SULFATE 18-103MCG
3 AEROSOL WITH ADAPTER (GRAM) INHALATION ONCE
Refills: 0 | Status: COMPLETED | OUTPATIENT
Start: 2023-03-28 | End: 2023-03-28

## 2023-03-28 RX ORDER — ENOXAPARIN SODIUM 100 MG/ML
60 INJECTION SUBCUTANEOUS EVERY 12 HOURS
Refills: 0 | Status: DISCONTINUED | OUTPATIENT
Start: 2023-03-28 | End: 2023-03-30

## 2023-03-28 RX ADMIN — Medication 3 MILLILITER(S): at 18:57

## 2023-03-28 RX ADMIN — Medication 975 MILLIGRAM(S): at 19:26

## 2023-03-28 RX ADMIN — ENOXAPARIN SODIUM 60 MILLIGRAM(S): 100 INJECTION SUBCUTANEOUS at 22:06

## 2023-03-28 RX ADMIN — Medication 3 MILLILITER(S): at 18:33

## 2023-03-28 RX ADMIN — Medication 975 MILLIGRAM(S): at 18:04

## 2023-03-28 RX ADMIN — Medication 50 MILLIGRAM(S): at 18:33

## 2023-03-28 RX ADMIN — Medication 3 MILLILITER(S): at 22:07

## 2023-03-28 NOTE — ED PROVIDER NOTE - PHYSICAL EXAMINATION
GENERAL: well appearing, no acute distress, intermittently coughing   HEAD: atraumatic   EYES: EOMI   ENT: moist oral mucosa   CARDIAC: regular rate, trace LE edema   RESPIRATORY: mild increased work of breathing, RR 20-22, scant expiratory wheezing b/l  MUSCULOSKELETAL: no deformity   NEUROLOGICAL: alert, spontaneous movement of extremities   SKIN: no visible rash  PSYCHIATRIC: cooperative

## 2023-03-28 NOTE — ED ADULT NURSE NOTE - OBJECTIVE STATEMENT
Pt's daughter Ivett states that pt has been experiencing shortness of breath; pt denies any chest pain; denies any fevers or any sick exposure contacts; no apparent distress.

## 2023-03-28 NOTE — H&P ADULT - PROBLEM SELECTOR PLAN 2
p/w SOB and wheezing   h/o recently diagnosed b/l PE in the setting of malignancy w/ METS no longer on AC  EKG shows NSR,   CTA chest: b/l PE in distal portions right and left main pulmonary arteries extending into the RML and bibasilar pulmonary artery branches w/ increased clot  burden in distal left main pulmonary artery, slightly increased compared to previous exam.   Lactate elevated 3.9 but Troponin neg x1   Not in respiratory distress never hypoxic, and without chest pain.   s/p duonebs x3, prednisone 50mg x1, and FD lovenox x1 in the ED with improvement  Admit to tele   c/w FD Lovenox   f/u repeat TTE to eval for heart strain in the setting of progressive PE   f/u T2 and repeat lactate  [facility] consulted, follows Dr. Alvarado  Cardiology consulted: Dr. Jennings

## 2023-03-28 NOTE — H&P ADULT - PROBLEM SELECTOR PLAN 5
h/o DM  will hold oral dm meds while inpatient   f/u A1c  c/w sliding scale  Adjust insulin as indicated  FS ACHS

## 2023-03-28 NOTE — H&P ADULT - PROBLEM SELECTOR PLAN 1
p/w SOB and wheezing  h/o PE no longer on AC and Asthma   EKG shows NSR,   CTA chest: b/l PE  w/ increased clot  burden in distal left main pulmonary artery, slightly increased compared to prior  Not in respiratory distress never hypoxic, and without chest pain.   (+) b/l crackles with expiratory wheezing, unlabored respirations  s/p duonebs x3, prednisone 50mg x1, and FD lovenox x1 in the ED with improvement  likely in the setting of progression in PE, unlikely asthma exacerbation   rest of plan as below

## 2023-03-28 NOTE — ED PROVIDER NOTE - CLINICAL SUMMARY MEDICAL DECISION MAKING FREE TEXT BOX
Elderly F with wheezing and cough, suspect asthma. However given hx of active malignancy, hx of PE, not on AC, some concern for SOB 2/2 to PE and will plan for CTA chest.

## 2023-03-28 NOTE — H&P ADULT - HISTORY OF PRESENT ILLNESS
88F from home, lives with daughter, uses a walker, with PMHx HTN, HLD, DM, Osteoporosis, Stage II B Gastric Adenocarcinoma s/p distal gastrectomy in 2015 on active chemo (follows [facility] Dr Adams), Early Multiple myeloma, and recently diagnosed with b/l PE (unclear if still on Eliquis) and PSHx ASHU w/BSO and Cholecystectomy, presenting to the ED with shortness of breath c/o wheezing and cough x  2d. Recently admitted 1/21/23-1/26/23 for acute hypoxic respiratory failure 2/2 new submassive PE and COVID with TTE negative for right heart strain and normal cardiac function and discharged on Eliquis. Pt is COVID vaccinated x 3. She reports that she feels like she may have had a cold stating that she has only had SOB and wheezing with no relief from her home inhalers. Denies chest pain, change in cough, or vomiting. No sick contact at  home. Denies fevers, chills, and night sweats. Patient and family at the bedside unable to recall medication list and unsure if she is still taking AC, per sure scripts she has not had another refill after the 30-d post-discharge, family states there weren't anymore refills left and did not know they needed follow up for additional refills. Reports compliant with other refilled meds. Last chemo was two weeks ago, with next chemo scheduled for tomorrow at [facility]. Denies other acute complaints.

## 2023-03-28 NOTE — H&P ADULT - PROBLEM/PLAN-4
Was the patient seen in the last year in this department? Yes     Does patient have an active prescription for medications requested? No     Received Request Via: Pharmacy       DISPLAY PLAN FREE TEXT no

## 2023-03-28 NOTE — H&P ADULT - PROBLEM SELECTOR PLAN 4
h/o HTN   currently normotensive   c/w home meds with holding parameters, once confirmed in the AM    Monitor BP

## 2023-03-28 NOTE — H&P ADULT - ATTENDING COMMENTS
88F from home, lives with daughter, uses a walker, with PMHx HTN, HLD, DM, Osteoporosis, Stage II B Gastric Adenocarcinoma s/p distal gastrectomy in 2015 on active chemo (follows [facility] Dr Adams), Early Multiple myeloma, and recently diagnosed with b/l PE (unclear if still on Eliquis) and PSHx ASHU w/BSO and Cholecystectomy, presenting to the ED with shortness of breath c/o wheezing and cough x  2d. Recently admitted 1/21/23-1/26/23 for acute hypoxic respiratory failure 2/2 new submassive PE and COVID with TTE negative for right heart strain and normal cardiac function and discharged on Eliquis. Pt is COVID vaccinated x 3. She reports that she feels like she may have had a cold stating that she has only had SOB and wheezing with no relief from her home inhalers. Denies chest pain, change in cough, or vomiting. No sick contact at  home. Denies fevers, chills, and night sweats. Patient and family at the bedside unable to recall medication list and unsure if she is still taking AC, per sure scripts she has not had another refill after the 30-d post-discharge, family states there weren't anymore refills left and did not know they needed follow up for additional refills. Reports compliant with other refilled meds. Last chemo was two weeks ago, with next chemo scheduled for tomorrow at [facility]. Denies other acute complaints.       assessment  --  b/l pe 2nd to non compliance, h/o HTN, HLD, DM, Osteoporosis, Stage II B Gastric Adenocarcinoma s/p distal gastrectomy in 2015 on active chemo (follows [facility] Dr Adams), Early Multiple myeloma, and recently diagnosed with b/l PE (unclear if still on Eliquis) hypoxic respiratory failure 2nd to covid 19 and b/l pe and PSHx ASHU w/BSO and Cholecystectomy,     plan  --  admit to med, lovenox 1mg/kg q 12,  bronchodilators, supplemental O2, cont preadmit home meds, gi prophylaxis  cbc, bmp, mg, phos, lipids, tsh,     echo    heme onc cons  pulm cons  card cons

## 2023-03-28 NOTE — ED PROVIDER NOTE - OBJECTIVE STATEMENT
89 yo F pmh of DM, HTN, HLD, asthma, and ?colon cancer with mets (currently on chemo), hx of PE (stopped taking AC) c/o wheezing and cough x a few days. Denies other acute complaints.  used.

## 2023-03-28 NOTE — H&P ADULT - PROBLEM SELECTOR PROBLEM 4
According to the chart notes, the only facility that the previous crisis worker was unable to contact was [place] [employer]  [facility], and they stated that they have a pending discharge for tomorrow  They requested to review patient for admissions review  Faxed chart for admissions review  HTN (hypertension)

## 2023-03-28 NOTE — H&P ADULT - ASSESSMENT
88F from home, uses a walker, PMHx HTN, HLD, DM, Osteoporosis, Stage II B Gastric Adenocarcinoma s/p distal gastrectomy in 2015 on active chemo (follows [facility] Dr Ramachandran), Early Multiple myeloma, and recently diagnosed with b/l PE (unclear if still on Eliquis), presenting to the ED with shortness of breath c/o wheezing and cough x  2d. COVD and flu negative. CTA shows b/l PE within the distal portions of the right and left main pulmonary arteries extending into the RML and bibailar pulmonary artery branches with increased clot  burden in the distal left main pulmonary artery has slightly increased when compared to previous exam. Not in respiratory distress never hypoxic, and without chest pain. s/p duonebs x3, prednisone 50mg x1, and FD lovenox x1 in the ED with improvement. Admitted to tele for progression of PE in the setting of medication non-compliance.      PRIMARY TEAM TO CONFIRM MEDS IN THE AM.

## 2023-03-28 NOTE — H&P ADULT - NSHPPHYSICALEXAM_GEN_ALL_CORE
Vital Signs Last 24 Hrs  T(C): 36.6 (28 Mar 2023 23:58), Max: 36.9 (28 Mar 2023 15:15)  T(F): 97.9 (28 Mar 2023 23:58), Max: 98.5 (28 Mar 2023 15:15)  HR: 101 (28 Mar 2023 23:58) (94 - 101)  BP: 123/67 (28 Mar 2023 23:58) (120/62 - 123/67)  BP(mean): --  RR: 20 (28 Mar 2023 23:58) (20 - 20)  SpO2: 95% (28 Mar 2023 23:58) (95% - 100%)    Parameters below as of 28 Mar 2023 23:58  Patient On (Oxygen Delivery Method): room air    GENERAL: NAD, lying in bed comfortably  HEAD:  Atraumatic, Normocephalic  EYES: EOMI, PERRLA, conjunctiva and sclera clear  ENT: Moist mucous membranes  NECK: Supple, No JVD  CHEST/LUNG: (+) b/l crackles with expiratory wheezing. Unlabored respirations  HEART: Regular rate and rhythm; No murmurs, rubs, or gallops  ABDOMEN: Bowel sounds present; Soft, Nontender, Nondistended.  EXTREMITIES:  2+ Peripheral Pulses, brisk capillary refill. No clubbing, cyanosis, or edema  NERVOUS SYSTEM:  Alert & Oriented X3, speech clear. No deficits   MSK: FROM all 4 extremities, full and equal strength  SKIN: No rashes or lesions

## 2023-03-28 NOTE — H&P ADULT - NSICDXPASTMEDICALHX_GEN_ALL_CORE_FT
PAST MEDICAL HISTORY:  Ambulatory dysfunction     Dementia     DM (diabetes mellitus)     Gastric adenocarcinoma s/p resection    HTN (hypertension)     Hypercholesteremia     Multiple myeloma     Pulmonary embolism     Vertigo

## 2023-03-28 NOTE — ED PROVIDER NOTE - PROGRESS NOTE DETAILS
EKG - nsr, rate 104, QTc 452, no ischemic changes, no ectopy labs - Hgb 9.5, lactate 3.9  CTA - b/l PE's w/ increased clot burden on L side  PCP admits to Dr Kramer. Endorsed to him and MAR.

## 2023-03-29 DIAGNOSIS — J45.909 UNSPECIFIED ASTHMA, UNCOMPLICATED: ICD-10-CM

## 2023-03-29 DIAGNOSIS — Z29.9 ENCOUNTER FOR PROPHYLACTIC MEASURES, UNSPECIFIED: ICD-10-CM

## 2023-03-29 DIAGNOSIS — I26.99 OTHER PULMONARY EMBOLISM WITHOUT ACUTE COR PULMONALE: ICD-10-CM

## 2023-03-29 DIAGNOSIS — I10 ESSENTIAL (PRIMARY) HYPERTENSION: ICD-10-CM

## 2023-03-29 DIAGNOSIS — E11.9 TYPE 2 DIABETES MELLITUS WITHOUT COMPLICATIONS: ICD-10-CM

## 2023-03-29 DIAGNOSIS — C16.9 MALIGNANT NEOPLASM OF STOMACH, UNSPECIFIED: ICD-10-CM

## 2023-03-29 DIAGNOSIS — R06.02 SHORTNESS OF BREATH: ICD-10-CM

## 2023-03-29 LAB
ALBUMIN SERPL ELPH-MCNC: 2.4 G/DL — LOW (ref 3.5–5)
ALP SERPL-CCNC: 50 U/L — SIGNIFICANT CHANGE UP (ref 40–120)
ALT FLD-CCNC: 16 U/L DA — SIGNIFICANT CHANGE UP (ref 10–60)
ANION GAP SERPL CALC-SCNC: 6 MMOL/L — SIGNIFICANT CHANGE UP (ref 5–17)
ANISOCYTOSIS BLD QL: SLIGHT — SIGNIFICANT CHANGE UP
AST SERPL-CCNC: 17 U/L — SIGNIFICANT CHANGE UP (ref 10–40)
BASOPHILS # BLD AUTO: 0 K/UL — SIGNIFICANT CHANGE UP (ref 0–0.2)
BASOPHILS NFR BLD AUTO: 0 % — SIGNIFICANT CHANGE UP (ref 0–2)
BILIRUB SERPL-MCNC: 0.2 MG/DL — SIGNIFICANT CHANGE UP (ref 0.2–1.2)
BUN SERPL-MCNC: 22 MG/DL — HIGH (ref 7–18)
BURR CELLS BLD QL SMEAR: PRESENT — SIGNIFICANT CHANGE UP
CALCIUM SERPL-MCNC: 9.1 MG/DL — SIGNIFICANT CHANGE UP (ref 8.4–10.5)
CHLORIDE SERPL-SCNC: 115 MMOL/L — HIGH (ref 96–108)
CO2 SERPL-SCNC: 19 MMOL/L — LOW (ref 22–31)
CREAT SERPL-MCNC: 0.79 MG/DL — SIGNIFICANT CHANGE UP (ref 0.5–1.3)
EGFR: 73 ML/MIN/1.73M2 — SIGNIFICANT CHANGE UP
ELLIPTOCYTES BLD QL SMEAR: SLIGHT — SIGNIFICANT CHANGE UP
EOSINOPHIL # BLD AUTO: 0 K/UL — SIGNIFICANT CHANGE UP (ref 0–0.5)
EOSINOPHIL NFR BLD AUTO: 0 % — SIGNIFICANT CHANGE UP (ref 0–6)
GLUCOSE BLDC GLUCOMTR-MCNC: 150 MG/DL — HIGH (ref 70–99)
GLUCOSE SERPL-MCNC: 111 MG/DL — HIGH (ref 70–99)
HCT VFR BLD CALC: 28.5 % — LOW (ref 34.5–45)
HGB BLD-MCNC: 9.2 G/DL — LOW (ref 11.5–15.5)
HYPOSEGMENTATION: PRESENT — SIGNIFICANT CHANGE UP
LACTATE SERPL-SCNC: 2.8 MMOL/L — HIGH (ref 0.7–2)
LACTATE SERPL-SCNC: 3 MMOL/L — HIGH (ref 0.7–2)
LYMPHOCYTES # BLD AUTO: 2.07 K/UL — SIGNIFICANT CHANGE UP (ref 1–3.3)
LYMPHOCYTES # BLD AUTO: 31 % — SIGNIFICANT CHANGE UP (ref 13–44)
MAGNESIUM SERPL-MCNC: 1.9 MG/DL — SIGNIFICANT CHANGE UP (ref 1.6–2.6)
MANUAL SMEAR VERIFICATION: SIGNIFICANT CHANGE UP
MCHC RBC-ENTMCNC: 30.5 PG — SIGNIFICANT CHANGE UP (ref 27–34)
MCHC RBC-ENTMCNC: 32.3 GM/DL — SIGNIFICANT CHANGE UP (ref 32–36)
MCV RBC AUTO: 94.4 FL — SIGNIFICANT CHANGE UP (ref 80–100)
MONOCYTES # BLD AUTO: 1.2 K/UL — HIGH (ref 0–0.9)
MONOCYTES NFR BLD AUTO: 18 % — HIGH (ref 2–14)
NEUTROPHILS # BLD AUTO: 3.2 K/UL — SIGNIFICANT CHANGE UP (ref 1.8–7.4)
NEUTROPHILS NFR BLD AUTO: 46 % — SIGNIFICANT CHANGE UP (ref 43–77)
NEUTS BAND # BLD: 2 % — SIGNIFICANT CHANGE UP (ref 0–8)
NRBC # BLD: 0 /100 — SIGNIFICANT CHANGE UP (ref 0–0)
OVALOCYTES BLD QL SMEAR: SLIGHT — SIGNIFICANT CHANGE UP
PHOSPHATE SERPL-MCNC: 3 MG/DL — SIGNIFICANT CHANGE UP (ref 2.5–4.5)
PLAT MORPH BLD: NORMAL — SIGNIFICANT CHANGE UP
PLATELET # BLD AUTO: 324 K/UL — SIGNIFICANT CHANGE UP (ref 150–400)
PLATELET COUNT - ESTIMATE: NORMAL — SIGNIFICANT CHANGE UP
POIKILOCYTOSIS BLD QL AUTO: SLIGHT — SIGNIFICANT CHANGE UP
POTASSIUM SERPL-MCNC: 4.2 MMOL/L — SIGNIFICANT CHANGE UP (ref 3.5–5.3)
POTASSIUM SERPL-SCNC: 4.2 MMOL/L — SIGNIFICANT CHANGE UP (ref 3.5–5.3)
PROT SERPL-MCNC: 6 G/DL — SIGNIFICANT CHANGE UP (ref 6–8.3)
RAPID RVP RESULT: DETECTED
RBC # BLD: 3.02 M/UL — LOW (ref 3.8–5.2)
RBC # FLD: 15 % — HIGH (ref 10.3–14.5)
RBC BLD AUTO: ABNORMAL
RV+EV RNA SPEC QL NAA+PROBE: DETECTED
SARS-COV-2 RNA SPEC QL NAA+PROBE: SIGNIFICANT CHANGE UP
SODIUM SERPL-SCNC: 140 MMOL/L — SIGNIFICANT CHANGE UP (ref 135–145)
SPHEROCYTES BLD QL SMEAR: SLIGHT — SIGNIFICANT CHANGE UP
TROPONIN I, HIGH SENSITIVITY RESULT: 11.2 NG/L — SIGNIFICANT CHANGE UP
TROPONIN I, HIGH SENSITIVITY RESULT: 11.9 NG/L — SIGNIFICANT CHANGE UP
VARIANT LYMPHS # BLD: 3 % — SIGNIFICANT CHANGE UP (ref 0–6)
WBC # BLD: 6.67 K/UL — SIGNIFICANT CHANGE UP (ref 3.8–10.5)
WBC # FLD AUTO: 6.67 K/UL — SIGNIFICANT CHANGE UP (ref 3.8–10.5)

## 2023-03-29 PROCEDURE — 71045 X-RAY EXAM CHEST 1 VIEW: CPT | Mod: 26

## 2023-03-29 PROCEDURE — 93970 EXTREMITY STUDY: CPT | Mod: 26

## 2023-03-29 RX ORDER — FAMOTIDINE 10 MG/ML
1 INJECTION INTRAVENOUS
Qty: 0 | Refills: 0 | DISCHARGE

## 2023-03-29 RX ORDER — INFLUENZA VIRUS VACCINE 15; 15; 15; 15 UG/.5ML; UG/.5ML; UG/.5ML; UG/.5ML
0.7 SUSPENSION INTRAMUSCULAR ONCE
Refills: 0 | Status: DISCONTINUED | OUTPATIENT
Start: 2023-03-29 | End: 2023-04-04

## 2023-03-29 RX ORDER — LENALIDOMIDE 5 MG/1
0 CAPSULE ORAL
Qty: 0 | Refills: 0 | DISCHARGE

## 2023-03-29 RX ORDER — ACETAMINOPHEN 500 MG
650 TABLET ORAL EVERY 6 HOURS
Refills: 0 | Status: DISCONTINUED | OUTPATIENT
Start: 2023-03-29 | End: 2023-04-04

## 2023-03-29 RX ORDER — ERTUGLIFLOZIN 5 MG/1
0 TABLET, FILM COATED ORAL
Qty: 0 | Refills: 0 | DISCHARGE

## 2023-03-29 RX ORDER — ASPIRIN/CALCIUM CARB/MAGNESIUM 324 MG
81 TABLET ORAL DAILY
Refills: 0 | Status: DISCONTINUED | OUTPATIENT
Start: 2023-03-29 | End: 2023-04-04

## 2023-03-29 RX ORDER — ONDANSETRON 8 MG/1
4 TABLET, FILM COATED ORAL EVERY 8 HOURS
Refills: 0 | Status: DISCONTINUED | OUTPATIENT
Start: 2023-03-29 | End: 2023-04-04

## 2023-03-29 RX ORDER — LOSARTAN POTASSIUM 100 MG/1
25 TABLET, FILM COATED ORAL DAILY
Refills: 0 | Status: DISCONTINUED | OUTPATIENT
Start: 2023-03-29 | End: 2023-03-29

## 2023-03-29 RX ORDER — SIMVASTATIN 20 MG/1
0 TABLET, FILM COATED ORAL
Qty: 0 | Refills: 0 | DISCHARGE

## 2023-03-29 RX ORDER — LOSARTAN POTASSIUM 100 MG/1
1 TABLET, FILM COATED ORAL
Qty: 0 | Refills: 0 | DISCHARGE

## 2023-03-29 RX ORDER — ALBUTEROL 90 UG/1
1 AEROSOL, METERED ORAL
Qty: 0 | Refills: 0 | DISCHARGE

## 2023-03-29 RX ORDER — CHOLECALCIFEROL (VITAMIN D3) 125 MCG
1 CAPSULE ORAL
Qty: 0 | Refills: 0 | DISCHARGE

## 2023-03-29 RX ORDER — METFORMIN HYDROCHLORIDE 850 MG/1
0 TABLET ORAL
Qty: 0 | Refills: 0 | DISCHARGE

## 2023-03-29 RX ORDER — ERGOCALCIFEROL 1.25 MG/1
0 CAPSULE ORAL
Qty: 0 | Refills: 0 | DISCHARGE

## 2023-03-29 RX ORDER — MONTELUKAST 4 MG/1
0 TABLET, CHEWABLE ORAL
Qty: 0 | Refills: 0 | DISCHARGE

## 2023-03-29 RX ORDER — SIMVASTATIN 20 MG/1
10 TABLET, FILM COATED ORAL AT BEDTIME
Refills: 0 | Status: DISCONTINUED | OUTPATIENT
Start: 2023-03-29 | End: 2023-04-04

## 2023-03-29 RX ORDER — MONTELUKAST 4 MG/1
10 TABLET, CHEWABLE ORAL DAILY
Refills: 0 | Status: DISCONTINUED | OUTPATIENT
Start: 2023-03-29 | End: 2023-04-04

## 2023-03-29 RX ORDER — LOSARTAN POTASSIUM 100 MG/1
25 TABLET, FILM COATED ORAL DAILY
Refills: 0 | Status: DISCONTINUED | OUTPATIENT
Start: 2023-03-29 | End: 2023-04-04

## 2023-03-29 RX ORDER — SODIUM CHLORIDE 9 MG/ML
1000 INJECTION, SOLUTION INTRAVENOUS
Refills: 0 | Status: DISCONTINUED | OUTPATIENT
Start: 2023-03-29 | End: 2023-03-31

## 2023-03-29 RX ORDER — ERTUGLIFLOZIN 5 MG/1
1 TABLET, FILM COATED ORAL
Qty: 0 | Refills: 0 | DISCHARGE

## 2023-03-29 RX ORDER — ONDANSETRON 8 MG/1
1 TABLET, FILM COATED ORAL
Qty: 0 | Refills: 0 | DISCHARGE

## 2023-03-29 RX ORDER — SIMVASTATIN 20 MG/1
1 TABLET, FILM COATED ORAL
Qty: 0 | Refills: 0 | DISCHARGE

## 2023-03-29 RX ORDER — ALBUTEROL 90 UG/1
0 AEROSOL, METERED ORAL
Qty: 0 | Refills: 0 | DISCHARGE

## 2023-03-29 RX ORDER — ASPIRIN/CALCIUM CARB/MAGNESIUM 324 MG
0 TABLET ORAL
Qty: 0 | Refills: 0 | DISCHARGE

## 2023-03-29 RX ORDER — METFORMIN HYDROCHLORIDE 850 MG/1
1 TABLET ORAL
Qty: 0 | Refills: 0 | DISCHARGE

## 2023-03-29 RX ORDER — ASPIRIN/CALCIUM CARB/MAGNESIUM 324 MG
1 TABLET ORAL
Qty: 0 | Refills: 0 | DISCHARGE

## 2023-03-29 RX ORDER — ONDANSETRON 8 MG/1
0 TABLET, FILM COATED ORAL
Qty: 0 | Refills: 0 | DISCHARGE

## 2023-03-29 RX ORDER — LOSARTAN POTASSIUM 100 MG/1
0 TABLET, FILM COATED ORAL
Qty: 0 | Refills: 0 | DISCHARGE

## 2023-03-29 RX ORDER — PANTOPRAZOLE SODIUM 20 MG/1
40 TABLET, DELAYED RELEASE ORAL
Refills: 0 | Status: DISCONTINUED | OUTPATIENT
Start: 2023-03-29 | End: 2023-04-04

## 2023-03-29 RX ORDER — MONTELUKAST 4 MG/1
1 TABLET, CHEWABLE ORAL
Qty: 0 | Refills: 0 | DISCHARGE

## 2023-03-29 RX ORDER — OMEPRAZOLE 10 MG/1
1 CAPSULE, DELAYED RELEASE ORAL
Qty: 0 | Refills: 0 | DISCHARGE

## 2023-03-29 RX ORDER — ALENDRONATE SODIUM 70 MG/1
1 TABLET ORAL
Qty: 0 | Refills: 0 | DISCHARGE

## 2023-03-29 RX ORDER — ALOGLIPTIN 12.5 MG/1
1 TABLET, FILM COATED ORAL
Refills: 0 | DISCHARGE

## 2023-03-29 RX ADMIN — SIMVASTATIN 10 MILLIGRAM(S): 20 TABLET, FILM COATED ORAL at 21:45

## 2023-03-29 RX ADMIN — ENOXAPARIN SODIUM 60 MILLIGRAM(S): 100 INJECTION SUBCUTANEOUS at 05:05

## 2023-03-29 RX ADMIN — Medication 81 MILLIGRAM(S): at 12:00

## 2023-03-29 RX ADMIN — MONTELUKAST 10 MILLIGRAM(S): 4 TABLET, CHEWABLE ORAL at 12:00

## 2023-03-29 RX ADMIN — ENOXAPARIN SODIUM 60 MILLIGRAM(S): 100 INJECTION SUBCUTANEOUS at 17:24

## 2023-03-29 NOTE — PATIENT PROFILE ADULT - FALL HARM RISK - HARM RISK INTERVENTIONS
Assistance with ambulation/Assistance OOB with selected safe patient handling equipment/Communicate Risk of Fall with Harm to all staff/Discuss with provider need for PT consult/Monitor gait and stability/Reinforce activity limits and safety measures with patient and family/Tailored Fall Risk Interventions/Visual Cue: Yellow wristband and red socks/Bed in lowest position, wheels locked, appropriate side rails in place/Call bell, personal items and telephone in reach/Instruct patient to call for assistance before getting out of bed or chair/Non-slip footwear when patient is out of bed/[place] to call system/Physically safe environment - no spills, clutter or unnecessary equipment/Purposeful Proactive Rounding/Room/bathroom lighting operational, light cord in reach

## 2023-03-29 NOTE — CHART NOTE - NSCHARTNOTEFT_GEN_A_CORE
I was paged by RN that patient is complaining of stabbing chest pain and has increased shortness of breath. On checking vitals patient was saturating 89% on room air. Patient was started on a NC 2L. Repeat vitals showed patient saturating 94% on 2L NC. EKG and troponin was ordered. I was paged by RN that patient is complaining of stabbing chest pain and has increased shortness of breath. On evaluation at bedside patient was lying comfortably in bed, NAD. Patient complains of stabbing chest pain that is worse than when she was admitted. On physical examination b/l clear lungs on asucultation with no wheeze, crackles, or rhonchi. On checking vitals patient was saturating 89% on room air. Patient was started on a NC 2L. Repeat vitals showed patient saturating 94% on 2L NC. EKG and troponin was ordered.    Patient EKG showed normal sinus rhythm.     Will follow up troponin.     Patient has been diagnosed with PE and is already on FD anticoagulation. I was paged by RN that patient is complaining of stabbing chest pain and has increased shortness of breath. On evaluation at bedside patient was lying comfortably in bed, NAD. Patient complains of stabbing chest pain that is worse than when she was admitted.     On checking vitals patient was saturating 89% on room air. Patient was started on a NC 2L. Repeat vitals showed patient saturating 94% on 2L NC.   On physical examination b/l rhonchi and b/l fine crackles at lung bases.     RVP, chest xray, EKG and troponin was ordered.    Patient EKG showed normal sinus rhythm.     Will follow up troponin, CXR and troponin.     Patient has been diagnosed with PE and is already on FD anticoagulation. I was paged by RN that patient is complaining of stabbing chest pain and has increased shortness of breath. On evaluation at bedside patient was lying comfortably in bed, NAD. Patient complains of stabbing chest pain that is worse than when she was admitted.     On checking vitals patient was saturating 89% on room air. Patient was started on a NC 2L. Repeat vitals showed patient saturating 94% on 2L NC.     On physical examination b/l rhonchi and b/l fine crackles at lung bases.     RVP, chest xray, EKG and troponin was ordered.    Patient EKG showed normal sinus rhythm.     Will follow up troponin, CXR and troponin.     Patient has been diagnosed with PE and is already on FD anticoagulation. I was paged by RN that patient is complaining of stabbing chest pain and has increased shortness of breath. On evaluation at bedside patient was lying comfortably in bed, NAD. Patient complains of stabbing chest pain that is worse than when she was admitted.     On checking vitals patient was saturating 89% on room air. Patient was started on a NC 2L. Repeat vitals showed patient saturating 94% on 2L NC.     On physical examination b/l rhonchi and b/l fine crackles at lung bases.     RVP, chest xray, EKG and troponin was ordered.    Patient EKG showed normal sinus rhythm.     Will follow up troponin, CXR and RVP.    Patient has been diagnosed with PE and is already on FD anticoagulation.

## 2023-03-29 NOTE — PROGRESS NOTE ADULT - SUBJECTIVE AND OBJECTIVE BOX
PGY-1 Progress Note discussed with attending    PAGER #: [[phone]] TILL 5:00 PM  PLEASE CONTACT ON CALL TEAM:  - On Call Team (Please refer to Brynn) FROM 5:00 PM - 8:30PM  - Nightfloat Team FROM 8:30 -7:30 AM    INTERVAL HPI/OVERNIGHT EVENTS: Patient seen and examined at bedside. No acute events overnight. Complains of cough with phlegm production. No other complaints this morning.     MEDICATIONS  (STANDING):  aspirin enteric coated 81 milliGRAM(s) Oral daily  enoxaparin Injectable 60 milliGRAM(s) SubCutaneous every 12 hours  influenza  Vaccine (HIGH DOSE) 0.7 milliLiter(s) IntraMuscular once  lactated ringers. 1000 milliLiter(s) (75 mL/Hr) IV Continuous <Continuous>  montelukast 10 milliGRAM(s) Oral daily  pantoprazole    Tablet 40 milliGRAM(s) Oral before breakfast  simvastatin 10 milliGRAM(s) Oral at bedtime    MEDICATIONS  (PRN):  acetaminophen     Tablet .. 650 milliGRAM(s) Oral every 6 hours PRN Temp greater or equal to 38C (100.4F), Mild Pain (1 - 3)  ondansetron Injectable 4 milliGRAM(s) IV Push every 8 hours PRN Nausea and/or Vomiting      REVIEW OF SYSTEMS:  CONSTITUTIONAL: No fever, weight loss, or fatigue  RESPIRATORY: + cough. No wheezing, chills or hemoptysis; No shortness of breath  CARDIOVASCULAR: No chest pain, palpitations, dizziness, or leg swelling  GASTROINTESTINAL: No abdominal pain. No nausea, vomiting, or hematemesis; No diarrhea or constipation. No melena or hematochezia.  GENITOURINARY: No dysuria or hematuria, urinary frequency  NEUROLOGICAL: No headaches, memory loss, loss of strength, numbness, or tremors  SKIN: No itching, burning, rashes, or lesions     Vital Signs Last 24 Hrs  T(C): 37.2 (29 Mar 2023 06:03), Max: 37.2 (29 Mar 2023 06:03)  T(F): 99 (29 Mar 2023 06:03), Max: 99 (29 Mar 2023 06:03)  HR: 97 (29 Mar 2023 06:03) (93 - 101)  BP: 122/72 (29 Mar 2023 06:03) (117/63 - 143/62)  BP(mean): --  RR: 18 (29 Mar 2023 06:03) (18 - 20)  SpO2: 97% (29 Mar 2023 06:03) (94% - 100%)    Parameters below as of 29 Mar 2023 06:03  Patient On (Oxygen Delivery Method): room air        PHYSICAL EXAMINATION:  GENERAL: NAD, thin, laying in bed  HEAD:  Atraumatic, Normocephalic  EYES:  conjunctiva and sclera clear  NECK: Supple, No JVD  CHEST/LUNG: RLL crackles  HEART: Regular rate and rhythm; No murmurs, rubs, or gallops  ABDOMEN: Soft, Nontender, Nondistended; Bowel sounds present  NERVOUS SYSTEM:  Alert & Oriented X3    EXTREMITIES:  2+ Peripheral Pulses, No clubbing, cyanosis, or edema  SKIN: warm dry                          9.2    6.67  )-----------( 324      ( 29 Mar 2023 06:00 )             28.5     03-29    140  |  115<H>  |  22<H>  ----------------------------<  111<H>  4.2   |  19<L>  |  0.79    Ca    9.1      29 Mar 2023 06:00  Phos  3.0     03-29  Mg     1.9     03-29    TPro  6.0  /  Alb  2.4<L>  /  TBili  0.2  /  DBili  x   /  AST  17  /  ALT  16  /  AlkPhos  50  03-29    LIVER FUNCTIONS - ( 29 Mar 2023 06:00 )  Alb: 2.4 g/dL / Pro: 6.0 g/dL / ALK PHOS: 50 U/L / ALT: 16 U/L DA / AST: 17 U/L / GGT: x               PT/INR - ( 28 Mar 2023 16:10 )   PT: 12.9 sec;   INR: 1.08 ratio         PTT - ( 28 Mar 2023 16:10 )  PTT:28.2 sec    CAPILLARY BLOOD GLUCOSE      RADIOLOGY & ADDITIONAL TESTS:

## 2023-03-29 NOTE — CONSULT NOTE ADULT - ASSESSMENT
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 Recently admitted 1/21/23-1/26/23 for acute hypoxic respiratory failure 2/2 new submassive PE and COVID with TTE negative for right heart strain and normal cardiac function and discharged on Eliquis. Pt is COVID vaccinated x 3. She reports that she feels like she may have had a cold stating that she has only had SOB and wheezing with no relief from her home inhalers. Denies chest pain, change in cough, or vomiting. No sick contact at  home. Denies fevers, chills, and night sweats. Patient and family at the bedside unable to recall medication list and unsure if she is still taking AC, per sure scripts she has not had another refill after the 30-d post-discharge, family states there weren't anymore refills left and did not know they needed follow up for additional refills. Reports compliant with other refilled meds. Last chemo was two weeks ago, with next chemo scheduled for tomorrow at [facility]. Denies other acute complaints.     #MM  #VTE   follows with our colleague Dr. Alvarado, on darzalex/revlimid/dex last given 3/15/23  pt non-compliant with eliquis and did not take for over one week d/t her running out of medication as per pt  now p/w SOB, cough  CTA shows worsening B/L PE and increased clot burden in distal Left main pulm artery slightly increased compared with prior  doppler LE shows extensive LLE DVT    Rec's:  -Echo r/o right heart strain  -continue lovenox  -hold chemo during admisison  -Pulm consult  long discussion with pt about the importance of continuing a/c    Thank you for the referral. Will continue to monitor the patient.  Please call with any questions [phone]  Above reviewed with Attending Dr. Rashad FLOREZ/[facility]  [address], [address], [place], [place]  [phone]  *Note not finalized until signed by Attending Physician     88F from home, lives with daughter, uses a walker, with PMHx HTN, HLD, DM, Osteoporosis, Stage II B Gastric Adenocarcinoma s/p distal gastrectomy in 2015 on active chemo (follows [facility] Dr Adams), Early Multiple myeloma, and recently diagnosed with b/l PE (unclear if still on Eliquis) and PSHx [facility] w/BSO and Cholecystectomy, presenting to the ED with shortness of breath c/o wheezing and cough x  2d. Recently admitted 1/21/23-1/26/23 for acute hypoxic respiratory failure 2/2 new submassive PE and COVID with TTE negative for right heart strain and normal cardiac function and discharged on Eliquis. Pt is COVID vaccinated x 3. She reports that she feels like she may have had a cold stating that she has only had SOB and wheezing with no relief from her home inhalers. Denies chest pain, change in cough, or vomiting. No sick contact at  home. Denies fevers, chills, and night sweats. Patient and family at the bedside unable to recall medication list and unsure if she is still taking AC, per sure scripts she has not had another refill after the 30-d post-discharge, family states there weren't anymore refills left and did not know they needed follow up for additional refills. Reports compliant with other refilled meds. Last chemo was two weeks ago, with next chemo scheduled for tomorrow at [facility]. Denies other acute complaints.     #MM  #VTE   follows with our colleague Dr. Alvarado, on darzalex/revlimid/dex last given 3/15/23  pt non-compliant with eliquis and did not take for over one week d/t her running out of medication as per pt  now p/w SOB, cough  CTA shows worsening B/L PE and increased clot burden in distal Left main pulm artery slightly increased compared with prior  doppler LE shows extensive LLE DVT    Rec's:  -hold chemo during admisison  -not requiring O2  -Echo r/o right heart strain  -continue lovenox  -Pulm consult  long discussion with pt about the importance of continuing a/c and risks if she does not continue, she understands    Thank you for the referral. Will continue to monitor the patient.  Please call with any questions [phone]  Above reviewed with Attending Dr. Solorzano  [facility]  [address] [facility], [address], [place], [place]  [phone]  *Note not finalized until signed by Attending Physician     88F from home, lives with daughter, uses a walker, with PMHx HTN, HLD, DM, Osteoporosis, Stage II B Gastric Adenocarcinoma s/p distal gastrectomy in 2015 on active chemo (follows [facility] Dr Adams), Early Multiple myeloma, and recently diagnosed with b/l PE (unclear if still on Eliquis) and PSHx [facility] w/BSO and Cholecystectomy, presenting to the ED with shortness of breath c/o wheezing and cough x  2d. Recently admitted 1/21/23-1/26/23 for acute hypoxic respiratory failure 2/2 new submassive PE and COVID with TTE negative for right heart strain and normal cardiac function and discharged on Eliquis. Pt is COVID vaccinated x 3. She reports that she feels like she may have had a cold stating that she has only had SOB and wheezing with no relief from her home inhalers. Denies chest pain, change in cough, or vomiting. No sick contact at  home. Denies fevers, chills, and night sweats. Patient and family at the bedside unable to recall medication list and unsure if she is still taking AC, per sure scripts she has not had another refill after the 30-d post-discharge, family states there weren't anymore refills left and did not know they needed follow up for additional refills. Reports compliant with other refilled meds. Last chemo was two weeks ago, with next chemo scheduled for tomorrow at [facility]. Denies other acute complaints.     #MM  #VTE   follows with our colleague Dr. Alvarado, on darzalex/revlimid/dex last given 3/15/23  pt developed SOB, covid and PE in 01/2023 and started on eliquis at that time  pt non-compliant with eliquis and did not take for over one week d/t her running out of medication as per pt  now p/w SOB, cough  CTA shows worsening B/L PE and increased clot burden in distal Left main pulm artery slightly increased compared with prior  doppler LE shows extensive LLE DVT    Rec's:  -hold chemo during admisison  -not requiring O2  -Echo r/o right heart strain  -continue lovenox  -Pulm consult  long discussion with pt about the importance of continuing a/c and risks if she does not continue, she understands    Thank you for the referral. Will continue to monitor the patient.  Please call with any questions [phone]  Above reviewed with Attending Dr. Rashad FLOREZ/[facility]  [address] [facility], [address], [place], [place]  [phone]  *Note not finalized until signed by Attending Physician

## 2023-03-29 NOTE — PROGRESS NOTE ADULT - SUBJECTIVE AND OBJECTIVE BOX
Patient is a 88y old  Female who presents with a chief complaint of Wheezing, SOB (28 Mar 2023 23:59)    pt seen in icu [  ], reg med floor [   ], bed [  ], chair at bedside [   ], a+o x3 [  ], lethargic [  ],  nad [  ]    shea [  ], ngt [  ], peg [  ], et tube [  ], cent line [  ], picc line [  ]        Allergies    gabapentin (Unknown)        Vitals    T(F): 99 (03-29-23 @ 06:03), Max: 99 (03-29-23 @ 06:03)  HR: 97 (03-29-23 @ 06:03) (93 - 101)  BP: 122/72 (03-29-23 @ 06:03) (117/63 - 143/62)  RR: 18 (03-29-23 @ 06:03) (18 - 20)  SpO2: 97% (03-29-23 @ 06:03) (94% - 100%)  Wt(kg): --  CAPILLARY BLOOD GLUCOSE          Labs                          9.5    6.21  )-----------( 270      ( 28 Mar 2023 16:10 )             30.1       03-28    138  |  112<H>  |  24<H>  ----------------------------<  100<H>  4.2   |  18<L>  |  1.11    Ca    9.0      28 Mar 2023 16:10    TPro  5.9<L>  /  Alb  2.5<L>  /  TBili  0.2  /  DBili  x   /  AST  14  /  ALT  14  /  AlkPhos  49  03-28          Troponin I, High Sensitivity Result: 10.3 ng/L (03-28-23 @ 16:10)        Radiology Results      Meds    MEDICATIONS  (STANDING):  enoxaparin Injectable 60 milliGRAM(s) SubCutaneous every 12 hours  influenza  Vaccine (HIGH DOSE) 0.7 milliLiter(s) IntraMuscular once      MEDICATIONS  (PRN):  acetaminophen     Tablet .. 650 milliGRAM(s) Oral every 6 hours PRN Temp greater or equal to 38C (100.4F), Mild Pain (1 - 3)  ondansetron Injectable 4 milliGRAM(s) IV Push every 8 hours PRN Nausea and/or Vomiting      Physical Exam    Neuro :  no focal deficits  Respiratory: CTA B/L  CV: RRR, S1S2, no murmurs,   Abdominal: Soft, NT, ND +BS,  Extremities: No edema, + peripheral pulses    ASSESSMENT    Other pulmonary embolism without acute cor pulmonale    HTN (hypertension)    DM (diabetes mellitus)    Hypercholesteremia    Gastric adenocarcinoma    Vertigo    Dementia    Ambulatory dysfunction    Multiple myeloma    Pulmonary embolism    S/P hysterectomy    S/P tubal ligation        PLAN     Patient is a 88y old  Female who presents with a chief complaint of Wheezing, SOB (28 Mar 2023 23:59)    pt seen in icu [  ], reg med floor [ x  ], bed [ x ], chair at bedside [   ], a+o x3 [x  ], lethargic [  ],  nad [x  ]      Allergies    gabapentin (Unknown)        Vitals    T(F): 99 (03-29-23 @ 06:03), Max: 99 (03-29-23 @ 06:03)  HR: 97 (03-29-23 @ 06:03) (93 - 101)  BP: 122/72 (03-29-23 @ 06:03) (117/63 - 143/62)  RR: 18 (03-29-23 @ 06:03) (18 - 20)  SpO2: 97% (03-29-23 @ 06:03) (94% - 100%)  Wt(kg): --  CAPILLARY BLOOD GLUCOSE          Labs                          9.5    6.21  )-----------( 270      ( 28 Mar 2023 16:10 )             30.1       03-28    138  |  112<H>  |  24<H>  ----------------------------<  100<H>  4.2   |  18<L>  |  1.11    Ca    9.0      28 Mar 2023 16:10    TPro  5.9<L>  /  Alb  2.5<L>  /  TBili  0.2  /  DBili  x   /  AST  14  /  ALT  14  /  AlkPhos  49  03-28          Troponin I, High Sensitivity Result: 10.3 ng/L (03-28-23 @ 16:10)        Radiology Results      Meds    MEDICATIONS  (STANDING):  enoxaparin Injectable 60 milliGRAM(s) SubCutaneous every 12 hours  influenza  Vaccine (HIGH DOSE) 0.7 milliLiter(s) IntraMuscular once      MEDICATIONS  (PRN):  acetaminophen     Tablet .. 650 milliGRAM(s) Oral every 6 hours PRN Temp greater or equal to 38C (100.4F), Mild Pain (1 - 3)  ondansetron Injectable 4 milliGRAM(s) IV Push every 8 hours PRN Nausea and/or Vomiting      Physical Exam    Neuro :  no focal deficits  Respiratory: CTA B/L  CV: RRR, S1S2, no murmurs,   Abdominal: Soft, NT, ND +BS,  Extremities: No edema, + peripheral pulses      ASSESSMENT    b/l pe 2nd to non compliance,   r/o dvt  h/o HTN,   HLD,   DM,   Osteoporosis,   Stage II B Gastric Adenocarcinoma s/p distal gastrectomy in 2015 on active chemo (follows [facility] Dr Adams),   Early Multiple myeloma,   recently diagnosed with b/l PE (on Eliquis)   hypoxic respiratory failure 2nd to covid 19 and b/l pe   PSHx ASHU w/BSO    Cholecystectomy        PLAN    cont lovenox 1mg/kg q 12,    f/u b/l le doppler  advised pt the importance of taking and cont anticoagulation for her condition  heme onc cons  cont bronchodilators,   pulm cons   supplemental O2 prn,   cardio cons   f/u echo   cont current meds

## 2023-03-29 NOTE — PROGRESS NOTE ADULT - PROBLEM SELECTOR PLAN 1
p/w SOB and wheezing  h/o PE did not take AC for 2 mo   EKG shows NSR,   CTA chest: b/l PE  w/ increased clot  burden in distal left main pulmonary artery, slightly increased compared to prior  Not in respiratory distress never hypoxic, and without chest pain.   s/p duonebs x3, prednisone 50mg x1, and FD lovenox x1 in the ED with improvement  likely in the setting of progression in PE, unlikely asthma exacerbation   - C/w FD Lovenox  - rest of plan as below  DISPO: DC on Eliquis w/ teaching

## 2023-03-29 NOTE — PROGRESS NOTE ADULT - ASSESSMENT
88F from home, uses a walker, PMHx HTN, HLD, DM, Osteoporosis, Stage II B Gastric Adenocarcinoma s/p distal gastrectomy in 2015 on active chemo (follows [facility] Dr Ramachandran), Early Multiple myeloma, and recently diagnosed with b/l PE (unclear if still on Eliquis), presenting to the ED with shortness of breath c/o wheezing and cough x  2d. COVD and flu negative. CTA shows b/l PE within the distal portions of the right and left main pulmonary arteries extending into the RML and bibailar pulmonary artery branches with increased clot  burden in the distal left main pulmonary artery has slightly increased when compared to previous exam. Not in respiratory distress never hypoxic, and without chest pain. s/p duonebs x3, prednisone 50mg x1, and FD lovenox x1 in the ED with improvement. Admitted to tele for progression of PE in the setting of medication non-compliance.       MEDS REC in chart.

## 2023-03-29 NOTE — CONSULT NOTE ADULT - ASSESSMENT
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 Recently admitted 1/21/23-1/26/23 for acute hypoxic respiratory failure 2/2 new submassive PE and COVID with TTE negative for right heart strain and normal cardiac function and discharged on Eliquis now with B/L PE and Lt DVT.  1.Echocardiogram eval RV size and fx.  2.PE and DVT-lovenox.  3.HTN-hold bp medication.  4.Gastric Ca-Heme/Onc.  5.DM-Insulin.  6.PPI.  7.IVF.  8.Lipid d/o-statin.

## 2023-03-29 NOTE — CONSULT NOTE ADULT - NS ATTEND AMEND GEN_ALL_CORE FT
pt seen and examined. Case d/w ACP and agreed with her A/P. She has been followed by Dr. Beckwith for gastric cancer diagnosed in 2015 and on chemotherapy for  MM ? on treatment. She was treated for DVT/PE diagnosed in 1/2023  with eliquis  but stop the treatment due to lack of medications admit for sob and was found to have B/L PE. recommend AC with lovenox swich to Navid and close monitoring . Pt should received long term AC. Pt will be followup with Dr. beckwith outpatient. will followup

## 2023-03-29 NOTE — CONSULT NOTE ADULT - SUBJECTIVE AND OBJECTIVE BOX
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 Recently admitted 1/21/23-1/26/23 for acute hypoxic respiratory failure 2/2 new submassive PE and COVID with TTE negative for right heart strain and normal cardiac function and discharged on Eliquis. Pt is COVID vaccinated x 3. She reports that she feels like she may have had a cold stating that she has only had SOB and wheezing with no relief from her home inhalers. Denies chest pain, change in cough, or vomiting. No sick contact at  home. Denies fevers, chills, and night sweats. Patient and family at the bedside unable to recall medication list and unsure if she is still taking AC, per sure scripts she has not had another refill after the 30-d post-discharge, family states there weren't anymore refills left and did not know they needed follow up for additional refills. Reports compliant with other refilled meds. Last chemo was two weeks ago, with next chemo scheduled for tomorrow at [facility]. Denies other acute complaints.         GAYLA ELLIS  MRN-[id number]      HISTORY OF PRESENT ILLNESS: As Above. Awake, alert, comfortable in bed in [facility]    MEDICATIONS  (STANDING):  aspirin enteric coated 81 milliGRAM(s) Oral daily  enoxaparin Injectable 60 milliGRAM(s) SubCutaneous every 12 hours  influenza  Vaccine (HIGH DOSE) 0.7 milliLiter(s) IntraMuscular once  lactated ringers. 1000 milliLiter(s) (75 mL/Hr) IV Continuous <Continuous>  losartan 25 milliGRAM(s) Oral daily  montelukast 10 milliGRAM(s) Oral daily  pantoprazole    Tablet 40 milliGRAM(s) Oral before breakfast  simvastatin 10 milliGRAM(s) Oral at bedtime      MEDICATIONS  (PRN):  acetaminophen     Tablet .. 650 milliGRAM(s) Oral every 6 hours PRN Temp greater or equal to 38C (100.4F), Mild Pain (1 - 3)  ondansetron Injectable 4 milliGRAM(s) IV Push every 8 hours PRN Nausea and/or Vomiting      Allergies    gabapentin (Unknown)    Intolerances        PAST MEDICAL & SURGICAL HISTORY:  HTN (hypertension)      DM (diabetes mellitus)      Hypercholesteremia      Gastric adenocarcinoma  s/p resection      Vertigo      Dementia      Ambulatory dysfunction      Multiple myeloma      Pulmonary embolism      S/P hysterectomy      S/P tubal ligation          FAMILY HISTORY:  Family history of diabetes mellitus (Sibling)    Family history of early CAD (Grandparent)        SOCIAL HISTORY  Smoking History:     REVIEW OF SYSTEMS:    CONSTITUTIONAL:  No fevers, chills, sweats    HEENT:  Eyes:  No diplopia or blurred vision. ENT:  No earache, sore throat or runny nose.    CARDIOVASCULAR:  No pressure, squeezing, tightness, or heaviness about the chest; no palpitations.    RESPIRATORY:  Per HPI    GASTROINTESTINAL:  No abdominal pain, nausea, vomiting or diarrhea.    GENITOURINARY:  No dysuria, frequency or urgency.    NEUROLOGIC:  No paresthesias, fasciculations, seizures or weakness.    PSYCHIATRIC:  No disorder of thought or mood.    Vital Signs Last 24 Hrs  T(C): 37.2 (29 Mar 2023 06:03), Max: 37.2 (29 Mar 2023 06:03)  T(F): 99 (29 Mar 2023 06:03), Max: 99 (29 Mar 2023 06:03)  HR: 97 (29 Mar 2023 06:03) (93 - 101)  BP: 122/72 (29 Mar 2023 06:03) (117/63 - 143/62)  BP(mean): --  RR: 18 (29 Mar 2023 06:03) (18 - 20)  SpO2: 97% (29 Mar 2023 06:03) (94% - 100%)    Parameters below as of 29 Mar 2023 06:03  Patient On (Oxygen Delivery Method): room air      I&O's Detail      PHYSICAL EXAMINATION:    GENERAL: The patient is a well-developed, well-nourished _____in no apparent distress.     HEENT: Head is normocephalic and atraumatic. Extraocular muscles are intact. Mucous membranes are moist.     NECK: Supple.     LUNGS: Rales posteriorly    HEART: Regular rate and rhythm without murmur.    ABDOMEN: Soft, nontender, and nondistended.  No hepatosplenomegaly is noted.    EXTREMITIES: Without any cyanosis, clubbing, rash, lesions or edema.    NEUROLOGIC: Grossly intact.      LABS:                        9.2    6.67  )-----------( 324      ( 29 Mar 2023 06:00 )             28.5     03-29    140  |  115<H>  |  22<H>  ----------------------------<  111<H>  4.2   |  19<L>  |  0.79    Ca    9.1      29 Mar 2023 06:00  Phos  3.0     03-29  Mg     1.9     03-29    TPro  6.0  /  Alb  2.4<L>  /  TBili  0.2  /  DBili  x   /  AST  17  /  ALT  16  /  AlkPhos  50  03-29    PT/INR - ( 28 Mar 2023 16:10 )   PT: 12.9 sec;   INR: 1.08 ratio         PTT - ( 28 Mar 2023 16:10 )  PTT:28.2 sec              Lactate, Blood: 2.8 mmol/L (03-29-23 @ 09:57)  Lactate, Blood: 3.0 mmol/L (03-29-23 @ 06:00)  Lactate, Blood: 3.9 mmol/L (03-28-23 @ 16:10)        MICROBIOLOGY:    RADIOLOGY & ADDITIONAL STUDIES:    CXR:    Ct scan chest;  < from: CT Angio Chest PE Protocol w/ IV Cont (03.28.23 @ 19:40) >  IMPRESSION: Pulmonary emboli within the distal portions of the right and   left main pulmonary arteries extending into the right middle and both   lower lobe pulmonary artery branches. Clot burden in the distal left main   pulmonary artery has slightly increased when compared to previous exam.      < end of copied text >    ekg;    echo:

## 2023-03-29 NOTE — CONSULT NOTE ADULT - SUBJECTIVE AND OBJECTIVE BOX
CHIEF COMPLAINT:Patient is a 88y old  Female who presents with a chief complaint of Wheezing, SOB (29 Mar 2023 11:35)      HPI:  88F from home, lives with daughter, uses a walker, with PMHx HTN, HLD, DM, Osteoporosis, Stage II B Gastric Adenocarcinoma s/p distal gastrectomy in 2015 on active chemo (follows [facility] Dr [facility]), Early Multiple myeloma, and recently diagnosed with b/l PE (unclear if still on Eliquis) and PSHx ASHU w/BSO and Cholecystectomy, presenting to the ED with shortness of breath c/o wheezing and cough x  2d. Recently admitted 1/21/23-1/26/23 for acute hypoxic respiratory failure 2/2 new submassive PE and COVID with TTE negative for right heart strain and normal cardiac function and discharged on Eliquis. Pt is COVID vaccinated x 3. She reports that she feels like she may have had a cold stating that she has only had SOB and wheezing with no relief from her home inhalers. Denies chest pain, change in cough, or vomiting. No sick contact at  home. Denies fevers, chills, and night sweats. Patient and family at the bedside unable to recall medication list and unsure if she is still taking AC, per sure scripts she has not had another refill after the 30-d post-discharge, family states there weren't anymore refills left and did not know they needed follow up for additional refills. Reports compliant with other refilled meds. Last chemo was two weeks ago, with next chemo scheduled for tomorrow at [facility]. Denies other acute complaints.    (28 Mar 2023 23:59)      PAST MEDICAL & SURGICAL HISTORY:  HTN (hypertension)      DM (diabetes mellitus)      Hypercholesteremia      Gastric adenocarcinoma  s/p resection      Vertigo      Dementia      Ambulatory dysfunction      Multiple myeloma      Pulmonary embolism      S/P hysterectomy      S/P tubal ligation          MEDICATIONS  (STANDING):  aspirin enteric coated 81 milliGRAM(s) Oral daily  enoxaparin Injectable 60 milliGRAM(s) SubCutaneous every 12 hours  influenza  Vaccine (HIGH DOSE) 0.7 milliLiter(s) IntraMuscular once  lactated ringers. 1000 milliLiter(s) (75 mL/Hr) IV Continuous <Continuous>  losartan 25 milliGRAM(s) Oral daily  montelukast 10 milliGRAM(s) Oral daily  pantoprazole    Tablet 40 milliGRAM(s) Oral before breakfast  simvastatin 10 milliGRAM(s) Oral at bedtime    MEDICATIONS  (PRN):  acetaminophen     Tablet .. 650 milliGRAM(s) Oral every 6 hours PRN Temp greater or equal to 38C (100.4F), Mild Pain (1 - 3)  ondansetron Injectable 4 milliGRAM(s) IV Push every 8 hours PRN Nausea and/or Vomiting      FAMILY HISTORY:  Family history of diabetes mellitus (Sibling)    Family history of early CAD (Grandparent)        SOCIAL HISTORY:    [x ] Non-smoker    [x ] Alcohol-denies    Allergies    gabapentin (Unknown)    Intolerances    	    REVIEW OF SYSTEMS:  CONSTITUTIONAL: No fever, weight loss, or fatigue  EYES: No eye pain, visual disturbances, or discharge  ENT:  No difficulty hearing, tinnitus, vertigo; No sinus or throat pain  NECK: No pain or stiffness  RESPIRATORY: No cough, wheezing, chills or hemoptysis; + Shortness of Breath  CARDIOVASCULAR: No chest pain, palpitations, passing out, dizziness, or leg swelling  GASTROINTESTINAL: No abdominal or epigastric pain. No nausea, vomiting, or hematemesis; No diarrhea or constipation. No melena or hematochezia.  GENITOURINARY: No dysuria, frequency, hematuria, or incontinence  NEUROLOGICAL: No headaches, memory loss, loss of strength, numbness, or tremors  SKIN: No itching, burning, rashes, or lesions   LYMPH Nodes: No enlarged glands  ENDOCRINE: No heat or cold intolerance; No hair loss  MUSCULOSKELETAL: No joint pain or swelling; No muscle, back, or extremity pain  PSYCHIATRIC: No depression, anxiety, mood swings, or difficulty sleeping  HEME/LYMPH: No easy bruising, or bleeding gums  ALLERGY AND IMMUNOLOGIC: No hives or eczema	        PHYSICAL EXAM:  T(C): 37.2 (03-29-23 @ 06:03), Max: 37.2 (03-29-23 @ 06:03)  HR: 97 (03-29-23 @ 06:03) (93 - 101)  BP: 122/72 (03-29-23 @ 06:03) (117/63 - 143/62)  RR: 18 (03-29-23 @ 06:03) (18 - 20)  SpO2: 97% (03-29-23 @ 06:03) (94% - 100%)  Wt(kg): --  I&O's Summary      Appearance: Normal	  HEENT:   Normal oral mucosa, PERRL, EOMI	  Lymphatic: No lymphadenopathy  Cardiovascular: Normal S1 S2, No JVD, No murmurs, No edema  Respiratory: Lungs clear to auscultation	  Psychiatry: A & O x 3, Mood & affect appropriate  Gastrointestinal:  Soft, Non-tender, + BS	  Skin: No rashes, No ecchymoses, No cyanosis	  Neurologic: Non-focal  Extremities: Normal range of motion, No clubbing, cyanosis or edema  Vascular: Peripheral pulses palpable 2+ bilaterally    	    ECG:  	sinus tachycardia    LABS:	 	    Troponin I, High Sensitivity Result: 11.2 ng/L (03-29-23 @ 06:00)  Troponin I, High Sensitivity Result: 10.3 ng/L (03-28-23 @ 16:10)                          9.2    6.67  )-----------( 324      ( 29 Mar 2023 06:00 )             28.5     03-29    140  |  115<H>  |  22<H>  ----------------------------<  111<H>  4.2   |  19<L>  |  0.79    Ca    9.1      29 Mar 2023 06:00  Phos  3.0     03-29  Mg     1.9     03-29    TPro  6.0  /  Alb  2.4<L>  /  TBili  0.2  /  DBili  x   /  AST  17  /  ALT  16  /  AlkPhos  50  03-29    < from: CT Angio Chest PE Protocol w/ IV Cont (03.28.23 @ 19:40) >  ACC: [id number] EXAM:  CT ANGIO CHEST PULM [facility]   ORDERED BY: OSWALDO TYLER     PROCEDURE DATE:  03/28/2023          INTERPRETATION:  Clinical information: Shortness of breath. Evaluate for   pulmonary embolus. Exam is compared to previous study of 1/21/2023.    CT angiogram of the chest was obtained following administration of   intravenous contrast. Approximately 65 cc of Omnipaque 350 was   administered and 35 cc was discarded.    Coronal, sagittal and MIP images were submitted for review.    No hilar and or mediastinal adenopathy is noted.    Heart is normal in size. No pericardial effusion is noted. Once again,   filling defects are noted within the distal right and left main pulmonary   arteries extending into the right middle and both lower lobes pulmonary   artery branches. Clot burden has slightly increased in the distal left   main pulmonary artery when compared to previous exam.    No endobronchial lesions are noted. Minimal atelectasis is noted in the   posterior dependent aspect of the right lower lobe. No pleural effusions   are noted.    Below the diaphragm, visualized portions of the abdomen demonstrate   patient to be status post cholecystectomy.    Degenerative changes of the spine are noted.    IMPRESSION: Pulmonary emboli within the distal portions of the right and   left main pulmonary arteries extending into the right middle and both   lower lobe pulmonary artery branches. Clot burden in the distal left main   pulmonary artery has slightly increased when compared to previous exam.    --- End of Report ---            GONZALO NELSON MD; Attending Radiologist  This document has been electronically signed. Mar 28 2023  7:55PM    < end of copied text >  < from: US Duplex Venous Lower Ext Complete, Bilateral (03.29.23 @ 10:45) >  ACC: [id number] EXAM:  US DPLX LWR EXT VEINS COMPL BI   ORDERED BY:   NATALY BAZAN     PROCEDURE DATE:  03/29/2023          INTERPRETATION:  CLINICAL INFORMATION: Pulmonary embolism.    TECHNIQUE: Duplex sonography of the BILATERAL LOWER extremity veins with   color and spectral Doppler, with and without compression.    FINDINGS:    RIGHT:  Normal compressibility of the RIGHT common femoral, femoral and popliteal   veins.  Doppler examination shows normal spontaneous and phasic flow.  No RIGHT calf vein thrombosis is detected.    LEFT:  Noncompressible thrombi are noted in the left common femoral, femoral,   popliteal, peroneal and posterior tibial veins, compatible with deep vein   thrombosis.    IMPRESSION:  No evidence of deep venous thrombosis in the right lower extremity.    Extensive deep vein thrombosis in the left leg as detailed above.    Dr. NATALY BAZAN was informed.    --- End of Report ---            EDIE CHAVIRA MD; Attending Radiologist    < end of copied text >

## 2023-03-30 DIAGNOSIS — R50.9 FEVER, UNSPECIFIED: ICD-10-CM

## 2023-03-30 LAB
ANION GAP SERPL CALC-SCNC: 7 MMOL/L — SIGNIFICANT CHANGE UP (ref 5–17)
BUN SERPL-MCNC: 21 MG/DL — HIGH (ref 7–18)
CALCIUM SERPL-MCNC: 8.9 MG/DL — SIGNIFICANT CHANGE UP (ref 8.4–10.5)
CHLORIDE SERPL-SCNC: 114 MMOL/L — HIGH (ref 96–108)
CO2 SERPL-SCNC: 19 MMOL/L — LOW (ref 22–31)
CREAT SERPL-MCNC: 0.68 MG/DL — SIGNIFICANT CHANGE UP (ref 0.5–1.3)
EGFR: 85 ML/MIN/1.73M2 — SIGNIFICANT CHANGE UP
GLUCOSE SERPL-MCNC: 101 MG/DL — HIGH (ref 70–99)
HCT VFR BLD CALC: 26.7 % — LOW (ref 34.5–45)
HGB BLD-MCNC: 8.7 G/DL — LOW (ref 11.5–15.5)
MAGNESIUM SERPL-MCNC: 1.8 MG/DL — SIGNIFICANT CHANGE UP (ref 1.6–2.6)
MCHC RBC-ENTMCNC: 30.9 PG — SIGNIFICANT CHANGE UP (ref 27–34)
MCHC RBC-ENTMCNC: 32.6 GM/DL — SIGNIFICANT CHANGE UP (ref 32–36)
MCV RBC AUTO: 94.7 FL — SIGNIFICANT CHANGE UP (ref 80–100)
NRBC # BLD: 0 /100 WBCS — SIGNIFICANT CHANGE UP (ref 0–0)
PHOSPHATE SERPL-MCNC: 2.3 MG/DL — LOW (ref 2.5–4.5)
PLATELET # BLD AUTO: 317 K/UL — SIGNIFICANT CHANGE UP (ref 150–400)
POTASSIUM SERPL-MCNC: 3.4 MMOL/L — LOW (ref 3.5–5.3)
POTASSIUM SERPL-SCNC: 3.4 MMOL/L — LOW (ref 3.5–5.3)
RBC # BLD: 2.82 M/UL — LOW (ref 3.8–5.2)
RBC # FLD: 15.3 % — HIGH (ref 10.3–14.5)
SODIUM SERPL-SCNC: 140 MMOL/L — SIGNIFICANT CHANGE UP (ref 135–145)
WBC # BLD: 6.88 K/UL — SIGNIFICANT CHANGE UP (ref 3.8–10.5)
WBC # FLD AUTO: 6.88 K/UL — SIGNIFICANT CHANGE UP (ref 3.8–10.5)

## 2023-03-30 PROCEDURE — 93010 ELECTROCARDIOGRAM REPORT: CPT

## 2023-03-30 RX ORDER — POTASSIUM CHLORIDE 20 MEQ
40 PACKET (EA) ORAL ONCE
Refills: 0 | Status: COMPLETED | OUTPATIENT
Start: 2023-03-30 | End: 2023-03-30

## 2023-03-30 RX ORDER — POTASSIUM PHOSPHATE, MONOBASIC POTASSIUM PHOSPHATE, DIBASIC 236; 224 MG/ML; MG/ML
30 INJECTION, SOLUTION INTRAVENOUS ONCE
Refills: 0 | Status: COMPLETED | OUTPATIENT
Start: 2023-03-30 | End: 2023-03-30

## 2023-03-30 RX ORDER — BUDESONIDE AND FORMOTEROL FUMARATE DIHYDRATE 160; 4.5 UG/1; UG/1
2 AEROSOL RESPIRATORY (INHALATION)
Refills: 0 | Status: DISCONTINUED | OUTPATIENT
Start: 2023-03-30 | End: 2023-04-04

## 2023-03-30 RX ORDER — ENOXAPARIN SODIUM 100 MG/ML
60 INJECTION SUBCUTANEOUS EVERY 12 HOURS
Refills: 0 | Status: DISCONTINUED | OUTPATIENT
Start: 2023-03-30 | End: 2023-03-31

## 2023-03-30 RX ADMIN — Medication 40 MILLIEQUIVALENT(S): at 10:22

## 2023-03-30 RX ADMIN — Medication 650 MILLIGRAM(S): at 12:00

## 2023-03-30 RX ADMIN — ENOXAPARIN SODIUM 60 MILLIGRAM(S): 100 INJECTION SUBCUTANEOUS at 06:03

## 2023-03-30 RX ADMIN — Medication 81 MILLIGRAM(S): at 11:33

## 2023-03-30 RX ADMIN — POTASSIUM PHOSPHATE, MONOBASIC POTASSIUM PHOSPHATE, DIBASIC 83.33 MILLIMOLE(S): 236; 224 INJECTION, SOLUTION INTRAVENOUS at 10:22

## 2023-03-30 RX ADMIN — SIMVASTATIN 10 MILLIGRAM(S): 20 TABLET, FILM COATED ORAL at 21:30

## 2023-03-30 RX ADMIN — MONTELUKAST 10 MILLIGRAM(S): 4 TABLET, CHEWABLE ORAL at 11:31

## 2023-03-30 RX ADMIN — BUDESONIDE AND FORMOTEROL FUMARATE DIHYDRATE 2 PUFF(S): 160; 4.5 AEROSOL RESPIRATORY (INHALATION) at 22:04

## 2023-03-30 RX ADMIN — Medication 100 MILLIGRAM(S): at 20:47

## 2023-03-30 RX ADMIN — ENOXAPARIN SODIUM 60 MILLIGRAM(S): 100 INJECTION SUBCUTANEOUS at 17:24

## 2023-03-30 RX ADMIN — LOSARTAN POTASSIUM 25 MILLIGRAM(S): 100 TABLET, FILM COATED ORAL at 06:03

## 2023-03-30 RX ADMIN — PANTOPRAZOLE SODIUM 40 MILLIGRAM(S): 20 TABLET, DELAYED RELEASE ORAL at 06:03

## 2023-03-30 RX ADMIN — Medication 650 MILLIGRAM(S): at 11:30

## 2023-03-30 NOTE — PROGRESS NOTE ADULT - SUBJECTIVE AND OBJECTIVE BOX
CHIEF COMPLAINT:Patient is a 88y old  Female who presents with a chief complaint of Wheezing, SOB.Pt appears comfortable.    	  REVIEW OF SYSTEMS:  CONSTITUTIONAL: No fever, weight loss, or fatigue  EYES: No eye pain, visual disturbances, or discharge  ENT:  No difficulty hearing, tinnitus, vertigo; No sinus or throat pain  NECK: No pain or stiffness  RESPIRATORY: No cough, wheezing, chills or hemoptysis; No Shortness of Breath  CARDIOVASCULAR: No chest pain, palpitations, passing out, dizziness, or leg swelling  GASTROINTESTINAL: No abdominal or epigastric pain. No nausea, vomiting, or hematemesis; No diarrhea or constipation. No melena or hematochezia.  GENITOURINARY: No dysuria, frequency, hematuria, or incontinence  NEUROLOGICAL: No headaches, memory loss, loss of strength, numbness, or tremors  SKIN: No itching, burning, rashes, or lesions   LYMPH Nodes: No enlarged glands  ENDOCRINE: No heat or cold intolerance; No hair loss  MUSCULOSKELETAL: No joint pain or swelling; No muscle, back, or extremity pain  PSYCHIATRIC: No depression, anxiety, mood swings, or difficulty sleeping  HEME/LYMPH: No easy bruising, or bleeding gums  ALLERGY AND IMMUNOLOGIC: No hives or eczema	      PHYSICAL EXAM:  T(C): 37.8 (03-30-23 @ 10:01), Max: 37.8 (03-30-23 @ 10:01)  HR: 95 (03-30-23 @ 10:01) (81 - 98)  BP: 141/- (03-30-23 @ 10:01) (110/57 - 150/97)  RR: 18 (03-30-23 @ 10:01) (18 - 18)  SpO2: 94% (03-30-23 @ 10:01) (89% - 96%)  Wt(kg): --  I&O's Summary    29 Mar 2023 07:01  -  30 Mar 2023 07:00  --------------------------------------------------------  IN: 0 mL / OUT: 200 mL / NET: -200 mL        Appearance: Normal	  HEENT:   Normal oral mucosa, PERRL, EOMI	  Lymphatic: No lymphadenopathy  Cardiovascular: Normal S1 S2, No JVD, No murmurs, No edema  Respiratory: Lungs clear to auscultation	  Psychiatry: A & O x 3, Mood & affect appropriate  Gastrointestinal:  Soft, Non-tender, + BS	  Skin: No rashes, No ecchymoses, No cyanosis	  Neurologic: Non-focal  Extremities: Normal range of motion, No clubbing, cyanosis or edema  Vascular: Peripheral pulses palpable 2+ bilaterally    MEDICATIONS  (STANDING):  aspirin enteric coated 81 milliGRAM(s) Oral daily  enoxaparin Injectable 60 milliGRAM(s) SubCutaneous every 12 hours  influenza  Vaccine (HIGH DOSE) 0.7 milliLiter(s) IntraMuscular once  lactated ringers. 1000 milliLiter(s) (75 mL/Hr) IV Continuous <Continuous>  losartan 25 milliGRAM(s) Oral daily  montelukast 10 milliGRAM(s) Oral daily  pantoprazole    Tablet 40 milliGRAM(s) Oral before breakfast  simvastatin 10 milliGRAM(s) Oral at bedtime      	  	  LABS:	 	          Troponin I, High Sensitivity Result: 11.9 ng/L (03-29 @ 18:50)  Troponin I, High Sensitivity Result: 11.2 ng/L (03-29 @ 06:00)  Troponin I, High Sensitivity Result: 10.3 ng/L (03-28 @ 16:10)                            8.7    6.88  )-----------( 317      ( 30 Mar 2023 05:48 )             26.7     03-30    140  |  114<H>  |  21<H>  ----------------------------<  101<H>  3.4<L>   |  19<L>  |  0.68    Ca    8.9      30 Mar 2023 05:48  Phos  2.3     03-30  Mg     1.8     03-30    TPro  6.0  /  Alb  2.4<L>  /  TBili  0.2  /  DBili  x   /  AST  17  /  ALT  16  /  AlkPhos  50  03-29

## 2023-03-30 NOTE — PROGRESS NOTE ADULT - SUBJECTIVE AND OBJECTIVE BOX
Patient is a 88y old  Female who presents with a chief complaint of Wheezing, SOB (29 Mar 2023 12:00)    pt seen in icu [  ], reg med floor [ x  ], bed [ x ], chair at bedside [   ], a+o x3 [x  ], lethargic [  ],    nad [x  ]      Allergies    gabapentin (Unknown)        Vitals    T(F): 99.3 (03-30-23 @ 05:32), Max: 99.4 (03-29-23 @ 21:20)  HR: 83 (03-30-23 @ 05:32) (81 - 98)  BP: 150/97 (03-30-23 @ 05:32) (110/57 - 150/97)  RR: 18 (03-30-23 @ 05:32) (18 - 18)  SpO2: 96% (03-30-23 @ 05:32) (89% - 96%)  Wt(kg): --  CAPILLARY BLOOD GLUCOSE      POCT Blood Glucose.: 150 mg/dL (29 Mar 2023 11:26)      Labs                          8.7    6.88  )-----------( 317      ( 30 Mar 2023 05:48 )             26.7       03-29    140  |  115<H>  |  22<H>  ----------------------------<  111<H>  4.2   |  19<L>  |  0.79    Ca    9.1      29 Mar 2023 06:00  Phos  3.0     03-29  Mg     1.9     03-29    TPro  6.0  /  Alb  2.4<L>  /  TBili  0.2  /  DBili  x   /  AST  17  /  ALT  16  /  AlkPhos  50  03-29          Troponin I, High Sensitivity Result: 11.9 ng/L (03-29-23 @ 18:50)  Troponin I, High Sensitivity Result: 11.2 ng/L (03-29-23 @ 06:00)  Troponin I, High Sensitivity Result: 10.3 ng/L (03-28-23 @ 16:10)        Radiology Results      Meds    MEDICATIONS  (STANDING):  aspirin enteric coated 81 milliGRAM(s) Oral daily  enoxaparin Injectable 60 milliGRAM(s) SubCutaneous every 12 hours  influenza  Vaccine (HIGH DOSE) 0.7 milliLiter(s) IntraMuscular once  lactated ringers. 1000 milliLiter(s) (75 mL/Hr) IV Continuous <Continuous>  losartan 25 milliGRAM(s) Oral daily  montelukast 10 milliGRAM(s) Oral daily  pantoprazole    Tablet 40 milliGRAM(s) Oral before breakfast  simvastatin 10 milliGRAM(s) Oral at bedtime      MEDICATIONS  (PRN):  acetaminophen     Tablet .. 650 milliGRAM(s) Oral every 6 hours PRN Temp greater or equal to 38C (100.4F), Mild Pain (1 - 3)  ondansetron Injectable 4 milliGRAM(s) IV Push every 8 hours PRN Nausea and/or Vomiting      Physical Exam    Neuro :  no focal deficits  Respiratory: CTA B/L  CV: RRR, S1S2, no murmurs,   Abdominal: Soft, NT, ND +BS,  Extremities: No edema, + peripheral pulses      ASSESSMENT    b/l pe 2nd to non compliance,   r/o dvt  h/o HTN,   HLD,   DM,   Osteoporosis,   Stage II B Gastric Adenocarcinoma s/p distal gastrectomy in 2015 on active chemo (follows [facility] Dr [facility]),   Early Multiple myeloma,   recently diagnosed with b/l PE (on Eliquis)   hypoxic respiratory failure 2nd to covid 19 and b/l pe   PSHx ASHU w/BSO    Cholecystectomy        PLAN    cont lovenox 1mg/kg q 12,    f/u b/l le doppler  advised pt the importance of taking and cont anticoagulation for her condition  heme onc cons  cont bronchodilators,   pulm cons   supplemental O2 prn,   cardio cons   f/u echo   cont current meds           Patient is a 88y old  Female who presents with a chief complaint of Wheezing, SOB (29 Mar 2023 12:00)    pt seen in icu [  ], reg med floor [ x  ], bed [ x ], chair at bedside [   ], a+o x3 [x  ], lethargic [  ],    nad [x  ]      Allergies    gabapentin (Unknown)        Vitals    T(F): 99.3 (03-30-23 @ 05:32), Max: 99.4 (03-29-23 @ 21:20)  HR: 83 (03-30-23 @ 05:32) (81 - 98)  BP: 150/97 (03-30-23 @ 05:32) (110/57 - 150/97)  RR: 18 (03-30-23 @ 05:32) (18 - 18)  SpO2: 96% (03-30-23 @ 05:32) (89% - 96%)  Wt(kg): --  CAPILLARY BLOOD GLUCOSE      POCT Blood Glucose.: 150 mg/dL (29 Mar 2023 11:26)      Labs                          8.7    6.88  )-----------( 317      ( 30 Mar 2023 05:48 )             26.7       03-29    140  |  115<H>  |  22<H>  ----------------------------<  111<H>  4.2   |  19<L>  |  0.79    Ca    9.1      29 Mar 2023 06:00  Phos  3.0     03-29  Mg     1.9     03-29    TPro  6.0  /  Alb  2.4<L>  /  TBili  0.2  /  DBili  x   /  AST  17  /  ALT  16  /  AlkPhos  50  03-29      Respiratory Viral Panel with COVID-19 by ALEX (03.29.23 @ 20:29)   Rapid RVP Result: Detected  Entero/Rhinovirus (RapRVP): Detected    Troponin I, High Sensitivity Result: 11.9 ng/L (03-29-23 @ 18:50)  Troponin I, High Sensitivity Result: 11.2 ng/L (03-29-23 @ 06:00)  Troponin I, High Sensitivity Result: 10.3 ng/L (03-28-23 @ 16:10)        Radiology Results    < from: US Duplex Venous Lower Ext Complete, Bilateral (03.29.23 @ 10:45) >  IMPRESSION:  No evidence of deep venous thrombosis in the right lower extremity.    Extensive deep vein thrombosis in the left leg as detailed above.    < end of copied text >      Meds    MEDICATIONS  (STANDING):  aspirin enteric coated 81 milliGRAM(s) Oral daily  enoxaparin Injectable 60 milliGRAM(s) SubCutaneous every 12 hours  influenza  Vaccine (HIGH DOSE) 0.7 milliLiter(s) IntraMuscular once  lactated ringers. 1000 milliLiter(s) (75 mL/Hr) IV Continuous <Continuous>  losartan 25 milliGRAM(s) Oral daily  montelukast 10 milliGRAM(s) Oral daily  pantoprazole    Tablet 40 milliGRAM(s) Oral before breakfast  simvastatin 10 milliGRAM(s) Oral at bedtime      MEDICATIONS  (PRN):  acetaminophen     Tablet .. 650 milliGRAM(s) Oral every 6 hours PRN Temp greater or equal to 38C (100.4F), Mild Pain (1 - 3)  ondansetron Injectable 4 milliGRAM(s) IV Push every 8 hours PRN Nausea and/or Vomiting      Physical Exam    Neuro :  no focal deficits  Respiratory: CTA B/L  CV: RRR, S1S2, no murmurs,   Abdominal: Soft, NT, ND +BS,  Extremities: No edema, + peripheral pulses      ASSESSMENT    b/l pe 2nd to non compliance,   left le dvt   entro-rhino virus infxn   h/o HTN,   HLD,   DM,   Osteoporosis,   Stage II B Gastric Adenocarcinoma s/p distal gastrectomy in 2015 on active chemo (follows [facility] Dr [facility]),   Early Multiple myeloma,   recently diagnosed with b/l PE (on Eliquis)   hypoxic respiratory failure 2nd to covid 19 and b/l pe   PSHx ASHU w/BSO    Cholecystectomy        PLAN        b/l le doppler with No evidence of deep venous thrombosis in the right lower extremity. Extensive deep vein thrombosis in the left leg as noted above  advised pt the importance of taking and cont anticoagulation for her condition  heme onc f/u  hold chemo during admisison  pt not requiring O2  recommend AC with lovenox swich to Eliquis and close monitoring .   Pt should received long term AC.   Pt will be followup with Dr. smith outpatient.  cont bronchodilators,   pulm f/u    Bronchodilators  Symbicort /4.5 mcgs 2 puffs po BID  PFTs as OP.  conservative manage for entero-rhino virus  cardio cons   f/u echo f/u  cont current meds

## 2023-03-30 NOTE — PROGRESS NOTE ADULT - SUBJECTIVE AND OBJECTIVE BOX
Patient is a 88y old  Female who presents with a chief complaint of Wheezing, SOB (30 Mar 2023 10:28)  Awake, alert, laying in bed in NAD. Febrile and hypoxemic on RA.    INTERVAL HPI/OVERNIGHT EVENTS:      VITAL SIGNS:  T(F): 100 (03-30-23 @ 10:01)  HR: 95 (03-30-23 @ 10:01)  BP: 141/- (03-30-23 @ 10:01)  RR: 18 (03-30-23 @ 10:01)  SpO2: 94% (03-30-23 @ 10:01)  Wt(kg): --  I&O's Detail    29 Mar 2023 07:01  -  30 Mar 2023 07:00  --------------------------------------------------------  IN:  Total IN: 0 mL    OUT:    Voided (mL): 200 mL  Total OUT: 200 mL    Total NET: -200 mL              REVIEW OF SYSTEMS:    CONSTITUTIONAL:  No fevers, chills, sweats    HEENT:  Eyes:  No diplopia or blurred vision. ENT:  No earache, sore throat or runny nose.    CARDIOVASCULAR:  No pressure, squeezing, tightness, or heaviness about the chest; no palpitations.    RESPIRATORY:  Per HPI    GASTROINTESTINAL:  No abdominal pain, nausea, vomiting or diarrhea.    GENITOURINARY:  No dysuria, frequency or urgency.    NEUROLOGIC:  No paresthesias, fasciculations, seizures or weakness.    PSYCHIATRIC:  No disorder of thought or mood.      PHYSICAL EXAM:    Constitutional: Well developed and nourished  Eyes:Perrla  ENMT: normal  Neck:supple  Respiratory: good air entry  Cardiovascular: S1 S2 regular  Gastrointestinal: Soft, Non tender  Extremities: No edema  Vascular:normal  Neurological:Awake, alert,Ox3  Musculoskeletal:Normal      MEDICATIONS  (STANDING):  aspirin enteric coated 81 milliGRAM(s) Oral daily  enoxaparin Injectable 60 milliGRAM(s) SubCutaneous every 12 hours  influenza  Vaccine (HIGH DOSE) 0.7 milliLiter(s) IntraMuscular once  lactated ringers. 1000 milliLiter(s) (75 mL/Hr) IV Continuous <Continuous>  losartan 25 milliGRAM(s) Oral daily  montelukast 10 milliGRAM(s) Oral daily  pantoprazole    Tablet 40 milliGRAM(s) Oral before breakfast  simvastatin 10 milliGRAM(s) Oral at bedtime    MEDICATIONS  (PRN):  acetaminophen     Tablet .. 650 milliGRAM(s) Oral every 6 hours PRN Temp greater or equal to 38C (100.4F), Mild Pain (1 - 3)  ondansetron Injectable 4 milliGRAM(s) IV Push every 8 hours PRN Nausea and/or Vomiting      Allergies    gabapentin (Unknown)    Intolerances        LABS:                        8.7    6.88  )-----------( 317      ( 30 Mar 2023 05:48 )             26.7     03-30    140  |  114<H>  |  21<H>  ----------------------------<  101<H>  3.4<L>   |  19<L>  |  0.68    Ca    8.9      30 Mar 2023 05:48  Phos  2.3     03-30  Mg     1.8     03-30    TPro  6.0  /  Alb  2.4<L>  /  TBili  0.2  /  DBili  x   /  AST  17  /  ALT  16  /  AlkPhos  50  03-29    PT/INR - ( 28 Mar 2023 16:10 )   PT: 12.9 sec;   INR: 1.08 ratio         PTT - ( 28 Mar 2023 16:10 )  PTT:28.2 sec          CAPILLARY BLOOD GLUCOSE      POCT Blood Glucose.: 150 mg/dL (29 Mar 2023 11:26)        RADIOLOGY & ADDITIONAL TESTS:    CXR:  < from: Xray Chest 1 View AP/PA (03.29.23 @ 23:41) >  Impression:    No acute pulmonary disease.    < end of copied text >    Ct scan chest:    ekg;    echo:

## 2023-03-30 NOTE — PROGRESS NOTE ADULT - ASSESSMENT
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 Recently admitted 1/21/23-1/26/23 for acute hypoxic respiratory failure 2/2 new submassive PE and COVID with TTE negative for right heart strain and normal cardiac function and discharged on Eliquis now with B/L PE and Lt DVT.  1.Echocardiogram eval RV size and fx.  2.PE and DVT-lovenox.  3.HTN-hold bp medication.  4.Gastric Ca-Heme/Onc.  5.DM-Insulin.  6.PPI.  7.IVF.  8.Lipid d/o-statin.  9.Replace k+.

## 2023-03-30 NOTE — PROGRESS NOTE ADULT - SUBJECTIVE AND OBJECTIVE BOX
PGY-1 Progress Note discussed with attending    PAGER #: [[phone]] TILL 5:00 PM  PLEASE CONTACT ON CALL TEAM:  - On Call Team (Please refer to Brynn) FROM 5:00 PM - 8:30PM  - Nightfloat Team FROM 8:30 -7:30 AM    INTERVAL HPI/OVERNIGHT EVENTS:   MEDICATIONS  (STANDING):  aspirin enteric coated 81 milliGRAM(s) Oral daily  enoxaparin Injectable 60 milliGRAM(s) SubCutaneous every 12 hours  influenza  Vaccine (HIGH DOSE) 0.7 milliLiter(s) IntraMuscular once  lactated ringers. 1000 milliLiter(s) (75 mL/Hr) IV Continuous <Continuous>  losartan 25 milliGRAM(s) Oral daily  montelukast 10 milliGRAM(s) Oral daily  pantoprazole    Tablet 40 milliGRAM(s) Oral before breakfast  simvastatin 10 milliGRAM(s) Oral at bedtime    MEDICATIONS  (PRN):  acetaminophen     Tablet .. 650 milliGRAM(s) Oral every 6 hours PRN Temp greater or equal to 38C (100.4F), Mild Pain (1 - 3)  ondansetron Injectable 4 milliGRAM(s) IV Push every 8 hours PRN Nausea and/or Vomiting      REVIEW OF SYSTEMS:  CONSTITUTIONAL: No fever, weight loss, or fatigue  RESPIRATORY: No cough, wheezing, chills or hemoptysis; No shortness of breath  CARDIOVASCULAR: No chest pain, palpitations, dizziness, or leg swelling  GASTROINTESTINAL: No abdominal pain. No nausea, vomiting, or hematemesis; No diarrhea or constipation. No melena or hematochezia.  GENITOURINARY: No dysuria or hematuria, urinary frequency  NEUROLOGICAL: No headaches, memory loss, loss of strength, numbness, or tremors  SKIN: No itching, burning, rashes, or lesions     Vital Signs Last 24 Hrs  T(C): 37.8 (30 Mar 2023 10:01), Max: 37.8 (30 Mar 2023 10:01)  T(F): 100 (30 Mar 2023 10:01), Max: 100 (30 Mar 2023 10:01)  HR: 95 (30 Mar 2023 10:01) (81 - 98)  BP: 141/- (30 Mar 2023 10:01) (110/57 - 150/97)  BP(mean): --  RR: 18 (30 Mar 2023 10:01) (18 - 18)  SpO2: 94% (30 Mar 2023 10:01) (89% - 96%)    Parameters below as of 30 Mar 2023 10:01  Patient On (Oxygen Delivery Method): nasal cannula  O2 Flow (L/min): 2      PHYSICAL EXAMINATION:  GENERAL: NAD, well built  HEAD:  Atraumatic, Normocephalic  EYES:  conjunctiva and sclera clear  NECK: Supple, No JVD  CHEST/LUNG: Clear to auscultation. Clear to percussion bilaterally; No rales, rhonchi, wheezing, or rubs  HEART: Regular rate and rhythm; No murmurs, rubs, or gallops  ABDOMEN: Soft, Nontender, Nondistended; Bowel sounds present  NERVOUS SYSTEM:  Alert & Oriented X3    EXTREMITIES:  2+ Peripheral Pulses, No clubbing, cyanosis, or edema  SKIN: warm dry                          8.7    6.88  )-----------( 317      ( 30 Mar 2023 05:48 )             26.7     03-30    140  |  114<H>  |  21<H>  ----------------------------<  101<H>  3.4<L>   |  19<L>  |  0.68    Ca    8.9      30 Mar 2023 05:48  Phos  2.3     03-30  Mg     1.8     03-30    TPro  6.0  /  Alb  2.4<L>  /  TBili  0.2  /  DBili  x   /  AST  17  /  ALT  16  /  AlkPhos  50  03-29    LIVER FUNCTIONS - ( 29 Mar 2023 06:00 )  Alb: 2.4 g/dL / Pro: 6.0 g/dL / ALK PHOS: 50 U/L / ALT: 16 U/L DA / AST: 17 U/L / GGT: x               PT/INR - ( 28 Mar 2023 16:10 )   PT: 12.9 sec;   INR: 1.08 ratio         PTT - ( 28 Mar 2023 16:10 )  PTT:28.2 sec    CAPILLARY BLOOD GLUCOSE      RADIOLOGY & ADDITIONAL TESTS:                   PGY-1 Progress Note discussed with attending    PAGER #: [[phone]] TILL 5:00 PM  PLEASE CONTACT ON CALL TEAM:  - On Call Team (Please refer to Brynn) FROM 5:00 PM - 8:30PM  - Nightfloat Team FROM 8:30 -7:30 AM    INTERVAL HPI/OVERNIGHT EVENTS: Patient seen and examined at bedside. Complains of chest pain and SOB overnight. Pt started on oxygen, trop x1 negative. EKG no ischemic changes. Denies having any complaints this morning.    MEDICATIONS  (STANDING):  aspirin enteric coated 81 milliGRAM(s) Oral daily  enoxaparin Injectable 60 milliGRAM(s) SubCutaneous every 12 hours  influenza  Vaccine (HIGH DOSE) 0.7 milliLiter(s) IntraMuscular once  lactated ringers. 1000 milliLiter(s) (75 mL/Hr) IV Continuous <Continuous>  losartan 25 milliGRAM(s) Oral daily  montelukast 10 milliGRAM(s) Oral daily  pantoprazole    Tablet 40 milliGRAM(s) Oral before breakfast  simvastatin 10 milliGRAM(s) Oral at bedtime    MEDICATIONS  (PRN):  acetaminophen     Tablet .. 650 milliGRAM(s) Oral every 6 hours PRN Temp greater or equal to 38C (100.4F), Mild Pain (1 - 3)  ondansetron Injectable 4 milliGRAM(s) IV Push every 8 hours PRN Nausea and/or Vomiting      REVIEW OF SYSTEMS:  CONSTITUTIONAL: No fever, weight loss, or fatigue  RESPIRATORY: No cough, wheezing, chills or hemoptysis; No shortness of breath  CARDIOVASCULAR: No chest pain, palpitations, dizziness, or leg swelling  GASTROINTESTINAL: No abdominal pain. No nausea, vomiting, or hematemesis; No diarrhea or constipation. No melena or hematochezia.  GENITOURINARY: No dysuria or hematuria, urinary frequency  NEUROLOGICAL: No headaches, memory loss, loss of strength, numbness, or tremors  SKIN: No itching, burning, rashes, or lesions     Vital Signs Last 24 Hrs  T(C): 37.8 (30 Mar 2023 10:01), Max: 37.8 (30 Mar 2023 10:01)  T(F): 100 (30 Mar 2023 10:01), Max: 100 (30 Mar 2023 10:01)  HR: 95 (30 Mar 2023 10:01) (81 - 98)  BP: 141/- (30 Mar 2023 10:01) (110/57 - 150/97)  BP(mean): --  RR: 18 (30 Mar 2023 10:01) (18 - 18)  SpO2: 94% (30 Mar 2023 10:01) (89% - 96%)    Parameters below as of 30 Mar 2023 10:01  Patient On (Oxygen Delivery Method): nasal cannula  O2 Flow (L/min): 2      PHYSICAL EXAMINATION:  GENERAL: NAD, thin, laying in bed  HEAD:  Atraumatic, Normocephalic  EYES:  conjunctiva and sclera clear  NECK: Supple, No JVD  CHEST/LUNG: RLL crackles  HEART: Regular rate and rhythm; No murmurs, rubs, or gallops  ABDOMEN: Soft, Nontender, Nondistended; Bowel sounds present  NERVOUS SYSTEM:  Alert & Oriented X3    EXTREMITIES:  2+ Peripheral Pulses, No clubbing, cyanosis, or edema  SKIN: warm dry                          8.7    6.88  )-----------( 317      ( 30 Mar 2023 05:48 )             26.7     03-30    140  |  114<H>  |  21<H>  ----------------------------<  101<H>  3.4<L>   |  19<L>  |  0.68    Ca    8.9      30 Mar 2023 05:48  Phos  2.3     03-30  Mg     1.8     03-30    TPro  6.0  /  Alb  2.4<L>  /  TBili  0.2  /  DBili  x   /  AST  17  /  ALT  16  /  AlkPhos  50  03-29    LIVER FUNCTIONS - ( 29 Mar 2023 06:00 )  Alb: 2.4 g/dL / Pro: 6.0 g/dL / ALK PHOS: 50 U/L / ALT: 16 U/L DA / AST: 17 U/L / GGT: x               PT/INR - ( 28 Mar 2023 16:10 )   PT: 12.9 sec;   INR: 1.08 ratio         PTT - ( 28 Mar 2023 16:10 )  PTT:28.2 sec    CAPILLARY BLOOD GLUCOSE      RADIOLOGY & ADDITIONAL TESTS:

## 2023-03-31 ENCOUNTER — TRANSCRIPTION ENCOUNTER (OUTPATIENT)
Age: 88
End: 2023-03-31

## 2023-03-31 LAB
A1C WITH ESTIMATED AVERAGE GLUCOSE RESULT: 5.8 % — HIGH (ref 4–5.6)
ANION GAP SERPL CALC-SCNC: 7 MMOL/L — SIGNIFICANT CHANGE UP (ref 5–17)
BUN SERPL-MCNC: 23 MG/DL — HIGH (ref 7–18)
CALCIUM SERPL-MCNC: 9.4 MG/DL — SIGNIFICANT CHANGE UP (ref 8.4–10.5)
CHLORIDE SERPL-SCNC: 114 MMOL/L — HIGH (ref 96–108)
CO2 SERPL-SCNC: 19 MMOL/L — LOW (ref 22–31)
CREAT SERPL-MCNC: 0.74 MG/DL — SIGNIFICANT CHANGE UP (ref 0.5–1.3)
EGFR: 79 ML/MIN/1.73M2 — SIGNIFICANT CHANGE UP
ESTIMATED AVERAGE GLUCOSE: 120 MG/DL — HIGH (ref 68–114)
GLUCOSE SERPL-MCNC: 107 MG/DL — HIGH (ref 70–99)
HCT VFR BLD CALC: 30.6 % — LOW (ref 34.5–45)
HGB BLD-MCNC: 9.9 G/DL — LOW (ref 11.5–15.5)
MAGNESIUM SERPL-MCNC: 1.9 MG/DL — SIGNIFICANT CHANGE UP (ref 1.6–2.6)
MCHC RBC-ENTMCNC: 30.5 PG — SIGNIFICANT CHANGE UP (ref 27–34)
MCHC RBC-ENTMCNC: 32.4 GM/DL — SIGNIFICANT CHANGE UP (ref 32–36)
MCV RBC AUTO: 94.2 FL — SIGNIFICANT CHANGE UP (ref 80–100)
NRBC # BLD: 0 /100 WBCS — SIGNIFICANT CHANGE UP (ref 0–0)
PHOSPHATE SERPL-MCNC: 3.3 MG/DL — SIGNIFICANT CHANGE UP (ref 2.5–4.5)
PLATELET # BLD AUTO: 340 K/UL — SIGNIFICANT CHANGE UP (ref 150–400)
POTASSIUM SERPL-MCNC: 3.9 MMOL/L — SIGNIFICANT CHANGE UP (ref 3.5–5.3)
POTASSIUM SERPL-SCNC: 3.9 MMOL/L — SIGNIFICANT CHANGE UP (ref 3.5–5.3)
RBC # BLD: 3.25 M/UL — LOW (ref 3.8–5.2)
RBC # FLD: 15.4 % — HIGH (ref 10.3–14.5)
SODIUM SERPL-SCNC: 140 MMOL/L — SIGNIFICANT CHANGE UP (ref 135–145)
WBC # BLD: 9.66 K/UL — SIGNIFICANT CHANGE UP (ref 3.8–10.5)
WBC # FLD AUTO: 9.66 K/UL — SIGNIFICANT CHANGE UP (ref 3.8–10.5)

## 2023-03-31 RX ORDER — APIXABAN 2.5 MG/1
10 TABLET, FILM COATED ORAL ONCE
Refills: 0 | Status: COMPLETED | OUTPATIENT
Start: 2023-03-31 | End: 2023-03-31

## 2023-03-31 RX ORDER — APIXABAN 2.5 MG/1
5 TABLET, FILM COATED ORAL EVERY 12 HOURS
Refills: 0 | Status: DISCONTINUED | OUTPATIENT
Start: 2023-04-01 | End: 2023-04-04

## 2023-03-31 RX ADMIN — SIMVASTATIN 10 MILLIGRAM(S): 20 TABLET, FILM COATED ORAL at 21:44

## 2023-03-31 RX ADMIN — BUDESONIDE AND FORMOTEROL FUMARATE DIHYDRATE 2 PUFF(S): 160; 4.5 AEROSOL RESPIRATORY (INHALATION) at 21:45

## 2023-03-31 RX ADMIN — MONTELUKAST 10 MILLIGRAM(S): 4 TABLET, CHEWABLE ORAL at 11:30

## 2023-03-31 RX ADMIN — PANTOPRAZOLE SODIUM 40 MILLIGRAM(S): 20 TABLET, DELAYED RELEASE ORAL at 06:01

## 2023-03-31 RX ADMIN — BUDESONIDE AND FORMOTEROL FUMARATE DIHYDRATE 2 PUFF(S): 160; 4.5 AEROSOL RESPIRATORY (INHALATION) at 11:32

## 2023-03-31 RX ADMIN — APIXABAN 10 MILLIGRAM(S): 2.5 TABLET, FILM COATED ORAL at 16:46

## 2023-03-31 RX ADMIN — LOSARTAN POTASSIUM 25 MILLIGRAM(S): 100 TABLET, FILM COATED ORAL at 06:01

## 2023-03-31 RX ADMIN — ENOXAPARIN SODIUM 60 MILLIGRAM(S): 100 INJECTION SUBCUTANEOUS at 06:01

## 2023-03-31 RX ADMIN — Medication 81 MILLIGRAM(S): at 11:31

## 2023-03-31 NOTE — PHYSICAL THERAPY INITIAL EVALUATION ADULT - GENERAL OBSERVATIONS, REHAB EVAL
team clear PT referral, active chemo team clear PT referral, active chemo, female patient from walk up apt home, provided assist with rew gait and transfers to generalized weakness and safety precuations

## 2023-03-31 NOTE — DISCHARGE NOTE PROVIDER - NSDCMRMEDTOKEN_GEN_ALL_CORE_FT
ALBUTEROL PV  INH: 1 gram(s) inhaled every 8 hours, As Needed for sob  ALOGLIPTIN 25MG TAB:   ANTI-DIARRHE 2MG TAB:   apixaban 5 mg oral tablet: 1 tab(s) orally every 12 hours  ASPIRIN LOW 81MG EC TAB: 1 tab(s) orally once a day  LOSARTAN POT 25MG TAB: 1 tab(s) orally once a day  meclizine 25 mg oral tablet: 1 tab(s) orally once a day, As Needed  METFORMIN 1000MG TAB: 1 tab(s) orally 2 times a day  MONTELUKAST 10MG TAB: 1 tab(s) orally once a day  ONDANSETRON 4MG TAB: 1 tab(s) orally every 8 hours, As Needed for nausea/vomiting  SIMVASTATIN 10MG TAB: 1 tab(s) orally once a day (at bedtime)  STEGLATRO 15MG TAB: 1 tab(s) orally once a day  ULTRA EYE 0.4-0.3% SELENE: 1 milliliter(s) to each affected eye 2 times a day, As Needed  Vitamin D3 1250 mcg (50,000 intl units) oral capsule: 1 cap(s) orally once a week   ALBUTEROL PV  INH: 1 gram(s) inhaled every 8 hours, As Needed for sob  ALOGLIPTIN 25MG TAB:   apixaban 5 mg oral tablet: 1 tab(s) orally every 12 hours  apixaban 5 mg oral tablet: 1 tab(s) orally every 12 hours  ASPIRIN LOW 81MG EC TAB: 1 tab(s) orally once a day  LOSARTAN POT 25MG TAB: 1 tab(s) orally once a day  METFORMIN 1000MG TAB: 1 tab(s) orally 2 times a day  MONTELUKAST 10MG TAB: 1 tab(s) orally once a day  ONDANSETRON 4MG TAB: 1 tab(s) orally every 8 hours, As Needed for nausea/vomiting  SIMVASTATIN 10MG TAB: 1 tab(s) orally once a day (at bedtime)  STEGLATRO 15MG TAB: 1 tab(s) orally once a day  ULTRA EYE 0.4-0.3% SELENE: 1 milliliter(s) to each affected eye 2 times a day, As Needed  Vitamin D3 1250 mcg (50,000 intl units) oral capsule: 1 cap(s) orally once a week   ALBUTEROL PV  INH: 1 gram(s) inhaled every 8 hours, As Needed for sob  ALOGLIPTIN 25MG TAB:   apixaban 5 mg oral tablet: 1 tab(s) orally every 12 hours  ASPIRIN LOW 81MG EC TAB: 1 tab(s) orally once a day  LOSARTAN POT 25MG TAB: 1 tab(s) orally once a day  METFORMIN 1000MG TAB: 1 tab(s) orally 2 times a day  MONTELUKAST 10MG TAB: 1 tab(s) orally once a day  ONDANSETRON 4MG TAB: 1 tab(s) orally every 8 hours, As Needed for nausea/vomiting  predniSONE 10 mg oral tablet: 4 tab(s) orally once a day Take 4 tablets once a day for two day, then take 3 tablets once a day for two days, then take 2 tablets once a day for two days, and then take one tablet once a day for two days  SIMVASTATIN 10MG TAB: 1 tab(s) orally once a day (at bedtime)  STEGLATRO 15MG TAB: 1 tab(s) orally once a day  ULTRA EYE 0.4-0.3% SELENE: 1 milliliter(s) to each affected eye 2 times a day, As Needed  Vitamin D3 1250 mcg (50,000 intl units) oral capsule: 1 cap(s) orally once a week

## 2023-03-31 NOTE — PHYSICAL THERAPY INITIAL EVALUATION ADULT - IMPAIRMENTS CONTRIBUTING IMPAIRED BED MOBILITY, REHAB EVAL
We will do suctioning and discharge the patient no indication to do further testing impaired motor control/decreased strength Statement Selected

## 2023-03-31 NOTE — PROGRESS NOTE ADULT - PROBLEM SELECTOR PLAN 1
p/w SOB and wheezing  h/o PE did not take AC for 2 mo   EKG shows NSR,   CTA chest: b/l PE  w/ increased clot  burden in distal left main pulmonary artery, slightly increased compared to prior  Not in respiratory distress never hypoxic, and without chest pain.   s/p duonebs x3, prednisone 50mg x1, and FD lovenox x1 in the ED with improvement  likely in the setting of progression in PE, unlikely asthma exacerbation   - 88% on RA - home O2 evaluation  - Switch Lovenox to Eliquis (okay to start maintenance dose in the AM per [facility])  - rest of plan as below  DISPO: DC on Eliquis w/ teaching

## 2023-03-31 NOTE — PROGRESS NOTE ADULT - ASSESSMENT
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 Recently admitted 1/21/23-1/26/23 for acute hypoxic respiratory failure 2/2 new submassive PE and COVID with TTE negative for right heart strain and normal cardiac function and discharged on Eliquis now with B/L PE and Lt DVT.  1.Echocardiogram eval RV size and fx.  2.PE and DVT-lovenox, Heme/Onc f/u regarding AC.  3.HTN-hold bp medication.  4.Gastric Ca-Heme/Onc.  5.DM-Insulin.  6.PPI.  7.Lipid d/o-statin.

## 2023-03-31 NOTE — PROGRESS NOTE ADULT - SUBJECTIVE AND OBJECTIVE BOX
PGY-1 Progress Note discussed with attending    PAGER #: [[phone]] TILL 5:00 PM  PLEASE CONTACT ON CALL TEAM:  - On Call Team (Please refer to Brynn) FROM 5:00 PM - 8:30PM  - Nightfloat Team FROM 8:30 -7:30 AM    INTERVAL HPI/OVERNIGHT EVENTS:   MEDICATIONS  (STANDING):  aspirin enteric coated 81 milliGRAM(s) Oral daily  budesonide  80 MICROgram(s)/formoterol 4.5 MICROgram(s) Inhaler 2 Puff(s) Inhalation two times a day  enoxaparin Injectable 60 milliGRAM(s) SubCutaneous every 12 hours  influenza  Vaccine (HIGH DOSE) 0.7 milliLiter(s) IntraMuscular once  lactated ringers. 1000 milliLiter(s) (75 mL/Hr) IV Continuous <Continuous>  losartan 25 milliGRAM(s) Oral daily  montelukast 10 milliGRAM(s) Oral daily  pantoprazole    Tablet 40 milliGRAM(s) Oral before breakfast  simvastatin 10 milliGRAM(s) Oral at bedtime    MEDICATIONS  (PRN):  acetaminophen     Tablet .. 650 milliGRAM(s) Oral every 6 hours PRN Temp greater or equal to 38C (100.4F), Mild Pain (1 - 3)  guaiFENesin Oral Liquid (Sugar-Free) 100 milliGRAM(s) Oral every 6 hours PRN Cough  ondansetron Injectable 4 milliGRAM(s) IV Push every 8 hours PRN Nausea and/or Vomiting      REVIEW OF SYSTEMS:  CONSTITUTIONAL: No fever, weight loss, or fatigue  RESPIRATORY: No cough, wheezing, chills or hemoptysis; No shortness of breath  CARDIOVASCULAR: No chest pain, palpitations, dizziness, or leg swelling  GASTROINTESTINAL: No abdominal pain. No nausea, vomiting, or hematemesis; No diarrhea or constipation. No melena or hematochezia.  GENITOURINARY: No dysuria or hematuria, urinary frequency  NEUROLOGICAL: No headaches, memory loss, loss of strength, numbness, or tremors  SKIN: No itching, burning, rashes, or lesions     Vital Signs Last 24 Hrs  T(C): 37.4 (31 Mar 2023 05:50), Max: 37.9 (30 Mar 2023 21:22)  T(F): 99.3 (31 Mar 2023 05:50), Max: 100.3 (30 Mar 2023 21:22)  HR: 100 (31 Mar 2023 09:32) (84 - 100)  BP: 131/68 (31 Mar 2023 09:32) (108/62 - 131/68)  BP(mean): --  RR: 18 (31 Mar 2023 05:50) (18 - 18)  SpO2: 97% (31 Mar 2023 09:32) (95% - 97%)    Parameters below as of 31 Mar 2023 09:32  Patient On (Oxygen Delivery Method): nasal cannula  O2 Flow (L/min): 2      PHYSICAL EXAMINATION:  GENERAL: NAD, well built  HEAD:  Atraumatic, Normocephalic  EYES:  conjunctiva and sclera clear  NECK: Supple, No JVD  CHEST/LUNG: Clear to auscultation. Clear to percussion bilaterally; No rales, rhonchi, wheezing, or rubs  HEART: Regular rate and rhythm; No murmurs, rubs, or gallops  ABDOMEN: Soft, Nontender, Nondistended; Bowel sounds present  NERVOUS SYSTEM:  Alert & Oriented X3    EXTREMITIES:  2+ Peripheral Pulses, No clubbing, cyanosis, or edema  SKIN: warm dry                          9.9    9.66  )-----------( 340      ( 31 Mar 2023 05:50 )             30.6     03-31    140  |  114<H>  |  23<H>  ----------------------------<  107<H>  3.9   |  19<L>  |  0.74    Ca    9.4      31 Mar 2023 05:50  Phos  3.3     03-31  Mg     1.9     03-31                CAPILLARY BLOOD GLUCOSE      RADIOLOGY & ADDITIONAL TESTS:                   PGY-1 Progress Note discussed with attending    PAGER #: [[phone]] TILL 5:00 PM  PLEASE CONTACT ON CALL TEAM:  - On Call Team (Please refer to Brynn) FROM 5:00 PM - 8:30PM  - Nightfloat Team FROM 8:30 -7:30 AM    INTERVAL HPI/OVERNIGHT EVENTS: Patient seen and examined at bedside. No acute events overnight. No new complaints today.      MEDICATIONS  (STANDING):  aspirin enteric coated 81 milliGRAM(s) Oral daily  budesonide  80 MICROgram(s)/formoterol 4.5 MICROgram(s) Inhaler 2 Puff(s) Inhalation two times a day  enoxaparin Injectable 60 milliGRAM(s) SubCutaneous every 12 hours  influenza  Vaccine (HIGH DOSE) 0.7 milliLiter(s) IntraMuscular once  lactated ringers. 1000 milliLiter(s) (75 mL/Hr) IV Continuous <Continuous>  losartan 25 milliGRAM(s) Oral daily  montelukast 10 milliGRAM(s) Oral daily  pantoprazole    Tablet 40 milliGRAM(s) Oral before breakfast  simvastatin 10 milliGRAM(s) Oral at bedtime    MEDICATIONS  (PRN):  acetaminophen     Tablet .. 650 milliGRAM(s) Oral every 6 hours PRN Temp greater or equal to 38C (100.4F), Mild Pain (1 - 3)  guaiFENesin Oral Liquid (Sugar-Free) 100 milliGRAM(s) Oral every 6 hours PRN Cough  ondansetron Injectable 4 milliGRAM(s) IV Push every 8 hours PRN Nausea and/or Vomiting      REVIEW OF SYSTEMS:  CONSTITUTIONAL: No fever, weight loss, or fatigue  RESPIRATORY: No cough, wheezing, chills or hemoptysis; No shortness of breath  CARDIOVASCULAR: No chest pain, palpitations, dizziness, or leg swelling  GASTROINTESTINAL: No abdominal pain. No nausea, vomiting, or hematemesis; No diarrhea or constipation. No melena or hematochezia.  GENITOURINARY: No dysuria or hematuria, urinary frequency  NEUROLOGICAL: No headaches, memory loss, loss of strength, numbness, or tremors  SKIN: No itching, burning, rashes, or lesions     Vital Signs Last 24 Hrs  T(C): 37.4 (31 Mar 2023 05:50), Max: 37.9 (30 Mar 2023 21:22)  T(F): 99.3 (31 Mar 2023 05:50), Max: 100.3 (30 Mar 2023 21:22)  HR: 100 (31 Mar 2023 09:32) (84 - 100)  BP: 131/68 (31 Mar 2023 09:32) (108/62 - 131/68)  BP(mean): --  RR: 18 (31 Mar 2023 05:50) (18 - 18)  SpO2: 97% (31 Mar 2023 09:32) (95% - 97%)    Parameters below as of 31 Mar 2023 09:32  Patient On (Oxygen Delivery Method): nasal cannula  O2 Flow (L/min): 2      PHYSICAL EXAMINATION:  GENERAL: NAD, thin, laying in bed  HEAD:  Atraumatic, Normocephalic  EYES:  conjunctiva and sclera clear  NECK: Supple, No JVD  CHEST/LUNG: CTA b/l  HEART: Regular rate and rhythm; No murmurs, rubs, or gallops  ABDOMEN: Soft, Nontender, Nondistended; Bowel sounds present  NERVOUS SYSTEM:  Alert & Oriented X3    EXTREMITIES:  2+ Peripheral Pulses, No clubbing, cyanosis, or edema  SKIN: warm dry                        9.9    9.66  )-----------( 340      ( 31 Mar 2023 05:50 )             30.6     03-31    140  |  114<H>  |  23<H>  ----------------------------<  107<H>  3.9   |  19<L>  |  0.74    Ca    9.4      31 Mar 2023 05:50  Phos  3.3     03-31  Mg     1.9     03-31                CAPILLARY BLOOD GLUCOSE      RADIOLOGY & ADDITIONAL TESTS:

## 2023-03-31 NOTE — DISCHARGE NOTE PROVIDER - DETAILS OF MALNUTRITION DIAGNOSIS/DIAGNOSES
This patient has been assessed with a concern for Malnutrition and was treated during this hospitalization for the following Nutrition diagnosis/diagnoses:     -  04/03/2023: Moderate protein-calorie malnutrition

## 2023-03-31 NOTE — PROGRESS NOTE ADULT - SUBJECTIVE AND OBJECTIVE BOX
pt seen and examined. doing better with less sob. low grade fever   ICU Vital Signs Last 24 Hrs  T(C): 37.1 (31 Mar 2023 14:01), Max: 37.9 (30 Mar 2023 21:22)  T(F): 98.8 (31 Mar 2023 14:01), Max: 100.3 (30 Mar 2023 21:22)  HR: 93 (31 Mar 2023 14:01) (84 - 100)  BP: 124/72 (31 Mar 2023 14:01) (108/62 - 131/68)  BP(mean): --  ABP: --  ABP(mean): --  RR: 17 (31 Mar 2023 14:01) (17 - 18)  SpO2: 94% (31 Mar 2023 14:01) (88% - 97%)    O2 Parameters below as of 31 Mar 2023 14:01  Patient On (Oxygen Delivery Method): nasal cannula  O2 Flow (L/min): 2    GEN: NAD; A and O x 3  HEENT normal   LUNGS: decreased BS B/L, basilar crackles  HEART: S1 S2  ABDOMEN: soft, non-tender, non-distended, + BS  EXTREMITIES: no edema  NERVOUS SYSTEM:  Awake and alert; no focal neuro deficits    MEDICATIONS  (STANDING):  aspirin enteric coated 81 milliGRAM(s) Oral daily  budesonide  80 MICROgram(s)/formoterol 4.5 MICROgram(s) Inhaler 2 Puff(s) Inhalation two times a day  influenza  Vaccine (HIGH DOSE) 0.7 milliLiter(s) IntraMuscular once  losartan 25 milliGRAM(s) Oral daily  montelukast 10 milliGRAM(s) Oral daily  pantoprazole    Tablet 40 milliGRAM(s) Oral before breakfast  simvastatin 10 milliGRAM(s) Oral at bedtime  MEDICATIONS  (PRN):  acetaminophen     Tablet .. 650 milliGRAM(s) Oral every 6 hours PRN Temp greater or equal to 38C (100.4F), Mild Pain (1 - 3)  guaiFENesin Oral Liquid (Sugar-Free) 100 milliGRAM(s) Oral every 6 hours PRN Cough  ondansetron Injectable 4 milliGRAM(s) IV Push every 8 hours PRN Nausea and/or Vomiting                        9.9    9.66  )-----------( 340      ( 31 Mar 2023 05:50 )             30.6   03-31    140  |  114<H>  |  23<H>  ----------------------------<  107<H>  3.9   |  19<L>  |  0.74    Ca    9.4      31 Mar 2023 05:50  Phos  3.3     03-31  Mg     1.9     03-31           pt seen and examined. she doing better with less sob. low grade fever but still need NC oxygen   ICU Vital Signs Last 24 Hrs  T(C): 37.1 (31 Mar 2023 14:01), Max: 37.9 (30 Mar 2023 21:22)  T(F): 98.8 (31 Mar 2023 14:01), Max: 100.3 (30 Mar 2023 21:22)  HR: 93 (31 Mar 2023 14:01) (84 - 100)  BP: 124/72 (31 Mar 2023 14:01) (108/62 - 131/68)  BP(mean): --  ABP: --  ABP(mean): --  RR: 17 (31 Mar 2023 14:01) (17 - 18)  SpO2: 94% (31 Mar 2023 14:01) (88% - 97%)    O2 Parameters below as of 31 Mar 2023 14:01  Patient On (Oxygen Delivery Method): nasal cannula  O2 Flow (L/min): 2    GEN: NAD; A and O x 3  HEENT normal   LUNGS: decreased BS B/L, basilar crackles  HEART: S1 S2  ABDOMEN: soft, non-tender, non-distended, + BS  EXTREMITIES: no edema  NERVOUS SYSTEM:  Awake and alert; no focal neuro deficits    MEDICATIONS  (STANDING):  aspirin enteric coated 81 milliGRAM(s) Oral daily  budesonide  80 MICROgram(s)/formoterol 4.5 MICROgram(s) Inhaler 2 Puff(s) Inhalation two times a day  influenza  Vaccine (HIGH DOSE) 0.7 milliLiter(s) IntraMuscular once  losartan 25 milliGRAM(s) Oral daily  montelukast 10 milliGRAM(s) Oral daily  pantoprazole    Tablet 40 milliGRAM(s) Oral before breakfast  simvastatin 10 milliGRAM(s) Oral at bedtime  MEDICATIONS  (PRN):  acetaminophen     Tablet .. 650 milliGRAM(s) Oral every 6 hours PRN Temp greater or equal to 38C (100.4F), Mild Pain (1 - 3)  guaiFENesin Oral Liquid (Sugar-Free) 100 milliGRAM(s) Oral every 6 hours PRN Cough  ondansetron Injectable 4 milliGRAM(s) IV Push every 8 hours PRN Nausea and/or Vomiting                        9.9    9.66  )-----------( 340      ( 31 Mar 2023 05:50 )             30.6   03-31    140  |  114<H>  |  23<H>  ----------------------------<  107<H>  3.9   |  19<L>  |  0.74    Ca    9.4      31 Mar 2023 05:50  Phos  3.3     03-31  Mg     1.9     03-31

## 2023-03-31 NOTE — DISCHARGE NOTE PROVIDER - CARE PROVIDER_API CALL
NORRIS BENSON  Phone: ([phone]  Fax: ()-  Follow Up Time:     Maddie Jennings)  Internal Medicine  [address] [place] [address]  Phone: [phone]  Fax: [phone]  Follow Up Time:    NORRIS BENSON  Phone: ()-  Fax: ()-  Follow Up Time:     Maddie Jennings)  Internal Medicine  [address] [place] [address]  Phone: [phone]  Fax: [phone]  Follow Up Time:     Nikunj Hernandes)  Internal Medicine; Pulmonary Disease  [address] [place] [address]  Phone: [phone]  Fax: [phone]  Follow Up Time: 1 week

## 2023-03-31 NOTE — PROGRESS NOTE ADULT - SUBJECTIVE AND OBJECTIVE BOX
Patient is a 88y old  Female who presents with a chief complaint of Wheezing, SOB (30 Mar 2023 11:01)    pt seen in icu [  ], reg med floor [ x  ], bed [ x ], chair at bedside [   ], a+o x3 [x  ], lethargic [  ],    nad [x  ]      Allergies    gabapentin (Unknown)        Vitals    T(F): 99.3 (03-31-23 @ 05:50), Max: 100.3 (03-30-23 @ 21:22)  HR: 84 (03-31-23 @ 05:50) (84 - 95)  BP: 128/78 (03-31-23 @ 05:50) (108/62 - 141/-)  RR: 18 (03-31-23 @ 05:50) (18 - 18)  SpO2: 95% (03-31-23 @ 05:50) (94% - 95%)  Wt(kg): --  CAPILLARY BLOOD GLUCOSE      POCT Blood Glucose.: 150 mg/dL (29 Mar 2023 11:26)      Labs                          8.7    6.88  )-----------( 317      ( 30 Mar 2023 05:48 )             26.7       03-30    140  |  114<H>  |  21<H>  ----------------------------<  101<H>  3.4<L>   |  19<L>  |  0.68    Ca    8.9      30 Mar 2023 05:48  Phos  2.3     03-30  Mg     1.8     03-30            Troponin I, High Sensitivity Result: 11.9 ng/L (03-29-23 @ 18:50)  Troponin I, High Sensitivity Result: 11.2 ng/L (03-29-23 @ 06:00)  Troponin I, High Sensitivity Result: 10.3 ng/L (03-28-23 @ 16:10)        Radiology Results      Meds    MEDICATIONS  (STANDING):  aspirin enteric coated 81 milliGRAM(s) Oral daily  budesonide  80 MICROgram(s)/formoterol 4.5 MICROgram(s) Inhaler 2 Puff(s) Inhalation two times a day  enoxaparin Injectable 60 milliGRAM(s) SubCutaneous every 12 hours  influenza  Vaccine (HIGH DOSE) 0.7 milliLiter(s) IntraMuscular once  lactated ringers. 1000 milliLiter(s) (75 mL/Hr) IV Continuous <Continuous>  losartan 25 milliGRAM(s) Oral daily  montelukast 10 milliGRAM(s) Oral daily  pantoprazole    Tablet 40 milliGRAM(s) Oral before breakfast  simvastatin 10 milliGRAM(s) Oral at bedtime      MEDICATIONS  (PRN):  acetaminophen     Tablet .. 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 Extensive deep vein thrombosis in the left leg as noted above  advised pt the importance of taking and cont anticoagulation for her condition  heme onc f/u  hold chemo during admisison  pt not requiring O2  recommend AC with lovenox swich to Eliquis and close monitoring .   Pt should received long term AC.   Pt will be followup with Dr. smith outpatient.  cont bronchodilators,   pulm f/u    Bronchodilators  Symbicort /4.5 mcgs 2 puffs po BID  PFTs as OP.  conservative manage for entero-rhino virus  cardio cons   f/u echo f/u  cont current meds         Patient is a 88y old  Female who presents with a chief complaint of Wheezing, SOB (30 Mar 2023 11:01)    pt seen in icu [  ], reg med floor [ x  ], bed [ x ], chair at bedside [   ], a+o x3 [x  ], lethargic [  ],    nad [x  ]      Allergies    gabapentin (Unknown)        Vitals    T(F): 99.3 (03-31-23 @ 05:50), Max: 100.3 (03-30-23 @ 21:22)  HR: 84 (03-31-23 @ 05:50) (84 - 95)  BP: 128/78 (03-31-23 @ 05:50) (108/62 - 141/-)  RR: 18 (03-31-23 @ 05:50) (18 - 18)  SpO2: 95% (03-31-23 @ 05:50) (94% - 95%)  Wt(kg): --  CAPILLARY BLOOD GLUCOSE      POCT Blood Glucose.: 150 mg/dL (29 Mar 2023 11:26)      Labs                          8.7    6.88  )-----------( 317      ( 30 Mar 2023 05:48 )             26.7       03-30    140  |  114<H>  |  21<H>  ----------------------------<  101<H>  3.4<L>   |  19<L>  |  0.68    Ca    8.9      30 Mar 2023 05:48  Phos  2.3     03-30  Mg     1.8     03-30            Troponin I, High Sensitivity Result: 11.9 ng/L (03-29-23 @ 18:50)  Troponin I, High Sensitivity Result: 11.2 ng/L (03-29-23 @ 06:00)  Troponin I, High Sensitivity Result: 10.3 ng/L (03-28-23 @ 16:10)    < from: Transthoracic Echocardiogram (03.30.23 @ 11:32) >  CONCLUSIONS:  1. Normal mitral valve.  2. Normal trileaflet aortic valve.  3. Normal aortic root.  4. Normal left atrium.  5. Normal left ventricular internal dimensions and wall  thicknesses.  6. Normal Left Ventricular Systolic Function,  (EF = 55 to  60%)  7. Diastolic function not assessed.  8. Normal right atrium.  9. Normal right ventricular size and systolic function  (TAPSE 2.0 cm).  10. RA Pressure is 8 mm Hg.  11. RV systolic pressure is moderately increased at  46 mm  Hg.  12. There is mild tricuspid regurgitation.  13. Pulmonic valve not well seen.  14. Normal pericardium with no pericardial effusion.    < end of copied text >      Radiology Results      Meds    MEDICATIONS  (STANDING):  aspirin enteric coated 81 milliGRAM(s) Oral daily  budesonide  80 MICROgram(s)/formoterol 4.5 MICROgram(s) Inhaler 2 Puff(s) Inhalation two times a day  enoxaparin Injectable 60 milliGRAM(s) SubCutaneous every 12 hours  influenza  Vaccine (HIGH DOSE) 0.7 milliLiter(s) IntraMuscular once  lactated ringers. 1000 milliLiter(s) (75 mL/Hr) IV Continuous <Continuous>  losartan 25 milliGRAM(s) Oral daily  montelukast 10 milliGRAM(s) Oral daily  pantoprazole    Tablet 40 milliGRAM(s) Oral before breakfast  simvastatin 10 milliGRAM(s) Oral at bedtime      MEDICATIONS  (PRN):  acetaminophen     Tablet .. 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 Extensive deep vein thrombosis in the left leg as noted above  advised pt the importance of taking and cont anticoagulation for her condition  heme onc f/u   pt on on darzalex/revlimid/dex last given 3/15/23  hold chemo during admisison  recommend AC with lovenox switch to Eliquis and close monitoring .   Pt should received long term AC.   Pt will be followup with Dr. smith outpatient.  cont bronchodilators,   pulm f/u    Bronchodilators  Symbicort /4.5 mcgs 2 puffs po BID  PFTs as OP.  conservative manage for entero-rhino virus   cont supplemental O2 prn   cardio f/u    hold bp medication  echo with EF = 55 to 60%. RV systolic pressure is moderately increased noted above  phys tx eval  cont current meds

## 2023-03-31 NOTE — DISCHARGE NOTE PROVIDER - PROVIDER TOKENS
PROVIDER:[TOKEN:[[id number]]],PROVIDER:[TOKEN:[[id number]]] PROVIDER:[TOKEN:[[id number]]],PROVIDER:[TOKEN:[[id number]]],PROVIDER:[TOKEN:[[id number]],FOLLOWUP:[1 week]]

## 2023-03-31 NOTE — PHYSICAL THERAPY INITIAL EVALUATION ADULT - DIAGNOSIS, PT EVAL
Aerobic Loss, Impaired Mobility, Unstable Balance, Prevention From Further Gross Deconditioning From Prolong Bedrest

## 2023-03-31 NOTE — PROGRESS NOTE ADULT - ASSESSMENT
88F from home, lives with daughter, uses a walker, with PMHx HTN, HLD, DM, Osteoporosis, Stage II B Gastric Adenocarcinoma s/p distal gastrectomy in 2015 on active chemo (follows [facility] Dr Adams), Early Multiple myeloma, and recently diagnosed with b/l PE (unclear if still on Eliquis) and PSHx [facility] w/BSO and Cholecystectomy, presenting to the ED with shortness of breath c/o wheezing and cough x  2d. Recently admitted 1/21/23-1/26/23 for acute hypoxic respiratory failure 2/2 new submassive PE and COVID with TTE negative for right heart strain and normal cardiac function and discharged on Eliquis. Pt is COVID vaccinated x 3. She reports that she feels like she may have had a cold stating that she has only had SOB and wheezing with no relief from her home inhalers. Denies chest pain, change in cough, or vomiting. No sick contact at  home. Denies fevers, chills, and night sweats. Patient and family at the bedside unable to recall medication list and unsure if she is still taking AC, per sure scripts she has not had another refill after the 30-d post-discharge, family states there weren't anymore refills left and did not know they needed follow up for additional refills. Reports compliant with other refilled meds. Last chemo was two weeks ago, with next chemo scheduled for tomorrow at [facility]. Denies other acute complaints.     #MM  #VTE   follows with our colleague Dr. Alvarado, on darzalex/revlimid/dex last given 3/15/23  pt developed SOB, covid and PE in 01/2023 and started on eliquis at that time  pt non-compliant with eliquis and did not take for over one week d/t her running out of medication as per pt  now p/w SOB, cough  CTA shows worsening B/L PE and increased clot burden in distal Left main pulm artery slightly increased compared with prior  doppler LE shows extensive LLE DVT    Rec's:  -hold chemo during admission  -not requiring O2  -Echo r/o right heart strain  -continue lovenox  -Pulm consult   88F from home, lives with daughter, uses a walker, with PMHx HTN, HLD, DM, Osteoporosis, Stage II B Gastric Adenocarcinoma s/p distal gastrectomy in 2015 on active chemo (follows [facility] Dr Adams), Early Multiple myeloma, and recently diagnosed with b/l PE (unclear if still on Eliquis) and PSHx [facility] w/BSO and Cholecystectomy, presenting to the ED with shortness of breath c/o wheezing and cough x  2d. Recently admitted 1/21/23-1/26/23 for acute hypoxic respiratory failure 2/2 new submassive PE and COVID with TTE negative for right heart strain and normal cardiac function and discharged on Eliquis. Pt is COVID vaccinated x 3. She reports that she feels like she may have had a cold stating that she has only had SOB and wheezing with no relief from her home inhalers. Denies chest pain, change in cough, or vomiting. No sick contact at  home. Denies fevers, chills, and night sweats. Patient and family at the bedside unable to recall medication list and unsure if she is still taking AC, per sure scripts she has not had another refill after the 30-d post-discharge, family states there weren't anymore refills left and did not know they needed follow up for additional refills. Reports compliant with other refilled meds. Last chemo was two weeks ago, with next chemo scheduled for tomorrow at [facility]. Denies other acute complaints.     #MM  #VTE   follows with our colleague Dr. Alvarado, on darzalex/revlimid/dex last given 3/15/23  pt developed SOB, covid and PE in 01/2023 and started on eliquis at that time  pt non-compliant with eliquis and did not take for over one week d/t her running out of medication as per pt  now p/w SOB, cough  CTA shows worsening B/L PE and increased clot burden in distal Left main pulm artery slightly increased compared with prior  doppler LE shows extensive LLE DVT    Rec's:  -hold chemo during admission  -requiring O2  -Echo r/o right heart strain  -continue lovenox transition to Eliquis   -Pulm consult  pt will be followup with dr. Beckwith if medically cleared for d/c

## 2023-03-31 NOTE — DISCHARGE NOTE PROVIDER - NSDCCPCAREPLAN_GEN_ALL_CORE_FT
PRINCIPAL DISCHARGE DIAGNOSIS  Diagnosis: Pulmonary embolism  Assessment and Plan of Treatment: You were recently diagonesd with a clot in your pulmonary artery known as a pulmonary embolism. You were unable to take the blood thinners at home leading to your clot to worsen. In the hospital you were started on a blood thinner and then transitioned to your home blood thinner Eliquis. Please take Eliquis 5mg twice a day at home. Please follow up with your heme-onc doctor for further care. You required oxygen in the hospital. Please continue to monitor your oxygen levels at home while you recover. You will be sent home with oxygen.  Please continue to follow with your primary care physician for further care.      SECONDARY DISCHARGE DIAGNOSES  Diagnosis: Acute bronchitis due to Rhinovirus  Assessment and Plan of Treatment: You presented to the hospital with shortness of breath. You were found to be rhinovirus positive. You were treated symptomatically for this in the hospital. This virus will resolve on its own over time. You required oxygen in the hospital. Please continue to monitor your oxygen levels at home while you recover. You will be sent home with oxygen.  Please continue to follow with your primary care physician for further care.    Diagnosis: DM (diabetes mellitus)  Assessment and Plan of Treatment: You have a history of daibetes for which you take Metformin, alogliptin at home. Please continue to take your medications as prescribed. Consider changing your metformin to another drug, when you speak with your endocrinologist next, due to the noted lactic acidosis you had in the hospital.   Please continue to follow with your primary care physician and endocrinologist for further care.    Diagnosis: Gastric cancer  Assessment and Plan of Treatment: You have gastric cancer for which you are receiving chemotherapy for. While in the hospital we did not give any chemo medications. Please continue to follow with your heme-onc doctor to resume your chemo.    Diagnosis: Asthma  Assessment and Plan of Treatment: You have a history of asthma. You were started on steroids in the hospital to help with your shortness of breath. Please continue to take your albuterol at home as need. Please continue to take montelukast at home as well.  Please continue to follow with your primary care physician for further care.     PRINCIPAL DISCHARGE DIAGNOSIS  Diagnosis: Pulmonary embolism  Assessment and Plan of Treatment: You were recently diagonesd with a clot in your pulmonary artery known as a pulmonary embolism. You were unable to take the blood thinners at home leading to your clot to worsen. In the hospital you were started on a blood thinner and then transitioned to your home blood thinner Eliquis. Please take Eliquis 5mg twice a day at home. Please follow up with your heme-onc doctor for further care. You required oxygen in the hospital. Please continue to monitor your oxygen levels at home while you recover. You will be sent home with oxygen.   Please continue to follow with your primary care physician for further care.      SECONDARY DISCHARGE DIAGNOSES  Diagnosis: Acute bronchitis due to Rhinovirus  Assessment and Plan of Treatment: You presented to the hospital with shortness of breath. You were found to be rhinovirus positive. You were treated symptomatically for this in the hospital. This virus will resolve on its own over time. You required oxygen in the hospital. Please continue to monitor your oxygen levels at home while you recover. You will be sent home with oxygen.  Please continue to follow with your primary care physician for further care.    Diagnosis: Asthma  Assessment and Plan of Treatment: You have a history of asthma. You were started on steroids in the hospital to help with your shortness of breath. Please continue to take your albuterol at home as need. Please continue to take montelukast at home as well. You required steroids in the hospital and received 48 hours of IV steroids. You are to take prednisone (oral steroid) at home for your continued shortness of breath.   Take 4 tablets daily for 2 days.  Then take 3 tablets daily for 2 days.   Then take 2 tablets daily for 2 days.  Then take 1 tablet daily for 2 days.  Please continue to follow with your primary care physician and pulmonologist for further care.    Diagnosis: DM (diabetes mellitus)  Assessment and Plan of Treatment: You have a history of daibetes for which you take Metformin, alogliptin at home. Please continue to take your medications as prescribed. Consider changing your metformin to another drug, when you speak with your endocrinologist next, due to the noted lactic acidosis you had in the hospital.   Please continue to follow with your primary care physician and endocrinologist for further care.    Diagnosis: Gastric cancer  Assessment and Plan of Treatment: You have gastric cancer for which you are receiving chemotherapy for. While in the hospital we did not give any chemo medications. Please continue to follow with your heme-onc doctor to resume your chemo.

## 2023-03-31 NOTE — DISCHARGE NOTE PROVIDER - HOSPITAL COURSE
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 Recently admitted 1/21/23-1/26/23 for acute hypoxic respiratory failure 2/2 new submassive PE and COVID with TTE negative for right heart strain and normal cardiac function and discharged on Eliquis. Pt is COVID vaccinated x 3. She reports that she feels like she may have had a cold stating that she has only had SOB and wheezing with no relief from her home inhalers. Denies chest pain, change in cough, or vomiting. No sick contact at  home. Denies fevers, chills, and night sweats. Patient and family at the bedside unable to recall medication list and unsure if she is still taking AC, per sure scripts she has not had another refill after the 30-d post-discharge, family states there weren't anymore refills left and did not know they needed follow up for additional refills. Reports compliant with other refilled meds. Last chemo was two weeks ago, with next chemo scheduled for tomorrow at [facility]. Denies other acute complaints. This is a 88F from home, lives with daughter, uses a walker, with PMHx HTN, HLD, DM, Osteoporosis, Stage II B Gastric Adenocarcinoma s/p distal gastrectomy in 2015 on active chemo (follows [facility] Dr Adams), Early Multiple myeloma, and recently diagnosed with b/l PE (unclear if still on Eliquis) and PSHx ASHU w/BSO and Cholecystectomy, presenting to the ED with shortness of breath c/o wheezing and cough x  2d. Recently admitted 1/21/23-1/26/23 for acute hypoxic respiratory failure 2/2 new submassive PE and COVID with TTE negative for right heart strain and normal cardiac function and discharged on Eliquis. Pt is COVID vaccinated x 3. She reports that she feels like she may have had a cold stating that she has only had SOB and wheezing with no relief from her home inhalers. Complaint with meds, however stopped taking Eliquis due to not refilling it. Last chemo was two weeks ago, with next chemo scheduled at [facility] in the coming week.     Shortness of breath.   ·  Plan: p/w SOB and wheezing  h/o PE did not take AC for 2 mo   EKG shows NSR,   CTA chest: b/l PE  w/ increased clot  burden in distal left main pulmonary artery, slightly increased compared to prior  Not in respiratory distress never hypoxic, and without chest pain.   s/p duonebs x3, prednisone 50mg x1, and FD lovenox x1 in the ED with improvement  likely in the setting of progression in PE, unlikely asthma exacerbation   - 88% on RA - home O2 evaluation  - Switch Lovenox to Eliquis (okay to start maintenance dose in the AM per [facility])  - rest of plan as below  DISPO: DC on Eliquis w/ teaching.     Problem/Plan - 2:  ·  Problem: Pulmonary embolism.   ·  Plan: h/o recently diagnosed b/l PE in the setting of malignancy w/ METS no longer on AC  EKG shows NSR,   CTA chest: b/l PE in distal portions right and left main pulmonary arteries extending into the RML and bibasilar pulmonary artery branches w/ increased clot  burden in distal left main pulmonary artery, slightly increased compared to previous exam.   Lactate downtrending with fluids 3.9 > 3.0 > 2.8 (likely secondary to metformin use at home)   Troponin neg x2  Not in respiratory distress never hypoxic, and without chest pain.   Admit to tele   Echo EF >55%, inc R ventricle pressure  - Switch Lovenox to Eliquis (okay to start maintenance dose in the AM per [facility])  [facility] consulted, follows Dr. Alvarado  Cardiology consulted: Dr. Jennings.     Problem/Plan - 3:  ·  Problem: Asthma.   ·  Plan: h/o Asthma takes Albuterol PRN/ Montelukast 10mg daily  not in exacerbation  COVID negative but noted to have prior COVID 2-months ago   CTA chest shows b/l PE progression without infectious porcess   s/p duonebs x3, prednisone 50mg x1, and FD lovenox x1 in the ED with improvement  (+) mild B/L crackle with expiratory wheezing on exam, improved   - c/w albuterol PRN and montelukast 10mg daily  - start spiriva  Pulmonary consulted: Dr. Hernandes.     Problem/Plan - 4:  ·  Problem: HTN (hypertension).   ·  Plan: h/o HTN, takes losartan 25mg daily  currently normotensive   c/w home meds with holding parameters  Monitor BP.     Problem/Plan - 5:  ·  Problem: DM (diabetes mellitus).   ·  Plan: h/o DM, take metformin 1000mg BID, Steglatro (Ertugliflozin) 15mg qD, and alogloptin 25mg qD at home  will hold oral dm meds while inpatient   A1c 5.8  c/w sliding scale  Adjust insulin as indicated This is a 88F from home, lives with daughter, uses a walker, with PMHx HTN, HLD, DM, Osteoporosis, Stage II B Gastric Adenocarcinoma s/p distal gastrectomy in 2015 on active chemo (follows A Dr Adams), Early Multiple myeloma, and recently diagnosed with b/l PE (on Eliquis) and PSHx ASHU w/BSO and Cholecystectomy, presenting to the ED with shortness of breath c/o wheezing and cough x  2d. Recently admitted 1/21/23-1/26/23 for acute hypoxic respiratory failure 2/2 new submassive PE and COVID with TTE negative for right heart strain and normal cardiac function and discharged on Eliquis. Pt is COVID vaccinated x 3. She reports that she feels like she may have had a cold stating that she has only had SOB and wheezing with no relief from her home inhalers. Complaint with meds, however stopped taking Eliquis due to not refilling it. Last chemo was two weeks ago, with next chemo scheduled at [facility] in the coming week.     Admitted to floors. h/o recently diagnosed b/l PE in the setting of malignancy w/ METS no longer on AC. CTA chest: b/l PE  w/ increased clot  burden in distal left main pulmonary artery, slightly increased compared to prior. Cardiology followed. EKG NSR. Pt started on FD Lovenox. Followed by a pulmonologist, albuterol PRN and Symbicort. RVP positive for Enterovirus/rhinovirus. Pt requires home O2, sat 88% on RA. Pt switched to Eliquis with heme onc approval. Pt to follow up with Heme-onc outpatient for further recommendations. Lactate elevated, likely due to albuterol use and possibly metformin use at home. Lactate downtrended s/p IVF. Echo EF >55%, inc R ventricle pressure. Pt to continue home meds for DM, HTN and Asthma.     Given patient's improved clinical status and current hemodynamic stability, decision was made to discharge. Discussed with attending. Please refer to patient's complete medical chart with documents for a full hospital course, for this is only a brief summary.         This is a 88F from home, lives with daughter, uses a walker, with PMHx HTN, HLD, DM, Osteoporosis, Stage II B Gastric Adenocarcinoma s/p distal gastrectomy in 2015 on active chemo (follows A Dr Adams), Early Multiple myeloma, and recently diagnosed with b/l PE (on Eliquis) and PSHx ASHU w/BSO and Cholecystectomy, presenting to the ED with shortness of breath c/o wheezing and cough x  2d. Recently admitted 1/21/23-1/26/23 for acute hypoxic respiratory failure 2/2 new submassive PE and COVID with TTE negative for right heart strain and normal cardiac function and discharged on Eliquis. Pt is COVID vaccinated x 3. She reports that she feels like she may have had a cold stating that she has only had SOB and wheezing with no relief from her home inhalers. Complaint with meds, however stopped taking Eliquis due to not refilling it. Last chemo was two weeks ago, with next chemo scheduled at [facility] in the coming week.     Admitted to floors. h/o recently diagnosed b/l PE in the setting of malignancy w/ METS no longer on AC. CTA chest: b/l PE  w/ increased clot  burden in distal left main pulmonary artery, slightly increased compared to prior. Cardiology followed. EKG NSR. Pt started on FD Lovenox. Followed by a pulmonologist, albuterol PRN and Symbicort. RVP positive for Enterovirus/rhinovirus. Pt requires home O2, ____. Pt switched to Eliquis with heme onc approval. Pt to follow up with Heme-onc outpatient for further recommendations. Lactate elevated, likely due to albuterol use and possibly metformin use at home. Lactate downtrended s/p IVF. Echo EF >55%, inc R ventricle pressure. Pt to continue home meds for DM, HTN and Asthma.     Given patient's improved clinical status and current hemodynamic stability, decision was made to discharge. Discussed with attending. Please refer to patient's complete medical chart with documents for a full hospital course, for this is only a brief summary.         This is a 88F from home, lives with daughter, uses a walker, with PMHx HTN, HLD, DM, Osteoporosis, Stage II B Gastric Adenocarcinoma s/p distal gastrectomy in 2015 on active chemo (follows A Dr Adams), Early Multiple myeloma, and recently diagnosed with b/l PE (on Eliquis) and PSHx ASHU w/BSO and Cholecystectomy, presenting to the ED with shortness of breath c/o wheezing and cough x  2d. Recently admitted 1/21/23-1/26/23 for acute hypoxic respiratory failure 2/2 new submassive PE and COVID with TTE negative for right heart strain and normal cardiac function and discharged on Eliquis. Pt is COVID vaccinated x 3. She reports that she feels like she may have had a cold stating that she has only had SOB and wheezing with no relief from her home inhalers. Complaint with meds, however stopped taking Eliquis due to not refilling it. Last chemo was two weeks ago, with next chemo scheduled at [facility] in the coming week.     Admitted to floors. h/o recently diagnosed b/l PE in the setting of malignancy w/ METS no longer on AC. CTA chest: b/l PE  w/ increased clot  burden in distal left main pulmonary artery, slightly increased compared to prior. Cardiology followed. EKG NSR. Pt started on FD Lovenox. Followed by a pulmonologist, albuterol PRN and Symbicort. RVP positive for Enterovirus/rhinovirus. Pt requires home O2, sat 88% on RA. Pt switched to Eliquis with heme onc approval. Pt to follow up with Heme-onc outpatient for further recommendations. Lactate elevated, likely due to albuterol use and possibly metformin use at home. Lactate downtrended s/p IVF. Echo EF >55%, inc R ventricle pressure. Pt to continue home meds for DM, HTN and Asthma. Prednisone taper on DC and f/u with Dr. Hernandes, pulmonology.    Given patient's improved clinical status and current hemodynamic stability, decision was made to discharge. Discussed with attending. Please refer to patient's complete medical chart with documents for a full hospital course, for this is only a brief summary.

## 2023-03-31 NOTE — PROGRESS NOTE ADULT - SUBJECTIVE AND OBJECTIVE BOX
CHIEF COMPLAINT:Patient is a 88y old  Female who presents with a chief complaint of Wheezing, SOB.Pt appears comfortable.    	  REVIEW OF SYSTEMS:  CONSTITUTIONAL: No fever, weight loss, or fatigue  EYES: No eye pain, visual disturbances, or discharge  ENT:  No difficulty hearing, tinnitus, vertigo; No sinus or throat pain  NECK: No pain or stiffness  RESPIRATORY: No cough, wheezing, chills or hemoptysis; No Shortness of Breath  CARDIOVASCULAR: No chest pain, palpitations, passing out, dizziness, or leg swelling  GASTROINTESTINAL: No abdominal or epigastric pain. No nausea, vomiting, or hematemesis; No diarrhea or constipation. No melena or hematochezia.  GENITOURINARY: No dysuria, frequency, hematuria, or incontinence  NEUROLOGICAL: No headaches, memory loss, loss of strength, numbness, or tremors  SKIN: No itching, burning, rashes, or lesions   LYMPH Nodes: No enlarged glands  ENDOCRINE: No heat or cold intolerance; No hair loss  MUSCULOSKELETAL: No joint pain or swelling; No muscle, back, or extremity pain  PSYCHIATRIC: No depression, anxiety, mood swings, or difficulty sleeping  HEME/LYMPH: No easy bruising, or bleeding gums  ALLERGY AND IMMUNOLOGIC: No hives or eczema	      PHYSICAL EXAM:  T(C): 37.4 (03-31-23 @ 05:50), Max: 37.9 (03-30-23 @ 21:22)  HR: 100 (03-31-23 @ 09:32) (84 - 100)  BP: 131/68 (03-31-23 @ 09:32) (108/62 - 131/68)  RR: 18 (03-31-23 @ 05:50) (18 - 18)  SpO2: 97% (03-31-23 @ 09:32) (95% - 97%)  Wt(kg): --  I&O's Summary      Appearance: Normal	  HEENT:   Normal oral mucosa, PERRL, EOMI	  Lymphatic: No lymphadenopathy  Cardiovascular: Normal S1 S2, No JVD, No murmurs, No edema  Respiratory: Lungs clear to auscultation	  Psychiatry: A & O x 3, Mood & affect appropriate  Gastrointestinal:  Soft, Non-tender, + BS	  Skin: No rashes, No ecchymoses, No cyanosis	  Neurologic: Non-focal  Extremities: Normal range of motion, No clubbing, cyanosis or edema  Vascular: Peripheral pulses palpable 2+ bilaterally    MEDICATIONS  (STANDING):  aspirin enteric coated 81 milliGRAM(s) Oral daily  budesonide  80 MICROgram(s)/formoterol 4.5 MICROgram(s) Inhaler 2 Puff(s) Inhalation two times a day  enoxaparin Injectable 60 milliGRAM(s) SubCutaneous every 12 hours  influenza  Vaccine (HIGH DOSE) 0.7 milliLiter(s) IntraMuscular once  lactated ringers. 1000 milliLiter(s) (75 mL/Hr) IV Continuous <Continuous>  losartan 25 milliGRAM(s) Oral daily  montelukast 10 milliGRAM(s) Oral daily  pantoprazole    Tablet 40 milliGRAM(s) Oral before breakfast  simvastatin 10 milliGRAM(s) Oral at bedtime        	  LABS:	 	    Troponin I, High Sensitivity Result: 11.9 ng/L (03-29 @ 18:50)  Troponin I, High Sensitivity Result: 11.2 ng/L (03-29 @ 06:00)  Troponin I, High Sensitivity Result: 10.3 ng/L (03-28 @ 16:10)                            9.9    9.66  )-----------( 340      ( 31 Mar 2023 05:50 )             30.6     03-31    140  |  114<H>  |  23<H>  ----------------------------<  107<H>  3.9   |  19<L>  |  0.74    Ca    9.4      31 Mar 2023 05:50  Phos  3.3     03-31  Mg     1.9     03-31       Transthoracic Echocardiogram (03.30.23 @ 11:32)  OBSERVATIONS:  Mitral Valve: Normal mitral valve.  Aortic Root: Normal aortic root.  Aortic Valve: Normal trileaflet aortic valve.  Left Atrium: Normal left atrium.  Left Ventricle: Normal Left Ventricular Systolic Function,  (EF = 55 to 60%) Normal left ventricular internal  dimensions and wall thicknesses. Diastolic function not  assessed.  Right Heart: Normal right atrium. Normal right ventricular  size and systolic function (TAPSE 2.0 cm). There is mild  tricuspid regurgitation. Pulmonic valve not well seen.  Pericardium/PleuraNormal pericardium with no pericardial  effusion.  Hemodynamic: RA Pressure is 8 mm Hg. RV systolic pressure  is moderately increased at  46 mm Hg.

## 2023-03-31 NOTE — PROGRESS NOTE ADULT - SUBJECTIVE AND OBJECTIVE BOX
Patient is a 88y old  Female who presents with a chief complaint of Wheezing, SOB (31 Mar 2023 10:17)  Awake, alert, comfortable in bed in NAD. Cough improved. Sob improved. Doing well on oxygen supp via NC    INTERVAL HPI/OVERNIGHT EVENTS:      VITAL SIGNS:  T(F): 99.3 (03-31-23 @ 05:50)  HR: 100 (03-31-23 @ 09:32)  BP: 131/68 (03-31-23 @ 09:32)  RR: 18 (03-31-23 @ 05:50)  SpO2: 97% (03-31-23 @ 09:32)  Wt(kg): --  I&O's Detail          REVIEW OF SYSTEMS:    CONSTITUTIONAL:  No fevers, chills, sweats    HEENT:  Eyes:  No diplopia or blurred vision. ENT:  No earache, sore throat or runny nose.    CARDIOVASCULAR:  No pressure, squeezing, tightness, or heaviness about the chest; no palpitations.    RESPIRATORY:  Per HPI    GASTROINTESTINAL:  No abdominal pain, nausea, vomiting or diarrhea.    GENITOURINARY:  No dysuria, frequency or urgency.    NEUROLOGIC:  No paresthesias, fasciculations, seizures or weakness.    PSYCHIATRIC:  No disorder of thought or mood.      PHYSICAL EXAM:    Constitutional: Well developed and nourished  Eyes:Perrla  ENMT: normal  Neck:supple  Respiratory: good air entry  Cardiovascular: S1 S2 regular  Gastrointestinal: Soft, Non tender  Extremities: No edema  Vascular:normal  Neurological:Awake, alert,Ox3  Musculoskeletal:Normal      MEDICATIONS  (STANDING):  aspirin enteric coated 81 milliGRAM(s) Oral daily  budesonide  80 MICROgram(s)/formoterol 4.5 MICROgram(s) Inhaler 2 Puff(s) Inhalation two times a day  enoxaparin Injectable 60 milliGRAM(s) SubCutaneous every 12 hours  influenza  Vaccine (HIGH DOSE) 0.7 milliLiter(s) IntraMuscular once  lactated ringers. 1000 milliLiter(s) (75 mL/Hr) IV Continuous <Continuous>  losartan 25 milliGRAM(s) Oral daily  montelukast 10 milliGRAM(s) Oral daily  pantoprazole    Tablet 40 milliGRAM(s) Oral before breakfast  simvastatin 10 milliGRAM(s) Oral at bedtime    MEDICATIONS  (PRN):  acetaminophen     Tablet .. 650 milliGRAM(s) Oral every 6 hours PRN Temp greater or equal to 38C (100.4F), Mild Pain (1 - 3)  guaiFENesin Oral Liquid (Sugar-Free) 100 milliGRAM(s) Oral every 6 hours PRN Cough  ondansetron Injectable 4 milliGRAM(s) IV Push every 8 hours PRN Nausea and/or Vomiting      Allergies    gabapentin (Unknown)    Intolerances        LABS:                        9.9    9.66  )-----------( 340      ( 31 Mar 2023 05:50 )             30.6     03-31    140  |  114<H>  |  23<H>  ----------------------------<  107<H>  3.9   |  19<L>  |  0.74    Ca    9.4      31 Mar 2023 05:50  Phos  3.3     03-31  Mg     1.9     03-31                CAPILLARY BLOOD GLUCOSE            RADIOLOGY & ADDITIONAL TESTS:    CXR:    Ct scan chest:    ekg;    echo:

## 2023-04-01 LAB
ALBUMIN SERPL ELPH-MCNC: 2.2 G/DL — LOW (ref 3.5–5)
ALP SERPL-CCNC: 70 U/L — SIGNIFICANT CHANGE UP (ref 40–120)
ALT FLD-CCNC: 29 U/L DA — SIGNIFICANT CHANGE UP (ref 10–60)
ANION GAP SERPL CALC-SCNC: 9 MMOL/L — SIGNIFICANT CHANGE UP (ref 5–17)
ANISOCYTOSIS BLD QL: SLIGHT — SIGNIFICANT CHANGE UP
AST SERPL-CCNC: 40 U/L — SIGNIFICANT CHANGE UP (ref 10–40)
BASOPHILS # BLD AUTO: 0.11 K/UL — SIGNIFICANT CHANGE UP (ref 0–0.2)
BASOPHILS NFR BLD AUTO: 1 % — SIGNIFICANT CHANGE UP (ref 0–2)
BILIRUB SERPL-MCNC: 0.4 MG/DL — SIGNIFICANT CHANGE UP (ref 0.2–1.2)
BUN SERPL-MCNC: 22 MG/DL — HIGH (ref 7–18)
CALCIUM SERPL-MCNC: 9.4 MG/DL — SIGNIFICANT CHANGE UP (ref 8.4–10.5)
CHLORIDE SERPL-SCNC: 110 MMOL/L — HIGH (ref 96–108)
CO2 SERPL-SCNC: 18 MMOL/L — LOW (ref 22–31)
CREAT SERPL-MCNC: 0.66 MG/DL — SIGNIFICANT CHANGE UP (ref 0.5–1.3)
EGFR: 85 ML/MIN/1.73M2 — SIGNIFICANT CHANGE UP
EOSINOPHIL # BLD AUTO: 0.11 K/UL — SIGNIFICANT CHANGE UP (ref 0–0.5)
EOSINOPHIL NFR BLD AUTO: 1 % — SIGNIFICANT CHANGE UP (ref 0–6)
GLUCOSE SERPL-MCNC: 120 MG/DL — HIGH (ref 70–99)
HCT VFR BLD CALC: 29.3 % — LOW (ref 34.5–45)
HGB BLD-MCNC: 9.4 G/DL — LOW (ref 11.5–15.5)
LYMPHOCYTES # BLD AUTO: 2.26 K/UL — SIGNIFICANT CHANGE UP (ref 1–3.3)
LYMPHOCYTES # BLD AUTO: 21 % — SIGNIFICANT CHANGE UP (ref 13–44)
MACROCYTES BLD QL: SLIGHT — SIGNIFICANT CHANGE UP
MAGNESIUM SERPL-MCNC: 1.9 MG/DL — SIGNIFICANT CHANGE UP (ref 1.6–2.6)
MANUAL SMEAR VERIFICATION: SIGNIFICANT CHANGE UP
MCHC RBC-ENTMCNC: 29.9 PG — SIGNIFICANT CHANGE UP (ref 27–34)
MCHC RBC-ENTMCNC: 32.1 GM/DL — SIGNIFICANT CHANGE UP (ref 32–36)
MCV RBC AUTO: 93.3 FL — SIGNIFICANT CHANGE UP (ref 80–100)
MICROCYTES BLD QL: SLIGHT — SIGNIFICANT CHANGE UP
MONOCYTES # BLD AUTO: 0.22 K/UL — SIGNIFICANT CHANGE UP (ref 0–0.9)
MONOCYTES NFR BLD AUTO: 2 % — SIGNIFICANT CHANGE UP (ref 2–14)
NEUTROPHILS # BLD AUTO: 7.85 K/UL — HIGH (ref 1.8–7.4)
NEUTROPHILS NFR BLD AUTO: 68 % — SIGNIFICANT CHANGE UP (ref 43–77)
NEUTS BAND # BLD: 5 % — SIGNIFICANT CHANGE UP (ref 0–8)
NRBC # BLD: 0 /100 — SIGNIFICANT CHANGE UP (ref 0–0)
OVALOCYTES BLD QL SMEAR: SLIGHT — SIGNIFICANT CHANGE UP
PHOSPHATE SERPL-MCNC: 2.8 MG/DL — SIGNIFICANT CHANGE UP (ref 2.5–4.5)
PLAT MORPH BLD: NORMAL — SIGNIFICANT CHANGE UP
PLATELET # BLD AUTO: 349 K/UL — SIGNIFICANT CHANGE UP (ref 150–400)
PLATELET COUNT - ESTIMATE: NORMAL — SIGNIFICANT CHANGE UP
POIKILOCYTOSIS BLD QL AUTO: SLIGHT — SIGNIFICANT CHANGE UP
POTASSIUM SERPL-MCNC: 3.7 MMOL/L — SIGNIFICANT CHANGE UP (ref 3.5–5.3)
POTASSIUM SERPL-SCNC: 3.7 MMOL/L — SIGNIFICANT CHANGE UP (ref 3.5–5.3)
PROT SERPL-MCNC: 6.6 G/DL — SIGNIFICANT CHANGE UP (ref 6–8.3)
RBC # BLD: 3.14 M/UL — LOW (ref 3.8–5.2)
RBC # FLD: 15.1 % — HIGH (ref 10.3–14.5)
RBC BLD AUTO: ABNORMAL
SODIUM SERPL-SCNC: 137 MMOL/L — SIGNIFICANT CHANGE UP (ref 135–145)
SPHEROCYTES BLD QL SMEAR: SLIGHT — SIGNIFICANT CHANGE UP
VARIANT LYMPHS # BLD: 2 % — SIGNIFICANT CHANGE UP (ref 0–6)
WBC # BLD: 10.75 K/UL — HIGH (ref 3.8–10.5)
WBC # FLD AUTO: 10.75 K/UL — HIGH (ref 3.8–10.5)

## 2023-04-01 RX ORDER — ALBUTEROL 90 UG/1
1 AEROSOL, METERED ORAL THREE TIMES A DAY
Refills: 0 | Status: DISCONTINUED | OUTPATIENT
Start: 2023-04-01 | End: 2023-04-04

## 2023-04-01 RX ORDER — MECLIZINE HCL 12.5 MG
1 TABLET ORAL
Qty: 0 | Refills: 0 | DISCHARGE

## 2023-04-01 RX ORDER — IPRATROPIUM/ALBUTEROL SULFATE 18-103MCG
3 AEROSOL WITH ADAPTER (GRAM) INHALATION EVERY 6 HOURS
Refills: 0 | Status: DISCONTINUED | OUTPATIENT
Start: 2023-04-01 | End: 2023-04-04

## 2023-04-01 RX ORDER — LOPERAMIDE HCL 2 MG
0 TABLET ORAL
Qty: 0 | Refills: 0 | DISCHARGE

## 2023-04-01 RX ORDER — APIXABAN 2.5 MG/1
1 TABLET, FILM COATED ORAL
Qty: 60 | Refills: 0
Start: 2023-04-01 | End: 2023-04-30

## 2023-04-01 RX ADMIN — PANTOPRAZOLE SODIUM 40 MILLIGRAM(S): 20 TABLET, DELAYED RELEASE ORAL at 06:06

## 2023-04-01 RX ADMIN — APIXABAN 5 MILLIGRAM(S): 2.5 TABLET, FILM COATED ORAL at 05:22

## 2023-04-01 RX ADMIN — Medication 40 MILLIGRAM(S): at 21:08

## 2023-04-01 RX ADMIN — MONTELUKAST 10 MILLIGRAM(S): 4 TABLET, CHEWABLE ORAL at 11:46

## 2023-04-01 RX ADMIN — Medication 3 MILLILITER(S): at 20:11

## 2023-04-01 RX ADMIN — LOSARTAN POTASSIUM 25 MILLIGRAM(S): 100 TABLET, FILM COATED ORAL at 05:22

## 2023-04-01 RX ADMIN — Medication 100 MILLIGRAM(S): at 11:46

## 2023-04-01 RX ADMIN — BUDESONIDE AND FORMOTEROL FUMARATE DIHYDRATE 2 PUFF(S): 160; 4.5 AEROSOL RESPIRATORY (INHALATION) at 21:09

## 2023-04-01 RX ADMIN — Medication 40 MILLIGRAM(S): at 13:32

## 2023-04-01 RX ADMIN — Medication 100 MILLIGRAM(S): at 00:37

## 2023-04-01 RX ADMIN — BUDESONIDE AND FORMOTEROL FUMARATE DIHYDRATE 2 PUFF(S): 160; 4.5 AEROSOL RESPIRATORY (INHALATION) at 09:21

## 2023-04-01 RX ADMIN — APIXABAN 5 MILLIGRAM(S): 2.5 TABLET, FILM COATED ORAL at 17:08

## 2023-04-01 RX ADMIN — Medication 3 MILLILITER(S): at 16:06

## 2023-04-01 RX ADMIN — Medication 81 MILLIGRAM(S): at 11:46

## 2023-04-01 RX ADMIN — SIMVASTATIN 10 MILLIGRAM(S): 20 TABLET, FILM COATED ORAL at 21:09

## 2023-04-01 NOTE — PROGRESS NOTE ADULT - SUBJECTIVE AND OBJECTIVE BOX
Patient is a 88y old  Female who presents with a chief complaint of Wheezing, SOB (01 Apr 2023 10:19)  Awake, alert, laying i bed in NAD. Having cough and mild sob    INTERVAL HPI/OVERNIGHT EVENTS:      VITAL SIGNS:  T(F): 99.5 (04-01-23 @ 04:57)  HR: 95 (04-01-23 @ 04:57)  BP: 112/75 (04-01-23 @ 04:57)  RR: 17 (04-01-23 @ 04:57)  SpO2: 94% (04-01-23 @ 04:57)  Wt(kg): --  I&O's Detail          REVIEW OF SYSTEMS:    CONSTITUTIONAL:  No fevers, chills, sweats    HEENT:  Eyes:  No diplopia or blurred vision. ENT:  No earache, sore throat or runny nose.    CARDIOVASCULAR:  No pressure, squeezing, tightness, or heaviness about the chest; no palpitations.    RESPIRATORY:  Per HPI    GASTROINTESTINAL:  No abdominal pain, nausea, vomiting or diarrhea.    GENITOURINARY:  No dysuria, frequency or urgency.    NEUROLOGIC:  No paresthesias, fasciculations, seizures or weakness.    PSYCHIATRIC:  No disorder of thought or mood.      PHYSICAL EXAM:    Constitutional: Well developed and nourished  Eyes:Perrla  ENMT: normal  Neck:supple  Respiratory: Rhonchi bilaterally  Cardiovascular: S1 S2 regular  Gastrointestinal: Soft, Non tender  Extremities: No edema  Vascular:normal  Neurological:Awake, alert,Ox3  Musculoskeletal:Normal      MEDICATIONS  (STANDING):  apixaban 5 milliGRAM(s) Oral every 12 hours  aspirin enteric coated 81 milliGRAM(s) Oral daily  benzonatate 100 milliGRAM(s) Oral once  budesonide  80 MICROgram(s)/formoterol 4.5 MICROgram(s) Inhaler 2 Puff(s) Inhalation two times a day  influenza  Vaccine (HIGH DOSE) 0.7 milliLiter(s) IntraMuscular once  losartan 25 milliGRAM(s) Oral daily  montelukast 10 milliGRAM(s) Oral daily  pantoprazole    Tablet 40 milliGRAM(s) Oral before breakfast  simvastatin 10 milliGRAM(s) Oral at bedtime    MEDICATIONS  (PRN):  acetaminophen     Tablet .. 650 milliGRAM(s) Oral every 6 hours PRN Temp greater or equal to 38C (100.4F), Mild Pain (1 - 3)  guaiFENesin Oral Liquid (Sugar-Free) 100 milliGRAM(s) Oral every 6 hours PRN Cough  ondansetron Injectable 4 milliGRAM(s) IV Push every 8 hours PRN Nausea and/or Vomiting      Allergies    gabapentin (Unknown)    Intolerances        LABS:                        9.4    10.75 )-----------( 349      ( 01 Apr 2023 08:00 )             29.3     04-01    137  |  110<H>  |  22<H>  ----------------------------<  120<H>  3.7   |  18<L>  |  0.66    Ca    9.4      01 Apr 2023 08:00  Phos  2.8     04-01  Mg     1.9     04-01    TPro  6.6  /  Alb  2.2<L>  /  TBili  0.4  /  DBili  x   /  AST  40  /  ALT  29  /  AlkPhos  70  04-01              CAPILLARY BLOOD GLUCOSE            RADIOLOGY & ADDITIONAL TESTS:    CXR:    Ct scan chest:    ekg;    echo:

## 2023-04-01 NOTE — PROGRESS NOTE ADULT - PROBLEM SELECTOR PLAN 1
p/w SOB and wheezing  h/o PE did not take AC for 2 mo   EKG shows NSR,   CTA chest: b/l PE  w/ increased clot  burden in distal left main pulmonary artery, slightly increased compared to prior  Not in respiratory distress never hypoxic, and without chest pain.   s/p duonebs x3, prednisone 50mg x1, and FD lovenox x1 in the ED with improvement  likely in the setting of progression in PE, unlikely asthma exacerbation   - 88% on RA - home O2 evaluation  - C/w Eliquis 5mg BID  - rest of plan as below  DISPO: DC on Eliquis w/ teaching p/w SOB and wheezing  h/o PE did not take AC for 2 mo   EKG shows NSR,   CTA chest: b/l PE  w/ increased clot  burden in distal left main pulmonary artery, slightly increased compared to prior  Not in respiratory distress never hypoxic, and without chest pain.   s/p duonebs x3, prednisone 50mg x1, and FD lovenox x1 in the ED with improvement  likely in the setting of progression in PE, unlikely asthma exacerbation   RVP - RHINOVIRUS/ENTEROVIRUS POSITIVE  - c/w tessalon perrle for cough  - 88% on RA > home O2 evaluation  - C/w Eliquis 5mg BID  - rest of plan as below  DISPO: DC on Eliquis w/ teaching

## 2023-04-01 NOTE — PROGRESS NOTE ADULT - ASSESSMENT
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 Recently admitted 1/21/23-1/26/23 for acute hypoxic respiratory failure 2/2 new submassive PE and COVID with TTE negative for right heart strain and normal cardiac function and discharged on Eliquis now with B/L PE and Lt DVT.  1.PE and DVT-NOAC as per  Heme/Onc.  2.HTN-hold bp medication.  3.Gastric Ca-Heme/Onc.  4.DM-Insulin.  5.PPI.  6.Lipid d/o-statin.

## 2023-04-01 NOTE — PROGRESS NOTE ADULT - SUBJECTIVE AND OBJECTIVE BOX
Patient is a 88y old  Female who presents with a chief complaint of Wheezing, SOB (31 Mar 2023 17:08)    pt seen in icu [  ], reg med floor [ x  ], bed [ x ], chair at bedside [   ], a+o x3 [x  ], lethargic [  ],    nad [x  ]        Allergies    gabapentin (Unknown)        Vitals    T(F): 99.5 (04-01-23 @ 04:57), Max: 99.9 (03-31-23 @ 21:17)  HR: 95 (04-01-23 @ 04:57) (93 - 100)  BP: 112/75 (04-01-23 @ 04:57) (112/75 - 131/68)  RR: 17 (04-01-23 @ 04:57) (16 - 18)  SpO2: 94% (04-01-23 @ 04:57) (88% - 97%)  Wt(kg): --  CAPILLARY BLOOD GLUCOSE          Labs                          9.9    9.66  )-----------( 340      ( 31 Mar 2023 05:50 )             30.6       03-31    140  |  114<H>  |  23<H>  ----------------------------<  107<H>  3.9   |  19<L>  |  0.74    Ca    9.4      31 Mar 2023 05:50  Phos  3.3     03-31  Mg     1.9     03-31            Troponin I, High Sensitivity Result: 11.9 ng/L (03-29-23 @ 18:50)        Radiology Results      Meds    MEDICATIONS  (STANDING):  apixaban 5 milliGRAM(s) Oral every 12 hours  aspirin enteric coated 81 milliGRAM(s) Oral daily  budesonide  80 MICROgram(s)/formoterol 4.5 MICROgram(s) Inhaler 2 Puff(s) Inhalation two times a day  influenza  Vaccine (HIGH DOSE) 0.7 milliLiter(s) IntraMuscular once  losartan 25 milliGRAM(s) Oral daily  montelukast 10 milliGRAM(s) Oral daily  pantoprazole    Tablet 40 milliGRAM(s) Oral before breakfast  simvastatin 10 milliGRAM(s) Oral at bedtime      MEDICATIONS  (PRN):  acetaminophen     Tablet .. 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 Extensive deep vein thrombosis in the left leg as noted above  advised pt the importance of taking and cont anticoagulation for her condition  heme onc f/u   pt on on darzalex/revlimid/dex last given 3/15/23  hold chemo during admisison  recommend AC with lovenox switch to Eliquis and close monitoring .   Pt should received long term AC.   Pt will be followup with Dr. smith outpatient.  cont bronchodilators,   pulm f/u    Bronchodilators  Symbicort /4.5 mcgs 2 puffs po BID  PFTs as OP.  conservative manage for entero-rhino virus   cont supplemental O2 prn   cardio f/u    hold bp medication  echo with EF = 55 to 60%. RV systolic pressure is moderately increased noted above  phys tx eval  cont current meds             Patient is a 88y old  Female who presents with a chief complaint of Wheezing, SOB (31 Mar 2023 17:08)    pt seen in icu [  ], reg med floor [ x  ], bed [ x ], chair at bedside [   ], a+o x3 [x  ], lethargic [  ],    nad [x  ]        Allergies    gabapentin (Unknown)        Vitals    T(F): 99.5 (04-01-23 @ 04:57), Max: 99.9 (03-31-23 @ 21:17)  HR: 95 (04-01-23 @ 04:57) (93 - 100)  BP: 112/75 (04-01-23 @ 04:57) (112/75 - 131/68)  RR: 17 (04-01-23 @ 04:57) (16 - 18)  SpO2: 94% (04-01-23 @ 04:57) (88% - 97%)  Wt(kg): --  CAPILLARY BLOOD GLUCOSE          Labs                          9.9    9.66  )-----------( 340      ( 31 Mar 2023 05:50 )             30.6       03-31    140  |  114<H>  |  23<H>  ----------------------------<  107<H>  3.9   |  19<L>  |  0.74    Ca    9.4      31 Mar 2023 05:50  Phos  3.3     03-31  Mg     1.9     03-31            Troponin I, High Sensitivity Result: 11.9 ng/L (03-29-23 @ 18:50)        Radiology Results      Meds    MEDICATIONS  (STANDING):  apixaban 5 milliGRAM(s) Oral every 12 hours  aspirin enteric coated 81 milliGRAM(s) Oral daily  budesonide  80 MICROgram(s)/formoterol 4.5 MICROgram(s) Inhaler 2 Puff(s) Inhalation two times a day  influenza  Vaccine (HIGH DOSE) 0.7 milliLiter(s) IntraMuscular once  losartan 25 milliGRAM(s) Oral daily  montelukast 10 milliGRAM(s) Oral daily  pantoprazole    Tablet 40 milliGRAM(s) Oral before breakfast  simvastatin 10 milliGRAM(s) Oral at bedtime      MEDICATIONS  (PRN):  acetaminophen     Tablet .. 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 Extensive deep vein thrombosis in the left leg as noted     advised pt the importance of taking and cont anticoagulation for her condition  heme onc f/u   pt on on darzalex/revlimid/dex last given 3/15/23  hold chemo during admisison   lovenox switch to Eliquis    Pt should received long term AC.   Pt will be followup with Dr. smith outpatient.  cont bronchodilators,   pulm f/u    Bronchodilators  Symbicort /4.5 mcgs 2 puffs po BID  PFTs as OP.  conservative manage for entero-rhino virus   cont supplemental O2 prn   cardio f/u    hold bp medication  echo with EF = 55 to 60%. RV systolic pressure is moderately increased noted above  phys tx eval noted anr rec phys tx 3-5x/week x 6-9 weeks at Home PT;    cont current meds  d/c plan pending home O2 delivery

## 2023-04-01 NOTE — PROGRESS NOTE ADULT - SUBJECTIVE AND OBJECTIVE BOX
PGY-1 Progress Note discussed with attending    PAGER #: [[phone]] TILL 5:00 PM  PLEASE CONTACT ON CALL TEAM:  - On Call Team (Please refer to Brynn) FROM 5:00 PM - 8:30PM  - Nightfloat Team FROM 8:30 -7:30 AM    INTERVAL HPI/OVERNIGHT EVENTS: Patient seen and examined at bedside. No acute events overnight. Pt states she has some chest pain along with some shortness of breath. Pt has a known PE.    MEDICATIONS  (STANDING):  apixaban 5 milliGRAM(s) Oral every 12 hours  aspirin enteric coated 81 milliGRAM(s) Oral daily  benzonatate 100 milliGRAM(s) Oral once  budesonide  80 MICROgram(s)/formoterol 4.5 MICROgram(s) Inhaler 2 Puff(s) Inhalation two times a day  influenza  Vaccine (HIGH DOSE) 0.7 milliLiter(s) IntraMuscular once  losartan 25 milliGRAM(s) Oral daily  montelukast 10 milliGRAM(s) Oral daily  pantoprazole    Tablet 40 milliGRAM(s) Oral before breakfast  simvastatin 10 milliGRAM(s) Oral at bedtime    MEDICATIONS  (PRN):  acetaminophen     Tablet .. 650 milliGRAM(s) Oral every 6 hours PRN Temp greater or equal to 38C (100.4F), Mild Pain (1 - 3)  guaiFENesin Oral Liquid (Sugar-Free) 100 milliGRAM(s) Oral every 6 hours PRN Cough  ondansetron Injectable 4 milliGRAM(s) IV Push every 8 hours PRN Nausea and/or Vomiting      REVIEW OF SYSTEMS:  CONSTITUTIONAL: No fever, weight loss, or fatigue  RESPIRATORY: No cough, wheezing, chills or hemoptysis; No shortness of breath  CARDIOVASCULAR: No chest pain, palpitations, dizziness, or leg swelling  GASTROINTESTINAL: No abdominal pain. No nausea, vomiting, or hematemesis; No diarrhea or constipation. No melena or hematochezia.  GENITOURINARY: No dysuria or hematuria, urinary frequency  NEUROLOGICAL: No headaches, memory loss, loss of strength, numbness, or tremors  SKIN: No itching, burning, rashes, or lesions     Vital Signs Last 24 Hrs  T(C): 37.5 (01 Apr 2023 04:57), Max: 37.7 (31 Mar 2023 21:17)  T(F): 99.5 (01 Apr 2023 04:57), Max: 99.9 (31 Mar 2023 21:17)  HR: 95 (01 Apr 2023 04:57) (93 - 95)  BP: 112/75 (01 Apr 2023 04:57) (112/75 - 124/72)  BP(mean): --  RR: 17 (01 Apr 2023 04:57) (16 - 18)  SpO2: 94% (01 Apr 2023 04:57) (88% - 96%)    Parameters below as of 01 Apr 2023 04:57  Patient On (Oxygen Delivery Method): nasal cannula  O2 Flow (L/min): 2      PHYSICAL EXAMINATION:  GENERAL: NAD, thin, laying in bed  HEAD:  Atraumatic, Normocephalic  EYES:  conjunctiva and sclera clear  NECK: Supple, No JVD  CHEST/LUNG: CTA b/l  HEART: Regular rate and rhythm; No murmurs, rubs, or gallops  ABDOMEN: Soft, Nontender, Nondistended; Bowel sounds present  NERVOUS SYSTEM:  Alert & Oriented X3    EXTREMITIES:  2+ Peripheral Pulses, No clubbing, cyanosis, or edema  SKIN: warm dry                          9.4    10.75 )-----------( 349      ( 01 Apr 2023 08:00 )             29.3     04-01    137  |  110<H>  |  22<H>  ----------------------------<  120<H>  3.7   |  18<L>  |  0.66    Ca    9.4      01 Apr 2023 08:00  Phos  2.8     04-01  Mg     1.9     04-01    TPro  6.6  /  Alb  2.2<L>  /  TBili  0.4  /  DBili  x   /  AST  40  /  ALT  29  /  AlkPhos  70  04-01    LIVER FUNCTIONS - ( 01 Apr 2023 08:00 )  Alb: 2.2 g/dL / Pro: 6.6 g/dL / ALK PHOS: 70 U/L / ALT: 29 U/L DA / AST: 40 U/L / GGT: x                   CAPILLARY BLOOD GLUCOSE      RADIOLOGY & ADDITIONAL TESTS:                   PGY-1 Progress Note discussed with attending    PAGER #: [[phone]] TILL 5:00 PM  PLEASE CONTACT ON CALL TEAM:  - On Call Team (Please refer to Brynn) FROM 5:00 PM - 8:30PM  - Nightfloat Team FROM 8:30 -7:30 AM    INTERVAL HPI/OVERNIGHT EVENTS: Patient seen and examined at bedside. No acute events overnight. Pt states she has intermittent chest pain which worsens with her cough. Cough productive of yellow sputum. Pt has a known PE.    MEDICATIONS  (STANDING):  apixaban 5 milliGRAM(s) Oral every 12 hours  aspirin enteric coated 81 milliGRAM(s) Oral daily  benzonatate 100 milliGRAM(s) Oral once  budesonide  80 MICROgram(s)/formoterol 4.5 MICROgram(s) Inhaler 2 Puff(s) Inhalation two times a day  influenza  Vaccine (HIGH DOSE) 0.7 milliLiter(s) IntraMuscular once  losartan 25 milliGRAM(s) Oral daily  montelukast 10 milliGRAM(s) Oral daily  pantoprazole    Tablet 40 milliGRAM(s) Oral before breakfast  simvastatin 10 milliGRAM(s) Oral at bedtime    MEDICATIONS  (PRN):  acetaminophen     Tablet .. 650 milliGRAM(s) Oral every 6 hours PRN Temp greater or equal to 38C (100.4F), Mild Pain (1 - 3)  guaiFENesin Oral Liquid (Sugar-Free) 100 milliGRAM(s) Oral every 6 hours PRN Cough  ondansetron Injectable 4 milliGRAM(s) IV Push every 8 hours PRN Nausea and/or Vomiting      REVIEW OF SYSTEMS:  CONSTITUTIONAL: No fever, weight loss, or fatigue  RESPIRATORY: + cough. No wheezing, chills or hemoptysis; No shortness of breath  CARDIOVASCULAR: + chest pain. No palpitations, dizziness, or leg swelling  GASTROINTESTINAL: No abdominal pain. No nausea, vomiting, or hematemesis; No diarrhea or constipation. No melena or hematochezia.  GENITOURINARY: No dysuria or hematuria, urinary frequency  NEUROLOGICAL: No headaches, memory loss, loss of strength, numbness, or tremors  SKIN: No itching, burning, rashes, or lesions     Vital Signs Last 24 Hrs  T(C): 37.5 (01 Apr 2023 04:57), Max: 37.7 (31 Mar 2023 21:17)  T(F): 99.5 (01 Apr 2023 04:57), Max: 99.9 (31 Mar 2023 21:17)  HR: 95 (01 Apr 2023 04:57) (93 - 95)  BP: 112/75 (01 Apr 2023 04:57) (112/75 - 124/72)  BP(mean): --  RR: 17 (01 Apr 2023 04:57) (16 - 18)  SpO2: 94% (01 Apr 2023 04:57) (88% - 96%)    Parameters below as of 01 Apr 2023 04:57  Patient On (Oxygen Delivery Method): nasal cannula  O2 Flow (L/min): 2      PHYSICAL EXAMINATION:  GENERAL: Mild distress, thin, laying in bed  HEAD:  Atraumatic, Normocephalic  EYES:  conjunctiva and sclera clear  NECK: Supple, No JVD  CHEST/LUNG: CTA b/l  HEART: Regular rate and rhythm; No murmurs, rubs, or gallops  ABDOMEN: Soft, Nontender, Nondistended; Bowel sounds present  NERVOUS SYSTEM:  Alert & Oriented X3    EXTREMITIES:  2+ Peripheral Pulses, No clubbing, cyanosis, or edema  SKIN: warm dry                          9.4    10.75 )-----------( 349      ( 01 Apr 2023 08:00 )             29.3     04-01    137  |  110<H>  |  22<H>  ----------------------------<  120<H>  3.7   |  18<L>  |  0.66    Ca    9.4      01 Apr 2023 08:00  Phos  2.8     04-01  Mg     1.9     04-01    TPro  6.6  /  Alb  2.2<L>  /  TBili  0.4  /  DBili  x   /  AST  40  /  ALT  29  /  AlkPhos  70  04-01    LIVER FUNCTIONS - ( 01 Apr 2023 08:00 )  Alb: 2.2 g/dL / Pro: 6.6 g/dL / ALK PHOS: 70 U/L / ALT: 29 U/L DA / AST: 40 U/L / GGT: x                   CAPILLARY BLOOD GLUCOSE      RADIOLOGY & ADDITIONAL TESTS:

## 2023-04-01 NOTE — PROGRESS NOTE ADULT - SUBJECTIVE AND OBJECTIVE BOX
CHIEF COMPLAINT:Patient is a 88y old  Female who presents with a chief complaint of Wheezing, SOB .Pt appears comfortable.    	  REVIEW OF SYSTEMS:  CONSTITUTIONAL: No fever, weight loss, or fatigue  EYES: No eye pain, visual disturbances, or discharge  ENT:  No difficulty hearing, tinnitus, vertigo; No sinus or throat pain  NECK: No pain or stiffness  RESPIRATORY: No cough, wheezing, chills or hemoptysis; No Shortness of Breath  CARDIOVASCULAR: No chest pain, palpitations, passing out, dizziness, or leg swelling  GASTROINTESTINAL: No abdominal or epigastric pain. No nausea, vomiting, or hematemesis; No diarrhea or constipation. No melena or hematochezia.  GENITOURINARY: No dysuria, frequency, hematuria, or incontinence  NEUROLOGICAL: No headaches, memory loss, loss of strength, numbness, or tremors  SKIN: No itching, burning, rashes, or lesions   LYMPH Nodes: No enlarged glands  ENDOCRINE: No heat or cold intolerance; No hair loss  MUSCULOSKELETAL: No joint pain or swelling; No muscle, back, or extremity pain  PSYCHIATRIC: No depression, anxiety, mood swings, or difficulty sleeping  HEME/LYMPH: No easy bruising, or bleeding gums  ALLERGY AND IMMUNOLOGIC: No hives or eczema	    PHYSICAL EXAM:  T(C): 37.5 (04-01-23 @ 04:57), Max: 37.7 (03-31-23 @ 21:17)  HR: 95 (04-01-23 @ 04:57) (93 - 95)  BP: 112/75 (04-01-23 @ 04:57) (112/75 - 124/72)  RR: 17 (04-01-23 @ 04:57) (16 - 18)  SpO2: 94% (04-01-23 @ 04:57) (88% - 96%)  Wt(kg): --  I&O's Summary      Appearance: Normal	  HEENT:   Normal oral mucosa, PERRL, EOMI	  Lymphatic: No lymphadenopathy  Cardiovascular: Normal S1 S2, No JVD, No murmurs, No edema  Respiratory: Lungs clear to auscultation	  Psychiatry: A & O x 3, Mood & affect appropriate  Gastrointestinal:  Soft, Non-tender, + BS	  Skin: No rashes, No ecchymoses, No cyanosis	  Neurologic: Non-focal  Extremities: Normal range of motion, No clubbing, cyanosis or edema  Vascular: Peripheral pulses palpable 2+ bilaterally    MEDICATIONS  (STANDING):  apixaban 5 milliGRAM(s) Oral every 12 hours  aspirin enteric coated 81 milliGRAM(s) Oral daily  benzonatate 100 milliGRAM(s) Oral once  budesonide  80 MICROgram(s)/formoterol 4.5 MICROgram(s) Inhaler 2 Puff(s) Inhalation two times a day  influenza  Vaccine (HIGH DOSE) 0.7 milliLiter(s) IntraMuscular once  losartan 25 milliGRAM(s) Oral daily  montelukast 10 milliGRAM(s) Oral daily  pantoprazole    Tablet 40 milliGRAM(s) Oral before breakfast  simvastatin 10 milliGRAM(s) Oral at bedtime      	  	  LABS:	 	      Troponin I, High Sensitivity Result: 11.9 ng/L (03-29 @ 18:50)                            9.4    10.75 )-----------( 349      ( 01 Apr 2023 08:00 )             29.3     04-01    137  |  110<H>  |  22<H>  ----------------------------<  120<H>  3.7   |  18<L>  |  0.66    Ca    9.4      01 Apr 2023 08:00  Phos  2.8     04-01  Mg     1.9     04-01    TPro  6.6  /  Alb  2.2<L>  /  TBili  0.4  /  DBili  x   /  AST  40  /  ALT  29  /  AlkPhos  70  04-01      	      < from: Transthoracic Echocardiogram (03.30.23 @ 11:32) >  OBSERVATIONS:  Mitral Valve: Normal mitral valve.  Aortic Root: Normal aortic root.  Aortic Valve: Normal trileaflet aortic valve.  Left Atrium: Normal left atrium.  Left Ventricle: Normal Left Ventricular Systolic Function,  (EF = 55 to 60%) Normal left ventricular internal  dimensions and wall thicknesses. Diastolic function not  assessed.  Right Heart: Normal right atrium. Normal right ventricular  size and systolic function (TAPSE 2.0 cm). There is mild  tricuspid regurgitation. Pulmonic valve not well seen.  Pericardium/PleuraNormal pericardium with no pericardial  effusion.  Hemodynamic: RA Pressure is 8 mm Hg. RV systolic pressure  is moderately increased at  46 mm Hg.  ------------------------------------------------------------------------  CONCLUSIONS:  1. Normal mitral valve.  2. Normal trileaflet aortic valve.  3. Normal aortic root.  4. Normal left atrium.  5. Normal left ventricular internal dimensions and wall  thicknesses.  6. Normal Left Ventricular Systolic Function,  (EF = 55 to  60%)  7. Diastolic function not assessed.  8. Normal right atrium.  9. Normal right ventricular size and systolic function  (TAPSE 2.0 cm).  10. RA Pressure is 8 mm Hg.  11. RV systolic pressure is moderately increased at  46 mm  Hg.  12. There is mild tricuspid regurgitation.  13. Pulmonic valve not well seen.  14. Normal pericardium with no pericardial effusion.    ------------------------------------------------------------------------  Confirmed on  3/31/2023 - 08:13:19 by Maddie Jennings MD  ------------------------------------------------------------------------    < end of copied text >

## 2023-04-02 LAB
ALBUMIN SERPL ELPH-MCNC: 2.4 G/DL — LOW (ref 3.5–5)
ALP SERPL-CCNC: 85 U/L — SIGNIFICANT CHANGE UP (ref 40–120)
ALT FLD-CCNC: 56 U/L DA — SIGNIFICANT CHANGE UP (ref 10–60)
ANION GAP SERPL CALC-SCNC: 11 MMOL/L — SIGNIFICANT CHANGE UP (ref 5–17)
AST SERPL-CCNC: 75 U/L — HIGH (ref 10–40)
BASOPHILS # BLD AUTO: 0.03 K/UL — SIGNIFICANT CHANGE UP (ref 0–0.2)
BASOPHILS NFR BLD AUTO: 0.3 % — SIGNIFICANT CHANGE UP (ref 0–2)
BILIRUB SERPL-MCNC: 0.3 MG/DL — SIGNIFICANT CHANGE UP (ref 0.2–1.2)
BUN SERPL-MCNC: 39 MG/DL — HIGH (ref 7–18)
CALCIUM SERPL-MCNC: 9.9 MG/DL — SIGNIFICANT CHANGE UP (ref 8.4–10.5)
CHLORIDE SERPL-SCNC: 109 MMOL/L — HIGH (ref 96–108)
CO2 SERPL-SCNC: 19 MMOL/L — LOW (ref 22–31)
CREAT SERPL-MCNC: 0.97 MG/DL — SIGNIFICANT CHANGE UP (ref 0.5–1.3)
EGFR: 57 ML/MIN/1.73M2 — LOW
EOSINOPHIL # BLD AUTO: 0 K/UL — SIGNIFICANT CHANGE UP (ref 0–0.5)
EOSINOPHIL NFR BLD AUTO: 0 % — SIGNIFICANT CHANGE UP (ref 0–6)
GLUCOSE BLDC GLUCOMTR-MCNC: 254 MG/DL — HIGH (ref 70–99)
GLUCOSE SERPL-MCNC: 264 MG/DL — HIGH (ref 70–99)
HCT VFR BLD CALC: 30.7 % — LOW (ref 34.5–45)
HGB BLD-MCNC: 9.9 G/DL — LOW (ref 11.5–15.5)
IMM GRANULOCYTES NFR BLD AUTO: 0.9 % — SIGNIFICANT CHANGE UP (ref 0–0.9)
LYMPHOCYTES # BLD AUTO: 1.36 K/UL — SIGNIFICANT CHANGE UP (ref 1–3.3)
LYMPHOCYTES # BLD AUTO: 15.1 % — SIGNIFICANT CHANGE UP (ref 13–44)
MAGNESIUM SERPL-MCNC: 2.1 MG/DL — SIGNIFICANT CHANGE UP (ref 1.6–2.6)
MCHC RBC-ENTMCNC: 29.7 PG — SIGNIFICANT CHANGE UP (ref 27–34)
MCHC RBC-ENTMCNC: 32.2 GM/DL — SIGNIFICANT CHANGE UP (ref 32–36)
MCV RBC AUTO: 92.2 FL — SIGNIFICANT CHANGE UP (ref 80–100)
MONOCYTES # BLD AUTO: 0.14 K/UL — SIGNIFICANT CHANGE UP (ref 0–0.9)
MONOCYTES NFR BLD AUTO: 1.6 % — LOW (ref 2–14)
NEUTROPHILS # BLD AUTO: 7.42 K/UL — HIGH (ref 1.8–7.4)
NEUTROPHILS NFR BLD AUTO: 82.1 % — HIGH (ref 43–77)
NRBC # BLD: 0 /100 WBCS — SIGNIFICANT CHANGE UP (ref 0–0)
PHOSPHATE SERPL-MCNC: 3.3 MG/DL — SIGNIFICANT CHANGE UP (ref 2.5–4.5)
PLATELET # BLD AUTO: 422 K/UL — HIGH (ref 150–400)
POTASSIUM SERPL-MCNC: 4 MMOL/L — SIGNIFICANT CHANGE UP (ref 3.5–5.3)
POTASSIUM SERPL-SCNC: 4 MMOL/L — SIGNIFICANT CHANGE UP (ref 3.5–5.3)
PROT SERPL-MCNC: 7.4 G/DL — SIGNIFICANT CHANGE UP (ref 6–8.3)
RBC # BLD: 3.33 M/UL — LOW (ref 3.8–5.2)
RBC # FLD: 15.4 % — HIGH (ref 10.3–14.5)
SODIUM SERPL-SCNC: 139 MMOL/L — SIGNIFICANT CHANGE UP (ref 135–145)
WBC # BLD: 9.03 K/UL — SIGNIFICANT CHANGE UP (ref 3.8–10.5)
WBC # FLD AUTO: 9.03 K/UL — SIGNIFICANT CHANGE UP (ref 3.8–10.5)

## 2023-04-02 PROCEDURE — 71045 X-RAY EXAM CHEST 1 VIEW: CPT | Mod: 26

## 2023-04-02 RX ADMIN — Medication 100 MILLIGRAM(S): at 21:04

## 2023-04-02 RX ADMIN — BUDESONIDE AND FORMOTEROL FUMARATE DIHYDRATE 2 PUFF(S): 160; 4.5 AEROSOL RESPIRATORY (INHALATION) at 21:05

## 2023-04-02 RX ADMIN — Medication 3 MILLILITER(S): at 03:21

## 2023-04-02 RX ADMIN — PANTOPRAZOLE SODIUM 40 MILLIGRAM(S): 20 TABLET, DELAYED RELEASE ORAL at 06:45

## 2023-04-02 RX ADMIN — APIXABAN 5 MILLIGRAM(S): 2.5 TABLET, FILM COATED ORAL at 06:45

## 2023-04-02 RX ADMIN — Medication 3 MILLILITER(S): at 20:09

## 2023-04-02 RX ADMIN — APIXABAN 5 MILLIGRAM(S): 2.5 TABLET, FILM COATED ORAL at 18:20

## 2023-04-02 RX ADMIN — SIMVASTATIN 10 MILLIGRAM(S): 20 TABLET, FILM COATED ORAL at 21:04

## 2023-04-02 RX ADMIN — Medication 3 MILLILITER(S): at 15:43

## 2023-04-02 RX ADMIN — Medication 81 MILLIGRAM(S): at 11:05

## 2023-04-02 RX ADMIN — Medication 40 MILLIGRAM(S): at 06:46

## 2023-04-02 RX ADMIN — BUDESONIDE AND FORMOTEROL FUMARATE DIHYDRATE 2 PUFF(S): 160; 4.5 AEROSOL RESPIRATORY (INHALATION) at 11:05

## 2023-04-02 RX ADMIN — Medication 40 MILLIGRAM(S): at 18:20

## 2023-04-02 RX ADMIN — Medication 3 MILLILITER(S): at 09:45

## 2023-04-02 RX ADMIN — Medication 100 MILLIGRAM(S): at 14:43

## 2023-04-02 RX ADMIN — MONTELUKAST 10 MILLIGRAM(S): 4 TABLET, CHEWABLE ORAL at 11:05

## 2023-04-02 RX ADMIN — LOSARTAN POTASSIUM 25 MILLIGRAM(S): 100 TABLET, FILM COATED ORAL at 06:45

## 2023-04-02 NOTE — PROGRESS NOTE ADULT - SUBJECTIVE AND OBJECTIVE BOX
PGY-1 Progress Note discussed with attending    PLEASE CONTACT ON CALL TEAM:  - On Call Team (Please refer to Brynn) FROM 5:00 PM - 8:30PM  - Nightfloat Team FROM 8:30 -7:30 AM    INTERVAL HPI/OVERNIGHT EVENTS: Patient seen and examined at bedside. No acute events overnight. Pt states that her cough is persistent, but that her shortness of breath has improved. awaiting home O2, poss d/c tenzin.     MEDICATIONS  (STANDING):  apixaban 5 milliGRAM(s) Oral every 12 hours  aspirin enteric coated 81 milliGRAM(s) Oral daily  benzonatate 100 milliGRAM(s) Oral once  budesonide  80 MICROgram(s)/formoterol 4.5 MICROgram(s) Inhaler 2 Puff(s) Inhalation two times a day  influenza  Vaccine (HIGH DOSE) 0.7 milliLiter(s) IntraMuscular once  losartan 25 milliGRAM(s) Oral daily  montelukast 10 milliGRAM(s) Oral daily  pantoprazole    Tablet 40 milliGRAM(s) Oral before breakfast  simvastatin 10 milliGRAM(s) Oral at bedtime    MEDICATIONS  (PRN):  acetaminophen     Tablet .. 650 milliGRAM(s) Oral every 6 hours PRN Temp greater or equal to 38C (100.4F), Mild Pain (1 - 3)  guaiFENesin Oral Liquid (Sugar-Free) 100 milliGRAM(s) Oral every 6 hours PRN Cough  ondansetron Injectable 4 milliGRAM(s) IV Push every 8 hours PRN Nausea and/or Vomiting      REVIEW OF SYSTEMS:  CONSTITUTIONAL: No fever, weight loss, or fatigue  RESPIRATORY: + cough. No wheezing, chills or hemoptysis; No shortness of breath  CARDIOVASCULAR: No chest pain, palpitations, dizziness, or leg swelling  GASTROINTESTINAL: No abdominal pain. No nausea, vomiting, or hematemesis; No diarrhea or constipation. No melena or hematochezia.  GENITOURINARY: No dysuria or hematuria, urinary frequency  NEUROLOGICAL: No headaches, memory loss, loss of strength, numbness, or tremors  SKIN: No itching, burning, rashes, or lesions     Vital Signs Last 24 Hrs  T(C): 37.5 (01 Apr 2023 04:57), Max: 37.7 (31 Mar 2023 21:17)  T(F): 99.5 (01 Apr 2023 04:57), Max: 99.9 (31 Mar 2023 21:17)  HR: 95 (01 Apr 2023 04:57) (93 - 95)  BP: 112/75 (01 Apr 2023 04:57) (112/75 - 124/72)  BP(mean): --  RR: 17 (01 Apr 2023 04:57) (16 - 18)  SpO2: 94% (01 Apr 2023 04:57) (88% - 96%)    Parameters below as of 01 Apr 2023 04:57  Patient On (Oxygen Delivery Method): nasal cannula  O2 Flow (L/min): 2      PHYSICAL EXAMINATION:  GENERAL: No distress, thin, laying in bed, on 2 L NC   HEAD:  Atraumatic, Normocephalic  EYES:  conjunctiva and sclera clear  NECK: Supple, No JVD  CHEST/LUNG: CTA b/l, no wheezes, rales, rhonci   HEART: Regular rate and rhythm; No murmurs, rubs, or gallops  ABDOMEN: Soft, Nontender, Nondistended; Bowel sounds present  NERVOUS SYSTEM:  Alert & Oriented X3    EXTREMITIES:  2+ Peripheral Pulses, No clubbing, cyanosis, or edema  SKIN: warm dry                          9.4    10.75 )-----------( 349      ( 01 Apr 2023 08:00 )             29.3     04-01    137  |  110<H>  |  22<H>  ----------------------------<  120<H>  3.7   |  18<L>  |  0.66    Ca    9.4      01 Apr 2023 08:00  Phos  2.8     04-01  Mg     1.9     04-01    TPro  6.6  /  Alb  2.2<L>  /  TBili  0.4  /  DBili  x   /  AST  40  /  ALT  29  /  AlkPhos  70  04-01    LIVER FUNCTIONS - ( 01 Apr 2023 08:00 )  Alb: 2.2 g/dL / Pro: 6.6 g/dL / ALK PHOS: 70 U/L / ALT: 29 U/L DA / AST: 40 U/L / GGT: x                   CAPILLARY BLOOD GLUCOSE      RADIOLOGY & ADDITIONAL TESTS:

## 2023-04-02 NOTE — PROGRESS NOTE ADULT - PROBLEM SELECTOR PLAN 1
p/w SOB and wheezing  h/o PE did not take AC for 2 mo   EKG shows NSR,   CTA chest: b/l PE  w/ increased clot  burden in distal left main pulmonary artery, slightly increased compared to prior  Not in respiratory distress never hypoxic, and without chest pain.   s/p duonebs x3, prednisone 50mg x1, and FD lovenox x1 in the ED with improvement  likely in the setting of progression in PE, unlikely asthma exacerbation   RVP - RHINOVIRUS/ENTEROVIRUS POSITIVE  - c/w Robitussin for cough  - 88% on RA > awaiting home O2   - C/w Eliquis 5mg BID  - rest of plan as below  DISPO: DC on Eliquis w/ teaching

## 2023-04-02 NOTE — PROGRESS NOTE ADULT - SUBJECTIVE AND OBJECTIVE BOX
Patient is a 88y old  Female who presents with a chief complaint of Wheezing, SOB (01 Apr 2023 11:32)    pt seen in icu [  ], reg med floor [ x  ], bed [ x ], chair at bedside [   ], a+o x3 [x  ], lethargic [  ],    nad [x  ]      Allergies    gabapentin (Unknown)        Vitals    T(F): 98.4 (04-02-23 @ 04:35), Max: 99.5 (04-01-23 @ 14:12)  HR: 93 (04-02-23 @ 04:35) (92 - 97)  BP: 150/78 (04-02-23 @ 04:35) (121/63 - 150/78)  RR: 18 (04-02-23 @ 04:35) (17 - 18)  SpO2: 96% (04-02-23 @ 04:35) (96% - 99%)  Wt(kg): --  CAPILLARY BLOOD GLUCOSE          Labs                          9.4    10.75 )-----------( 349      ( 01 Apr 2023 08:00 )             29.3       04-01    137  |  110<H>  |  22<H>  ----------------------------<  120<H>  3.7   |  18<L>  |  0.66    Ca    9.4      01 Apr 2023 08:00  Phos  2.8     04-01  Mg     1.9     04-01    TPro  6.6  /  Alb  2.2<L>  /  TBili  0.4  /  DBili  x   /  AST  40  /  ALT  29  /  AlkPhos  70  04-01                Radiology Results      Meds    MEDICATIONS  (STANDING):  albuterol    90 MICROgram(s) HFA Inhaler 1 Puff(s) Inhalation three times a day  albuterol/ipratropium for Nebulization 3 milliLiter(s) Nebulizer every 6 hours  apixaban 5 milliGRAM(s) Oral every 12 hours  aspirin enteric coated 81 milliGRAM(s) Oral daily  budesonide  80 MICROgram(s)/formoterol 4.5 MICROgram(s) Inhaler 2 Puff(s) Inhalation two times a day  influenza  Vaccine (HIGH DOSE) 0.7 milliLiter(s) IntraMuscular once  losartan 25 milliGRAM(s) Oral daily  methylPREDNISolone sodium succinate Injectable 40 milliGRAM(s) IV Push three times a day  montelukast 10 milliGRAM(s) Oral daily  pantoprazole    Tablet 40 milliGRAM(s) Oral before breakfast  simvastatin 10 milliGRAM(s) Oral at bedtime      MEDICATIONS  (PRN):  acetaminophen     Tablet .. 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 Extensive deep vein thrombosis in the left leg as noted     advised pt the importance of taking and cont anticoagulation for her condition  heme onc f/u   pt on on darzalex/revlimid/dex last given 3/15/23  hold chemo during admisison   lovenox switch to Eliquis    Pt should received long term AC.   Pt will be followup with Dr. smith outpatient.  cont bronchodilators,   pulm f/u    Bronchodilators  Symbicort /4.5 mcgs 2 puffs po BID  PFTs as OP.  conservative manage for entero-rhino virus   cont supplemental O2 prn   cardio f/u    hold bp medication  echo with EF = 55 to 60%. RV systolic pressure is moderately increased noted above  phys tx eval noted anr rec phys tx 3-5x/week x 6-9 weeks at Home PT;    cont current meds  d/c plan pending home O2 delivery             Patient is a 88y old  Female who presents with a chief complaint of Wheezing, SOB (01 Apr 2023 11:32)    pt seen in icu [  ], reg med floor [ x  ], bed [ x ], chair at bedside [   ], a+o x3 [x  ], lethargic [  ],    nad [x  ]      Allergies    gabapentin (Unknown)        Vitals    T(F): 98.4 (04-02-23 @ 04:35), Max: 99.5 (04-01-23 @ 14:12)  HR: 93 (04-02-23 @ 04:35) (92 - 97)  BP: 150/78 (04-02-23 @ 04:35) (121/63 - 150/78)  RR: 18 (04-02-23 @ 04:35) (17 - 18)  SpO2: 96% (04-02-23 @ 04:35) (96% - 99%)  Wt(kg): --  CAPILLARY BLOOD GLUCOSE          Labs                          9.4    10.75 )-----------( 349      ( 01 Apr 2023 08:00 )             29.3       04-01    137  |  110<H>  |  22<H>  ----------------------------<  120<H>  3.7   |  18<L>  |  0.66    Ca    9.4      01 Apr 2023 08:00  Phos  2.8     04-01  Mg     1.9     04-01    TPro  6.6  /  Alb  2.2<L>  /  TBili  0.4  /  DBili  x   /  AST  40  /  ALT  29  /  AlkPhos  70  04-01                Radiology Results      Meds    MEDICATIONS  (STANDING):  albuterol    90 MICROgram(s) HFA Inhaler 1 Puff(s) Inhalation three times a day  albuterol/ipratropium for Nebulization 3 milliLiter(s) Nebulizer every 6 hours  apixaban 5 milliGRAM(s) Oral every 12 hours  aspirin enteric coated 81 milliGRAM(s) Oral daily  budesonide  80 MICROgram(s)/formoterol 4.5 MICROgram(s) Inhaler 2 Puff(s) Inhalation two times a day  influenza  Vaccine (HIGH DOSE) 0.7 milliLiter(s) IntraMuscular once  losartan 25 milliGRAM(s) Oral daily  methylPREDNISolone sodium succinate Injectable 40 milliGRAM(s) IV Push three times a day  montelukast 10 milliGRAM(s) Oral daily  pantoprazole    Tablet 40 milliGRAM(s) Oral before breakfast  simvastatin 10 milliGRAM(s) Oral at bedtime      MEDICATIONS  (PRN):  acetaminophen     Tablet .. 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 Extensive deep vein thrombosis in the left leg as noted     advised pt the importance of taking and cont anticoagulation for her condition  heme onc f/u   pt on on darzalex/revlimid/dex last given 3/15/23  hold chemo during admisison   lovenox switch to Eliquis    cont eliquis   Pt should received long term AC.   Pt will be followup with Dr. smith outpatient.   change solumedrol in am to prednisone 40mg po daily and taper by 10mg q 2 days  add tessalon pereles tid x 3 days   pulm f/u    Bronchodilators  Symbicort /4.5 mcgs 2 puffs po BID  PFTs as OP.  conservative manage for entero-rhino virus   cont supplemental O2 prn   cardio f/u    hold bp medication  echo with EF = 55 to 60%. RV systolic pressure is moderately increased noted above  phys tx eval noted anr rec phys tx 3-5x/week x 6-9 weeks at Home PT;    cont current meds  d/c plan pending home O2 delivery

## 2023-04-02 NOTE — PROGRESS NOTE ADULT - ASSESSMENT
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 Recently admitted 1/21/23-1/26/23 for acute hypoxic respiratory failure 2/2 new submassive PE and COVID with TTE negative for right heart strain and normal cardiac function and discharged on Eliquis now with B/L PE and Lt DVT.  1.PE and DVT-NOAC as per  Heme/Onc.  2.HTN-cozaar.  3.Gastric Ca-Heme/Onc.  4.DM-Insulin.  5.PPI.  6.Lipid d/o-statin.

## 2023-04-02 NOTE — PROGRESS NOTE ADULT - SUBJECTIVE AND OBJECTIVE BOX
Patient is a 88y old  Female who presents with a chief complaint of Wheezing, SOB (02 Apr 2023 06:33)  Awake, alert, laying in bed in NAD. Had cough and sob yest    INTERVAL HPI/OVERNIGHT EVENTS:      VITAL SIGNS:  T(F): 98.4 (04-02-23 @ 04:35)  HR: 93 (04-02-23 @ 04:35)  BP: 150/78 (04-02-23 @ 04:35)  RR: 18 (04-02-23 @ 04:35)  SpO2: 96% (04-02-23 @ 04:35)  Wt(kg): --  I&O's Detail          REVIEW OF SYSTEMS:    CONSTITUTIONAL:  No fevers, chills, sweats    HEENT:  Eyes:  No diplopia or blurred vision. ENT:  No earache, sore throat or runny nose.    CARDIOVASCULAR:  No pressure, squeezing, tightness, or heaviness about the chest; no palpitations.    RESPIRATORY:  Per HPI    GASTROINTESTINAL:  No abdominal pain, nausea, vomiting or diarrhea.    GENITOURINARY:  No dysuria, frequency or urgency.    NEUROLOGIC:  No paresthesias, fasciculations, seizures or weakness.    PSYCHIATRIC:  No disorder of thought or mood.      PHYSICAL EXAM:    Constitutional: Well developed and nourished  Eyes:Perrla  ENMT: normal  Neck:supple  Respiratory: Occasional rhonchi  Cardiovascular: S1 S2 regular  Gastrointestinal: Soft, Non tender  Extremities: No edema  Vascular:normal  Neurological:Awake, alert,Ox3  Musculoskeletal:Normal      MEDICATIONS  (STANDING):  albuterol    90 MICROgram(s) HFA Inhaler 1 Puff(s) Inhalation three times a day  albuterol/ipratropium for Nebulization 3 milliLiter(s) Nebulizer every 6 hours  apixaban 5 milliGRAM(s) Oral every 12 hours  aspirin enteric coated 81 milliGRAM(s) Oral daily  budesonide  80 MICROgram(s)/formoterol 4.5 MICROgram(s) Inhaler 2 Puff(s) Inhalation two times a day  influenza  Vaccine (HIGH DOSE) 0.7 milliLiter(s) IntraMuscular once  losartan 25 milliGRAM(s) Oral daily  methylPREDNISolone sodium succinate Injectable 40 milliGRAM(s) IV Push three times a day  montelukast 10 milliGRAM(s) Oral daily  pantoprazole    Tablet 40 milliGRAM(s) Oral before breakfast  simvastatin 10 milliGRAM(s) Oral at bedtime    MEDICATIONS  (PRN):  acetaminophen     Tablet .. 650 milliGRAM(s) Oral every 6 hours PRN Temp greater or equal to 38C (100.4F), Mild Pain (1 - 3)  guaiFENesin Oral Liquid (Sugar-Free) 100 milliGRAM(s) Oral every 6 hours PRN Cough  ondansetron Injectable 4 milliGRAM(s) IV Push every 8 hours PRN Nausea and/or Vomiting      Allergies    gabapentin (Unknown)    Intolerances        LABS:                        9.9    9.03  )-----------( 422      ( 02 Apr 2023 07:10 )             30.7     04-02    139  |  109<H>  |  39<H>  ----------------------------<  264<H>  4.0   |  19<L>  |  0.97    Ca    9.9      02 Apr 2023 07:10  Phos  3.3     04-02  Mg     2.1     04-02    TPro  7.4  /  Alb  2.4<L>  /  TBili  0.3  /  DBili  x   /  AST  75<H>  /  ALT  56  /  AlkPhos  85  04-02              CAPILLARY BLOOD GLUCOSE      POCT Blood Glucose.: 254 mg/dL (02 Apr 2023 07:59)        RADIOLOGY & ADDITIONAL TESTS:    CXR:    Ct scan chest:    ekg;    echo:

## 2023-04-02 NOTE — PROGRESS NOTE ADULT - ASSESSMENT
88F from home, uses a walker, PMHx HTN, HLD, DM, Osteoporosis, Stage II B Gastric Adenocarcinoma s/p distal gastrectomy in 2015 on active chemo (follows [facility] Dr Ramachandran), Early Multiple myeloma, and recently diagnosed with b/l PE (unclear if still on Eliquis), presenting to the ED with shortness of breath c/o wheezing and cough x  2d. COVD and flu negative. CTA shows b/l PE within the distal portions of the right and left main pulmonary arteries extending into the RML and bibailar pulmonary artery branches with increased clot  burden in the distal left main pulmonary artery has slightly increased when compared to previous exam. Not in respiratory distress never hypoxic, and without chest pain. s/p duonebs x3, prednisone 50mg x1, and FD lovenox x1 in the ED with improvement. Admitted to tele for progression of PE in the setting of medication non-compliance.

## 2023-04-02 NOTE — PROGRESS NOTE ADULT - SUBJECTIVE AND OBJECTIVE BOX
CHIEF COMPLAINT:Patient is a 88y old  Female who presents with a chief complaint of Wheezing, SOB .Pt appears comfortable.    	  REVIEW OF SYSTEMS:  CONSTITUTIONAL: No fever, weight loss, or fatigue  EYES: No eye pain, visual disturbances, or discharge  ENT:  No difficulty hearing, tinnitus, vertigo; No sinus or throat pain  NECK: No pain or stiffness  RESPIRATORY: No cough, wheezing, chills or hemoptysis; No Shortness of Breath  CARDIOVASCULAR: No chest pain, palpitations, passing out, dizziness, or leg swelling  GASTROINTESTINAL: No abdominal or epigastric pain. No nausea, vomiting, or hematemesis; No diarrhea or constipation. No melena or hematochezia.  GENITOURINARY: No dysuria, frequency, hematuria, or incontinence  NEUROLOGICAL: No headaches, memory loss, loss of strength, numbness, or tremors  SKIN: No itching, burning, rashes, or lesions   LYMPH Nodes: No enlarged glands  ENDOCRINE: No heat or cold intolerance; No hair loss  MUSCULOSKELETAL: No joint pain or swelling; No muscle, back, or extremity pain  PSYCHIATRIC: No depression, anxiety, mood swings, or difficulty sleeping  HEME/LYMPH: No easy bruising, or bleeding gums  ALLERGY AND IMMUNOLOGIC: No hives or eczema	    PHYSICAL EXAM:  T(C): 36.9 (04-02-23 @ 04:35), Max: 37.5 (04-01-23 @ 14:12)  HR: 93 (04-02-23 @ 04:35) (92 - 97)  BP: 150/78 (04-02-23 @ 04:35) (121/63 - 150/78)  RR: 18 (04-02-23 @ 04:35) (17 - 18)  SpO2: 96% (04-02-23 @ 04:35) (96% - 99%)  Wt(kg): --  I&O's Summary      Appearance: Normal	  HEENT:   Normal oral mucosa, PERRL, EOMI	  Lymphatic: No lymphadenopathy  Cardiovascular: Normal S1 S2, No JVD, No murmurs, No edema  Respiratory: Lungs clear to auscultation	  Psychiatry: A & O x 3, Mood & affect appropriate  Gastrointestinal:  Soft, Non-tender, + BS	  Skin: No rashes, No ecchymoses, No cyanosis	  Neurologic: Non-focal  Extremities: Normal range of motion, No clubbing, cyanosis or edema  Vascular: Peripheral pulses palpable 2+ bilaterally    MEDICATIONS  (STANDING):  albuterol    90 MICROgram(s) HFA Inhaler 1 Puff(s) Inhalation three times a day  albuterol/ipratropium for Nebulization 3 milliLiter(s) Nebulizer every 6 hours  apixaban 5 milliGRAM(s) Oral every 12 hours  aspirin enteric coated 81 milliGRAM(s) Oral daily  budesonide  80 MICROgram(s)/formoterol 4.5 MICROgram(s) Inhaler 2 Puff(s) Inhalation two times a day  influenza  Vaccine (HIGH DOSE) 0.7 milliLiter(s) IntraMuscular once  losartan 25 milliGRAM(s) Oral daily  methylPREDNISolone sodium succinate Injectable 40 milliGRAM(s) IV Push three times a day  montelukast 10 milliGRAM(s) Oral daily  pantoprazole    Tablet 40 milliGRAM(s) Oral before breakfast  simvastatin 10 milliGRAM(s) Oral at bedtime      	  	  LABS:	 	                              9.9    9.03  )-----------( 422      ( 02 Apr 2023 07:10 )             30.7     04-02    139  |  109<H>  |  39<H>  ----------------------------<  264<H>  4.0   |  19<L>  |  0.97    Ca    9.9      02 Apr 2023 07:10  Phos  3.3     04-02  Mg     2.1     04-02    TPro  7.4  /  Alb  2.4<L>  /  TBili  0.3  /  DBili  x   /  AST  75<H>  /  ALT  56  /  AlkPhos  85  04-02

## 2023-04-02 NOTE — PROGRESS NOTE ADULT - SUBJECTIVE AND OBJECTIVE BOX
pt improved sob/dyspnea but still coughing still need O2  ICU Vital Signs Last 24 Hrs  T(C): 36.8 (02 Apr 2023 13:45), Max: 36.9 (02 Apr 2023 04:35)  T(F): 98.2 (02 Apr 2023 13:45), Max: 98.4 (02 Apr 2023 04:35)  HR: 109 (02 Apr 2023 13:45) (92 - 109)  BP: 141/60 (02 Apr 2023 13:45) (126/67 - 150/78)  BP(mean): --  ABP: --  ABP(mean): --  RR: 20 (02 Apr 2023 13:45) (17 - 20)  SpO2: 96% (02 Apr 2023 04:35) (96% - 97%)    O2 Parameters below as of 02 Apr 2023 13:45  Patient On (Oxygen Delivery Method): nasal cannula  O2 Flow (L/min): 2      general AA0x3 NAD   HEENT normal   Chest CTA b/l  abd soft nontender   extrem no edema   neurology nonfocal                           9.9    9.03  )-----------( 422      ( 02 Apr 2023 07:10 )             30.7   04-02    139  |  109<H>  |  39<H>  ----------------------------<  264<H>  4.0   |  19<L>  |  0.97    Ca    9.9      02 Apr 2023 07:10  Phos  3.3     04-02  Mg     2.1     04-02    TPro  7.4  /  Alb  2.4<L>  /  TBili  0.3  /  DBili  x   /  AST  75<H>  /  ALT  56  /  AlkPhos  85  04-02

## 2023-04-02 NOTE — PROGRESS NOTE ADULT - ASSESSMENT
88F from home, lives with daughter, uses a walker, with PMHx HTN, HLD, DM, Osteoporosis, Stage II B Gastric Adenocarcinoma s/p distal gastrectomy in 2015 on active chemo (follows [facility] Dr Adams), Early Multiple myeloma, and recently diagnosed with b/l PE (unclear if still on Eliquis) and PSHx ASHU w/BSO and Cholecystectomy, presenting to the ED with shortness of breath c/o wheezing and cough x  2d. Recently admitted 1/21/23-1/26/23 for acute hypoxic respiratory failure 2/2 new submassive PE and COVID with TTE negative for right heart strain and normal cardiac function and discharged on Eliquis. Pt is COVID vaccinated x 3. She reports that she feels like she may have had a cold stating that she has only had SOB and wheezing with no relief from her home inhalers. Denies chest pain, change in cough, or vomiting. No sick contact at  home. Denies fevers, chills, and night sweats. Patient and family at the bedside unable to recall medication list and unsure if she is still taking AC, per sure scripts she has not had another refill after the 30-d post-discharge, family states there weren't anymore refills left and did not know they needed follow up for additional refills. Reports compliant with other refilled meds. Last chemo was two weeks ago, with next chemo scheduled for tomorrow at [facility]. Denies other acute complaints.     #MM  #VTE   follows with our colleague Dr. Alvarado, on darzalex/revlimid/dex last given 3/15/23  pt developed SOB, covid and PE in 01/2023 and started on eliquis at that time  pt non-compliant with eliquis and did not take for over one week d/t her running out of medication as per pt  now p/w SOB, cough  CTA shows worsening B/L PE and increased clot burden in distal Left main pulm artery slightly increased compared with prior  doppler LE shows extensive LLE DVT    Rec's:  -hold chemo during admission  -requiring O2  setting up home oxygen on d/c  clinically improved   -Echo done and essentially normal   -AC transition to Eliquis   -Pulm consult  pt will be followup with dr. Beckwith if medically cleared for d/c  will not active f/u pt   please call for new question       MEDICATIONS  (STANDING):  albuterol    90 MICROgram(s) HFA Inhaler 1 Puff(s) Inhalation three times a day  albuterol/ipratropium for Nebulization 3 milliLiter(s) Nebulizer every 6 hours  apixaban 5 milliGRAM(s) Oral every 12 hours  aspirin enteric coated 81 milliGRAM(s) Oral daily  benzonatate 100 milliGRAM(s) Oral three times a day  budesonide  80 MICROgram(s)/formoterol 4.5 MICROgram(s) Inhaler 2 Puff(s) Inhalation two times a day  influenza  Vaccine (HIGH DOSE) 0.7 milliLiter(s) IntraMuscular once  losartan 25 milliGRAM(s) Oral daily  methylPREDNISolone sodium succinate Injectable 40 milliGRAM(s) IV Push every 12 hours  montelukast 10 milliGRAM(s) Oral daily  pantoprazole    Tablet 40 milliGRAM(s) Oral before breakfast  simvastatin 10 milliGRAM(s) Oral at bedtime    MEDICATIONS  (PRN):  acetaminophen     Tablet .. 650 milliGRAM(s) Oral every 6 hours PRN Temp greater or equal to 38C (100.4F), Mild Pain (1 - 3)  guaiFENesin Oral Liquid (Sugar-Free) 100 milliGRAM(s) Oral every 6 hours PRN Cough  ondansetron Injectable 4 milliGRAM(s) IV Push every 8 hours PRN Nausea and/or Vomiting

## 2023-04-03 LAB
ALBUMIN SERPL ELPH-MCNC: 2.4 G/DL — LOW (ref 3.5–5)
ALP SERPL-CCNC: 71 U/L — SIGNIFICANT CHANGE UP (ref 40–120)
ALT FLD-CCNC: 59 U/L DA — SIGNIFICANT CHANGE UP (ref 10–60)
ANION GAP SERPL CALC-SCNC: 9 MMOL/L — SIGNIFICANT CHANGE UP (ref 5–17)
AST SERPL-CCNC: 46 U/L — HIGH (ref 10–40)
BASOPHILS # BLD AUTO: 0.03 K/UL — SIGNIFICANT CHANGE UP (ref 0–0.2)
BASOPHILS NFR BLD AUTO: 0.2 % — SIGNIFICANT CHANGE UP (ref 0–2)
BILIRUB SERPL-MCNC: 0.2 MG/DL — SIGNIFICANT CHANGE UP (ref 0.2–1.2)
BUN SERPL-MCNC: 51 MG/DL — HIGH (ref 7–18)
CALCIUM SERPL-MCNC: 9.5 MG/DL — SIGNIFICANT CHANGE UP (ref 8.4–10.5)
CHLORIDE SERPL-SCNC: 111 MMOL/L — HIGH (ref 96–108)
CO2 SERPL-SCNC: 19 MMOL/L — LOW (ref 22–31)
CREAT SERPL-MCNC: 1.09 MG/DL — SIGNIFICANT CHANGE UP (ref 0.5–1.3)
EGFR: 49 ML/MIN/1.73M2 — LOW
EOSINOPHIL # BLD AUTO: 0 K/UL — SIGNIFICANT CHANGE UP (ref 0–0.5)
EOSINOPHIL NFR BLD AUTO: 0 % — SIGNIFICANT CHANGE UP (ref 0–6)
GLUCOSE SERPL-MCNC: 215 MG/DL — HIGH (ref 70–99)
HCT VFR BLD CALC: 28.7 % — LOW (ref 34.5–45)
HGB BLD-MCNC: 9.2 G/DL — LOW (ref 11.5–15.5)
IMM GRANULOCYTES NFR BLD AUTO: 0.9 % — SIGNIFICANT CHANGE UP (ref 0–0.9)
LYMPHOCYTES # BLD AUTO: 13.3 % — SIGNIFICANT CHANGE UP (ref 13–44)
LYMPHOCYTES # BLD AUTO: 2.15 K/UL — SIGNIFICANT CHANGE UP (ref 1–3.3)
MAGNESIUM SERPL-MCNC: 2.2 MG/DL — SIGNIFICANT CHANGE UP (ref 1.6–2.6)
MCHC RBC-ENTMCNC: 30 PG — SIGNIFICANT CHANGE UP (ref 27–34)
MCHC RBC-ENTMCNC: 32.1 GM/DL — SIGNIFICANT CHANGE UP (ref 32–36)
MCV RBC AUTO: 93.5 FL — SIGNIFICANT CHANGE UP (ref 80–100)
MONOCYTES # BLD AUTO: 0.5 K/UL — SIGNIFICANT CHANGE UP (ref 0–0.9)
MONOCYTES NFR BLD AUTO: 3.1 % — SIGNIFICANT CHANGE UP (ref 2–14)
NEUTROPHILS # BLD AUTO: 13.33 K/UL — HIGH (ref 1.8–7.4)
NEUTROPHILS NFR BLD AUTO: 82.5 % — HIGH (ref 43–77)
NRBC # BLD: 0 /100 WBCS — SIGNIFICANT CHANGE UP (ref 0–0)
PHOSPHATE SERPL-MCNC: 2.7 MG/DL — SIGNIFICANT CHANGE UP (ref 2.5–4.5)
PLATELET # BLD AUTO: 446 K/UL — HIGH (ref 150–400)
POTASSIUM SERPL-MCNC: 4.3 MMOL/L — SIGNIFICANT CHANGE UP (ref 3.5–5.3)
POTASSIUM SERPL-SCNC: 4.3 MMOL/L — SIGNIFICANT CHANGE UP (ref 3.5–5.3)
PROT SERPL-MCNC: 6.8 G/DL — SIGNIFICANT CHANGE UP (ref 6–8.3)
RBC # BLD: 3.07 M/UL — LOW (ref 3.8–5.2)
RBC # FLD: 15.7 % — HIGH (ref 10.3–14.5)
SODIUM SERPL-SCNC: 139 MMOL/L — SIGNIFICANT CHANGE UP (ref 135–145)
WBC # BLD: 16.16 K/UL — HIGH (ref 3.8–10.5)
WBC # FLD AUTO: 16.16 K/UL — HIGH (ref 3.8–10.5)

## 2023-04-03 RX ORDER — NYSTATIN CREAM 100000 [USP'U]/G
1 CREAM TOPICAL
Refills: 0 | Status: DISCONTINUED | OUTPATIENT
Start: 2023-04-03 | End: 2023-04-04

## 2023-04-03 RX ADMIN — Medication 3 MILLILITER(S): at 20:34

## 2023-04-03 RX ADMIN — Medication 3 MILLILITER(S): at 09:07

## 2023-04-03 RX ADMIN — BUDESONIDE AND FORMOTEROL FUMARATE DIHYDRATE 2 PUFF(S): 160; 4.5 AEROSOL RESPIRATORY (INHALATION) at 10:36

## 2023-04-03 RX ADMIN — APIXABAN 5 MILLIGRAM(S): 2.5 TABLET, FILM COATED ORAL at 05:29

## 2023-04-03 RX ADMIN — SIMVASTATIN 10 MILLIGRAM(S): 20 TABLET, FILM COATED ORAL at 21:26

## 2023-04-03 RX ADMIN — APIXABAN 5 MILLIGRAM(S): 2.5 TABLET, FILM COATED ORAL at 18:44

## 2023-04-03 RX ADMIN — Medication 3 MILLILITER(S): at 14:52

## 2023-04-03 RX ADMIN — Medication 100 MILLIGRAM(S): at 05:29

## 2023-04-03 RX ADMIN — Medication 100 MILLIGRAM(S): at 15:15

## 2023-04-03 RX ADMIN — Medication 600 MILLIGRAM(S): at 18:44

## 2023-04-03 RX ADMIN — Medication 40 MILLIGRAM(S): at 05:29

## 2023-04-03 RX ADMIN — PANTOPRAZOLE SODIUM 40 MILLIGRAM(S): 20 TABLET, DELAYED RELEASE ORAL at 06:00

## 2023-04-03 RX ADMIN — MONTELUKAST 10 MILLIGRAM(S): 4 TABLET, CHEWABLE ORAL at 11:36

## 2023-04-03 RX ADMIN — Medication 81 MILLIGRAM(S): at 11:36

## 2023-04-03 RX ADMIN — BUDESONIDE AND FORMOTEROL FUMARATE DIHYDRATE 2 PUFF(S): 160; 4.5 AEROSOL RESPIRATORY (INHALATION) at 21:26

## 2023-04-03 RX ADMIN — LOSARTAN POTASSIUM 25 MILLIGRAM(S): 100 TABLET, FILM COATED ORAL at 05:29

## 2023-04-03 RX ADMIN — Medication 100 MILLIGRAM(S): at 21:25

## 2023-04-03 RX ADMIN — NYSTATIN CREAM 1 APPLICATION(S): 100000 CREAM TOPICAL at 18:44

## 2023-04-03 NOTE — DIETITIAN INITIAL EVALUATION ADULT - PERTINENT LABORATORY DATA
04-03    139  |  111<H>  |  51<H>  ----------------------------<  215<H>  4.3   |  19<L>  |  1.09    Ca    9.5      03 Apr 2023 06:14  Phos  2.7     04-03  Mg     2.2     04-03    TPro  6.8  /  Alb  2.4<L>  /  TBili  0.2  /  DBili  x   /  AST  46<H>  /  ALT  59  /  AlkPhos  71  04-03  A1C with Estimated Average Glucose Result: 5.8 % (03-31-23 @ 05:50)  A1C with Estimated Average Glucose Result: 5.5 % (11-28-22 @ 05:25)  A1C with Estimated Average Glucose Result: 7.8 % (06-14-22 @ 20:20)

## 2023-04-03 NOTE — DIETITIAN NUTRITION RISK NOTIFICATION - TREATMENT: THE FOLLOWING DIET HAS BEEN RECOMMENDED
Diet, Consistent Carbohydrate/No Snacks:   DASH/TLC {Sodium & Cholesterol Restricted}  Easy to Chew (EASYTOCHEW)  Supplement Feeding Modality:  Oral  Glucerna Shake Cans or Servings Per Day:  1       Frequency:  Two Times a day (04-03-23 @ 11:38) [Pending Verification By Attending]

## 2023-04-03 NOTE — DIETITIAN NUTRITION RISK NOTIFICATION - ADDITIONAL COMMENTS/DIETITIAN RECOMMENDATIONS
Malnutrition Diagnosis Parameters: intake <75% needs x >1m, wt loss <20% x 1y, mild fat loss, mild muscle loss, debility

## 2023-04-03 NOTE — PROGRESS NOTE ADULT - SUBJECTIVE AND OBJECTIVE BOX
Patient is a 88y old  Female who presents with a chief complaint of Wheezing, SOB (02 Apr 2023 13:51)    pt seen in icu [  ], reg med floor [ x  ], bed [ x ], chair at bedside [   ], a+o x3 [x  ], lethargic [  ],    nad [x  ]        Allergies    gabapentin (Unknown)        Vitals    T(F): 98.4 (04-03-23 @ 05:15), Max: 98.4 (04-03-23 @ 05:15)  HR: 80 (04-03-23 @ 05:15) (80 - 110)  BP: 139/72 (04-03-23 @ 05:15) (135/60 - 141/60)  RR: 17 (04-03-23 @ 05:15) (17 - 20)  SpO2: 95% (04-03-23 @ 05:15) (93% - 95%)  Wt(kg): --  CAPILLARY BLOOD GLUCOSE      POCT Blood Glucose.: 254 mg/dL (02 Apr 2023 07:59)      Labs                          9.9    9.03  )-----------( 422      ( 02 Apr 2023 07:10 )             30.7       04-02    139  |  109<H>  |  39<H>  ----------------------------<  264<H>  4.0   |  19<L>  |  0.97    Ca    9.9      02 Apr 2023 07:10  Phos  3.3     04-02  Mg     2.1     04-02    TPro  7.4  /  Alb  2.4<L>  /  TBili  0.3  /  DBili  x   /  AST  75<H>  /  ALT  56  /  AlkPhos  85  04-02                Radiology Results      Meds    MEDICATIONS  (STANDING):  albuterol    90 MICROgram(s) HFA Inhaler 1 Puff(s) Inhalation three times a day  albuterol/ipratropium for Nebulization 3 milliLiter(s) Nebulizer every 6 hours  apixaban 5 milliGRAM(s) Oral every 12 hours  aspirin enteric coated 81 milliGRAM(s) Oral daily  benzonatate 100 milliGRAM(s) Oral three times a day  budesonide  80 MICROgram(s)/formoterol 4.5 MICROgram(s) Inhaler 2 Puff(s) Inhalation two times a day  influenza  Vaccine (HIGH DOSE) 0.7 milliLiter(s) IntraMuscular once  losartan 25 milliGRAM(s) Oral daily  methylPREDNISolone sodium succinate Injectable 40 milliGRAM(s) IV Push every 12 hours  montelukast 10 milliGRAM(s) Oral daily  nystatin Powder 1 Application(s) Topical two times a day  pantoprazole    Tablet 40 milliGRAM(s) Oral before breakfast  simvastatin 10 milliGRAM(s) Oral at bedtime      MEDICATIONS  (PRN):  acetaminophen     Tablet .. 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 Extensive deep vein thrombosis in the left leg as noted     advised pt the importance of taking and cont anticoagulation for her condition  heme onc f/u   pt on on darzalex/revlimid/dex last given 3/15/23  hold chemo during admisison   lovenox switch to Eliquis    cont eliquis   Pt should received long term AC.   Pt will be followup with Dr. smith outpatient.   change solumedrol in am to prednisone 40mg po daily and taper by 10mg q 2 days  add tessalon pereles tid x 3 days   pulm f/u    Bronchodilators  Symbicort /4.5 mcgs 2 puffs po BID  PFTs as OP.  conservative manage for entero-rhino virus   cont supplemental O2 prn   cardio f/u    hold bp medication  echo with EF = 55 to 60%. RV systolic pressure is moderately increased noted above  phys tx eval noted anr rec phys tx 3-5x/week x 6-9 weeks at Home PT;    cont current meds  d/c plan pending home O2 delivery         Patient is a 88y old  Female who presents with a chief complaint of Wheezing, SOB (02 Apr 2023 13:51)    pt seen in icu [  ], reg med floor [ x  ], bed [ x ], chair at bedside [   ], a+o x3 [x  ], lethargic [  ],    nad [x  ]        Allergies    gabapentin (Unknown)        Vitals    T(F): 98.4 (04-03-23 @ 05:15), Max: 98.4 (04-03-23 @ 05:15)  HR: 80 (04-03-23 @ 05:15) (80 - 110)  BP: 139/72 (04-03-23 @ 05:15) (135/60 - 141/60)  RR: 17 (04-03-23 @ 05:15) (17 - 20)  SpO2: 95% (04-03-23 @ 05:15) (93% - 95%)  Wt(kg): --  CAPILLARY BLOOD GLUCOSE      POCT Blood Glucose.: 254 mg/dL (02 Apr 2023 07:59)      Labs                          9.9    9.03  )-----------( 422      ( 02 Apr 2023 07:10 )             30.7       04-02    139  |  109<H>  |  39<H>  ----------------------------<  264<H>  4.0   |  19<L>  |  0.97    Ca    9.9      02 Apr 2023 07:10  Phos  3.3     04-02  Mg     2.1     04-02    TPro  7.4  /  Alb  2.4<L>  /  TBili  0.3  /  DBili  x   /  AST  75<H>  /  ALT  56  /  AlkPhos  85  04-02                Radiology Results      Meds    MEDICATIONS  (STANDING):  albuterol    90 MICROgram(s) HFA Inhaler 1 Puff(s) Inhalation three times a day  albuterol/ipratropium for Nebulization 3 milliLiter(s) Nebulizer every 6 hours  apixaban 5 milliGRAM(s) Oral every 12 hours  aspirin enteric coated 81 milliGRAM(s) Oral daily  benzonatate 100 milliGRAM(s) Oral three times a day  budesonide  80 MICROgram(s)/formoterol 4.5 MICROgram(s) Inhaler 2 Puff(s) Inhalation two times a day  influenza  Vaccine (HIGH DOSE) 0.7 milliLiter(s) IntraMuscular once  losartan 25 milliGRAM(s) Oral daily  methylPREDNISolone sodium succinate Injectable 40 milliGRAM(s) IV Push every 12 hours  montelukast 10 milliGRAM(s) Oral daily  nystatin Powder 1 Application(s) Topical two times a day  pantoprazole    Tablet 40 milliGRAM(s) Oral before breakfast  simvastatin 10 milliGRAM(s) Oral at bedtime      MEDICATIONS  (PRN):  acetaminophen     Tablet .. 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 Extensive deep vein thrombosis in the left leg as noted     advised pt the importance of taking and cont anticoagulation for her condition  heme onc f/u   pt on on darzalex/revlimid/dex last given 3/15/23  hold chemo during admisison   cont eliquis   Pt should received long term AC.   Pt will be followup with Dr. smith outpatient.   change solumedrol to prednisone 40mg po daily and taper by 10mg q 2 days  cont tessalon pereles tid x 3 days   pulm f/u    Bronchodilators  Symbicort /4.5 mcgs 2 puffs po BID  PFTs as OP.  conservative manage for entero-rhino virus   cont supplemental O2 prn   cardio f/u    hold bp medication  echo with EF = 55 to 60%. RV systolic pressure is moderately increased noted above  phys tx eval noted anr rec phys tx 3-5x/week x 6-9 weeks at Home PT;    cont current meds  d/c plan pending home O2 delivery

## 2023-04-03 NOTE — DIETITIAN INITIAL EVALUATION ADULT - OTHER INFO
Pt lives home with family PTA, alert, oriented, daughter Ivett at bedside, pt/family Romanian speaking, contacted via [employer]  ID # [id number], well-communicated; Reported appetite good, but nausea/vomiting on/off from chemotherapy, wt loss >6m ( unclear quantity/duration, see below for wt data in EMR), having dentures, prefers soft food, willing to have oral nutritional supplement for DM; food choices obtained and forwarded to Dietary; Unknown food allergies; Pending discharge planning to home when medically ready per shira  Pt lives home with family PTA, alert, oriented, daughter Ivett at bedside, pt/family Frisian speaking, contacted via [email or web address]  ID # [id number], well-communicated; Reported appetite good, but nausea/vomiting on/off from chemotherapy, wt loss >6m ( unclear quantity/duration, see below for wt data in EMR), having dentures, prefers soft food, willing to have oral nutritional supplement for DM; food choices obtained and forwarded to Dietary; Unknown food allergies; Pending discharge planning to home when medically ready per team

## 2023-04-03 NOTE — DIETITIAN INITIAL EVALUATION ADULT - SIGNS/SYMPTOMS
unintended wt loss <20% x 1y, mild fat loss, mild muscle loss, debility  intake <75% needs x >1m, wt loss <20% x 1y, mild fat loss, mild muscle loss, debility

## 2023-04-03 NOTE — DIETITIAN INITIAL EVALUATION ADULT - NSFNSGIIOFT_GEN_A_CORE
IBW=88 lb   Wt data in EMR: 153 lb 4/13/22; 140 lb 5/12/22; 109.5 lb 1/22/23; 110 lb 1/23/22; 108 lb 1/25/23; 132 lb 3/28/23

## 2023-04-03 NOTE — DIETITIAN INITIAL EVALUATION ADULT - PROBLEM SELECTOR PLAN 3
h/o Asthma takes Albuterol PRN/ Montelukast but unable to recall if on other inhalers   not in exacerbation  COVID negative but noted to have prior COVID 2-months ago   CTA chest shows b/l PE progression without infectious porcess   s/p duonebs x3, prednisone 50mg x1, and FD lovenox x1 in the ED with improvement  (+) mild B/L crackle with expiratory wheezing on exam, improved   c/w home inhalers once confirmed in the AM   Pulmonary consulted: Dr. Hernandes

## 2023-04-03 NOTE — DIETITIAN INITIAL EVALUATION ADULT - PERTINENT MEDS FT
MEDICATIONS  (STANDING):  albuterol    90 MICROgram(s) HFA Inhaler 1 Puff(s) Inhalation three times a day  albuterol/ipratropium for Nebulization 3 milliLiter(s) Nebulizer every 6 hours  apixaban 5 milliGRAM(s) Oral every 12 hours  aspirin enteric coated 81 milliGRAM(s) Oral daily  benzonatate 100 milliGRAM(s) Oral three times a day  budesonide  80 MICROgram(s)/formoterol 4.5 MICROgram(s) Inhaler 2 Puff(s) Inhalation two times a day  influenza  Vaccine (HIGH DOSE) 0.7 milliLiter(s) IntraMuscular once  losartan 25 milliGRAM(s) Oral daily  methylPREDNISolone sodium succinate Injectable 40 milliGRAM(s) IV Push every 12 hours  montelukast 10 milliGRAM(s) Oral daily  nystatin Powder 1 Application(s) Topical two times a day  pantoprazole    Tablet 40 milliGRAM(s) Oral before breakfast  simvastatin 10 milliGRAM(s) Oral at bedtime    MEDICATIONS  (PRN):  acetaminophen     Tablet .. 650 milliGRAM(s) Oral every 6 hours PRN Temp greater or equal to 38C (100.4F), Mild Pain (1 - 3)  guaiFENesin Oral Liquid (Sugar-Free) 100 milliGRAM(s) Oral every 6 hours PRN Cough  ondansetron Injectable 4 milliGRAM(s) IV Push every 8 hours PRN Nausea and/or Vomiting

## 2023-04-03 NOTE — PROGRESS NOTE ADULT - PROBLEM SELECTOR PLAN 1
p/w SOB and wheezing  h/o PE did not take AC for 2 mo   EKG shows NSR,   CTA chest: b/l PE  w/ increased clot  burden in distal left main pulmonary artery, slightly increased compared to prior  Not in respiratory distress never hypoxic, and without chest pain.   s/p duonebs x3, prednisone 50mg x1, and FD lovenox x1 in the ED with improvement  likely in the setting of progression in PE, unlikely asthma exacerbation   RVP - RHINOVIRUS/ENTEROVIRUS POSITIVE  - c/w Robitussin for cough  - 88% on RA > awaiting home O2 > re-evaluated O2 requirement  - C/w Eliquis 5mg BID  - rest of plan as below  DISPO: DC on Eliquis w/ teaching

## 2023-04-03 NOTE — DIETITIAN INITIAL EVALUATION ADULT - ETIOLOGY
inadequate intake from cancer with chemotherapy, altered GI function of intermittent nausea/vomiting

## 2023-04-03 NOTE — DIETITIAN INITIAL EVALUATION ADULT - FACTORS AFF FOOD INTAKE
acute on chronic comorbidities including cancer with chemotherapy, altered GI function of intermittent nausea/vomiting/[place]/ethnic/cultural/personal food preferences

## 2023-04-03 NOTE — PROGRESS NOTE ADULT - SUBJECTIVE AND OBJECTIVE BOX
CHIEF COMPLAINT:Patient is a 88y old  Female who presents with a chief complaint of Wheezing, SOB.Pt appears comfortable,+cough.    	  REVIEW OF SYSTEMS:  CONSTITUTIONAL: No fever, weight loss, or fatigue  EYES: No eye pain, visual disturbances, or discharge  ENT:  No difficulty hearing, tinnitus, vertigo; No sinus or throat pain  NECK: No pain or stiffness  RESPIRATORY: + cough,No wheezing, chills or hemoptysis; No Shortness of Breath  CARDIOVASCULAR: No chest pain, palpitations, passing out, dizziness, or leg swelling  GASTROINTESTINAL: No abdominal or epigastric pain. No nausea, vomiting, or hematemesis; No diarrhea or constipation. No melena or hematochezia.  GENITOURINARY: No dysuria, frequency, hematuria, or incontinence  NEUROLOGICAL: No headaches, memory loss, loss of strength, numbness, or tremors  SKIN: No itching, burning, rashes, or lesions   LYMPH Nodes: No enlarged glands  ENDOCRINE: No heat or cold intolerance; No hair loss  MUSCULOSKELETAL: No joint pain or swelling; No muscle, back, or extremity pain  PSYCHIATRIC: No depression, anxiety, mood swings, or difficulty sleeping  HEME/LYMPH: No easy bruising, or bleeding gums  ALLERGY AND IMMUNOLOGIC: No hives or eczema	        PHYSICAL EXAM:  T(C): 36.9 (04-03-23 @ 05:15), Max: 36.9 (04-03-23 @ 05:15)  HR: 80 (04-03-23 @ 05:15) (80 - 110)  BP: 139/72 (04-03-23 @ 05:15) (135/60 - 141/60)  RR: 17 (04-03-23 @ 05:15) (17 - 20)  SpO2: 95% (04-03-23 @ 05:15) (93% - 95%)      Appearance: Normal	  HEENT:   Normal oral mucosa, PERRL, EOMI	  Lymphatic: No lymphadenopathy  Cardiovascular: Normal S1 S2, No JVD, No murmurs, No edema  Respiratory: B/L ronchi  Psychiatry: A & O x 3, Mood & affect appropriate  Gastrointestinal:  Soft, Non-tender, + BS	  Skin: No rashes, No ecchymoses, No cyanosis	  Neurologic: Non-focal  Extremities: Normal range of motion, No clubbing, cyanosis or edema  Vascular: Peripheral pulses palpable 2+ bilaterally    MEDICATIONS  (STANDING):  albuterol    90 MICROgram(s) HFA Inhaler 1 Puff(s) Inhalation three times a day  albuterol/ipratropium for Nebulization 3 milliLiter(s) Nebulizer every 6 hours  apixaban 5 milliGRAM(s) Oral every 12 hours  aspirin enteric coated 81 milliGRAM(s) Oral daily  benzonatate 100 milliGRAM(s) Oral three times a day  budesonide  80 MICROgram(s)/formoterol 4.5 MICROgram(s) Inhaler 2 Puff(s) Inhalation two times a day  guaiFENesin  milliGRAM(s) Oral every 12 hours  influenza  Vaccine (HIGH DOSE) 0.7 milliLiter(s) IntraMuscular once  losartan 25 milliGRAM(s) Oral daily  methylPREDNISolone sodium succinate Injectable 40 milliGRAM(s) IV Push every 12 hours  montelukast 10 milliGRAM(s) Oral daily  nystatin Powder 1 Application(s) Topical two times a day  pantoprazole    Tablet 40 milliGRAM(s) Oral before breakfast  simvastatin 10 milliGRAM(s) Oral at bedtime        	  LABS:	 	                         9.2    16.16 )-----------( 446      ( 03 Apr 2023 06:14 )             28.7     04-03    139  |  111<H>  |  51<H>  ----------------------------<  215<H>  4.3   |  19<L>  |  1.09    Ca    9.5      03 Apr 2023 06:14  Phos  2.7     04-03  Mg     2.2     04-03    TPro  6.8  /  Alb  2.4<L>  /  TBili  0.2  /  DBili  x   /  AST  46<H>  /  ALT  59  /  AlkPhos  71  04-03

## 2023-04-03 NOTE — HOME OXYGEN EVALUATION NOTE - NSHOMEOXYEVAL_PHYATT_PORTOXY_GEN_ALL_CORE
Patient needs home and portable oxygen as the patient is mobile in the home.
Patient needs home and portable oxygen as the patient is mobile in the home.

## 2023-04-03 NOTE — PROGRESS NOTE ADULT - SUBJECTIVE AND OBJECTIVE BOX
PGY-1 Progress Note discussed with attending    PAGER #: [[phone]] TILL 5:00 PM  PLEASE CONTACT ON CALL TEAM:  - On Call Team (Please refer to Brynn) FROM 5:00 PM - 8:30PM  - Nightfloat Team FROM 8:30 -7:30 AM    INTERVAL HPI/OVERNIGHT EVENTS: Patient seen and examined at bedside. No acute events overnight. Continues to complain of chest pain with coughing. Also complains of right hip pain (chronic).     MEDICATIONS  (STANDING):  albuterol    90 MICROgram(s) HFA Inhaler 1 Puff(s) Inhalation three times a day  albuterol/ipratropium for Nebulization 3 milliLiter(s) Nebulizer every 6 hours  apixaban 5 milliGRAM(s) Oral every 12 hours  aspirin enteric coated 81 milliGRAM(s) Oral daily  benzonatate 100 milliGRAM(s) Oral three times a day  budesonide  80 MICROgram(s)/formoterol 4.5 MICROgram(s) Inhaler 2 Puff(s) Inhalation two times a day  influenza  Vaccine (HIGH DOSE) 0.7 milliLiter(s) IntraMuscular once  losartan 25 milliGRAM(s) Oral daily  methylPREDNISolone sodium succinate Injectable 40 milliGRAM(s) IV Push every 12 hours  montelukast 10 milliGRAM(s) Oral daily  nystatin Powder 1 Application(s) Topical two times a day  pantoprazole    Tablet 40 milliGRAM(s) Oral before breakfast  simvastatin 10 milliGRAM(s) Oral at bedtime    MEDICATIONS  (PRN):  acetaminophen     Tablet .. 650 milliGRAM(s) Oral every 6 hours PRN Temp greater or equal to 38C (100.4F), Mild Pain (1 - 3)  guaiFENesin Oral Liquid (Sugar-Free) 100 milliGRAM(s) Oral every 6 hours PRN Cough  ondansetron Injectable 4 milliGRAM(s) IV Push every 8 hours PRN Nausea and/or Vomiting      REVIEW OF SYSTEMS:  CONSTITUTIONAL: No fever, weight loss, or fatigue  RESPIRATORY: No cough, wheezing, chills or hemoptysis; No shortness of breath  CARDIOVASCULAR: No chest pain, palpitations, dizziness, or leg swelling  GASTROINTESTINAL: No abdominal pain. No nausea, vomiting, or hematemesis; No diarrhea or constipation. No melena or hematochezia.  GENITOURINARY: No dysuria or hematuria, urinary frequency  NEUROLOGICAL: No headaches, memory loss, loss of strength, numbness, or tremors  SKIN: No itching, burning, rashes, or lesions     Vital Signs Last 24 Hrs  T(C): 36.9 (03 Apr 2023 05:15), Max: 36.9 (03 Apr 2023 05:15)  T(F): 98.4 (03 Apr 2023 05:15), Max: 98.4 (03 Apr 2023 05:15)  HR: 80 (03 Apr 2023 05:15) (80 - 110)  BP: 139/72 (03 Apr 2023 05:15) (135/60 - 141/60)  BP(mean): --  RR: 17 (03 Apr 2023 05:15) (17 - 20)  SpO2: 95% (03 Apr 2023 05:15) (93% - 95%)    Parameters below as of 03 Apr 2023 05:15  Patient On (Oxygen Delivery Method): nasal cannula  O2 Flow (L/min): 2      PHYSICAL EXAMINATION:  GENERAL: No distress, thin, laying in bed, on 2 L NC   HEAD:  Atraumatic, Normocephalic  EYES:  conjunctiva and sclera clear  NECK: Supple, No JVD  CHEST/LUNG: CTA b/l, no wheezes, rales, rhonci   HEART: Regular rate and rhythm; No murmurs, rubs, or gallops  ABDOMEN: Soft, Nontender, Nondistended; Bowel sounds present  NERVOUS SYSTEM:  Alert & Oriented X3    EXTREMITIES:  2+ Peripheral Pulses, No clubbing, cyanosis, or edema  SKIN: warm dry                          9.2    16.16 )-----------( 446      ( 03 Apr 2023 06:14 )             28.7     04-03    139  |  111<H>  |  51<H>  ----------------------------<  215<H>  4.3   |  19<L>  |  1.09    Ca    9.5      03 Apr 2023 06:14  Phos  2.7     04-03  Mg     2.2     04-03    TPro  6.8  /  Alb  2.4<L>  /  TBili  0.2  /  DBili  x   /  AST  46<H>  /  ALT  59  /  AlkPhos  71  04-03    LIVER FUNCTIONS - ( 03 Apr 2023 06:14 )  Alb: 2.4 g/dL / Pro: 6.8 g/dL / ALK PHOS: 71 U/L / ALT: 59 U/L DA / AST: 46 U/L / GGT: x                   CAPILLARY BLOOD GLUCOSE      RADIOLOGY & ADDITIONAL TESTS:

## 2023-04-03 NOTE — PROGRESS NOTE ADULT - ASSESSMENT
88F from home, uses a walker, PMHx HTN, HLD, DM, Osteoporosis, Stage II B Gastric Adenocarcinoma s/p distal gastrectomy in 2015 on active chemo (follows [facility] Dr Ramachandran), Early Multiple myeloma, and recently diagnosed with b/l PE (unclear if still on Eliquis), presenting to the ED with shortness of breath c/o wheezing and cough x  2d. COVD and flu negative. CTA shows b/l PE progression.  Admitted to tele for progression of PE in the setting of medication non-compliance.

## 2023-04-03 NOTE — PROGRESS NOTE ADULT - SUBJECTIVE AND OBJECTIVE BOX
Patient is a 88y old  Female who presents with a chief complaint of Wheezing, SOB (03 Apr 2023 09:48)  Awake, laying in bed in NAD. Cough improved    INTERVAL HPI/OVERNIGHT EVENTS:      VITAL SIGNS:  T(F): 98.4 (04-03-23 @ 05:15)  HR: 80 (04-03-23 @ 05:15)  BP: 139/72 (04-03-23 @ 05:15)  RR: 17 (04-03-23 @ 05:15)  SpO2: 95% (04-03-23 @ 05:15)  Wt(kg): --  I&O's Detail          REVIEW OF SYSTEMS:    CONSTITUTIONAL:  No fevers, chills, sweats    HEENT:  Eyes:  No diplopia or blurred vision. ENT:  No earache, sore throat or runny nose.    CARDIOVASCULAR:  No pressure, squeezing, tightness, or heaviness about the chest; no palpitations.    RESPIRATORY:  Per HPI    GASTROINTESTINAL:  No abdominal pain, nausea, vomiting or diarrhea.    GENITOURINARY:  No dysuria, frequency or urgency.    NEUROLOGIC:  No paresthesias, fasciculations, seizures or weakness.    PSYCHIATRIC:  No disorder of thought or mood.      PHYSICAL EXAM:    Constitutional: Well developed and nourished  Eyes:Perrla  ENMT: normal  Neck:supple  Respiratory: good air entry  Cardiovascular: S1 S2 regular  Gastrointestinal: Soft, Non tender  Extremities: No edema  Vascular:normal  Neurological:Awake, alert,Ox3  Musculoskeletal:Normal      MEDICATIONS  (STANDING):  albuterol    90 MICROgram(s) HFA Inhaler 1 Puff(s) Inhalation three times a day  albuterol/ipratropium for Nebulization 3 milliLiter(s) Nebulizer every 6 hours  apixaban 5 milliGRAM(s) Oral every 12 hours  aspirin enteric coated 81 milliGRAM(s) Oral daily  benzonatate 100 milliGRAM(s) Oral three times a day  budesonide  80 MICROgram(s)/formoterol 4.5 MICROgram(s) Inhaler 2 Puff(s) Inhalation two times a day  guaiFENesin  milliGRAM(s) Oral every 12 hours  influenza  Vaccine (HIGH DOSE) 0.7 milliLiter(s) IntraMuscular once  losartan 25 milliGRAM(s) Oral daily  methylPREDNISolone sodium succinate Injectable 40 milliGRAM(s) IV Push every 12 hours  montelukast 10 milliGRAM(s) Oral daily  nystatin Powder 1 Application(s) Topical two times a day  pantoprazole    Tablet 40 milliGRAM(s) Oral before breakfast  simvastatin 10 milliGRAM(s) Oral at bedtime    MEDICATIONS  (PRN):  acetaminophen     Tablet .. 650 milliGRAM(s) Oral every 6 hours PRN Temp greater or equal to 38C (100.4F), Mild Pain (1 - 3)  ondansetron Injectable 4 milliGRAM(s) IV Push every 8 hours PRN Nausea and/or Vomiting      Allergies    gabapentin (Unknown)    Intolerances        LABS:                        9.2    16.16 )-----------( 446      ( 03 Apr 2023 06:14 )             28.7     04-03    139  |  111<H>  |  51<H>  ----------------------------<  215<H>  4.3   |  19<L>  |  1.09    Ca    9.5      03 Apr 2023 06:14  Phos  2.7     04-03  Mg     2.2     04-03    TPro  6.8  /  Alb  2.4<L>  /  TBili  0.2  /  DBili  x   /  AST  46<H>  /  ALT  59  /  AlkPhos  71  04-03              CAPILLARY BLOOD GLUCOSE            RADIOLOGY & ADDITIONAL TESTS:    CXR:    Ct scan chest:    ekg;    echo:

## 2023-04-03 NOTE — DIETITIAN INITIAL EVALUATION ADULT - DIET TYPE
Add Glucerna Shake 1can bid as medically feasible (440kcal, 20g protein)/consistent carbohydrate (no snacks)/DASH/TLC (sodium and cholesterol restricted diet)/easy to chew

## 2023-04-04 ENCOUNTER — TRANSCRIPTION ENCOUNTER (OUTPATIENT)
Age: 88
End: 2023-04-04

## 2023-04-04 VITALS
RESPIRATION RATE: 18 BRPM | OXYGEN SATURATION: 95 % | SYSTOLIC BLOOD PRESSURE: 138 MMHG | DIASTOLIC BLOOD PRESSURE: 79 MMHG | HEART RATE: 119 BPM

## 2023-04-04 LAB
ANION GAP SERPL CALC-SCNC: 6 MMOL/L — SIGNIFICANT CHANGE UP (ref 5–17)
BUN SERPL-MCNC: 40 MG/DL — HIGH (ref 7–18)
CALCIUM SERPL-MCNC: 9.2 MG/DL — SIGNIFICANT CHANGE UP (ref 8.4–10.5)
CHLORIDE SERPL-SCNC: 110 MMOL/L — HIGH (ref 96–108)
CO2 SERPL-SCNC: 20 MMOL/L — LOW (ref 22–31)
CREAT SERPL-MCNC: 0.81 MG/DL — SIGNIFICANT CHANGE UP (ref 0.5–1.3)
EGFR: 71 ML/MIN/1.73M2 — SIGNIFICANT CHANGE UP
GLUCOSE SERPL-MCNC: 147 MG/DL — HIGH (ref 70–99)
HCT VFR BLD CALC: 27.3 % — LOW (ref 34.5–45)
HGB BLD-MCNC: 8.8 G/DL — LOW (ref 11.5–15.5)
MCHC RBC-ENTMCNC: 30.2 PG — SIGNIFICANT CHANGE UP (ref 27–34)
MCHC RBC-ENTMCNC: 32.2 GM/DL — SIGNIFICANT CHANGE UP (ref 32–36)
MCV RBC AUTO: 93.8 FL — SIGNIFICANT CHANGE UP (ref 80–100)
NRBC # BLD: 0 /100 WBCS — SIGNIFICANT CHANGE UP (ref 0–0)
PLATELET # BLD AUTO: 405 K/UL — HIGH (ref 150–400)
POTASSIUM SERPL-MCNC: 3.4 MMOL/L — LOW (ref 3.5–5.3)
POTASSIUM SERPL-SCNC: 3.4 MMOL/L — LOW (ref 3.5–5.3)
RBC # BLD: 2.91 M/UL — LOW (ref 3.8–5.2)
RBC # FLD: 15.6 % — HIGH (ref 10.3–14.5)
SODIUM SERPL-SCNC: 136 MMOL/L — SIGNIFICANT CHANGE UP (ref 135–145)
WBC # BLD: 14.21 K/UL — HIGH (ref 3.8–10.5)
WBC # FLD AUTO: 14.21 K/UL — HIGH (ref 3.8–10.5)

## 2023-04-04 PROCEDURE — 86850 RBC ANTIBODY SCREEN: CPT

## 2023-04-04 PROCEDURE — 85610 PROTHROMBIN TIME: CPT

## 2023-04-04 PROCEDURE — 99285 EMERGENCY DEPT VISIT HI MDM: CPT

## 2023-04-04 PROCEDURE — 86900 BLOOD TYPING SEROLOGIC ABO: CPT

## 2023-04-04 PROCEDURE — 0225U NFCT DS DNA&RNA 21 SARSCOV2: CPT

## 2023-04-04 PROCEDURE — 83880 ASSAY OF NATRIURETIC PEPTIDE: CPT

## 2023-04-04 PROCEDURE — 82803 BLOOD GASES ANY COMBINATION: CPT

## 2023-04-04 PROCEDURE — 97161 PT EVAL LOW COMPLEX 20 MIN: CPT

## 2023-04-04 PROCEDURE — 36415 COLL VENOUS BLD VENIPUNCTURE: CPT

## 2023-04-04 PROCEDURE — 93306 TTE W/DOPPLER COMPLETE: CPT

## 2023-04-04 PROCEDURE — 93970 EXTREMITY STUDY: CPT

## 2023-04-04 PROCEDURE — 85025 COMPLETE CBC W/AUTO DIFF WBC: CPT

## 2023-04-04 PROCEDURE — 83036 HEMOGLOBIN GLYCOSYLATED A1C: CPT

## 2023-04-04 PROCEDURE — 84484 ASSAY OF TROPONIN QUANT: CPT

## 2023-04-04 PROCEDURE — 83605 ASSAY OF LACTIC ACID: CPT

## 2023-04-04 PROCEDURE — 71275 CT ANGIOGRAPHY CHEST: CPT | Mod: MA

## 2023-04-04 PROCEDURE — 86901 BLOOD TYPING SEROLOGIC RH(D): CPT

## 2023-04-04 PROCEDURE — 80053 COMPREHEN METABOLIC PANEL: CPT

## 2023-04-04 PROCEDURE — 86870 RBC ANTIBODY IDENTIFICATION: CPT

## 2023-04-04 PROCEDURE — 83735 ASSAY OF MAGNESIUM: CPT

## 2023-04-04 PROCEDURE — 80048 BASIC METABOLIC PNL TOTAL CA: CPT

## 2023-04-04 PROCEDURE — 97530 THERAPEUTIC ACTIVITIES: CPT

## 2023-04-04 PROCEDURE — 82962 GLUCOSE BLOOD TEST: CPT

## 2023-04-04 PROCEDURE — 84100 ASSAY OF PHOSPHORUS: CPT

## 2023-04-04 PROCEDURE — 0001U RBC DNA HEA 35 AG 11 BLD GRP: CPT

## 2023-04-04 PROCEDURE — 93005 ELECTROCARDIOGRAM TRACING: CPT

## 2023-04-04 PROCEDURE — 86880 COOMBS TEST DIRECT: CPT

## 2023-04-04 PROCEDURE — 71045 X-RAY EXAM CHEST 1 VIEW: CPT

## 2023-04-04 PROCEDURE — 87637 SARSCOV2&INF A&B&RSV AMP PRB: CPT

## 2023-04-04 PROCEDURE — 85027 COMPLETE CBC AUTOMATED: CPT

## 2023-04-04 PROCEDURE — 94640 AIRWAY INHALATION TREATMENT: CPT

## 2023-04-04 PROCEDURE — 85730 THROMBOPLASTIN TIME PARTIAL: CPT

## 2023-04-04 RX ADMIN — Medication 50 MILLIGRAM(S): at 05:33

## 2023-04-04 RX ADMIN — Medication 100 MILLIGRAM(S): at 05:33

## 2023-04-04 RX ADMIN — Medication 3 MILLILITER(S): at 08:28

## 2023-04-04 RX ADMIN — PANTOPRAZOLE SODIUM 40 MILLIGRAM(S): 20 TABLET, DELAYED RELEASE ORAL at 05:50

## 2023-04-04 RX ADMIN — APIXABAN 5 MILLIGRAM(S): 2.5 TABLET, FILM COATED ORAL at 05:32

## 2023-04-04 RX ADMIN — Medication 600 MILLIGRAM(S): at 05:32

## 2023-04-04 RX ADMIN — Medication 3 MILLILITER(S): at 02:39

## 2023-04-04 RX ADMIN — LOSARTAN POTASSIUM 25 MILLIGRAM(S): 100 TABLET, FILM COATED ORAL at 05:32

## 2023-04-04 RX ADMIN — NYSTATIN CREAM 1 APPLICATION(S): 100000 CREAM TOPICAL at 05:50

## 2023-04-04 RX ADMIN — BUDESONIDE AND FORMOTEROL FUMARATE DIHYDRATE 2 PUFF(S): 160; 4.5 AEROSOL RESPIRATORY (INHALATION) at 09:39

## 2023-04-04 NOTE — PROGRESS NOTE ADULT - NUTRITIONAL ASSESSMENT
This patient has been assessed with a concern for Malnutrition and has been determined to have a diagnosis/diagnoses of Moderate protein-calorie malnutrition.    This patient is being managed with:   Diet Consistent Carbohydrate/No Snacks-  DASH/TLC {Sodium & Cholesterol Restricted}  Easy to Chew (EASYTOCHEW)  Supplement Feeding Modality:  Oral  Glucerna Shake Cans or Servings Per Day:  1       Frequency:  Two Times a day  Entered: Apr  3 2023 11:38AM

## 2023-04-04 NOTE — PROGRESS NOTE ADULT - PROBLEM SELECTOR PROBLEM 1
Pulmonary embolism
Shortness of breath
Shortness of breath
Pulmonary embolism
Pulmonary embolism
Shortness of breath
Pulmonary embolism
Shortness of breath

## 2023-04-04 NOTE — DISCHARGE NOTE NURSING/CASE MANAGEMENT/SOCIAL WORK - NSDCPEFALRISK_GEN_ALL_CORE
For information on Fall & Injury Prevention, visit: [email or web address] OR  [email or web address] OR  https://www.cdc.gov/steadi/patient.html

## 2023-04-04 NOTE — PROGRESS NOTE ADULT - PROBLEM SELECTOR PLAN 3
h/o Asthma takes Albuterol PRN/ Montelukast 10mg daily  not in exacerbation  COVID negative but noted to have prior COVID 2-months ago   CTA chest shows b/l PE progression without infectious porcess   s/p duonebs x3, prednisone 50mg x1, and FD lovenox x1 in the ED with improvement  - c/w albuterol PRN and montelukast 10mg daily  - c/w spiriva  Pulmonary consulted: Dr. Hernandes
h/o Asthma takes Albuterol PRN/ Montelukast 10mg daily  not in exacerbation  COVID negative but noted to have prior COVID 2-months ago   CTA chest shows b/l PE progression without infectious porcess   s/p duonebs x3, prednisone 50mg x1, and FD lovenox x1 in the ED with improvement  (+) mild B/L crackle with expiratory wheezing on exam, improved   - c/w albuterol PRN and montelukast 10mg daily  Pulmonary consulted: Dr. Hernandes
monitor BP  cont meds
h/o Asthma takes Albuterol PRN/ Montelukast 10mg daily  not in exacerbation  COVID negative but noted to have prior COVID 2-months ago   CTA chest shows b/l PE progression without infectious porcess   s/p duonebs x3, prednisone 50mg x1, and FD lovenox x1 in the ED with improvement  (+) mild B/L crackle with expiratory wheezing on exam, improved   - c/w albuterol PRN and montelukast 10mg daily  - start spiriva  Pulmonary consulted: Dr. Hernandes
h/o Asthma takes Albuterol PRN/ Montelukast 10mg daily  not in exacerbation  COVID negative but noted to have prior COVID 2-months ago   CTA chest shows b/l PE progression without infectious porcess   s/p duonebs x3, prednisone 50mg x1, and FD lovenox x1 in the ED with improvement  - c/w albuterol PRN and montelukast 10mg daily  - c/w spiriva  - IV solumedrol 48 hours  Pulmonary consulted: Dr. Hernandes
monitor BP  cont meds
h/o Asthma takes Albuterol PRN/ Montelukast 10mg daily  not in exacerbation  COVID negative but noted to have prior COVID 2-months ago   CTA chest shows b/l PE progression without infectious porcess   s/p duonebs x3, prednisone 50mg x1, and FD lovenox x1 in the ED with improvement  (+) mild B/L crackle with expiratory wheezing on exam, improved   - c/w albuterol PRN and montelukast 10mg daily  Pulmonary consulted: Dr. Hernandes
ruba PENA  cont meds
monitor BP  cont meds
h/o Asthma takes Albuterol PRN/ Montelukast 10mg daily  not in exacerbation  COVID negative but noted to have prior COVID 2-months ago   CTA chest shows b/l PE progression without infectious porcess   s/p duonebs x3, prednisone 50mg x1, and FD lovenox x1 in the ED with improvement  (+) mild B/L crackle with expiratory wheezing on exam, improved   - c/w albuterol PRN and montelukast 10mg daily  - start spiriva  Pulmonary consulted: Dr. Hernandes

## 2023-04-04 NOTE — PROGRESS NOTE ADULT - PROBLEM SELECTOR PLAN 2
Bronchodilators prn  steroids IV for 48hs  montelukast 10 mgs po Qhs  Symbicort HFA 80/4.5 mcgs 2 puffs po BID  PFTs as OP
h/o recently diagnosed b/l PE in the setting of malignancy w/ METS no longer on AC  EKG shows NSR,   CTA chest: b/l PE in distal portions right and left main pulmonary arteries extending into the RML and bibasilar pulmonary artery branches w/ increased clot  burden in distal left main pulmonary artery, slightly increased compared to previous exam.   Lactate downtrending with fluids 3.9 > 3.0 > 2.8 (likely secondary to metformin use at home)   Troponin neg x2  Not in respiratory distress never hypoxic, and without chest pain.   Admit to tele   - c/w FD Lovenox   - f/u repeat TTE to eval for heart strain in the setting of progressive PE   [facility] consulted, follows Dr. Alvarado  Cardiology consulted: Dr. Jennings
Bronchodilators prn  steroids IV for 48hs  montelukast 10 mgs po Qhs  Symbicort HFA 80/4.5 mcgs 2 puffs po BID  PFTs as OP
Bronchodilators prn  steroids IV for 48hs  montelukast 10 mgs po Qhs  Symbicort HFA 80/4.5 mcgs 2 puffs po BID  PFTs as OP
h/o recently diagnosed b/l PE in the setting of malignancy w/ METS no longer on AC  EKG shows NSR,   CTA chest: b/l PE in distal portions right and left main pulmonary arteries extending into the RML and bibasilar pulmonary artery branches w/ increased clot  burden in distal left main pulmonary artery, slightly increased compared to previous exam.   Lactate downtrending with fluids 3.9 > 3.0 > 2.8 (likely secondary to metformin use at home)   Troponin neg x2  Not in respiratory distress never hypoxic, and without chest pain.   Admit to tele   - c/w FD Lovenox   - f/u repeat TTE to eval for heart strain in the setting of progressive PE   [facility] consulted, follows Dr. Alvarado  Cardiology consulted: Dr. Jennings
Bronchodilators prn  montelukast 10 mgs po Qhs  Symbicort HFA 80/4.5 mcgs 2 puffs po BID  PFTs as OP
Bronchodilators prn  montelukast 10 mgs po Qhs  Symbicort HFA 80/4.5 mcgs 2 puffs po BID  PFTs as oP
h/o recently diagnosed b/l PE in the setting of malignancy w/ METS no longer on AC  EKG shows NSR,   CTA chest: b/l PE in distal portions right and left main pulmonary arteries extending into the RML and bibasilar pulmonary artery branches w/ increased clot  burden in distal left main pulmonary artery, slightly increased compared to previous exam.   Lactate downtrending with fluids 3.9 > 3.0 > 2.8 (likely secondary to metformin use at home)   Troponin neg x2  Not in respiratory distress never hypoxic, and without chest pain.   Admit to tele   Echo EF >55%, inc R ventricle pressure  - c/w eliquis  [facility] on board follows Dr. Alvarado  Cardiology on board, Dr. Jennings
Bronchodilators prn  steroids IV for 48hs then po  montelukast 10 mgs po Qhs  Symbicort HFA 80/4.5 mcgs 2 puffs po BID  PFTs as OP
h/o recently diagnosed b/l PE in the setting of malignancy w/ METS no longer on AC  EKG shows NSR,   CTA chest: b/l PE in distal portions right and left main pulmonary arteries extending into the RML and bibasilar pulmonary artery branches w/ increased clot  burden in distal left main pulmonary artery, slightly increased compared to previous exam.   Lactate downtrending with fluids 3.9 > 3.0 > 2.8 (likely secondary to metformin use at home)   Troponin neg x2  Not in respiratory distress never hypoxic, and without chest pain.   Admit to tele   Echo EF >55%, inc R ventricle pressure  - Switch Lovenox to Eliquis (okay to start maintenance dose in the AM per QMA)  [facility] consulted, follows Dr. Alvarado  Cardiology consulted: Dr. Jennings
h/o recently diagnosed b/l PE in the setting of malignancy w/ METS no longer on AC  EKG shows NSR,   CTA chest: b/l PE in distal portions right and left main pulmonary arteries extending into the RML and bibasilar pulmonary artery branches w/ increased clot  burden in distal left main pulmonary artery, slightly increased compared to previous exam.   Lactate downtrending with fluids 3.9 > 3.0 > 2.8 (likely secondary to metformin use at home)   Troponin neg x2  Not in respiratory distress never hypoxic, and without chest pain.   Admit to tele   Echo EF >55%, inc R ventricle pressure  - Switch Lovenox to Eliquis (okay to start maintenance dose in the AM per QMA)  [facility] consulted, follows Dr. Alvarado  Cardiology consulted: Dr. Jennings
h/o recently diagnosed b/l PE in the setting of malignancy w/ METS no longer on AC  EKG shows NSR,   CTA chest: b/l PE in distal portions right and left main pulmonary arteries extending into the RML and bibasilar pulmonary artery branches w/ increased clot  burden in distal left main pulmonary artery, slightly increased compared to previous exam.   Lactate downtrending with fluids 3.9 > 3.0 > 2.8 (likely secondary to metformin use at home)   Troponin neg x2  Not in respiratory distress never hypoxic, and without chest pain.   Admit to tele   Echo EF >55%, inc R ventricle pressure  - c/w eliquis  [facility] on board follows Dr. Alvarado  Cardiology on board, Dr. Jennings

## 2023-04-04 NOTE — PROGRESS NOTE ADULT - PROBLEM SELECTOR PROBLEM 3
HTN (hypertension)
Asthma
HTN (hypertension)
Asthma
HTN (hypertension)
Asthma
Asthma
HTN (hypertension)
Asthma
Asthma

## 2023-04-04 NOTE — PROGRESS NOTE ADULT - SUBJECTIVE AND OBJECTIVE BOX
Patient is a 88y old  Female who presents with a chief complaint of Wheezing, SOB (03 Apr 2023 11:06)    pt seen in icu [  ], reg med floor [ x  ], bed [ x ], chair at bedside [   ], a+o x3 [x  ], lethargic [  ],    nad [x  ]      Allergies    gabapentin (Unknown)        Vitals    T(F): 98.1 (04-04-23 @ 04:44), Max: 99.6 (04-03-23 @ 21:15)  HR: 93 (04-04-23 @ 04:44) (84 - 93)  BP: 139/67 (04-04-23 @ 04:44) (133/63 - 164/78)  RR: 18 (04-04-23 @ 04:44) (17 - 18)  SpO2: 98% (04-04-23 @ 04:44) (92% - 98%)  Wt(kg): --  CAPILLARY BLOOD GLUCOSE      POCT Blood Glucose.: 254 mg/dL (02 Apr 2023 07:59)      Labs                          9.2    16.16 )-----------( 446      ( 03 Apr 2023 06:14 )             28.7       04-03    139  |  111<H>  |  51<H>  ----------------------------<  215<H>  4.3   |  19<L>  |  1.09    Ca    9.5      03 Apr 2023 06:14  Phos  2.7     04-03  Mg     2.2     04-03    TPro  6.8  /  Alb  2.4<L>  /  TBili  0.2  /  DBili  x   /  AST  46<H>  /  ALT  59  /  AlkPhos  71  04-03                Radiology Results      Meds    MEDICATIONS  (STANDING):  albuterol    90 MICROgram(s) HFA Inhaler 1 Puff(s) Inhalation three times a day  albuterol/ipratropium for Nebulization 3 milliLiter(s) Nebulizer every 6 hours  apixaban 5 milliGRAM(s) Oral every 12 hours  aspirin enteric coated 81 milliGRAM(s) Oral daily  benzonatate 100 milliGRAM(s) Oral three times a day  budesonide  80 MICROgram(s)/formoterol 4.5 MICROgram(s) Inhaler 2 Puff(s) Inhalation two times a day  guaiFENesin  milliGRAM(s) Oral every 12 hours  influenza  Vaccine (HIGH DOSE) 0.7 milliLiter(s) IntraMuscular once  losartan 25 milliGRAM(s) Oral daily  montelukast 10 milliGRAM(s) Oral daily  nystatin Powder 1 Application(s) Topical two times a day  pantoprazole    Tablet 40 milliGRAM(s) Oral before breakfast  predniSONE   Tablet 50 milliGRAM(s) Oral every 24 hours  simvastatin 10 milliGRAM(s) Oral at bedtime      MEDICATIONS  (PRN):  acetaminophen     Tablet .. 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 Extensive deep vein thrombosis in the left leg as noted     advised pt the importance of taking and cont anticoagulation for her condition  heme onc f/u   pt on on darzalex/revlimid/dex last given 3/15/23  hold chemo during admisison   cont eliquis   Pt should received long term AC.   Pt will be followup with Dr. smith outpatient.   change solumedrol to prednisone 40mg po daily and taper by 10mg q 2 days  cont tessalon pereles tid x 3 days   pulm f/u    Bronchodilators  Symbicort /4.5 mcgs 2 puffs po BID  PFTs as OP.  conservative manage for entero-rhino virus   cont supplemental O2 prn   cardio f/u    hold bp medication  echo with EF = 55 to 60%. RV systolic pressure is moderately increased noted above  phys tx eval noted anr rec phys tx 3-5x/week x 6-9 weeks at Home PT;    cont current meds  d/c plan pending home O2 delivery       Patient is a 88y old  Female who presents with a chief complaint of Wheezing, SOB (03 Apr 2023 11:06)    pt seen in icu [  ], reg med floor [ x  ], bed [ x ], chair at bedside [   ], a+o x3 [x  ], lethargic [  ],    nad [x  ]      Allergies    gabapentin (Unknown)        Vitals    T(F): 98.1 (04-04-23 @ 04:44), Max: 99.6 (04-03-23 @ 21:15)  HR: 93 (04-04-23 @ 04:44) (84 - 93)  BP: 139/67 (04-04-23 @ 04:44) (133/63 - 164/78)  RR: 18 (04-04-23 @ 04:44) (17 - 18)  SpO2: 98% (04-04-23 @ 04:44) (92% - 98%)  Wt(kg): --  CAPILLARY BLOOD GLUCOSE      POCT Blood Glucose.: 254 mg/dL (02 Apr 2023 07:59)      Labs                          9.2    16.16 )-----------( 446      ( 03 Apr 2023 06:14 )             28.7       04-03    139  |  111<H>  |  51<H>  ----------------------------<  215<H>  4.3   |  19<L>  |  1.09    Ca    9.5      03 Apr 2023 06:14  Phos  2.7     04-03  Mg     2.2     04-03    TPro  6.8  /  Alb  2.4<L>  /  TBili  0.2  /  DBili  x   /  AST  46<H>  /  ALT  59  /  AlkPhos  71  04-03                Radiology Results      Meds    MEDICATIONS  (STANDING):  albuterol    90 MICROgram(s) HFA Inhaler 1 Puff(s) Inhalation three times a day  albuterol/ipratropium for Nebulization 3 milliLiter(s) Nebulizer every 6 hours  apixaban 5 milliGRAM(s) Oral every 12 hours  aspirin enteric coated 81 milliGRAM(s) Oral daily  benzonatate 100 milliGRAM(s) Oral three times a day  budesonide  80 MICROgram(s)/formoterol 4.5 MICROgram(s) Inhaler 2 Puff(s) Inhalation two times a day  guaiFENesin  milliGRAM(s) Oral every 12 hours  influenza  Vaccine (HIGH DOSE) 0.7 milliLiter(s) IntraMuscular once  losartan 25 milliGRAM(s) Oral daily  montelukast 10 milliGRAM(s) Oral daily  nystatin Powder 1 Application(s) Topical two times a day  pantoprazole    Tablet 40 milliGRAM(s) Oral before breakfast  predniSONE   Tablet 50 milliGRAM(s) Oral every 24 hours  simvastatin 10 milliGRAM(s) Oral at bedtime      MEDICATIONS  (PRN):  acetaminophen     Tablet .. 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 Extensive deep vein thrombosis in the left leg as noted     advised pt the importance of taking and cont anticoagulation for her condition  heme onc f/u   pt on on darzalex/revlimid/dex last given 3/15/23  hold chemo during admisison   cont eliquis   Pt should received long term AC.   Pt will be followup with Dr. smith outpatient.   change solumedrol to prednisone 40mg po daily and taper by 10mg q 2 days  to complete tessalon pereles tid x 3 days today   cough improved  pulm f/u    Bronchodilators  Symbicort /4.5 mcgs 2 puffs po BID  PFTs as OP.  conservative manage for entero-rhino virus   O2 sat at rest 88% and 85 with ambulation.  pt will benefit from home O2  cont supplemental O2 prn   cardio f/u    hold bp medication  echo with EF = 55 to 60%. RV systolic pressure is moderately increased noted    phys tx eval noted anr rec phys tx 3-5x/week x 6-9 weeks at Home PT;    cont current meds  d/c plan pending home O2 delivery

## 2023-04-04 NOTE — PROGRESS NOTE ADULT - PROBLEM SELECTOR PROBLEM 6
Fever
Prophylactic measure
Fever
Fever
Prophylactic measure
Fever
Prophylactic measure
Fever
Prophylactic measure
Prophylactic measure
Fever
Prophylactic measure
No

## 2023-04-04 NOTE — PROGRESS NOTE ADULT - PROBLEM SELECTOR PLAN 5
Hem/onc follow up
Hem/onc follow up
h/o DM, take metformin 1000mg BID, Steglatro (Ertugliflozin) 15mg qD, and alogloptin 25mg qD at home  will hold oral dm meds while inpatient   f/u A1c  c/w sliding scale  Adjust insulin as indicated  FS ACHS
Hem/onc follow up
h/o DM, take metformin 1000mg BID, Steglatro (Ertugliflozin) 15mg qD, and alogloptin 25mg qD at home  will hold oral dm meds while inpatient   A1c 5.8  c/w sliding scale  Adjust insulin as indicated  FS ACHS
Hem/onc follow up
h/o DM, take metformin 1000mg BID, Steglatro (Ertugliflozin) 15mg qD, and alogliptin 25mg qD at home  will hold oral dm meds while inpatient   A1c 5.8  c/w sliding scale  Adjust insulin as indicated  FS ACHS    DC on home medications, switch metformin possibly
h/o DM, take metformin 1000mg BID, Steglatro (Ertugliflozin) 15mg qD, and alogloptin 25mg qD at home  will hold oral dm meds while inpatient   f/u A1c  c/w sliding scale  Adjust insulin as indicated  FS ACHS

## 2023-04-04 NOTE — PROGRESS NOTE ADULT - SUBJECTIVE AND OBJECTIVE BOX
CHIEF COMPLAINT:Patient is a 88y old  Female who presents with a chief complaint of Wheezing, SOB .Pt appears comfortable.    	  REVIEW OF SYSTEMS:  CONSTITUTIONAL: No fever, weight loss, or fatigue  EYES: No eye pain, visual disturbances, or discharge  ENT:  No difficulty hearing, tinnitus, vertigo; No sinus or throat pain  NECK: No pain or stiffness  RESPIRATORY: No cough, wheezing, chills or hemoptysis; No Shortness of Breath  CARDIOVASCULAR: No chest pain, palpitations, passing out, dizziness, or leg swelling  GASTROINTESTINAL: No abdominal or epigastric pain. No nausea, vomiting, or hematemesis; No diarrhea or constipation. No melena or hematochezia.  GENITOURINARY: No dysuria, frequency, hematuria, or incontinence  NEUROLOGICAL: No headaches, memory loss, loss of strength, numbness, or tremors  SKIN: No itching, burning, rashes, or lesions   LYMPH Nodes: No enlarged glands  ENDOCRINE: No heat or cold intolerance; No hair loss  MUSCULOSKELETAL: No joint pain or swelling; No muscle, back, or extremity pain  PSYCHIATRIC: No depression, anxiety, mood swings, or difficulty sleeping  HEME/LYMPH: No easy bruising, or bleeding gums  ALLERGY AND IMMUNOLOGIC: No hives or eczema	      PHYSICAL EXAM:  T(C): 36.7 (04-04-23 @ 04:44), Max: 37.6 (04-03-23 @ 21:15)  HR: 93 (04-04-23 @ 04:44) (84 - 93)  BP: 139/67 (04-04-23 @ 04:44) (133/63 - 164/78)  RR: 18 (04-04-23 @ 04:44) (17 - 18)  SpO2: 98% (04-04-23 @ 04:44) (92% - 98%)      Appearance: Normal	  HEENT:   Normal oral mucosa, PERRL, EOMI	  Lymphatic: No lymphadenopathy  Cardiovascular: Normal S1 S2, No JVD, No murmurs, No edema  Respiratory: Lungs clear to auscultation	  Psychiatry: A & O x 3, Mood & affect appropriate  Gastrointestinal:  Soft, Non-tender, + BS	  Skin: No rashes, No ecchymoses, No cyanosis	  Neurologic: Non-focal  Extremities: Normal range of motion, No clubbing, cyanosis or edema  Vascular: Peripheral pulses palpable 2+ bilaterally    MEDICATIONS  (STANDING):  albuterol    90 MICROgram(s) HFA Inhaler 1 Puff(s) Inhalation three times a day  albuterol/ipratropium for Nebulization 3 milliLiter(s) Nebulizer every 6 hours  apixaban 5 milliGRAM(s) Oral every 12 hours  aspirin enteric coated 81 milliGRAM(s) Oral daily  benzonatate 100 milliGRAM(s) Oral three times a day  budesonide  80 MICROgram(s)/formoterol 4.5 MICROgram(s) Inhaler 2 Puff(s) Inhalation two times a day  guaiFENesin  milliGRAM(s) Oral every 12 hours  influenza  Vaccine (HIGH DOSE) 0.7 milliLiter(s) IntraMuscular once  losartan 25 milliGRAM(s) Oral daily  montelukast 10 milliGRAM(s) Oral daily  nystatin Powder 1 Application(s) Topical two times a day  pantoprazole    Tablet 40 milliGRAM(s) Oral before breakfast  predniSONE   Tablet 50 milliGRAM(s) Oral every 24 hours  simvastatin 10 milliGRAM(s) Oral at bedtime        LABS:	 	                        8.8    14.21 )-----------( 405      ( 04 Apr 2023 06:12 )             27.3     04-04    136  |  110<H>  |  40<H>  ----------------------------<  147<H>  3.4<L>   |  20<L>  |  0.81    Ca    9.2      04 Apr 2023 06:12  Phos  2.7     04-03  Mg     2.2     04-03    TPro  6.8  /  Alb  2.4<L>  /  TBili  0.2  /  DBili  x   /  AST  46<H>  /  ALT  59  /  AlkPhos  71  04-03    proBNP:   Lipid Profile:   HgA1c:   TSH:

## 2023-04-04 NOTE — PROGRESS NOTE ADULT - PROBLEM/PLAN-2
DISPLAY PLAN FREE TEXT
[Alert] : alert
DISPLAY PLAN FREE TEXT
[Oriented x 3] : ~L oriented x 3
[Well Nourished] : well nourished
[Full Body Skin Exam Performed] : performed
[FreeTextEntry3] : A full skin exam was performed including the scalp, face (including lips, ears, nose and eyes), neck, chest, abdomen, back, buttocks, upper extremities and lower extremities.  The patient declined examination of the genitalia.  \par The exam revealed the following benign growths:\par Seborrheic keratoses - includes the back (darkest on the right mid-back) and the left upper inner arm - all bland by ELM.\par Lentigines - shoulders, UE's, face.\par 
DISPLAY PLAN FREE TEXT

## 2023-04-04 NOTE — PROGRESS NOTE ADULT - REASON FOR ADMISSION
Wheezing, SOB

## 2023-04-04 NOTE — PROGRESS NOTE ADULT - PROVIDER SPECIALTY LIST ADULT
Heme/Onc
Internal Medicine
Internal Medicine
Cardiology
Internal Medicine
Pulmonology
Cardiology
Heme/Onc
Internal Medicine
Cardiology
Cardiology
Internal Medicine
Internal Medicine
Pulmonology
Internal Medicine

## 2023-04-04 NOTE — PROGRESS NOTE ADULT - PROBLEM SELECTOR PROBLEM 5
Gastric cancer
DM (diabetes mellitus)
Gastric cancer
Gastric cancer
DM (diabetes mellitus)
Gastric cancer
DM (diabetes mellitus)
Gastric cancer
Gastric cancer

## 2023-04-04 NOTE — PROGRESS NOTE ADULT - PROBLEM SELECTOR PLAN 4
h/o HTN, takes losartan 25mg daily  currently normotensive   c/w home meds with holding parameters  Monitor BP
Accuchecks with reg insulin coverage  HBA1C
h/o HTN, takes losartan 25mg daily  currently normotensive   c/w home meds with holding parameters  Monitor BP
Accuchecks with reg insulin coverage  HBA1C
Accuchecks with reg insulin coverage  HBA1C
h/o HTN, takes losartan 25mg daily  currently normotensive   c/w home meds with holding parameters  Monitor BP
Accuchecks with reg insulin coverage  HBA1C
h/o HTN, takes losartan 25mg daily  currently normotensive   c/w home meds with holding parameters  Monitor BP

## 2023-04-04 NOTE — PROGRESS NOTE ADULT - SUBJECTIVE AND OBJECTIVE BOX
Patient is a 88y old  Female who presents with a chief complaint of Wheezing, SOB (04 Apr 2023 10:29)  Awake, alert, comfortable in bed in NAD. Still with cough productive of yellowish phlegm. No fever. Remains on oxygen supp via NC    INTERVAL HPI/OVERNIGHT EVENTS:      VITAL SIGNS:  T(F): 98.1 (04-04-23 @ 04:44)  HR: 119 (04-04-23 @ 10:45)  BP: 138/79 (04-04-23 @ 10:45)  RR: 18 (04-04-23 @ 10:45)  SpO2: 95% (04-04-23 @ 10:45)  Wt(kg): --  I&O's Detail          REVIEW OF SYSTEMS:    CONSTITUTIONAL:  No fevers, chills, sweats    HEENT:  Eyes:  No diplopia or blurred vision. ENT:  No earache, sore throat or runny nose.    CARDIOVASCULAR:  No pressure, squeezing, tightness, or heaviness about the chest; no palpitations.    RESPIRATORY:  Per HPI    GASTROINTESTINAL:  No abdominal pain, nausea, vomiting or diarrhea.    GENITOURINARY:  No dysuria, frequency or urgency.    NEUROLOGIC:  No paresthesias, fasciculations, seizures or weakness.    PSYCHIATRIC:  No disorder of thought or mood.      PHYSICAL EXAM:    Constitutional: Well developed and nourished  Eyes:Perrla  ENMT: normal  Neck:supple  Respiratory: Occasional rhonchi B/L  Cardiovascular: S1 S2 regular  Gastrointestinal: Soft, Non tender  Extremities: No edema  Vascular:normal  Neurological:Awake, alert,Ox3  Musculoskeletal:Normal      MEDICATIONS  (STANDING):  albuterol    90 MICROgram(s) HFA Inhaler 1 Puff(s) Inhalation three times a day  albuterol/ipratropium for Nebulization 3 milliLiter(s) Nebulizer every 6 hours  apixaban 5 milliGRAM(s) Oral every 12 hours  aspirin enteric coated 81 milliGRAM(s) Oral daily  benzonatate 100 milliGRAM(s) Oral three times a day  budesonide  80 MICROgram(s)/formoterol 4.5 MICROgram(s) Inhaler 2 Puff(s) Inhalation two times a day  guaiFENesin  milliGRAM(s) Oral every 12 hours  influenza  Vaccine (HIGH DOSE) 0.7 milliLiter(s) IntraMuscular once  losartan 25 milliGRAM(s) Oral daily  montelukast 10 milliGRAM(s) Oral daily  nystatin Powder 1 Application(s) Topical two times a day  pantoprazole    Tablet 40 milliGRAM(s) Oral before breakfast  predniSONE   Tablet 50 milliGRAM(s) Oral every 24 hours  simvastatin 10 milliGRAM(s) Oral at bedtime    MEDICATIONS  (PRN):  acetaminophen     Tablet .. 650 milliGRAM(s) Oral every 6 hours PRN Temp greater or equal to 38C (100.4F), Mild Pain (1 - 3)  hydrocodone/homatropine Syrup 5 milliLiter(s) Oral every 6 hours PRN Cough  ondansetron Injectable 4 milliGRAM(s) IV Push every 8 hours PRN Nausea and/or Vomiting      Allergies    gabapentin (Unknown)    Intolerances        LABS:                        8.8    14.21 )-----------( 405      ( 04 Apr 2023 06:12 )             27.3     04-04    136  |  110<H>  |  40<H>  ----------------------------<  147<H>  3.4<L>   |  20<L>  |  0.81    Ca    9.2      04 Apr 2023 06:12  Phos  2.7     04-03  Mg     2.2     04-03    TPro  6.8  /  Alb  2.4<L>  /  TBili  0.2  /  DBili  x   /  AST  46<H>  /  ALT  59  /  AlkPhos  71  04-03              CAPILLARY BLOOD GLUCOSE            RADIOLOGY & ADDITIONAL TESTS:    CXR:  < from: Xray Chest 1 View- PORTABLE-Routine (Xray Chest 1 View- PORTABLE-Routine in AM.) (04.02.23 @ 09:36) >  Findings/  Impression: The heart is unremarkable. No consolidation.      < end of copied text >    Ct scan chest:    ekg;    echo:

## 2023-04-04 NOTE — PROGRESS NOTE ADULT - PROBLEM SELECTOR PROBLEM 2
Asthma
Pulmonary embolism
Asthma
Pulmonary embolism
Pulmonary embolism
Asthma
Asthma
Pulmonary embolism

## 2023-04-04 NOTE — PROGRESS NOTE ADULT - ASSESSMENT
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 Recently admitted 1/21/23-1/26/23 for acute hypoxic respiratory failure 2/2 new submassive PE and COVID with TTE negative for right heart strain and normal cardiac function and discharged on Eliquis now with B/L PE and Lt DVT.  1.PE and DVT-NOAC as per  Heme/Onc.  2.HTN-cozaar.  3.Gastric Ca-Heme/Onc.  4.DM-Insulin.  5.PPI.  6.Lipid d/o-statin.  7.Replace k+.

## 2023-04-04 NOTE — DISCHARGE NOTE NURSING/CASE MANAGEMENT/SOCIAL WORK - PATIENT PORTAL LINK FT
You can access the FollowMyHealth Patient Portal offered by [facility] by registering at the following website: [email or web address]. By joining [employer]’s FollowMyHealth portal, you will also be able to view your health information using other applications (apps) compatible with our system.

## 2023-04-04 NOTE — PROGRESS NOTE ADULT - PROBLEM SELECTOR PLAN 6
monitor temp and WBC  cultures if fever persist  ID eval
FD Lovenox
Eliquis
Eliquis
monitor temp and WBC  cultures if fever persist  ID eval
Eliquis
Eliquis
FD Lovenox
monitor temp and WBC  cultures if fever persits  ID eval
monitor temp and WBC  cultures if fever persist  ID eval

## 2023-04-04 NOTE — PROGRESS NOTE ADULT - PROBLEM SELECTOR PROBLEM 4
DM (diabetes mellitus)
DM (diabetes mellitus)
HTN (hypertension)
HTN (hypertension)
DM (diabetes mellitus)
HTN (hypertension)
DM (diabetes mellitus)
DM (diabetes mellitus)
HTN (hypertension)
HTN (hypertension)
DM (diabetes mellitus)
HTN (hypertension)

## 2023-04-11 LAB — HUMAN ERYTHROCYTE ANTIGEN PANEL RESULT: SIGNIFICANT CHANGE UP

## 2023-04-13 NOTE — ED ADULT NURSE NOTE - PRO INTERPRETER NEED 2
[place] Cheiloplasty (Less Than 50%) Text: A decision was made to reconstruct the defect with a  cheiloplasty.  The defect was undermined extensively.  Additional orbicularis oris muscle was excised with a 15 blade scalpel.  The defect was converted into a full thickness wedge, of less than 50% of the vertical height of the lip, to facilite a better cosmetic result.  Small vessels were then tied off with 5-0 monocyrl. The orbicularis oris, superficial fascia, adipose and dermis were then reapproximated.  After the deeper layers were approximated the epidermis was reapproximated with particular care given to realign the vermilion border.

## 2023-05-05 NOTE — H&P ADULT - PROBLEM SELECTOR PLAN 3
h/o Asthma takes Albuterol PRN/ Montelukast but unable to recall if on other inhalers   not in exacerbation  COVID negative but noted to have prior COVID 2-months ago   CTA chest shows b/l PE progression without infectious porcess   s/p duonebs x3, prednisone 50mg x1, and FD lovenox x1 in the ED with improvement  (+) mild B/L crackle with expiratory wheezing on exam, improved   c/w home inhalers once confirmed in the AM   Pulmonary consulted: Dr. Hernandes .

## 2023-05-17 NOTE — PROGRESS NOTE ADULT - GASTROINTESTINAL DETAILS
soft/no distention/bowel sounds normal/no guarding/no rigidity Patient with one or more new problems requiring additional work-up/treatment.

## 2023-10-24 ENCOUNTER — EMERGENCY (EMERGENCY)
Facility: HOSPITAL | Age: 88
LOS: 1 days | Discharge: HOME | End: 2023-10-24
Attending: STUDENT IN AN ORGANIZED HEALTH CARE EDUCATION/TRAINING PROGRAM
Payer: MEDICAID

## 2023-10-24 VITALS
HEART RATE: 70 BPM | OXYGEN SATURATION: 98 % | DIASTOLIC BLOOD PRESSURE: 68 MMHG | RESPIRATION RATE: 18 BRPM | TEMPERATURE: 98 F | SYSTOLIC BLOOD PRESSURE: 173 MMHG

## 2023-10-24 DIAGNOSIS — Z90.710 ACQUIRED ABSENCE OF BOTH CERVIX AND UTERUS: Chronic | ICD-10-CM

## 2023-10-24 DIAGNOSIS — Z98.51 TUBAL LIGATION STATUS: Chronic | ICD-10-CM

## 2023-10-24 PROBLEM — C90.00 MULTIPLE MYELOMA NOT HAVING ACHIEVED REMISSION: Chronic | Status: ACTIVE | Noted: 2023-03-29

## 2023-10-24 PROBLEM — R26.2 DIFFICULTY IN WALKING, NOT ELSEWHERE CLASSIFIED: Chronic | Status: ACTIVE | Noted: 2023-03-29

## 2023-10-24 PROBLEM — I26.99 OTHER PULMONARY EMBOLISM WITHOUT ACUTE COR PULMONALE: Chronic | Status: ACTIVE | Noted: 2023-03-29

## 2023-10-24 LAB
ALBUMIN SERPL ELPH-MCNC: 2.9 G/DL — LOW (ref 3.5–5)
ALBUMIN SERPL ELPH-MCNC: 2.9 G/DL — LOW (ref 3.5–5)
ALP SERPL-CCNC: 48 U/L — SIGNIFICANT CHANGE UP (ref 40–120)
ALP SERPL-CCNC: 48 U/L — SIGNIFICANT CHANGE UP (ref 40–120)
ALT FLD-CCNC: 25 U/L DA — SIGNIFICANT CHANGE UP (ref 10–60)
ALT FLD-CCNC: 25 U/L DA — SIGNIFICANT CHANGE UP (ref 10–60)
ANION GAP SERPL CALC-SCNC: 3 MMOL/L — LOW (ref 5–17)
ANION GAP SERPL CALC-SCNC: 3 MMOL/L — LOW (ref 5–17)
APTT BLD: 26.2 SEC — SIGNIFICANT CHANGE UP (ref 24.5–35.6)
APTT BLD: 26.2 SEC — SIGNIFICANT CHANGE UP (ref 24.5–35.6)
AST SERPL-CCNC: 15 U/L — SIGNIFICANT CHANGE UP (ref 10–40)
AST SERPL-CCNC: 15 U/L — SIGNIFICANT CHANGE UP (ref 10–40)
BASOPHILS # BLD AUTO: 0.05 K/UL — SIGNIFICANT CHANGE UP (ref 0–0.2)
BASOPHILS # BLD AUTO: 0.05 K/UL — SIGNIFICANT CHANGE UP (ref 0–0.2)
BASOPHILS NFR BLD AUTO: 0.6 % — SIGNIFICANT CHANGE UP (ref 0–2)
BASOPHILS NFR BLD AUTO: 0.6 % — SIGNIFICANT CHANGE UP (ref 0–2)
BILIRUB SERPL-MCNC: 0.3 MG/DL — SIGNIFICANT CHANGE UP (ref 0.2–1.2)
BILIRUB SERPL-MCNC: 0.3 MG/DL — SIGNIFICANT CHANGE UP (ref 0.2–1.2)
BUN SERPL-MCNC: 16 MG/DL — SIGNIFICANT CHANGE UP (ref 7–18)
BUN SERPL-MCNC: 16 MG/DL — SIGNIFICANT CHANGE UP (ref 7–18)
CALCIUM SERPL-MCNC: 8.3 MG/DL — LOW (ref 8.4–10.5)
CALCIUM SERPL-MCNC: 8.3 MG/DL — LOW (ref 8.4–10.5)
CHLORIDE SERPL-SCNC: 116 MMOL/L — HIGH (ref 96–108)
CHLORIDE SERPL-SCNC: 116 MMOL/L — HIGH (ref 96–108)
CO2 SERPL-SCNC: 23 MMOL/L — SIGNIFICANT CHANGE UP (ref 22–31)
CO2 SERPL-SCNC: 23 MMOL/L — SIGNIFICANT CHANGE UP (ref 22–31)
CREAT SERPL-MCNC: 0.94 MG/DL — SIGNIFICANT CHANGE UP (ref 0.5–1.3)
CREAT SERPL-MCNC: 0.94 MG/DL — SIGNIFICANT CHANGE UP (ref 0.5–1.3)
EGFR: 59 ML/MIN/1.73M2 — LOW
EGFR: 59 ML/MIN/1.73M2 — LOW
EOSINOPHIL # BLD AUTO: 0.08 K/UL — SIGNIFICANT CHANGE UP (ref 0–0.5)
EOSINOPHIL # BLD AUTO: 0.08 K/UL — SIGNIFICANT CHANGE UP (ref 0–0.5)
EOSINOPHIL NFR BLD AUTO: 0.9 % — SIGNIFICANT CHANGE UP (ref 0–6)
EOSINOPHIL NFR BLD AUTO: 0.9 % — SIGNIFICANT CHANGE UP (ref 0–6)
GLUCOSE SERPL-MCNC: 95 MG/DL — SIGNIFICANT CHANGE UP (ref 70–99)
GLUCOSE SERPL-MCNC: 95 MG/DL — SIGNIFICANT CHANGE UP (ref 70–99)
HCT VFR BLD CALC: 31.2 % — LOW (ref 34.5–45)
HCT VFR BLD CALC: 31.2 % — LOW (ref 34.5–45)
HGB BLD-MCNC: 9.9 G/DL — LOW (ref 11.5–15.5)
HGB BLD-MCNC: 9.9 G/DL — LOW (ref 11.5–15.5)
IMM GRANULOCYTES NFR BLD AUTO: 0.5 % — SIGNIFICANT CHANGE UP (ref 0–0.9)
IMM GRANULOCYTES NFR BLD AUTO: 0.5 % — SIGNIFICANT CHANGE UP (ref 0–0.9)
INR BLD: 1.08 RATIO — SIGNIFICANT CHANGE UP (ref 0.85–1.18)
INR BLD: 1.08 RATIO — SIGNIFICANT CHANGE UP (ref 0.85–1.18)
LYMPHOCYTES # BLD AUTO: 2.94 K/UL — SIGNIFICANT CHANGE UP (ref 1–3.3)
LYMPHOCYTES # BLD AUTO: 2.94 K/UL — SIGNIFICANT CHANGE UP (ref 1–3.3)
LYMPHOCYTES # BLD AUTO: 34.5 % — SIGNIFICANT CHANGE UP (ref 13–44)
LYMPHOCYTES # BLD AUTO: 34.5 % — SIGNIFICANT CHANGE UP (ref 13–44)
MCHC RBC-ENTMCNC: 30.2 PG — SIGNIFICANT CHANGE UP (ref 27–34)
MCHC RBC-ENTMCNC: 30.2 PG — SIGNIFICANT CHANGE UP (ref 27–34)
MCHC RBC-ENTMCNC: 31.7 GM/DL — LOW (ref 32–36)
MCHC RBC-ENTMCNC: 31.7 GM/DL — LOW (ref 32–36)
MCV RBC AUTO: 95.1 FL — SIGNIFICANT CHANGE UP (ref 80–100)
MCV RBC AUTO: 95.1 FL — SIGNIFICANT CHANGE UP (ref 80–100)
MONOCYTES # BLD AUTO: 0.82 K/UL — SIGNIFICANT CHANGE UP (ref 0–0.9)
MONOCYTES # BLD AUTO: 0.82 K/UL — SIGNIFICANT CHANGE UP (ref 0–0.9)
MONOCYTES NFR BLD AUTO: 9.6 % — SIGNIFICANT CHANGE UP (ref 2–14)
MONOCYTES NFR BLD AUTO: 9.6 % — SIGNIFICANT CHANGE UP (ref 2–14)
NEUTROPHILS # BLD AUTO: 4.6 K/UL — SIGNIFICANT CHANGE UP (ref 1.8–7.4)
NEUTROPHILS # BLD AUTO: 4.6 K/UL — SIGNIFICANT CHANGE UP (ref 1.8–7.4)
NEUTROPHILS NFR BLD AUTO: 53.9 % — SIGNIFICANT CHANGE UP (ref 43–77)
NEUTROPHILS NFR BLD AUTO: 53.9 % — SIGNIFICANT CHANGE UP (ref 43–77)
NRBC # BLD: 0 /100 WBCS — SIGNIFICANT CHANGE UP (ref 0–0)
NRBC # BLD: 0 /100 WBCS — SIGNIFICANT CHANGE UP (ref 0–0)
PLATELET # BLD AUTO: 196 K/UL — SIGNIFICANT CHANGE UP (ref 150–400)
PLATELET # BLD AUTO: 196 K/UL — SIGNIFICANT CHANGE UP (ref 150–400)
POTASSIUM SERPL-MCNC: 4 MMOL/L — SIGNIFICANT CHANGE UP (ref 3.5–5.3)
POTASSIUM SERPL-MCNC: 4 MMOL/L — SIGNIFICANT CHANGE UP (ref 3.5–5.3)
POTASSIUM SERPL-SCNC: 4 MMOL/L — SIGNIFICANT CHANGE UP (ref 3.5–5.3)
POTASSIUM SERPL-SCNC: 4 MMOL/L — SIGNIFICANT CHANGE UP (ref 3.5–5.3)
PROT SERPL-MCNC: 5.8 G/DL — LOW (ref 6–8.3)
PROT SERPL-MCNC: 5.8 G/DL — LOW (ref 6–8.3)
PROTHROM AB SERPL-ACNC: 12.3 SEC — SIGNIFICANT CHANGE UP (ref 9.5–13)
PROTHROM AB SERPL-ACNC: 12.3 SEC — SIGNIFICANT CHANGE UP (ref 9.5–13)
RBC # BLD: 3.28 M/UL — LOW (ref 3.8–5.2)
RBC # BLD: 3.28 M/UL — LOW (ref 3.8–5.2)
RBC # FLD: 17.9 % — HIGH (ref 10.3–14.5)
RBC # FLD: 17.9 % — HIGH (ref 10.3–14.5)
SODIUM SERPL-SCNC: 142 MMOL/L — SIGNIFICANT CHANGE UP (ref 135–145)
SODIUM SERPL-SCNC: 142 MMOL/L — SIGNIFICANT CHANGE UP (ref 135–145)
WBC # BLD: 8.53 K/UL — SIGNIFICANT CHANGE UP (ref 3.8–10.5)
WBC # BLD: 8.53 K/UL — SIGNIFICANT CHANGE UP (ref 3.8–10.5)
WBC # FLD AUTO: 8.53 K/UL — SIGNIFICANT CHANGE UP (ref 3.8–10.5)
WBC # FLD AUTO: 8.53 K/UL — SIGNIFICANT CHANGE UP (ref 3.8–10.5)

## 2023-10-24 PROCEDURE — 72100 X-RAY EXAM L-S SPINE 2/3 VWS: CPT | Mod: 26

## 2023-10-24 PROCEDURE — 70450 CT HEAD/BRAIN W/O DYE: CPT | Mod: 26,MA

## 2023-10-24 PROCEDURE — 73502 X-RAY EXAM HIP UNI 2-3 VIEWS: CPT | Mod: 26,RT

## 2023-10-24 PROCEDURE — 71045 X-RAY EXAM CHEST 1 VIEW: CPT | Mod: 26

## 2023-10-24 PROCEDURE — 99285 EMERGENCY DEPT VISIT HI MDM: CPT

## 2023-10-24 PROCEDURE — 73562 X-RAY EXAM OF KNEE 3: CPT | Mod: 26,RT

## 2023-10-24 RX ORDER — ACETAMINOPHEN 500 MG
1000 TABLET ORAL ONCE
Refills: 0 | Status: COMPLETED | OUTPATIENT
Start: 2023-10-24 | End: 2023-10-24

## 2023-10-24 RX ADMIN — Medication 400 MILLIGRAM(S): at 17:45

## 2023-10-24 NOTE — ED PROVIDER NOTE - PATIENT PORTAL LINK FT
You can access the FollowMyHealth Patient Portal offered by [facility] by registering at the following website: [email or web address]. By joining [employer]’s FollowMyHealth portal, you will also be able to view your health information using other applications (apps) compatible with our system.

## 2023-10-24 NOTE — ED PROVIDER NOTE - OBJECTIVE STATEMENT
88 y.o w/ pmh of cancer on chemo presenting s.p mechanical fall. endorses head injury. endorses pain to right lower back and hip. denies n, v, loc, cp, sob.

## 2023-10-24 NOTE — ED PROVIDER NOTE - CLINICAL SUMMARY MEDICAL DECISION MAKING FREE TEXT BOX
Patient presenting s/p fall. neuro intact. will obtain ct, ro ich. xray. ro fracture. ed obs and reassess

## 2023-10-24 NOTE — ED ADULT NURSE NOTE - PRIMARY CARE PROVIDER
• This problem is chronic, likely secondary to repetitive movement while working and resultant myofascial restriction. There is an associated musculoskeletal component with somatic dysfunction specifically involving the above noted regions.  • OMT was performed in order to improve ROM and pain.  • The treatment was tolerated well without adverse events with subjective improvement in symptoms.  • Recommendations include continuing stretches, adequate hydration, and return in 8 weeks for reassessment.   cortney

## 2023-10-24 NOTE — ED PROVIDER NOTE - NSFOLLOWUPINSTRUCTIONS_ED_ALL_ED_FT
Dolor de cadera  Hip Pain  La cadera es la articulación entre la parte superior de las piernas y la parte inferior de la pelvis. Los huesos, el cartílago, los tendones y los músculos de la articulación de la cadera sostienen el peso del cuerpo y permiten el desplazamiento.    El dolor de cadera puede tener distintos grados, desde un dolor leve hasta un dolor intenso en song o ambos lados de la cadera. El dolor puede sentirse en la parte interna de la articulación de la cadera, cerca de la jann, o en la parte externa, cerca de las nalgas y la parte superior de los muslos. También puede estar acompañado de hinchazón o rigidez en la shai de la cadera.    Siga estas instrucciones en [facility] casa:  Control del dolor, la rigidez y la hinchazón        Si se lo indican, aplique hielo sobre la shai dolorida. Para hacer esto:  Ponga el hielo en darvin bolsa plástica.  Coloque darvin toalla entre la piel y la bolsa.  Aplique el hielo alexandre 20 minutos, 2 a 3 veces por día.  Si se lo indican, aplique calor en la shai afectada con la frecuencia que le haya indicado el médico. Use la dayanna de calor que el médico le recomiende, mally darvin compresa de calor húmedo o darvin almohadilla térmica.  Coloque dravin toalla entre la piel y la dayanna de calor.  Aplique calor alexandre 20 a 30 minutos.  Retire la dayanna de calor si la piel se pone de color kingsley brillante. [place] es especialmente importante si no puede sentir dolor, calor o frío. Puede correr un riesgo mayor de sufrir quemaduras.  Actividad    Mark ejercicio mally se lo haya indicado el médico.  Evite las actividades que le causan dolor.  Instrucciones generales      Use los medicamentos de venta agatha y los recetados solamente mally te lo haya indicado el médico.  Lleve un registro de los síntomas. Escriba los siguientes datos:  Con qué frecuencia le duele la cadera.  La ubicación del dolor.  Cómo se siente el dolor.  Qué es lo que hace que el dolor empeore.  Duerma con darvin almohada entre las piernas del lado que le sea más cómodo.  Concurra a todas las visitas de seguimiento mally se lo haya indicado el médico. [place] es importante.  Comuníquese con un médico si:  No puede apoyar el peso del cuerpo sobre la pierna.  El dolor o la hinchazón continúan o empeoran después de darvin semana.  Tiene más dificultades para caminar.  Tiene fiebre.  Solicite ayuda inmediatamente si:  Se .  El dolor y la hinchazón de la cadera aumentan de repente.  La cadera está enrojecida o hinchada, o muy dolorida con la palpación.  Resumen  El dolor de cadera puede tener distintos grados, desde un dolor leve hasta un dolor intenso en song o ambos lados de la cadera.  El dolor puede sentirse en la parte interna de la articulación de la cadera, cerca de la jann, o en la parte externa, cerca de las nalgas y la parte superior de los muslos.  Evite las actividades que le causan dolor.  Anote la frecuencia con la que tiene dolor en la cadera, la ubicación del dolor, qué es lo que hace que el dolor empeore y qué siente.  Esta información no tiene mally fin reemplazar el consejo del médico. Asegúrese de hacerle al médico cualquier pregunta que tenga.

## 2023-10-24 NOTE — ED ADULT NURSE NOTE - NSFALLHARMRISKINTERV_ED_ALL_ED

## 2023-10-24 NOTE — ED PROVIDER NOTE - PROGRESS NOTE DETAILS
Patient endorses feeling improved. xray negative for acute fracture. clinically well. given return precaution and instructed to fu pmd

## 2023-10-25 VITALS
SYSTOLIC BLOOD PRESSURE: 179 MMHG | OXYGEN SATURATION: 98 % | DIASTOLIC BLOOD PRESSURE: 66 MMHG | HEART RATE: 75 BPM | RESPIRATION RATE: 18 BRPM | TEMPERATURE: 98 F

## 2023-10-25 PROCEDURE — 80053 COMPREHEN METABOLIC PANEL: CPT

## 2023-10-25 PROCEDURE — 70450 CT HEAD/BRAIN W/O DYE: CPT | Mod: MA

## 2023-10-25 PROCEDURE — 73562 X-RAY EXAM OF KNEE 3: CPT

## 2023-10-25 PROCEDURE — 96374 THER/PROPH/DIAG INJ IV PUSH: CPT

## 2023-10-25 PROCEDURE — 72100 X-RAY EXAM L-S SPINE 2/3 VWS: CPT

## 2023-10-25 PROCEDURE — 85730 THROMBOPLASTIN TIME PARTIAL: CPT

## 2023-10-25 PROCEDURE — 36415 COLL VENOUS BLD VENIPUNCTURE: CPT

## 2023-10-25 PROCEDURE — 85610 PROTHROMBIN TIME: CPT

## 2023-10-25 PROCEDURE — 86850 RBC ANTIBODY SCREEN: CPT

## 2023-10-25 PROCEDURE — 71045 X-RAY EXAM CHEST 1 VIEW: CPT

## 2023-10-25 PROCEDURE — 86870 RBC ANTIBODY IDENTIFICATION: CPT

## 2023-10-25 PROCEDURE — 85025 COMPLETE CBC W/AUTO DIFF WBC: CPT

## 2023-10-25 PROCEDURE — 86900 BLOOD TYPING SEROLOGIC ABO: CPT

## 2023-10-25 PROCEDURE — 99284 EMERGENCY DEPT VISIT MOD MDM: CPT | Mod: 25

## 2023-10-25 PROCEDURE — 73502 X-RAY EXAM HIP UNI 2-3 VIEWS: CPT

## 2023-10-25 PROCEDURE — 86901 BLOOD TYPING SEROLOGIC RH(D): CPT

## 2023-11-29 ENCOUNTER — EMERGENCY (EMERGENCY)
Facility: HOSPITAL | Age: 88
LOS: 1 days | Discharge: HOME | End: 2023-11-29
Attending: STUDENT IN AN ORGANIZED HEALTH CARE EDUCATION/TRAINING PROGRAM
Payer: MEDICAID

## 2023-11-29 VITALS
SYSTOLIC BLOOD PRESSURE: 133 MMHG | HEART RATE: 86 BPM | OXYGEN SATURATION: 100 % | TEMPERATURE: 98 F | DIASTOLIC BLOOD PRESSURE: 70 MMHG | RESPIRATION RATE: 16 BRPM

## 2023-11-29 VITALS
HEART RATE: 67 BPM | RESPIRATION RATE: 16 BRPM | OXYGEN SATURATION: 98 % | WEIGHT: 100.09 LBS | SYSTOLIC BLOOD PRESSURE: 137 MMHG | DIASTOLIC BLOOD PRESSURE: 57 MMHG | HEIGHT: 61 IN | TEMPERATURE: 98 F

## 2023-11-29 DIAGNOSIS — Z90.710 ACQUIRED ABSENCE OF BOTH CERVIX AND UTERUS: Chronic | ICD-10-CM

## 2023-11-29 DIAGNOSIS — Z98.51 TUBAL LIGATION STATUS: Chronic | ICD-10-CM

## 2023-11-29 LAB
ALBUMIN SERPL ELPH-MCNC: 2.6 G/DL — LOW (ref 3.5–5)
ALBUMIN SERPL ELPH-MCNC: 2.6 G/DL — LOW (ref 3.5–5)
ALP SERPL-CCNC: 66 U/L — SIGNIFICANT CHANGE UP (ref 40–120)
ALP SERPL-CCNC: 66 U/L — SIGNIFICANT CHANGE UP (ref 40–120)
ALT FLD-CCNC: 18 U/L DA — SIGNIFICANT CHANGE UP (ref 10–60)
ALT FLD-CCNC: 18 U/L DA — SIGNIFICANT CHANGE UP (ref 10–60)
ANION GAP SERPL CALC-SCNC: 7 MMOL/L — SIGNIFICANT CHANGE UP (ref 5–17)
ANION GAP SERPL CALC-SCNC: 7 MMOL/L — SIGNIFICANT CHANGE UP (ref 5–17)
APPEARANCE UR: ABNORMAL
APPEARANCE UR: ABNORMAL
APTT BLD: 32 SEC — SIGNIFICANT CHANGE UP (ref 24.5–35.6)
APTT BLD: 32 SEC — SIGNIFICANT CHANGE UP (ref 24.5–35.6)
AST SERPL-CCNC: 11 U/L — SIGNIFICANT CHANGE UP (ref 10–40)
AST SERPL-CCNC: 11 U/L — SIGNIFICANT CHANGE UP (ref 10–40)
BACTERIA # UR AUTO: ABNORMAL /HPF
BACTERIA # UR AUTO: ABNORMAL /HPF
BASOPHILS # BLD AUTO: 0.04 K/UL — SIGNIFICANT CHANGE UP (ref 0–0.2)
BASOPHILS # BLD AUTO: 0.04 K/UL — SIGNIFICANT CHANGE UP (ref 0–0.2)
BASOPHILS NFR BLD AUTO: 0.6 % — SIGNIFICANT CHANGE UP (ref 0–2)
BASOPHILS NFR BLD AUTO: 0.6 % — SIGNIFICANT CHANGE UP (ref 0–2)
BILIRUB SERPL-MCNC: 0.3 MG/DL — SIGNIFICANT CHANGE UP (ref 0.2–1.2)
BILIRUB SERPL-MCNC: 0.3 MG/DL — SIGNIFICANT CHANGE UP (ref 0.2–1.2)
BILIRUB UR-MCNC: NEGATIVE — SIGNIFICANT CHANGE UP
BILIRUB UR-MCNC: NEGATIVE — SIGNIFICANT CHANGE UP
BLD GP AB SCN SERPL QL: SIGNIFICANT CHANGE UP
BLD GP AB SCN SERPL QL: SIGNIFICANT CHANGE UP
BUN SERPL-MCNC: 19 MG/DL — HIGH (ref 7–18)
BUN SERPL-MCNC: 19 MG/DL — HIGH (ref 7–18)
CALCIUM SERPL-MCNC: 8 MG/DL — LOW (ref 8.4–10.5)
CALCIUM SERPL-MCNC: 8 MG/DL — LOW (ref 8.4–10.5)
CHLORIDE SERPL-SCNC: 115 MMOL/L — HIGH (ref 96–108)
CHLORIDE SERPL-SCNC: 115 MMOL/L — HIGH (ref 96–108)
CO2 SERPL-SCNC: 21 MMOL/L — LOW (ref 22–31)
CO2 SERPL-SCNC: 21 MMOL/L — LOW (ref 22–31)
COLOR SPEC: YELLOW — SIGNIFICANT CHANGE UP
COLOR SPEC: YELLOW — SIGNIFICANT CHANGE UP
COMMENT - URINE: SIGNIFICANT CHANGE UP
COMMENT - URINE: SIGNIFICANT CHANGE UP
CREAT SERPL-MCNC: 1.03 MG/DL — SIGNIFICANT CHANGE UP (ref 0.5–1.3)
CREAT SERPL-MCNC: 1.03 MG/DL — SIGNIFICANT CHANGE UP (ref 0.5–1.3)
DIFF PNL FLD: ABNORMAL
DIFF PNL FLD: ABNORMAL
EGFR: 53 ML/MIN/1.73M2 — LOW
EGFR: 53 ML/MIN/1.73M2 — LOW
EOSINOPHIL # BLD AUTO: 0.2 K/UL — SIGNIFICANT CHANGE UP (ref 0–0.5)
EOSINOPHIL # BLD AUTO: 0.2 K/UL — SIGNIFICANT CHANGE UP (ref 0–0.5)
EOSINOPHIL NFR BLD AUTO: 3 % — SIGNIFICANT CHANGE UP (ref 0–6)
EOSINOPHIL NFR BLD AUTO: 3 % — SIGNIFICANT CHANGE UP (ref 0–6)
EPI CELLS # UR: PRESENT
EPI CELLS # UR: PRESENT
GLUCOSE SERPL-MCNC: 98 MG/DL — SIGNIFICANT CHANGE UP (ref 70–99)
GLUCOSE SERPL-MCNC: 98 MG/DL — SIGNIFICANT CHANGE UP (ref 70–99)
GLUCOSE UR QL: >=1000 MG/DL
GLUCOSE UR QL: >=1000 MG/DL
HCT VFR BLD CALC: 30.5 % — LOW (ref 34.5–45)
HCT VFR BLD CALC: 30.5 % — LOW (ref 34.5–45)
HGB BLD-MCNC: 9.5 G/DL — LOW (ref 11.5–15.5)
HGB BLD-MCNC: 9.5 G/DL — LOW (ref 11.5–15.5)
IMM GRANULOCYTES NFR BLD AUTO: 0.3 % — SIGNIFICANT CHANGE UP (ref 0–0.9)
IMM GRANULOCYTES NFR BLD AUTO: 0.3 % — SIGNIFICANT CHANGE UP (ref 0–0.9)
INR BLD: 1.38 RATIO — HIGH (ref 0.85–1.18)
INR BLD: 1.38 RATIO — HIGH (ref 0.85–1.18)
KETONES UR-MCNC: NEGATIVE MG/DL — SIGNIFICANT CHANGE UP
KETONES UR-MCNC: NEGATIVE MG/DL — SIGNIFICANT CHANGE UP
LEUKOCYTE ESTERASE UR-ACNC: NEGATIVE — SIGNIFICANT CHANGE UP
LEUKOCYTE ESTERASE UR-ACNC: NEGATIVE — SIGNIFICANT CHANGE UP
LYMPHOCYTES # BLD AUTO: 3.13 K/UL — SIGNIFICANT CHANGE UP (ref 1–3.3)
LYMPHOCYTES # BLD AUTO: 3.13 K/UL — SIGNIFICANT CHANGE UP (ref 1–3.3)
LYMPHOCYTES # BLD AUTO: 46.7 % — HIGH (ref 13–44)
LYMPHOCYTES # BLD AUTO: 46.7 % — HIGH (ref 13–44)
MAGNESIUM SERPL-MCNC: 1.4 MG/DL — LOW (ref 1.6–2.6)
MAGNESIUM SERPL-MCNC: 1.4 MG/DL — LOW (ref 1.6–2.6)
MCHC RBC-ENTMCNC: 31.1 GM/DL — LOW (ref 32–36)
MCHC RBC-ENTMCNC: 31.1 GM/DL — LOW (ref 32–36)
MCHC RBC-ENTMCNC: 32.1 PG — SIGNIFICANT CHANGE UP (ref 27–34)
MCHC RBC-ENTMCNC: 32.1 PG — SIGNIFICANT CHANGE UP (ref 27–34)
MCV RBC AUTO: 103 FL — HIGH (ref 80–100)
MCV RBC AUTO: 103 FL — HIGH (ref 80–100)
MONOCYTES # BLD AUTO: 0.67 K/UL — SIGNIFICANT CHANGE UP (ref 0–0.9)
MONOCYTES # BLD AUTO: 0.67 K/UL — SIGNIFICANT CHANGE UP (ref 0–0.9)
MONOCYTES NFR BLD AUTO: 10 % — SIGNIFICANT CHANGE UP (ref 2–14)
MONOCYTES NFR BLD AUTO: 10 % — SIGNIFICANT CHANGE UP (ref 2–14)
NEUTROPHILS # BLD AUTO: 2.64 K/UL — SIGNIFICANT CHANGE UP (ref 1.8–7.4)
NEUTROPHILS # BLD AUTO: 2.64 K/UL — SIGNIFICANT CHANGE UP (ref 1.8–7.4)
NEUTROPHILS NFR BLD AUTO: 39.4 % — LOW (ref 43–77)
NEUTROPHILS NFR BLD AUTO: 39.4 % — LOW (ref 43–77)
NITRITE UR-MCNC: NEGATIVE — SIGNIFICANT CHANGE UP
NITRITE UR-MCNC: NEGATIVE — SIGNIFICANT CHANGE UP
NRBC # BLD: 0 /100 WBCS — SIGNIFICANT CHANGE UP (ref 0–0)
NRBC # BLD: 0 /100 WBCS — SIGNIFICANT CHANGE UP (ref 0–0)
PH UR: 5.5 — SIGNIFICANT CHANGE UP (ref 5–8)
PH UR: 5.5 — SIGNIFICANT CHANGE UP (ref 5–8)
PLATELET # BLD AUTO: 213 K/UL — SIGNIFICANT CHANGE UP (ref 150–400)
PLATELET # BLD AUTO: 213 K/UL — SIGNIFICANT CHANGE UP (ref 150–400)
POTASSIUM SERPL-MCNC: 3.8 MMOL/L — SIGNIFICANT CHANGE UP (ref 3.5–5.3)
POTASSIUM SERPL-MCNC: 3.8 MMOL/L — SIGNIFICANT CHANGE UP (ref 3.5–5.3)
POTASSIUM SERPL-SCNC: 3.8 MMOL/L — SIGNIFICANT CHANGE UP (ref 3.5–5.3)
POTASSIUM SERPL-SCNC: 3.8 MMOL/L — SIGNIFICANT CHANGE UP (ref 3.5–5.3)
PROT SERPL-MCNC: 5.8 G/DL — LOW (ref 6–8.3)
PROT SERPL-MCNC: 5.8 G/DL — LOW (ref 6–8.3)
PROT UR-MCNC: ABNORMAL MG/DL
PROT UR-MCNC: ABNORMAL MG/DL
PROTHROM AB SERPL-ACNC: 15.6 SEC — HIGH (ref 9.5–13)
PROTHROM AB SERPL-ACNC: 15.6 SEC — HIGH (ref 9.5–13)
RBC # BLD: 2.96 M/UL — LOW (ref 3.8–5.2)
RBC # BLD: 2.96 M/UL — LOW (ref 3.8–5.2)
RBC # FLD: 19.7 % — HIGH (ref 10.3–14.5)
RBC # FLD: 19.7 % — HIGH (ref 10.3–14.5)
RBC CASTS # UR COMP ASSIST: 10 /HPF — HIGH (ref 0–4)
RBC CASTS # UR COMP ASSIST: 10 /HPF — HIGH (ref 0–4)
SODIUM SERPL-SCNC: 143 MMOL/L — SIGNIFICANT CHANGE UP (ref 135–145)
SODIUM SERPL-SCNC: 143 MMOL/L — SIGNIFICANT CHANGE UP (ref 135–145)
SP GR SPEC: 1.02 — SIGNIFICANT CHANGE UP (ref 1–1.03)
SP GR SPEC: 1.02 — SIGNIFICANT CHANGE UP (ref 1–1.03)
UROBILINOGEN FLD QL: 0.2 MG/DL — SIGNIFICANT CHANGE UP (ref 0.2–1)
UROBILINOGEN FLD QL: 0.2 MG/DL — SIGNIFICANT CHANGE UP (ref 0.2–1)
WBC # BLD: 6.7 K/UL — SIGNIFICANT CHANGE UP (ref 3.8–10.5)
WBC # BLD: 6.7 K/UL — SIGNIFICANT CHANGE UP (ref 3.8–10.5)
WBC # FLD AUTO: 6.7 K/UL — SIGNIFICANT CHANGE UP (ref 3.8–10.5)
WBC # FLD AUTO: 6.7 K/UL — SIGNIFICANT CHANGE UP (ref 3.8–10.5)
WBC UR QL: 15 /HPF — HIGH (ref 0–5)
WBC UR QL: 15 /HPF — HIGH (ref 0–5)

## 2023-11-29 PROCEDURE — 99284 EMERGENCY DEPT VISIT MOD MDM: CPT

## 2023-11-29 RX ORDER — CEFTRIAXONE 500 MG/1
1000 INJECTION, POWDER, FOR SOLUTION INTRAMUSCULAR; INTRAVENOUS ONCE
Refills: 0 | Status: COMPLETED | OUTPATIENT
Start: 2023-11-29 | End: 2023-11-29

## 2023-11-29 RX ORDER — CEFPODOXIME PROXETIL 100 MG
1 TABLET ORAL
Qty: 14 | Refills: 0
Start: 2023-11-29 | End: 2023-12-05

## 2023-11-29 RX ADMIN — CEFTRIAXONE 100 MILLIGRAM(S): 500 INJECTION, POWDER, FOR SOLUTION INTRAMUSCULAR; INTRAVENOUS at 13:40

## 2023-11-29 RX ADMIN — CEFTRIAXONE 1000 MILLIGRAM(S): 500 INJECTION, POWDER, FOR SOLUTION INTRAMUSCULAR; INTRAVENOUS at 15:43

## 2023-11-29 NOTE — ED PROVIDER NOTE - NSFOLLOWUPINSTRUCTIONS_ED_ALL_ED_FT
You were seen in the emergency department for bloody urine.     Please follow-up with your primary care doctor within the next 48 hours.     If you have any worsening symptoms, severe chest pain, trouble breathing, please return to the emergency department. Lo atendieron en el departamento de emergencias por orina con fany.     Mark un seguimiento con [facility] médico de atención primaria dentro de las próximas 48 horas.    Si tiene algún síntoma que empeora, dolor intenso en el pecho o dificultad para respirar, regrese al departamento de emergencias.    Translation via Google Translate. Cannot guarantee accuracy.     You were seen in the emergency department for bloody urine.     Please follow-up with your primary care doctor within the next 48 hours.     If you have any worsening symptoms, severe chest pain, trouble breathing, please return to the emergency department.

## 2023-11-29 NOTE — ED PROVIDER NOTE - PHYSICAL EXAMINATION
General: well appearing male, no acute distress   HEENT: normocephalic, atraumatic   Respiratory: normal work of breathing  Abdomen: soft, non-tender, no guarding or rebound   MSK: no swelling or tenderness of lower extremities, moving all extremities spontaneously   Skin: warm, dry   Neuro: A&Ox2  Psych: appropriate affect

## 2023-11-29 NOTE — ED PROVIDER NOTE - OBJECTIVE STATEMENT
88F, pmh of HTN, HLD, DM, Osteoporosis, Stage II B Gastric Adenocarcinoma, presenting with several days of bloody urine. has some chest pain but denies weakness, shortness of breath, nausea or vomiting.

## 2023-11-29 NOTE — ED PROVIDER NOTE - CLINICAL SUMMARY MEDICAL DECISION MAKING FREE TEXT BOX
88F presenting with dysuria and hematuria. concern for hemorrhagic uti but as patient is on anticoagulant medication, concern for anemia. 88F presenting with dysuria and hematuria. concern for hemorrhagic uti but as patient is on anticoagulant medication, concern for anemia.    likely hemorrhagic UTI on UA. will treat.

## 2023-11-29 NOTE — ED ADULT NURSE NOTE - NSFALLHARMRISKINTERV_ED_ALL_ED
Assistance OOB with selected safe patient handling equipment if applicable/Assistance with ambulation/Communicate risk of Fall with Harm to all staff, patient, and family/Monitor gait and stability/Monitor for mental status changes and reorient to person, place, and time, as needed/Provide patient with walking aids/Provide visual cue: red socks, yellow wristband, yellow gown, etc/Reinforce activity limits and safety measures with patient and family/Toileting schedule using arm’s reach rule for commode and bathroom/Bed in lowest position, wheels locked, appropriate side rails in place/Call bell, personal items and telephone in reach/Instruct patient to call for assistance before getting out of bed/chair/stretcher/Non-slip footwear applied when patient is off stretcher/[place] to call system/Physically safe environment - no spills, clutter or unnecessary equipment/Purposeful Proactive Rounding/Room/bathroom lighting operational, light cord in reach

## 2023-11-29 NOTE — ED PROVIDER NOTE - PATIENT PORTAL LINK FT
You can access the FollowMyHealth Patient Portal offered by [facility] by registering at the following website: [email or web address]. By joining [employer]’s FollowMyHealth portal, you will also be able to view your health information using other applications (apps) compatible with our system.

## 2023-11-30 LAB
ALLERGY+IMMUNOLOGY DIAG STUDY NOTE: SIGNIFICANT CHANGE UP
ALLERGY+IMMUNOLOGY DIAG STUDY NOTE: SIGNIFICANT CHANGE UP

## 2023-11-30 PROCEDURE — 87086 URINE CULTURE/COLONY COUNT: CPT

## 2023-11-30 PROCEDURE — 81001 URINALYSIS AUTO W/SCOPE: CPT

## 2023-11-30 PROCEDURE — 36415 COLL VENOUS BLD VENIPUNCTURE: CPT

## 2023-11-30 PROCEDURE — 96366 THER/PROPH/DIAG IV INF ADDON: CPT

## 2023-11-30 PROCEDURE — 86880 COOMBS TEST DIRECT: CPT

## 2023-11-30 PROCEDURE — 87186 SC STD MICRODIL/AGAR DIL: CPT

## 2023-11-30 PROCEDURE — 99284 EMERGENCY DEPT VISIT MOD MDM: CPT | Mod: 25

## 2023-11-30 PROCEDURE — 86901 BLOOD TYPING SEROLOGIC RH(D): CPT

## 2023-11-30 PROCEDURE — 83735 ASSAY OF MAGNESIUM: CPT

## 2023-11-30 PROCEDURE — 86850 RBC ANTIBODY SCREEN: CPT

## 2023-11-30 PROCEDURE — 85610 PROTHROMBIN TIME: CPT

## 2023-11-30 PROCEDURE — 85730 THROMBOPLASTIN TIME PARTIAL: CPT

## 2023-11-30 PROCEDURE — 85025 COMPLETE CBC W/AUTO DIFF WBC: CPT

## 2023-11-30 PROCEDURE — 96365 THER/PROPH/DIAG IV INF INIT: CPT

## 2023-11-30 PROCEDURE — 86870 RBC ANTIBODY IDENTIFICATION: CPT

## 2023-11-30 PROCEDURE — 80053 COMPREHEN METABOLIC PANEL: CPT

## 2023-11-30 PROCEDURE — 86900 BLOOD TYPING SEROLOGIC ABO: CPT

## 2023-11-30 RX ORDER — CEFPODOXIME PROXETIL 100 MG
1 TABLET ORAL
Qty: 14 | Refills: 0
Start: 2023-11-30 | End: 2023-12-06

## 2023-12-02 LAB
-  AMOXICILLIN/CLAVULANIC ACID: SIGNIFICANT CHANGE UP
-  AMOXICILLIN/CLAVULANIC ACID: SIGNIFICANT CHANGE UP
-  AMPICILLIN/SULBACTAM: SIGNIFICANT CHANGE UP
-  AMPICILLIN/SULBACTAM: SIGNIFICANT CHANGE UP
-  AMPICILLIN: SIGNIFICANT CHANGE UP
-  AMPICILLIN: SIGNIFICANT CHANGE UP
-  AZTREONAM: SIGNIFICANT CHANGE UP
-  AZTREONAM: SIGNIFICANT CHANGE UP
-  CEFAZOLIN: SIGNIFICANT CHANGE UP
-  CEFAZOLIN: SIGNIFICANT CHANGE UP
-  CEFEPIME: SIGNIFICANT CHANGE UP
-  CEFEPIME: SIGNIFICANT CHANGE UP
-  CEFOXITIN: SIGNIFICANT CHANGE UP
-  CEFOXITIN: SIGNIFICANT CHANGE UP
-  CEFTRIAXONE: SIGNIFICANT CHANGE UP
-  CEFTRIAXONE: SIGNIFICANT CHANGE UP
-  CEFUROXIME: SIGNIFICANT CHANGE UP
-  CEFUROXIME: SIGNIFICANT CHANGE UP
-  CIPROFLOXACIN: SIGNIFICANT CHANGE UP
-  CIPROFLOXACIN: SIGNIFICANT CHANGE UP
-  ERTAPENEM: SIGNIFICANT CHANGE UP
-  ERTAPENEM: SIGNIFICANT CHANGE UP
-  GENTAMICIN: SIGNIFICANT CHANGE UP
-  GENTAMICIN: SIGNIFICANT CHANGE UP
-  IMIPENEM: SIGNIFICANT CHANGE UP
-  IMIPENEM: SIGNIFICANT CHANGE UP
-  LEVOFLOXACIN: SIGNIFICANT CHANGE UP
-  LEVOFLOXACIN: SIGNIFICANT CHANGE UP
-  MEROPENEM: SIGNIFICANT CHANGE UP
-  MEROPENEM: SIGNIFICANT CHANGE UP
-  NITROFURANTOIN: SIGNIFICANT CHANGE UP
-  NITROFURANTOIN: SIGNIFICANT CHANGE UP
-  PIPERACILLIN/TAZOBACTAM: SIGNIFICANT CHANGE UP
-  PIPERACILLIN/TAZOBACTAM: SIGNIFICANT CHANGE UP
-  TOBRAMYCIN: SIGNIFICANT CHANGE UP
-  TOBRAMYCIN: SIGNIFICANT CHANGE UP
-  TRIMETHOPRIM/SULFAMETHOXAZOLE: SIGNIFICANT CHANGE UP
-  TRIMETHOPRIM/SULFAMETHOXAZOLE: SIGNIFICANT CHANGE UP
CULTURE RESULTS: ABNORMAL
CULTURE RESULTS: ABNORMAL
METHOD TYPE: SIGNIFICANT CHANGE UP
METHOD TYPE: SIGNIFICANT CHANGE UP
ORGANISM # SPEC MICROSCOPIC CNT: ABNORMAL
SPECIMEN SOURCE: SIGNIFICANT CHANGE UP
SPECIMEN SOURCE: SIGNIFICANT CHANGE UP

## 2023-12-09 NOTE — ED PROVIDER NOTE - OBJECTIVE STATEMENT
Subjective   Patient ID: Zoran is a 8 year old male.    Chief Complaint   Patient presents with    Ear Pain     Right ear pain starting Thursday       HPI: Right ear pain 2 days ago.Improved with Tylenol. Has not had pain since. Rhinorrhea, nasal congestion and cough. Fever, tm 101 since this afternoon. No COVID exposure, sister with same sx's.. No recent travel.     REVIEW OF SYSTEMS: Denies difficulty breathing, anosmia, ageusia, hypogeusia, vomiting, and diarrhea. Denies sleep disturbances, headaches, sore throat, abdominal pain, dysuria, hematuria, enuresis, urinary frequency or urgency. Normal appetite. Normal fluid intake. Normal urine output. Normal energy.     All other systems reviewed and are negative.    PAST MEDICAL HISTORY:  History reviewed. No pertinent past medical history.    MEDICATIONS:  Current Outpatient Medications   Medication Sig    cefdinir (OMNICEF) 250 MG/5ML suspension Take 3.3 mLs by mouth in the morning and 3.3 mLs in the evening. Do all this for 10 days.    albuterol 108 (90 Base) MCG/ACT inhaler Inhale 2 puffs into the lungs every 4 hours as needed for Shortness of Breath.    fluticasone propionate (FLOVENT HFA) 44 MCG/ACT inhaler Inhale 2 puffs into the lungs in the morning and 2 puffs in the evening.    Spacer/Aero-Holding Chambers (OptiChamber Advantage-Med Mask) Misc Use with inhaler     No current facility-administered medications for this visit.       ALLERGIES:  ALLERGIES:   Allergen Reactions    Amoxicillin RASH       PAST SURGICAL HISTORY:  History reviewed. No pertinent surgical history.    FAMILY HISTORY:  History reviewed. No pertinent family history.    Social History     Socioeconomic History    Marital status: Single           Visit Vitals  Pulse (!) 128   Temp 97.6 °F (36.4 °C)   Wt 23.9 kg (52 lb 11 oz)   SpO2 99%       Vitals signs and nursing note reviewed.     Physical Exam     PHYSICAL EXAM:    Constitutional:       General: Active and alert     Appearance: Normal  appearance. Well-developed and well-hydrated.  HENT:      Head: Normocephalic and atraumatic.      Right Ear: Tympanic membrane injected, dull and full of mucoid fluid, ear canal and external ear normal.      Left Ear: Tympanic membrane injected, dull and full of mucoid fluid, ear canal and external ear normal.      Nose: Clear without discharge      Mouth: Mucous membranes are moist.      Pharynx: Oropharynx with erythema but no exudate or lesions. Uvula is midline. Airway is intact  Eyes:      Extraocular Movements: Extraocular movements intact.      Conjunctiva/sclera: Conjunctivae normal.   Neck:      Musculoskeletal: Normal range of motion and neck supple.   Cardiovascular:      Rate and Rhythm: Normal rate and regular rhythm.      Pulses: Normal pulses.      Heart sounds: Normal heart sounds.   Pulmonary:      Effort: Pulmonary effort is normal.      Breath sounds: Normal breath sounds. No wheezing, rhonchi or rales.  Abdominal:      General: Abdomen is flat. Bowel sounds are normal. Nondistended.     Palpations: Abdomen is soft and nontender. No hepatosplenomegaly or masses.  Lymphadenopathy:      Cervical: No cervical adenopathy.      Upper Body:      Right upper body: No supraclavicular adenopathy.      Left upper body: No supraclavicular adenopathy.   Skin:     General: Skin is warm and dry with normal skin turgor. Clear without rashes.  Neurological:      Mental Status: Alert.     ASSESSMENT/PLAN:  (H66.93) Acute otitis media of both ears in pediatric patient  (primary encounter diagnosis)  Plan: POCT Streptococcus Group A PCR    (J02.9) Pharyngitis, unspecified etiology  Plan: POCT Streptococcus Group A PCR    (J02.0) Strep pharyngitis     Orders Placed This Encounter    POCT Streptococcus Group A PCR    cefdinir (OMNICEF) 250 MG/5ML suspension      Recent Results (from the past 24 hour(s))   POCT Streptococcus Group A PCR    Collection Time: 12/09/23  4:33 PM   Result Value Ref Range    STREPTOCOCCUS  GROUP A PCR Detected (A) Not Detected    TEST LOT NUMBER NA     TEST LOT EXPIRATION DATE NA         PATIENT INSTRUCTIONS:  Complete antibiotics as instructed  No school until patient receives at least 2 doses of antibiotics and at least 24 hours after the start of antibiotics and patient's last fever  Recommend starting a daily probiotic (e.g. Culturelle, Florastor,Biogaia, Upspring, Garden of Life, or generic equivalent ) while taking antibiotics  Take the probiotic daily at least 2 hours apart from the antibiotic dose while on antibiotics and then an additional 3-4 weeks upon completion of the antibiotics   Drink plenty of fluids - may give chamomile tea with honey  Acetaminophen/Ibuprofen as needed  Salt water rinses  Throat lozenges  Change toothbrush after 2 days of antibiotics  Keep patient's toothbrush away from everyone's in a separate davalos  Call us or your child's doctor if fever is greater than 105, lasting longer than 5 days, difficulty breathing, neck pain or stiffness, decreased urine output or if patient is not better after completion of antibiotics     FOLLOW-UP:  Return if symptoms worsen or fail to improve, for Follow up with PCP in 3-5 days.     Plan of care and anticipatory guidance discussed with patient/parents.  Parents displayed good understanding of plan of care.    Maria Dolores Hancock M.D.  [facility]       82 y/o F pt w/ PMHx of HTN, DM, HLD, Dementia, Stomach CA p/w R foot pain and swelling s/p mechanical fall 3 days ago while in the [place]. Pt tripped and fell on her R foot, [place] advised to [place] XRs bud did not, saw her PMD Dr. Grace Bonds today who sent her to the ED to r/o fx. Pt admits she can walk on the foot, but feels pinching sensation at the top. Pt otherwise denies headstrike or any other injuries. NKDA. 84 y/o F pt w/ PMHx of HTN, DM, HLD, Dementia, Stomach CA p/w R foot pain and swelling s/p mechanical fall 3 days ago while in the [place]. Pt tripped and fell on her R foot, [place] and was advised to get XRs but did not, returned yesterday and saw her PMD Dr. Grace Bonds today who sent her to the ED to r/o fx. Pt admits she can walk on the foot, but feels pinching sensation at the top as per son. Pt otherwise denies headstrike or any other injuries. NKDA.

## 2023-12-11 NOTE — ED ADULT NURSE NOTE - CARDIO WDL
Subjective   Renetta Salguero No problems in bed- but turning bothers her  No medicine tried  Did some heat and ice   Does gymnastics - but no known trauma    Objective   /68   Pulse 80   Temp 36.9 °C (98.4 °F) (Oral)   Wt 44 kg     Physical Exam    General: Well-developed, well-nourished, alert and oriented, no acute distress.  Eyes: Normal sclera, PERRLA, EOMI.  Skin: No rashes.  Neuro: Symmetric face, no ataxia, grossly normal strength.  Lymph: No lymphadenopathy  Orthopedic: tender midline lower spine with flexion and extension, less with rotation, no muscle tenderness, hips with full range of motion and nontender            Assessment/Plan   Diagnoses and all orders for this visit:  Midline low back pain without sciatica, unspecified chronicity  -     Referral to Pediatric Sports Medicine; Future  -     XR lumbar spine 2-3 views; Future      Patient Instructions   Please call the referral line number [phone] to make an appointment with  We will call with the official results of the xray.  For now, use supportive measures.  Rest the area  You may use ibuprofen every 6 hours or alleve twice a day for pain and swelling.  If it makes it more comfortable, you may wrap the injury.  CAll for increasing pain or discomfort or worsening of symptoms.                                 Tisha Khan MD   
Normal rate, regular rhythm, normal S1, S2 heart sounds heard.

## 2024-01-08 NOTE — DISCHARGE NOTE ADULT - PHYSICIAN SECTION COMPLETE
Problem: Skin  Goal: Participates in plan/prevention/treatment measures  1/8/2024 1807 by Neyda May RN  Flowsheets (Taken 1/5/2024 1119 by Vickie Vazquez RN)  Participates in plan/prevention/treatment measures: Elevate heels  1/8/2024 1807 by Neyda May RN  Reactivated  Goal: Prevent/manage excess moisture  Reactivated  Goal: Prevent/minimize sheer/friction injuries  Reactivated  Goal: Promote/optimize nutrition  Reactivated  Goal: Decreased wound size/increased tissue granulation at next dressing change  1/8/2024 1807 by Neyda May RN  Reactivated  1/8/2024 1756 by Neyda May RN  Outcome: Met  Goal: Promote skin healing  Reactivated   The patient's goals for the shift include      The clinical goals for the shift include Patient's SBP will remain >90 and <170 through out this shift         Yes

## 2024-01-17 NOTE — PROGRESS NOTE ADULT - PROBLEM SELECTOR PLAN 1
Monitor Oxygen status  Monitor Respiratory status  Oxygen supplement  Bronchodilators  Steroids  CXR noted  CT angio chest noted. show

## 2024-02-07 NOTE — ED ADULT NURSE NOTE - HOW OFTEN DO YOU HAVE A DRINK CONTAINING ALCOHOL?
Next visit: Visit date not found    Verified prescription in clinician's note.    No protocol for requested medication.    Medication:   1/11/2024 baclofen (LIORESAL) 10 MG tablet #30 R-0 Take 1 tablet by mouth 3 times daily as needed (muscle spasms).     Last office visit date: 11/3/2023 when pt was admitted to [facility]  Pharmacy: [id number] - [place] [place] - [facility] AT [facility]    Order pended, routed to clinician for review.           Medication:   12/11/2023 mirtazapine (REMERON) 15 MG tablet #28 R-0 Take 1 tablet by mouth nightly.     Last office visit date: 11/3/2023 when pt was admitted to [facility]  Medication Refill Protocol Failed.  Failed criteria:  Seen by prescribing provider or same department within the last 12 months or has a future appt in 3 months. Sent to clinician to review.       [facility] was [facility] to [facility] due to COVID. Dr. Cordon, do you want patient to call clinic to schedule appointment?   Never

## 2024-03-20 NOTE — ED ADULT TRIAGE NOTE - CCCP TRG CHIEF CMPLNT
HPI:     Sore Throat   This is a new problem. Episode onset: 3 days. The problem has been gradually worsening. There has been no fever. Associated symptoms include headaches. Pertinent negatives include no congestion, coughing, ear discharge, ear pain, shortness of breath or vomiting. He has tried acetaminophen for the symptoms.         Medications:     Current Outpatient Medications   Medication Sig Dispense Refill    amoxicillin 500 MG Oral Cap Take 1 capsule (500 mg total) by mouth 2 (two) times daily for 10 days. 20 capsule 0       Allergies:   No Known Allergies    History:     Health Maintenance   Topic Date Due    Hepatitis A Vaccines (1 of 2 - 2-dose series) Never done    HPV Vaccines (1 - Male 2-dose series) Never done    COVID-19 Vaccine (1 - 2023-24 season) Never done    Influenza Vaccine (1) Never done    Annual Depression Screening  Never done    Annual Physical  08/11/2024    Meningococcal Vaccine (2 - 2-dose series) 08/14/2027    DTaP,Tdap,and Td Vaccines (7 - Td or Tdap) 10/24/2032    Pneumococcal Vaccine: Birth to 64yrs  Completed    Hepatitis B Vaccines  Completed    IPV Vaccines  Completed    MMR Vaccines  Completed       No LMP for male patient.   Past Medical History:   No past medical history on file.    Past Surgical History:   No past surgical history on file.    Family History:     Family History   Problem Relation Age of Onset    No Known Problems Father     Cancer Mother         melenoma cancer     No Known Problems Maternal Grandmother     No Known Problems Maternal Grandfather     Cancer Paternal Grandmother         breast cancer     No Known Problems Paternal Grandfather     No Known Problems Sister     No Known Problems Brother        Social History:     Social History     Socioeconomic History    Marital status: Single     Spouse name: Not on file    Number of children: Not on file    Years of education: Not on file    Highest education level: Not on file   Occupational History    Not  fever on file   Tobacco Use    Smoking status: Never    Smokeless tobacco: Never   Vaping Use    Vaping Use: Never used   Substance and Sexual Activity    Alcohol use: Never    Drug use: Never    Sexual activity: Not on file   Other Topics Concern    Second-hand smoke exposure No    Alcohol/drug concerns No    Violence concerns No   Social History Narrative    Not on file     Social Determinants of Health     Financial Resource Strain: Not on file   Food Insecurity: Not on file   Transportation Needs: Not on file   Physical Activity: Not on file   Stress: Not on file   Social Connections: Not on file   Housing Stability: Not on file       Review of Systems:   Review of Systems   HENT:  Positive for sore throat. Negative for congestion, ear discharge and ear pain.    Respiratory:  Negative for cough and shortness of breath.    Gastrointestinal:  Negative for vomiting.   Neurological:  Positive for headaches.        Vitals:    03/20/24 1510   BP: 123/70   Pulse: 84   Weight: 121 lb (54.9 kg)   Height: 4' 11.4\" (1.509 m)     Body mass index is 24.11 kg/m².    Physical Exam:   Physical Exam  Vitals reviewed.   Constitutional:       General: He is active.   HENT:      Right Ear: Tympanic membrane, ear canal and external ear normal. There is no impacted cerumen. Tympanic membrane is not erythematous.      Left Ear: Tympanic membrane, ear canal and external ear normal. There is no impacted cerumen. Tympanic membrane is not erythematous.      Nose: Nose normal.      Mouth/Throat:      Mouth: Mucous membranes are moist.      Pharynx: Oropharynx is clear. Posterior oropharyngeal erythema present. No oropharyngeal exudate.   Eyes:      General:         Right eye: No discharge.         Left eye: No discharge.      Conjunctiva/sclera: Conjunctivae normal.   Cardiovascular:      Rate and Rhythm: Normal rate and regular rhythm.      Heart sounds: Normal heart sounds.   Pulmonary:      Effort: Pulmonary effort is normal.      Breath  sounds: Normal breath sounds.   Lymphadenopathy:      Cervical: Cervical adenopathy present.   Skin:     Findings: No rash.   Neurological:      Mental Status: He is alert.        Assessment and Plan::     Problem List Items Addressed This Visit    None  Visit Diagnoses       Strep pharyngitis    -  Primary    Relevant Medications    amoxicillin 500 MG Oral Cap    Sore throat        Relevant Orders    POC Rapid Strep [[id number]] (Completed)        Supportive care includes:    encourage fluid intake   Advise patient to take Tylenol/Ibuprofen as needed for pain.  warm salt water gargles   humidifier     Discussed plan of care with pt and pt is in agreement.All questions answered. Pt to call with questions or concerns.

## 2024-07-28 ENCOUNTER — INPATIENT (INPATIENT)
Facility: HOSPITAL | Age: 89
LOS: 9 days | Discharge: HOME | DRG: 179 | End: 2024-08-07
Attending: INTERNAL MEDICINE | Admitting: INTERNAL MEDICINE
Payer: MEDICAID

## 2024-07-28 VITALS
DIASTOLIC BLOOD PRESSURE: 80 MMHG | OXYGEN SATURATION: 96 % | TEMPERATURE: 99 F | RESPIRATION RATE: 18 BRPM | HEART RATE: 78 BPM | SYSTOLIC BLOOD PRESSURE: 163 MMHG

## 2024-07-28 DIAGNOSIS — Z98.51 TUBAL LIGATION STATUS: Chronic | ICD-10-CM

## 2024-07-28 DIAGNOSIS — Z90.710 ACQUIRED ABSENCE OF BOTH CERVIX AND UTERUS: Chronic | ICD-10-CM

## 2024-07-28 PROCEDURE — 99285 EMERGENCY DEPT VISIT HI MDM: CPT

## 2024-07-28 NOTE — ED PROVIDER NOTE - OBJECTIVE STATEMENT
89-year-old female history of HTN, HLD, DM, dementia, Asthma, osteoporosis, gastric cancer in remission, on chemo for "bone cancer" last dose 7/26 here for right-sided lower abdominal pain and vomiting once this evening.  Vomit was yellow-colored.  Patient has also had nonproductive cough since yesterday.  Received Tylenol earlier this evening.  Denies any shortness of breath, diarrhea, sore throat, dizziness, travel or sick contacts.  Accompanied by daughter

## 2024-07-28 NOTE — ED ADULT TRIAGE NOTE - CHIEF COMPLAINT QUOTE
cough for two days, chest pain and vomiting x one days with history of bone cancer on chemo therapy every tow months with shots every 15 days / EMS

## 2024-07-28 NOTE — ED PROVIDER NOTE - CLINICAL SUMMARY MEDICAL DECISION MAKING FREE TEXT BOX
84-year-old female on chemo for "bone cancer" here for episode of vomiting.  Also with chest pain and nonproductive cough.  Suspicion for pneumonia.  Also concern for dehydration, colitis, SBO among other causes.  Plan to provide IV hydration perform sepsis panel and reassess.  Anticipate admission.

## 2024-07-28 NOTE — ED PROVIDER NOTE - PROGRESS NOTE DETAILS
Pt reports slight improvement in WOB. ABG performed, ICU team consulted. Pt placed on BIPAP due to WOB despite nebs administered, not first time BIPAP. Spoke with charge nurse malika who is at bedside to initiate nebulizer treatment. Spoke with nurse Mary to start nebulizers to treat diffuse wheezing noted on exam new onset. Pt repositioned in bed by MD and PCA.

## 2024-07-28 NOTE — ED PROVIDER NOTE - BIRTH SEX
Female
Nutrition, metabolism, and development symptoms

## 2024-07-29 DIAGNOSIS — Z29.9 ENCOUNTER FOR PROPHYLACTIC MEASURES, UNSPECIFIED: ICD-10-CM

## 2024-07-29 DIAGNOSIS — J96.01 ACUTE RESPIRATORY FAILURE WITH HYPOXIA: ICD-10-CM

## 2024-07-29 DIAGNOSIS — C90.00 MULTIPLE MYELOMA NOT HAVING ACHIEVED REMISSION: ICD-10-CM

## 2024-07-29 DIAGNOSIS — N39.0 URINARY TRACT INFECTION, SITE NOT SPECIFIED: ICD-10-CM

## 2024-07-29 DIAGNOSIS — U07.1 COVID-19: ICD-10-CM

## 2024-07-29 DIAGNOSIS — Z86.711 PERSONAL HISTORY OF PULMONARY EMBOLISM: ICD-10-CM

## 2024-07-29 DIAGNOSIS — I10 ESSENTIAL (PRIMARY) HYPERTENSION: ICD-10-CM

## 2024-07-29 DIAGNOSIS — E11.9 TYPE 2 DIABETES MELLITUS WITHOUT COMPLICATIONS: ICD-10-CM

## 2024-07-29 DIAGNOSIS — E78.5 HYPERLIPIDEMIA, UNSPECIFIED: ICD-10-CM

## 2024-07-29 LAB
-  STREPTOCOCCUS SP. (NOT GRP A, B OR S PNEUMONIAE): SIGNIFICANT CHANGE UP
ALBUMIN SERPL ELPH-MCNC: 2.6 G/DL — LOW (ref 3.5–5)
ALP SERPL-CCNC: 48 U/L — SIGNIFICANT CHANGE UP (ref 40–120)
ALT FLD-CCNC: 14 U/L DA — SIGNIFICANT CHANGE UP (ref 10–60)
ANION GAP SERPL CALC-SCNC: 10 MMOL/L — SIGNIFICANT CHANGE UP (ref 5–17)
ANION GAP SERPL CALC-SCNC: 9 MMOL/L — SIGNIFICANT CHANGE UP (ref 5–17)
APPEARANCE UR: ABNORMAL
APTT BLD: 26.2 SEC — SIGNIFICANT CHANGE UP (ref 24.5–35.6)
AST SERPL-CCNC: 14 U/L — SIGNIFICANT CHANGE UP (ref 10–40)
BACTERIA # UR AUTO: ABNORMAL /HPF
BASE EXCESS BLDA CALC-SCNC: -6 MMOL/L — LOW (ref -2–3)
BASOPHILS # BLD AUTO: 0.02 K/UL — SIGNIFICANT CHANGE UP (ref 0–0.2)
BASOPHILS NFR BLD AUTO: 0.4 % — SIGNIFICANT CHANGE UP (ref 0–2)
BILIRUB SERPL-MCNC: 0.3 MG/DL — SIGNIFICANT CHANGE UP (ref 0.2–1.2)
BILIRUB UR-MCNC: NEGATIVE — SIGNIFICANT CHANGE UP
BLOOD GAS COMMENTS ARTERIAL: SIGNIFICANT CHANGE UP
BUN SERPL-MCNC: 21 MG/DL — HIGH (ref 7–18)
BUN SERPL-MCNC: 26 MG/DL — HIGH (ref 7–18)
CALCIUM SERPL-MCNC: 8.3 MG/DL — LOW (ref 8.4–10.5)
CALCIUM SERPL-MCNC: 8.6 MG/DL — SIGNIFICANT CHANGE UP (ref 8.4–10.5)
CHLORIDE SERPL-SCNC: 115 MMOL/L — HIGH (ref 96–108)
CHLORIDE SERPL-SCNC: 119 MMOL/L — HIGH (ref 96–108)
CO2 SERPL-SCNC: 16 MMOL/L — LOW (ref 22–31)
CO2 SERPL-SCNC: 19 MMOL/L — LOW (ref 22–31)
COLOR SPEC: YELLOW — SIGNIFICANT CHANGE UP
COMMENT - URINE: SIGNIFICANT CHANGE UP
CREAT SERPL-MCNC: 0.91 MG/DL — SIGNIFICANT CHANGE UP (ref 0.5–1.3)
CREAT SERPL-MCNC: 1.12 MG/DL — SIGNIFICANT CHANGE UP (ref 0.5–1.3)
DIFF PNL FLD: ABNORMAL
EGFR: 48 ML/MIN/1.73M2 — LOW
EGFR: 61 ML/MIN/1.73M2 — SIGNIFICANT CHANGE UP
EOSINOPHIL # BLD AUTO: 0.07 K/UL — SIGNIFICANT CHANGE UP (ref 0–0.5)
EOSINOPHIL NFR BLD AUTO: 1.3 % — SIGNIFICANT CHANGE UP (ref 0–6)
EPI CELLS # UR: PRESENT
GLUCOSE BLDC GLUCOMTR-MCNC: 180 MG/DL — HIGH (ref 70–99)
GLUCOSE BLDC GLUCOMTR-MCNC: 184 MG/DL — HIGH (ref 70–99)
GLUCOSE BLDC GLUCOMTR-MCNC: 264 MG/DL — HIGH (ref 70–99)
GLUCOSE SERPL-MCNC: 194 MG/DL — HIGH (ref 70–99)
GLUCOSE SERPL-MCNC: 272 MG/DL — HIGH (ref 70–99)
GLUCOSE UR QL: >=1000 MG/DL
GRAM STN FLD: ABNORMAL
HCO3 BLDA-SCNC: 16 MMOL/L — LOW (ref 21–28)
HCT VFR BLD CALC: 30.5 % — LOW (ref 34.5–45)
HCT VFR BLD CALC: 31.8 % — LOW (ref 34.5–45)
HGB BLD-MCNC: 10.1 G/DL — LOW (ref 11.5–15.5)
HGB BLD-MCNC: 10.2 G/DL — LOW (ref 11.5–15.5)
HOROWITZ INDEX BLDA+IHG-RTO: 50 — SIGNIFICANT CHANGE UP
IMM GRANULOCYTES NFR BLD AUTO: 0.6 % — SIGNIFICANT CHANGE UP (ref 0–0.9)
INR BLD: 1.14 RATIO — SIGNIFICANT CHANGE UP (ref 0.85–1.18)
KETONES UR-MCNC: ABNORMAL MG/DL
LACTATE SERPL-SCNC: 3 MMOL/L — HIGH (ref 0.7–2)
LACTATE SERPL-SCNC: 3.3 MMOL/L — HIGH (ref 0.7–2)
LACTATE SERPL-SCNC: 5.4 MMOL/L — CRITICAL HIGH (ref 0.7–2)
LACTATE SERPL-SCNC: 6.8 MMOL/L — CRITICAL HIGH (ref 0.7–2)
LACTATE SERPL-SCNC: 7.3 MMOL/L — CRITICAL HIGH (ref 0.7–2)
LEUKOCYTE ESTERASE UR-ACNC: ABNORMAL
LYMPHOCYTES # BLD AUTO: 2.08 K/UL — SIGNIFICANT CHANGE UP (ref 1–3.3)
LYMPHOCYTES # BLD AUTO: 39.2 % — SIGNIFICANT CHANGE UP (ref 13–44)
MCHC RBC-ENTMCNC: 32.1 GM/DL — SIGNIFICANT CHANGE UP (ref 32–36)
MCHC RBC-ENTMCNC: 33.1 GM/DL — SIGNIFICANT CHANGE UP (ref 32–36)
MCHC RBC-ENTMCNC: 33.6 PG — SIGNIFICANT CHANGE UP (ref 27–34)
MCHC RBC-ENTMCNC: 34 PG — SIGNIFICANT CHANGE UP (ref 27–34)
MCV RBC AUTO: 102.7 FL — HIGH (ref 80–100)
MCV RBC AUTO: 104.6 FL — HIGH (ref 80–100)
METHOD TYPE: SIGNIFICANT CHANGE UP
MONOCYTES # BLD AUTO: 0.31 K/UL — SIGNIFICANT CHANGE UP (ref 0–0.9)
MONOCYTES NFR BLD AUTO: 5.8 % — SIGNIFICANT CHANGE UP (ref 2–14)
NEUTROPHILS # BLD AUTO: 2.8 K/UL — SIGNIFICANT CHANGE UP (ref 1.8–7.4)
NEUTROPHILS NFR BLD AUTO: 52.7 % — SIGNIFICANT CHANGE UP (ref 43–77)
NITRITE UR-MCNC: NEGATIVE — SIGNIFICANT CHANGE UP
NRBC # BLD: 0 /100 WBCS — SIGNIFICANT CHANGE UP (ref 0–0)
NRBC # BLD: 0 /100 WBCS — SIGNIFICANT CHANGE UP (ref 0–0)
PCO2 BLDA: 23 MMHG — LOW (ref 32–35)
PH BLDA: 7.45 — SIGNIFICANT CHANGE UP (ref 7.35–7.45)
PH UR: 5.5 — SIGNIFICANT CHANGE UP (ref 5–8)
PLATELET # BLD AUTO: 118 K/UL — LOW (ref 150–400)
PLATELET # BLD AUTO: 147 K/UL — LOW (ref 150–400)
PO2 BLDA: 287 MMHG — HIGH (ref 83–108)
POTASSIUM SERPL-MCNC: 4 MMOL/L — SIGNIFICANT CHANGE UP (ref 3.5–5.3)
POTASSIUM SERPL-MCNC: 4.2 MMOL/L — SIGNIFICANT CHANGE UP (ref 3.5–5.3)
POTASSIUM SERPL-SCNC: 4 MMOL/L — SIGNIFICANT CHANGE UP (ref 3.5–5.3)
POTASSIUM SERPL-SCNC: 4.2 MMOL/L — SIGNIFICANT CHANGE UP (ref 3.5–5.3)
PROT SERPL-MCNC: 5.3 G/DL — LOW (ref 6–8.3)
PROT UR-MCNC: 30 MG/DL
PROTHROM AB SERPL-ACNC: 13 SEC — SIGNIFICANT CHANGE UP (ref 9.5–13)
RBC # BLD: 2.97 M/UL — LOW (ref 3.8–5.2)
RBC # BLD: 3.04 M/UL — LOW (ref 3.8–5.2)
RBC # FLD: 15.9 % — HIGH (ref 10.3–14.5)
RBC # FLD: 16.1 % — HIGH (ref 10.3–14.5)
RBC CASTS # UR COMP ASSIST: 4 /HPF — SIGNIFICANT CHANGE UP (ref 0–4)
SAO2 % BLDA: 96 % — SIGNIFICANT CHANGE UP
SARS-COV-2 RNA SPEC QL NAA+PROBE: DETECTED
SODIUM SERPL-SCNC: 141 MMOL/L — SIGNIFICANT CHANGE UP (ref 135–145)
SODIUM SERPL-SCNC: 147 MMOL/L — HIGH (ref 135–145)
SP GR SPEC: 1.02 — SIGNIFICANT CHANGE UP (ref 1–1.03)
SPECIMEN SOURCE: SIGNIFICANT CHANGE UP
UROBILINOGEN FLD QL: 1 MG/DL — SIGNIFICANT CHANGE UP (ref 0.2–1)
WBC # BLD: 4.82 K/UL — SIGNIFICANT CHANGE UP (ref 3.8–10.5)
WBC # BLD: 5.31 K/UL — SIGNIFICANT CHANGE UP (ref 3.8–10.5)
WBC # FLD AUTO: 4.82 K/UL — SIGNIFICANT CHANGE UP (ref 3.8–10.5)
WBC # FLD AUTO: 5.31 K/UL — SIGNIFICANT CHANGE UP (ref 3.8–10.5)
WBC UR QL: 8 /HPF — HIGH (ref 0–5)

## 2024-07-29 PROCEDURE — 71045 X-RAY EXAM CHEST 1 VIEW: CPT | Mod: 26

## 2024-07-29 PROCEDURE — 93010 ELECTROCARDIOGRAM REPORT: CPT

## 2024-07-29 PROCEDURE — 74177 CT ABD & PELVIS W/CONTRAST: CPT | Mod: 26,MC

## 2024-07-29 PROCEDURE — 70450 CT HEAD/BRAIN W/O DYE: CPT | Mod: 26,MC

## 2024-07-29 PROCEDURE — 71275 CT ANGIOGRAPHY CHEST: CPT | Mod: 26,MC

## 2024-07-29 RX ORDER — INSULIN LISPRO 100/ML
VIAL (ML) SUBCUTANEOUS AT BEDTIME
Refills: 0 | Status: DISCONTINUED | OUTPATIENT
Start: 2024-07-29 | End: 2024-08-03

## 2024-07-29 RX ORDER — FAMOTIDINE 40 MG/1
20 TABLET, FILM COATED ORAL DAILY
Refills: 0 | Status: DISCONTINUED | OUTPATIENT
Start: 2024-07-29 | End: 2024-07-31

## 2024-07-29 RX ORDER — REMDESIVIR 100 MG/1
INJECTION, POWDER, LYOPHILIZED, FOR SOLUTION INTRAVENOUS
Refills: 0 | Status: COMPLETED | OUTPATIENT
Start: 2024-07-29 | End: 2024-08-02

## 2024-07-29 RX ORDER — DEXAMETHASONE 1.5 MG/1
6 TABLET ORAL DAILY
Refills: 0 | Status: DISCONTINUED | OUTPATIENT
Start: 2024-07-29 | End: 2024-08-07

## 2024-07-29 RX ORDER — DEXTROSE MONOHYDRATE, SODIUM CHLORIDE, SODIUM LACTATE, CALCIUM CHLORIDE, MAGNESIUM CHLORIDE 1.5; 538; 448; 18.4; 5.08 G/100ML; MG/100ML; MG/100ML; MG/100ML; MG/100ML
1000 SOLUTION INTRAPERITONEAL
Refills: 0 | Status: DISCONTINUED | OUTPATIENT
Start: 2024-07-29 | End: 2024-08-07

## 2024-07-29 RX ORDER — DEXTROSE MONOHYDRATE, SODIUM CHLORIDE, SODIUM LACTATE, CALCIUM CHLORIDE, MAGNESIUM CHLORIDE 1.5; 538; 448; 18.4; 5.08 G/100ML; MG/100ML; MG/100ML; MG/100ML; MG/100ML
1000 SOLUTION INTRAPERITONEAL ONCE
Refills: 0 | Status: COMPLETED | OUTPATIENT
Start: 2024-07-29 | End: 2024-07-29

## 2024-07-29 RX ORDER — METOPROLOL TARTRATE 100 MG
25 TABLET ORAL DAILY
Refills: 0 | Status: DISCONTINUED | OUTPATIENT
Start: 2024-07-29 | End: 2024-08-05

## 2024-07-29 RX ORDER — REMDESIVIR 100 MG/1
200 INJECTION, POWDER, LYOPHILIZED, FOR SOLUTION INTRAVENOUS EVERY 24 HOURS
Refills: 0 | Status: COMPLETED | OUTPATIENT
Start: 2024-07-29 | End: 2024-07-29

## 2024-07-29 RX ORDER — APIXABAN 5 MG/1
1 TABLET, FILM COATED ORAL
Refills: 0 | DISCHARGE

## 2024-07-29 RX ORDER — IPRATROPIUM BROMIDE AND ALBUTEROL SULFATE 2.5; .5 MG/3ML; MG/3ML
3 SOLUTION RESPIRATORY (INHALATION)
Refills: 0 | DISCHARGE

## 2024-07-29 RX ORDER — LOSARTAN POTASSIUM 50 MG/1
25 TABLET, FILM COATED ORAL DAILY
Refills: 0 | Status: DISCONTINUED | OUTPATIENT
Start: 2024-07-29 | End: 2024-08-03

## 2024-07-29 RX ORDER — ZINC SULFATE 50(220)MG
220 CAPSULE ORAL DAILY
Refills: 0 | Status: DISCONTINUED | OUTPATIENT
Start: 2024-07-29 | End: 2024-08-07

## 2024-07-29 RX ORDER — IPRATROPIUM BROMIDE AND ALBUTEROL SULFATE 2.5; .5 MG/3ML; MG/3ML
3 SOLUTION RESPIRATORY (INHALATION)
Refills: 0 | Status: COMPLETED | OUTPATIENT
Start: 2024-07-29 | End: 2024-07-29

## 2024-07-29 RX ORDER — BACTERIOSTATIC SODIUM CHLORIDE 0.9 %
1000 VIAL (ML) INJECTION ONCE
Refills: 0 | Status: COMPLETED | OUTPATIENT
Start: 2024-07-29 | End: 2024-07-29

## 2024-07-29 RX ORDER — REMDESIVIR 100 MG/1
100 INJECTION, POWDER, LYOPHILIZED, FOR SOLUTION INTRAVENOUS EVERY 24 HOURS
Refills: 0 | Status: COMPLETED | OUTPATIENT
Start: 2024-07-30 | End: 2024-08-02

## 2024-07-29 RX ORDER — ALBUTEROL 90 MCG
2 AEROSOL REFILL (GRAM) INHALATION
Refills: 0 | DISCHARGE

## 2024-07-29 RX ORDER — DEXAMETHASONE 1.5 MG/1
6 TABLET ORAL ONCE
Refills: 0 | Status: COMPLETED | OUTPATIENT
Start: 2024-07-29 | End: 2024-07-29

## 2024-07-29 RX ORDER — ACETAMINOPHEN 500 MG
650 TABLET ORAL EVERY 6 HOURS
Refills: 0 | Status: DISCONTINUED | OUTPATIENT
Start: 2024-07-29 | End: 2024-08-07

## 2024-07-29 RX ORDER — LYSINE HCL 500 MG
500 TABLET ORAL DAILY
Refills: 0 | Status: DISCONTINUED | OUTPATIENT
Start: 2024-07-29 | End: 2024-08-07

## 2024-07-29 RX ORDER — APIXABAN 5 MG/1
2.5 TABLET, FILM COATED ORAL EVERY 12 HOURS
Refills: 0 | Status: DISCONTINUED | OUTPATIENT
Start: 2024-07-29 | End: 2024-08-07

## 2024-07-29 RX ORDER — INSULIN LISPRO 100/ML
VIAL (ML) SUBCUTANEOUS
Refills: 0 | Status: DISCONTINUED | OUTPATIENT
Start: 2024-07-29 | End: 2024-08-03

## 2024-07-29 RX ORDER — DAPAGLIFLOZIN 5 MG/1
1 TABLET, FILM COATED ORAL
Refills: 0 | DISCHARGE

## 2024-07-29 RX ADMIN — APIXABAN 2.5 MILLIGRAM(S): 5 TABLET, FILM COATED ORAL at 18:24

## 2024-07-29 RX ADMIN — DEXAMETHASONE 6 MILLIGRAM(S): 1.5 TABLET ORAL at 15:03

## 2024-07-29 RX ADMIN — Medication 10 MILLIGRAM(S): at 22:13

## 2024-07-29 RX ADMIN — FAMOTIDINE 20 MILLIGRAM(S): 40 TABLET, FILM COATED ORAL at 13:13

## 2024-07-29 RX ADMIN — DEXTROSE MONOHYDRATE, SODIUM CHLORIDE, SODIUM LACTATE, CALCIUM CHLORIDE, MAGNESIUM CHLORIDE 1000 MILLILITER(S): 1.5; 538; 448; 18.4; 5.08 SOLUTION INTRAPERITONEAL at 13:26

## 2024-07-29 RX ADMIN — REMDESIVIR 200 MILLIGRAM(S): 100 INJECTION, POWDER, LYOPHILIZED, FOR SOLUTION INTRAVENOUS at 15:03

## 2024-07-29 RX ADMIN — Medication 1000 MILLILITER(S): at 01:21

## 2024-07-29 RX ADMIN — Medication 1: at 12:15

## 2024-07-29 RX ADMIN — Medication 2000 MILLIGRAM(S): at 05:41

## 2024-07-29 RX ADMIN — Medication 1: at 22:14

## 2024-07-29 RX ADMIN — DEXAMETHASONE 6 MILLIGRAM(S): 1.5 TABLET ORAL at 02:34

## 2024-07-29 RX ADMIN — Medication 1000 MILLILITER(S): at 09:43

## 2024-07-29 RX ADMIN — Medication 500 MILLIGRAM(S): at 13:13

## 2024-07-29 RX ADMIN — LOSARTAN POTASSIUM 25 MILLIGRAM(S): 50 TABLET, FILM COATED ORAL at 13:10

## 2024-07-29 RX ADMIN — IPRATROPIUM BROMIDE AND ALBUTEROL SULFATE 3 MILLILITER(S): 2.5; .5 SOLUTION RESPIRATORY (INHALATION) at 02:38

## 2024-07-29 RX ADMIN — Medication 220 MILLIGRAM(S): at 13:10

## 2024-07-29 RX ADMIN — IPRATROPIUM BROMIDE AND ALBUTEROL SULFATE 3 MILLILITER(S): 2.5; .5 SOLUTION RESPIRATORY (INHALATION) at 03:00

## 2024-07-29 RX ADMIN — IPRATROPIUM BROMIDE AND ALBUTEROL SULFATE 3 MILLILITER(S): 2.5; .5 SOLUTION RESPIRATORY (INHALATION) at 02:45

## 2024-07-29 RX ADMIN — DEXTROSE MONOHYDRATE, SODIUM CHLORIDE, SODIUM LACTATE, CALCIUM CHLORIDE, MAGNESIUM CHLORIDE 1000 MILLILITER(S): 1.5; 538; 448; 18.4; 5.08 SOLUTION INTRAPERITONEAL at 16:46

## 2024-07-29 RX ADMIN — Medication 25 MILLIGRAM(S): at 13:10

## 2024-07-29 RX ADMIN — Medication 1: at 17:08

## 2024-07-29 RX ADMIN — Medication 100 MILLIGRAM(S): at 05:27

## 2024-07-29 RX ADMIN — Medication 1000 UNIT(S): at 13:10

## 2024-07-29 RX ADMIN — Medication 1000 MILLILITER(S): at 02:30

## 2024-07-29 RX ADMIN — DEXTROSE MONOHYDRATE, SODIUM CHLORIDE, SODIUM LACTATE, CALCIUM CHLORIDE, MAGNESIUM CHLORIDE 50 MILLILITER(S): 1.5; 538; 448; 18.4; 5.08 SOLUTION INTRAPERITONEAL at 13:26

## 2024-07-29 NOTE — H&P ADULT - ATTENDING COMMENTS
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  Daughter at bedside states that her mother starting having a productive cough with sputum starting yesterday and today she had one episode of NBNB vomiting earlier with SOB today so she brought her mother into the ED. Pt seen at bedside, AAOx1 (to self only), complaining of diffuse abdominal pain, headache and mild chest pain. She denies any fever, chills, shortness of breath, dysuria, numbness/tingling/weakness in extremities.     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

## 2024-07-29 NOTE — ED ADULT NURSE NOTE - ED STAT RN HANDOFF DETAILS
alert , awake , responsive to all stimuli with dgth at bed side , admitted to reg floor , still ion isolation , awaiting for a bed availability .

## 2024-07-29 NOTE — CONSULT NOTE ADULT - SUBJECTIVE AND OBJECTIVE BOX
89F PMH HTN, HLD, DM, Osteoporosis, Stage II B Gastric Adenocarcinoma s/p distal gastrectomy in 2015 on active chemo (follows [facility] Dr Adams), Early Multiple myeloma, and recently diagnosed with b/l PE (on Eliquis) presenting with SOB, cough and vomiting. Daughter at bedside states that her mother starting having a productive cough with sputum starting yesterday and today she had one episode of NBNB vomiting earlier with SOB today so she brought her mother into the ED. Pt seen at bedside, AAOx2, complaining of diffuse abdominal pain. Daughter notes that her mother is not on any home O2. She reports that her mother takes eliquis for her previously diagnosed PE, but only takes it once a day. She denies any fevers, chills, CP, dysuria, numbness/tingling/weakness in extremities.  Pt lives at home with her daughter and ambulates with a wheelchair        PAST MEDICAL & SURGICAL HISTORY:  HTN (hypertension)      DM (diabetes mellitus)      Hypercholesteremia      Gastric adenocarcinoma  s/p resection      Vertigo      Dementia      Ambulatory dysfunction      Multiple myeloma      Pulmonary embolism      S/P hysterectomy      S/P tubal ligation          SOCIAL HX:   Smoking                         ETOH                            Other    FAMILY HISTORY:  Family history of diabetes mellitus (Sibling)    Family history of early CAD (Grandparent)    :  No known cardiovacular family hisotry     ROS:  See HPI     Allergies    gabapentin (Unknown)    Intolerances          PHYSICAL EXAM    ICU Vital Signs Last 24 Hrs  T(C): 37 (28 Jul 2024 23:41), Max: 37 (28 Jul 2024 23:41)  T(F): 98.6 (28 Jul 2024 23:41), Max: 98.6 (28 Jul 2024 23:41)  HR: 78 (28 Jul 2024 23:41) (78 - 78)  BP: 163/80 (28 Jul 2024 23:41) (163/80 - 163/80)  BP(mean): --  ABP: --  ABP(mean): --  RR: 18 (28 Jul 2024 23:41) (18 - 18)  SpO2: 100% (29 Jul 2024 03:21) (96% - 100%)    O2 Parameters below as of 28 Jul 2024 23:41  Patient On (Oxygen Delivery Method): room air            General - NAD, lying in bed, appears comfortable on BIPAP, transitioned to 3L NC  Eyes - PERRLA, EOM intact  ENT - Nonicteric sclerae, PERRLA, EOMI. Oropharynx clear. Moist mucous membranes. Conjunctivae appear well perfused.   Neck - No noticeable or palpable swelling, redness or rash around throat or on face  Lymph Nodes - No lymphadenopathy  Cardiovascular - RRR no m/r/g, no JVD, no carotid bruits  Lungs - (+) course rhonchi heard b/l lung fields R>L. Skin - No rashes, skin warm and dry, no erythematous areas  Abdomen - (+) diffuse tenderness to palpation. Normal bowel sounds, abdomen soft   Rectal – Rectal exam not performed since no symptoms indicated blood loss.  Extremities - (+) 1+ edema b/l LEs, cyanosis or clubbing  Musculoskeletal - 5/5 strength, normal range of motion, no swollen or erythematous joints.  Neuro– (+) mild left sided facial droop noted. Alert and oriented x 2, CN 2-12 grossly intact.          LABS:                          10.2   5.31  )-----------( 118      ( 29 Jul 2024 01:10 )             31.8                                               07-29    147<H>  |  119<H>  |  21<H>  ----------------------------<  194<H>  4.0   |  19<L>  |  0.91    Ca    8.3<L>      29 Jul 2024 01:10    TPro  5.3<L>  /  Alb  2.6<L>  /  TBili  0.3  /  DBili  x   /  AST  14  /  ALT  14  /  AlkPhos  48  07-29      PT/INR - ( 29 Jul 2024 01:10 )   PT: 13.0 sec;   INR: 1.14 ratio         PTT - ( 29 Jul 2024 01:10 )  PTT:26.2 sec                                       Urinalysis Basic - ( 29 Jul 2024 01:10 )    Color: x / Appearance: x / SG: x / pH: x  Gluc: 194 mg/dL / Ketone: x  / Bili: x / Urobili: x   Blood: x / Protein: x / Nitrite: x   Leuk Esterase: x / RBC: x / WBC x   Sq Epi: x / Non Sq Epi: x / Bacteria: x                                                  LIVER FUNCTIONS - ( 29 Jul 2024 01:10 )  Alb: 2.6 g/dL / Pro: 5.3 g/dL / ALK PHOS: 48 U/L / ALT: 14 U/L DA / AST: 14 U/L / GGT: x                                                                                                                                   ABG - ( 29 Jul 2024 03:06 )  pH, Arterial: 7.45  pH, Blood: x     /  pCO2: 23    /  pO2: 287   / HCO3: 16    / Base Excess: -6.0  /  SaO2: 96                  CXR:    ECHO:    MEDICATIONS  (STANDING):    MEDICATIONS  (PRN):

## 2024-07-29 NOTE — H&P ADULT - HISTORY OF PRESENT ILLNESS
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  Daughter at bedside states that her mother starting having a productive cough with sputum starting yesterday and today she had one episode of NBNB vomiting earlier with SOB today so she brought her mother into the ED. Pt seen at bedside, AAOx1 (to self only), complaining of diffuse abdominal pain, headache and mild chest pain. She denies any fever, chills, shortness of breath, dysuria, numbness/tingling/weakness in extremities.     In ED   T 98.6  HR 82, /72  RR 22, O2 98% on BiPAP

## 2024-07-29 NOTE — H&P ADULT - PROBLEM SELECTOR PLAN 6
Hx of PE in 1/2023 likely provoked in the setting of active cancer   on eliquis 2.5mg BID   c/w home meds

## 2024-07-29 NOTE — H&P ADULT - CLICK TO LAUNCH ORM
. Isotretinoin Pregnancy And Lactation Text: This medication is Pregnancy Category X and is considered extremely dangerous during pregnancy. It is unknown if it is excreted in breast milk.

## 2024-07-29 NOTE — H&P ADULT - NSHPPHYSICALEXAM_GEN_ALL_CORE
GENERAL: NAD, appears comfortable on 3L NC   HEAD:  Atraumatic, Normocephalic  EYES: EOMI, PERRLA, conjunctiva and sclera clear  ENMT: No tonsillar erythema, exudates, or enlargement; Moist mucous membranes, No lesions  NECK: Supple, normal appearance, No JVD; Normal thyroid; Trachea midline  NERVOUS SYSTEM:  Alert & Oriented X1 (self only),  Motor Strength 5/5 B/L upper and 2/5 lower extremities, sensation intact, CN II-XII intact.   CHEST/LUNG: (+) course rhonchi heard b/l lung fields R>L. , No wheezing   HEART: Regular rate and rhythm; No murmurs, rubs, or gallops  ABDOMEN: Soft, (+) diffuse tenderness to palpation. Nondistended; Bowel sounds present  : No suprapubic tenderness, CVAT;   EXTREMITIES:  2+ Peripheral Pulses; 1+ edema b/l LEs,  No clubbing, cyanosis  LYMPH: No lymphadenopathy noted  SKIN: No rashes or lesions;  Good capillary refill

## 2024-07-29 NOTE — H&P ADULT - PROBLEM SELECTOR PLAN 1
2/2 COVID   initially requiring BIPAP for WOB in ED  transitioned to 3L NC, saturating 100%, appears comfortable  course rhonchi heard b/l  CT chest:: No pneumonia or edema or acute abnormality.  wean O2 as tolerated

## 2024-07-29 NOTE — ED ADULT NURSE NOTE - NSFALLHARMRISKINTERV_ED_ALL_ED

## 2024-07-29 NOTE — H&P ADULT - ASSESSMENT
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 ICU was initially consulted for AHRF requiring BiPAP but patient transitioned off to RA. COVID +, UA+ , lactate 3.0. Admitted for AHRF 2/2 COVID and UTI.

## 2024-07-29 NOTE — ED ADULT NURSE NOTE - OBJECTIVE STATEMENT
bibems for cough for two days, chest pain and vomiting x one days with history of bone cancer on chemo therapy q two months

## 2024-07-29 NOTE — CHART NOTE - NSCHARTNOTEFT_GEN_A_CORE
Assessed patient at bedside.     Patient laying comfortably in bed. Endorsing sore throat and chills.     V/S /70, O2 100% in RA, RR 16, T 97.9F     PEx: Moist mucous membrane. 3 petechiae under tongue. S1/S2 no murmurs appreciated. Lungs B/L lower lung rales.    Lactate 6.8 at 11AM --> 1L LR bolus given and standing maintenance IVF. Endorsed to RN for remdesivir and decadron. Requested lactate follow up. Assessed patient at bedside.     Patient laying comfortably in bed. Endorsing sore throat and chills.     V/S /70, O2 100% in RA, RR 16, T 97.9F     PEx: Moist mucous membrane. 3 petechiae under tongue. S1/S2 no murmurs appreciated. Lungs B/L lower lung rales. Abdomen soft, mildly tender. No guarding or rebound tenderness.     Lactate 6.8 at 11AM --> 1L LR bolus given and standing maintenance IVF. Endorsed to RN for remdesivir and decadron.    Repeat lactate 7.3. Will give another 1L LR bolus. Assessed patient at bedside.     Patient laying comfortably in bed. Endorsing sore throat and chills.     V/S /70, O2 100% in RA, RR 16, T 97.9F     PEx: Moist mucous membrane. 3 petechiae under tongue. S1/S2 no murmurs appreciated. Lungs B/L lower lung rales. Abdomen soft, mildly tender. No guarding or rebound tenderness.     Lactate 6.8 at 11AM --> 1L LR bolus given and standing maintenance IVF. Endorsed to RN for remdesivir and decadron.    Repeat lactate 7.3. Will give another 1L LR bolus. Patient examined at bedside again. Appears comfortable. Has no acute complaints. Making urine. Bladder scan completed - 54cc. No urinary retention.

## 2024-07-29 NOTE — ED ADULT NURSE REASSESSMENT NOTE - NS ED NURSE REASSESS COMMENT FT1
Pt appears more awake and speaking in full sentences bipap discontinued/ pt on nasal cannula daughter at bedside

## 2024-07-29 NOTE — H&P ADULT - PROBLEM SELECTOR PLAN 3
p/w abdominal pain; UA +   Lactate 3.0 > 3.3  s/p 1L NS in ED   Will give 1L NS more  s/p ceftriaxone in ED   c/w ceftriaxone   f/u UCx

## 2024-07-29 NOTE — CONSULT NOTE ADULT - ATTENDING COMMENTS
88 y/o F PMH HTN, HLD, DM, Osteoporosis, Stage II B Gastric Adenocarcinoma s/p distal gastrectomy in 2015 on active chemo (follows [facility] Dr Adams), Early Multiple myeloma, and Covid-19 with recently diagnosed with b/l PE (on Eliquis) presenting with vomiting and respiratory distress 2/2 to Covid-19. ICU consulted for use of NIV    Assessment  Acute Respiratory Failure with Hypoxia  Covid-19 Infection   Gastric Ca  Multiple Myeloma   Hx of Pulmonary Embolism   DM2  Elevated Lactate    Plan  -Patient weaned to NC maintaining 100% saturation in no apparent respiratory distress  -c/w dexamethasone, Remdesevir, Bronchodilators  -Send inflammatory markers-->Ferritin, CRP, Prolactin.   -f/u CTA chest (daughter states she is giving Eliquis only QD), CTH (?R facial droop)   -Trend Lactate   -Admit to telemetry. Patient has no ICU needs at this time, please reconsult for any change in clinical condition.

## 2024-07-29 NOTE — PATIENT PROFILE ADULT - NSPROGENOTHERPROVIDER_GEN_A_NUR
What Is The Reason For Today's Visit?: History of Melanoma Year Excised?: 2017 unable to assess Breslow Depth?: 0.53

## 2024-07-29 NOTE — CONSULT NOTE ADULT - TIME BILLING
- Review of chart (interval documentation, labs/studies, VS trends)  - Personal review of chest imaging  - Medication reconciliation  - Bedside interview and physical examination  - Bedside SpO2 monitoring and supplemental O2 titration  - Coordination of care with primary team.

## 2024-07-29 NOTE — PROVIDER CONTACT NOTE (CRITICAL VALUE NOTIFICATION) - ACTION/TREATMENT ORDERED:
As per MD MIKE's order, LR 1000 bolus x NS x 1 1000 bolus given as per order. As per MD MIKE's order, LR 1000 bolus x NS x  1000 bolus given as per order.

## 2024-07-29 NOTE — PATIENT PROFILE ADULT - NSPRESCRALCFREQ_GEN_A_NUR
[FreeTextEntry1] : Magdaleno is a 26 y/o male with type 1 diabetes diagnosed in 2010 with symptoms of polydipsia found to have hyperglycemia on labs. No known complications. Sent to [facility]. Discharged on Lantus and Novolog. Never in DKA. Was on the Medtronic pump since 2010 now on T-Slim since 12/2018 with Admelog. \par \par Pump Settings: \par Basal Rate: \par Start Time: 12:00 AM ----- 1.8 units/hour \par Start Time: 630A -----  1.8 units/hour \par Start Time: 9A -----  1.9 units/hour \par Start Time: 1P ----- 2.1 units/hour \par Start Time: 6P -----  2.1 units/hour \par Start Time: 9P -----  \par 2.1 units/hour \par Start Time: 10P -----  2.1 units/hour   \par Insulin to Carb Ration (I:C): \par Start Time: 12:00 AM ----- 9 units/hour \par Start Time: -----  units/hour \par Start Time: 9A -----  3 units/hour \par Start Time: 130PM -----  3.5 units/hour      \par Insulin Sensitivity Factor = 12 A 50, 630A 30, 10 P 50 \par BG Target = 140 \par Insulin on Board (IOB) Duration = 4 hours \par Auto off set = 18 hours \par Maximum Basal Rate = units/hour \par Maximum Bolus = 20 units \par \par \par He used to follow with Dr. Galen Lwason last seen August 2014. Seen initially by me 12/14/15 with A1c of >14%. due to fear of hypoglycemia. He was not giving himself full bolus doses and basal rates. Uses DEXCOM G6 sensor. Changes sensor site every 10 days. Checks glucose 2-3x/day with values 200-400 on average. Knows how carb count. Uses bolus wizard. Overrides frequently. Now giving full bolus at breakfast and trying to give at least 50% of bolus at snacks and lunch and dinner for fear of going low. Still aims to go to sleep with glucose >250 as he is afraid to go to bed with lower amounts. Also concerned that he is waking up in the 90's. Tries to monitor carbs. No soda. +juice if thinks he will go low. Has a snack at bedtime for fear of hypoglycemia. The pump insertion site is changed every 3 days. Site of injection/insertion: abdomen. No polyuria or polydipsia. Denies nausea or vomiting. The patient has glucagon at home, but has no history of hypoglycemic seizure. Does not have medical alert bracelet. Had a baby boy 5/2018. Sleep has been less consistent since then. \par  \par +Celiac screen, has not yet seen GI. Cut out cake and some carbs from diet with improvement in abdominal symptoms.  unable to assess

## 2024-07-29 NOTE — H&P ADULT - PROBLEM SELECTOR PLAN 4
Hx of MM on active chemo, revlimid   follows [facility] Dr Adams Hx of MM on active chemo, revlimid   follows [facility] Dr Adams  [facility] consulted

## 2024-07-30 DIAGNOSIS — R78.81 BACTEREMIA: ICD-10-CM

## 2024-07-30 LAB
A1C WITH ESTIMATED AVERAGE GLUCOSE RESULT: 5.8 % — HIGH (ref 4–5.6)
ALBUMIN SERPL ELPH-MCNC: 2.4 G/DL — LOW (ref 3.5–5)
ALP SERPL-CCNC: 46 U/L — SIGNIFICANT CHANGE UP (ref 40–120)
ALT FLD-CCNC: 19 U/L DA — SIGNIFICANT CHANGE UP (ref 10–60)
ANION GAP SERPL CALC-SCNC: 9 MMOL/L — SIGNIFICANT CHANGE UP (ref 5–17)
AST SERPL-CCNC: 16 U/L — SIGNIFICANT CHANGE UP (ref 10–40)
BASOPHILS # BLD AUTO: 0.01 K/UL — SIGNIFICANT CHANGE UP (ref 0–0.2)
BASOPHILS NFR BLD AUTO: 0.2 % — SIGNIFICANT CHANGE UP (ref 0–2)
BILIRUB SERPL-MCNC: 0.2 MG/DL — SIGNIFICANT CHANGE UP (ref 0.2–1.2)
BUN SERPL-MCNC: 29 MG/DL — HIGH (ref 7–18)
CALCIUM SERPL-MCNC: 8.7 MG/DL — SIGNIFICANT CHANGE UP (ref 8.4–10.5)
CHLORIDE SERPL-SCNC: 111 MMOL/L — HIGH (ref 96–108)
CO2 SERPL-SCNC: 20 MMOL/L — LOW (ref 22–31)
CREAT SERPL-MCNC: 0.8 MG/DL — SIGNIFICANT CHANGE UP (ref 0.5–1.3)
CULTURE RESULTS: SIGNIFICANT CHANGE UP
EGFR: 71 ML/MIN/1.73M2 — SIGNIFICANT CHANGE UP
EOSINOPHIL # BLD AUTO: 0 K/UL — SIGNIFICANT CHANGE UP (ref 0–0.5)
EOSINOPHIL NFR BLD AUTO: 0 % — SIGNIFICANT CHANGE UP (ref 0–6)
ESTIMATED AVERAGE GLUCOSE: 120 MG/DL — HIGH (ref 68–114)
GLUCOSE BLDC GLUCOMTR-MCNC: 183 MG/DL — HIGH (ref 70–99)
GLUCOSE BLDC GLUCOMTR-MCNC: 226 MG/DL — HIGH (ref 70–99)
GLUCOSE BLDC GLUCOMTR-MCNC: 241 MG/DL — HIGH (ref 70–99)
GLUCOSE BLDC GLUCOMTR-MCNC: 279 MG/DL — HIGH (ref 70–99)
GLUCOSE SERPL-MCNC: 166 MG/DL — HIGH (ref 70–99)
GRAM STN FLD: ABNORMAL
HCT VFR BLD CALC: 29.1 % — LOW (ref 34.5–45)
HGB BLD-MCNC: 9.4 G/DL — LOW (ref 11.5–15.5)
IMM GRANULOCYTES NFR BLD AUTO: 0.2 % — SIGNIFICANT CHANGE UP (ref 0–0.9)
LYMPHOCYTES # BLD AUTO: 2.04 K/UL — SIGNIFICANT CHANGE UP (ref 1–3.3)
LYMPHOCYTES # BLD AUTO: 41.5 % — SIGNIFICANT CHANGE UP (ref 13–44)
MAGNESIUM SERPL-MCNC: 1.7 MG/DL — SIGNIFICANT CHANGE UP (ref 1.6–2.6)
MCHC RBC-ENTMCNC: 32.3 GM/DL — SIGNIFICANT CHANGE UP (ref 32–36)
MCHC RBC-ENTMCNC: 33.5 PG — SIGNIFICANT CHANGE UP (ref 27–34)
MCV RBC AUTO: 103.6 FL — HIGH (ref 80–100)
MONOCYTES # BLD AUTO: 0.51 K/UL — SIGNIFICANT CHANGE UP (ref 0–0.9)
MONOCYTES NFR BLD AUTO: 10.4 % — SIGNIFICANT CHANGE UP (ref 2–14)
NEUTROPHILS # BLD AUTO: 2.35 K/UL — SIGNIFICANT CHANGE UP (ref 1.8–7.4)
NEUTROPHILS NFR BLD AUTO: 47.7 % — SIGNIFICANT CHANGE UP (ref 43–77)
NRBC # BLD: 0 /100 WBCS — SIGNIFICANT CHANGE UP (ref 0–0)
PHOSPHATE SERPL-MCNC: 3.4 MG/DL — SIGNIFICANT CHANGE UP (ref 2.5–4.5)
PLATELET # BLD AUTO: 139 K/UL — LOW (ref 150–400)
POTASSIUM SERPL-MCNC: 3.9 MMOL/L — SIGNIFICANT CHANGE UP (ref 3.5–5.3)
POTASSIUM SERPL-SCNC: 3.9 MMOL/L — SIGNIFICANT CHANGE UP (ref 3.5–5.3)
PROT SERPL-MCNC: 5.1 G/DL — LOW (ref 6–8.3)
RBC # BLD: 2.81 M/UL — LOW (ref 3.8–5.2)
RBC # FLD: 15.9 % — HIGH (ref 10.3–14.5)
SODIUM SERPL-SCNC: 140 MMOL/L — SIGNIFICANT CHANGE UP (ref 135–145)
SPECIMEN SOURCE: SIGNIFICANT CHANGE UP
WBC # BLD: 4.92 K/UL — SIGNIFICANT CHANGE UP (ref 3.8–10.5)
WBC # FLD AUTO: 4.92 K/UL — SIGNIFICANT CHANGE UP (ref 3.8–10.5)

## 2024-07-30 RX ADMIN — Medication 10 MILLIGRAM(S): at 21:50

## 2024-07-30 RX ADMIN — FAMOTIDINE 20 MILLIGRAM(S): 40 TABLET, FILM COATED ORAL at 12:12

## 2024-07-30 RX ADMIN — Medication 1000 UNIT(S): at 12:11

## 2024-07-30 RX ADMIN — Medication 100 MILLIGRAM(S): at 06:25

## 2024-07-30 RX ADMIN — Medication 2: at 17:10

## 2024-07-30 RX ADMIN — REMDESIVIR 200 MILLIGRAM(S): 100 INJECTION, POWDER, LYOPHILIZED, FOR SOLUTION INTRAVENOUS at 14:52

## 2024-07-30 RX ADMIN — APIXABAN 2.5 MILLIGRAM(S): 5 TABLET, FILM COATED ORAL at 06:26

## 2024-07-30 RX ADMIN — DEXAMETHASONE 6 MILLIGRAM(S): 1.5 TABLET ORAL at 06:25

## 2024-07-30 RX ADMIN — DEXTROSE MONOHYDRATE, SODIUM CHLORIDE, SODIUM LACTATE, CALCIUM CHLORIDE, MAGNESIUM CHLORIDE 50 MILLILITER(S): 1.5; 538; 448; 18.4; 5.08 SOLUTION INTRAPERITONEAL at 06:26

## 2024-07-30 RX ADMIN — Medication 220 MILLIGRAM(S): at 12:11

## 2024-07-30 RX ADMIN — Medication 500 MILLIGRAM(S): at 12:12

## 2024-07-30 RX ADMIN — APIXABAN 2.5 MILLIGRAM(S): 5 TABLET, FILM COATED ORAL at 17:10

## 2024-07-30 RX ADMIN — LOSARTAN POTASSIUM 25 MILLIGRAM(S): 50 TABLET, FILM COATED ORAL at 06:26

## 2024-07-30 RX ADMIN — Medication 1: at 08:11

## 2024-07-30 RX ADMIN — Medication 25 MILLIGRAM(S): at 06:26

## 2024-07-30 RX ADMIN — Medication 100 MILLIGRAM(S): at 14:52

## 2024-07-30 RX ADMIN — Medication 3: at 11:07

## 2024-07-30 NOTE — PROGRESS NOTE ADULT - PROBLEM SELECTOR PLAN 2
Blood culture from 7/29 growing strep prelim results   -Adjust Ceftriaxone doses to 2 g daily until blood culture from 7/29 is finalized  - Repeat Blood cultures X 2 in AM to document clearing the blood stream  -ID Dr. Estrada following

## 2024-07-30 NOTE — PROGRESS NOTE ADULT - SUBJECTIVE AND OBJECTIVE BOX
Patient is a 89y old  Female who presents with a chief complaint of UTI and COVID (29 Jul 2024 09:29)      INTERVAL HPI/OVERNIGHT EVENTS: no new complaints    MEDICATIONS  (STANDING):  apixaban 2.5 milliGRAM(s) Oral every 12 hours  ascorbic acid 500 milliGRAM(s) Oral daily  cefTRIAXone   IVPB 1000 milliGRAM(s) IV Intermittent every 24 hours  cholecalciferol 1000 Unit(s) Oral daily  dexAMETHasone  Injectable 6 milliGRAM(s) IV Push daily  famotidine    Tablet 20 milliGRAM(s) Oral daily  insulin lispro (ADMELOG) corrective regimen sliding scale   SubCutaneous at bedtime  insulin lispro (ADMELOG) corrective regimen sliding scale   SubCutaneous three times a day before meals  lactated ringers. 1000 milliLiter(s) (50 mL/Hr) IV Continuous <Continuous>  losartan 25 milliGRAM(s) Oral daily  metoprolol succinate ER 25 milliGRAM(s) Oral daily  montelukast 10 milliGRAM(s) Oral at bedtime  remdesivir  IVPB   IV Intermittent   remdesivir  IVPB 100 milliGRAM(s) IV Intermittent every 24 hours  simvastatin 10 milliGRAM(s) Oral at bedtime  zinc sulfate 220 milliGRAM(s) Oral daily    MEDICATIONS  (PRN):  acetaminophen     Tablet .. 650 milliGRAM(s) Oral every 6 hours PRN Temp greater or equal to 38C (100.4F), Mild Pain (1 - 3)      __________________________________________________  REVIEW OF SYSTEMS:    CONSTITUTIONAL: No fever,   EYES: no acute visual disturbances  NECK: No pain or stiffness  RESPIRATORY: No cough; No shortness of breath  CARDIOVASCULAR: No chest pain, no palpitations  GASTROINTESTINAL: No pain. No nausea or vomiting; No diarrhea   NEUROLOGICAL: No headache or numbness, no tremors  MUSCULOSKELETAL: No joint pain, no muscle pain  GENITOURINARY: no dysuria, no frequency, no hesitancy  PSYCHIATRY: no depression , no anxiety  ALL OTHER  ROS negative      Vital Signs Last 24 Hrs  T(C): 36.9 (30 Jul 2024 06:20), Max: 36.9 (29 Jul 2024 21:02)  T(F): 98.4 (30 Jul 2024 06:20), Max: 98.5 (29 Jul 2024 21:02)  HR: 62 (30 Jul 2024 06:20) (51 - 71)  BP: 169/60 (30 Jul 2024 06:20) (157/71 - 169/68)  BP(mean): 95 (29 Jul 2024 21:02) (95 - 95)  RR: 18 (30 Jul 2024 06:20) (18 - 18)  SpO2: 98% (30 Jul 2024 06:20) (97% - 98%)    Parameters below as of 30 Jul 2024 06:20  Patient On (Oxygen Delivery Method): nasal cannula  O2 Flow (L/min): 2      ________________________________________________  PHYSICAL EXAM:  GENERAL: NAD  HEENT: Normocephalic;  conjunctivae and sclerae clear; moist mucous membranes;   NECK : supple  CHEST/LUNG: Clear to auscultation bilaterally with good air entry   HEART: S1 S2  regular; no murmurs, gallops or rubs  ABDOMEN: Soft, Nontender, Nondistended; Bowel sounds present  EXTREMITIES: no cyanosis; no edema; no calf tenderness  SKIN: warm and dry; no rash  NERVOUS SYSTEM:  Awake and alert; Oriented  to place, person and time ; no new deficits    _________________________________________________  LABS:                        9.4    4.92  )-----------( 139      ( 30 Jul 2024 07:05 )             29.1     07-30    140  |  111<H>  |  29<H>  ----------------------------<  166<H>  3.9   |  20<L>  |  0.80    Ca    8.7      30 Jul 2024 07:05  Phos  3.4     07-30  Mg     1.7     07-30    TPro  5.1<L>  /  Alb  2.4<L>  /  TBili  0.2  /  DBili  x   /  AST  16  /  ALT  19  /  AlkPhos  46  07-30    PT/INR - ( 29 Jul 2024 01:10 )   PT: 13.0 sec;   INR: 1.14 ratio         PTT - ( 29 Jul 2024 01:10 )  PTT:26.2 sec  Urinalysis Basic - ( 30 Jul 2024 07:05 )    Color: x / Appearance: x / SG: x / pH: x  Gluc: 166 mg/dL / Ketone: x  / Bili: x / Urobili: x   Blood: x / Protein: x / Nitrite: x   Leuk Esterase: x / RBC: x / WBC x   Sq Epi: x / Non Sq Epi: x / Bacteria: x      CAPILLARY BLOOD GLUCOSE      POCT Blood Glucose.: 183 mg/dL (30 Jul 2024 08:02)  POCT Blood Glucose.: 264 mg/dL (29 Jul 2024 21:37)  POCT Blood Glucose.: 180 mg/dL (29 Jul 2024 16:52)  POCT Blood Glucose.: 184 mg/dL (29 Jul 2024 11:43)      RADIOLOGY & ADDITIONAL TESTS:    Imaging Personally Reviewed:  YES/NO    Consultant(s) Notes Reviewed:   YES/ No    Care Discussed with Consultants :     Plan of care was discussed with patient and /or primary care giver; all questions and concerns were addressed and care was aligned with patient's wishes.       Patient is a 89y old  Female who presents with a chief complaint of UTI and COVID (29 Jul 2024 09:29)      INTERVAL HPI/OVERNIGHT EVENTS: pt seen at bedside with Cong[place]  Kevan [id number], pt has no new complaints    MEDICATIONS  (STANDING):  apixaban 2.5 milliGRAM(s) Oral every 12 hours  ascorbic acid 500 milliGRAM(s) Oral daily  cefTRIAXone   IVPB 1000 milliGRAM(s) IV Intermittent every 24 hours  cholecalciferol 1000 Unit(s) Oral daily  dexAMETHasone  Injectable 6 milliGRAM(s) IV Push daily  famotidine    Tablet 20 milliGRAM(s) Oral daily  insulin lispro (ADMELOG) corrective regimen sliding scale   SubCutaneous at bedtime  insulin lispro (ADMELOG) corrective regimen sliding scale   SubCutaneous three times a day before meals  lactated ringers. 1000 milliLiter(s) (50 mL/Hr) IV Continuous <Continuous>  losartan 25 milliGRAM(s) Oral daily  metoprolol succinate ER 25 milliGRAM(s) Oral daily  montelukast 10 milliGRAM(s) Oral at bedtime  remdesivir  IVPB   IV Intermittent   remdesivir  IVPB 100 milliGRAM(s) IV Intermittent every 24 hours  simvastatin 10 milliGRAM(s) Oral at bedtime  zinc sulfate 220 milliGRAM(s) Oral daily    MEDICATIONS  (PRN):  acetaminophen     Tablet .. 650 milliGRAM(s) Oral every 6 hours PRN Temp greater or equal to 38C (100.4F), Mild Pain (1 - 3)  __________________________________________________  REVIEW OF SYSTEMS:    CONSTITUTIONAL: No fever,   EYES: no acute visual disturbances  NECK: No pain or stiffness  RESPIRATORY: productive cough; dyspnea with exertion  CARDIOVASCULAR: No chest pain, no palpitations  GASTROINTESTINAL: No pain. No nausea or vomiting; No diarrhea   NEUROLOGICAL: No headache or numbness, no tremors  MUSCULOSKELETAL: No joint pain, no muscle pain  GENITOURINARY: no dysuria, no frequency, no hesitancy  PSYCHIATRY: no depression , no anxiety  ALL OTHER  ROS negative      Vital Signs Last 24 Hrs  T(C): 36.9 (30 Jul 2024 06:20), Max: 36.9 (29 Jul 2024 21:02)  T(F): 98.4 (30 Jul 2024 06:20), Max: 98.5 (29 Jul 2024 21:02)  HR: 62 (30 Jul 2024 06:20) (51 - 71)  BP: 169/60 (30 Jul 2024 06:20) (157/71 - 169/68)  BP(mean): 95 (29 Jul 2024 21:02) (95 - 95)  RR: 18 (30 Jul 2024 06:20) (18 - 18)  SpO2: 98% (30 Jul 2024 06:20) (97% - 98%)    Parameters below as of 30 Jul 2024 06:20  Patient On (Oxygen Delivery Method): nasal cannula  O2 Flow (L/min): 2  ________________________________________________  PHYSICAL EXAM:  GENERAL: NAD  HEENT: Normocephalic;  conjunctivae and sclerae clear; moist mucous membranes;   NECK : supple  CHEST/LUNG: Clear to auscultation bilaterally with good air entry   HEART: S1 S2  regular; no murmurs, gallops or rubs  ABDOMEN: Soft, Nontender, Nondistended; Bowel sounds present  EXTREMITIES: no cyanosis; no edema; no calf tenderness  SKIN: warm and dry; no rash  NERVOUS SYSTEM:  Awake and alert; Oriented to person place and situation; no new deficits  _________________________________________________  LABS:                        9.4    4.92  )-----------( 139      ( 30 Jul 2024 07:05 )             29.1     07-30    140  |  111<H>  |  29<H>  ----------------------------<  166<H>  3.9   |  20<L>  |  0.80    Ca    8.7      30 Jul 2024 07:05  Phos  3.4     07-30  Mg     1.7     07-30    TPro  5.1<L>  /  Alb  2.4<L>  /  TBili  0.2  /  DBili  x   /  AST  16  /  ALT  19  /  AlkPhos  46  07-30    PT/INR - ( 29 Jul 2024 01:10 )   PT: 13.0 sec;   INR: 1.14 ratio       PTT - ( 29 Jul 2024 01:10 )  PTT:26.2 sec  Urinalysis Basic - ( 30 Jul 2024 07:05 )    Color: x / Appearance: x / SG: x / pH: x  Gluc: 166 mg/dL / Ketone: x  / Bili: x / Urobili: x   Blood: x / Protein: x / Nitrite: x   Leuk Esterase: x / RBC: x / WBC x   Sq Epi: x / Non Sq Epi: x / Bacteria: x    CAPILLARY BLOOD GLUCOSE    POCT Blood Glucose.: 183 mg/dL (30 Jul 2024 08:02)  POCT Blood Glucose.: 264 mg/dL (29 Jul 2024 21:37)  POCT Blood Glucose.: 180 mg/dL (29 Jul 2024 16:52)  POCT Blood Glucose.: 184 mg/dL (29 Jul 2024 11:43)      RADIOLOGY & ADDITIONAL TESTS:  < from: CT Head No Cont (07.29.24 @ 05:21) >  PROCEDURE DATE:  07/29/2024        INTERPRETATION:  CLINICAL INFORMATION: AMS    COMPARISON: CT head 10/24/2023    CONTRAST:  IV Contrast: NONE  Complications: None reported at time of study completion    TECHNIQUE:  Serial axial images were obtained from the skull base to the   vertex using multi-slice helical technique. Sagittal and coronal   reformats were obtained.    FINDINGS:    VENTRICLES AND SULCI: Age appropriate involutional changes.  INTRA-AXIAL: No mass effect, acute hemorrhage, or midline shift.  There   are periventricular and subcortical white matter hypodensities,   consistent with microvascular type changes.  EXTRA-AXIAL: No mass or fluid collection. Basal cisterns are normal in   appearance.    VISUALIZED SINUSES:  Scattered mucosal thickening.  TYMPANOMASTOID CAVITIES:  Clear.  VISUALIZED ORBITS: Bilateral lens replacement.  CALVARIUM: Intact.    MISCELLANEOUS: Atherosclerotic calcifications of the intracranial   vasculature.      IMPRESSION:  No acute intracranial hemorrhage, mass effect, or midline shift.      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    COMPARISON: CTA chest 3/28/2023. CT abdomen pelvis 5/13/2022.    CONTRAST/COMPLICATIONS:  IV Contrast: Omnipaque 350  80 cc administered   20 cc discarded  Oral Contrast: NONE  Complications: None reported at time of study completion    PROCEDURE:  CT Angiography of the Chest was performed followed by portal venous phase   imaging of the Abdomen and Pelvis.  Sagittal and coronal reformats were performed as well as 3D (MIP)   reconstructions.    FINDINGS:  CHEST:  LUNGS AND LARGE AIRWAYS: Patent central airways. Mild subpleural   interstitial prominence particularly in the upper lungs. No pneumonia or   edema or acute abnormality.  PLEURA: No pleural effusion.  VESSELS: No thoracic aortic dissection or aneurysm. Coronary artery  atherosclerosis. No pulmonary embolus.  HEART: Heart size is normal. No pericardial effusion.  MEDIASTINUM AND MARGARET: No lymphadenopathy.  CHEST WALL AND LOWER NECK: Multinodular thyroid.    ABDOMEN AND PELVIS:  LIVER: Within normal limits.  BILE DUCTS: Normal caliber.  GALLBLADDER: Cholecystectomy.  SPLEEN: Within normal limits.  PANCREAS: Within normal limits.  ADRENALS: Within normal limits.  KIDNEYS/URETERS: No hydronephrosis or renal stone or concerning mass.   Mild cortical volume loss bilaterally.    BLADDER: Within normal limits.  REPRODUCTIVE ORGANS: Status post hysterectomy.    BOWEL: No bowel obstruction. Status post appendectomy. Status post distal   gastric resection with gastrojejunostomy.  PERITONEUM/RETROPERITONEUM: Within normal limits.  VESSELS: Atherosclerotic changes. No abdominal aortic aneurysm. Patent   portal, splenic, and mesenteric veins.  LYMPH NODES: No lymphadenopathy.  ABDOMINAL WALL: Within normal limits.  BONES: Bony demineralization. No acute fracture. Mild compression   fracture of the L1 superior endplate, new compared to prior but   chronic-appearing. Transitional lumbosacral anatomy with   pseudoarticulation between the transverse processes of S1 and S2.    IMPRESSION:  No pulmonary embolus or acute finding in the chest.    No acute finding in the abdomen or pelvis.    Mild compression fracture of the L1 superior endplate, new compared to   prior but chronic-appearing.    --- End of Report ---    Imaging Personally Reviewed:  YES    Consultant(s) Notes Reviewed:   YES      Plan of care was discussed with patient and /or primary care giver; all questions and concerns were addressed and care was aligned with patient's wishes.

## 2024-07-30 NOTE — PROGRESS NOTE ADULT - SUBJECTIVE AND OBJECTIVE BOX
Patient is a 89y old  Female who presents with a chief complaint of UTI and COVID (30 Jul 2024 09:23)    pt seen in icu [  ], reg med floor [   ], bed [  ], chair at bedside [   ], a+o x3 [  ], lethargic [  ],  nad [  ]    shae [  ], ngt [  ], peg [  ], et tube [  ], cent line [  ], picc line [  ]        Allergies    gabapentin (Unknown)        Vitals    T(F): 98.4 (07-30-24 @ 06:20), Max: 98.5 (07-29-24 @ 21:02)  HR: 62 (07-30-24 @ 06:20) (51 - 71)  BP: 169/60 (07-30-24 @ 06:20) (157/71 - 169/68)  RR: 18 (07-30-24 @ 06:20) (18 - 18)  SpO2: 98% (07-30-24 @ 06:20) (97% - 98%)  Wt(kg): --  CAPILLARY BLOOD GLUCOSE      POCT Blood Glucose.: 183 mg/dL (30 Jul 2024 08:02)      Labs                          9.4    4.92  )-----------( 139      ( 30 Jul 2024 07:05 )             29.1       07-30    140  |  111<H>  |  29<H>  ----------------------------<  166<H>  3.9   |  20<L>  |  0.80    Ca    8.7      30 Jul 2024 07:05  Phos  3.4     07-30  Mg     1.7     07-30    TPro  5.1<L>  /  Alb  2.4<L>  /  TBili  0.2  /  DBili  x   /  AST  16  /  ALT  19  /  AlkPhos  46  07-30            Clean Catch  07-29 @ 04:31   >=3 organisms. Probable collection contamination.  --  --      .Blood Blood-Peripheral  07-29 @ 01:10   No growth at 24 hours  --  --      .Blood Blood-Peripheral  07-29 @ 01:05   Growth in anaerobic bottle: Gram positive cocci in pairs  Direct identification is available within approximately 3-5  hours either by Blood Panel Multiplexed PCR or Direct  MALDI-TOF. Details: [email or web address]  --  Blood Culture PCR          Radiology Results      Meds    MEDICATIONS  (STANDING):  apixaban 2.5 milliGRAM(s) Oral every 12 hours  ascorbic acid 500 milliGRAM(s) Oral daily  cefTRIAXone   IVPB 1000 milliGRAM(s) IV Intermittent every 24 hours  cholecalciferol 1000 Unit(s) Oral daily  dexAMETHasone  Injectable 6 milliGRAM(s) IV Push daily  famotidine    Tablet 20 milliGRAM(s) Oral daily  insulin lispro (ADMELOG) corrective regimen sliding scale   SubCutaneous three times a day before meals  insulin lispro (ADMELOG) corrective regimen sliding scale   SubCutaneous at bedtime  lactated ringers. 1000 milliLiter(s) (50 mL/Hr) IV Continuous <Continuous>  losartan 25 milliGRAM(s) Oral daily  metoprolol succinate ER 25 milliGRAM(s) Oral daily  montelukast 10 milliGRAM(s) Oral at bedtime  remdesivir  IVPB   IV Intermittent   remdesivir  IVPB 100 milliGRAM(s) IV Intermittent every 24 hours  simvastatin 10 milliGRAM(s) Oral at bedtime  zinc sulfate 220 milliGRAM(s) Oral daily      MEDICATIONS  (PRN):  acetaminophen     Tablet .. 650 milliGRAM(s) Oral every 6 hours PRN Temp greater or equal to 38C (100.4F), Mild Pain (1 - 3)      Physical Exam    Neuro :  no focal deficits  Respiratory: CTA B/L  CV: RRR, S1S2, no murmurs,   Abdominal: Soft, NT, ND +BS,  Extremities: No edema, + peripheral pulses    ASSESSMENT    asute hypoxic resp failure 2nd to covid 19,   uti,   h/o HTN,   HLD,   DM,   Osteoporosis,   Stage II B Gastric Adenocarcinoma s/p distal gastrectomy (2015),   Multiple myeloma/plasmactyoma    Infection due to severe acute respiratory syndrome coronavirus 2 (SARS-CoV-2)    HTN (hypertension)    DM (diabetes mellitus)    Hypercholesteremia    Gastric adenocarcinoma    Vertigo    Dementia    Ambulatory dysfunction    Multiple myeloma    Pulmonary embolism    S/P hysterectomy    S/P tubal ligation        PLAN    admit to med,   decadron,   remdesevir,   vit c,   vitamin d,   zinc,   pepcid,   singulair,   contact and airborne isolation,   cont albuterol inhaler,   cont supportive care with tylenol prn,   robitussin prn and O2 via nasal canula as needed,   rocephin 1 g daily,   lispro ss,   hold outpt dm meds,    cbc,   bmp,   mg,   phos,   lipids,   tsh,   hgba1c,  bld cx,   ucx      pulm cons  heme onc cons     Patient is a 89y old  Female who presents with a chief complaint of UTI and COVID (30 Jul 2024 09:23)    pt seen in icu [  ], reg med floor [  x ], bed [ x ], chair at bedside [   ], awake and responsive [ x ], lethargic [  ],  nad [ x ]      Allergies    gabapentin (Unknown)        Vitals    T(F): 98.4 (07-30-24 @ 06:20), Max: 98.5 (07-29-24 @ 21:02)  HR: 62 (07-30-24 @ 06:20) (51 - 71)  BP: 169/60 (07-30-24 @ 06:20) (157/71 - 169/68)  RR: 18 (07-30-24 @ 06:20) (18 - 18)  SpO2: 98% (07-30-24 @ 06:20) (97% - 98%)  Wt(kg): --  CAPILLARY BLOOD GLUCOSE      POCT Blood Glucose.: 183 mg/dL (30 Jul 2024 08:02)      Labs                          9.4    4.92  )-----------( 139      ( 30 Jul 2024 07:05 )             29.1       07-30    140  |  111<H>  |  29<H>  ----------------------------<  166<H>  3.9   |  20<L>  |  0.80    Ca    8.7      30 Jul 2024 07:05  Phos  3.4     07-30  Mg     1.7     07-30    TPro  5.1<L>  /  Alb  2.4<L>  /  TBili  0.2  /  DBili  x   /  AST  16  /  ALT  19  /  AlkPhos  46  07-30    A1C with Estimated Average Glucose (07.30.24 @ 07:05)   A1C with Estimated Average Glucose Result: 5.8        Clean Catch  07-29 @ 04:31   >=3 organisms. Probable collection contamination.  --  --      .Blood Blood-Peripheral  07-29 @ 01:10   No growth at 24 hours  --  --      .Blood Blood-Peripheral  07-29 @ 01:05   Growth in anaerobic bottle: Gram positive cocci in pairs  Direct identification is available within approximately 3-5  hours either by Blood Panel Multiplexed PCR or Direct  MALDI-TOF. Details: [email or web address]  --  Blood Culture PCR  - Streptococcus sp. (Not Grp A, B or S pneumoniae): Detec        Radiology Results      Meds    MEDICATIONS  (STANDING):  apixaban 2.5 milliGRAM(s) Oral every 12 hours  ascorbic acid 500 milliGRAM(s) Oral daily  cefTRIAXone   IVPB 1000 milliGRAM(s) IV Intermittent every 24 hours  cholecalciferol 1000 Unit(s) Oral daily  dexAMETHasone  Injectable 6 milliGRAM(s) IV Push daily  famotidine    Tablet 20 milliGRAM(s) Oral daily  insulin lispro (ADMELOG) corrective regimen sliding scale   SubCutaneous three times a day before meals  insulin lispro (ADMELOG) corrective regimen sliding scale   SubCutaneous at bedtime  lactated ringers. 1000 milliLiter(s) (50 mL/Hr) IV Continuous <Continuous>  losartan 25 milliGRAM(s) Oral daily  metoprolol succinate ER 25 milliGRAM(s) Oral daily  montelukast 10 milliGRAM(s) Oral at bedtime  remdesivir  IVPB   IV Intermittent   remdesivir  IVPB 100 milliGRAM(s) IV Intermittent every 24 hours  simvastatin 10 milliGRAM(s) Oral at bedtime  zinc sulfate 220 milliGRAM(s) Oral daily      MEDICATIONS  (PRN):  acetaminophen     Tablet .. 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

## 2024-07-30 NOTE — CONSULT NOTE ADULT - PROBLEM SELECTOR RECOMMENDATION 2
isolation precautions  oxygen supp  monitor oxygen sat  check LDH, LFTs, CRP, D-Dimer, Ferritin and procalcitonin  Vit C, D and Zinc supp  IV Dexa  IV Remdesivir  Bronchodilators  follow up Covid PCR

## 2024-07-30 NOTE — CONSULT NOTE ADULT - SUBJECTIVE AND OBJECTIVE BOX
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 Patient is AAOx1 (to self only), complaining of diffuse abdominal pain, headache and mild chest pain. She has no fever or chills. On admission, she found to have no fever but positive COVID PCR and Blood culture positive for streptococcal species. She has started on Remdesivir, Dexamethasone and Ceftriaxone, and the ID consult requested to assist with further evaluation and antibiotic management.      REVIEW OF SYSTEMS: Total of twelve systems have been reviewed  and found to be negative unless mentioned in HPI      PAST MEDICAL & SURGICAL HISTORY:  HTN (hypertension)  DM (diabetes mellitus)  Hypercholesteremia  Gastric adenocarcinoma  s/p resection  Vertigo  Dementia  Ambulatory dysfunction  Multiple myeloma  Pulmonary embolism  S/P hysterectomy  S/P tubal ligation      SOCIAL HISTORY  Alcohol: Does not drink  Tobacco: Does not smoke  Illicit substance use: None      FAMILY HISTORY: Non contributory to the present illness      ALLERGIES: gabapentin (Unknown)      Vital Signs Last 24 Hrs  T(C): 36.9 (30 Jul 2024 12:51), Max: 36.9 (29 Jul 2024 21:02)  T(F): 98.4 (30 Jul 2024 12:51), Max: 98.5 (29 Jul 2024 21:02)  HR: 75 (30 Jul 2024 12:51) (51 - 75)  BP: 137/62 (30 Jul 2024 12:51) (137/62 - 169/60)  BP(mean): 95 (29 Jul 2024 21:02) (95 - 95)  RR: 16 (30 Jul 2024 12:51) (16 - 18)  SpO2: 98% (30 Jul 2024 12:51) (97% - 98%)    Parameters below as of 30 Jul 2024 12:51  Patient On (Oxygen Delivery Method): nasal cannula  O2 Flow (L/min): 2        PHYSICAL EXAM:  GENERAL: Not in distress, on oxygen via NC  CHEST/LUNG: Not using accessory muscles   EXTREMITIES: No pedal  edema  CNS: Awake and Alert      LABS:                        9.4    4.92  )-----------( 139      ( 30 Jul 2024 07:05 )             29.1         07-30    140  |  111<H>  |  29<H>  ----------------------------<  166<H>  3.9   |  20<L>  |  0.80    Ca    8.7      30 Jul 2024 07:05  Phos  3.4     07-30  Mg     1.7     07-30    TPro  5.1<L>  /  Alb  2.4<L>  /  TBili  0.2  /  DBili  x   /  AST  16  /  ALT  19  /  AlkPhos  46  07-30    PT/INR - ( 29 Jul 2024 01:10 )   PT: 13.0 sec;   INR: 1.14 ratio      PTT - ( 29 Jul 2024 01:10 )  PTT:26.2 sec      CAPILLARY BLOOD GLUCOSE  POCT Blood Glucose.: 279 mg/dL (30 Jul 2024 10:54)  POCT Blood Glucose.: 183 mg/dL (30 Jul 2024 08:02)  POCT Blood Glucose.: 264 mg/dL (29 Jul 2024 21:37)  POCT Blood Glucose.: 180 mg/dL (29 Jul 2024 16:52)    ABG - ( 29 Jul 2024 03:06 )  pH, Arterial: 7.45  pH, Blood: x     /  pCO2: 23    /  pO2: 287   / HCO3: 16    / Base Excess: -6.0  /  SaO2: 96          MEDICATIONS  (STANDING):  apixaban 2.5 milliGRAM(s) Oral every 12 hours  ascorbic acid 500 milliGRAM(s) Oral daily  cefTRIAXone   IVPB 1000 milliGRAM(s) IV Intermittent every 24 hours  cholecalciferol 1000 Unit(s) Oral daily  dexAMETHasone  Injectable 6 milliGRAM(s) IV Push daily  famotidine    Tablet 20 milliGRAM(s) Oral daily  insulin lispro (ADMELOG) corrective regimen sliding scale   SubCutaneous at bedtime  insulin lispro (ADMELOG) corrective regimen sliding scale   SubCutaneous three times a day before meals  lactated ringers. 1000 milliLiter(s) (50 mL/Hr) IV Continuous <Continuous>  losartan 25 milliGRAM(s) Oral daily  metoprolol succinate ER 25 milliGRAM(s) Oral daily  montelukast 10 milliGRAM(s) Oral at bedtime  remdesivir  IVPB   IV Intermittent   remdesivir  IVPB 100 milliGRAM(s) IV Intermittent every 24 hours  simvastatin 10 milliGRAM(s) Oral at bedtime  zinc sulfate 220 milliGRAM(s) Oral daily    MEDICATIONS  (PRN):  acetaminophen     Tablet .. 650 milliGRAM(s) Oral every 6 hours PRN Temp greater or equal to 38C (100.4F), Mild Pain (1 - 3)        RADIOLOGY & ADDITIONAL TESTS:    7/29/24: CT Head No Cont (07.29.24 @ 05:21) No acute intracranial hemorrhage, mass effect, or midline shift.      7/29/24: CT Angio Chest PE Protocol w/ IV Cont (07.29.24 @ 05:21) No pulmonary embolus or acute finding in the chest.    No acute finding in the abdomen or pelvis. Mild compression fracture of the L1 superior endplate, new compared to prior but chronic-appearing.      7/29/24 : CT Abdomen and Pelvis w/ IV Cont (07.29.24 @ 05:21) No acute finding in the abdomen or pelvis.    Mild compression fracture of the L1 superior endplate, new compared to prior but chronic-appearing.      7/29/24 : Xray Chest 1 View- PORTABLE-Urgent (07.29.24 @ 02:10) No consolidation or infiltrate.  No pleural effusion.    Heart size cannot be accurately assessed in this projection.        MICROBIOLOGY DATA:    Culture - Urine (07.29.24 @ 04:31)   Specimen Source: Clean Catch  Culture Results:   >=3 organisms. Probable collection contamination.    Urine Microscopic-Add On (NC) (07.29.24 @ 04:31)   Bacteria: Many /HPF  Comment - Urine: few amorphous urates  Squamous Epithelial Cells: Present  Red Blood Cell - Urine: 4 /HPF  White Blood Cell - Urine: 8 /HPF    Culture - Blood (07.29.24 @ 01:10)   Specimen Source: .Blood Blood-Peripheral  Culture Results: No growth at 24 hours    Culture - Blood (07.29.24 @ 01:05)   - Streptococcus sp. (Not Grp A, B or S pneumoniae): Detec  Gram Stain:   Growth in anaerobic bottle: Gram positive cocci in pairs   Growth in aerobic bottle: Gram positive cocci in pairs  Specimen Source: .Blood Blood-Peripheral  Organism: Blood Culture PCR  Culture Results:   Growth in anaerobic bottle: Gram positive cocci in pairs   Growth in aerobic bottle: Gram positive cocci in pairs   Direct identification is available within approximately 3-5   hours either by Blood Panel Multiplexed PCR or Direct   MALDI-TOF. 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 Patient is AAOx1 (to self only), complaining of diffuse abdominal pain, headache and mild chest pain. She has no fever or chills. On admission, she found to have no fever but positive COVID PCR and Blood culture positive for streptococcal species. She has started on Remdesivir, Dexamethasone and Ceftriaxone, and the ID consult requested to assist with further evaluation and antibiotic management.      REVIEW OF SYSTEMS: Total of twelve systems have been reviewed  and found to be negative unless mentioned in HPI      PAST MEDICAL & SURGICAL HISTORY:  HTN (hypertension)  DM (diabetes mellitus)  Hypercholesteremia  Gastric adenocarcinoma  s/p resection  Vertigo  Dementia  Ambulatory dysfunction  Multiple myeloma  Pulmonary embolism  S/P hysterectomy  S/P tubal ligation      SOCIAL HISTORY  Alcohol: Does not drink  Tobacco: Does not smoke  Illicit substance use: None      FAMILY HISTORY: Non contributory to the present illness      ALLERGIES: gabapentin (Unknown)      Vital Signs Last 24 Hrs  T(C): 36.9 (30 Jul 2024 12:51), Max: 36.9 (29 Jul 2024 21:02)  T(F): 98.4 (30 Jul 2024 12:51), Max: 98.5 (29 Jul 2024 21:02)  HR: 75 (30 Jul 2024 12:51) (51 - 75)  BP: 137/62 (30 Jul 2024 12:51) (137/62 - 169/60)  BP(mean): 95 (29 Jul 2024 21:02) (95 - 95)  RR: 16 (30 Jul 2024 12:51) (16 - 18)  SpO2: 98% (30 Jul 2024 12:51) (97% - 98%)    Parameters below as of 30 Jul 2024 12:51  Patient On (Oxygen Delivery Method): nasal cannula  O2 Flow (L/min): 2      PHYSICAL EXAM:  GENERAL: Not in distress, on oxygen via NC  CHEST/LUNG: Not using accessory muscles   EXTREMITIES: No pedal  edema  CNS: Awake and Alert      LABS:                        9.4    4.92  )-----------( 139      ( 30 Jul 2024 07:05 )             29.1         07-30    140  |  111<H>  |  29<H>  ----------------------------<  166<H>  3.9   |  20<L>  |  0.80    Ca    8.7      30 Jul 2024 07:05  Phos  3.4     07-30  Mg     1.7     07-30    TPro  5.1<L>  /  Alb  2.4<L>  /  TBili  0.2  /  DBili  x   /  AST  16  /  ALT  19  /  AlkPhos  46  07-30    PT/INR - ( 29 Jul 2024 01:10 )   PT: 13.0 sec;   INR: 1.14 ratio      PTT - ( 29 Jul 2024 01:10 )  PTT:26.2 sec      CAPILLARY BLOOD GLUCOSE  POCT Blood Glucose.: 279 mg/dL (30 Jul 2024 10:54)  POCT Blood Glucose.: 183 mg/dL (30 Jul 2024 08:02)  POCT Blood Glucose.: 264 mg/dL (29 Jul 2024 21:37)  POCT Blood Glucose.: 180 mg/dL (29 Jul 2024 16:52)    ABG - ( 29 Jul 2024 03:06 )  pH, Arterial: 7.45  pH, Blood: x     /  pCO2: 23    /  pO2: 287   / HCO3: 16    / Base Excess: -6.0  /  SaO2: 96          MEDICATIONS  (STANDING):  apixaban 2.5 milliGRAM(s) Oral every 12 hours  ascorbic acid 500 milliGRAM(s) Oral daily  cefTRIAXone   IVPB 1000 milliGRAM(s) IV Intermittent every 24 hours  cholecalciferol 1000 Unit(s) Oral daily  dexAMETHasone  Injectable 6 milliGRAM(s) IV Push daily  famotidine    Tablet 20 milliGRAM(s) Oral daily  insulin lispro (ADMELOG) corrective regimen sliding scale   SubCutaneous at bedtime  insulin lispro (ADMELOG) corrective regimen sliding scale   SubCutaneous three times a day before meals  lactated ringers. 1000 milliLiter(s) (50 mL/Hr) IV Continuous <Continuous>  losartan 25 milliGRAM(s) Oral daily  metoprolol succinate ER 25 milliGRAM(s) Oral daily  montelukast 10 milliGRAM(s) Oral at bedtime  remdesivir  IVPB   IV Intermittent   remdesivir  IVPB 100 milliGRAM(s) IV Intermittent every 24 hours  simvastatin 10 milliGRAM(s) Oral at bedtime  zinc sulfate 220 milliGRAM(s) Oral daily    MEDICATIONS  (PRN):  acetaminophen     Tablet .. 650 milliGRAM(s) Oral every 6 hours PRN Temp greater or equal to 38C (100.4F), Mild Pain (1 - 3)        RADIOLOGY & ADDITIONAL TESTS:    7/29/24: CT Head No Cont (07.29.24 @ 05:21) No acute intracranial hemorrhage, mass effect, or midline shift.      7/29/24: CT Angio Chest PE Protocol w/ IV Cont (07.29.24 @ 05:21) No pulmonary embolus or acute finding in the chest.    No acute finding in the abdomen or pelvis. Mild compression fracture of the L1 superior endplate, new compared to prior but chronic-appearing.      7/29/24 : CT Abdomen and Pelvis w/ IV Cont (07.29.24 @ 05:21) No acute finding in the abdomen or pelvis.    Mild compression fracture of the L1 superior endplate, new compared to prior but chronic-appearing.      7/29/24 : Xray Chest 1 View- PORTABLE-Urgent (07.29.24 @ 02:10) No consolidation or infiltrate.  No pleural effusion.    Heart size cannot be accurately assessed in this projection.        MICROBIOLOGY DATA:    Culture - Urine (07.29.24 @ 04:31)   Specimen Source: Clean Catch  Culture Results:   >=3 organisms. Probable collection contamination.    Urine Microscopic-Add On (NC) (07.29.24 @ 04:31)   Bacteria: Many /HPF  Comment - Urine: few amorphous urates  Squamous Epithelial Cells: Present  Red Blood Cell - Urine: 4 /HPF  White Blood Cell - Urine: 8 /HPF    Culture - Blood (07.29.24 @ 01:10)   Specimen Source: .Blood Blood-Peripheral  Culture Results: No growth at 24 hours    Culture - Blood (07.29.24 @ 01:05)   - Streptococcus sp. (Not Grp A, B or S pneumoniae): Detec  Gram Stain:   Growth in anaerobic bottle: Gram positive cocci in pairs   Growth in aerobic bottle: Gram positive cocci in pairs  Specimen Source: .Blood Blood-Peripheral  Organism: Blood Culture PCR  Culture Results:   Growth in anaerobic bottle: Gram positive cocci in pairs   Growth in aerobic bottle: Gram positive cocci in pairs   Direct identification is available within approximately 3-5   hours either by Blood Panel Multiplexed PCR or Direct   MALDI-TOF. Details: [email or web address]  Organism Identification: Blood Culture PCR  Method Type: PCR

## 2024-07-30 NOTE — PROVIDER CONTACT NOTE (CRITICAL VALUE NOTIFICATION) - TEST AND RESULT REPORTED:
blood culture fro 7/29 24 growth in anaerobic and aerobic bottle gram positive cocci in pairs
LACTATE 6.8
anaerobic bottle gram + cocci in pairs
lactate 7.3

## 2024-07-30 NOTE — CONSULT NOTE ADULT - SUBJECTIVE AND OBJECTIVE BOX
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  Daughter at bedside states that her mother starting having a productive cough with sputum starting yesterday and today she had one episode of NBNB vomiting earlier with SOB today so she brought her mother into the ED. Pt seen at bedside, Daisy (to self only), complaining of diffuse abdominal pain, headache and mild chest pain. She denies any fever, chills, shortness of breath, dysuria, numbness/tingling/weakness in extremities.         HISTORY OF PRESENT ILLNESS: As Above. Awake, alert, comfortable in bed in NAD    MEDICATIONS  (STANDING):  apixaban 2.5 milliGRAM(s) Oral every 12 hours  ascorbic acid 500 milliGRAM(s) Oral daily  cefTRIAXone   IVPB 1000 milliGRAM(s) IV Intermittent every 24 hours  cholecalciferol 1000 Unit(s) Oral daily  dexAMETHasone  Injectable 6 milliGRAM(s) IV Push daily  famotidine    Tablet 20 milliGRAM(s) Oral daily  insulin lispro (ADMELOG) corrective regimen sliding scale   SubCutaneous at bedtime  insulin lispro (ADMELOG) corrective regimen sliding scale   SubCutaneous three times a day before meals  lactated ringers. 1000 milliLiter(s) (50 mL/Hr) IV Continuous <Continuous>  losartan 25 milliGRAM(s) Oral daily  metoprolol succinate ER 25 milliGRAM(s) Oral daily  montelukast 10 milliGRAM(s) Oral at bedtime  remdesivir  IVPB   IV Intermittent   remdesivir  IVPB 100 milliGRAM(s) IV Intermittent every 24 hours  simvastatin 10 milliGRAM(s) Oral at bedtime  zinc sulfate 220 milliGRAM(s) Oral daily      MEDICATIONS  (PRN):  acetaminophen     Tablet .. 650 milliGRAM(s) Oral every 6 hours PRN Temp greater or equal to 38C (100.4F), Mild Pain (1 - 3)      Allergies    gabapentin (Unknown)    Intolerances        PAST MEDICAL & SURGICAL HISTORY:  HTN (hypertension)      DM (diabetes mellitus)      Hypercholesteremia      Gastric adenocarcinoma  s/p resection      Vertigo      Dementia      Ambulatory dysfunction      Multiple myeloma      Pulmonary embolism      S/P hysterectomy      S/P tubal ligation          FAMILY HISTORY:  Family history of diabetes mellitus (Sibling)    Family history of early CAD (Grandparent)        SOCIAL HISTORY  Smoking History:     REVIEW OF SYSTEMS:    CONSTITUTIONAL:  No fevers, chills, sweats    HEENT:  Eyes:  No diplopia or blurred vision. ENT:  No earache, sore throat or runny nose.    CARDIOVASCULAR:  No pressure, squeezing, tightness, or heaviness about the chest; no palpitations.    RESPIRATORY:  Per HPI    GASTROINTESTINAL:  No abdominal pain, nausea, vomiting or diarrhea.    GENITOURINARY:  No dysuria, frequency or urgency.    NEUROLOGIC:  No paresthesias, fasciculations, seizures or weakness.    PSYCHIATRIC:  No disorder of thought or mood.    Vital Signs Last 24 Hrs  T(C): 36.9 (30 Jul 2024 06:20), Max: 36.9 (29 Jul 2024 21:02)  T(F): 98.4 (30 Jul 2024 06:20), Max: 98.5 (29 Jul 2024 21:02)  HR: 62 (30 Jul 2024 06:20) (51 - 71)  BP: 169/60 (30 Jul 2024 06:20) (157/71 - 169/68)  BP(mean): 95 (29 Jul 2024 21:02) (95 - 95)  RR: 18 (30 Jul 2024 06:20) (18 - 18)  SpO2: 98% (30 Jul 2024 06:20) (97% - 98%)    Parameters below as of 30 Jul 2024 06:20  Patient On (Oxygen Delivery Method): nasal cannula  O2 Flow (L/min): 2    I&O's Detail      PHYSICAL EXAMINATION:    GENERAL: The patient is a well-developed, well-nourished _____in no apparent distress.     HEENT: Head is normocephalic and atraumatic. Extraocular muscles are intact. Mucous membranes are moist.     NECK: Supple.     LUNGS: Clear to auscultation without wheezing, rales, or rhonchi. Respirations unlabored    HEART: Regular rate and rhythm without murmur.    ABDOMEN: Soft, nontender, and nondistended.  No hepatosplenomegaly is noted.    EXTREMITIES: Without any cyanosis, clubbing, rash, lesions or edema.    NEUROLOGIC: Grossly intact.      LABS:                        9.4    4.92  )-----------( 139      ( 30 Jul 2024 07:05 )             29.1     07-30    140  |  111<H>  |  29<H>  ----------------------------<  166<H>  3.9   |  20<L>  |  0.80    Ca    8.7      30 Jul 2024 07:05  Phos  3.4     07-30  Mg     1.7     07-30    TPro  5.1<L>  /  Alb  2.4<L>  /  TBili  0.2  /  DBili  x   /  AST  16  /  ALT  19  /  AlkPhos  46  07-30    PT/INR - ( 29 Jul 2024 01:10 )   PT: 13.0 sec;   INR: 1.14 ratio         PTT - ( 29 Jul 2024 01:10 )  PTT:26.2 sec  Urinalysis Basic - ( 30 Jul 2024 07:05 )    Color: x / Appearance: x / SG: x / pH: x  Gluc: 166 mg/dL / Ketone: x  / Bili: x / Urobili: x   Blood: x / Protein: x / Nitrite: x   Leuk Esterase: x / RBC: x / WBC x   Sq Epi: x / Non Sq Epi: x / Bacteria: x      ABG - ( 29 Jul 2024 03:06 )  pH, Arterial: 7.45  pH, Blood: x     /  pCO2: 23    /  pO2: 287   / HCO3: 16    / Base Excess: -6.0  /  SaO2: 96          COVID-19 PCR (07.29.24 @ 01:10)   COVID-19 PCR: Detected: EUA/IVD               Lactate, Blood: 5.4 mmol/L (07-29-24 @ 20:24)  Lactate, Blood: 7.3 mmol/L (07-29-24 @ 15:05)  Lactate, Blood: 6.8 mmol/L (07-29-24 @ 11:20)        MICROBIOLOGY:    RADIOLOGY & ADDITIONAL STUDIES:  < from: CT Head No Cont (07.29.24 @ 05:21) >  IMPRESSION:  No acute intracranial hemorrhage, mass effect, or midline shift.      < end of copied text >  < from: CT Angio Chest PE Protocol w/ IV Cont (07.29.24 @ 05:21) >  IMPRESSION:  No pulmonary embolus or acute finding in the chest.    No acute finding in the abdomen or pelvis.    Mild compression fracture of the L1 superior endplate, new compared to   prior but chronic-appearing.      < end of copied text >    CXR:    Ct scan chest;    ekg;    echo:

## 2024-07-30 NOTE — PROGRESS NOTE ADULT - PROBLEM SELECTOR PLAN 4
p/w abdominal pain; UA +   - UA+, Lactate 3.0 > 3.3-->6.8  -S/p 2L NS in ED and Ceftriaxone  -C/w ceftriaxone   -UCx grew >3 organisms, likely contaminated

## 2024-07-31 LAB
-  CEFTRIAXONE: SIGNIFICANT CHANGE UP
-  PENICILLIN: SIGNIFICANT CHANGE UP
-  VANCOMYCIN: SIGNIFICANT CHANGE UP
ANION GAP SERPL CALC-SCNC: 11 MMOL/L — SIGNIFICANT CHANGE UP (ref 5–17)
BUN SERPL-MCNC: 33 MG/DL — HIGH (ref 7–18)
CALCIUM SERPL-MCNC: 8.5 MG/DL — SIGNIFICANT CHANGE UP (ref 8.4–10.5)
CHLORIDE SERPL-SCNC: 110 MMOL/L — HIGH (ref 96–108)
CO2 SERPL-SCNC: 21 MMOL/L — LOW (ref 22–31)
CREAT SERPL-MCNC: 0.9 MG/DL — SIGNIFICANT CHANGE UP (ref 0.5–1.3)
EGFR: 62 ML/MIN/1.73M2 — SIGNIFICANT CHANGE UP
GLUCOSE BLDC GLUCOMTR-MCNC: 191 MG/DL — HIGH (ref 70–99)
GLUCOSE BLDC GLUCOMTR-MCNC: 195 MG/DL — HIGH (ref 70–99)
GLUCOSE BLDC GLUCOMTR-MCNC: 248 MG/DL — HIGH (ref 70–99)
GLUCOSE BLDC GLUCOMTR-MCNC: 261 MG/DL — HIGH (ref 70–99)
GLUCOSE SERPL-MCNC: 149 MG/DL — HIGH (ref 70–99)
HCT VFR BLD CALC: 31.4 % — LOW (ref 34.5–45)
HGB BLD-MCNC: 10.1 G/DL — LOW (ref 11.5–15.5)
LDH SERPL L TO P-CCNC: 269 U/L — HIGH (ref 120–225)
MCHC RBC-ENTMCNC: 32.2 GM/DL — SIGNIFICANT CHANGE UP (ref 32–36)
MCHC RBC-ENTMCNC: 32.9 PG — SIGNIFICANT CHANGE UP (ref 27–34)
MCV RBC AUTO: 102.3 FL — HIGH (ref 80–100)
METHOD TYPE: SIGNIFICANT CHANGE UP
MRSA PCR RESULT.: SIGNIFICANT CHANGE UP
NRBC # BLD: 0 /100 WBCS — SIGNIFICANT CHANGE UP (ref 0–0)
PLATELET # BLD AUTO: 155 K/UL — SIGNIFICANT CHANGE UP (ref 150–400)
POTASSIUM SERPL-MCNC: 3.6 MMOL/L — SIGNIFICANT CHANGE UP (ref 3.5–5.3)
POTASSIUM SERPL-SCNC: 3.6 MMOL/L — SIGNIFICANT CHANGE UP (ref 3.5–5.3)
RBC # BLD: 3.07 M/UL — LOW (ref 3.8–5.2)
RBC # FLD: 15.6 % — HIGH (ref 10.3–14.5)
S AUREUS DNA NOSE QL NAA+PROBE: SIGNIFICANT CHANGE UP
SODIUM SERPL-SCNC: 142 MMOL/L — SIGNIFICANT CHANGE UP (ref 135–145)
WBC # BLD: 4.23 K/UL — SIGNIFICANT CHANGE UP (ref 3.8–10.5)
WBC # FLD AUTO: 4.23 K/UL — SIGNIFICANT CHANGE UP (ref 3.8–10.5)

## 2024-07-31 RX ORDER — PANTOPRAZOLE SODIUM 20 MG/1
40 TABLET, DELAYED RELEASE ORAL
Refills: 0 | Status: DISCONTINUED | OUTPATIENT
Start: 2024-07-31 | End: 2024-08-07

## 2024-07-31 RX ORDER — MAGNESIUM, ALUMINUM HYDROXIDE 200-225/5
30 SUSPENSION, ORAL (FINAL DOSE FORM) ORAL ONCE
Refills: 0 | Status: COMPLETED | OUTPATIENT
Start: 2024-07-31 | End: 2024-07-31

## 2024-07-31 RX ADMIN — Medication 100 MILLIGRAM(S): at 14:21

## 2024-07-31 RX ADMIN — APIXABAN 2.5 MILLIGRAM(S): 5 TABLET, FILM COATED ORAL at 06:20

## 2024-07-31 RX ADMIN — APIXABAN 2.5 MILLIGRAM(S): 5 TABLET, FILM COATED ORAL at 17:06

## 2024-07-31 RX ADMIN — Medication 2: at 17:03

## 2024-07-31 RX ADMIN — Medication 650 MILLIGRAM(S): at 21:32

## 2024-07-31 RX ADMIN — Medication 10 MILLIGRAM(S): at 21:32

## 2024-07-31 RX ADMIN — Medication 3: at 11:54

## 2024-07-31 RX ADMIN — REMDESIVIR 200 MILLIGRAM(S): 100 INJECTION, POWDER, LYOPHILIZED, FOR SOLUTION INTRAVENOUS at 14:21

## 2024-07-31 RX ADMIN — Medication 220 MILLIGRAM(S): at 11:55

## 2024-07-31 RX ADMIN — Medication 1: at 08:18

## 2024-07-31 RX ADMIN — LOSARTAN POTASSIUM 25 MILLIGRAM(S): 50 TABLET, FILM COATED ORAL at 06:21

## 2024-07-31 RX ADMIN — Medication 30 MILLILITER(S): at 17:44

## 2024-07-31 RX ADMIN — Medication 500 MILLIGRAM(S): at 11:53

## 2024-07-31 RX ADMIN — Medication 650 MILLIGRAM(S): at 22:32

## 2024-07-31 RX ADMIN — FAMOTIDINE 20 MILLIGRAM(S): 40 TABLET, FILM COATED ORAL at 11:53

## 2024-07-31 RX ADMIN — Medication 25 MILLIGRAM(S): at 06:21

## 2024-07-31 RX ADMIN — DEXAMETHASONE 6 MILLIGRAM(S): 1.5 TABLET ORAL at 06:21

## 2024-07-31 RX ADMIN — Medication 1000 UNIT(S): at 11:54

## 2024-07-31 RX ADMIN — PANTOPRAZOLE SODIUM 40 MILLIGRAM(S): 20 TABLET, DELAYED RELEASE ORAL at 17:44

## 2024-07-31 NOTE — PROGRESS NOTE ADULT - PROBLEM SELECTOR PLAN 2
isolation precautions  oxygen supp prn  monitor oxygen sat  monitor LDH, LFTs, CRP, D-Dimer, Ferritin and procalcitonin  IV Dexa  IV Remdesivir  Bronchodilators  Vit C, D and Zinc supp  ID follow up

## 2024-07-31 NOTE — PROGRESS NOTE ADULT - SUBJECTIVE AND OBJECTIVE BOX
Patient is a 89y old  Female who presents with a chief complaint of UTI and COVID (31 Jul 2024 06:40)  Awake, alert, laying in bed in NAD. Having loose stools. Remain on isolation due to Covid    INTERVAL HPI/OVERNIGHT EVENTS:      VITAL SIGNS:  T(F): 98.9 (07-31-24 @ 05:40)  HR: 64 (07-31-24 @ 05:40)  BP: 156/66 (07-31-24 @ 05:40)  RR: 20 (07-31-24 @ 05:40)  SpO2: 96% (07-31-24 @ 05:40)  Wt(kg): --  I&O's Detail    30 Jul 2024 07:01  -  31 Jul 2024 07:00  --------------------------------------------------------  IN:    IV PiggyBack: 100 mL    IV PiggyBack: 50 mL  Total IN: 150 mL    OUT:    Voided (mL): 400 mL  Total OUT: 400 mL    Total NET: -250 mL              REVIEW OF SYSTEMS:    CONSTITUTIONAL:  No fevers, chills, sweats    HEENT:  Eyes:  No diplopia or blurred vision. ENT:  No earache, sore throat or runny nose.    CARDIOVASCULAR:  No pressure, squeezing, tightness, or heaviness about the chest; no palpitations.    RESPIRATORY:  Per HPI    GASTROINTESTINAL:  No abdominal pain, nausea, vomiting + diarrhea.    GENITOURINARY:  No dysuria, frequency or urgency.    NEUROLOGIC:  No paresthesias, fasciculations, seizures or weakness.    PSYCHIATRIC:  No disorder of thought or mood.      PHYSICAL EXAM:    Constitutional: Well developed and nourished  Eyes:Perrla  ENMT: normal  Neck:supple  Respiratory: good air entry  Cardiovascular: S1 S2 regular  Gastrointestinal: Soft, Non tender  Extremities: No edema  Vascular:normal  Neurological:Awake, alert,Ox3  Musculoskeletal:Normal      MEDICATIONS  (STANDING):  apixaban 2.5 milliGRAM(s) Oral every 12 hours  ascorbic acid 500 milliGRAM(s) Oral daily  cefTRIAXone   IVPB 2000 milliGRAM(s) IV Intermittent every 24 hours  cholecalciferol 1000 Unit(s) Oral daily  dexAMETHasone  Injectable 6 milliGRAM(s) IV Push daily  famotidine    Tablet 20 milliGRAM(s) Oral daily  insulin lispro (ADMELOG) corrective regimen sliding scale   SubCutaneous at bedtime  insulin lispro (ADMELOG) corrective regimen sliding scale   SubCutaneous three times a day before meals  lactated ringers. 1000 milliLiter(s) (50 mL/Hr) IV Continuous <Continuous>  losartan 25 milliGRAM(s) Oral daily  metoprolol succinate ER 25 milliGRAM(s) Oral daily  montelukast 10 milliGRAM(s) Oral at bedtime  remdesivir  IVPB   IV Intermittent   remdesivir  IVPB 100 milliGRAM(s) IV Intermittent every 24 hours  simvastatin 10 milliGRAM(s) Oral at bedtime  zinc sulfate 220 milliGRAM(s) Oral daily    MEDICATIONS  (PRN):  acetaminophen     Tablet .. 650 milliGRAM(s) Oral every 6 hours PRN Temp greater or equal to 38C (100.4F), Mild Pain (1 - 3)      Allergies    gabapentin (Unknown)    Intolerances        LABS:                        10.1   4.23  )-----------( 155      ( 31 Jul 2024 06:40 )             31.4     07-31    142  |  110<H>  |  33<H>  ----------------------------<  149<H>  3.6   |  21<L>  |  0.90    Ca    8.5      31 Jul 2024 06:40  Phos  3.4     07-30  Mg     1.7     07-30    TPro  5.1<L>  /  Alb  2.4<L>  /  TBili  0.2  /  DBili  x   /  AST  16  /  ALT  19  /  AlkPhos  46  07-30      Urinalysis Basic - ( 31 Jul 2024 06:40 )    Color: x / Appearance: x / SG: x / pH: x  Gluc: 149 mg/dL / Ketone: x  / Bili: x / Urobili: x   Blood: x / Protein: x / Nitrite: x   Leuk Esterase: x / RBC: x / WBC x   Sq Epi: x / Non Sq Epi: x / Bacteria: x            CAPILLARY BLOOD GLUCOSE      POCT Blood Glucose.: 191 mg/dL (31 Jul 2024 08:12)  POCT Blood Glucose.: 226 mg/dL (30 Jul 2024 20:48)  POCT Blood Glucose.: 241 mg/dL (30 Jul 2024 17:05)  POCT Blood Glucose.: 279 mg/dL (30 Jul 2024 10:54)        RADIOLOGY & ADDITIONAL TESTS:  < from: CT Head No Cont (07.29.24 @ 05:21) >  IMPRESSION:  No acute intracranial hemorrhage, mass effect, or midline shift.      < end of copied text >  < from: CT Angio Chest PE Protocol w/ IV Cont (07.29.24 @ 05:21) >  IMPRESSION:  No pulmonary embolus or acute finding in the chest.    No acute finding in the abdomen or pelvis.    Mild compression fracture of the L1 superior endplate, new compared to   prior but chronic-appearing.        < end of copied text >    CXR:    Ct scan chest:    ekg;    echo:

## 2024-07-31 NOTE — PROGRESS NOTE ADULT - ASSESSMENT
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 Patient is AAOx1 (to self only), complaining of diffuse abdominal pain, headache and mild chest pain. She has no fever or chills. On admission, she found to have no fever but positive COVID PCR and Blood culture positive for streptococcal species. She has started on Remdesivir, Dexamethasone and Ceftriaxone, and the ID consult requested to assist with further evaluation and antibiotic management.    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 Follow up Repeat Blood cultures to document clearing the blood stream  2. 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 Continue Remdesivir and Dexamethasone  4. Supportive care including AC  5. Supplemental oxygenation and Bronchodilator as needed  6. COVID precautions    d/w Dr. Kramer and Covering NPMehreen     Attending Attestation:    Spent more than 45 minutes on total encounter, more than 50 % of the visit was spent counseling and/or coordinating care by the Attending physician.

## 2024-07-31 NOTE — PROGRESS NOTE ADULT - PROBLEM SELECTOR PLAN 2
Blood culture from 7/29 growing strep prelim results   -Adjust Ceftriaxone doses to 2 g daily until blood culture from 7/29 is finalized  - Repeat Blood cultures X 2 in AM to document clearing the blood stream  -ID Dr. Estrada following Blood culture from 7/29 grew strep prelim results, susceptible to ceftriaxone  -Adjust Ceftriaxone doses to 2 g daily until blood culture from 7/29 is finalized  - Repeat Blood cx sent today  -ID Dr. Estrada following

## 2024-07-31 NOTE — PROGRESS NOTE ADULT - SUBJECTIVE AND OBJECTIVE BOX
Patient is seen and examined at the bed side, is afebrile. She is doing okay, tolerating Remdesivir and Ceftriaxone well. The Blood culture from 7/29 grew Streptococcus gallolyticus.      REVIEW OF SYSTEMS: All other review systems are negative      ALLERGIES: gabapentin (Unknown)      Vital Signs Last 24 Hrs  T(C): 36.3 (31 Jul 2024 12:54), Max: 37.2 (31 Jul 2024 05:40)  T(F): 97.3 (31 Jul 2024 12:54), Max: 98.9 (31 Jul 2024 05:40)  HR: 72 (31 Jul 2024 12:54) (64 - 72)  BP: 145/77 (31 Jul 2024 12:54) (145/77 - 159/71)  BP(mean): --  RR: 18 (31 Jul 2024 12:54) (18 - 20)  SpO2: 97% (31 Jul 2024 12:54) (96% - 97%)    Parameters below as of 31 Jul 2024 12:54  Patient On (Oxygen Delivery Method): nasal cannula  O2 Flow (L/min): 2      PHYSICAL EXAM:  GENERAL: Not in distress, on oxygen via NC  CHEST/LUNG: Not using accessory muscles   EXTREMITIES: No pedal  edema  CNS: Awake and Alert      LABS:                        10.1   4.23  )-----------( 155      ( 31 Jul 2024 06:40 )             31.4                           9.4    4.92  )-----------( 139      ( 30 Jul 2024 07:05 )             29.1         07-31    142  |  110<H>  |  33<H>  ----------------------------<  149<H>  3.6   |  21<L>  |  0.90    Ca    8.5      31 Jul 2024 06:40  Phos  3.4     07-30  Mg     1.7     07-30    TPro  5.1<L>  /  Alb  2.4<L>  /  TBili  0.2  /  DBili  x   /  AST  16  /  ALT  19  /  AlkPhos  46  07-30    07-30    140  |  111<H>  |  29<H>  ----------------------------<  166<H>  3.9   |  20<L>  |  0.80    Ca    8.7      30 Jul 2024 07:05  Phos  3.4     07-30  Mg     1.7     07-30    TPro  5.1<L>  /  Alb  2.4<L>  /  TBili  0.2  /  DBili  x   /  AST  16  /  ALT  19  /  AlkPhos  46  07-30    PT/INR - ( 29 Jul 2024 01:10 )   PT: 13.0 sec;   INR: 1.14 ratio      PTT - ( 29 Jul 2024 01:10 )  PTT:26.2 sec      CAPILLARY BLOOD GLUCOSE  POCT Blood Glucose.: 279 mg/dL (30 Jul 2024 10:54)  POCT Blood Glucose.: 183 mg/dL (30 Jul 2024 08:02)  POCT Blood Glucose.: 264 mg/dL (29 Jul 2024 21:37)  POCT Blood Glucose.: 180 mg/dL (29 Jul 2024 16:52)    ABG - ( 29 Jul 2024 03:06 )  pH, Arterial: 7.45  pH, Blood: x     /  pCO2: 23    /  pO2: 287   / HCO3: 16    / Base Excess: -6.0  /  SaO2: 96          MEDICATIONS  (STANDING):    apixaban 2.5 milliGRAM(s) Oral every 12 hours  ascorbic acid 500 milliGRAM(s) Oral daily  cefTRIAXone   IVPB 2000 milliGRAM(s) IV Intermittent every 24 hours  cholecalciferol 1000 Unit(s) Oral daily  dexAMETHasone  Injectable 6 milliGRAM(s) IV Push daily  insulin lispro (ADMELOG) corrective regimen sliding scale   SubCutaneous at bedtime  insulin lispro (ADMELOG) corrective regimen sliding scale   SubCutaneous three times a day before meals  lactated ringers. 1000 milliLiter(s) (50 mL/Hr) IV Continuous <Continuous>  losartan 25 milliGRAM(s) Oral daily  metoprolol succinate ER 25 milliGRAM(s) Oral daily  montelukast 10 milliGRAM(s) Oral at bedtime  pantoprazole    Tablet 40 milliGRAM(s) Oral before breakfast  remdesivir  IVPB   IV Intermittent   remdesivir  IVPB 100 milliGRAM(s) IV Intermittent every 24 hours  simvastatin 10 milliGRAM(s) Oral at bedtime  zinc sulfate 220 milliGRAM(s) Oral daily        RADIOLOGY & ADDITIONAL TESTS:    7/29/24: CT Head No Cont (07.29.24 @ 05:21) No acute intracranial hemorrhage, mass effect, or midline shift.      7/29/24: CT Angio Chest PE Protocol w/ IV Cont (07.29.24 @ 05:21) No pulmonary embolus or acute finding in the chest.    No acute finding in the abdomen or pelvis. Mild compression fracture of the L1 superior endplate, new compared to prior but chronic-appearing.      7/29/24 : CT Abdomen and Pelvis w/ IV Cont (07.29.24 @ 05:21) No acute finding in the abdomen or pelvis.    Mild compression fracture of the L1 superior endplate, new compared to prior but chronic-appearing.      7/29/24 : Xray Chest 1 View- PORTABLE-Urgent (07.29.24 @ 02:10) No consolidation or infiltrate.  No pleural effusion.    Heart size cannot be accurately assessed in this projection.        MICROBIOLOGY DATA:    Culture - Blood (07.29.24 @ 01:05)   - Streptococcus sp. (Not Grp A, B or S pneumoniae): Detec  - Vancomycin: S 0.5  Gram Stain:   Growth in anaerobic bottle: Gram positive cocci in pairs   Growth in aerobic bottle: Gram positive cocci in pairs  - Ceftriaxone: S <=0.25  - Penicillin: S 0.06  Specimen Source: .Blood Blood-Peripheral  Organism: Streptococcus gallolyticus  Organism: Blood Culture PCR  Culture Results:   Growth in anaerobic bottle: Streptococcus gallolyticus   Growth in aerobic bottle: Gram positive cocci in pairs   Direct identification is available within approximately 3-5   hours either by Blood Panel Multiplexed PCR or Direct   MALDI-TOF. Details: [email or web address]  Organism Identification: Blood Culture PCR   Streptococcus gallolyticus    Culture - Urine (07.29.24 @ 04:31)   Specimen Source: Clean Catch  Culture Results:   >=3 organisms. Probable collection contamination.    Urine Microscopic-Add On (NC) (07.29.24 @ 04:31)   Bacteria: Many /HPF  Comment - Urine: few amorphous urates  Squamous Epithelial Cells: Present  Red Blood Cell - Urine: 4 /HPF  White Blood Cell - Urine: 8 /HPF    Culture - Blood (07.29.24 @ 01:10)   Specimen Source: .Blood Blood-Peripheral  Culture Results: No growth at 24 hours    Culture - Blood (07.29.24 @ 01:05)   - Streptococcus sp. (Not Grp A, B or S pneumoniae): Detec  Gram Stain:   Growth in anaerobic bottle: Gram positive cocci in pairs   Growth in aerobic bottle: Gram positive cocci in pairs  Specimen Source: .Blood Blood-Peripheral  Organism: Blood Culture PCR  Culture Results:   Growth in anaerobic bottle: Gram positive cocci in pairs   Growth in aerobic bottle: Gram positive cocci in pairs   Direct identification is available within approximately 3-5   hours either by Blood Panel Multiplexed PCR or Direct   MALDI-TOF. Details: [email or web address]  Organism Identification: Blood Culture PCR  Method Type: PCR

## 2024-07-31 NOTE — PROGRESS NOTE ADULT - PROBLEM SELECTOR PLAN 7
Hx of PE in 1/2023 likely provoked iso active cancer   C/W home Eliquis 2.5mg BID Hx of PE in 2023 likely provoked iso active cancer   C/W home Eliquis 2.5mg BID

## 2024-07-31 NOTE — PROGRESS NOTE ADULT - PROBLEM SELECTOR PLAN 4
p/w abdominal pain; UA +   - UA+, Lactate 3.0 > 3.3-->6.8  -S/p 2L NS in ED and Ceftriaxone  -C/w ceftriaxone   -UCx grew >3 organisms, likely contaminated p/w abdominal pain; UA +   - UA+, Lactate 3.0 > 3.3-->6.8  -S/p 2L NS in ED and Ceftriaxone  -C/w ceftriaxone 2g daily   -UCx grew >3 organisms, likely contaminated

## 2024-07-31 NOTE — PROGRESS NOTE ADULT - PROBLEM SELECTOR PLAN 5
Hx of MM on active chemo, revlimid   follows [facility] Dr Ramachandran  [facility] consulted Hx of MM on active chemo, revlimid   Follows [facility] Dr Ramachandran  [facility] consulted

## 2024-07-31 NOTE — PROGRESS NOTE ADULT - SUBJECTIVE AND OBJECTIVE BOX
Patient is a 89y old  Female who presents with a chief complaint of UTI and COVID (31 Jul 2024 09:46)      INTERVAL HPI/OVERNIGHT EVENTS: no new complaints    MEDICATIONS  (STANDING):  apixaban 2.5 milliGRAM(s) Oral every 12 hours  ascorbic acid 500 milliGRAM(s) Oral daily  cefTRIAXone   IVPB 2000 milliGRAM(s) IV Intermittent every 24 hours  cholecalciferol 1000 Unit(s) Oral daily  dexAMETHasone  Injectable 6 milliGRAM(s) IV Push daily  famotidine    Tablet 20 milliGRAM(s) Oral daily  insulin lispro (ADMELOG) corrective regimen sliding scale   SubCutaneous at bedtime  insulin lispro (ADMELOG) corrective regimen sliding scale   SubCutaneous three times a day before meals  lactated ringers. 1000 milliLiter(s) (50 mL/Hr) IV Continuous <Continuous>  losartan 25 milliGRAM(s) Oral daily  metoprolol succinate ER 25 milliGRAM(s) Oral daily  montelukast 10 milliGRAM(s) Oral at bedtime  remdesivir  IVPB   IV Intermittent   remdesivir  IVPB 100 milliGRAM(s) IV Intermittent every 24 hours  simvastatin 10 milliGRAM(s) Oral at bedtime  zinc sulfate 220 milliGRAM(s) Oral daily    MEDICATIONS  (PRN):  acetaminophen     Tablet .. 650 milliGRAM(s) Oral every 6 hours PRN Temp greater or equal to 38C (100.4F), Mild Pain (1 - 3)      __________________________________________________  REVIEW OF SYSTEMS:    CONSTITUTIONAL: No fever,   EYES: no acute visual disturbances  NECK: No pain or stiffness  RESPIRATORY: No cough; No shortness of breath  CARDIOVASCULAR: No chest pain, no palpitations  GASTROINTESTINAL: No pain. No nausea or vomiting; No diarrhea   NEUROLOGICAL: No headache or numbness, no tremors  MUSCULOSKELETAL: No joint pain, no muscle pain  GENITOURINARY: no dysuria, no frequency, no hesitancy  PSYCHIATRY: no depression , no anxiety  ALL OTHER  ROS negative      Vital Signs Last 24 Hrs  T(C): 37.2 (31 Jul 2024 05:40), Max: 37.2 (31 Jul 2024 05:40)  T(F): 98.9 (31 Jul 2024 05:40), Max: 98.9 (31 Jul 2024 05:40)  HR: 64 (31 Jul 2024 05:40) (64 - 75)  BP: 156/66 (31 Jul 2024 05:40) (137/62 - 159/71)  BP(mean): --  RR: 20 (31 Jul 2024 05:40) (16 - 20)  SpO2: 96% (31 Jul 2024 05:40) (96% - 98%)    Parameters below as of 31 Jul 2024 05:40  Patient On (Oxygen Delivery Method): room air        ________________________________________________  PHYSICAL EXAM:  GENERAL: NAD  HEENT: Normocephalic;  conjunctivae and sclerae clear; moist mucous membranes;   NECK : supple  CHEST/LUNG: Clear to auscultation bilaterally with good air entry   HEART: S1 S2  regular; no murmurs, gallops or rubs  ABDOMEN: Soft, Nontender, Nondistended; Bowel sounds present  EXTREMITIES: no cyanosis; no edema; no calf tenderness  SKIN: warm and dry; no rash  NERVOUS SYSTEM:  Awake and alert; Oriented  to place, person and time ; no new deficits    _________________________________________________  LABS:                        10.1   4.23  )-----------( 155      ( 31 Jul 2024 06:40 )             31.4     07-31    142  |  110<H>  |  33<H>  ----------------------------<  149<H>  3.6   |  21<L>  |  0.90    Ca    8.5      31 Jul 2024 06:40  Phos  3.4     07-30  Mg     1.7     07-30    TPro  5.1<L>  /  Alb  2.4<L>  /  TBili  0.2  /  DBili  x   /  AST  16  /  ALT  19  /  AlkPhos  46  07-30      Urinalysis Basic - ( 31 Jul 2024 06:40 )    Color: x / Appearance: x / SG: x / pH: x  Gluc: 149 mg/dL / Ketone: x  / Bili: x / Urobili: x   Blood: x / Protein: x / Nitrite: x   Leuk Esterase: x / RBC: x / WBC x   Sq Epi: x / Non Sq Epi: x / Bacteria: x      CAPILLARY BLOOD GLUCOSE      POCT Blood Glucose.: 261 mg/dL (31 Jul 2024 11:40)  POCT Blood Glucose.: 191 mg/dL (31 Jul 2024 08:12)  POCT Blood Glucose.: 226 mg/dL (30 Jul 2024 20:48)  POCT Blood Glucose.: 241 mg/dL (30 Jul 2024 17:05)      RADIOLOGY & ADDITIONAL TESTS:    Imaging Personally Reviewed:  YES/NO    Consultant(s) Notes Reviewed:   YES/ No    Care Discussed with Consultants :     Plan of care was discussed with patient and /or primary care giver; all questions and concerns were addressed and care was aligned with patient's wishes.       Patient is a 89y old  Female who presents with a chief complaint of UTI and COVID (31 Jul 2024 09:46)      INTERVAL HPI/OVERNIGHT EVENTS: Pt seen at bedside with no new complaints    MEDICATIONS  (STANDING):  apixaban 2.5 milliGRAM(s) Oral every 12 hours  ascorbic acid 500 milliGRAM(s) Oral daily  cefTRIAXone   IVPB 2000 milliGRAM(s) IV Intermittent every 24 hours  cholecalciferol 1000 Unit(s) Oral daily  dexAMETHasone  Injectable 6 milliGRAM(s) IV Push daily  famotidine    Tablet 20 milliGRAM(s) Oral daily  insulin lispro (ADMELOG) corrective regimen sliding scale   SubCutaneous at bedtime  insulin lispro (ADMELOG) corrective regimen sliding scale   SubCutaneous three times a day before meals  lactated ringers. 1000 milliLiter(s) (50 mL/Hr) IV Continuous <Continuous>  losartan 25 milliGRAM(s) Oral daily  metoprolol succinate ER 25 milliGRAM(s) Oral daily  montelukast 10 milliGRAM(s) Oral at bedtime  remdesivir  IVPB   IV Intermittent   remdesivir  IVPB 100 milliGRAM(s) IV Intermittent every 24 hours  simvastatin 10 milliGRAM(s) Oral at bedtime  zinc sulfate 220 milliGRAM(s) Oral daily    MEDICATIONS  (PRN):  acetaminophen     Tablet .. 650 milliGRAM(s) Oral every 6 hours PRN Temp greater or equal to 38C (100.4F), Mild Pain (1 - 3)    __________________________________________________  REVIEW OF SYSTEMS:    CONSTITUTIONAL: No fever,   EYES: no acute visual disturbances  NECK: No pain or stiffness  RESPIRATORY: No cough; No shortness of breath  CARDIOVASCULAR: No chest pain, no palpitations  GASTROINTESTINAL: No pain. No nausea or vomiting; No diarrhea   NEUROLOGICAL: No headache or numbness, no tremors  MUSCULOSKELETAL: No joint pain, no muscle pain  GENITOURINARY: no dysuria, no frequency, no hesitancy  PSYCHIATRY: no depression , no anxiety  ALL OTHER  ROS negative      Vital Signs Last 24 Hrs  T(C): 37.2 (31 Jul 2024 05:40), Max: 37.2 (31 Jul 2024 05:40)  T(F): 98.9 (31 Jul 2024 05:40), Max: 98.9 (31 Jul 2024 05:40)  HR: 64 (31 Jul 2024 05:40) (64 - 75)  BP: 156/66 (31 Jul 2024 05:40) (137/62 - 159/71)  BP(mean): --  RR: 20 (31 Jul 2024 05:40) (16 - 20)  SpO2: 96% (31 Jul 2024 05:40) (96% - 98%)    Parameters below as of 31 Jul 2024 05:40  Patient On (Oxygen Delivery Method): room air  ________________________________________________  PHYSICAL EXAM:  GENERAL: NAD  HEENT: Normocephalic;  conjunctivae and sclerae clear; moist mucous membranes;   NECK : supple  CHEST/LUNG: Clear to auscultation bilaterally with good air entry   HEART: S1 S2  regular; no murmurs, gallops or rubs  ABDOMEN: Soft, Nontender, Nondistended; Bowel sounds present  EXTREMITIES: no cyanosis; no edema; no calf tenderness  SKIN: warm and dry; no rash  NERVOUS SYSTEM:  Awake and alert; Oriented  to place, person and time ; no new deficits    _________________________________________________  LABS:                        10.1   4.23  )-----------( 155      ( 31 Jul 2024 06:40 )             31.4     07-31    142  |  110<H>  |  33<H>  ----------------------------<  149<H>  3.6   |  21<L>  |  0.90    Ca    8.5      31 Jul 2024 06:40  Phos  3.4     07-30  Mg     1.7     07-30    TPro  5.1<L>  /  Alb  2.4<L>  /  TBili  0.2  /  DBili  x   /  AST  16  /  ALT  19  /  AlkPhos  46  07-30      Urinalysis Basic - ( 31 Jul 2024 06:40 )    Color: x / Appearance: x / SG: x / pH: x  Gluc: 149 mg/dL / Ketone: x  / Bili: x / Urobili: x   Blood: x / Protein: x / Nitrite: x   Leuk Esterase: x / RBC: x / WBC x   Sq Epi: x / Non Sq Epi: x / Bacteria: x      CAPILLARY BLOOD GLUCOSE      POCT Blood Glucose.: 261 mg/dL (31 Jul 2024 11:40)  POCT Blood Glucose.: 191 mg/dL (31 Jul 2024 08:12)  POCT Blood Glucose.: 226 mg/dL (30 Jul 2024 20:48)  POCT Blood Glucose.: 241 mg/dL (30 Jul 2024 17:05)      RADIOLOGY & ADDITIONAL TESTS:    Imaging Personally Reviewed:  YES    Consultant(s) Notes Reviewed:   YES      Plan of care was discussed with patient and /or primary care giver; all questions and concerns were addressed and care was aligned with patient's wishes.

## 2024-07-31 NOTE — CHART NOTE - NSCHARTNOTEFT_GEN_A_CORE
spoke with Daughter Ivett De Jesus on phone with  [facility] ID [id number] with general update on mother's condition and addressed all questions. Daughter expressed understanding.

## 2024-07-31 NOTE — PROGRESS NOTE ADULT - SUBJECTIVE AND OBJECTIVE BOX
Patient is a 89y old  Female who presents with a chief complaint of UTI and COVID (30 Jul 2024 13:55)    pt seen in icu [  ], reg med floor [  x ], bed [ x ], chair at bedside [   ], awake and responsive [ x ], lethargic [  ],    nad [ x ]        Allergies    gabapentin (Unknown)        Vitals    T(F): 98.9 (07-31-24 @ 05:40), Max: 98.9 (07-31-24 @ 05:40)  HR: 64 (07-31-24 @ 05:40) (64 - 75)  BP: 156/66 (07-31-24 @ 05:40) (137/62 - 159/71)  RR: 20 (07-31-24 @ 05:40) (16 - 20)  SpO2: 96% (07-31-24 @ 05:40) (96% - 98%)  Wt(kg): --  CAPILLARY BLOOD GLUCOSE      POCT Blood Glucose.: 226 mg/dL (30 Jul 2024 20:48)      Labs                          9.4    4.92  )-----------( 139      ( 30 Jul 2024 07:05 )             29.1       07-30    140  |  111<H>  |  29<H>  ----------------------------<  166<H>  3.9   |  20<L>  |  0.80    Ca    8.7      30 Jul 2024 07:05  Phos  3.4     07-30  Mg     1.7     07-30    TPro  5.1<L>  /  Alb  2.4<L>  /  TBili  0.2  /  DBili  x   /  AST  16  /  ALT  19  /  AlkPhos  46  07-30            Clean Catch  07-29 @ 04:31   >=3 organisms. Probable collection contamination.  --  --      .Blood Blood-Peripheral  07-29 @ 01:10   No growth at 24 hours  --  --      .Blood Blood-Peripheral  07-29 @ 01:05   Growth in anaerobic bottle: Streptococcus gallolyticus  Growth in aerobic bottle: Gram positive cocci in pairs  Direct identification is available within approximately 3-5  hours either by Blood Panel Multiplexed PCR or Direct  MALDI-TOF. Details: [email or web address]  --  Blood Culture PCR          Radiology Results      Meds    MEDICATIONS  (STANDING):  apixaban 2.5 milliGRAM(s) Oral every 12 hours  ascorbic acid 500 milliGRAM(s) Oral daily  cefTRIAXone   IVPB 2000 milliGRAM(s) IV Intermittent every 24 hours  cholecalciferol 1000 Unit(s) Oral daily  dexAMETHasone  Injectable 6 milliGRAM(s) IV Push daily  famotidine    Tablet 20 milliGRAM(s) Oral daily  insulin lispro (ADMELOG) corrective regimen sliding scale   SubCutaneous at bedtime  insulin lispro (ADMELOG) corrective regimen sliding scale   SubCutaneous three times a day before meals  lactated ringers. 1000 milliLiter(s) (50 mL/Hr) IV Continuous <Continuous>  losartan 25 milliGRAM(s) Oral daily  metoprolol succinate ER 25 milliGRAM(s) Oral daily  montelukast 10 milliGRAM(s) Oral at bedtime  remdesivir  IVPB   IV Intermittent   remdesivir  IVPB 100 milliGRAM(s) IV Intermittent every 24 hours  simvastatin 10 milliGRAM(s) Oral at bedtime  zinc sulfate 220 milliGRAM(s) Oral daily      MEDICATIONS  (PRN):  acetaminophen     Tablet .. 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),   Multiple myeloma/ plasmacytoma  Pulmonary embolism  S/P hysterectomy  S/P tubal ligation        PLAN    cont decadron, remdesevir,   cont vit c, vitamin d, zinc, pepcid,   cont singulair,   contact and airborne isolation,   cont albuterol inhaler,   pulm cons   robitussin prn   O2 sat 98% (97% - 98%) n/c 2L  O2 via nasal canula as needed,   blood cx with Streptococcus species noted above   ucx with contamination  cont rocephin 1 g daily,   id cons  lispro ss,   hold outpt dm meds,  hgba1c 5.8 noted above,    heme onc cons  cont current meds         Patient is a 89y old  Female who presents with a chief complaint of UTI and COVID (30 Jul 2024 13:55)    pt seen in icu [  ], reg med floor [  x ], bed [ x ], chair at bedside [   ], awake and responsive [ x ], lethargic [  ],    nad [ x ]        Allergies    gabapentin (Unknown)        Vitals    T(F): 98.9 (07-31-24 @ 05:40), Max: 98.9 (07-31-24 @ 05:40)  HR: 64 (07-31-24 @ 05:40) (64 - 75)  BP: 156/66 (07-31-24 @ 05:40) (137/62 - 159/71)  RR: 20 (07-31-24 @ 05:40) (16 - 20)  SpO2: 96% (07-31-24 @ 05:40) (96% - 98%)  Wt(kg): --  CAPILLARY BLOOD GLUCOSE      POCT Blood Glucose.: 226 mg/dL (30 Jul 2024 20:48)      Labs                          9.4    4.92  )-----------( 139      ( 30 Jul 2024 07:05 )             29.1       07-30    140  |  111<H>  |  29<H>  ----------------------------<  166<H>  3.9   |  20<L>  |  0.80    Ca    8.7      30 Jul 2024 07:05  Phos  3.4     07-30  Mg     1.7     07-30    TPro  5.1<L>  /  Alb  2.4<L>  /  TBili  0.2  /  DBili  x   /  AST  16  /  ALT  19  /  AlkPhos  46  07-30            Clean Catch  07-29 @ 04:31   >=3 organisms. Probable collection contamination.  --  --      .Blood Blood-Peripheral  07-29 @ 01:10   No growth at 24 hours  --  --      .Blood Blood-Peripheral  07-29 @ 01:05   Growth in anaerobic bottle: Streptococcus gallolyticus  Growth in aerobic bottle: Gram positive cocci in pairs  Direct identification is available within approximately 3-5  hours either by Blood Panel Multiplexed PCR or Direct  MALDI-TOF. Details: [email or web address]  --  Blood Culture PCR          Radiology Results      Meds    MEDICATIONS  (STANDING):  apixaban 2.5 milliGRAM(s) Oral every 12 hours  ascorbic acid 500 milliGRAM(s) Oral daily  cefTRIAXone   IVPB 2000 milliGRAM(s) IV Intermittent every 24 hours  cholecalciferol 1000 Unit(s) Oral daily  dexAMETHasone  Injectable 6 milliGRAM(s) IV Push daily  famotidine    Tablet 20 milliGRAM(s) Oral daily  insulin lispro (ADMELOG) corrective regimen sliding scale   SubCutaneous at bedtime  insulin lispro (ADMELOG) corrective regimen sliding scale   SubCutaneous three times a day before meals  lactated ringers. 1000 milliLiter(s) (50 mL/Hr) IV Continuous <Continuous>  losartan 25 milliGRAM(s) Oral daily  metoprolol succinate ER 25 milliGRAM(s) Oral daily  montelukast 10 milliGRAM(s) Oral at bedtime  remdesivir  IVPB   IV Intermittent   remdesivir  IVPB 100 milliGRAM(s) IV Intermittent every 24 hours  simvastatin 10 milliGRAM(s) Oral at bedtime  zinc sulfate 220 milliGRAM(s) Oral daily      MEDICATIONS  (PRN):  acetaminophen     Tablet .. 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),   Multiple myeloma/ plasmacytoma  Pulmonary embolism  S/P hysterectomy  S/P tubal ligation        PLAN    cont decadron, remdesevir,   cont vit c, vitamin d, zinc, pepcid,   cont singulair,   contact and airborne isolation,   cont albuterol inhaler,   pulm f/u   robitussin prn   O2 sat 96% (96% - 98%) on ra  O2 via nasal canula as needed,   blood cx with Streptococcus gallolyticus noted above   ucx with contamination  id f/u   Adjusted Ceftriaxone doses to 2 g daily until blood culture from 7/29 is finalized  f/u Repeat Blood cultures X 2 to document clearing the blood stream  lispro ss,   hold outpt dm meds,  hgba1c 5.8 noted      heme onc cons  cont current meds         Patient is a 89y old  Female who presents with a chief complaint of UTI and COVID (30 Jul 2024 13:55)    pt seen in icu [  ], reg med floor [  x ], bed [ x ], chair at bedside [   ], awake and responsive [ x ], lethargic [  ],    nad [ x ]        Allergies    gabapentin (Unknown)        Vitals    T(F): 98.9 (07-31-24 @ 05:40), Max: 98.9 (07-31-24 @ 05:40)  HR: 64 (07-31-24 @ 05:40) (64 - 75)  BP: 156/66 (07-31-24 @ 05:40) (137/62 - 159/71)  RR: 20 (07-31-24 @ 05:40) (16 - 20)  SpO2: 96% (07-31-24 @ 05:40) (96% - 98%)  Wt(kg): --  CAPILLARY BLOOD GLUCOSE      POCT Blood Glucose.: 226 mg/dL (30 Jul 2024 20:48)      Labs                          9.4    4.92  )-----------( 139      ( 30 Jul 2024 07:05 )             29.1       07-30    140  |  111<H>  |  29<H>  ----------------------------<  166<H>  3.9   |  20<L>  |  0.80    Ca    8.7      30 Jul 2024 07:05  Phos  3.4     07-30  Mg     1.7     07-30    TPro  5.1<L>  /  Alb  2.4<L>  /  TBili  0.2  /  DBili  x   /  AST  16  /  ALT  19  /  AlkPhos  46  07-30            Clean Catch  07-29 @ 04:31   >=3 organisms. Probable collection contamination.  --  --      .Blood Blood-Peripheral  07-29 @ 01:10   No growth at 24 hours  --  --      .Blood Blood-Peripheral  07-29 @ 01:05   Growth in anaerobic bottle: Streptococcus gallolyticus  Growth in aerobic bottle: Gram positive cocci in pairs  Direct identification is available within approximately 3-5  hours either by Blood Panel Multiplexed PCR or Direct  MALDI-TOF. Details: [email or web address]  --  Blood Culture PCR          Radiology Results      Meds    MEDICATIONS  (STANDING):  apixaban 2.5 milliGRAM(s) Oral every 12 hours  ascorbic acid 500 milliGRAM(s) Oral daily  cefTRIAXone   IVPB 2000 milliGRAM(s) IV Intermittent every 24 hours  cholecalciferol 1000 Unit(s) Oral daily  dexAMETHasone  Injectable 6 milliGRAM(s) IV Push daily  famotidine    Tablet 20 milliGRAM(s) Oral daily  insulin lispro (ADMELOG) corrective regimen sliding scale   SubCutaneous at bedtime  insulin lispro (ADMELOG) corrective regimen sliding scale   SubCutaneous three times a day before meals  lactated ringers. 1000 milliLiter(s) (50 mL/Hr) IV Continuous <Continuous>  losartan 25 milliGRAM(s) Oral daily  metoprolol succinate ER 25 milliGRAM(s) Oral daily  montelukast 10 milliGRAM(s) Oral at bedtime  remdesivir  IVPB   IV Intermittent   remdesivir  IVPB 100 milliGRAM(s) IV Intermittent every 24 hours  simvastatin 10 milliGRAM(s) Oral at bedtime  zinc sulfate 220 milliGRAM(s) Oral daily      MEDICATIONS  (PRN):  acetaminophen     Tablet .. 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),   Multiple myeloma/ plasmacytoma  Pulmonary embolism  S/P hysterectomy  S/P tubal ligation        PLAN    cont decadron,   cont remdesivir until 8/2/24,   cont vit c, vitamin d, zinc, pepcid,   cont singulair,   contact and airborne isolation,   cont albuterol inhaler,   pulm f/u   robitussin prn   O2 sat 96% (96% - 98%) on ra  O2 via nasal canula as needed,   blood cx with Streptococcus gallolyticus noted above   ucx with contamination  id f/u   Adjusted Ceftriaxone doses to 2 g daily until blood culture from 7/29 is finalized  f/u Repeat Blood cultures X 2 to document clearing the blood stream  lispro ss,   hold outpt dm meds,  hgba1c 5.8 noted      heme onc cons  cont current meds

## 2024-07-31 NOTE — PROGRESS NOTE ADULT - ASSESSMENT
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 ICU was initially consulted for AHRF requiring BiPAP but patient transitioned off to RA. COVID +, UA+ , lactate 3.0. Admitted for AHRF 2/2 COVID and UTI.  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 ICU was initially consulted for AHRF requiring BiPAP but patient transitioned off to RA. COVID +, UA+ , lactate 3.0. Admitted for AHRF 2/2 COVID and UTI.   7/30: Pt continued on Remdesivir and Decadron, blood cx grew strep Ceftriaxone dose increased to 2g   7/31: respiratory status stable, will wean O2 as tolerated

## 2024-08-01 ENCOUNTER — TRANSCRIPTION ENCOUNTER (OUTPATIENT)
Age: 89
End: 2024-08-01

## 2024-08-01 DIAGNOSIS — R78.81 BACTEREMIA: ICD-10-CM

## 2024-08-01 LAB
ANION GAP SERPL CALC-SCNC: 10 MMOL/L — SIGNIFICANT CHANGE UP (ref 5–17)
BUN SERPL-MCNC: 36 MG/DL — HIGH (ref 7–18)
CALCIUM SERPL-MCNC: 8.1 MG/DL — LOW (ref 8.4–10.5)
CHLORIDE SERPL-SCNC: 111 MMOL/L — HIGH (ref 96–108)
CO2 SERPL-SCNC: 20 MMOL/L — LOW (ref 22–31)
CREAT SERPL-MCNC: 0.88 MG/DL — SIGNIFICANT CHANGE UP (ref 0.5–1.3)
EGFR: 64 ML/MIN/1.73M2 — SIGNIFICANT CHANGE UP
GLUCOSE BLDC GLUCOMTR-MCNC: 184 MG/DL — HIGH (ref 70–99)
GLUCOSE BLDC GLUCOMTR-MCNC: 204 MG/DL — HIGH (ref 70–99)
GLUCOSE BLDC GLUCOMTR-MCNC: 252 MG/DL — HIGH (ref 70–99)
GLUCOSE BLDC GLUCOMTR-MCNC: 319 MG/DL — HIGH (ref 70–99)
GLUCOSE SERPL-MCNC: 146 MG/DL — HIGH (ref 70–99)
HCT VFR BLD CALC: 31.5 % — LOW (ref 34.5–45)
HGB BLD-MCNC: 10.4 G/DL — LOW (ref 11.5–15.5)
MCHC RBC-ENTMCNC: 33 GM/DL — SIGNIFICANT CHANGE UP (ref 32–36)
MCHC RBC-ENTMCNC: 33.3 PG — SIGNIFICANT CHANGE UP (ref 27–34)
MCV RBC AUTO: 101 FL — HIGH (ref 80–100)
NRBC # BLD: 0 /100 WBCS — SIGNIFICANT CHANGE UP (ref 0–0)
PLATELET # BLD AUTO: 148 K/UL — LOW (ref 150–400)
POTASSIUM SERPL-MCNC: 3.6 MMOL/L — SIGNIFICANT CHANGE UP (ref 3.5–5.3)
POTASSIUM SERPL-SCNC: 3.6 MMOL/L — SIGNIFICANT CHANGE UP (ref 3.5–5.3)
RBC # BLD: 3.12 M/UL — LOW (ref 3.8–5.2)
RBC # FLD: 15.1 % — HIGH (ref 10.3–14.5)
SODIUM SERPL-SCNC: 141 MMOL/L — SIGNIFICANT CHANGE UP (ref 135–145)
WBC # BLD: 4.62 K/UL — SIGNIFICANT CHANGE UP (ref 3.8–10.5)
WBC # FLD AUTO: 4.62 K/UL — SIGNIFICANT CHANGE UP (ref 3.8–10.5)

## 2024-08-01 PROCEDURE — 99222 1ST HOSP IP/OBS MODERATE 55: CPT

## 2024-08-01 RX ADMIN — Medication 500 MILLIGRAM(S): at 11:30

## 2024-08-01 RX ADMIN — Medication 4: at 12:56

## 2024-08-01 RX ADMIN — DEXAMETHASONE 6 MILLIGRAM(S): 1.5 TABLET ORAL at 06:20

## 2024-08-01 RX ADMIN — Medication 10 MILLIGRAM(S): at 22:17

## 2024-08-01 RX ADMIN — Medication 100 MILLIGRAM(S): at 13:18

## 2024-08-01 RX ADMIN — APIXABAN 2.5 MILLIGRAM(S): 5 TABLET, FILM COATED ORAL at 17:39

## 2024-08-01 RX ADMIN — APIXABAN 2.5 MILLIGRAM(S): 5 TABLET, FILM COATED ORAL at 06:21

## 2024-08-01 RX ADMIN — Medication 10 MILLIGRAM(S): at 22:18

## 2024-08-01 RX ADMIN — Medication 3: at 17:39

## 2024-08-01 RX ADMIN — Medication 25 MILLIGRAM(S): at 06:20

## 2024-08-01 RX ADMIN — PANTOPRAZOLE SODIUM 40 MILLIGRAM(S): 20 TABLET, DELAYED RELEASE ORAL at 06:21

## 2024-08-01 RX ADMIN — LOSARTAN POTASSIUM 25 MILLIGRAM(S): 50 TABLET, FILM COATED ORAL at 06:20

## 2024-08-01 RX ADMIN — Medication 2: at 08:40

## 2024-08-01 RX ADMIN — Medication 1000 UNIT(S): at 12:57

## 2024-08-01 RX ADMIN — Medication 220 MILLIGRAM(S): at 11:29

## 2024-08-01 NOTE — PROGRESS NOTE ADULT - PROBLEM SELECTOR PLAN 10
DVT prophylaxis - on eliquis    # Dispo  - f/u repeat blood Cx sent  on 7/31  - complete Remedisivir

## 2024-08-01 NOTE — DISCHARGE NOTE PROVIDER - NSDCMRMEDTOKEN_GEN_ALL_CORE_FT
Albuterol (Eqv-ProAir HFA) 90 mcg/inh inhalation aerosol: 2 puff(s) inhaled every 4 hours as needed for  shortness of breath and/or wheezing  alendronate 70 mg/75 mL oral solution: 75 milliliter(s) orally once a week  Eliquis 2.5 mg oral tablet: 1 tab(s) orally 2 times a day  Farxiga 10 mg oral tablet: 1 tab(s) orally once a day  ipratropium-albuterol 0.5 mg-2.5 mg/3 mL inhalation solution: 3 milliliter(s) by nebulizer every 6 hours as needed for  shortness of breath and/or wheezing  LOSARTAN POT 25MG TAB: 1 tab(s) orally once a day  meclizine 25 mg oral tablet: 1 tab(s) orally once a day as needed for  dizziness  megestrol 40 mg/mL oral suspension: 20 milliliter(s) orally once a day  METFORMIN 1000MG TAB: 1 tab(s) orally 2 times a day  metoprolol succinate 25 mg oral tablet, extended release: 1 tab(s) orally once a day  MONTELUKAST 10MG TAB: 1 tab(s) orally once a day  SIMVASTATIN 10MG TAB: 1 tab(s) orally once a day (at bedtime)   Albuterol (Eqv-ProAir HFA) 90 mcg/inh inhalation aerosol: 2 puff(s) inhaled every 4 hours as needed for  shortness of breath and/or wheezing  alendronate 70 mg/75 mL oral solution: 75 milliliter(s) orally once a week  cefTRIAXone 2 g injection: 2 gram(s) intravenously once a day  Eliquis 2.5 mg oral tablet: 1 tab(s) orally 2 times a day  Farxiga 10 mg oral tablet: 1 tab(s) orally once a day  ipratropium-albuterol 0.5 mg-2.5 mg/3 mL inhalation solution: 3 milliliter(s) by nebulizer every 6 hours as needed for  shortness of breath and/or wheezing  LOSARTAN POT 25MG TAB: 1 tab(s) orally once a day  meclizine 25 mg oral tablet: 1 tab(s) orally once a day as needed for  dizziness  megestrol 40 mg/mL oral suspension: 20 milliliter(s) orally once a day  METFORMIN 1000MG TAB: 1 tab(s) orally 2 times a day  metoprolol succinate 25 mg oral tablet, extended release: 1 tab(s) orally once a day  MONTELUKAST 10MG TAB: 1 tab(s) orally once a day  Normal Saline Flush 0.9% injectable solution: 10 milliliter(s) injectable once a day  SIMVASTATIN 10MG TAB: 1 tab(s) orally once a day (at bedtime)   - ID follow up with DR. Merrick Scott via Telehealth ( Tuesday only) [phone] and Fax [phone]: - ID follow up with DR. Merrick Scott via Telehealth ( Tuesday only) [phone] and Fax [phone]  Albuterol (Eqv-ProAir HFA) 90 mcg/inh inhalation aerosol: 2 puff(s) inhaled every 4 hours as needed for  shortness of breath and/or wheezing  alendronate 70 mg/75 mL oral solution: 75 milliliter(s) orally once a week  cefTRIAXone 2 g injection: 2 gram(s) intravenously once a day  Eliquis 2.5 mg oral tablet: 1 tab(s) orally 2 times a day  Farxiga 10 mg oral tablet: 1 tab(s) orally once a day  ipratropium-albuterol 0.5 mg-2.5 mg/3 mL inhalation solution: 3 milliliter(s) by nebulizer every 6 hours as needed for  shortness of breath and/or wheezing  LOSARTAN POT 25MG TAB: 1 tab(s) orally once a day  meclizine 25 mg oral tablet: 1 tab(s) orally once a day as needed for  dizziness  megestrol 40 mg/mL oral suspension: 20 milliliter(s) orally once a day  METFORMIN 1000MG TAB: 1 tab(s) orally 2 times a day  metoprolol succinate 25 mg oral tablet, extended release: 1 tab(s) orally once a day  Monitor CBC, BMP, ESR and CRP weekly until 8/28/24: Monitor CBC, BMP, ESR and CRP weekly until 8/28/24  MONTELUKAST 10MG TAB: 1 tab(s) orally once a day  Normal Saline Flush 0.9% injectable solution: 10 milliliter(s) injectable once a day  Remove Midline after last dose of Ceftriaxone on 8/28/2024: Remove Midline after last dose of Ceftriaxone on 8/28/2024  SIMVASTATIN 10MG TAB: 1 tab(s) orally once a day (at bedtime)   - ID follow up with DR. Merrick Scott via Telehealth ( Tuesday only) [phone] and Fax [phone]: - ID follow up with DR. Merrick Scott via Telehealth ( Tuesday only) [phone] and Fax [phone]  Albuterol (Eqv-ProAir HFA) 90 mcg/inh inhalation aerosol: 2 puff(s) inhaled every 4 hours as needed for  shortness of breath and/or wheezing  ascorbic acid 500 mg oral tablet: 1 tab(s) orally once a day  cefTRIAXone 2 g injection: 2 gram(s) intravenously once a day  cholecalciferol oral tablet: 1,000 international unit(s) orally once a day 1000 unit(s) orally once a day  dexAMETHasone 2 mg oral tablet: 5 tab(s) orally once a day  Eliquis 2.5 mg oral tablet: 1 tab(s) orally 2 times a day  Farxiga 10 mg oral tablet: 1 tab(s) orally once a day  ipratropium-albuterol 0.5 mg-2.5 mg/3 mL inhalation solution: 3 milliliter(s) by nebulizer every 6 hours as needed for  shortness of breath and/or wheezing  losartan 50 mg oral tablet: 1 tab(s) orally 2 times a day  METFORMIN 1000MG TAB: 1 tab(s) orally 2 times a day  metoprolol tartrate 25 mg oral tablet: 1 tab(s) orally 2 times a day  Monitor CBC, BMP, ESR and CRP weekly until 8/28/24: Monitor CBC, BMP, ESR and CRP weekly until 8/28/24  MONTELUKAST 10MG TAB: 1 tab(s) orally once a day  NIFEdipine 30 mg oral tablet, extended release: 1 tab(s) orally once a day  Normal Saline Flush 0.9% injectable solution: 10 milliliter(s) injectable once a day  pantoprazole 40 mg oral delayed release tablet: 1 tab(s) orally once a day (before a meal)  Remove Midline after last dose of Ceftriaxone on 8/28/2024: Remove Midline after last dose of Ceftriaxone on 8/28/2024  SIMVASTATIN 10MG TAB: 1 tab(s) orally once a day (at bedtime)  zinc sulfate 220 mg (as elemental zinc 50 mg) oral capsule: 1 cap(s) orally once a day

## 2024-08-01 NOTE — DISCHARGE NOTE PROVIDER - HOSPITAL COURSE
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  Admitted for AHRF 2/2 COVID and UTI. ICU was initially consulted for AHRF requiring BiPAP but patient transitioned off to RA .Pt started on remdesivir and decadron  Pt found to have strep bacteremia likely due to UTI. started On IV ceftriaxone. Repeat bcx showed -------    CTA chest/A/P showed no PE, no acute finding in the abdomen or pelvis. Mild compression fracture of the L1 superior endplate, new compared to prior but chronic-appearing. CTH with no acute findings    Pt is medically optimized for discharge, discussed with the attending  This is just a brief hospital course, please refer to the progress notes for detailed information   89F, from home living with her daughter and ambulates with a wheelchair (mostly bedbound), with PMHx of HTN, HLD, DM, Osteoporosis, Stage II B Gastric Adenocarcinoma s/p distal gastrectomy (2015), Multiple myeloma/plasmactyoma on active chemo (follows [facility] Dr Adams), Hx b/l PE (1/2023 on Eliquis) presenting with SOB, cough and vomiting.  Admitted for AHRF 2/2 COVID and UTI. ICU was initially consulted for AHRF requiring BiPAP but patient transitioned off to RA .Pt started on remdesivir and decadron  Pt found to have strep bacteremia likely due to UTI. started On IV ceftriaxone. Repeat bcx showed NGTD.    TTE showed ----------------------    CTA chest/A/P showed no PE, no acute finding in the abdomen or pelvis. Mild compression fracture of the L1 superior endplate, new compared to prior but chronic-appearing. CTH with no acute findings    Pt is medically optimized for discharge, discussed with the attending  This is just a brief hospital course, please refer to the progress notes for detailed information   89F, from home living with her daughter and ambulates with a wheelchair (mostly bedbound), with PMHx of HTN, HLD, DM, Osteoporosis, Stage II B Gastric Adenocarcinoma s/p distal gastrectomy (2015), Multiple myeloma/plasmactyoma on active chemo (follows [facility] Dr Adams), Hx b/l PE (1/2023 on Eliquis) presenting with SOB, cough and vomiting.  Admitted for AHRF 2/2 COVID and UTI. ICU was initially consulted for AHRF requiring BiPAP but patient transitioned off to RA .Pt started on remdesivir and decadron  Pt found to have strep bacteremia likely due to UTI. started On IV ceftriaxone. Repeat bcx showed NGTD.    TTE showed negative vegetation. ID recommending Ceftriaxone 2G IV daily until 8/28/24 for strep bacteremia.     CTA chest/A/P showed no PE, no acute finding in the abdomen or pelvis. Mild compression fracture of the L1 superior endplate, new compared to prior but chronic-appearing. CTH with no acute findings    Pt is medically optimized for discharge, discussed with the attending  Please note that this a brief summary of hospital course please refer to daily progress notes and consult notes for full course and events. Patient seen and examined at bedside, discussed with medical attending. Patient medically cleared for discharge to home with IV antibiotics.

## 2024-08-01 NOTE — DISCHARGE NOTE PROVIDER - NSDCCPCAREPLAN_GEN_ALL_CORE_FT
PRINCIPAL DISCHARGE DIAGNOSIS  Diagnosis: Acute hypoxic respiratory failure  Assessment and Plan of Treatment: You presented with shortness of breath and required non invasive ventilation (BiPAP) initially but now you are transitioned to room air. Your CT chest with no acute findings. You are saturating well on room air. You were found to have COVID and was given IV medication and decadron. You completed remdesivir course and you are to continue decadron at home until -----------  follow up with primary care provider      SECONDARY DISCHARGE DIAGNOSES  Diagnosis: Bacteremia  Assessment and Plan of Treatment: Your blood culture is positive for bacteria. You were started on IV antibiotics and was evaluated by infectious disease specialist. You are to continue antibiotics until--------------  Your repeat blood culture is -------------    Diagnosis: Multiple myeloma  Assessment and Plan of Treatment: You have known multiple myeloma and follows Dr Alvarado outpatient. Follow up with Dr Alvarado once discharged    Diagnosis: 2019 novel coronavirus disease (COVID-19)  Assessment and Plan of Treatment: you were found covid positive on date: 7/29  You completed remdesivir course and started on decadron. You are to continue decadron until ---------      Diagnosis: Acute UTI  Assessment and Plan of Treatment: You were started on IV antibiotics.  Drink enough water and fluids to keep your urine clear or pale yellow.  Avoid caffeine, tea, and carbonated beverages. They tend to irritate your bladder.  Empty your bladder often. Avoid holding urine for long periods of time.  SEEK MEDICAL CARE IF:  You have back pain.  You develop a fever.  Your symptoms do not begin to resolve within 3 days.  SEEK IMMEDIATE MEDICAL CARE IF:  You have severe back pain or lower abdominal pain.  You develop chills.  You have nausea or vomiting.  You have continued burning or discomfort with urination.    Diagnosis: HTN (hypertension)  Assessment and Plan of Treatment: Continue home medication losartan and metoprolol. Follow up with primary care provider to establish long term goals for your blood pressure and for long term management and monitoring of your blood pressure    Diagnosis: History of pulmonary embolism  Assessment and Plan of Treatment: Continue taking your Eliquis at home  Follow up with primary care provider once discharged  If you develop shortness of breath or if your shortness of breath worsens call your Health Care Provider or go to the Emergency Department.      Diagnosis: DM (diabetes mellitus)  Assessment and Plan of Treatment: HgA1C this admission 5.8  Make sure you get your HgA1c checked every three months.  If you have not seen your ophthalmologist this year call for appointment.  Check your feet daily for redness, sores, or openings. Do not self treat. If no improvement in two days call your primary care physician for an appointment.  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 You were evaluated by a gastroenterologist who recommended ------------    Diagnosis: HLD (hyperlipidemia)  Assessment and Plan of Treatment: Continue your home medication. Follow up with primary care provider for long term management     PRINCIPAL DISCHARGE DIAGNOSIS  Diagnosis: Acute hypoxic respiratory failure  Assessment and Plan of Treatment: You presented with shortness of breath and required non invasive ventilation (BiPAP) initially but now you are transitioned to room air. Your CT chest with no acute findings. You are saturating well on room air. You were found to have COVID and was given IV medication and decadron. You completed remdesivir course and you are to continue decadron at home until 8/8/24  follow up with primary care provider      SECONDARY DISCHARGE DIAGNOSES  Diagnosis: Acute UTI  Assessment and Plan of Treatment: You were started on IV antibiotics.  Drink enough water and fluids to keep your urine clear or pale yellow.  Avoid caffeine, tea, and carbonated beverages. They tend to irritate your bladder.  Empty your bladder often. Avoid holding urine for long periods of time.  SEEK MEDICAL CARE IF:  You have back pain.  You develop a fever.  Your symptoms do not begin to resolve within 3 days.  SEEK IMMEDIATE MEDICAL CARE IF:  You have severe back pain or lower abdominal pain.  You develop chills.  You have nausea or vomiting.  You have continued burning or discomfort with urination.    Diagnosis: 2019 novel coronavirus disease (COVID-19)  Assessment and Plan of Treatment: you were found covid positive on date: 7/29  You completed remdesivir course and started on decadron. You are to continue decadron until 8/8/24      Diagnosis: Multiple myeloma  Assessment and Plan of Treatment: You have known multiple myeloma and follows Dr Alvarado outpatient. Follow up with Dr Alvarado once discharged    Diagnosis: Bacteremia  Assessment and Plan of Treatment: Your blood culture is positive for bacteria. You were started on IV antibiotics and was evaluated by infectious disease specialist. 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 You were evaluated by a gastroenterologist who recommended to follow up outpatient with gastroenterologist as there is no active gastroenterological issues at this time    Diagnosis: HTN (hypertension)  Assessment and Plan of Treatment: Continue home medication losartan and metoprolol. Follow up with primary care provider to establish long term goals for your blood pressure and for long term management and monitoring of your blood pressure    Diagnosis: History of pulmonary embolism  Assessment and Plan of Treatment: Continue taking your Eliquis at home  Follow up with primary care provider once discharged  If you develop shortness of breath or if your shortness of breath worsens call your Health Care Provider or go to the Emergency Department.      Diagnosis: DM (diabetes mellitus)  Assessment and Plan of Treatment: HgA1C this admission 5.8  Make sure you get your HgA1c checked every three months.  If you have not seen your ophthalmologist this year call for appointment.  Check your feet daily for redness, sores, or openings. Do not self treat. If no improvement in two days call your primary care physician for an appointment.  Follow up with endocrinologist to establish long term goals for your A1c and for long term management and monitoring      Diagnosis: HLD (hyperlipidemia)  Assessment and Plan of Treatment: Continue your home medication. Follow up with primary care provider for long term management     PRINCIPAL DISCHARGE DIAGNOSIS  Diagnosis: Acute hypoxic respiratory failure  Assessment and Plan of Treatment: You presented with shortness of breath and required non invasive ventilation (BiPAP) initially but now you are transitioned to room air. Your CT chest with no acute findings. You are saturating well on room air. You were found to have COVID and was given IV medication and decadron. You completed remdesivir course and you are to continue decadron at home until 8/10/24  Please follow up with PCP and Pulmonologist in 1 week for further medical management.      SECONDARY DISCHARGE DIAGNOSES  Diagnosis: Bacteremia  Assessment and Plan of Treatment: Your blood culture is positive for bacteria. You were started on IV antibiotics and was evaluated by infectious disease specialist. You are to continue Ceftriaxone 2G IV daily until 8/28/24  Your repeat blood culture is negative  Your echocardiogram showed no infection.  Remove your Midline after last dose on 8.28.24  Please follow with ID for weekly blood work.  Please follow up with PCP in 1 week for further medical management.    Diagnosis: Acute UTI  Assessment and Plan of Treatment: You completed treatment for UTI.  Please follow up with your PCP in 1 week for further medical management.    Diagnosis: 2019 novel coronavirus disease (COVID-19)  Assessment and Plan of Treatment: you were found covid positive on date: 7/29  You completed remdesivir course and started on decadron. You are to continue decadron until 8/10/24      Diagnosis: Multiple myeloma  Assessment and Plan of Treatment: You have known multiple myeloma and follows Dr Alvarado outpatient. 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 You were evaluated by a gastroenterologist who recommended to follow up outpatient with gastroenterologist as there is no active gastroenterological issues at this time    Diagnosis: HTN (hypertension)  Assessment and Plan of Treatment: Please continue Metoprolol 25mg twice a day  Please continue Nifedipine 30mg daily  Please continue Losartan 50mg twice a day  Please follow up with your Primary care physician and Cardiologist in 1 week for further medical management.      Diagnosis: History of pulmonary embolism  Assessment and Plan of Treatment: Continue taking your Eliquis at home  Follow up with primary care provider once discharged  If you develop shortness of breath or if your shortness of breath worsens call your Health Care Provider or go to the Emergency Department.      Diagnosis: DM (diabetes mellitus)  Assessment and Plan of Treatment: HgA1C this admission 5.8  Make sure you get your HgA1c checked every three months.  If you have not seen your ophthalmologist this year call for appointment.  Check your feet daily for redness, sores, or openings. Do not self treat. If no improvement in two days call your primary care physician for an appointment.  Follow up with endocrinologist to establish long term goals for your A1c and for long term management and monitoring      Diagnosis: HLD (hyperlipidemia)  Assessment and Plan of Treatment: Continue your home medication. Follow up with primary care provider for long term management

## 2024-08-01 NOTE — DISCHARGE NOTE PROVIDER - CARE PROVIDER_API CALL
Alexus Bull  Geriatric Medicine  [address] [place] [id number]  Phone: [phone]  Fax: [phone]  Follow Up Time: 2 weeks

## 2024-08-01 NOTE — PROGRESS NOTE ADULT - SUBJECTIVE AND OBJECTIVE BOX
Patient is a 89y old  Female who presents with a chief complaint of UTI and COVID (01 Aug 2024 06:23)      INTERVAL HPI/OVERNIGHT EVENTS: no acute events overnight       REVIEW OF SYSTEMS:  CONSTITUTIONAL: No fever, chills  ENMT:  No difficulty hearing, no change in vision  NECK: No pain or stiffness  RESPIRATORY: No cough, SOB  CARDIOVASCULAR: No chest pain, palpitations  GASTROINTESTINAL: No abdominal pain. No nausea, vomiting, or diarrhea  GENITOURINARY: No dysuria  NEUROLOGICAL: No HA  SKIN: No itching, burning, rashes, or lesions   LYMPH NODES: No enlarged glands  ENDOCRINE: No heat or cold intolerance; No hair loss  MUSCULOSKELETAL: No joint pain or swelling; No muscle, back, or extremity pain  PSYCHIATRIC: No depression, anxiety  HEME/LYMPH: No easy bruising, or bleeding gums    T(C): 36.6 (07-31-24 @ 21:26), Max: 36.6 (07-31-24 @ 21:26)  HR: 65 (07-31-24 @ 21:26) (65 - 72)  BP: 174/78 (07-31-24 @ 21:26) (145/77 - 174/78)  RR: 18 (07-31-24 @ 21:26) (18 - 18)  SpO2: 97% (07-31-24 @ 21:26) (97% - 97%)  Wt(kg): --Vital Signs Last 24 Hrs  T(C): 36.6 (31 Jul 2024 21:26), Max: 36.6 (31 Jul 2024 21:26)  T(F): 97.9 (31 Jul 2024 21:26), Max: 97.9 (31 Jul 2024 21:26)  HR: 65 (31 Jul 2024 21:26) (65 - 72)  BP: 174/78 (31 Jul 2024 21:26) (145/77 - 174/78)  BP(mean): 100 (31 Jul 2024 21:26) (100 - 100)  RR: 18 (31 Jul 2024 21:26) (18 - 18)  SpO2: 97% (31 Jul 2024 21:26) (97% - 97%)    Parameters below as of 31 Jul 2024 21:26  Patient On (Oxygen Delivery Method): nasal cannula  O2 Flow (L/min): 2    MEDICATIONS  (STANDING):  apixaban 2.5 milliGRAM(s) Oral every 12 hours  ascorbic acid 500 milliGRAM(s) Oral daily  cefTRIAXone   IVPB 2000 milliGRAM(s) IV Intermittent every 24 hours  cholecalciferol 1000 Unit(s) Oral daily  dexAMETHasone  Injectable 6 milliGRAM(s) IV Push daily  insulin lispro (ADMELOG) corrective regimen sliding scale   SubCutaneous at bedtime  insulin lispro (ADMELOG) corrective regimen sliding scale   SubCutaneous three times a day before meals  lactated ringers. 1000 milliLiter(s) (50 mL/Hr) IV Continuous <Continuous>  losartan 25 milliGRAM(s) Oral daily  metoprolol succinate ER 25 milliGRAM(s) Oral daily  montelukast 10 milliGRAM(s) Oral at bedtime  pantoprazole    Tablet 40 milliGRAM(s) Oral before breakfast  remdesivir  IVPB 100 milliGRAM(s) IV Intermittent every 24 hours  remdesivir  IVPB   IV Intermittent   simvastatin 10 milliGRAM(s) Oral at bedtime  zinc sulfate 220 milliGRAM(s) Oral daily    MEDICATIONS  (PRN):  acetaminophen     Tablet .. 650 milliGRAM(s) Oral every 6 hours PRN Temp greater or equal to 38C (100.4F), Mild Pain (1 - 3)    PHYSICAL EXAM:  GENERAL: NAD  EYES: clear conjunctiva; EOMI  ENMT: Moist mucous membranes  NECK: Supple, No JVD, Normal thyroid  CHEST/LUNG: Clear to auscultation bilaterally; No rales, rhonchi, wheezing, or rubs  HEART: S1, S2, Regular rate and rhythm  ABDOMEN: Soft, Nontender, Nondistended; Bowel sounds present  NEURO: Alert & Oriented X3  EXTREMITIES: No LE edema, no calf tenderness  LYMPH: No lymphadenopathy noted  SKIN: No rashes or lesions    Consultant(s) Notes Reviewed:  [x ] YES  [ ] NO  Care Discussed with Consultants/Other Providers [ x] YES  [ ] NO    LABS:                        10.4   4.62  )-----------( 148      ( 01 Aug 2024 06:57 )             31.5     08-01    141  |  111<H>  |  36<H>  ----------------------------<  146<H>  3.6   |  20<L>  |  0.88    Ca    8.1<L>      01 Aug 2024 06:57        CAPILLARY BLOOD GLUCOSE      POCT Blood Glucose.: 204 mg/dL (01 Aug 2024 08:17)  POCT Blood Glucose.: 195 mg/dL (31 Jul 2024 21:37)  POCT Blood Glucose.: 248 mg/dL (31 Jul 2024 16:58)  POCT Blood Glucose.: 261 mg/dL (31 Jul 2024 11:40)        Urinalysis Basic - ( 01 Aug 2024 06:57 )    Color: x / Appearance: x / SG: x / pH: x  Gluc: 146 mg/dL / Ketone: x  / Bili: x / Urobili: x   Blood: x / Protein: x / Nitrite: x   Leuk Esterase: x / RBC: x / WBC x   Sq Epi: x / Non Sq Epi: x / Bacteria: x        RADIOLOGY & ADDITIONAL TESTS:    Imaging Personally Reviewed:  [x ] YES  [ ] NO  < from: CT Head No Cont (07.29.24 @ 05:21) >    ACC: [id number] EXAM:  CT BRAIN   ORDERED BY: JAYCEE CARMEN     PROCEDURE DATE:  07/29/2024          INTERPRETATION:  CLINICAL INFORMATION: AMS    COMPARISON: CT head 10/24/2023    CONTRAST:  IV Contrast: NONE  Complications: None reported at time of study completion    TECHNIQUE:  Serial axial images were obtained from the skull base to the   vertex using multi-slice helical technique. Sagittal and coronal   reformats were obtained.    FINDINGS:    VENTRICLES AND SULCI: Age appropriate involutional changes.  INTRA-AXIAL: No mass effect, acute hemorrhage, or midline shift.  There   are periventricular and subcortical white matter hypodensities,   consistent with microvascular type changes.  EXTRA-AXIAL: No mass or fluid collection. Basal cisterns are normal in   appearance.    VISUALIZED SINUSES:  Scattered mucosal thickening.  TYMPANOMASTOID CAVITIES:  Clear.  VISUALIZED ORBITS: Bilateral lens replacement.  CALVARIUM: Intact.    MISCELLANEOUS: Atherosclerotic calcifications of the intracranial   vasculature.      IMPRESSION:  No acute intracranial hemorrhage, mass effect, or midline shift.        --- End of Report ---            FRANSISCO EASON MD; Attending Radiologist  This document has been electronically signed. 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    COMPARISON: CTA chest 3/28/2023. CT abdomen pelvis 5/13/2022.    CONTRAST/COMPLICATIONS:  IV Contrast: Omnipaque 350  80 cc administered   20 cc discarded  Oral Contrast: NONE  Complications: None reported at time of study completion    PROCEDURE:  CT Angiography of the Chest was performed followed by portal venous phase   imaging of the Abdomen and Pelvis.  Sagittal and coronal reformats were performed as well as 3D (MIP)   reconstructions.    FINDINGS:  CHEST:  LUNGS AND LARGE AIRWAYS: Patent central airways. Mild subpleural   interstitial prominence particularly in the upper lungs. No pneumonia or   edema or acute abnormality.  PLEURA: No pleural effusion.  VESSELS: No thoracic aortic dissection or aneurysm. Coronary artery  atherosclerosis. No pulmonary embolus.  HEART: Heart size is normal. No pericardial effusion.  MEDIASTINUM AND MARGARET: No lymphadenopathy.  CHEST WALL AND LOWER NECK: Multinodular thyroid.    ABDOMEN AND PELVIS:  LIVER: Within normal limits.  BILE DUCTS: Normal caliber.  GALLBLADDER: Cholecystectomy.  SPLEEN: Within normal limits.  PANCREAS: Within normal limits.  ADRENALS: Within normal limits.  KIDNEYS/URETERS: No hydronephrosis or renal stone or concerning mass.   Mild cortical volume loss bilaterally.    BLADDER: Within normal limits.  REPRODUCTIVE ORGANS: Status post hysterectomy.    BOWEL: No bowel obstruction. Status post appendectomy. Status post distal   gastric resection with gastrojejunostomy.  PERITONEUM/RETROPERITONEUM: Within normal limits.  VESSELS: Atherosclerotic changes. No abdominal aortic aneurysm. Patent   portal, splenic, and mesenteric veins.  LYMPH NODES: No lymphadenopathy.  ABDOMINAL WALL: Within normal limits.  BONES: Bony demineralization. No acute fracture. Mild compression   fracture of the L1 superior endplate, new compared to prior but   chronic-appearing. Transitional lumbosacral anatomy with   pseudoarticulation between the transverse processes of S1 and S2.    IMPRESSION:  No pulmonary embolus or acute finding in the chest.    No acute finding in the abdomen or pelvis.    Mild compression fracture of the L1 superior endplate, new compared to   prior but chronic-appearing.        --- End of Report ---             KARIN MULLINS MD; Attending Radiologist  This document has been electronically signed. Jul 29 2024  5:49AM

## 2024-08-01 NOTE — PROGRESS NOTE ADULT - ASSESSMENT
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 ICU was initially consulted for AHRF requiring BiPAP but patient transitioned off to RA. COVID +, UA+ ,Admitted for AHRF 2/2 COVID and UTI.   Pt found to have strep bacteremia  likely due to Uti. 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 ICU was initially consulted for AHRF requiring BiPAP but patient transitioned off to RA. COVID +, UA+ ,Admitted for AHRF 2/2 COVID and UTI.   Pt found to have strept gallolyticus bacteremia  likely due to Uti Vs concern for colon cancer. GI consulted  for colonoscopy.  On IV ceftriaxone. 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 ICU was initially consulted for AHRF requiring BiPAP but patient transitioned off to RA. COVID +, UA+ ,Admitted for AHRF 2/2 COVID and UTI.   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 GI consulted  for colonoscopy.  On IV ceftriaxone. ID following

## 2024-08-01 NOTE — PROGRESS NOTE ADULT - SUBJECTIVE AND OBJECTIVE BOX
Patient is a 89y old  Female who presents with a chief complaint of UTI and COVID (31 Jul 2024 14:31)    pt seen in icu [  ], reg med floor [  x ], bed [ x ], chair at bedside [   ], awake and responsive [ x ], lethargic [  ],    nad [ x ]        Allergies    gabapentin (Unknown)        Vitals    T(F): 97.9 (07-31-24 @ 21:26), Max: 97.9 (07-31-24 @ 21:26)  HR: 65 (07-31-24 @ 21:26) (65 - 72)  BP: 174/78 (07-31-24 @ 21:26) (145/77 - 174/78)  RR: 18 (07-31-24 @ 21:26) (18 - 18)  SpO2: 97% (07-31-24 @ 21:26) (97% - 97%)  Wt(kg): --  CAPILLARY BLOOD GLUCOSE      POCT Blood Glucose.: 195 mg/dL (31 Jul 2024 21:37)      Labs                          10.1   4.23  )-----------( 155      ( 31 Jul 2024 06:40 )             31.4       07-31    142  |  110<H>  |  33<H>  ----------------------------<  149<H>  3.6   |  21<L>  |  0.90    Ca    8.5      31 Jul 2024 06:40  Phos  3.4     07-30  Mg     1.7     07-30    TPro  5.1<L>  /  Alb  2.4<L>  /  TBili  0.2  /  DBili  x   /  AST  16  /  ALT  19  /  AlkPhos  46  07-30            Clean Catch  07-29 @ 04:31   >=3 organisms. Probable collection contamination.  --  --      .Blood Blood-Peripheral  07-29 @ 01:10   No growth at 48 Hours  --  --      .Blood Blood-Peripheral  07-29 @ 01:05   Growth in anaerobic bottle: Streptococcus gallolyticus  Growth in aerobic bottle: Streptococcus salivarius/vestibularis group  "Susceptibilities not performed"  Direct identification is available within approximately 3-5  hours either by Blood Panel Multiplexed PCR or Direct  MALDI-TOF. Details: [email or web address]  --  Blood Culture PCR  Streptococcus gallolyticus          Radiology Results      Meds    MEDICATIONS  (STANDING):  apixaban 2.5 milliGRAM(s) Oral every 12 hours  ascorbic acid 500 milliGRAM(s) Oral daily  cefTRIAXone   IVPB 2000 milliGRAM(s) IV Intermittent every 24 hours  cholecalciferol 1000 Unit(s) Oral daily  dexAMETHasone  Injectable 6 milliGRAM(s) IV Push daily  insulin lispro (ADMELOG) corrective regimen sliding scale   SubCutaneous at bedtime  insulin lispro (ADMELOG) corrective regimen sliding scale   SubCutaneous three times a day before meals  lactated ringers. 1000 milliLiter(s) (50 mL/Hr) IV Continuous <Continuous>  losartan 25 milliGRAM(s) Oral daily  metoprolol succinate ER 25 milliGRAM(s) Oral daily  montelukast 10 milliGRAM(s) Oral at bedtime  pantoprazole    Tablet 40 milliGRAM(s) Oral before breakfast  remdesivir  IVPB   IV Intermittent   remdesivir  IVPB 100 milliGRAM(s) IV Intermittent every 24 hours  simvastatin 10 milliGRAM(s) Oral at bedtime  zinc sulfate 220 milliGRAM(s) Oral daily      MEDICATIONS  (PRN):  acetaminophen     Tablet .. 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),   Multiple myeloma/ plasmacytoma  Pulmonary embolism  S/P hysterectomy  S/P tubal ligation        PLAN    cont decadron,   cont remdesivir until 8/2/24,   cont vit c, vitamin d, zinc, pepcid,   cont singulair,   contact and airborne isolation,   cont albuterol inhaler,   pulm f/u   robitussin prn   O2 sat 96% (96% - 98%) on ra  O2 via nasal canula as needed,   blood cx with Streptococcus gallolyticus noted above   ucx with contamination  id f/u   Adjusted Ceftriaxone doses to 2 g daily until blood culture from 7/29 is finalized  f/u Repeat Blood cultures X 2 to document clearing the blood stream  lispro ss,   hold outpt dm meds,  hgba1c 5.8 noted      heme onc cons  cont current meds     Patient is a 89y old  Female who presents with a chief complaint of UTI and COVID (31 Jul 2024 14:31)    pt seen in icu [  ], reg med floor [  x ], bed [ x ], chair at bedside [   ], awake and responsive [ x ], lethargic [  ],    nad [ x ]        Allergies    gabapentin (Unknown)        Vitals    T(F): 97.9 (07-31-24 @ 21:26), Max: 97.9 (07-31-24 @ 21:26)  HR: 65 (07-31-24 @ 21:26) (65 - 72)  BP: 174/78 (07-31-24 @ 21:26) (145/77 - 174/78)  RR: 18 (07-31-24 @ 21:26) (18 - 18)  SpO2: 97% (07-31-24 @ 21:26) (97% - 97%)  Wt(kg): --  CAPILLARY BLOOD GLUCOSE      POCT Blood Glucose.: 195 mg/dL (31 Jul 2024 21:37)      Labs                          10.1   4.23  )-----------( 155      ( 31 Jul 2024 06:40 )             31.4       07-31    142  |  110<H>  |  33<H>  ----------------------------<  149<H>  3.6   |  21<L>  |  0.90    Ca    8.5      31 Jul 2024 06:40  Phos  3.4     07-30  Mg     1.7     07-30    TPro  5.1<L>  /  Alb  2.4<L>  /  TBili  0.2  /  DBili  x   /  AST  16  /  ALT  19  /  AlkPhos  46  07-30            Clean Catch  07-29 @ 04:31   >=3 organisms. Probable collection contamination.  --  --      .Blood Blood-Peripheral  07-29 @ 01:10   No growth at 48 Hours  --  --      .Blood Blood-Peripheral  07-29 @ 01:05   Growth in anaerobic bottle: Streptococcus gallolyticus  Growth in aerobic bottle: Streptococcus salivarius/vestibularis group  "Susceptibilities not performed"  Direct identification is available within approximately 3-5  hours either by Blood Panel Multiplexed PCR or Direct  MALDI-TOF. Details: [email or web address]  --  Blood Culture PCR  Streptococcus gallolyticus          Radiology Results      Meds    MEDICATIONS  (STANDING):  apixaban 2.5 milliGRAM(s) Oral every 12 hours  ascorbic acid 500 milliGRAM(s) Oral daily  cefTRIAXone   IVPB 2000 milliGRAM(s) IV Intermittent every 24 hours  cholecalciferol 1000 Unit(s) Oral daily  dexAMETHasone  Injectable 6 milliGRAM(s) IV Push daily  insulin lispro (ADMELOG) corrective regimen sliding scale   SubCutaneous at bedtime  insulin lispro (ADMELOG) corrective regimen sliding scale   SubCutaneous three times a day before meals  lactated ringers. 1000 milliLiter(s) (50 mL/Hr) IV Continuous <Continuous>  losartan 25 milliGRAM(s) Oral daily  metoprolol succinate ER 25 milliGRAM(s) Oral daily  montelukast 10 milliGRAM(s) Oral at bedtime  pantoprazole    Tablet 40 milliGRAM(s) Oral before breakfast  remdesivir  IVPB   IV Intermittent   remdesivir  IVPB 100 milliGRAM(s) IV Intermittent every 24 hours  simvastatin 10 milliGRAM(s) Oral at bedtime  zinc sulfate 220 milliGRAM(s) Oral daily      MEDICATIONS  (PRN):  acetaminophen     Tablet .. 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),   Multiple myeloma/ plasmacytoma  Pulmonary embolism  S/P hysterectomy  S/P tubal ligation        PLAN    cont decadron,   cont remdesivir until 8/2/24,   cont vit c, vitamin d, zinc, pepcid,   cont singulair,   contact and airborne isolation,   cont albuterol inhaler,   pulm f/u   robitussin prn   O2 sat 97% (97% - 97%) on n/c 2L  O2 via nasal canula as needed,   blood cx with Streptococcus gallolyticus noted above   ucx with contamination   f/u rept blood cx   id f/u   cont Ceftriaxone doses to 2 g daily until blood culture from 7/29 is finalized  Consider Colonoscopy if not done recently since  Streptococcus gallolyticus is associated with GI Malignancy   and also in the setting of H/O 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

## 2024-08-01 NOTE — PROGRESS NOTE ADULT - PROBLEM SELECTOR PLAN 2
- cont ceftriaxone 2mg  - f/u repeat blood Cx - cont ceftriaxone 2mg  - f/u repeat blood Cx  - blood Cx + from strept gallolyticus , concern for colon ca - Gi consulted  for colonoscopy.

## 2024-08-01 NOTE — CONSULT NOTE ADULT - NS ATTEND AMEND GEN_ALL_CORE FT
Patient seen and examined. Presenting with SOB and n/v found to have COVID. Hx of gastric ca s/p resection. No apparent active GI issues at this time. Pt eating well without further n/v. Recommend outpatient GI f/u. Please reconsult inpatient GI PRN. Patient seen and examined. Presenting with SOB and n/v found to have COVID. Hx of gastric ca s/p resection. No apparent active GI issues at this time. Pt eating well without further n/v. Recommend outpatient GI f/u. Please reconsult inpatient GI PRN.      Total time spent to complete patient's bedside assessment, physical examination, review medical chart including labs & imaging, discuss medical plan of care with housestaff was more than 50 minutes.

## 2024-08-01 NOTE — PROGRESS NOTE ADULT - SUBJECTIVE AND OBJECTIVE BOX
Patient is a 89y old  Female who presents with a chief complaint of UTI and COVID (01 Aug 2024 08:36)  Awake, alert, laying in bed in NAD. Still on respiratory isolation due to Covid. Doing well on RA    INTERVAL HPI/OVERNIGHT EVENTS:      VITAL SIGNS:  T(F): 97.9 (07-31-24 @ 21:26)  HR: 65 (07-31-24 @ 21:26)  BP: 174/78 (07-31-24 @ 21:26)  RR: 18 (07-31-24 @ 21:26)  SpO2: 97% (07-31-24 @ 21:26)  Wt(kg): --  I&O's Detail    31 Jul 2024 07:01  -  01 Aug 2024 07:00  --------------------------------------------------------  IN:  Total IN: 0 mL    OUT:    Stool (mL): 1 mL  Total OUT: 1 mL    Total NET: -1 mL              REVIEW OF SYSTEMS:    CONSTITUTIONAL:  No fevers, chills, sweats    HEENT:  Eyes:  No diplopia or blurred vision. ENT:  No earache, sore throat or runny nose.    CARDIOVASCULAR:  No pressure, squeezing, tightness, or heaviness about the chest; no palpitations.    RESPIRATORY:  Per HPI    GASTROINTESTINAL:  No abdominal pain, nausea, vomiting or diarrhea.    GENITOURINARY:  No dysuria, frequency or urgency.    NEUROLOGIC:  No paresthesias, fasciculations, seizures or weakness.    PSYCHIATRIC:  No disorder of thought or mood.      PHYSICAL EXAM:    Constitutional: Well developed and nourished  Eyes:Perrla  ENMT: normal  Neck:supple  Respiratory: good air entry  Cardiovascular: S1 S2 regular  Gastrointestinal: Soft, Non tender  Extremities: No edema  Vascular:normal  Neurological:Awake, alert,Ox3  Musculoskeletal:Normal      MEDICATIONS  (STANDING):  apixaban 2.5 milliGRAM(s) Oral every 12 hours  ascorbic acid 500 milliGRAM(s) Oral daily  cefTRIAXone   IVPB 2000 milliGRAM(s) IV Intermittent every 24 hours  cholecalciferol 1000 Unit(s) Oral daily  dexAMETHasone  Injectable 6 milliGRAM(s) IV Push daily  insulin lispro (ADMELOG) corrective regimen sliding scale   SubCutaneous at bedtime  insulin lispro (ADMELOG) corrective regimen sliding scale   SubCutaneous three times a day before meals  lactated ringers. 1000 milliLiter(s) (50 mL/Hr) IV Continuous <Continuous>  losartan 25 milliGRAM(s) Oral daily  metoprolol succinate ER 25 milliGRAM(s) Oral daily  montelukast 10 milliGRAM(s) Oral at bedtime  pantoprazole    Tablet 40 milliGRAM(s) Oral before breakfast  remdesivir  IVPB   IV Intermittent   remdesivir  IVPB 100 milliGRAM(s) IV Intermittent every 24 hours  simvastatin 10 milliGRAM(s) Oral at bedtime  zinc sulfate 220 milliGRAM(s) Oral daily    MEDICATIONS  (PRN):  acetaminophen     Tablet .. 650 milliGRAM(s) Oral every 6 hours PRN Temp greater or equal to 38C (100.4F), Mild Pain (1 - 3)      Allergies    gabapentin (Unknown)    Intolerances        LABS:                        10.4   4.62  )-----------( 148      ( 01 Aug 2024 06:57 )             31.5     08-01    141  |  111<H>  |  36<H>  ----------------------------<  146<H>  3.6   |  20<L>  |  0.88    Ca    8.1<L>      01 Aug 2024 06:57        Urinalysis Basic - ( 01 Aug 2024 06:57 )    Color: x / Appearance: x / SG: x / pH: x  Gluc: 146 mg/dL / Ketone: x  / Bili: x / Urobili: x   Blood: x / Protein: x / Nitrite: x   Leuk Esterase: x / RBC: x / WBC x   Sq Epi: x / Non Sq Epi: x / Bacteria: x      Culture - Urine (07.29.24 @ 04:31)   Specimen Source: Clean Catch  Culture Results:   >=3 organisms. Probable collection contamination.Culture - Blood (07.29.24 @ 01:10)   Specimen Source: .Blood Blood-Peripheral  Culture Results:   No growth at 72 HoursCulture Results:   Growth in anaerobic bottle: Streptococcus gallolyticus   Growth in aerobic bottle: Streptococcus salivarius/vestibularis group   "Susceptibilities not performed"   Direct identification is available within approximately 3-5   hours either by Blood Panel Multiplexed PCR or Direct   MALDI-TOF. Details: [email or web address]  Organism Identification: Blood Culture PCR   Streptococcus gallolyticus  Method Type: RYAN  Method Type: PCR      CAPILLARY BLOOD GLUCOSE      POCT Blood Glucose.: 204 mg/dL (01 Aug 2024 08:17)  POCT Blood Glucose.: 195 mg/dL (31 Jul 2024 21:37)  POCT Blood Glucose.: 248 mg/dL (31 Jul 2024 16:58)  POCT Blood Glucose.: 261 mg/dL (31 Jul 2024 11:40)        RADIOLOGY & ADDITIONAL TESTS:    CXR:    Ct scan chest:    ekg;    echo:

## 2024-08-01 NOTE — PROGRESS NOTE ADULT - ASSESSMENT
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 Patient is AAOx1 (to self only), complaining of diffuse abdominal pain, headache and mild chest pain. She has no fever or chills. On admission, she found to have no fever but positive COVID PCR and Blood culture positive for streptococcal species. She has started on Remdesivir, Dexamethasone and Ceftriaxone, and the ID consult requested to assist with further evaluation and antibiotic management.    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 TTE in the setting of Streptococcal Bacteremia  2. 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 Continue Remdesivir and Dexamethasone to complete the course  4. Supportive care including AC  5. Supplemental oxygenation and Bronchodilator as needed  6. COVID precautions    Attending Attestation:    Spent more than 35 minutes on total encounter, more than 50 % of the visit was spent counseling and/or coordinating care by the Attending physician.

## 2024-08-01 NOTE — PROGRESS NOTE ADULT - SUBJECTIVE AND OBJECTIVE BOX
Patient is seen and examined at the bed side, is afebrile. The repeat blood cultures from 7/31 have NGTD.       REVIEW OF SYSTEMS: All other review systems are negative      ALLERGIES: gabapentin (Unknown)      Vital Signs Last 24 Hrs  T(C): 36.6 (01 Aug 2024 13:11), Max: 36.6 (31 Jul 2024 21:26)  T(F): 97.8 (01 Aug 2024 13:11), Max: 97.9 (31 Jul 2024 21:26)  HR: 77 (01 Aug 2024 13:39) (65 - 77)  BP: 142/76 (01 Aug 2024 13:39) (142/76 - 174/78)  BP(mean): 100 (31 Jul 2024 21:26) (100 - 100)  RR: 16 (01 Aug 2024 13:39) (16 - 18)  SpO2: 97% (01 Aug 2024 13:39) (97% - 98%)    Parameters below as of 01 Aug 2024 13:39  Patient On (Oxygen Delivery Method): room air      PHYSICAL EXAM:  GENERAL: Not in distress, on oxygen via NC  CHEST/LUNG: Not using accessory muscles   EXTREMITIES: No pedal  edema  CNS: Awake and Alert      LABS:                        10.4   4.62  )-----------( 148      ( 01 Aug 2024 06:57 )             31.5                           10.1   4.23  )-----------( 155      ( 31 Jul 2024 06:40 )             31.4         08-01    141  |  111<H>  |  36<H>  ----------------------------<  146<H>  3.6   |  20<L>  |  0.88    Ca    8.1<L>      01 Aug 2024 06:57      07-31    142  |  110<H>  |  33<H>  ----------------------------<  149<H>  3.6   |  21<L>  |  0.90    Ca    8.5      31 Jul 2024 06:40  Phos  3.4     07-30  Mg     1.7     07-30    TPro  5.1<L>  /  Alb  2.4<L>  /  TBili  0.2  /  DBili  x   /  AST  16  /  ALT  19  /  AlkPhos  46  07-30    PT/INR - ( 29 Jul 2024 01:10 )   PT: 13.0 sec;   INR: 1.14 ratio      PTT - ( 29 Jul 2024 01:10 )  PTT:26.2 sec      CAPILLARY BLOOD GLUCOSE  POCT Blood Glucose.: 279 mg/dL (30 Jul 2024 10:54)  POCT Blood Glucose.: 183 mg/dL (30 Jul 2024 08:02)  POCT Blood Glucose.: 264 mg/dL (29 Jul 2024 21:37)  POCT Blood Glucose.: 180 mg/dL (29 Jul 2024 16:52)    ABG - ( 29 Jul 2024 03:06 )  pH, Arterial: 7.45  pH, Blood: x     /  pCO2: 23    /  pO2: 287   / HCO3: 16    / Base Excess: -6.0  /  SaO2: 96          MEDICATIONS  (STANDING):    apixaban 2.5 milliGRAM(s) Oral every 12 hours  ascorbic acid 500 milliGRAM(s) Oral daily  cefTRIAXone   IVPB 2000 milliGRAM(s) IV Intermittent every 24 hours  cholecalciferol 1000 Unit(s) Oral daily  dexAMETHasone  Injectable 6 milliGRAM(s) IV Push daily  insulin lispro (ADMELOG) corrective regimen sliding scale   SubCutaneous at bedtime  insulin lispro (ADMELOG) corrective regimen sliding scale   SubCutaneous three times a day before meals  lactated ringers. 1000 milliLiter(s) (50 mL/Hr) IV Continuous <Continuous>  losartan 25 milliGRAM(s) Oral daily  metoprolol succinate ER 25 milliGRAM(s) Oral daily  montelukast 10 milliGRAM(s) Oral at bedtime  pantoprazole    Tablet 40 milliGRAM(s) Oral before breakfast  remdesivir  IVPB   IV Intermittent   remdesivir  IVPB 100 milliGRAM(s) IV Intermittent every 24 hours  simvastatin 10 milliGRAM(s) Oral at bedtime  zinc sulfate 220 milliGRAM(s) Oral daily      RADIOLOGY & ADDITIONAL TESTS:    7/29/24: CT Head No Cont (07.29.24 @ 05:21) No acute intracranial hemorrhage, mass effect, or midline shift.      7/29/24: CT Angio Chest PE Protocol w/ IV Cont (07.29.24 @ 05:21) No pulmonary embolus or acute finding in the chest.    No acute finding in the abdomen or pelvis. Mild compression fracture of the L1 superior endplate, new compared to prior but chronic-appearing.      7/29/24 : CT Abdomen and Pelvis w/ IV Cont (07.29.24 @ 05:21) No acute finding in the abdomen or pelvis.    Mild compression fracture of the L1 superior endplate, new compared to prior but chronic-appearing.      7/29/24 : Xray Chest 1 View- PORTABLE-Urgent (07.29.24 @ 02:10) No consolidation or infiltrate.  No pleural effusion.    Heart size cannot be accurately assessed in this projection.        MICROBIOLOGY DATA:    Culture - Blood in AM (07.31.24 @ 06:30)   Specimen Source: .Blood Blood-Peripheral  Culture Results: No growth at 24 hours    Culture - Blood in AM (07.31.24 @ 06:00)   Specimen Source: .Blood Blood-Peripheral  Culture Results: No growth at 24 hours    Culture - Blood (07.29.24 @ 01:05)   - Streptococcus sp. (Not Grp A, B or S pneumoniae): Detec  - Vancomycin: S 0.5  Gram Stain:   Growth in anaerobic bottle: Gram positive cocci in pairs   Growth in aerobic bottle: Gram positive cocci in pairs  - Ceftriaxone: S <=0.25  - Penicillin: S 0.06  Specimen Source: .Blood Blood-Peripheral  Organism: Streptococcus gallolyticus  Organism: Blood Culture PCR  Culture Results:   Growth in anaerobic bottle: Streptococcus gallolyticus   Growth in aerobic bottle: Gram positive cocci in pairs   Direct identification is available within approximately 3-5   hours either by Blood Panel Multiplexed PCR or Direct   MALDI-TOF. Details: [email or web address]  Organism Identification: Blood Culture PCR   Streptococcus gallolyticus    Culture - Urine (07.29.24 @ 04:31)   Specimen Source: Clean Catch  Culture Results:   >=3 organisms. Probable collection contamination.    Urine Microscopic-Add On (NC) (07.29.24 @ 04:31)   Bacteria: Many /HPF  Comment - Urine: few amorphous urates  Squamous Epithelial Cells: Present  Red Blood Cell - Urine: 4 /HPF  White Blood Cell - Urine: 8 /HPF    Culture - Blood (07.29.24 @ 01:10)   Specimen Source: .Blood Blood-Peripheral  Culture Results: No growth at 24 hours    Culture - Blood (07.29.24 @ 01:05)   - Streptococcus sp. (Not Grp A, B or S pneumoniae): Detec  Gram Stain:   Growth in anaerobic bottle: Gram positive cocci in pairs   Growth in aerobic bottle: Gram positive cocci in pairs  Specimen Source: .Blood Blood-Peripheral  Organism: Blood Culture PCR  Culture Results:   Growth in anaerobic bottle: Gram positive cocci in pairs   Growth in aerobic bottle: Gram positive cocci in pairs   Direct identification is available within approximately 3-5   hours either by Blood Panel Multiplexed PCR or Direct   MALDI-TOF. Details: [email or web address]  Organism Identification: Blood Culture PCR  Method Type: PCR

## 2024-08-01 NOTE — CONSULT NOTE ADULT - SUBJECTIVE AND OBJECTIVE BOX
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  Daughter at bedside states that her mother starting having a productive cough with sputum starting yesterday and today she had one episode of NBNB vomiting earlier with SOB today so she brought her mother into the ED. Pt seen at bedside, AAOx1 (to self only), complaining of diffuse abdominal pain, headache and mild chest pain. She denies any fever, chills, shortness of breath, dysuria, numbness/tingling/weakness in extremities.     In ED   T 98.6  HR 82, /72  RR 22, O2 98% on BiPAP  (29 Jul 2024 09:29)      GI HPI:   CTAP 7/29-  No pulmonary embolus or acute finding in the chest. No acute finding in the abdomen or pelvis. Mild compression fracture of the L1 superior endplate, new compared to prior but chronic-appearing.    Pt seen and examined at bedside.  Marky # [id number] utilized. Airborne/ contact precautions maintained + COVID 7/29. Pt awake, confused. oriented x 0. Does not follow commands. NAD.      PMH/PSH:  PAST MEDICAL & SURGICAL HISTORY:  HTN (hypertension)      DM (diabetes mellitus)      Hypercholesteremia      Gastric adenocarcinoma  s/p resection      Vertigo      Dementia      Ambulatory dysfunction      Multiple myeloma      Pulmonary embolism      S/P hysterectomy      S/P tubal ligation        FH:  FAMILY HISTORY:  Family history of diabetes mellitus (Sibling)    Family history of early CAD (Grandparent)        Social History: Denies hx smoking, ETOH, illicit drug use        MEDS:  MEDICATIONS  (STANDING):  apixaban 2.5 milliGRAM(s) Oral every 12 hours  ascorbic acid 500 milliGRAM(s) Oral daily  cefTRIAXone   IVPB 2000 milliGRAM(s) IV Intermittent every 24 hours  cholecalciferol 1000 Unit(s) Oral daily  dexAMETHasone  Injectable 6 milliGRAM(s) IV Push daily  insulin lispro (ADMELOG) corrective regimen sliding scale   SubCutaneous at bedtime  insulin lispro (ADMELOG) corrective regimen sliding scale   SubCutaneous three times a day before meals  lactated ringers. 1000 milliLiter(s) (50 mL/Hr) IV Continuous <Continuous>  losartan 25 milliGRAM(s) Oral daily  metoprolol succinate ER 25 milliGRAM(s) Oral daily  montelukast 10 milliGRAM(s) Oral at bedtime  pantoprazole    Tablet 40 milliGRAM(s) Oral before breakfast  remdesivir  IVPB   IV Intermittent   remdesivir  IVPB 100 milliGRAM(s) IV Intermittent every 24 hours  simvastatin 10 milliGRAM(s) Oral at bedtime  zinc sulfate 220 milliGRAM(s) Oral daily    MEDICATIONS  (PRN):  acetaminophen     Tablet .. 650 milliGRAM(s) Oral every 6 hours PRN Temp greater or equal to 38C (100.4F), Mild Pain (1 - 3)    Allergies    gabapentin (Unknown)    ROS- unable to perform     ______________________________________________________________________  PHYSICAL EXAM:  T(C): 36.6 (07-31-24 @ 21:26), Max: 36.6 (07-31-24 @ 21:26)  HR: 65 (07-31-24 @ 21:26)  BP: 174/78 (07-31-24 @ 21:26)  RR: 18 (07-31-24 @ 21:26)  SpO2: 97% (07-31-24 @ 21:26)  Wt(kg): --    07-31 - 08-01  --------------------------------------------------------  IN:  Total IN: 0 mL    OUT:    Stool (mL): 1 mL  Total OUT: 1 mL    Total NET: -1 mL    PHYSICAL EXAM:  GENERAL: Awake, Oriented X0, Does not follow commands, NAD.    RESPIRATORY: Respirations even and unlabored. Diminshed to auscultation bilaterally   CARDIOVASCULAR: Normal S1/S2, regular rate and rhythm; No murmurs, rubs, or gallops.    GASTROINTESTINAL:  Soft, Nontender, Nondistended; Bowel sounds present  PERIPHERAL VASCULAR:  Extremities warm without edema. 2+ Peripheral Pulses, No cyanosis   ______________________________________________________________________  LABS:                        10.4   4.62  )-----------( 148      ( 01 Aug 2024 06:57 )             31.5     08-01    141  |  111<H>  |  36<H>  ----------------------------<  146<H>  3.6   |  20<L>  |  0.88    Ca    8.1<L>      01 Aug 2024 06:57          ____________________________________________  IMAGING:   < from: CT Abdomen and Pelvis w/ IV Cont (07.29.24 @ 05:21) >    ACC: [id number] EXAM:  CT ANGIO CHEST PULM [facility]   ORDERED BY: JAYCEE CARMEN     ACC: [id number] EXAM:  CT ABDOMEN AND PELVIS IC   ORDERED BY: JAYCEE CARMEN     PROCEDURE DATE:  07/29/2024          INTERPRETATION:  CLINICAL INFORMATION: 89[facility] HTN, HLD, DM,   Osteoporosis, Stage II B Gastric Adenocarcinoma s/p distal gastrectomy in   2015 on active chemo (follows [facility] Dr Adams), Early Multiple   myeloma, and recently diagnosed with b/l PE (on Eliquis) presenting with   SOB, cough and vomiting.    COMPARISON: CTA chest 3/28/2023. CT abdomen pelvis 5/13/2022.    CONTRAST/COMPLICATIONS:  IV Contrast: Omnipaque 350  80 cc administered   20 cc discarded  Oral Contrast: NONE  Complications: None reported at time of study completion    PROCEDURE:  CT Angiography of the Chest was performed followed by portal venous phase   imaging of the Abdomen and Pelvis.  Sagittal and coronal reformats were performed as well as 3D (MIP)   reconstructions.    FINDINGS:  CHEST:  LUNGS AND LARGE AIRWAYS: Patent central airways. Mild subpleural   interstitial prominence particularly in the upper lungs. No pneumonia or   edema or acute abnormality.  PLEURA: No pleural effusion.  VESSELS: No thoracic aortic dissection or aneurysm. Coronary artery  atherosclerosis. No pulmonary embolus.  HEART: Heart size is normal. No pericardial effusion.  MEDIASTINUM AND MARGARET: No lymphadenopathy.  CHEST WALL AND LOWER NECK: Multinodular thyroid.    ABDOMEN AND PELVIS:  LIVER: Within normal limits.  BILE DUCTS: Normal caliber.  GALLBLADDER: Cholecystectomy.  SPLEEN: Within normal limits.  PANCREAS: Within normal limits.  ADRENALS: Within normal limits.  KIDNEYS/URETERS: No hydronephrosis or renal stone or concerning mass.   Mild cortical volume loss bilaterally.    BLADDER: Within normal limits.  REPRODUCTIVE ORGANS: Status post hysterectomy.    BOWEL: No bowel obstruction. Status post appendectomy. Status post distal   gastric resection with gastrojejunostomy.  PERITONEUM/RETROPERITONEUM: Within normal limits.  VESSELS: Atherosclerotic changes. No abdominal aortic aneurysm. Patent   portal, splenic, and mesenteric veins.  LYMPH NODES: No lymphadenopathy.  ABDOMINAL WALL: Within normal limits.  BONES: Bony demineralization. No acute fracture. Mild compression   fracture of the L1 superior endplate, new compared to prior but   chronic-appearing. Transitional lumbosacral anatomy with   pseudoarticulation between the transverse processes of S1 and S2.    IMPRESSION:  No pulmonary embolus or acute finding in the chest.    No acute finding in the abdomen or pelvis.    Mild compression fracture of the L1 superior endplate, new compared to   prior but chronic-appearing.        --- End of Report ---             KARIN MULLINS MD; Attending Radiologist  This document has been electronically signed. Jul 29 2024  5:49AM    < end of copied text >      This note and recommendations herein are preliminary until cosigned by attending.

## 2024-08-02 DIAGNOSIS — C16.9 MALIGNANT NEOPLASM OF STOMACH, UNSPECIFIED: ICD-10-CM

## 2024-08-02 DIAGNOSIS — A49.1 STREPTOCOCCAL INFECTION, UNSPECIFIED SITE: ICD-10-CM

## 2024-08-02 DIAGNOSIS — Z75.8 OTHER PROBLEMS RELATED TO MEDICAL FACILITIES AND OTHER HEALTH CARE: ICD-10-CM

## 2024-08-02 LAB
ALBUMIN SERPL ELPH-MCNC: 2.4 G/DL — LOW (ref 3.5–5)
ALP SERPL-CCNC: 41 U/L — SIGNIFICANT CHANGE UP (ref 40–120)
ALT FLD-CCNC: 22 U/L DA — SIGNIFICANT CHANGE UP (ref 10–60)
ANION GAP SERPL CALC-SCNC: 11 MMOL/L — SIGNIFICANT CHANGE UP (ref 5–17)
AST SERPL-CCNC: 16 U/L — SIGNIFICANT CHANGE UP (ref 10–40)
BILIRUB SERPL-MCNC: 0.3 MG/DL — SIGNIFICANT CHANGE UP (ref 0.2–1.2)
BUN SERPL-MCNC: 40 MG/DL — HIGH (ref 7–18)
CALCIUM SERPL-MCNC: 8.1 MG/DL — LOW (ref 8.4–10.5)
CHLORIDE SERPL-SCNC: 113 MMOL/L — HIGH (ref 96–108)
CO2 SERPL-SCNC: 17 MMOL/L — LOW (ref 22–31)
CREAT SERPL-MCNC: 0.85 MG/DL — SIGNIFICANT CHANGE UP (ref 0.5–1.3)
EGFR: 66 ML/MIN/1.73M2 — SIGNIFICANT CHANGE UP
GLUCOSE BLDC GLUCOMTR-MCNC: 187 MG/DL — HIGH (ref 70–99)
GLUCOSE BLDC GLUCOMTR-MCNC: 215 MG/DL — HIGH (ref 70–99)
GLUCOSE BLDC GLUCOMTR-MCNC: 228 MG/DL — HIGH (ref 70–99)
GLUCOSE BLDC GLUCOMTR-MCNC: 329 MG/DL — HIGH (ref 70–99)
GLUCOSE SERPL-MCNC: 143 MG/DL — HIGH (ref 70–99)
HCT VFR BLD CALC: 32.1 % — LOW (ref 34.5–45)
HGB BLD-MCNC: 10.9 G/DL — LOW (ref 11.5–15.5)
MCHC RBC-ENTMCNC: 33.9 PG — SIGNIFICANT CHANGE UP (ref 27–34)
MCHC RBC-ENTMCNC: 34 GM/DL — SIGNIFICANT CHANGE UP (ref 32–36)
MCV RBC AUTO: 99.7 FL — SIGNIFICANT CHANGE UP (ref 80–100)
NRBC # BLD: 1 /100 WBCS — HIGH (ref 0–0)
PLATELET # BLD AUTO: 177 K/UL — SIGNIFICANT CHANGE UP (ref 150–400)
POTASSIUM SERPL-MCNC: 3.3 MMOL/L — LOW (ref 3.5–5.3)
POTASSIUM SERPL-SCNC: 3.3 MMOL/L — LOW (ref 3.5–5.3)
PROT SERPL-MCNC: 5.1 G/DL — LOW (ref 6–8.3)
RBC # BLD: 3.22 M/UL — LOW (ref 3.8–5.2)
RBC # FLD: 14.9 % — HIGH (ref 10.3–14.5)
SODIUM SERPL-SCNC: 141 MMOL/L — SIGNIFICANT CHANGE UP (ref 135–145)
WBC # BLD: 4.46 K/UL — SIGNIFICANT CHANGE UP (ref 3.8–10.5)
WBC # FLD AUTO: 4.46 K/UL — SIGNIFICANT CHANGE UP (ref 3.8–10.5)

## 2024-08-02 RX ORDER — POTASSIUM CHLORIDE 1500 MG/1
40 TABLET, EXTENDED RELEASE ORAL ONCE
Refills: 0 | Status: COMPLETED | OUTPATIENT
Start: 2024-08-02 | End: 2024-08-02

## 2024-08-02 RX ADMIN — Medication 500 MILLIGRAM(S): at 12:33

## 2024-08-02 RX ADMIN — Medication 100 MILLIGRAM(S): at 13:11

## 2024-08-02 RX ADMIN — APIXABAN 2.5 MILLIGRAM(S): 5 TABLET, FILM COATED ORAL at 06:17

## 2024-08-02 RX ADMIN — Medication 10 MILLIGRAM(S): at 22:12

## 2024-08-02 RX ADMIN — DEXAMETHASONE 6 MILLIGRAM(S): 1.5 TABLET ORAL at 06:18

## 2024-08-02 RX ADMIN — Medication 4: at 12:34

## 2024-08-02 RX ADMIN — Medication 220 MILLIGRAM(S): at 12:33

## 2024-08-02 RX ADMIN — REMDESIVIR 200 MILLIGRAM(S): 100 INJECTION, POWDER, LYOPHILIZED, FOR SOLUTION INTRAVENOUS at 14:50

## 2024-08-02 RX ADMIN — LOSARTAN POTASSIUM 25 MILLIGRAM(S): 50 TABLET, FILM COATED ORAL at 06:17

## 2024-08-02 RX ADMIN — APIXABAN 2.5 MILLIGRAM(S): 5 TABLET, FILM COATED ORAL at 17:10

## 2024-08-02 RX ADMIN — PANTOPRAZOLE SODIUM 40 MILLIGRAM(S): 20 TABLET, DELAYED RELEASE ORAL at 06:17

## 2024-08-02 RX ADMIN — Medication 1: at 08:31

## 2024-08-02 RX ADMIN — Medication 2: at 17:08

## 2024-08-02 RX ADMIN — POTASSIUM CHLORIDE 40 MILLIEQUIVALENT(S): 1500 TABLET, EXTENDED RELEASE ORAL at 08:45

## 2024-08-02 RX ADMIN — Medication 25 MILLIGRAM(S): at 06:17

## 2024-08-02 RX ADMIN — Medication 1000 UNIT(S): at 12:33

## 2024-08-02 NOTE — PROGRESS NOTE ADULT - SUBJECTIVE AND OBJECTIVE BOX
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 ICU was initially consulted for AHRF requiring BiPAP but patient transitioned off to RA. COVID +, UA+ ,Admitted for AHRF 2/2 COVID and UTI.   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 GI consulted  for colonoscopy.  On IV ceftriaxone. ID following     MEDICATIONS  (STANDING):  apixaban 2.5 milliGRAM(s) Oral every 12 hours  ascorbic acid 500 milliGRAM(s) Oral daily  cefTRIAXone   IVPB 2000 milliGRAM(s) IV Intermittent every 24 hours  cholecalciferol 1000 Unit(s) Oral daily  dexAMETHasone  Injectable 6 milliGRAM(s) IV Push daily  insulin lispro (ADMELOG) corrective regimen sliding scale   SubCutaneous at bedtime  insulin lispro (ADMELOG) corrective regimen sliding scale   SubCutaneous three times a day before meals  lactated ringers. 1000 milliLiter(s) (50 mL/Hr) IV Continuous <Continuous>  losartan 25 milliGRAM(s) Oral daily  metoprolol succinate ER 25 milliGRAM(s) Oral daily  montelukast 10 milliGRAM(s) Oral at bedtime  pantoprazole    Tablet 40 milliGRAM(s) Oral before breakfast  remdesivir  IVPB   IV Intermittent   remdesivir  IVPB 100 milliGRAM(s) IV Intermittent every 24 hours  simvastatin 10 milliGRAM(s) Oral at bedtime  zinc sulfate 220 milliGRAM(s) Oral daily    MEDICATIONS  (PRN):  acetaminophen     Tablet .. 650 milliGRAM(s) Oral every 6 hours PRN Temp greater or equal to 38C (100.4F), Mild Pain (1 - 3)      __________________________________________________  REVIEW OF SYSTEMS:    CONSTITUTIONAL: No fever,   EYES: no acute visual disturbances  NECK: No pain or stiffness  RESPIRATORY: No cough; No shortness of breath  CARDIOVASCULAR: No chest pain, no palpitations  GASTROINTESTINAL: No pain. No nausea or vomiting; No diarrhea   NEUROLOGICAL: No headache or numbness, no tremors  MUSCULOSKELETAL: No joint pain, no muscle pain  GENITOURINARY: no dysuria, no frequency, no hesitancy  PSYCHIATRY: no depression , no anxiety  ALL OTHER  ROS negative        Vital Signs Last 24 Hrs  T(C): 36.6 (02 Aug 2024 13:22), Max: 36.9 (02 Aug 2024 05:12)  T(F): 97.8 (02 Aug 2024 13:22), Max: 98.5 (02 Aug 2024 05:12)  HR: 79 (02 Aug 2024 13:22) (78 - 81)  BP: 148/73 (02 Aug 2024 13:22) (148/73 - 164/53)  BP(mean): 92 (01 Aug 2024 20:58) (92 - 92)  RR: 16 (02 Aug 2024 13:22) (16 - 17)  SpO2: 98% (02 Aug 2024 13:22) (97% - 98%)    Parameters below as of 02 Aug 2024 13:22  Patient On (Oxygen Delivery Method): room air        ________________________________________________  PHYSICAL EXAM:  GENERAL: NAD  HEENT: Normocephalic;  conjunctivae and sclerae clear; moist mucous membranes;   NECK : supple  CHEST/LUNG: Clear to auscultation bilaterally with good air entry   HEART: S1 S2  regular; no murmurs, gallops or rubs  ABDOMEN: Soft, Nontender, Nondistended; Bowel sounds present  EXTREMITIES: no cyanosis; no edema; no calf tenderness  SKIN: warm and dry; no rash  NERVOUS SYSTEM:  Awake and alert; Oriented  to place, person and time ; no new deficits    _________________________________________________  LABS:                        10.9   4.46  )-----------( 177      ( 02 Aug 2024 07:27 )             32.1     08-02    141  |  113<H>  |  40<H>  ----------------------------<  143<H>  3.3<L>   |  17<L>  |  0.85    Ca    8.1<L>      02 Aug 2024 07:27    TPro  5.1<L>  /  Alb  2.4<L>  /  TBili  0.3  /  DBili  x   /  AST  16  /  ALT  22  /  AlkPhos  41  08-02      Urinalysis Basic - ( 02 Aug 2024 07:27 )    Color: x / Appearance: x / SG: x / pH: x  Gluc: 143 mg/dL / Ketone: x  / Bili: x / Urobili: x   Blood: x / Protein: x / Nitrite: x   Leuk Esterase: x / RBC: x / WBC x   Sq Epi: x / Non Sq Epi: x / Bacteria: x      CAPILLARY BLOOD GLUCOSE      POCT Blood Glucose.: 329 mg/dL (02 Aug 2024 12:13)  POCT Blood Glucose.: 187 mg/dL (02 Aug 2024 08:00)  POCT Blood Glucose.: 184 mg/dL (01 Aug 2024 21:46)  POCT Blood Glucose.: 252 mg/dL (01 Aug 2024 16:55)        RADIOLOGY & ADDITIONAL TESTS:    Imaging Personally Reviewed:  YES/NO    Consultant(s) Notes Reviewed:   YES/ No    Care Discussed with Consultants :     Plan of care was discussed with patient and /or primary care giver; all questions and concerns were addressed and care was aligned with patient's wishes.     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 ICU was initially consulted for AHRF requiring BiPAP but patient transitioned off to RA. Admitted for AHRF 2/2 COVID and UTI.   Pt found to have strept gallolyticus bacteremia likely due to UTI VS GI source (colorectal cancer). GI consulted for possible colonoscopy. GI recs No apparent active GI issues at this time. Recommend outpatient GI f/u. Started on IV ceftriaxone.    Repeat Bcx on 7/31 NGTD. ID recs TTE. F/U TTE, continue IV abx for now    MEDICATIONS  (STANDING):  apixaban 2.5 milliGRAM(s) Oral every 12 hours  ascorbic acid 500 milliGRAM(s) Oral daily  cefTRIAXone   IVPB 2000 milliGRAM(s) IV Intermittent every 24 hours  cholecalciferol 1000 Unit(s) Oral daily  dexAMETHasone  Injectable 6 milliGRAM(s) IV Push daily  insulin lispro (ADMELOG) corrective regimen sliding scale   SubCutaneous at bedtime  insulin lispro (ADMELOG) corrective regimen sliding scale   SubCutaneous three times a day before meals  lactated ringers. 1000 milliLiter(s) (50 mL/Hr) IV Continuous <Continuous>  losartan 25 milliGRAM(s) Oral daily  metoprolol succinate ER 25 milliGRAM(s) Oral daily  montelukast 10 milliGRAM(s) Oral at bedtime  pantoprazole    Tablet 40 milliGRAM(s) Oral before breakfast  remdesivir  IVPB   IV Intermittent   remdesivir  IVPB 100 milliGRAM(s) IV Intermittent every 24 hours  simvastatin 10 milliGRAM(s) Oral at bedtime  zinc sulfate 220 milliGRAM(s) Oral daily    MEDICATIONS  (PRN):  acetaminophen     Tablet .. 650 milliGRAM(s) Oral every 6 hours PRN Temp greater or equal to 38C (100.4F), Mild Pain (1 - 3)      __________________________________________________  REVIEW OF SYSTEMS:    CONSTITUTIONAL: No fever,   EYES: no acute visual disturbances  NECK: No pain or stiffness  RESPIRATORY: No cough; No shortness of breath  CARDIOVASCULAR: No chest pain, no palpitations  GASTROINTESTINAL: No pain. No nausea or vomiting; No diarrhea   NEUROLOGICAL: No headache or numbness, no tremors  MUSCULOSKELETAL: No joint pain, no muscle pain  GENITOURINARY: no dysuria, no frequency, no hesitancy  ALL OTHER  ROS negative        Vital Signs Last 24 Hrs  T(C): 36.6 (02 Aug 2024 13:22), Max: 36.9 (02 Aug 2024 05:12)  T(F): 97.8 (02 Aug 2024 13:22), Max: 98.5 (02 Aug 2024 05:12)  HR: 79 (02 Aug 2024 13:22) (78 - 81)  BP: 148/73 (02 Aug 2024 13:22) (148/73 - 164/53)  BP(mean): 92 (01 Aug 2024 20:58) (92 - 92)  RR: 16 (02 Aug 2024 13:22) (16 - 17)  SpO2: 98% (02 Aug 2024 13:22) (97% - 98%)    Parameters below as of 02 Aug 2024 13:22  Patient On (Oxygen Delivery Method): room air        ________________________________________________  PHYSICAL EXAM:  GENERAL: NAD  HEENT: Normocephalic; moist mucous membranes;   NECK : supple  CHEST/LUNG: Clear to auscultation bilaterally with good air entry   HEART: S1 S2  regular;  ABDOMEN: Soft, Nontender, Nondistended; Bowel sounds present  EXTREMITIES: no cyanosis; no edema; no calf tenderness  SKIN: warm and dry; no rash  NERVOUS SYSTEM:  Awake and alert; Oriented to place, person and time    _________________________________________________  LABS:                        10.9   4.46  )-----------( 177      ( 02 Aug 2024 07:27 )             32.1     08-02    141  |  113<H>  |  40<H>  ----------------------------<  143<H>  3.3<L>   |  17<L>  |  0.85    Ca    8.1<L>      02 Aug 2024 07:27    TPro  5.1<L>  /  Alb  2.4<L>  /  TBili  0.3  /  DBili  x   /  AST  16  /  ALT  22  /  AlkPhos  41  08-02      Urinalysis Basic - ( 02 Aug 2024 07:27 )    Color: x / Appearance: x / SG: x / pH: x  Gluc: 143 mg/dL / Ketone: x  / Bili: x / Urobili: x   Blood: x / Protein: x / Nitrite: x   Leuk Esterase: x / RBC: x / WBC x   Sq Epi: x / Non Sq Epi: x / Bacteria: x      CAPILLARY BLOOD GLUCOSE      POCT Blood Glucose.: 329 mg/dL (02 Aug 2024 12:13)  POCT Blood Glucose.: 187 mg/dL (02 Aug 2024 08:00)  POCT Blood Glucose.: 184 mg/dL (01 Aug 2024 21:46)  POCT Blood Glucose.: 252 mg/dL (01 Aug 2024 16:55)        RADIOLOGY & ADDITIONAL TESTS:  < from: CT Head No Cont (07.29.24 @ 05:21) >    ACC: [id number] EXAM:  CT BRAIN   ORDERED BY: JAYCEE CARMEN     PROCEDURE DATE:  07/29/2024          INTERPRETATION:  CLINICAL INFORMATION: AMS    COMPARISON: CT head 10/24/2023    CONTRAST:  IV Contrast: NONE  Complications: None reported at time of study completion    TECHNIQUE:  Serial axial images were obtained from the skull base to the   vertex using multi-slice helical technique. Sagittal and coronal   reformats were obtained.    FINDINGS:    VENTRICLES AND SULCI: Age appropriate involutional changes.  INTRA-AXIAL: No mass effect, acute hemorrhage, or midline shift.  There   are periventricular and subcortical white matter hypodensities,   consistent with microvascular type changes.  EXTRA-AXIAL: No mass or fluid collection. Basal cisterns are normal in   appearance.    VISUALIZED SINUSES:  Scattered mucosal thickening.  TYMPANOMASTOID CAVITIES:  Clear.  VISUALIZED ORBITS: Bilateral lens replacement.  CALVARIUM: Intact.    MISCELLANEOUS: Atherosclerotic calcifications of the intracranial   vasculature.      IMPRESSION:  No acute intracranial hemorrhage, mass effect, or midline shift.        --- End of Report ---            FRANSISCO EASON MD; Attending Radiologist  This document has been electronically signed. Jul 29 2024  5:25AM    < end of copied text >    Imaging Personally Reviewed:  YES/NO    Consultant(s) Notes Reviewed:   YES/ No    Care Discussed with Consultants :     Plan of care was discussed with patient and /or primary care giver; all questions and concerns were addressed and care was aligned with patient's wishes.

## 2024-08-02 NOTE — PROGRESS NOTE ADULT - PROBLEM SELECTOR PLAN 2
- blood Cx + for strept gallolyticus (1 bottle)  - cont ceftriaxone 2mg  - repeat bcx on 7/31 NGTD  - F/U TTE  - can also be associated with colorectal cancer -- GI followed  - GI recs -- No apparent active GI issues at this time. Pt eating well without further n/v  - Recommend outpatient GI f/u.

## 2024-08-02 NOTE — PROGRESS NOTE ADULT - SUBJECTIVE AND OBJECTIVE BOX
Patient is a 89y old  Female who presents with a chief complaint of UTI and COVID (02 Aug 2024 06:40)  Asleep, laying in bed in NAD. Still isolated due to Covid    INTERVAL HPI/OVERNIGHT EVENTS:      VITAL SIGNS:  T(F): 98.5 (08-02-24 @ 05:12)  HR: 78 (08-02-24 @ 05:12)  BP: 164/53 (08-02-24 @ 05:12)  RR: 17 (08-02-24 @ 05:12)  SpO2: 97% (08-02-24 @ 05:12)  Wt(kg): --  I&O's Detail          REVIEW OF SYSTEMS:    CONSTITUTIONAL:  No fevers, chills, sweats    HEENT:  Eyes:  No diplopia or blurred vision. ENT:  No earache, sore throat or runny nose.    CARDIOVASCULAR:  No pressure, squeezing, tightness, or heaviness about the chest; no palpitations.    RESPIRATORY:  Per HPI    GASTROINTESTINAL:  No abdominal pain, nausea, vomiting or diarrhea.    GENITOURINARY:  No dysuria, frequency or urgency.    NEUROLOGIC:  No paresthesias, fasciculations, seizures or weakness.    PSYCHIATRIC:  No disorder of thought or mood.      PHYSICAL EXAM:    Constitutional: Well developed and nourished  Eyes:Perrla  ENMT: normal  Neck:supple  Respiratory: good air entry  Cardiovascular: S1 S2 regular  Gastrointestinal: Soft, Non tender  Extremities: No edema  Vascular:normal  Neurological:Asleep  Musculoskeletal:Normal      MEDICATIONS  (STANDING):  apixaban 2.5 milliGRAM(s) Oral every 12 hours  ascorbic acid 500 milliGRAM(s) Oral daily  cefTRIAXone   IVPB 2000 milliGRAM(s) IV Intermittent every 24 hours  cholecalciferol 1000 Unit(s) Oral daily  dexAMETHasone  Injectable 6 milliGRAM(s) IV Push daily  insulin lispro (ADMELOG) corrective regimen sliding scale   SubCutaneous at bedtime  insulin lispro (ADMELOG) corrective regimen sliding scale   SubCutaneous three times a day before meals  lactated ringers. 1000 milliLiter(s) (50 mL/Hr) IV Continuous <Continuous>  losartan 25 milliGRAM(s) Oral daily  metoprolol succinate ER 25 milliGRAM(s) Oral daily  montelukast 10 milliGRAM(s) Oral at bedtime  pantoprazole    Tablet 40 milliGRAM(s) Oral before breakfast  remdesivir  IVPB 100 milliGRAM(s) IV Intermittent every 24 hours  remdesivir  IVPB   IV Intermittent   simvastatin 10 milliGRAM(s) Oral at bedtime  zinc sulfate 220 milliGRAM(s) Oral daily    MEDICATIONS  (PRN):  acetaminophen     Tablet .. 650 milliGRAM(s) Oral every 6 hours PRN Temp greater or equal to 38C (100.4F), Mild Pain (1 - 3)      Allergies    gabapentin (Unknown)    Intolerances        LABS:                        10.9   4.46  )-----------( 177      ( 02 Aug 2024 07:27 )             32.1     08-02    141  |  113<H>  |  40<H>  ----------------------------<  143<H>  3.3<L>   |  17<L>  |  0.85    Ca    8.1<L>      02 Aug 2024 07:27    TPro  5.1<L>  /  Alb  2.4<L>  /  TBili  0.3  /  DBili  x   /  AST  16  /  ALT  22  /  AlkPhos  41  08-02      Urinalysis Basic - ( 02 Aug 2024 07:27 )    Color: x / Appearance: x / SG: x / pH: x  Gluc: 143 mg/dL / Ketone: x  / Bili: x / Urobili: x   Blood: x / Protein: x / Nitrite: x   Leuk Esterase: x / RBC: x / WBC x   Sq Epi: x / Non Sq Epi: x / Bacteria: x            CAPILLARY BLOOD GLUCOSE      POCT Blood Glucose.: 187 mg/dL (02 Aug 2024 08:00)  POCT Blood Glucose.: 184 mg/dL (01 Aug 2024 21:46)  POCT Blood Glucose.: 252 mg/dL (01 Aug 2024 16:55)  POCT Blood Glucose.: 319 mg/dL (01 Aug 2024 12:25)      COVID-19 PCR (07.29.24 @ 01:10)   COVID-19 PCR: Detected: EUA/IVD   RADIOLOGY & ADDITIONAL TESTS:    CXR:    Ct scan chest:    ekg;    echo:

## 2024-08-02 NOTE — PROGRESS NOTE ADULT - SUBJECTIVE AND OBJECTIVE BOX
Patient is a 89y old  Female who presents with a chief complaint of UTI and COVID (01 Aug 2024 13:57)    pt seen in icu [  ], reg med floor [  x ], bed [ x ], chair at bedside [   ], awake and responsive [ x ], lethargic [  ],    nad [ x ]        Allergies    gabapentin (Unknown)        Vitals    T(F): 98.5 (08-02-24 @ 05:12), Max: 98.5 (08-02-24 @ 05:12)  HR: 78 (08-02-24 @ 05:12) (75 - 81)  BP: 164/53 (08-02-24 @ 05:12) (142/76 - 167/81)  RR: 17 (08-02-24 @ 05:12) (16 - 17)  SpO2: 97% (08-02-24 @ 05:12) (97% - 98%)  Wt(kg): --  CAPILLARY BLOOD GLUCOSE      POCT Blood Glucose.: 184 mg/dL (01 Aug 2024 21:46)      Labs                          10.4   4.62  )-----------( 148      ( 01 Aug 2024 06:57 )             31.5       08-01    141  |  111<H>  |  36<H>  ----------------------------<  146<H>  3.6   |  20<L>  |  0.88    Ca    8.1<L>      01 Aug 2024 06:57              .Blood Blood-Peripheral  07-31 @ 06:30   No growth at 24 hours  --  --      .Blood Blood-Peripheral  07-31 @ 06:00   No growth at 24 hours  --  --      Clean Catch  07-29 @ 04:31   >=3 organisms. Probable collection contamination.  --  --      .Blood Blood-Peripheral  07-29 @ 01:10   No growth at 72 Hours  --  --      .Blood Blood-Peripheral  07-29 @ 01:05   Growth in anaerobic bottle: Streptococcus gallolyticus  Growth in aerobic bottle: Streptococcus salivarius/vestibularis group  "Susceptibilities not performed"  Direct identification is available within approximately 3-5  hours either by Blood Panel Multiplexed PCR or Direct  MALDI-TOF. Details: [email or web address]  --  Blood Culture PCR  Streptococcus gallolyticus          Radiology Results      Meds    MEDICATIONS  (STANDING):  apixaban 2.5 milliGRAM(s) Oral every 12 hours  ascorbic acid 500 milliGRAM(s) Oral daily  cefTRIAXone   IVPB 2000 milliGRAM(s) IV Intermittent every 24 hours  cholecalciferol 1000 Unit(s) Oral daily  dexAMETHasone  Injectable 6 milliGRAM(s) IV Push daily  insulin lispro (ADMELOG) corrective regimen sliding scale   SubCutaneous three times a day before meals  insulin lispro (ADMELOG) corrective regimen sliding scale   SubCutaneous at bedtime  lactated ringers. 1000 milliLiter(s) (50 mL/Hr) IV Continuous <Continuous>  losartan 25 milliGRAM(s) Oral daily  metoprolol succinate ER 25 milliGRAM(s) Oral daily  montelukast 10 milliGRAM(s) Oral at bedtime  pantoprazole    Tablet 40 milliGRAM(s) Oral before breakfast  remdesivir  IVPB   IV Intermittent   remdesivir  IVPB 100 milliGRAM(s) IV Intermittent every 24 hours  simvastatin 10 milliGRAM(s) Oral at bedtime  zinc sulfate 220 milliGRAM(s) Oral daily      MEDICATIONS  (PRN):  acetaminophen     Tablet .. 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),   Multiple myeloma/ plasmacytoma  Pulmonary embolism  S/P hysterectomy  S/P tubal ligation        PLAN    cont decadron,   cont remdesivir until 8/2/24,   cont vit c, vitamin d, zinc, pepcid,   cont singulair,   contact and airborne isolation,   cont albuterol inhaler,   pulm f/u   robitussin prn   O2 sat 97% (97% - 97%) on n/c 2L  O2 via nasal canula as needed,   blood cx with Streptococcus gallolyticus noted above   ucx with contamination   f/u rept blood cx   id f/u   cont Ceftriaxone doses to 2 g daily until blood culture from 7/29 is finalized  Consider Colonoscopy if not done recently since  Streptococcus gallolyticus is associated with GI Malignancy   and also in the setting of H/O 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         Patient is a 89y old  Female who presents with a chief complaint of UTI and COVID (01 Aug 2024 13:57)    pt seen in icu [  ], reg med floor [  x ], bed [ x ], chair at bedside [   ], awake and responsive [ x ], lethargic [  ],    nad [ x ]        Allergies    gabapentin (Unknown)        Vitals    T(F): 98.5 (08-02-24 @ 05:12), Max: 98.5 (08-02-24 @ 05:12)  HR: 78 (08-02-24 @ 05:12) (75 - 81)  BP: 164/53 (08-02-24 @ 05:12) (142/76 - 167/81)  RR: 17 (08-02-24 @ 05:12) (16 - 17)  SpO2: 97% (08-02-24 @ 05:12) (97% - 98%)  Wt(kg): --  CAPILLARY BLOOD GLUCOSE      POCT Blood Glucose.: 184 mg/dL (01 Aug 2024 21:46)      Labs                          10.4   4.62  )-----------( 148      ( 01 Aug 2024 06:57 )             31.5       08-01    141  |  111<H>  |  36<H>  ----------------------------<  146<H>  3.6   |  20<L>  |  0.88    Ca    8.1<L>      01 Aug 2024 06:57              .Blood Blood-Peripheral  07-31 @ 06:30   No growth at 24 hours  --  --      .Blood Blood-Peripheral  07-31 @ 06:00   No growth at 24 hours  --  --      Clean Catch  07-29 @ 04:31   >=3 organisms. Probable collection contamination.  --  --      .Blood Blood-Peripheral  07-29 @ 01:10   No growth at 72 Hours  --  --      .Blood Blood-Peripheral  07-29 @ 01:05   Growth in anaerobic bottle: Streptococcus gallolyticus  Growth in aerobic bottle: Streptococcus salivarius/vestibularis group  "Susceptibilities not performed"  Direct identification is available within approximately 3-5  hours either by Blood Panel Multiplexed PCR or Direct  MALDI-TOF. Details: [email or web address]  --  Blood Culture PCR  Streptococcus gallolyticus          Radiology Results      Meds    MEDICATIONS  (STANDING):  apixaban 2.5 milliGRAM(s) Oral every 12 hours  ascorbic acid 500 milliGRAM(s) Oral daily  cefTRIAXone   IVPB 2000 milliGRAM(s) IV Intermittent every 24 hours  cholecalciferol 1000 Unit(s) Oral daily  dexAMETHasone  Injectable 6 milliGRAM(s) IV Push daily  insulin lispro (ADMELOG) corrective regimen sliding scale   SubCutaneous three times a day before meals  insulin lispro (ADMELOG) corrective regimen sliding scale   SubCutaneous at bedtime  lactated ringers. 1000 milliLiter(s) (50 mL/Hr) IV Continuous <Continuous>  losartan 25 milliGRAM(s) Oral daily  metoprolol succinate ER 25 milliGRAM(s) Oral daily  montelukast 10 milliGRAM(s) Oral at bedtime  pantoprazole    Tablet 40 milliGRAM(s) Oral before breakfast  remdesivir  IVPB   IV Intermittent   remdesivir  IVPB 100 milliGRAM(s) IV Intermittent every 24 hours  simvastatin 10 milliGRAM(s) Oral at bedtime  zinc sulfate 220 milliGRAM(s) Oral daily      MEDICATIONS  (PRN):  acetaminophen     Tablet .. 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),   Multiple myeloma/ plasmacytoma  Pulmonary embolism  S/P hysterectomy  S/P tubal ligation        PLAN    cont decadron,   pt to complete remdesivir today 8/2/24,   cont vit c, vitamin d, zinc, pepcid,   cont singulair,   contact and airborne isolation,   cont albuterol inhaler,   pulm f/u   robitussin prn   O2 sat 97% (97% - 98%) on ra  O2 via nasal canula as needed,   blood cx with Streptococcus gallolyticus noted above   ucx with contamination   rept blood cx neg noted above  id f/u   Consider Colonoscopy if not done recently since  Streptococcus gallolyticus is associated with GI Malignancy   and also in the setting of H/O Stage II B Gastric Adenocarcinoma s/p distal gastrectomy (2015)   abx recs as per id  TTE in the setting of Streptococcal Bacteremia   gi cons noted  Hx of gastric ca s/p resection.   No apparent active GI issues at this time.   Pt eating well without further n/v.   Recommend outpatient GI f/u.   Please reconsult inpatient GI PRN.  lispro ss,   hold outpt dm meds,  hgba1c 5.8 noted      heme onc cons  cont current meds

## 2024-08-02 NOTE — PROGRESS NOTE ADULT - ASSESSMENT
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 Admitted for AHRF 2/2 COVID, UTI and bacteremia

## 2024-08-02 NOTE — PROGRESS NOTE ADULT - ASSESSMENT
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 Patient is AAOx1 (to self only), complaining of diffuse abdominal pain, headache and mild chest pain. She has no fever or chills. On admission, she found to have no fever but positive COVID PCR and Blood culture positive for streptococcal species. She has started on Remdesivir, Dexamethasone and Ceftriaxone, and the ID consult requested to assist with further evaluation and antibiotic management.    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 TTE to rule out vegetation in the setting of Streptococcal Bacteremia  2. IF TTE  shows no vegetation then Need IV Ceftriaxone 2 g daily until 8/28/24, otherwise 6 weeks   3. Continue Dexamethasone to complete the course  4. Supportive care including AC  5. Supplemental oxygenation and Bronchodilator as needed  6. COVID precautions    d/w NP Director, Ramona    Attending Attestation:    Spent more than 35 minutes on total encounter, more than 50 % of the visit was spent counseling and/or coordinating care by the Attending physician.

## 2024-08-02 NOTE — PROGRESS NOTE ADULT - SUBJECTIVE AND OBJECTIVE BOX
Patient is seen and examined at the bed side, is afebrile. She is tolerating Abx well.       REVIEW OF SYSTEMS: All other review systems are negative      ALLERGIES: gabapentin (Unknown)      Vital Signs Last 24 Hrs  T(C): 36.6 (02 Aug 2024 13:22), Max: 36.9 (02 Aug 2024 05:12)  T(F): 97.8 (02 Aug 2024 13:22), Max: 98.5 (02 Aug 2024 05:12)  HR: 79 (02 Aug 2024 13:22) (78 - 81)  BP: 148/73 (02 Aug 2024 13:22) (148/73 - 164/53)  BP(mean): 92 (01 Aug 2024 20:58) (92 - 92)  RR: 16 (02 Aug 2024 13:22) (16 - 17)  SpO2: 98% (02 Aug 2024 13:22) (97% - 98%)    Parameters below as of 02 Aug 2024 13:22  Patient On (Oxygen Delivery Method): room air      PHYSICAL EXAM:  GENERAL: Not in distress, on oxygen via NC  CHEST/LUNG: Not using accessory muscles   EXTREMITIES: No pedal  edema  CNS: Awake and Alert      LABS:                        10.9   4.46  )-----------( 177      ( 02 Aug 2024 07:27 )             32.1                           10.4   4.62  )-----------( 148      ( 01 Aug 2024 06:57 )             31.5         08-02    141  |  113<H>  |  40<H>  ----------------------------<  143<H>  3.3<L>   |  17<L>  |  0.85    Ca    8.1<L>      02 Aug 2024 07:27    TPro  5.1<L>  /  Alb  2.4<L>  /  TBili  0.3  /  DBili  x   /  AST  16  /  ALT  22  /  AlkPhos  41  08-02 08-01    141  |  111<H>  |  36<H>  ----------------------------<  146<H>  3.6   |  20<L>  |  0.88    Ca    8.1<L>      01 Aug 2024 06:57    TPro  5.1<L>  /  Alb  2.4<L>  /  TBili  0.2  /  DBili  x   /  AST  16  /  ALT  19  /  AlkPhos  46  07-30    PT/INR - ( 29 Jul 2024 01:10 )   PT: 13.0 sec;   INR: 1.14 ratio      PTT - ( 29 Jul 2024 01:10 )  PTT:26.2 sec      CAPILLARY BLOOD GLUCOSE  POCT Blood Glucose.: 279 mg/dL (30 Jul 2024 10:54)  POCT Blood Glucose.: 183 mg/dL (30 Jul 2024 08:02)  POCT Blood Glucose.: 264 mg/dL (29 Jul 2024 21:37)  POCT Blood Glucose.: 180 mg/dL (29 Jul 2024 16:52)    ABG - ( 29 Jul 2024 03:06 )  pH, Arterial: 7.45  pH, Blood: x     /  pCO2: 23    /  pO2: 287   / HCO3: 16    / Base Excess: -6.0  /  SaO2: 96          MEDICATIONS  (STANDING):    apixaban 2.5 milliGRAM(s) Oral every 12 hours  ascorbic acid 500 milliGRAM(s) Oral daily  cefTRIAXone   IVPB 2000 milliGRAM(s) IV Intermittent every 24 hours  cholecalciferol 1000 Unit(s) Oral daily  dexAMETHasone  Injectable 6 milliGRAM(s) IV Push daily  insulin lispro (ADMELOG) corrective regimen sliding scale   SubCutaneous three times a day before meals  insulin lispro (ADMELOG) corrective regimen sliding scale   SubCutaneous at bedtime  lactated ringers. 1000 milliLiter(s) (50 mL/Hr) IV Continuous <Continuous>  losartan 25 milliGRAM(s) Oral daily  metoprolol succinate ER 25 milliGRAM(s) Oral daily  montelukast 10 milliGRAM(s) Oral at bedtime  pantoprazole    Tablet 40 milliGRAM(s) Oral before breakfast  simvastatin 10 milliGRAM(s) Oral at bedtime  zinc sulfate 220 milliGRAM(s) Oral daily      RADIOLOGY & ADDITIONAL TESTS:    7/29/24: CT Head No Cont (07.29.24 @ 05:21) No acute intracranial hemorrhage, mass effect, or midline shift.      7/29/24: CT Angio Chest PE Protocol w/ IV Cont (07.29.24 @ 05:21) No pulmonary embolus or acute finding in the chest.    No acute finding in the abdomen or pelvis. Mild compression fracture of the L1 superior endplate, new compared to prior but chronic-appearing.      7/29/24 : CT Abdomen and Pelvis w/ IV Cont (07.29.24 @ 05:21) No acute finding in the abdomen or pelvis.    Mild compression fracture of the L1 superior endplate, new compared to prior but chronic-appearing.      7/29/24 : Xray Chest 1 View- PORTABLE-Urgent (07.29.24 @ 02:10) No consolidation or infiltrate.  No pleural effusion.    Heart size cannot be accurately assessed in this projection.        MICROBIOLOGY DATA:    Culture - Blood in AM (07.31.24 @ 06:30)   Specimen Source: .Blood Blood-Peripheral  Culture Results: No growth at 48 hours    Culture - Blood in AM (07.31.24 @ 06:00)   Specimen Source: .Blood Blood-Peripheral  Culture Results: No growth at 48 hours    Culture - Blood (07.29.24 @ 01:05)   - Streptococcus sp. (Not Grp A, B or S pneumoniae): Detec  - Vancomycin: S 0.5  Gram Stain:   Growth in anaerobic bottle: Gram positive cocci in pairs   Growth in aerobic bottle: Gram positive cocci in pairs  - Ceftriaxone: S <=0.25  - Penicillin: S 0.06  Specimen Source: .Blood Blood-Peripheral  Organism: Streptococcus gallolyticus  Organism: Blood Culture PCR  Culture Results:   Growth in anaerobic bottle: Streptococcus gallolyticus   Growth in aerobic bottle: Gram positive cocci in pairs   Direct identification is available within approximately 3-5   hours either by Blood Panel Multiplexed PCR or Direct   MALDI-TOF. Details: [email or web address]  Organism Identification: Blood Culture PCR   Streptococcus gallolyticus    Culture - Urine (07.29.24 @ 04:31)   Specimen Source: Clean Catch  Culture Results:   >=3 organisms. Probable collection contamination.    Urine Microscopic-Add On (NC) (07.29.24 @ 04:31)   Bacteria: Many /HPF  Comment - Urine: few amorphous urates  Squamous Epithelial Cells: Present  Red Blood Cell - Urine: 4 /HPF  White Blood Cell - Urine: 8 /HPF    Culture - Blood (07.29.24 @ 01:10)   Specimen Source: .Blood Blood-Peripheral  Culture Results: No growth at 5 days    Culture - Blood (07.29.24 @ 01:05)   - Streptococcus sp. (Not Grp A, B or S pneumoniae): Detec  Gram Stain:   Growth in anaerobic bottle: Gram positive cocci in pairs   Growth in aerobic bottle: Gram positive cocci in pairs  Specimen Source: .Blood Blood-Peripheral  Organism: Blood Culture PCR  Culture Results:   Growth in anaerobic bottle: Gram positive cocci in pairs   Growth in aerobic bottle: Gram positive cocci in pairs   Direct identification is available within approximately 3-5   hours either by Blood Panel Multiplexed PCR or Direct   MALDI-TOF. Details: [email or web address]  Organism Identification: Blood Culture PCR  Method Type: PCR

## 2024-08-02 NOTE — PROGRESS NOTE ADULT - PROBLEM SELECTOR PLAN 2
isolation precautions  oxygen supp prn  monitor oxygen sat  monitor LDH, LFTs, CRP, D-Dimer, Ferritin and procalcitonin  IV Dexa  IV Remdesivir  Bronchodilators  Vit C, D and Zinc supp  ID follow up  Follow up Covid PCR

## 2024-08-03 LAB
ALBUMIN SERPL ELPH-MCNC: 2.7 G/DL — LOW (ref 3.5–5)
ALP SERPL-CCNC: 42 U/L — SIGNIFICANT CHANGE UP (ref 40–120)
ALT FLD-CCNC: 24 U/L DA — SIGNIFICANT CHANGE UP (ref 10–60)
ANION GAP SERPL CALC-SCNC: 9 MMOL/L — SIGNIFICANT CHANGE UP (ref 5–17)
AST SERPL-CCNC: 15 U/L — SIGNIFICANT CHANGE UP (ref 10–40)
BASOPHILS # BLD AUTO: 0 K/UL — SIGNIFICANT CHANGE UP (ref 0–0.2)
BASOPHILS NFR BLD AUTO: 0 % — SIGNIFICANT CHANGE UP (ref 0–2)
BILIRUB SERPL-MCNC: 0.4 MG/DL — SIGNIFICANT CHANGE UP (ref 0.2–1.2)
BUN SERPL-MCNC: 38 MG/DL — HIGH (ref 7–18)
CALCIUM SERPL-MCNC: 8.5 MG/DL — SIGNIFICANT CHANGE UP (ref 8.4–10.5)
CHLORIDE SERPL-SCNC: 112 MMOL/L — HIGH (ref 96–108)
CO2 SERPL-SCNC: 17 MMOL/L — LOW (ref 22–31)
CREAT SERPL-MCNC: 0.74 MG/DL — SIGNIFICANT CHANGE UP (ref 0.5–1.3)
CULTURE RESULTS: ABNORMAL
CULTURE RESULTS: SIGNIFICANT CHANGE UP
EGFR: 78 ML/MIN/1.73M2 — SIGNIFICANT CHANGE UP
EOSINOPHIL # BLD AUTO: 0 K/UL — SIGNIFICANT CHANGE UP (ref 0–0.5)
EOSINOPHIL NFR BLD AUTO: 0 % — SIGNIFICANT CHANGE UP (ref 0–6)
GLUCOSE BLDC GLUCOMTR-MCNC: 220 MG/DL — HIGH (ref 70–99)
GLUCOSE BLDC GLUCOMTR-MCNC: 220 MG/DL — HIGH (ref 70–99)
GLUCOSE BLDC GLUCOMTR-MCNC: 278 MG/DL — HIGH (ref 70–99)
GLUCOSE BLDC GLUCOMTR-MCNC: 347 MG/DL — HIGH (ref 70–99)
GLUCOSE SERPL-MCNC: 209 MG/DL — HIGH (ref 70–99)
HCT VFR BLD CALC: 32.7 % — LOW (ref 34.5–45)
HGB BLD-MCNC: 10.9 G/DL — LOW (ref 11.5–15.5)
IMM GRANULOCYTES NFR BLD AUTO: 0.5 % — SIGNIFICANT CHANGE UP (ref 0–0.9)
LYMPHOCYTES # BLD AUTO: 2.12 K/UL — SIGNIFICANT CHANGE UP (ref 1–3.3)
LYMPHOCYTES # BLD AUTO: 57.1 % — HIGH (ref 13–44)
MAGNESIUM SERPL-MCNC: 1.7 MG/DL — SIGNIFICANT CHANGE UP (ref 1.6–2.6)
MCHC RBC-ENTMCNC: 33.3 GM/DL — SIGNIFICANT CHANGE UP (ref 32–36)
MCHC RBC-ENTMCNC: 33.3 PG — SIGNIFICANT CHANGE UP (ref 27–34)
MCV RBC AUTO: 100 FL — SIGNIFICANT CHANGE UP (ref 80–100)
MONOCYTES # BLD AUTO: 0.33 K/UL — SIGNIFICANT CHANGE UP (ref 0–0.9)
MONOCYTES NFR BLD AUTO: 8.9 % — SIGNIFICANT CHANGE UP (ref 2–14)
NEUTROPHILS # BLD AUTO: 1.24 K/UL — LOW (ref 1.8–7.4)
NEUTROPHILS NFR BLD AUTO: 33.5 % — LOW (ref 43–77)
NRBC # BLD: 1 /100 WBCS — HIGH (ref 0–0)
ORGANISM # SPEC MICROSCOPIC CNT: ABNORMAL
PHOSPHATE SERPL-MCNC: 2.6 MG/DL — SIGNIFICANT CHANGE UP (ref 2.5–4.5)
PLATELET # BLD AUTO: 207 K/UL — SIGNIFICANT CHANGE UP (ref 150–400)
POTASSIUM SERPL-MCNC: 3.9 MMOL/L — SIGNIFICANT CHANGE UP (ref 3.5–5.3)
POTASSIUM SERPL-SCNC: 3.9 MMOL/L — SIGNIFICANT CHANGE UP (ref 3.5–5.3)
PROT SERPL-MCNC: 5.5 G/DL — LOW (ref 6–8.3)
RBC # BLD: 3.27 M/UL — LOW (ref 3.8–5.2)
RBC # FLD: 15.3 % — HIGH (ref 10.3–14.5)
SODIUM SERPL-SCNC: 138 MMOL/L — SIGNIFICANT CHANGE UP (ref 135–145)
SPECIMEN SOURCE: SIGNIFICANT CHANGE UP
SPECIMEN SOURCE: SIGNIFICANT CHANGE UP
WBC # BLD: 3.71 K/UL — LOW (ref 3.8–10.5)
WBC # FLD AUTO: 3.71 K/UL — LOW (ref 3.8–10.5)

## 2024-08-03 RX ORDER — INSULIN LISPRO 100/ML
VIAL (ML) SUBCUTANEOUS AT BEDTIME
Refills: 0 | Status: DISCONTINUED | OUTPATIENT
Start: 2024-08-03 | End: 2024-08-07

## 2024-08-03 RX ORDER — INSULIN LISPRO 100/ML
VIAL (ML) SUBCUTANEOUS
Refills: 0 | Status: DISCONTINUED | OUTPATIENT
Start: 2024-08-03 | End: 2024-08-07

## 2024-08-03 RX ORDER — LOSARTAN POTASSIUM 50 MG/1
25 TABLET, FILM COATED ORAL ONCE
Refills: 0 | Status: COMPLETED | OUTPATIENT
Start: 2024-08-03 | End: 2024-08-03

## 2024-08-03 RX ORDER — LOSARTAN POTASSIUM 50 MG/1
50 TABLET, FILM COATED ORAL DAILY
Refills: 0 | Status: DISCONTINUED | OUTPATIENT
Start: 2024-08-04 | End: 2024-08-04

## 2024-08-03 RX ADMIN — Medication 25 MILLIGRAM(S): at 05:23

## 2024-08-03 RX ADMIN — APIXABAN 2.5 MILLIGRAM(S): 5 TABLET, FILM COATED ORAL at 05:24

## 2024-08-03 RX ADMIN — LOSARTAN POTASSIUM 25 MILLIGRAM(S): 50 TABLET, FILM COATED ORAL at 09:59

## 2024-08-03 RX ADMIN — LOSARTAN POTASSIUM 25 MILLIGRAM(S): 50 TABLET, FILM COATED ORAL at 05:23

## 2024-08-03 RX ADMIN — APIXABAN 2.5 MILLIGRAM(S): 5 TABLET, FILM COATED ORAL at 17:35

## 2024-08-03 RX ADMIN — Medication 220 MILLIGRAM(S): at 13:00

## 2024-08-03 RX ADMIN — Medication 10 MILLIGRAM(S): at 22:52

## 2024-08-03 RX ADMIN — Medication 2: at 09:29

## 2024-08-03 RX ADMIN — DEXAMETHASONE 6 MILLIGRAM(S): 1.5 TABLET ORAL at 05:22

## 2024-08-03 RX ADMIN — Medication 100 MILLIGRAM(S): at 14:22

## 2024-08-03 RX ADMIN — Medication 1000 UNIT(S): at 13:00

## 2024-08-03 RX ADMIN — Medication 500 MILLIGRAM(S): at 13:01

## 2024-08-03 RX ADMIN — Medication 6: at 17:35

## 2024-08-03 RX ADMIN — Medication 8: at 12:53

## 2024-08-03 RX ADMIN — PANTOPRAZOLE SODIUM 40 MILLIGRAM(S): 20 TABLET, DELAYED RELEASE ORAL at 07:43

## 2024-08-03 NOTE — PROGRESS NOTE ADULT - SUBJECTIVE AND OBJECTIVE BOX
Patient is a 89y old  Female who presents with a chief complaint of UTI and COVID (03 Aug 2024 07:31)  Patient is awake, alert, lying in bed with oxygen supp via NC.     INTERVAL HPI/OVERNIGHT EVENTS:      VITAL SIGNS:  T(F): 98.8 (08-03-24 @ 05:02)  HR: 81 (08-03-24 @ 09:53)  BP: 173/83 (08-03-24 @ 09:53)  RR: 18 (08-03-24 @ 05:02)  SpO2: 96% (08-03-24 @ 05:02)  Wt(kg): --  I&O's Detail          REVIEW OF SYSTEMS:    CONSTITUTIONAL:  No fevers, chills, sweats    HEENT:  Eyes:  No diplopia or blurred vision. ENT:  No earache, sore throat or runny nose.    CARDIOVASCULAR:  No pressure, squeezing, tightness, or heaviness about the chest; no palpitations.    RESPIRATORY:  Per HPI    GASTROINTESTINAL:  No abdominal pain, nausea, vomiting or diarrhea.    GENITOURINARY:  No dysuria, frequency or urgency.    NEUROLOGIC:  No paresthesias, fasciculations, seizures or weakness.    PSYCHIATRIC:  No disorder of thought or mood.      PHYSICAL EXAM:    Constitutional: Well developed and nourished  Eyes:Perrla  ENMT: normal  Neck:supple  Respiratory: good air entry  Cardiovascular: S1 S2 regular  Gastrointestinal: Soft, Non tender  Extremities: No edema  Vascular:normal  Neurological:Awake, alert,Ox3  Musculoskeletal:Normal      MEDICATIONS  (STANDING):  apixaban 2.5 milliGRAM(s) Oral every 12 hours  ascorbic acid 500 milliGRAM(s) Oral daily  cefTRIAXone   IVPB 2000 milliGRAM(s) IV Intermittent every 24 hours  cholecalciferol 1000 Unit(s) Oral daily  dexAMETHasone  Injectable 6 milliGRAM(s) IV Push daily  insulin lispro (ADMELOG) corrective regimen sliding scale   SubCutaneous at bedtime  insulin lispro (ADMELOG) corrective regimen sliding scale   SubCutaneous three times a day before meals  insulin lispro (ADMELOG) corrective regimen sliding scale   SubCutaneous at bedtime  insulin lispro (ADMELOG) corrective regimen sliding scale   SubCutaneous three times a day before meals  lactated ringers. 1000 milliLiter(s) (50 mL/Hr) IV Continuous <Continuous>  metoprolol succinate ER 25 milliGRAM(s) Oral daily  montelukast 10 milliGRAM(s) Oral at bedtime  pantoprazole    Tablet 40 milliGRAM(s) Oral before breakfast  simvastatin 10 milliGRAM(s) Oral at bedtime  zinc sulfate 220 milliGRAM(s) Oral daily    MEDICATIONS  (PRN):  acetaminophen     Tablet .. 650 milliGRAM(s) Oral every 6 hours PRN Temp greater or equal to 38C (100.4F), Mild Pain (1 - 3)      Allergies    gabapentin (Unknown)    Intolerances        LABS:                        10.9   3.71  )-----------( 207      ( 03 Aug 2024 07:40 )             32.7     08-03    138  |  112<H>  |  38<H>  ----------------------------<  209<H>  3.9   |  17<L>  |  0.74    Ca    8.5      03 Aug 2024 07:40  Phos  2.6     08-03  Mg     1.7     08-03    TPro  5.5<L>  /  Alb  2.7<L>  /  TBili  0.4  /  DBili  x   /  AST  15  /  ALT  24  /  AlkPhos  42  08-03      Urinalysis Basic - ( 03 Aug 2024 07:40 )    Color: x / Appearance: x / SG: x / pH: x  Gluc: 209 mg/dL / Ketone: x  / Bili: x / Urobili: x   Blood: x / Protein: x / Nitrite: x   Leuk Esterase: x / RBC: x / WBC x   Sq Epi: x / Non Sq Epi: x / Bacteria: x            CAPILLARY BLOOD GLUCOSE      POCT Blood Glucose.: 220 mg/dL (03 Aug 2024 08:58)  POCT Blood Glucose.: 215 mg/dL (02 Aug 2024 21:49)  POCT Blood Glucose.: 228 mg/dL (02 Aug 2024 16:28)  POCT Blood Glucose.: 329 mg/dL (02 Aug 2024 12:13)    Culture - Blood in AM (07.31.24 @ 06:30)   Specimen Source: .Blood Blood-Peripheral  Culture Results:   No growth at 48 Hours    RADIOLOGY & ADDITIONAL TESTS:    CXR:    Ct scan chest:    ekg;    echo: Patient is a 89y old  Female who presents with a chief complaint of UTI and COVID (03 Aug 2024 07:31)  Patient is awake, alert, lying in bed with oxygen supp via NC in NAD. Remains on isolation due to Covid    INTERVAL HPI/OVERNIGHT EVENTS:      VITAL SIGNS:  T(F): 98.8 (08-03-24 @ 05:02)  HR: 81 (08-03-24 @ 09:53)  BP: 173/83 (08-03-24 @ 09:53)  RR: 18 (08-03-24 @ 05:02)  SpO2: 96% (08-03-24 @ 05:02)  Wt(kg): --  I&O's Detail          REVIEW OF SYSTEMS:    CONSTITUTIONAL:  No fevers, chills, sweats    HEENT:  Eyes:  No diplopia or blurred vision. ENT:  No earache, sore throat or runny nose.    CARDIOVASCULAR:  No pressure, squeezing, tightness, or heaviness about the chest; no palpitations.    RESPIRATORY:  Per HPI    GASTROINTESTINAL:  No abdominal pain, nausea, vomiting or diarrhea.    GENITOURINARY:  No dysuria, frequency or urgency.    NEUROLOGIC:  No paresthesias, fasciculations, seizures or weakness.    PSYCHIATRIC:  No disorder of thought or mood.      PHYSICAL EXAM:    Constitutional: Well developed and nourished  Eyes:Perrla  ENMT: normal  Neck:supple  Respiratory: good air entry  Cardiovascular: S1 S2 regular  Gastrointestinal: Soft, Non tender  Extremities: No edema  Vascular:normal  Neurological:Awake, alert,Ox3  Musculoskeletal:Normal      MEDICATIONS  (STANDING):  apixaban 2.5 milliGRAM(s) Oral every 12 hours  ascorbic acid 500 milliGRAM(s) Oral daily  cefTRIAXone   IVPB 2000 milliGRAM(s) IV Intermittent every 24 hours  cholecalciferol 1000 Unit(s) Oral daily  dexAMETHasone  Injectable 6 milliGRAM(s) IV Push daily  insulin lispro (ADMELOG) corrective regimen sliding scale   SubCutaneous at bedtime  insulin lispro (ADMELOG) corrective regimen sliding scale   SubCutaneous three times a day before meals  insulin lispro (ADMELOG) corrective regimen sliding scale   SubCutaneous at bedtime  insulin lispro (ADMELOG) corrective regimen sliding scale   SubCutaneous three times a day before meals  lactated ringers. 1000 milliLiter(s) (50 mL/Hr) IV Continuous <Continuous>  metoprolol succinate ER 25 milliGRAM(s) Oral daily  montelukast 10 milliGRAM(s) Oral at bedtime  pantoprazole    Tablet 40 milliGRAM(s) Oral before breakfast  simvastatin 10 milliGRAM(s) Oral at bedtime  zinc sulfate 220 milliGRAM(s) Oral daily    MEDICATIONS  (PRN):  acetaminophen     Tablet .. 650 milliGRAM(s) Oral every 6 hours PRN Temp greater or equal to 38C (100.4F), Mild Pain (1 - 3)      Allergies    gabapentin (Unknown)    Intolerances        LABS:                        10.9   3.71  )-----------( 207      ( 03 Aug 2024 07:40 )             32.7     08-03    138  |  112<H>  |  38<H>  ----------------------------<  209<H>  3.9   |  17<L>  |  0.74    Ca    8.5      03 Aug 2024 07:40  Phos  2.6     08-03  Mg     1.7     08-03    TPro  5.5<L>  /  Alb  2.7<L>  /  TBili  0.4  /  DBili  x   /  AST  15  /  ALT  24  /  AlkPhos  42  08-03      Urinalysis Basic - ( 03 Aug 2024 07:40 )    Color: x / Appearance: x / SG: x / pH: x  Gluc: 209 mg/dL / Ketone: x  / Bili: x / Urobili: x   Blood: x / Protein: x / Nitrite: x   Leuk Esterase: x / RBC: x / WBC x   Sq Epi: x / Non Sq Epi: x / Bacteria: x            CAPILLARY BLOOD GLUCOSE      POCT Blood Glucose.: 220 mg/dL (03 Aug 2024 08:58)  POCT Blood Glucose.: 215 mg/dL (02 Aug 2024 21:49)  POCT Blood Glucose.: 228 mg/dL (02 Aug 2024 16:28)  POCT Blood Glucose.: 329 mg/dL (02 Aug 2024 12:13)    Culture - Blood in AM (07.31.24 @ 06:30)   Specimen Source: .Blood Blood-Peripheral  Culture Results:   No growth at 48 Hours    RADIOLOGY & ADDITIONAL TESTS:    CXR:    Ct scan chest:    ekg;    echo:

## 2024-08-03 NOTE — PROGRESS NOTE ADULT - PROBLEM SELECTOR PLAN 2
isolation precautions  oxygen supp prn  monitor oxygen sat  monitor LDH, LFTs, CRP, D-Dimer, Ferritin and procalcitonin  IV Dexa  IV Remdesivir  Bronchodilators  Vit C, D and Zinc supp  ID follow up  Follow up Covid PCR isolation precautions  oxygen supp prn  monitor oxygen sat  monitor LDH, LFTs, CRP, D-Dimer, Ferritin and procalcitonin  IV Dexa  IV Remdesivir completed  Bronchodilators  Vit C, D and Zinc supp  ID follow up  Follow up Covid PCR

## 2024-08-03 NOTE — CONSULT NOTE ADULT - SUBJECTIVE AND OBJECTIVE BOX
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  Daughter at bedside states that her mother starting having a productive cough with sputum starting yesterday and today she had one episode of NBNB vomiting earlier with SOB today so she brought her mother into the ED. Pt seen at bedside, Daisy (to self only), complaining of diffuse abdominal pain, headache and mild chest pain. She denies any fever, chills, shortness of breath, dysuria, numbness/tingling/weakness in extremities. Patient with strep bactermi, evaluate for possible endocarditis.          PAST MEDICAL & SURGICAL HISTORY:  HTN (hypertension)      DM (diabetes mellitus)      Hypercholesteremia      Gastric adenocarcinoma  s/p resection      Vertigo      Dementia      Ambulatory dysfunction      Multiple myeloma      Pulmonary embolism      S/P hysterectomy      S/P tubal ligation          MEDICATIONS  (STANDING):  apixaban 2.5 milliGRAM(s) Oral every 12 hours  ascorbic acid 500 milliGRAM(s) Oral daily  cefTRIAXone   IVPB 2000 milliGRAM(s) IV Intermittent every 24 hours  cholecalciferol 1000 Unit(s) Oral daily  dexAMETHasone  Injectable 6 milliGRAM(s) IV Push daily  insulin lispro (ADMELOG) corrective regimen sliding scale   SubCutaneous three times a day before meals  insulin lispro (ADMELOG) corrective regimen sliding scale   SubCutaneous at bedtime  lactated ringers. 1000 milliLiter(s) (50 mL/Hr) IV Continuous <Continuous>  metoprolol succinate ER 25 milliGRAM(s) Oral daily  montelukast 10 milliGRAM(s) Oral at bedtime  pantoprazole    Tablet 40 milliGRAM(s) Oral before breakfast  simvastatin 10 milliGRAM(s) Oral at bedtime  zinc sulfate 220 milliGRAM(s) Oral daily    MEDICATIONS  (PRN):  acetaminophen     Tablet .. 650 milliGRAM(s) Oral every 6 hours PRN Temp greater or equal to 38C (100.4F), Mild Pain (1 - 3)      FAMILY HISTORY:  Family history of diabetes mellitus (Sibling)    Family history of early CAD (Grandparent)        SOCIAL HISTORY:    [ x] Non-smoker    [x ] Alcohol-denies    Allergies    gabapentin (Unknown)    Intolerances    	    REVIEW OF SYSTEMS:  CONSTITUTIONAL: No fever, weight loss, or fatigue  EYES: No eye pain, visual disturbances, or discharge  ENT:  No difficulty hearing, tinnitus, vertigo; No sinus or throat pain  NECK: No pain or stiffness  RESPIRATORY: No cough, wheezing, chills or hemoptysis; + Shortness of Breath  CARDIOVASCULAR: No chest pain, palpitations, passing out, dizziness, or leg swelling  GASTROINTESTINAL: No abdominal or epigastric pain. No nausea, vomiting, or hematemesis; No diarrhea or constipation. No melena or hematochezia.  GENITOURINARY: No dysuria, frequency, hematuria, or incontinence  NEUROLOGICAL: No headaches, memory loss, loss of strength, numbness, or tremors  SKIN: No itching, burning, rashes, or lesions   LYMPH Nodes: No enlarged glands  ENDOCRINE: No heat or cold intolerance; No hair loss  MUSCULOSKELETAL: No joint pain or swelling; No muscle, back, or extremity pain  PSYCHIATRIC: No depression, anxiety, mood swings, or difficulty sleeping  HEME/LYMPH: No easy bruising, or bleeding gums  ALLERGY AND IMMUNOLOGIC: No hives or eczema	      PHYSICAL EXAM:  T(C): 37.1 (08-03-24 @ 05:02), Max: 37.3 (08-02-24 @ 19:49)  HR: 81 (08-03-24 @ 09:53) (73 - 81)  BP: 173/83 (08-03-24 @ 09:53) (148/73 - 179/94)  RR: 18 (08-03-24 @ 05:02) (16 - 18)  SpO2: 96% (08-03-24 @ 05:02) (96% - 98%)  Wt(kg): --  I&O's Summary      Appearance: Normal	  HEENT:   Normal oral mucosa, PERRL, EOMI	  Lymphatic: No lymphadenopathy  Cardiovascular: Normal S1 S2, No JVD, No murmurs, No edema  [facility]  Psychiatry: A & O x 3, Mood & affect appropriate  Gastrointestinal:  Soft, Non-tender, + BS	  Skin: No rashes, No ecchymoses, No cyanosis	  Neurologic: Non-focal  Extremities: Normal range of motion, No clubbing, cyanosis or edema  Vascular: Peripheral pulses palpable 2+ bilaterally    	    ECG:    Normal sinus rhythm  Minimal voltage criteria for LVH, may be normal variant      	  	  LABS:	 	                        10.9   3.71  )-----------( 207      ( 03 Aug 2024 07:40 )             32.7     08-03    138  |  112<H>  |  38<H>  ----------------------------<  209<H>  3.9   |  17<L>  |  0.74    Ca    8.5      03 Aug 2024 07:40  Phos  2.6     08-03  Mg     1.7     08-03    TPro  5.5<L>  /  Alb  2.7<L>  /  TBili  0.4  /  DBili  x   /  AST  15  /  ALT  24  /  AlkPhos  42  08-03    < from: CT Head No Cont (07.29.24 @ 05:21) >  ACC: [id number] EXAM:  CT BRAIN   ORDERED BY: JAYCEE CARMEN     PROCEDURE DATE:  07/29/2024          INTERPRETATION:  CLINICAL INFORMATION: AMS    COMPARISON: CT head 10/24/2023    CONTRAST:  IV Contrast: NONE  Complications: None reported at time of study completion    TECHNIQUE:  Serial axial images were obtained from the skull base to the   vertex using multi-slice helical technique. Sagittal and coronal   reformats were obtained.    FINDINGS:    VENTRICLES AND SULCI: Age appropriate involutional changes.  INTRA-AXIAL: No mass effect, acute hemorrhage, or midline shift.  There   are periventricular and subcortical white matter hypodensities,   consistent with microvascular type changes.  EXTRA-AXIAL: No mass or fluid collection. Basal cisterns are normal in   appearance.    VISUALIZED SINUSES:  Scattered mucosal thickening.  TYMPANOMASTOID CAVITIES:  Clear.  VISUALIZED ORBITS: Bilateral lens replacement.  CALVARIUM: Intact.    MISCELLANEOUS: Atherosclerotic calcifications of the intracranial   vasculature.      IMPRESSION:  No acute intracranial hemorrhage, mass effect, or midline shift.        --- End of Report ---            FRANSISCO EASON MD; Attending Radiologist  This document has been electronically signed. Jul 29 2024  5:25AM    < end of copied text >  < from: CT Angio Chest PE Protocol w/ IV Cont (07.29.24 @ 05:21) >  ACC: [id number] EXAM:  CT ANGIO CHEST PULM ART WAWIC   ORDERED BY: JAYCEE CARMEN     ACC: [id number] EXAM:  CT ABDOMEN AND PELVIS IC   ORDERED BY: JAYCEE CARMEN     PROCEDURE DATE:  07/29/2024          INTERPRETATION:  CLINICAL INFORMATION: 89FP HTN, HLD, DM,   Osteoporosis, Stage II B Gastric Adenocarcinoma s/p distal gastrectomy in   2015 on active chemo (follows [facility] Dr Adams), Early Multiple   myeloma, and recently diagnosed with b/l PE (on Eliquis) presenting with   SOB, cough and vomiting.    COMPARISON: CTA chest 3/28/2023. CT abdomen pelvis 5/13/2022.    CONTRAST/COMPLICATIONS:  IV Contrast: Omnipaque 350  80 cc administered   20 cc discarded  Oral Contrast: NONE  Complications: None reported at time of study completion    PROCEDURE:  CT Angiography of the Chest was performed followed by portal venous phase   imaging of the Abdomen and Pelvis.  Sagittal and coronal reformats were performed as well as 3D (MIP)   reconstructions.    FINDINGS:  CHEST:  LUNGS AND LARGE AIRWAYS: Patent central airways. Mild subpleural   interstitial prominence particularly in the upper lungs. No pneumonia or   edema or acute abnormality.  PLEURA: No pleural effusion.  VESSELS: No thoracic aortic dissection or aneurysm. Coronary artery  atherosclerosis. No pulmonary embolus.  HEART: Heart size is normal. No pericardial effusion.  MEDIASTINUM AND MARGARET: No lymphadenopathy.  CHEST WALL AND LOWER NECK: Multinodular thyroid.    ABDOMEN AND PELVIS:  LIVER: Within normal limits.  BILE DUCTS: Normal caliber.  GALLBLADDER: Cholecystectomy.  SPLEEN: Within normal limits.  PANCREAS: Within normal limits.  ADRENALS: Within normal limits.  KIDNEYS/URETERS: No hydronephrosis or renal stone or concerning mass.   Mild cortical volume loss bilaterally.    BLADDER: Within normal limits.  REPRODUCTIVE ORGANS: Status post hysterectomy.    BOWEL: No bowel obstruction. Status post appendectomy. Status post distal   gastric resection with gastrojejunostomy.  PERITONEUM/RETROPERITONEUM: Within normal limits.  VESSELS: Atherosclerotic changes. No abdominal aortic aneurysm. Patent   portal, splenic, and mesenteric veins.  LYMPH NODES: No lymphadenopathy.  ABDOMINAL WALL: Within normal limits.  BONES: Bony demineralization. No acute fracture. Mild compression   fracture of the L1 superior endplate, new compared to prior but   chronic-appearing. Transitional lumbosacral anatomy with   pseudoarticulation between the transverse processes of S1 and S2.    IMPRESSION:  No pulmonary embolus or acute finding in the chest.    No acute finding in the abdomen or pelvis.    Mild compression fracture of the L1 superior endplate, new compared to   prior but chronic-appearing.    < end of copied text >  Culture - Blood (07.29.24 @ 01:05)   - Streptococcus sp. (Not Grp A, B or S pneumoniae): Detec  - Ceftriaxone: S <=0.25  - Penicillin: S 0.06  - Vancomycin: S 0.5  Gram Stain:   Growth in anaerobic bottle: Gram positive cocci in pairs   Growth in aerobic bottle: Gram positive cocci in pairs  Specimen Source: .Blood Blood-Peripheral  Organism: Blood Culture PCR  Organism: Streptococcus gallolyticus  Culture Results:   Growth in anaerobic bottle: Streptococcus gallolyticus   Growth in aerobic bottle: Streptococcus salivarius/vestibularis group   "Susceptibilities not performed"   Direct identification is available within approximately 3-5   hours either by Blood Panel Multiplexed PCR or Direct   MALDI-TOF. Details: [email or web address]  Organism Identification: Blood Culture PCR   Streptococcus gallolyticus  Method Type: RYAN  Method Type: PCRCulture - Blood in AM (07.31.24 @ 06:30)   Specimen Source: .Blood Blood-Peripheral  Culture Results:   No growth at 48 HoursCulture - Blood in AM (07.31.24 @ 06:00)   Specimen Source: .Blood Blood-Peripheral  Culture Results:   No growth at 48 Hours

## 2024-08-03 NOTE — PROGRESS NOTE ADULT - SUBJECTIVE AND OBJECTIVE BOX
Patient is a 89y old  Female who presents with a chief complaint of UTI and COVID (02 Aug 2024 14:09)    pt seen in icu [  ], reg med floor [   ], bed [  ], chair at bedside [   ], a+o x3 [  ], lethargic [  ],  nad [  ]    shea [  ], ngt [  ], peg [  ], et tube [  ], cent line [  ], picc line [  ]        Allergies    gabapentin (Unknown)        Vitals    T(F): 98.8 (08-03-24 @ 05:02), Max: 99.1 (08-02-24 @ 19:49)  HR: 74 (08-03-24 @ 05:02) (73 - 79)  BP: 179/94 (08-03-24 @ 05:02) (148/73 - 179/94)  RR: 18 (08-03-24 @ 05:02) (16 - 18)  SpO2: 96% (08-03-24 @ 05:02) (96% - 98%)  Wt(kg): --  CAPILLARY BLOOD GLUCOSE      POCT Blood Glucose.: 215 mg/dL (02 Aug 2024 21:49)      Labs                          10.9   4.46  )-----------( 177      ( 02 Aug 2024 07:27 )             32.1       08-02    141  |  113<H>  |  40<H>  ----------------------------<  143<H>  3.3<L>   |  17<L>  |  0.85    Ca    8.1<L>      02 Aug 2024 07:27    TPro  5.1<L>  /  Alb  2.4<L>  /  TBili  0.3  /  DBili  x   /  AST  16  /  ALT  22  /  AlkPhos  41  08-02            .Blood Blood-Peripheral  07-31 @ 06:30   No growth at 48 Hours  --  --      .Blood Blood-Peripheral  07-31 @ 06:00   No growth at 48 Hours  --  --      Clean Catch  07-29 @ 04:31   >=3 organisms. Probable collection contamination.  --  --      .Blood Blood-Peripheral  07-29 @ 01:10   No growth at 4 days  --  --      .Blood Blood-Peripheral  07-29 @ 01:05   Growth in anaerobic bottle: Streptococcus gallolyticus  Growth in aerobic bottle: Streptococcus salivarius/vestibularis group  "Susceptibilities not performed"  Direct identification is available within approximately 3-5  hours either by Blood Panel Multiplexed PCR or Direct  MALDI-TOF. Details: [email or web address]  --  Blood Culture PCR  Streptococcus gallolyticus          Radiology Results      Meds    MEDICATIONS  (STANDING):  apixaban 2.5 milliGRAM(s) Oral every 12 hours  ascorbic acid 500 milliGRAM(s) Oral daily  cefTRIAXone   IVPB 2000 milliGRAM(s) IV Intermittent every 24 hours  cholecalciferol 1000 Unit(s) Oral daily  dexAMETHasone  Injectable 6 milliGRAM(s) IV Push daily  insulin lispro (ADMELOG) corrective regimen sliding scale   SubCutaneous at bedtime  insulin lispro (ADMELOG) corrective regimen sliding scale   SubCutaneous three times a day before meals  lactated ringers. 1000 milliLiter(s) (50 mL/Hr) IV Continuous <Continuous>  losartan 25 milliGRAM(s) Oral daily  metoprolol succinate ER 25 milliGRAM(s) Oral daily  montelukast 10 milliGRAM(s) Oral at bedtime  pantoprazole    Tablet 40 milliGRAM(s) Oral before breakfast  simvastatin 10 milliGRAM(s) Oral at bedtime  zinc sulfate 220 milliGRAM(s) Oral daily      MEDICATIONS  (PRN):  acetaminophen     Tablet .. 650 milliGRAM(s) Oral every 6 hours PRN Temp greater or equal to 38C (100.4F), Mild Pain (1 - 3)      Physical Exam    Neuro :  no focal deficits  Respiratory: CTA B/L  CV: RRR, S1S2, no murmurs,   Abdominal: Soft, NT, ND +BS,  Extremities: No edema, + peripheral pulses    ASSESSMENT    Infection due to severe acute respiratory syndrome coronavirus 2 (SARS-CoV-2)    HTN (hypertension)    DM (diabetes mellitus)    Hypercholesteremia    Gastric adenocarcinoma    Vertigo    Dementia    Ambulatory dysfunction    Multiple myeloma    Pulmonary embolism    S/P hysterectomy    S/P tubal ligation        PLAN     Patient is a 89y old  Female who presents with a chief complaint of UTI and COVID (02 Aug 2024 14:09)    pt seen in icu [  ], reg med floor [  x ], bed [ x ], chair at bedside [   ], awake and responsive [ x ], lethargic [  ],    nad [ x ]      Allergies    gabapentin (Unknown)        Vitals    T(F): 98.8 (08-03-24 @ 05:02), Max: 99.1 (08-02-24 @ 19:49)  HR: 74 (08-03-24 @ 05:02) (73 - 79)  BP: 179/94 (08-03-24 @ 05:02) (148/73 - 179/94)  RR: 18 (08-03-24 @ 05:02) (16 - 18)  SpO2: 96% (08-03-24 @ 05:02) (96% - 98%)  Wt(kg): --  CAPILLARY BLOOD GLUCOSE      POCT Blood Glucose.: 215 mg/dL (02 Aug 2024 21:49)      Labs                          10.9   4.46  )-----------( 177      ( 02 Aug 2024 07:27 )             32.1       08-02    141  |  113<H>  |  40<H>  ----------------------------<  143<H>  3.3<L>   |  17<L>  |  0.85    Ca    8.1<L>      02 Aug 2024 07:27    TPro  5.1<L>  /  Alb  2.4<L>  /  TBili  0.3  /  DBili  x   /  AST  16  /  ALT  22  /  AlkPhos  41  08-02            .Blood Blood-Peripheral  07-31 @ 06:30   No growth at 48 Hours  --  --      .Blood Blood-Peripheral  07-31 @ 06:00   No growth at 48 Hours  --  --      Clean Catch  07-29 @ 04:31   >=3 organisms. Probable collection contamination.  --  --      .Blood Blood-Peripheral  07-29 @ 01:10   No growth at 4 days  --  --      .Blood Blood-Peripheral  07-29 @ 01:05   Growth in anaerobic bottle: Streptococcus gallolyticus  Growth in aerobic bottle: Streptococcus salivarius/vestibularis group  "Susceptibilities not performed"  Direct identification is available within approximately 3-5  hours either by Blood Panel Multiplexed PCR or Direct  MALDI-TOF. Details: [email or web address]  --  Blood Culture PCR  Streptococcus gallolyticus          Radiology Results      Meds    MEDICATIONS  (STANDING):  apixaban 2.5 milliGRAM(s) Oral every 12 hours  ascorbic acid 500 milliGRAM(s) Oral daily  cefTRIAXone   IVPB 2000 milliGRAM(s) IV Intermittent every 24 hours  cholecalciferol 1000 Unit(s) Oral daily  dexAMETHasone  Injectable 6 milliGRAM(s) IV Push daily  insulin lispro (ADMELOG) corrective regimen sliding scale   SubCutaneous at bedtime  insulin lispro (ADMELOG) corrective regimen sliding scale   SubCutaneous three times a day before meals  lactated ringers. 1000 milliLiter(s) (50 mL/Hr) IV Continuous <Continuous>  losartan 25 milliGRAM(s) Oral daily  metoprolol succinate ER 25 milliGRAM(s) Oral daily  montelukast 10 milliGRAM(s) Oral at bedtime  pantoprazole    Tablet 40 milliGRAM(s) Oral before breakfast  simvastatin 10 milliGRAM(s) Oral at bedtime  zinc sulfate 220 milliGRAM(s) Oral daily      MEDICATIONS  (PRN):  acetaminophen     Tablet .. 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),   Multiple myeloma/ plasmacytoma  Pulmonary embolism  S/P hysterectomy  S/P tubal ligation        PLAN    cont decadron,   completed remdesivir today 8/2/24,   cont vit c, vitamin d, zinc, pepcid,   cont singulair,   contact and airborne isolation,   cont albuterol inhaler,   pulm f/u   robitussin prn   O2 sat 96% (96% - 98%) on ra  O2 via nasal canula as needed,   blood cx with Streptococcus gallolyticus noted above   ucx with contamination   rept blood cx neg noted above  id f/u   TTE to rule out vegetation in the setting of Streptococcal Bacteremia  IF TTE  shows no vegetation then Need IV Ceftriaxone 2 g daily until 8/28/24, otherwise 6 weeks   cardio cons for r/o endocarditis   Continue Dexamethasone to complete the course  gi cons noted  Hx of gastric ca s/p resection.   No apparent active GI issues at this time.   Pt eating well without further n/v.   Recommend outpatient GI f/u.   Please reconsult inpatient GI PRN.  lispro ss,   hold outpt dm meds,  hgba1c 5.8 noted      heme onc cons  cont current meds

## 2024-08-03 NOTE — PROGRESS NOTE ADULT - SUBJECTIVE AND OBJECTIVE BOX
Patient is seen and examined at the bed side, is afebrile. She is tolerating Abx well.       REVIEW OF SYSTEMS: All other review systems are negative      ALLERGIES: gabapentin (Unknown)      Vital Signs Last 24 Hrs  T(C): 36.6 (03 Aug 2024 12:50), Max: 37.3 (02 Aug 2024 19:49)  T(F): 97.8 (03 Aug 2024 12:50), Max: 99.1 (02 Aug 2024 19:49)  HR: 82 (03 Aug 2024 12:50) (73 - 82)  BP: 148/91 (03 Aug 2024 12:50) (148/91 - 179/94)  BP(mean): --  RR: 17 (03 Aug 2024 12:50) (17 - 18)  SpO2: 98% (03 Aug 2024 12:50) (96% - 98%)    Parameters below as of 03 Aug 2024 12:50  Patient On (Oxygen Delivery Method): room air        PHYSICAL EXAM:  GENERAL: Not in distress, on oxygen via NC  CHEST/LUNG: Not using accessory muscles   EXTREMITIES: No pedal  edema  CNS: Awake and Alert      LABS:                        10.9   3.71  )-----------( 207      ( 03 Aug 2024 07:40 )             32.7                           10.9   4.46  )-----------( 177      ( 02 Aug 2024 07:27 )             32.1       08-03    138  |  112<H>  |  38<H>  ----------------------------<  209<H>  3.9   |  17<L>  |  0.74    Ca    8.5      03 Aug 2024 07:40  Phos  2.6     08-03  Mg     1.7     08-03    TPro  5.5<L>  /  Alb  2.7<L>  /  TBili  0.4  /  DBili  x   /  AST  15  /  ALT  24  /  AlkPhos  42  08-03    08-02    141  |  113<H>  |  40<H>  ----------------------------<  143<H>  3.3<L>   |  17<L>  |  0.85    Ca    8.1<L>      02 Aug 2024 07:27    TPro  5.1<L>  /  Alb  2.4<L>  /  TBili  0.3  /  DBili  x   /  AST  16  /  ALT  22  /  AlkPhos  41  08-02    PT/INR - ( 29 Jul 2024 01:10 )   PT: 13.0 sec;   INR: 1.14 ratio      PTT - ( 29 Jul 2024 01:10 )  PTT:26.2 sec      CAPILLARY BLOOD GLUCOSE  POCT Blood Glucose.: 279 mg/dL (30 Jul 2024 10:54)  POCT Blood Glucose.: 183 mg/dL (30 Jul 2024 08:02)  POCT Blood Glucose.: 264 mg/dL (29 Jul 2024 21:37)  POCT Blood Glucose.: 180 mg/dL (29 Jul 2024 16:52)    ABG - ( 29 Jul 2024 03:06 )  pH, Arterial: 7.45  pH, Blood: x     /  pCO2: 23    /  pO2: 287   / HCO3: 16    / Base Excess: -6.0  /  SaO2: 96          MEDICATIONS  (STANDING):    apixaban 2.5 milliGRAM(s) Oral every 12 hours  ascorbic acid 500 milliGRAM(s) Oral daily  cefTRIAXone   IVPB 2000 milliGRAM(s) IV Intermittent every 24 hours  cholecalciferol 1000 Unit(s) Oral daily  dexAMETHasone  Injectable 6 milliGRAM(s) IV Push daily  insulin lispro (ADMELOG) corrective regimen sliding scale   SubCutaneous three times a day before meals  insulin lispro (ADMELOG) corrective regimen sliding scale   SubCutaneous at bedtime  lactated ringers. 1000 milliLiter(s) (50 mL/Hr) IV Continuous <Continuous>  metoprolol succinate ER 25 milliGRAM(s) Oral daily  montelukast 10 milliGRAM(s) Oral at bedtime  pantoprazole    Tablet 40 milliGRAM(s) Oral before breakfast  simvastatin 10 milliGRAM(s) Oral at bedtime  zinc sulfate 220 milliGRAM(s) Oral daily        RADIOLOGY & ADDITIONAL TESTS:    7/29/24: CT Head No Cont (07.29.24 @ 05:21) No acute intracranial hemorrhage, mass effect, or midline shift.      7/29/24: CT Angio Chest PE Protocol w/ IV Cont (07.29.24 @ 05:21) No pulmonary embolus or acute finding in the chest.    No acute finding in the abdomen or pelvis. Mild compression fracture of the L1 superior endplate, new compared to prior but chronic-appearing.      7/29/24 : CT Abdomen and Pelvis w/ IV Cont (07.29.24 @ 05:21) No acute finding in the abdomen or pelvis.    Mild compression fracture of the L1 superior endplate, new compared to prior but chronic-appearing.      7/29/24 : Xray Chest 1 View- PORTABLE-Urgent (07.29.24 @ 02:10) No consolidation or infiltrate.  No pleural effusion.    Heart size cannot be accurately assessed in this projection.        MICROBIOLOGY DATA:    Culture - Blood in AM (07.31.24 @ 06:30)   Specimen Source: .Blood Blood-Peripheral  Culture Results: No growth at 72 hours    Culture - Blood in AM (07.31.24 @ 06:00)   Specimen Source: .Blood Blood-Peripheral  Culture Results: No growth at 72 hours    Culture - Blood (07.29.24 @ 01:05)   - Streptococcus sp. (Not Grp A, B or S pneumoniae): Detec  - Vancomycin: S 0.5  Gram Stain:   Growth in anaerobic bottle: Gram positive cocci in pairs   Growth in aerobic bottle: Gram positive cocci in pairs  - Ceftriaxone: S <=0.25  - Penicillin: S 0.06  Specimen Source: .Blood Blood-Peripheral  Organism: Streptococcus gallolyticus  Organism: Blood Culture PCR  Culture Results:   Growth in anaerobic bottle: Streptococcus gallolyticus   Growth in aerobic bottle: Gram positive cocci in pairs   Direct identification is available within approximately 3-5   hours either by Blood Panel Multiplexed PCR or Direct   MALDI-TOF. Details: [email or web address]  Organism Identification: Blood Culture PCR   Streptococcus gallolyticus    Culture - Urine (07.29.24 @ 04:31)   Specimen Source: Clean Catch  Culture Results:   >=3 organisms. Probable collection contamination.    Urine Microscopic-Add On (NC) (07.29.24 @ 04:31)   Bacteria: Many /HPF  Comment - Urine: few amorphous urates  Squamous Epithelial Cells: Present  Red Blood Cell - Urine: 4 /HPF  White Blood Cell - Urine: 8 /HPF    Culture - Blood (07.29.24 @ 01:10)   Specimen Source: .Blood Blood-Peripheral  Culture Results: No growth at 5 days    Culture - Blood (07.29.24 @ 01:05)   - Streptococcus sp. (Not Grp A, B or S pneumoniae): Detec  Gram Stain:   Growth in anaerobic bottle: Gram positive cocci in pairs   Growth in aerobic bottle: Gram positive cocci in pairs  Specimen Source: .Blood Blood-Peripheral  Organism: Blood Culture PCR  Culture Results:   Growth in anaerobic bottle: Gram positive cocci in pairs   Growth in aerobic bottle: Gram positive cocci in pairs   Direct identification is available within approximately 3-5   hours either by Blood Panel Multiplexed PCR or Direct   MALDI-TOF. Details: [email or web address]  Organism Identification: Blood Culture PCR  Method Type: PCR         Patient is seen and examined at the bed side, is afebrile. She is doing well, saturating well in Room air.      REVIEW OF SYSTEMS: All other review systems are negative      ALLERGIES: gabapentin (Unknown)      Vital Signs Last 24 Hrs  T(C): 36.6 (03 Aug 2024 12:50), Max: 37.3 (02 Aug 2024 19:49)  T(F): 97.8 (03 Aug 2024 12:50), Max: 99.1 (02 Aug 2024 19:49)  HR: 82 (03 Aug 2024 12:50) (73 - 82)  BP: 148/91 (03 Aug 2024 12:50) (148/91 - 179/94)  BP(mean): --  RR: 17 (03 Aug 2024 12:50) (17 - 18)  SpO2: 98% (03 Aug 2024 12:50) (96% - 98%)    Parameters below as of 03 Aug 2024 12:50  Patient On (Oxygen Delivery Method): room air      PHYSICAL EXAM:  GENERAL: Not in distress, on oxygen via NC  CHEST/LUNG: Not using accessory muscles   EXTREMITIES: No pedal  edema  CNS: Awake and Alert      LABS:                        10.9   3.71  )-----------( 207      ( 03 Aug 2024 07:40 )             32.7                           10.9   4.46  )-----------( 177      ( 02 Aug 2024 07:27 )             32.1       08-03    138  |  112<H>  |  38<H>  ----------------------------<  209<H>  3.9   |  17<L>  |  0.74    Ca    8.5      03 Aug 2024 07:40  Phos  2.6     08-03  Mg     1.7     08-03    TPro  5.5<L>  /  Alb  2.7<L>  /  TBili  0.4  /  DBili  x   /  AST  15  /  ALT  24  /  AlkPhos  42  08-03    08-02    141  |  113<H>  |  40<H>  ----------------------------<  143<H>  3.3<L>   |  17<L>  |  0.85    Ca    8.1<L>      02 Aug 2024 07:27    TPro  5.1<L>  /  Alb  2.4<L>  /  TBili  0.3  /  DBili  x   /  AST  16  /  ALT  22  /  AlkPhos  41  08-02    PT/INR - ( 29 Jul 2024 01:10 )   PT: 13.0 sec;   INR: 1.14 ratio      PTT - ( 29 Jul 2024 01:10 )  PTT:26.2 sec      CAPILLARY BLOOD GLUCOSE  POCT Blood Glucose.: 279 mg/dL (30 Jul 2024 10:54)  POCT Blood Glucose.: 183 mg/dL (30 Jul 2024 08:02)  POCT Blood Glucose.: 264 mg/dL (29 Jul 2024 21:37)  POCT Blood Glucose.: 180 mg/dL (29 Jul 2024 16:52)    ABG - ( 29 Jul 2024 03:06 )  pH, Arterial: 7.45  pH, Blood: x     /  pCO2: 23    /  pO2: 287   / HCO3: 16    / Base Excess: -6.0  /  SaO2: 96          MEDICATIONS  (STANDING):    apixaban 2.5 milliGRAM(s) Oral every 12 hours  ascorbic acid 500 milliGRAM(s) Oral daily  cefTRIAXone   IVPB 2000 milliGRAM(s) IV Intermittent every 24 hours  cholecalciferol 1000 Unit(s) Oral daily  dexAMETHasone  Injectable 6 milliGRAM(s) IV Push daily  insulin lispro (ADMELOG) corrective regimen sliding scale   SubCutaneous three times a day before meals  insulin lispro (ADMELOG) corrective regimen sliding scale   SubCutaneous at bedtime  lactated ringers. 1000 milliLiter(s) (50 mL/Hr) IV Continuous <Continuous>  metoprolol succinate ER 25 milliGRAM(s) Oral daily  montelukast 10 milliGRAM(s) Oral at bedtime  pantoprazole    Tablet 40 milliGRAM(s) Oral before breakfast  simvastatin 10 milliGRAM(s) Oral at bedtime  zinc sulfate 220 milliGRAM(s) Oral daily        RADIOLOGY & ADDITIONAL TESTS:    7/29/24: CT Head No Cont (07.29.24 @ 05:21) No acute intracranial hemorrhage, mass effect, or midline shift.      7/29/24: CT Angio Chest PE Protocol w/ IV Cont (07.29.24 @ 05:21) No pulmonary embolus or acute finding in the chest.    No acute finding in the abdomen or pelvis. Mild compression fracture of the L1 superior endplate, new compared to prior but chronic-appearing.      7/29/24 : CT Abdomen and Pelvis w/ IV Cont (07.29.24 @ 05:21) No acute finding in the abdomen or pelvis.    Mild compression fracture of the L1 superior endplate, new compared to prior but chronic-appearing.      7/29/24 : Xray Chest 1 View- PORTABLE-Urgent (07.29.24 @ 02:10) No consolidation or infiltrate.  No pleural effusion.    Heart size cannot be accurately assessed in this projection.        MICROBIOLOGY DATA:    Culture - Blood in AM (07.31.24 @ 06:30)   Specimen Source: .Blood Blood-Peripheral  Culture Results: No growth at 72 hours    Culture - Blood in AM (07.31.24 @ 06:00)   Specimen Source: .Blood Blood-Peripheral  Culture Results: No growth at 72 hours    Culture - Blood (07.29.24 @ 01:05)   - Streptococcus sp. (Not Grp A, B or S pneumoniae): Detec  - Vancomycin: S 0.5  Gram Stain:   Growth in anaerobic bottle: Gram positive cocci in pairs   Growth in aerobic bottle: Gram positive cocci in pairs  - Ceftriaxone: S <=0.25  - Penicillin: S 0.06  Specimen Source: .Blood Blood-Peripheral  Organism: Streptococcus gallolyticus  Organism: Blood Culture PCR  Culture Results:   Growth in anaerobic bottle: Streptococcus gallolyticus   Growth in aerobic bottle: Gram positive cocci in pairs   Direct identification is available within approximately 3-5   hours either by Blood Panel Multiplexed PCR or Direct   MALDI-TOF. Details: [email or web address]  Organism Identification: Blood Culture PCR   Streptococcus gallolyticus    Culture - Urine (07.29.24 @ 04:31)   Specimen Source: Clean Catch  Culture Results:   >=3 organisms. Probable collection contamination.    Urine Microscopic-Add On (NC) (07.29.24 @ 04:31)   Bacteria: Many /HPF  Comment - Urine: few amorphous urates  Squamous Epithelial Cells: Present  Red Blood Cell - Urine: 4 /HPF  White Blood Cell - Urine: 8 /HPF    Culture - Blood (07.29.24 @ 01:10)   Specimen Source: .Blood Blood-Peripheral  Culture Results: No growth at 5 days    Culture - Blood (07.29.24 @ 01:05)   - Streptococcus sp. (Not Grp A, B or S pneumoniae): Detec  Gram Stain:   Growth in anaerobic bottle: Gram positive cocci in pairs   Growth in aerobic bottle: Gram positive cocci in pairs  Specimen Source: .Blood Blood-Peripheral  Organism: Blood Culture PCR  Culture Results:   Growth in anaerobic bottle: Gram positive cocci in pairs   Growth in aerobic bottle: Gram positive cocci in pairs   Direct identification is available within approximately 3-5   hours either by Blood Panel Multiplexed PCR or Direct   MALDI-TOF. Details: [email or web address]  Organism Identification: Blood Culture PCR  Method Type: PCR

## 2024-08-03 NOTE — CONSULT NOTE ADULT - CONSULT REASON
AHRF 2/2 COVID requiring BIPAP
gastric Ca; colonoscopy
SOB,R/O Endocarditis
positive Blood culture and COVID +
Covid

## 2024-08-03 NOTE — CONSULT NOTE ADULT - ASSESSMENT
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  1.Echocardiogram, ([facility] not feasible since pt is COVID+).  2.COVID+-dexamethasone.  3.Strep bactermia-abx as per ID.  4.HTN_b blocker,agree with inc cozaar 50mg qd.  5.Hx of PE-Eliquis.  6.DM-Insulin.  7.Lipid d.o-statin.  8.Multiple Myeloma-Heme/Onc f/u as outpatient.  9.PPI.
89F PMH HTN, HLD, DM, Osteoporosis, Stage II B Gastric Adenocarcinoma s/p distal gastrectomy in 2015 on active chemo (follows [facility] Dr Adams), Early Multiple myeloma, and recently diagnosed with b/l PE (on Eliquis) presenting with SOB, cough and vomiting. ICU consulted for AHRF 2/2 COVID requiring BIPAP    =================== Neuro============================  AAOx2, appears at basline  Daughter at bedside noting left sided facial droop on admission  would obtain CTH and CTA    ================= Cardiovascular==========================  #HTN  /80 upon evaluation  takes losartan 25 qd at home  continue with home med    ================- Pulm=================================  #AHRF 2/2 COVID  initially requiring BIPAP for WOB in ED  transitioned to 3L NC, saturating 100%, appears comfortable  course rhonchi heard b/l  start remdesivir and decadron  would start rocephin and azithro for possible superimposed PNA  wean O2 as tolerated    #PE  h/o worsening PE in 3/2023 due to noncompliance with AC  daughter at bedside states that she only takes eliquis qd instead of BID  would continue eliquis BID, will need education on medication dosage    ==================ID===================================  #AHRF 2/2 COVID  initially requiring BIPAP for WOB in ED  transitioned to 3L NC, saturating 100%, appears comfortable  course rhonchi heard b/l  start remdesivir and decadron  would start rocephin and azithro for possible superimposed PNA  wean O2 as tolerated  ================= Nephro================================  no active issues    =================GI====================================  #Stage II B Gastric Adenocarcinoma  s/p distal gastrectomy in 2015 on active chemo (unknown)  follows [facility] Adams, would consutl [facility] during hospital stay    ================ Heme/Onc==================================  #Stage II B Gastric Adenocarcinoma  s/p distal gastrectomy in 2015 on active chemo (unknown)  follow[facility] Adams, would consult [facility] during hospital stay    #MM  on darzalex/revlimid/dex  follow[facility] Adams, would consult [facility] during hospital stay    =================Endocrine===============================  #DM  takes metformin 1000mg BID, Steglatro (Ertugliflozin) 15mg qD, and alogliptin 25mg qD at home  blood glucose 194 on admission  ISS achs for now  monitor FS    ================= Skin/Catheters============================  Peripheral IV lines     =================Prophylaxis =============================  DVT prophylaxis   GI prophylaxis     ==================GOC==================================  FULL CODE   Disposition Medical Floors    
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 Patient is AAOx1 (to self only), complaining of diffuse abdominal pain, headache and mild chest pain. She has no fever or chills. On admission, she found to have no fever but positive COVID PCR and Blood culture positive for streptococcal species. She has started on Remdesivir, Dexamethasone and Ceftriaxone, and the ID consult requested to assist with further evaluation and antibiotic management.    # COVID Pneumonitis  # Streptococcal Bacteremia  # Stage II B Gastric Adenocarcinoma s/p distal gastrectomy (2015), Multiple myeloma/plasmactyoma on active chemo (follows [facility] Dr Adams)    would recommend:    1. Adjust Ceftriaxone doses to 2 g daily until blood culture from 7/29 is finalized  2. Repeat Blood cultures X 2 in AM to document clearing the blood stream  3. Continue Remdesivir and Dexamethasone  4. Supportive care including AC  5. Supplemental oxygenation and Bronchodilator as needed  6. COVID precautions    will follow the patient with you and make further recommendation based on the clinical course and Lab results  Thank you for the opportunity to participate in Ms. Lundy's care    Attending Attestation:    Spent more than 65 minutes on total encounter, more than 50 % of the visit was spent counseling and/or coordinating care by the Attending physician.  
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  Daughter at bedside states that her mother starting having a productive cough with sputum starting yesterday and today she had one episode of NBNB vomiting earlier with SOB today so she brought her mother into the ED. Pt seen at bedside, AAOx1 (to self only), complaining of diffuse abdominal pain, headache and mild chest pain. She denies any fever, chills, shortness of breath, dysuria, numbness/tingling/weakness in extremities.     GI consulted 8/1 for Colonoscopy eval.   CTAP 7/29-  No pulmonary embolus or acute finding in the chest. No acute finding in the abdomen or pelvis. Mild compression fracture of the L1 superior endplate, new compared to prior but chronic-appearing.    # Stage II B Gastric Adenocarcinoma s/p distal gastrectomy (2015)  #  Multiple myeloma/plasmactyoma on active chemo (follows [facility] Dr Adams),     Recommendations-  - Regular diet as tolerated  - Trend H/H, transfuse for goal Hgb >7 or if symptomatic.  - Protonix 40mg PO daily  - GOC conversation     This note and its recommendations herein are preliminary until such time as cosigned by an attending.

## 2024-08-03 NOTE — PROGRESS NOTE ADULT - ASSESSMENT
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 Patient is AAOx1 (to self only), complaining of diffuse abdominal pain, headache and mild chest pain. She has no fever or chills. On admission, she found to have no fever but positive COVID PCR and Blood culture positive for streptococcal species. She has started on Remdesivir, Dexamethasone and Ceftriaxone, and the ID consult requested to assist with further evaluation and antibiotic management.    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 TTE to rule out vegetation in the setting of Streptococcal Bacteremia  2. IF TTE  shows no vegetation then Need IV Ceftriaxone 2 g daily until 8/28/24, otherwise 6 weeks   3. Continue Dexamethasone to complete the course  4. Supportive care including AC  5. Supplemental oxygenation and Bronchodilator as needed  6. COVID precautions      Attending Attestation:    Spent more than 35 minutes on total encounter, more than 50 % of the visit was spent counseling and/or coordinating care by the Attending physician Patient is a 89y old  Female who is from home living with her daughter and ambulates with a wheelchair (mostly bedbound), with PMHx of HTN, HLD, DM, Osteoporosis, Stage II B Gastric Adenocarcinoma s/p distal gastrectomy (2015), Multiple myeloma/plasmactyoma on active chemo (follows [facility] Dr Adams), Hx b/l PE (1/2023 on Eliquis), now presents to the ER for evaluation of SOB, cough and vomiting. Patient is AAOx1 (to self only), complaining of diffuse abdominal pain, headache and mild chest pain. She has no fever or chills. On admission, she found to have no fever but positive COVID PCR and Blood culture positive for streptococcal species. She has started on Remdesivir, Dexamethasone and Ceftriaxone, and the ID consult requested to assist with further evaluation and antibiotic management.    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 Continue Dexamethasone to complete the course  2. TTE to rule out vegetation in the setting of Streptococcal Bacteremia  3. IF TTE shows no vegetation then Need IV Ceftriaxone 2 g daily until 8/28/24, otherwise 6 weeks   4. Supportive care including AC  5. Supplemental oxygenation and Bronchodilator as needed  6. COVID precautions      Attending Attestation:    Spent more than 35 minutes on total encounter, more than 50 % of the visit was spent counseling and/or coordinating care by the Attending physician

## 2024-08-04 LAB
ALBUMIN SERPL ELPH-MCNC: 2.7 G/DL — LOW (ref 3.5–5)
ALP SERPL-CCNC: 41 U/L — SIGNIFICANT CHANGE UP (ref 40–120)
ALT FLD-CCNC: 21 U/L DA — SIGNIFICANT CHANGE UP (ref 10–60)
ANION GAP SERPL CALC-SCNC: 9 MMOL/L — SIGNIFICANT CHANGE UP (ref 5–17)
AST SERPL-CCNC: 12 U/L — SIGNIFICANT CHANGE UP (ref 10–40)
BASOPHILS # BLD AUTO: 0 K/UL — SIGNIFICANT CHANGE UP (ref 0–0.2)
BASOPHILS NFR BLD AUTO: 0 % — SIGNIFICANT CHANGE UP (ref 0–2)
BILIRUB SERPL-MCNC: 0.4 MG/DL — SIGNIFICANT CHANGE UP (ref 0.2–1.2)
BUN SERPL-MCNC: 39 MG/DL — HIGH (ref 7–18)
CALCIUM SERPL-MCNC: 8.3 MG/DL — LOW (ref 8.4–10.5)
CHLORIDE SERPL-SCNC: 113 MMOL/L — HIGH (ref 96–108)
CO2 SERPL-SCNC: 18 MMOL/L — LOW (ref 22–31)
CREAT SERPL-MCNC: 0.9 MG/DL — SIGNIFICANT CHANGE UP (ref 0.5–1.3)
EGFR: 62 ML/MIN/1.73M2 — SIGNIFICANT CHANGE UP
EOSINOPHIL # BLD AUTO: 0 K/UL — SIGNIFICANT CHANGE UP (ref 0–0.5)
EOSINOPHIL NFR BLD AUTO: 0 % — SIGNIFICANT CHANGE UP (ref 0–6)
GLUCOSE BLDC GLUCOMTR-MCNC: 216 MG/DL — HIGH (ref 70–99)
GLUCOSE BLDC GLUCOMTR-MCNC: 218 MG/DL — HIGH (ref 70–99)
GLUCOSE BLDC GLUCOMTR-MCNC: 283 MG/DL — HIGH (ref 70–99)
GLUCOSE BLDC GLUCOMTR-MCNC: 326 MG/DL — HIGH (ref 70–99)
GLUCOSE SERPL-MCNC: 166 MG/DL — HIGH (ref 70–99)
HCT VFR BLD CALC: 32.9 % — LOW (ref 34.5–45)
HGB BLD-MCNC: 10.9 G/DL — LOW (ref 11.5–15.5)
IMM GRANULOCYTES NFR BLD AUTO: 0.4 % — SIGNIFICANT CHANGE UP (ref 0–0.9)
LYMPHOCYTES # BLD AUTO: 3.45 K/UL — HIGH (ref 1–3.3)
LYMPHOCYTES # BLD AUTO: 64.1 % — HIGH (ref 13–44)
MAGNESIUM SERPL-MCNC: 1.9 MG/DL — SIGNIFICANT CHANGE UP (ref 1.6–2.6)
MCHC RBC-ENTMCNC: 33 PG — SIGNIFICANT CHANGE UP (ref 27–34)
MCHC RBC-ENTMCNC: 33.1 GM/DL — SIGNIFICANT CHANGE UP (ref 32–36)
MCV RBC AUTO: 99.7 FL — SIGNIFICANT CHANGE UP (ref 80–100)
MONOCYTES # BLD AUTO: 0.63 K/UL — SIGNIFICANT CHANGE UP (ref 0–0.9)
MONOCYTES NFR BLD AUTO: 11.7 % — SIGNIFICANT CHANGE UP (ref 2–14)
NEUTROPHILS # BLD AUTO: 1.28 K/UL — LOW (ref 1.8–7.4)
NEUTROPHILS NFR BLD AUTO: 23.8 % — LOW (ref 43–77)
NRBC # BLD: 0 /100 WBCS — SIGNIFICANT CHANGE UP (ref 0–0)
PHOSPHATE SERPL-MCNC: 2.9 MG/DL — SIGNIFICANT CHANGE UP (ref 2.5–4.5)
PLATELET # BLD AUTO: 226 K/UL — SIGNIFICANT CHANGE UP (ref 150–400)
POTASSIUM SERPL-MCNC: 3.8 MMOL/L — SIGNIFICANT CHANGE UP (ref 3.5–5.3)
POTASSIUM SERPL-SCNC: 3.8 MMOL/L — SIGNIFICANT CHANGE UP (ref 3.5–5.3)
PROT SERPL-MCNC: 5.7 G/DL — LOW (ref 6–8.3)
RBC # BLD: 3.3 M/UL — LOW (ref 3.8–5.2)
RBC # FLD: 15.7 % — HIGH (ref 10.3–14.5)
SODIUM SERPL-SCNC: 140 MMOL/L — SIGNIFICANT CHANGE UP (ref 135–145)
WBC # BLD: 5.38 K/UL — SIGNIFICANT CHANGE UP (ref 3.8–10.5)
WBC # FLD AUTO: 5.38 K/UL — SIGNIFICANT CHANGE UP (ref 3.8–10.5)

## 2024-08-04 RX ORDER — LOSARTAN POTASSIUM 50 MG/1
50 TABLET, FILM COATED ORAL
Refills: 0 | Status: DISCONTINUED | OUTPATIENT
Start: 2024-08-04 | End: 2024-08-07

## 2024-08-04 RX ADMIN — Medication 25 MILLIGRAM(S): at 05:46

## 2024-08-04 RX ADMIN — Medication 650 MILLIGRAM(S): at 07:02

## 2024-08-04 RX ADMIN — Medication 4: at 08:54

## 2024-08-04 RX ADMIN — Medication 8: at 12:25

## 2024-08-04 RX ADMIN — PANTOPRAZOLE SODIUM 40 MILLIGRAM(S): 20 TABLET, DELAYED RELEASE ORAL at 05:46

## 2024-08-04 RX ADMIN — Medication 10 MILLIGRAM(S): at 22:07

## 2024-08-04 RX ADMIN — Medication 100 MILLIGRAM(S): at 14:51

## 2024-08-04 RX ADMIN — LOSARTAN POTASSIUM 50 MILLIGRAM(S): 50 TABLET, FILM COATED ORAL at 05:46

## 2024-08-04 RX ADMIN — DEXAMETHASONE 6 MILLIGRAM(S): 1.5 TABLET ORAL at 05:49

## 2024-08-04 RX ADMIN — Medication 500 MILLIGRAM(S): at 12:26

## 2024-08-04 RX ADMIN — Medication 220 MILLIGRAM(S): at 12:26

## 2024-08-04 RX ADMIN — APIXABAN 2.5 MILLIGRAM(S): 5 TABLET, FILM COATED ORAL at 19:00

## 2024-08-04 RX ADMIN — APIXABAN 2.5 MILLIGRAM(S): 5 TABLET, FILM COATED ORAL at 05:46

## 2024-08-04 RX ADMIN — Medication 1000 UNIT(S): at 12:26

## 2024-08-04 RX ADMIN — Medication 650 MILLIGRAM(S): at 05:47

## 2024-08-04 RX ADMIN — Medication 6: at 17:13

## 2024-08-04 RX ADMIN — LOSARTAN POTASSIUM 50 MILLIGRAM(S): 50 TABLET, FILM COATED ORAL at 19:00

## 2024-08-04 NOTE — PROGRESS NOTE ADULT - SUBJECTIVE AND OBJECTIVE BOX
Patient is seen and examined at the bed side, is afebrile. No new events, saturating well in Room air.       REVIEW OF SYSTEMS: All other review systems are negative      ALLERGIES: gabapentin (Unknown)      Vital Signs Last 24 Hrs  T(C): 36.6 (04 Aug 2024 12:49), Max: 36.6 (04 Aug 2024 05:34)  T(F): 97.8 (04 Aug 2024 12:49), Max: 97.8 (04 Aug 2024 05:34)  HR: 80 (04 Aug 2024 12:49) (77 - 80)  BP: 158/82 (04 Aug 2024 12:49) (149/74 - 158/82)  BP(mean): --  RR: 18 (04 Aug 2024 12:49) (17 - 18)  SpO2: 97% (04 Aug 2024 12:49) (96% - 97%)    Parameters below as of 04 Aug 2024 12:49  Patient On (Oxygen Delivery Method): room air      PHYSICAL EXAM:  GENERAL: Not in distress, off oxygen  CHEST/LUNG: Not using accessory muscles   EXTREMITIES: No pedal  edema  CNS: Awake and Alert      LABS:                        10.9   5.38  )-----------( 226      ( 04 Aug 2024 06:05 )             32.9                           10.9   3.71  )-----------( 207      ( 03 Aug 2024 07:40 )             32.7                08-04    140  |  113<H>  |  39<H>  ----------------------------<  166<H>  3.8   |  18<L>  |  0.90    Ca    8.3<L>      04 Aug 2024 06:05  Phos  2.9     08-04  Mg     1.9     08-04    TPro  5.7<L>  /  Alb  2.7<L>  /  TBili  0.4  /  DBili  x   /  AST  12  /  ALT  21  /  AlkPhos  41  08-04    08-03    138  |  112<H>  |  38<H>  ----------------------------<  209<H>  3.9   |  17<L>  |  0.74    Ca    8.5      03 Aug 2024 07:40  Phos  2.6     08-03  Mg     1.7     08-03    TPro  5.5<L>  /  Alb  2.7<L>  /  TBili  0.4  /  DBili  x   /  AST  15  /  ALT  24  /  AlkPhos  42  08-03    PT/INR - ( 29 Jul 2024 01:10 )   PT: 13.0 sec;   INR: 1.14 ratio      PTT - ( 29 Jul 2024 01:10 )  PTT:26.2 sec      CAPILLARY BLOOD GLUCOSE  POCT Blood Glucose.: 279 mg/dL (30 Jul 2024 10:54)  POCT Blood Glucose.: 183 mg/dL (30 Jul 2024 08:02)  POCT Blood Glucose.: 264 mg/dL (29 Jul 2024 21:37)  POCT Blood Glucose.: 180 mg/dL (29 Jul 2024 16:52)    ABG - ( 29 Jul 2024 03:06 )  pH, Arterial: 7.45  pH, Blood: x     /  pCO2: 23    /  pO2: 287   / HCO3: 16    / Base Excess: -6.0  /  SaO2: 96          MEDICATIONS  (STANDING):    apixaban 2.5 milliGRAM(s) Oral every 12 hours  ascorbic acid 500 milliGRAM(s) Oral daily  cefTRIAXone   IVPB 2000 milliGRAM(s) IV Intermittent every 24 hours  cholecalciferol 1000 Unit(s) Oral daily  dexAMETHasone  Injectable 6 milliGRAM(s) IV Push daily  insulin lispro (ADMELOG) corrective regimen sliding scale   SubCutaneous three times a day before meals  insulin lispro (ADMELOG) corrective regimen sliding scale   SubCutaneous at bedtime  lactated ringers. 1000 milliLiter(s) (50 mL/Hr) IV Continuous <Continuous>  losartan 50 milliGRAM(s) Oral two times a day  metoprolol succinate ER 25 milliGRAM(s) Oral daily  montelukast 10 milliGRAM(s) Oral at bedtime  pantoprazole    Tablet 40 milliGRAM(s) Oral before breakfast  simvastatin 10 milliGRAM(s) Oral at bedtime  zinc sulfate 220 milliGRAM(s) Oral daily        RADIOLOGY & ADDITIONAL TESTS:    7/29/24: CT Head No Cont (07.29.24 @ 05:21) No acute intracranial hemorrhage, mass effect, or midline shift.      7/29/24: CT Angio Chest PE Protocol w/ IV Cont (07.29.24 @ 05:21) No pulmonary embolus or acute finding in the chest.    No acute finding in the abdomen or pelvis. Mild compression fracture of the L1 superior endplate, new compared to prior but chronic-appearing.      7/29/24 : CT Abdomen and Pelvis w/ IV Cont (07.29.24 @ 05:21) No acute finding in the abdomen or pelvis.    Mild compression fracture of the L1 superior endplate, new compared to prior but chronic-appearing.      7/29/24 : Xray Chest 1 View- PORTABLE-Urgent (07.29.24 @ 02:10) No consolidation or infiltrate.  No pleural effusion.    Heart size cannot be accurately assessed in this projection.        MICROBIOLOGY DATA:    Culture - Blood in AM (07.31.24 @ 06:30)   Specimen Source: .Blood Blood-Peripheral  Culture Results: No growth at 4 days    Culture - Blood in AM (07.31.24 @ 06:00)   Specimen Source: .Blood Blood-Peripheral  Culture Results: No growth at 4 days    Culture - Blood (07.29.24 @ 01:05)   - Streptococcus sp. (Not Grp A, B or S pneumoniae): Detec  - Vancomycin: S 0.5  Gram Stain:   Growth in anaerobic bottle: Gram positive cocci in pairs   Growth in aerobic bottle: Gram positive cocci in pairs  - Ceftriaxone: S <=0.25  - Penicillin: S 0.06  Specimen Source: .Blood Blood-Peripheral  Organism: Streptococcus gallolyticus  Organism: Blood Culture PCR  Culture Results:   Growth in anaerobic bottle: Streptococcus gallolyticus   Growth in aerobic bottle: Gram positive cocci in pairs   Direct identification is available within approximately 3-5   hours either by Blood Panel Multiplexed PCR or Direct   MALDI-TOF. Details: [email or web address]  Organism Identification: Blood Culture PCR   Streptococcus gallolyticus    Culture - Urine (07.29.24 @ 04:31)   Specimen Source: Clean Catch  Culture Results:   >=3 organisms. Probable collection contamination.    Urine Microscopic-Add On (NC) (07.29.24 @ 04:31)   Bacteria: Many /HPF  Comment - Urine: few amorphous urates  Squamous Epithelial Cells: Present  Red Blood Cell - Urine: 4 /HPF  White Blood Cell - Urine: 8 /HPF    Culture - Blood (07.29.24 @ 01:10)   Specimen Source: .Blood Blood-Peripheral  Culture Results: No growth at 5 days    Culture - Blood (07.29.24 @ 01:05)   - Streptococcus sp. (Not Grp A, B or S pneumoniae): Detec  Gram Stain:   Growth in anaerobic bottle: Gram positive cocci in pairs   Growth in aerobic bottle: Gram positive cocci in pairs  Specimen Source: .Blood Blood-Peripheral  Organism: Blood Culture PCR  Culture Results:   Growth in anaerobic bottle: Gram positive cocci in pairs   Growth in aerobic bottle: Gram positive cocci in pairs   Direct identification is available within approximately 3-5   hours either by Blood Panel Multiplexed PCR or Direct   MALDI-TOF. Details: [email or web address]  Organism Identification: Blood Culture PCR  Method Type: PCR

## 2024-08-04 NOTE — PROGRESS NOTE ADULT - SUBJECTIVE AND OBJECTIVE BOX
Patient is a 89y old  Female who presents with a chief complaint of UTI and COVID (04 Aug 2024 06:31)  Patient is awake, alert, lying in bed in NAD. Doing well on RA. Remains on isolation due to Covid, clinically stable.     INTERVAL HPI/OVERNIGHT EVENTS:      VITAL SIGNS:  T(F): 97.8 (08-04-24 @ 05:34)  HR: 77 (08-04-24 @ 05:34)  BP: 152/78 (08-04-24 @ 05:34)  RR: 18 (08-04-24 @ 05:34)  SpO2: 96% (08-04-24 @ 05:34)  Wt(kg): --  I&O's Detail    03 Aug 2024 07:01  -  04 Aug 2024 07:00  --------------------------------------------------------  IN:  Total IN: 0 mL    OUT:    Stool (mL): 2 mL  Total OUT: 2 mL    Total NET: -2 mL              REVIEW OF SYSTEMS:    CONSTITUTIONAL:  No fevers, chills, sweats    HEENT:  Eyes:  No diplopia or blurred vision. ENT:  No earache, sore throat or runny nose.    CARDIOVASCULAR:  No pressure, squeezing, tightness, or heaviness about the chest; no palpitations.    RESPIRATORY:  Per HPI    GASTROINTESTINAL:  No abdominal pain, nausea, vomiting or diarrhea.    GENITOURINARY:  No dysuria, frequency or urgency.    NEUROLOGIC:  No paresthesias, fasciculations, seizures or weakness.    PSYCHIATRIC:  No disorder of thought or mood.      PHYSICAL EXAM:    Constitutional: Well developed and nourished  Eyes:Perrla  ENMT: normal  Neck:supple  Respiratory: good air entry  Cardiovascular: S1 S2 regular  Gastrointestinal: Soft, Non tender  Extremities: No edema  Vascular:normal  Neurological:Awake, alert,Ox3  Musculoskeletal:Normal      MEDICATIONS  (STANDING):  apixaban 2.5 milliGRAM(s) Oral every 12 hours  ascorbic acid 500 milliGRAM(s) Oral daily  cefTRIAXone   IVPB 2000 milliGRAM(s) IV Intermittent every 24 hours  cholecalciferol 1000 Unit(s) Oral daily  dexAMETHasone  Injectable 6 milliGRAM(s) IV Push daily  insulin lispro (ADMELOG) corrective regimen sliding scale   SubCutaneous three times a day before meals  insulin lispro (ADMELOG) corrective regimen sliding scale   SubCutaneous at bedtime  lactated ringers. 1000 milliLiter(s) (50 mL/Hr) IV Continuous <Continuous>  losartan 50 milliGRAM(s) Oral daily  metoprolol succinate ER 25 milliGRAM(s) Oral daily  montelukast 10 milliGRAM(s) Oral at bedtime  pantoprazole    Tablet 40 milliGRAM(s) Oral before breakfast  simvastatin 10 milliGRAM(s) Oral at bedtime  zinc sulfate 220 milliGRAM(s) Oral daily    MEDICATIONS  (PRN):  acetaminophen     Tablet .. 650 milliGRAM(s) Oral every 6 hours PRN Temp greater or equal to 38C (100.4F), Mild Pain (1 - 3)      Allergies    gabapentin (Unknown)    Intolerances        LABS:                        10.9   5.38  )-----------( 226      ( 04 Aug 2024 06:05 )             32.9     08-04    140  |  113<H>  |  39<H>  ----------------------------<  166<H>  3.8   |  18<L>  |  0.90    Ca    8.3<L>      04 Aug 2024 06:05  Phos  2.9     08-04  Mg     1.9     08-04    TPro  5.7<L>  /  Alb  2.7<L>  /  TBili  0.4  /  DBili  x   /  AST  12  /  ALT  21  /  AlkPhos  41  08-04      Urinalysis Basic - ( 04 Aug 2024 06:05 )    Color: x / Appearance: x / SG: x / pH: x  Gluc: 166 mg/dL / Ketone: x  / Bili: x / Urobili: x   Blood: x / Protein: x / Nitrite: x   Leuk Esterase: x / RBC: x / WBC x   Sq Epi: x / Non Sq Epi: x / Bacteria: x            CAPILLARY BLOOD GLUCOSE      POCT Blood Glucose.: 216 mg/dL (04 Aug 2024 08:09)  POCT Blood Glucose.: 220 mg/dL (03 Aug 2024 21:58)  POCT Blood Glucose.: 278 mg/dL (03 Aug 2024 16:48)  POCT Blood Glucose.: 347 mg/dL (03 Aug 2024 12:27)        RADIOLOGY & ADDITIONAL TESTS:    CXR:     Ct scan chest:    ekg;    echo: Patient is a 89y old  Female who presents with a chief complaint of UTI and COVID (04 Aug 2024 06:31)  Patient is awake, alert, lying in bed in NAD. Doing well on RA. Remains on isolation due to Covid. Clinically stable.     INTERVAL HPI/OVERNIGHT EVENTS:      VITAL SIGNS:  T(F): 97.8 (08-04-24 @ 05:34)  HR: 77 (08-04-24 @ 05:34)  BP: 152/78 (08-04-24 @ 05:34)  RR: 18 (08-04-24 @ 05:34)  SpO2: 96% (08-04-24 @ 05:34)  Wt(kg): --  I&O's Detail    03 Aug 2024 07:01  -  04 Aug 2024 07:00  --------------------------------------------------------  IN:  Total IN: 0 mL    OUT:    Stool (mL): 2 mL  Total OUT: 2 mL    Total NET: -2 mL              REVIEW OF SYSTEMS:    CONSTITUTIONAL:  No fevers, chills, sweats    HEENT:  Eyes:  No diplopia or blurred vision. ENT:  No earache, sore throat or runny nose.    CARDIOVASCULAR:  No pressure, squeezing, tightness, or heaviness about the chest; no palpitations.    RESPIRATORY:  Per HPI    GASTROINTESTINAL:  No abdominal pain, nausea, vomiting or diarrhea.    GENITOURINARY:  No dysuria, frequency or urgency.    NEUROLOGIC:  No paresthesias, fasciculations, seizures or weakness.    PSYCHIATRIC:  No disorder of thought or mood.      PHYSICAL EXAM:    Constitutional: Well developed and nourished  Eyes:Perrla  ENMT: normal  Neck:supple  Respiratory: good air entry  Cardiovascular: S1 S2 regular  Gastrointestinal: Soft, Non tender  Extremities: No edema  Vascular:normal  Neurological:Awake, alert,Ox3  Musculoskeletal:Normal      MEDICATIONS  (STANDING):  apixaban 2.5 milliGRAM(s) Oral every 12 hours  ascorbic acid 500 milliGRAM(s) Oral daily  cefTRIAXone   IVPB 2000 milliGRAM(s) IV Intermittent every 24 hours  cholecalciferol 1000 Unit(s) Oral daily  dexAMETHasone  Injectable 6 milliGRAM(s) IV Push daily  insulin lispro (ADMELOG) corrective regimen sliding scale   SubCutaneous three times a day before meals  insulin lispro (ADMELOG) corrective regimen sliding scale   SubCutaneous at bedtime  lactated ringers. 1000 milliLiter(s) (50 mL/Hr) IV Continuous <Continuous>  losartan 50 milliGRAM(s) Oral daily  metoprolol succinate ER 25 milliGRAM(s) Oral daily  montelukast 10 milliGRAM(s) Oral at bedtime  pantoprazole    Tablet 40 milliGRAM(s) Oral before breakfast  simvastatin 10 milliGRAM(s) Oral at bedtime  zinc sulfate 220 milliGRAM(s) Oral daily    MEDICATIONS  (PRN):  acetaminophen     Tablet .. 650 milliGRAM(s) Oral every 6 hours PRN Temp greater or equal to 38C (100.4F), Mild Pain (1 - 3)      Allergies    gabapentin (Unknown)    Intolerances        LABS:                        10.9   5.38  )-----------( 226      ( 04 Aug 2024 06:05 )             32.9     08-04    140  |  113<H>  |  39<H>  ----------------------------<  166<H>  3.8   |  18<L>  |  0.90    Ca    8.3<L>      04 Aug 2024 06:05  Phos  2.9     08-04  Mg     1.9     08-04    TPro  5.7<L>  /  Alb  2.7<L>  /  TBili  0.4  /  DBili  x   /  AST  12  /  ALT  21  /  AlkPhos  41  08-04      Urinalysis Basic - ( 04 Aug 2024 06:05 )    Color: x / Appearance: x / SG: x / pH: x  Gluc: 166 mg/dL / Ketone: x  / Bili: x / Urobili: x   Blood: x / Protein: x / Nitrite: x   Leuk Esterase: x / RBC: x / WBC x   Sq Epi: x / Non Sq Epi: x / Bacteria: x            CAPILLARY BLOOD GLUCOSE      POCT Blood Glucose.: 216 mg/dL (04 Aug 2024 08:09)  POCT Blood Glucose.: 220 mg/dL (03 Aug 2024 21:58)  POCT Blood Glucose.: 278 mg/dL (03 Aug 2024 16:48)  POCT Blood Glucose.: 347 mg/dL (03 Aug 2024 12:27)        RADIOLOGY & ADDITIONAL TESTS:    CXR:     Ct scan chest:    ekg;    echo:

## 2024-08-04 NOTE — PROGRESS NOTE ADULT - SUBJECTIVE AND OBJECTIVE BOX
Patient is a 89y old  Female who presents with a chief complaint of UTI and COVID (03 Aug 2024 14:43)    pt seen in icu [  ], reg med floor [  x ], bed [ x ], chair at bedside [   ], awake and responsive [ x ], lethargic [  ],    nad [ x ]      Allergies    gabapentin (Unknown)        Vitals    T(F): 97.8 (08-04-24 @ 05:34), Max: 97.8 (08-03-24 @ 12:50)  HR: 77 (08-04-24 @ 05:34) (77 - 82)  BP: 152/78 (08-04-24 @ 05:34) (148/91 - 173/83)  RR: 18 (08-04-24 @ 05:34) (17 - 18)  SpO2: 96% (08-04-24 @ 05:34) (96% - 98%)  Wt(kg): --  CAPILLARY BLOOD GLUCOSE      POCT Blood Glucose.: 220 mg/dL (03 Aug 2024 21:58)      Labs                          10.9   3.71  )-----------( 207      ( 03 Aug 2024 07:40 )             32.7       08-03    138  |  112<H>  |  38<H>  ----------------------------<  209<H>  3.9   |  17<L>  |  0.74    Ca    8.5      03 Aug 2024 07:40  Phos  2.6     08-03  Mg     1.7     08-03    TPro  5.5<L>  /  Alb  2.7<L>  /  TBili  0.4  /  DBili  x   /  AST  15  /  ALT  24  /  AlkPhos  42  08-03            .Blood Blood-Peripheral  07-31 @ 06:30   No growth at 72 Hours  --  --      .Blood Blood-Peripheral  07-31 @ 06:00   No growth at 72 Hours  --  --      Clean Catch  07-29 @ 04:31   >=3 organisms. Probable collection contamination.  --  --      .Blood Blood-Peripheral  07-29 @ 01:10   No growth at 5 days  --  --      .Blood Blood-Peripheral  07-29 @ 01:05   Growth in anaerobic bottle: Streptococcus gallolyticus  Growth in aerobic bottle: Streptococcus salivarius/vestibularis group  "Susceptibilities not performed"  Direct identification is available within approximately 3-5  hours either by Blood Panel Multiplexed PCR or Direct  MALDI-TOF. Details: [email or web address]  --  Blood Culture PCR  Streptococcus gallolyticus          Radiology Results      Meds    MEDICATIONS  (STANDING):  apixaban 2.5 milliGRAM(s) Oral every 12 hours  ascorbic acid 500 milliGRAM(s) Oral daily  cefTRIAXone   IVPB 2000 milliGRAM(s) IV Intermittent every 24 hours  cholecalciferol 1000 Unit(s) Oral daily  dexAMETHasone  Injectable 6 milliGRAM(s) IV Push daily  insulin lispro (ADMELOG) corrective regimen sliding scale   SubCutaneous three times a day before meals  insulin lispro (ADMELOG) corrective regimen sliding scale   SubCutaneous at bedtime  lactated ringers. 1000 milliLiter(s) (50 mL/Hr) IV Continuous <Continuous>  losartan 50 milliGRAM(s) Oral daily  metoprolol succinate ER 25 milliGRAM(s) Oral daily  montelukast 10 milliGRAM(s) Oral at bedtime  pantoprazole    Tablet 40 milliGRAM(s) Oral before breakfast  simvastatin 10 milliGRAM(s) Oral at bedtime  zinc sulfate 220 milliGRAM(s) Oral daily      MEDICATIONS  (PRN):  acetaminophen     Tablet .. 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),   Multiple myeloma/ plasmacytoma  Pulmonary embolism  S/P hysterectomy  S/P tubal ligation        PLAN    cont decadron,   completed remdesivir today 8/2/24,   cont vit c, vitamin d, zinc, pepcid,   cont singulair,   contact and airborne isolation,   cont albuterol inhaler,   pulm f/u   robitussin prn   O2 sat 96% (96% - 98%) on ra  O2 via nasal canula as needed,   blood cx with Streptococcus gallolyticus noted above   ucx with contamination   rept blood cx neg noted above  id f/u   TTE to rule out vegetation in the setting of Streptococcal Bacteremia  IF TTE  shows no vegetation then Need IV Ceftriaxone 2 g daily until 8/28/24, otherwise 6 weeks   cardio cons for r/o endocarditis   Continue Dexamethasone to complete the course  gi cons noted  Hx of gastric ca s/p resection.   No apparent active GI issues at this time.   Pt eating well without further n/v.   Recommend outpatient GI f/u.   Please reconsult inpatient GI PRN.  lispro ss,   hold outpt dm meds,  hgba1c 5.8 noted      heme onc cons  cont current meds         Patient is a 89y old  Female who presents with a chief complaint of UTI and COVID (03 Aug 2024 14:43)    pt seen in icu [  ], reg med floor [  x ], bed [ x ], chair at bedside [   ], awake and responsive [ x ], lethargic [  ],    nad [ x ]      Allergies    gabapentin (Unknown)        Vitals    T(F): 97.8 (08-04-24 @ 05:34), Max: 97.8 (08-03-24 @ 12:50)  HR: 77 (08-04-24 @ 05:34) (77 - 82)  BP: 152/78 (08-04-24 @ 05:34) (148/91 - 173/83)  RR: 18 (08-04-24 @ 05:34) (17 - 18)  SpO2: 96% (08-04-24 @ 05:34) (96% - 98%)  Wt(kg): --  CAPILLARY BLOOD GLUCOSE      POCT Blood Glucose.: 220 mg/dL (03 Aug 2024 21:58)      Labs                          10.9   3.71  )-----------( 207      ( 03 Aug 2024 07:40 )             32.7       08-03    138  |  112<H>  |  38<H>  ----------------------------<  209<H>  3.9   |  17<L>  |  0.74    Ca    8.5      03 Aug 2024 07:40  Phos  2.6     08-03  Mg     1.7     08-03    TPro  5.5<L>  /  Alb  2.7<L>  /  TBili  0.4  /  DBili  x   /  AST  15  /  ALT  24  /  AlkPhos  42  08-03            .Blood Blood-Peripheral  07-31 @ 06:30   No growth at 72 Hours  --  --      .Blood Blood-Peripheral  07-31 @ 06:00   No growth at 72 Hours  --  --      Clean Catch  07-29 @ 04:31   >=3 organisms. Probable collection contamination.  --  --      .Blood Blood-Peripheral  07-29 @ 01:10   No growth at 5 days  --  --      .Blood Blood-Peripheral  07-29 @ 01:05   Growth in anaerobic bottle: Streptococcus gallolyticus  Growth in aerobic bottle: Streptococcus salivarius/vestibularis group  "Susceptibilities not performed"  Direct identification is available within approximately 3-5  hours either by Blood Panel Multiplexed PCR or Direct  MALDI-TOF. Details: [email or web address]  --  Blood Culture PCR  Streptococcus gallolyticus          Radiology Results      Meds    MEDICATIONS  (STANDING):  apixaban 2.5 milliGRAM(s) Oral every 12 hours  ascorbic acid 500 milliGRAM(s) Oral daily  cefTRIAXone   IVPB 2000 milliGRAM(s) IV Intermittent every 24 hours  cholecalciferol 1000 Unit(s) Oral daily  dexAMETHasone  Injectable 6 milliGRAM(s) IV Push daily  insulin lispro (ADMELOG) corrective regimen sliding scale   SubCutaneous three times a day before meals  insulin lispro (ADMELOG) corrective regimen sliding scale   SubCutaneous at bedtime  lactated ringers. 1000 milliLiter(s) (50 mL/Hr) IV Continuous <Continuous>  losartan 50 milliGRAM(s) Oral daily  metoprolol succinate ER 25 milliGRAM(s) Oral daily  montelukast 10 milliGRAM(s) Oral at bedtime  pantoprazole    Tablet 40 milliGRAM(s) Oral before breakfast  simvastatin 10 milliGRAM(s) Oral at bedtime  zinc sulfate 220 milliGRAM(s) Oral daily      MEDICATIONS  (PRN):  acetaminophen     Tablet .. 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),   Multiple myeloma/ plasmacytoma  Pulmonary embolism  S/P hysterectomy  S/P tubal ligation        PLAN    cont decadron until 8/8/24,   completed remdesivir 8/2/24,   cont vit c, vitamin d, zinc, pepcid,   cont singulair,   contact and airborne isolation,   cont albuterol inhaler,   pulm f/u   robitussin prn   O2 sat 96% (96% - 98%) on ra  O2 via nasal canula as needed,   blood cx with Streptococcus gallolyticus noted above   ucx with contamination   rept blood cx neg noted above  id f/u   TTE to rule out vegetation in the setting of Streptococcal Bacteremia  IF TTE  shows no vegetation then Need IV Ceftriaxone 2 g daily until 8/28/24, otherwise 6 weeks   cardio f/u   Echocardiogram, (KEVIN not feasible since pt is COVID+).  inc cozaar 50mg qd  Continue Dexamethasone to complete the course  gi cons noted   Hx of gastric ca s/p resection.   No apparent active GI issues at this time.   Pt eating well without further n/v.   Recommend outpatient GI f/u.   Please reconsult inpatient GI PRN.  lispro ss,   hold outpt dm meds,  hgba1c 5.8 noted      heme onc cons  cont current meds

## 2024-08-04 NOTE — PROGRESS NOTE ADULT - PROBLEM SELECTOR PLAN 2
isolation precautions  oxygen supp prn  monitor oxygen sat  monitor LDH, LFTs, CRP, D-Dimer, Ferritin and procalcitonin  IV Dexa  IV Remdesivir completed  Bronchodilators  Vit C, D and Zinc supp  ID follow up  Follow up Covid PCR isolation precautions  oxygen supp prn  monitor oxygen sat  monitor LDH, LFTs, CRP, D-Dimer, Ferritin and procalcitonin  IV Dexa  Bronchodilators  Vit C, D and Zinc supp  ID follow up  Follow up Covid PCR

## 2024-08-04 NOTE — PROGRESS NOTE ADULT - SUBJECTIVE AND OBJECTIVE BOX
Date of Service 08-04-24 @ 11:32    CHIEF COMPLAINT:Patient is a 89y old  Female who presents with a chief complaint of UTI and COVID.Pt appears comfortable.    	  REVIEW OF SYSTEMS:  CONSTITUTIONAL: No fever, weight loss, or fatigue  EYES: No eye pain, visual disturbances, or discharge  ENT:  No difficulty hearing, tinnitus, vertigo; No sinus or throat pain  NECK: No pain or stiffness  RESPIRATORY: No cough, wheezing, chills or hemoptysis; No Shortness of Breath  CARDIOVASCULAR: No chest pain, palpitations, passing out, dizziness, or leg swelling  GASTROINTESTINAL: No abdominal or epigastric pain. No nausea, vomiting, or hematemesis; No diarrhea or constipation. No melena or hematochezia.  GENITOURINARY: No dysuria, frequency, hematuria, or incontinence  NEUROLOGICAL: No headaches, memory loss, loss of strength, numbness, or tremors  SKIN: No itching, burning, rashes, or lesions   LYMPH Nodes: No enlarged glands  ENDOCRINE: No heat or cold intolerance; No hair loss  MUSCULOSKELETAL: No joint pain or swelling; No muscle, back, or extremity pain  PSYCHIATRIC: No depression, anxiety, mood swings, or difficulty sleeping  HEME/LYMPH: No easy bruising, or bleeding gums  ALLERGY AND IMMUNOLOGIC: No hives or eczema	        PHYSICAL EXAM:  T(C): 36.6 (08-04-24 @ 05:34), Max: 36.6 (08-03-24 @ 12:50)  HR: 77 (08-04-24 @ 05:34) (77 - 82)  BP: 152/78 (08-04-24 @ 05:34) (148/91 - 152/78)  RR: 18 (08-04-24 @ 05:34) (17 - 18)  SpO2: 96% (08-04-24 @ 05:34) (96% - 98%)  Wt(kg): --  I&O's Summary    03 Aug 2024 07:01  -  04 Aug 2024 07:00  --------------------------------------------------------  IN: 0 mL / OUT: 2 mL / NET: -2 mL        Appearance: Normal	  HEENT:   Normal oral mucosa, PERRL, EOMI	  Lymphatic: No lymphadenopathy  Cardiovascular: Normal S1 S2, No JVD, No murmurs, No edema  Respiratory: Lungs clear to auscultation	  Psychiatry: A & O x 3, Mood & affect appropriate  Gastrointestinal:  Soft, Non-tender, + BS	  Skin: No rashes, No ecchymoses, No cyanosis	  Neurologic: Non-focal  Extremities: Normal range of motion, No clubbing, cyanosis or edema  Vascular: Peripheral pulses palpable 2+ bilaterally    MEDICATIONS  (STANDING):  apixaban 2.5 milliGRAM(s) Oral every 12 hours  ascorbic acid 500 milliGRAM(s) Oral daily  cefTRIAXone   IVPB 2000 milliGRAM(s) IV Intermittent every 24 hours  cholecalciferol 1000 Unit(s) Oral daily  dexAMETHasone  Injectable 6 milliGRAM(s) IV Push daily  insulin lispro (ADMELOG) corrective regimen sliding scale   SubCutaneous three times a day before meals  insulin lispro (ADMELOG) corrective regimen sliding scale   SubCutaneous at bedtime  lactated ringers. 1000 milliLiter(s) (50 mL/Hr) IV Continuous <Continuous>  losartan 50 milliGRAM(s) Oral daily  metoprolol succinate ER 25 milliGRAM(s) Oral daily  montelukast 10 milliGRAM(s) Oral at bedtime  pantoprazole    Tablet 40 milliGRAM(s) Oral before breakfast  simvastatin 10 milliGRAM(s) Oral at bedtime  zinc sulfate 220 milliGRAM(s) Oral daily      	  	  LABS:	 	                      10.9   5.38  )-----------( 226      ( 04 Aug 2024 06:05 )             32.9     08-04    140  |  113<H>  |  39<H>  ----------------------------<  166<H>  3.8   |  18<L>  |  0.90    Ca    8.3<L>      04 Aug 2024 06:05  Phos  2.9     08-04  Mg     1.9     08-04    TPro  5.7<L>  /  Alb  2.7<L>  /  TBili  0.4  /  DBili  x   /  AST  12  /  ALT  21  /  AlkPhos  41  08-04

## 2024-08-04 NOTE — PROGRESS NOTE ADULT - ASSESSMENT
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  1.Echocardiogram, (KVEIN not feasible since pt is COVID+).  2.COVID+-dexamethasone.  3.Strep bactermia-abx as per ID.  4.HTN-b blocker,inc cozaar 50mg bid.  5.Hx of PE-Eliquis.  6.DM-Insulin.  7.Lipid d.o-statin.  8.Multiple Myeloma-Heme/Onc f/u as outpatient.  9.PPI.

## 2024-08-04 NOTE — PROGRESS NOTE ADULT - ASSESSMENT
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 Patient is AAOx1 (to self only), complaining of diffuse abdominal pain, headache and mild chest pain. She has no fever or chills. On admission, she found to have no fever but positive COVID PCR and Blood culture positive for streptococcal species. She has started on Remdesivir, Dexamethasone and Ceftriaxone, and the ID consult requested to assist with further evaluation and antibiotic management.    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 OOB to chair  2. Continue Dexamethasone to complete the course  3. TTE to rule out vegetation in the setting of Streptococcal Bacteremia, if negative then Need IV Ceftriaxone 2 g daily until 8/28/24, otherwise 6 weeks   4. Supportive care including AC  5. Supplemental oxygenation and Bronchodilator as needed  6. COVID precautions      Attending Attestation:    Spent more than 35 minutes on total encounter, more than 50 % of the visit was spent counseling and/or coordinating care by the Attending physician

## 2024-08-05 ENCOUNTER — RESULT REVIEW (OUTPATIENT)
Age: 89
End: 2024-08-05

## 2024-08-05 LAB
ALBUMIN SERPL ELPH-MCNC: 2.7 G/DL — LOW (ref 3.5–5)
ALP SERPL-CCNC: 40 U/L — SIGNIFICANT CHANGE UP (ref 40–120)
ALT FLD-CCNC: 21 U/L DA — SIGNIFICANT CHANGE UP (ref 10–60)
ANION GAP SERPL CALC-SCNC: 9 MMOL/L — SIGNIFICANT CHANGE UP (ref 5–17)
AST SERPL-CCNC: 9 U/L — LOW (ref 10–40)
BASE EXCESS BLDV CALC-SCNC: -6.5 MMOL/L — SIGNIFICANT CHANGE UP
BASOPHILS # BLD AUTO: 0.01 K/UL — SIGNIFICANT CHANGE UP (ref 0–0.2)
BASOPHILS NFR BLD AUTO: 0.2 % — SIGNIFICANT CHANGE UP (ref 0–2)
BILIRUB SERPL-MCNC: 0.3 MG/DL — SIGNIFICANT CHANGE UP (ref 0.2–1.2)
BUN SERPL-MCNC: 44 MG/DL — HIGH (ref 7–18)
CALCIUM SERPL-MCNC: 8.3 MG/DL — LOW (ref 8.4–10.5)
CHLORIDE SERPL-SCNC: 117 MMOL/L — HIGH (ref 96–108)
CO2 SERPL-SCNC: 16 MMOL/L — LOW (ref 22–31)
CREAT SERPL-MCNC: 0.97 MG/DL — SIGNIFICANT CHANGE UP (ref 0.5–1.3)
CULTURE RESULTS: SIGNIFICANT CHANGE UP
CULTURE RESULTS: SIGNIFICANT CHANGE UP
EGFR: 57 ML/MIN/1.73M2 — LOW
EOSINOPHIL # BLD AUTO: 0 K/UL — SIGNIFICANT CHANGE UP (ref 0–0.5)
EOSINOPHIL NFR BLD AUTO: 0 % — SIGNIFICANT CHANGE UP (ref 0–6)
GLUCOSE BLDC GLUCOMTR-MCNC: 153 MG/DL — HIGH (ref 70–99)
GLUCOSE BLDC GLUCOMTR-MCNC: 218 MG/DL — HIGH (ref 70–99)
GLUCOSE BLDC GLUCOMTR-MCNC: 224 MG/DL — HIGH (ref 70–99)
GLUCOSE BLDC GLUCOMTR-MCNC: 317 MG/DL — HIGH (ref 70–99)
GLUCOSE SERPL-MCNC: 214 MG/DL — HIGH (ref 70–99)
HCO3 BLDV-SCNC: 16 MMOL/L — LOW (ref 22–29)
HCT VFR BLD CALC: 33 % — LOW (ref 34.5–45)
HGB BLD-MCNC: 10.9 G/DL — LOW (ref 11.5–15.5)
IMM GRANULOCYTES NFR BLD AUTO: 0.7 % — SIGNIFICANT CHANGE UP (ref 0–0.9)
LYMPHOCYTES # BLD AUTO: 2.31 K/UL — SIGNIFICANT CHANGE UP (ref 1–3.3)
LYMPHOCYTES # BLD AUTO: 52.5 % — HIGH (ref 13–44)
MAGNESIUM SERPL-MCNC: 2 MG/DL — SIGNIFICANT CHANGE UP (ref 1.6–2.6)
MCHC RBC-ENTMCNC: 33 GM/DL — SIGNIFICANT CHANGE UP (ref 32–36)
MCHC RBC-ENTMCNC: 33.2 PG — SIGNIFICANT CHANGE UP (ref 27–34)
MCV RBC AUTO: 100.6 FL — HIGH (ref 80–100)
MONOCYTES # BLD AUTO: 0.48 K/UL — SIGNIFICANT CHANGE UP (ref 0–0.9)
MONOCYTES NFR BLD AUTO: 10.9 % — SIGNIFICANT CHANGE UP (ref 2–14)
NEUTROPHILS # BLD AUTO: 1.57 K/UL — LOW (ref 1.8–7.4)
NEUTROPHILS NFR BLD AUTO: 35.7 % — LOW (ref 43–77)
NRBC # BLD: 0 /100 WBCS — SIGNIFICANT CHANGE UP (ref 0–0)
PCO2 BLDV: 24 MMHG — LOW (ref 39–42)
PH BLDV: 7.43 — SIGNIFICANT CHANGE UP (ref 7.32–7.43)
PHOSPHATE SERPL-MCNC: 2.8 MG/DL — SIGNIFICANT CHANGE UP (ref 2.5–4.5)
PLATELET # BLD AUTO: 235 K/UL — SIGNIFICANT CHANGE UP (ref 150–400)
PO2 BLDV: 45 MMHG — SIGNIFICANT CHANGE UP
POTASSIUM SERPL-MCNC: 3.8 MMOL/L — SIGNIFICANT CHANGE UP (ref 3.5–5.3)
POTASSIUM SERPL-SCNC: 3.8 MMOL/L — SIGNIFICANT CHANGE UP (ref 3.5–5.3)
PROT SERPL-MCNC: 5.3 G/DL — LOW (ref 6–8.3)
RBC # BLD: 3.28 M/UL — LOW (ref 3.8–5.2)
RBC # FLD: 15.9 % — HIGH (ref 10.3–14.5)
SAO2 % BLDV: 73 % — SIGNIFICANT CHANGE UP
SODIUM SERPL-SCNC: 142 MMOL/L — SIGNIFICANT CHANGE UP (ref 135–145)
SPECIMEN SOURCE: SIGNIFICANT CHANGE UP
SPECIMEN SOURCE: SIGNIFICANT CHANGE UP
WBC # BLD: 4.4 K/UL — SIGNIFICANT CHANGE UP (ref 3.8–10.5)
WBC # FLD AUTO: 4.4 K/UL — SIGNIFICANT CHANGE UP (ref 3.8–10.5)

## 2024-08-05 PROCEDURE — 93306 TTE W/DOPPLER COMPLETE: CPT | Mod: 26

## 2024-08-05 PROCEDURE — 76937 US GUIDE VASCULAR ACCESS: CPT | Mod: 26

## 2024-08-05 PROCEDURE — 36000 PLACE NEEDLE IN VEIN: CPT

## 2024-08-05 RX ORDER — METOPROLOL TARTRATE 100 MG
25 TABLET ORAL
Refills: 0 | Status: DISCONTINUED | OUTPATIENT
Start: 2024-08-05 | End: 2024-08-07

## 2024-08-05 RX ADMIN — APIXABAN 2.5 MILLIGRAM(S): 5 TABLET, FILM COATED ORAL at 18:25

## 2024-08-05 RX ADMIN — Medication 25 MILLIGRAM(S): at 18:23

## 2024-08-05 RX ADMIN — Medication 500 MILLIGRAM(S): at 11:40

## 2024-08-05 RX ADMIN — Medication 1000 UNIT(S): at 11:37

## 2024-08-05 RX ADMIN — LOSARTAN POTASSIUM 50 MILLIGRAM(S): 50 TABLET, FILM COATED ORAL at 05:34

## 2024-08-05 RX ADMIN — PANTOPRAZOLE SODIUM 40 MILLIGRAM(S): 20 TABLET, DELAYED RELEASE ORAL at 05:34

## 2024-08-05 RX ADMIN — DEXAMETHASONE 6 MILLIGRAM(S): 1.5 TABLET ORAL at 05:34

## 2024-08-05 RX ADMIN — Medication 220 MILLIGRAM(S): at 11:36

## 2024-08-05 RX ADMIN — Medication 8: at 11:36

## 2024-08-05 RX ADMIN — Medication 100 MILLIGRAM(S): at 13:36

## 2024-08-05 RX ADMIN — Medication 4: at 08:19

## 2024-08-05 RX ADMIN — Medication 25 MILLIGRAM(S): at 05:34

## 2024-08-05 RX ADMIN — Medication 2: at 17:49

## 2024-08-05 RX ADMIN — APIXABAN 2.5 MILLIGRAM(S): 5 TABLET, FILM COATED ORAL at 05:34

## 2024-08-05 RX ADMIN — Medication 10 MILLIGRAM(S): at 21:53

## 2024-08-05 RX ADMIN — LOSARTAN POTASSIUM 50 MILLIGRAM(S): 50 TABLET, FILM COATED ORAL at 18:24

## 2024-08-05 NOTE — PROGRESS NOTE ADULT - SUBJECTIVE AND OBJECTIVE BOX
Patient is a 89y old  Female who presents with a chief complaint of UTI and COVID (04 Aug 2024 14:48)    pt seen in icu [  ], reg med floor [  x ], bed [ x ], chair at bedside [   ], awake and responsive [ x ], lethargic [  ],    nad [ x ]      Allergies    gabapentin (Unknown)        Vitals    T(F): 98.6 (08-05-24 @ 05:27), Max: 98.7 (08-04-24 @ 22:00)  HR: 80 (08-05-24 @ 05:27) (80 - 84)  BP: 184/95 (08-05-24 @ 05:27) (158/82 - 184/95)  RR: 18 (08-05-24 @ 05:27) (18 - 18)  SpO2: 97% (08-05-24 @ 05:27) (97% - 97%)  Wt(kg): --  CAPILLARY BLOOD GLUCOSE      POCT Blood Glucose.: 218 mg/dL (04 Aug 2024 21:46)      Labs                          10.9   5.38  )-----------( 226      ( 04 Aug 2024 06:05 )             32.9       08-04    140  |  113<H>  |  39<H>  ----------------------------<  166<H>  3.8   |  18<L>  |  0.90    Ca    8.3<L>      04 Aug 2024 06:05  Phos  2.9     08-04  Mg     1.9     08-04    TPro  5.7<L>  /  Alb  2.7<L>  /  TBili  0.4  /  DBili  x   /  AST  12  /  ALT  21  /  AlkPhos  41  08-04            .Blood Blood-Peripheral  07-31 @ 06:30   No growth at 4 days  --  --      .Blood Blood-Peripheral  07-31 @ 06:00   No growth at 4 days  --  --      Clean Catch  07-29 @ 04:31   >=3 organisms. Probable collection contamination.  --  --      .Blood Blood-Peripheral  07-29 @ 01:10   No growth at 5 days  --  --      .Blood Blood-Peripheral  07-29 @ 01:05   Growth in anaerobic bottle: Streptococcus gallolyticus  Growth in aerobic bottle: Streptococcus salivarius/vestibularis group  "Susceptibilities not performed"  Direct identification is available within approximately 3-5  hours either by Blood Panel Multiplexed PCR or Direct  MALDI-TOF. Details: [email or web address]  --  Blood Culture PCR  Streptococcus gallolyticus          Radiology Results      Meds    MEDICATIONS  (STANDING):  apixaban 2.5 milliGRAM(s) Oral every 12 hours  ascorbic acid 500 milliGRAM(s) Oral daily  cefTRIAXone   IVPB 2000 milliGRAM(s) IV Intermittent every 24 hours  cholecalciferol 1000 Unit(s) Oral daily  dexAMETHasone  Injectable 6 milliGRAM(s) IV Push daily  insulin lispro (ADMELOG) corrective regimen sliding scale   SubCutaneous three times a day before meals  insulin lispro (ADMELOG) corrective regimen sliding scale   SubCutaneous at bedtime  lactated ringers. 1000 milliLiter(s) (50 mL/Hr) IV Continuous <Continuous>  losartan 50 milliGRAM(s) Oral two times a day  metoprolol succinate ER 25 milliGRAM(s) Oral daily  montelukast 10 milliGRAM(s) Oral at bedtime  pantoprazole    Tablet 40 milliGRAM(s) Oral before breakfast  simvastatin 10 milliGRAM(s) Oral at bedtime  zinc sulfate 220 milliGRAM(s) Oral daily      MEDICATIONS  (PRN):  acetaminophen     Tablet .. 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),   Multiple myeloma/ plasmacytoma  Pulmonary embolism  S/P hysterectomy  S/P tubal ligation        PLAN    cont decadron until 8/8/24,   completed remdesivir 8/2/24,   cont vit c, vitamin d, zinc, pepcid,   cont singulair,   contact and airborne isolation,   cont albuterol inhaler,   pulm f/u   robitussin prn   O2 sat 96% (96% - 98%) on ra  O2 via nasal canula as needed,   blood cx with Streptococcus gallolyticus noted above   ucx with contamination   rept blood cx neg noted above  id f/u   TTE to rule out vegetation in the setting of Streptococcal Bacteremia  IF TTE  shows no vegetation then Need IV Ceftriaxone 2 g daily until 8/28/24, otherwise 6 weeks   cardio f/u   Echocardiogram, (KEVIN not feasible since pt is COVID+).  inc cozaar 50mg qd  Continue Dexamethasone to complete the course  gi cons noted   Hx of gastric ca s/p resection.   No apparent active GI issues at this time.   Pt eating well without further n/v.   Recommend outpatient GI f/u.   Please reconsult inpatient GI PRN.  lispro ss,   hold outpt dm meds,  hgba1c 5.8 noted      heme onc cons  cont current meds         Patient is a 89y old  Female who presents with a chief complaint of UTI and COVID (04 Aug 2024 14:48)    pt seen in icu [  ], reg med floor [  x ], bed [ x ], chair at bedside [   ], awake and responsive [ x ], lethargic [  ],    nad [ x ]      Allergies    gabapentin (Unknown)        Vitals    T(F): 98.6 (08-05-24 @ 05:27), Max: 98.7 (08-04-24 @ 22:00)  HR: 80 (08-05-24 @ 05:27) (80 - 84)  BP: 184/95 (08-05-24 @ 05:27) (158/82 - 184/95)  RR: 18 (08-05-24 @ 05:27) (18 - 18)  SpO2: 97% (08-05-24 @ 05:27) (97% - 97%)  Wt(kg): --  CAPILLARY BLOOD GLUCOSE      POCT Blood Glucose.: 218 mg/dL (04 Aug 2024 21:46)      Labs                          10.9   5.38  )-----------( 226      ( 04 Aug 2024 06:05 )             32.9       08-04    140  |  113<H>  |  39<H>  ----------------------------<  166<H>  3.8   |  18<L>  |  0.90    Ca    8.3<L>      04 Aug 2024 06:05  Phos  2.9     08-04  Mg     1.9     08-04    TPro  5.7<L>  /  Alb  2.7<L>  /  TBili  0.4  /  DBili  x   /  AST  12  /  ALT  21  /  AlkPhos  41  08-04            .Blood Blood-Peripheral  07-31 @ 06:30   No growth at 4 days  --  --      .Blood Blood-Peripheral  07-31 @ 06:00   No growth at 4 days  --  --      Clean Catch  07-29 @ 04:31   >=3 organisms. Probable collection contamination.  --  --      .Blood Blood-Peripheral  07-29 @ 01:10   No growth at 5 days  --  --      .Blood Blood-Peripheral  07-29 @ 01:05   Growth in anaerobic bottle: Streptococcus gallolyticus  Growth in aerobic bottle: Streptococcus salivarius/vestibularis group  "Susceptibilities not performed"  Direct identification is available within approximately 3-5  hours either by Blood Panel Multiplexed PCR or Direct  MALDI-TOF. Details: [email or web address]  --  Blood Culture PCR  Streptococcus gallolyticus          Radiology Results      Meds    MEDICATIONS  (STANDING):  apixaban 2.5 milliGRAM(s) Oral every 12 hours  ascorbic acid 500 milliGRAM(s) Oral daily  cefTRIAXone   IVPB 2000 milliGRAM(s) IV Intermittent every 24 hours  cholecalciferol 1000 Unit(s) Oral daily  dexAMETHasone  Injectable 6 milliGRAM(s) IV Push daily  insulin lispro (ADMELOG) corrective regimen sliding scale   SubCutaneous three times a day before meals  insulin lispro (ADMELOG) corrective regimen sliding scale   SubCutaneous at bedtime  lactated ringers. 1000 milliLiter(s) (50 mL/Hr) IV Continuous <Continuous>  losartan 50 milliGRAM(s) Oral two times a day  metoprolol succinate ER 25 milliGRAM(s) Oral daily  montelukast 10 milliGRAM(s) Oral at bedtime  pantoprazole    Tablet 40 milliGRAM(s) Oral before breakfast  simvastatin 10 milliGRAM(s) Oral at bedtime  zinc sulfate 220 milliGRAM(s) Oral daily      MEDICATIONS  (PRN):  acetaminophen     Tablet .. 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),   Multiple myeloma/ plasmacytoma  Pulmonary embolism  S/P hysterectomy  S/P tubal ligation        PLAN    cont decadron until 8/8/24,   completed remdesivir 8/2/24,   cont vit c, vitamin d, zinc, pepcid,   cont singulair,   contact and airborne isolation,   cont albuterol inhaler,   pulm f/u   robitussin prn   O2 sat 96% (96% - 98%) on ra  O2 via nasal canula as needed,   blood cx with Streptococcus gallolyticus noted above   ucx with contamination   rept blood cx neg noted above  id f/u   TTE to rule out vegetation in the setting of Streptococcal Bacteremia  IF TTE  shows no vegetation then Need IV Ceftriaxone 2 g daily until 8/28/24, otherwise 6 weeks   Continue Dexamethasone to complete the course   ir for picc line placement for long term abx  cardio f/u   Echocardiogram, (KEVIN not feasible since pt is COVID+).  cont cozaar 50mg qd and lopressor   gi cons noted   Hx of gastric ca s/p resection.   No apparent active GI issues at this time.   Pt eating well without further n/v.   Recommend outpatient GI f/u.   Please reconsult inpatient GI PRN.  lispro ss,   hold outpt dm meds,  hgba1c 5.8 noted      heme onc cons outpt  cont current meds   d/c planning

## 2024-08-05 NOTE — PROGRESS NOTE ADULT - SUBJECTIVE AND OBJECTIVE BOX
Patient is a 89y old  Female who presents with a chief complaint of UTI and COVID (05 Aug 2024 06:31)  Awake, alert, comfortable in bed in NAD. Still on isolation due to Covid    INTERVAL HPI/OVERNIGHT EVENTS:      VITAL SIGNS:  T(F): 98.6 (08-05-24 @ 05:27)  HR: 69 (08-05-24 @ 07:14)  BP: 184/82 (08-05-24 @ 07:14)  RR: 18 (08-05-24 @ 05:27)  SpO2: 97% (08-05-24 @ 05:27)  Wt(kg): --  I&O's Detail          REVIEW OF SYSTEMS:    CONSTITUTIONAL:  No fevers, chills, sweats    HEENT:  Eyes:  No diplopia or blurred vision. ENT:  No earache, sore throat or runny nose.    CARDIOVASCULAR:  No pressure, squeezing, tightness, or heaviness about the chest; no palpitations.    RESPIRATORY:  Per HPI    GASTROINTESTINAL:  No abdominal pain, nausea, vomiting or diarrhea.    GENITOURINARY:  No dysuria, frequency or urgency.    NEUROLOGIC:  No paresthesias, fasciculations, seizures or weakness.    PSYCHIATRIC:  No disorder of thought or mood.      PHYSICAL EXAM:    Constitutional: Well developed and nourished  Eyes:Perrla  ENMT: normal  Neck:supple  Respiratory: good air entry  Cardiovascular: S1 S2 regular  Gastrointestinal: Soft, Non tender  Extremities: No edema  Vascular:normal  Neurological:Awake, alert,Ox3  Musculoskeletal:Normal      MEDICATIONS  (STANDING):  apixaban 2.5 milliGRAM(s) Oral every 12 hours  ascorbic acid 500 milliGRAM(s) Oral daily  cefTRIAXone   IVPB 2000 milliGRAM(s) IV Intermittent every 24 hours  cholecalciferol 1000 Unit(s) Oral daily  dexAMETHasone  Injectable 6 milliGRAM(s) IV Push daily  insulin lispro (ADMELOG) corrective regimen sliding scale   SubCutaneous at bedtime  insulin lispro (ADMELOG) corrective regimen sliding scale   SubCutaneous three times a day before meals  lactated ringers. 1000 milliLiter(s) (50 mL/Hr) IV Continuous <Continuous>  losartan 50 milliGRAM(s) Oral two times a day  metoprolol succinate ER 25 milliGRAM(s) Oral daily  montelukast 10 milliGRAM(s) Oral at bedtime  pantoprazole    Tablet 40 milliGRAM(s) Oral before breakfast  simvastatin 10 milliGRAM(s) Oral at bedtime  zinc sulfate 220 milliGRAM(s) Oral daily    MEDICATIONS  (PRN):  acetaminophen     Tablet .. 650 milliGRAM(s) Oral every 6 hours PRN Temp greater or equal to 38C (100.4F), Mild Pain (1 - 3)      Allergies    gabapentin (Unknown)    Intolerances        LABS:                        10.9   4.40  )-----------( 235      ( 05 Aug 2024 07:10 )             33.0     08-05    142  |  117<H>  |  44<H>  ----------------------------<  214<H>  3.8   |  16<L>  |  0.97    Ca    8.3<L>      05 Aug 2024 07:10  Phos  2.8     08-05  Mg     2.0     08-05    TPro  5.3<L>  /  Alb  2.7<L>  /  TBili  0.3  /  DBili  x   /  AST  9<L>  /  ALT  21  /  AlkPhos  40  08-05      Urinalysis Basic - ( 05 Aug 2024 07:10 )    Color: x / Appearance: x / SG: x / pH: x  Gluc: 214 mg/dL / Ketone: x  / Bili: x / Urobili: x   Blood: x / Protein: x / Nitrite: x   Leuk Esterase: x / RBC: x / WBC x   Sq Epi: x / Non Sq Epi: x / Bacteria: x        COVID-19 PCR (07.29.24 @ 01:10)   COVID-19 PCR: Detected: EUA/IVD     CAPILLARY BLOOD GLUCOSE      POCT Blood Glucose.: 218 mg/dL (05 Aug 2024 08:09)  POCT Blood Glucose.: 218 mg/dL (04 Aug 2024 21:46)  POCT Blood Glucose.: 283 mg/dL (04 Aug 2024 16:32)  POCT Blood Glucose.: 326 mg/dL (04 Aug 2024 12:00)        RADIOLOGY & ADDITIONAL TESTS:  < from: CT Head No Cont (07.29.24 @ 05:21) >  IMPRESSION:  No acute intracranial hemorrhage, mass effect, or midline shift.      < end of copied text >    CXR:    Ct scan chest:    ekg;    echo:

## 2024-08-05 NOTE — DIETITIAN INITIAL EVALUATION ADULT - PERTINENT LABORATORY DATA
08-05    142  |  117<H>  |  44<H>  ----------------------------<  214<H>  3.8   |  16<L>  |  0.97    Ca    8.3<L>      05 Aug 2024 07:10  Phos  2.8     08-05  Mg     2.0     08-05    TPro  5.3<L>  /  Alb  2.7<L>  /  TBili  0.3  /  DBili  x   /  AST  9<L>  /  ALT  21  /  AlkPhos  40  08-05  POCT Blood Glucose.: 317 mg/dL (08-05-24 @ 11:27)  A1C with Estimated Average Glucose Result: 5.8 % (07-30-24 @ 07:05)

## 2024-08-05 NOTE — PROGRESS NOTE ADULT - ASSESSMENT
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  1.Echocardiogram, (KEVIN not feasible since pt is COVID+).  2.COVID+-dexamethasone.  3.Strep bactermia-abx as per ID.  4.HTN-b blocker inc 25mg bid, cozaar 50mg bid.  5.Hx of PE-Eliquis.  6.DM-Insulin.  7.Lipid d.o-statin.  8.Multiple Myeloma-Heme/Onc f/u as outpatient.  9.PPI.

## 2024-08-05 NOTE — PROGRESS NOTE ADULT - SUBJECTIVE AND OBJECTIVE BOX
Date of Service 08-05-24 @ 11:37    CHIEF COMPLAINT:Patient is a 89y old  Female who presents with a chief complaint of UTI and COVID.Pt appears comfortable.    	  REVIEW OF SYSTEMS:  CONSTITUTIONAL: No fever, weight loss, or fatigue  EYES: No eye pain, visual disturbances, or discharge  ENT:  No difficulty hearing, tinnitus, vertigo; No sinus or throat pain  NECK: No pain or stiffness  RESPIRATORY: No cough, wheezing, chills or hemoptysis; No Shortness of Breath  CARDIOVASCULAR: No chest pain, palpitations, passing out, dizziness, or leg swelling  GASTROINTESTINAL: No abdominal or epigastric pain. No nausea, vomiting, or hematemesis; No diarrhea or constipation. No melena or hematochezia.  GENITOURINARY: No dysuria, frequency, hematuria, or incontinence  NEUROLOGICAL: No headaches, memory loss, loss of strength, numbness, or tremors  SKIN: No itching, burning, rashes, or lesions   LYMPH Nodes: No enlarged glands  ENDOCRINE: No heat or cold intolerance; No hair loss  MUSCULOSKELETAL: No joint pain or swelling; No muscle, back, or extremity pain  PSYCHIATRIC: No depression, anxiety, mood swings, or difficulty sleeping  HEME/LYMPH: No easy bruising, or bleeding gums  ALLERGY AND IMMUNOLOGIC: No hives or eczema	      PHYSICAL EXAM:  T(C): 37 (08-05-24 @ 05:27), Max: 37.1 (08-04-24 @ 22:00)  HR: 69 (08-05-24 @ 07:14) (69 - 84)  BP: 184/82 (08-05-24 @ 07:14) (158/82 - 184/95)  RR: 18 (08-05-24 @ 05:27) (18 - 18)  SpO2: 97% (08-05-24 @ 05:27) (97% - 97%)  Wt(kg): --  I&O's Summary      Appearance: Normal	  HEENT:   Normal oral mucosa, PERRL, EOMI	  Lymphatic: No lymphadenopathy  Cardiovascular: Normal S1 S2, No JVD, No murmurs, No edema  Respiratory:B/L ronchi	  Psychiatry: A & O x 3, Mood & affect appropriate  Gastrointestinal:  Soft, Non-tender, + BS	  Skin: No rashes, No ecchymoses, No cyanosis	  Neurologic: Non-focal  Extremities: Normal range of motion, No clubbing, cyanosis or edema  Vascular: Peripheral pulses palpable 2+ bilaterally    MEDICATIONS  (STANDING):  apixaban 2.5 milliGRAM(s) Oral every 12 hours  ascorbic acid 500 milliGRAM(s) Oral daily  cefTRIAXone   IVPB 2000 milliGRAM(s) IV Intermittent every 24 hours  cholecalciferol 1000 Unit(s) Oral daily  dexAMETHasone  Injectable 6 milliGRAM(s) IV Push daily  insulin lispro (ADMELOG) corrective regimen sliding scale   SubCutaneous three times a day before meals  insulin lispro (ADMELOG) corrective regimen sliding scale   SubCutaneous at bedtime  lactated ringers. 1000 milliLiter(s) (50 mL/Hr) IV Continuous <Continuous>  losartan 50 milliGRAM(s) Oral two times a day  metoprolol tartrate 25 milliGRAM(s) Oral two times a day  montelukast 10 milliGRAM(s) Oral at bedtime  pantoprazole    Tablet 40 milliGRAM(s) Oral before breakfast  simvastatin 10 milliGRAM(s) Oral at bedtime  zinc sulfate 220 milliGRAM(s) Oral daily        LABS:	 	                        10.9   4.40  )-----------( 235      ( 05 Aug 2024 07:10 )             33.0     08-05    142  |  117<H>  |  44<H>  ----------------------------<  214<H>  3.8   |  16<L>  |  0.97    Ca    8.3<L>      05 Aug 2024 07:10  Phos  2.8     08-05  Mg     2.0     08-05    TPro  5.3<L>  /  Alb  2.7<L>  /  TBili  0.3  /  DBili  x   /  AST  9<L>  /  ALT  21  /  AlkPhos  40  08-05

## 2024-08-05 NOTE — DIETITIAN INITIAL EVALUATION ADULT - FUNCTIONAL SCREEN CURRENT LEVEL: SWALLOWING (IF SCORE 2 OR MORE FOR ANY ITEM, CONSULT REHAB SERVICES), MLM)
Goal Outcome Evaluation:         Pt up to bedside commode with 2 assist. Pt memory seems to be improving. This am pt would ask repetitive questions and now pt seems to be aware of the situation.Pt po intake remains poor. Pt seems to pocket food. Pt drinking fluids without any issues at this time and taking pills whole without issues .          0 = swallows foods/liquids without difficulty

## 2024-08-05 NOTE — DIETITIAN INITIAL EVALUATION ADULT - OTHER INFO
Pt  Seen for LOS. Pt Asleep. D/W  RN. Pt with Good appetite. Pt is A & O x2. Po tolerated. Daughter helps with feeding. Labs noted. No overt s/sx of Muscle  mass loss Noted. Pt w H/O DM. PO tolerated. No N/V reported. GI on case.  PT  W h/o gastric CA,  H/O distal gastrectomy. HT E ST ~ 5 FEET.. PER H& P Pt is bed  bound.  Pt  Seen for LOS. Pt Asleep. D/W  RN. Pt with Good appetite. Pt is A & O x2. Po tolerated. Daughter helps with feeding. Labs noted. No overt s/sx of Muscle  mass loss Noted. Pt w H/O DM. PO tolerated. No N/V reported. GI on case.  PT  W h/o gastric CA,  H/O distal gastrectomy. HT E ST ~ 5 FEET.. PER H& P Pt is bed  bound.  will f/u with Pt daughter.

## 2024-08-05 NOTE — PROGRESS NOTE ADULT - SUBJECTIVE AND OBJECTIVE BOX
Patient is seen and examined at the bed side, is afebrile. She is doing better, had echo done today.      REVIEW OF SYSTEMS: All other review systems are negative      ALLERGIES: gabapentin (Unknown)      Vital Signs Last 24 Hrs  T(C): 36.9 (05 Aug 2024 12:56), Max: 37.1 (04 Aug 2024 22:00)  T(F): 98.4 (05 Aug 2024 12:56), Max: 98.7 (04 Aug 2024 22:00)  HR: 80 (05 Aug 2024 12:56) (69 - 84)  BP: 147/81 (05 Aug 2024 12:56) (147/81 - 184/95)  BP(mean): --  RR: 18 (05 Aug 2024 12:56) (18 - 18)  SpO2: 97% (05 Aug 2024 12:56) (97% - 97%)    Parameters below as of 05 Aug 2024 12:56  Patient On (Oxygen Delivery Method): room air      PHYSICAL EXAM:  GENERAL: Not in distress, off oxygen  CHEST/LUNG: Not using accessory muscles   EXTREMITIES: No pedal  edema  CNS: Awake and Alert      LABS:                        10.9   4.40  )-----------( 235      ( 05 Aug 2024 07:10 )             33.0                           10.9   5.38  )-----------( 226      ( 04 Aug 2024 06:05 )             32.9               08-05    142  |  117<H>  |  44<H>  ----------------------------<  214<H>  3.8   |  16<L>  |  0.97    Ca    8.3<L>      05 Aug 2024 07:10  Phos  2.8     08-05  Mg     2.0     08-05    TPro  5.3<L>  /  Alb  2.7<L>  /  TBili  0.3  /  DBili  x   /  AST  9<L>  /  ALT  21  /  AlkPhos  40  08-05           08-04    140  |  113<H>  |  39<H>  ----------------------------<  166<H>  3.8   |  18<L>  |  0.90    Ca    8.3<L>      04 Aug 2024 06:05  Phos  2.9     08-04  Mg     1.9     08-04    TPro  5.7<L>  /  Alb  2.7<L>  /  TBili  0.4  /  DBili  x   /  AST  12  /  ALT  21  /  AlkPhos  41  08-04    PT/INR - ( 29 Jul 2024 01:10 )   PT: 13.0 sec;   INR: 1.14 ratio      PTT - ( 29 Jul 2024 01:10 )  PTT:26.2 sec      CAPILLARY BLOOD GLUCOSE  POCT Blood Glucose.: 279 mg/dL (30 Jul 2024 10:54)  POCT Blood Glucose.: 183 mg/dL (30 Jul 2024 08:02)  POCT Blood Glucose.: 264 mg/dL (29 Jul 2024 21:37)  POCT Blood Glucose.: 180 mg/dL (29 Jul 2024 16:52)    ABG - ( 29 Jul 2024 03:06 )  pH, Arterial: 7.45  pH, Blood: x     /  pCO2: 23    /  pO2: 287   / HCO3: 16    / Base Excess: -6.0  /  SaO2: 96          MEDICATIONS  (STANDING):    apixaban 2.5 milliGRAM(s) Oral every 12 hours  ascorbic acid 500 milliGRAM(s) Oral daily  cefTRIAXone   IVPB 2000 milliGRAM(s) IV Intermittent every 24 hours  cholecalciferol 1000 Unit(s) Oral daily  dexAMETHasone  Injectable 6 milliGRAM(s) IV Push daily  insulin lispro (ADMELOG) corrective regimen sliding scale   SubCutaneous three times a day before meals  insulin lispro (ADMELOG) corrective regimen sliding scale   SubCutaneous at bedtime  lactated ringers. 1000 milliLiter(s) (50 mL/Hr) IV Continuous <Continuous>  losartan 50 milliGRAM(s) Oral two times a day  metoprolol tartrate 25 milliGRAM(s) Oral two times a day  montelukast 10 milliGRAM(s) Oral at bedtime  pantoprazole    Tablet 40 milliGRAM(s) Oral before breakfast  simvastatin 10 milliGRAM(s) Oral at bedtime  zinc sulfate 220 milliGRAM(s) Oral daily      RADIOLOGY & ADDITIONAL TESTS:    7/29/24: CT Head No Cont (07.29.24 @ 05:21) No acute intracranial hemorrhage, mass effect, or midline shift.      7/29/24: CT Angio Chest PE Protocol w/ IV Cont (07.29.24 @ 05:21) No pulmonary embolus or acute finding in the chest.    No acute finding in the abdomen or pelvis. Mild compression fracture of the L1 superior endplate, new compared to prior but chronic-appearing.      7/29/24 : CT Abdomen and Pelvis w/ IV Cont (07.29.24 @ 05:21) No acute finding in the abdomen or pelvis.    Mild compression fracture of the L1 superior endplate, new compared to prior but chronic-appearing.      7/29/24 : Xray Chest 1 View- PORTABLE-Urgent (07.29.24 @ 02:10) No consolidation or infiltrate.  No pleural effusion.    Heart size cannot be accurately assessed in this projection.        MICROBIOLOGY DATA:    Culture - Blood in AM (07.31.24 @ 06:30)   Specimen Source: .Blood Blood-Peripheral  Culture Results: No growth at 4 days    Culture - Blood in AM (07.31.24 @ 06:00)   Specimen Source: .Blood Blood-Peripheral  Culture Results: No growth at 4 days    Culture - Blood (07.29.24 @ 01:05)   - Streptococcus sp. (Not Grp A, B or S pneumoniae): Detec  - Vancomycin: S 0.5  Gram Stain:   Growth in anaerobic bottle: Gram positive cocci in pairs   Growth in aerobic bottle: Gram positive cocci in pairs  - Ceftriaxone: S <=0.25  - Penicillin: S 0.06  Specimen Source: .Blood Blood-Peripheral  Organism: Streptococcus gallolyticus  Organism: Blood Culture PCR  Culture Results:   Growth in anaerobic bottle: Streptococcus gallolyticus   Growth in aerobic bottle: Gram positive cocci in pairs   Direct identification is available within approximately 3-5   hours either by Blood Panel Multiplexed PCR or Direct   MALDI-TOF. Details: [email or web address]  Organism Identification: Blood Culture PCR   Streptococcus gallolyticus    Culture - Urine (07.29.24 @ 04:31)   Specimen Source: Clean Catch  Culture Results:   >=3 organisms. Probable collection contamination.    Urine Microscopic-Add On (NC) (07.29.24 @ 04:31)   Bacteria: Many /HPF  Comment - Urine: few amorphous urates  Squamous Epithelial Cells: Present  Red Blood Cell - Urine: 4 /HPF  White Blood Cell - Urine: 8 /HPF    Culture - Blood (07.29.24 @ 01:10)   Specimen Source: .Blood Blood-Peripheral  Culture Results: No growth at 5 days    Culture - Blood (07.29.24 @ 01:05)   - Streptococcus sp. (Not Grp A, B or S pneumoniae): Detec  Gram Stain:   Growth in anaerobic bottle: Gram positive cocci in pairs   Growth in aerobic bottle: Gram positive cocci in pairs  Specimen Source: .Blood Blood-Peripheral  Organism: Blood Culture PCR  Culture Results:   Growth in anaerobic bottle: Gram positive cocci in pairs   Growth in aerobic bottle: Gram positive cocci in pairs   Direct identification is available within approximately 3-5   hours either by Blood Panel Multiplexed PCR or Direct   MALDI-TOF. Details: [email or web address]  Organism Identification: Blood Culture PCR  Method Type: PCR

## 2024-08-05 NOTE — PROGRESS NOTE ADULT - ASSESSMENT
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 Admitted for AHRF 2/2 COVID, UTI and bacteremia.  ID recommending TTE, to r/o vegetation.  KEVIN unable to be performed due to COVID infection.

## 2024-08-05 NOTE — PROGRESS NOTE ADULT - SUBJECTIVE AND OBJECTIVE BOX
NP Note discussed with  Primary Attending    Patient is a 89y old  Female who presents with a chief complaint of UTI and COVID (05 Aug 2024 12:29)      INTERVAL HPI/OVERNIGHT EVENTS: no new complaints    MEDICATIONS  (STANDING):  apixaban 2.5 milliGRAM(s) Oral every 12 hours  ascorbic acid 500 milliGRAM(s) Oral daily  cefTRIAXone   IVPB 2000 milliGRAM(s) IV Intermittent every 24 hours  cholecalciferol 1000 Unit(s) Oral daily  dexAMETHasone  Injectable 6 milliGRAM(s) IV Push daily  insulin lispro (ADMELOG) corrective regimen sliding scale   SubCutaneous at bedtime  insulin lispro (ADMELOG) corrective regimen sliding scale   SubCutaneous three times a day before meals  lactated ringers. 1000 milliLiter(s) (50 mL/Hr) IV Continuous <Continuous>  losartan 50 milliGRAM(s) Oral two times a day  metoprolol tartrate 25 milliGRAM(s) Oral two times a day  montelukast 10 milliGRAM(s) Oral at bedtime  pantoprazole    Tablet 40 milliGRAM(s) Oral before breakfast  simvastatin 10 milliGRAM(s) Oral at bedtime  zinc sulfate 220 milliGRAM(s) Oral daily    MEDICATIONS  (PRN):  acetaminophen     Tablet .. 650 milliGRAM(s) Oral every 6 hours PRN Temp greater or equal to 38C (100.4F), Mild Pain (1 - 3)      __________________________________________________  REVIEW OF SYSTEMS:    CONSTITUTIONAL: No fever,   EYES: no acute visual disturbances  NECK: No pain or stiffness  RESPIRATORY: No cough; No shortness of breath  CARDIOVASCULAR: No chest pain, no palpitations  GASTROINTESTINAL: No pain. No nausea or vomiting; No diarrhea   NEUROLOGICAL: No headache or numbness, no tremors  MUSCULOSKELETAL: No joint pain, no muscle pain  GENITOURINARY: no dysuria, no frequency, no hesitancy  PSYCHIATRY: no depression , no anxiety  ALL OTHER  ROS negative        Vital Signs Last 24 Hrs  T(C): 36.9 (05 Aug 2024 12:56), Max: 37.1 (04 Aug 2024 22:00)  T(F): 98.4 (05 Aug 2024 12:56), Max: 98.7 (04 Aug 2024 22:00)  HR: 80 (05 Aug 2024 12:56) (69 - 84)  BP: 147/81 (05 Aug 2024 12:56) (147/81 - 184/95)  BP(mean): --  RR: 18 (05 Aug 2024 12:56) (18 - 18)  SpO2: 97% (05 Aug 2024 12:56) (97% - 97%)    Parameters below as of 05 Aug 2024 12:56  Patient On (Oxygen Delivery Method): room air        ________________________________________________  PHYSICAL EXAM:  GENERAL: NAD  HEENT: Normocephalic;  conjunctivae and sclerae clear; moist mucous membranes;   NECK : supple  CHEST/LUNG: Clear to auscultation bilaterally with good air entry   HEART: S1 S2  regular; no murmurs, gallops or rubs  ABDOMEN: Soft, Nontender, Nondistended; Bowel sounds present  EXTREMITIES: no cyanosis; no edema; no calf tenderness  SKIN: warm and dry; no rash  NERVOUS SYSTEM:  Awake and alert; Oriented  to place, person and time ; no new deficits    _________________________________________________  LABS:                        10.9   4.40  )-----------( 235      ( 05 Aug 2024 07:10 )             33.0     08-05    142  |  117<H>  |  44<H>  ----------------------------<  214<H>  3.8   |  16<L>  |  0.97    Ca    8.3<L>      05 Aug 2024 07:10  Phos  2.8     08-05  Mg     2.0     08-05    TPro  5.3<L>  /  Alb  2.7<L>  /  TBili  0.3  /  DBili  x   /  AST  9<L>  /  ALT  21  /  AlkPhos  40  08-05      Urinalysis Basic - ( 05 Aug 2024 07:10 )    Color: x / Appearance: x / SG: x / pH: x  Gluc: 214 mg/dL / Ketone: x  / Bili: x / Urobili: x   Blood: x / Protein: x / Nitrite: x   Leuk Esterase: x / RBC: x / WBC x   Sq Epi: x / Non Sq Epi: x / Bacteria: x      CAPILLARY BLOOD GLUCOSE      POCT Blood Glucose.: 317 mg/dL (05 Aug 2024 11:27)  POCT Blood Glucose.: 218 mg/dL (05 Aug 2024 08:09)  POCT Blood Glucose.: 218 mg/dL (04 Aug 2024 21:46)  POCT Blood Glucose.: 283 mg/dL (04 Aug 2024 16:32)        RADIOLOGY & ADDITIONAL TESTS:  < from: CT Head No Cont (07.29.24 @ 05:21) >    ACC: [id number] EXAM:  CT BRAIN   ORDERED BY: JAYCEE CARMEN     PROCEDURE DATE:  07/29/2024          INTERPRETATION:  CLINICAL INFORMATION: AMS    COMPARISON: CT head 10/24/2023    CONTRAST:  IV Contrast: NONE  Complications: None reported at time of study completion    TECHNIQUE:  Serial axial images were obtained from the skull base to the   vertex using multi-slice helical technique. Sagittal and coronal   reformats were obtained.    FINDINGS:    VENTRICLES AND SULCI: Age appropriate involutional changes.  INTRA-AXIAL: No mass effect, acute hemorrhage, or midline shift.  There   are periventricular and subcortical white matter hypodensities,   consistent with microvascular type changes.  EXTRA-AXIAL: No mass or fluid collection. Basal cisterns are normal in   appearance.    VISUALIZED SINUSES:  Scattered mucosal thickening.  TYMPANOMASTOID CAVITIES:  Clear.  VISUALIZED ORBITS: Bilateral lens replacement.  CALVARIUM: Intact.    MISCELLANEOUS: Atherosclerotic calcifications of the intracranial   vasculature.      IMPRESSION:  No acute intracranial hemorrhage, mass effect, or midline shift.        --- End of Report ---    < end of copied text >  < from: CT Angio Chest PE Protocol w/ IV Cont (07.29.24 @ 05:21) >  ACC: [id number] EXAM:  CT ANGIO CHEST PULM ART WAWIC   ORDERED BY: JAYCEE CARMEN     ACC: [id number] EXAM:  CT ABDOMEN AND PELVIS IC   ORDERED BY: JAYCEE CARMEN     PROCEDURE DATE:  07/29/2024          INTERPRETATION:  CLINICAL INFORMATION: 89FP HTN, HLD, DM,   Osteoporosis, Stage II B Gastric Adenocarcinoma s/p distal gastrectomy in   2015 on active chemo (follows [facility] Dr Adams), Early Multiple   myeloma, and recently diagnosed with b/l PE (on Eliquis) presenting with   SOB, cough and vomiting.    COMPARISON: CTA chest 3/28/2023. CT abdomen pelvis 5/13/2022.    CONTRAST/COMPLICATIONS:  IV Contrast: Omnipaque 350  80 cc administered   20 cc discarded  Oral Contrast: NONE  Complications: None reported at time of study completion    PROCEDURE:  CT Angiography of the Chest was performed followed by portal venous phase   imaging of the Abdomen and Pelvis.  Sagittal and coronal reformats were performed as well as 3D (MIP)   reconstructions.    FINDINGS:  CHEST:  LUNGS AND LARGE AIRWAYS: Patent central airways. Mild subpleural   interstitial prominence particularly in the upper lungs. No pneumonia or   edema or acute abnormality.  PLEURA: No pleural effusion.  VESSELS: No thoracic aortic dissection or aneurysm. Coronary artery  atherosclerosis. No pulmonary embolus.  HEART: Heart size is normal. No pericardial effusion.  MEDIASTINUM AND MARGARET: No lymphadenopathy.  CHEST WALL AND LOWER NECK: Multinodular thyroid.    ABDOMEN AND PELVIS:  LIVER: Within normal limits.  BILE DUCTS: Normal caliber.  GALLBLADDER: Cholecystectomy.  SPLEEN: Within normal limits.  PANCREAS: Within normal limits.  ADRENALS: Within normal limits.  KIDNEYS/URETERS: No hydronephrosis or renal stone or concerning mass.   Mild cortical volume loss bilaterally.    BLADDER: Within normal limits.  REPRODUCTIVE ORGANS: Status post hysterectomy.    BOWEL: No bowel obstruction. Status post appendectomy. Status post distal   gastric resection with gastrojejunostomy.  PERITONEUM/RETROPERITONEUM: Within normal limits.  VESSELS: Atherosclerotic changes. No abdominal aortic aneurysm. Patent   portal, splenic, and mesenteric veins.  LYMPH NODES: No lymphadenopathy.  ABDOMINAL WALL: Within normal limits.  BONES: Bony demineralization. No acute fracture. Mild compression   fracture of the L1 superior endplate, new compared to prior but   chronic-appearing. Transitional lumbosacral anatomy with   pseudoarticulation between the transverse processes of S1 and S2.    IMPRESSION:  No pulmonary embolus or acute finding in the chest.    No acute finding in the abdomen or pelvis.    Mild compression fracture of the L1 superior endplate, new compared to   prior but chronic-appearing.        --- End of Report ---    < end of copied text >    Imaging  Reviewed:  YES    Consultant(s) Notes Reviewed:   YES    Plan of care was discussed with patient and /or primary care giver; all questions and concerns were addressed

## 2024-08-05 NOTE — PROGRESS NOTE ADULT - PROBLEM SELECTOR PLAN 2
isolation precautions  oxygen supp prn  monitor oxygen sat  monitor LDH, LFTs, CRP, D-Dimer, Ferritin and procalcitonin  IV Dexa  Bronchodilators  Vit C, D and Zinc supp  ID follow up  Follow up Covid PCR

## 2024-08-05 NOTE — PROGRESS NOTE ADULT - ASSESSMENT
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 Patient is AAOx1 (to self only), complaining of diffuse abdominal pain, headache and mild chest pain. She has no fever or chills. On admission, she found to have no fever but positive COVID PCR and Blood culture positive for streptococcal species. She has started on Remdesivir, Dexamethasone and Ceftriaxone, and the ID consult requested to assist with further evaluation and antibiotic management.    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 Follow up Echo result  2. If TTE shows no vegetation then Need IV Ceftriaxone 2 g daily until 8/28/24, otherwise 6 weeks   3. Continue Dexamethasone to complete the course  4. Supportive care including AC  5. Supplemental oxygenation and Bronchodilator as needed  6. COVID precautions    d/w covering NP, Jose Angel-Dedrick    Attending Attestation:    Spent more than 35 minutes on total encounter, more than 50 % of the visit was spent counseling and/or coordinating care by the Attending physician

## 2024-08-05 NOTE — DIETITIAN INITIAL EVALUATION ADULT - PERTINENT MEDS FT
MEDICATIONS  (STANDING):  apixaban 2.5 milliGRAM(s) Oral every 12 hours  ascorbic acid 500 milliGRAM(s) Oral daily  cefTRIAXone   IVPB 2000 milliGRAM(s) IV Intermittent every 24 hours  cholecalciferol 1000 Unit(s) Oral daily  dexAMETHasone  Injectable 6 milliGRAM(s) IV Push daily  insulin lispro (ADMELOG) corrective regimen sliding scale   SubCutaneous three times a day before meals  insulin lispro (ADMELOG) corrective regimen sliding scale   SubCutaneous at bedtime  lactated ringers. 1000 milliLiter(s) (50 mL/Hr) IV Continuous <Continuous>  losartan 50 milliGRAM(s) Oral two times a day  metoprolol tartrate 25 milliGRAM(s) Oral two times a day  montelukast 10 milliGRAM(s) Oral at bedtime  pantoprazole    Tablet 40 milliGRAM(s) Oral before breakfast  simvastatin 10 milliGRAM(s) Oral at bedtime  zinc sulfate 220 milliGRAM(s) Oral daily    MEDICATIONS  (PRN):  acetaminophen     Tablet .. 650 milliGRAM(s) Oral every 6 hours PRN Temp greater or equal to 38C (100.4F), Mild Pain (1 - 3)

## 2024-08-06 LAB
ANION GAP SERPL CALC-SCNC: 11 MMOL/L — SIGNIFICANT CHANGE UP (ref 5–17)
BUN SERPL-MCNC: 43 MG/DL — HIGH (ref 7–18)
CALCIUM SERPL-MCNC: 8.7 MG/DL — SIGNIFICANT CHANGE UP (ref 8.4–10.5)
CALCIUM SERPL-MCNC: 8.9 MG/DL — SIGNIFICANT CHANGE UP (ref 8.4–10.5)
CHLORIDE SERPL-SCNC: 112 MMOL/L — HIGH (ref 96–108)
CO2 SERPL-SCNC: 16 MMOL/L — LOW (ref 22–31)
CREAT SERPL-MCNC: 0.91 MG/DL — SIGNIFICANT CHANGE UP (ref 0.5–1.3)
EGFR: 61 ML/MIN/1.73M2 — SIGNIFICANT CHANGE UP
GLUCOSE BLDC GLUCOMTR-MCNC: 208 MG/DL — HIGH (ref 70–99)
GLUCOSE BLDC GLUCOMTR-MCNC: 225 MG/DL — HIGH (ref 70–99)
GLUCOSE BLDC GLUCOMTR-MCNC: 244 MG/DL — HIGH (ref 70–99)
GLUCOSE BLDC GLUCOMTR-MCNC: 274 MG/DL — HIGH (ref 70–99)
GLUCOSE SERPL-MCNC: 171 MG/DL — HIGH (ref 70–99)
HCT VFR BLD CALC: 35.5 % — SIGNIFICANT CHANGE UP (ref 34.5–45)
HGB BLD-MCNC: 11.8 G/DL — SIGNIFICANT CHANGE UP (ref 11.5–15.5)
MCHC RBC-ENTMCNC: 33.2 GM/DL — SIGNIFICANT CHANGE UP (ref 32–36)
MCHC RBC-ENTMCNC: 33.4 PG — SIGNIFICANT CHANGE UP (ref 27–34)
MCV RBC AUTO: 100.6 FL — HIGH (ref 80–100)
NRBC # BLD: 0 /100 WBCS — SIGNIFICANT CHANGE UP (ref 0–0)
PLATELET # BLD AUTO: 260 K/UL — SIGNIFICANT CHANGE UP (ref 150–400)
POTASSIUM SERPL-MCNC: 3.8 MMOL/L — SIGNIFICANT CHANGE UP (ref 3.5–5.3)
POTASSIUM SERPL-SCNC: 3.8 MMOL/L — SIGNIFICANT CHANGE UP (ref 3.5–5.3)
PTH-INTACT FLD-MCNC: 79 PG/ML — HIGH (ref 15–65)
RBC # BLD: 3.53 M/UL — LOW (ref 3.8–5.2)
RBC # FLD: 15.9 % — HIGH (ref 10.3–14.5)
SODIUM SERPL-SCNC: 139 MMOL/L — SIGNIFICANT CHANGE UP (ref 135–145)
T4 AB SER-ACNC: 10.3 UG/DL — SIGNIFICANT CHANGE UP (ref 4.6–12)
TSH SERPL-MCNC: 0.56 UU/ML — SIGNIFICANT CHANGE UP (ref 0.34–4.82)
WBC # BLD: 6.35 K/UL — SIGNIFICANT CHANGE UP (ref 3.8–10.5)
WBC # FLD AUTO: 6.35 K/UL — SIGNIFICANT CHANGE UP (ref 3.8–10.5)

## 2024-08-06 RX ORDER — NIFEDIPINE 20 MG/1
30 CAPSULE, LIQUID FILLED ORAL DAILY
Refills: 0 | Status: DISCONTINUED | OUTPATIENT
Start: 2024-08-06 | End: 2024-08-07

## 2024-08-06 RX ORDER — BACTERIOSTATIC SODIUM CHLORIDE 0.9 %
10 VIAL (ML) INJECTION
Qty: 20 | Refills: 0
Start: 2024-08-06 | End: 2024-08-25

## 2024-08-06 RX ADMIN — Medication 4: at 08:38

## 2024-08-06 RX ADMIN — LOSARTAN POTASSIUM 50 MILLIGRAM(S): 50 TABLET, FILM COATED ORAL at 17:57

## 2024-08-06 RX ADMIN — Medication 10 MILLIGRAM(S): at 21:59

## 2024-08-06 RX ADMIN — APIXABAN 2.5 MILLIGRAM(S): 5 TABLET, FILM COATED ORAL at 18:34

## 2024-08-06 RX ADMIN — Medication 4: at 17:55

## 2024-08-06 RX ADMIN — Medication 220 MILLIGRAM(S): at 11:51

## 2024-08-06 RX ADMIN — Medication 1000 UNIT(S): at 11:51

## 2024-08-06 RX ADMIN — APIXABAN 2.5 MILLIGRAM(S): 5 TABLET, FILM COATED ORAL at 05:33

## 2024-08-06 RX ADMIN — Medication 100 MILLIGRAM(S): at 15:12

## 2024-08-06 RX ADMIN — LOSARTAN POTASSIUM 50 MILLIGRAM(S): 50 TABLET, FILM COATED ORAL at 06:51

## 2024-08-06 RX ADMIN — Medication 500 MILLIGRAM(S): at 11:54

## 2024-08-06 RX ADMIN — Medication 25 MILLIGRAM(S): at 05:31

## 2024-08-06 RX ADMIN — NIFEDIPINE 30 MILLIGRAM(S): 20 CAPSULE, LIQUID FILLED ORAL at 15:11

## 2024-08-06 RX ADMIN — DEXAMETHASONE 6 MILLIGRAM(S): 1.5 TABLET ORAL at 05:34

## 2024-08-06 RX ADMIN — Medication 6: at 11:49

## 2024-08-06 RX ADMIN — PANTOPRAZOLE SODIUM 40 MILLIGRAM(S): 20 TABLET, DELAYED RELEASE ORAL at 06:52

## 2024-08-06 RX ADMIN — Medication 25 MILLIGRAM(S): at 18:34

## 2024-08-06 NOTE — PROGRESS NOTE ADULT - PROBLEM SELECTOR PLAN 8
Hx of DM on metformin and farxiga   - Holding oral meds inpatient    - c/w low ISS AC QHS
Hx of DM on Metformin and Farxiga   -A1c 5.8  -C/W Holding oral meds while inpatient  -C/w ISSC AC QHS
- Hx of DM on metformin and farxiga  - c/w low ISS  - a1c 5.8
Hx of DM on Metformin and Farxiga   -A1c 5.8  -C/W Holding oral meds while inpatient  -C/w ISSC AC QHS

## 2024-08-06 NOTE — PROGRESS NOTE ADULT - SUBJECTIVE AND OBJECTIVE BOX
Date of Service 08-06-24 @ 11:49    CHIEF COMPLAINT:Patient is a 89y old  Female who presents with a chief complaint of UTI and COVID .Pt appears comfortable.    	  REVIEW OF SYSTEMS:  CONSTITUTIONAL: No fever, weight loss, or fatigue  EYES: No eye pain, visual disturbances, or discharge  ENT:  No difficulty hearing, tinnitus, vertigo; No sinus or throat pain  NECK: No pain or stiffness  RESPIRATORY: No cough, wheezing, chills or hemoptysis; No Shortness of Breath  CARDIOVASCULAR: No chest pain, palpitations, passing out, dizziness, or leg swelling  GASTROINTESTINAL: No abdominal or epigastric pain. No nausea, vomiting, or hematemesis; No diarrhea or constipation. No melena or hematochezia.  GENITOURINARY: No dysuria, frequency, hematuria, or incontinence  NEUROLOGICAL: No headaches, memory loss, loss of strength, numbness, or tremors  SKIN: No itching, burning, rashes, or lesions   LYMPH Nodes: No enlarged glands  ENDOCRINE: No heat or cold intolerance; No hair loss  MUSCULOSKELETAL: No joint pain or swelling; No muscle, back, or extremity pain  PSYCHIATRIC: No depression, anxiety, mood swings, or difficulty sleeping  HEME/LYMPH: No easy bruising, or bleeding gums  ALLERGY AND IMMUNOLOGIC: No hives or eczema	      PHYSICAL EXAM:  T(C): 36.9 (08-06-24 @ 05:09), Max: 36.9 (08-05-24 @ 12:56)  HR: 79 (08-06-24 @ 05:09) (71 - 80)  BP: 183/95 (08-06-24 @ 05:09) (147/81 - 183/95)  RR: 20 (08-06-24 @ 05:09) (18 - 20)  SpO2: 97% (08-06-24 @ 05:09) (97% - 97%)  Wt(kg): --  I&O's Summary    05 Aug 2024 07:01  -  06 Aug 2024 07:00  --------------------------------------------------------  IN: 0 mL / OUT: 1000 mL / NET: -1000 mL        Appearance: Normal	  HEENT:   Normal oral mucosa, PERRL, EOMI	  Lymphatic: No lymphadenopathy  Cardiovascular: Normal S1 S2, No JVD, No murmurs, No edema  Respiratory: Lungs clear to auscultation	  Psychiatry: A & O x 3, Mood & affect appropriate  Gastrointestinal:  Soft, Non-tender, + BS	  Skin: No rashes, No ecchymoses, No cyanosis	  Neurologic: Non-focal  Extremities: Normal range of motion, No clubbing, cyanosis or edema  Vascular: Peripheral pulses palpable 2+ bilaterally    MEDICATIONS  (STANDING):  apixaban 2.5 milliGRAM(s) Oral every 12 hours  ascorbic acid 500 milliGRAM(s) Oral daily  cefTRIAXone   IVPB 2000 milliGRAM(s) IV Intermittent every 24 hours  cholecalciferol 1000 Unit(s) Oral daily  dexAMETHasone  Injectable 6 milliGRAM(s) IV Push daily  insulin lispro (ADMELOG) corrective regimen sliding scale   SubCutaneous three times a day before meals  insulin lispro (ADMELOG) corrective regimen sliding scale   SubCutaneous at bedtime  lactated ringers. 1000 milliLiter(s) (50 mL/Hr) IV Continuous <Continuous>  losartan 50 milliGRAM(s) Oral two times a day  metoprolol tartrate 25 milliGRAM(s) Oral two times a day  montelukast 10 milliGRAM(s) Oral at bedtime  pantoprazole    Tablet 40 milliGRAM(s) Oral before breakfast  simvastatin 10 milliGRAM(s) Oral at bedtime  zinc sulfate 220 milliGRAM(s) Oral daily        	  LABS:	 	                        11.8   6.35  )-----------( 260      ( 06 Aug 2024 05:35 )             35.5     08-06    139  |  112<H>  |  43<H>  ----------------------------<  171<H>  3.8   |  16<L>  |  0.91    Ca    8.7      06 Aug 2024 05:35  Phos  2.8     08-05  Mg     2.0     08-05    TPro  5.3<L>  /  Alb  2.7<L>  /  TBili  0.3  /  DBili  x   /  AST  9<L>  /  ALT  21  /  AlkPhos  40  08-05      TSH: Thyroid Stimulating Hormone, Serum: 0.56 uU/mL (08-06 @ 05:35)      	< from: TTE W or WO Ultrasound Enhancing Agent (08.05.24 @ 12:18) >  CONCLUSIONS:      1. Left ventricular cavity is normal in size. Left ventricular systolic function is normal with an ejection fraction visually estimated at 55 to 60 %.   2. The left ventricular diastolic function is indeterminate.   3. Normal right ventricular cavity size and normal right ventricular systolic function.   4. Trileaflet aortic valve. No aortic valve stenosis.   5. Structurally normal mitral valve with normal leaflet excursion.   6. Trace mitral regurgitation.   7. No pericardial effusion seen.   8. No prior echocardiogram is available for comparison.      < end of copied text >

## 2024-08-06 NOTE — PROGRESS NOTE ADULT - SUBJECTIVE AND OBJECTIVE BOX
Patient is seen and examined at the bed side, is afebrile. The TTE from 8/5 shows no vegetation.      REVIEW OF SYSTEMS: All other review systems are negative      ALLERGIES: gabapentin (Unknown)      Vital Signs Last 24 Hrs  T(C): 36.7 (06 Aug 2024 14:05), Max: 36.9 (06 Aug 2024 05:09)  T(F): 98.1 (06 Aug 2024 14:05), Max: 98.5 (06 Aug 2024 05:09)  HR: 86 (06 Aug 2024 14:05) (71 - 86)  BP: 155/81 (06 Aug 2024 14:05) (155/81 - 183/95)  BP(mean): --  RR: 18 (06 Aug 2024 14:05) (18 - 20)  SpO2: 97% (06 Aug 2024 14:05) (97% - 97%)    Parameters below as of 06 Aug 2024 14:05  Patient On (Oxygen Delivery Method): room air      PHYSICAL EXAM:  GENERAL: Not in distress, off oxygen  CHEST/LUNG: Not using accessory muscles   EXTREMITIES: No pedal  edema  CNS: Awake and Alert      LABS:                        11.8   6.35  )-----------( 260      ( 06 Aug 2024 05:35 )             35.5                           10.9   4.40  )-----------( 235      ( 05 Aug 2024 07:10 )             33.0               08-06    139  |  112<H>  |  43<H>  ----------------------------<  171<H>  3.8   |  16<L>  |  0.91    Ca    8.7      06 Aug 2024 05:35  Phos  2.8     08-05  Mg     2.0     08-05    TPro  5.3<L>  /  Alb  2.7<L>  /  TBili  0.3  /  DBili  x   /  AST  9<L>  /  ALT  21  /  AlkPhos  40  08-05    08-05    142  |  117<H>  |  44<H>  ----------------------------<  214<H>  3.8   |  16<L>  |  0.97    Ca    8.3<L>      05 Aug 2024 07:10  Phos  2.8     08-05  Mg     2.0     08-05    TPro  5.3<L>  /  Alb  2.7<L>  /  TBili  0.3  /  DBili  x   /  AST  9<L>  /  ALT  21  /  AlkPhos  40  08-05         PT/INR - ( 29 Jul 2024 01:10 )   PT: 13.0 sec;   INR: 1.14 ratio      PTT - ( 29 Jul 2024 01:10 )  PTT:26.2 sec      CAPILLARY BLOOD GLUCOSE  POCT Blood Glucose.: 279 mg/dL (30 Jul 2024 10:54)  POCT Blood Glucose.: 183 mg/dL (30 Jul 2024 08:02)  POCT Blood Glucose.: 264 mg/dL (29 Jul 2024 21:37)  POCT Blood Glucose.: 180 mg/dL (29 Jul 2024 16:52)    ABG - ( 29 Jul 2024 03:06 )  pH, Arterial: 7.45  pH, Blood: x     /  pCO2: 23    /  pO2: 287   / HCO3: 16    / Base Excess: -6.0  /  SaO2: 96          MEDICATIONS  (STANDING):    apixaban 2.5 milliGRAM(s) Oral every 12 hours  ascorbic acid 500 milliGRAM(s) Oral daily  cholecalciferol 1000 Unit(s) Oral daily  dexAMETHasone  Injectable 6 milliGRAM(s) IV Push daily  insulin lispro (ADMELOG) corrective regimen sliding scale   SubCutaneous three times a day before meals  insulin lispro (ADMELOG) corrective regimen sliding scale   SubCutaneous at bedtime  lactated ringers. 1000 milliLiter(s) (50 mL/Hr) IV Continuous <Continuous>  losartan 50 milliGRAM(s) Oral two times a day  metoprolol tartrate 25 milliGRAM(s) Oral two times a day  montelukast 10 milliGRAM(s) Oral at bedtime  NIFEdipine XL 30 milliGRAM(s) Oral daily  pantoprazole    Tablet 40 milliGRAM(s) Oral before breakfast  simvastatin 10 milliGRAM(s) Oral at bedtime  zinc sulfate 220 milliGRAM(s) Oral daily      RADIOLOGY & ADDITIONAL TESTS:    7/29/24: CT Head No Cont (07.29.24 @ 05:21) No acute intracranial hemorrhage, mass effect, or midline shift.    7/29/24: CT Angio Chest PE Protocol w/ IV Cont (07.29.24 @ 05:21) No pulmonary embolus or acute finding in the chest.    No acute finding in the abdomen or pelvis. Mild compression fracture of the L1 superior endplate, new compared to prior but chronic-appearing.      7/29/24 : CT Abdomen and Pelvis w/ IV Cont (07.29.24 @ 05:21) No acute finding in the abdomen or pelvis.    Mild compression fracture of the L1 superior endplate, new compared to prior but chronic-appearing.    7/29/24 : Xray Chest 1 View- PORTABLE-Urgent (07.29.24 @ 02:10) No consolidation or infiltrate.  No pleural effusion.    Heart size cannot be accurately assessed in this projection.      8/5/24 : TTE W or WO Ultrasound Enhancing Agent (08.05.24 @ 12:18) >     CONCLUSIONS:      1. Left ventricular cavity is normal in size. Left ventricular systolic function is normal with an ejection fraction visually estimated at 55 to 60 %.   2. The left ventricular diastolic function is indeterminate.   3. Normal right ventricular cavity size and normal right ventricular systolic function.   4. Trileaflet aortic valve. No aortic valve stenosis.   5. Structurally normal mitral valve with normal leaflet excursion.   6. Trace mitral regurgitation.   7. No pericardial effusion seen.   8. No prior echocardiogram is available for comparison.      MICROBIOLOGY DATA:    Culture - Blood in AM (07.31.24 @ 06:30)   Specimen Source: .Blood Blood-Peripheral  Culture Results: No growth at 5 days    Culture - Blood in AM (07.31.24 @ 06:00)   Specimen Source: .Blood Blood-Peripheral  Culture Results: No growth at 5 days      Culture - Blood (07.29.24 @ 01:05)   - Streptococcus sp. (Not Grp A, B or S pneumoniae): Detec  - Vancomycin: S 0.5  Gram Stain:   Growth in anaerobic bottle: Gram positive cocci in pairs   Growth in aerobic bottle: Gram positive cocci in pairs  - Ceftriaxone: S <=0.25  - Penicillin: S 0.06  Specimen Source: .Blood Blood-Peripheral  Organism: Streptococcus gallolyticus  Organism: Blood Culture PCR  Culture Results:   Growth in anaerobic bottle: Streptococcus gallolyticus   Growth in aerobic bottle: Gram positive cocci in pairs   Direct identification is available within approximately 3-5   hours either by Blood Panel Multiplexed PCR or Direct   MALDI-TOF. Details: [email or web address]  Organism Identification: Blood Culture PCR   Streptococcus gallolyticus    Culture - Urine (07.29.24 @ 04:31)   Specimen Source: Clean Catch  Culture Results:   >=3 organisms. Probable collection contamination.    Urine Microscopic-Add On (NC) (07.29.24 @ 04:31)   Bacteria: Many /HPF  Comment - Urine: few amorphous urates  Squamous Epithelial Cells: Present  Red Blood Cell - Urine: 4 /HPF  White Blood Cell - Urine: 8 /HPF    Culture - Blood (07.29.24 @ 01:10)   Specimen Source: .Blood Blood-Peripheral  Culture Results: No growth at 5 days    Culture - Blood (07.29.24 @ 01:05)   - Streptococcus sp. (Not Grp A, B or S pneumoniae): Detec  Gram Stain:   Growth in anaerobic bottle: Gram positive cocci in pairs   Growth in aerobic bottle: Gram positive cocci in pairs  Specimen Source: .Blood Blood-Peripheral  Organism: Blood Culture PCR  Culture Results:   Growth in anaerobic bottle: Gram positive cocci in pairs   Growth in aerobic bottle: Gram positive cocci in pairs   Direct identification is available within approximately 3-5   hours either by Blood Panel Multiplexed PCR or Direct   MALDI-TOF. Details: [email or web address]  Organism Identification: Blood Culture PCR  Method Type: PCR

## 2024-08-06 NOTE — PROGRESS NOTE ADULT - SUBJECTIVE AND OBJECTIVE BOX
NP Note discussed with  Primary Attending    Patient is a 89y old  Female who presents with a chief complaint of UTI and COVID (06 Aug 2024 11:49)      INTERVAL HPI/OVERNIGHT EVENTS: no new complaints    MEDICATIONS  (STANDING):  apixaban 2.5 milliGRAM(s) Oral every 12 hours  ascorbic acid 500 milliGRAM(s) Oral daily  cefTRIAXone   IVPB 2000 milliGRAM(s) IV Intermittent every 24 hours  cholecalciferol 1000 Unit(s) Oral daily  dexAMETHasone  Injectable 6 milliGRAM(s) IV Push daily  insulin lispro (ADMELOG) corrective regimen sliding scale   SubCutaneous three times a day before meals  insulin lispro (ADMELOG) corrective regimen sliding scale   SubCutaneous at bedtime  lactated ringers. 1000 milliLiter(s) (50 mL/Hr) IV Continuous <Continuous>  losartan 50 milliGRAM(s) Oral two times a day  metoprolol tartrate 25 milliGRAM(s) Oral two times a day  montelukast 10 milliGRAM(s) Oral at bedtime  NIFEdipine XL 30 milliGRAM(s) Oral daily  pantoprazole    Tablet 40 milliGRAM(s) Oral before breakfast  simvastatin 10 milliGRAM(s) Oral at bedtime  zinc sulfate 220 milliGRAM(s) Oral daily    MEDICATIONS  (PRN):  acetaminophen     Tablet .. 650 milliGRAM(s) Oral every 6 hours PRN Temp greater or equal to 38C (100.4F), Mild Pain (1 - 3)      __________________________________________________  REVIEW OF SYSTEMS:    CONSTITUTIONAL: No fever,   EYES: no acute visual disturbances  NECK: No pain or stiffness  RESPIRATORY: No cough; No shortness of breath  CARDIOVASCULAR: No chest pain, no palpitations  GASTROINTESTINAL: No pain. No nausea or vomiting; No diarrhea   NEUROLOGICAL: No headache or numbness, no tremors  MUSCULOSKELETAL: No joint pain, no muscle pain  GENITOURINARY: no dysuria, no frequency, no hesitancy  PSYCHIATRY: no depression , no anxiety  ALL OTHER  ROS negative        Vital Signs Last 24 Hrs  T(C): 36.9 (06 Aug 2024 05:09), Max: 36.9 (05 Aug 2024 12:56)  T(F): 98.5 (06 Aug 2024 05:09), Max: 98.5 (06 Aug 2024 05:09)  HR: 79 (06 Aug 2024 05:09) (71 - 80)  BP: 183/95 (06 Aug 2024 05:09) (147/81 - 183/95)  BP(mean): --  RR: 20 (06 Aug 2024 05:09) (18 - 20)  SpO2: 97% (06 Aug 2024 05:09) (97% - 97%)    Parameters below as of 06 Aug 2024 05:09  Patient On (Oxygen Delivery Method): room air        ________________________________________________  PHYSICAL EXAM:  GENERAL: NAD  HEENT: Normocephalic;  conjunctivae and sclerae clear; moist mucous membranes;   NECK : supple  CHEST/LUNG: Clear to auscultation bilaterally with good air entry   HEART: S1 S2  regular; no murmurs, gallops or rubs  ABDOMEN: Soft, Nontender, Nondistended; Bowel sounds present  EXTREMITIES: no cyanosis; no edema; no calf tenderness  SKIN: warm and dry; no rash  NERVOUS SYSTEM:  Awake and alert; Oriented  to place, person and time ; no new deficits    _________________________________________________  LABS:                        11.8   6.35  )-----------( 260      ( 06 Aug 2024 05:35 )             35.5     08-06    139  |  112<H>  |  43<H>  ----------------------------<  171<H>  3.8   |  16<L>  |  0.91    Ca    8.7      06 Aug 2024 05:35  Phos  2.8     08-05  Mg     2.0     08-05    TPro  5.3<L>  /  Alb  2.7<L>  /  TBili  0.3  /  DBili  x   /  AST  9<L>  /  ALT  21  /  AlkPhos  40  08-05      Urinalysis Basic - ( 06 Aug 2024 05:35 )    Color: x / Appearance: x / SG: x / pH: x  Gluc: 171 mg/dL / Ketone: x  / Bili: x / Urobili: x   Blood: x / Protein: x / Nitrite: x   Leuk Esterase: x / RBC: x / WBC x   Sq Epi: x / Non Sq Epi: x / Bacteria: x      CAPILLARY BLOOD GLUCOSE      POCT Blood Glucose.: 274 mg/dL (06 Aug 2024 11:29)  POCT Blood Glucose.: 225 mg/dL (06 Aug 2024 08:18)  POCT Blood Glucose.: 224 mg/dL (05 Aug 2024 21:26)  POCT Blood Glucose.: 153 mg/dL (05 Aug 2024 17:06)        RADIOLOGY & ADDITIONAL TESTS:    Imaging  Reviewed:  YES/NO    Consultant(s) Notes Reviewed:   YES/ No      Plan of care was discussed with patient and /or primary care giver; all questions and concerns were addressed

## 2024-08-06 NOTE — PROGRESS NOTE ADULT - SUBJECTIVE AND OBJECTIVE BOX
Patient is a 89y old  Female who presents with a chief complaint of UTI and COVID (06 Aug 2024 06:35)  Asleep, laying  comfortable in bed in NAD. Still on isolation due  to Covid    INTERVAL HPI/OVERNIGHT EVENTS:      VITAL SIGNS:  T(F): 98.5 (08-06-24 @ 05:09)  HR: 79 (08-06-24 @ 05:09)  BP: 183/95 (08-06-24 @ 05:09)  RR: 20 (08-06-24 @ 05:09)  SpO2: 97% (08-06-24 @ 05:09)  Wt(kg): --  I&O's Detail    05 Aug 2024 07:01  -  06 Aug 2024 07:00  --------------------------------------------------------  IN:  Total IN: 0 mL    OUT:    Voided (mL): 1000 mL  Total OUT: 1000 mL    Total NET: -1000 mL              REVIEW OF SYSTEMS:    CONSTITUTIONAL:  No fevers, chills, sweats    HEENT:  Eyes:  No diplopia or blurred vision. ENT:  No earache, sore throat or runny nose.    CARDIOVASCULAR:  No pressure, squeezing, tightness, or heaviness about the chest; no palpitations.    RESPIRATORY:  Per HPI    GASTROINTESTINAL:  No abdominal pain, nausea, vomiting or diarrhea.    GENITOURINARY:  No dysuria, frequency or urgency.    NEUROLOGIC:  No paresthesias, fasciculations, seizures or weakness.    PSYCHIATRIC:  No disorder of thought or mood.      PHYSICAL EXAM:    Constitutional: Well developed and nourished  Eyes:Perrla  ENMT: normal  Neck:supple  Respiratory: good air entry  Cardiovascular: S1 S2 regular  Gastrointestinal: Soft, Non tender  Extremities: No edema  Vascular:normal  Neurological:Asleep  Musculoskeletal:Normal      MEDICATIONS  (STANDING):  apixaban 2.5 milliGRAM(s) Oral every 12 hours  ascorbic acid 500 milliGRAM(s) Oral daily  cefTRIAXone   IVPB 2000 milliGRAM(s) IV Intermittent every 24 hours  cholecalciferol 1000 Unit(s) Oral daily  dexAMETHasone  Injectable 6 milliGRAM(s) IV Push daily  insulin lispro (ADMELOG) corrective regimen sliding scale   SubCutaneous three times a day before meals  insulin lispro (ADMELOG) corrective regimen sliding scale   SubCutaneous at bedtime  lactated ringers. 1000 milliLiter(s) (50 mL/Hr) IV Continuous <Continuous>  losartan 50 milliGRAM(s) Oral two times a day  metoprolol tartrate 25 milliGRAM(s) Oral two times a day  montelukast 10 milliGRAM(s) Oral at bedtime  pantoprazole    Tablet 40 milliGRAM(s) Oral before breakfast  simvastatin 10 milliGRAM(s) Oral at bedtime  zinc sulfate 220 milliGRAM(s) Oral daily    MEDICATIONS  (PRN):  acetaminophen     Tablet .. 650 milliGRAM(s) Oral every 6 hours PRN Temp greater or equal to 38C (100.4F), Mild Pain (1 - 3)      Allergies    gabapentin (Unknown)    Intolerances        LABS:                        11.8   6.35  )-----------( 260      ( 06 Aug 2024 05:35 )             35.5     08-06    139  |  112<H>  |  43<H>  ----------------------------<  171<H>  3.8   |  16<L>  |  0.91    Ca    8.7      06 Aug 2024 05:35  Phos  2.8     08-05  Mg     2.0     08-05    TPro  5.3<L>  /  Alb  2.7<L>  /  TBili  0.3  /  DBili  x   /  AST  9<L>  /  ALT  21  /  AlkPhos  40  08-05      Urinalysis Basic - ( 06 Aug 2024 05:35 )    Color: x / Appearance: x / SG: x / pH: x  Gluc: 171 mg/dL / Ketone: x  / Bili: x / Urobili: x   Blood: x / Protein: x / Nitrite: x   Leuk Esterase: x / RBC: x / WBC x   Sq Epi: x / Non Sq Epi: x / Bacteria: x            CAPILLARY BLOOD GLUCOSE      POCT Blood Glucose.: 225 mg/dL (06 Aug 2024 08:18)  POCT Blood Glucose.: 224 mg/dL (05 Aug 2024 21:26)  POCT Blood Glucose.: 153 mg/dL (05 Aug 2024 17:06)  POCT Blood Glucose.: 317 mg/dL (05 Aug 2024 11:27)        RADIOLOGY & ADDITIONAL TESTS:    CXR:    Ct scan chest:    ekg;    echo:

## 2024-08-06 NOTE — PROGRESS NOTE ADULT - PROBLEM SELECTOR PLAN 11
pending TTE  will need abx plan once TTE resulted  pt is from home, will likely to go back home
pending PT  home infusion versus [facility]
pending TTE  will need abx plan once TTE resulted  pt is from home, will likely to go back home

## 2024-08-06 NOTE — PROGRESS NOTE ADULT - PROBLEM SELECTOR PLAN 9
Hx of HLD on simvastatin   -C/w Simvastatin 10mg at HS
- Hx of HLD on simvastatin   - c/w home meds
- Hx of HLD on simvastatin   - c/w home meds
Hx of HLD on simvastatin   c/w home meds
Hx of HLD on simvastatin   -C/w Simvastatin 10mg at HS
- Hx of HLD on simvastatin   - c/w home meds

## 2024-08-06 NOTE — PROGRESS NOTE ADULT - ASSESSMENT
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 Patient is AAOx1 (to self only), complaining of diffuse abdominal pain, headache and mild chest pain. She has no fever or chills. On admission, she found to have no fever but positive COVID PCR and Blood culture positive for streptococcal species. She has started on Remdesivir, Dexamethasone and Ceftriaxone, and the ID consult requested to assist with further evaluation and antibiotic management.    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 Continue Ceftriaxone 2 g daily until 8/28/24  2. Monitor CBC, BMP, ESR and CRP weekly until 8/28/24  3. Continue Dexamethasone to complete the course  4. Supportive care including AC  5. COVID precautions    d/w covering NP, Soh-Dedrick    - ID follow up with DR. Merrick Scott via Telehealth ( Tuesday only) [phone] and Fax [phone]    Attending Attestation:    Spent more than 35 minutes on total encounter, more than 50 % of the visit was spent counseling and/or coordinating care by the Attending physician

## 2024-08-06 NOTE — PROGRESS NOTE ADULT - PROBLEM SELECTOR PROBLEM 9
CT chest, abdomen and pelvis with contrast 9/8/2017 at 1005 hours



Indication: Weight loss



Comparison: None available



Technique: Multiple axial CT images of the chest, abdomen and pelvis were

obtained after the intravenous administration of 75 mL Omnipaque 300. Oral

contrast was administered. Coronal and sagittal reformats are provided.



Findings:



Chest:



There is a 4 mm hypodense nodule in the right thyroid gland.



There are no enlarged lymph nodes in the axilla, mediastinal or hilar regions.

Heart size is within normal limits. There is no pericardial effusion. Thoracic

aorta is normal in course and caliber.



The lungs are clear. No suspicious solid noncalcified pulmonary nodules are

identified. No pulmonary infiltrates. No pulmonary vascular congestion or

pneumothorax. No pleural effusions are identified.



Abdomen/pelvis:



Liver is homogenous without evidence for a focal mass lesion. Spleen is normal

in appearance. Adrenal glands are normal. Pancreas is normal in appearance. No

pancreatic ductal dilatation. No intrahepatic or extrahepatic biliary ductal

dilatation. Hyperdense material within the gallbladder is filled represent

gallstones.



Abdominal aorta is normal in course and caliber. There are no pathologically

enlarged lymph nodes in the abdomen or pelvis. There is no free

intraperitoneal air. No free fluid within the abdomen or pelvis.



Opacified bowel loops are normal in caliber. There is no evidence for bowel

obstruction. No pericolonic inflammatory changes are present.



The kidneys enhance symmetrically. No suspicious renal masses are identified.

There is no hydronephrosis. No renal calculi are suspected.



Urinary bladder is within normal limits. Follicular changes are noted in the

adnexa bilaterally. Uterus is grossly normal. No suspicious osseous lesions

are identified.



Impression:

1. No suspicious abnormalities identified in the chest, abdomen or pelvis.

2. Tiny 4 mm nodule noted in the right thyroid gland. Next

3. Cholelithiasis without CT evidence for acute cholecystitis.











[employer]RS Compliance Statement:



One or more of the following individualized dose reduction techniques were

utilized for this examination:

1. Automated exposure control

2. Adjustment of the mA and/or kV according to patient size

3. Use of iterative reconstruction technique
HLD (hyperlipidemia)

## 2024-08-06 NOTE — PROGRESS NOTE ADULT - ASSESSMENT
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  1.Echocardiogram as above.  2.COVID+-dexamethasone.  3.Strep bactermia-abx as per ID.  4.HTN-lopressor 25mg bid, cozaar 50mg bid.Add procaria xl 30mg qd.  5.Hx of PE-Eliquis.  6.DM-Insulin.  7.Lipid d.o-statin.  8.Multiple Myeloma-Heme/Onc f/u as outpatient.  9.PPI.

## 2024-08-06 NOTE — PROGRESS NOTE ADULT - PROBLEM SELECTOR PLAN 2
- blood Cx + for strept gallolyticus (1 bottle)  - cont ceftriaxone 2mg  - repeat bcx on 7/31 NGTD  - TTE no vegetation  - can also be associated with colorectal cancer -- GI followed  - GI recs -- No apparent active GI issues at this time. Pt eating well without further n/v  - Recommend outpatient GI f/u.

## 2024-08-06 NOTE — PROGRESS NOTE ADULT - SUBJECTIVE AND OBJECTIVE BOX
Patient is a 89y old  Female who presents with a chief complaint of Infection due to severe acute respiratory syndrome coronavirus 2 (SARS-CoV-2)     (05 Aug 2024 16:15)    pt seen in icu [  ], reg med floor [  x ], bed [ x ], chair at bedside [   ], awake and responsive [ x ], lethargic [  ],    nad [ x ]      Allergies    gabapentin (Unknown)        Vitals    T(F): 98.5 (08-06-24 @ 05:09), Max: 98.5 (08-06-24 @ 05:09)  HR: 79 (08-06-24 @ 05:09) (69 - 80)  BP: 183/95 (08-06-24 @ 05:09) (147/81 - 184/82)  RR: 20 (08-06-24 @ 05:09) (18 - 20)  SpO2: 97% (08-06-24 @ 05:09) (97% - 97%)  Wt(kg): --  CAPILLARY BLOOD GLUCOSE      POCT Blood Glucose.: 224 mg/dL (05 Aug 2024 21:26)      Labs                          11.8   6.35  )-----------( 260      ( 06 Aug 2024 05:35 )             35.5       08-05    142  |  117<H>  |  44<H>  ----------------------------<  214<H>  3.8   |  16<L>  |  0.97    Ca    8.3<L>      05 Aug 2024 07:10  Phos  2.8     08-05  Mg     2.0     08-05    TPro  5.3<L>  /  Alb  2.7<L>  /  TBili  0.3  /  DBili  x   /  AST  9<L>  /  ALT  21  /  AlkPhos  40  08-05            .Blood Blood-Peripheral  07-31 @ 06:30   No growth at 5 days  --  --      .Blood Blood-Peripheral  07-31 @ 06:00   No growth at 5 days  --  --      Clean Catch  07-29 @ 04:31   >=3 organisms. Probable collection contamination.  --  --      .Blood Blood-Peripheral  07-29 @ 01:10   No growth at 5 days  --  --      .Blood Blood-Peripheral  07-29 @ 01:05   Growth in anaerobic bottle: Streptococcus gallolyticus  Growth in aerobic bottle: Streptococcus salivarius/vestibularis group  "Susceptibilities not performed"  Direct identification is available within approximately 3-5  hours either by Blood Panel Multiplexed PCR or Direct  MALDI-TOF. Details: [email or web address]  --  Blood Culture PCR  Streptococcus gallolyticus          Radiology Results      Meds    MEDICATIONS  (STANDING):  apixaban 2.5 milliGRAM(s) Oral every 12 hours  ascorbic acid 500 milliGRAM(s) Oral daily  cefTRIAXone   IVPB 2000 milliGRAM(s) IV Intermittent every 24 hours  cholecalciferol 1000 Unit(s) Oral daily  dexAMETHasone  Injectable 6 milliGRAM(s) IV Push daily  insulin lispro (ADMELOG) corrective regimen sliding scale   SubCutaneous three times a day before meals  insulin lispro (ADMELOG) corrective regimen sliding scale   SubCutaneous at bedtime  lactated ringers. 1000 milliLiter(s) (50 mL/Hr) IV Continuous <Continuous>  losartan 50 milliGRAM(s) Oral two times a day  metoprolol tartrate 25 milliGRAM(s) Oral two times a day  montelukast 10 milliGRAM(s) Oral at bedtime  pantoprazole    Tablet 40 milliGRAM(s) Oral before breakfast  simvastatin 10 milliGRAM(s) Oral at bedtime  zinc sulfate 220 milliGRAM(s) Oral daily      MEDICATIONS  (PRN):  acetaminophen     Tablet .. 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),   Multiple myeloma/ plasmacytoma  Pulmonary embolism  S/P hysterectomy  S/P tubal ligation        PLAN    cont decadron until 8/8/24,   completed remdesivir 8/2/24,   cont vit c, vitamin d, zinc, pepcid,   cont singulair,   contact and airborne isolation,   cont albuterol inhaler,   pulm f/u   robitussin prn   O2 sat 96% (96% - 98%) on ra  O2 via nasal canula as needed,   blood cx with Streptococcus gallolyticus noted above   ucx with contamination   rept blood cx neg noted above  id f/u   TTE to rule out vegetation in the setting of Streptococcal Bacteremia  IF TTE  shows no vegetation then Need IV Ceftriaxone 2 g daily until 8/28/24, otherwise 6 weeks   Continue Dexamethasone to complete the course   ir for picc line placement for long term abx  cardio f/u   Echocardiogram, (KEVIN not feasible since pt is COVID+).  cont cozaar 50mg qd and lopressor   gi cons noted   Hx of gastric ca s/p resection.   No apparent active GI issues at this time.   Pt eating well without further n/v.   Recommend outpatient GI f/u.   Please reconsult inpatient GI PRN.  lispro ss,   hold outpt dm meds,  hgba1c 5.8 noted      heme onc cons outpt  cont current meds   d/c planning          Patient is a 89y old  Female who presents with a chief complaint of Infection due to severe acute respiratory syndrome coronavirus 2 (SARS-CoV-2)     (05 Aug 2024 16:15)    pt seen in icu [  ], reg med floor [  x ], bed [ x ], chair at bedside [   ], awake and responsive [ x ], lethargic [  ],    nad [ x ]      Allergies    gabapentin (Unknown)        Vitals    T(F): 98.5 (08-06-24 @ 05:09), Max: 98.5 (08-06-24 @ 05:09)  HR: 79 (08-06-24 @ 05:09) (69 - 80)  BP: 183/95 (08-06-24 @ 05:09) (147/81 - 184/82)  RR: 20 (08-06-24 @ 05:09) (18 - 20)  SpO2: 97% (08-06-24 @ 05:09) (97% - 97%)  Wt(kg): --  CAPILLARY BLOOD GLUCOSE      POCT Blood Glucose.: 224 mg/dL (05 Aug 2024 21:26)      Labs                          11.8   6.35  )-----------( 260      ( 06 Aug 2024 05:35 )             35.5       08-05    142  |  117<H>  |  44<H>  ----------------------------<  214<H>  3.8   |  16<L>  |  0.97    Ca    8.3<L>      05 Aug 2024 07:10  Phos  2.8     08-05  Mg     2.0     08-05    TPro  5.3<L>  /  Alb  2.7<L>  /  TBili  0.3  /  DBili  x   /  AST  9<L>  /  ALT  21  /  AlkPhos  40  08-05            .Blood Blood-Peripheral  07-31 @ 06:30   No growth at 5 days  --  --      .Blood Blood-Peripheral  07-31 @ 06:00   No growth at 5 days  --  --      Clean Catch  07-29 @ 04:31   >=3 organisms. Probable collection contamination.  --  --      .Blood Blood-Peripheral  07-29 @ 01:10   No growth at 5 days  --  --      .Blood Blood-Peripheral  07-29 @ 01:05   Growth in anaerobic bottle: Streptococcus gallolyticus  Growth in aerobic bottle: Streptococcus salivarius/vestibularis group  "Susceptibilities not performed"  Direct identification is available within approximately 3-5  hours either by Blood Panel Multiplexed PCR or Direct  MALDI-TOF. Details: [email or web address]  --  Blood Culture PCR  Streptococcus gallolyticus    < from: TTE W or WO Ultrasound Enhancing Agent (08.05.24 @ 12:18) >  CONCLUSIONS:      1. Left ventricular cavity is normal in size. Left ventricular systolic function is normal with an ejection fraction visually estimated at 55 to 60 %.   2. The left ventricular diastolic function is indeterminate.   3. Normal right ventricular cavity size and normal right ventricular systolic function.   4. Trileaflet aortic valve. No aortic valve stenosis.   5. Structurally normal mitral valve with normal leaflet excursion.   6. Trace mitral regurgitation.   7. No pericardial effusion seen.   8. No prior echocardiogram is available for comparison.      < end of copied text >        Radiology Results      Meds    MEDICATIONS  (STANDING):  apixaban 2.5 milliGRAM(s) Oral every 12 hours  ascorbic acid 500 milliGRAM(s) Oral daily  cefTRIAXone   IVPB 2000 milliGRAM(s) IV Intermittent every 24 hours  cholecalciferol 1000 Unit(s) Oral daily  dexAMETHasone  Injectable 6 milliGRAM(s) IV Push daily  insulin lispro (ADMELOG) corrective regimen sliding scale   SubCutaneous three times a day before meals  insulin lispro (ADMELOG) corrective regimen sliding scale   SubCutaneous at bedtime  lactated ringers. 1000 milliLiter(s) (50 mL/Hr) IV Continuous <Continuous>  losartan 50 milliGRAM(s) Oral two times a day  metoprolol tartrate 25 milliGRAM(s) Oral two times a day  montelukast 10 milliGRAM(s) Oral at bedtime  pantoprazole    Tablet 40 milliGRAM(s) Oral before breakfast  simvastatin 10 milliGRAM(s) Oral at bedtime  zinc sulfate 220 milliGRAM(s) Oral daily      MEDICATIONS  (PRN):  acetaminophen     Tablet .. 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),   Multiple myeloma/ plasmacytoma  Pulmonary embolism  S/P hysterectomy  S/P tubal ligation        PLAN    cont decadron until 8/8/24,   completed remdesivir 8/2/24,   cont vit c, vitamin d, zinc, pepcid,   cont singulair,   contact and airborne isolation,   cont albuterol inhaler,   pulm f/u   robitussin prn   O2 sat  97% (97% - 97%) on ra  O2 via nasal canula as needed,   blood cx with Streptococcus gallolyticus noted above   ucx with contamination   rept blood cx neg noted above  id f/u   TTE  shows no vegetation then Need IV Ceftriaxone 2 g daily until 8/28/24, otherwise 6 weeks   Continue Dexamethasone to complete the course   s/p  midline placement for long term abx 8/5/24  cardio f/u   Echocardiogram with ejection fraction visually estimated at 55 to 60 %.   Structurally normal mitral valve with normal leaflet excursion noted above.   (KEVIN not feasible since pt is COVID+).  cont cozaar 50mg bid  cont lopressor to 25mg bid    gi cons noted   Hx of gastric ca s/p resection.   No apparent active GI issues at this time.   Pt eating well without further n/v.   Recommend outpatient GI f/u.   Please reconsult inpatient GI PRN.  lispro ss,   hold outpt dm meds,  hgba1c 5.8 noted      heme onc cons outpt  cont current meds   d/c planning

## 2024-08-06 NOTE — PROGRESS NOTE ADULT - PROBLEM SELECTOR PLAN 5
- Hx of MM on active chemo, revlimid   - follows [facility] Dr Andrade - Hx of MM on active chemo, revlimid   - follows [facility] Dr Andrade  Last chemo 7/26/2024

## 2024-08-07 ENCOUNTER — TRANSCRIPTION ENCOUNTER (OUTPATIENT)
Age: 89
End: 2024-08-07

## 2024-08-07 VITALS
OXYGEN SATURATION: 96 % | DIASTOLIC BLOOD PRESSURE: 60 MMHG | SYSTOLIC BLOOD PRESSURE: 112 MMHG | RESPIRATION RATE: 18 BRPM | HEART RATE: 90 BPM | TEMPERATURE: 99 F

## 2024-08-07 LAB
ANION GAP SERPL CALC-SCNC: 11 MMOL/L — SIGNIFICANT CHANGE UP (ref 5–17)
BUN SERPL-MCNC: 38 MG/DL — HIGH (ref 7–18)
CALCIUM SERPL-MCNC: 8.8 MG/DL — SIGNIFICANT CHANGE UP (ref 8.4–10.5)
CHLORIDE SERPL-SCNC: 109 MMOL/L — HIGH (ref 96–108)
CO2 SERPL-SCNC: 16 MMOL/L — LOW (ref 22–31)
CREAT SERPL-MCNC: 0.82 MG/DL — SIGNIFICANT CHANGE UP (ref 0.5–1.3)
EGFR: 69 ML/MIN/1.73M2 — SIGNIFICANT CHANGE UP
GLUCOSE BLDC GLUCOMTR-MCNC: 261 MG/DL — HIGH (ref 70–99)
GLUCOSE BLDC GLUCOMTR-MCNC: 272 MG/DL — HIGH (ref 70–99)
GLUCOSE BLDC GLUCOMTR-MCNC: 291 MG/DL — HIGH (ref 70–99)
GLUCOSE SERPL-MCNC: 243 MG/DL — HIGH (ref 70–99)
HCT VFR BLD CALC: 39.2 % — SIGNIFICANT CHANGE UP (ref 34.5–45)
HGB BLD-MCNC: 12.9 G/DL — SIGNIFICANT CHANGE UP (ref 11.5–15.5)
MCHC RBC-ENTMCNC: 32.9 GM/DL — SIGNIFICANT CHANGE UP (ref 32–36)
MCHC RBC-ENTMCNC: 33.5 PG — SIGNIFICANT CHANGE UP (ref 27–34)
MCV RBC AUTO: 101.8 FL — HIGH (ref 80–100)
NRBC # BLD: 0 /100 WBCS — SIGNIFICANT CHANGE UP (ref 0–0)
PLATELET # BLD AUTO: 259 K/UL — SIGNIFICANT CHANGE UP (ref 150–400)
POTASSIUM SERPL-MCNC: 4.1 MMOL/L — SIGNIFICANT CHANGE UP (ref 3.5–5.3)
POTASSIUM SERPL-SCNC: 4.1 MMOL/L — SIGNIFICANT CHANGE UP (ref 3.5–5.3)
RBC # BLD: 3.85 M/UL — SIGNIFICANT CHANGE UP (ref 3.8–5.2)
RBC # FLD: 16.1 % — HIGH (ref 10.3–14.5)
SODIUM SERPL-SCNC: 136 MMOL/L — SIGNIFICANT CHANGE UP (ref 135–145)
WBC # BLD: 6.72 K/UL — SIGNIFICANT CHANGE UP (ref 3.8–10.5)
WBC # FLD AUTO: 6.72 K/UL — SIGNIFICANT CHANGE UP (ref 3.8–10.5)

## 2024-08-07 PROCEDURE — 94660 CPAP INITIATION&MGMT: CPT

## 2024-08-07 PROCEDURE — 93005 ELECTROCARDIOGRAM TRACING: CPT

## 2024-08-07 PROCEDURE — 96374 THER/PROPH/DIAG INJ IV PUSH: CPT

## 2024-08-07 PROCEDURE — 81001 URINALYSIS AUTO W/SCOPE: CPT

## 2024-08-07 PROCEDURE — 85025 COMPLETE CBC W/AUTO DIFF WBC: CPT

## 2024-08-07 PROCEDURE — 71045 X-RAY EXAM CHEST 1 VIEW: CPT

## 2024-08-07 PROCEDURE — 87640 STAPH A DNA AMP PROBE: CPT

## 2024-08-07 PROCEDURE — 83735 ASSAY OF MAGNESIUM: CPT

## 2024-08-07 PROCEDURE — 83615 LACTATE (LD) (LDH) ENZYME: CPT

## 2024-08-07 PROCEDURE — 84100 ASSAY OF PHOSPHORUS: CPT

## 2024-08-07 PROCEDURE — 85730 THROMBOPLASTIN TIME PARTIAL: CPT

## 2024-08-07 PROCEDURE — 99285 EMERGENCY DEPT VISIT HI MDM: CPT | Mod: 25

## 2024-08-07 PROCEDURE — 96375 TX/PRO/DX INJ NEW DRUG ADDON: CPT

## 2024-08-07 PROCEDURE — 82310 ASSAY OF CALCIUM: CPT

## 2024-08-07 PROCEDURE — 36415 COLL VENOUS BLD VENIPUNCTURE: CPT

## 2024-08-07 PROCEDURE — 84436 ASSAY OF TOTAL THYROXINE: CPT

## 2024-08-07 PROCEDURE — 71275 CT ANGIOGRAPHY CHEST: CPT | Mod: MC

## 2024-08-07 PROCEDURE — 87641 MR-STAPH DNA AMP PROBE: CPT

## 2024-08-07 PROCEDURE — 83605 ASSAY OF LACTIC ACID: CPT

## 2024-08-07 PROCEDURE — 87635 SARS-COV-2 COVID-19 AMP PRB: CPT

## 2024-08-07 PROCEDURE — 80048 BASIC METABOLIC PNL TOTAL CA: CPT

## 2024-08-07 PROCEDURE — 94640 AIRWAY INHALATION TREATMENT: CPT

## 2024-08-07 PROCEDURE — 80053 COMPREHEN METABOLIC PANEL: CPT

## 2024-08-07 PROCEDURE — 70450 CT HEAD/BRAIN W/O DYE: CPT | Mod: MC

## 2024-08-07 PROCEDURE — 87077 CULTURE AEROBIC IDENTIFY: CPT

## 2024-08-07 PROCEDURE — 83036 HEMOGLOBIN GLYCOSYLATED A1C: CPT

## 2024-08-07 PROCEDURE — 83970 ASSAY OF PARATHORMONE: CPT

## 2024-08-07 PROCEDURE — 87150 DNA/RNA AMPLIFIED PROBE: CPT

## 2024-08-07 PROCEDURE — 84443 ASSAY THYROID STIM HORMONE: CPT

## 2024-08-07 PROCEDURE — 94760 N-INVAS EAR/PLS OXIMETRY 1: CPT

## 2024-08-07 PROCEDURE — 82803 BLOOD GASES ANY COMBINATION: CPT

## 2024-08-07 PROCEDURE — 85027 COMPLETE CBC AUTOMATED: CPT

## 2024-08-07 PROCEDURE — 87186 SC STD MICRODIL/AGAR DIL: CPT

## 2024-08-07 PROCEDURE — 87040 BLOOD CULTURE FOR BACTERIA: CPT

## 2024-08-07 PROCEDURE — 93306 TTE W/DOPPLER COMPLETE: CPT

## 2024-08-07 PROCEDURE — 82962 GLUCOSE BLOOD TEST: CPT

## 2024-08-07 PROCEDURE — 87086 URINE CULTURE/COLONY COUNT: CPT

## 2024-08-07 PROCEDURE — 74177 CT ABD & PELVIS W/CONTRAST: CPT | Mod: MC

## 2024-08-07 PROCEDURE — 85610 PROTHROMBIN TIME: CPT

## 2024-08-07 RX ORDER — LOSARTAN POTASSIUM 50 MG/1
1 TABLET, FILM COATED ORAL
Qty: 60 | Refills: 0
Start: 2024-08-07 | End: 2024-09-05

## 2024-08-07 RX ORDER — PANTOPRAZOLE SODIUM 20 MG/1
1 TABLET, DELAYED RELEASE ORAL
Qty: 30 | Refills: 0
Start: 2024-08-07 | End: 2024-09-05

## 2024-08-07 RX ORDER — NIFEDIPINE 20 MG/1
1 CAPSULE, LIQUID FILLED ORAL
Qty: 30 | Refills: 0
Start: 2024-08-07 | End: 2024-09-05

## 2024-08-07 RX ORDER — METOPROLOL TARTRATE 100 MG
1 TABLET ORAL
Qty: 60 | Refills: 0
Start: 2024-08-07 | End: 2024-09-05

## 2024-08-07 RX ORDER — DEXAMETHASONE 1.5 MG/1
5 TABLET ORAL
Qty: 10 | Refills: 0
Start: 2024-08-07 | End: 2024-08-08

## 2024-08-07 RX ORDER — ZINC SULFATE 50(220)MG
1 CAPSULE ORAL
Qty: 30 | Refills: 0
Start: 2024-08-07 | End: 2024-09-05

## 2024-08-07 RX ORDER — LYSINE HCL 500 MG
1 TABLET ORAL
Qty: 30 | Refills: 0
Start: 2024-08-07 | End: 2024-09-05

## 2024-08-07 RX ORDER — MECLIZINE 12.5 MG/1
1 TABLET ORAL
Refills: 0 | DISCHARGE

## 2024-08-07 RX ORDER — METOPROLOL TARTRATE 100 MG
1 TABLET ORAL
Refills: 0 | DISCHARGE

## 2024-08-07 RX ORDER — MEGESTROL ACETATE 40 MG/ML
20 SUSPENSION ORAL
Refills: 0 | DISCHARGE

## 2024-08-07 RX ORDER — BACTERIOSTATIC SODIUM CHLORIDE 0.9 %
10 VIAL (ML) INJECTION
Qty: 22 | Refills: 0
Start: 2024-08-07 | End: 2024-08-28

## 2024-08-07 RX ORDER — ALENDRONATE SODIUM 70 MG
75 TABLET ORAL
Refills: 0 | DISCHARGE

## 2024-08-07 RX ADMIN — PANTOPRAZOLE SODIUM 40 MILLIGRAM(S): 20 TABLET, DELAYED RELEASE ORAL at 07:06

## 2024-08-07 RX ADMIN — Medication 6: at 12:09

## 2024-08-07 RX ADMIN — LOSARTAN POTASSIUM 50 MILLIGRAM(S): 50 TABLET, FILM COATED ORAL at 05:36

## 2024-08-07 RX ADMIN — Medication 220 MILLIGRAM(S): at 12:09

## 2024-08-07 RX ADMIN — Medication 6: at 08:23

## 2024-08-07 RX ADMIN — Medication 500 MILLIGRAM(S): at 12:11

## 2024-08-07 RX ADMIN — Medication 100 MILLIGRAM(S): at 12:08

## 2024-08-07 RX ADMIN — Medication 1000 UNIT(S): at 12:09

## 2024-08-07 RX ADMIN — DEXAMETHASONE 6 MILLIGRAM(S): 1.5 TABLET ORAL at 05:34

## 2024-08-07 RX ADMIN — APIXABAN 2.5 MILLIGRAM(S): 5 TABLET, FILM COATED ORAL at 05:35

## 2024-08-07 RX ADMIN — Medication 25 MILLIGRAM(S): at 05:36

## 2024-08-07 RX ADMIN — NIFEDIPINE 30 MILLIGRAM(S): 20 CAPSULE, LIQUID FILLED ORAL at 05:35

## 2024-08-07 NOTE — PROGRESS NOTE ADULT - ASSESSMENT
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  1.Echocardiogram no vegetation.  2.COVID+-dexamethasone.  3.Strep bactermia-abx as per ID.  4.HTN-lopressor 25mg bid, cozaar 50mg bid, procaria xl 30mg qd.  5.Hx of PE-Eliquis.  6.DM-Insulin.  7.Lipid d.o-statin.  8.Multiple Myeloma-Heme/Onc f/u as outpatient.  9.PPI.

## 2024-08-07 NOTE — PROGRESS NOTE ADULT - PROBLEM SELECTOR PROBLEM 4
Multiple myeloma
UTI (urinary tract infection)
UTI (urinary tract infection)
Multiple myeloma
UTI (urinary tract infection)
Multiple myeloma
UTI (urinary tract infection)

## 2024-08-07 NOTE — DISCHARGE NOTE NURSING/CASE MANAGEMENT/SOCIAL WORK - PATIENT PORTAL LINK FT
You can access the FollowMyHealth Patient Portal offered by [facility] by registering at the following website: [email or web address]. By joining [employer]’s FollowMyHealth portal, you will also be able to view your health information using other applications (apps) compatible with our system.

## 2024-08-07 NOTE — GOALS OF CARE CONVERSATION - ADVANCED CARE PLANNING - CONVERSATION DETAILS
She is aware her mother is not well and is on the clinical/cognitive decline   She states if it is in god's will her mother has lived a long and fruitful life and would like her to die naturally   She does want to continue cancer treatment with Dr Real  Discussed by phone given COVID + status and limited family visitation for merari

## 2024-08-07 NOTE — PROGRESS NOTE ADULT - SUBJECTIVE AND OBJECTIVE BOX
Patient is seen and examined at the bed side, is afebrile. She is doing better, no new complaints. The discharge plan noted.       REVIEW OF SYSTEMS: All other review systems are negative      ALLERGIES: gabapentin (Unknown)      Vital Signs Last 24 Hrs  T(C): 36.5 (07 Aug 2024 13:11), Max: 36.8 (07 Aug 2024 05:08)  T(F): 97.7 (07 Aug 2024 13:11), Max: 98.3 (07 Aug 2024 05:08)  HR: 92 (07 Aug 2024 13:11) (81 - 93)  BP: 115/74 (07 Aug 2024 13:11) (115/74 - 148/74)  BP(mean): --  RR: 18 (07 Aug 2024 13:11) (18 - 20)  SpO2: 98% (07 Aug 2024 13:11) (92% - 98%)    Parameters below as of 07 Aug 2024 13:11  Patient On (Oxygen Delivery Method): room air      PHYSICAL EXAM:  GENERAL: Not in distress, off oxygen  CHEST/LUNG: Not using accessory muscles   EXTREMITIES: No pedal  edema  CNS: Awake and Alert      LABS:                        12.9   6.72  )-----------( 259      ( 07 Aug 2024 08:10 )             39.2                           11.8   6.35  )-----------( 260      ( 06 Aug 2024 05:35 )             35.5         08-07    136  |  109<H>  |  38<H>  ----------------------------<  243<H>  4.1   |  16<L>  |  0.82    Ca    8.8      07 Aug 2024 08:10      08-06    139  |  112<H>  |  43<H>  ----------------------------<  171<H>  3.8   |  16<L>  |  0.91    Ca    8.7      06 Aug 2024 05:35  Phos  2.8     08-05  Mg     2.0     08-05    TPro  5.3<L>  /  Alb  2.7<L>  /  TBili  0.3  /  DBili  x   /  AST  9<L>  /  ALT  21  /  AlkPhos  40  08-05    PT/INR - ( 29 Jul 2024 01:10 )   PT: 13.0 sec;   INR: 1.14 ratio      PTT - ( 29 Jul 2024 01:10 )  PTT:26.2 sec      CAPILLARY BLOOD GLUCOSE  POCT Blood Glucose.: 279 mg/dL (30 Jul 2024 10:54)  POCT Blood Glucose.: 183 mg/dL (30 Jul 2024 08:02)  POCT Blood Glucose.: 264 mg/dL (29 Jul 2024 21:37)  POCT Blood Glucose.: 180 mg/dL (29 Jul 2024 16:52)    ABG - ( 29 Jul 2024 03:06 )  pH, Arterial: 7.45  pH, Blood: x     /  pCO2: 23    /  pO2: 287   / HCO3: 16    / Base Excess: -6.0  /  SaO2: 96          MEDICATIONS  (STANDING):  apixaban 2.5 milliGRAM(s) Oral every 12 hours  ascorbic acid 500 milliGRAM(s) Oral daily  cefTRIAXone   IVPB 2000 milliGRAM(s) IV Intermittent every 24 hours  cholecalciferol 1000 Unit(s) Oral daily  dexAMETHasone  Injectable 6 milliGRAM(s) IV Push daily  insulin lispro (ADMELOG) corrective regimen sliding scale   SubCutaneous at bedtime  insulin lispro (ADMELOG) corrective regimen sliding scale   SubCutaneous three times a day before meals  lactated ringers. 1000 milliLiter(s) (50 mL/Hr) IV Continuous <Continuous>  losartan 50 milliGRAM(s) Oral two times a day  metoprolol tartrate 25 milliGRAM(s) Oral two times a day  montelukast 10 milliGRAM(s) Oral at bedtime  NIFEdipine XL 30 milliGRAM(s) Oral daily  pantoprazole    Tablet 40 milliGRAM(s) Oral before breakfast  simvastatin 10 milliGRAM(s) Oral at bedtime  zinc sulfate 220 milliGRAM(s) Oral daily        RADIOLOGY & ADDITIONAL TESTS:    7/29/24: CT Head No Cont (07.29.24 @ 05:21) No acute intracranial hemorrhage, mass effect, or midline shift.    7/29/24: CT Angio Chest PE Protocol w/ IV Cont (07.29.24 @ 05:21) No pulmonary embolus or acute finding in the chest.    No acute finding in the abdomen or pelvis. Mild compression fracture of the L1 superior endplate, new compared to prior but chronic-appearing.      7/29/24 : CT Abdomen and Pelvis w/ IV Cont (07.29.24 @ 05:21) No acute finding in the abdomen or pelvis.    Mild compression fracture of the L1 superior endplate, new compared to prior but chronic-appearing.    7/29/24 : Xray Chest 1 View- PORTABLE-Urgent (07.29.24 @ 02:10) No consolidation or infiltrate.  No pleural effusion.    Heart size cannot be accurately assessed in this projection.      8/5/24 : TTE W or WO Ultrasound Enhancing Agent (08.05.24 @ 12:18) >     CONCLUSIONS:      1. Left ventricular cavity is normal in size. Left ventricular systolic function is normal with an ejection fraction visually estimated at 55 to 60 %.   2. The left ventricular diastolic function is indeterminate.   3. Normal right ventricular cavity size and normal right ventricular systolic function.   4. Trileaflet aortic valve. No aortic valve stenosis.   5. Structurally normal mitral valve with normal leaflet excursion.   6. Trace mitral regurgitation.   7. No pericardial effusion seen.   8. No prior echocardiogram is available for comparison.      MICROBIOLOGY DATA:    Culture - Blood in AM (07.31.24 @ 06:30)   Specimen Source: .Blood Blood-Peripheral  Culture Results: No growth at 5 days    Culture - Blood in AM (07.31.24 @ 06:00)   Specimen Source: .Blood Blood-Peripheral  Culture Results: No growth at 5 days      Culture - Blood (07.29.24 @ 01:05)   - Streptococcus sp. (Not Grp A, B or S pneumoniae): Detec  - Vancomycin: S 0.5  Gram Stain:   Growth in anaerobic bottle: Gram positive cocci in pairs   Growth in aerobic bottle: Gram positive cocci in pairs  - Ceftriaxone: S <=0.25  - Penicillin: S 0.06  Specimen Source: .Blood Blood-Peripheral  Organism: Streptococcus gallolyticus  Organism: Blood Culture PCR  Culture Results:   Growth in anaerobic bottle: Streptococcus gallolyticus   Growth in aerobic bottle: Gram positive cocci in pairs   Direct identification is available within approximately 3-5   hours either by Blood Panel Multiplexed PCR or Direct   MALDI-TOF. Details: [email or web address]  Organism Identification: Blood Culture PCR   Streptococcus gallolyticus    Culture - Urine (07.29.24 @ 04:31)   Specimen Source: Clean Catch  Culture Results:   >=3 organisms. Probable collection contamination.    Urine Microscopic-Add On (NC) (07.29.24 @ 04:31)   Bacteria: Many /HPF  Comment - Urine: few amorphous urates  Squamous Epithelial Cells: Present  Red Blood Cell - Urine: 4 /HPF  White Blood Cell - Urine: 8 /HPF    Culture - Blood (07.29.24 @ 01:10)   Specimen Source: .Blood Blood-Peripheral  Culture Results: No growth at 5 days    Culture - Blood (07.29.24 @ 01:05)   - Streptococcus sp. (Not Grp A, B or S pneumoniae): Detec  Gram Stain:   Growth in anaerobic bottle: Gram positive cocci in pairs   Growth in aerobic bottle: Gram positive cocci in pairs  Specimen Source: .Blood Blood-Peripheral  Organism: Blood Culture PCR  Culture Results:   Growth in anaerobic bottle: Gram positive cocci in pairs   Growth in aerobic bottle: Gram positive cocci in pairs   Direct identification is available within approximately 3-5   hours either by Blood Panel Multiplexed PCR or Direct   MALDI-TOF. Details: [email or web address]  Organism Identification: Blood Culture PCR  Method Type: PCR

## 2024-08-07 NOTE — PROGRESS NOTE ADULT - PROBLEM SELECTOR PROBLEM 3
UTI (urinary tract infection)
2019 novel coronavirus disease (COVID-19)
UTI (urinary tract infection)
2019 novel coronavirus disease (COVID-19)

## 2024-08-07 NOTE — PROGRESS NOTE ADULT - PROBLEM SELECTOR PLAN 6
Accuchecks with reg insulin coverage  HBA1C
- Hx of HTN on losartan and metoprolol   - c/w home meds
Accuchecks with reg insulin coverage  HBA1C
Hx of HTN on losartan 25mg and metoprolol 25mg daily  C/w home meds
Hx of HTN on losartan and metoprolol   - c/w home meds
Accuchecks with reg insulin coverage  HBA1C
- Hx of HTN on losartan and metoprolol   - c/w home meds
Hx of HTN on losartan 25mg and metoprolol 25mg daily  C/w home meds
- Hx of HTN on losartan and metoprolol   - c/w home meds

## 2024-08-07 NOTE — PROGRESS NOTE ADULT - SUBJECTIVE AND OBJECTIVE BOX
Date of Service 08-07-24 @ 12:43    CHIEF COMPLAINT:Patient is a 89y old  Female who presents with a chief complaint of UTI and COVID .Pt appears comfortable.    	  REVIEW OF SYSTEMS:  CONSTITUTIONAL: No fever, weight loss, or fatigue  EYES: No eye pain, visual disturbances, or discharge  ENT:  No difficulty hearing, tinnitus, vertigo; No sinus or throat pain  NECK: No pain or stiffness  RESPIRATORY: No cough, wheezing, chills or hemoptysis; No Shortness of Breath  CARDIOVASCULAR: No chest pain, palpitations, passing out, dizziness, or leg swelling  GASTROINTESTINAL: No abdominal or epigastric pain. No nausea, vomiting, or hematemesis; No diarrhea or constipation. No melena or hematochezia.  GENITOURINARY: No dysuria, frequency, hematuria, or incontinence  NEUROLOGICAL: No headaches, memory loss, loss of strength, numbness, or tremors  SKIN: No itching, burning, rashes, or lesions   LYMPH Nodes: No enlarged glands  ENDOCRINE: No heat or cold intolerance; No hair loss  MUSCULOSKELETAL: No joint pain or swelling; No muscle, back, or extremity pain  PSYCHIATRIC: No depression, anxiety, mood swings, or difficulty sleeping  HEME/LYMPH: No easy bruising, or bleeding gums  ALLERGY AND IMMUNOLOGIC: No hives or eczema	      PHYSICAL EXAM:  T(C): 36.8 (08-07-24 @ 05:08), Max: 36.8 (08-07-24 @ 05:08)  HR: 93 (08-07-24 @ 05:08) (81 - 93)  BP: 126/77 (08-07-24 @ 05:08) (126/77 - 155/81)  RR: 20 (08-07-24 @ 05:08) (18 - 20)  SpO2: 97% (08-07-24 @ 05:08) (92% - 97%)  Wt(kg): --  I&O's Summary    06 Aug 2024 07:01  -  07 Aug 2024 07:00  --------------------------------------------------------  IN: 0 mL / OUT: 500 mL / NET: -500 mL        Appearance: Normal	  HEENT:   Normal oral mucosa, PERRL, EOMI	  Lymphatic: No lymphadenopathy  Cardiovascular: Normal S1 S2, No JVD, No murmurs, No edema  Respiratory: Lungs clear to auscultation	  Psychiatry: A & O x 3, Mood & affect appropriate  Gastrointestinal:  Soft, Non-tender, + BS	  Skin: No rashes, No ecchymoses, No cyanosis	  Neurologic: Non-focal  Extremities: Normal range of motion, No clubbing, cyanosis or edema  Vascular: Peripheral pulses palpable 2+ bilaterally    MEDICATIONS  (STANDING):  apixaban 2.5 milliGRAM(s) Oral every 12 hours  ascorbic acid 500 milliGRAM(s) Oral daily  cefTRIAXone   IVPB 2000 milliGRAM(s) IV Intermittent every 24 hours  cholecalciferol 1000 Unit(s) Oral daily  dexAMETHasone  Injectable 6 milliGRAM(s) IV Push daily  insulin lispro (ADMELOG) corrective regimen sliding scale   SubCutaneous at bedtime  insulin lispro (ADMELOG) corrective regimen sliding scale   SubCutaneous three times a day before meals  lactated ringers. 1000 milliLiter(s) (50 mL/Hr) IV Continuous <Continuous>  losartan 50 milliGRAM(s) Oral two times a day  metoprolol tartrate 25 milliGRAM(s) Oral two times a day  montelukast 10 milliGRAM(s) Oral at bedtime  NIFEdipine XL 30 milliGRAM(s) Oral daily  pantoprazole    Tablet 40 milliGRAM(s) Oral before breakfast  simvastatin 10 milliGRAM(s) Oral at bedtime  zinc sulfate 220 milliGRAM(s) Oral daily        LABS:	 	                        12.9   6.72  )-----------( 259      ( 07 Aug 2024 08:10 )             39.2     08-07    136  |  109<H>  |  38<H>  ----------------------------<  243<H>  4.1   |  16<L>  |  0.82    Ca    8.8      07 Aug 2024 08:10        TSH: Thyroid Stimulating Hormone, Serum: 0.56 uU/mL (08-06 @ 05:35)

## 2024-08-07 NOTE — PROGRESS NOTE ADULT - ASSESSMENT
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 Patient is AAOx1 (to self only), complaining of diffuse abdominal pain, headache and mild chest pain. She has no fever or chills. On admission, she found to have no fever but positive COVID PCR and Blood culture positive for streptococcal species. She has started on Remdesivir, Dexamethasone and Ceftriaxone, and the ID consult requested to assist with further evaluation and antibiotic management.    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 Continue Dexamethasone to complete the course  2. Continue Ceftriaxone 2 g daily until 8/28/24  3. Monitor CBC, BMP, ESR and CRP weekly until 8/28/24  4. Supportive care including AC  5. COVID precautions    d/w covering NP, Soh-Dedrick    - ID follow up with DR. Merrick Scott via Telehealth ( Tuesday only) [phone] and Fax [phone]    Attending Attestation:    Spent more than 35 minutes on total encounter, more than 50 % of the visit was spent counseling and/or coordinating care by the Attending physician

## 2024-08-07 NOTE — PROGRESS NOTE ADULT - PROBLEM SELECTOR PLAN 3
COVID + in ED   See plan above
cultures noted  antibiotics
- completed Remdesivir today  - continue decadron until 8/8  - tolerating room air
cultures noted  antibiotics
check pending cultures  antibiotics
- completed Remdesivir today  - continue decadron until 8/8  - tolerating room air
cultures noted  antibiotics
cultures noted  antibiotics
cont Remdesivir and decadron
COVID + in ED   See plan above
- completed Remdesivir today  - continue decadron until 8/8  - tolerating room air

## 2024-08-07 NOTE — PROGRESS NOTE ADULT - SUBJECTIVE AND OBJECTIVE BOX
Patient is a 89y old  Female who presents with a chief complaint of UTI and COVID (07 Aug 2024 06:40)  Awake, alert, laying in bed in NAD. Still on isolation due to Covid. Confused but not agitated    INTERVAL HPI/OVERNIGHT EVENTS:      VITAL SIGNS:  T(F): 98.3 (08-07-24 @ 05:08)  HR: 93 (08-07-24 @ 05:08)  BP: 126/77 (08-07-24 @ 05:08)  RR: 20 (08-07-24 @ 05:08)  SpO2: 97% (08-07-24 @ 05:08)  Wt(kg): --  I&O's Detail    06 Aug 2024 07:01  -  07 Aug 2024 07:00  --------------------------------------------------------  IN:  Total IN: 0 mL    OUT:    Stool (mL): 500 mL  Total OUT: 500 mL    Total NET: -500 mL              REVIEW OF SYSTEMS:    CONSTITUTIONAL:  No fevers, chills, sweats    HEENT:  Eyes:  No diplopia or blurred vision. ENT:  No earache, sore throat or runny nose.    CARDIOVASCULAR:  No pressure, squeezing, tightness, or heaviness about the chest; no palpitations.    RESPIRATORY:  Per HPI    GASTROINTESTINAL:  No abdominal pain, nausea, vomiting or diarrhea.    GENITOURINARY:  No dysuria, frequency or urgency.    NEUROLOGIC:  No paresthesias, fasciculations, seizures or weakness.    PSYCHIATRIC:  No disorder of thought or mood.      PHYSICAL EXAM:    Constitutional: Well developed and nourished  Eyes:Perrla  ENMT: normal  Neck:supple  Respiratory: good air entry  Cardiovascular: S1 S2 regular  Gastrointestinal: Soft, Non tender  Extremities: No edema  Vascular:normal  Neurological:Awake, alert,Ox3  Musculoskeletal:Normal      MEDICATIONS  (STANDING):  apixaban 2.5 milliGRAM(s) Oral every 12 hours  ascorbic acid 500 milliGRAM(s) Oral daily  cholecalciferol 1000 Unit(s) Oral daily  dexAMETHasone  Injectable 6 milliGRAM(s) IV Push daily  insulin lispro (ADMELOG) corrective regimen sliding scale   SubCutaneous three times a day before meals  insulin lispro (ADMELOG) corrective regimen sliding scale   SubCutaneous at bedtime  lactated ringers. 1000 milliLiter(s) (50 mL/Hr) IV Continuous <Continuous>  losartan 50 milliGRAM(s) Oral two times a day  metoprolol tartrate 25 milliGRAM(s) Oral two times a day  montelukast 10 milliGRAM(s) Oral at bedtime  NIFEdipine XL 30 milliGRAM(s) Oral daily  pantoprazole    Tablet 40 milliGRAM(s) Oral before breakfast  simvastatin 10 milliGRAM(s) Oral at bedtime  zinc sulfate 220 milliGRAM(s) Oral daily    MEDICATIONS  (PRN):  acetaminophen     Tablet .. 650 milliGRAM(s) Oral every 6 hours PRN Temp greater or equal to 38C (100.4F), Mild Pain (1 - 3)      Allergies    gabapentin (Unknown)    Intolerances        LABS:                        12.9   6.72  )-----------( 259      ( 07 Aug 2024 08:10 )             39.2     08-07    136  |  109<H>  |  38<H>  ----------------------------<  243<H>  4.1   |  16<L>  |  0.82    Ca    8.8      07 Aug 2024 08:10        Urinalysis Basic - ( 07 Aug 2024 08:10 )    Color: x / Appearance: x / SG: x / pH: x  Gluc: 243 mg/dL / Ketone: x  / Bili: x / Urobili: x   Blood: x / Protein: x / Nitrite: x   Leuk Esterase: x / RBC: x / WBC x   Sq Epi: x / Non Sq Epi: x / Bacteria: x            CAPILLARY BLOOD GLUCOSE      POCT Blood Glucose.: 272 mg/dL (07 Aug 2024 08:17)  POCT Blood Glucose.: 208 mg/dL (06 Aug 2024 20:43)  POCT Blood Glucose.: 244 mg/dL (06 Aug 2024 17:31)  POCT Blood Glucose.: 274 mg/dL (06 Aug 2024 11:29)        RADIOLOGY & ADDITIONAL TESTS:    CXR:  < from: CT Angio Chest PE Protocol w/ IV Cont (07.29.24 @ 05:21) >  IMPRESSION:  No pulmonary embolus or acute finding in the chest.    No acute finding in the abdomen or pelvis.    Mild compression fracture of the L1 superior endplate, new compared to   prior but chronic-appearing.      < end of copied text >  < from: CT Head No Cont (07.29.24 @ 05:21) >  IMPRESSION:  No acute intracranial hemorrhage, mass effect, or midline shift.      < end of copied text >    Ct scan chest:    ekg;    echo:

## 2024-08-07 NOTE — PROGRESS NOTE ADULT - PROBLEM SELECTOR PROBLEM 5
HTN (hypertension)
Multiple myeloma
HTN (hypertension)
Multiple myeloma
Multiple myeloma
HTN (hypertension)
HTN (hypertension)
Multiple myeloma

## 2024-08-07 NOTE — PROGRESS NOTE ADULT - PROVIDER SPECIALTY LIST ADULT
Infectious Disease
Internal Medicine
Infectious Disease
Internal Medicine
Cardiology
Cardiology
Internal Medicine
Cardiology
Cardiology
Internal Medicine
Pulmonology
Internal Medicine
Pulmonology
Internal Medicine
Internal Medicine

## 2024-08-07 NOTE — PROGRESS NOTE ADULT - PROBLEM SELECTOR PROBLEM 6
DM (diabetes mellitus)
DM (diabetes mellitus)
HTN (hypertension)
DM (diabetes mellitus)
HTN (hypertension)
DM (diabetes mellitus)
HTN (hypertension)
HTN (hypertension)

## 2024-08-07 NOTE — PROGRESS NOTE ADULT - PROBLEM SELECTOR PLAN 1
improved  oxygen supp prn  monitor oxygen sat  monitor resp status
- 2/2 COVID  - s/p BIPAP transitioned to 2L --> now on room air saturating 97%  - CT chest No pulmonary embolus or acute finding in the chest.  - completed remdesivir course  - continue decadron to complete on 8/8  RESOLVED
improved  oxygen supp prn  monitor oxygen sat  monitor resp status
- 2/2 COVID  - s/p BIPAP transitioned to 2L --> now on room air saturating 97%  - CT chest No pulmonary embolus or acute finding in the chest.  - completed remdesivir course  - continue decadron to complete on 8/8  RESOLVED
2/2 COVID   -S/P BIPAP in ED, transitioned to 3L NC  -CT Chest: No pneumonia or edema or acute abnormality.  -C/W Remdesivir & Decadron  - Wean O2 as tolerated  - Pulm Dr. Hernandes following
- 2/2 COVID  - s/p BIPAP transitioned to 2L --> now on room air saturating 97%  - CT chest No pulmonary embolus or acute finding in the chest.  - completed remdesivir course  - continue decadron to complete on 8/8  RESOLVED
improved  oxygen supp prn  monitor oxygen sat  monitor resp status
2/2 COVID   -S/P BIPAP in ED, transitioned to 3L NC  -CT Chest: No pneumonia or edema or acute abnormality.  -C/W Remdesivir & Decadron  - Wean O2 as tolerated  - Pulm Dr. Hernandes following
2/2 COVID, and Bacteremia   - s/p BIPAP  - transitioned to 2L now   - CT chest No pulmonary embolus or acute finding in the chest.  --- resolved----------

## 2024-08-07 NOTE — PROGRESS NOTE ADULT - REASON FOR ADMISSION
UTI and COVID

## 2024-08-07 NOTE — PROGRESS NOTE ADULT - PROBLEM SELECTOR PROBLEM 2
2019 novel coronavirus disease (COVID-19)
Bacteremia
2019 novel coronavirus disease (COVID-19)
Bacteremia
Bacteremia
Infection due to Streptococcus gallolyticus

## 2024-08-07 NOTE — PROGRESS NOTE ADULT - SUBJECTIVE AND OBJECTIVE BOX
Patient is a 89y old  Female who presents with a chief complaint of UTI and COVID (06 Aug 2024 13:48)    pt seen in icu [  ], reg med floor [  x ], bed [ x ], chair at bedside [   ], awake and responsive [ x ], lethargic [  ],    nad [ x ]      Allergies    gabapentin (Unknown)        Vitals    T(F): 98.3 (08-07-24 @ 05:08), Max: 98.3 (08-07-24 @ 05:08)  HR: 93 (08-07-24 @ 05:08) (81 - 93)  BP: 126/77 (08-07-24 @ 05:08) (126/77 - 155/81)  RR: 20 (08-07-24 @ 05:08) (18 - 20)  SpO2: 97% (08-07-24 @ 05:08) (92% - 97%)  Wt(kg): --  CAPILLARY BLOOD GLUCOSE      POCT Blood Glucose.: 208 mg/dL (06 Aug 2024 20:43)      Labs                          11.8   6.35  )-----------( 260      ( 06 Aug 2024 05:35 )             35.5       08-06    139  |  112<H>  |  43<H>  ----------------------------<  171<H>  3.8   |  16<L>  |  0.91    Ca    8.7      06 Aug 2024 05:35  Phos  2.8     08-05  Mg     2.0     08-05    TPro  5.3<L>  /  Alb  2.7<L>  /  TBili  0.3  /  DBili  x   /  AST  9<L>  /  ALT  21  /  AlkPhos  40  08-05            .Blood Blood-Peripheral  07-31 @ 06:30   No growth at 5 days  --  --      .Blood Blood-Peripheral  07-31 @ 06:00   No growth at 5 days  --  --      Clean Catch  07-29 @ 04:31   >=3 organisms. Probable collection contamination.  --  --      .Blood Blood-Peripheral  07-29 @ 01:10   No growth at 5 days  --  --      .Blood Blood-Peripheral  07-29 @ 01:05   Growth in anaerobic bottle: Streptococcus gallolyticus  Growth in aerobic bottle: Streptococcus salivarius/vestibularis group  "Susceptibilities not performed"  Direct identification is available within approximately 3-5  hours either by Blood Panel Multiplexed PCR or Direct  MALDI-TOF. Details: [email or web address]  --  Blood Culture PCR  Streptococcus gallolyticus          Radiology Results      Meds    MEDICATIONS  (STANDING):  apixaban 2.5 milliGRAM(s) Oral every 12 hours  ascorbic acid 500 milliGRAM(s) Oral daily  cholecalciferol 1000 Unit(s) Oral daily  dexAMETHasone  Injectable 6 milliGRAM(s) IV Push daily  insulin lispro (ADMELOG) corrective regimen sliding scale   SubCutaneous three times a day before meals  insulin lispro (ADMELOG) corrective regimen sliding scale   SubCutaneous at bedtime  lactated ringers. 1000 milliLiter(s) (50 mL/Hr) IV Continuous <Continuous>  losartan 50 milliGRAM(s) Oral two times a day  metoprolol tartrate 25 milliGRAM(s) Oral two times a day  montelukast 10 milliGRAM(s) Oral at bedtime  NIFEdipine XL 30 milliGRAM(s) Oral daily  pantoprazole    Tablet 40 milliGRAM(s) Oral before breakfast  simvastatin 10 milliGRAM(s) Oral at bedtime  zinc sulfate 220 milliGRAM(s) Oral daily      MEDICATIONS  (PRN):  acetaminophen     Tablet .. 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),   Multiple myeloma/ plasmacytoma  Pulmonary embolism  S/P hysterectomy  S/P tubal ligation        PLAN    cont decadron until 8/8/24,   completed remdesivir 8/2/24,   cont vit c, vitamin d, zinc, pepcid,   cont singulair,   contact and airborne isolation,   cont albuterol inhaler,   pulm f/u   robitussin prn   O2 sat  97% (97% - 97%) on ra  O2 via nasal canula as needed,   blood cx with Streptococcus gallolyticus noted above   ucx with contamination   rept blood cx neg noted above  id f/u   TTE  shows no vegetation then Need IV Ceftriaxone 2 g daily until 8/28/24, otherwise 6 weeks   Continue Dexamethasone to complete the course   s/p  midline placement for long term abx 8/5/24  cardio f/u   Echocardiogram with ejection fraction visually estimated at 55 to 60 %.   Structurally normal mitral valve with normal leaflet excursion noted above.   (KEVIN not feasible since pt is COVID+).  cont cozaar 50mg bid  cont lopressor to 25mg bid    gi cons noted   Hx of gastric ca s/p resection.   No apparent active GI issues at this time.   Pt eating well without further n/v.   Recommend outpatient GI f/u.   Please reconsult inpatient GI PRN.  lispro ss,   hold outpt dm meds,  hgba1c 5.8 noted      heme onc cons outpt  cont current meds   d/c planning          Patient is a 89y old  Female who presents with a chief complaint of UTI and COVID (06 Aug 2024 13:48)    pt seen in icu [  ], reg med floor [  x ], bed [ x ], chair at bedside [   ], awake and responsive [ x ], lethargic [  ],    nad [ x ]      Allergies    gabapentin (Unknown)        Vitals    T(F): 98.3 (08-07-24 @ 05:08), Max: 98.3 (08-07-24 @ 05:08)  HR: 93 (08-07-24 @ 05:08) (81 - 93)  BP: 126/77 (08-07-24 @ 05:08) (126/77 - 155/81)  RR: 20 (08-07-24 @ 05:08) (18 - 20)  SpO2: 97% (08-07-24 @ 05:08) (92% - 97%)  Wt(kg): --  CAPILLARY BLOOD GLUCOSE      POCT Blood Glucose.: 208 mg/dL (06 Aug 2024 20:43)      Labs                          11.8   6.35  )-----------( 260      ( 06 Aug 2024 05:35 )             35.5       08-06    139  |  112<H>  |  43<H>  ----------------------------<  171<H>  3.8   |  16<L>  |  0.91    Ca    8.7      06 Aug 2024 05:35  Phos  2.8     08-05  Mg     2.0     08-05    TPro  5.3<L>  /  Alb  2.7<L>  /  TBili  0.3  /  DBili  x   /  AST  9<L>  /  ALT  21  /  AlkPhos  40  08-05            .Blood Blood-Peripheral  07-31 @ 06:30   No growth at 5 days  --  --      .Blood Blood-Peripheral  07-31 @ 06:00   No growth at 5 days  --  --      Clean Catch  07-29 @ 04:31   >=3 organisms. Probable collection contamination.  --  --      .Blood Blood-Peripheral  07-29 @ 01:10   No growth at 5 days  --  --      .Blood Blood-Peripheral  07-29 @ 01:05   Growth in anaerobic bottle: Streptococcus gallolyticus  Growth in aerobic bottle: Streptococcus salivarius/vestibularis group  "Susceptibilities not performed"  Direct identification is available within approximately 3-5  hours either by Blood Panel Multiplexed PCR or Direct  MALDI-TOF. Details: [email or web address]  --  Blood Culture PCR  Streptococcus gallolyticus          Radiology Results      Meds    MEDICATIONS  (STANDING):  apixaban 2.5 milliGRAM(s) Oral every 12 hours  ascorbic acid 500 milliGRAM(s) Oral daily  cholecalciferol 1000 Unit(s) Oral daily  dexAMETHasone  Injectable 6 milliGRAM(s) IV Push daily  insulin lispro (ADMELOG) corrective regimen sliding scale   SubCutaneous three times a day before meals  insulin lispro (ADMELOG) corrective regimen sliding scale   SubCutaneous at bedtime  lactated ringers. 1000 milliLiter(s) (50 mL/Hr) IV Continuous <Continuous>  losartan 50 milliGRAM(s) Oral two times a day  metoprolol tartrate 25 milliGRAM(s) Oral two times a day  montelukast 10 milliGRAM(s) Oral at bedtime  NIFEdipine XL 30 milliGRAM(s) Oral daily  pantoprazole    Tablet 40 milliGRAM(s) Oral before breakfast  simvastatin 10 milliGRAM(s) Oral at bedtime  zinc sulfate 220 milliGRAM(s) Oral daily      MEDICATIONS  (PRN):  acetaminophen     Tablet .. 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 Merrick Scott via Telehealth ( Tuesday only) [phone] and Fax [phone]   Continue Dexamethasone to complete the course   s/p  midline placement for long term abx 8/5/24  cardio f/u   Echocardiogram with ejection fraction visually estimated at 55 to 60 %.   Structurally normal mitral valve with normal leaflet excursion noted    (KVEIN not feasible since pt is COVID+).  cont cozaar 50mg bid  cont lopressor to 25mg bid    gi cons noted   Hx of gastric ca s/p resection.   No apparent active GI issues at this time.   Pt eating well without further n/v.   Recommend outpatient GI f/u.   Please reconsult inpatient GI PRN.  lispro ss,   hold outpt dm meds,  hgba1c 5.8 noted      heme onc cons outpt  cont current meds   d/c planning home with abx infusion

## 2024-08-07 NOTE — DISCHARGE NOTE NURSING/CASE MANAGEMENT/SOCIAL WORK - NSDCPEFALRISK_GEN_ALL_CORE
For information on Fall & Injury Prevention, visit: [email or web address] OR  [email or web address] OR  https://www.cdc.gov/steadi/patient.html

## 2024-08-07 NOTE — PROGRESS NOTE ADULT - PROBLEM SELECTOR PROBLEM 7
History of pulmonary embolism
HLD (hyperlipidemia)
HLD (hyperlipidemia)
History of pulmonary embolism
HLD (hyperlipidemia)
History of pulmonary embolism
HLD (hyperlipidemia)
History of pulmonary embolism
History of pulmonary embolism
HLD (hyperlipidemia)
HLD (hyperlipidemia)
History of pulmonary embolism
HLD (hyperlipidemia)
HLD (hyperlipidemia)

## 2024-08-12 ENCOUNTER — EMERGENCY (EMERGENCY)
Facility: HOSPITAL | Age: 89
LOS: 1 days | Discharge: HOME | End: 2024-08-12
Attending: STUDENT IN AN ORGANIZED HEALTH CARE EDUCATION/TRAINING PROGRAM
Payer: MEDICAID

## 2024-08-12 VITALS
HEART RATE: 106 BPM | OXYGEN SATURATION: 96 % | DIASTOLIC BLOOD PRESSURE: 74 MMHG | HEIGHT: 55 IN | SYSTOLIC BLOOD PRESSURE: 121 MMHG | RESPIRATION RATE: 17 BRPM | TEMPERATURE: 99 F | WEIGHT: 130.07 LBS

## 2024-08-12 VITALS
DIASTOLIC BLOOD PRESSURE: 79 MMHG | SYSTOLIC BLOOD PRESSURE: 130 MMHG | HEART RATE: 86 BPM | TEMPERATURE: 98 F | RESPIRATION RATE: 18 BRPM | OXYGEN SATURATION: 98 %

## 2024-08-12 DIAGNOSIS — Z98.51 TUBAL LIGATION STATUS: Chronic | ICD-10-CM

## 2024-08-12 DIAGNOSIS — Z90.710 ACQUIRED ABSENCE OF BOTH CERVIX AND UTERUS: Chronic | ICD-10-CM

## 2024-08-12 LAB
ALBUMIN SERPL ELPH-MCNC: 2.7 G/DL — LOW (ref 3.5–5)
ALP SERPL-CCNC: 49 U/L — SIGNIFICANT CHANGE UP (ref 40–120)
ALT FLD-CCNC: 22 U/L DA — SIGNIFICANT CHANGE UP (ref 10–60)
ANION GAP SERPL CALC-SCNC: 10 MMOL/L — SIGNIFICANT CHANGE UP (ref 5–17)
AST SERPL-CCNC: 23 U/L — SIGNIFICANT CHANGE UP (ref 10–40)
BASOPHILS # BLD AUTO: 0.01 K/UL — SIGNIFICANT CHANGE UP (ref 0–0.2)
BASOPHILS NFR BLD AUTO: 0.1 % — SIGNIFICANT CHANGE UP (ref 0–2)
BILIRUB SERPL-MCNC: 0.3 MG/DL — SIGNIFICANT CHANGE UP (ref 0.2–1.2)
BUN SERPL-MCNC: 30 MG/DL — HIGH (ref 7–18)
CALCIUM SERPL-MCNC: 8.5 MG/DL — SIGNIFICANT CHANGE UP (ref 8.4–10.5)
CHLORIDE SERPL-SCNC: 114 MMOL/L — HIGH (ref 96–108)
CO2 SERPL-SCNC: 20 MMOL/L — LOW (ref 22–31)
CREAT SERPL-MCNC: 0.88 MG/DL — SIGNIFICANT CHANGE UP (ref 0.5–1.3)
EGFR: 64 ML/MIN/1.73M2 — SIGNIFICANT CHANGE UP
EOSINOPHIL # BLD AUTO: 0.02 K/UL — SIGNIFICANT CHANGE UP (ref 0–0.5)
EOSINOPHIL NFR BLD AUTO: 0.3 % — SIGNIFICANT CHANGE UP (ref 0–6)
GLUCOSE SERPL-MCNC: 134 MG/DL — HIGH (ref 70–99)
HCT VFR BLD CALC: 36.4 % — SIGNIFICANT CHANGE UP (ref 34.5–45)
HGB BLD-MCNC: 11.9 G/DL — SIGNIFICANT CHANGE UP (ref 11.5–15.5)
IMM GRANULOCYTES NFR BLD AUTO: 0.5 % — SIGNIFICANT CHANGE UP (ref 0–0.9)
LYMPHOCYTES # BLD AUTO: 3.47 K/UL — HIGH (ref 1–3.3)
LYMPHOCYTES # BLD AUTO: 44.5 % — HIGH (ref 13–44)
MAGNESIUM SERPL-MCNC: 1.9 MG/DL — SIGNIFICANT CHANGE UP (ref 1.6–2.6)
MCHC RBC-ENTMCNC: 32.7 GM/DL — SIGNIFICANT CHANGE UP (ref 32–36)
MCHC RBC-ENTMCNC: 33.5 PG — SIGNIFICANT CHANGE UP (ref 27–34)
MCV RBC AUTO: 102.5 FL — HIGH (ref 80–100)
MONOCYTES # BLD AUTO: 0.3 K/UL — SIGNIFICANT CHANGE UP (ref 0–0.9)
MONOCYTES NFR BLD AUTO: 3.8 % — SIGNIFICANT CHANGE UP (ref 2–14)
NEUTROPHILS # BLD AUTO: 3.96 K/UL — SIGNIFICANT CHANGE UP (ref 1.8–7.4)
NEUTROPHILS NFR BLD AUTO: 50.8 % — SIGNIFICANT CHANGE UP (ref 43–77)
NRBC # BLD: 0 /100 WBCS — SIGNIFICANT CHANGE UP (ref 0–0)
NT-PROBNP SERPL-SCNC: 709 PG/ML — HIGH (ref 0–450)
PHOSPHATE SERPL-MCNC: 2.8 MG/DL — SIGNIFICANT CHANGE UP (ref 2.5–4.5)
PLATELET # BLD AUTO: 149 K/UL — LOW (ref 150–400)
POTASSIUM SERPL-MCNC: 4.1 MMOL/L — SIGNIFICANT CHANGE UP (ref 3.5–5.3)
POTASSIUM SERPL-SCNC: 4.1 MMOL/L — SIGNIFICANT CHANGE UP (ref 3.5–5.3)
PROT SERPL-MCNC: 5.5 G/DL — LOW (ref 6–8.3)
RBC # BLD: 3.55 M/UL — LOW (ref 3.8–5.2)
RBC # FLD: 15.9 % — HIGH (ref 10.3–14.5)
SODIUM SERPL-SCNC: 144 MMOL/L — SIGNIFICANT CHANGE UP (ref 135–145)
TROPONIN I, HIGH SENSITIVITY RESULT: 35.3 NG/L — SIGNIFICANT CHANGE UP
WBC # BLD: 7.8 K/UL — SIGNIFICANT CHANGE UP (ref 3.8–10.5)
WBC # FLD AUTO: 7.8 K/UL — SIGNIFICANT CHANGE UP (ref 3.8–10.5)

## 2024-08-12 PROCEDURE — 85025 COMPLETE CBC W/AUTO DIFF WBC: CPT

## 2024-08-12 PROCEDURE — 93010 ELECTROCARDIOGRAM REPORT: CPT

## 2024-08-12 PROCEDURE — 99285 EMERGENCY DEPT VISIT HI MDM: CPT | Mod: 25

## 2024-08-12 PROCEDURE — 83880 ASSAY OF NATRIURETIC PEPTIDE: CPT

## 2024-08-12 PROCEDURE — 36415 COLL VENOUS BLD VENIPUNCTURE: CPT

## 2024-08-12 PROCEDURE — 80053 COMPREHEN METABOLIC PANEL: CPT

## 2024-08-12 PROCEDURE — 84484 ASSAY OF TROPONIN QUANT: CPT

## 2024-08-12 PROCEDURE — 83735 ASSAY OF MAGNESIUM: CPT

## 2024-08-12 PROCEDURE — 71045 X-RAY EXAM CHEST 1 VIEW: CPT | Mod: 26

## 2024-08-12 PROCEDURE — 99285 EMERGENCY DEPT VISIT HI MDM: CPT

## 2024-08-12 PROCEDURE — 71045 X-RAY EXAM CHEST 1 VIEW: CPT

## 2024-08-12 PROCEDURE — 84100 ASSAY OF PHOSPHORUS: CPT

## 2024-08-12 PROCEDURE — 93005 ELECTROCARDIOGRAM TRACING: CPT

## 2024-08-12 NOTE — ED PROVIDER NOTE - NSFOLLOWUPINSTRUCTIONS_ED_ALL_ED_FT
Por favor, continue judit medicamentos mally estan recetados  Regrese a la juliano de urgencias si empeoran judit sintomas.

## 2024-08-12 NOTE — ED ADULT NURSE NOTE - NSFALLRISKINTERV_ED_ALL_ED
Assistance OOB with selected safe patient handling equipment if applicable/Assistance with ambulation/Communicate fall risk and risk factors to all staff, patient, and family/Provide patient with walking aids/Provide visual cue: yellow wristband, yellow gown, etc/Reinforce activity limits and safety measures with patient and family/Call bell, personal items and telephone in reach/Instruct patient to call for assistance before getting out of bed/chair/stretcher/Non-slip footwear applied when patient is off stretcher/[place] to call system/Physically safe environment - no spills, clutter or unnecessary equipment/Purposeful Proactive Rounding/Room/bathroom lighting operational, light cord in reach

## 2024-08-12 NOTE — ED PROVIDER NOTE - PROGRESS NOTE DETAILS
Using the ultrasound probe to count ribs starting from the 12th rib, the 7th rib was located at the mid axillary line. After sterile prep and gloves were don, a 22G 50mm Pajunk needle was advanced in-plane and 12cc of Exparel + 12cc of Bupivacaine 0.25% was administered into the superficial and deep SAP in divided doses. Aspiration q5ccs was negative for heme/csf/air. Local anesthetic spread was visualized on ultrasound. Feels improved

## 2024-08-12 NOTE — ED PROVIDER NOTE - PATIENT PORTAL LINK FT
You can access the FollowMyHealth Patient Portal offered by [facility] by registering at the following website: [email or web address]. By joining [email or web address]’s FollowMyHealth portal, you will also be able to view your health information using other applications (apps) compatible with our system.

## 2024-08-12 NOTE — ED ADULT NURSE NOTE - NSSEPSISSUSPECTED_ED_A_ED
Pt called in stating she is still experiencing reflux   Please send a refill for pantoprazole to [facility] [place]

## 2024-08-12 NOTE — ED PROVIDER NOTE - CLINICAL SUMMARY MEDICAL DECISION MAKING FREE TEXT BOX
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

## 2024-08-12 NOTE — ED ADULT NURSE NOTE - BREATHING, MLM
"Chief Complaint:   Valve Disorder     History Of Present Illness:    Rosita Darby is a 62 y.o. female presenting with aortic stenosis.  The patient has been well since their last appointment and has no cardiac complaints.  The patient denies chest pain, shortness of breath, or syncope.  Their most recent echocardiogram demonstrates moderate aortic stenosis with normal left ventricular systolic function.     Review of Systems  All pertinent systems have been reviewed and are negative except for what is stated in the history of present illness.    All other systems have been reviewed and are negative and noncontributory to this patient's current ailments.  .     Last Recorded Vitals:  Visit Vitals  /80 (BP Location: Right arm, Patient Position: Sitting, BP Cuff Size: Child)   Pulse 101   Ht 1.499 m (4' 11\")   Wt 49.1 kg (108 lb 3.2 oz)   BMI 21.85 kg/m²   Smoking Status Never Assessed   BSA 1.43 m²        Past Medical History:  She has a past medical history of Allergy to other foods, Aortic stenosis due to bicuspid aortic valve, Meniere's disease, unspecified ear, Other allergy status, other than to drugs and biological substances, Other conditions influencing health status, Personal history of other diseases of the circulatory system, Personal history of other diseases of the circulatory system, and Personal history of other diseases of the musculoskeletal system and connective tissue.    Past Surgical History:  She has a past surgical history that includes Tonsillectomy (01/31/2017); Hysterectomy (01/31/2017); Laparoscopy diagnostic / biopsy / aspiration / lysis (01/31/2017); and Dilation and curettage of uterus (01/31/2017).      Social History:  She has no history on file for tobacco use, alcohol use, and drug use.    Family History:  Family History   Problem Relation Name Age of Onset    Rheumatic fever Mother      Other (heart trouble) Brother      Other (heart trouble) Maternal Grandmother      Other " (heart trouble) Paternal Grandmother      Other (heart trouble) Paternal Grandfather          Allergies:  Egg, Influenza virus vaccine whole, Latex, Lorazepam, Milk, Sulfa (sulfonamide antibiotics), and Tree nuts    Outpatient Medications:  Current Outpatient Medications   Medication Instructions    ACETYLCYSTEINE ORAL oral    adrenal cortex, porcine, (ADRENAL ORAL) oral, organ concentrates (ADRENAL ORAL)    alpha lipoic acid 600 mg capsule oral, 2 times daily    amoxicillin-pot clavulanate (Augmentin) 875-125 mg tablet 875 mg, oral, 2 times daily    aspirin 81 mg EC tablet 1 tablet, oral, Daily    atorvastatin (Lipitor) 10 mg tablet 1 tablet, oral, Daily    Bacillus subtilis-inulin 1.5 billion cell-1 gram tablet,chewable oral, Daily, 1    cholecalciferol (Vitamin D-3) 5,000 Units tablet oral, Weekly, Take 1     coenzyme Q-10 100 mg capsule oral    coenzyme Q10-vitamin E 100-5 mg-unit capsule     Creon 36,000-114,000- 180,000 unit capsule,delayed release(DR/EC) capsule oral, take 1 capsule by oral route 2 times every day with meals and 1 capsule with each snack swallowing whole. Do not crush, chew and/or divide.    cyanocobalamin (Vitamin B-12) 2,000 mcg tablet 1 tablet, oral    dimenhyDRINATE (Dramamine) 50 mg tablet 1 tablet, oral, Every 6 hours PRN    fluticasone (Flonase) 50 mcg/actuation nasal spray 1 spray, Each Nostril, Daily    L. acidophilus-L. rhamnosus (Probiotic) 15 billion cell capsule oral, PROBIOTIC (unknown strength)    L. acidophilus/Bifid. animalis 32 billion cell capsule oral, Probiotic CAPS    lisinopril 10 mg tablet 1 tablet, oral, Daily    magnesium oxide (Mag-Ox) 400 mg tablet oral    magnesium oxide 500 mg tablet 1 tablet, oral, Daily    NP Thyroid 60 mg tablet oral, Take 1     pantoprazole (ProtoNix) 20 mg EC tablet oral    phytonadione, vit K1,, bulk, (Vitamin K1) 100 % liquid VITAMIN K (unknown strength)    SELENIUM ORAL oral, SELENIUM ORAL    sertraline (Zoloft) 25 mg tablet     Sudafed  "24 Hour 240 mg tablet extended release 24 hr 24 hr tablet oral    thyroid, pork, (WP Thyroid) 65 mg tablet oral    torsemide (Demadex) 10 mg tablet 1 tablet, oral, Daily    triamcinolone (Nasacort) 55 mcg nasal inhaler     trientine (Syprine) 250 mg capsule oral    ubiquinol, bulk, 100 % powder UBIQUINOL (unknown strength)    vitamin K2 100 mcg capsule oral, VITAMIN K2 (unknown strength)    ZINC ORAL oral, Daily, ZINC (unknown strength); take 1       Physical Exam:  Physical Exam  Vitals reviewed.   Constitutional:       General: She is not in acute distress.     Appearance: Normal appearance.   HENT:      Head: Normocephalic and atraumatic.      Nose: Nose normal.   Eyes:      Conjunctiva/sclera: Conjunctivae normal.   Cardiovascular:      Rate and Rhythm: Normal rate and regular rhythm.      Pulses: Normal pulses.      Heart sounds: Murmur heard.   Pulmonary:      Effort: Pulmonary effort is normal. No respiratory distress.      Breath sounds: Normal breath sounds. No wheezing, rhonchi or rales.   Abdominal:      General: Bowel sounds are normal. There is no distension.      Palpations: Abdomen is soft.      Tenderness: There is no abdominal tenderness.   Musculoskeletal:         General: No swelling.      Right lower leg: No edema.      Left lower leg: No edema.   Skin:     General: Skin is warm and dry.      Capillary Refill: Capillary refill takes less than 2 seconds.   Neurological:      General: No focal deficit present.      Mental Status: She is alert.   Psychiatric:         Mood and Affect: Mood normal.           Last Labs:  CBC -  No results found for: \"WBC\", \"HGB\", \"HCT\", \"MCV\", \"PLT\"    CMP -  No results found for: \"CALCIUM\", \"PHOS\", \"PROT\", \"ALBUMIN\", \"AST\", \"ALT\", \"ALKPHOS\", \"BILITOT\"    LIPID PANEL -   No results found for: \"CHOL\", \"HDL\", \"CHHDL\", \"LDL\", \"VLDL\", \"TRIG\", \"NHDL\"    RENAL FUNCTION PANEL -   No results found for: \"GLU\", \"K\", \"CHLOR\", \"PHOS\"    No results found for: \"BNP\", " "\"HGBA1C\"    Last Cardiology Tests:  ECG:  No results found for this or any previous visit from the past 1095 days.    Echo:  No echocardiogram results found for the past 12 months     Normal EF and moderate aortic stenosis.      Assessment/Plan       1. Congenital aortic stenosis  Aortic stenosis due to trileaflet aortic valve: The patient has mild/moderate/severe aortic stenosis and mild/moderate/severe regurgitation and remains asymptomatic.  There is no indication for AVR.  The patient will continue to have serial annual echocardiography and was counseled on the expected symptoms related to aortic stenosis.  Weight lifting restrictions reviewed.      - Follow Up In Cardiology; Future  - Transthoracic echo (TTE) complete; Future    2. Essential (primary) hypertension  Hypertension: The patient's blood pressure has been well-controlled at today's appointment or by recent primary care provider's measurements/home measurements and meets their goal blood pressure per the ACC/AHA guidelines.  The patient has been compliant with their anti-hypertensive medications and is following a low sodium/DASH diet and performs regular exercise.  I advised continuation of their present medical treatment and lifestyle modification.      - Follow Up In Cardiology; Future  - Transthoracic echo (TTE) complete; Future    4. Mixed hyperlipidemia  Hyperlipidemia: The patient's lipids are well controlled on chronic statin therapy and they are meeting their goal LDL cholesterol per the ACC/AHA guidelines.  The patient is compliant and tolerating statin therapy and is following a heart health diet and performs regular exercise.  The benefits of lipid lowering therapy have been discussed with the patient/family/caregiver.     - Follow Up In Cardiology; Future  - Transthoracic echo (TTE) complete; Future       Bill Daly MD    Exclusive of any other services or procedures performed, I, Bill Daly MD , spent 30 minutes in duration for " this visit today.  This time consisted of chart review, obtaining history, and/or performing the exam as documented above as well as documenting the clinical information for the encounter in the electronic record, discussing treatment options, plans, and/or goals with patient, family, and/or caregiver, refilling medications, updating the electronic record, ordering medicines, lab work, imaging, referrals, and/or procedures as documented above and communicating with other [facility] professionals. I have discussed the results of laboratory, radiology, and cardiology studies with the patient and their family/caregiver.     Spontaneous, unlabored and symmetrical

## 2024-08-12 NOTE — ED PROVIDER NOTE - OBJECTIVE STATEMENT
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 Now resolved. Recent admission for Strep bacteremia on CTX dailiy via midline. No shortness of breath. Patient feels improved.

## 2024-08-20 NOTE — ED PROVIDER NOTE - NEUROLOGICAL, MLM
Pharmacy requesting refill: Levothyroxine   Last OV: 7/10/24  Next OV: 10/9/24  Last refill: 4/12/24  Last labs: 5/8/24  Refilled.     Alert and oriented, no focal deficits, no motor or sensory deficits.

## 2024-08-22 NOTE — ED ADULT NURSE NOTE - CAS EDN DISCHARGE INTERVENTIONS
Ret call to  again [facility] care lm for asha [phone] office and nurse ph [phone]. To cb I am ret her call and trouble reaching her due to service are phone.need info about pts sore arm where iv was.  
Arm band on/IV intact

## 2024-09-03 NOTE — H&P ADULT - PROBLEM/PLAN-6
RN notified [facility] via [employer] that while IV team was inserting corpak, pt vomitted around 750ml of tan, soft, stool. IV team unable to place corpak.    DISPLAY PLAN FREE TEXT

## 2024-09-13 ENCOUNTER — TRANSCRIPTION ENCOUNTER (OUTPATIENT)
Age: 89
End: 2024-09-13

## 2024-09-13 ENCOUNTER — INPATIENT (INPATIENT)
Facility: HOSPITAL | Age: 89
LOS: 3 days | Discharge: HOME | DRG: 195 | End: 2024-09-17
Attending: INTERNAL MEDICINE | Admitting: INTERNAL MEDICINE
Payer: MEDICAID

## 2024-09-13 VITALS
SYSTOLIC BLOOD PRESSURE: 134 MMHG | RESPIRATION RATE: 20 BRPM | HEIGHT: 55 IN | OXYGEN SATURATION: 97 % | TEMPERATURE: 100 F | HEART RATE: 129 BPM | DIASTOLIC BLOOD PRESSURE: 90 MMHG

## 2024-09-13 DIAGNOSIS — A41.9 SEPSIS, UNSPECIFIED ORGANISM: ICD-10-CM

## 2024-09-13 DIAGNOSIS — J18.9 PNEUMONIA, UNSPECIFIED ORGANISM: ICD-10-CM

## 2024-09-13 DIAGNOSIS — Z86.711 PERSONAL HISTORY OF PULMONARY EMBOLISM: ICD-10-CM

## 2024-09-13 DIAGNOSIS — Z90.710 ACQUIRED ABSENCE OF BOTH CERVIX AND UTERUS: Chronic | ICD-10-CM

## 2024-09-13 DIAGNOSIS — E78.5 HYPERLIPIDEMIA, UNSPECIFIED: ICD-10-CM

## 2024-09-13 DIAGNOSIS — Z29.9 ENCOUNTER FOR PROPHYLACTIC MEASURES, UNSPECIFIED: ICD-10-CM

## 2024-09-13 DIAGNOSIS — Z98.51 TUBAL LIGATION STATUS: Chronic | ICD-10-CM

## 2024-09-13 DIAGNOSIS — C90.00 MULTIPLE MYELOMA NOT HAVING ACHIEVED REMISSION: ICD-10-CM

## 2024-09-13 DIAGNOSIS — E11.9 TYPE 2 DIABETES MELLITUS WITHOUT COMPLICATIONS: ICD-10-CM

## 2024-09-13 DIAGNOSIS — J96.01 ACUTE RESPIRATORY FAILURE WITH HYPOXIA: ICD-10-CM

## 2024-09-13 DIAGNOSIS — I10 ESSENTIAL (PRIMARY) HYPERTENSION: ICD-10-CM

## 2024-09-13 LAB
ALBUMIN SERPL ELPH-MCNC: 2 G/DL — LOW (ref 3.5–5)
ALBUMIN SERPL ELPH-MCNC: 2.1 G/DL — LOW (ref 3.5–5)
ALBUMIN SERPL ELPH-MCNC: 2.5 G/DL — LOW (ref 3.5–5)
ALP SERPL-CCNC: 78 U/L — SIGNIFICANT CHANGE UP (ref 40–120)
ALP SERPL-CCNC: 89 U/L — SIGNIFICANT CHANGE UP (ref 40–120)
ALP SERPL-CCNC: 97 U/L — SIGNIFICANT CHANGE UP (ref 40–120)
ALT FLD-CCNC: 12 U/L DA — SIGNIFICANT CHANGE UP (ref 10–60)
ALT FLD-CCNC: 13 U/L DA — SIGNIFICANT CHANGE UP (ref 10–60)
ALT FLD-CCNC: 14 U/L DA — SIGNIFICANT CHANGE UP (ref 10–60)
ANION GAP SERPL CALC-SCNC: 8 MMOL/L — SIGNIFICANT CHANGE UP (ref 5–17)
ANION GAP SERPL CALC-SCNC: 9 MMOL/L — SIGNIFICANT CHANGE UP (ref 5–17)
ANION GAP SERPL CALC-SCNC: 9 MMOL/L — SIGNIFICANT CHANGE UP (ref 5–17)
APTT BLD: 33.3 SEC — SIGNIFICANT CHANGE UP (ref 24.5–35.6)
AST SERPL-CCNC: 9 U/L — LOW (ref 10–40)
BASE EXCESS BLDV CALC-SCNC: -2.5 MMOL/L — SIGNIFICANT CHANGE UP
BASOPHILS # BLD AUTO: 0.02 K/UL — SIGNIFICANT CHANGE UP (ref 0–0.2)
BASOPHILS # BLD AUTO: 0.03 K/UL — SIGNIFICANT CHANGE UP (ref 0–0.2)
BASOPHILS NFR BLD AUTO: 0.1 % — SIGNIFICANT CHANGE UP (ref 0–2)
BASOPHILS NFR BLD AUTO: 0.2 % — SIGNIFICANT CHANGE UP (ref 0–2)
BILIRUB SERPL-MCNC: 0.3 MG/DL — SIGNIFICANT CHANGE UP (ref 0.2–1.2)
BILIRUB SERPL-MCNC: 0.5 MG/DL — SIGNIFICANT CHANGE UP (ref 0.2–1.2)
BILIRUB SERPL-MCNC: 0.5 MG/DL — SIGNIFICANT CHANGE UP (ref 0.2–1.2)
BLOOD GAS COMMENTS, VENOUS: SIGNIFICANT CHANGE UP
BUN SERPL-MCNC: 19 MG/DL — HIGH (ref 7–18)
BUN SERPL-MCNC: 19 MG/DL — HIGH (ref 7–18)
BUN SERPL-MCNC: 22 MG/DL — HIGH (ref 7–18)
CA-I SERPL-SCNC: SIGNIFICANT CHANGE UP MMOL/L (ref 1.15–1.33)
CALCIUM SERPL-MCNC: 8.2 MG/DL — LOW (ref 8.4–10.5)
CALCIUM SERPL-MCNC: 8.5 MG/DL — SIGNIFICANT CHANGE UP (ref 8.4–10.5)
CALCIUM SERPL-MCNC: 8.8 MG/DL — SIGNIFICANT CHANGE UP (ref 8.4–10.5)
CHLORIDE SERPL-SCNC: 111 MMOL/L — HIGH (ref 96–108)
CHLORIDE SERPL-SCNC: 114 MMOL/L — HIGH (ref 96–108)
CHLORIDE SERPL-SCNC: 114 MMOL/L — HIGH (ref 96–108)
CO2 SERPL-SCNC: 18 MMOL/L — LOW (ref 22–31)
CO2 SERPL-SCNC: 21 MMOL/L — LOW (ref 22–31)
CO2 SERPL-SCNC: 22 MMOL/L — SIGNIFICANT CHANGE UP (ref 22–31)
CREAT SERPL-MCNC: 0.61 MG/DL — SIGNIFICANT CHANGE UP (ref 0.5–1.3)
CREAT SERPL-MCNC: 0.62 MG/DL — SIGNIFICANT CHANGE UP (ref 0.5–1.3)
CREAT SERPL-MCNC: 0.72 MG/DL — SIGNIFICANT CHANGE UP (ref 0.5–1.3)
EGFR: 81 ML/MIN/1.73M2 — SIGNIFICANT CHANGE UP
EGFR: 86 ML/MIN/1.73M2 — SIGNIFICANT CHANGE UP
EGFR: 86 ML/MIN/1.73M2 — SIGNIFICANT CHANGE UP
EOSINOPHIL # BLD AUTO: 0 K/UL — SIGNIFICANT CHANGE UP (ref 0–0.5)
EOSINOPHIL # BLD AUTO: 0.06 K/UL — SIGNIFICANT CHANGE UP (ref 0–0.5)
EOSINOPHIL NFR BLD AUTO: 0 % — SIGNIFICANT CHANGE UP (ref 0–6)
EOSINOPHIL NFR BLD AUTO: 0.3 % — SIGNIFICANT CHANGE UP (ref 0–6)
FLUAV AG NPH QL: SIGNIFICANT CHANGE UP
FLUBV AG NPH QL: SIGNIFICANT CHANGE UP
GAS PNL BLDV: 138 MMOL/L — SIGNIFICANT CHANGE UP (ref 136–145)
GAS PNL BLDV: SIGNIFICANT CHANGE UP
GLUCOSE BLDC GLUCOMTR-MCNC: 226 MG/DL — HIGH (ref 70–99)
GLUCOSE BLDC GLUCOMTR-MCNC: 237 MG/DL — HIGH (ref 70–99)
GLUCOSE BLDC GLUCOMTR-MCNC: 294 MG/DL — HIGH (ref 70–99)
GLUCOSE SERPL-MCNC: 155 MG/DL — HIGH (ref 70–99)
GLUCOSE SERPL-MCNC: 226 MG/DL — HIGH (ref 70–99)
GLUCOSE SERPL-MCNC: 290 MG/DL — HIGH (ref 70–99)
HCO3 BLDV-SCNC: 22 MMOL/L — SIGNIFICANT CHANGE UP (ref 22–29)
HCT VFR BLD CALC: 33.2 % — LOW (ref 34.5–45)
HCT VFR BLD CALC: 35.8 % — SIGNIFICANT CHANGE UP (ref 34.5–45)
HGB BLD-MCNC: 10.7 G/DL — LOW (ref 11.5–15.5)
HGB BLD-MCNC: 11.5 G/DL — SIGNIFICANT CHANGE UP (ref 11.5–15.5)
HOROWITZ INDEX BLDV+IHG-RTO: SIGNIFICANT CHANGE UP
IMM GRANULOCYTES NFR BLD AUTO: 0.4 % — SIGNIFICANT CHANGE UP (ref 0–0.9)
IMM GRANULOCYTES NFR BLD AUTO: 0.7 % — SIGNIFICANT CHANGE UP (ref 0–0.9)
INR BLD: 1.74 RATIO — HIGH (ref 0.85–1.18)
LACTATE BLDV-MCNC: 1.8 MMOL/L — SIGNIFICANT CHANGE UP (ref 0.5–2)
LACTATE SERPL-SCNC: 1.8 MMOL/L — SIGNIFICANT CHANGE UP (ref 0.7–2)
LYMPHOCYTES # BLD AUTO: 1.02 K/UL — SIGNIFICANT CHANGE UP (ref 1–3.3)
LYMPHOCYTES # BLD AUTO: 18.5 % — SIGNIFICANT CHANGE UP (ref 13–44)
LYMPHOCYTES # BLD AUTO: 3.45 K/UL — HIGH (ref 1–3.3)
LYMPHOCYTES # BLD AUTO: 7.6 % — LOW (ref 13–44)
MAGNESIUM SERPL-MCNC: 1.6 MG/DL — SIGNIFICANT CHANGE UP (ref 1.6–2.6)
MAGNESIUM SERPL-MCNC: 1.7 MG/DL — SIGNIFICANT CHANGE UP (ref 1.6–2.6)
MCHC RBC-ENTMCNC: 31.3 PG — SIGNIFICANT CHANGE UP (ref 27–34)
MCHC RBC-ENTMCNC: 31.6 PG — SIGNIFICANT CHANGE UP (ref 27–34)
MCHC RBC-ENTMCNC: 32.1 GM/DL — SIGNIFICANT CHANGE UP (ref 32–36)
MCHC RBC-ENTMCNC: 32.2 GM/DL — SIGNIFICANT CHANGE UP (ref 32–36)
MCV RBC AUTO: 97.3 FL — SIGNIFICANT CHANGE UP (ref 80–100)
MCV RBC AUTO: 97.9 FL — SIGNIFICANT CHANGE UP (ref 80–100)
MONOCYTES # BLD AUTO: 0.14 K/UL — SIGNIFICANT CHANGE UP (ref 0–0.9)
MONOCYTES # BLD AUTO: 1.03 K/UL — HIGH (ref 0–0.9)
MONOCYTES NFR BLD AUTO: 1 % — LOW (ref 2–14)
MONOCYTES NFR BLD AUTO: 5.5 % — SIGNIFICANT CHANGE UP (ref 2–14)
NEUTROPHILS # BLD AUTO: 12.15 K/UL — HIGH (ref 1.8–7.4)
NEUTROPHILS # BLD AUTO: 13.95 K/UL — HIGH (ref 1.8–7.4)
NEUTROPHILS NFR BLD AUTO: 75.1 % — SIGNIFICANT CHANGE UP (ref 43–77)
NEUTROPHILS NFR BLD AUTO: 90.6 % — HIGH (ref 43–77)
NRBC # BLD: 0 /100 WBCS — SIGNIFICANT CHANGE UP (ref 0–0)
NRBC # BLD: 0 /100 WBCS — SIGNIFICANT CHANGE UP (ref 0–0)
PCO2 BLDV: 34 MMHG — LOW (ref 39–42)
PH BLDV: 7.41 — SIGNIFICANT CHANGE UP (ref 7.32–7.43)
PHOSPHATE SERPL-MCNC: 1.8 MG/DL — LOW (ref 2.5–4.5)
PHOSPHATE SERPL-MCNC: 1.9 MG/DL — LOW (ref 2.5–4.5)
PLATELET # BLD AUTO: 285 K/UL — SIGNIFICANT CHANGE UP (ref 150–400)
PLATELET # BLD AUTO: 323 K/UL — SIGNIFICANT CHANGE UP (ref 150–400)
PO2 BLDV: 38 MMHG — SIGNIFICANT CHANGE UP
POTASSIUM BLDV-SCNC: 3.5 MMOL/L — SIGNIFICANT CHANGE UP (ref 3.5–5.1)
POTASSIUM SERPL-MCNC: 3.4 MMOL/L — LOW (ref 3.5–5.3)
POTASSIUM SERPL-MCNC: 3.5 MMOL/L — SIGNIFICANT CHANGE UP (ref 3.5–5.3)
POTASSIUM SERPL-MCNC: 4.3 MMOL/L — SIGNIFICANT CHANGE UP (ref 3.5–5.3)
POTASSIUM SERPL-SCNC: 3.4 MMOL/L — LOW (ref 3.5–5.3)
POTASSIUM SERPL-SCNC: 3.5 MMOL/L — SIGNIFICANT CHANGE UP (ref 3.5–5.3)
POTASSIUM SERPL-SCNC: 4.3 MMOL/L — SIGNIFICANT CHANGE UP (ref 3.5–5.3)
PROCALCITONIN SERPL-MCNC: 1.45 NG/ML — HIGH (ref 0.02–0.1)
PROT SERPL-MCNC: 5.3 G/DL — LOW (ref 6–8.3)
PROT SERPL-MCNC: 5.7 G/DL — LOW (ref 6–8.3)
PROT SERPL-MCNC: 6.1 G/DL — SIGNIFICANT CHANGE UP (ref 6–8.3)
PROTHROM AB SERPL-ACNC: 19.5 SEC — HIGH (ref 9.5–13)
RBC # BLD: 3.39 M/UL — LOW (ref 3.8–5.2)
RBC # BLD: 3.68 M/UL — LOW (ref 3.8–5.2)
RBC # FLD: 15.9 % — HIGH (ref 10.3–14.5)
RBC # FLD: 16 % — HIGH (ref 10.3–14.5)
SAO2 % BLDV: 65.8 % — SIGNIFICANT CHANGE UP
SARS-COV-2 RNA SPEC QL NAA+PROBE: SIGNIFICANT CHANGE UP
SODIUM SERPL-SCNC: 141 MMOL/L — SIGNIFICANT CHANGE UP (ref 135–145)
SODIUM SERPL-SCNC: 142 MMOL/L — SIGNIFICANT CHANGE UP (ref 135–145)
SODIUM SERPL-SCNC: 143 MMOL/L — SIGNIFICANT CHANGE UP (ref 135–145)
WBC # BLD: 13.42 K/UL — HIGH (ref 3.8–10.5)
WBC # BLD: 18.6 K/UL — HIGH (ref 3.8–10.5)
WBC # FLD AUTO: 13.42 K/UL — HIGH (ref 3.8–10.5)
WBC # FLD AUTO: 18.6 K/UL — HIGH (ref 3.8–10.5)

## 2024-09-13 PROCEDURE — 71275 CT ANGIOGRAPHY CHEST: CPT | Mod: 26,MC

## 2024-09-13 PROCEDURE — 99291 CRITICAL CARE FIRST HOUR: CPT

## 2024-09-13 PROCEDURE — 93010 ELECTROCARDIOGRAM REPORT: CPT

## 2024-09-13 PROCEDURE — 71045 X-RAY EXAM CHEST 1 VIEW: CPT | Mod: 26

## 2024-09-13 RX ORDER — AZITHROMYCIN 500 MG/1
500 TABLET, FILM COATED ORAL EVERY 24 HOURS
Refills: 0 | Status: DISCONTINUED | OUTPATIENT
Start: 2024-09-13 | End: 2024-09-16

## 2024-09-13 RX ORDER — CEFEPIME 2 G/1
2000 INJECTION, POWDER, FOR SOLUTION INTRAVENOUS EVERY 12 HOURS
Refills: 0 | Status: DISCONTINUED | OUTPATIENT
Start: 2024-09-13 | End: 2024-09-13

## 2024-09-13 RX ORDER — METOPROLOL TARTRATE 100 MG/1
25 TABLET ORAL
Refills: 0 | Status: DISCONTINUED | OUTPATIENT
Start: 2024-09-13 | End: 2024-09-13

## 2024-09-13 RX ORDER — DAPAGLIFLOZIN 10 MG/1
10 TABLET, FILM COATED ORAL DAILY
Refills: 0 | Status: DISCONTINUED | OUTPATIENT
Start: 2024-09-13 | End: 2024-09-17

## 2024-09-13 RX ORDER — POTASSIUM CHLORIDE 10 MEQ
40 TABLET, EXT RELEASE, PARTICLES/CRYSTALS ORAL ONCE
Refills: 0 | Status: COMPLETED | OUTPATIENT
Start: 2024-09-13 | End: 2024-09-13

## 2024-09-13 RX ORDER — CEFEPIME 2 G/1
2000 INJECTION, POWDER, FOR SOLUTION INTRAVENOUS ONCE
Refills: 0 | Status: COMPLETED | OUTPATIENT
Start: 2024-09-13 | End: 2024-09-13

## 2024-09-13 RX ORDER — APIXABAN 5 MG/1
2.5 TABLET, FILM COATED ORAL EVERY 12 HOURS
Refills: 0 | Status: DISCONTINUED | OUTPATIENT
Start: 2024-09-13 | End: 2024-09-17

## 2024-09-13 RX ORDER — MONTELUKAST SODIUM 5 MG/1
10 TABLET, CHEWABLE ORAL DAILY
Refills: 0 | Status: DISCONTINUED | OUTPATIENT
Start: 2024-09-13 | End: 2024-09-17

## 2024-09-13 RX ORDER — PANTOPRAZOLE SODIUM 40 MG
40 TABLET, DELAYED RELEASE (ENTERIC COATED) ORAL
Refills: 0 | Status: DISCONTINUED | OUTPATIENT
Start: 2024-09-13 | End: 2024-09-17

## 2024-09-13 RX ORDER — METHYLPREDNISOLONE 4 MG
125 TABLET ORAL ONCE
Refills: 0 | Status: COMPLETED | OUTPATIENT
Start: 2024-09-13 | End: 2024-09-13

## 2024-09-13 RX ORDER — ACETAMINOPHEN 325 MG/1
650 TABLET ORAL EVERY 6 HOURS
Refills: 0 | Status: DISCONTINUED | OUTPATIENT
Start: 2024-09-13 | End: 2024-09-17

## 2024-09-13 RX ORDER — ZINC SULFATE 50(220)MG
220 CAPSULE ORAL DAILY
Refills: 0 | Status: DISCONTINUED | OUTPATIENT
Start: 2024-09-13 | End: 2024-09-17

## 2024-09-13 RX ORDER — SODIUM CHLORIDE 9 MG/ML
1000 INJECTION INTRAMUSCULAR; INTRAVENOUS; SUBCUTANEOUS ONCE
Refills: 0 | Status: COMPLETED | OUTPATIENT
Start: 2024-09-13 | End: 2024-09-13

## 2024-09-13 RX ORDER — IPRATROPIUM BROMIDE AND ALBUTEROL SULFATE .5; 3 MG/3ML; MG/3ML
3 SOLUTION RESPIRATORY (INHALATION) ONCE
Refills: 0 | Status: COMPLETED | OUTPATIENT
Start: 2024-09-13 | End: 2024-09-13

## 2024-09-13 RX ORDER — GUAIFENESIN 100 MG/5ML
600 LIQUID ORAL EVERY 12 HOURS
Refills: 0 | Status: COMPLETED | OUTPATIENT
Start: 2024-09-13 | End: 2024-09-16

## 2024-09-13 RX ORDER — VANCOMYCIN/0.9 % SOD CHLORIDE 1.75G/25
1000 PLASTIC BAG, INJECTION (ML) INTRAVENOUS ONCE
Refills: 0 | Status: COMPLETED | OUTPATIENT
Start: 2024-09-13 | End: 2024-09-13

## 2024-09-13 RX ORDER — ACETAMINOPHEN 325 MG/1
1000 TABLET ORAL ONCE
Refills: 0 | Status: COMPLETED | OUTPATIENT
Start: 2024-09-13 | End: 2024-09-13

## 2024-09-13 RX ORDER — IPRATROPIUM BROMIDE AND ALBUTEROL SULFATE .5; 3 MG/3ML; MG/3ML
3 SOLUTION RESPIRATORY (INHALATION) EVERY 6 HOURS
Refills: 0 | Status: DISCONTINUED | OUTPATIENT
Start: 2024-09-13 | End: 2024-09-16

## 2024-09-13 RX ORDER — CEFEPIME 2 G/1
INJECTION, POWDER, FOR SOLUTION INTRAVENOUS
Refills: 0 | Status: DISCONTINUED | OUTPATIENT
Start: 2024-09-13 | End: 2024-09-13

## 2024-09-13 RX ORDER — FLUTICASONE PROPIONATE AND SALMETEROL 250; 50 UG/1; UG/1
1 POWDER RESPIRATORY (INHALATION)
Refills: 0 | Status: DISCONTINUED | OUTPATIENT
Start: 2024-09-13 | End: 2024-09-15

## 2024-09-13 RX ADMIN — DAPAGLIFLOZIN 10 MILLIGRAM(S): 10 TABLET, FILM COATED ORAL at 17:21

## 2024-09-13 RX ADMIN — CEFEPIME 100 MILLIGRAM(S): 2 INJECTION, POWDER, FOR SOLUTION INTRAVENOUS at 02:38

## 2024-09-13 RX ADMIN — IPRATROPIUM BROMIDE AND ALBUTEROL SULFATE 3 MILLILITER(S): .5; 3 SOLUTION RESPIRATORY (INHALATION) at 20:30

## 2024-09-13 RX ADMIN — Medication 3: at 14:59

## 2024-09-13 RX ADMIN — APIXABAN 2.5 MILLIGRAM(S): 5 TABLET, FILM COATED ORAL at 17:51

## 2024-09-13 RX ADMIN — IPRATROPIUM BROMIDE AND ALBUTEROL SULFATE 3 MILLILITER(S): .5; 3 SOLUTION RESPIRATORY (INHALATION) at 01:18

## 2024-09-13 RX ADMIN — SODIUM CHLORIDE 1000 MILLILITER(S): 9 INJECTION INTRAMUSCULAR; INTRAVENOUS; SUBCUTANEOUS at 02:40

## 2024-09-13 RX ADMIN — GUAIFENESIN 600 MILLIGRAM(S): 100 LIQUID ORAL at 17:21

## 2024-09-13 RX ADMIN — Medication 125 MILLIGRAM(S): at 03:59

## 2024-09-13 RX ADMIN — Medication 220 MILLIGRAM(S): at 14:58

## 2024-09-13 RX ADMIN — Medication 40 MILLIEQUIVALENT(S): at 08:04

## 2024-09-13 RX ADMIN — ACETAMINOPHEN 400 MILLIGRAM(S): 325 TABLET ORAL at 01:23

## 2024-09-13 RX ADMIN — Medication 10 MILLIGRAM(S): at 22:02

## 2024-09-13 RX ADMIN — Medication 2: at 17:20

## 2024-09-13 RX ADMIN — Medication 250 MILLIGRAM(S): at 02:58

## 2024-09-13 RX ADMIN — MONTELUKAST SODIUM 10 MILLIGRAM(S): 5 TABLET, CHEWABLE ORAL at 14:58

## 2024-09-13 RX ADMIN — Medication 40 MILLIGRAM(S): at 08:14

## 2024-09-13 RX ADMIN — IPRATROPIUM BROMIDE AND ALBUTEROL SULFATE 3 MILLILITER(S): .5; 3 SOLUTION RESPIRATORY (INHALATION) at 15:22

## 2024-09-13 RX ADMIN — FLUTICASONE PROPIONATE AND SALMETEROL 1 DOSE(S): 250; 50 POWDER RESPIRATORY (INHALATION) at 22:04

## 2024-09-13 RX ADMIN — Medication 85 MILLILITER(S): at 08:03

## 2024-09-13 RX ADMIN — AZITHROMYCIN 255 MILLIGRAM(S): 500 TABLET, FILM COATED ORAL at 17:30

## 2024-09-13 NOTE — CONSULT NOTE ADULT - SUBJECTIVE AND OBJECTIVE BOX
PULMONARY CONSULT NOTE      GAYLA ELLIS  [id number]    History of Present Illness:  Reason for Admission: cough, SOB  History of Present Illness:   89F, from home, ambulates with wheelchair (mostly bedbound), PMH HTN, HLD, T2DM, osteoporosis, Stage IIB gastric adenocarcinoma s/p distal gastrectomy (2015), multiple myeloma/plastocytoma on chemo (follows [facility] Dr Adams), h/o bilateral PE (1/2023) on Eliquis. Presenting with fever, productive cough with white-yellowish sputum, SOB x1d. Collateral obtained from daughter over phone. Patient is AAO x2 (to self and place) at baseline and poor historian (at baseline mental status currently). Endorses throat discomfort from coughing. Daughter noted wheezing at home as well.     Denies chest pain, palpitations, SOB, n/v/d, abd pain, dysuria, numbness tingling in ext. No recent travel or sick contacts.     Patient initially on NRB in the ED. Received solumedrol 125 x1, duonebs x1. Weaned to NC 2L, satting 95-96%.           HISTORY OF PRESENT ILLNESS: As Above. Awake, alert, comfortable in bed in NAD    MEDICATIONS  (STANDING):  albuterol/ipratropium for Nebulization 3 milliLiter(s) Nebulizer every 6 hours  apixaban 2.5 milliGRAM(s) Oral every 12 hours  atorvastatin 10 milliGRAM(s) Oral at bedtime  azithromycin  IVPB 500 milliGRAM(s) IV Intermittent every 24 hours  dapagliflozin 10 milliGRAM(s) Oral daily  guaiFENesin  milliGRAM(s) Oral every 12 hours  insulin lispro (ADMELOG) corrective regimen sliding scale   SubCutaneous three times a day before meals  insulin lispro (ADMELOG) corrective regimen sliding scale   SubCutaneous at bedtime  lactated ringers. 1000 milliLiter(s) (85 mL/Hr) IV Continuous <Continuous>  montelukast 10 milliGRAM(s) Oral daily  pantoprazole    Tablet 40 milliGRAM(s) Oral before breakfast  zinc sulfate 220 milliGRAM(s) Oral daily      MEDICATIONS  (PRN):  acetaminophen     Tablet .. 650 milliGRAM(s) Oral every 6 hours PRN Temp greater or equal to 38C (100.4F), Mild Pain (1 - 3)      Allergies    gabapentin (Unknown)    Intolerances        PAST MEDICAL & SURGICAL HISTORY:  HTN (hypertension)      DM (diabetes mellitus)      Hypercholesteremia      Gastric adenocarcinoma  s/p resection      Vertigo      Dementia      Ambulatory dysfunction      Multiple myeloma      Pulmonary embolism      S/P hysterectomy      S/P tubal ligation          FAMILY HISTORY:  Family history of diabetes mellitus (Sibling)    Family history of early CAD (Grandparent)        SOCIAL HISTORY  Smoking History:     REVIEW OF SYSTEMS:    CONSTITUTIONAL:  No fevers, chills, sweats    HEENT:  Eyes:  No diplopia or blurred vision. ENT:  No earache, sore throat or runny nose.    CARDIOVASCULAR:  No pressure, squeezing, tightness, or heaviness about the chest; no palpitations.    RESPIRATORY:  Per HPI    GASTROINTESTINAL:  No abdominal pain, nausea, vomiting or diarrhea.    GENITOURINARY:  No dysuria, frequency or urgency.    NEUROLOGIC:  No paresthesias, fasciculations, seizures or weakness.    PSYCHIATRIC:  No disorder of thought or mood.    Vital Signs Last 24 Hrs  T(C): 37.2 (13 Sep 2024 08:04), Max: 38.4 (13 Sep 2024 01:01)  T(F): 99 (13 Sep 2024 08:04), Max: 101.1 (13 Sep 2024 01:01)  HR: 111 (13 Sep 2024 08:04) (109 - 129)  BP: 123/65 (13 Sep 2024 08:04) (123/65 - 134/90)  BP(mean): --  RR: 21 (13 Sep 2024 08:04) (20 - 23)  SpO2: 95% (13 Sep 2024 08:04) (94% - 97%)    Parameters below as of 13 Sep 2024 08:04  Patient On (Oxygen Delivery Method): room air      I&O's Detail      PHYSICAL EXAMINATION:    GENERAL: The patient is a well-developed, well-nourished _____in no apparent distress.     HEENT: Head is normocephalic and atraumatic. Extraocular muscles are intact. Mucous membranes are moist.     NECK: Supple.     LUNGS: Clear to auscultation without wheezing, rales, or rhonchi. Respirations unlabored    HEART: Regular rate and rhythm without murmur.    ABDOMEN: Soft, nontender, and nondistended.  No hepatosplenomegaly is noted.    EXTREMITIES: Without any cyanosis, clubbing, rash, lesions or edema.    NEUROLOGIC: Grossly intact.      LABS:                        11.5   18.60 )-----------( 323      ( 13 Sep 2024 01:14 )             35.8     09-13    142  |  111<H>  |  19<H>  ----------------------------<  155<H>  3.4<L>   |  22  |  0.72    Ca    8.8      13 Sep 2024 01:14    TPro  6.1  /  Alb  2.5<L>  /  TBili  0.5  /  DBili  x   /  AST  9<L>  /  ALT  14  /  AlkPhos  97  09-13    PT/INR - ( 13 Sep 2024 01:14 )   PT: 19.5 sec;   INR: 1.74 ratio         PTT - ( 13 Sep 2024 01:14 )  PTT:33.3 sec  Urinalysis Basic - ( 13 Sep 2024 01:14 )    Color: x / Appearance: x / SG: x / pH: x  Gluc: 155 mg/dL / Ketone: x  / Bili: x / Urobili: x   Blood: x / Protein: x / Nitrite: x   Leuk Esterase: x / RBC: x / WBC x   Sq Epi: x / Non Sq Epi: x / Bacteria: x                Lactate, Blood: 1.8 mmol/L (09-13-24 @ 01:13)        MICROBIOLOGY:    RADIOLOGY & ADDITIONAL STUDIES:  < from: CT Angio Chest PE Protocol w/ IV Cont (09.13.24 @ 03:45) >  IMPRESSION:  No pulmonary embolus.    Patchy opacities in the right upper and right middle lobe with bilateral   lower lobe dependent consolidations versus atelectasis. Findings   concerning for multifocal pneumonia.      < end of copied text >    CXR:    Ct scan chest;    ekg;    echo:

## 2024-09-13 NOTE — H&P ADULT - PROBLEM SELECTOR PLAN 1
meet sepsis criteria with Tmax 101, .   CTA chest - patchy opacities in the RUL, RML, and bilateral lower lobe consolidations.  s/p 1L NS bolus, cefepime, vanco in the ED.    - BCX, UCX  - will start ceftriaxone, azithro  - will give LR 85ml/h x14h to complete sepsis fluids / maintenance fluids.

## 2024-09-13 NOTE — DISCHARGE NOTE PROVIDER - NSDCMRMEDTOKEN_GEN_ALL_CORE_FT
Albuterol (Eqv-ProAir HFA) 90 mcg/inh inhalation aerosol: 2 puff(s) inhaled every 4 hours as needed for  shortness of breath and/or wheezing  ascorbic acid 500 mg oral tablet: 1 tab(s) orally once a day  cholecalciferol oral tablet: 1,000 international unit(s) orally once a day 1000 unit(s) orally once a day  Eliquis 2.5 mg oral tablet: 1 tab(s) orally 2 times a day  Farxiga 10 mg oral tablet: 1 tab(s) orally once a day  ipratropium-albuterol 0.5 mg-2.5 mg/3 mL inhalation solution: 3 milliliter(s) by nebulizer every 6 hours as needed for  shortness of breath and/or wheezing  losartan 50 mg oral tablet: 1 tab(s) orally 2 times a day  METFORMIN 1000MG TAB: 1 tab(s) orally 2 times a day  metoprolol tartrate 25 mg oral tablet: 1 tab(s) orally 2 times a day  MONTELUKAST 10MG TAB: 1 tab(s) orally once a day  NIFEdipine 30 mg oral tablet, extended release: 1 tab(s) orally once a day  pantoprazole 40 mg oral delayed release tablet: 1 tab(s) orally once a day (before a meal)  SIMVASTATIN 10MG TAB: 1 tab(s) orally once a day (at bedtime)  zinc sulfate 220 mg (as elemental zinc 50 mg) oral capsule: 1 cap(s) orally once a day   acetaminophen 325 mg oral tablet: 2 tab(s) orally every 6 hours as needed for Temp greater or equal to 38C (100.4F), Mild Pain (1 - 3)  Albuterol (Eqv-ProAir HFA) 90 mcg/inh inhalation aerosol: 2 puff(s) inhaled every 4 hours as needed for  shortness of breath and/or wheezing  amoxicillin-clavulanate 875 mg-125 mg oral tablet: 1 tab(s) orally 2 times a day take starting on 9/18 until last dose on 9/20  ascorbic acid 500 mg oral tablet: 1 tab(s) orally once a day  cholecalciferol oral tablet: 1 tab(s) orally once a day 1000 unit(s) orally once a day  Eliquis 2.5 mg oral tablet: 1 tab(s) orally 2 times a day  Farxiga 10 mg oral tablet: 1 tab(s) orally once a day  ipratropium-albuterol 0.5 mg-2.5 mg/3 mL inhalation solution: 3 milliliter(s) by nebulizer every 6 hours as needed for  shortness of breath and/or wheezing  losartan 50 mg oral tablet: 1 tab(s) orally 2 times a day  METFORMIN 1000MG TAB: 1 tab(s) orally 2 times a day  metoprolol tartrate 25 mg oral tablet: 1 tab(s) orally 2 times a day  MONTELUKAST 10MG TAB: 1 tab(s) orally once a day  Multiple Vitamins with Minerals oral tablet: 1 tab(s) orally once a day  NIFEdipine 30 mg oral tablet, extended release: 1 tab(s) orally once a day  pantoprazole 40 mg oral delayed release tablet: 1 tab(s) orally once a day (before a meal)  SIMVASTATIN 10MG TAB: 1 tab(s) orally once a day (at bedtime)  Symbicort 80 mcg-4.5 mcg/inh inhalation aerosol: 2 puff(s) inhaled 2 times a day  zinc sulfate 220 mg (as elemental zinc 50 mg) oral capsule: 1 cap(s) orally once a day

## 2024-09-13 NOTE — H&P ADULT - ATTENDING COMMENTS
89F, from home, ambulates with wheelchair (mostly bedbound), PMH HTN, HLD, T2DM, osteoporosis, Stage IIB gastric adenocarcinoma s/p distal gastrectomy (2015), multiple myeloma/plastocytoma on chemo (follows [facility] Dr Adams), h/o bilateral PE (1/2023) on Eliquis. Presenting with fever, productive cough with white-yellowish sputum, SOB x1d. Collateral obtained from daughter over phone. Patient is AAO x2 (to self and place) at baseline and poor historian (at baseline mental status currently). Endorses throat discomfort from coughing. Daughter noted wheezing at home as well.     Denies chest pain, palpitations, SOB, n/v/d, abd pain, dysuria, numbness tingling in ext. No recent travel or sick contacts.     Patient initially on NRB in the ED. Received solumedrol 125 x1, duonebs x1. Weaned to NC 2L, satting 95-96%.     < from: CT Angio Chest PE Protocol w/ IV Cont (09.13.24 @ 03:45) >    IMPRESSION:  No pulmonary embolus.    Patchy opacities in the right upper and right middle lobe with bilateral   lower lobe dependent consolidations versus atelectasis. Findings   concerning for multifocal pneumonia.    < end of copied text >        assessment  --  acute hypoxic resp failure 2nd to multifocal pneumonia,  sepsis, h/o HTN, HLD, T2DM, osteoporosis, Stage IIB gastric adenocarcinoma s/p distal gastrectomy (2015), multiple myeloma/plastocytoma on chemo (follows [facility] Dr Adams), h/o bilateral PE (1/2023) on Eliquis    plan  --  admit to med, rocephin, zithromax, atrov and prov nebs, supplemental oxygen, robitussin prn, hold outpt dm meds, lispro ss, cont preadmit home meds, gi and dvt prophylaxis,   cbc, bmp, mg, phos, lipids, tsh, hgba1c, bld cx, ucx,     id cons  pulm cons   heme onc cons

## 2024-09-13 NOTE — DISCHARGE NOTE PROVIDER - NSDCCPCAREPLAN_GEN_ALL_CORE_FT
PRINCIPAL DISCHARGE DIAGNOSIS  Diagnosis: Acute hypoxic respiratory failure  Assessment and Plan of Treatment: YOu presented with acute hypoxia respiratory and Sepsis due to pneumonia. CT of chest  showed that you have pnemonia. YOu were given IV fluids and antibitics. YOu were seen by infectious disease and pulmonoligist t manage your care.   YOur symtoms have resovled now and that you have been able to breathe on your own without requiring oxygen   Take all antibiotics as ordered.  Call you Health care provider upon arrival home to make a one week follow up appointment.  If you develop fever, chills, malaise, or change in mental status call your Health Care Provider or go to the Emergency Department.  Nutrition is important, eat small frequent meals to help ensure you get adequate calories.  Do not stay in bed all day!  Increase your activity daily as tolerated.        SECONDARY DISCHARGE DIAGNOSES  Diagnosis: Multifocal pneumonia  Assessment and Plan of Treatment: You wer found to have pnemonia. you wer eufemia murphy    Diagnosis: Acute hypoxic respiratory failure  Assessment and Plan of Treatment:      PRINCIPAL DISCHARGE DIAGNOSIS  Diagnosis: Acute hypoxic respiratory failure  Assessment and Plan of Treatment: YOu presented with acute hypoxia respiratory and Sepsis due to pneumonia. CT of chest  showed that you have pnemonia. YOu were given IV fluids and antibitics. YOu were seen by infectious disease and pulmonoligist t manage your care.   YOur symtoms have resovled now and that you have been able to breathe on your own without requiring oxygen   Take all antibiotics as ordered.  Call you Health care provider upon arrival home to make a one week follow up appointment.  If you develop fever, chills, malaise, or change in mental status call your Health Care Provider or go to the Emergency Department.  Nutrition is important, eat small frequent meals to help ensure you get adequate calories.  Do not stay in bed all day!  Increase your activity daily as tolerated.        SECONDARY DISCHARGE DIAGNOSES  Diagnosis: Sepsis  Assessment and Plan of Treatment: You were treated for sepsis, which is the bodys extreme response to an infection. The cause of your sepsis was due to pneumonia.   Take all antibiotics as ordered.  Call you Health care provider upon arrival home to make a one week follow up appointment.  If you develop fever, chills, malaise, or change in mental status call your Health Care Provider or go to the Emergency Department.  Nutrition is important, eat small frequent meals to help ensure you get adequate calories.  Do not stay in bed all day!  Increase your activity daily as tolerated.      Diagnosis: Multifocal pneumonia  Assessment and Plan of Treatment: Pneumonia is a lung infection that can cause a fever, cough, and trouble breathing.  Continue all antibiotics as ordered until complete.  Nutrition is important, eat small frequent meals.  Get lots of rest and drink fluids.  Call your health care provider upon arrival home from hospital and make a follow up appointment for one week.  If your cough worsens, you develop fever greater than 101', you have shaking chills, a fast heartbeat, trouble breathing and/or feel your are breathing much faster than usual, call your healthcare provider.  Make sure you wash your hands frequently.      Diagnosis: HLD (hyperlipidemia)  Assessment and Plan of Treatment: You have a history of hyperlipidemia, which is when you have too much cholesterol in your blood. High amounts of cholesterol in your blood can put you at higher risks for heart attck, strokes and other health problems. Follow up with PCP for treatment goals, continue medication as prescribed, have liver function testing every 3 months as anti lipid medications can cause liver irritation, eat low fat meals, avoid red meat, butter, fried foods and cheese. Get daily exercise.      Diagnosis: T2DM (type 2 diabetes mellitus)  Assessment and Plan of Treatment: Continue to follow with your primary care MD or your endocrinologist. Discuss what the goal hemoglobin A1C level is for you.  Follow a heart healthy diabetic diet. If you check your fingerstick glucose at home, call your MD if it is greater than 250mg/dL on 2 occasions or less than 100mg/dL on 2 occasions. Know signs of low blood sugar, such as: dizziness, shakiness, sweating, confusion, hunger, nervousness- drink 4 ounces apple juice if occurs and call your doctor. Know early signs of high blood sugar, such as: frequent urination, increased thirst, blurry vision, fatigue, headache - call your doctor if this occurs.      Diagnosis: HTN (hypertension)  Assessment and Plan of Treatment: You have a history of high blood pressure. High blood pressure is a condition that puts you at risk for heart attack, stroke and kidney disease. Please continue to take your medications as prescribed. You can also help control your blood pressure by maintaining a healthy weight, eating a diet low in fat and rich in fruits and vegetables, reduce the amount of salt in your diet. Also, reduce alcohol and try to include some form of physical activity daily for at least 30 mins. Follow up with your medical doctor to establish long term blood pressure treatment goals.  Notify your doctor if you have any of the following symptoms:   Dizziness, Lightheadedness, Blurry vision, Headache, Chest pain, Shortness of breath       PRINCIPAL DISCHARGE DIAGNOSIS  Diagnosis: Sepsis due to pneumonia  Assessment and Plan of Treatment: you came into the hospital due to worsening shortness of breath  you had a CT scan of your chest that showed multifocal pneumonia  Community-acquired pneumonia (CAP) is a lung infection that you get outside of a hospital or nursing home setting. Your lungs become inflamed and cannot work well. CAP may be caused by bacteria, viruses, or fungi.  continue taking augmentin 875 mg twice a day 9/18-9/20  follow up with your Primary Care Doctor in 1 week        SECONDARY DISCHARGE DIAGNOSES  Diagnosis: Hypokalemia  Assessment and Plan of Treatment: you were found to have low potassium levels due to poor oral intake of foods and fluids  you were given electrolyte supplements in the hospital  please follow up with your Primary Care Doctor to recheck your potassium levels in 1 week.   you were given potassium supplements and your potassium is now back to normal.   Low potassium symptoms may include:  Weakness  Fatigue  Muscle cramps  Constipation  Abnormal heart rhythms (arrhythmias) are the most worrisome complication of very low potassium levels  foods rich in potassium include: Bananas, oranges, cantaloupe, honeydew, apricots, grapefruit (some dried fruits, such as prunes, raisins, and dates, are also high in potassium)  Cooked spinach.  Cooked broccoli.  Potatoes.  Sweet potatoes.  Mushrooms.  Peas.  Cucumbers.      Diagnosis: Asthma  Assessment and Plan of Treatment: you have a history of Asthma  you were seen in the hospital by a Pulmonologist.   Continue using Albuterol 2 puffs as needed for sudden shortness of breath or wheezing   you are prescribed a Maintainence Inhaler called Symbicort to prevent future Asthma Attacks - use the inhaler Symbicort 80/4.5 mcg 2 Puffs Twice Daily.   rinse your mouth with water after inhaling symbicort to prevent thrush.  continue taking montelukast 10 mg daily  folllow  up with Pulmonology Dr. Hernandes      Diagnosis: History of pulmonary embolism  Assessment and Plan of Treatment: you have a history of blood clots in your lungs  continue taking eliquis 2.5 mg twice a day to prevent blood clots   you had a CT Angiogram of your lungs and did not show any blood clots present    Diagnosis: Multiple myeloma  Assessment and Plan of Treatment: you have a history of Multiple Myeloma  continue taking tylenol as needed for mild pain  continue your chemotherapy treatment - revlimid   please follow up with your Oncologist Dr. Alvarado    Diagnosis: HTN (hypertension)  Assessment and Plan of Treatment: continue taking your home medication losartan, nifedipine and metoprolol daily    Diagnosis: HLD (hyperlipidemia)  Assessment and Plan of Treatment: continue taking simvastatin 10 mg daily      Diagnosis: T2DM (type 2 diabetes mellitus)  Assessment and Plan of Treatment: continue taking your home medications - farxiga 10 mg daily, metformin 1000 mg twice a day

## 2024-09-13 NOTE — H&P ADULT - ASSESSMENT
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 Presenting with fever, productive cough with white-yellowish sputum, SOB x1d. Collateral obtained from daughter over phone. Patient is AAO x2 (to self and place) at baseline and poor historian (at baseline mental status currently). Endorses throat discomfort from coughing. Daughter noted wheezing at home as well.     Denies chest pain, palpitations, SOB, n/v/d, abd pain, dysuria, numbness tingling in ext. No recent travel or sick contacts.     Patient initially on NRB in the ED. Received solumedrol 125 x1, duonebs x1. Weaned to NC 2L, satting 95-95%.      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 Presenting with fever, productive cough with white-yellowish sputum, SOB x1d. Found to meet sepsis criteria with Tmax 101, . CTA chest showing patchy opacities in the RUL, RML, and bilateral lower lobe consolidations. Weaned off NRB to 2L NC in the ED. Admitted for sepsis 2/2 multifocal PNA.         Collateral obtained from daughter over phone. Patient is AAO x2 (to self and place) at baseline and poor historian (at baseline mental status currently). Endorses throat discomfort from coughing. Daughter noted wheezing at home as well.     Denies chest pain, palpitations, SOB, n/v/d, abd pain, dysuria, numbness tingling in ext. No recent travel or sick contacts.     Patient initially on NRB in the ED. Received solumedrol 125 x1, duonebs x1. Weaned to NC 2L, satting 95-95%.      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 Presenting with fever, productive cough with white-yellowish sputum, SOB x1d. Found to meet sepsis criteria with Tmax 101, . CTA chest showing patchy opacities in the RUL, RML, and bilateral lower lobe consolidations. Weaned off NRB to 2L NC in the ED. Admitted for sepsis 2/2 multifocal PNA.

## 2024-09-13 NOTE — ED ADULT NURSE NOTE - OBJECTIVE STATEMENT
Pt A&Ox1 As per daughter Pt has SOB, fever, & cough that started today this afternoon. Pt noted with High 's @ this time. PMH Dementia

## 2024-09-13 NOTE — H&P ADULT - NSHPPHYSICALEXAM_GEN_ALL_CORE
Vital Signs Last 24 Hrs  T(C): 37.2 (13 Sep 2024 05:07), Max: 38.4 (13 Sep 2024 01:01)  T(F): 98.9 (13 Sep 2024 05:07), Max: 101.1 (13 Sep 2024 01:01)  HR: 109 (13 Sep 2024 05:07) (109 - 129)  BP: 127/65 (13 Sep 2024 05:07) (127/65 - 134/90)  BP(mean): --  RR: 23 (13 Sep 2024 05:07) (20 - 23)  SpO2: 94% (13 Sep 2024 05:07) (94% - 97%)    Parameters below as of 13 Sep 2024 05:07  Patient On (Oxygen Delivery Method): nasal cannula    GENERAL: NAD  HEAD:  Atraumatic, Normocephalic  EYES: EOMI, PERRLA, conjunctiva and sclera clear  ENMT: +dry mucous membranes. No tonsillar erythema, exudates, or enlargement  NECK: Supple, normal appearance, No JVD; Normal thyroid; Trachea midline  NERVOUS SYSTEM:  Alert & Oriented X2 (at baseline),  Motor Strength 4/5 B/L upper and lower extremities, sensation intact  CHEST/LUNG: +rhochi bilateral, +expiratory wheezing  HEART: Regular rate and rhythm; No murmurs, rubs, or gallops  ABDOMEN: Soft, Nontender, Nondistended; Bowel sounds present  EXTREMITIES:  +trace PE bilatera. 2+ Peripheral Pulses, No clubbing, cyanosis  LYMPH: No lymphadenopathy noted  SKIN: No rashes or lesions

## 2024-09-13 NOTE — DISCHARGE NOTE PROVIDER - INSTRUCTIONS
Please call  to schedule Mammogram/Ultrasound/Bone Density Scan     glucerna shakes three times a day

## 2024-09-13 NOTE — ED PROVIDER NOTE - WR ORDER ID 1
[id number]
bilateral upper extremity ROM was WFL (within functional limits)/bilateral lower extremity ROM was WFL (within functional limits)

## 2024-09-13 NOTE — ED PROVIDER NOTE - NS ED MD DISPO ADMITTING SERVICE
Auditory hallucinations/Visual hallucinations/Other
Auditory hallucinations/Visual hallucinations/Other
MEDICINE

## 2024-09-13 NOTE — ED PROVIDER NOTE - PHYSICAL EXAMINATION
General:  tachycardic, febrile  HEENT: MMM, trachea midline  Cardiac:  tachycardia  Respiratory: moderate respiratory distress, clear to auscultation on the left, crackles and wheezing on the right  GI: Soft, non tender, non distended  MSK: No lower extremity edema  Neuro: Moves all extremities, responding to questions appropriately  Skin: No rashes or lesions, warm, dry

## 2024-09-13 NOTE — H&P ADULT - NSHPREVIEWOFSYSTEMS_GEN_ALL_CORE
CONSTITUTIONAL: +fever, +fatigue. No weight loss  RESPIRATORY: +cough, +wheezing, + shortness of breath  CARDIOVASCULAR: No chest pain, palpitations, dizziness, or leg swelling  GASTROINTESTINAL: No abdominal pain. No nausea, vomiting, or hematemesis. No diarrhea or constipation. No melena or hematochezia.  GENITOURINARY: No dysuria or hematuria, urinary frequency  NEUROLOGICAL: No headaches, memory loss, loss of strength, numbness, or tremors  ENDOCRINE: No polyuria, polydipsia, or heat/cold intolerance  MUSKULOSKELETAL: No muscle aches, joint pains  HEME: no easy bruisability, no tender or enlarged lymph nodes  SKIN: No itching, burning, rashes, or lesions.

## 2024-09-13 NOTE — CONSULT NOTE ADULT - SUBJECTIVE AND OBJECTIVE BOX
Patient is a 89y old  Female who is from home, ambulates with wheelchair (mostly bedbound), PMH HTN, HLD, T2DM, osteoporosis, Stage IIB gastric adenocarcinoma s/p distal gastrectomy (2015), multiple Myeloma/plastocytoma on chemo (follows [facility] Dr Adams), h/o bilateral PE (1/2023) on Eliquis, now Presents to the ER for evaluation of fever, productive cough with white-yellowish sputum, Short of breath. Collateral obtained from daughter over phone. Endorses throat discomfort from coughing. Daughter noted wheezing at home as well. On admission, she found to have fever, tachycardia, tachypnea and hypoxia requiring supplemental oxygenation. The CT chest shows Patchy opacities in the right upper and right middle lobe with bilateral lower lobe. She has started on ceftriaxone and Azithromycin after dose of Vancomycin. And the ID consult requested to assist with further evaluation and antibiotic management.      REVIEW OF SYSTEMS: Total of twelve systems have been reviewed with patient and found to be negative unless mentioned in HPI      PAST MEDICAL & SURGICAL HISTORY:  HTN (hypertension)  DM (diabetes mellitus)  Hypercholesteremia  Gastric adenocarcinoma, s/p resection  Vertigo  Dementia  Ambulatory dysfunction  Multiple myeloma  Pulmonary embolism  S/P hysterectomy  S/P tubal ligation      SOCIAL HISTORY  Alcohol: Does not drink  Tobacco: Does not smoke  Illicit substance use: None      FAMILY HISTORY: Non contributory to the present illness      ALLERGIES: gabapentin (Unknown)      Vital Signs Last 24 Hrs  T(C): 36.4 (13 Sep 2024 09:50), Max: 38.4 (13 Sep 2024 01:01)  T(F): 97.5 (13 Sep 2024 09:50), Max: 101.1 (13 Sep 2024 01:01)  HR: 109 (13 Sep 2024 09:50) (109 - 129)  BP: 121/72 (13 Sep 2024 09:50) (121/72 - 134/90)  BP(mean): --  RR: 22 (13 Sep 2024 09:50) (20 - 23)  SpO2: 93% (13 Sep 2024 09:50) (93% - 97%)    Parameters below as of 13 Sep 2024 09:50  Patient On (Oxygen Delivery Method): nasal cannula      PHYSICAL EXAM:  GENERAL: Not in distress   CHEST/LUNG:  Not using accessory muscles   HEART: s1 and s2 present  ABDOMEN:  Nontender and  Nondistended  EXTREMITIES: No pedal  edema  CNS: Awake and Alert      LABS:                        10.7   13.42 )-----------( 285      ( 13 Sep 2024 10:14 )             33.2       09-13    141  |  114<H>  |  19<H>  ----------------------------<  290<H>  4.3   |  18<L>  |  0.62    Ca    8.2<L>      13 Sep 2024 10:14  Phos  1.8     09-13  Mg     1.6     09-13    TPro  5.7<L>  /  Alb  2.1<L>  /  TBili  0.5  /  DBili  x   /  AST  9<L>  /  ALT  13  /  AlkPhos  89  09-13    PT/INR - ( 13 Sep 2024 01:14 )   PT: 19.5 sec;   INR: 1.74 ratio     PTT - ( 13 Sep 2024 01:14 )  PTT:33.3 sec      MEDICATIONS  (STANDING):  albuterol/ipratropium for Nebulization 3 milliLiter(s) Nebulizer every 6 hours  apixaban 2.5 milliGRAM(s) Oral every 12 hours  atorvastatin 10 milliGRAM(s) Oral at bedtime  azithromycin  IVPB 500 milliGRAM(s) IV Intermittent every 24 hours  dapagliflozin 10 milliGRAM(s) Oral daily  fluticasone propionate/ salmeterol 100-50 MICROgram(s) Diskus 1 Dose(s) Inhalation two times a day  guaiFENesin  milliGRAM(s) Oral every 12 hours  insulin lispro (ADMELOG) corrective regimen sliding scale   SubCutaneous three times a day before meals  insulin lispro (ADMELOG) corrective regimen sliding scale   SubCutaneous at bedtime  lactated ringers. 1000 milliLiter(s) (85 mL/Hr) IV Continuous <Continuous>  montelukast 10 milliGRAM(s) Oral daily  pantoprazole    Tablet 40 milliGRAM(s) Oral before breakfast  zinc sulfate 220 milliGRAM(s) Oral daily    MEDICATIONS  (PRN):  acetaminophen     Tablet .. 650 milliGRAM(s) Oral every 6 hours PRN Temp greater or equal to 38C (100.4F), Mild Pain (1 - 3)      RADIOLOGY & ADDITIONAL TESTS:    9/13/24 : CT Angio Chest PE Protocol w/ IV Cont (09.13.24 @ 03:45) No pulmonary embolus.  Patchy opacities in the right upper and right middle lobe with bilateral lower lobe dependent consolidations versus atelectasis. Findings   concerning for multifocal pneumonia.      9/13/24 : Xray Chest 1 View-PORTABLE IMMEDIATE (09.13.24 @ 02:00) No gross consolidation or pleural effusion    Heart size cannot be accurately assessed in this projection.             Patient is a 89y old  Female who is from home, ambulates with wheelchair (mostly bedbound), PMH HTN, HLD, T2DM, osteoporosis, Stage IIB gastric adenocarcinoma s/p distal gastrectomy (2015), multiple Myeloma/plastocytoma on chemo (follows [facility] Dr Adams), h/o bilateral PE (1/2023) on Eliquis, now Presents to the ER for evaluation of fever, productive cough with white-yellowish sputum, Short of breath. Collateral obtained from daughter over phone. Endorses throat discomfort from coughing. Daughter noted wheezing at home as well. On admission, she found to have fever, tachycardia, tachypnea and hypoxia requiring supplemental oxygenation. The CT chest shows Patchy opacities in the right upper and right middle lobe with bilateral lower lobe. She has started on ceftriaxone and Azithromycin after dose of Vancomycin. And the ID consult requested to assist with further evaluation and antibiotic management.      REVIEW OF SYSTEMS: Total of twelve systems have been reviewed and found to be negative unless mentioned in HPI      PAST MEDICAL & SURGICAL HISTORY:  HTN (hypertension)  DM (diabetes mellitus)  Hypercholesteremia  Gastric adenocarcinoma, s/p resection  Vertigo  Dementia  Ambulatory dysfunction  Multiple myeloma  Pulmonary embolism  S/P hysterectomy  S/P tubal ligation      SOCIAL HISTORY  Alcohol: Does not drink  Tobacco: Does not smoke  Illicit substance use: None      FAMILY HISTORY: Non contributory to the present illness      ALLERGIES: gabapentin (Unknown)      Vital Signs Last 24 Hrs  T(C): 36.4 (13 Sep 2024 09:50), Max: 38.4 (13 Sep 2024 01:01)  T(F): 97.5 (13 Sep 2024 09:50), Max: 101.1 (13 Sep 2024 01:01)  HR: 109 (13 Sep 2024 09:50) (109 - 129)  BP: 121/72 (13 Sep 2024 09:50) (121/72 - 134/90)  BP(mean): --  RR: 22 (13 Sep 2024 09:50) (20 - 23)  SpO2: 93% (13 Sep 2024 09:50) (93% - 97%)    Parameters below as of 13 Sep 2024 09:50  Patient On (Oxygen Delivery Method): nasal cannula      PHYSICAL EXAM:  GENERAL: Not in distress   CHEST/LUNG:  Not using accessory muscles   HEART: s1 and s2 present  ABDOMEN:  Nontender and  Nondistended  EXTREMITIES: No pedal  edema  CNS: Awake and Alert      LABS:                        10.7   13.42 )-----------( 285      ( 13 Sep 2024 10:14 )             33.2       09-13    141  |  114<H>  |  19<H>  ----------------------------<  290<H>  4.3   |  18<L>  |  0.62    Ca    8.2<L>      13 Sep 2024 10:14  Phos  1.8     09-13  Mg     1.6     09-13    TPro  5.7<L>  /  Alb  2.1<L>  /  TBili  0.5  /  DBili  x   /  AST  9<L>  /  ALT  13  /  AlkPhos  89  09-13    PT/INR - ( 13 Sep 2024 01:14 )   PT: 19.5 sec;   INR: 1.74 ratio     PTT - ( 13 Sep 2024 01:14 )  PTT:33.3 sec      MEDICATIONS  (STANDING):  albuterol/ipratropium for Nebulization 3 milliLiter(s) Nebulizer every 6 hours  apixaban 2.5 milliGRAM(s) Oral every 12 hours  atorvastatin 10 milliGRAM(s) Oral at bedtime  azithromycin  IVPB 500 milliGRAM(s) IV Intermittent every 24 hours  dapagliflozin 10 milliGRAM(s) Oral daily  fluticasone propionate/ salmeterol 100-50 MICROgram(s) Diskus 1 Dose(s) Inhalation two times a day  guaiFENesin  milliGRAM(s) Oral every 12 hours  insulin lispro (ADMELOG) corrective regimen sliding scale   SubCutaneous three times a day before meals  insulin lispro (ADMELOG) corrective regimen sliding scale   SubCutaneous at bedtime  lactated ringers. 1000 milliLiter(s) (85 mL/Hr) IV Continuous <Continuous>  montelukast 10 milliGRAM(s) Oral daily  pantoprazole    Tablet 40 milliGRAM(s) Oral before breakfast  zinc sulfate 220 milliGRAM(s) Oral daily    MEDICATIONS  (PRN):  acetaminophen     Tablet .. 650 milliGRAM(s) Oral every 6 hours PRN Temp greater or equal to 38C (100.4F), Mild Pain (1 - 3)      RADIOLOGY & ADDITIONAL TESTS:    9/13/24 : CT Angio Chest PE Protocol w/ IV Cont (09.13.24 @ 03:45) No pulmonary embolus.  Patchy opacities in the right upper and right middle lobe with bilateral lower lobe dependent consolidations versus atelectasis. Findings   concerning for multifocal pneumonia.      9/13/24 : Xray Chest 1 View-PORTABLE IMMEDIATE (09.13.24 @ 02:00) No gross consolidation or pleural effusion    Heart size cannot be accurately assessed in this projection.

## 2024-09-13 NOTE — DISCHARGE NOTE PROVIDER - NSDCFUADDAPPT_GEN_ALL_CORE_FT
APPTS ARE READY TO BE MADE: [X] YES    Best Family or Patient Contact (if needed):    Additional Information about above appointments (if needed):    1: PCP Dr. Hernandes  2: Oncology Dr. Alvarado  3:     Other comments or requests:    APPTS ARE READY TO BE MADE: [X] YES    Best Family or Patient Contact (if needed):    Additional Information about above appointments (if needed):    1: PCP Dr. Hernandes  2: Oncology Dr. Alvarado  3:     Other comments or requests:     Appointment was scheduled by our team on the patient's behalf through the provider's office for Geriatric Medicine with Dr. Alexus Hernandes for 9/23/2024 at 11:45am, [address] [place] [id number].     Patient informed us they already have secured a follow up appointment for Medical Oncology with Dr. Alvarado for 9/20/2024 at 12:30pm, [address] [facility], [address] [place] [address], which is not visible on [facility].     Appointment was scheduled by our team on the patient's behalf through the provider's office for Pulmonary with Dr. Nikunj Hernandes for 10/3/2024 at 3pm, [address] [place] [id number].

## 2024-09-13 NOTE — H&P ADULT - PROBLEM SELECTOR PLAN 2
- see as above    - initially requiring NRB in the ED. Weaned to 2L NC, satting 95-95%  - lex q6  - guaifenesin 600 q12 x3d

## 2024-09-13 NOTE — DISCHARGE NOTE PROVIDER - HOSPITAL COURSE
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 Presenting with fever, productive cough with white-yellowish sputum, SOB x1d. Found to meet sepsis criteria with Tmax 101, . CTA chest showing patchy opacities in the RUL, RML, and bilateral lower lobe consolidations. Weaned off NRB to 2L NC in the ED. Admitted for sepsis 2/2 multifocal PNA.        started on 9/13---------------------------------------------------- 88 y/o F, [place] speaking, from home, ambulates with wheelchair (mostly bedbound), PMH HTN, HLD, T2DM, osteoporosis, Stage IIB gastric adenocarcinoma s/p distal gastrectomy (2015), multiple myeloma/plastocytoma on chemo (follows [facility] Dr Adams), h/o bilateral PE (1/2023) on Eliquis. Presenting with fever, productive cough with white-yellowish sputum, SOB x1d. Found to meet sepsis criteria with Tmax 101, . CTA chest showing patchy opacities in the RUL, RML, and bilateral lower lobe consolidations. Weaned off NRB to 2L NC in the ED. Admitted for sepsis 2/2 multifocal PNA. Started on ceftriaxone and azithromycin.   Urine culture negative as well.  Pt is now medically optimized for discharge home. Pt to continue taking augmentin 875 mg bid until 9/20.

## 2024-09-13 NOTE — ED PROVIDER NOTE - CLINICAL SUMMARY MEDICAL DECISION MAKING FREE TEXT BOX
89-year-old female who had presented for evaluation of cough, shortness of breath, fever with acute hypoxic respiratory failure.  Chest x-ray showed no focal consolidation.  Patient was weaned from nonrebreather to 2 L nasal cannula oxygen after receiving DuoNeb, steroids, broad-spectrum antibiotics, fluids.  Laboratory significant for WBC 18, normal lactate.  CTA obtained due to patient's history of PE which shows no pulmonary embolism above multifocal pneumonia.  Patient's heart rates in the 100s, oxygen saturations in the mid 90s on 2 L nasal cannula oxygen.  Will admit for further management of multifocal pneumonia. 89-year-old female who had presented for evaluation of cough, shortness of breath, fever with acute hypoxic respiratory failure.  Chest x-ray showed no focal consolidation.  Patient was weaned from nonrebreather to 2 L nasal cannula oxygen after receiving DuoNeb, steroids, broad-spectrum antibiotics, fluids.  Laboratory significant for WBC 18, normal lactate. COVID negative.  CTA obtained due to patient's history of PE which shows no pulmonary embolism above multifocal pneumonia.  Patient's heart rates in the 100s, oxygen saturations in the mid 90s on 2 L nasal cannula oxygen.  Will admit for further management of multifocal pneumonia.

## 2024-09-13 NOTE — ED PROVIDER NOTE - OBJECTIVE STATEMENT
89-year-old female with PMH of HTN, HLD, DM, gastric adenocarcinoma s/p distal gastrectomy (2015), MM, hx of PE  presents with cough, shortness of breath, fever that began yesterday.  Family member at the bedside states that she had some wheezing as well. 89-year-old female with PMH of HTN, HLD, DM, gastric adenocarcinoma s/p distal gastrectomy (2015), MM, asthma, dementia, hx of PE  presents with cough, shortness of breath, fever that began yesterday.  Family member at the bedside states that she had some wheezing as well.

## 2024-09-13 NOTE — H&P ADULT - PROBLEM SELECTOR PLAN 7
home meds: metformin and farxiga   Holding oral meds. c/w low ISS AC QHS. home meds: metformin and farxiga     - Holding oral meds  - ISS  - FS ACHS

## 2024-09-13 NOTE — DISCHARGE NOTE PROVIDER - CARE PROVIDER_API CALL
Alexus Bull  Geriatric Medicine  [address] [place] [address]  Phone: [phone]  Fax: [phone]  Follow Up Time: 1 week    Evgeny Alvarado  Medical Oncology  [address] [facility], [address]  [place] [address]  Phone: [phone]  Fax: [phone]  Follow Up Time: 1 week    Nikunj Hernandes  Pulmonary Disease  [address] [place] [id number]  Phone: [phone]  Fax: [phone]  Follow Up Time: 1 week

## 2024-09-13 NOTE — H&P ADULT - HISTORY OF PRESENT ILLNESS
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 Presenting with fever, productive cough with white-yellowish sputum, SOB x1d. Collateral obtained from daughter over phone. Patient is AAO x2 (to self and place) at baseline and poor historian (at baseline mental status currently). Endorses throat discomfort from coughing. Daughter noted wheezing at home as well.     Denies chest pain, palpitations, SOB, n/v/d, abd pain, dysuria, numbness tingling in ext. No recent travel or sick contacts.     Patient initially on NRB in the ED. Received solumedrol 125 x1, duonebs x1. Weaned to NC 2L, satting 95-95%.    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 Presenting with fever, productive cough with white-yellowish sputum, SOB x1d. Collateral obtained from daughter over phone. Patient is AAO x2 (to self and place) at baseline and poor historian (at baseline mental status currently). Endorses throat discomfort from coughing. Daughter noted wheezing at home as well.     Denies chest pain, palpitations, SOB, n/v/d, abd pain, dysuria, numbness tingling in ext. No recent travel or sick contacts.     Patient initially on NRB in the ED. Received solumedrol 125 x1, duonebs x1. Weaned to NC 2L, satting 95-96%.

## 2024-09-13 NOTE — H&P ADULT - PROBLEM SELECTOR PLAN 5
home meds: losartan, metoprolol, nifedipine     - holding metoprolol, losartan and nifedipine as normotensive and iso sepsis. Can resume with parameters as tolerated.

## 2024-09-13 NOTE — H&P ADULT - PROBLEM SELECTOR PLAN 3
- see as above    - Abx  - BCX  - strep pneumo, legionella, mycoplasma  - duonebs q6  - guaifenesin 600 q12 x3d

## 2024-09-13 NOTE — H&P ADULT - PROBLEM SELECTOR PLAN 8
h/o PE in 1/2023 likely provoked in the setting of active cancer   home meds: eliquis 2.5mg BID     - c/w home meds.

## 2024-09-13 NOTE — ED ADULT NURSE NOTE - NSFALLHARMRISKINTERV_ED_ALL_ED
Assistance OOB with selected safe patient handling equipment if applicable/Assistance with ambulation/Communicate risk of Fall with Harm to all staff, patient, and family/Monitor gait and stability/Monitor for mental status changes and reorient to person, place, and time, as needed/Move patient closer to nursing station/within visual sight of ED staff/Provide patient with walking aids/Provide visual cue: red socks, yellow wristband, yellow gown, etc/Reinforce activity limits and safety measures with patient and family/Toileting schedule using arm’s reach rule for commode and bathroom/Use of alarms - bed, stretcher, chair and/or video monitoring/Bed in lowest position, wheels locked, appropriate side rails in place/Call bell, personal items and telephone in reach/Instruct patient to call for assistance before getting out of bed/chair/stretcher/Non-slip footwear applied when patient is off stretcher/[place] to call system/Physically safe environment - no spills, clutter or unnecessary equipment/Purposeful Proactive Rounding/Room/bathroom lighting operational, light cord in reach

## 2024-09-14 DIAGNOSIS — J45.909 UNSPECIFIED ASTHMA, UNCOMPLICATED: ICD-10-CM

## 2024-09-14 LAB
ANION GAP SERPL CALC-SCNC: 8 MMOL/L — SIGNIFICANT CHANGE UP (ref 5–17)
BUN SERPL-MCNC: 23 MG/DL — HIGH (ref 7–18)
CALCIUM SERPL-MCNC: 8.5 MG/DL — SIGNIFICANT CHANGE UP (ref 8.4–10.5)
CHLORIDE SERPL-SCNC: 116 MMOL/L — HIGH (ref 96–108)
CO2 SERPL-SCNC: 21 MMOL/L — LOW (ref 22–31)
CREAT SERPL-MCNC: 0.55 MG/DL — SIGNIFICANT CHANGE UP (ref 0.5–1.3)
EGFR: 89 ML/MIN/1.73M2 — SIGNIFICANT CHANGE UP
GLUCOSE BLDC GLUCOMTR-MCNC: 110 MG/DL — HIGH (ref 70–99)
GLUCOSE BLDC GLUCOMTR-MCNC: 111 MG/DL — HIGH (ref 70–99)
GLUCOSE BLDC GLUCOMTR-MCNC: 131 MG/DL — HIGH (ref 70–99)
GLUCOSE BLDC GLUCOMTR-MCNC: 200 MG/DL — HIGH (ref 70–99)
GLUCOSE SERPL-MCNC: 130 MG/DL — HIGH (ref 70–99)
HCT VFR BLD CALC: 28.9 % — LOW (ref 34.5–45)
HGB BLD-MCNC: 9.5 G/DL — LOW (ref 11.5–15.5)
M PNEUMO IGM SER-ACNC: 0.06 INDEX — SIGNIFICANT CHANGE UP (ref 0–0.9)
MCHC RBC-ENTMCNC: 31.6 PG — SIGNIFICANT CHANGE UP (ref 27–34)
MCHC RBC-ENTMCNC: 32.9 GM/DL — SIGNIFICANT CHANGE UP (ref 32–36)
MCV RBC AUTO: 96 FL — SIGNIFICANT CHANGE UP (ref 80–100)
MRSA PCR RESULT.: SIGNIFICANT CHANGE UP
MYCOPLASMA AG SPEC QL: NEGATIVE — SIGNIFICANT CHANGE UP
NRBC # BLD: 0 /100 WBCS — SIGNIFICANT CHANGE UP (ref 0–0)
PLATELET # BLD AUTO: 292 K/UL — SIGNIFICANT CHANGE UP (ref 150–400)
POTASSIUM SERPL-MCNC: 3.5 MMOL/L — SIGNIFICANT CHANGE UP (ref 3.5–5.3)
POTASSIUM SERPL-SCNC: 3.5 MMOL/L — SIGNIFICANT CHANGE UP (ref 3.5–5.3)
RBC # BLD: 3.01 M/UL — LOW (ref 3.8–5.2)
RBC # FLD: 15.8 % — HIGH (ref 10.3–14.5)
S AUREUS DNA NOSE QL NAA+PROBE: SIGNIFICANT CHANGE UP
SODIUM SERPL-SCNC: 145 MMOL/L — SIGNIFICANT CHANGE UP (ref 135–145)
WBC # BLD: 16.19 K/UL — HIGH (ref 3.8–10.5)
WBC # FLD AUTO: 16.19 K/UL — HIGH (ref 3.8–10.5)

## 2024-09-14 RX ORDER — FLUTICASONE PROPIONATE AND SALMETEROL 250; 50 UG/1; UG/1
1 POWDER RESPIRATORY (INHALATION)
Refills: 0 | Status: DISCONTINUED | OUTPATIENT
Start: 2024-09-14 | End: 2024-09-15

## 2024-09-14 RX ADMIN — Medication 10 MILLIGRAM(S): at 22:23

## 2024-09-14 RX ADMIN — Medication 40 MILLIGRAM(S): at 05:24

## 2024-09-14 RX ADMIN — Medication 100 MILLIGRAM(S): at 01:13

## 2024-09-14 RX ADMIN — APIXABAN 2.5 MILLIGRAM(S): 5 TABLET, FILM COATED ORAL at 17:29

## 2024-09-14 RX ADMIN — IPRATROPIUM BROMIDE AND ALBUTEROL SULFATE 3 MILLILITER(S): .5; 3 SOLUTION RESPIRATORY (INHALATION) at 15:25

## 2024-09-14 RX ADMIN — MONTELUKAST SODIUM 10 MILLIGRAM(S): 5 TABLET, CHEWABLE ORAL at 12:07

## 2024-09-14 RX ADMIN — FLUTICASONE PROPIONATE AND SALMETEROL 1 DOSE(S): 250; 50 POWDER RESPIRATORY (INHALATION) at 22:23

## 2024-09-14 RX ADMIN — AZITHROMYCIN 255 MILLIGRAM(S): 500 TABLET, FILM COATED ORAL at 17:29

## 2024-09-14 RX ADMIN — DAPAGLIFLOZIN 10 MILLIGRAM(S): 10 TABLET, FILM COATED ORAL at 12:08

## 2024-09-14 RX ADMIN — APIXABAN 2.5 MILLIGRAM(S): 5 TABLET, FILM COATED ORAL at 05:24

## 2024-09-14 RX ADMIN — FLUTICASONE PROPIONATE AND SALMETEROL 1 DOSE(S): 250; 50 POWDER RESPIRATORY (INHALATION) at 12:12

## 2024-09-14 RX ADMIN — GUAIFENESIN 600 MILLIGRAM(S): 100 LIQUID ORAL at 17:29

## 2024-09-14 RX ADMIN — GUAIFENESIN 600 MILLIGRAM(S): 100 LIQUID ORAL at 05:24

## 2024-09-14 RX ADMIN — Medication 1: at 12:09

## 2024-09-14 RX ADMIN — IPRATROPIUM BROMIDE AND ALBUTEROL SULFATE 3 MILLILITER(S): .5; 3 SOLUTION RESPIRATORY (INHALATION) at 20:43

## 2024-09-14 RX ADMIN — Medication 220 MILLIGRAM(S): at 12:08

## 2024-09-14 RX ADMIN — IPRATROPIUM BROMIDE AND ALBUTEROL SULFATE 3 MILLILITER(S): .5; 3 SOLUTION RESPIRATORY (INHALATION) at 08:37

## 2024-09-14 NOTE — PROGRESS NOTE ADULT - SUBJECTIVE AND OBJECTIVE BOX
Patient is seen and examined at the bed side, is afebrile. She mentioned feeling better. The Blood cultures from 9/13 have NGTD,       REVIEW OF SYSTEMS: All other review systems are negative      ALLERGIES: gabapentin (Unknown)      Vital Signs Last 24 Hrs  T(C): 36.1 (14 Sep 2024 12:28), Max: 36.6 (14 Sep 2024 05:30)  T(F): 97 (14 Sep 2024 12:28), Max: 97.9 (14 Sep 2024 05:30)  HR: 100 (14 Sep 2024 12:28) (100 - 102)  BP: 146/76 (14 Sep 2024 12:28) (126/73 - 146/76)  BP(mean): --  RR: 18 (14 Sep 2024 12:28) (17 - 18)  SpO2: 98% (14 Sep 2024 12:28) (92% - 98%)    Parameters below as of 14 Sep 2024 12:28  Patient On (Oxygen Delivery Method): room air      PHYSICAL EXAM:  GENERAL: Not in distress   CHEST/LUNG:  Not using accessory muscles   HEART: s1 and s2 present  ABDOMEN:  Nontender and  Nondistended  EXTREMITIES: No pedal  edema  CNS: Awake and Alert      LABS:                        9.5    16.19 )-----------( 292      ( 14 Sep 2024 07:17 )             28.9                           10.7   13.42 )-----------( 285      ( 13 Sep 2024 10:14 )             33.2       09-14    145  |  116<H>  |  23<H>  ----------------------------<  130<H>  3.5   |  21<L>  |  0.55    Ca    8.5      14 Sep 2024 07:17  Phos  1.9     09-13  Mg     1.7     09-13    TPro  5.3<L>  /  Alb  2.0<L>  /  TBili  0.3  /  DBili  x   /  AST  9<L>  /  ALT  12  /  AlkPhos  78  09-13    09-13    141  |  114<H>  |  19<H>  ----------------------------<  290<H>  4.3   |  18<L>  |  0.62    Ca    8.2<L>      13 Sep 2024 10:14  Phos  1.8     09-13  Mg     1.6     09-13    TPro  5.7<L>  /  Alb  2.1<L>  /  TBili  0.5  /  DBili  x   /  AST  9<L>  /  ALT  13  /  AlkPhos  89  09-13    PT/INR - ( 13 Sep 2024 01:14 )   PT: 19.5 sec;   INR: 1.74 ratio     PTT - ( 13 Sep 2024 01:14 )  PTT:33.3 sec      MEDICATIONS  (STANDING):    albuterol/ipratropium for Nebulization 3 milliLiter(s) Nebulizer every 6 hours  apixaban 2.5 milliGRAM(s) Oral every 12 hours  atorvastatin 10 milliGRAM(s) Oral at bedtime  azithromycin  IVPB 500 milliGRAM(s) IV Intermittent every 24 hours  cefTRIAXone   IVPB 1000 milliGRAM(s) IV Intermittent every 24 hours  dapagliflozin 10 milliGRAM(s) Oral daily  fluticasone propionate/ salmeterol 100-50 MICROgram(s) Diskus 1 Dose(s) Inhalation two times a day  fluticasone propionate/ salmeterol 100-50 MICROgram(s) Diskus 1 Dose(s) Inhalation two times a day  guaiFENesin  milliGRAM(s) Oral every 12 hours  insulin lispro (ADMELOG) corrective regimen sliding scale   SubCutaneous three times a day before meals  insulin lispro (ADMELOG) corrective regimen sliding scale   SubCutaneous at bedtime  lactated ringers. 1000 milliLiter(s) (85 mL/Hr) IV Continuous <Continuous>  montelukast 10 milliGRAM(s) Oral daily  pantoprazole    Tablet 40 milliGRAM(s) Oral before breakfast  zinc sulfate 220 milliGRAM(s) Oral daily      RADIOLOGY & ADDITIONAL TESTS:    9/13/24 : CT Angio Chest PE Protocol w/ IV Cont (09.13.24 @ 03:45) No pulmonary embolus.  Patchy opacities in the right upper and right middle lobe with bilateral lower lobe dependent consolidations versus atelectasis. Findings   concerning for multifocal pneumonia.    9/13/24 : Xray Chest 1 View-PORTABLE IMMEDIATE (09.13.24 @ 02:00) No gross consolidation or pleural effusion    Heart size cannot be accurately assessed in this projection.      MICROBIOLOGY DATA:    MRSA/MSSA PCR (09.13.24 @ 13:59)   MRSA PCR Result.: NotDetec    Culture - Blood (09.13.24 @ 01:07)   Specimen Source: .Blood Blood-Peripheral  Culture Results: No growth at 24 hours    Culture - Blood (09.13.24 @ 01:00)   Specimen Source: .Blood Blood-Peripheral  Culture Results: No growth at 24 hours

## 2024-09-14 NOTE — PROGRESS NOTE ADULT - PROBLEM SELECTOR PLAN 1
Check pending cultures  antibiotics  ID consult  monitor vitals. Check pending cultures  antibiotics  ID Follow up  monitor vitals.

## 2024-09-14 NOTE — PROGRESS NOTE ADULT - SUBJECTIVE AND OBJECTIVE BOX
Patient is a 89y old  Female who presents with a chief complaint of cough, SOB (14 Sep 2024 06:06)      INTERVAL HPI/OVERNIGHT EVENTS:      VITAL SIGNS:  T(F): 97.9 (09-14-24 @ 05:30)  HR: 102 (09-14-24 @ 05:30)  BP: 126/73 (09-14-24 @ 05:30)  RR: 17 (09-14-24 @ 05:30)  SpO2: 92% (09-14-24 @ 05:30)  Wt(kg): --  I&O's Detail    13 Sep 2024 07:01  -  14 Sep 2024 07:00  --------------------------------------------------------  IN:  Total IN: 0 mL    OUT:    Voided (mL): 900 mL  Total OUT: 900 mL    Total NET: -900 mL              REVIEW OF SYSTEMS:    CONSTITUTIONAL:  No fevers, chills, sweats    HEENT:  Eyes:  No diplopia or blurred vision. ENT:  No earache, sore throat or runny nose.    CARDIOVASCULAR:  No pressure, squeezing, tightness, or heaviness about the chest; no palpitations.    RESPIRATORY:  Per HPI    GASTROINTESTINAL:  No abdominal pain, nausea, vomiting or diarrhea.    GENITOURINARY:  No dysuria, frequency or urgency.    NEUROLOGIC:  No paresthesias, fasciculations, seizures or weakness.    PSYCHIATRIC:  No disorder of thought or mood.      PHYSICAL EXAM:    Constitutional: Well developed and nourished  Eyes:Perrla  ENMT: normal  Neck:supple  Respiratory: good air entry  Cardiovascular: S1 S2 regular  Gastrointestinal: Soft, Non tender  Extremities: No edema  Vascular:normal  Neurological:Awake, alert,Ox3  Musculoskeletal:Normal      MEDICATIONS  (STANDING):  albuterol/ipratropium for Nebulization 3 milliLiter(s) Nebulizer every 6 hours  apixaban 2.5 milliGRAM(s) Oral every 12 hours  atorvastatin 10 milliGRAM(s) Oral at bedtime  azithromycin  IVPB 500 milliGRAM(s) IV Intermittent every 24 hours  cefTRIAXone   IVPB 1000 milliGRAM(s) IV Intermittent every 24 hours  dapagliflozin 10 milliGRAM(s) Oral daily  fluticasone propionate/ salmeterol 100-50 MICROgram(s) Diskus 1 Dose(s) Inhalation two times a day  guaiFENesin  milliGRAM(s) Oral every 12 hours  insulin lispro (ADMELOG) corrective regimen sliding scale   SubCutaneous three times a day before meals  insulin lispro (ADMELOG) corrective regimen sliding scale   SubCutaneous at bedtime  lactated ringers. 1000 milliLiter(s) (85 mL/Hr) IV Continuous <Continuous>  montelukast 10 milliGRAM(s) Oral daily  pantoprazole    Tablet 40 milliGRAM(s) Oral before breakfast  zinc sulfate 220 milliGRAM(s) Oral daily    MEDICATIONS  (PRN):  acetaminophen     Tablet .. 650 milliGRAM(s) Oral every 6 hours PRN Temp greater or equal to 38C (100.4F), Mild Pain (1 - 3)      Allergies    gabapentin (Unknown)    Intolerances        LABS:                        9.5    16.19 )-----------( 292      ( 14 Sep 2024 07:17 )             28.9     09-14    145  |  116<H>  |  23<H>  ----------------------------<  130<H>  3.5   |  21<L>  |  0.55    Ca    8.5      14 Sep 2024 07:17  Phos  1.9     09-13  Mg     1.7     09-13    TPro  5.3<L>  /  Alb  2.0<L>  /  TBili  0.3  /  DBili  x   /  AST  9<L>  /  ALT  12  /  AlkPhos  78  09-13    PT/INR - ( 13 Sep 2024 01:14 )   PT: 19.5 sec;   INR: 1.74 ratio         PTT - ( 13 Sep 2024 01:14 )  PTT:33.3 sec  Urinalysis Basic - ( 14 Sep 2024 07:17 )    Color: x / Appearance: x / SG: x / pH: x  Gluc: 130 mg/dL / Ketone: x  / Bili: x / Urobili: x   Blood: x / Protein: x / Nitrite: x   Leuk Esterase: x / RBC: x / WBC x   Sq Epi: x / Non Sq Epi: x / Bacteria: x            CAPILLARY BLOOD GLUCOSE      POCT Blood Glucose.: 131 mg/dL (14 Sep 2024 07:46)  POCT Blood Glucose.: 226 mg/dL (13 Sep 2024 21:43)  POCT Blood Glucose.: 237 mg/dL (13 Sep 2024 17:01)  POCT Blood Glucose.: 294 mg/dL (13 Sep 2024 14:51)    Culture - Blood (09.13.24 @ 01:07)   Specimen Source: .Blood Blood-Peripheral  Culture Results:   No growth at 24 hours    RADIOLOGY & ADDITIONAL TESTS:    CXR:    Ct scan chest:    ekg;    echo: Patient is a 89y old  Female who presents with a chief complaint of cough, SOB (14 Sep 2024 06:06)  Patient is awake, alert comfortable in bed in NAD. On room air. complains of cough and phlegm.     INTERVAL HPI/OVERNIGHT EVENTS:      VITAL SIGNS:  T(F): 97.9 (09-14-24 @ 05:30)  HR: 102 (09-14-24 @ 05:30)  BP: 126/73 (09-14-24 @ 05:30)  RR: 17 (09-14-24 @ 05:30)  SpO2: 92% (09-14-24 @ 05:30)  Wt(kg): --  I&O's Detail    13 Sep 2024 07:01  -  14 Sep 2024 07:00  --------------------------------------------------------  IN:  Total IN: 0 mL    OUT:    Voided (mL): 900 mL  Total OUT: 900 mL    Total NET: -900 mL              REVIEW OF SYSTEMS:    CONSTITUTIONAL:  No fevers, chills, sweats    HEENT:  Eyes:  No diplopia or blurred vision. ENT:  No earache, sore throat or runny nose.    CARDIOVASCULAR:  No pressure, squeezing, tightness, or heaviness about the chest; no palpitations.    RESPIRATORY:  Per HPI    GASTROINTESTINAL:  No abdominal pain, nausea, vomiting or diarrhea.    GENITOURINARY:  No dysuria, frequency or urgency.    NEUROLOGIC:  No paresthesias, fasciculations, seizures or weakness.    PSYCHIATRIC:  No disorder of thought or mood.      PHYSICAL EXAM:    Constitutional: Well developed and nourished  Eyes:Perrla  ENMT: normal  Neck:supple  Respiratory: good air entry  Cardiovascular: S1 S2 regular  Gastrointestinal: Soft, Non tender  Extremities: No edema  Vascular:normal  Neurological:Awake, alert,Ox3  Musculoskeletal:Normal      MEDICATIONS  (STANDING):  albuterol/ipratropium for Nebulization 3 milliLiter(s) Nebulizer every 6 hours  apixaban 2.5 milliGRAM(s) Oral every 12 hours  atorvastatin 10 milliGRAM(s) Oral at bedtime  azithromycin  IVPB 500 milliGRAM(s) IV Intermittent every 24 hours  cefTRIAXone   IVPB 1000 milliGRAM(s) IV Intermittent every 24 hours  dapagliflozin 10 milliGRAM(s) Oral daily  fluticasone propionate/ salmeterol 100-50 MICROgram(s) Diskus 1 Dose(s) Inhalation two times a day  guaiFENesin  milliGRAM(s) Oral every 12 hours  insulin lispro (ADMELOG) corrective regimen sliding scale   SubCutaneous three times a day before meals  insulin lispro (ADMELOG) corrective regimen sliding scale   SubCutaneous at bedtime  lactated ringers. 1000 milliLiter(s) (85 mL/Hr) IV Continuous <Continuous>  montelukast 10 milliGRAM(s) Oral daily  pantoprazole    Tablet 40 milliGRAM(s) Oral before breakfast  zinc sulfate 220 milliGRAM(s) Oral daily    MEDICATIONS  (PRN):  acetaminophen     Tablet .. 650 milliGRAM(s) Oral every 6 hours PRN Temp greater or equal to 38C (100.4F), Mild Pain (1 - 3)      Allergies    gabapentin (Unknown)    Intolerances        LABS:                        9.5    16.19 )-----------( 292      ( 14 Sep 2024 07:17 )             28.9     09-14    145  |  116<H>  |  23<H>  ----------------------------<  130<H>  3.5   |  21<L>  |  0.55    Ca    8.5      14 Sep 2024 07:17  Phos  1.9     09-13  Mg     1.7     09-13    TPro  5.3<L>  /  Alb  2.0<L>  /  TBili  0.3  /  DBili  x   /  AST  9<L>  /  ALT  12  /  AlkPhos  78  09-13    PT/INR - ( 13 Sep 2024 01:14 )   PT: 19.5 sec;   INR: 1.74 ratio         PTT - ( 13 Sep 2024 01:14 )  PTT:33.3 sec  Urinalysis Basic - ( 14 Sep 2024 07:17 )    Color: x / Appearance: x / SG: x / pH: x  Gluc: 130 mg/dL / Ketone: x  / Bili: x / Urobili: x   Blood: x / Protein: x / Nitrite: x   Leuk Esterase: x / RBC: x / WBC x   Sq Epi: x / Non Sq Epi: x / Bacteria: x            CAPILLARY BLOOD GLUCOSE      POCT Blood Glucose.: 131 mg/dL (14 Sep 2024 07:46)  POCT Blood Glucose.: 226 mg/dL (13 Sep 2024 21:43)  POCT Blood Glucose.: 237 mg/dL (13 Sep 2024 17:01)  POCT Blood Glucose.: 294 mg/dL (13 Sep 2024 14:51)    Culture - Blood (09.13.24 @ 01:07)   Specimen Source: .Blood Blood-Peripheral  Culture Results:   No growth at 24 hours    RADIOLOGY & ADDITIONAL TESTS:    CXR:    Ct scan chest:    ekg;    echo: Patient is a 89y old  Female who presents with a chief complaint of cough, SOB (14 Sep 2024 06:06)  Patient is awake, alert comfortable in bed in NAD. On room air. Complains of cough and phlegm.     INTERVAL HPI/OVERNIGHT EVENTS:      VITAL SIGNS:  T(F): 97.9 (09-14-24 @ 05:30)  HR: 102 (09-14-24 @ 05:30)  BP: 126/73 (09-14-24 @ 05:30)  RR: 17 (09-14-24 @ 05:30)  SpO2: 92% (09-14-24 @ 05:30)  Wt(kg): --  I&O's Detail    13 Sep 2024 07:01  -  14 Sep 2024 07:00  --------------------------------------------------------  IN:  Total IN: 0 mL    OUT:    Voided (mL): 900 mL  Total OUT: 900 mL    Total NET: -900 mL              REVIEW OF SYSTEMS:    CONSTITUTIONAL:  No fevers, chills, sweats    HEENT:  Eyes:  No diplopia or blurred vision. ENT:  No earache, sore throat or runny nose.    CARDIOVASCULAR:  No pressure, squeezing, tightness, or heaviness about the chest; no palpitations.    RESPIRATORY:  Per HPI    GASTROINTESTINAL:  No abdominal pain, nausea, vomiting or diarrhea.    GENITOURINARY:  No dysuria, frequency or urgency.    NEUROLOGIC:  No paresthesias, fasciculations, seizures or weakness.    PSYCHIATRIC:  No disorder of thought or mood.      PHYSICAL EXAM:    Constitutional: Well developed and nourished  Eyes:Perrla  ENMT: normal  Neck:supple  Respiratory: good air entry  Cardiovascular: S1 S2 regular  Gastrointestinal: Soft, Non tender  Extremities: No edema  Vascular:normal  Neurological:Awake, alert,Ox3  Musculoskeletal:Normal      MEDICATIONS  (STANDING):  albuterol/ipratropium for Nebulization 3 milliLiter(s) Nebulizer every 6 hours  apixaban 2.5 milliGRAM(s) Oral every 12 hours  atorvastatin 10 milliGRAM(s) Oral at bedtime  azithromycin  IVPB 500 milliGRAM(s) IV Intermittent every 24 hours  cefTRIAXone   IVPB 1000 milliGRAM(s) IV Intermittent every 24 hours  dapagliflozin 10 milliGRAM(s) Oral daily  fluticasone propionate/ salmeterol 100-50 MICROgram(s) Diskus 1 Dose(s) Inhalation two times a day  guaiFENesin  milliGRAM(s) Oral every 12 hours  insulin lispro (ADMELOG) corrective regimen sliding scale   SubCutaneous three times a day before meals  insulin lispro (ADMELOG) corrective regimen sliding scale   SubCutaneous at bedtime  lactated ringers. 1000 milliLiter(s) (85 mL/Hr) IV Continuous <Continuous>  montelukast 10 milliGRAM(s) Oral daily  pantoprazole    Tablet 40 milliGRAM(s) Oral before breakfast  zinc sulfate 220 milliGRAM(s) Oral daily    MEDICATIONS  (PRN):  acetaminophen     Tablet .. 650 milliGRAM(s) Oral every 6 hours PRN Temp greater or equal to 38C (100.4F), Mild Pain (1 - 3)      Allergies    gabapentin (Unknown)    Intolerances        LABS:                        9.5    16.19 )-----------( 292      ( 14 Sep 2024 07:17 )             28.9     09-14    145  |  116<H>  |  23<H>  ----------------------------<  130<H>  3.5   |  21<L>  |  0.55    Ca    8.5      14 Sep 2024 07:17  Phos  1.9     09-13  Mg     1.7     09-13    TPro  5.3<L>  /  Alb  2.0<L>  /  TBili  0.3  /  DBili  x   /  AST  9<L>  /  ALT  12  /  AlkPhos  78  09-13    PT/INR - ( 13 Sep 2024 01:14 )   PT: 19.5 sec;   INR: 1.74 ratio         PTT - ( 13 Sep 2024 01:14 )  PTT:33.3 sec  Urinalysis Basic - ( 14 Sep 2024 07:17 )    Color: x / Appearance: x / SG: x / pH: x  Gluc: 130 mg/dL / Ketone: x  / Bili: x / Urobili: x   Blood: x / Protein: x / Nitrite: x   Leuk Esterase: x / RBC: x / WBC x   Sq Epi: x / Non Sq Epi: x / Bacteria: x            CAPILLARY BLOOD GLUCOSE      POCT Blood Glucose.: 131 mg/dL (14 Sep 2024 07:46)  POCT Blood Glucose.: 226 mg/dL (13 Sep 2024 21:43)  POCT Blood Glucose.: 237 mg/dL (13 Sep 2024 17:01)  POCT Blood Glucose.: 294 mg/dL (13 Sep 2024 14:51)    Culture - Blood (09.13.24 @ 01:07)   Specimen Source: .Blood Blood-Peripheral  Culture Results:   No growth at 24 hours    RADIOLOGY & ADDITIONAL TESTS:    CXR:    Ct scan chest:    ekg;    echo: Patient is a 89y old  Female who presents with a chief complaint of cough, SOB (14 Sep 2024 06:06)  Patient is awake, alert comfortable in bed in NAD.  Currently on room air. Complains of cough and phlegm.     INTERVAL HPI/OVERNIGHT EVENTS:      VITAL SIGNS:  T(F): 97.9 (09-14-24 @ 05:30)  HR: 102 (09-14-24 @ 05:30)  BP: 126/73 (09-14-24 @ 05:30)  RR: 17 (09-14-24 @ 05:30)  SpO2: 92% (09-14-24 @ 05:30)  Wt(kg): --  I&O's Detail    13 Sep 2024 07:01  -  14 Sep 2024 07:00  --------------------------------------------------------  IN:  Total IN: 0 mL    OUT:    Voided (mL): 900 mL  Total OUT: 900 mL    Total NET: -900 mL              REVIEW OF SYSTEMS:    CONSTITUTIONAL:  No fevers, chills, sweats    HEENT:  Eyes:  No diplopia or blurred vision. ENT:  No earache, sore throat or runny nose.    CARDIOVASCULAR:  No pressure, squeezing, tightness, or heaviness about the chest; no palpitations.    RESPIRATORY:  Per HPI    GASTROINTESTINAL:  No abdominal pain, nausea, vomiting or diarrhea.    GENITOURINARY:  No dysuria, frequency or urgency.    NEUROLOGIC:  No paresthesias, fasciculations, seizures or weakness.    PSYCHIATRIC:  No disorder of thought or mood.      PHYSICAL EXAM:    Constitutional: Well developed and nourished  Eyes:Perrla  ENMT: normal  Neck:supple  Respiratory: good air entry  Cardiovascular: S1 S2 regular  Gastrointestinal: Soft, Non tender  Extremities: No edema  Vascular:normal  Neurological:Awake, alert,Ox3  Musculoskeletal:Normal      MEDICATIONS  (STANDING):  albuterol/ipratropium for Nebulization 3 milliLiter(s) Nebulizer every 6 hours  apixaban 2.5 milliGRAM(s) Oral every 12 hours  atorvastatin 10 milliGRAM(s) Oral at bedtime  azithromycin  IVPB 500 milliGRAM(s) IV Intermittent every 24 hours  cefTRIAXone   IVPB 1000 milliGRAM(s) IV Intermittent every 24 hours  dapagliflozin 10 milliGRAM(s) Oral daily  fluticasone propionate/ salmeterol 100-50 MICROgram(s) Diskus 1 Dose(s) Inhalation two times a day  guaiFENesin  milliGRAM(s) Oral every 12 hours  insulin lispro (ADMELOG) corrective regimen sliding scale   SubCutaneous three times a day before meals  insulin lispro (ADMELOG) corrective regimen sliding scale   SubCutaneous at bedtime  lactated ringers. 1000 milliLiter(s) (85 mL/Hr) IV Continuous <Continuous>  montelukast 10 milliGRAM(s) Oral daily  pantoprazole    Tablet 40 milliGRAM(s) Oral before breakfast  zinc sulfate 220 milliGRAM(s) Oral daily    MEDICATIONS  (PRN):  acetaminophen     Tablet .. 650 milliGRAM(s) Oral every 6 hours PRN Temp greater or equal to 38C (100.4F), Mild Pain (1 - 3)      Allergies    gabapentin (Unknown)    Intolerances        LABS:                        9.5    16.19 )-----------( 292      ( 14 Sep 2024 07:17 )             28.9     09-14    145  |  116<H>  |  23<H>  ----------------------------<  130<H>  3.5   |  21<L>  |  0.55    Ca    8.5      14 Sep 2024 07:17  Phos  1.9     09-13  Mg     1.7     09-13    TPro  5.3<L>  /  Alb  2.0<L>  /  TBili  0.3  /  DBili  x   /  AST  9<L>  /  ALT  12  /  AlkPhos  78  09-13    PT/INR - ( 13 Sep 2024 01:14 )   PT: 19.5 sec;   INR: 1.74 ratio         PTT - ( 13 Sep 2024 01:14 )  PTT:33.3 sec  Urinalysis Basic - ( 14 Sep 2024 07:17 )    Color: x / Appearance: x / SG: x / pH: x  Gluc: 130 mg/dL / Ketone: x  / Bili: x / Urobili: x   Blood: x / Protein: x / Nitrite: x   Leuk Esterase: x / RBC: x / WBC x   Sq Epi: x / Non Sq Epi: x / Bacteria: x            CAPILLARY BLOOD GLUCOSE      POCT Blood Glucose.: 131 mg/dL (14 Sep 2024 07:46)  POCT Blood Glucose.: 226 mg/dL (13 Sep 2024 21:43)  POCT Blood Glucose.: 237 mg/dL (13 Sep 2024 17:01)  POCT Blood Glucose.: 294 mg/dL (13 Sep 2024 14:51)    Culture - Blood (09.13.24 @ 01:07)   Specimen Source: .Blood Blood-Peripheral  Culture Results:   No growth at 24 hours    RADIOLOGY & ADDITIONAL TESTS:  < from: CT Angio Chest PE Protocol w/ IV Cont (09.13.24 @ 03:45) >  IMPRESSION:  No pulmonary embolus.    Patchy opacities in the right upper and right middle lobe with bilateral   lower lobe dependent consolidations versus atelectasis. Findings   concerning for multifocal pneumonia.      < end of copied text >    CXR:    Ct scan chest:    ekg;    echo:

## 2024-09-14 NOTE — PROGRESS NOTE ADULT - PROBLEM SELECTOR PLAN 4
monitor BP  cont meds. Bronchodilators  Symbicort HFA 80/4.5 mcgs 2 puffs po BID with spacer   montelukast 10 mgs po Qhs

## 2024-09-14 NOTE — PROGRESS NOTE ADULT - SUBJECTIVE AND OBJECTIVE BOX
Patient is a 89y old  Female who presents with a chief complaint of cough, SOB (13 Sep 2024 16:04)    pt seen in icu [  ], reg med floor [   ], bed [  ], chair at bedside [   ], a+o x3 [  ], lethargic [  ],  nad [  ]    shea [  ], ngt [  ], peg [  ], et tube [  ], cent line [  ], picc line [  ]        Allergies    gabapentin (Unknown)        Vitals    T(F): 97.9 (09-14-24 @ 05:30), Max: 99 (09-13-24 @ 08:04)  HR: 102 (09-14-24 @ 05:30) (70 - 111)  BP: 126/73 (09-14-24 @ 05:30) (113/70 - 126/73)  RR: 17 (09-14-24 @ 05:30) (17 - 22)  SpO2: 92% (09-14-24 @ 05:30) (92% - 95%)  Wt(kg): --  CAPILLARY BLOOD GLUCOSE      POCT Blood Glucose.: 226 mg/dL (13 Sep 2024 21:43)      Labs                          10.7   13.42 )-----------( 285      ( 13 Sep 2024 10:14 )             33.2       09-13    143  |  114<H>  |  22<H>  ----------------------------<  226<H>  3.5   |  21<L>  |  0.61    Ca    8.5      13 Sep 2024 22:10  Phos  1.9     09-13  Mg     1.7     09-13    TPro  5.3<L>  /  Alb  2.0<L>  /  TBili  0.3  /  DBili  x   /  AST  9<L>  /  ALT  12  /  AlkPhos  78  09-13            .Blood Blood-Peripheral  09-13 @ 01:07   No growth at 24 hours  --  --      .Blood Blood-Peripheral  09-13 @ 01:00   No growth at 24 hours  --  --      .Blood Blood-Peripheral  07-31 @ 06:30   No growth at 5 days  --  --      .Blood Blood-Peripheral  07-31 @ 06:00   No growth at 5 days  --  --      Clean Catch  07-29 @ 04:31   >=3 organisms. Probable collection contamination.  --  --      .Blood Blood-Peripheral  07-29 @ 01:10   No growth at 5 days  --  --      .Blood Blood-Peripheral  07-29 @ 01:05   Growth in anaerobic bottle: Streptococcus gallolyticus  Growth in aerobic bottle: Streptococcus salivarius/vestibularis group  "Susceptibilities not performed"  Direct identification is available within approximately 3-5  hours either by Blood Panel Multiplexed PCR or Direct  MALDI-TOF. Details: [email or web address]  --  Blood Culture PCR  Streptococcus gallolyticus          Radiology Results      Meds    MEDICATIONS  (STANDING):  albuterol/ipratropium for Nebulization 3 milliLiter(s) Nebulizer every 6 hours  apixaban 2.5 milliGRAM(s) Oral every 12 hours  atorvastatin 10 milliGRAM(s) Oral at bedtime  azithromycin  IVPB 500 milliGRAM(s) IV Intermittent every 24 hours  cefTRIAXone   IVPB 1000 milliGRAM(s) IV Intermittent every 24 hours  dapagliflozin 10 milliGRAM(s) Oral daily  fluticasone propionate/ salmeterol 100-50 MICROgram(s) Diskus 1 Dose(s) Inhalation two times a day  guaiFENesin  milliGRAM(s) Oral every 12 hours  insulin lispro (ADMELOG) corrective regimen sliding scale   SubCutaneous three times a day before meals  insulin lispro (ADMELOG) corrective regimen sliding scale   SubCutaneous at bedtime  lactated ringers. 1000 milliLiter(s) (85 mL/Hr) IV Continuous <Continuous>  montelukast 10 milliGRAM(s) Oral daily  pantoprazole    Tablet 40 milliGRAM(s) Oral before breakfast  zinc sulfate 220 milliGRAM(s) Oral daily      MEDICATIONS  (PRN):  acetaminophen     Tablet .. 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 (follows [facility] Dr Adams),   h/o bilateral PE (1/2023) on Eliquis    Pneumonia due to infectious organism    HTN (hypertension)    DM (diabetes mellitus)    Hypercholesteremia    Gastric adenocarcinoma    Vertigo    Dementia    Ambulatory dysfunction    Multiple myeloma    Pulmonary embolism    S/P hysterectomy    S/P tubal ligation        PLAN    admit to med,   rocephin,   zithromax,   atrov and prov nebs,   supplemental oxygen,   robitussin prn,   hold outpt dm meds,   lispro ss,   cont preadmit home meds,   gi and dvt prophylaxis,   cbc,   bmp,   mg,   phos,   lipids,   tsh,   hgba1c,   bld cx,   ucx,     id cons  pulm cons   heme onc cons .     Patient is a 89y old  Female who presents with a chief complaint of cough, SOB (13 Sep 2024 16:04)    pt seen in icu [  ], reg med floor [ x  ], bed [  ], chair at bedside [   ], awake and responsive [ x ], lethargic [  ],  nad [x  ]      Allergies    gabapentin (Unknown)        Vitals    T(F): 97.9 (09-14-24 @ 05:30), Max: 99 (09-13-24 @ 08:04)  HR: 102 (09-14-24 @ 05:30) (70 - 111)  BP: 126/73 (09-14-24 @ 05:30) (113/70 - 126/73)  RR: 17 (09-14-24 @ 05:30) (17 - 22)  SpO2: 92% (09-14-24 @ 05:30) (92% - 95%)  Wt(kg): --  CAPILLARY BLOOD GLUCOSE      POCT Blood Glucose.: 226 mg/dL (13 Sep 2024 21:43)      Labs                          10.7   13.42 )-----------( 285      ( 13 Sep 2024 10:14 )             33.2       09-13    143  |  114<H>  |  22<H>  ----------------------------<  226<H>  3.5   |  21<L>  |  0.61    Ca    8.5      13 Sep 2024 22:10  Phos  1.9     09-13  Mg     1.7     09-13    TPro  5.3<L>  /  Alb  2.0<L>  /  TBili  0.3  /  DBili  x   /  AST  9<L>  /  ALT  12  /  AlkPhos  78  09-13    Procalcitonin (09.13.24 @ 10:14)   Procalcitonin: 1.45        .Blood Blood-Peripheral  09-13 @ 01:07   No growth at 24 hours  --  --      .Blood Blood-Peripheral  09-13 @ 01:00   No growth at 24 hours  --  --      Radiology Results      Meds    MEDICATIONS  (STANDING):  albuterol/ipratropium for Nebulization 3 milliLiter(s) Nebulizer every 6 hours  apixaban 2.5 milliGRAM(s) Oral every 12 hours  atorvastatin 10 milliGRAM(s) Oral at bedtime  azithromycin  IVPB 500 milliGRAM(s) IV Intermittent every 24 hours  cefTRIAXone   IVPB 1000 milliGRAM(s) IV Intermittent every 24 hours  dapagliflozin 10 milliGRAM(s) Oral daily  fluticasone propionate/ salmeterol 100-50 MICROgram(s) Diskus 1 Dose(s) Inhalation two times a day  guaiFENesin  milliGRAM(s) Oral every 12 hours  insulin lispro (ADMELOG) corrective regimen sliding scale   SubCutaneous three times a day before meals  insulin lispro (ADMELOG) corrective regimen sliding scale   SubCutaneous at bedtime  lactated ringers. 1000 milliLiter(s) (85 mL/Hr) IV Continuous <Continuous>  montelukast 10 milliGRAM(s) Oral daily  pantoprazole    Tablet 40 milliGRAM(s) Oral before breakfast  zinc sulfate 220 milliGRAM(s) Oral daily      MEDICATIONS  (PRN):  acetaminophen     Tablet .. 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

## 2024-09-14 NOTE — PROGRESS NOTE ADULT - PROBLEM SELECTOR PLAN 2
panculture  antibiotics  follow up CT chest. Check pending cultures  antibiotics  follow up CT chest.

## 2024-09-15 LAB
ANION GAP SERPL CALC-SCNC: 11 MMOL/L — SIGNIFICANT CHANGE UP (ref 5–17)
APPEARANCE UR: CLEAR — SIGNIFICANT CHANGE UP
BACTERIA # UR AUTO: ABNORMAL /HPF
BILIRUB UR-MCNC: NEGATIVE — SIGNIFICANT CHANGE UP
BUN SERPL-MCNC: 16 MG/DL — SIGNIFICANT CHANGE UP (ref 7–18)
CALCIUM SERPL-MCNC: 8.5 MG/DL — SIGNIFICANT CHANGE UP (ref 8.4–10.5)
CHLORIDE SERPL-SCNC: 112 MMOL/L — HIGH (ref 96–108)
CO2 SERPL-SCNC: 20 MMOL/L — LOW (ref 22–31)
COLOR SPEC: YELLOW — SIGNIFICANT CHANGE UP
COMMENT - URINE: SIGNIFICANT CHANGE UP
CREAT SERPL-MCNC: 0.59 MG/DL — SIGNIFICANT CHANGE UP (ref 0.5–1.3)
DIFF PNL FLD: NEGATIVE — SIGNIFICANT CHANGE UP
EGFR: 87 ML/MIN/1.73M2 — SIGNIFICANT CHANGE UP
EPI CELLS # UR: PRESENT
GLUCOSE BLDC GLUCOMTR-MCNC: 111 MG/DL — HIGH (ref 70–99)
GLUCOSE BLDC GLUCOMTR-MCNC: 120 MG/DL — HIGH (ref 70–99)
GLUCOSE BLDC GLUCOMTR-MCNC: 136 MG/DL — HIGH (ref 70–99)
GLUCOSE BLDC GLUCOMTR-MCNC: 182 MG/DL — HIGH (ref 70–99)
GLUCOSE SERPL-MCNC: 173 MG/DL — HIGH (ref 70–99)
GLUCOSE UR QL: 500 MG/DL
HCT VFR BLD CALC: 33.1 % — LOW (ref 34.5–45)
HGB BLD-MCNC: 11 G/DL — LOW (ref 11.5–15.5)
KETONES UR-MCNC: ABNORMAL MG/DL
LEGIONELLA AG UR QL: NEGATIVE — SIGNIFICANT CHANGE UP
LEUKOCYTE ESTERASE UR-ACNC: ABNORMAL
MAGNESIUM SERPL-MCNC: 1.6 MG/DL — SIGNIFICANT CHANGE UP (ref 1.6–2.6)
MCHC RBC-ENTMCNC: 31.6 PG — SIGNIFICANT CHANGE UP (ref 27–34)
MCHC RBC-ENTMCNC: 33.2 GM/DL — SIGNIFICANT CHANGE UP (ref 32–36)
MCV RBC AUTO: 95.1 FL — SIGNIFICANT CHANGE UP (ref 80–100)
NITRITE UR-MCNC: NEGATIVE — SIGNIFICANT CHANGE UP
NRBC # BLD: 0 /100 WBCS — SIGNIFICANT CHANGE UP (ref 0–0)
PH UR: 7 — SIGNIFICANT CHANGE UP (ref 5–8)
PHOSPHATE SERPL-MCNC: 2.6 MG/DL — SIGNIFICANT CHANGE UP (ref 2.5–4.5)
PLATELET # BLD AUTO: 307 K/UL — SIGNIFICANT CHANGE UP (ref 150–400)
POTASSIUM SERPL-MCNC: 3 MMOL/L — LOW (ref 3.5–5.3)
POTASSIUM SERPL-SCNC: 3 MMOL/L — LOW (ref 3.5–5.3)
PROT UR-MCNC: 30 MG/DL
RBC # BLD: 3.48 M/UL — LOW (ref 3.8–5.2)
RBC # FLD: 15.6 % — HIGH (ref 10.3–14.5)
RBC CASTS # UR COMP ASSIST: 0 /HPF — SIGNIFICANT CHANGE UP (ref 0–4)
S PNEUM AG UR QL: NEGATIVE — SIGNIFICANT CHANGE UP
SODIUM SERPL-SCNC: 143 MMOL/L — SIGNIFICANT CHANGE UP (ref 135–145)
SP GR SPEC: 1 — LOW (ref 1–1.03)
UROBILINOGEN FLD QL: 1 MG/DL — SIGNIFICANT CHANGE UP (ref 0.2–1)
WBC # BLD: 10.75 K/UL — HIGH (ref 3.8–10.5)
WBC # FLD AUTO: 10.75 K/UL — HIGH (ref 3.8–10.5)
WBC UR QL: 5 /HPF — SIGNIFICANT CHANGE UP (ref 0–5)

## 2024-09-15 PROCEDURE — 99497 ADVNCD CARE PLAN 30 MIN: CPT

## 2024-09-15 RX ORDER — FLUTICASONE PROPIONATE AND SALMETEROL 250; 50 UG/1; UG/1
1 POWDER RESPIRATORY (INHALATION)
Refills: 0 | Status: DISCONTINUED | OUTPATIENT
Start: 2024-09-15 | End: 2024-09-17

## 2024-09-15 RX ORDER — POTASSIUM CHLORIDE 10 MEQ
40 TABLET, EXT RELEASE, PARTICLES/CRYSTALS ORAL ONCE
Refills: 0 | Status: COMPLETED | OUTPATIENT
Start: 2024-09-15 | End: 2024-09-15

## 2024-09-15 RX ADMIN — Medication 1: at 12:18

## 2024-09-15 RX ADMIN — APIXABAN 2.5 MILLIGRAM(S): 5 TABLET, FILM COATED ORAL at 06:07

## 2024-09-15 RX ADMIN — GUAIFENESIN 600 MILLIGRAM(S): 100 LIQUID ORAL at 17:40

## 2024-09-15 RX ADMIN — APIXABAN 2.5 MILLIGRAM(S): 5 TABLET, FILM COATED ORAL at 17:39

## 2024-09-15 RX ADMIN — DAPAGLIFLOZIN 10 MILLIGRAM(S): 10 TABLET, FILM COATED ORAL at 12:20

## 2024-09-15 RX ADMIN — Medication 10 MILLIGRAM(S): at 21:42

## 2024-09-15 RX ADMIN — IPRATROPIUM BROMIDE AND ALBUTEROL SULFATE 3 MILLILITER(S): .5; 3 SOLUTION RESPIRATORY (INHALATION) at 03:34

## 2024-09-15 RX ADMIN — FLUTICASONE PROPIONATE AND SALMETEROL 1 DOSE(S): 250; 50 POWDER RESPIRATORY (INHALATION) at 09:38

## 2024-09-15 RX ADMIN — Medication 40 MILLIEQUIVALENT(S): at 13:32

## 2024-09-15 RX ADMIN — AZITHROMYCIN 255 MILLIGRAM(S): 500 TABLET, FILM COATED ORAL at 17:41

## 2024-09-15 RX ADMIN — FLUTICASONE PROPIONATE AND SALMETEROL 1 DOSE(S): 250; 50 POWDER RESPIRATORY (INHALATION) at 21:42

## 2024-09-15 RX ADMIN — Medication 220 MILLIGRAM(S): at 12:18

## 2024-09-15 RX ADMIN — GUAIFENESIN 600 MILLIGRAM(S): 100 LIQUID ORAL at 06:03

## 2024-09-15 RX ADMIN — MONTELUKAST SODIUM 10 MILLIGRAM(S): 5 TABLET, CHEWABLE ORAL at 12:19

## 2024-09-15 RX ADMIN — Medication 100 MILLIGRAM(S): at 00:36

## 2024-09-15 RX ADMIN — IPRATROPIUM BROMIDE AND ALBUTEROL SULFATE 3 MILLILITER(S): .5; 3 SOLUTION RESPIRATORY (INHALATION) at 20:23

## 2024-09-15 RX ADMIN — Medication 40 MILLIGRAM(S): at 06:02

## 2024-09-15 NOTE — PROGRESS NOTE ADULT - SUBJECTIVE AND OBJECTIVE BOX
Patient is a 89y old  Female who presents with a chief complaint of cough, SOB (14 Sep 2024 14:53)    pt seen in icu [  ], reg med floor [ x  ], bed [  ], chair at bedside [   ], awake and responsive [ x ],   lethargic [  ],  nad [x  ]        Allergies    gabapentin (Unknown)        Vitals    T(F): 98.4 (09-15-24 @ 05:10), Max: 98.4 (09-15-24 @ 05:10)  HR: 104 (09-15-24 @ 05:10) (100 - 106)  BP: 150/74 (09-15-24 @ 05:10) (128/84 - 150/74)  RR: 18 (09-15-24 @ 05:10) (18 - 20)  SpO2: 96% (09-15-24 @ 05:10) (94% - 98%)  Wt(kg): --  CAPILLARY BLOOD GLUCOSE      POCT Blood Glucose.: 111 mg/dL (14 Sep 2024 21:08)      Labs                          9.5    16.19 )-----------( 292      ( 14 Sep 2024 07:17 )             28.9       09-14    145  |  116<H>  |  23<H>  ----------------------------<  130<H>  3.5   |  21<L>  |  0.55    Ca    8.5      14 Sep 2024 07:17  Phos  1.9     09-13  Mg     1.7     09-13    TPro  5.3<L>  /  Alb  2.0<L>  /  TBili  0.3  /  DBili  x   /  AST  9<L>  /  ALT  12  /  AlkPhos  78  09-13            .Blood Blood-Peripheral  09-13 @ 01:07   No growth at 48 Hours  --  --      .Blood Blood-Peripheral  09-13 @ 01:00   No growth at 48 Hours  --  --      .Blood Blood-Peripheral  07-31 @ 06:30   No growth at 5 days  --  --      .Blood Blood-Peripheral  07-31 @ 06:00   No growth at 5 days  --  --      Clean Catch  07-29 @ 04:31   >=3 organisms. Probable collection contamination.  --  --      .Blood Blood-Peripheral  07-29 @ 01:10   No growth at 5 days  --  --      .Blood Blood-Peripheral  07-29 @ 01:05   Growth in anaerobic bottle: Streptococcus gallolyticus  Growth in aerobic bottle: Streptococcus salivarius/vestibularis group  "Susceptibilities not performed"  Direct identification is available within approximately 3-5  hours either by Blood Panel Multiplexed PCR or Direct  MALDI-TOF. Details: [email or web address]  --  Blood Culture PCR  Streptococcus gallolyticus          Radiology Results      Meds    MEDICATIONS  (STANDING):  albuterol/ipratropium for Nebulization 3 milliLiter(s) Nebulizer every 6 hours  apixaban 2.5 milliGRAM(s) Oral every 12 hours  atorvastatin 10 milliGRAM(s) Oral at bedtime  azithromycin  IVPB 500 milliGRAM(s) IV Intermittent every 24 hours  cefTRIAXone   IVPB 1000 milliGRAM(s) IV Intermittent every 24 hours  dapagliflozin 10 milliGRAM(s) Oral daily  fluticasone propionate/ salmeterol 100-50 MICROgram(s) Diskus 1 Dose(s) Inhalation two times a day  fluticasone propionate/ salmeterol 100-50 MICROgram(s) Diskus 1 Dose(s) Inhalation two times a day  guaiFENesin  milliGRAM(s) Oral every 12 hours  insulin lispro (ADMELOG) corrective regimen sliding scale   SubCutaneous three times a day before meals  insulin lispro (ADMELOG) corrective regimen sliding scale   SubCutaneous at bedtime  lactated ringers. 1000 milliLiter(s) (85 mL/Hr) IV Continuous <Continuous>  montelukast 10 milliGRAM(s) Oral daily  pantoprazole    Tablet 40 milliGRAM(s) Oral before breakfast  zinc sulfate 220 milliGRAM(s) Oral daily      MEDICATIONS  (PRN):  acetaminophen     Tablet .. 650 milliGRAM(s) Oral every 6 hours PRN Temp greater or equal to 38C (100.4F), Mild Pain (1 - 3)      Physical Exam    Neuro :  no focal deficits  Respiratory: B/L LL crackles  CV: RRR, S1S2, no murmurs,   Abdominal: Soft, NT, ND +BS,  Extremities: No edema, + peripheral pulses    ASSESSMENT    acute hypoxic resp failure 2nd to multifocal pneumonia,    sepsis,   h/o HTN,   HLD,   T2DM,   osteoporosis,   Stage IIB gastric adenocarcinoma s/p distal gastrectomy (2015),   multiple myeloma/plastocytoma on chemo (follows [facility] Dr Adams),   bilateral PE (1/2023) on Eliquis  Dementia  Ambulatory dysfunction  S/P hysterectomy  S/P tubal ligation        PLAN    cont rocephin, zithromax,   blood cx neg noted above   procal elevated noted above   id f/u   f/u MRSA PCR and follow up Legionella urine Ag   atrov and prov nebs,   pt now tolerating ra   pulm f/u   robitussin prn,   hold outpt dm meds,   lispro ss,   heme onc cons   cont current meds            Patient is a 89y old  Female who presents with a chief complaint of cough, SOB (14 Sep 2024 14:53)    pt seen in icu [  ], reg med floor [ x  ], bed [  ], chair at bedside [   ], awake and responsive [ x ],   lethargic [  ],  nad [x  ]        Allergies    gabapentin (Unknown)        Vitals    T(F): 98.4 (09-15-24 @ 05:10), Max: 98.4 (09-15-24 @ 05:10)  HR: 104 (09-15-24 @ 05:10) (100 - 106)  BP: 150/74 (09-15-24 @ 05:10) (128/84 - 150/74)  RR: 18 (09-15-24 @ 05:10) (18 - 20)  SpO2: 96% (09-15-24 @ 05:10) (94% - 98%)  Wt(kg): --  CAPILLARY BLOOD GLUCOSE      POCT Blood Glucose.: 111 mg/dL (14 Sep 2024 21:08)      Labs                          9.5    16.19 )-----------( 292      ( 14 Sep 2024 07:17 )             28.9       09-14    145  |  116<H>  |  23<H>  ----------------------------<  130<H>  3.5   |  21<L>  |  0.55    Ca    8.5      14 Sep 2024 07:17  Phos  1.9     09-13  Mg     1.7     09-13    TPro  5.3<L>  /  Alb  2.0<L>  /  TBili  0.3  /  DBili  x   /  AST  9<L>  /  ALT  12  /  AlkPhos  78  09-13    MRSA/MSSA PCR (09.13.24 @ 13:59)   MRSA PCR Result.: NotDetec:     Legionella pneumophila Antigen, Urine (11.28.22 @ 15:00)   Legionella Antigen, Urine: Negative    .Blood Blood-Peripheral  09-13 @ 01:07   No growth at 48 Hours  --  --      .Blood Blood-Peripheral  09-13 @ 01:00   No growth at 48 Hours  --  --        Radiology Results      Meds    MEDICATIONS  (STANDING):  albuterol/ipratropium for Nebulization 3 milliLiter(s) Nebulizer every 6 hours  apixaban 2.5 milliGRAM(s) Oral every 12 hours  atorvastatin 10 milliGRAM(s) Oral at bedtime  azithromycin  IVPB 500 milliGRAM(s) IV Intermittent every 24 hours  cefTRIAXone   IVPB 1000 milliGRAM(s) IV Intermittent every 24 hours  dapagliflozin 10 milliGRAM(s) Oral daily  fluticasone propionate/ salmeterol 100-50 MICROgram(s) Diskus 1 Dose(s) Inhalation two times a day  fluticasone propionate/ salmeterol 100-50 MICROgram(s) Diskus 1 Dose(s) Inhalation two times a day  guaiFENesin  milliGRAM(s) Oral every 12 hours  insulin lispro (ADMELOG) corrective regimen sliding scale   SubCutaneous three times a day before meals  insulin lispro (ADMELOG) corrective regimen sliding scale   SubCutaneous at bedtime  lactated ringers. 1000 milliLiter(s) (85 mL/Hr) IV Continuous <Continuous>  montelukast 10 milliGRAM(s) Oral daily  pantoprazole    Tablet 40 milliGRAM(s) Oral before breakfast  zinc sulfate 220 milliGRAM(s) Oral daily      MEDICATIONS  (PRN):  acetaminophen     Tablet .. 650 milliGRAM(s) Oral every 6 hours PRN Temp greater or equal to 38C (100.4F), Mild Pain (1 - 3)      Physical Exam    Neuro :  no focal deficits  Respiratory: B/L LL crackles  CV: RRR, S1S2, no murmurs,   Abdominal: Soft, NT, ND +BS,  Extremities: No edema, + peripheral pulses    ASSESSMENT    acute hypoxic resp failure 2nd to multifocal pneumonia,    sepsis,   h/o HTN,   HLD,   T2DM,   osteoporosis,   Stage IIB gastric adenocarcinoma s/p distal gastrectomy (2015),   multiple myeloma/plastocytoma on chemo (follows [facility] Dr Adams),   bilateral PE (1/2023) on Eliquis  Dementia  Ambulatory dysfunction  S/P hysterectomy  S/P tubal ligation        PLAN    cont rocephin, zithromax,   blood cx neg noted above   procal elevated noted    id f/u   MRSA PCR and  Legionella urine Ag neg noted above  atrov and prov nebs,   pt now tolerating ra   pulm f/u   robitussin prn,   Bronchodilators  Symbicort HFA 80/4.5 mcgs 2 puffs po BID with spacer   montelukast 10 mgs po Qhs  hold outpt dm meds,   lispro ss,   heme onc cons   cont current meds

## 2024-09-15 NOTE — PROGRESS NOTE ADULT - PROBLEM SELECTOR PLAN 1
Check pending cultures  antibiotics  ID Follow up  monitor vitals. cultures noted  antibiotics  ID Follow up  monitor vitals.

## 2024-09-15 NOTE — GOALS OF CARE CONVERSATION - ADVANCED CARE PLANNING - CONVERSATION DETAILS
Discussed with (HCP, surrogate, legal guardian): Ivett Lipscomb  About: Cardiac and respiratory resuscitative measures including CPR, shock, vasopressors, invasive ventilatory support via intubation.  All risks and benefits discussed. All questions answered.         [    ]  Health Care Proxy form filled out.   [  x  ]   DNR and MOLST forms were filled out.   [    ]   Please refer to [facility] documentation in EMR for further details.

## 2024-09-15 NOTE — GOALS OF CARE CONVERSATION - ADVANCED CARE PLANNING - TREATMENT GUIDELINES
4/19/2023       RE: Angeli Galindo  [address]  [place] [address]     Dear Colleague,    Thank you for referring your patient, Angeli Galindo, to the [facility]OLOG[facility] [place] at [facility]. Please see a copy of my visit note below.    Urology Clinic     HPI  Angeli Galindo is a 65 year old female with a left kidney lesion, here for follow-up after the partial nephrectomy.      The patient denies any major issues after the surgery.     No changes to health, hospitalizations or new diagnoses in the interim    PHYSICAL EXAM  BP (!) 152/92   Pulse 79   Gen NAD Alert   Card RRR  Resp symmetric chest movement, unlabored breathing   Abdomen incisions are CDI, non tender non distended   Ext WWP       Labs  Lab Results   Component Value Date    WBC 8.3 04/19/2023     Lab Results   Component Value Date    RBC 4.31 04/19/2023     Lab Results   Component Value Date    HGB 14.3 04/19/2023     Lab Results   Component Value Date    HCT 42.5 04/19/2023     No components found for: MCT  Lab Results   Component Value Date    MCV 99 04/19/2023     Lab Results   Component Value Date    MCH 33.2 04/19/2023     Lab Results   Component Value Date    MCHC 33.6 04/19/2023     Lab Results   Component Value Date    RDW 13.0 04/19/2023     Lab Results   Component Value Date     04/19/2023        Last Comprehensive Metabolic Panel:  Sodium   Date Value Ref Range Status   04/19/2023 141 136 - 145 mmol/L Final     Potassium   Date Value Ref Range Status   04/19/2023 5.3 3.4 - 5.3 mmol/L Final   04/11/2022 4.1 3.4 - 5.3 mmol/L Final     Chloride   Date Value Ref Range Status   04/19/2023 103 98 - 107 mmol/L Final   04/11/2022 112 (H) 94 - 109 mmol/L Final     Carbon Dioxide (CO2)   Date Value Ref Range Status   04/19/2023 31 (H) 22 - 29 mmol/L Final   04/11/2022 25 20 - 32 mmol/L Final     Anion Gap   Date Value Ref Range Status   04/19/2023  7 7 - 15 mmol/L Final   04/11/2022 6 3 - 14 mmol/L Final     Glucose   Date Value Ref Range Status   04/19/2023 127 (H) 70 - 99 mg/dL Final   04/11/2022 91 70 - 99 mg/dL Final     GLUCOSE BY METER POCT   Date Value Ref Range Status   04/15/2023 111 (H) 70 - 99 mg/dL Final     Urea Nitrogen   Date Value Ref Range Status   04/19/2023 11.8 8.0 - 23.0 mg/dL Final   04/11/2022 9 7 - 30 mg/dL Final     Creatinine   Date Value Ref Range Status   04/19/2023 0.57 0.51 - 0.95 mg/dL Final     GFR Estimate   Date Value Ref Range Status   04/19/2023 >90 >60 mL/min/1.73m2 Final     Comment:     eGFR calculated using 2021 CKD-EPI equation.     GFR, ESTIMATED POCT   Date Value Ref Range Status   04/08/2022 >60 >60 mL/min/1.73m2 Final     Calcium   Date Value Ref Range Status   04/19/2023 9.7 8.8 - 10.2 mg/dL Final     Bilirubin Total   Date Value Ref Range Status   04/19/2023 0.7 <=1.2 mg/dL Final     Alkaline Phosphatase   Date Value Ref Range Status   04/19/2023 169 (H) 35 - 104 U/L Final     ALT   Date Value Ref Range Status   04/19/2023 115 (H) 10 - 35 U/L Final     AST   Date Value Ref Range Status   04/19/2023 58 (H) 10 - 35 U/L Final     Surgical pathology  Final Diagnosis   A.  Perirenal fat, excision:  --Benign adipose tissue.     B.  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 I told Angeli that the pathology is benign and no surveillance imaging is needed     Plan   RTC as needed    30 total minutes spent on the date of the encounter including direct interaction with the patient, performing chart review, documentation and further activities as noted above    Johnny Renee MD   Department of Urology   [facility]        DNR/Antibiotic trial/IV fluid trial/DNI

## 2024-09-15 NOTE — PROGRESS NOTE ADULT - SUBJECTIVE AND OBJECTIVE BOX
Patient is seen and examined at the bed side, is afebrile. She is doing better, tolerating Abx well. The Leukocytosis trended down to normal.       REVIEW OF SYSTEMS: All other review systems are negative      ALLERGIES: gabapentin (Unknown)      Vital Signs Last 24 Hrs  T(C): 36.9 (15 Sep 2024 14:11), Max: 36.9 (15 Sep 2024 05:10)  T(F): 98.4 (15 Sep 2024 14:11), Max: 98.4 (15 Sep 2024 05:10)  HR: 116 (15 Sep 2024 14:11) (104 - 116)  BP: 155/79 (15 Sep 2024 14:11) (128/84 - 155/79)  BP(mean): --  RR: 17 (15 Sep 2024 14:11) (17 - 20)  SpO2: 97% (15 Sep 2024 14:11) (94% - 97%)    Parameters below as of 15 Sep 2024 14:11  Patient On (Oxygen Delivery Method): room air      PHYSICAL EXAM:  GENERAL: Not in distress   CHEST/LUNG:  Not using accessory muscles   HEART: s1 and s2 present  ABDOMEN:  Nontender and  Nondistended  EXTREMITIES: No pedal  edema  CNS: Awake and Alert      LABS:                        11.0   10.75 )-----------( 307      ( 15 Sep 2024 11:56 )             33.1                           9.5    16.19 )-----------( 292      ( 14 Sep 2024 07:17 )             28.9         09-15    143  |  112<H>  |  16  ----------------------------<  173<H>  3.0<L>   |  20<L>  |  0.59    Ca    8.5      15 Sep 2024 11:56  Phos  2.6     09-15  Mg     1.6     09-15    TPro  5.3<L>  /  Alb  2.0<L>  /  TBili  0.3  /  DBili  x   /  AST  9<L>  /  ALT  12  /  AlkPhos  78  09-13    09-14    145  |  116<H>  |  23<H>  ----------------------------<  130<H>  3.5   |  21<L>  |  0.55    Ca    8.5      14 Sep 2024 07:17  Phos  1.9     09-13  Mg     1.7     09-13    TPro  5.3<L>  /  Alb  2.0<L>  /  TBili  0.3  /  DBili  x   /  AST  9<L>  /  ALT  12  /  AlkPhos  78  09-13    PT/INR - ( 13 Sep 2024 01:14 )   PT: 19.5 sec;   INR: 1.74 ratio     PTT - ( 13 Sep 2024 01:14 )  PTT:33.3 sec      MEDICATIONS  (STANDING):    albuterol/ipratropium for Nebulization 3 milliLiter(s) Nebulizer every 6 hours  apixaban 2.5 milliGRAM(s) Oral every 12 hours  atorvastatin 10 milliGRAM(s) Oral at bedtime  azithromycin  IVPB 500 milliGRAM(s) IV Intermittent every 24 hours  cefTRIAXone   IVPB 1000 milliGRAM(s) IV Intermittent every 24 hours  dapagliflozin 10 milliGRAM(s) Oral daily  fluticasone propionate/ salmeterol 100-50 MICROgram(s) Diskus 1 Dose(s) Inhalation two times a day  guaiFENesin  milliGRAM(s) Oral every 12 hours  insulin lispro (ADMELOG) corrective regimen sliding scale   SubCutaneous three times a day before meals  insulin lispro (ADMELOG) corrective regimen sliding scale   SubCutaneous at bedtime  lactated ringers. 1000 milliLiter(s) (85 mL/Hr) IV Continuous <Continuous>  montelukast 10 milliGRAM(s) Oral daily  pantoprazole    Tablet 40 milliGRAM(s) Oral before breakfast  zinc sulfate 220 milliGRAM(s) Oral daily        RADIOLOGY & ADDITIONAL TESTS:    9/13/24 : CT Angio Chest PE Protocol w/ IV Cont (09.13.24 @ 03:45) No pulmonary embolus.  Patchy opacities in the right upper and right middle lobe with bilateral lower lobe dependent consolidations versus atelectasis. Findings   concerning for multifocal pneumonia.    9/13/24 : Xray Chest 1 View-PORTABLE IMMEDIATE (09.13.24 @ 02:00) No gross consolidation or pleural effusion    Heart size cannot be accurately assessed in this projection.      MICROBIOLOGY DATA:    Legionella pneumophila Antigen, Urine (09.15.24 @ 01:09)   Legionella Antigen, Urine: Negative    MRSA/MSSA PCR (09.13.24 @ 13:59)   MRSA PCR Result.: NotDetec    Culture - Blood (09.13.24 @ 01:07)   Specimen Source: .Blood Blood-Peripheral  Culture Results: No growth at 48 hours    Culture - Blood (09.13.24 @ 01:00)   Specimen Source: .Blood Blood-Peripheral  Culture Results: No growth at 48 hours

## 2024-09-15 NOTE — PROGRESS NOTE ADULT - PROBLEM SELECTOR PLAN 2
Check pending cultures  antibiotics  follow up CT chest. cultures noted  antibiotics  follow up CT chest.

## 2024-09-15 NOTE — PROGRESS NOTE ADULT - SUBJECTIVE AND OBJECTIVE BOX
Patient is a 89y old  Female who presents with a chief complaint of cough, SOB (15 Sep 2024 06:30)  Patient is awake, alert, lying in bed in NAD. Doing well in room air. She c/o occasional cough and phlegm.     INTERVAL HPI/OVERNIGHT EVENTS:      VITAL SIGNS:  T(F): 98.4 (09-15-24 @ 05:10)  HR: 104 (09-15-24 @ 05:10)  BP: 150/74 (09-15-24 @ 05:10)  RR: 18 (09-15-24 @ 05:10)  SpO2: 96% (09-15-24 @ 05:10)  Wt(kg): --  I&O's Detail          REVIEW OF SYSTEMS:    CONSTITUTIONAL:  No fevers, chills, sweats    HEENT:  Eyes:  No diplopia or blurred vision. ENT:  No earache, sore throat or runny nose.    CARDIOVASCULAR:  No pressure, squeezing, tightness, or heaviness about the chest; no palpitations.    RESPIRATORY:  Per HPI    GASTROINTESTINAL:  No abdominal pain, nausea, vomiting or diarrhea.    GENITOURINARY:  No dysuria, frequency or urgency.    NEUROLOGIC:  No paresthesias, fasciculations, seizures or weakness.    PSYCHIATRIC:  No disorder of thought or mood.      PHYSICAL EXAM:    Constitutional: Well developed and nourished  Eyes:Perrla  ENMT: normal  Neck:supple  Respiratory: good air entry  Cardiovascular: S1 S2 regular  Gastrointestinal: Soft, Non tender  Extremities: No edema  Vascular:normal  Neurological:Awake, alert,Ox3  Musculoskeletal:Normal      MEDICATIONS  (STANDING):  albuterol/ipratropium for Nebulization 3 milliLiter(s) Nebulizer every 6 hours  apixaban 2.5 milliGRAM(s) Oral every 12 hours  atorvastatin 10 milliGRAM(s) Oral at bedtime  azithromycin  IVPB 500 milliGRAM(s) IV Intermittent every 24 hours  cefTRIAXone   IVPB 1000 milliGRAM(s) IV Intermittent every 24 hours  dapagliflozin 10 milliGRAM(s) Oral daily  fluticasone propionate/ salmeterol 100-50 MICROgram(s) Diskus 1 Dose(s) Inhalation two times a day  fluticasone propionate/ salmeterol 100-50 MICROgram(s) Diskus 1 Dose(s) Inhalation two times a day  guaiFENesin  milliGRAM(s) Oral every 12 hours  insulin lispro (ADMELOG) corrective regimen sliding scale   SubCutaneous three times a day before meals  insulin lispro (ADMELOG) corrective regimen sliding scale   SubCutaneous at bedtime  lactated ringers. 1000 milliLiter(s) (85 mL/Hr) IV Continuous <Continuous>  montelukast 10 milliGRAM(s) Oral daily  pantoprazole    Tablet 40 milliGRAM(s) Oral before breakfast  zinc sulfate 220 milliGRAM(s) Oral daily    MEDICATIONS  (PRN):  acetaminophen     Tablet .. 650 milliGRAM(s) Oral every 6 hours PRN Temp greater or equal to 38C (100.4F), Mild Pain (1 - 3)      Allergies    gabapentin (Unknown)    Intolerances        LABS:                        9.5    16.19 )-----------( 292      ( 14 Sep 2024 07:17 )             28.9     09-14    145  |  116<H>  |  23<H>  ----------------------------<  130<H>  3.5   |  21<L>  |  0.55    Ca    8.5      14 Sep 2024 07:17  Phos  1.9     09-13  Mg     1.7     09-13    TPro  5.3<L>  /  Alb  2.0<L>  /  TBili  0.3  /  DBili  x   /  AST  9<L>  /  ALT  12  /  AlkPhos  78  09-13      Urinalysis Basic - ( 15 Sep 2024 01:09 )    Color: Yellow / Appearance: Clear / S.000 / pH: x  Gluc: x / Ketone: Trace mg/dL  / Bili: Negative / Urobili: 1.0 mg/dL   Blood: x / Protein: 30 mg/dL / Nitrite: Negative   Leuk Esterase: Small / RBC: 0 /HPF / WBC 5 /HPF   Sq Epi: x / Non Sq Epi: x / Bacteria: Many /HPF            CAPILLARY BLOOD GLUCOSE      POCT Blood Glucose.: 136 mg/dL (15 Sep 2024 07:52)  POCT Blood Glucose.: 111 mg/dL (14 Sep 2024 21:08)  POCT Blood Glucose.: 110 mg/dL (14 Sep 2024 17:02)  POCT Blood Glucose.: 200 mg/dL (14 Sep 2024 11:46)        RADIOLOGY & ADDITIONAL TESTS:    CXR:    Ct scan chest:    ekg;    echo: Patient is a 89y old  Female who presents with a chief complaint of cough, SOB (15 Sep 2024 06:30)  Patient is awake, alert, lying in bed in NAD. Doing well in room air. She c/o occasional cough and phlegm. Clinically stable    INTERVAL HPI/OVERNIGHT EVENTS:      VITAL SIGNS:  T(F): 98.4 (09-15-24 @ 05:10)  HR: 104 (09-15-24 @ 05:10)  BP: 150/74 (09-15-24 @ 05:10)  RR: 18 (09-15-24 @ 05:10)  SpO2: 96% (09-15-24 @ 05:10)  Wt(kg): --  I&O's Detail          REVIEW OF SYSTEMS:    CONSTITUTIONAL:  No fevers, chills, sweats    HEENT:  Eyes:  No diplopia or blurred vision. ENT:  No earache, sore throat or runny nose.    CARDIOVASCULAR:  No pressure, squeezing, tightness, or heaviness about the chest; no palpitations.    RESPIRATORY:  Per HPI    GASTROINTESTINAL:  No abdominal pain, nausea, vomiting or diarrhea.    GENITOURINARY:  No dysuria, frequency or urgency.    NEUROLOGIC:  No paresthesias, fasciculations, seizures or weakness.    PSYCHIATRIC:  No disorder of thought or mood.      PHYSICAL EXAM:    Constitutional: Well developed and nourished  Eyes:Perrla  ENMT: normal  Neck:supple  Respiratory: good air entry  Cardiovascular: S1 S2 regular  Gastrointestinal: Soft, Non tender  Extremities: No edema  Vascular:normal  Neurological:Awake, alert,Ox3  Musculoskeletal:Normal      MEDICATIONS  (STANDING):  albuterol/ipratropium for Nebulization 3 milliLiter(s) Nebulizer every 6 hours  apixaban 2.5 milliGRAM(s) Oral every 12 hours  atorvastatin 10 milliGRAM(s) Oral at bedtime  azithromycin  IVPB 500 milliGRAM(s) IV Intermittent every 24 hours  cefTRIAXone   IVPB 1000 milliGRAM(s) IV Intermittent every 24 hours  dapagliflozin 10 milliGRAM(s) Oral daily  fluticasone propionate/ salmeterol 100-50 MICROgram(s) Diskus 1 Dose(s) Inhalation two times a day  fluticasone propionate/ salmeterol 100-50 MICROgram(s) Diskus 1 Dose(s) Inhalation two times a day  guaiFENesin  milliGRAM(s) Oral every 12 hours  insulin lispro (ADMELOG) corrective regimen sliding scale   SubCutaneous three times a day before meals  insulin lispro (ADMELOG) corrective regimen sliding scale   SubCutaneous at bedtime  lactated ringers. 1000 milliLiter(s) (85 mL/Hr) IV Continuous <Continuous>  montelukast 10 milliGRAM(s) Oral daily  pantoprazole    Tablet 40 milliGRAM(s) Oral before breakfast  zinc sulfate 220 milliGRAM(s) Oral daily    MEDICATIONS  (PRN):  acetaminophen     Tablet .. 650 milliGRAM(s) Oral every 6 hours PRN Temp greater or equal to 38C (100.4F), Mild Pain (1 - 3)      Allergies    gabapentin (Unknown)    Intolerances        LABS:                        9.5    16.19 )-----------( 292      ( 14 Sep 2024 07:17 )             28.9     -14    145  |  116<H>  |  23<H>  ----------------------------<  130<H>  3.5   |  21<L>  |  0.55    Ca    8.5      14 Sep 2024 07:17  Phos  1.9     -  Mg     1.7         TPro  5.3<L>  /  Alb  2.0<L>  /  TBili  0.3  /  DBili  x   /  AST  9<L>  /  ALT  12  /  AlkPhos  78  -      Urinalysis Basic - ( 15 Sep 2024 01:09 )    Color: Yellow / Appearance: Clear / S.000 / pH: x  Gluc: x / Ketone: Trace mg/dL  / Bili: Negative / Urobili: 1.0 mg/dL   Blood: x / Protein: 30 mg/dL / Nitrite: Negative   Leuk Esterase: Small / RBC: 0 /HPF / WBC 5 /HPF   Sq Epi: x / Non Sq Epi: x / Bacteria: Many /HPF    Urinalysis with Rflx Culture (09.15.24 @ 01:09)   Urine Appearance: Clear  Color: Yellow  Specific Gravity: 1.000  pH Urine: 7.0  Protein, Urine: 30 mg/dL  Glucose Qualitative, Urine: 500 mg/dL  Ketone - Urine: Trace mg/dL  Blood, Urine: Negative  Bilirubin: Negative  Urobilinogen: 1.0 mg/dL  Leukocyte Esterase Concentration: Small  Nitrite: NegativeCulture - Blood (24 @ 01:07)   Specimen Source: .Blood Blood-Peripheral  Culture Results:   No growth at 48 HoursCulture - Blood (24 @ 01:00)   Specimen Source: .Blood Blood-Peripheral  Culture Results:   No growth at 48 Hours    Culture - Blood (24 @ 01:00)   Specimen Source: .Blood Blood-Peripheral  Culture Results:   No growth at 48 Hours        CAPILLARY BLOOD GLUCOSE      POCT Blood Glucose.: 136 mg/dL (15 Sep 2024 07:52)  POCT Blood Glucose.: 111 mg/dL (14 Sep 2024 21:08)  POCT Blood Glucose.: 110 mg/dL (14 Sep 2024 17:02)  POCT Blood Glucose.: 200 mg/dL (14 Sep 2024 11:46)        RADIOLOGY & ADDITIONAL TESTS:    CXR:    Ct scan chest:    ekg;    echo:

## 2024-09-15 NOTE — CHART NOTE - NSCHARTNOTEFT_GEN_A_CORE
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 Presenting with fever, productive cough with white-yellowish sputum, SOB x1d. Found to meet sepsis. CTA chest showing patchy opacities in the RUL, RML, and bilateral. Admitted for sepsis 2/2 multifocal PNA.      # Sepsis.   - CTA chest - patchy opacities in the RUL, RML, and bilateral lower lobe consolidations.  - leukocytosis improving   - cont azith and ceftriaxone  - f/u Culture   - ID Dr Estrada consulted  - Pulm Dr Hernandes     #  Multifocal pneumonia.   - saturating well on RA now  -  Cont duonebs q6  - rest same plan as above     #Multiple myeloma.   - h/o MM on active chemo, revlimid   - follows [facility] Dr Adams  - [facility] consulted.    # HTN (hypertension).   ·  Plan: home meds: losartan, metoprolol, nifedipine   - holding metoprolol, losartan and nifedipine as normotensive and iso sepsis. Can resume with parameters as tolerated.  #   HLD (hyperlipidemia).   - cont home meds simvastatin 5mg  #   T2DM (type 2 diabetes mellitus).   ·  Plan: home meds: metformin and farxiga   - Holding oral meds  - ISS  - FS ACHS.    # History of pulmonary embolism.   ·  Plan: h/o PE in 1/2023 likely provoked in the setting of active cancer   - cont home meds eliquis 2.5mg BID     # Prophylactic measure.   ·  Plan: - Eliquis.
EVENT: Received telephone call from RN that pt is c/o of insomnia    HPI: rosales is a 89y old  Female who is from home, ambulates with wheelchair (mostly bedbound), PMH HTN, HLD, T2DM, osteoporosis, Stage IIB gastric adenocarcinoma s/p distal gastrectomy (2015), multiple Myeloma/plastocytoma on chemo (follows [facility] Dr Adams), h/o bilateral PE (1/2023) on Eliquis, now Presents to the ER for evaluation of fever, productive cough with white-yellowish sputum, Short of breath. Admitted for sepsis & PNA    SUBJECTIVE: n/a    OBJECTIVE:  Vital Signs Last 24 Hrs  T(C): 36.7 (15 Sep 2024 20:25), Max: 36.9 (15 Sep 2024 05:10)  T(F): 98.1 (15 Sep 2024 20:25), Max: 98.4 (15 Sep 2024 05:10)  HR: 116 (15 Sep 2024 20:25) (104 - 116)  BP: 161/88 (15 Sep 2024 20:25) (150/74 - 161/88)  BP(mean): --  RR: 20 (15 Sep 2024 20:25) (17 - 20)  SpO2: 94% (15 Sep 2024 20:25) (94% - 97%)    Parameters below as of 15 Sep 2024 20:25  Patient On (Oxygen Delivery Method): room air        FOCUSED PHYSICAL EXAM:  Neuro: awake, alert, In NAD  Cardiovascular: Pulses +2 B/L in lower and upper extremities, HR regular, BP stable, No edema.  Respiratory: Respirations regular, unlabored, breath sounds clear B/L.   GI: Abdomen soft, non-tender, positive bowel sounds.  : no bladder distention noted. No complaints at this time.  Skin: Dry, intact, no bruising, no diaphoresis.    LABS:                        11.0   10.75 )-----------( 307      ( 15 Sep 2024 11:56 )             33.1     09-15    143  |  112<H>  |  16  ----------------------------<  173<H>  3.0<L>   |  20<L>  |  0.59    Ca    8.5      15 Sep 2024 11:56  Phos  2.6     09-15  Mg     1.6     09-15        EKG:   IMAGING:    ASSESSMENT/PROBLEM: Insomnia most likely 2/2 to hospitalization      PLAN:   1. Melatonin 3 mg, PO, QHS, PRN insomnia ordered  2. Monitor response to treatment  3. Cont present care/treatment  4. Supportive care

## 2024-09-16 DIAGNOSIS — E43 UNSPECIFIED SEVERE PROTEIN-CALORIE MALNUTRITION: ICD-10-CM

## 2024-09-16 DIAGNOSIS — E87.6 HYPOKALEMIA: ICD-10-CM

## 2024-09-16 LAB
ANION GAP SERPL CALC-SCNC: 13 MMOL/L — SIGNIFICANT CHANGE UP (ref 5–17)
BUN SERPL-MCNC: 18 MG/DL — SIGNIFICANT CHANGE UP (ref 7–18)
CALCIUM SERPL-MCNC: 8.8 MG/DL — SIGNIFICANT CHANGE UP (ref 8.4–10.5)
CHLORIDE SERPL-SCNC: 110 MMOL/L — HIGH (ref 96–108)
CO2 SERPL-SCNC: 18 MMOL/L — LOW (ref 22–31)
CREAT SERPL-MCNC: 0.53 MG/DL — SIGNIFICANT CHANGE UP (ref 0.5–1.3)
CULTURE RESULTS: SIGNIFICANT CHANGE UP
EGFR: 89 ML/MIN/1.73M2 — SIGNIFICANT CHANGE UP
GLUCOSE BLDC GLUCOMTR-MCNC: 118 MG/DL — HIGH (ref 70–99)
GLUCOSE BLDC GLUCOMTR-MCNC: 122 MG/DL — HIGH (ref 70–99)
GLUCOSE BLDC GLUCOMTR-MCNC: 124 MG/DL — HIGH (ref 70–99)
GLUCOSE BLDC GLUCOMTR-MCNC: 135 MG/DL — HIGH (ref 70–99)
GLUCOSE SERPL-MCNC: 133 MG/DL — HIGH (ref 70–99)
HCT VFR BLD CALC: 34.8 % — SIGNIFICANT CHANGE UP (ref 34.5–45)
HGB BLD-MCNC: 11.5 G/DL — SIGNIFICANT CHANGE UP (ref 11.5–15.5)
MCHC RBC-ENTMCNC: 31.3 PG — SIGNIFICANT CHANGE UP (ref 27–34)
MCHC RBC-ENTMCNC: 33 GM/DL — SIGNIFICANT CHANGE UP (ref 32–36)
MCV RBC AUTO: 94.8 FL — SIGNIFICANT CHANGE UP (ref 80–100)
NRBC # BLD: 0 /100 WBCS — SIGNIFICANT CHANGE UP (ref 0–0)
PLATELET # BLD AUTO: 337 K/UL — SIGNIFICANT CHANGE UP (ref 150–400)
POTASSIUM SERPL-MCNC: 3.3 MMOL/L — LOW (ref 3.5–5.3)
POTASSIUM SERPL-SCNC: 3.3 MMOL/L — LOW (ref 3.5–5.3)
RBC # BLD: 3.67 M/UL — LOW (ref 3.8–5.2)
RBC # FLD: 15.5 % — HIGH (ref 10.3–14.5)
SODIUM SERPL-SCNC: 141 MMOL/L — SIGNIFICANT CHANGE UP (ref 135–145)
SPECIMEN SOURCE: SIGNIFICANT CHANGE UP
WBC # BLD: 10.73 K/UL — HIGH (ref 3.8–10.5)
WBC # FLD AUTO: 10.73 K/UL — HIGH (ref 3.8–10.5)

## 2024-09-16 RX ORDER — METOPROLOL TARTRATE 100 MG/1
25 TABLET ORAL EVERY 12 HOURS
Refills: 0 | Status: DISCONTINUED | OUTPATIENT
Start: 2024-09-16 | End: 2024-09-16

## 2024-09-16 RX ORDER — METOPROLOL TARTRATE 100 MG/1
25 TABLET ORAL EVERY 12 HOURS
Refills: 0 | Status: DISCONTINUED | OUTPATIENT
Start: 2024-09-16 | End: 2024-09-17

## 2024-09-16 RX ORDER — LOSARTAN POTASSIUM 50 MG/1
25 TABLET ORAL DAILY
Refills: 0 | Status: DISCONTINUED | OUTPATIENT
Start: 2024-09-16 | End: 2024-09-17

## 2024-09-16 RX ORDER — PSYLLIUM HUSK 0.4 G
1 CAPSULE ORAL DAILY
Refills: 0 | Status: DISCONTINUED | OUTPATIENT
Start: 2024-09-16 | End: 2024-09-17

## 2024-09-16 RX ORDER — POTASSIUM CHLORIDE 10 MEQ
40 TABLET, EXT RELEASE, PARTICLES/CRYSTALS ORAL ONCE
Refills: 0 | Status: COMPLETED | OUTPATIENT
Start: 2024-09-16 | End: 2024-09-16

## 2024-09-16 RX ADMIN — Medication 10 MILLIGRAM(S): at 21:24

## 2024-09-16 RX ADMIN — MONTELUKAST SODIUM 10 MILLIGRAM(S): 5 TABLET, CHEWABLE ORAL at 11:48

## 2024-09-16 RX ADMIN — FLUTICASONE PROPIONATE AND SALMETEROL 1 DOSE(S): 250; 50 POWDER RESPIRATORY (INHALATION) at 10:57

## 2024-09-16 RX ADMIN — APIXABAN 2.5 MILLIGRAM(S): 5 TABLET, FILM COATED ORAL at 17:59

## 2024-09-16 RX ADMIN — Medication 40 MILLIGRAM(S): at 08:14

## 2024-09-16 RX ADMIN — FLUTICASONE PROPIONATE AND SALMETEROL 1 DOSE(S): 250; 50 POWDER RESPIRATORY (INHALATION) at 21:24

## 2024-09-16 RX ADMIN — Medication 220 MILLIGRAM(S): at 11:48

## 2024-09-16 RX ADMIN — GUAIFENESIN 600 MILLIGRAM(S): 100 LIQUID ORAL at 06:23

## 2024-09-16 RX ADMIN — Medication 100 MILLIGRAM(S): at 01:55

## 2024-09-16 RX ADMIN — DAPAGLIFLOZIN 10 MILLIGRAM(S): 10 TABLET, FILM COATED ORAL at 11:48

## 2024-09-16 RX ADMIN — METOPROLOL TARTRATE 25 MILLIGRAM(S): 100 TABLET ORAL at 17:59

## 2024-09-16 RX ADMIN — IPRATROPIUM BROMIDE AND ALBUTEROL SULFATE 3 MILLILITER(S): .5; 3 SOLUTION RESPIRATORY (INHALATION) at 08:05

## 2024-09-16 RX ADMIN — IPRATROPIUM BROMIDE AND ALBUTEROL SULFATE 3 MILLILITER(S): .5; 3 SOLUTION RESPIRATORY (INHALATION) at 14:37

## 2024-09-16 RX ADMIN — Medication 40 MILLIEQUIVALENT(S): at 11:00

## 2024-09-16 RX ADMIN — APIXABAN 2.5 MILLIGRAM(S): 5 TABLET, FILM COATED ORAL at 06:22

## 2024-09-16 NOTE — PROGRESS NOTE ADULT - PROBLEM SELECTOR PLAN 9
home meds: metformin and farxiga   - farxiga was resumed on 9/13   - ISS tid before meals and at bedtime  - FS ACHS  - glucose controlled

## 2024-09-16 NOTE — PROGRESS NOTE ADULT - SUBJECTIVE AND OBJECTIVE BOX
Patient is a 89y old  Female who presents with a chief complaint of cough, SOB (16 Sep 2024 06:33)  Asleep, laying in bed in NAD. Doing well on RA    INTERVAL HPI/OVERNIGHT EVENTS:      VITAL SIGNS:  T(F): 97.7 (09-16-24 @ 05:15)  HR: 115 (09-16-24 @ 05:15)  BP: 154/93 (09-16-24 @ 05:15)  RR: 16 (09-16-24 @ 05:15)  SpO2: 97% (09-16-24 @ 05:15)  Wt(kg): --  I&O's Detail          REVIEW OF SYSTEMS:    CONSTITUTIONAL:  No fevers, chills, sweats    HEENT:  Eyes:  No diplopia or blurred vision. ENT:  No earache, sore throat or runny nose.    CARDIOVASCULAR:  No pressure, squeezing, tightness, or heaviness about the chest; no palpitations.    RESPIRATORY:  Per HPI    GASTROINTESTINAL:  No abdominal pain, nausea, vomiting or diarrhea.    GENITOURINARY:  No dysuria, frequency or urgency.    NEUROLOGIC:  No paresthesias, fasciculations, seizures or weakness.    PSYCHIATRIC:  No disorder of thought or mood.      PHYSICAL EXAM:    Constitutional: Well developed and nourished  Eyes:Perrla  ENMT: normal  Neck:supple  Respiratory: good air entry  Cardiovascular: S1 S2 regular  Gastrointestinal: Soft, Non tender  Extremities: No edema  Vascular:normal  Neurological:Asleep  Musculoskeletal:Normal      MEDICATIONS  (STANDING):  albuterol/ipratropium for Nebulization 3 milliLiter(s) Nebulizer every 6 hours  apixaban 2.5 milliGRAM(s) Oral every 12 hours  atorvastatin 10 milliGRAM(s) Oral at bedtime  cefTRIAXone   IVPB 1000 milliGRAM(s) IV Intermittent every 24 hours  dapagliflozin 10 milliGRAM(s) Oral daily  fluticasone propionate/ salmeterol 100-50 MICROgram(s) Diskus 1 Dose(s) Inhalation two times a day  insulin lispro (ADMELOG) corrective regimen sliding scale   SubCutaneous three times a day before meals  insulin lispro (ADMELOG) corrective regimen sliding scale   SubCutaneous at bedtime  losartan 25 milliGRAM(s) Oral daily  metoprolol tartrate 25 milliGRAM(s) Oral every 12 hours  montelukast 10 milliGRAM(s) Oral daily  pantoprazole    Tablet 40 milliGRAM(s) Oral before breakfast  zinc sulfate 220 milliGRAM(s) Oral daily    MEDICATIONS  (PRN):  acetaminophen     Tablet .. 650 milliGRAM(s) Oral every 6 hours PRN Temp greater or equal to 38C (100.4F), Mild Pain (1 - 3)  melatonin 3 milliGRAM(s) Oral at bedtime PRN Insomnia      Allergies    gabapentin (Unknown)    Intolerances        LABS:                        11.5   10.73 )-----------( 337      ( 16 Sep 2024 07:20 )             34.8     09-16    141  |  110<H>  |  18  ----------------------------<  133<H>  3.3<L>   |  18<L>  |  0.53    Ca    8.8      16 Sep 2024 07:20  Phos  2.6     09-15  Mg     1.6     09-15        Urinalysis Basic - ( 16 Sep 2024 07:20 )    Color: x / Appearance: x / SG: x / pH: x  Gluc: 133 mg/dL / Ketone: x  / Bili: x / Urobili: x   Blood: x / Protein: x / Nitrite: x   Leuk Esterase: x / RBC: x / WBC x   Sq Epi: x / Non Sq Epi: x / Bacteria: x            CAPILLARY BLOOD GLUCOSE      POCT Blood Glucose.: 135 mg/dL (16 Sep 2024 07:31)  POCT Blood Glucose.: 120 mg/dL (15 Sep 2024 21:05)  POCT Blood Glucose.: 111 mg/dL (15 Sep 2024 16:54)  POCT Blood Glucose.: 182 mg/dL (15 Sep 2024 11:28)        RADIOLOGY & ADDITIONAL TESTS:    CXR:  < from: CT Angio Chest PE Protocol w/ IV Cont (09.13.24 @ 03:45) >  IMPRESSION:  No pulmonary embolus.    Patchy opacities in the right upper and right middle lobe with bilateral   lower lobe dependent consolidations versus atelectasis. Findings   concerning for multifocal pneumonia.        < end of copied text >    Ct scan chest:    ekg;    echo:

## 2024-09-16 NOTE — PROGRESS NOTE ADULT - PROBLEM SELECTOR PLAN 5
c/w albuterol mdi 2 puffs q6h PRN for shortness of breath, advair 1 puff bid, montelukast 10 mg daily

## 2024-09-16 NOTE — PROGRESS NOTE ADULT - SUBJECTIVE AND OBJECTIVE BOX
Patient is a 89y old  Female who presents with a chief complaint of cough, SOB (15 Sep 2024 16:10)    pt seen in icu [  ], reg med floor [ x  ], bed [  ], chair at bedside [   ], awake and responsive [ x ],   lethargic [  ],  nad [x  ]        Allergies    gabapentin (Unknown)        Vitals    T(F): 97.7 (09-16-24 @ 05:15), Max: 98.4 (09-15-24 @ 14:11)  HR: 115 (09-16-24 @ 05:15) (115 - 116)  BP: 154/93 (09-16-24 @ 05:15) (154/93 - 161/88)  RR: 16 (09-16-24 @ 05:15) (16 - 20)  SpO2: 97% (09-16-24 @ 05:15) (94% - 97%)  Wt(kg): --  CAPILLARY BLOOD GLUCOSE      POCT Blood Glucose.: 120 mg/dL (15 Sep 2024 21:05)      Labs                          11.0   10.75 )-----------( 307      ( 15 Sep 2024 11:56 )             33.1       09-15    143  |  112<H>  |  16  ----------------------------<  173<H>  3.0<L>   |  20<L>  |  0.59    Ca    8.5      15 Sep 2024 11:56  Phos  2.6     09-15  Mg     1.6     09-15              .Blood Blood-Peripheral  09-13 @ 01:07   No growth at 72 Hours  --  --      .Blood Blood-Peripheral  09-13 @ 01:00   No growth at 72 Hours  --  --      .Blood Blood-Peripheral  07-31 @ 06:30   No growth at 5 days  --  --      .Blood Blood-Peripheral  07-31 @ 06:00   No growth at 5 days  --  --      Clean Catch  07-29 @ 04:31   >=3 organisms. Probable collection contamination.  --  --      .Blood Blood-Peripheral  07-29 @ 01:10   No growth at 5 days  --  --      .Blood Blood-Peripheral  07-29 @ 01:05   Growth in anaerobic bottle: Streptococcus gallolyticus  Growth in aerobic bottle: Streptococcus salivarius/vestibularis group  "Susceptibilities not performed"  Direct identification is available within approximately 3-5  hours either by Blood Panel Multiplexed PCR or Direct  MALDI-TOF. Details: [email or web address]  --  Blood Culture PCR  Streptococcus gallolyticus          Radiology Results      Meds    MEDICATIONS  (STANDING):  albuterol/ipratropium for Nebulization 3 milliLiter(s) Nebulizer every 6 hours  apixaban 2.5 milliGRAM(s) Oral every 12 hours  atorvastatin 10 milliGRAM(s) Oral at bedtime  cefTRIAXone   IVPB 1000 milliGRAM(s) IV Intermittent every 24 hours  dapagliflozin 10 milliGRAM(s) Oral daily  fluticasone propionate/ salmeterol 100-50 MICROgram(s) Diskus 1 Dose(s) Inhalation two times a day  insulin lispro (ADMELOG) corrective regimen sliding scale   SubCutaneous three times a day before meals  insulin lispro (ADMELOG) corrective regimen sliding scale   SubCutaneous at bedtime  lactated ringers. 1000 milliLiter(s) (85 mL/Hr) IV Continuous <Continuous>  montelukast 10 milliGRAM(s) Oral daily  pantoprazole    Tablet 40 milliGRAM(s) Oral before breakfast  zinc sulfate 220 milliGRAM(s) Oral daily      MEDICATIONS  (PRN):  acetaminophen     Tablet .. 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 (follows [facility] Dr Adams),   bilateral PE (1/2023) on Eliquis  Dementia  Ambulatory dysfunction  S/P hysterectomy  S/P tubal ligation        PLAN    cont rocephin, zithromax,   blood cx neg noted above   procal elevated noted    id f/u   MRSA PCR and  Legionella urine Ag neg noted above  atrov and prov nebs,   pt now tolerating ra   pulm f/u   robitussin prn,   Bronchodilators  Symbicort HFA 80/4.5 mcgs 2 puffs po BID with spacer   montelukast 10 mgs po Qhs  hold outpt dm meds,   lispro ss,   heme onc cons   cont current meds        Patient is a 89y old  Female who presents with a chief complaint of cough, SOB (15 Sep 2024 16:10)    pt seen in icu [  ], reg med floor [ x  ], bed [  ], chair at bedside [   ], awake and responsive [ x ],   lethargic [  ],  nad [x  ]        Allergies    gabapentin (Unknown)        Vitals    T(F): 97.7 (09-16-24 @ 05:15), Max: 98.4 (09-15-24 @ 14:11)  HR: 115 (09-16-24 @ 05:15) (115 - 116)  BP: 154/93 (09-16-24 @ 05:15) (154/93 - 161/88)  RR: 16 (09-16-24 @ 05:15) (16 - 20)  SpO2: 97% (09-16-24 @ 05:15) (94% - 97%)  Wt(kg): --  CAPILLARY BLOOD GLUCOSE      POCT Blood Glucose.: 120 mg/dL (15 Sep 2024 21:05)      Labs                          11.0   10.75 )-----------( 307      ( 15 Sep 2024 11:56 )             33.1       09-15    143  |  112<H>  |  16  ----------------------------<  173<H>  3.0<L>   |  20<L>  |  0.59    Ca    8.5      15 Sep 2024 11:56  Phos  2.6     09-15  Mg     1.6     09-15              .Blood Blood-Peripheral  09-13 @ 01:07   No growth at 72 Hours  --  --      .Blood Blood-Peripheral  09-13 @ 01:00   No growth at 72 Hours  --  --        Radiology Results      Meds    MEDICATIONS  (STANDING):  albuterol/ipratropium for Nebulization 3 milliLiter(s) Nebulizer every 6 hours  apixaban 2.5 milliGRAM(s) Oral every 12 hours  atorvastatin 10 milliGRAM(s) Oral at bedtime  cefTRIAXone   IVPB 1000 milliGRAM(s) IV Intermittent every 24 hours  dapagliflozin 10 milliGRAM(s) Oral daily  fluticasone propionate/ salmeterol 100-50 MICROgram(s) Diskus 1 Dose(s) Inhalation two times a day  insulin lispro (ADMELOG) corrective regimen sliding scale   SubCutaneous three times a day before meals  insulin lispro (ADMELOG) corrective regimen sliding scale   SubCutaneous at bedtime  lactated ringers. 1000 milliLiter(s) (85 mL/Hr) IV Continuous <Continuous>  montelukast 10 milliGRAM(s) Oral daily  pantoprazole    Tablet 40 milliGRAM(s) Oral before breakfast  zinc sulfate 220 milliGRAM(s) Oral daily      MEDICATIONS  (PRN):  acetaminophen     Tablet .. 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 (follows [facility] Dr Adams),   bilateral PE (1/2023) on Eliquis  Dementia  Ambulatory dysfunction  S/P hysterectomy  S/P tubal ligation        PLAN    blood cx neg noted above   procal elevated noted    MRSA PCR and  Legionella urine Ag neg noted    id f/u   Discontinued Azithromycin   Continue Ceftriaxone while inpatient   may change to oral Augmentin 875mg q 12hours on discharge to continue until 9/20/24  atrov and prov nebs,   pt now tolerating ra   pulm f/u   robitussin prn,   Bronchodilators  Symbicort HFA 80/4.5 mcgs 2 puffs po BID with spacer   montelukast 10 mgs po Qhs  hold outpt dm meds,   lispro ss,   heme onc cons   resume lopressor 25 mg q12   resume losartan 25 mg daily   cont current meds

## 2024-09-16 NOTE — PHARMACOTHERAPY INTERVENTION NOTE - COMMENTS
Recommended to discontinue azithromycin in the setting of negative Legionella antigen test in patient being treated for PNA as per ID.       Massiel Rogers, PharmD   Clinical Pharmacy Specialist, Infectious Diseases  Tele-Antimicrobial Stewardship Program (Tele-ASP)  Tele-ASP Phone: [phone]   Recommended to discontinue azithromycin in the setting of negative Legionella antigen test in patient being treated for PNA as per ID. Patient is covered with ceftriaxone.      Massiel Rogers, Richard   Clinical Pharmacy Specialist, Infectious Diseases  Tele-Antimicrobial Stewardship Program (Tele-ASP)  Tele-ASP Phone: [phone]

## 2024-09-16 NOTE — PROGRESS NOTE ADULT - PROBLEM SELECTOR PLAN 7
BP uptrending due to recent medication hold iso sepsis  continue home meds: losartan, metoprolol for now  consider restart nifedipine if needed   BP goal <140/90

## 2024-09-16 NOTE — PROGRESS NOTE ADULT - ASSESSMENT
Patient is a 89y old  Female who is from home, ambulates with wheelchair (mostly bedbound), PMH HTN, HLD, T2DM, osteoporosis, Stage IIB gastric adenocarcinoma s/p distal gastrectomy (2015), multiple Myeloma/plastocytoma on chemo (follows [facility] Dr Adams), h/o bilateral PE (1/2023) on Eliquis, now Presents to the ER for evaluation of fever, productive cough with white-yellowish sputum, Short of breath. Collateral obtained from daughter over phone. Endorses throat discomfort from coughing. Daughter noted wheezing at home as well. On admission, she found to have fever, tachycardia, tachypnea and hypoxia requiring supplemental oxygenation. The CT chest shows Patchy opacities in the right upper and right middle lobe with bilateral lower lobe. She has started on ceftriaxone and Azithromycin after dose of Vancomycin. And the ID consult requested to assist with further evaluation and antibiotic management.    # Sepsis ( fever + tachycardia +Leukocytosis )- resolving  # Pneumonia - MRSA PCR  AND  Legionella urine Ag is negative    would recommend:    1. PT/OOB to chair   2. Continue Ceftriaxone while inpatient and may change to oral Augmentin 875mg q 12hours on discharge to continue until 9/20/24  3. Supplemental oxygenation and Bronchodilator as needed    d/w covering NP, Ray    Attending Attestation;    Spent more than 35 minutes on total encounter, more than 50 % of the visit was spent counseling and/or coordinating care by the Attending physician.
Patient is a 89y old  Female who is from home, ambulates with wheelchair (mostly bedbound), PMH HTN, HLD, T2DM, osteoporosis, Stage IIB gastric adenocarcinoma s/p distal gastrectomy (2015), multiple Myeloma/plastocytoma on chemo (follows [facility] Dr Adams), h/o bilateral PE (1/2023) on Eliquis, now Presents to the ER for evaluation of fever, productive cough with white-yellowish sputum, Short of breath. Collateral obtained from daughter over phone. Endorses throat discomfort from coughing. Daughter noted wheezing at home as well. On admission, she found to have fever, tachycardia, tachypnea and hypoxia requiring supplemental oxygenation. The CT chest shows Patchy opacities in the right upper and right middle lobe with bilateral lower lobe. She has started on ceftriaxone and Azithromycin after dose of Vancomycin. And the ID consult requested to assist with further evaluation and antibiotic management.    # Sepsis ( fever + tachycardia +Leukocytosis )- resolving  # Pneumonia - MRSA PCR  AND  Legionella urine Ag is negative    would recommend:    1. Discontinue Azithromycin   2. Continue Ceftriaxone while inpatient and may change to oral Augmentin 875mg q 12hours on discharge to continue until 9/20/24  3. Supplemental oxygenation and Bronchodilator as needed  4. OOB to chair    d/w covering NPMagdalena     Attending Attestation;    Spent more than 35 minutes on total encounter, more than 50 % of the visit was spent counseling and/or coordinating care by the Attending physician.    
Patient is a 89y old  Female who is from home, ambulates with wheelchair (mostly bedbound), PMH HTN, HLD, T2DM, osteoporosis, Stage IIB gastric adenocarcinoma s/p distal gastrectomy (2015), multiple Myeloma/plastocytoma on chemo (follows [facility] Dr Adams), h/o bilateral PE (1/2023) on Eliquis, now Presents to the ER for evaluation of fever, productive cough with white-yellowish sputum, Short of breath. Collateral obtained from daughter over phone. Endorses throat discomfort from coughing. Daughter noted wheezing at home as well. On admission, she found to have fever, tachycardia, tachypnea and hypoxia requiring supplemental oxygenation. The CT chest shows Patchy opacities in the right upper and right middle lobe with bilateral lower lobe. She has started on ceftriaxone and Azithromycin after dose of Vancomycin. And the ID consult requested to assist with further evaluation and antibiotic management.    # Sepsis ( fever + tachycardia +Leukocytosis )- resolving  # Pneumonia - MRSA PCR is negative    would recommend:    1. Please obtain Legionella urine Ag  2. Continue Ceftriaxone and Azithromycin until work up is done  3. Monitor WBC count, is elevated   4. Supplemental oxygenation and Bronchodilator as needed      Attending Attestation;    Spent more than 45 minutes on total encounter, more than 50 % of the visit was spent counseling and/or coordinating care by the Attending physician.  
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 Presenting with fever, productive cough with white-yellowish sputum, SOB x1d. Found to meet sepsis criteria with Tmax 101, . CTA chest showing patchy opacities in the RUL, RML, and bilateral lower lobe consolidations. Weaned off NRB to 2L NC in the ED. Admitted for sepsis 2/2 multifocal PNA.

## 2024-09-16 NOTE — PROGRESS NOTE ADULT - PROBLEM SELECTOR PLAN 10
h/o PE in 1/2023 likely provoked in the setting of active cancer   - c/w home meds - eliquis 2.5mg BID

## 2024-09-16 NOTE — PROGRESS NOTE ADULT - SUBJECTIVE AND OBJECTIVE BOX
Patient is a 89y old  Female who presents with a chief complaint of cough, SOB (16 Sep 2024 12:08)    OVERNIGHT EVENTS: no acute changes.     Pt is confused aox1, answering questions with bedside  #[id number], tolerating PO, mostly bedbound/uses wheelchair.     REVIEW OF SYSTEMS:  limited exam due to mental status  pt denies any pain or other discomfort    T(C): 36.5 (09-16-24 @ 05:15), Max: 36.7 (09-15-24 @ 20:25)  HR: 100 (09-16-24 @ 14:15) (100 - 116)  BP: 143/67 (09-16-24 @ 14:15) (143/67 - 161/88)  RR: 17 (09-16-24 @ 14:15) (16 - 20)  SpO2: 99% (09-16-24 @ 14:15) (94% - 99%)  Wt(kg): --Vital Signs Last 24 Hrs  T(C): 36.5 (16 Sep 2024 05:15), Max: 36.7 (15 Sep 2024 20:25)  T(F): 97.7 (16 Sep 2024 05:15), Max: 98.1 (15 Sep 2024 20:25)  HR: 100 (16 Sep 2024 14:15) (100 - 116)  BP: 143/67 (16 Sep 2024 14:15) (143/67 - 161/88)  BP(mean): --  RR: 17 (16 Sep 2024 14:15) (16 - 20)  SpO2: 99% (16 Sep 2024 14:15) (94% - 99%)    Parameters below as of 16 Sep 2024 14:15  Patient On (Oxygen Delivery Method): room air        MEDICATIONS  (STANDING):  albuterol/ipratropium for Nebulization 3 milliLiter(s) Nebulizer every 6 hours  apixaban 2.5 milliGRAM(s) Oral every 12 hours  atorvastatin 10 milliGRAM(s) Oral at bedtime  cefTRIAXone   IVPB 1000 milliGRAM(s) IV Intermittent every 24 hours  dapagliflozin 10 milliGRAM(s) Oral daily  fluticasone propionate/ salmeterol 100-50 MICROgram(s) Diskus 1 Dose(s) Inhalation two times a day  insulin lispro (ADMELOG) corrective regimen sliding scale   SubCutaneous three times a day before meals  insulin lispro (ADMELOG) corrective regimen sliding scale   SubCutaneous at bedtime  losartan 25 milliGRAM(s) Oral daily  metoprolol tartrate 25 milliGRAM(s) Oral every 12 hours  montelukast 10 milliGRAM(s) Oral daily  pantoprazole    Tablet 40 milliGRAM(s) Oral before breakfast  zinc sulfate 220 milliGRAM(s) Oral daily    MEDICATIONS  (PRN):  acetaminophen     Tablet .. 650 milliGRAM(s) Oral every 6 hours PRN Temp greater or equal to 38C (100.4F), Mild Pain (1 - 3)  melatonin 3 milliGRAM(s) Oral at bedtime PRN Insomnia      PHYSICAL EXAM:  GENERAL: NAD  EYES: clear conjunctiva  ENMT: Moist mucous membranes  NECK: Supple, No JVD, Normal thyroid  CHEST/LUNG: Clear to auscultation bilaterally; No rales, rhonchi, wheezing, or rubs  HEART: S1, S2, Regular rate and rhythm  ABDOMEN: Soft, Nontender, Nondistended; Bowel sounds present  NEURO: awake, confused  EXTREMITIES: No LE edema, no calf tenderness  LYMPH: No lymphadenopathy noted  SKIN: No rashes or lesions    Consultant(s) Notes Reviewed:  [x ] YES  [ ] NO  Care Discussed with Consultants/Other Providers [ x] YES  [ ] NO    LABS:                        11.5   10.73 )-----------( 337      ( 16 Sep 2024 07:20 )             34.8     09-16    141  |  110<H>  |  18  ----------------------------<  133<H>  3.3<L>   |  18<L>  |  0.53    Ca    8.8      16 Sep 2024 07:20  Phos  2.6     09-15  Mg     1.6     09-15        CAPILLARY BLOOD GLUCOSE      POCT Blood Glucose.: 122 mg/dL (16 Sep 2024 16:52)  POCT Blood Glucose.: 124 mg/dL (16 Sep 2024 11:32)  POCT Blood Glucose.: 135 mg/dL (16 Sep 2024 07:31)  POCT Blood Glucose.: 120 mg/dL (15 Sep 2024 21:05)        Urinalysis Basic - ( 16 Sep 2024 07:20 )    Color: x / Appearance: x / SG: x / pH: x  Gluc: 133 mg/dL / Ketone: x  / Bili: x / Urobili: x   Blood: x / Protein: x / Nitrite: x   Leuk Esterase: x / RBC: x / WBC x   Sq Epi: x / Non Sq Epi: x / Bacteria: x        RADIOLOGY & ADDITIONAL TESTS:  < from: CT Angio Chest PE Protocol w/ IV Cont (09.13.24 @ 03:45) >    ACC: [id number] EXAM:  CT ANGIO CHEST PULM ART WAWIC   ORDERED BY: COSME CAMEJO     PROCEDURE DATE:  09/13/2024          INTERPRETATION:  CLINICAL INFORMATION: Shortness of breath    COMPARISON: CT chest 7/29/2024    CONTRAST/COMPLICATIONS:  IV Contrast: Omnipaque 350  90 cc administered   10 cc discarded  Oral Contrast: NONE  Complications: None reported at time of study completion    PROCEDURE:  CT Angiography of the Chest.  Sagittal and coronal reformats were performed as well as 3D (MIP)   reconstructions.    FINDINGS:    LUNGS AND LARGE AIRWAYS: Patent central airways. Patchy opacities in the   right upper lobe and right middle lobe. Bilateral lower lobe dependent   consolidations  PLEURA: No pleural effusion.  VESSELS: No pulmonary embolus. Atherosclerotic calcifications of the   aorta and coronary arteries.  HEART: Heart size is normal. No pericardial effusion.  MEDIASTINUM AND MARGARET: No lymphadenopathy.  CHEST WALL AND LOWER NECK: Within normal limits.  VISUALIZED UPPER ABDOMEN: It cholecystectomy.  BONES: Mild chronic superior endplate L1 compression deformity.   Degenerative changes of the spine..    IMPRESSION:  No pulmonary embolus.    Patchy opacities in the right upper and right middle lobe with bilateral   lower lobe dependent consolidations versus atelectasis. Findings   concerning for multifocal pneumonia.        --- End of Report ---            KIKI HUGGINS MD; Attending Radiologist  This document has been electronically signed. Sep 13 2024  3:52AM    < end of copied text >  < from: Xray Chest 1 View-PORTABLE IMMEDIATE (09.13.24 @ 02:00) >    ACC: [id number] EXAM:  XR CHEST PORTABLE IMMED 1V   ORDERED BY: COSME CAMEJO     PROCEDURE DATE:  09/13/2024          INTERPRETATION:  CLINICAL STATEMENT: Chest Pain.    TECHNIQUE: AP view of the chest.    COMPARISON: 8/12/2024    FINDINGS/  IMPRESSION:  No gross consolidation or pleural effusion    Heart size cannot be accurately assessed in this projection.    --- End of Report ---            KASHMIR RESENDIZ MD; Attending Radiologist  This document has been electronically signed. Sep 13 2024  7:31AM    < end of copied text >      Imaging Personally Reviewed:  [ ] YES  [ ] NO

## 2024-09-16 NOTE — PROGRESS NOTE ADULT - PROBLEM SELECTOR PLAN 1
pt p/w cough, shortness of breath and fevers  meet sepsis criteria with Tmax 101, .   CTA chest - patchy opacities in the RUL, RML, and bilateral lower lobe consolidations.  s/p 1L NS bolus, cefepime, vanco in the ED.  blood cultures negative   s/p azithromycin 500 mg qd x3 days   - continue ceftriaxone 1g qd (9/14-9/18)  - f/u urine culture

## 2024-09-16 NOTE — PROGRESS NOTE ADULT - PROBLEM SELECTOR PLAN 4
streptococcus pneumonia, mycoplasma and legionella all negative  aspiration precautions - soft + bite size diet, head of bed at least 30 degrees  - as above

## 2024-09-16 NOTE — PROGRESS NOTE ADULT - SUBJECTIVE AND OBJECTIVE BOX
Patient is seen and examined at the bed side, is afebrile. The urine analysis form 9/15 has been reviewed.       REVIEW OF SYSTEMS: All other review systems are negative      ALLERGIES: gabapentin (Unknown)      Vital Signs Last 24 Hrs  T(C): 36.5 (16 Sep 2024 05:15), Max: 36.7 (15 Sep 2024 20:25)  T(F): 97.7 (16 Sep 2024 05:15), Max: 98.1 (15 Sep 2024 20:25)  HR: 100 (16 Sep 2024 14:15) (100 - 116)  BP: 143/67 (16 Sep 2024 14:15) (143/67 - 161/88)  BP(mean): --  RR: 17 (16 Sep 2024 14:15) (16 - 20)  SpO2: 99% (16 Sep 2024 14:15) (94% - 99%)    Parameters below as of 16 Sep 2024 14:15  Patient On (Oxygen Delivery Method): room air      PHYSICAL EXAM:  GENERAL: Not in distress   CHEST/LUNG:  Not using accessory muscles   HEART: s1 and s2 present  ABDOMEN:  Nontender and  Nondistended  EXTREMITIES: No pedal  edema  CNS: Awake and Alert      LABS:                        11.5   10.73 )-----------( 337      ( 16 Sep 2024 07:20 )             34.8                           11.0   10.75 )-----------( 307      ( 15 Sep 2024 11:56 )             33.1              09-16    141  |  110<H>  |  18  ----------------------------<  133<H>  3.3<L>   |  18<L>  |  0.53    Ca    8.8      16 Sep 2024 07:20  Phos  2.6     09-15  Mg     1.6     09-15        09-15    143  |  112<H>  |  16  ----------------------------<  173<H>  3.0<L>   |  20<L>  |  0.59    Ca    8.5      15 Sep 2024 11:56  Phos  2.6     09-15  Mg     1.6     09-15    TPro  5.3<L>  /  Alb  2.0<L>  /  TBili  0.3  /  DBili  x   /  AST  9<L>  /  ALT  12  /  AlkPhos  78  09-13    PT/INR - ( 13 Sep 2024 01:14 )   PT: 19.5 sec;   INR: 1.74 ratio     PTT - ( 13 Sep 2024 01:14 )  PTT:33.3 sec      MEDICATIONS  (STANDING):  PT  apixaban 2.5 milliGRAM(s) Oral every 12 hours  atorvastatin 10 milliGRAM(s) Oral at bedtime  cefTRIAXone   IVPB 1000 milliGRAM(s) IV Intermittent every 24 hours  dapagliflozin 10 milliGRAM(s) Oral daily  fluticasone propionate/ salmeterol 100-50 MICROgram(s) Diskus 1 Dose(s) Inhalation two times a day  insulin lispro (ADMELOG) corrective regimen sliding scale   SubCutaneous three times a day before meals  insulin lispro (ADMELOG) corrective regimen sliding scale   SubCutaneous at bedtime  losartan 25 milliGRAM(s) Oral daily  metoprolol tartrate 25 milliGRAM(s) Oral every 12 hours  montelukast 10 milliGRAM(s) Oral daily  multivitamin/minerals 1 Tablet(s) Oral daily  pantoprazole    Tablet 40 milliGRAM(s) Oral before breakfast  zinc sulfate 220 milliGRAM(s) Oral daily          RADIOLOGY & ADDITIONAL TESTS:    9/13/24 : CT Angio Chest PE Protocol w/ IV Cont (09.13.24 @ 03:45) No pulmonary embolus.  Patchy opacities in the right upper and right middle lobe with bilateral lower lobe dependent consolidations versus atelectasis. Findings   concerning for multifocal pneumonia.    9/13/24 : Xray Chest 1 View-PORTABLE IMMEDIATE (09.13.24 @ 02:00) No gross consolidation or pleural effusion    Heart size cannot be accurately assessed in this projection.      MICROBIOLOGY DATA:    Legionella pneumophila Antigen, Urine (09.15.24 @ 01:09)   Legionella Antigen, Urine: Negative    MRSA/MSSA PCR (09.13.24 @ 13:59)   MRSA PCR Result.: NotDetec    Culture - Blood (09.13.24 @ 01:07)   Specimen Source: .Blood Blood-Peripheral  Culture Results: No growth at 48 hours    Culture - Blood (09.13.24 @ 01:00)   Specimen Source: .Blood Blood-Peripheral  Culture Results: No growth at 48 hours

## 2024-09-17 ENCOUNTER — TRANSCRIPTION ENCOUNTER (OUTPATIENT)
Age: 89
End: 2024-09-17

## 2024-09-17 VITALS
TEMPERATURE: 99 F | HEART RATE: 79 BPM | RESPIRATION RATE: 17 BRPM | OXYGEN SATURATION: 97 % | SYSTOLIC BLOOD PRESSURE: 159 MMHG | DIASTOLIC BLOOD PRESSURE: 59 MMHG

## 2024-09-17 LAB
ANION GAP SERPL CALC-SCNC: 12 MMOL/L — SIGNIFICANT CHANGE UP (ref 5–17)
BUN SERPL-MCNC: 20 MG/DL — SIGNIFICANT CHANGE UP (ref 7–23)
CALCIUM SERPL-MCNC: 9.1 MG/DL — SIGNIFICANT CHANGE UP (ref 8.5–10.1)
CHLORIDE SERPL-SCNC: 109 MMOL/L — HIGH (ref 96–108)
CO2 SERPL-SCNC: 18 MMOL/L — LOW (ref 22–31)
CREAT SERPL-MCNC: 0.64 MG/DL — SIGNIFICANT CHANGE UP (ref 0.5–1.3)
EGFR: 85 ML/MIN/1.73M2 — SIGNIFICANT CHANGE UP
GLUCOSE BLDC GLUCOMTR-MCNC: 121 MG/DL — HIGH (ref 70–99)
GLUCOSE BLDC GLUCOMTR-MCNC: 131 MG/DL — HIGH (ref 70–99)
GLUCOSE SERPL-MCNC: 123 MG/DL — HIGH (ref 70–99)
MAGNESIUM SERPL-MCNC: 1.9 MG/DL — SIGNIFICANT CHANGE UP (ref 1.6–2.6)
POTASSIUM SERPL-MCNC: 3.3 MMOL/L — LOW (ref 3.5–5.3)
POTASSIUM SERPL-SCNC: 3.3 MMOL/L — LOW (ref 3.5–5.3)
SODIUM SERPL-SCNC: 139 MMOL/L — SIGNIFICANT CHANGE UP (ref 135–145)

## 2024-09-17 PROCEDURE — 87641 MR-STAPH DNA AMP PROBE: CPT

## 2024-09-17 PROCEDURE — 87449 NOS EACH ORGANISM AG IA: CPT

## 2024-09-17 PROCEDURE — 80048 BASIC METABOLIC PNL TOTAL CA: CPT

## 2024-09-17 PROCEDURE — 82330 ASSAY OF CALCIUM: CPT

## 2024-09-17 PROCEDURE — 84100 ASSAY OF PHOSPHORUS: CPT

## 2024-09-17 PROCEDURE — 83605 ASSAY OF LACTIC ACID: CPT

## 2024-09-17 PROCEDURE — 85730 THROMBOPLASTIN TIME PARTIAL: CPT

## 2024-09-17 PROCEDURE — 36415 COLL VENOUS BLD VENIPUNCTURE: CPT

## 2024-09-17 PROCEDURE — 82962 GLUCOSE BLOOD TEST: CPT

## 2024-09-17 PROCEDURE — 87040 BLOOD CULTURE FOR BACTERIA: CPT

## 2024-09-17 PROCEDURE — 81001 URINALYSIS AUTO W/SCOPE: CPT

## 2024-09-17 PROCEDURE — 71045 X-RAY EXAM CHEST 1 VIEW: CPT

## 2024-09-17 PROCEDURE — 86738 MYCOPLASMA ANTIBODY: CPT

## 2024-09-17 PROCEDURE — 83735 ASSAY OF MAGNESIUM: CPT

## 2024-09-17 PROCEDURE — 85610 PROTHROMBIN TIME: CPT

## 2024-09-17 PROCEDURE — 87899 AGENT NOS ASSAY W/OPTIC: CPT

## 2024-09-17 PROCEDURE — 85027 COMPLETE CBC AUTOMATED: CPT

## 2024-09-17 PROCEDURE — 93005 ELECTROCARDIOGRAM TRACING: CPT

## 2024-09-17 PROCEDURE — 84295 ASSAY OF SERUM SODIUM: CPT

## 2024-09-17 PROCEDURE — 96374 THER/PROPH/DIAG INJ IV PUSH: CPT

## 2024-09-17 PROCEDURE — 96375 TX/PRO/DX INJ NEW DRUG ADDON: CPT

## 2024-09-17 PROCEDURE — 84145 PROCALCITONIN (PCT): CPT

## 2024-09-17 PROCEDURE — 87640 STAPH A DNA AMP PROBE: CPT

## 2024-09-17 PROCEDURE — 80053 COMPREHEN METABOLIC PANEL: CPT

## 2024-09-17 PROCEDURE — 94640 AIRWAY INHALATION TREATMENT: CPT

## 2024-09-17 PROCEDURE — 84132 ASSAY OF SERUM POTASSIUM: CPT

## 2024-09-17 PROCEDURE — 71275 CT ANGIOGRAPHY CHEST: CPT | Mod: MC

## 2024-09-17 PROCEDURE — 99285 EMERGENCY DEPT VISIT HI MDM: CPT | Mod: 25

## 2024-09-17 PROCEDURE — 87086 URINE CULTURE/COLONY COUNT: CPT

## 2024-09-17 PROCEDURE — 82803 BLOOD GASES ANY COMBINATION: CPT

## 2024-09-17 PROCEDURE — 85025 COMPLETE CBC W/AUTO DIFF WBC: CPT

## 2024-09-17 PROCEDURE — 87636 SARSCOV2 & INF A&B AMP PRB: CPT

## 2024-09-17 RX ORDER — AMOXICILLIN AND CLAVULANATE POTASSIUM 250; 125 MG/1; MG/1
1 TABLET, FILM COATED ORAL
Qty: 6 | Refills: 0
Start: 2024-09-17 | End: 2024-09-19

## 2024-09-17 RX ORDER — BUDESONIDE AND FORMOTEROL FUMARATE 80; 4.5 UG/1; UG/1
2 AEROSOL, METERED RESPIRATORY (INHALATION)
Qty: 1 | Refills: 1
Start: 2024-09-17 | End: 2024-11-15

## 2024-09-17 RX ORDER — POTASSIUM CHLORIDE 10 MEQ
20 TABLET, EXT RELEASE, PARTICLES/CRYSTALS ORAL ONCE
Refills: 0 | Status: COMPLETED | OUTPATIENT
Start: 2024-09-17 | End: 2024-09-17

## 2024-09-17 RX ORDER — ACETAMINOPHEN 325 MG/1
2 TABLET ORAL
Qty: 24 | Refills: 0
Start: 2024-09-17 | End: 2024-09-19

## 2024-09-17 RX ORDER — FOLIC ACID/MULTIVIT,IRON,MINER 0.4MG-18MG
1 TABLET,CHEWABLE ORAL
Qty: 30 | Refills: 0
Start: 2024-09-17 | End: 2024-10-16

## 2024-09-17 RX ORDER — PSYLLIUM HUSK 0.4 G
1 CAPSULE ORAL
Qty: 30 | Refills: 0
Start: 2024-09-17 | End: 2024-10-16

## 2024-09-17 RX ADMIN — Medication 100 MILLIGRAM(S): at 00:49

## 2024-09-17 RX ADMIN — Medication 20 MILLIEQUIVALENT(S): at 11:57

## 2024-09-17 RX ADMIN — Medication 220 MILLIGRAM(S): at 11:54

## 2024-09-17 RX ADMIN — LOSARTAN POTASSIUM 25 MILLIGRAM(S): 50 TABLET ORAL at 05:48

## 2024-09-17 RX ADMIN — FLUTICASONE PROPIONATE AND SALMETEROL 1 DOSE(S): 250; 50 POWDER RESPIRATORY (INHALATION) at 11:55

## 2024-09-17 RX ADMIN — Medication 40 MILLIGRAM(S): at 05:53

## 2024-09-17 RX ADMIN — METOPROLOL TARTRATE 25 MILLIGRAM(S): 100 TABLET ORAL at 05:48

## 2024-09-17 RX ADMIN — MONTELUKAST SODIUM 10 MILLIGRAM(S): 5 TABLET, CHEWABLE ORAL at 11:54

## 2024-09-17 RX ADMIN — DAPAGLIFLOZIN 10 MILLIGRAM(S): 10 TABLET, FILM COATED ORAL at 11:54

## 2024-09-17 RX ADMIN — APIXABAN 2.5 MILLIGRAM(S): 5 TABLET, FILM COATED ORAL at 05:48

## 2024-09-17 RX ADMIN — Medication 1 TABLET(S): at 11:54

## 2024-09-17 NOTE — PROGRESS NOTE ADULT - SUBJECTIVE AND OBJECTIVE BOX
Patient is a 89y old  Female who presents with a chief complaint of sepsis 2/2 multifocal pneumonia (16 Sep 2024 17:21)    pt seen in icu [  ], reg med floor [ x  ], bed [  ], chair at bedside [   ], awake and responsive [ x ],   lethargic [  ],  nad [x  ]        Allergies    gabapentin (Unknown)        Vitals    T(F): 97.8 (09-17-24 @ 05:11), Max: 98.4 (09-16-24 @ 19:51)  HR: 101 (09-17-24 @ 05:11) (100 - 114)  BP: 143/81 (09-17-24 @ 05:11) (129/82 - 143/81)  RR: 17 (09-17-24 @ 05:11) (17 - 17)  SpO2: 96% (09-17-24 @ 05:11) (93% - 99%)  Wt(kg): --  CAPILLARY BLOOD GLUCOSE      POCT Blood Glucose.: 118 mg/dL (16 Sep 2024 21:09)      Labs                          11.5   10.73 )-----------( 337      ( 16 Sep 2024 07:20 )             34.8       09-16    141  |  110<H>  |  18  ----------------------------<  133<H>  3.3<L>   |  18<L>  |  0.53    Ca    8.8      16 Sep 2024 07:20  Phos  2.6     09-15  Mg     1.6     09-15              Clean Catch  09-15 @ 01:09   <10,000 CFU/mL Normal Urogenital Natalie  --  --      .Blood Blood-Peripheral  09-13 @ 01:07   No growth at 4 days  --  --      .Blood Blood-Peripheral  09-13 @ 01:00   No growth at 4 days  --  --      .Blood Blood-Peripheral  07-31 @ 06:30   No growth at 5 days  --  --      .Blood Blood-Peripheral  07-31 @ 06:00   No growth at 5 days  --  --      Clean Catch  07-29 @ 04:31   >=3 organisms. Probable collection contamination.  --  --      .Blood Blood-Peripheral  07-29 @ 01:10   No growth at 5 days  --  --      .Blood Blood-Peripheral  07-29 @ 01:05   Growth in anaerobic bottle: Streptococcus gallolyticus  Growth in aerobic bottle: Streptococcus salivarius/vestibularis group  "Susceptibilities not performed"  Direct identification is available within approximately 3-5  hours either by Blood Panel Multiplexed PCR or Direct  MALDI-TOF. Details: [email or web address]  --  Blood Culture PCR  Streptococcus gallolyticus          Radiology Results      Meds    MEDICATIONS  (STANDING):  apixaban 2.5 milliGRAM(s) Oral every 12 hours  atorvastatin 10 milliGRAM(s) Oral at bedtime  cefTRIAXone   IVPB 1000 milliGRAM(s) IV Intermittent every 24 hours  dapagliflozin 10 milliGRAM(s) Oral daily  fluticasone propionate/ salmeterol 100-50 MICROgram(s) Diskus 1 Dose(s) Inhalation two times a day  insulin lispro (ADMELOG) corrective regimen sliding scale   SubCutaneous three times a day before meals  insulin lispro (ADMELOG) corrective regimen sliding scale   SubCutaneous at bedtime  losartan 25 milliGRAM(s) Oral daily  metoprolol tartrate 25 milliGRAM(s) Oral every 12 hours  montelukast 10 milliGRAM(s) Oral daily  multivitamin/minerals 1 Tablet(s) Oral daily  pantoprazole    Tablet 40 milliGRAM(s) Oral before breakfast  zinc sulfate 220 milliGRAM(s) Oral daily      MEDICATIONS  (PRN):  acetaminophen     Tablet .. 650 milliGRAM(s) Oral every 6 hours PRN Temp greater or equal to 38C (100.4F), Mild Pain (1 - 3)  albuterol    90 MICROgram(s) HFA Inhaler 2 Puff(s) Inhalation every 6 hours PRN Shortness of Breath and/or Wheezing  melatonin 3 milliGRAM(s) Oral at bedtime PRN Insomnia      Physical Exam    Neuro :  no focal deficits  Respiratory: B/L LL crackles  CV: RRR, S1S2, no murmurs,   Abdominal: Soft, NT, ND +BS,  Extremities: No edema, + peripheral pulses    ASSESSMENT    acute hypoxic resp failure 2nd to multifocal pneumonia,    sepsis,   h/o HTN,   HLD,   T2DM,   osteoporosis,   Stage IIB gastric adenocarcinoma s/p distal gastrectomy (2015),   multiple myeloma/plastocytoma on chemo (follows [facility] Dr Adams),   bilateral PE (1/2023) on Eliquis  Dementia  Ambulatory dysfunction  S/P hysterectomy  S/P tubal ligation        PLAN    blood cx neg noted above   procal elevated noted    MRSA PCR and  Legionella urine Ag neg noted    id f/u   Discontinued Azithromycin   Continue Ceftriaxone while inpatient   may change to oral Augmentin 875mg q 12hours on discharge to continue until 9/20/24  atrov and prov nebs,   pt now tolerating ra   pulm f/u   robitussin prn,   Bronchodilators  Symbicort HFA 80/4.5 mcgs 2 puffs po BID with spacer   montelukast 10 mgs po Qhs  hold outpt dm meds,   lispro ss,   heme onc cons   resume lopressor 25 mg q12   resume losartan 25 mg daily   cont current meds        Patient is a 89y old  Female who presents with a chief complaint of sepsis 2/2 multifocal pneumonia (16 Sep 2024 17:21)    pt seen in icu [  ], reg med floor [ x  ], bed [  ], chair at bedside [   ], awake and responsive [ x ],   lethargic [  ],  nad [x  ]        Allergies    gabapentin (Unknown)        Vitals    T(F): 97.8 (09-17-24 @ 05:11), Max: 98.4 (09-16-24 @ 19:51)  HR: 101 (09-17-24 @ 05:11) (100 - 114)  BP: 143/81 (09-17-24 @ 05:11) (129/82 - 143/81)  RR: 17 (09-17-24 @ 05:11) (17 - 17)  SpO2: 96% (09-17-24 @ 05:11) (93% - 99%)  Wt(kg): --  CAPILLARY BLOOD GLUCOSE      POCT Blood Glucose.: 118 mg/dL (16 Sep 2024 21:09)      Labs                          11.5   10.73 )-----------( 337      ( 16 Sep 2024 07:20 )             34.8       09-16    141  |  110<H>  |  18  ----------------------------<  133<H>  3.3<L>   |  18<L>  |  0.53    Ca    8.8      16 Sep 2024 07:20  Phos  2.6     09-15  Mg     1.6     09-15              Clean Catch  09-15 @ 01:09   <10,000 CFU/mL Normal Urogenital Natalie  --  --      .Blood Blood-Peripheral  09-13 @ 01:07   No growth at 4 days  --  --      .Blood Blood-Peripheral  09-13 @ 01:00   No growth at 4 days  --  --        Radiology Results      Meds    MEDICATIONS  (STANDING):  apixaban 2.5 milliGRAM(s) Oral every 12 hours  atorvastatin 10 milliGRAM(s) Oral at bedtime  cefTRIAXone   IVPB 1000 milliGRAM(s) IV Intermittent every 24 hours  dapagliflozin 10 milliGRAM(s) Oral daily  fluticasone propionate/ salmeterol 100-50 MICROgram(s) Diskus 1 Dose(s) Inhalation two times a day  insulin lispro (ADMELOG) corrective regimen sliding scale   SubCutaneous three times a day before meals  insulin lispro (ADMELOG) corrective regimen sliding scale   SubCutaneous at bedtime  losartan 25 milliGRAM(s) Oral daily  metoprolol tartrate 25 milliGRAM(s) Oral every 12 hours  montelukast 10 milliGRAM(s) Oral daily  multivitamin/minerals 1 Tablet(s) Oral daily  pantoprazole    Tablet 40 milliGRAM(s) Oral before breakfast  zinc sulfate 220 milliGRAM(s) Oral daily      MEDICATIONS  (PRN):  acetaminophen     Tablet .. 650 milliGRAM(s) Oral every 6 hours PRN Temp greater or equal to 38C (100.4F), Mild Pain (1 - 3)  albuterol    90 MICROgram(s) HFA Inhaler 2 Puff(s) Inhalation every 6 hours PRN Shortness of Breath and/or Wheezing  melatonin 3 milliGRAM(s) Oral at bedtime PRN Insomnia      Physical Exam    Neuro :  no focal deficits  Respiratory: B/L LL crackles  CV: RRR, S1S2, no murmurs,   Abdominal: Soft, NT, ND +BS,  Extremities: No edema, + peripheral pulses    ASSESSMENT    acute hypoxic resp failure 2nd to multifocal pneumonia,    sepsis,   h/o HTN,   HLD,   T2DM,   osteoporosis,   Stage IIB gastric adenocarcinoma s/p distal gastrectomy (2015),   multiple myeloma/plastocytoma on chemo (follows [facility] Dr Adams),   bilateral PE (1/2023) on [facility]  Dementia  Ambulatory dysfunction  S/P hysterectomy  S/P tubal ligation        PLAN    blood cx and ucx neg noted above   procal elevated noted    MRSA PCR and  Legionella urine Ag neg noted    id f/u   Discontinued Azithromycin   Continue Ceftriaxone while inpatient   may change to oral Augmentin 875mg q 12hours on discharge to continue until 9/20/24  atrov and prov nebs,   pt now tolerating ra   pulm f/u   robitussin prn,   Bronchodilators  Symbicort HFA 80/4.5 mcgs 2 puffs po BID with spacer   montelukast 10 mgs po Qhs  hold outpt dm meds,   lispro ss,   heme onc cons   cont lopressor 25 mg q12   cont losartan 25 mg daily   phys tx eval   cont current meds   d/c planning

## 2024-09-17 NOTE — PROGRESS NOTE ADULT - PROVIDER SPECIALTY LIST ADULT
Infectious Disease
Internal Medicine
Infectious Disease
Internal Medicine
Pulmonology
Internal Medicine
Pulmonology

## 2024-09-17 NOTE — DISCHARGE NOTE NURSING/CASE MANAGEMENT/SOCIAL WORK - NSDCFUADDAPPT_GEN_ALL_CORE_FT
APPTS ARE READY TO BE MADE: [X] YES    Best Family or Patient Contact (if needed):    Additional Information about above appointments (if needed):    1: PCP Dr. Hernandes  2: Oncology Dr. Alvarado  3:     Other comments or requests:

## 2024-09-17 NOTE — DISCHARGE NOTE NURSING/CASE MANAGEMENT/SOCIAL WORK - PATIENT PORTAL LINK FT
You can access the FollowMyHealth Patient Portal offered by [facility] by registering at the following website: [email or web address]. By joining [facility]’s FollowMyHealth portal, you will also be able to view your health information using other applications (apps) compatible with our system.

## 2024-09-17 NOTE — PROGRESS NOTE ADULT - REASON FOR ADMISSION
cough, SOB
sepsis 2/2 multifocal pneumonia
cough, SOB

## 2024-09-17 NOTE — PROGRESS NOTE ADULT - SUBJECTIVE AND OBJECTIVE BOX
Patient is a 89y old  Female who presents with a chief complaint of cough, SOB (17 Sep 2024 06:41)  Awake, alert, laying in bed in NAD. Doing well on RA    INTERVAL HPI/OVERNIGHT EVENTS:      VITAL SIGNS:  T(F): 97.8 (09-17-24 @ 05:11)  HR: 101 (09-17-24 @ 05:11)  BP: 143/81 (09-17-24 @ 05:11)  RR: 17 (09-17-24 @ 05:11)  SpO2: 96% (09-17-24 @ 05:11)  Wt(kg): --  I&O's Detail    16 Sep 2024 07:01  -  17 Sep 2024 07:00  --------------------------------------------------------  IN:  Total IN: 0 mL    OUT:    Stool (mL): 1 mL  Total OUT: 1 mL    Total NET: -1 mL              REVIEW OF SYSTEMS:    CONSTITUTIONAL:  No fevers, chills, sweats    HEENT:  Eyes:  No diplopia or blurred vision. ENT:  No earache, sore throat or runny nose.    CARDIOVASCULAR:  No pressure, squeezing, tightness, or heaviness about the chest; no palpitations.    RESPIRATORY:  Per HPI    GASTROINTESTINAL:  No abdominal pain, nausea, vomiting or diarrhea.    GENITOURINARY:  No dysuria, frequency or urgency.    NEUROLOGIC:  No paresthesias, fasciculations, seizures or weakness.    PSYCHIATRIC:  No disorder of thought or mood.      PHYSICAL EXAM:    Constitutional: Well developed and nourished  Eyes:Perrla  ENMT: normal  Neck:supple  Respiratory: good air entry  Cardiovascular: S1 S2 regular  Gastrointestinal: Soft, Non tender  Extremities: No edema  Vascular:normal  Neurological:Awake, alert,Ox3  Musculoskeletal:Normal      MEDICATIONS  (STANDING):  apixaban 2.5 milliGRAM(s) Oral every 12 hours  atorvastatin 10 milliGRAM(s) Oral at bedtime  cefTRIAXone   IVPB 1000 milliGRAM(s) IV Intermittent every 24 hours  dapagliflozin 10 milliGRAM(s) Oral daily  fluticasone propionate/ salmeterol 100-50 MICROgram(s) Diskus 1 Dose(s) Inhalation two times a day  insulin lispro (ADMELOG) corrective regimen sliding scale   SubCutaneous three times a day before meals  insulin lispro (ADMELOG) corrective regimen sliding scale   SubCutaneous at bedtime  losartan 25 milliGRAM(s) Oral daily  metoprolol tartrate 25 milliGRAM(s) Oral every 12 hours  montelukast 10 milliGRAM(s) Oral daily  multivitamin/minerals 1 Tablet(s) Oral daily  pantoprazole    Tablet 40 milliGRAM(s) Oral before breakfast  zinc sulfate 220 milliGRAM(s) Oral daily    MEDICATIONS  (PRN):  acetaminophen     Tablet .. 650 milliGRAM(s) Oral every 6 hours PRN Temp greater or equal to 38C (100.4F), Mild Pain (1 - 3)  albuterol    90 MICROgram(s) HFA Inhaler 2 Puff(s) Inhalation every 6 hours PRN Shortness of Breath and/or Wheezing  melatonin 3 milliGRAM(s) Oral at bedtime PRN Insomnia      Allergies    gabapentin (Unknown)    Intolerances        LABS:                        11.5   10.73 )-----------( 337      ( 16 Sep 2024 07:20 )             34.8     09-16    141  |  110[H]  |  18  ----------------------------<  133[H]  3.3[L]   |  18[L]  |  0.53    Ca    8.8      16 Sep 2024 07:20  Phos  2.6     09-15  Mg     1.6     09-15        Urinalysis Basic - ( 16 Sep 2024 07:20 )    Color: x / Appearance: x / SG: x / pH: x  Gluc: 133 mg/dL / Ketone: x  / Bili: x / Urobili: x   Blood: x / Protein: x / Nitrite: x   Leuk Esterase: x / RBC: x / WBC x   Sq Epi: x / Non Sq Epi: x / Bacteria: x            CAPILLARY BLOOD GLUCOSE      POCT Blood Glucose.: 131 mg/dL (17 Sep 2024 07:48)  POCT Blood Glucose.: 118 mg/dL (16 Sep 2024 21:09)  POCT Blood Glucose.: 122 mg/dL (16 Sep 2024 16:52)  POCT Blood Glucose.: 124 mg/dL (16 Sep 2024 11:32)        RADIOLOGY & ADDITIONAL TESTS:  < from: CT Angio Chest PE Protocol w/ IV Cont (09.13.24 @ 03:45) >  IMPRESSION:  No pulmonary embolus.    Patchy opacities in the right upper and right middle lobe with bilateral   lower lobe dependent consolidations versus atelectasis. Findings   concerning for multifocal pneumonia.        < end of copied text >    CXR:    Ct scan chest:    ekg;    echo: 23-Jun-2021

## 2024-09-17 NOTE — PROGRESS NOTE ADULT - PROBLEM SELECTOR PLAN 8
Hem/Onc follow up as OP.
continue home meds: simvastatin 5mg interchange to atorvastatin 10 mg qhs
Hem/Onc follow up as OP.

## 2024-09-17 NOTE — PROGRESS NOTE ADULT - PROBLEM SELECTOR PLAN 3
Improved  oxygen supp via NC  monitor oxygen sat  monitor resp status.
now tolerating room air  - resolved
Improved  oxygen supp via NC  monitor oxygen sat  monitor resp status.

## 2024-10-01 NOTE — DIETITIAN INITIAL EVALUATION ADULT - FEEDING SKILL
[Negative] : Heme/Lymph [Joint Pain] : no joint pain [Joint Stiffness] : no joint stiffness age appropriate assistance

## 2024-11-02 ENCOUNTER — INPATIENT (INPATIENT)
Facility: HOSPITAL | Age: 89
LOS: 10 days | Discharge: HOME | DRG: 872 | End: 2024-11-13
Attending: INTERNAL MEDICINE | Admitting: INTERNAL MEDICINE
Payer: MEDICAID

## 2024-11-02 VITALS
HEART RATE: 90 BPM | HEIGHT: 55 IN | WEIGHT: 125 LBS | SYSTOLIC BLOOD PRESSURE: 144 MMHG | TEMPERATURE: 99 F | DIASTOLIC BLOOD PRESSURE: 67 MMHG | OXYGEN SATURATION: 98 % | RESPIRATION RATE: 20 BRPM

## 2024-11-02 DIAGNOSIS — Z90.710 ACQUIRED ABSENCE OF BOTH CERVIX AND UTERUS: Chronic | ICD-10-CM

## 2024-11-02 DIAGNOSIS — Z98.51 TUBAL LIGATION STATUS: Chronic | ICD-10-CM

## 2024-11-02 LAB
ALBUMIN SERPL ELPH-MCNC: 3 G/DL — LOW (ref 3.5–5)
ALP SERPL-CCNC: 47 U/L — SIGNIFICANT CHANGE UP (ref 40–120)
ALT FLD-CCNC: 17 U/L DA — SIGNIFICANT CHANGE UP (ref 10–60)
ANION GAP SERPL CALC-SCNC: 14 MMOL/L — SIGNIFICANT CHANGE UP (ref 5–17)
AST SERPL-CCNC: 24 U/L — SIGNIFICANT CHANGE UP (ref 10–40)
BASOPHILS # BLD AUTO: 0.02 K/UL — SIGNIFICANT CHANGE UP (ref 0–0.2)
BASOPHILS NFR BLD AUTO: 0.2 % — SIGNIFICANT CHANGE UP (ref 0–2)
BILIRUB SERPL-MCNC: 0.2 MG/DL — SIGNIFICANT CHANGE UP (ref 0.2–1.2)
BUN SERPL-MCNC: 22 MG/DL — HIGH (ref 7–18)
CALCIUM SERPL-MCNC: 8.8 MG/DL — SIGNIFICANT CHANGE UP (ref 8.4–10.5)
CHLORIDE SERPL-SCNC: 114 MMOL/L — HIGH (ref 96–108)
CO2 SERPL-SCNC: 15 MMOL/L — LOW (ref 22–31)
CREAT SERPL-MCNC: 1.22 MG/DL — SIGNIFICANT CHANGE UP (ref 0.5–1.3)
D DIMER BLD IA.RAPID-MCNC: 164 NG/ML DDU — SIGNIFICANT CHANGE UP
EGFR: 43 ML/MIN/1.73M2 — LOW
EOSINOPHIL # BLD AUTO: 0 K/UL — SIGNIFICANT CHANGE UP (ref 0–0.5)
EOSINOPHIL NFR BLD AUTO: 0 % — SIGNIFICANT CHANGE UP (ref 0–6)
GLUCOSE SERPL-MCNC: 115 MG/DL — HIGH (ref 70–99)
HCT VFR BLD CALC: 32.5 % — LOW (ref 34.5–45)
HGB BLD-MCNC: 10.6 G/DL — LOW (ref 11.5–15.5)
IMM GRANULOCYTES NFR BLD AUTO: 0.4 % — SIGNIFICANT CHANGE UP (ref 0–0.9)
LACTATE SERPL-SCNC: 8.8 MMOL/L — CRITICAL HIGH (ref 0.7–2)
LYMPHOCYTES # BLD AUTO: 3.43 K/UL — HIGH (ref 1–3.3)
LYMPHOCYTES # BLD AUTO: 31.7 % — SIGNIFICANT CHANGE UP (ref 13–44)
MCHC RBC-ENTMCNC: 32.6 G/DL — SIGNIFICANT CHANGE UP (ref 32–36)
MCHC RBC-ENTMCNC: 32.8 PG — SIGNIFICANT CHANGE UP (ref 27–34)
MCV RBC AUTO: 100.6 FL — HIGH (ref 80–100)
MONOCYTES # BLD AUTO: 0.94 K/UL — HIGH (ref 0–0.9)
MONOCYTES NFR BLD AUTO: 8.7 % — SIGNIFICANT CHANGE UP (ref 2–14)
NEUTROPHILS # BLD AUTO: 6.38 K/UL — SIGNIFICANT CHANGE UP (ref 1.8–7.4)
NEUTROPHILS NFR BLD AUTO: 59 % — SIGNIFICANT CHANGE UP (ref 43–77)
NRBC # BLD: 0 /100 WBCS — SIGNIFICANT CHANGE UP (ref 0–0)
NT-PROBNP SERPL-SCNC: 1062 PG/ML — HIGH (ref 0–450)
PLATELET # BLD AUTO: 240 K/UL — SIGNIFICANT CHANGE UP (ref 150–400)
POTASSIUM SERPL-MCNC: 4.1 MMOL/L — SIGNIFICANT CHANGE UP (ref 3.5–5.3)
POTASSIUM SERPL-SCNC: 4.1 MMOL/L — SIGNIFICANT CHANGE UP (ref 3.5–5.3)
PROT SERPL-MCNC: 6.2 G/DL — SIGNIFICANT CHANGE UP (ref 6–8.3)
RBC # BLD: 3.23 M/UL — LOW (ref 3.8–5.2)
RBC # FLD: 16.9 % — HIGH (ref 10.3–14.5)
SODIUM SERPL-SCNC: 143 MMOL/L — SIGNIFICANT CHANGE UP (ref 135–145)
TROPONIN I, HIGH SENSITIVITY RESULT: 9.6 NG/L — SIGNIFICANT CHANGE UP
WBC # BLD: 10.81 K/UL — HIGH (ref 3.8–10.5)
WBC # FLD AUTO: 10.81 K/UL — HIGH (ref 3.8–10.5)

## 2024-11-02 PROCEDURE — 71045 X-RAY EXAM CHEST 1 VIEW: CPT | Mod: 26

## 2024-11-02 PROCEDURE — 99285 EMERGENCY DEPT VISIT HI MDM: CPT

## 2024-11-02 NOTE — ED ADULT NURSE NOTE - SUICIDE SCREENING DEPRESSION
Render In Strict Bullet Format?: No Negative Plan: Elta Md or [place] P sunscreen Detail Level: Zone Continue Regimen: azelaic acid 15 % topical gel - Apply to full face once daily after cleansing. Continue Regimen: doxycycline hyclate 100 mg tablet - Take 1 tablet by mouth once daily with food. After one more month (June 10th) reduce to 50 mg daily (break 100mg tablet in half) Modify Regimen: pimecrolimus 1 % topical cream - only as needed for irritation

## 2024-11-02 NOTE — ED ADULT NURSE NOTE - INTERPRETATION SERVICES DECLINED
Problem: Patient Education: Go to Patient Education Activity  Goal: Patient/Family Education  Outcome: Progressing Towards Goal     Problem: Patient Education: Go to Patient Education Activity  Goal: Patient/Family Education  Outcome: Progressing Towards Goal     Problem: Falls - Risk of  Goal: *Absence of Falls  Description: Document Miguel Ko Fall Risk and appropriate interventions in the flowsheet.   Outcome: Progressing Towards Goal     Problem: Patient Education: Go to Patient Education Activity  Goal: Patient/Family Education  Outcome: Progressing Towards Goal Patient/Caregiver requests family/friend to interpret.

## 2024-11-03 DIAGNOSIS — J45.909 UNSPECIFIED ASTHMA, UNCOMPLICATED: ICD-10-CM

## 2024-11-03 DIAGNOSIS — Z29.9 ENCOUNTER FOR PROPHYLACTIC MEASURES, UNSPECIFIED: ICD-10-CM

## 2024-11-03 DIAGNOSIS — I26.99 OTHER PULMONARY EMBOLISM WITHOUT ACUTE COR PULMONALE: ICD-10-CM

## 2024-11-03 DIAGNOSIS — I10 ESSENTIAL (PRIMARY) HYPERTENSION: ICD-10-CM

## 2024-11-03 DIAGNOSIS — N17.9 ACUTE KIDNEY FAILURE, UNSPECIFIED: ICD-10-CM

## 2024-11-03 DIAGNOSIS — C16.9 MALIGNANT NEOPLASM OF STOMACH, UNSPECIFIED: ICD-10-CM

## 2024-11-03 DIAGNOSIS — E78.5 HYPERLIPIDEMIA, UNSPECIFIED: ICD-10-CM

## 2024-11-03 DIAGNOSIS — E87.20 ACIDOSIS, UNSPECIFIED: ICD-10-CM

## 2024-11-03 DIAGNOSIS — E11.9 TYPE 2 DIABETES MELLITUS WITHOUT COMPLICATIONS: ICD-10-CM

## 2024-11-03 LAB
ANION GAP SERPL CALC-SCNC: 9 MMOL/L — SIGNIFICANT CHANGE UP (ref 5–17)
APPEARANCE UR: ABNORMAL
BILIRUB UR-MCNC: NEGATIVE — SIGNIFICANT CHANGE UP
BUN SERPL-MCNC: 13 MG/DL — SIGNIFICANT CHANGE UP (ref 7–18)
CALCIUM SERPL-MCNC: 9 MG/DL — SIGNIFICANT CHANGE UP (ref 8.4–10.5)
CHLORIDE SERPL-SCNC: 115 MMOL/L — HIGH (ref 96–108)
CO2 SERPL-SCNC: 21 MMOL/L — LOW (ref 22–31)
COLOR SPEC: YELLOW — SIGNIFICANT CHANGE UP
CREAT SERPL-MCNC: 0.73 MG/DL — SIGNIFICANT CHANGE UP (ref 0.5–1.3)
DIFF PNL FLD: ABNORMAL
EGFR: 80 ML/MIN/1.73M2 — SIGNIFICANT CHANGE UP
GLUCOSE BLDC GLUCOMTR-MCNC: 118 MG/DL — HIGH (ref 70–99)
GLUCOSE BLDC GLUCOMTR-MCNC: 85 MG/DL — SIGNIFICANT CHANGE UP (ref 70–99)
GLUCOSE BLDC GLUCOMTR-MCNC: 87 MG/DL — SIGNIFICANT CHANGE UP (ref 70–99)
GLUCOSE BLDC GLUCOMTR-MCNC: 93 MG/DL — SIGNIFICANT CHANGE UP (ref 70–99)
GLUCOSE BLDC GLUCOMTR-MCNC: 96 MG/DL — SIGNIFICANT CHANGE UP (ref 70–99)
GLUCOSE SERPL-MCNC: 89 MG/DL — SIGNIFICANT CHANGE UP (ref 70–99)
GLUCOSE UR QL: 500 MG/DL
HCT VFR BLD CALC: 33.2 % — LOW (ref 34.5–45)
HGB BLD-MCNC: 10.9 G/DL — LOW (ref 11.5–15.5)
KETONES UR-MCNC: NEGATIVE MG/DL — SIGNIFICANT CHANGE UP
LACTATE SERPL-SCNC: 4.5 MMOL/L — CRITICAL HIGH (ref 0.7–2)
LACTATE SERPL-SCNC: 7.5 MMOL/L — CRITICAL HIGH (ref 0.7–2)
LACTATE SERPL-SCNC: 7.8 MMOL/L — CRITICAL HIGH (ref 0.7–2)
LEUKOCYTE ESTERASE UR-ACNC: ABNORMAL
MAGNESIUM SERPL-MCNC: 1.4 MG/DL — LOW (ref 1.6–2.6)
MCHC RBC-ENTMCNC: 31.7 PG — SIGNIFICANT CHANGE UP (ref 27–34)
MCHC RBC-ENTMCNC: 32.8 G/DL — SIGNIFICANT CHANGE UP (ref 32–36)
MCV RBC AUTO: 96.5 FL — SIGNIFICANT CHANGE UP (ref 80–100)
NITRITE UR-MCNC: NEGATIVE — SIGNIFICANT CHANGE UP
NRBC # BLD: 0 /100 WBCS — SIGNIFICANT CHANGE UP (ref 0–0)
PH UR: 6 — SIGNIFICANT CHANGE UP (ref 5–8)
PHOSPHATE SERPL-MCNC: 1.7 MG/DL — LOW (ref 2.5–4.5)
PLATELET # BLD AUTO: 232 K/UL — SIGNIFICANT CHANGE UP (ref 150–400)
POTASSIUM SERPL-MCNC: 3.7 MMOL/L — SIGNIFICANT CHANGE UP (ref 3.5–5.3)
POTASSIUM SERPL-SCNC: 3.7 MMOL/L — SIGNIFICANT CHANGE UP (ref 3.5–5.3)
PROT UR-MCNC: ABNORMAL MG/DL
RBC # BLD: 3.44 M/UL — LOW (ref 3.8–5.2)
RBC # FLD: 16.5 % — HIGH (ref 10.3–14.5)
SODIUM SERPL-SCNC: 145 MMOL/L — SIGNIFICANT CHANGE UP (ref 135–145)
SP GR SPEC: 1.01 — SIGNIFICANT CHANGE UP (ref 1–1.03)
UROBILINOGEN FLD QL: 0.2 MG/DL — SIGNIFICANT CHANGE UP (ref 0.2–1)
WBC # BLD: 15.12 K/UL — HIGH (ref 3.8–10.5)
WBC # FLD AUTO: 15.12 K/UL — HIGH (ref 3.8–10.5)

## 2024-11-03 RX ORDER — SODIUM CHLORIDE 9 MG/ML
1000 INJECTION, SOLUTION INTRAMUSCULAR; INTRAVENOUS; SUBCUTANEOUS
Refills: 0 | Status: COMPLETED | OUTPATIENT
Start: 2024-11-03 | End: 2024-11-04

## 2024-11-03 RX ORDER — INSULIN LISPRO 100/ML
VIAL (ML) SUBCUTANEOUS
Refills: 0 | Status: DISCONTINUED | OUTPATIENT
Start: 2024-11-03 | End: 2024-11-13

## 2024-11-03 RX ORDER — METOPROLOL TARTRATE 50 MG
25 TABLET ORAL
Refills: 0 | Status: DISCONTINUED | OUTPATIENT
Start: 2024-11-03 | End: 2024-11-04

## 2024-11-03 RX ORDER — SODIUM CHLORIDE 9 MG/ML
1000 INJECTION, SOLUTION INTRAMUSCULAR; INTRAVENOUS; SUBCUTANEOUS ONCE
Refills: 0 | Status: COMPLETED | OUTPATIENT
Start: 2024-11-03 | End: 2024-11-03

## 2024-11-03 RX ORDER — MAGNESIUM SULFATE IN 0.9% NACL 2 G/50 ML
1 INTRAVENOUS SOLUTION, PIGGYBACK (ML) INTRAVENOUS ONCE
Refills: 0 | Status: COMPLETED | OUTPATIENT
Start: 2024-11-03 | End: 2024-11-03

## 2024-11-03 RX ORDER — APIXABAN 5 MG/1
2.5 TABLET, FILM COATED ORAL EVERY 12 HOURS
Refills: 0 | Status: DISCONTINUED | OUTPATIENT
Start: 2024-11-03 | End: 2024-11-13

## 2024-11-03 RX ORDER — IPRATROPIUM BROMIDE AND ALBUTEROL SULFATE .5; 2.5 MG/3ML; MG/3ML
3 SOLUTION RESPIRATORY (INHALATION) EVERY 6 HOURS
Refills: 0 | Status: DISCONTINUED | OUTPATIENT
Start: 2024-11-03 | End: 2024-11-13

## 2024-11-03 RX ORDER — HALOPERIDOL DECANOATE 50 MG/ML
2 INJECTION INTRAMUSCULAR ONCE
Refills: 0 | Status: COMPLETED | OUTPATIENT
Start: 2024-11-03 | End: 2024-11-03

## 2024-11-03 RX ORDER — NIFEDIPINE 90 MG
30 TABLET, EXTENDED RELEASE 24 HR ORAL DAILY
Refills: 0 | Status: DISCONTINUED | OUTPATIENT
Start: 2024-11-03 | End: 2024-11-08

## 2024-11-03 RX ORDER — CEFTRIAXONE SODIUM 10 G
1000 VIAL (EA) INJECTION EVERY 24 HOURS
Refills: 0 | Status: DISCONTINUED | OUTPATIENT
Start: 2024-11-03 | End: 2024-11-06

## 2024-11-03 RX ORDER — MELATONIN 5 MG
3 TABLET ORAL AT BEDTIME
Refills: 0 | Status: DISCONTINUED | OUTPATIENT
Start: 2024-11-03 | End: 2024-11-13

## 2024-11-03 RX ORDER — FLUTICASONE PROPIONATE AND SALMETEROL XINAFOATE 230; 21 UG/1; UG/1
1 AEROSOL, METERED RESPIRATORY (INHALATION)
Refills: 0 | Status: DISCONTINUED | OUTPATIENT
Start: 2024-11-03 | End: 2024-11-13

## 2024-11-03 RX ORDER — AZITHROMYCIN DIHYDRATE 200 MG/5ML
500 POWDER, FOR SUSPENSION ORAL EVERY 24 HOURS
Refills: 0 | Status: DISCONTINUED | OUTPATIENT
Start: 2024-11-04 | End: 2024-11-05

## 2024-11-03 RX ORDER — AZITHROMYCIN DIHYDRATE 200 MG/5ML
500 POWDER, FOR SUSPENSION ORAL ONCE
Refills: 0 | Status: COMPLETED | OUTPATIENT
Start: 2024-11-03 | End: 2024-11-03

## 2024-11-03 RX ORDER — POTASSIUM PHOSPHATE 236; 224 MG/ML; MG/ML
30 INJECTION, SOLUTION INTRAVENOUS ONCE
Refills: 0 | Status: COMPLETED | OUTPATIENT
Start: 2024-11-03 | End: 2024-11-03

## 2024-11-03 RX ORDER — INSULIN LISPRO 100/ML
VIAL (ML) SUBCUTANEOUS AT BEDTIME
Refills: 0 | Status: DISCONTINUED | OUTPATIENT
Start: 2024-11-03 | End: 2024-11-13

## 2024-11-03 RX ORDER — AZITHROMYCIN DIHYDRATE 200 MG/5ML
POWDER, FOR SUSPENSION ORAL
Refills: 0 | Status: DISCONTINUED | OUTPATIENT
Start: 2024-11-03 | End: 2024-11-05

## 2024-11-03 RX ORDER — PANTOPRAZOLE SODIUM 40 MG/1
40 TABLET, DELAYED RELEASE ORAL
Refills: 0 | Status: DISCONTINUED | OUTPATIENT
Start: 2024-11-03 | End: 2024-11-13

## 2024-11-03 RX ADMIN — Medication 25 MILLIGRAM(S): at 17:46

## 2024-11-03 RX ADMIN — Medication 100 GRAM(S): at 10:46

## 2024-11-03 RX ADMIN — FLUTICASONE PROPIONATE AND SALMETEROL XINAFOATE 1 DOSE(S): 230; 21 AEROSOL, METERED RESPIRATORY (INHALATION) at 22:29

## 2024-11-03 RX ADMIN — SODIUM CHLORIDE 80 MILLILITER(S): 9 INJECTION, SOLUTION INTRAMUSCULAR; INTRAVENOUS; SUBCUTANEOUS at 07:44

## 2024-11-03 RX ADMIN — Medication 30 MILLIGRAM(S): at 06:10

## 2024-11-03 RX ADMIN — HALOPERIDOL DECANOATE 2 MILLIGRAM(S): 50 INJECTION INTRAMUSCULAR at 13:53

## 2024-11-03 RX ADMIN — APIXABAN 2.5 MILLIGRAM(S): 5 TABLET, FILM COATED ORAL at 06:08

## 2024-11-03 RX ADMIN — SODIUM CHLORIDE 1000 MILLILITER(S): 9 INJECTION, SOLUTION INTRAMUSCULAR; INTRAVENOUS; SUBCUTANEOUS at 03:28

## 2024-11-03 RX ADMIN — Medication 25 MILLIGRAM(S): at 06:08

## 2024-11-03 RX ADMIN — SODIUM CHLORIDE 80 MILLILITER(S): 9 INJECTION, SOLUTION INTRAMUSCULAR; INTRAVENOUS; SUBCUTANEOUS at 23:59

## 2024-11-03 RX ADMIN — SODIUM CHLORIDE 1000 MILLILITER(S): 9 INJECTION, SOLUTION INTRAMUSCULAR; INTRAVENOUS; SUBCUTANEOUS at 07:36

## 2024-11-03 RX ADMIN — AZITHROMYCIN DIHYDRATE 500 MILLIGRAM(S): 200 POWDER, FOR SUSPENSION ORAL at 04:41

## 2024-11-03 RX ADMIN — APIXABAN 2.5 MILLIGRAM(S): 5 TABLET, FILM COATED ORAL at 17:46

## 2024-11-03 RX ADMIN — PANTOPRAZOLE SODIUM 40 MILLIGRAM(S): 40 TABLET, DELAYED RELEASE ORAL at 06:08

## 2024-11-03 RX ADMIN — FLUTICASONE PROPIONATE AND SALMETEROL XINAFOATE 1 DOSE(S): 230; 21 AEROSOL, METERED RESPIRATORY (INHALATION) at 12:22

## 2024-11-03 RX ADMIN — POTASSIUM PHOSPHATE 83.33 MILLIMOLE(S): 236; 224 INJECTION, SOLUTION INTRAVENOUS at 12:22

## 2024-11-03 RX ADMIN — Medication 100 MILLIGRAM(S): at 07:44

## 2024-11-03 RX ADMIN — Medication 10 MILLIGRAM(S): at 22:30

## 2024-11-03 NOTE — PROVIDER CONTACT NOTE (CRITICAL VALUE NOTIFICATION) - BACKGROUND
Covering for RN Cheyenne during morning break Pt Denies chest pain or SOB and no distress noted on assessment. PT verbalizes no needs at this time. Plan of care ongoing. PT safety maintained e Safety maintained e.g. Bed alarm activated and at lowest position. Call bell within arm's reach and instructed pt. to use call bell for assistance.

## 2024-11-03 NOTE — ED PROVIDER NOTE - WR INTERPRETATION 1
Intermediate memory loss  The patient and family for the past 6 months at least   Same since onset       -No progression or change since hospitalization    Currently alert and oriented CXR negative - No pneumothorax, No opacities, No free air

## 2024-11-03 NOTE — ED PROVIDER NOTE - PROGRESS NOTE DETAILS
ADDENDUM by Ole Aceves M.D.: I received sign-off from Dr. ANALIA Cheung. 89-year-old female with history of PE on Eliquis, HTN, HLD, DM, gastric cancer, dementia, presented with appearance of discomfort this afternoon, now back to baseline, labs unremarkable other than elevated lactate, out of proportion to patient's presentation, I was to follow-up rpt lactate. On multiple assessments, patient laying comfortably in bed, no WOB, abdomen soft, entirely nontender, nondistended, no work of breathing, pleasant and smiling. Patient appears nontoxic and does not appear in any pain or have any other issues. Repeat lactate prior to any treatment slightly down trended. Noted CHARISSE compared to her prior presentation and will give IV fluids.

## 2024-11-03 NOTE — H&P ADULT - PROBLEM SELECTOR PLAN 1
-patient is brought in by family due to SOB and chest tightness, patient unable to provide any hx or ROS   -has hx of gastric cancer, multiple myeloma and treated 2 days ago with chemo with    -noted on labs to have an charisse  -currently on metformin  -family claiming that has been coughing yellow sputum recently and noted to have a low grade temp at 99.1F   -etiology unclear   -lactate downtrending  -will start CTX and azithro to cover infectious cause   -f/u metormin levels   -could also be in the setting of malignancy vs CHARISSE decreased clearance   -f/u serial lactate

## 2024-11-03 NOTE — ED PROVIDER NOTE - CARE PLAN
Principal Discharge DX:	CHARISSE (acute kidney injury)  Secondary Diagnosis:	High serum lactate  Secondary Diagnosis:	Chest discomfort   1

## 2024-11-03 NOTE — PATIENT PROFILE ADULT - FALL HARM RISK - FACTORS
Impaired gait/Impaired vision/Medication side effects/Orthostatic hypotension/Other medical problems/Weakness/Other

## 2024-11-03 NOTE — CONSULT NOTE ADULT - SUBJECTIVE AND OBJECTIVE BOX
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 On admission, she found to have no fever but Leukocytosis and positive Urine analysis. The CXR is negative. She has started on Ceftriaxone and Azithromycin, and the ID consult requested to assist with further evaluation and antibiotic management.      REVIEW OF SYSTEMS: Total of twelve systems have been reviewed with patient and found to be negative unless mentioned in HPI      PAST MEDICAL & SURGICAL HISTORY:  HTN (hypertension)  DM (diabetes mellitus)  Hypercholesteremia  Gastric adenocarcinoma  s/p resection  Vertigo  Dementia  Ambulatory dysfunction  Multiple myeloma  Pulmonary embolism  S/P hysterectomy  S/P tubal ligation        SOCIAL HISTORY  Alcohol: Does not drink  Tobacco: Does not smoke  Illicit substance use: None      FAMILY HISTORY: Non contributory to the present illness      ALLERGIES: gabapentin (Unknown)      Vital Signs Last 24 Hrs  T(C): 37.4 (03 Nov 2024 10:45), Max: 37.6 (03 Nov 2024 04:38)  T(F): 99.3 (03 Nov 2024 10:45), Max: 99.6 (03 Nov 2024 04:38)  HR: 90 (03 Nov 2024 10:45) (81 - 98)  BP: 127/81 (03 Nov 2024 10:45) (127/81 - 144/67)  BP(mean): 97 (03 Nov 2024 10:45) (97 - 97)  RR: 19 (03 Nov 2024 10:45) (19 - 23)  SpO2: 93% (03 Nov 2024 10:45) (93% - 100%)    Parameters below as of 03 Nov 2024 10:45  Patient On (Oxygen Delivery Method): room air      PHYSICAL EXAM:  GENERAL: Not in distress   CHEST/LUNG:  Air entry bilaterally  HEART: s1 and s2 present  ABDOMEN:  Nontender and  Nondistended  EXTREMITIES: No pedal  edema  CNS: Awake and Alert      LABS:                        10.9   15.12 )-----------( 232      ( 03 Nov 2024 08:40 )             33.2       11-03    145  |  115[H]  |  13  ----------------------------<  89  3.7   |  21[L]  |  0.73    Ca    9.0      03 Nov 2024 08:40  Phos  1.7     11-03  Mg     1.4     11-03    TPro  6.2  /  Alb  3.0[L]  /  TBili  0.2  /  DBili  x   /  AST  24  /  ALT  17  /  AlkPhos  47  11-02      CAPILLARY BLOOD GLUCOSE  POCT Blood Glucose.: 96 mg/dL (03 Nov 2024 12:18)  POCT Blood Glucose.: 85 mg/dL (03 Nov 2024 07:59)  POCT Blood Glucose.: 93 mg/dL (03 Nov 2024 04:48)        MEDICATIONS  (STANDING):  apixaban 2.5 milliGRAM(s) Oral every 12 hours  atorvastatin 10 milliGRAM(s) Oral at bedtime  azithromycin  IVPB      cefTRIAXone   IVPB 1000 milliGRAM(s) IV Intermittent every 24 hours  fluticasone propionate/ salmeterol 100-50 MICROgram(s) Diskus 1 Dose(s) Inhalation two times a day  haloperidol    Injectable 2 milliGRAM(s) IntraMuscular once  insulin lispro (ADMELOG) corrective regimen sliding scale   SubCutaneous three times a day before meals  insulin lispro (ADMELOG) corrective regimen sliding scale   SubCutaneous at bedtime  metoprolol tartrate 25 milliGRAM(s) Oral two times a day  NIFEdipine XL 30 milliGRAM(s) Oral daily  pantoprazole    Tablet 40 milliGRAM(s) Oral before breakfast  sodium chloride 0.9%. 1000 milliLiter(s) (80 mL/Hr) IV Continuous <Continuous>    MEDICATIONS  (PRN):  albuterol/ipratropium for Nebulization 3 milliLiter(s) Nebulizer every 6 hours PRN Bronchospasm  melatonin 3 milliGRAM(s) Oral at bedtime PRN Insomnia        RADIOLOGY & ADDITIONAL TESTS:    11/2/24: Xray Chest 1 View- PORTABLE-Urgent (11.02.24 @ 22:22) No active parenchymal disease in the chest.      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 On admission, she found to have no fever but Leukocytosis and positive Urine analysis. The CXR is negative. She has started on Ceftriaxone and Azithromycin, and the ID consult requested to assist with further evaluation and antibiotic management.      REVIEW OF SYSTEMS: Unable to obtain due to mental status unless mentioned in HPI      PAST MEDICAL & SURGICAL HISTORY:  HTN (hypertension)  DM (diabetes mellitus)  Hypercholesteremia  Gastric adenocarcinoma  s/p resection  Vertigo  Dementia  Ambulatory dysfunction  Multiple myeloma  Pulmonary embolism  S/P hysterectomy  S/P tubal ligation        SOCIAL HISTORY  Alcohol: Does not drink  Tobacco: Does not smoke  Illicit substance use: None      FAMILY HISTORY: Non contributory to the present illness      ALLERGIES: gabapentin (Unknown)      Vital Signs Last 24 Hrs  T(C): 37.4 (03 Nov 2024 10:45), Max: 37.6 (03 Nov 2024 04:38)  T(F): 99.3 (03 Nov 2024 10:45), Max: 99.6 (03 Nov 2024 04:38)  HR: 90 (03 Nov 2024 10:45) (81 - 98)  BP: 127/81 (03 Nov 2024 10:45) (127/81 - 144/67)  BP(mean): 97 (03 Nov 2024 10:45) (97 - 97)  RR: 19 (03 Nov 2024 10:45) (19 - 23)  SpO2: 93% (03 Nov 2024 10:45) (93% - 100%)    Parameters below as of 03 Nov 2024 10:45  Patient On (Oxygen Delivery Method): room air      PHYSICAL EXAM:  GENERAL: Not in distress   CHEST/LUNG:  Air entry bilaterally  HEART: s1 and s2 present  ABDOMEN:  Nontender and  Nondistended  EXTREMITIES: No pedal  edema  CNS: Awake and Alert      LABS:                        10.9   15.12 )-----------( 232      ( 03 Nov 2024 08:40 )             33.2       11-03    145  |  115[H]  |  13  ----------------------------<  89  3.7   |  21[L]  |  0.73    Ca    9.0      03 Nov 2024 08:40  Phos  1.7     11-03  Mg     1.4     11-03    TPro  6.2  /  Alb  3.0[L]  /  TBili  0.2  /  DBili  x   /  AST  24  /  ALT  17  /  AlkPhos  47  11-02      CAPILLARY BLOOD GLUCOSE  POCT Blood Glucose.: 96 mg/dL (03 Nov 2024 12:18)  POCT Blood Glucose.: 85 mg/dL (03 Nov 2024 07:59)  POCT Blood Glucose.: 93 mg/dL (03 Nov 2024 04:48)        MEDICATIONS  (STANDING):  apixaban 2.5 milliGRAM(s) Oral every 12 hours  atorvastatin 10 milliGRAM(s) Oral at bedtime  azithromycin  IVPB      cefTRIAXone   IVPB 1000 milliGRAM(s) IV Intermittent every 24 hours  fluticasone propionate/ salmeterol 100-50 MICROgram(s) Diskus 1 Dose(s) Inhalation two times a day  haloperidol    Injectable 2 milliGRAM(s) IntraMuscular once  insulin lispro (ADMELOG) corrective regimen sliding scale   SubCutaneous three times a day before meals  insulin lispro (ADMELOG) corrective regimen sliding scale   SubCutaneous at bedtime  metoprolol tartrate 25 milliGRAM(s) Oral two times a day  NIFEdipine XL 30 milliGRAM(s) Oral daily  pantoprazole    Tablet 40 milliGRAM(s) Oral before breakfast  sodium chloride 0.9%. 1000 milliLiter(s) (80 mL/Hr) IV Continuous <Continuous>    MEDICATIONS  (PRN):  albuterol/ipratropium for Nebulization 3 milliLiter(s) Nebulizer every 6 hours PRN Bronchospasm  melatonin 3 milliGRAM(s) Oral at bedtime PRN Insomnia        RADIOLOGY & ADDITIONAL TESTS:    11/2/24: Xray Chest 1 View- PORTABLE-Urgent (11.02.24 @ 22:22) No active parenchymal disease in the chest.      MICROBIOLOGY DATA:    Urine Microscopic-Add On (NC) (11.03.24 @ 04:09)   Bacteria: Few /HPF  Comment - Urine: Numerous Budding yeasts found  Squamous Epithelial Cells: Present  Red Blood Cell - Urine: 5 /HPF  White Blood Cell - Urine: Too Numerous to count /HPF

## 2024-11-03 NOTE — PATIENT PROFILE ADULT - FALL HARM RISK - HARM RISK INTERVENTIONS

## 2024-11-03 NOTE — CONSULT NOTE ADULT - SUBJECTIVE AND OBJECTIVE BOX
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 Patient at bedside not able to provide any hx, hx provided by family over the phone. They state that [place] started being noticed that had SOB along with chest thightness. Per family patient did not complain of any other symptoms and was also noted to be having chills like shaking along with new onset cough that has a yellow sputum. Pateint had chemotherapy 2 days ago with  with no complications.         HISTORY OF PRESENT ILLNESS: As Above. Awake, alert, comfortable in bed in NAD. Confused but not agitated    MEDICATIONS  (STANDING):  apixaban 2.5 milliGRAM(s) Oral every 12 hours  atorvastatin 10 milliGRAM(s) Oral at bedtime  azithromycin  IVPB      cefTRIAXone   IVPB 1000 milliGRAM(s) IV Intermittent every 24 hours  fluticasone propionate/ salmeterol 100-50 MICROgram(s) Diskus 1 Dose(s) Inhalation two times a day  haloperidol    Injectable 2 milliGRAM(s) IntraMuscular once  insulin lispro (ADMELOG) corrective regimen sliding scale   SubCutaneous at bedtime  insulin lispro (ADMELOG) corrective regimen sliding scale   SubCutaneous three times a day before meals  metoprolol tartrate 25 milliGRAM(s) Oral two times a day  NIFEdipine XL 30 milliGRAM(s) Oral daily  pantoprazole    Tablet 40 milliGRAM(s) Oral before breakfast  sodium chloride 0.9%. 1000 milliLiter(s) (80 mL/Hr) IV Continuous <Continuous>      MEDICATIONS  (PRN):  albuterol/ipratropium for Nebulization 3 milliLiter(s) Nebulizer every 6 hours PRN Bronchospasm  melatonin 3 milliGRAM(s) Oral at bedtime PRN Insomnia      Allergies    gabapentin (Unknown)    Intolerances        PAST MEDICAL & SURGICAL HISTORY:  HTN (hypertension)      DM (diabetes mellitus)      Hypercholesteremia      Gastric adenocarcinoma  s/p resection      Vertigo      Dementia      Ambulatory dysfunction      Multiple myeloma      Pulmonary embolism      S/P hysterectomy      S/P tubal ligation          FAMILY HISTORY:  Family history of diabetes mellitus (Sibling)    Family history of early CAD (Grandparent)        SOCIAL HISTORY  Smoking History:     REVIEW OF SYSTEMS:    CONSTITUTIONAL:  No fevers, chills, sweats    HEENT:  Eyes:  No diplopia or blurred vision. ENT:  No earache, sore throat or runny nose.    CARDIOVASCULAR:  No pressure, squeezing, tightness, or heaviness about the chest; no palpitations.    RESPIRATORY:  Per HPI    GASTROINTESTINAL:  No abdominal pain, nausea, vomiting or diarrhea.    GENITOURINARY:  No dysuria, frequency or urgency.    NEUROLOGIC:  No paresthesias, fasciculations, seizures or weakness.    PSYCHIATRIC:  No disorder of thought or mood.    Vital Signs Last 24 Hrs  T(C): 36.7 (03 Nov 2024 05:51), Max: 37.6 (03 Nov 2024 04:38)  T(F): 98.1 (03 Nov 2024 05:51), Max: 99.6 (03 Nov 2024 04:38)  HR: 98 (03 Nov 2024 05:51) (81 - 98)  BP: 133/96 (03 Nov 2024 05:51) (127/94 - 144/67)  BP(mean): --  RR: 23 (03 Nov 2024 05:51) (20 - 23)  SpO2: 100% (03 Nov 2024 05:51) (97% - 100%)    Parameters below as of 03 Nov 2024 05:51  Patient On (Oxygen Delivery Method): room air      I&O's Detail    03 Nov 2024 07:01  -  03 Nov 2024 09:28  --------------------------------------------------------  IN:  Total IN: 0 mL    OUT:    Voided (mL): 900 mL  Total OUT: 900 mL    Total NET: -900 mL          PHYSICAL EXAMINATION:    GENERAL: The patient is a well-developed, well-nourished _____in no apparent distress.     HEENT: Head is normocephalic and atraumatic. Extraocular muscles are intact. Mucous membranes are moist.     NECK: Supple.     LUNGS: Clear to auscultation without wheezing, rales, or rhonchi. Respirations unlabored    HEART: Regular rate and rhythm without murmur.    ABDOMEN: Soft, nontender, and nondistended.  No hepatosplenomegaly is noted.    EXTREMITIES: Without any cyanosis, clubbing, rash, lesions or edema.    NEUROLOGIC: Grossly intact.      LABS:                        10.9   15.12 )-----------( 232      ( 03 Nov 2024 08:40 )             33.2     11-03    145  |  115[H]  |  13  ----------------------------<  89  3.7   |  21[L]  |  0.73    Ca    9.0      03 Nov 2024 08:40  Phos  1.7     11-03  Mg     1.4     11-03    TPro  6.2  /  Alb  3.0[L]  /  TBili  0.2  /  DBili  x   /  AST  24  /  ALT  17  /  AlkPhos  47  11-02      Urinalysis Basic - ( 03 Nov 2024 08:40 )    Color: x / Appearance: x / SG: x / pH: x  Gluc: 89 mg/dL / Ketone: x  / Bili: x / Urobili: x   Blood: x / Protein: x / Nitrite: x   Leuk Esterase: x / RBC: x / WBC x   Sq Epi: x / Non Sq Epi: x / Bacteria: x            D-Dimer Assay, Quantitative: 164 ng/mL DDU (11-02-24 @ 21:34)      Lactate, Blood: 7.8 mmol/L (11-03-24 @ 04:09)  Lactate, Blood: 7.5 mmol/L (11-03-24 @ 01:35)  Lactate, Blood: 8.8 mmol/L (11-02-24 @ 21:34)        MICROBIOLOGY:    RADIOLOGY & ADDITIONAL STUDIES:    CXR:    Ct scan chest;    ekg;    echo:

## 2024-11-03 NOTE — H&P ADULT - ATTENDING COMMENTS
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 Patient at bedside not able to provide any hx, hx provided by family over the phone. They state that today patient started being noticed that had SOB along with chest tightness. Per family patient did not complain of any other symptoms and was also noted to be having chills like shaking along with new onset cough that has a yellow sputum. Patient had chemotherapy 2 days ago with Dr. Alvarado with no complications.         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

## 2024-11-03 NOTE — CONSULT NOTE ADULT - ASSESSMENT
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 On admission, she found to have no fever but Leukocytosis and positive Urine analysis. The CXR is negative. She has started on Ceftriaxone and Azithromycin, and the ID consult requested to assist with further evaluation and antibiotic management.    # UTI  # Leukocytosis    would recommend:    1. Follow up Urine culture  2. CT chest without contrast to assess for Pneumonia  3. Continue Ceftriaxone until work up is done  4. Monitor WBC count    will follow the patient with you and make further recommendation based on the clinical course and Lab results  Thank you for the opportunity to participate in Ms. ELLIS's care    Attending Attestation:    Spent more than 65 minutes on total encounter, more than 50 % of the visit was spent counseling and/or coordinating care by the Attending physician.

## 2024-11-03 NOTE — H&P ADULT - HISTORY OF PRESENT ILLNESS
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 Patient at bedside not able to provide any hx, hx provided by family over the phone. They state that [place] started being noticed that had SOB along with chest thightness. Per family patient did not complain of any other symptoms and was also noted to be having chills like shaking along with new onset cough that has a yellow sputum. Pateint had chemotherapy 2 days ago with  with no complications.

## 2024-11-03 NOTE — H&P ADULT - ASSESSMENT
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 Patient to be admitted for new onset CHARISSE and lactic acidosis with unclear etiology

## 2024-11-03 NOTE — PATIENT PROFILE ADULT - FUNCTIONAL ASSESSMENT - BASIC MOBILITY 6.
1-calculated by average/Not able to assess (calculate score using [facility] averaging method)

## 2024-11-03 NOTE — ED PROVIDER NOTE - OBJECTIVE STATEMENT
Patient is a 89-year-old lady with past medical history of hypertension, hyperlipidemia, diabetes, gastric cancer, PE on Eliquis, dementia who presents to the ED because of some shortness of breath, and appearance of chest discomfort this afternoon.  Daughter says this has improved, patient appears to be back to baseline.  Has a chronic cough, but over the last 3 days it was more yellow.  No fever.  No nausea, vomiting.  He is compliant with her anticoagulation.  No leg swelling.

## 2024-11-03 NOTE — H&P ADULT - BIRTH SEX
"Requested Prescriptions   Pending Prescriptions Disp Refills     EQ ALLERGY RELIEF 10 MG tablet [Pharmacy Med Name: ALLERGY RELIEF 10MG   TAB] 30 tablet 0     Sig: TAKE 1 TABLET BY MOUTH ONCE DAILY   Last Written Prescription Date:  5/6/2019  Last Fill Quantity: 30,  # refills: 0   Last office visit: 5/6/2019 with prescribing provider:     Future Office Visit:   Next 5 appointments (look out 90 days)    May 28, 2019  9:45 AM CDT  Return Visit with Matt Mccracken PA-C  [facility] ([address] [id number] [phone]             Antihistamines Protocol Failed - 5/23/2019  1:10 PM        Failed - Patient is 3-64 years of age     Apply weight-based dosing for peds patients age 3 - 12 years of age.    Forward request to provider for patients under the age of 3 or over the age of 64.          Passed - Recent (12 mo) or future (30 days) visit within the authorizing provider's specialty     Patient had office visit in the last 12 months or has a visit in the next 30 days with authorizing provider or within the authorizing provider's specialty.  See \"Patient Info\" tab in inbasket, or \"Choose Columns\" in Meds & Orders section of the refill encounter.              Passed - Medication is active on med list      Routing refill request to provider for review/approval because:  Drug not on the G refill protocol due to patient age    T'd up 1 - 3 month for provider review.    Erica Cuadra RN            "
Female

## 2024-11-03 NOTE — H&P ADULT - NSHPPHYSICALEXAM_GEN_ALL_CORE
PHYSICAL EXAMINATION:  GENERAL: NAD  HEAD:  Atraumatic, Normocephalic  EYES:  conjunctiva and sclera clear,dry mucous membranes   NECK: Supple, No JVD, Normal thyroid  CHEST/LUNG: Clear to auscultation. Clear to percussion bilaterally; No rales, rhonchi, wheezing, or rubs  HEART: Regular rate and rhythm; No murmurs, rubs, or gallops  ABDOMEN: Soft, Nontender, Nondistended; Bowel sounds present  NERVOUS SYSTEM:  Alert & Oriented x0  EXTREMITIES:  2+ Peripheral Pulses, No clubbing, cyanosis, or edema  SKIN: warm dry

## 2024-11-03 NOTE — H&P ADULT - PROBLEM SELECTOR PLAN 2
-on labs noted to have cr at 1.2 with baseline of 0.6  -noted at bedside to be making urine   -will get urine lytes   -IV fluids   -f/u bmp

## 2024-11-04 DIAGNOSIS — N39.0 URINARY TRACT INFECTION, SITE NOT SPECIFIED: ICD-10-CM

## 2024-11-04 DIAGNOSIS — I48.91 UNSPECIFIED ATRIAL FIBRILLATION: ICD-10-CM

## 2024-11-04 DIAGNOSIS — C90.00 MULTIPLE MYELOMA NOT HAVING ACHIEVED REMISSION: ICD-10-CM

## 2024-11-04 LAB
A1C WITH ESTIMATED AVERAGE GLUCOSE RESULT: 5.5 % — SIGNIFICANT CHANGE UP (ref 4–5.6)
ANION GAP SERPL CALC-SCNC: 9 MMOL/L — SIGNIFICANT CHANGE UP (ref 5–17)
BUN SERPL-MCNC: 12 MG/DL — SIGNIFICANT CHANGE UP (ref 7–18)
CALCIUM SERPL-MCNC: 9.2 MG/DL — SIGNIFICANT CHANGE UP (ref 8.4–10.5)
CHLORIDE SERPL-SCNC: 117 MMOL/L — HIGH (ref 96–108)
CO2 SERPL-SCNC: 22 MMOL/L — SIGNIFICANT CHANGE UP (ref 22–31)
CREAT ?TM UR-MCNC: 16 MG/DL — SIGNIFICANT CHANGE UP
CREAT SERPL-MCNC: 0.79 MG/DL — SIGNIFICANT CHANGE UP (ref 0.5–1.3)
EGFR: 72 ML/MIN/1.73M2 — SIGNIFICANT CHANGE UP
ESTIMATED AVERAGE GLUCOSE: 111 MG/DL — SIGNIFICANT CHANGE UP (ref 68–114)
GLUCOSE BLDC GLUCOMTR-MCNC: 107 MG/DL — HIGH (ref 70–99)
GLUCOSE BLDC GLUCOMTR-MCNC: 109 MG/DL — HIGH (ref 70–99)
GLUCOSE BLDC GLUCOMTR-MCNC: 114 MG/DL — HIGH (ref 70–99)
GLUCOSE BLDC GLUCOMTR-MCNC: 77 MG/DL — SIGNIFICANT CHANGE UP (ref 70–99)
GLUCOSE SERPL-MCNC: 118 MG/DL — HIGH (ref 70–99)
HCT VFR BLD CALC: 36.5 % — SIGNIFICANT CHANGE UP (ref 34.5–45)
HGB BLD-MCNC: 11.9 G/DL — SIGNIFICANT CHANGE UP (ref 11.5–15.5)
MAGNESIUM SERPL-MCNC: 1.6 MG/DL — SIGNIFICANT CHANGE UP (ref 1.6–2.6)
MCHC RBC-ENTMCNC: 31.1 PG — SIGNIFICANT CHANGE UP (ref 27–34)
MCHC RBC-ENTMCNC: 32.6 G/DL — SIGNIFICANT CHANGE UP (ref 32–36)
MCV RBC AUTO: 95.3 FL — SIGNIFICANT CHANGE UP (ref 80–100)
NRBC # BLD: 0 /100 WBCS — SIGNIFICANT CHANGE UP (ref 0–0)
PHOSPHATE SERPL-MCNC: 3.3 MG/DL — SIGNIFICANT CHANGE UP (ref 2.5–4.5)
PLATELET # BLD AUTO: 241 K/UL — SIGNIFICANT CHANGE UP (ref 150–400)
POTASSIUM SERPL-MCNC: 4.1 MMOL/L — SIGNIFICANT CHANGE UP (ref 3.5–5.3)
POTASSIUM SERPL-SCNC: 4.1 MMOL/L — SIGNIFICANT CHANGE UP (ref 3.5–5.3)
RBC # BLD: 3.83 M/UL — SIGNIFICANT CHANGE UP (ref 3.8–5.2)
RBC # FLD: 17 % — HIGH (ref 10.3–14.5)
SODIUM SERPL-SCNC: 148 MMOL/L — HIGH (ref 135–145)
SODIUM UR-SCNC: 163 MMOL/L — SIGNIFICANT CHANGE UP
WBC # BLD: 11.32 K/UL — HIGH (ref 3.8–10.5)
WBC # FLD AUTO: 11.32 K/UL — HIGH (ref 3.8–10.5)

## 2024-11-04 PROCEDURE — 71275 CT ANGIOGRAPHY CHEST: CPT | Mod: 26

## 2024-11-04 RX ORDER — METOPROLOL TARTRATE 50 MG
25 TABLET ORAL EVERY 8 HOURS
Refills: 0 | Status: DISCONTINUED | OUTPATIENT
Start: 2024-11-04 | End: 2024-11-06

## 2024-11-04 RX ORDER — DIGOXIN 250 MCG
500 TABLET ORAL ONCE
Refills: 0 | Status: COMPLETED | OUTPATIENT
Start: 2024-11-04 | End: 2024-11-04

## 2024-11-04 RX ADMIN — APIXABAN 2.5 MILLIGRAM(S): 5 TABLET, FILM COATED ORAL at 06:09

## 2024-11-04 RX ADMIN — Medication 500 MICROGRAM(S): at 13:42

## 2024-11-04 RX ADMIN — Medication 25 MILLIGRAM(S): at 06:09

## 2024-11-04 RX ADMIN — FLUTICASONE PROPIONATE AND SALMETEROL XINAFOATE 1 DOSE(S): 230; 21 AEROSOL, METERED RESPIRATORY (INHALATION) at 23:13

## 2024-11-04 RX ADMIN — Medication 100 MILLIGRAM(S): at 08:27

## 2024-11-04 RX ADMIN — PANTOPRAZOLE SODIUM 40 MILLIGRAM(S): 40 TABLET, DELAYED RELEASE ORAL at 06:09

## 2024-11-04 RX ADMIN — Medication 25 MILLIGRAM(S): at 23:12

## 2024-11-04 RX ADMIN — Medication 30 MILLIGRAM(S): at 06:09

## 2024-11-04 RX ADMIN — Medication 10 MILLIGRAM(S): at 23:12

## 2024-11-04 RX ADMIN — Medication 25 MILLIGRAM(S): at 13:42

## 2024-11-04 RX ADMIN — AZITHROMYCIN DIHYDRATE 255 MILLIGRAM(S): 200 POWDER, FOR SUSPENSION ORAL at 04:41

## 2024-11-04 RX ADMIN — APIXABAN 2.5 MILLIGRAM(S): 5 TABLET, FILM COATED ORAL at 18:24

## 2024-11-04 NOTE — PROGRESS NOTE ADULT - PROBLEM SELECTOR PLAN 7
H/o PE on Eliquis 2.5mg  - C/w Home med hx of DM on Metformin  -Hold oral meds while inpt   -C/w FS w/ [facility] ACHS   -Con Carb diet

## 2024-11-04 NOTE — DIETITIAN INITIAL EVALUATION ADULT - ORAL INTAKE PTA/DIET HISTORY
Patient is alert but has dementia. Her daughter provided information in addition to the patient who was able to answer some questions. Patient has no teeth, her dentures are lost and cannot eat regular consistency food. She said that she likes soft food, soups and mashed potatoes. She eats chicken, fish  mostly. She said that she is not eating much due to feeling sick and having breathing problems.

## 2024-11-04 NOTE — PROGRESS NOTE ADULT - PROBLEM SELECTOR PLAN 5
-C/w home med Metoprolol 25 BID and Nifedipine 30mg daily w/ parameters   -Dash diet Afib on Tele with rate 140-160s (no hx of Afib)  EKG Afib w/ RVR rate 144  -Cards Dr. Jennings consulted  -Digoxin 0.5mg x1 dose  -Metoprolol 25mg q12hr changed to q8hr  -C/w AC Eliquis 2.5mg   -C/w Tele monitoring

## 2024-11-04 NOTE — PROGRESS NOTE ADULT - PROBLEM SELECTOR PLAN 9
H/o HLD on Atorvastatin  C/w Atorvastatin 10mg at HS C/w home inhaler Advair 100-50mcg diskus  - DuoNeb PRN

## 2024-11-04 NOTE — PROGRESS NOTE ADULT - PROBLEM SELECTOR PLAN 2
H/o Multiple Myeloma   -Follows with Dr. Alvarado outpt   -Last chemo 2 days prior to admission  -[facility] Dr. Alvarado consulted Urine Cx growing gram negative rods  -C/w Ceftriaxone  -pending final culture and sensitivities  -ID Dr. Estrada following

## 2024-11-04 NOTE — DIETITIAN INITIAL EVALUATION ADULT - NS FNS DIET ORDER
Diet, Minced and Moist:   Consistent Carbohydrate {No Snacks}  DASH/TLC {Sodium & Cholesterol Restricted} (11-04-24 @ 13:53)

## 2024-11-04 NOTE — PROGRESS NOTE ADULT - PROBLEM SELECTOR PLAN 10
DVT ppx: Eliquis 2.5mg BID   GI ppx: Protonix 40mg H/o HLD on Atorvastatin  C/w Atorvastatin 10mg at HS

## 2024-11-04 NOTE — DIETITIAN INITIAL EVALUATION ADULT - PERTINENT MEDS FT
MEDICATIONS  (STANDING):  apixaban 2.5 milliGRAM(s) Oral every 12 hours  atorvastatin 10 milliGRAM(s) Oral at bedtime  azithromycin  IVPB 500 milliGRAM(s) IV Intermittent every 24 hours  azithromycin  IVPB      cefTRIAXone   IVPB 1000 milliGRAM(s) IV Intermittent every 24 hours  fluticasone propionate/ salmeterol 100-50 MICROgram(s) Diskus 1 Dose(s) Inhalation two times a day  insulin lispro (ADMELOG) corrective regimen sliding scale   SubCutaneous at bedtime  insulin lispro (ADMELOG) corrective regimen sliding scale   SubCutaneous three times a day before meals  metoprolol tartrate 25 milliGRAM(s) Oral every 8 hours  NIFEdipine XL 30 milliGRAM(s) Oral daily  pantoprazole    Tablet 40 milliGRAM(s) Oral before breakfast    MEDICATIONS  (PRN):  albuterol/ipratropium for Nebulization 3 milliLiter(s) Nebulizer every 6 hours PRN Bronchospasm  melatonin 3 milliGRAM(s) Oral at bedtime PRN Insomnia

## 2024-11-04 NOTE — PROGRESS NOTE ADULT - PROBLEM SELECTOR PLAN 3
-on labs noted to have cr at 1.2 with baseline of 0.6  - Voiding  -S/p IV fluids   - Cr 0.79 today  -Trend bmp H/o Multiple Myeloma   -Follows with Dr. Alvarado outpt   -Last chemo 2 days prior to admission  -[facility] Dr. Alvarado consulted

## 2024-11-04 NOTE — PROGRESS NOTE ADULT - SUBJECTIVE AND OBJECTIVE BOX
Patient is a 89y old  Female who presents with a chief complaint of CHARISSE, lactic acidosis (04 Nov 2024 13:25)      INTERVAL HPI/OVERNIGHT EVENTS: no new complaints    MEDICATIONS  (STANDING):  apixaban 2.5 milliGRAM(s) Oral every 12 hours  atorvastatin 10 milliGRAM(s) Oral at bedtime  azithromycin  IVPB 500 milliGRAM(s) IV Intermittent every 24 hours  azithromycin  IVPB      cefTRIAXone   IVPB 1000 milliGRAM(s) IV Intermittent every 24 hours  digoxin  Injectable 500 MICROGram(s) IV Push once  fluticasone propionate/ salmeterol 100-50 MICROgram(s) Diskus 1 Dose(s) Inhalation two times a day  insulin lispro (ADMELOG) corrective regimen sliding scale   SubCutaneous three times a day before meals  insulin lispro (ADMELOG) corrective regimen sliding scale   SubCutaneous at bedtime  metoprolol tartrate 25 milliGRAM(s) Oral every 8 hours  NIFEdipine XL 30 milliGRAM(s) Oral daily  pantoprazole    Tablet 40 milliGRAM(s) Oral before breakfast    MEDICATIONS  (PRN):  albuterol/ipratropium for Nebulization 3 milliLiter(s) Nebulizer every 6 hours PRN Bronchospasm  melatonin 3 milliGRAM(s) Oral at bedtime PRN Insomnia      __________________________________________________  REVIEW OF SYSTEMS:    CONSTITUTIONAL: No fever,   EYES: no acute visual disturbances  NECK: No pain or stiffness  RESPIRATORY: No cough; No shortness of breath  CARDIOVASCULAR: No chest pain, no palpitations  GASTROINTESTINAL: No pain. No nausea or vomiting; No diarrhea   NEUROLOGICAL: No headache or numbness, no tremors  MUSCULOSKELETAL: No joint pain, no muscle pain  GENITOURINARY: no dysuria, no frequency, no hesitancy  PSYCHIATRY: no depression , no anxiety  ALL OTHER  ROS negative      Vital Signs Last 24 Hrs  T(C): 36.4 (04 Nov 2024 10:51), Max: 36.8 (03 Nov 2024 22:30)  T(F): 97.5 (04 Nov 2024 10:51), Max: 98.2 (03 Nov 2024 22:30)  HR: 98 (04 Nov 2024 10:51) (89 - 114)  BP: 132/88 (04 Nov 2024 10:51) (115/87 - 144/96)  BP(mean): 94 (04 Nov 2024 05:00) (87 - 99)  RR: 17 (04 Nov 2024 10:51) (17 - 20)  SpO2: 98% (04 Nov 2024 10:51) (93% - 100%)    Parameters below as of 04 Nov 2024 10:51  Patient On (Oxygen Delivery Method): room air        ________________________________________________  PHYSICAL EXAM:  GENERAL: NAD  HEENT: Normocephalic;  conjunctivae and sclerae clear; moist mucous membranes;   NECK : supple  CHEST/LUNG: Clear to auscultation bilaterally with good air entry   HEART: S1 S2  regular; no murmurs, gallops or rubs  ABDOMEN: Soft, Nontender, Nondistended; Bowel sounds present  EXTREMITIES: no cyanosis; no edema; no calf tenderness  SKIN: warm and dry; no rash  NERVOUS SYSTEM:  Awake and alert; Oriented  to place, person and time ; no new deficits    _________________________________________________  LABS:                        11.9   11.32 )-----------( 241      ( 04 Nov 2024 06:22 )             36.5     11-04    148[H]  |  117[H]  |  12  ----------------------------<  118[H]  4.1   |  22  |  0.79    Ca    9.2      04 Nov 2024 06:22  Phos  3.3     11-04  Mg     1.6     11-04    TPro  6.2  /  Alb  3.0[L]  /  TBili  0.2  /  DBili  x   /  AST  24  /  ALT  17  /  AlkPhos  47  11-02      Urinalysis Basic - ( 04 Nov 2024 06:22 )    Color: x / Appearance: x / SG: x / pH: x  Gluc: 118 mg/dL / Ketone: x  / Bili: x / Urobili: x   Blood: x / Protein: x / Nitrite: x   Leuk Esterase: x / RBC: x / WBC x   Sq Epi: x / Non Sq Epi: x / Bacteria: x      CAPILLARY BLOOD GLUCOSE      POCT Blood Glucose.: 77 mg/dL (04 Nov 2024 11:44)  POCT Blood Glucose.: 114 mg/dL (04 Nov 2024 07:53)  POCT Blood Glucose.: 118 mg/dL (03 Nov 2024 22:25)  POCT Blood Glucose.: 87 mg/dL (03 Nov 2024 16:47)      RADIOLOGY & ADDITIONAL TESTS:    Imaging Personally Reviewed:  YES/NO    Consultant(s) Notes Reviewed:   YES/ No    Care Discussed with Consultants :     Plan of care was discussed with patient and /or primary care giver; all questions and concerns were addressed and care was aligned with patient's wishes.       Patient is a 89y old  Female who presents with a chief complaint of CHARISSE, lactic acidosis (04 Nov 2024 13:25)      INTERVAL HPI/OVERNIGHT EVENTS: Pt seen at bedside with Swedish  Etsher ID # [id number]. Pt confused and saying random words not appropriate to question. She appears comfortable     MEDICATIONS  (STANDING):  apixaban 2.5 milliGRAM(s) Oral every 12 hours  atorvastatin 10 milliGRAM(s) Oral at bedtime  azithromycin  IVPB 500 milliGRAM(s) IV Intermittent every 24 hours  azithromycin  IVPB      cefTRIAXone   IVPB 1000 milliGRAM(s) IV Intermittent every 24 hours  digoxin  Injectable 500 MICROGram(s) IV Push once  fluticasone propionate/ salmeterol 100-50 MICROgram(s) Diskus 1 Dose(s) Inhalation two times a day  insulin lispro (ADMELOG) corrective regimen sliding scale   SubCutaneous three times a day before meals  insulin lispro (ADMELOG) corrective regimen sliding scale   SubCutaneous at bedtime  metoprolol tartrate 25 milliGRAM(s) Oral every 8 hours  NIFEdipine XL 30 milliGRAM(s) Oral daily  pantoprazole    Tablet 40 milliGRAM(s) Oral before breakfast    MEDICATIONS  (PRN):  albuterol/ipratropium for Nebulization 3 milliLiter(s) Nebulizer every 6 hours PRN Bronchospasm  melatonin 3 milliGRAM(s) Oral at bedtime PRN Insomnia      __________________________________________________  REVIEW OF SYSTEMS:  Pt appears comfortably resting in bed. Unable to elicit ROS as pt has dementia and is A&Ox0     Vital Signs Last 24 Hrs  T(C): 36.4 (04 Nov 2024 10:51), Max: 36.8 (03 Nov 2024 22:30)  T(F): 97.5 (04 Nov 2024 10:51), Max: 98.2 (03 Nov 2024 22:30)  HR: 98 (04 Nov 2024 10:51) (89 - 114)  BP: 132/88 (04 Nov 2024 10:51) (115/87 - 144/96)  BP(mean): 94 (04 Nov 2024 05:00) (87 - 99)  RR: 17 (04 Nov 2024 10:51) (17 - 20)  SpO2: 98% (04 Nov 2024 10:51) (93% - 100%)    Parameters below as of 04 Nov 2024 10:51  Patient On (Oxygen Delivery Method): room air    ________________________________________________  PHYSICAL EXAM:  GENERAL: NAD  HEENT: Normocephalic;  conjunctivae and sclerae clear; moist mucous membranes;   NECK : supple  CHEST/LUNG: Clear to auscultation bilaterally with good air entry   HEART: S1 S2  regular; no murmurs, gallops or rubs  ABDOMEN: Soft, Nontender, Nondistended; Bowel sounds present  EXTREMITIES: no cyanosis; no edema; no calf tenderness  SKIN: warm and dry; no rash  NERVOUS SYSTEM:  Awake and alert; Oriented x zero; no new deficits    _________________________________________________  LABS:                        11.9   11.32 )-----------( 241      ( 04 Nov 2024 06:22 )             36.5     11-04    148[H]  |  117[H]  |  12  ----------------------------<  118[H]  4.1   |  22  |  0.79    Ca    9.2      04 Nov 2024 06:22  Phos  3.3     11-04  Mg     1.6     11-04    TPro  6.2  /  Alb  3.0[L]  /  TBili  0.2  /  DBili  x   /  AST  24  /  ALT  17  /  AlkPhos  47  11-02    Urinalysis Basic - ( 04 Nov 2024 06:22 )    Color: x / Appearance: x / SG: x / pH: x  Gluc: 118 mg/dL / Ketone: x  / Bili: x / Urobili: x   Blood: x / Protein: x / Nitrite: x   Leuk Esterase: x / RBC: x / WBC x   Sq Epi: x / Non Sq Epi: x / Bacteria: x    CAPILLARY BLOOD GLUCOSE    POCT Blood Glucose.: 77 mg/dL (04 Nov 2024 11:44)  POCT Blood Glucose.: 114 mg/dL (04 Nov 2024 07:53)  POCT Blood Glucose.: 118 mg/dL (03 Nov 2024 22:25)  POCT Blood Glucose.: 87 mg/dL (03 Nov 2024 16:47)    RADIOLOGY & ADDITIONAL TESTS:  < from: CT Angio Chest PE Protocol w/ IV Cont (11.04.24 @ 15:19) >  ACC: [id number] EXAM:  CT ANGIO CHEST PULM ART WAWIC   ORDERED BY: LATOYA DAWN     PROCEDURE DATE:  11/04/2024          INTERPRETATION:  INDICATION: Shortness of breath, chest tightness ,   history of    TECHNIQUE: Helical acquisition of the chest after the administration of   53 mL of Omnipaque 350. Maximum intensity projection images were   generated.    COMPARISON: CT chest 9/13/2024.    FINDINGS:    HEART/VASCULATURE: No pulmonary embolus. Normal heart size. No   pericardial effusion. Coronary artery calcification. Normal aortic size.    LUNGS/AIRWAYS/PLEURA: Patent trachea and bronchi. Unchanged unchanged   small cluster of sub-4 mm nodules in the left upper lobe (2:52-55). Image   acquisition during exhalation. Calcified granuloma in the middle lobe. No   pleural effusion.    LYMPH NODES/MEDIASTINUM: No lymphadenopathy.    UPPER ABDOMEN: Cholecystectomy.    BONES/SOFT TISSUES: Multiple old rib fractures.      IMPRESSION:    No pulmonary embolus.    Resolved prior pneumonia.    Unchanged small left upper lobe nodules.    --- End of Report ---    < from: Xray Chest 1 View- PORTABLE-Urgent (11.02.24 @ 22:22) >  ACC: [id number] EXAM:  XR CHEST PORTABLE URGENT 1V   ORDERED BY:  FRANKY ALCAZAR     PROCEDURE DATE:  11/02/2024        INTERPRETATION:  Exam:XR CHEST URGENT    clinical history:Chest Pain    Heart size is normal. Lungs show no acute infiltrate. No pleural effusion.    IMPRESSION:  No active parenchymal disease in the chest.      --- End of Report ---    Imaging Personally Reviewed:  YES    Consultant(s) Notes Reviewed:   YES      Plan of care was discussed with patient and /or primary care giver; all questions and concerns were addressed and care was aligned with patient's wishes.       Patient is a 89y old  Female who presents with a chief complaint of CHARISSE, lactic acidosis (04 Nov 2024 13:25)      INTERVAL HPI/OVERNIGHT EVENTS: Pt seen at bedside with Uzbek  Esther ID # [id number]. Pt is alert but confused and saying random words not appropriate to question. She appears comfortable     MEDICATIONS  (STANDING):  apixaban 2.5 milliGRAM(s) Oral every 12 hours  atorvastatin 10 milliGRAM(s) Oral at bedtime  azithromycin  IVPB 500 milliGRAM(s) IV Intermittent every 24 hours  azithromycin  IVPB      cefTRIAXone   IVPB 1000 milliGRAM(s) IV Intermittent every 24 hours  digoxin  Injectable 500 MICROGram(s) IV Push once  fluticasone propionate/ salmeterol 100-50 MICROgram(s) Diskus 1 Dose(s) Inhalation two times a day  insulin lispro (ADMELOG) corrective regimen sliding scale   SubCutaneous three times a day before meals  insulin lispro (ADMELOG) corrective regimen sliding scale   SubCutaneous at bedtime  metoprolol tartrate 25 milliGRAM(s) Oral every 8 hours  NIFEdipine XL 30 milliGRAM(s) Oral daily  pantoprazole    Tablet 40 milliGRAM(s) Oral before breakfast    MEDICATIONS  (PRN):  albuterol/ipratropium for Nebulization 3 milliLiter(s) Nebulizer every 6 hours PRN Bronchospasm  melatonin 3 milliGRAM(s) Oral at bedtime PRN Insomnia      __________________________________________________  REVIEW OF SYSTEMS:  Pt appears comfortably resting in bed. Unable to elicit ROS as pt has dementia and is A&Ox0     Vital Signs Last 24 Hrs  T(C): 36.4 (04 Nov 2024 10:51), Max: 36.8 (03 Nov 2024 22:30)  T(F): 97.5 (04 Nov 2024 10:51), Max: 98.2 (03 Nov 2024 22:30)  HR: 98 (04 Nov 2024 10:51) (89 - 114)  BP: 132/88 (04 Nov 2024 10:51) (115/87 - 144/96)  BP(mean): 94 (04 Nov 2024 05:00) (87 - 99)  RR: 17 (04 Nov 2024 10:51) (17 - 20)  SpO2: 98% (04 Nov 2024 10:51) (93% - 100%)    Parameters below as of 04 Nov 2024 10:51  Patient On (Oxygen Delivery Method): room air    ________________________________________________  PHYSICAL EXAM:  GENERAL: NAD  HEENT: Normocephalic;  conjunctivae and sclerae clear; moist mucous membranes;   NECK : supple  CHEST/LUNG: Clear to auscultation bilaterally with good air entry   HEART: S1 S2  regular; no murmurs, gallops or rubs  ABDOMEN: Soft, Nontender, Nondistended; Bowel sounds present  EXTREMITIES: no cyanosis; no edema; no calf tenderness  SKIN: warm and dry; no rash  NERVOUS SYSTEM:  Awake and alert; Oriented x zero; no new deficits    _________________________________________________  LABS:                        11.9   11.32 )-----------( 241      ( 04 Nov 2024 06:22 )             36.5     11-04    148[H]  |  117[H]  |  12  ----------------------------<  118[H]  4.1   |  22  |  0.79    Ca    9.2      04 Nov 2024 06:22  Phos  3.3     11-04  Mg     1.6     11-04    TPro  6.2  /  Alb  3.0[L]  /  TBili  0.2  /  DBili  x   /  AST  24  /  ALT  17  /  AlkPhos  47  11-02    Urinalysis Basic - ( 04 Nov 2024 06:22 )    Color: x / Appearance: x / SG: x / pH: x  Gluc: 118 mg/dL / Ketone: x  / Bili: x / Urobili: x   Blood: x / Protein: x / Nitrite: x   Leuk Esterase: x / RBC: x / WBC x   Sq Epi: x / Non Sq Epi: x / Bacteria: x    CAPILLARY BLOOD GLUCOSE    POCT Blood Glucose.: 77 mg/dL (04 Nov 2024 11:44)  POCT Blood Glucose.: 114 mg/dL (04 Nov 2024 07:53)  POCT Blood Glucose.: 118 mg/dL (03 Nov 2024 22:25)  POCT Blood Glucose.: 87 mg/dL (03 Nov 2024 16:47)    RADIOLOGY & ADDITIONAL TESTS:  < from: CT Angio Chest PE Protocol w/ IV Cont (11.04.24 @ 15:19) >  ACC: [id number] EXAM:  CT ANGIO CHEST PULM ART WAWIC   ORDERED BY: LATOYA DAWN     PROCEDURE DATE:  11/04/2024          INTERPRETATION:  INDICATION: Shortness of breath, chest tightness ,   history of    TECHNIQUE: Helical acquisition of the chest after the administration of   53 mL of Omnipaque 350. Maximum intensity projection images were   generated.    COMPARISON: CT chest 9/13/2024.    FINDINGS:    HEART/VASCULATURE: No pulmonary embolus. Normal heart size. No   pericardial effusion. Coronary artery calcification. Normal aortic size.    LUNGS/AIRWAYS/PLEURA: Patent trachea and bronchi. Unchanged unchanged   small cluster of sub-4 mm nodules in the left upper lobe (2:52-55). Image   acquisition during exhalation. Calcified granuloma in the middle lobe. No   pleural effusion.    LYMPH NODES/MEDIASTINUM: No lymphadenopathy.    UPPER ABDOMEN: Cholecystectomy.    BONES/SOFT TISSUES: Multiple old rib fractures.      IMPRESSION:    No pulmonary embolus.    Resolved prior pneumonia.    Unchanged small left upper lobe nodules.    --- End of Report ---    < from: Xray Chest 1 View- PORTABLE-Urgent (11.02.24 @ 22:22) >  ACC: [id number] EXAM:  XR CHEST PORTABLE URGENT 1V   ORDERED BY:  FRANKY ALACZAR     PROCEDURE DATE:  11/02/2024        INTERPRETATION:  Exam:XR CHEST URGENT    clinical history:Chest Pain    Heart size is normal. Lungs show no acute infiltrate. No pleural effusion.    IMPRESSION:  No active parenchymal disease in the chest.      --- End of Report ---    Imaging Personally Reviewed:  YES    Consultant(s) Notes Reviewed:   YES      Plan of care was discussed with patient and /or primary care giver; all questions and concerns were addressed and care was aligned with patient's wishes.

## 2024-11-04 NOTE — PROGRESS NOTE ADULT - PROBLEM SELECTOR PLAN 6
hx of DM on Metformin  -Hold oral meds while inpt   -C/w FS w/ [facility] ACHS   -Con Carb diet -C/w home med Metoprolol 25 BID and Nifedipine 30mg daily w/ parameters   -Dash diet

## 2024-11-04 NOTE — DIETITIAN INITIAL EVALUATION ADULT - NSFNSPHYEXAMSKINFT_GEN_A_CORE
Pressure Injury 1: sacrum, Stage I    Pressure Injury 11: none, none, Pressure Injury 1: sacrum, Stage I

## 2024-11-04 NOTE — PROGRESS NOTE ADULT - ASSESSMENT
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 On admission, she found to have no fever but Leukocytosis and positive Urine analysis. The CXR is negative. She has started on Ceftriaxone and Azithromycin, and the ID consult requested to assist with further evaluation and antibiotic management.    # UTI - Urine Cx growing GNR  # Leukocytosis - resolved    would recommend:    1. Follow up final Urine culture for ID and sensitivities  2. Please discontinue Azithromycin snce CT chest shows prior pneumonia improved  3. Continue Ceftriaxone until urine culture is finalzied  4. OOB to chair     d/w Dr. Kramer     Attending Attestation:    Spent more than 45 minutes on total encounter, more than 50 % of the visit was spent counseling and/or coordinating care by the Attending physician.

## 2024-11-04 NOTE — DIETITIAN INITIAL EVALUATION ADULT - OTHER INFO
Patient was admitted due to Acute kidney injury and lactic acidosis. She has history of Pulmonary Embolism, mutiple myeloma, dementia, hypercholesterolemia, gastric adenocarcinoma, DM, vertigo.

## 2024-11-04 NOTE — PROGRESS NOTE ADULT - SUBJECTIVE AND OBJECTIVE BOX
Patient is a 89y old  Female who presents with a chief complaint of CHARISSE, lactic acidosis (04 Nov 2024 07:00)  Awake, alert, comfortable in bed in NAD. Doing well on RA    INTERVAL HPI/OVERNIGHT EVENTS:      VITAL SIGNS:  T(F): 97.5 (11-04-24 @ 10:51)  HR: 98 (11-04-24 @ 10:51)  BP: 132/88 (11-04-24 @ 10:51)  RR: 17 (11-04-24 @ 10:51)  SpO2: 98% (11-04-24 @ 10:51)  Wt(kg): --  I&O's Detail    03 Nov 2024 07:01  -  04 Nov 2024 07:00  --------------------------------------------------------  IN:    IV PiggyBack: 266.6 mL    sodium chloride 0.9%: 480 mL  Total IN: 746.6 mL    OUT:    Voided (mL): 900 mL  Total OUT: 900 mL    Total NET: -153.4 mL              REVIEW OF SYSTEMS:    CONSTITUTIONAL:  No fevers, chills, sweats    HEENT:  Eyes:  No diplopia or blurred vision. ENT:  No earache, sore throat or runny nose.    CARDIOVASCULAR:  No pressure, squeezing, tightness, or heaviness about the chest; no palpitations.    RESPIRATORY:  Per HPI    GASTROINTESTINAL:  No abdominal pain, nausea, vomiting or diarrhea.    GENITOURINARY:  No dysuria, frequency or urgency.    NEUROLOGIC:  No paresthesias, fasciculations, seizures or weakness.    PSYCHIATRIC:  No disorder of thought or mood.      PHYSICAL EXAM:    Constitutional: Well developed and nourished  Eyes:Perrla  ENMT: normal  Neck:supple  Respiratory: good air entry  Cardiovascular: S1 S2 regular  Gastrointestinal: Soft, Non tender  Extremities: No edema  Vascular:normal  Neurological:Awake, alert,Ox3  Musculoskeletal:Normal      MEDICATIONS  (STANDING):  apixaban 2.5 milliGRAM(s) Oral every 12 hours  atorvastatin 10 milliGRAM(s) Oral at bedtime  azithromycin  IVPB      azithromycin  IVPB 500 milliGRAM(s) IV Intermittent every 24 hours  cefTRIAXone   IVPB 1000 milliGRAM(s) IV Intermittent every 24 hours  fluticasone propionate/ salmeterol 100-50 MICROgram(s) Diskus 1 Dose(s) Inhalation two times a day  insulin lispro (ADMELOG) corrective regimen sliding scale   SubCutaneous at bedtime  insulin lispro (ADMELOG) corrective regimen sliding scale   SubCutaneous three times a day before meals  metoprolol tartrate 25 milliGRAM(s) Oral two times a day  NIFEdipine XL 30 milliGRAM(s) Oral daily  pantoprazole    Tablet 40 milliGRAM(s) Oral before breakfast    MEDICATIONS  (PRN):  albuterol/ipratropium for Nebulization 3 milliLiter(s) Nebulizer every 6 hours PRN Bronchospasm  melatonin 3 milliGRAM(s) Oral at bedtime PRN Insomnia      Allergies    gabapentin (Unknown)    Intolerances        LABS:                        11.9   11.32 )-----------( 241      ( 04 Nov 2024 06:22 )             36.5     11-04    148[H]  |  117[H]  |  12  ----------------------------<  118[H]  4.1   |  22  |  0.79    Ca    9.2      04 Nov 2024 06:22  Phos  3.3     11-04  Mg     1.6     11-04    TPro  6.2  /  Alb  3.0[L]  /  TBili  0.2  /  DBili  x   /  AST  24  /  ALT  17  /  AlkPhos  47  11-02      Urinalysis Basic - ( 04 Nov 2024 06:22 )    Color: x / Appearance: x / SG: x / pH: x  Gluc: 118 mg/dL / Ketone: x  / Bili: x / Urobili: x   Blood: x / Protein: x / Nitrite: x   Leuk Esterase: x / RBC: x / WBC x   Sq Epi: x / Non Sq Epi: x / Bacteria: x            CAPILLARY BLOOD GLUCOSE      POCT Blood Glucose.: 77 mg/dL (04 Nov 2024 11:44)  POCT Blood Glucose.: 114 mg/dL (04 Nov 2024 07:53)  POCT Blood Glucose.: 118 mg/dL (03 Nov 2024 22:25)  POCT Blood Glucose.: 87 mg/dL (03 Nov 2024 16:47)  POCT Blood Glucose.: 96 mg/dL (03 Nov 2024 12:18)    pro-bnp -- 11-02 @ 21:34     d-dimer 164  11-02 @ 21:34      RADIOLOGY & ADDITIONAL TESTS:    CXR:    Ct scan chest:    ekg;    echo:

## 2024-11-04 NOTE — DIETITIAN INITIAL EVALUATION ADULT - PERTINENT LABORATORY DATA
11-04    148[H]  |  117[H]  |  12  ----------------------------<  118[H]  4.1   |  22  |  0.79    Ca    9.2      04 Nov 2024 06:22  Phos  3.3     11-04  Mg     1.6     11-04    TPro  6.2  /  Alb  3.0[L]  /  TBili  0.2  /  DBili  x   /  AST  24  /  ALT  17  /  AlkPhos  47  11-02  POCT Blood Glucose.: 77 mg/dL (11-04-24 @ 11:44)  A1C with Estimated Average Glucose Result: 5.5 % (11-04-24 @ 06:22)  A1C with Estimated Average Glucose Result: 5.8 % (07-30-24 @ 07:05)

## 2024-11-04 NOTE — PROGRESS NOTE ADULT - SUBJECTIVE AND OBJECTIVE BOX
Patient is seen and examined at the bed side, is afebrile. She is doing much better, no more confused and answering question appropriately. The Leukocytosis trended down to normal. The CT chest from 11/4 has been reviewed.      REVIEW OF SYSTEMS: All other review systems are negative      ALLERGIES: gabapentin (Unknown)      Vital Signs Last 24 Hrs  T(C): 37.3 (04 Nov 2024 14:55), Max: 37.3 (04 Nov 2024 14:55)  T(F): 99.1 (04 Nov 2024 14:55), Max: 99.1 (04 Nov 2024 14:55)  HR: 81 (04 Nov 2024 14:55) (81 - 114)  BP: 94/78 (04 Nov 2024 14:55) (94/78 - 144/96)  BP(mean): 94 (04 Nov 2024 05:00) (87 - 99)  RR: 18 (04 Nov 2024 14:55) (17 - 20)  SpO2: 96% (04 Nov 2024 14:55) (93% - 100%)    Parameters below as of 04 Nov 2024 14:55  Patient On (Oxygen Delivery Method): room air      PHYSICAL EXAM:  GENERAL: Not in distress   CHEST/LUNG:  Air entry bilaterally  HEART: s1 and s2 present  ABDOMEN:  Nontender and  Nondistended  EXTREMITIES: No pedal  edema  CNS: Awake and Alert      LABS:                        11.9   11.32 )-----------( 241      ( 04 Nov 2024 06:22 )             36.5                           10.9   15.12 )-----------( 232      ( 03 Nov 2024 08:40 )             33.2       11-04    148[H]  |  117[H]  |  12  ----------------------------<  118[H]  4.1   |  22  |  0.79    Ca    9.2      04 Nov 2024 06:22  Phos  3.3     11-04  Mg     1.6     11-04    TPro  6.2  /  Alb  3.0[L]  /  TBili  0.2  /  DBili  x   /  AST  24  /  ALT  17  /  AlkPhos  47  11-02    11-03    145  |  115[H]  |  13  ----------------------------<  89  3.7   |  21[L]  |  0.73    Ca    9.0      03 Nov 2024 08:40  Phos  1.7     11-03  Mg     1.4     11-03    TPro  6.2  /  Alb  3.0[L]  /  TBili  0.2  /  DBili  x   /  AST  24  /  ALT  17  /  AlkPhos  47  11-02      CAPILLARY BLOOD GLUCOSE  POCT Blood Glucose.: 96 mg/dL (03 Nov 2024 12:18)  POCT Blood Glucose.: 85 mg/dL (03 Nov 2024 07:59)  POCT Blood Glucose.: 93 mg/dL (03 Nov 2024 04:48)        MEDICATIONS  (STANDING):    apixaban 2.5 milliGRAM(s) Oral every 12 hours  atorvastatin 10 milliGRAM(s) Oral at bedtime  azithromycin  IVPB      azithromycin  IVPB 500 milliGRAM(s) IV Intermittent every 24 hours  cefTRIAXone   IVPB 1000 milliGRAM(s) IV Intermittent every 24 hours  fluticasone propionate/ salmeterol 100-50 MICROgram(s) Diskus 1 Dose(s) Inhalation two times a day  insulin lispro (ADMELOG) corrective regimen sliding scale   SubCutaneous at bedtime  insulin lispro (ADMELOG) corrective regimen sliding scale   SubCutaneous three times a day before meals  metoprolol tartrate 25 milliGRAM(s) Oral every 8 hours  NIFEdipine XL 30 milliGRAM(s) Oral daily  pantoprazole    Tablet 40 milliGRAM(s) Oral before breakfast        RADIOLOGY & ADDITIONAL TESTS:    11/4/24  : CT Angio Chest PE Protocol w/ IV Cont (11.04.24 @ 15:19) No pulmonary embolus.    Resolved prior pneumonia.  Unchanged small left upper lobe nodules.        11/2/24: Xray Chest 1 View- PORTABLE-Urgent (11.02.24 @ 22:22) No active parenchymal disease in the chest.      MICROBIOLOGY DATA:    Culture - Urine (11.03.24 @ 04:09)   Specimen Source: Clean Catch  Culture Results:   >100,000 CFU/ml Gram Negative Rods    Urine Microscopic-Add On (NC) (11.03.24 @ 04:09)   Bacteria: Few /HPF  Comment - Urine: Numerous Budding yeasts found  Squamous Epithelial Cells: Present  Red Blood Cell - Urine: 5 /HPF  White Blood Cell - Urine: Too Numerous to count /HPF

## 2024-11-04 NOTE — PROGRESS NOTE ADULT - PROBLEM SELECTOR PLAN 1
P/w SOB and chest tightness, patient unable to provide any hx or ROS   -H/o gastric ca, Now w/ multiple myeloma last chemo 3 days ago with Dr. Alvarado   -Labs CHARISSE Cr 1.22 now 0.79 s/p IVF   -Metformin at home  -lactate 8.8 on admission downtrending  -C/w CTX and azithromycin to cover infectious cause   -F/u metformin level   -Unclear Etiology poss iso malignancy P/w SOB and chest tightness, patient unable to provide any hx or ROS   -H/o gastric ca, Now w/ multiple myeloma last chemo 3 days ago with Dr. Alvarado   -Labs CHARISSE Cr 1.22 now 0.79 s/p IVF   -Metformin at home  -lactate 8.8 on admission downtrending  -C/w CTX to cover infectious cause   -D/c Azithromycin as CT shows resolution of prior PNA   -F/u metformin level   -Unclear Etiology poss iso malignancy  - O2 sat 96% on RA  - ID Dr. Estrada following

## 2024-11-04 NOTE — PROGRESS NOTE ADULT - PROBLEM SELECTOR PLAN 4
Afib on Tele with rate 140-160s (no hx of Afib)  EKG Afib w/ RVR rate 144  -Cards Dr. Jennings consulted  -Digoxin 0.5mg x1 dose  -Metoprolol 25mg q12hr changed to q8hr  -C/w AC Eliquis 2.5mg   -C/w Tele monitoring -on labs noted to have cr at 1.2 with baseline of 0.6  - Voiding  -S/p IV fluids   - Cr 0.79 today  -Trend bmp

## 2024-11-04 NOTE — PROGRESS NOTE ADULT - SUBJECTIVE AND OBJECTIVE BOX
Patient is a 89y old  Female who presents with a chief complaint of CHARISSE, lactic acidosis (03 Nov 2024 12:58)    pt seen in icu [  ], reg med floor [   ], bed [  ], chair at bedside [   ], a+o x3 [  ], lethargic [  ],  nad [  ]    shea [  ], ngt [  ], peg [  ], et tube [  ], cent line [  ], picc line [  ]        Allergies    gabapentin (Unknown)        Vitals    T(F): 97.7 (11-04-24 @ 05:00), Max: 99.3 (11-03-24 @ 10:45)  HR: 111 (11-04-24 @ 05:00) (89 - 114)  BP: 115/87 (11-04-24 @ 05:00) (115/87 - 144/96)  RR: 18 (11-04-24 @ 05:00) (18 - 20)  SpO2: 96% (11-04-24 @ 05:00) (93% - 100%)  Wt(kg): --  CAPILLARY BLOOD GLUCOSE      POCT Blood Glucose.: 118 mg/dL (03 Nov 2024 22:25)      Labs                          11.9   11.32 )-----------( 241      ( 04 Nov 2024 06:22 )             36.5       11-03    145  |  115[H]  |  13  ----------------------------<  89  3.7   |  21[L]  |  0.73    Ca    9.0      03 Nov 2024 08:40  Phos  1.7     11-03  Mg     1.4     11-03    TPro  6.2  /  Alb  3.0[L]  /  TBili  0.2  /  DBili  x   /  AST  24  /  ALT  17  /  AlkPhos  47  11-02          Troponin I, High Sensitivity Result: 9.6 ng/L (11-02-24 @ 21:34)    Clean Catch  09-15 @ 01:09   <10,000 CFU/mL Normal Urogenital Natalie  --  --      .Blood Blood-Peripheral  09-13 @ 01:07   No growth at 5 days  --  --      .Blood Blood-Peripheral  09-13 @ 01:00   No growth at 5 days  --  --          Radiology Results      Meds    MEDICATIONS  (STANDING):  apixaban 2.5 milliGRAM(s) Oral every 12 hours  atorvastatin 10 milliGRAM(s) Oral at bedtime  azithromycin  IVPB      azithromycin  IVPB 500 milliGRAM(s) IV Intermittent every 24 hours  cefTRIAXone   IVPB 1000 milliGRAM(s) IV Intermittent every 24 hours  fluticasone propionate/ salmeterol 100-50 MICROgram(s) Diskus 1 Dose(s) Inhalation two times a day  insulin lispro (ADMELOG) corrective regimen sliding scale   SubCutaneous three times a day before meals  insulin lispro (ADMELOG) corrective regimen sliding scale   SubCutaneous at bedtime  metoprolol tartrate 25 milliGRAM(s) Oral two times a day  NIFEdipine XL 30 milliGRAM(s) Oral daily  pantoprazole    Tablet 40 milliGRAM(s) Oral before breakfast      MEDICATIONS  (PRN):  albuterol/ipratropium for Nebulization 3 milliLiter(s) Nebulizer every 6 hours PRN Bronchospasm  melatonin 3 milliGRAM(s) Oral at bedtime PRN Insomnia      Physical Exam    Neuro :  no focal deficits  Respiratory: CTA B/L  CV: RRR, S1S2, no murmurs,   Abdominal: Soft, NT, ND +BS,  Extremities: No edema, + peripheral pulses    ASSESSMENT    pneumonia,    sepsis,   uti,   h/o HTN,   HLD,   T2DM,   osteoporosis,   Stage IIB gastric adenocarcinoma s/p distal gastrectomy (2015),   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 (follows [facility] Dr Adams),   bilateral PE (1/2023) on Eliquis,   asthma    Acute renal failure    HTN (hypertension)    DM (diabetes mellitus)    Hypercholesteremia    Gastric adenocarcinoma    Vertigo    Dementia    Ambulatory dysfunction    Multiple myeloma    Pulmonary embolism    S/P hysterectomy    S/P tubal ligation        PLAN    admit to med,   rocephin,   zithromax,   atrov and prov nebs,   supplemental oxygen prn,   robitussin prn,   hold outpt dm meds,   lispro ss,   cont preadmit home meds,   gi and dvt prophylaxis,   ivf  cbc,   bmp,   mg,   phos,   lipids,   tsh,   hgba1c,   bld cx,   ucx,   MRSA PCR and follow up Legionella urine Ag    cta chest to r/o pe     id cons  pulm cons   heme onc cons .      Patient is a 89y old  Female who presents with a chief complaint of CHARISSE, lactic acidosis (03 Nov 2024 12:58)    pt seen in icu [  ], reg med floor [ x  ], bed [ x ], chair at bedside [   ], awake and responsive [x  ], lethargic [  ],  nad [x ]        Allergies    gabapentin (Unknown)        Vitals    T(F): 97.7 (11-04-24 @ 05:00), Max: 99.3 (11-03-24 @ 10:45)  HR: 111 (11-04-24 @ 05:00) (89 - 114)  BP: 115/87 (11-04-24 @ 05:00) (115/87 - 144/96)  RR: 18 (11-04-24 @ 05:00) (18 - 20)  SpO2: 96% (11-04-24 @ 05:00) (93% - 100%)  Wt(kg): --  CAPILLARY BLOOD GLUCOSE      POCT Blood Glucose.: 118 mg/dL (03 Nov 2024 22:25)      Labs                          11.9   11.32 )-----------( 241      ( 04 Nov 2024 06:22 )             36.5       11-03    145  |  115[H]  |  13  ----------------------------<  89  3.7   |  21[L]  |  0.73    Ca    9.0      03 Nov 2024 08:40  Phos  1.7     11-03  Mg     1.4     11-03    TPro  6.2  /  Alb  3.0[L]  /  TBili  0.2  /  DBili  x   /  AST  24  /  ALT  17  /  AlkPhos  47  11-02          Troponin I, High Sensitivity Result: 9.6 ng/L (11-02-24 @ 21:34)        Radiology Results      Meds    MEDICATIONS  (STANDING):  apixaban 2.5 milliGRAM(s) Oral every 12 hours  atorvastatin 10 milliGRAM(s) Oral at bedtime  azithromycin  IVPB      azithromycin  IVPB 500 milliGRAM(s) IV Intermittent every 24 hours  cefTRIAXone   IVPB 1000 milliGRAM(s) IV Intermittent every 24 hours  fluticasone propionate/ salmeterol 100-50 MICROgram(s) Diskus 1 Dose(s) Inhalation two times a day  insulin lispro (ADMELOG) corrective regimen sliding scale   SubCutaneous three times a day before meals  insulin lispro (ADMELOG) corrective regimen sliding scale   SubCutaneous at bedtime  metoprolol tartrate 25 milliGRAM(s) Oral two times a day  NIFEdipine XL 30 milliGRAM(s) Oral daily  pantoprazole    Tablet 40 milliGRAM(s) Oral before breakfast      MEDICATIONS  (PRN):  albuterol/ipratropium for Nebulization 3 milliLiter(s) Nebulizer every 6 hours PRN Bronchospasm  melatonin 3 milliGRAM(s) Oral at bedtime PRN Insomnia      Physical Exam    Neuro :  no focal deficits  Respiratory: CTA B/L  CV: RRR, S1S2, no murmurs,   Abdominal: Soft, NT, ND +BS,  Extremities: No edema, + peripheral pulses      ASSESSMENT    r/o pneumonia,    sepsis,   uti,   h/o HTN,   HLD,   T2DM,   osteoporosis,   Stage IIB gastric adenocarcinoma s/p distal gastrectomy (2015),   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  hold outpt dm meds,   lispro ss,   hgba1c,   heme onc cons .   cont current meds

## 2024-11-05 DIAGNOSIS — Z75.8 OTHER PROBLEMS RELATED TO MEDICAL FACILITIES AND OTHER HEALTH CARE: ICD-10-CM

## 2024-11-05 LAB
ANION GAP SERPL CALC-SCNC: 6 MMOL/L — SIGNIFICANT CHANGE UP (ref 5–17)
BUN SERPL-MCNC: 12 MG/DL — SIGNIFICANT CHANGE UP (ref 7–18)
CALCIUM SERPL-MCNC: 8.8 MG/DL — SIGNIFICANT CHANGE UP (ref 8.4–10.5)
CHLORIDE SERPL-SCNC: 118 MMOL/L — HIGH (ref 96–108)
CO2 SERPL-SCNC: 22 MMOL/L — SIGNIFICANT CHANGE UP (ref 22–31)
CREAT SERPL-MCNC: 0.81 MG/DL — SIGNIFICANT CHANGE UP (ref 0.5–1.3)
EGFR: 70 ML/MIN/1.73M2 — SIGNIFICANT CHANGE UP
GLUCOSE BLDC GLUCOMTR-MCNC: 117 MG/DL — HIGH (ref 70–99)
GLUCOSE BLDC GLUCOMTR-MCNC: 127 MG/DL — HIGH (ref 70–99)
GLUCOSE BLDC GLUCOMTR-MCNC: 136 MG/DL — HIGH (ref 70–99)
GLUCOSE BLDC GLUCOMTR-MCNC: 142 MG/DL — HIGH (ref 70–99)
GLUCOSE SERPL-MCNC: 136 MG/DL — HIGH (ref 70–99)
HCT VFR BLD CALC: 36 % — SIGNIFICANT CHANGE UP (ref 34.5–45)
HGB BLD-MCNC: 11.9 G/DL — SIGNIFICANT CHANGE UP (ref 11.5–15.5)
MCHC RBC-ENTMCNC: 31.5 PG — SIGNIFICANT CHANGE UP (ref 27–34)
MCHC RBC-ENTMCNC: 33.1 G/DL — SIGNIFICANT CHANGE UP (ref 32–36)
MCV RBC AUTO: 95.2 FL — SIGNIFICANT CHANGE UP (ref 80–100)
NRBC # BLD: 0 /100 WBCS — SIGNIFICANT CHANGE UP (ref 0–0)
PLATELET # BLD AUTO: 244 K/UL — SIGNIFICANT CHANGE UP (ref 150–400)
POTASSIUM SERPL-MCNC: 3.7 MMOL/L — SIGNIFICANT CHANGE UP (ref 3.5–5.3)
POTASSIUM SERPL-SCNC: 3.7 MMOL/L — SIGNIFICANT CHANGE UP (ref 3.5–5.3)
RBC # BLD: 3.78 M/UL — LOW (ref 3.8–5.2)
RBC # FLD: 17.1 % — HIGH (ref 10.3–14.5)
SODIUM SERPL-SCNC: 146 MMOL/L — HIGH (ref 135–145)
WBC # BLD: 10.93 K/UL — HIGH (ref 3.8–10.5)
WBC # FLD AUTO: 10.93 K/UL — HIGH (ref 3.8–10.5)

## 2024-11-05 RX ADMIN — APIXABAN 2.5 MILLIGRAM(S): 5 TABLET, FILM COATED ORAL at 17:09

## 2024-11-05 RX ADMIN — Medication 10 MILLIGRAM(S): at 22:29

## 2024-11-05 RX ADMIN — Medication 25 MILLIGRAM(S): at 06:26

## 2024-11-05 RX ADMIN — PANTOPRAZOLE SODIUM 40 MILLIGRAM(S): 40 TABLET, DELAYED RELEASE ORAL at 06:26

## 2024-11-05 RX ADMIN — Medication 25 MILLIGRAM(S): at 13:51

## 2024-11-05 RX ADMIN — Medication 100 MILLIGRAM(S): at 08:44

## 2024-11-05 RX ADMIN — FLUTICASONE PROPIONATE AND SALMETEROL XINAFOATE 1 DOSE(S): 230; 21 AEROSOL, METERED RESPIRATORY (INHALATION) at 22:29

## 2024-11-05 RX ADMIN — APIXABAN 2.5 MILLIGRAM(S): 5 TABLET, FILM COATED ORAL at 06:25

## 2024-11-05 RX ADMIN — Medication 25 MILLIGRAM(S): at 22:29

## 2024-11-05 RX ADMIN — Medication 30 MILLIGRAM(S): at 06:26

## 2024-11-05 NOTE — PROGRESS NOTE ADULT - PROBLEM SELECTOR PLAN 2
Urine Cx growing gram negative rods  -C/w Ceftriaxone  -pending final culture and sensitivities  -ID Dr. Estrada following

## 2024-11-05 NOTE — PROGRESS NOTE ADULT - PROBLEM SELECTOR PLAN 7
From: Kathy Amin  To: Joaquina Tomlin  Sent: 3/2/2021 12:00 PM CST  Subject: Lab Test or Test Related Question    Hello,    Did you put the order in for the labs that I need prior to my visit on Monday, March 8? Also, is the order still effective for me to get a mammogram?     Thank you   Kathy   hx of DM on Metformin  -Hold oral meds while inpt   -C/w FS w/ ISSC ACHS   -Con Carb diet

## 2024-11-05 NOTE — PROGRESS NOTE ADULT - ASSESSMENT
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 On admission, she found to have no fever but Leukocytosis and positive Urine analysis. The CXR is negative. She has started on Ceftriaxone and Azithromycin, and the ID consult requested to assist with further evaluation and antibiotic management.    # UTI - Urine Cx growing GNR  # Leukocytosis - resolved    would recommend:    1. Follow up final Urine culture for ID and sensitivities, still in process  2. Continue Ceftriaxone until urine culture is finalzied  3. OOB to chair     d/w Dr. Kramer     Attending Attestation:    Spent more than 35 minutes on total encounter, more than 50 % of the visit was spent counseling and/or coordinating care by the Attending physician.

## 2024-11-05 NOTE — PROGRESS NOTE ADULT - PROBLEM SELECTOR PLAN 1
P/w SOB and chest tightness, patient unable to provide any hx or ROS   -H/o gastric ca, Now w/ multiple myeloma last chemo 3 days ago with Dr. Alvarado   -Labs CHARISSE Cr 1.22 now 0.79 s/p IVF   -Metformin at home  -lactate 8.8 on admission downtrending  -C/w CTX to cover infectious cause   -D/c Azithromycin as CT shows resolution of prior PNA   -F/u metformin level   -Unclear Etiology poss iso malignancy  - O2 sat 96% on RA  - ID Dr. Estrada following

## 2024-11-05 NOTE — PROGRESS NOTE ADULT - PROBLEM SELECTOR PLAN 3
H/o Multiple Myeloma   -Follows with Dr. Alvarado outpt   -Last chemo 2 days prior to admission  -[facility] Dr. Alvarado consulted

## 2024-11-05 NOTE — PROGRESS NOTE ADULT - PROBLEM SELECTOR PLAN 4
-on labs noted to have cr at 1.2 with baseline of 0.6  - Voiding  -S/p IV fluids   - Cr 0.79 today  -Trend bmp

## 2024-11-05 NOTE — PROGRESS NOTE ADULT - ASSESSMENT
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 Patient to be admitted for new onset CHARISSE and lactic acidosis with unclear etiology  11/4: Pt in Afib on Tele -- EKG A-Fib w/RVR -- Cards Dr. Jennings consulted- digoxin given and Metoprolol   11/5: Pt more alert today and conversive. Rate controlled.

## 2024-11-05 NOTE — PROGRESS NOTE ADULT - PROBLEM SELECTOR PLAN 5
Noted to be in Afib on Tele with rate 140-160s (no hx of Afib)  -EKG Afib w/ RVR rate 144  -Cards Dr. Jennings consulted  -S/p Digoxin 0.5mg x1 dose  -C/w Metoprolol 25mg q8hr (increased from q12h)  -C/w AC Eliquis 2.5mg   -C/w Tele monitoring

## 2024-11-05 NOTE — PROGRESS NOTE ADULT - SUBJECTIVE AND OBJECTIVE BOX
pt seen in icu [  ], reg med floor [  x ], bed [x  ], chair at bedside [   ]  Awake, alert, comfortable in bed in NAD. Confused but not agitated  REVIEW OF SYSTEMS:    CONSTITUTIONAL: No weakness, fevers or chills  EYES/ENT: No visual changes;  No vertigo or throat pain   NECK: No pain or stiffness  RESPIRATORY: No cough, wheezing, hemoptysis; No shortness of breath  CARDIOVASCULAR: No chest pain or palpitations  GASTROINTESTINAL: No abdominal or epigastric pain. No nausea, vomiting, or hematemesis; No diarrhea or constipation. No melena or hematochezia.  GENITOURINARY: No dysuria, frequency or hematuria  NEUROLOGICAL: No numbness or weakness  SKIN: No itching, burning, rashes, or lesions   All other review of systems is negative unless indicated above.    Physical Exam    General: WN/WD NAD  Neurology: A&Ox3, nonfocal, JACKMAN x 4  Respiratory: CTA B/L  CV: RRR, S1S2, no murmurs, rubs or gallops  Abdominal: Soft, NT, ND +BS, Last BM  Extremities: No edema, + peripheral pulses      Allergies  gabapentin (Unknown)      Health Issues  Acute renal failure    HTN (hypertension)    DM (diabetes mellitus)    Hypercholesteremia    Gastric adenocarcinoma    Vertigo    Dementia    Ambulatory dysfunction    Multiple myeloma    Pulmonary embolism    S/P hysterectomy    S/P tubal ligation        Vitals  T(F): 97.3 (11-05-24 @ 09:50), Max: 99.3 (11-05-24 @ 05:31)  HR: 91 (11-05-24 @ 09:50) (78 - 96)  BP: 98/55 (11-05-24 @ 09:50) (94/78 - 134/70)  RR: 16 (11-05-24 @ 09:50) (16 - 18)  SpO2: 93% (11-05-24 @ 09:50) (93% - 99%)  Wt(kg): --  CAPILLARY BLOOD GLUCOSE      POCT Blood Glucose.: 127 mg/dL (05 Nov 2024 08:04)      Labs                          11.9   10.93 )-----------( 244      ( 05 Nov 2024 05:06 )             36.0       11-05    146[H]  |  118[H]  |  12  ----------------------------<  136[H]  3.7   |  22  |  0.81    Ca    8.8      05 Nov 2024 05:06  Phos  3.3     11-04  Mg     1.6     11-04              Radiology Results      Meds    MEDICATIONS  (STANDING):  apixaban 2.5 milliGRAM(s) Oral every 12 hours  atorvastatin 10 milliGRAM(s) Oral at bedtime  cefTRIAXone   IVPB 1000 milliGRAM(s) IV Intermittent every 24 hours  fluticasone propionate/ salmeterol 100-50 MICROgram(s) Diskus 1 Dose(s) Inhalation two times a day  insulin lispro (ADMELOG) corrective regimen sliding scale   SubCutaneous three times a day before meals  insulin lispro (ADMELOG) corrective regimen sliding scale   SubCutaneous at bedtime  metoprolol tartrate 25 milliGRAM(s) Oral every 8 hours  NIFEdipine XL 30 milliGRAM(s) Oral daily  pantoprazole    Tablet 40 milliGRAM(s) Oral before breakfast      MEDICATIONS  (PRN):  albuterol/ipratropium for Nebulization 3 milliLiter(s) Nebulizer every 6 hours PRN Bronchospasm  melatonin 3 milliGRAM(s) Oral at bedtime PRN Insomnia

## 2024-11-05 NOTE — PROGRESS NOTE ADULT - SUBJECTIVE AND OBJECTIVE BOX
Patient is seen and examined at the bed side, is afebrile. She is doing well. The Urine culture grew GNR, not finalized yet.      REVIEW OF SYSTEMS: All other review systems are negative      ALLERGIES: gabapentin (Unknown)      Vital Signs Last 24 Hrs  T(C): 36.9 (05 Nov 2024 13:41), Max: 37.4 (05 Nov 2024 05:31)  T(F): 98.4 (05 Nov 2024 13:41), Max: 99.3 (05 Nov 2024 05:31)  HR: 101 (05 Nov 2024 13:41) (78 - 101)  BP: 119/63 (05 Nov 2024 13:41) (98/55 - 134/70)  BP(mean): 80 (05 Nov 2024 13:41) (80 - 89)  RR: 17 (05 Nov 2024 13:41) (16 - 18)  SpO2: 95% (05 Nov 2024 13:41) (93% - 99%)    Parameters below as of 05 Nov 2024 13:41  Patient On (Oxygen Delivery Method): room air      PHYSICAL EXAM:  GENERAL: Not in distress   CHEST/LUNG:  Air entry bilaterally  HEART: s1 and s2 present  ABDOMEN:  Nontender and  Nondistended  EXTREMITIES: No pedal  edema  CNS: Awake and Alert      LABS:                        11.9   10.93 )-----------( 244      ( 05 Nov 2024 05:06 )             36.0                           11.9   11.32 )-----------( 241      ( 04 Nov 2024 06:22 )             36.5       11-05    146[H]  |  118[H]  |  12  ----------------------------<  136[H]  3.7   |  22  |  0.81    Ca    8.8      05 Nov 2024 05:06  Phos  3.3     11-04  Mg     1.6     11-04      11-04    148[H]  |  117[H]  |  12  ----------------------------<  118[H]  4.1   |  22  |  0.79    Ca    9.2      04 Nov 2024 06:22  Phos  3.3     11-04  Mg     1.6     11-04    TPro  6.2  /  Alb  3.0[L]  /  TBili  0.2  /  DBili  x   /  AST  24  /  ALT  17  /  AlkPhos  47  11-02      CAPILLARY BLOOD GLUCOSE  POCT Blood Glucose.: 96 mg/dL (03 Nov 2024 12:18)  POCT Blood Glucose.: 85 mg/dL (03 Nov 2024 07:59)  POCT Blood Glucose.: 93 mg/dL (03 Nov 2024 04:48)        MEDICATIONS  (STANDING):    apixaban 2.5 milliGRAM(s) Oral every 12 hours  atorvastatin 10 milliGRAM(s) Oral at bedtime  cefTRIAXone   IVPB 1000 milliGRAM(s) IV Intermittent every 24 hours  fluticasone propionate/ salmeterol 100-50 MICROgram(s) Diskus 1 Dose(s) Inhalation two times a day  insulin lispro (ADMELOG) corrective regimen sliding scale   SubCutaneous three times a day before meals  insulin lispro (ADMELOG) corrective regimen sliding scale   SubCutaneous at bedtime  metoprolol tartrate 25 milliGRAM(s) Oral every 8 hours  NIFEdipine XL 30 milliGRAM(s) Oral daily  pantoprazole    Tablet 40 milliGRAM(s) Oral before breakfast        RADIOLOGY & ADDITIONAL TESTS:    11/4/24  : CT Angio Chest PE Protocol w/ IV Cont (11.04.24 @ 15:19) No pulmonary embolus.    Resolved prior pneumonia.  Unchanged small left upper lobe nodules.      11/2/24: Xray Chest 1 View- PORTABLE-Urgent (11.02.24 @ 22:22) No active parenchymal disease in the chest.      MICROBIOLOGY DATA:    Culture - Urine (11.03.24 @ 04:09)   Specimen Source: Clean Catch  Culture Results:   >100,000 CFU/ml Gram Negative Rods    Urine Microscopic-Add On (NC) (11.03.24 @ 04:09)   Bacteria: Few /HPF  Comment - Urine: Numerous Budding yeasts found  Squamous Epithelial Cells: Present  Red Blood Cell - Urine: 5 /HPF  White Blood Cell - Urine: Too Numerous to count /HPF

## 2024-11-05 NOTE — PROGRESS NOTE ADULT - SUBJECTIVE AND OBJECTIVE BOX
Patient is a 89y old  Female who presents with a chief complaint of Acute renal failure     (04 Nov 2024 16:24)      INTERVAL HPI/OVERNIGHT EVENTS: no new complaints    MEDICATIONS  (STANDING):  apixaban 2.5 milliGRAM(s) Oral every 12 hours  atorvastatin 10 milliGRAM(s) Oral at bedtime  cefTRIAXone   IVPB 1000 milliGRAM(s) IV Intermittent every 24 hours  fluticasone propionate/ salmeterol 100-50 MICROgram(s) Diskus 1 Dose(s) Inhalation two times a day  insulin lispro (ADMELOG) corrective regimen sliding scale   SubCutaneous at bedtime  insulin lispro (ADMELOG) corrective regimen sliding scale   SubCutaneous three times a day before meals  metoprolol tartrate 25 milliGRAM(s) Oral every 8 hours  NIFEdipine XL 30 milliGRAM(s) Oral daily  pantoprazole    Tablet 40 milliGRAM(s) Oral before breakfast    MEDICATIONS  (PRN):  albuterol/ipratropium for Nebulization 3 milliLiter(s) Nebulizer every 6 hours PRN Bronchospasm  melatonin 3 milliGRAM(s) Oral at bedtime PRN Insomnia      __________________________________________________  REVIEW OF SYSTEMS:    CONSTITUTIONAL: No fever,   EYES: no acute visual disturbances  NECK: No pain or stiffness  RESPIRATORY: No cough; No shortness of breath  CARDIOVASCULAR: No chest pain, no palpitations  GASTROINTESTINAL: No pain. No nausea or vomiting; No diarrhea   NEUROLOGICAL: No headache or numbness, no tremors  MUSCULOSKELETAL: No joint pain, no muscle pain  GENITOURINARY: no dysuria, no frequency, no hesitancy  PSYCHIATRY: no depression , no anxiety  ALL OTHER  ROS negative      Vital Signs Last 24 Hrs  T(C): 36.3 (05 Nov 2024 09:50), Max: 37.4 (05 Nov 2024 05:31)  T(F): 97.3 (05 Nov 2024 09:50), Max: 99.3 (05 Nov 2024 05:31)  HR: 91 (05 Nov 2024 09:50) (78 - 96)  BP: 98/55 (05 Nov 2024 09:50) (94/78 - 134/70)  BP(mean): 89 (05 Nov 2024 05:31) (86 - 89)  RR: 16 (05 Nov 2024 09:50) (16 - 18)  SpO2: 93% (05 Nov 2024 09:50) (93% - 99%)    Parameters below as of 05 Nov 2024 09:50  Patient On (Oxygen Delivery Method): room air        ________________________________________________  PHYSICAL EXAM:  GENERAL: NAD  HEENT: Normocephalic;  conjunctivae and sclerae clear; moist mucous membranes;   NECK : supple  CHEST/LUNG: Clear to auscultation bilaterally with good air entry   HEART: S1 S2  regular; no murmurs, gallops or rubs  ABDOMEN: Soft, Nontender, Nondistended; Bowel sounds present  EXTREMITIES: no cyanosis; no edema; no calf tenderness  SKIN: warm and dry; no rash  NERVOUS SYSTEM:  Awake and alert; Oriented  to place, person and time ; no new deficits    _________________________________________________  LABS:                        11.9   10.93 )-----------( 244      ( 05 Nov 2024 05:06 )             36.0     11-05    146[H]  |  118[H]  |  12  ----------------------------<  136[H]  3.7   |  22  |  0.81    Ca    8.8      05 Nov 2024 05:06  Phos  3.3     11-04  Mg     1.6     11-04        Urinalysis Basic - ( 05 Nov 2024 05:06 )    Color: x / Appearance: x / SG: x / pH: x  Gluc: 136 mg/dL / Ketone: x  / Bili: x / Urobili: x   Blood: x / Protein: x / Nitrite: x   Leuk Esterase: x / RBC: x / WBC x   Sq Epi: x / Non Sq Epi: x / Bacteria: x      CAPILLARY BLOOD GLUCOSE      POCT Blood Glucose.: 127 mg/dL (05 Nov 2024 08:04)  POCT Blood Glucose.: 107 mg/dL (04 Nov 2024 22:21)  POCT Blood Glucose.: 109 mg/dL (04 Nov 2024 17:10)  POCT Blood Glucose.: 77 mg/dL (04 Nov 2024 11:44)      RADIOLOGY & ADDITIONAL TESTS:    Imaging Personally Reviewed:  YES/NO    Consultant(s) Notes Reviewed:   YES/ No    Care Discussed with Consultants :     Plan of care was discussed with patient and /or primary care giver; all questions and concerns were addressed and care was aligned with patient's wishes.       Patient is a 89y old  Female who presents with a chief complaint of Acute renal failure     (04 Nov 2024 16:24)      INTERVAL HPI/OVERNIGHT EVENTS: pt seen at bedside with Algerian  Mark ID #[id number] no new complaints.    MEDICATIONS  (STANDING):  apixaban 2.5 milliGRAM(s) Oral every 12 hours  atorvastatin 10 milliGRAM(s) Oral at bedtime  cefTRIAXone   IVPB 1000 milliGRAM(s) IV Intermittent every 24 hours  fluticasone propionate/ salmeterol 100-50 MICROgram(s) Diskus 1 Dose(s) Inhalation two times a day  insulin lispro (ADMELOG) corrective regimen sliding scale   SubCutaneous at bedtime  insulin lispro (ADMELOG) corrective regimen sliding scale   SubCutaneous three times a day before meals  metoprolol tartrate 25 milliGRAM(s) Oral every 8 hours  NIFEdipine XL 30 milliGRAM(s) Oral daily  pantoprazole    Tablet 40 milliGRAM(s) Oral before breakfast    MEDICATIONS  (PRN):  albuterol/ipratropium for Nebulization 3 milliLiter(s) Nebulizer every 6 hours PRN Bronchospasm  melatonin 3 milliGRAM(s) Oral at bedtime PRN Insomnia  __________________________________________________  REVIEW OF SYSTEMS:    Unable to elicit full ROS as patient confused.    Vital Signs Last 24 Hrs  T(C): 36.3 (05 Nov 2024 09:50), Max: 37.4 (05 Nov 2024 05:31)  T(F): 97.3 (05 Nov 2024 09:50), Max: 99.3 (05 Nov 2024 05:31)  HR: 91 (05 Nov 2024 09:50) (78 - 96)  BP: 98/55 (05 Nov 2024 09:50) (94/78 - 134/70)  BP(mean): 89 (05 Nov 2024 05:31) (86 - 89)  RR: 16 (05 Nov 2024 09:50) (16 - 18)  SpO2: 93% (05 Nov 2024 09:50) (93% - 99%)    Parameters below as of 05 Nov 2024 09:50  Patient On (Oxygen Delivery Method): room air  ________________________________________________  PHYSICAL EXAM:  GENERAL: NAD  HEENT: Normocephalic;  conjunctivae and sclerae clear; moist mucous membranes;   NECK : supple  CHEST/LUNG: Clear to auscultation bilaterally with good air entry   HEART: S1 S2  regular; no murmurs, gallops or rubs  ABDOMEN: Soft, Nontender, Nondistended; Bowel sounds present  EXTREMITIES: no cyanosis; no edema; no calf tenderness  SKIN: warm and dry; no rash  NERVOUS SYSTEM:  Awake and alert; Oriented person; no new deficits    _________________________________________________  LABS:                        11.9   10.93 )-----------( 244      ( 05 Nov 2024 05:06 )             36.0     11-05    146[H]  |  118[H]  |  12  ----------------------------<  136[H]  3.7   |  22  |  0.81    Ca    8.8      05 Nov 2024 05:06  Phos  3.3     11-04  Mg     1.6     11-04    Urinalysis Basic - ( 05 Nov 2024 05:06 )    Color: x / Appearance: x / SG: x / pH: x  Gluc: 136 mg/dL / Ketone: x  / Bili: x / Urobili: x   Blood: x / Protein: x / Nitrite: x   Leuk Esterase: x / RBC: x / WBC x   Sq Epi: x / Non Sq Epi: x / Bacteria: x    CAPILLARY BLOOD GLUCOSE    POCT Blood Glucose.: 127 mg/dL (05 Nov 2024 08:04)  POCT Blood Glucose.: 107 mg/dL (04 Nov 2024 22:21)  POCT Blood Glucose.: 109 mg/dL (04 Nov 2024 17:10)  POCT Blood Glucose.: 77 mg/dL (04 Nov 2024 11:44)      RADIOLOGY & ADDITIONAL TESTS:  < from: CT Angio Chest PE Protocol w/ IV Cont (11.04.24 @ 15:19) >  ACC: [id number] EXAM:  CT ANGIO CHEST PULM ART [facility]   ORDERED BY: LATOYA DAWN     PROCEDURE DATE:  11/04/2024          INTERPRETATION:  INDICATION: Shortness of breath, chest tightness ,   history of    TECHNIQUE: Helical acquisition of the chest after the administration of   53 mL of Omnipaque 350. Maximum intensity projection images were   generated.    COMPARISON: CT chest 9/13/2024.    FINDINGS:    HEART/VASCULATURE: No pulmonary embolus. Normal heart size. No   pericardial effusion. Coronary artery calcification. Normal aortic size.    LUNGS/AIRWAYS/PLEURA: Patent trachea and bronchi. Unchanged unchanged   small cluster of sub-4 mm nodules in the left upper lobe (2:52-55). Image   acquisition during exhalation. Calcified granuloma in the middle lobe. No   pleural effusion.    LYMPH NODES/MEDIASTINUM: No lymphadenopathy.    UPPER ABDOMEN: Cholecystectomy.    BONES/SOFT TISSUES: Multiple old rib fractures.      IMPRESSION:    No pulmonary embolus.    Resolved prior pneumonia.    Unchanged small left upper lobe nodules.    --- End of Report ---    < from: Xray Chest 1 View- PORTABLE-Urgent (11.02.24 @ 22:22) >  ACC: [id number] EXAM:  XR CHEST PORTABLE URGENT 1V   ORDERED BY:  FRANKY ALCAZAR     PROCEDURE DATE:  11/02/2024        INTERPRETATION:  Exam:XR CHEST URGENT    clinical history:Chest Pain    Heart size is normal. Lungs show no acute infiltrate. No pleural effusion.    IMPRESSION:  No active parenchymal disease in the chest.    --- End of Report ---      Imaging Personally Reviewed:  YES    Consultant(s) Notes Reviewed:   YES    Plan of care was discussed with patient and /or primary care giver; all questions and concerns were addressed and care was aligned with patient's wishes.

## 2024-11-06 ENCOUNTER — TRANSCRIPTION ENCOUNTER (OUTPATIENT)
Age: 89
End: 2024-11-06

## 2024-11-06 LAB
ANION GAP SERPL CALC-SCNC: 7 MMOL/L — SIGNIFICANT CHANGE UP (ref 5–17)
BUN SERPL-MCNC: 26 MG/DL — HIGH (ref 7–18)
CALCIUM SERPL-MCNC: 8.3 MG/DL — LOW (ref 8.4–10.5)
CHLORIDE SERPL-SCNC: 118 MMOL/L — HIGH (ref 96–108)
CO2 SERPL-SCNC: 20 MMOL/L — LOW (ref 22–31)
CREAT SERPL-MCNC: 0.74 MG/DL — SIGNIFICANT CHANGE UP (ref 0.5–1.3)
EGFR: 78 ML/MIN/1.73M2 — SIGNIFICANT CHANGE UP
GLUCOSE BLDC GLUCOMTR-MCNC: 104 MG/DL — HIGH (ref 70–99)
GLUCOSE BLDC GLUCOMTR-MCNC: 108 MG/DL — HIGH (ref 70–99)
GLUCOSE BLDC GLUCOMTR-MCNC: 112 MG/DL — HIGH (ref 70–99)
GLUCOSE BLDC GLUCOMTR-MCNC: 141 MG/DL — HIGH (ref 70–99)
GLUCOSE SERPL-MCNC: 139 MG/DL — HIGH (ref 70–99)
HCT VFR BLD CALC: 31.9 % — LOW (ref 34.5–45)
HGB BLD-MCNC: 10.4 G/DL — LOW (ref 11.5–15.5)
LACTATE SERPL-SCNC: 2.9 MMOL/L — HIGH (ref 0.7–2)
MCHC RBC-ENTMCNC: 31.4 PG — SIGNIFICANT CHANGE UP (ref 27–34)
MCHC RBC-ENTMCNC: 32.6 G/DL — SIGNIFICANT CHANGE UP (ref 32–36)
MCV RBC AUTO: 96.4 FL — SIGNIFICANT CHANGE UP (ref 80–100)
NRBC # BLD: 0 /100 WBCS — SIGNIFICANT CHANGE UP (ref 0–0)
PLATELET # BLD AUTO: 203 K/UL — SIGNIFICANT CHANGE UP (ref 150–400)
POTASSIUM SERPL-MCNC: 3.3 MMOL/L — LOW (ref 3.5–5.3)
POTASSIUM SERPL-SCNC: 3.3 MMOL/L — LOW (ref 3.5–5.3)
RBC # BLD: 3.31 M/UL — LOW (ref 3.8–5.2)
RBC # FLD: 17 % — HIGH (ref 10.3–14.5)
SODIUM SERPL-SCNC: 145 MMOL/L — SIGNIFICANT CHANGE UP (ref 135–145)
WBC # BLD: 8.89 K/UL — SIGNIFICANT CHANGE UP (ref 3.8–10.5)
WBC # FLD AUTO: 8.89 K/UL — SIGNIFICANT CHANGE UP (ref 3.8–10.5)

## 2024-11-06 PROCEDURE — 99497 ADVNCD CARE PLAN 30 MIN: CPT

## 2024-11-06 RX ORDER — ERTAPENEM SODIUM 1 G/1
1000 INJECTION, POWDER, LYOPHILIZED, FOR SOLUTION INTRAMUSCULAR; INTRAVENOUS ONCE
Refills: 0 | Status: COMPLETED | OUTPATIENT
Start: 2024-11-06 | End: 2024-11-06

## 2024-11-06 RX ORDER — METOPROLOL TARTRATE 50 MG
25 TABLET ORAL
Refills: 0 | Status: DISCONTINUED | OUTPATIENT
Start: 2024-11-06 | End: 2024-11-08

## 2024-11-06 RX ORDER — SODIUM CHLORIDE 9 MG/ML
1000 INJECTION, SOLUTION INTRAMUSCULAR; INTRAVENOUS; SUBCUTANEOUS ONCE
Refills: 0 | Status: COMPLETED | OUTPATIENT
Start: 2024-11-06 | End: 2024-11-06

## 2024-11-06 RX ORDER — LOPERAMIDE HCL 2 MG
4 TABLET ORAL ONCE
Refills: 0 | Status: COMPLETED | OUTPATIENT
Start: 2024-11-06 | End: 2024-11-06

## 2024-11-06 RX ORDER — POTASSIUM CHLORIDE 10 MEQ
40 TABLET, EXTENDED RELEASE ORAL ONCE
Refills: 0 | Status: COMPLETED | OUTPATIENT
Start: 2024-11-06 | End: 2024-11-06

## 2024-11-06 RX ORDER — MONTELUKAST SODIUM 10 MG/1
10 TABLET, FILM COATED ORAL AT BEDTIME
Refills: 0 | Status: DISCONTINUED | OUTPATIENT
Start: 2024-11-06 | End: 2024-11-13

## 2024-11-06 RX ORDER — ERTAPENEM SODIUM 1 G/1
INJECTION, POWDER, LYOPHILIZED, FOR SOLUTION INTRAMUSCULAR; INTRAVENOUS
Refills: 0 | Status: COMPLETED | OUTPATIENT
Start: 2024-11-06 | End: 2024-11-10

## 2024-11-06 RX ORDER — ACETAMINOPHEN 500 MG
650 TABLET ORAL EVERY 6 HOURS
Refills: 0 | Status: DISCONTINUED | OUTPATIENT
Start: 2024-11-06 | End: 2024-11-13

## 2024-11-06 RX ORDER — LOPERAMIDE HCL 2 MG
2 TABLET ORAL
Refills: 0 | Status: DISCONTINUED | OUTPATIENT
Start: 2024-11-06 | End: 2024-11-13

## 2024-11-06 RX ORDER — DRONEDARONE 400 MG/1
400 TABLET, FILM COATED ORAL
Refills: 0 | Status: DISCONTINUED | OUTPATIENT
Start: 2024-11-06 | End: 2024-11-13

## 2024-11-06 RX ORDER — ERTAPENEM SODIUM 1 G/1
1000 INJECTION, POWDER, LYOPHILIZED, FOR SOLUTION INTRAMUSCULAR; INTRAVENOUS EVERY 24 HOURS
Refills: 0 | Status: COMPLETED | OUTPATIENT
Start: 2024-11-07 | End: 2024-11-09

## 2024-11-06 RX ADMIN — PANTOPRAZOLE SODIUM 40 MILLIGRAM(S): 40 TABLET, DELAYED RELEASE ORAL at 07:04

## 2024-11-06 RX ADMIN — Medication 30 MILLIGRAM(S): at 05:54

## 2024-11-06 RX ADMIN — APIXABAN 2.5 MILLIGRAM(S): 5 TABLET, FILM COATED ORAL at 05:54

## 2024-11-06 RX ADMIN — Medication 40 MILLIEQUIVALENT(S): at 09:42

## 2024-11-06 RX ADMIN — DRONEDARONE 400 MILLIGRAM(S): 400 TABLET, FILM COATED ORAL at 17:44

## 2024-11-06 RX ADMIN — MONTELUKAST SODIUM 10 MILLIGRAM(S): 10 TABLET, FILM COATED ORAL at 22:20

## 2024-11-06 RX ADMIN — Medication 100 MILLIGRAM(S): at 07:05

## 2024-11-06 RX ADMIN — ERTAPENEM SODIUM 120 MILLIGRAM(S): 1 INJECTION, POWDER, LYOPHILIZED, FOR SOLUTION INTRAMUSCULAR; INTRAVENOUS at 15:13

## 2024-11-06 RX ADMIN — Medication 4 MILLIGRAM(S): at 16:21

## 2024-11-06 RX ADMIN — Medication 650 MILLIGRAM(S): at 18:18

## 2024-11-06 RX ADMIN — Medication 10 MILLIGRAM(S): at 22:20

## 2024-11-06 RX ADMIN — Medication 25 MILLIGRAM(S): at 17:44

## 2024-11-06 RX ADMIN — SODIUM CHLORIDE 1000 MILLILITER(S): 9 INJECTION, SOLUTION INTRAMUSCULAR; INTRAVENOUS; SUBCUTANEOUS at 13:58

## 2024-11-06 RX ADMIN — Medication 25 MILLIGRAM(S): at 05:54

## 2024-11-06 RX ADMIN — APIXABAN 2.5 MILLIGRAM(S): 5 TABLET, FILM COATED ORAL at 17:44

## 2024-11-06 RX ADMIN — FLUTICASONE PROPIONATE AND SALMETEROL XINAFOATE 1 DOSE(S): 230; 21 AEROSOL, METERED RESPIRATORY (INHALATION) at 22:20

## 2024-11-06 RX ADMIN — FLUTICASONE PROPIONATE AND SALMETEROL XINAFOATE 1 DOSE(S): 230; 21 AEROSOL, METERED RESPIRATORY (INHALATION) at 09:42

## 2024-11-06 NOTE — PROGRESS NOTE ADULT - PROBLEM SELECTOR PLAN 1
P/w SOB and chest tightness, (patient unable to provide any hx or ROS)  - on metformin at home, F/u metformin level   -Unclear Etiology poss iso malignancy  - s/p IVF  -Labs CHARISSE Cr 1.22-->0.74   -lactate 8.8 on admission--->2.9   - Stat 1L bolus; f/u lactate in AM  - ID Dr. Estrada & [facility] following

## 2024-11-06 NOTE — DISCHARGE NOTE PROVIDER - HOSPITAL COURSE
Patient is a 89-year-old lady with past medical history of hypertension, hyperlipidemia, diabetes, gastric cancer, PE on Eliquis, dementia who presents to the ED because of some shortness of breath, and appearance of chest discomfort. Patient to be admitted for new onset CHARISSE and lactic acidosis. On admission, she found to have no fever but Leukocytosis and positive Urine analysis. The CXR is negative. She has started on Ceftriaxone and Azithromycin. ID and pulm consulted.     course c/b EKG A-Fib w/RVR -- Cards Dr. Jennings consulted- digoxin given and Metoprolol. on d/c cards reccs: ?????      on d/c id reccs: ???      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 Ct angio- no pe, resolved prior pna. l upper lobe nodules. Patient to be admitted for new onset AK wtih  lactic acidosis likely in the setting of UTI and albuterol use. Id following.  Hospital course A-Fib w/RVR on 11/4/24 and  spontaneously converted to sinus. Card following   Pr urine Cx + for  ESBL UTI, per ID pt to continue ertapenem until 11/13/24          Problem/Plan - 1:  ·  Problem: Urinary tract infection due to ESBL Klebsiella.   ·  Plan: cont ertapenem   - blood Cx from 11/7 negative so far   - ID Dr Joe.    Problem/Plan - 2:  ·  Problem: Afib.   ·  Plan: - 11/4: Noted to be in Afib on Tele with rate 140-160s (no hx of Afib)  -EKG Afib w/ RVR rate 144  -S/p Digoxin 0.5mg x1 dose  -C/w eliquis,add multaq,change lopressor 25mg bid. ( cardio reccs)  - now SR  - C/w Tele monitoring  -  Card Dr Jennings.    Problem/Plan - 3:  ·  Problem: Lactic acidosis.   ·  Plan: likely in the setting of albuterol   - resolved now.    Problem/Plan - 4:  ·  Problem: CHARISSE (acute kidney injury).   ·  Plan: resolved now   -m likely prerenal.    Problem/Plan - 5:  ·  Problem: Multiple myeloma.   ·  Plan: H/o Multiple Myeloma   -Follows with Dr. Alvarado outpt   -Last chemo 2 days prior to admission  -[facility] Dr. Alvarado consulted, f/u reccs.           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 Ct angio- no pe, resolved prior pna. l upper lobe nodules. Patient to be admitted for new onset AK wtih  lactic acidosis likely in the setting of UTI and albuterol use. Id following.  Hospital course A-Fib w/RVR on 11/4/24 and  spontaneously converted to sinus. 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 Ct angio- no pe, resolved prior pna. l upper lobe nodules. Patient to be admitted for new onset CHARISSE with lactic acidosis likely in the setting of UTI and albuterol use. ID following.  Hospital course A-Fib w/RVR on 11/4/24 and spontaneously converted to sinus. Card following, now dc tele.   Remains on Ertapenem 1g qd until 11/13.    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 Ct angio- no pe, resolved prior pna. l upper lobe nodules. Patient to be admitted for new onset CHARISSE with lactic acidosis likely in the setting of UTI and albuterol use. ID following.  Hospital course A-Fib w/RVR on 11/4/24 and spontaneously converted to sinus. Card following, now dc tele.   Remains on Ertapenem 1g qd until 11/13.     eliquis prescription was renewed.   multaq verified with pharmacy - covered with insurance. 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 Ct angio- no pe, resolved prior pna. l upper lobe nodules. Patient to be admitted for new onset CHARISSE with lactic acidosis likely in the setting of UTI and albuterol use. ID following.  Hospital course A-Fib w/RVR on 11/4/24 and spontaneously converted to sinus. Card following, now dc tele.   Remains on Ertapenem 1g qd until 11/13.     eliquis prescription was renewed.   multaq verified with pharmacy - covered with insurance.     case discussed with attending, pt medically stable for d/c. Please note that this a brief summary of hospital course please refer to daily progress notes and consult notes for full course and events

## 2024-11-06 NOTE — CONSULT NOTE ADULT - ASSESSMENT
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 Patient at bedside not able to provide any hx, hx provided by family over the phone. They state that [place] started being noticed that had SOB along with chest thightness. Per family patient did not complain of any other symptoms and was also noted to be having chills like shaking along with new onset cough that has a yellow sputum. Pateint had chemotherapy 2 days ago with  with no complications.     #Multiple Myeloma  #Hx Gastric CA IIB  #Hx PE  follows with our colleague Dr. Alvarado, currently on quadruple therapy, last tx 11/01/24  p/w SOB  Hgb=10.4, WBC=8.8  UCx + GNR  CTA chest shows resolved PNA from prior imaging and no PE  Rec's:  -Hold chemo during admission  -on abx for UTI, final Cx pending  -SOB likely d/t asthma, no infiltrate  -continue eliquis  -f/u with Dr. Alvarado on d/c    #VTE Prophylaxis    Thank you for the referral. Will continue to monitor the patient.  Please call with any questions [phone]  Above reviewed with Attending Dr. Flores  [facility]  [address], [address]th [place] [address]  [phone]  *Note not finalized until signed by Attending Physician

## 2024-11-06 NOTE — PROGRESS NOTE ADULT - PROBLEM SELECTOR PLAN 2
- UA+, ucx- >100k ESBL Klb pneu  -resistant to Ceftriaxone--> abx changed to ???  -ID Dr. Estrada following - UA+, ucx- >100k ESBL Klb pneu  -resistant to Ceftriaxone--> abx changed to Ertapenem 1g daily ( ID reccs)  -ID Dr. Estrada following

## 2024-11-06 NOTE — CONSULT NOTE ADULT - REASON FOR ADMISSION
CHARISSE, lactic acidosis

## 2024-11-06 NOTE — PROGRESS NOTE ADULT - ASSESSMENT
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 On admission, she found to have no fever but Leukocytosis and positive Urine analysis. The CXR is negative. She has started on Ceftriaxone and Azithromycin, and the ID consult requested to assist with further evaluation and antibiotic management.    # UTI - Urine Cx growing GNR  # Leukocytosis - resolved    would recommend:    1. Please call [facility] for final Urine culture for ID and sensitivities, still in process  2. Continue Ceftriaxone until urine culture is finalzied  3. OOB to chair     d/w Covering Magdalena LOPEZ    Attending Attestation:    Spent more than 35 minutes on total encounter, more than 50 % of the visit was spent counseling and/or coordinating care by the Attending physician.

## 2024-11-06 NOTE — PHARMACOTHERAPY INTERVENTION NOTE - COMMENTS
Recommended to discontinue azithromycin since CT chest shows prior pneumonia improved as per ID.      Massiel Rogers, PharmD   Clinical Pharmacy Specialist, Infectious Diseases  Tele-Antimicrobial Stewardship Program (Tele-ASP)  Tele-ASP Phone: [phone]  
Patient’s medication profile reviewed with Prateek (pharmacy student). Discussed with patient about their medication. Patient had no additional questions regarding medications.

## 2024-11-06 NOTE — DISCHARGE NOTE PROVIDER - CARE PROVIDER_API CALL
Alexus Bull  Geriatric Medicine  [address] [place] [id number]  Phone: [phone]  Fax: [phone]  Follow Up Time: 1 week    Maddie Jennings  Cardiology  [address] [place] [address]  Phone: [phone]  Fax: [phone]  Follow Up Time: 1 week    Evgeny Alvarado  Medical Oncology  [address] [place] [address], [facility] [address]  [place] [address]  Phone: [phone]  Fax: [phone]  Follow Up Time: 1 week

## 2024-11-06 NOTE — PROGRESS NOTE ADULT - SUBJECTIVE AND OBJECTIVE BOX
Patient is a 89y old  Female who presents with a chief complaint of CHARISSE, lactic acidosis (05 Nov 2024 13:12)    pt seen in icu [  ], reg med floor [ x  ], bed [ x ], chair at bedside [   ], awake and responsive [x  ], lethargic [  ],    nad [x ]      Allergies    gabapentin (Unknown)        Vitals    T(F): 98.2 (11-06-24 @ 04:57), Max: 98.9 (11-05-24 @ 20:33)  HR: 91 (11-06-24 @ 04:57) (90 - 101)  BP: 141/69 (11-06-24 @ 04:57) (98/55 - 141/69)  RR: 17 (11-06-24 @ 04:57) (16 - 17)  SpO2: 96% (11-06-24 @ 04:57) (93% - 96%)  Wt(kg): --  CAPILLARY BLOOD GLUCOSE      POCT Blood Glucose.: 117 mg/dL (05 Nov 2024 22:28)      Labs                          10.4   8.89  )-----------( 203      ( 06 Nov 2024 05:33 )             31.9       11-06    145  |  118[H]  |  26[H]  ----------------------------<  139[H]  3.3[L]   |  20[L]  |  0.74    Ca    8.3[L]      06 Nov 2024 05:33              Clean Catch  11-03 @ 04:09   >100,000 CFU/ml Gram Negative Rods  --  --      Clean Catch  09-15 @ 01:09   <10,000 CFU/mL Normal Urogenital Natalie  --  --      .Blood Blood-Peripheral  09-13 @ 01:07   No growth at 5 days  --  --      .Blood Blood-Peripheral  09-13 @ 01:00   No growth at 5 days  --  --          Radiology Results      Meds    MEDICATIONS  (STANDING):  apixaban 2.5 milliGRAM(s) Oral every 12 hours  atorvastatin 10 milliGRAM(s) Oral at bedtime  cefTRIAXone   IVPB 1000 milliGRAM(s) IV Intermittent every 24 hours  fluticasone propionate/ salmeterol 100-50 MICROgram(s) Diskus 1 Dose(s) Inhalation two times a day  insulin lispro (ADMELOG) corrective regimen sliding scale   SubCutaneous at bedtime  insulin lispro (ADMELOG) corrective regimen sliding scale   SubCutaneous three times a day before meals  metoprolol tartrate 25 milliGRAM(s) Oral every 8 hours  NIFEdipine XL 30 milliGRAM(s) Oral daily  pantoprazole    Tablet 40 milliGRAM(s) Oral before breakfast      MEDICATIONS  (PRN):  albuterol/ipratropium for Nebulization 3 milliLiter(s) Nebulizer every 6 hours PRN Bronchospasm  melatonin 3 milliGRAM(s) Oral at bedtime PRN Insomnia      Physical Exam    Neuro :  no focal deficits  Respiratory: CTA B/L  CV: RRR, S1S2, no murmurs,   Abdominal: Soft, NT, ND +BS,  Extremities: No edema, + peripheral pulses      ASSESSMENT    r/o pneumonia,    sepsis,   uti,   h/o HTN,   HLD,   T2DM,   osteoporosis,   Stage IIB gastric adenocarcinoma s/p distal gastrectomy (2015),   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  hold outpt dm meds,   lispro ss,   hgba1c,   heme onc cons .   cont current meds          Patient is a 89y old  Female who presents with a chief complaint of CHARISSE, lactic acidosis (05 Nov 2024 13:12)    pt seen in icu [  ], reg med floor [ x  ], bed [ x ], chair at bedside [   ], awake and responsive [x  ], lethargic [  ],    nad [x ]      Allergies    gabapentin (Unknown)        Vitals    T(F): 98.2 (11-06-24 @ 04:57), Max: 98.9 (11-05-24 @ 20:33)  HR: 91 (11-06-24 @ 04:57) (90 - 101)  BP: 141/69 (11-06-24 @ 04:57) (98/55 - 141/69)  RR: 17 (11-06-24 @ 04:57) (16 - 17)  SpO2: 96% (11-06-24 @ 04:57) (93% - 96%)  Wt(kg): --  CAPILLARY BLOOD GLUCOSE      POCT Blood Glucose.: 117 mg/dL (05 Nov 2024 22:28)      Labs                          10.4   8.89  )-----------( 203      ( 06 Nov 2024 05:33 )             31.9       11-06    145  |  118[H]  |  26[H]  ----------------------------<  139[H]  3.3[L]   |  20[L]  |  0.74    Ca    8.3[L]      06 Nov 2024 05:33      A1C with Estimated Average Glucose (11.04.24 @ 06:22)   A1C with Estimated Average Glucose Result: 5.5:         Clean Catch  11-03 @ 04:09   >100,000 CFU/ml Gram Negative Rods  --  --          Radiology Results      < from: CT Angio Chest PE Protocol w/ IV Cont (11.04.24 @ 15:19) >  FINDINGS:    HEART/VASCULATURE: No pulmonary embolus. Normal heart size. No   pericardial effusion. Coronary artery calcification. Normal aortic size.    LUNGS/AIRWAYS/PLEURA: Patent trachea and bronchi. Unchanged unchanged   small cluster of sub-4 mm nodules in the left upper lobe (2:52-55). Image   acquisition during exhalation. Calcified granuloma in the middle lobe. No   pleural effusion.    LYMPH NODES/MEDIASTINUM: No lymphadenopathy.    UPPER ABDOMEN: Cholecystectomy.    BONES/SOFT TISSUES: Multiple old rib fractures.      IMPRESSION:    No pulmonary embolus.    Resolved prior pneumonia.    Unchanged small left upper lobe nodules.    < end of copied text >        Meds    MEDICATIONS  (STANDING):  apixaban 2.5 milliGRAM(s) Oral every 12 hours  atorvastatin 10 milliGRAM(s) Oral at bedtime  cefTRIAXone   IVPB 1000 milliGRAM(s) IV Intermittent every 24 hours  fluticasone propionate/ salmeterol 100-50 MICROgram(s) Diskus 1 Dose(s) Inhalation two times a day  insulin lispro (ADMELOG) corrective regimen sliding scale   SubCutaneous at bedtime  insulin lispro (ADMELOG) corrective regimen sliding scale   SubCutaneous three times a day before meals  metoprolol tartrate 25 milliGRAM(s) Oral every 8 hours  NIFEdipine XL 30 milliGRAM(s) Oral daily  pantoprazole    Tablet 40 milliGRAM(s) Oral before breakfast      MEDICATIONS  (PRN):  albuterol/ipratropium for Nebulization 3 milliLiter(s) Nebulizer every 6 hours PRN Bronchospasm  melatonin 3 milliGRAM(s) Oral at bedtime PRN Insomnia      Physical Exam    Neuro :  no focal deficits  Respiratory: CTA B/L  CV: RRR, S1S2, no murmurs,   Abdominal: Soft, NT, ND +BS,  Extremities: No edema, + peripheral pulses      ASSESSMENT    pneumonia r/o     sepsis,   uti,   h/o HTN,   HLD,   T2DM,   osteoporosis,   Stage IIB gastric adenocarcinoma s/p distal gastrectomy (2015),   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 Resolved prior pneumonia.  Unchanged small left upper lobe nodules noted above.  id f/u   Follow up final Urine culture for ID and sensitivities, still in process  continue Ceftriaxone until urine culture is finalzied  pulm f/u   atrov and prov nebs,   supplemental oxygen prn,   robitussin prn,   Budesonide 0.25 mgs neb BID  singulair qhs   PFTs as OP.  hold outpt dm meds,   lispro ss,   hgba1c 5.5 noted above,   heme onc cons .   cont current meds

## 2024-11-06 NOTE — CONSULT NOTE ADULT - ASSESSMENT
88 y/o F, [place] speaking, from home, ambulates with wheelchair (mostly bedbound), PMH HTN, HLD, T2DM, osteoporosis, Stage IIB gastric adenocarcinoma s/p distal gastrectomy (2015), multiple myeloma/plastocytoma on chemo (follows [facility] Dr Adams), that is brought in by family for SOB,s/p CHARISSE, found to be in afib 11/4/24 and  spontaneously converted to sinus.  1.Tele monitoring.  2.PAF-eliquis,add multaq,change lopressor 25mg bid.  3.HTN-procardia xl.  4.DM-insulin.  5.CHARISSE-resolved.  6.PPI.

## 2024-11-06 NOTE — CHART NOTE - NSCHARTNOTEFT_GEN_A_CORE
EVENT:  notified by RN critical value: urine cultue + klebsiella pneumoniae ESBL > [id number]      HPI:  89 y/o F, [place] speaking, from home, ambulates with wheelchair (mostly bedbound), PMH HTN, HLD, T2DM, osteoporosis, Stage IIB gastric adenocarcinoma s/p distal gastrectomy (2015), 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 Patient at bedside not able to provide any hx, hx provided by family over the phone. They state that [place] started being noticed that had SOB along with chest thightness. Per family patient did not complain of any other symptoms and was also noted to be having chills like shaking along with new onset cough that has a yellow sputum. Pateint had chemotherapy 2 days ago with  with no complications.  (03 Nov 2024 03:49)        OBJECTIVE:  Vital Signs Last 24 Hrs  T(C): 36.9 (06 Nov 2024 13:32), Max: 37.2 (05 Nov 2024 20:33)  T(F): 98.4 (06 Nov 2024 13:32), Max: 98.9 (05 Nov 2024 20:33)  HR: 94 (06 Nov 2024 13:32) (84 - 98)  BP: 133/64 (06 Nov 2024 13:32) (128/73 - 153/59)  BP(mean): 82 (06 Nov 2024 13:32) (82 - 83)  RR: 18 (06 Nov 2024 13:32) (17 - 18)  SpO2: 95% (06 Nov 2024 13:32) (95% - 96%)    Parameters below as of 06 Nov 2024 13:32  Patient On (Oxygen Delivery Method): room air        LABS:                        10.4   8.89  )-----------( 203      ( 06 Nov 2024 05:33 )             31.9     11-06    145  |  118[H]  |  26[H]  ----------------------------<  139[H]  3.3[L]   |  20[L]  |  0.74    Ca    8.3[L]      06 Nov 2024 05:33            PLAN:   1. culture results discussed with Dr. Estrada ( infectious disease)  2. abx changed to ertapenem 1g daily

## 2024-11-06 NOTE — PROGRESS NOTE ADULT - ASSESSMENT
89 y/o F, [place] speaking, from home, ambulates with wheelchair (mostly bedbound), PMH HTN, HLD, T2DM, osteoporosis, Stage IIB gastric adenocarcinoma s/p distal gastrectomy (2015), 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 ct angio- no pe, resolved prior pna. l upper lobe nodules. Patient to be admitted for new onset CHARISSE and lactic acidosis with unclear etiology.    11/4: Pt in Afib on Tele -- EKG A-Fib w/RVR -- Cards Dr. Jennings consulted- digoxin given and Metoprolol

## 2024-11-06 NOTE — DISCHARGE NOTE PROVIDER - NSDCHHCONTACT_GEN_ALL_CORE_FT
Normal for race
As certified below, I, or a nurse practitioner or physician assistant working with me, had a face-to-face encounter that meets the physician face-to-face encounter requirements.

## 2024-11-06 NOTE — PROGRESS NOTE ADULT - PROBLEM SELECTOR PLAN 7
hx of DM on Metformin at home  -Hold oral meds while inpt   - a1c 5.5%; CONTROLLED  -C/w FS w/ [facility] ACHS   -Con Carb diet

## 2024-11-06 NOTE — DISCHARGE NOTE PROVIDER - NSDCCPCAREPLAN_GEN_ALL_CORE_FT
PRINCIPAL DISCHARGE DIAGNOSIS  Diagnosis: CHARISSE (acute kidney injury)  Assessment and Plan of Treatment: You presented with Elevated Creatinine likely due to  ????????. You were treated with IV fluids and your kidney funtion improved. Please continue oral hydration as much as possible within the daily drinking limit of 2 L per day  Prevent acute kidney injury: Manage other health conditions such as diabetes, high blood pressure, or heart disease. These conditions increase your risk for acute kidney injury. Talk to your healthcare provider before you take over-the-counter-medicine. NSAIDs, stomach medicine, or laxatives may harm your kidneys and increase your risk for acute kidney injury. Tell healthcare providers you have had acute kidney injury before you get contrast liquid for an x-ray or CT scan.   Follow up with your healthcare provider        SECONDARY DISCHARGE DIAGNOSES  Diagnosis: High serum lactate  Assessment and Plan of Treatment:     Diagnosis: Acute UTI  Assessment and Plan of Treatment: You were treated for a urinary tract infection with IV antibiotics. If you were prescribed antibiotics, take them exactly as your caregiver instructs you. Finish the medication even if you feel better after you have only taken some of the medication.  Drink enough water and fluids to keep your urine clear or pale yellow.  Avoid caffeine, tea, and carbonated beverages. They tend to irritate your bladder.  Empty your bladder often. Avoid holding urine for long periods of time.  After a bowel movement, women should cleanse from front to back. Use each tissue only once.  SEEK MEDICAL CARE IF:  You have back pain.  You develop a fever.  Your symptoms do not begin to resolve within 3 days.  SEEK IMMEDIATE MEDICAL CARE IF:  You have severe back pain or lower abdominal pain.  You develop chills.  You have nausea or vomiting.  You have continued burning or discomfort with urination.      Diagnosis: Gastric cancer  Assessment and Plan of Treatment:     Diagnosis: Afib  Assessment and Plan of Treatment: Atrial fibrillation is the most common heart rhythm problem & has the risk of stroke & heart attack  It helps if you control your blood pressure, not drink more than 1-2 alcohol drinks per day, cut down on caffeine, getting treatment for over active thyroid gland, & getting exercise  Call your doctor if you feel your heart racing or beating unusually, chest tightness or pain, lightheaded, faint, shortness of breath especially with exercise  It is important to take your heart medication as prescribed  You may be on anticoagulation which is very important to take as directed - you may need blood work to monitor drug levels      Diagnosis: DM (diabetes mellitus)  Assessment and Plan of Treatment: Continue to follow with your primary care MD or your endocrinologist. Discuss what the goal hemoglobin A1C level is for you.  Follow a heart healthy diabetic diet. If you check your fingerstick glucose at home, call your MD if it is greater than 250mg/dL on 2 occasions or less than 100mg/dL on 2 occasions. Know signs of low blood sugar, such as: dizziness, shakiness, sweating, confusion, hunger, nervousness- drink 4 ounces apple juice if occurs and call your doctor. Know early signs of high blood sugar, such as: frequent urination, increased thirst, blurry vision, fatigue, headache - call your doctor if this occurs.      Diagnosis: HLD (hyperlipidemia)  Assessment and Plan of Treatment: You have a history of hyperlipidemia, which is when you have too much cholesterol in your blood. High amounts of cholesterol in your blood can put you at higher risks for heart attck, strokes and other health problems. Follow up with PCP for treatment goals, continue medication as prescribed, have liver function testing every 3 months as anti lipid medications can cause liver irritation, eat low fat meals, avoid red meat, butter, fried foods and cheese. Get daily exercise.      Diagnosis: HTN (hypertension)  Assessment and Plan of Treatment: You have a history of high blood pressure. High blood pressure is a condition that puts you at risk for heart attack, stroke and kidney disease. Please continue to take your medications as prescribed. You can also help control your blood pressure by maintaining a healthy weight, eating a diet low in fat and rich in fruits and vegetables, reduce the amount of salt in your diet. Also, reduce alcohol and try to include some form of physical activity daily for at least 30 mins. Follow up with your medical doctor to establish long term blood pressure treatment goals.  Notify your doctor if you have any of the following symptoms:   Dizziness, Lightheadedness, Blurry vision, Headache, Chest pain, Shortness of breath       PRINCIPAL DISCHARGE DIAGNOSIS  Diagnosis: Urinary tract infection due to ESBL Klebsiella  Assessment and Plan of Treatment: You were found to have urinary tract infection. YOu were seen by infectious disease and you were given IV antibitics until 11/13/24.  Drink enough water and fluids to keep your urine clear or pale yellow.  Avoid caffeine, tea, and carbonated beverages. They tend to irritate your bladder.  Empty your bladder often. Avoid holding urine for long periods of time.  After a bowel movement, women should cleanse from front to back. Use each tissue only once.  SEEK MEDICAL CARE IF:  You have back pain.  You develop a fever.  Your symptoms do not begin to resolve within 3 days.  SEEK IMMEDIATE MEDICAL CARE IF:  You have severe back pain or lower abdominal pain.  You develop chills.  You have nausea or vomiting.  You have continued burning or discomfort with urination.      SECONDARY DISCHARGE DIAGNOSES  Diagnosis: CHARISSE (acute kidney injury)  Assessment and Plan of Treatment: You presented with Elevated Creatinine likely due to  ????????. You were treated with IV fluids and your kidney funtion improved. Please continue oral hydration as much as possible within the daily drinking limit of 2 L per day  Prevent acute kidney injury: Manage other health conditions such as diabetes, high blood pressure, or heart disease. These conditions increase your risk for acute kidney injury. Talk to your healthcare provider before you take over-the-counter-medicine. NSAIDs, stomach medicine, or laxatives may harm your kidneys and increase your risk for acute kidney injury. Tell healthcare providers you have had acute kidney injury before you get contrast liquid for an x-ray or CT scan.   Follow up with your healthcare provider      Diagnosis: High serum lactate  Assessment and Plan of Treatment: YOu lacated was elevated initally likey due to uri and albuterol use. It has been resolved now. YOu were given IV fluids.    Diagnosis: HTN (hypertension)  Assessment and Plan of Treatment: You have a history of high blood pressure. High blood pressure is a condition that puts you at risk for heart attack, stroke and kidney disease. Please continue to take your medications as prescribed. You can also help control your blood pressure by maintaining a healthy weight, eating a diet low in fat and rich in fruits and vegetables, reduce the amount of salt in your diet. Also, reduce alcohol and try to include some form of physical activity daily for at least 30 mins. Follow up with your medical doctor to establish long term blood pressure treatment goals.  Notify your doctor if you have any of the following symptoms:   Dizziness, Lightheadedness, Blurry vision, Headache, Chest pain, Shortness of breath      Diagnosis: DM (diabetes mellitus)  Assessment and Plan of Treatment: Continue to follow with your primary care MD or your endocrinologist. Discuss what the goal hemoglobin A1C level is for you.  Follow a heart healthy diabetic diet. If you check your fingerstick glucose at home, call your MD if it is greater than 250mg/dL on 2 occasions or less than 100mg/dL on 2 occasions. Know signs of low blood sugar, such as: dizziness, shakiness, sweating, confusion, hunger, nervousness- drink 4 ounces apple juice if occurs and call your doctor. Know early signs of high blood sugar, such as: frequent urination, increased thirst, blurry vision, fatigue, headache - call your doctor if this occurs.      Diagnosis: HLD (hyperlipidemia)  Assessment and Plan of Treatment: You have a history of hyperlipidemia, which is when you have too much cholesterol in your blood. High amounts of cholesterol in your blood can put you at higher risks for heart attck, strokes and other health problems. Follow up with PCP for treatment goals, continue medication as prescribed, have liver function testing every 3 months as anti lipid medications can cause liver irritation, eat low fat meals, avoid red meat, butter, fried foods and cheese. Get daily exercise.      Diagnosis: Afib  Assessment and Plan of Treatment: Atrial fibrillation is the most common heart rhythm problem & has the risk of stroke & heart attack  It helps if you control your blood pressure, not drink more than 1-2 alcohol drinks per day, cut down on caffeine, getting treatment for over active thyroid gland, & getting exercise  Call your doctor if you feel your heart racing or beating unusually, chest tightness or pain, lightheaded, faint, shortness of breath especially with exercise  It is important to take your heart medication as prescribed  You may be on anticoagulation which is very important to take as directed - you may need blood work to monitor drug levels       PRINCIPAL DISCHARGE DIAGNOSIS  Diagnosis: Urinary tract infection due to ESBL Klebsiella  Assessment and Plan of Treatment: You were found to have urinary tract infection. You were seen by a infectious disease doctor and you were given IV antibiotics Ertapenem 1g daily 11/6-11/13.   follow up with your primary care doctor Dr. Hernandes in 1 week  SEEK IMMEDIATE MEDICAL CARE IF:  You have severe back pain or lower abdominal pain.  You develop chills.  You have nausea or vomiting.  You have continued burning or discomfort with urination.      SECONDARY DISCHARGE DIAGNOSES  Diagnosis: New onset atrial fibrillation  Assessment and Plan of Treatment: During your hospitalization you were found to have a fast and abnormal heart rate.   A-fib is an irregular heartbeat. It reduces your heart's ability to pump blood through your body. A-fib may come and go, or it may be a long-term condition. A-fib can cause life-threatening blood clots, stroke, or heart failure. It is important to treat and manage a-fib to help prevent these problems.  Antiplatelet or blood thinner medicines help prevent blood clots and stroke.  you were seen by a Cardiologist Dr. Jennings and started on new medications to help you control your heart rate and reduce your chances of developing blood clots by taking eliquis.   please remember that eliquis is a blood thinning medication, you are at risk of bleeding and bruising easily, try not to fall and injure yourself.  continue taking all your medications - eliquis 2.5 mg twice a day + metoprolol 25 mg every 8 hours + multaq 400 mg twice a day  please follow up with Cardiologist Dr. Jennings in 1 week.    Diagnosis: Gastric cancer  Assessment and Plan of Treatment: your last chemotherapy treatment was on 11/1  you will need to follow up with Oncologist Dr. Alvarado in 1-2 weeks.    Diagnosis: Multiple myeloma  Assessment and Plan of Treatment: you will need to follow up with Oncologist Dr. Alvarado in 1-2 weeks.    Diagnosis: High serum lactate  Assessment and Plan of Treatment: You were found to have elevated lactate due to a recent Urinary Tract Infection and you were given a breathing treatment with albuterol.   After a course of IV fluids, your lactate levels normalized.   What is lactic acidosis and what causes it? Lactic acidosis is the buildup of lactic acid in your blood. Lactic acid is a substance that can build up in your body if you are not getting enough oxygen. It can also occur if you have a condition that causes an increased need for oxygen.    Diagnosis: HTN (hypertension)  Assessment and Plan of Treatment: continue taking all your blood pressure medications daily  follow up with your primary care doctor or cardiologist.  try to take your blood pressure readings twice a day, morning and night time.   Notify your doctor if you have any of the following symptoms:   Dizziness, Lightheadedness, Blurry vision, Headache, Chest pain, Shortness of breath    Diagnosis: DM (diabetes mellitus)  Assessment and Plan of Treatment: a1c was 5.5%    Diagnosis: Asthma  Assessment and Plan of Treatment: continue using home inhaler advair 100-50    Diagnosis: HLD (hyperlipidemia)  Assessment and Plan of Treatment: continue taking atorvastatin 10 mg at bedtime    Diagnosis: CHARISSE (acute kidney injury)  Assessment and Plan of Treatment: you were found to have elevated creatinine level and caused a acute kidney injury due to recent urinary tract infection and poor oral intake causing dehydration.    you were given IV antibiotics and IV fluids and now your creatinine has improved from 1.6 to 1.2  follow up with your primary care doctor   Avoid taking (NSAIDs) - (ex: Ibuprofen, Advil, Celebrex, Naprosyn)  Avoid taking any nephrotoxic agents (can harm kidneys) - Intravenous contrast for diagnostic testing, combination cold medications.  Have all medications adjusted for your renal function by your Health Care Provider.  Blood pressure control is important.  Take all medication as prescribed.     PRINCIPAL DISCHARGE DIAGNOSIS  Diagnosis: Urinary tract infection due to ESBL Klebsiella  Assessment and Plan of Treatment: You were found to have urinary tract infection. You were seen by a infectious disease doctor and you were given IV antibiotics Ertapenem 1g daily 11/6-11/13.   follow up with your primary care doctor Dr. Hernandes in 1 week  SEEK IMMEDIATE MEDICAL CARE IF:  You have severe back pain or lower abdominal pain.  You develop chills.  You have nausea or vomiting.  You have continued burning or discomfort with urination.      SECONDARY DISCHARGE DIAGNOSES  Diagnosis: New onset atrial fibrillation  Assessment and Plan of Treatment: During your hospitalization you were found to have a fast and abnormal heart rate.   A-fib is an irregular heartbeat. It reduces your heart's ability to pump blood through your body. A-fib may come and go, or it may be a long-term condition. A-fib can cause life-threatening blood clots, stroke, or heart failure. It is important to treat and manage a-fib to help prevent these problems.  Antiplatelet or blood thinner medicines help prevent blood clots and stroke.  you were seen by a Cardiologist Dr. Jennings and started on new medications to help you control your heart rate and reduce your chances of developing blood clots by taking eliquis.   please remember that eliquis is a blood thinning medication, you are at risk of bleeding and bruising easily, try not to fall and injure yourself.  continue taking all your medications - eliquis 2.5 mg twice a day + metoprolol 25 mg every 8 hours + multaq 400 mg twice a day  (eliquis was renewed, please  new prescription)  please follow up with Cardiologist Dr. Jennings in 1 week.    Diagnosis: Gastric cancer  Assessment and Plan of Treatment: your last chemotherapy treatment was on 11/1  you will need to follow up with Oncologist Dr. Alvarado in 1-2 weeks.    Diagnosis: Multiple myeloma  Assessment and Plan of Treatment: you will need to follow up with Oncologist Dr. Alvarado in 1-2 weeks.    Diagnosis: High serum lactate  Assessment and Plan of Treatment: You were found to have elevated lactate due to a recent Urinary Tract Infection and you were given a breathing treatment with albuterol.   After a course of IV fluids, your lactate levels normalized.   What is lactic acidosis and what causes it? Lactic acidosis is the buildup of lactic acid in your blood. Lactic acid is a substance that can build up in your body if you are not getting enough oxygen. It can also occur if you have a condition that causes an increased need for oxygen.    Diagnosis: HTN (hypertension)  Assessment and Plan of Treatment: continue taking all your blood pressure medications daily - losartan + metoprolol   stop taking nifedipine for now, as your metoprolol dose has increased  follow up with your primary care doctor or cardiologist.  try to take your blood pressure readings twice a day, morning and night time.   Notify your doctor if you have any of the following symptoms:   Dizziness, Lightheadedness, Blurry vision, Headache, Chest pain, Shortness of breath    Diagnosis: DM (diabetes mellitus)  Assessment and Plan of Treatment: a1c was 5.5%  continue taking your home medication farxiga 10 mg daily + metformin 1000 mg daily    Diagnosis: Asthma  Assessment and Plan of Treatment: continue using home inhaler symbicort twice a day + singulair 10 mg daily + albuterol inhaler as needed for shortness of breath    Diagnosis: HLD (hyperlipidemia)  Assessment and Plan of Treatment: continue taking your home medication simvastatin 10 mg at bedtime    Diagnosis: CHARISSE (acute kidney injury)  Assessment and Plan of Treatment: you were found to have elevated creatinine level and caused a acute kidney injury due to recent urinary tract infection and poor oral intake causing dehydration.    you were given IV antibiotics and IV fluids and now your creatinine has improved from 1.6 to 1.2  follow up with your primary care doctor   Avoid taking (NSAIDs) - (ex: Ibuprofen, Advil, Celebrex, Naprosyn)  Avoid taking any nephrotoxic agents (can harm kidneys) - Intravenous contrast for diagnostic testing, combination cold medications.  Have all medications adjusted for your renal function by your Health Care Provider.  Blood pressure control is important.  Take all medication as prescribed.     PRINCIPAL DISCHARGE DIAGNOSIS  Diagnosis: Urinary tract infection due to ESBL Klebsiella  Assessment and Plan of Treatment: You were found to have urinary tract infection. You were seen by a infectious disease doctor and you were given IV antibiotics Ertapenem 1g daily 11/6-11/13.   follow up with your primary care doctor Dr. Hernandes in 1 week  SEEK IMMEDIATE MEDICAL CARE IF:  You have severe back pain or lower abdominal pain.  You develop chills.  You have nausea or vomiting.  You have continued burning or discomfort with urination.      SECONDARY DISCHARGE DIAGNOSES  Diagnosis: New onset atrial fibrillation  Assessment and Plan of Treatment: During your hospitalization you were found to have a fast and abnormal heart rate.   A-fib is an irregular heartbeat. It reduces your heart's ability to pump blood through your body. A-fib may come and go, or it may be a long-term condition. A-fib can cause life-threatening blood clots, stroke, or heart failure. It is important to treat and manage a-fib to help prevent these problems.  Antiplatelet or blood thinner medicines help prevent blood clots and stroke.  you were seen by a Cardiologist Dr. Jennings and started on new medications to help you control your heart rate and reduce your chances of developing blood clots by taking eliquis.   please remember that eliquis is a blood thinning medication, you are at risk of bleeding and bruising easily, try not to fall and injure yourself.  continue taking all your medications - eliquis 2.5 mg twice a day + metoprolol 25 mg every 8 hours + multaq 400 mg twice a day  (eliquis was renewed, please  new prescription)  please follow up with Cardiologist Dr. Jennings in 1 week.    Diagnosis: Gastric cancer  Assessment and Plan of Treatment: your last chemotherapy treatment was on 11/1  you will need to follow up with Oncologist Dr. Alvarado in 1-2 weeks.    Diagnosis: Multiple myeloma  Assessment and Plan of Treatment: continue taking your home medication valtrex 500 mg daily + dexamethasone 10 mg weekly  you will need to follow up with Oncologist Dr. Alvarado in 1-2 weeks.    Diagnosis: High serum lactate  Assessment and Plan of Treatment: You were found to have elevated lactate due to a recent Urinary Tract Infection and you were given a breathing treatment with albuterol.   After a course of IV fluids, your lactate levels normalized.   What is lactic acidosis and what causes it? Lactic acidosis is the buildup of lactic acid in your blood. Lactic acid is a substance that can build up in your body if you are not getting enough oxygen. It can also occur if you have a condition that causes an increased need for oxygen.    Diagnosis: HTN (hypertension)  Assessment and Plan of Treatment: continue taking all your blood pressure medications daily - losartan + metoprolol   stop taking nifedipine for now, as your metoprolol dose has increased  follow up with your primary care doctor or cardiologist.  try to take your blood pressure readings twice a day, morning and night time.   Notify your doctor if you have any of the following symptoms:   Dizziness, Lightheadedness, Blurry vision, Headache, Chest pain, Shortness of breath    Diagnosis: DM (diabetes mellitus)  Assessment and Plan of Treatment: a1c was 5.5%  continue taking your home medication farxiga 10 mg daily + metformin 1000 mg daily    Diagnosis: Asthma  Assessment and Plan of Treatment: continue using home inhaler symbicort twice a day + singulair 10 mg daily + albuterol inhaler as needed for shortness of breath    Diagnosis: HLD (hyperlipidemia)  Assessment and Plan of Treatment: continue taking your home medication simvastatin 10 mg at bedtime    Diagnosis: CHARISSE (acute kidney injury)  Assessment and Plan of Treatment: you were found to have elevated creatinine level and caused a acute kidney injury due to recent urinary tract infection and poor oral intake causing dehydration.    you were given IV antibiotics and IV fluids and now your creatinine has improved from 1.6 to 1.2  follow up with your primary care doctor   Avoid taking (NSAIDs) - (ex: Ibuprofen, Advil, Celebrex, Naprosyn)  Avoid taking any nephrotoxic agents (can harm kidneys) - Intravenous contrast for diagnostic testing, combination cold medications.  Have all medications adjusted for your renal function by your Health Care Provider.  Blood pressure control is important.  Take all medication as prescribed.     PRINCIPAL DISCHARGE DIAGNOSIS  Diagnosis: Urinary tract infection due to ESBL Klebsiella  Assessment and Plan of Treatment: You were found to have urinary tract infection. You were seen by a infectious disease doctor and you were given IV antibiotics Ertapenem 1g daily 11/6-11/13.   follow up with your primary care doctor Dr. Hernandes in 1 week  SEEK IMMEDIATE MEDICAL CARE IF:  You have severe back pain or lower abdominal pain.  You develop chills.  You have nausea or vomiting.  You have continued burning or discomfort with urination.      SECONDARY DISCHARGE DIAGNOSES  Diagnosis: High serum lactate  Assessment and Plan of Treatment: You were found to have elevated lactate due to a recent Urinary Tract Infection and you were given a breathing treatment with albuterol.   After a course of IV fluids, your lactate levels normalized.   What is lactic acidosis and what causes it? Lactic acidosis is the buildup of lactic acid in your blood. Lactic acid is a substance that can build up in your body if you are not getting enough oxygen. It can also occur if you have a condition that causes an increased need for oxygen.    Diagnosis: DM (diabetes mellitus)  Assessment and Plan of Treatment: a1c was 5.5%  continue taking your home medication farxiga 10 mg daily + metformin 1000 mg daily  Continue to follow with your primary care MD or your endocrinologist. Discuss what the goal hemoglobin A1C level is for you.  Follow a heart healthy diabetic diet. If you check your fingerstick glucose at home, call your MD if it is greater than 250mg/dL on 2 occasions or less than 100mg/dL on 2 occasions. Know signs of low blood sugar, such as: dizziness, shakiness, sweating, confusion, hunger, nervousness- drink 4 ounces apple juice if occurs and call your doctor. Know early signs of high blood sugar, such as: frequent urination, increased thirst, blurry vision,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

## 2024-11-06 NOTE — PROGRESS NOTE ADULT - PROBLEM SELECTOR PLAN 4
-on labs noted to have cr at 1.2. baseline of 0.6  - Voiding  -S/p IV fluids   - Cr 0.74      ---> reoslved

## 2024-11-06 NOTE — DISCHARGE NOTE PROVIDER - NSDCMRMEDTOKEN_GEN_ALL_CORE_FT
acetaminophen 325 mg oral tablet: 2 tab(s) orally every 6 hours as needed for Temp greater or equal to 38C (100.4F), Mild Pain (1 - 3)  Albuterol (Eqv-ProAir HFA) 90 mcg/inh inhalation aerosol: 2 puff(s) inhaled every 4 hours as needed for  shortness of breath and/or wheezing  amoxicillin-clavulanate 875 mg-125 mg oral tablet: 1 tab(s) orally 2 times a day take starting on 9/18 until last dose on 9/20  ascorbic acid 500 mg oral tablet: 1 tab(s) orally once a day  cholecalciferol oral tablet: 1 tab(s) orally once a day 1000 unit(s) orally once a day  Eliquis 2.5 mg oral tablet: 1 tab(s) orally 2 times a day  Farxiga 10 mg oral tablet: 1 tab(s) orally once a day  ipratropium-albuterol 0.5 mg-2.5 mg/3 mL inhalation solution: 3 milliliter(s) by nebulizer every 6 hours as needed for  shortness of breath and/or wheezing  losartan 50 mg oral tablet: 1 tab(s) orally 2 times a day  METFORMIN 1000MG TAB: 1 tab(s) orally 2 times a day  metoprolol tartrate 25 mg oral tablet: 1 tab(s) orally 2 times a day  MONTELUKAST 10MG TAB: 1 tab(s) orally once a day  Multiple Vitamins with Minerals oral tablet: 1 tab(s) orally once a day  NIFEdipine 30 mg oral tablet, extended release: 1 tab(s) orally once a day  pantoprazole 40 mg oral delayed release tablet: 1 tab(s) orally once a day (before a meal)  SIMVASTATIN 10MG TAB: 1 tab(s) orally once a day (at bedtime)  Symbicort 80 mcg-4.5 mcg/inh inhalation aerosol: 2 puff(s) inhaled 2 times a day  zinc sulfate 220 mg (as elemental zinc 50 mg) oral capsule: 1 cap(s) orally once a day   acetaminophen 325 mg oral tablet: 2 tab(s) orally every 6 hours as needed for Temp greater or equal to 38C (100.4F), Mild Pain (1 - 3)  Albuterol (Eqv-ProAir HFA) 90 mcg/inh inhalation aerosol: 2 puff(s) inhaled every 4 hours as needed for  shortness of breath and/or wheezing  amoxicillin-clavulanate 875 mg-125 mg oral tablet: 1 tab(s) orally 2 times a day take starting on 9/18 until last dose on 9/20  ascorbic acid 500 mg oral tablet: 1 tab(s) orally once a day  cholecalciferol oral tablet: 1 tab(s) orally once a day 1000 unit(s) orally once a day  Eliquis 2.5 mg oral tablet: 1 tab(s) orally 2 times a day  ertapenem 1 g injection: 1 gram(s) intravenously once a day last day 11/13  Farxiga 10 mg oral tablet: 1 tab(s) orally once a day  ipratropium-albuterol 0.5 mg-2.5 mg/3 mL inhalation solution: 3 milliliter(s) by nebulizer every 6 hours as needed for  shortness of breath and/or wheezing  losartan 50 mg oral tablet: 1 tab(s) orally 2 times a day  METFORMIN 1000MG TAB: 1 tab(s) orally 2 times a day  metoprolol tartrate 25 mg oral tablet: 1 tab(s) orally 2 times a day  MONTELUKAST 10MG TAB: 1 tab(s) orally once a day  Multiple Vitamins with Minerals oral tablet: 1 tab(s) orally once a day  NIFEdipine 30 mg oral tablet, extended release: 1 tab(s) orally once a day  pantoprazole 40 mg oral delayed release tablet: 1 tab(s) orally once a day (before a meal)  SIMVASTATIN 10MG TAB: 1 tab(s) orally once a day (at bedtime)  Symbicort 80 mcg-4.5 mcg/inh inhalation aerosol: 2 puff(s) inhaled 2 times a day  zinc sulfate 220 mg (as elemental zinc 50 mg) oral capsule: 1 cap(s) orally once a day   26-Nov-2018 17:13 acetaminophen 325 mg oral tablet: 2 tab(s) orally every 6 hours as needed for Temp greater or equal to 38C (100.4F), Mild Pain (1 - 3)  Albuterol (Eqv-ProAir HFA) 90 mcg/inh inhalation aerosol: 2 puff(s) inhaled every 4 hours as needed for  shortness of breath and/or wheezing  alendronate 70 mg oral tablet: 1 tab(s) orally once a week  dexAMETHasone 2 mg oral tablet: 5 tab(s) orally once a week  dronedarone 400 mg oral tablet: 1 tab(s) orally every 12 hours  Eliquis 2.5 mg oral tablet: 1 tab(s) orally every 12 hours  Farxiga 10 mg oral tablet: 1 tab(s) orally once a day  ipratropium-albuterol 0.5 mg-2.5 mg/3 mL inhalation solution: 3 milliliter(s) by nebulizer every 6 hours as needed for  shortness of breath and/or wheezing  loperamide 2 mg oral capsule: 1 cap(s) orally 4 times a day As needed Diarrhea  losartan 25 mg oral tablet: 1 tab(s) orally once a day  meclizine 25 mg oral tablet: 1 tab(s) orally once a day as needed for  dizziness  METFORMIN 1000MG TAB: 1 tab(s) orally once a day  MONTELUKAST 10MG TAB: 1 tab(s) orally once a day  Multiple Vitamins with Minerals oral tablet: 1 tab(s) orally once a day  pantoprazole 40 mg oral delayed release tablet: 1 tab(s) orally once a day (before a meal)  SIMVASTATIN 10MG TAB: 1 tab(s) orally once a day (at bedtime)  Symbicort 80 mcg-4.5 mcg/inh inhalation aerosol: 2 puff(s) inhaled 2 times a day  valACYclovir 500 mg oral tablet: 1 tab(s) orally once a day   acetaminophen 325 mg oral tablet: 2 tab(s) orally every 6 hours as needed for Temp greater or equal to 38C (100.4F), Mild Pain (1 - 3)  Albuterol (Eqv-ProAir HFA) 90 mcg/inh inhalation aerosol: 2 puff(s) inhaled every 4 hours as needed for  shortness of breath and/or wheezing  alendronate 70 mg oral tablet: 1 tab(s) orally once a week  dexAMETHasone 2 mg oral tablet: 5 tab(s) orally once a week  dronedarone 400 mg oral tablet: 1 tab(s) orally every 12 hours  Eliquis 2.5 mg oral tablet: 1 tab(s) orally every 12 hours  Farxiga 10 mg oral tablet: 1 tab(s) orally once a day  ipratropium-albuterol 0.5 mg-2.5 mg/3 mL inhalation solution: 3 milliliter(s) by nebulizer every 6 hours as needed for  shortness of breath and/or wheezing  loperamide 2 mg oral capsule: 1 cap(s) orally 4 times a day As needed Diarrhea  losartan 25 mg oral tablet: 1 tab(s) orally once a day  meclizine 25 mg oral tablet: 1 tab(s) orally once a day as needed for  dizziness  METFORMIN 1000MG TAB: 1 tab(s) orally once a day  metoprolol tartrate 25 mg oral tablet: 1 tab(s) orally every 8 hours  MONTELUKAST 10MG TAB: 1 tab(s) orally once a day  Multiple Vitamins with Minerals oral tablet: 1 tab(s) orally once a day  pantoprazole 40 mg oral delayed release tablet: 1 tab(s) orally once a day (before a meal)  SIMVASTATIN 10MG TAB: 1 tab(s) orally once a day (at bedtime)  Symbicort 80 mcg-4.5 mcg/inh inhalation aerosol: 2 puff(s) inhaled 2 times a day  valACYclovir 500 mg oral tablet: 1 tab(s) orally once a day   acetaminophen 325 mg oral tablet: 2 tab(s) orally every 6 hours as needed for Temp greater or equal to 38C (100.4F), Mild Pain (1 - 3)  Albuterol (Eqv-ProAir HFA) 90 mcg/inh inhalation aerosol: 2 puff(s) inhaled every 4 hours as needed for  shortness of breath and/or wheezing  alendronate 70 mg oral tablet: 1 tab(s) orally once a week  dexAMETHasone 2 mg oral tablet: 5 tab(s) orally once a week  dronedarone 400 mg oral tablet: 1 tab(s) orally every 12 hours  Eliquis 2.5 mg oral tablet: 1 tab(s) orally every 12 hours  ipratropium-albuterol 0.5 mg-2.5 mg/3 mL inhalation solution: 3 milliliter(s) by nebulizer every 6 hours as needed for  shortness of breath and/or wheezing  loperamide 2 mg oral capsule: 1 cap(s) orally 4 times a day As needed Diarrhea  meclizine 25 mg oral tablet: 1 tab(s) orally once a day as needed for  dizziness  metoprolol tartrate 25 mg oral tablet: 1 tab(s) orally every 8 hours  MONTELUKAST 10MG TAB: 1 tab(s) orally once a day  Multiple Vitamins with Minerals oral tablet: 1 tab(s) orally once a day  pantoprazole 40 mg oral delayed release tablet: 1 tab(s) orally once a day (before a meal)  SIMVASTATIN 10MG TAB: 1 tab(s) orally once a day (at bedtime)  Symbicort 80 mcg-4.5 mcg/inh inhalation aerosol: 2 puff(s) inhaled 2 times a day  valACYclovir 500 mg oral tablet: 1 tab(s) orally once a day

## 2024-11-06 NOTE — DISCHARGE NOTE PROVIDER - PROVIDER TOKENS
PROVIDER:[TOKEN:[[id number]],FOLLOWUP:[1 week]],PROVIDER:[TOKEN:[[id number]],FOLLOWUP:[1 week]],PROVIDER:[TOKEN:[[id number]],FOLLOWUP:[1 week]]

## 2024-11-06 NOTE — PROGRESS NOTE ADULT - SUBJECTIVE AND OBJECTIVE BOX
Patient is seen and examined at the bed side, is afebrile. No over night events, The Urine culture growing GNR, not finalized yet.      REVIEW OF SYSTEMS: All other review systems are negative      ALLERGIES: gabapentin (Unknown)      Vital Signs Last 24 Hrs  T(C): 36.6 (06 Nov 2024 10:15), Max: 37.2 (05 Nov 2024 20:33)  T(F): 97.8 (06 Nov 2024 10:15), Max: 98.9 (05 Nov 2024 20:33)  HR: 84 (06 Nov 2024 10:15) (84 - 101)  BP: 153/59 (06 Nov 2024 10:15) (119/63 - 153/59)  BP(mean): 83 (06 Nov 2024 10:15) (80 - 83)  RR: 18 (06 Nov 2024 10:15) (17 - 18)  SpO2: 95% (06 Nov 2024 10:15) (95% - 96%)    Parameters below as of 06 Nov 2024 10:15  Patient On (Oxygen Delivery Method): room air        PHYSICAL EXAM:  GENERAL: Not in distress   CHEST/LUNG:  Air entry bilaterally  HEART: s1 and s2 present  ABDOMEN:  Nontender and  Nondistended  EXTREMITIES: No pedal  edema  CNS: Awake and Alert      LABS:                        10.4   8.89  )-----------( 203      ( 06 Nov 2024 05:33 )             31.9                           11.9   10.93 )-----------( 244      ( 05 Nov 2024 05:06 )             36.0       11-06    145  |  118[H]  |  26[H]  ----------------------------<  139[H]  3.3[L]   |  20[L]  |  0.74    Ca    8.3[L]      06 Nov 2024 05:33      11-05    146[H]  |  118[H]  |  12  ----------------------------<  136[H]  3.7   |  22  |  0.81    Ca    8.8      05 Nov 2024 05:06  Phos  3.3     11-04  Mg     1.6     11-04    TPro  6.2  /  Alb  3.0[L]  /  TBili  0.2  /  DBili  x   /  AST  24  /  ALT  17  /  AlkPhos  47  11-02      CAPILLARY BLOOD GLUCOSE  POCT Blood Glucose.: 96 mg/dL (03 Nov 2024 12:18)  POCT Blood Glucose.: 85 mg/dL (03 Nov 2024 07:59)  POCT Blood Glucose.: 93 mg/dL (03 Nov 2024 04:48)        MEDICATIONS  (STANDING):    apixaban 2.5 milliGRAM(s) Oral every 12 hours  atorvastatin 10 milliGRAM(s) Oral at bedtime  cefTRIAXone   IVPB 1000 milliGRAM(s) IV Intermittent every 24 hours  fluticasone propionate/ salmeterol 100-50 MICROgram(s) Diskus 1 Dose(s) Inhalation two times a day  insulin lispro (ADMELOG) corrective regimen sliding scale   SubCutaneous three times a day before meals  insulin lispro (ADMELOG) corrective regimen sliding scale   SubCutaneous at bedtime  metoprolol tartrate 25 milliGRAM(s) Oral every 8 hours  NIFEdipine XL 30 milliGRAM(s) Oral daily  pantoprazole    Tablet 40 milliGRAM(s) Oral before breakfast        RADIOLOGY & ADDITIONAL TESTS:    11/4/24  : CT Angio Chest PE Protocol w/ IV Cont (11.04.24 @ 15:19) No pulmonary embolus.    Resolved prior pneumonia.  Unchanged small left upper lobe nodules.      11/2/24: Xray Chest 1 View- PORTABLE-Urgent (11.02.24 @ 22:22) No active parenchymal disease in the chest.      MICROBIOLOGY DATA:    Culture - Urine (11.03.24 @ 04:09)   Specimen Source: Clean Catch  Culture Results:   >100,000 CFU/ml Gram Negative Rods    Urine Microscopic-Add On (NC) (11.03.24 @ 04:09)   Bacteria: Few /HPF  Comment - Urine: Numerous Budding yeasts found  Squamous Epithelial Cells: Present  Red Blood Cell - Urine: 5 /HPF  White Blood Cell - Urine: Too Numerous to count /HPF

## 2024-11-06 NOTE — PROGRESS NOTE ADULT - SUBJECTIVE AND OBJECTIVE BOX
Patient is a 89y old  Female who presents with a chief complaint of CHARISSE, lactic acidosis (06 Nov 2024 12:16)      INTERVAL HPI/OVERNIGHT EVENTS: no overnight events    I&O's Summary    Vital Signs Last 24 Hrs  T(C): 36.6 (06 Nov 2024 10:15), Max: 37.2 (05 Nov 2024 20:33)  T(F): 97.8 (06 Nov 2024 10:15), Max: 98.9 (05 Nov 2024 20:33)  HR: 84 (06 Nov 2024 10:15) (84 - 101)  BP: 153/59 (06 Nov 2024 10:15) (119/63 - 153/59)  BP(mean): 83 (06 Nov 2024 10:15) (80 - 83)  RR: 18 (06 Nov 2024 10:15) (17 - 18)  SpO2: 95% (06 Nov 2024 10:15) (95% - 96%)    Parameters below as of 06 Nov 2024 10:15  Patient On (Oxygen Delivery Method): room air      PAST MEDICAL & SURGICAL HISTORY:  HTN (hypertension)      DM (diabetes mellitus)      Hypercholesteremia      Gastric adenocarcinoma  s/p resection      Vertigo      Dementia      Ambulatory dysfunction      Multiple myeloma      Pulmonary embolism      S/P hysterectomy      S/P tubal ligation          SOCIAL HISTORY  Alcohol:  Tobacco:  Illicit substance use:      FAMILY HISTORY:      LABS:                        10.4   8.89  )-----------( 203      ( 06 Nov 2024 05:33 )             31.9     11-06    145  |  118[H]  |  26[H]  ----------------------------<  139[H]  3.3[L]   |  20[L]  |  0.74    Ca    8.3[L]      06 Nov 2024 05:33        Urinalysis Basic - ( 06 Nov 2024 05:33 )    Color: x / Appearance: x / SG: x / pH: x  Gluc: 139 mg/dL / Ketone: x  / Bili: x / Urobili: x   Blood: x / Protein: x / Nitrite: x   Leuk Esterase: x / RBC: x / WBC x   Sq Epi: x / Non Sq Epi: x / Bacteria: x      CAPILLARY BLOOD GLUCOSE      POCT Blood Glucose.: 104 mg/dL (06 Nov 2024 11:20)  POCT Blood Glucose.: 141 mg/dL (06 Nov 2024 07:26)  POCT Blood Glucose.: 117 mg/dL (05 Nov 2024 22:28)  POCT Blood Glucose.: 142 mg/dL (05 Nov 2024 16:46)        Urinalysis Basic - ( 06 Nov 2024 05:33 )    Color: x / Appearance: x / SG: x / pH: x  Gluc: 139 mg/dL / Ketone: x  / Bili: x / Urobili: x   Blood: x / Protein: x / Nitrite: x   Leuk Esterase: x / RBC: x / WBC x   Sq Epi: x / Non Sq Epi: x / Bacteria: x        MEDICATIONS  (STANDING):  apixaban 2.5 milliGRAM(s) Oral every 12 hours  atorvastatin 10 milliGRAM(s) Oral at bedtime  cefTRIAXone   IVPB 1000 milliGRAM(s) IV Intermittent every 24 hours  dronedarone 400 milliGRAM(s) Oral two times a day  fluticasone propionate/ salmeterol 100-50 MICROgram(s) Diskus 1 Dose(s) Inhalation two times a day  insulin lispro (ADMELOG) corrective regimen sliding scale   SubCutaneous three times a day before meals  insulin lispro (ADMELOG) corrective regimen sliding scale   SubCutaneous at bedtime  metoprolol tartrate 25 milliGRAM(s) Oral two times a day  montelukast 10 milliGRAM(s) Oral at bedtime  NIFEdipine XL 30 milliGRAM(s) Oral daily  pantoprazole    Tablet 40 milliGRAM(s) Oral before breakfast    MEDICATIONS  (PRN):  albuterol/ipratropium for Nebulization 3 milliLiter(s) Nebulizer every 6 hours PRN Bronchospasm  melatonin 3 milliGRAM(s) Oral at bedtime PRN Insomnia      REVIEW OF SYSTEMS:  CONSTITUTIONAL: No fever  EYES: No eye pain, visual disturbances  ENMT: No sinus or throat pain  NECK: No pain or stiffness  RESPIRATORY: No cough, wheezing, No shortness of breath  CARDIOVASCULAR: No chest pain, palpitations  GASTROINTESTINAL: No abdominal pain. No nausea, vomiting. +loose stool  GENITOURINARY: No dysuria, frequency, hematuria  NEUROLOGICAL: No headaches  SKIN: No rashes  MUSCULOSKELETAL: No joint pain     RADIOLOGY & ADDITIONAL TESTS:  < from: CT Angio Chest PE Protocol w/ IV Cont (11.04.24 @ 15:19) >    ACC: [id number] EXAM:  CT ANGIO CHEST PULM [facility]   ORDERED BY: LATOYA DAWN     PROCEDURE DATE:  11/04/2024          INTERPRETATION:  INDICATION: Shortness of breath, chest tightness ,   history of    TECHNIQUE: Helical acquisition of the chest after the administration of   53 mL of Omnipaque 350. Maximum intensity projection images were   generated.    COMPARISON: CT chest 9/13/2024.    FINDINGS:    HEART/VASCULATURE: No pulmonary embolus. Normal heart size. No   pericardial effusion. Coronary artery calcification. Normal aortic size.    LUNGS/AIRWAYS/PLEURA: Patent trachea and bronchi. Unchanged unchanged   small cluster of sub-4 mm nodules in the left upper lobe (2:52-55). Image   acquisition during exhalation. Calcified granuloma in the middle lobe. No   pleural effusion.    LYMPH NODES/MEDIASTINUM: No lymphadenopathy.    UPPER ABDOMEN: Cholecystectomy.    BONES/SOFT TISSUES: Multiple old rib fractures.      IMPRESSION:    No pulmonary embolus.    Resolved prior pneumonia.    Unchanged small left upper lobe nodules.    --- End of Report ---      MENDEZ GOODWIN M.D., ATTENDING RADIOLOGIST  This document has been electronically signed. Nov 4 2024  3:36PM    < end of copied text >    Imaging Personally Reviewed:  [x ] YES  [ ] NO    Consultant(s) Notes Reviewed:  [x ] YES  [ ] NO    PHYSICAL EXAM:  GENERAL: NAD  HEAD:  Atraumatic, Normocephalic  EYES: conjunctiva and sclera clear  ENMT: Moist mucous membranes  NECK: Supple  NERVOUS SYSTEM:  Alert & Oriented   CHEST/LUNG: Cta bilaterally; No rales, rhonchi, wheezing  HEART: Regular rate and rhythm  ABDOMEN: Soft, Nontender, Nondistended; Bowel sounds present  EXTREMITIES:  No clubbing, cyanosis, or edema  SKIN: No rashes    Care Collaborated Discussed with Consultants/Other Providers [ x] YES  [ ] NO

## 2024-11-06 NOTE — CONSULT NOTE ADULT - NS ATTEND AMEND GEN_ALL_CORE FT
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     INTERVAL HPI: Patient seen and examined at bedside; Family at bedside; Events noted; Patient feels improved    PMH/PSH as above; non-contributory    FmHx/Sox Hx as above; non-contributory    ROS as above; pt is not able to provide detailed review of systems    MEDICATIONS  (STANDING):  apixaban 2.5 milliGRAM(s) Oral every 12 hours  atorvastatin 10 milliGRAM(s) Oral at bedtime  dronedarone 400 milliGRAM(s) Oral two times a day  ertapenem  IVPB      fluticasone propionate/ salmeterol 100-50 MICROgram(s) Diskus 1 Dose(s) Inhalation two times a day  insulin lispro (ADMELOG) corrective regimen sliding scale   SubCutaneous at bedtime  insulin lispro (ADMELOG) corrective regimen sliding scale   SubCutaneous three times a day before meals  metoprolol tartrate 25 milliGRAM(s) Oral two times a day  montelukast 10 milliGRAM(s) Oral at bedtime  NIFEdipine XL 30 milliGRAM(s) Oral daily  pantoprazole    Tablet 40 milliGRAM(s) Oral before breakfast    MEDICATIONS  (PRN):  acetaminophen     Tablet .. 650 milliGRAM(s) Oral every 6 hours PRN Temp greater or equal to 38C (100.4F)  albuterol/ipratropium for Nebulization 3 milliLiter(s) Nebulizer every 6 hours PRN Bronchospasm  loperamide 2 milliGRAM(s) Oral four times a day PRN Diarrhea  melatonin 3 milliGRAM(s) Oral at bedtime PRN Insomnia      Vital Signs Last 24 Hrs  T(C): 36.4 (06 Nov 2024 18:34), Max: 38 (06 Nov 2024 18:02)  T(F): 97.5 (06 Nov 2024 18:34), Max: 100.4 (06 Nov 2024 18:02)  HR: 109 (06 Nov 2024 18:34) (84 - 109)  BP: 115/66 (06 Nov 2024 18:34) (115/66 - 153/59)  BP(mean): 80 (06 Nov 2024 18:34) (80 - 83)  RR: 18 (06 Nov 2024 18:34) (17 - 18)  SpO2: 95% (06 Nov 2024 18:34) (95% - 96%)    Parameters below as of 06 Nov 2024 18:34  Patient On (Oxygen Delivery Method): room air      _________________  PHYSICAL EXAM:  ---------------------------  GEN: NAD; NC/AT; A and O x 1  HEENT: MMM; PERRLA: EOMI  LUNGS: no wheezing; decreased bilateral air entry; no use of accessory muscles for breathing  HEART: Nl S1 S2; no M   ABDOMEN: Soft, Nontender, non distended  EXTREMITIES: no cyanosis; no edema; warm and dry  SKIN: Warm and dry  NERVOUS SYSTEM: no focal neuro  deficits    _________________________________________________  LABS:                        10.4   8.89  )-----------( 203      ( 06 Nov 2024 05:33 )             31.9     11-06    145  |  118[H]  |  26[H]  ----------------------------<  139[H]  3.3[L]   |  20[L]  |  0.74    Ca    8.3[L]      06 Nov 2024 05:33      A/P    Problem #1 SOB -ID work up in progress; continue w/ supportive tx    Problem #2 MM/plasmacytoma - hold tumor directed tx during the hospital course    I have examined the patient at bedside and reviewed patient's data and participated in the management of the patient along with ACP as well as hematology/medical oncology faculty consisting of Raleigh Bolden, Rashad, Alberto, Fran as well as myself during the daily review. I reviewed pertinent clinical information, PE,  labs as well as A/P as outline above.     Call with questions [phone]    Julio Flores MD

## 2024-11-06 NOTE — PROGRESS NOTE ADULT - PROBLEM SELECTOR PLAN 5
- 11/4: Noted to be in Afib on Tele with rate 140-160s (no hx of Afib)  -EKG Afib w/ RVR rate 144  -Cards Dr. Jennings consulted:                           -S/p Digoxin 0.5mg x1 dose  -C/w eliquis,add multaq,change lopressor 25mg bid. ( cardio reccs)  -C/w Tele monitoring

## 2024-11-06 NOTE — DISCHARGE NOTE PROVIDER - NSDCHHNEEDSERVICE_GEN_ALL_CORE
Medication teaching and assessment/Rehabilitation services Medication teaching and assessment/Rehabilitation services/Wound care and assessment

## 2024-11-06 NOTE — GOALS OF CARE CONVERSATION - ADVANCED CARE PLANNING - CONVERSATION DETAILS
Discussed with (HCP, surrogate, legal guardian): Ivett De Jesus  About: Cardiac and respiratory resuscitative measures including CPR, shock, vasopressors, invasive ventilatory support via intubation.  All risks and benefits discussed. All questions answered.         [    ]  Health Care Proxy form filled out.   [ x   ]   DNR and MOLST forms were filled out.   [    ]   Please refer to [facility] documentation in EMR for further details.

## 2024-11-06 NOTE — PROGRESS NOTE ADULT - SUBJECTIVE AND OBJECTIVE BOX
Patient is a 89y old  Female who presents with a chief complaint of CHARISSE, lactic acidosis (06 Nov 2024 08:09)  Awake, alert, comfortable in bed in NAD. Clinically stable    INTERVAL HPI/OVERNIGHT EVENTS:      VITAL SIGNS:  T(F): 97.8 (11-06-24 @ 10:15)  HR: 84 (11-06-24 @ 10:15)  BP: 153/59 (11-06-24 @ 10:15)  RR: 18 (11-06-24 @ 10:15)  SpO2: 95% (11-06-24 @ 10:15)  Wt(kg): --  I&O's Detail          REVIEW OF SYSTEMS:    CONSTITUTIONAL:  No fevers, chills, sweats    HEENT:  Eyes:  No diplopia or blurred vision. ENT:  No earache, sore throat or runny nose.    CARDIOVASCULAR:  No pressure, squeezing, tightness, or heaviness about the chest; no palpitations.    RESPIRATORY:  Per HPI    GASTROINTESTINAL:  No abdominal pain, nausea, vomiting or diarrhea.    GENITOURINARY:  No dysuria, frequency or urgency.    NEUROLOGIC:  No paresthesias, fasciculations, seizures or weakness.    PSYCHIATRIC:  No disorder of thought or mood.      PHYSICAL EXAM:    Constitutional: Well developed and nourished  Eyes:Perrla  ENMT: normal  Neck:supple  Respiratory: good air entry  Cardiovascular: S1 S2 regular  Gastrointestinal: Soft, Non tender  Extremities: No edema  Vascular:normal  Neurological:Awake, alert,Ox3  Musculoskeletal:Normal      MEDICATIONS  (STANDING):  apixaban 2.5 milliGRAM(s) Oral every 12 hours  atorvastatin 10 milliGRAM(s) Oral at bedtime  cefTRIAXone   IVPB 1000 milliGRAM(s) IV Intermittent every 24 hours  fluticasone propionate/ salmeterol 100-50 MICROgram(s) Diskus 1 Dose(s) Inhalation two times a day  insulin lispro (ADMELOG) corrective regimen sliding scale   SubCutaneous at bedtime  insulin lispro (ADMELOG) corrective regimen sliding scale   SubCutaneous three times a day before meals  metoprolol tartrate 25 milliGRAM(s) Oral every 8 hours  NIFEdipine XL 30 milliGRAM(s) Oral daily  pantoprazole    Tablet 40 milliGRAM(s) Oral before breakfast    MEDICATIONS  (PRN):  albuterol/ipratropium for Nebulization 3 milliLiter(s) Nebulizer every 6 hours PRN Bronchospasm  melatonin 3 milliGRAM(s) Oral at bedtime PRN Insomnia      Allergies    gabapentin (Unknown)    Intolerances        LABS:                        10.4   8.89  )-----------( 203      ( 06 Nov 2024 05:33 )             31.9     11-06    145  |  118[H]  |  26[H]  ----------------------------<  139[H]  3.3[L]   |  20[L]  |  0.74    Ca    8.3[L]      06 Nov 2024 05:33        Urinalysis Basic - ( 06 Nov 2024 05:33 )    Color: x / Appearance: x / SG: x / pH: x  Gluc: 139 mg/dL / Ketone: x  / Bili: x / Urobili: x   Blood: x / Protein: x / Nitrite: x   Leuk Esterase: x / RBC: x / WBC x   Sq Epi: x / Non Sq Epi: x / Bacteria: x    Culture - Urine (11.03.24 @ 04:09)   Specimen Source: Clean Catch  Culture Results:   >100,000 CFU/ml Gram Negative Rods        CAPILLARY BLOOD GLUCOSE      POCT Blood Glucose.: 141 mg/dL (06 Nov 2024 07:26)  POCT Blood Glucose.: 117 mg/dL (05 Nov 2024 22:28)  POCT Blood Glucose.: 142 mg/dL (05 Nov 2024 16:46)  POCT Blood Glucose.: 136 mg/dL (05 Nov 2024 11:34)    pro-bnp -- 11-02 @ 21:34     d-dimer 164  11-02 @ 21:34      RADIOLOGY & ADDITIONAL TESTS:    CXR:  < from: CT Angio Chest PE Protocol w/ IV Cont (11.04.24 @ 15:19) >  IMPRESSION:    No pulmonary embolus.    Resolved prior pneumonia.    Unchanged small left upper lobe nodules.      < end of copied text >    Ct scan chest:    ekg;    echo:

## 2024-11-06 NOTE — PROVIDER CONTACT NOTE (CRITICAL VALUE NOTIFICATION) - TEST AND RESULT REPORTED:
"Received a call from the call center to discuss lightheadedness.  Spoke to Champ, who says he has had fairly constant \"light dizziness\" for the last 3 weeks. He says his [employer] mobile device verified he is in A fib today. A couple readings said \"unclassified\" and a couple readings said \"normal\". He says his heart rates have been in the 59-60 bpm range. He says he is taking aspirin 81 mg and sotalol 80 mg twice daily, however, he says he misses the evening dose about twice a week. He does not check BP at home.  Advised a message will be sent to [place] cardiology for follow up.    1. DESCRIPTION: \"Describe your dizziness.\" Light dizziness  2. LIGHTHEADED: \"Do you feel lightheaded?\" (e.g., somewhat faint, woozy, weak upon standing) denies  3. VERTIGO: \"Do you feel like either you or the room is spinning or tilting?\" (i.e. vertigo)  4. SEVERITY: \"How bad is it?\" \"Do you feel like you are going to faint?\" \"Can you stand and walk?\" Mild  - MILD: Feels slightly dizzy, but walking normally.  - MODERATE: Feels unsteady when walking, but not falling; interferes with normal activities (e.g., school, work).  - SEVERE: Unable to walk without falling, or requires assistance to walk without falling; feels like passing out now.  5. ONSET: \"When did the dizziness begin?\" 3 weeks ago  6. AGGRAVATING FACTORS: \"Does anything make it worse?\" (e.g., standing, change in head position)  7. HEART RATE: \"Can you tell me your heart rate?\" \"How many beats in 15 seconds?\" (Note: not all patients can do this) 44043 bpm range today  8. CAUSE: \"What do you think is causing the dizziness?\" A fib, s/p Watchman  9. RECURRENT SYMPTOM: \"Have you had dizziness before?\" If Yes, ask: \"When was the last time?\" \"What happened that time?\"  10. OTHER SYMPTOMS: \"Do you have any other symptoms?\" (e.g., fever, chest pain, vomiting, diarrhea, bleeding) denies  11. PREGNANCY: \"Is there any chance you are pregnant?\" \"When was your last menstrual "
"period?\"  Additional Information    Negative: SEVERE dizziness (e.g., unable to stand, requires support to walk, feels like passing out now)    Protocols used: Dizziness-A-OH    "
urine Culture <1000 CFU Klebsiella Pneumoniae ESBL
Lactate 4.5

## 2024-11-06 NOTE — CONSULT NOTE ADULT - SUBJECTIVE AND OBJECTIVE BOX
Date of Service  11-06-24 @ 11:21    CHIEF COMPLAINT:Patient is a 89y old  Female who presents with a chief complaint of CHARISSE, lactic acidosis. 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 Patient at bedside not able to provide any hx, hx provided by family over the phone. They state that [place] started being noticed that had SOB along with chest thightness. Per family patient did not complain of any other symptoms and was also noted to be having chills like shaking along with new onset cough that has a yellow sputum. Pateint had chemotherapy 2 days ago with  with no complications.Patient admitted with ARF, found to be in afib 11/4/24 and  spontaneously converted to sinus.      PAST MEDICAL & SURGICAL HISTORY:  HTN (hypertension)      DM (diabetes mellitus)      Hypercholesteremia      Gastric adenocarcinoma  s/p resection      Vertigo      Dementia      Ambulatory dysfunction      Multiple myeloma      Pulmonary embolism      S/P hysterectomy      S/P tubal ligation          MEDICATIONS  (STANDING):  apixaban 2.5 milliGRAM(s) Oral every 12 hours  atorvastatin 10 milliGRAM(s) Oral at bedtime  cefTRIAXone   IVPB 1000 milliGRAM(s) IV Intermittent every 24 hours  fluticasone propionate/ salmeterol 100-50 MICROgram(s) Diskus 1 Dose(s) Inhalation two times a day  insulin lispro (ADMELOG) corrective regimen sliding scale   SubCutaneous at bedtime  insulin lispro (ADMELOG) corrective regimen sliding scale   SubCutaneous three times a day before meals  metoprolol tartrate 25 milliGRAM(s) Oral every 8 hours  NIFEdipine XL 30 milliGRAM(s) Oral daily  pantoprazole    Tablet 40 milliGRAM(s) Oral before breakfast    MEDICATIONS  (PRN):  albuterol/ipratropium for Nebulization 3 milliLiter(s) Nebulizer every 6 hours PRN Bronchospasm  melatonin 3 milliGRAM(s) Oral at bedtime PRN Insomnia      FAMILY HISTORY:  Family history of diabetes mellitus (Sibling)    Family history of early CAD (Grandparent)        SOCIAL HISTORY:    [x ] Non-smoker    [ x] Alcohol    Allergies    gabapentin (Unknown)    Intolerances    	    REVIEW OF SYSTEMS:  CONSTITUTIONAL: No fever, weight loss, or fatigue  EYES: No eye pain, visual disturbances, or discharge  ENT:  No difficulty hearing, tinnitus, vertigo; No sinus or throat pain  NECK: No pain or stiffness  RESPIRATORY: No cough, wheezing, chills or hemoptysis; No Shortness of Breath  CARDIOVASCULAR: No chest pain, palpitations, passing out, dizziness, or leg swelling  GASTROINTESTINAL: No abdominal or epigastric pain. No nausea, vomiting, or hematemesis; No diarrhea or constipation. No melena or hematochezia.  GENITOURINARY: No dysuria, frequency, hematuria, or incontinence  NEUROLOGICAL: No headaches, memory loss, loss of strength, numbness, or tremors  SKIN: No itching, burning, rashes, or lesions   LYMPH Nodes: No enlarged glands  ENDOCRINE: No heat or cold intolerance; No hair loss  MUSCULOSKELETAL: No joint pain or swelling; No muscle, back, or extremity pain  PSYCHIATRIC: No depression, anxiety, mood swings, or difficulty sleeping  HEME/LYMPH: No easy bruising, or bleeding gums  ALLERGY AND IMMUNOLOGIC: No hives or eczema	        PHYSICAL EXAM:  T(C): 36.6 (11-06-24 @ 10:15), Max: 37.2 (11-05-24 @ 20:33)  HR: 84 (11-06-24 @ 10:15) (84 - 101)  BP: 153/59 (11-06-24 @ 10:15) (119/63 - 153/59)  RR: 18 (11-06-24 @ 10:15) (17 - 18)  SpO2: 95% (11-06-24 @ 10:15) (95% - 96%)  Wt(kg): --  I&O's Summary      Appearance: Normal	  HEENT:   Normal oral mucosa, PERRL, EOMI	  Lymphatic: No lymphadenopathy  Cardiovascular: Normal S1 S2, No JVD, No murmurs, No edema  Respiratory: Lungs clear to auscultation	  Psychiatry: A & O x 3, Mood & affect appropriate  Gastrointestinal:  Soft, Non-tender, + BS	  Skin: No rashes, No ecchymoses, No cyanosis	  Neurologic: Non-focal  Extremities: Normal range of motion, No clubbing, cyanosis or edema  Vascular: Peripheral pulses palpable 2+ bilaterally       ECG:  	afib with rvr,lvh  	  	  LABS:	 	                              10.4   8.89  )-----------( 203      ( 06 Nov 2024 05:33 )             31.9     11-06    145  |  118[H]  |  26[H]  ----------------------------<  139[H]  3.3[L]   |  20[L]  |  0.74    Ca    8.3[L]      06 Nov 2024 05:33      < from: TTE W or WO Ultrasound Enhancing Agent (08.05.24 @ 12:18) >  CONCLUSIONS:      1. Left ventricular cavity is normal in size. Left ventricular systolic function is normal with an ejection fraction visually estimated at 55 to 60 %.   2. The left ventricular diastolic function is indeterminate.   3. Normal right ventricular cavity size and normal right ventricular systolic function.   4. Trileaflet aortic valve. No aortic valve stenosis.   5. Structurally normal mitral valve with normal leaflet excursion.   6. Trace mitral regurgitation.   7. No pericardial effusion seen.   8. No prior echocardiogram is available for comparison.    < end of copied text >

## 2024-11-06 NOTE — CONSULT NOTE ADULT - SUBJECTIVE AND OBJECTIVE BOX
MEDICATIONS  (STANDING):  apixaban 2.5 milliGRAM(s) Oral every 12 hours  atorvastatin 10 milliGRAM(s) Oral at bedtime  cefTRIAXone   IVPB 1000 milliGRAM(s) IV Intermittent every 24 hours  dronedarone 400 milliGRAM(s) Oral two times a day  fluticasone propionate/ salmeterol 100-50 MICROgram(s) Diskus 1 Dose(s) Inhalation two times a day  insulin lispro (ADMELOG) corrective regimen sliding scale   SubCutaneous three times a day before meals  insulin lispro (ADMELOG) corrective regimen sliding scale   SubCutaneous at bedtime  metoprolol tartrate 25 milliGRAM(s) Oral two times a day  NIFEdipine XL 30 milliGRAM(s) Oral daily  pantoprazole    Tablet 40 milliGRAM(s) Oral before breakfast    MEDICATIONS  (PRN):  albuterol/ipratropium for Nebulization 3 milliLiter(s) Nebulizer every 6 hours PRN Bronchospasm  melatonin 3 milliGRAM(s) Oral at bedtime PRN Insomnia    CAPILLARY BLOOD GLUCOSE      POCT Blood Glucose.: 104 mg/dL (06 Nov 2024 11:20)  POCT Blood Glucose.: 141 mg/dL (06 Nov 2024 07:26)  POCT Blood Glucose.: 117 mg/dL (05 Nov 2024 22:28)  POCT Blood Glucose.: 142 mg/dL (05 Nov 2024 16:46)    I&O's Summary      PHYSICAL EXAM:  Vital Signs Last 24 Hrs  T(C): 36.6 (06 Nov 2024 10:15), Max: 37.2 (05 Nov 2024 20:33)  T(F): 97.8 (06 Nov 2024 10:15), Max: 98.9 (05 Nov 2024 20:33)  HR: 84 (06 Nov 2024 10:15) (84 - 101)  BP: 153/59 (06 Nov 2024 10:15) (119/63 - 153/59)  BP(mean): 83 (06 Nov 2024 10:15) (80 - 83)  RR: 18 (06 Nov 2024 10:15) (17 - 18)  SpO2: 95% (06 Nov 2024 10:15) (95% - 96%)    Parameters below as of 06 Nov 2024 10:15  Patient On (Oxygen Delivery Method): room air          LABS:                        10.4   8.89  )-----------( 203      ( 06 Nov 2024 05:33 )             31.9     11-06    145  |  118[H]  |  26[H]  ----------------------------<  139[H]  3.3[L]   |  20[L]  |  0.74    Ca    8.3[L]      06 Nov 2024 05:33            Urinalysis Basic - ( 06 Nov 2024 05:33 )    Color: x / Appearance: x / SG: x / pH: x  Gluc: 139 mg/dL / Ketone: x  / Bili: x / Urobili: x   Blood: x / Protein: x / Nitrite: x   Leuk Esterase: x / RBC: x / WBC x   Sq Epi: x / Non Sq Epi: x / Bacteria: x        COVID-19 PCR: Detected (29 Jul 2024 01:10)      RADIOLOGY & ADDITIONAL TESTS:     Reason for Admission: CHARISSE, lactic acidosis  History of Present Illness:   89 y/o F, [place] speaking, from home, ambulates with wheelchair (mostly bedbound), PMH HTN, HLD, T2DM, osteoporosis, Stage IIB gastric adenocarcinoma s/p distal gastrectomy (2015), 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 Patient at bedside not able to provide any hx, hx provided by family over the phone. They state that [place] started being noticed that had SOB along with chest thightness. Per family patient did not complain of any other symptoms and was also noted to be having chills like shaking along with new onset cough that has a yellow sputum. Pateint had chemotherapy 2 days ago with  with no complications.       Review of Systems:  Review of Systems: UNABLE TO OBTAIN  #[id number]    Allergies and Intolerances:        Allergies:  	gabapentin: Drug, Unknown    Home Medications:   * Patient Currently Takes Medications as of 17-Sep-2024 11:41 documented in Structured Notes  · 	Symbicort 80 mcg-4.5 mcg/inh inhalation aerosol: 2 puff(s) inhaled 2 times a day  · 	amoxicillin-clavulanate 875 mg-125 mg oral tablet: 1 tab(s) orally 2 times a day take starting on 9/18 until last dose on 9/20  · 	cholecalciferol oral tablet: 1 tab(s) orally once a day 1000 unit(s) orally once a day  · 	acetaminophen 325 mg oral tablet: 2 tab(s) orally every 6 hours as needed for Temp greater or equal to 38C (100.4F), Mild Pain (1 - 3)  · 	Multiple Vitamins with Minerals oral tablet: 1 tab(s) orally once a day  · 	NIFEdipine 30 mg oral tablet, extended release: 1 tab(s) orally once a day  · 	ascorbic acid 500 mg oral tablet: 1 tab(s) orally once a day  · 	metoprolol tartrate 25 mg oral tablet: 1 tab(s) orally 2 times a day  · 	zinc sulfate 220 mg (as elemental zinc 50 mg) oral capsule: 1 cap(s) orally once a day  · 	pantoprazole 40 mg oral delayed release tablet: 1 tab(s) orally once a day (before a meal)  · 	losartan 50 mg oral tablet: 1 tab(s) orally 2 times a day  · 	ipratropium-albuterol 0.5 mg-2.5 mg/3 mL inhalation solution: 3 milliliter(s) by nebulizer every 6 hours as needed for  shortness of breath and/or wheezing  · 	Farxiga 10 mg oral tablet: 1 tab(s) orally once a day  · 	METFORMIN 1000MG TAB: 1 tab(s) orally 2 times a day  · 	MONTELUKAST 10MG TAB: 1 tab(s) orally once a day  · 	Albuterol (Eqv-ProAir HFA) 90 mcg/inh inhalation aerosol: 2 puff(s) inhaled every 4 hours as needed for  shortness of breath and/or wheezing  · 	Eliquis 2.5 mg oral tablet: 1 tab(s) orally 2 times a day  · 	SIMVASTATIN 10MG TAB: 1 tab(s) orally once a day (at bedtime)    Patient History:   Past Medical, Past Surgical, and Family History:  PAST MEDICAL HISTORY:  Ambulatory dysfunction     Dementia     DM (diabetes mellitus)     Gastric adenocarcinoma s/p resection    HTN (hypertension)     Hypercholesteremia     Multiple myeloma     Pulmonary embolism     Vertigo.     PAST SURGICAL HISTORY:  S/P hysterectomy     S/P tubal ligation.     FAMILY HISTORY:  Sibling  Still living? Unknown  Family history of diabetes mellitus, Age at diagnosis: Age Unknown    Grandparent  Still living? No  Family history of early CAD, Age at diagnosis: Age Unknown.    Social History:  · Substance use	Unable to obtain    Tobacco Screening:  · Core Measure Site	Yes  · Has the patient used tobacco in the past 30 days?	No      MEDICATIONS  (STANDING):  apixaban 2.5 milliGRAM(s) Oral every 12 hours  atorvastatin 10 milliGRAM(s) Oral at bedtime  cefTRIAXone   IVPB 1000 milliGRAM(s) IV Intermittent every 24 hours  dronedarone 400 milliGRAM(s) Oral two times a day  fluticasone propionate/ salmeterol 100-50 MICROgram(s) Diskus 1 Dose(s) Inhalation two times a day  insulin lispro (ADMELOG) corrective regimen sliding scale   SubCutaneous three times a day before meals  insulin lispro (ADMELOG) corrective regimen sliding scale   SubCutaneous at bedtime  metoprolol tartrate 25 milliGRAM(s) Oral two times a day  NIFEdipine XL 30 milliGRAM(s) Oral daily  pantoprazole    Tablet 40 milliGRAM(s) Oral before breakfast    MEDICATIONS  (PRN):  albuterol/ipratropium for Nebulization 3 milliLiter(s) Nebulizer every 6 hours PRN Bronchospasm  melatonin 3 milliGRAM(s) Oral at bedtime PRN Insomnia    CAPILLARY BLOOD GLUCOSE      POCT Blood Glucose.: 104 mg/dL (06 Nov 2024 11:20)  POCT Blood Glucose.: 141 mg/dL (06 Nov 2024 07:26)  POCT Blood Glucose.: 117 mg/dL (05 Nov 2024 22:28)  POCT Blood Glucose.: 142 mg/dL (05 Nov 2024 16:46)    I&O's Summary      PHYSICAL EXAM:  Vital Signs Last 24 Hrs  T(C): 36.6 (06 Nov 2024 10:15), Max: 37.2 (05 Nov 2024 20:33)  T(F): 97.8 (06 Nov 2024 10:15), Max: 98.9 (05 Nov 2024 20:33)  HR: 84 (06 Nov 2024 10:15) (84 - 101)  BP: 153/59 (06 Nov 2024 10:15) (119/63 - 153/59)  BP(mean): 83 (06 Nov 2024 10:15) (80 - 83)  RR: 18 (06 Nov 2024 10:15) (17 - 18)  SpO2: 95% (06 Nov 2024 10:15) (95% - 96%)    Parameters below as of 06 Nov 2024 10:15  Patient On (Oxygen Delivery Method): room air    GEN: NAD; A and O x 1-2  LUNGS: CTA B/L  HEART: S1 S2  ABDOMEN: soft, non-tender, non-distended, + BS  EXTREMITIES: no edema          LABS:                        10.4   8.89  )-----------( 203      ( 06 Nov 2024 05:33 )             31.9     11-06    145  |  118[H]  |  26[H]  ----------------------------<  139[H]  3.3[L]   |  20[L]  |  0.74    Ca    8.3[L]      06 Nov 2024 05:33            Urinalysis Basic - ( 06 Nov 2024 05:33 )    Color: x / Appearance: x / SG: x / pH: x  Gluc: 139 mg/dL / Ketone: x  / Bili: x / Urobili: x   Blood: x / Protein: x / Nitrite: x   Leuk Esterase: x / RBC: x / WBC x   Sq Epi: x / Non Sq Epi: x / Bacteria: x        COVID-19 PCR: Detected (29 Jul 2024 01:10)      RADIOLOGY & ADDITIONAL TESTS:  < from: Xray Chest 1 View- PORTABLE-Urgent (11.02.24 @ 22:22) >  IMPRESSION:  No active parenchymal disease in the chest.    < end of copied text >  < from: CT Angio Chest PE Protocol w/ IV Cont (11.04.24 @ 15:19) >    ACC: [id number] EXAM:  CT ANGIO CHEST PULM [facility]   ORDERED BY: LATOYA DAWN     PROCEDURE DATE:  11/04/2024          INTERPRETATION:  INDICATION: Shortness of breath, chest tightness ,   history of    TECHNIQUE: Helical acquisition of the chest after the administration of   53 mL of Omnipaque 350. Maximum intensity projection images were   generated.    COMPARISON: CT chest 9/13/2024.    FINDINGS:    HEART/VASCULATURE: No pulmonary embolus. Normal heart size. No   pericardial effusion. Coronary artery calcification. Normal aortic size.    LUNGS/AIRWAYS/PLEURA: Patent trachea and bronchi. Unchanged unchanged   small cluster of sub-4 mm nodules in the left upper lobe (2:52-55). Image   acquisition during exhalation. Calcified granuloma in the middle lobe. No   pleural effusion.    LYMPH NODES/MEDIASTINUM: No lymphadenopathy.    UPPER ABDOMEN: Cholecystectomy.    BONES/SOFT TISSUES: Multiple old rib fractures.      IMPRESSION:    No pulmonary embolus.    Resolved prior pneumonia.    Unchanged small left upper lobe nodules.    < end of copied text >

## 2024-11-06 NOTE — PROGRESS NOTE ADULT - PROBLEM SELECTOR PLAN 3
H/o Multiple Myeloma   -Follows with Dr. Alvarado outpt   -Last chemo 2 days prior to admission  -[facility] Dr. Alvarado consulted, f/u reccs

## 2024-11-07 DIAGNOSIS — R79.89 OTHER SPECIFIED ABNORMAL FINDINGS OF BLOOD CHEMISTRY: ICD-10-CM

## 2024-11-07 DIAGNOSIS — N39.0 URINARY TRACT INFECTION, SITE NOT SPECIFIED: ICD-10-CM

## 2024-11-07 LAB
ANION GAP SERPL CALC-SCNC: 7 MMOL/L — SIGNIFICANT CHANGE UP (ref 5–17)
BUN SERPL-MCNC: 19 MG/DL — HIGH (ref 7–18)
CALCIUM SERPL-MCNC: 8.3 MG/DL — LOW (ref 8.4–10.5)
CHLORIDE SERPL-SCNC: 118 MMOL/L — HIGH (ref 96–108)
CO2 SERPL-SCNC: 21 MMOL/L — LOW (ref 22–31)
CREAT SERPL-MCNC: 0.57 MG/DL — SIGNIFICANT CHANGE UP (ref 0.5–1.3)
EGFR: 88 ML/MIN/1.73M2 — SIGNIFICANT CHANGE UP
GLUCOSE BLDC GLUCOMTR-MCNC: 105 MG/DL — HIGH (ref 70–99)
GLUCOSE BLDC GLUCOMTR-MCNC: 112 MG/DL — HIGH (ref 70–99)
GLUCOSE BLDC GLUCOMTR-MCNC: 121 MG/DL — HIGH (ref 70–99)
GLUCOSE BLDC GLUCOMTR-MCNC: 90 MG/DL — SIGNIFICANT CHANGE UP (ref 70–99)
GLUCOSE SERPL-MCNC: 105 MG/DL — HIGH (ref 70–99)
HCT VFR BLD CALC: 31.8 % — LOW (ref 34.5–45)
HGB BLD-MCNC: 10.3 G/DL — LOW (ref 11.5–15.5)
LACTATE SERPL-SCNC: 1.1 MMOL/L — SIGNIFICANT CHANGE UP (ref 0.7–2)
MAGNESIUM SERPL-MCNC: 1.4 MG/DL — LOW (ref 1.6–2.6)
MCHC RBC-ENTMCNC: 30.9 PG — SIGNIFICANT CHANGE UP (ref 27–34)
MCHC RBC-ENTMCNC: 32.4 G/DL — SIGNIFICANT CHANGE UP (ref 32–36)
MCV RBC AUTO: 95.5 FL — SIGNIFICANT CHANGE UP (ref 80–100)
NRBC # BLD: 0 /100 WBCS — SIGNIFICANT CHANGE UP (ref 0–0)
PHOSPHATE SERPL-MCNC: 2.9 MG/DL — SIGNIFICANT CHANGE UP (ref 2.5–4.5)
PLATELET # BLD AUTO: 181 K/UL — SIGNIFICANT CHANGE UP (ref 150–400)
POTASSIUM SERPL-MCNC: 3.7 MMOL/L — SIGNIFICANT CHANGE UP (ref 3.5–5.3)
POTASSIUM SERPL-SCNC: 3.7 MMOL/L — SIGNIFICANT CHANGE UP (ref 3.5–5.3)
RBC # BLD: 3.33 M/UL — LOW (ref 3.8–5.2)
RBC # FLD: 17.2 % — HIGH (ref 10.3–14.5)
SODIUM SERPL-SCNC: 146 MMOL/L — HIGH (ref 135–145)
WBC # BLD: 7.1 K/UL — SIGNIFICANT CHANGE UP (ref 3.8–10.5)
WBC # FLD AUTO: 7.1 K/UL — SIGNIFICANT CHANGE UP (ref 3.8–10.5)

## 2024-11-07 RX ADMIN — Medication 30 MILLIGRAM(S): at 05:26

## 2024-11-07 RX ADMIN — FLUTICASONE PROPIONATE AND SALMETEROL XINAFOATE 1 DOSE(S): 230; 21 AEROSOL, METERED RESPIRATORY (INHALATION) at 21:53

## 2024-11-07 RX ADMIN — APIXABAN 2.5 MILLIGRAM(S): 5 TABLET, FILM COATED ORAL at 17:59

## 2024-11-07 RX ADMIN — PANTOPRAZOLE SODIUM 40 MILLIGRAM(S): 40 TABLET, DELAYED RELEASE ORAL at 05:26

## 2024-11-07 RX ADMIN — Medication 25 MILLIGRAM(S): at 17:59

## 2024-11-07 RX ADMIN — DRONEDARONE 400 MILLIGRAM(S): 400 TABLET, FILM COATED ORAL at 05:26

## 2024-11-07 RX ADMIN — FLUTICASONE PROPIONATE AND SALMETEROL XINAFOATE 1 DOSE(S): 230; 21 AEROSOL, METERED RESPIRATORY (INHALATION) at 10:51

## 2024-11-07 RX ADMIN — APIXABAN 2.5 MILLIGRAM(S): 5 TABLET, FILM COATED ORAL at 05:26

## 2024-11-07 RX ADMIN — MONTELUKAST SODIUM 10 MILLIGRAM(S): 10 TABLET, FILM COATED ORAL at 21:52

## 2024-11-07 RX ADMIN — Medication 650 MILLIGRAM(S): at 19:18

## 2024-11-07 RX ADMIN — DRONEDARONE 400 MILLIGRAM(S): 400 TABLET, FILM COATED ORAL at 17:59

## 2024-11-07 RX ADMIN — Medication 10 MILLIGRAM(S): at 21:52

## 2024-11-07 RX ADMIN — Medication 25 MILLIGRAM(S): at 05:26

## 2024-11-07 NOTE — PROGRESS NOTE ADULT - PROBLEM SELECTOR PLAN 2
- 11/4: Noted to be in Afib on Tele with rate 140-160s (no hx of Afib)  -EKG Afib w/ RVR rate 144  -S/p Digoxin 0.5mg x1 dose  -C/w eliquis,add multaq,change lopressor 25mg bid. ( cardio reccs)  - now SR  - C/w Tele monitoring  -  Card Dr Jennings

## 2024-11-07 NOTE — PROGRESS NOTE ADULT - SUBJECTIVE AND OBJECTIVE BOX
Patient is a 89y old  Female who presents with a chief complaint of CHARISSE, lactic acidosis (07 Nov 2024 11:43)      INTERVAL HPI/OVERNIGHT EVENTS: no acute events overnight         REVIEW OF SYSTEMS:  CONSTITUTIONAL: No fever, chills  ENMT:  No difficulty hearing, no change in vision  NECK: No pain or stiffness  RESPIRATORY: No cough, SOB  CARDIOVASCULAR: No chest pain, palpitations  GASTROINTESTINAL: No abdominal pain. No nausea, vomiting, or diarrhea  GENITOURINARY: No dysuria  NEUROLOGICAL: No HA  SKIN: No itching, burning, rashes, or lesions   LYMPH NODES: No enlarged glands  ENDOCRINE: No heat or cold intolerance; No hair loss  MUSCULOSKELETAL: No joint pain or swelling; No muscle, back, or extremity pain  PSYCHIATRIC: No depression, anxiety  HEME/LYMPH: No easy bruising, or bleeding gums    T(C): 36.8 (11-07-24 @ 09:52), Max: 38 (11-06-24 @ 18:02)  HR: 86 (11-07-24 @ 09:52) (82 - 109)  BP: 132/72 (11-07-24 @ 09:52) (111/69 - 138/89)  RR: 16 (11-07-24 @ 09:52) (16 - 18)  SpO2: 97% (11-07-24 @ 09:52) (95% - 98%)  Wt(kg): --Vital Signs Last 24 Hrs  T(C): 36.8 (07 Nov 2024 09:52), Max: 38 (06 Nov 2024 18:02)  T(F): 98.3 (07 Nov 2024 09:52), Max: 100.4 (06 Nov 2024 18:02)  HR: 86 (07 Nov 2024 09:52) (82 - 109)  BP: 132/72 (07 Nov 2024 09:52) (111/69 - 138/89)  BP(mean): 79 (07 Nov 2024 05:15) (79 - 82)  RR: 16 (07 Nov 2024 09:52) (16 - 18)  SpO2: 97% (07 Nov 2024 09:52) (95% - 98%)    Parameters below as of 07 Nov 2024 05:15  Patient On (Oxygen Delivery Method): room air    MEDICATIONS  (STANDING):  apixaban 2.5 milliGRAM(s) Oral every 12 hours  atorvastatin 10 milliGRAM(s) Oral at bedtime  dronedarone 400 milliGRAM(s) Oral two times a day  ertapenem  IVPB      ertapenem  IVPB 1000 milliGRAM(s) IV Intermittent every 24 hours  fluticasone propionate/ salmeterol 100-50 MICROgram(s) Diskus 1 Dose(s) Inhalation two times a day  insulin lispro (ADMELOG) corrective regimen sliding scale   SubCutaneous at bedtime  insulin lispro (ADMELOG) corrective regimen sliding scale   SubCutaneous three times a day before meals  metoprolol tartrate 25 milliGRAM(s) Oral two times a day  montelukast 10 milliGRAM(s) Oral at bedtime  NIFEdipine XL 30 milliGRAM(s) Oral daily  pantoprazole    Tablet 40 milliGRAM(s) Oral before breakfast    MEDICATIONS  (PRN):  acetaminophen     Tablet .. 650 milliGRAM(s) Oral every 6 hours PRN Temp greater or equal to 38C (100.4F)  albuterol/ipratropium for Nebulization 3 milliLiter(s) Nebulizer every 6 hours PRN Bronchospasm  loperamide 2 milliGRAM(s) Oral four times a day PRN Diarrhea  melatonin 3 milliGRAM(s) Oral at bedtime PRN Insomnia      PHYSICAL EXAM:  GENERAL: NAD  EYES: clear conjunctiva; EOMI  ENMT: Moist mucous membranes  NECK: Supple, No JVD, Normal thyroid  CHEST/LUNG: Clear to auscultation bilaterally; No rales, rhonchi, wheezing, or rubs  HEART: S1, S2, Regular rate and rhythm  ABDOMEN: Soft, Nontender, Nondistended; Bowel sounds present  NEURO: Alert & awake   EXTREMITIES: No LE edema, no calf tenderness  LYMPH: No lymphadenopathy noted  SKIN: No rashes or lesions    Consultant(s) Notes Reviewed:  [x ] YES  [ ] NO  Care Discussed with Consultants/Other Providers [ x] YES  [ ] NO    LABS:                        10.3   7.10  )-----------( 181      ( 07 Nov 2024 05:51 )             31.8     11-07    146[H]  |  118[H]  |  19[H]  ----------------------------<  105[H]  3.7   |  21[L]  |  0.57    Ca    8.3[L]      07 Nov 2024 05:51  Phos  2.9     11-07  Mg     1.4     11-07        CAPILLARY BLOOD GLUCOSE      POCT Blood Glucose.: 105 mg/dL (07 Nov 2024 07:32)  POCT Blood Glucose.: 108 mg/dL (06 Nov 2024 22:01)  POCT Blood Glucose.: 112 mg/dL (06 Nov 2024 16:38)        Urinalysis Basic - ( 07 Nov 2024 05:51 )    Color: x / Appearance: x / SG: x / pH: x  Gluc: 105 mg/dL / Ketone: x  / Bili: x / Urobili: x   Blood: x / Protein: x / Nitrite: x   Leuk Esterase: x / RBC: x / WBC x   Sq Epi: x / Non Sq Epi: x / Bacteria: x        RADIOLOGY & ADDITIONAL TESTS:    Imaging Personally Reviewed:  [ x] YES  [ ] NO    < from: CT Angio Chest PE Protocol w/ IV Cont (11.04.24 @ 15:19) >    ACC: [id number] EXAM:  CT ANGIO CHEST PULM [facility]   ORDERED BY: LATOYA DAWN     PROCEDURE DATE:  11/04/2024          INTERPRETATION:  INDICATION: Shortness of breath, chest tightness ,   history of    TECHNIQUE: Helical acquisition of the chest after the administration of   53 mL of Omnipaque 350. Maximum intensity projection images were   generated.    COMPARISON: CT chest 9/13/2024.    FINDINGS:    HEART/VASCULATURE: No pulmonary embolus. Normal heart size. No   pericardial effusion. Coronary artery calcification. Normal aortic size.    LUNGS/AIRWAYS/PLEURA: Patent trachea and bronchi. Unchanged unchanged   small cluster of sub-4 mm nodules in the left upper lobe (2:52-55). Image   acquisition during exhalation. Calcified granuloma in the middle lobe. No   pleural effusion.    LYMPH NODES/MEDIASTINUM: No lymphadenopathy.    UPPER ABDOMEN: Cholecystectomy.    BONES/SOFT TISSUES: Multiple old rib fractures.      IMPRESSION:    No pulmonary embolus.    Resolved prior pneumonia.    Unchanged small left upper lobe nodules.    --- End of Report ---            MENDEZ GOODWIN M.D., ATTENDING RADIOLOGIST  This document has been electronically signed. Nov 4 2024  3:36PM    < end of copied text >

## 2024-11-07 NOTE — PROGRESS NOTE ADULT - PROBLEM SELECTOR PLAN 7
hx of DM on Metformin at home  -Hold oral meds while inpt   - a1c 5.5%; CONTROLLED  -C/w FS w/ ISSC ACHS

## 2024-11-07 NOTE — PROGRESS NOTE ADULT - SUBJECTIVE AND OBJECTIVE BOX
Patient is a 89y old  Female who presents with a chief complaint of CHARISSE, lactic acidosis (07 Nov 2024 06:40)  Asleep, laying in bed in NAD. Doing well on RA    INTERVAL HPI/OVERNIGHT EVENTS:      VITAL SIGNS:  T(F): 98.3 (11-07-24 @ 09:52)  HR: 86 (11-07-24 @ 09:52)  BP: 132/72 (11-07-24 @ 09:52)  RR: 16 (11-07-24 @ 09:52)  SpO2: 97% (11-07-24 @ 09:52)  Wt(kg): --  I&O's Detail    06 Nov 2024 07:01  -  07 Nov 2024 07:00  --------------------------------------------------------  IN:  Total IN: 0 mL    OUT:    Voided (mL): 600 mL  Total OUT: 600 mL    Total NET: -600 mL              REVIEW OF SYSTEMS:    CONSTITUTIONAL:  No fevers, chills, sweats    HEENT:  Eyes:  No diplopia or blurred vision. ENT:  No earache, sore throat or runny nose.    CARDIOVASCULAR:  No pressure, squeezing, tightness, or heaviness about the chest; no palpitations.    RESPIRATORY:  Per HPI    GASTROINTESTINAL:  No abdominal pain, nausea, vomiting or diarrhea.    GENITOURINARY:  No dysuria, frequency or urgency.    NEUROLOGIC:  No paresthesias, fasciculations, seizures or weakness.    PSYCHIATRIC:  No disorder of thought or mood.      PHYSICAL EXAM:    Constitutional: Well developed and nourished  Eyes:Perrla  ENMT: normal  Neck:supple  Respiratory: good air entry  Cardiovascular: S1 S2 regular  Gastrointestinal: Soft, Non tender  Extremities: No edema  Vascular:normal  Neurological:Awake, alert,Ox3  Musculoskeletal:Normal      MEDICATIONS  (STANDING):  apixaban 2.5 milliGRAM(s) Oral every 12 hours  atorvastatin 10 milliGRAM(s) Oral at bedtime  dronedarone 400 milliGRAM(s) Oral two times a day  ertapenem  IVPB      ertapenem  IVPB 1000 milliGRAM(s) IV Intermittent every 24 hours  fluticasone propionate/ salmeterol 100-50 MICROgram(s) Diskus 1 Dose(s) Inhalation two times a day  insulin lispro (ADMELOG) corrective regimen sliding scale   SubCutaneous three times a day before meals  insulin lispro (ADMELOG) corrective regimen sliding scale   SubCutaneous at bedtime  metoprolol tartrate 25 milliGRAM(s) Oral two times a day  montelukast 10 milliGRAM(s) Oral at bedtime  NIFEdipine XL 30 milliGRAM(s) Oral daily  pantoprazole    Tablet 40 milliGRAM(s) Oral before breakfast    MEDICATIONS  (PRN):  acetaminophen     Tablet .. 650 milliGRAM(s) Oral every 6 hours PRN Temp greater or equal to 38C (100.4F)  albuterol/ipratropium for Nebulization 3 milliLiter(s) Nebulizer every 6 hours PRN Bronchospasm  loperamide 2 milliGRAM(s) Oral four times a day PRN Diarrhea  melatonin 3 milliGRAM(s) Oral at bedtime PRN Insomnia      Allergies    gabapentin (Unknown)    Intolerances        LABS:                        10.3   7.10  )-----------( 181      ( 07 Nov 2024 05:51 )             31.8     11-07    146[H]  |  118[H]  |  19[H]  ----------------------------<  105[H]  3.7   |  21[L]  |  0.57    Ca    8.3[L]      07 Nov 2024 05:51  Phos  2.9     11-07  Mg     1.4     11-07        Urinalysis Basic - ( 07 Nov 2024 05:51 )    Color: x / Appearance: x / SG: x / pH: x  Gluc: 105 mg/dL / Ketone: x  / Bili: x / Urobili: x   Blood: x / Protein: x / Nitrite: x   Leuk Esterase: x / RBC: x / WBC x   Sq Epi: x / Non Sq Epi: x / Bacteria: x            CAPILLARY BLOOD GLUCOSE      POCT Blood Glucose.: 105 mg/dL (07 Nov 2024 07:32)  POCT Blood Glucose.: 108 mg/dL (06 Nov 2024 22:01)  POCT Blood Glucose.: 112 mg/dL (06 Nov 2024 16:38)  POCT Blood Glucose.: 104 mg/dL (06 Nov 2024 11:20)    pro-bnp -- 11-02 @ 21:34     d-dimer 164  11-02 @ 21:34      RADIOLOGY & ADDITIONAL TESTS:    CXR:    Ct scan chest:    ekg;    echo:

## 2024-11-07 NOTE — PROGRESS NOTE ADULT - PROBLEM SELECTOR PLAN 1
cont ertapenem   - f/u blood Cx was sent on11/7- spiked fever 100.4  - f/u id recs for abx plan  - ID Dr Joe

## 2024-11-07 NOTE — PROGRESS NOTE ADULT - SUBJECTIVE AND OBJECTIVE BOX
Date of Service 11-07-24 @ 11:23    CHIEF COMPLAINT:Patient is a 89y old  Female who presents with a chief complaint of CHARISSE, lactic acidosis .Pt appears comfortable.    	  REVIEW OF SYSTEMS:  CONSTITUTIONAL: No fever, weight loss, or fatigue  EYES: No eye pain, visual disturbances, or discharge  ENT:  No difficulty hearing, tinnitus, vertigo; No sinus or throat pain  NECK: No pain or stiffness  RESPIRATORY: No cough, wheezing, chills or hemoptysis; No Shortness of Breath  CARDIOVASCULAR: No chest pain, palpitations, passing out, dizziness, or leg swelling  GASTROINTESTINAL: No abdominal or epigastric pain. No nausea, vomiting, or hematemesis; No diarrhea or constipation. No melena or hematochezia.  GENITOURINARY: No dysuria, frequency, hematuria, or incontinence  NEUROLOGICAL: No headaches, memory loss, loss of strength, numbness, or tremors  SKIN: No itching, burning, rashes, or lesions   LYMPH Nodes: No enlarged glands  ENDOCRINE: No heat or cold intolerance; No hair loss  MUSCULOSKELETAL: No joint pain or swelling; No muscle, back, or extremity pain  PSYCHIATRIC: No depression, anxiety, mood swings, or difficulty sleeping  HEME/LYMPH: No easy bruising, or bleeding gums  ALLERGY AND IMMUNOLOGIC: No hives or eczema	        PHYSICAL EXAM:  T(C): 36.8 (11-07-24 @ 09:52), Max: 38 (11-06-24 @ 18:02)  HR: 86 (11-07-24 @ 09:52) (82 - 109)  BP: 132/72 (11-07-24 @ 09:52) (111/69 - 138/89)  RR: 16 (11-07-24 @ 09:52) (16 - 18)  SpO2: 97% (11-07-24 @ 09:52) (95% - 98%)  Wt(kg): --  I&O's Summary    06 Nov 2024 07:01  -  07 Nov 2024 07:00  --------------------------------------------------------  IN: 0 mL / OUT: 600 mL / NET: -600 mL        Appearance: Normal	  HEENT:   Normal oral mucosa, PERRL, EOMI	  Lymphatic: No lymphadenopathy  Cardiovascular: Normal S1 S2, No JVD, No murmurs, No edema  Respiratory: Lungs clear to auscultation	  Psychiatry: A & O x 3, Mood & affect appropriate  Gastrointestinal:  Soft, Non-tender, + BS	  Skin: No rashes, No ecchymoses, No cyanosis	  Neurologic: Non-focal  Extremities: Normal range of motion, No clubbing, cyanosis or edema  Vascular: Peripheral pulses palpable 2+ bilaterally    MEDICATIONS  (STANDING):  apixaban 2.5 milliGRAM(s) Oral every 12 hours  atorvastatin 10 milliGRAM(s) Oral at bedtime  dronedarone 400 milliGRAM(s) Oral two times a day  ertapenem  IVPB      ertapenem  IVPB 1000 milliGRAM(s) IV Intermittent every 24 hours  fluticasone propionate/ salmeterol 100-50 MICROgram(s) Diskus 1 Dose(s) Inhalation two times a day  insulin lispro (ADMELOG) corrective regimen sliding scale   SubCutaneous three times a day before meals  insulin lispro (ADMELOG) corrective regimen sliding scale   SubCutaneous at bedtime  metoprolol tartrate 25 milliGRAM(s) Oral two times a day  montelukast 10 milliGRAM(s) Oral at bedtime  NIFEdipine XL 30 milliGRAM(s) Oral daily  pantoprazole    Tablet 40 milliGRAM(s) Oral before breakfast    	  LABS:	 	                        10.3   7.10  )-----------( 181      ( 07 Nov 2024 05:51 )             31.8     11-07    146[H]  |  118[H]  |  19[H]  ----------------------------<  105[H]  3.7   |  21[L]  |  0.57    Ca    8.3[L]      07 Nov 2024 05:51  Phos  2.9     11-07  Mg     1.4     11-07

## 2024-11-07 NOTE — PROGRESS NOTE ADULT - ASSESSMENT
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 Ct angio- no pe, resolved prior pna. l upper lobe nodules. Patient to be admitted for new onset AK wtih  lactic acidosis likely in the setting of UTI and albuterol use. Id following.  Hospital course A-Fib w/RVR on 11/4/24 and  spontaneously converted to sinus. Card following

## 2024-11-07 NOTE — PROGRESS NOTE ADULT - SUBJECTIVE AND OBJECTIVE BOX
Medicine Progress Note  Patient is a 89y old  Female who presents with a chief complaint of CHARISSE, lactic acidosis       SUBJECTIVE / OVERNIGHT EVENTS:        MEDICATIONS  (STANDING):  apixaban 2.5 milliGRAM(s) Oral every 12 hours  atorvastatin 10 milliGRAM(s) Oral at bedtime  dronedarone 400 milliGRAM(s) Oral two times a day  ertapenem  IVPB      ertapenem  IVPB 1000 milliGRAM(s) IV Intermittent every 24 hours  fluticasone propionate/ salmeterol 100-50 MICROgram(s) Diskus 1 Dose(s) Inhalation two times a day  insulin lispro (ADMELOG) corrective regimen sliding scale   SubCutaneous at bedtime  insulin lispro (ADMELOG) corrective regimen sliding scale   SubCutaneous three times a day before meals  metoprolol tartrate 25 milliGRAM(s) Oral two times a day  montelukast 10 milliGRAM(s) Oral at bedtime  NIFEdipine XL 30 milliGRAM(s) Oral daily  pantoprazole    Tablet 40 milliGRAM(s) Oral before breakfast    MEDICATIONS  (PRN):  acetaminophen     Tablet .. 650 milliGRAM(s) Oral every 6 hours PRN Temp greater or equal to 38C (100.4F)  albuterol/ipratropium for Nebulization 3 milliLiter(s) Nebulizer every 6 hours PRN Bronchospasm  loperamide 2 milliGRAM(s) Oral four times a day PRN Diarrhea  melatonin 3 milliGRAM(s) Oral at bedtime PRN Insomnia    CAPILLARY BLOOD GLUCOSE      POCT Blood Glucose.: 121 mg/dL (07 Nov 2024 12:08)  POCT Blood Glucose.: 105 mg/dL (07 Nov 2024 07:32)  POCT Blood Glucose.: 108 mg/dL (06 Nov 2024 22:01)  POCT Blood Glucose.: 112 mg/dL (06 Nov 2024 16:38)    I&O's Summary    06 Nov 2024 07:01  -  07 Nov 2024 07:00  --------------------------------------------------------  IN: 0 mL / OUT: 600 mL / NET: -600 mL        PHYSICAL EXAM:  Vital Signs Last 24 Hrs  T(C): 36.8 (07 Nov 2024 09:52), Max: 38 (06 Nov 2024 18:02)  T(F): 98.3 (07 Nov 2024 09:52), Max: 100.4 (06 Nov 2024 18:02)  HR: 86 (07 Nov 2024 09:52) (82 - 109)  BP: 132/72 (07 Nov 2024 09:52) (111/69 - 138/89)  BP(mean): 79 (07 Nov 2024 05:15) (79 - 82)  RR: 16 (07 Nov 2024 09:52) (16 - 18)  SpO2: 97% (07 Nov 2024 09:52) (95% - 98%)    Parameters below as of 07 Nov 2024 05:15  Patient On (Oxygen Delivery Method): room air      LABS:                        10.3   7.10  )-----------( 181      ( 07 Nov 2024 05:51 )             31.8     11-07    146[H]  |  118[H]  |  19[H]  ----------------------------<  105[H]  3.7   |  21[L]  |  0.57    Ca    8.3[L]      07 Nov 2024 05:51  Phos  2.9     11-07  Mg     1.4     11-07            Urinalysis Basic - ( 07 Nov 2024 05:51 )    Color: x / Appearance: x / SG: x / pH: x  Gluc: 105 mg/dL / Ketone: x  / Bili: x / Urobili: x   Blood: x / Protein: x / Nitrite: x   Leuk Esterase: x / RBC: x / WBC x   Sq Epi: x / Non Sq Epi: x / Bacteria: x        COVID-19 PCR: Detected (29 Jul 2024 01:10)         Patient is a 89y old  Female who presents with a chief complaint of CHARISSE, lactic acidosis     SUBJECTIVE / OVERNIGHT EVENTS: events noted. Denies pain, SOB      MEDICATIONS  (STANDING):  apixaban 2.5 milliGRAM(s) Oral every 12 hours  atorvastatin 10 milliGRAM(s) Oral at bedtime  dronedarone 400 milliGRAM(s) Oral two times a day  ertapenem  IVPB      ertapenem  IVPB 1000 milliGRAM(s) IV Intermittent every 24 hours  fluticasone propionate/ salmeterol 100-50 MICROgram(s) Diskus 1 Dose(s) Inhalation two times a day  insulin lispro (ADMELOG) corrective regimen sliding scale   SubCutaneous at bedtime  insulin lispro (ADMELOG) corrective regimen sliding scale   SubCutaneous three times a day before meals  metoprolol tartrate 25 milliGRAM(s) Oral two times a day  montelukast 10 milliGRAM(s) Oral at bedtime  NIFEdipine XL 30 milliGRAM(s) Oral daily  pantoprazole    Tablet 40 milliGRAM(s) Oral before breakfast    MEDICATIONS  (PRN):  acetaminophen     Tablet .. 650 milliGRAM(s) Oral every 6 hours PRN Temp greater or equal to 38C (100.4F)  albuterol/ipratropium for Nebulization 3 milliLiter(s) Nebulizer every 6 hours PRN Bronchospasm  loperamide 2 milliGRAM(s) Oral four times a day PRN Diarrhea  melatonin 3 milliGRAM(s) Oral at bedtime PRN Insomnia    CAPILLARY BLOOD GLUCOSE      POCT Blood Glucose.: 121 mg/dL (07 Nov 2024 12:08)  POCT Blood Glucose.: 105 mg/dL (07 Nov 2024 07:32)  POCT Blood Glucose.: 108 mg/dL (06 Nov 2024 22:01)  POCT Blood Glucose.: 112 mg/dL (06 Nov 2024 16:38)    I&O's Summary    06 Nov 2024 07:01  -  07 Nov 2024 07:00  --------------------------------------------------------  IN: 0 mL / OUT: 600 mL / NET: -600 mL        PHYSICAL EXAM:  Vital Signs Last 24 Hrs  T(C): 36.8 (07 Nov 2024 09:52), Max: 38 (06 Nov 2024 18:02)  T(F): 98.3 (07 Nov 2024 09:52), Max: 100.4 (06 Nov 2024 18:02)  HR: 86 (07 Nov 2024 09:52) (82 - 109)  BP: 132/72 (07 Nov 2024 09:52) (111/69 - 138/89)  BP(mean): 79 (07 Nov 2024 05:15) (79 - 82)  RR: 16 (07 Nov 2024 09:52) (16 - 18)  SpO2: 97% (07 Nov 2024 09:52) (95% - 98%)    Parameters below as of 07 Nov 2024 05:15  Patient On (Oxygen Delivery Method): room air    GEN: NAD; A and O x 1-2  LUNGS: CTA B/L  HEART: S1 S2  ABDOMEN: soft, non-tender, non-distended, + BS  EXTREMITIES: no edema    LABS:                        10.3   7.10  )-----------( 181      ( 07 Nov 2024 05:51 )             31.8     11-07    146[H]  |  118[H]  |  19[H]  ----------------------------<  105[H]  3.7   |  21[L]  |  0.57    Ca    8.3[L]      07 Nov 2024 05:51  Phos  2.9     11-07  Mg     1.4     11-07            Urinalysis Basic - ( 07 Nov 2024 05:51 )    Color: x / Appearance: x / SG: x / pH: x  Gluc: 105 mg/dL / Ketone: x  / Bili: x / Urobili: x   Blood: x / Protein: x / Nitrite: x   Leuk Esterase: x / RBC: x / WBC x   Sq Epi: x / Non Sq Epi: x / Bacteria: x        COVID-19 PCR: Detected (29 Jul 2024 01:10)

## 2024-11-07 NOTE — PROGRESS NOTE ADULT - NS ATTEND AMEND GEN_ALL_CORE FT
CC: MM  INTERVAL HPI: Patient seen and examined at bedside; Events noted; Patient w/o new complaint     Vital Signs Last 24 Hrs  T(C): 37.2 (07 Nov 2024 14:01), Max: 38 (06 Nov 2024 18:02)  T(F): 99 (07 Nov 2024 14:01), Max: 100.4 (06 Nov 2024 18:02)  HR: 100 (07 Nov 2024 14:01) (82 - 109)  BP: 124/59 (07 Nov 2024 14:01) (111/69 - 138/89)  BP(mean): 79 (07 Nov 2024 05:15) (79 - 82)  RR: 17 (07 Nov 2024 14:01) (16 - 18)  SpO2: 96% (07 Nov 2024 14:01) (95% - 98%)    Parameters below as of 07 Nov 2024 05:15  Patient On (Oxygen Delivery Method): room air      _________________  PHYSICAL EXAM:  ---------------------------  GEN: NAD; NC/AT; A and O x 1  HEENT: MMM; PERRLA: EOMI  LUNGS: no wheezing; decreased bilateral air entry; no use of accessory muscles for breathing  HEART: Nl S1 S2; no M   ABDOMEN: Soft, Nontender, non distended  EXTREMITIES: no cyanosis; no edema; warm and dry  SKIN: Warm and dry  NERVOUS SYSTEM: no focal neuro  deficits    _________________________________________________  LABS:                        10.3   7.10  )-----------( 181      ( 07 Nov 2024 05:51 )             31.8     11-07    146[H]  |  118[H]  |  19[H]  ----------------------------<  105[H]  3.7   |  21[L]  |  0.57    Ca    8.3[L]      07 Nov 2024 05:51  Phos  2.9     11-07  Mg     1.4     11-07    A/P    Problem #1 MM - tumor directed tx on hold during the inpatient hospitalization  -pt will f/up with Dr. Alvarado once stable for discharge    Problem #2 ID - continue w/ tx of UTI w/ Klebsiella    I have examined the patient at bedside and reviewed patient's data and participated in the management of the patient along with ACP as well as hematology/medical oncology faculty consisting of Josey Alvarado, [place], Rashad, Alberto, Fran as well as myself during the daily review. I reviewed pertinent clinical information, PE,  labs as well as A/P as outline above.     Call with questions [phone]    Julio Flores MD

## 2024-11-07 NOTE — PROGRESS NOTE ADULT - ASSESSMENT
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 On admission, she found to have no fever but Leukocytosis and positive Urine analysis. The CXR is negative. She has started on Ceftriaxone and Azithromycin, and the ID consult requested to assist with further evaluation and antibiotic management.    # UTI - Urine Cx grew ESBL  Klebsiella  # Leukocytosis - resolved    would recommend:    1. Follow up Blood culture, spiked fever last night  2. Continue Ertapenem  3. Monitor Temp and c/w supportive care    d/w Covering Pascual LOPEZ    Attending Attestation:    Spent more than 35 minutes on total encounter, more than 50 % of the visit was spent counseling and/or coordinating care by the Attending physician.           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 On admission, she found to have no fever but Leukocytosis and positive Urine analysis. The CXR is negative. She has started on Ceftriaxone and Azithromycin, and the ID consult requested to assist with further evaluation and antibiotic management.    # UTI - Urine Cx grew ESBL  Klebsiella  # Leukocytosis - resolved    would recommend:    1. Follow up Blood culture, spiked fever last night  2. Continue Ertapenem  3. Monitor Temp and c/w supportive care    d/w Covering Pascual LOPEZ and Dr. Kramer    Attending Attestation:    Spent more than 35 minutes on total encounter, more than 50 % of the visit was spent counseling and/or coordinating care by the Attending physician.

## 2024-11-07 NOTE — PROGRESS NOTE ADULT - ASSESSMENT
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  1.Tele monitoring.  2.PAF-eliquis, multaq, lopressor 25mg bid.  3.HTN-procardia xl.  4.DM-insulin.  5.CHARISSE-resolved.  6.PPI.

## 2024-11-07 NOTE — PROGRESS NOTE ADULT - PROBLEM SELECTOR PLAN 11
DVT ppx: Eliquis 2.5mg BID   GI ppx: Protonix 40mg    #Dispo  - d/c planning likely after blood Cx negative

## 2024-11-07 NOTE — PROGRESS NOTE ADULT - SUBJECTIVE AND OBJECTIVE BOX
Patient is a 89y old  Female who presents with a chief complaint of CHARISSE, lactic acidosis (06 Nov 2024 13:25)    pt seen in icu [  ], reg med floor [ x  ], bed [ x ], chair at bedside [   ], awake and responsive [x  ], lethargic [  ],    nad [x ]      Allergies    gabapentin (Unknown)        Vitals    T(F): 98.6 (11-07-24 @ 05:15), Max: 100.4 (11-06-24 @ 18:02)  HR: 84 (11-07-24 @ 05:15) (82 - 109)  BP: 121/63 (11-07-24 @ 05:15) (111/69 - 153/59)  RR: 17 (11-07-24 @ 05:15) (17 - 18)  SpO2: 98% (11-07-24 @ 05:15) (95% - 98%)  Wt(kg): --  CAPILLARY BLOOD GLUCOSE      POCT Blood Glucose.: 108 mg/dL (06 Nov 2024 22:01)      Labs                          10.3   7.10  )-----------( 181      ( 07 Nov 2024 05:51 )             31.8       11-07    146[H]  |  118[H]  |  19[H]  ----------------------------<  105[H]  3.7   |  21[L]  |  0.57    Ca    8.3[L]      07 Nov 2024 05:51  Phos  2.9     11-07  Mg     1.4     11-07              Clean Catch  11-03 @ 04:09   >100,000 CFU/ml Klebsiella pneumoniae ESBL  --  Klebsiella pneumoniae ESBL      Clean Catch  09-15 @ 01:09   <10,000 CFU/mL Normal Urogenital Natalie  --  --      .Blood Blood-Peripheral  09-13 @ 01:07   No growth at 5 days  --  --      .Blood Blood-Peripheral  09-13 @ 01:00   No growth at 5 days  --  --          Radiology Results      Meds    MEDICATIONS  (STANDING):  apixaban 2.5 milliGRAM(s) Oral every 12 hours  atorvastatin 10 milliGRAM(s) Oral at bedtime  dronedarone 400 milliGRAM(s) Oral two times a day  ertapenem  IVPB      ertapenem  IVPB 1000 milliGRAM(s) IV Intermittent every 24 hours  fluticasone propionate/ salmeterol 100-50 MICROgram(s) Diskus 1 Dose(s) Inhalation two times a day  insulin lispro (ADMELOG) corrective regimen sliding scale   SubCutaneous at bedtime  insulin lispro (ADMELOG) corrective regimen sliding scale   SubCutaneous three times a day before meals  metoprolol tartrate 25 milliGRAM(s) Oral two times a day  montelukast 10 milliGRAM(s) Oral at bedtime  NIFEdipine XL 30 milliGRAM(s) Oral daily  pantoprazole    Tablet 40 milliGRAM(s) Oral before breakfast      MEDICATIONS  (PRN):  acetaminophen     Tablet .. 650 milliGRAM(s) Oral every 6 hours PRN Temp greater or equal to 38C (100.4F)  albuterol/ipratropium for Nebulization 3 milliLiter(s) Nebulizer every 6 hours PRN Bronchospasm  loperamide 2 milliGRAM(s) Oral four times a day PRN Diarrhea  melatonin 3 milliGRAM(s) Oral at bedtime PRN Insomnia      Physical Exam    Neuro :  no focal deficits  Respiratory: CTA B/L  CV: RRR, S1S2, no murmurs,   Abdominal: Soft, NT, ND +BS,  Extremities: No edema, + peripheral pulses      ASSESSMENT    pneumonia r/o     sepsis,   uti,   h/o HTN,   HLD,   T2DM,   osteoporosis,   Stage IIB gastric adenocarcinoma s/p distal gastrectomy (2015),   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 Resolved prior pneumonia.  Unchanged small left upper lobe nodules noted above.  id f/u   Follow up final Urine culture for ID and sensitivities, still in process  continue Ceftriaxone until urine culture is finalzied  pulm f/u   atrov and prov nebs,   supplemental oxygen prn,   robitussin prn,   Budesonide 0.25 mgs neb BID  singulair qhs   PFTs as OP.  hold outpt dm meds,   lispro ss,   hgba1c 5.5 noted above,   heme onc cons .   cont current meds            Patient is a 89y old  Female who presents with a chief complaint of CHARISSE, lactic acidosis (06 Nov 2024 13:25)    pt seen in icu [  ], reg med floor [ x  ], bed [ x ], chair at bedside [   ], awake and responsive [x  ], lethargic [  ],    nad [x ]      Allergies    gabapentin (Unknown)        Vitals    T(F): 98.6 (11-07-24 @ 05:15), Max: 100.4 (11-06-24 @ 18:02)  HR: 84 (11-07-24 @ 05:15) (82 - 109)  BP: 121/63 (11-07-24 @ 05:15) (111/69 - 153/59)  RR: 17 (11-07-24 @ 05:15) (17 - 18)  SpO2: 98% (11-07-24 @ 05:15) (95% - 98%)  Wt(kg): --  CAPILLARY BLOOD GLUCOSE      POCT Blood Glucose.: 108 mg/dL (06 Nov 2024 22:01)      Labs                          10.3   7.10  )-----------( 181      ( 07 Nov 2024 05:51 )             31.8       11-07    146[H]  |  118[H]  |  19[H]  ----------------------------<  105[H]  3.7   |  21[L]  |  0.57    Ca    8.3[L]      07 Nov 2024 05:51  Phos  2.9     11-07  Mg     1.4     11-07    MRSA/MSSA PCR (09.13.24 @ 13:59)   MRSA PCR Result.: NotDetec    Legionella pneumophila Antigen, Urine (09.15.24 @ 01:09)   Legionella Antigen, Urine: Negative:      Clean Catch  11-03 @ 04:09   >100,000 CFU/ml Klebsiella pneumoniae ESBL  --  Klebsiella pneumoniae ESBL  - Ampicillin: R >16 These ampicillin results predict results for amoxicillin  - Ampicillin/Sulbactam: R >16/8  - Aztreonam: R >16  - Cefazolin: R >16 For uncomplicated UTI with K. pneumoniae, E. coli, or P. mirablis: RYAN <=16 is sensitive and RYAN >=32 is resistant. This also predicts results for oral agents cefaclor, cefdinir, cefpodoxime, cefprozil, cefuroxime axetil, cephalexin and locarbef for uncomplicated UTI. Note that some isolates may be susceptible to these agents while testing resistant to cefazolin.  - Cefepime: R >16  - Ceftriaxone: R >32  - Cefuroxime: R >16  - Ciprofloxacin: S <=0.25  - Ertapenem: S <=0.5  - Gentamicin: S <=2  - Levofloxacin: S <=0.5  - Imipenem: S <=1  - Meropenem: S <=1  - Nitrofurantoin: S <=32 Should not be used to treat pyelonephritis  - Piperacillin/Tazobactam: S <=8  - Tobramycin: S <=2  - Trimethoprim/Sulfamethoxazole: R >2/38          Radiology Results      Meds    MEDICATIONS  (STANDING):  apixaban 2.5 milliGRAM(s) Oral every 12 hours  atorvastatin 10 milliGRAM(s) Oral at bedtime  dronedarone 400 milliGRAM(s) Oral two times a day  ertapenem  IVPB      ertapenem  IVPB 1000 milliGRAM(s) IV Intermittent every 24 hours  fluticasone propionate/ salmeterol 100-50 MICROgram(s) Diskus 1 Dose(s) Inhalation two times a day  insulin lispro (ADMELOG) corrective regimen sliding scale   SubCutaneous at bedtime  insulin lispro (ADMELOG) corrective regimen sliding scale   SubCutaneous three times a day before meals  metoprolol tartrate 25 milliGRAM(s) Oral two times a day  montelukast 10 milliGRAM(s) Oral at bedtime  NIFEdipine XL 30 milliGRAM(s) Oral daily  pantoprazole    Tablet 40 milliGRAM(s) Oral before breakfast      MEDICATIONS  (PRN):  acetaminophen     Tablet .. 650 milliGRAM(s) Oral every 6 hours PRN Temp greater or equal to 38C (100.4F)  albuterol/ipratropium for Nebulization 3 milliLiter(s) Nebulizer every 6 hours PRN Bronchospasm  loperamide 2 milliGRAM(s) Oral four times a day PRN Diarrhea  melatonin 3 milliGRAM(s) Oral at bedtime PRN Insomnia      Physical Exam    Neuro :  no focal deficits  Respiratory: CTA B/L  CV: RRR, S1S2, no murmurs,   Abdominal: Soft, NT, ND +BS,  Extremities: No edema, + peripheral pulses      ASSESSMENT    pneumonia r/o     sepsis,   uti,   paf   h/o HTN,   HLD,   T2DM,   osteoporosis,   Stage IIB gastric adenocarcinoma s/p distal gastrectomy (2015),   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 Resolved prior pneumonia.  Unchanged small left upper lobe nodules noted    id f/u   abx changed to ertapenem   pulm f/u   atrov and prov nebs,   supplemental oxygen prn,   robitussin prn,   Budesonide 0.25 mgs neb BID  singulair qhs   PFTs as OP.  hold outpt dm meds,   lispro ss,   hgba1c 5.5 noted     heme onc cons noted.   hold tumor directed tx during the hospital course   pt found to be in afib 11/4/24 and  spontaneously converted to sinus.  cardio f/u   cont eliquis,  added multaq,   changed lopressor 25mg bid.  cont procardia xl.  cont current meds   d/c plan pend id recs

## 2024-11-07 NOTE — PROGRESS NOTE ADULT - SUBJECTIVE AND OBJECTIVE BOX
Patient is seen and examined at the bed side, is afebrile now, spiked fever last night. The blood culture is in process. The Urine culture grew ESBL  Klebsiella.      REVIEW OF SYSTEMS: All other review systems are negative      ALLERGIES: gabapentin (Unknown)      Vital Signs Last 24 Hrs  T(C): 36.8 (07 Nov 2024 09:52), Max: 38 (06 Nov 2024 18:02)  T(F): 98.3 (07 Nov 2024 09:52), Max: 100.4 (06 Nov 2024 18:02)  HR: 86 (07 Nov 2024 09:52) (82 - 109)  BP: 132/72 (07 Nov 2024 09:52) (111/69 - 138/89)  BP(mean): 79 (07 Nov 2024 05:15) (79 - 82)  RR: 16 (07 Nov 2024 09:52) (16 - 18)  SpO2: 97% (07 Nov 2024 09:52) (95% - 98%)    Parameters below as of 07 Nov 2024 05:15  Patient On (Oxygen Delivery Method): room air      PHYSICAL EXAM:  GENERAL: Not in distress   CHEST/LUNG:  Air entry bilaterally  HEART: s1 and s2 present  ABDOMEN:  Nontender and  Nondistended  EXTREMITIES: No pedal  edema  CNS: Awake and Alert      LABS:                        10.3   7.10  )-----------( 181      ( 07 Nov 2024 05:51 )             31.8                           10.4   8.89  )-----------( 203      ( 06 Nov 2024 05:33 )             31.9                  11-07    146[H]  |  118[H]  |  19[H]  ----------------------------<  105[H]  3.7   |  21[L]  |  0.57    Ca    8.3[L]      07 Nov 2024 05:51  Phos  2.9     11-07  Mg     1.4     11-07 11-06    145  |  118[H]  |  26[H]  ----------------------------<  139[H]  3.3[L]   |  20[L]  |  0.74    Ca    8.3[L]      06 Nov 2024 05:33    TPro  6.2  /  Alb  3.0[L]  /  TBili  0.2  /  DBili  x   /  AST  24  /  ALT  17  /  AlkPhos  47  11-02      CAPILLARY BLOOD GLUCOSE  POCT Blood Glucose.: 96 mg/dL (03 Nov 2024 12:18)  POCT Blood Glucose.: 85 mg/dL (03 Nov 2024 07:59)  POCT Blood Glucose.: 93 mg/dL (03 Nov 2024 04:48)      MEDICATIONS  (STANDING):    apixaban 2.5 milliGRAM(s) Oral every 12 hours  atorvastatin 10 milliGRAM(s) Oral at bedtime  dronedarone 400 milliGRAM(s) Oral two times a day  ertapenem  IVPB      ertapenem  IVPB 1000 milliGRAM(s) IV Intermittent every 24 hours  fluticasone propionate/ salmeterol 100-50 MICROgram(s) Diskus 1 Dose(s) Inhalation two times a day  insulin lispro (ADMELOG) corrective regimen sliding scale   SubCutaneous at bedtime  insulin lispro (ADMELOG) corrective regimen sliding scale   SubCutaneous three times a day before meals  metoprolol tartrate 25 milliGRAM(s) Oral two times a day  montelukast 10 milliGRAM(s) Oral at bedtime  NIFEdipine XL 30 milliGRAM(s) Oral daily  pantoprazole    Tablet 40 milliGRAM(s) Oral before breakfast      RADIOLOGY & ADDITIONAL TESTS:    11/4/24  : CT Angio Chest PE Protocol w/ IV Cont (11.04.24 @ 15:19) No pulmonary embolus.    Resolved prior pneumonia.  Unchanged small left upper lobe nodules.      11/2/24: Xray Chest 1 View- PORTABLE-Urgent (11.02.24 @ 22:22) No active parenchymal disease in the chest.      MICROBIOLOGY DATA:    Culture - Urine (11.03.24 @ 04:09)   - Nitrofurantoin: S <=32 Should not be used to treat pyelonephritis  - Piperacillin/Tazobactam: S <=8  - Tobramycin: S <=2  - Trimethoprim/Sulfamethoxazole: R >2/38  - Ampicillin: R >16 These ampicillin results predict results for amoxicillin  - Ampicillin/Sulbactam: R >16/8  - Aztreonam: R >16  - Cefazolin: R >16 For uncomplicated UTI with K. pneumoniae, E. coli, or P. mirablis: RYAN <=16 is sensitive and RYAN >=32 is resistant. This also predicts results for oral agents cefaclor, cefdinir, cefpodoxime, cefprozil, cefuroxime axetil, cephalexin and locarbef for uncomplicated UTI. Note that some isolates may be susceptible to these agents while testing resistant to cefazolin.  - Cefepime: R >16  - Ceftriaxone: R >32  - Cefuroxime: R >16  - Ciprofloxacin: S <=0.25  - Ertapenem: S <=0.5  - Gentamicin: S <=2  - Imipenem: S <=1  - Levofloxacin: S <=0.5  - Meropenem: S <=1  Specimen Source: Clean Catch  Culture Results:   >100,000 CFU/ml Klebsiella pneumoniae ESBL  Organism Identification: Klebsiella pneumoniae ESBL  Organism: Klebsiella pneumoniae ESBL  Method Type: RYAN    Culture - Urine (11.03.24 @ 04:09)   Specimen Source: Clean Catch  Culture Results:   >100,000 CFU/ml Gram Negative Rods    Urine Microscopic-Add On (NC) (11.03.24 @ 04:09)   Bacteria: Few /HPF  Comment - Urine: Numerous Budding yeasts found  Squamous Epithelial Cells: Present  Red Blood Cell - Urine: 5 /HPF  White Blood Cell - Urine: Too Numerous to count /HPF

## 2024-11-08 LAB
ANION GAP SERPL CALC-SCNC: 7 MMOL/L — SIGNIFICANT CHANGE UP (ref 5–17)
BUN SERPL-MCNC: 19 MG/DL — HIGH (ref 7–18)
CALCIUM SERPL-MCNC: 8.7 MG/DL — SIGNIFICANT CHANGE UP (ref 8.4–10.5)
CHLORIDE SERPL-SCNC: 120 MMOL/L — HIGH (ref 96–108)
CO2 SERPL-SCNC: 20 MMOL/L — LOW (ref 22–31)
CREAT SERPL-MCNC: 0.62 MG/DL — SIGNIFICANT CHANGE UP (ref 0.5–1.3)
EGFR: 86 ML/MIN/1.73M2 — SIGNIFICANT CHANGE UP
GLUCOSE BLDC GLUCOMTR-MCNC: 114 MG/DL — HIGH (ref 70–99)
GLUCOSE BLDC GLUCOMTR-MCNC: 93 MG/DL — SIGNIFICANT CHANGE UP (ref 70–99)
GLUCOSE BLDC GLUCOMTR-MCNC: 97 MG/DL — SIGNIFICANT CHANGE UP (ref 70–99)
GLUCOSE BLDC GLUCOMTR-MCNC: 99 MG/DL — SIGNIFICANT CHANGE UP (ref 70–99)
GLUCOSE SERPL-MCNC: 104 MG/DL — HIGH (ref 70–99)
HCT VFR BLD CALC: 29.8 % — LOW (ref 34.5–45)
HGB BLD-MCNC: 9.8 G/DL — LOW (ref 11.5–15.5)
MCHC RBC-ENTMCNC: 31.9 PG — SIGNIFICANT CHANGE UP (ref 27–34)
MCHC RBC-ENTMCNC: 32.9 G/DL — SIGNIFICANT CHANGE UP (ref 32–36)
MCV RBC AUTO: 97.1 FL — SIGNIFICANT CHANGE UP (ref 80–100)
NRBC # BLD: 0 /100 WBCS — SIGNIFICANT CHANGE UP (ref 0–0)
PLATELET # BLD AUTO: 177 K/UL — SIGNIFICANT CHANGE UP (ref 150–400)
POTASSIUM SERPL-MCNC: 3.5 MMOL/L — SIGNIFICANT CHANGE UP (ref 3.5–5.3)
POTASSIUM SERPL-SCNC: 3.5 MMOL/L — SIGNIFICANT CHANGE UP (ref 3.5–5.3)
RBC # BLD: 3.07 M/UL — LOW (ref 3.8–5.2)
RBC # FLD: 17 % — HIGH (ref 10.3–14.5)
SODIUM SERPL-SCNC: 147 MMOL/L — HIGH (ref 135–145)
WBC # BLD: 8.68 K/UL — SIGNIFICANT CHANGE UP (ref 3.8–10.5)
WBC # FLD AUTO: 8.68 K/UL — SIGNIFICANT CHANGE UP (ref 3.8–10.5)

## 2024-11-08 RX ORDER — METOPROLOL TARTRATE 50 MG
25 TABLET ORAL EVERY 8 HOURS
Refills: 0 | Status: DISCONTINUED | OUTPATIENT
Start: 2024-11-08 | End: 2024-11-13

## 2024-11-08 RX ORDER — ERTAPENEM SODIUM 1 G/1
1 INJECTION, POWDER, LYOPHILIZED, FOR SOLUTION INTRAMUSCULAR; INTRAVENOUS
Qty: 5 | Refills: 0
Start: 2024-11-08 | End: 2024-11-12

## 2024-11-08 RX ADMIN — Medication 2 MILLIGRAM(S): at 17:09

## 2024-11-08 RX ADMIN — APIXABAN 2.5 MILLIGRAM(S): 5 TABLET, FILM COATED ORAL at 05:20

## 2024-11-08 RX ADMIN — APIXABAN 2.5 MILLIGRAM(S): 5 TABLET, FILM COATED ORAL at 17:09

## 2024-11-08 RX ADMIN — ERTAPENEM SODIUM 120 MILLIGRAM(S): 1 INJECTION, POWDER, LYOPHILIZED, FOR SOLUTION INTRAMUSCULAR; INTRAVENOUS at 13:30

## 2024-11-08 RX ADMIN — DRONEDARONE 400 MILLIGRAM(S): 400 TABLET, FILM COATED ORAL at 17:09

## 2024-11-08 RX ADMIN — DRONEDARONE 400 MILLIGRAM(S): 400 TABLET, FILM COATED ORAL at 05:19

## 2024-11-08 RX ADMIN — Medication 25 MILLIGRAM(S): at 05:19

## 2024-11-08 RX ADMIN — MONTELUKAST SODIUM 10 MILLIGRAM(S): 10 TABLET, FILM COATED ORAL at 21:28

## 2024-11-08 RX ADMIN — Medication 25 MILLIGRAM(S): at 21:29

## 2024-11-08 RX ADMIN — Medication 30 MILLIGRAM(S): at 05:20

## 2024-11-08 RX ADMIN — Medication 3 MILLIGRAM(S): at 21:28

## 2024-11-08 RX ADMIN — FLUTICASONE PROPIONATE AND SALMETEROL XINAFOATE 1 DOSE(S): 230; 21 AEROSOL, METERED RESPIRATORY (INHALATION) at 21:28

## 2024-11-08 RX ADMIN — Medication 10 MILLIGRAM(S): at 21:29

## 2024-11-08 RX ADMIN — PANTOPRAZOLE SODIUM 40 MILLIGRAM(S): 40 TABLET, DELAYED RELEASE ORAL at 05:20

## 2024-11-08 RX ADMIN — Medication 25 MILLIGRAM(S): at 14:06

## 2024-11-08 NOTE — PROGRESS NOTE ADULT - PROBLEM SELECTOR PROBLEM 7
Patient is allergic to sulfa, she break out in hives.   She is also allergic to penicillin, she thinks. She doesn't remember why is it note in the pharmacy and she doesn't remember what is the reaction. She knows that she didn't go to the Hospital and it wasn't anything major like SOB (but doesn't know what it was ). She rather not take any risks, wants another antibiotic.     HLD (hyperlipidemia)

## 2024-11-08 NOTE — PROGRESS NOTE ADULT - SUBJECTIVE AND OBJECTIVE BOX
Patient is a 89y old  Female who presents with a chief complaint of CHARISSE, lactic acidosis (08 Nov 2024 10:51)      INTERVAL HPI/OVERNIGHT EVENTS: No acute events overnight. per nurse pt had episode of diarrhea. will continue  to monitor stool count         REVIEW OF SYSTEMS:  CONSTITUTIONAL: No fever, chills  ENMT:  No difficulty hearing, no change in vision  NECK: No pain or stiffness  RESPIRATORY: No cough, SOB  CARDIOVASCULAR: No chest pain, palpitations  GASTROINTESTINAL: No abdominal pain. No nausea, vomiting, or diarrhea  GENITOURINARY: No dysuria  NEUROLOGICAL: No HA  SKIN: No itching, burning, rashes, or lesions   LYMPH NODES: No enlarged glands  ENDOCRINE: No heat or cold intolerance; No hair loss  MUSCULOSKELETAL: No joint pain or swelling; No muscle, back, or extremity pain  PSYCHIATRIC: No depression, anxiety  HEME/LYMPH: No easy bruising, or bleeding gums    T(C): 37 (11-08-24 @ 10:26), Max: 37.3 (11-07-24 @ 21:32)  HR: 88 (11-08-24 @ 10:26) (84 - 106)  BP: 115/61 (11-08-24 @ 10:26) (108/54 - 127/57)  RR: 17 (11-08-24 @ 10:26) (17 - 17)  SpO2: 96% (11-08-24 @ 10:26) (95% - 96%)  Wt(kg): --Vital Signs Last 24 Hrs  T(C): 37 (08 Nov 2024 10:26), Max: 37.3 (07 Nov 2024 21:32)  T(F): 98.6 (08 Nov 2024 10:26), Max: 99.1 (07 Nov 2024 21:32)  HR: 88 (08 Nov 2024 10:26) (84 - 106)  BP: 115/61 (08 Nov 2024 10:26) (108/54 - 127/57)  BP(mean): 72 (08 Nov 2024 05:19) (69 - 72)  RR: 17 (08 Nov 2024 10:26) (17 - 17)  SpO2: 96% (08 Nov 2024 10:26) (95% - 96%)    Parameters below as of 08 Nov 2024 10:26  Patient On (Oxygen Delivery Method): room air    MEDICATIONS  (STANDING):  apixaban 2.5 milliGRAM(s) Oral every 12 hours  atorvastatin 10 milliGRAM(s) Oral at bedtime  dronedarone 400 milliGRAM(s) Oral two times a day  ertapenem  IVPB 1000 milliGRAM(s) IV Intermittent every 24 hours  ertapenem  IVPB      fluticasone propionate/ salmeterol 100-50 MICROgram(s) Diskus 1 Dose(s) Inhalation two times a day  insulin lispro (ADMELOG) corrective regimen sliding scale   SubCutaneous at bedtime  insulin lispro (ADMELOG) corrective regimen sliding scale   SubCutaneous three times a day before meals  metoprolol tartrate 25 milliGRAM(s) Oral every 8 hours  montelukast 10 milliGRAM(s) Oral at bedtime  pantoprazole    Tablet 40 milliGRAM(s) Oral before breakfast    MEDICATIONS  (PRN):  acetaminophen     Tablet .. 650 milliGRAM(s) Oral every 6 hours PRN Temp greater or equal to 38C (100.4F)  albuterol/ipratropium for Nebulization 3 milliLiter(s) Nebulizer every 6 hours PRN Bronchospasm  loperamide 2 milliGRAM(s) Oral four times a day PRN Diarrhea  melatonin 3 milliGRAM(s) Oral at bedtime PRN Insomnia      PHYSICAL EXAM:  GENERAL: NAD  EYES: clear conjunctiva; EOMI  ENMT: Moist mucous membranes  NECK: Supple, No JVD, Normal thyroid  CHEST/LUNG: Clear to auscultation bilaterally; No rales, rhonchi, wheezing, or rubs  HEART: S1, S2, Regular rate and rhythm  ABDOMEN: Soft, Nontender, Nondistended; Bowel sounds present  NEURO: Alert & Oriented X3  EXTREMITIES: No LE edema, no calf tenderness  LYMPH: No lymphadenopathy noted  SKIN: No rashes or lesions    Consultant(s) Notes Reviewed:  [x ] YES  [ ] NO  Care Discussed with Consultants/Other Providers [ x] YES  [ ] NO    LABS:                        9.8    8.68  )-----------( 177      ( 08 Nov 2024 05:20 )             29.8     11-08    147[H]  |  120[H]  |  19[H]  ----------------------------<  104[H]  3.5   |  20[L]  |  0.62    Ca    8.7      08 Nov 2024 05:20  Phos  2.9     11-07  Mg     1.4     11-07        CAPILLARY BLOOD GLUCOSE      POCT Blood Glucose.: 97 mg/dL (08 Nov 2024 11:49)  POCT Blood Glucose.: 114 mg/dL (08 Nov 2024 07:29)  POCT Blood Glucose.: 112 mg/dL (07 Nov 2024 21:47)  POCT Blood Glucose.: 90 mg/dL (07 Nov 2024 16:55)        Urinalysis Basic - ( 08 Nov 2024 05:20 )    Color: x / Appearance: x / SG: x / pH: x  Gluc: 104 mg/dL / Ketone: x  / Bili: x / Urobili: x   Blood: x / Protein: x / Nitrite: x   Leuk Esterase: x / RBC: x / WBC x   Sq Epi: x / Non Sq Epi: x / Bacteria: x        RADIOLOGY & ADDITIONAL TESTS:    Imaging Personally Reviewed:  [x ] YES  [ ] NO  < from: CT Angio Chest PE Protocol w/ IV Cont (11.04.24 @ 15:19) >    ACC: [id number] EXAM:  CT ANGIO CHEST PULM ART [facility]   ORDERED BY: LATOYA DAWN     PROCEDURE DATE:  11/04/2024          INTERPRETATION:  INDICATION: Shortness of breath, chest tightness ,   history of    TECHNIQUE: Helical acquisition of the chest after the administration of   53 mL of Omnipaque 350. Maximum intensity projection images were   generated.    COMPARISON: CT chest 9/13/2024.    FINDINGS:    HEART/VASCULATURE: No pulmonary embolus. Normal heart size. No   pericardial effusion. Coronary artery calcification. Normal aortic size.    LUNGS/AIRWAYS/PLEURA: Patent trachea and bronchi. Unchanged unchanged   small cluster of sub-4 mm nodules in the left upper lobe (2:52-55). Image   acquisition during exhalation. Calcified granuloma in the middle lobe. No   pleural effusion.    LYMPH NODES/MEDIASTINUM: No lymphadenopathy.    UPPER ABDOMEN: Cholecystectomy.    BONES/SOFT TISSUES: Multiple old rib fractures.      IMPRESSION:    No pulmonary embolus.    Resolved prior pneumonia.    Unchanged small left upper lobe nodules.    --- End of Report ---            MENDEZ GOODWIN M.D., ATTENDING RADIOLOGIST  This document has been electronically signed. Nov 4 2024  3:36PM    < end of copied text >

## 2024-11-08 NOTE — PROGRESS NOTE ADULT - SUBJECTIVE AND OBJECTIVE BOX
Patient is seen and examined at the bed side, is afebrile.  The blood culture from 11/7 has NGTD.  She is tolerating Ertapenem well.      REVIEW OF SYSTEMS: All other review systems are negative      ALLERGIES: gabapentin (Unknown)      Vital Signs Last 24 Hrs  T(C): 37.3 (08 Nov 2024 17:24), Max: 37.5 (08 Nov 2024 13:27)  T(F): 99.1 (08 Nov 2024 17:24), Max: 99.5 (08 Nov 2024 13:27)  HR: 91 (08 Nov 2024 17:24) (84 - 92)  BP: 126/67 (08 Nov 2024 17:24) (108/54 - 126/67)  BP(mean): 72 (08 Nov 2024 05:19) (72 - 72)  RR: 17 (08 Nov 2024 17:24) (17 - 17)  SpO2: 98% (08 Nov 2024 17:24) (95% - 98%)    Parameters below as of 08 Nov 2024 17:24  Patient On (Oxygen Delivery Method): room air      PHYSICAL EXAM:  GENERAL: Not in distress   CHEST/LUNG:  Air entry bilaterally  HEART: s1 and s2 present  ABDOMEN:  Nontender and  Nondistended  EXTREMITIES: No pedal  edema  CNS: Awake and Alert      LABS:                        9.8    8.68  )-----------( 177      ( 08 Nov 2024 05:20 )             29.8                           10.3   7.10  )-----------( 181      ( 07 Nov 2024 05:51 )             31.8          11-08    147[H]  |  120[H]  |  19[H]  ----------------------------<  104[H]  3.5   |  20[L]  |  0.62    Ca    8.7      08 Nov 2024 05:20  Phos  2.9     11-07  Mg     1.4     11-07 11-07    146[H]  |  118[H]  |  19[H]  ----------------------------<  105[H]  3.7   |  21[L]  |  0.57    Ca    8.3[L]      07 Nov 2024 05:51  Phos  2.9     11-07  Mg     1.4     11-07    TPro  6.2  /  Alb  3.0[L]  /  TBili  0.2  /  DBili  x   /  AST  24  /  ALT  17  /  AlkPhos  47  11-02      CAPILLARY BLOOD GLUCOSE  POCT Blood Glucose.: 96 mg/dL (03 Nov 2024 12:18)  POCT Blood Glucose.: 85 mg/dL (03 Nov 2024 07:59)  POCT Blood Glucose.: 93 mg/dL (03 Nov 2024 04:48)      MEDICATIONS  (STANDING):    apixaban 2.5 milliGRAM(s) Oral every 12 hours  atorvastatin 10 milliGRAM(s) Oral at bedtime  dronedarone 400 milliGRAM(s) Oral two times a day  ertapenem  IVPB      ertapenem  IVPB 1000 milliGRAM(s) IV Intermittent every 24 hours  fluticasone propionate/ salmeterol 100-50 MICROgram(s) Diskus 1 Dose(s) Inhalation two times a day  insulin lispro (ADMELOG) corrective regimen sliding scale   SubCutaneous three times a day before meals  insulin lispro (ADMELOG) corrective regimen sliding scale   SubCutaneous at bedtime  metoprolol tartrate 25 milliGRAM(s) Oral every 8 hours  montelukast 10 milliGRAM(s) Oral at bedtime  pantoprazole    Tablet 40 milliGRAM(s) Oral before breakfast        RADIOLOGY & ADDITIONAL TESTS:    11/4/24  : CT Angio Chest PE Protocol w/ IV Cont (11.04.24 @ 15:19) No pulmonary embolus.    Resolved prior pneumonia.  Unchanged small left upper lobe nodules.      11/2/24: Xray Chest 1 View- PORTABLE-Urgent (11.02.24 @ 22:22) No active parenchymal disease in the chest.      MICROBIOLOGY DATA:    Culture - Blood in AM (11.07.24 @ 05:51)   Specimen Source: .Blood BLOOD  Culture Results:   No growth at 24 hours    Culture - Urine (11.03.24 @ 04:09)   - Nitrofurantoin: S <=32 Should not be used to treat pyelonephritis  - Piperacillin/Tazobactam: S <=8  - Tobramycin: S <=2  - Trimethoprim/Sulfamethoxazole: R >2/38  - Ampicillin: R >16 These ampicillin results predict results for amoxicillin  - Ampicillin/Sulbactam: R >16/8  - Aztreonam: R >16  - Cefazolin: R >16 For uncomplicated UTI with K. pneumoniae, E. coli, or P. mirablis: RYAN <=16 is sensitive and RYAN >=32 is resistant. This also predicts results for oral agents cefaclor, cefdinir, cefpodoxime, cefprozil, cefuroxime axetil, cephalexin and locarbef for uncomplicated UTI. Note that some isolates may be susceptible to these agents while testing resistant to cefazolin.  - Cefepime: R >16  - Ceftriaxone: R >32  - Cefuroxime: R >16  - Ciprofloxacin: S <=0.25  - Ertapenem: S <=0.5  - Gentamicin: S <=2  - Imipenem: S <=1  - Levofloxacin: S <=0.5  - Meropenem: S <=1  Specimen Source: Clean Catch  Culture Results:   >100,000 CFU/ml Klebsiella pneumoniae ESBL  Organism Identification: Klebsiella pneumoniae ESBL  Organism: Klebsiella pneumoniae ESBL  Method Type: RYAN    Culture - Urine (11.03.24 @ 04:09)   Specimen Source: Clean Catch  Culture Results:   >100,000 CFU/ml Gram Negative Rods    Urine Microscopic-Add On (NC) (11.03.24 @ 04:09)   Bacteria: Few /HPF  Comment - Urine: Numerous Budding yeasts found  Squamous Epithelial Cells: Present  Red Blood Cell - Urine: 5 /HPF  White Blood Cell - Urine: Too Numerous to count /HPF

## 2024-11-08 NOTE — PROGRESS NOTE ADULT - ASSESSMENT
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 Ct angio- no pe, resolved prior pna. l upper lobe nodules. Patient to be admitted for new onset AK wtih  lactic acidosis likely in the setting of UTI and albuterol use. Id following.  Hospital course A-Fib w/RVR on 11/4/24 and  spontaneously converted to sinus. Card following

## 2024-11-08 NOTE — PROGRESS NOTE ADULT - SUBJECTIVE AND OBJECTIVE BOX
Date of Service 11-08-24 @ 11:15    CHIEF COMPLAINT:Patient is a 89y old  Female who presents with a chief complaint of CHARISSE, lactic acidosis.Pt appears comfortable.    	  REVIEW OF SYSTEMS:  CONSTITUTIONAL: No fever, weight loss, or fatigue  EYES: No eye pain, visual disturbances, or discharge  ENT:  No difficulty hearing, tinnitus, vertigo; No sinus or throat pain  NECK: No pain or stiffness  RESPIRATORY: No cough, wheezing, chills or hemoptysis; No Shortness of Breath  CARDIOVASCULAR: No chest pain, palpitations, passing out, dizziness, or leg swelling  GASTROINTESTINAL: No abdominal or epigastric pain. No nausea, vomiting, or hematemesis; No diarrhea or constipation. No melena or hematochezia.  GENITOURINARY: No dysuria, frequency, hematuria, or incontinence  NEUROLOGICAL: No headaches, memory loss, loss of strength, numbness, or tremors  SKIN: No itching, burning, rashes, or lesions   LYMPH Nodes: No enlarged glands  ENDOCRINE: No heat or cold intolerance; No hair loss  MUSCULOSKELETAL: No joint pain or swelling; No muscle, back, or extremity pain  PSYCHIATRIC: No depression, anxiety, mood swings, or difficulty sleeping  HEME/LYMPH: No easy bruising, or bleeding gums  ALLERGY AND IMMUNOLOGIC: No hives or eczema	        PHYSICAL EXAM:  T(C): 37 (11-08-24 @ 10:26), Max: 37.3 (11-07-24 @ 21:32)  HR: 88 (11-08-24 @ 10:26) (84 - 106)  BP: 115/61 (11-08-24 @ 10:26) (108/54 - 127/57)  RR: 17 (11-08-24 @ 10:26) (17 - 17)  SpO2: 96% (11-08-24 @ 10:26) (95% - 96%)  Wt(kg): --  I&O's Summary      Appearance: Normal	  HEENT:   Normal oral mucosa, PERRL, EOMI	  Lymphatic: No lymphadenopathy  Cardiovascular: Normal S1 S2, No JVD, No murmurs, No edema  Respiratory: Lungs clear to auscultation	  Psychiatry: A & O x 3, Mood & affect appropriate  Gastrointestinal:  Soft, Non-tender, + BS	  Skin: No rashes, No ecchymoses, No cyanosis	  Neurologic: Non-focal  Extremities: Normal range of motion, No clubbing, cyanosis or edema  Vascular: Peripheral pulses palpable 2+ bilaterally    MEDICATIONS  (STANDING):  apixaban 2.5 milliGRAM(s) Oral every 12 hours  atorvastatin 10 milliGRAM(s) Oral at bedtime  dronedarone 400 milliGRAM(s) Oral two times a day  ertapenem  IVPB 1000 milliGRAM(s) IV Intermittent every 24 hours  ertapenem  IVPB      fluticasone propionate/ salmeterol 100-50 MICROgram(s) Diskus 1 Dose(s) Inhalation two times a day  insulin lispro (ADMELOG) corrective regimen sliding scale   SubCutaneous at bedtime  insulin lispro (ADMELOG) corrective regimen sliding scale   SubCutaneous three times a day before meals  metoprolol tartrate 25 milliGRAM(s) Oral every 8 hours  montelukast 10 milliGRAM(s) Oral at bedtime  pantoprazole    Tablet 40 milliGRAM(s) Oral before breakfast      TELEMETRY: 	paf      	  LABS:	 	                        9.8    8.68  )-----------( 177      ( 08 Nov 2024 05:20 )             29.8     11-08    147[H]  |  120[H]  |  19[H]  ----------------------------<  104[H]  3.5   |  20[L]  |  0.62    Ca    8.7      08 Nov 2024 05:20  Phos  2.9     11-07  Mg     1.4     11-07

## 2024-11-08 NOTE — PROGRESS NOTE ADULT - ASSESSMENT
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  1.Tele monitoring.  2.PAF-eliquis, multaq,inc lopressor 25mg tid.  3.HTN-d/c procardia xl.  4.DM-insulin.  5.CHARISSE-resolved.  6.PPI.

## 2024-11-08 NOTE — PROGRESS NOTE ADULT - SUBJECTIVE AND OBJECTIVE BOX
Patient is a 89y old  Female who presents with a chief complaint of CHARISSE, lactic acidosis (07 Nov 2024 12:29)    pt seen in icu [  ], reg med floor [ x  ], bed [ x ], chair at bedside [   ], awake and responsive [x  ], lethargic [  ],    nad [x ]      Allergies    gabapentin (Unknown)        Vitals    T(F): 98.6 (11-08-24 @ 05:19), Max: 99.1 (11-07-24 @ 21:32)  HR: 92 (11-08-24 @ 05:19) (84 - 106)  BP: 108/54 (11-08-24 @ 05:19) (108/54 - 132/72)  RR: 17 (11-08-24 @ 05:19) (16 - 17)  SpO2: 96% (11-08-24 @ 05:19) (95% - 97%)  Wt(kg): --  CAPILLARY BLOOD GLUCOSE      POCT Blood Glucose.: 112 mg/dL (07 Nov 2024 21:47)      Labs                          9.8    8.68  )-----------( 177      ( 08 Nov 2024 05:20 )             29.8       11-08    147[H]  |  120[H]  |  19[H]  ----------------------------<  104[H]  3.5   |  20[L]  |  0.62    Ca    8.7      08 Nov 2024 05:20  Phos  2.9     11-07  Mg     1.4     11-07              Clean Catch  11-03 @ 04:09   >100,000 CFU/ml Klebsiella pneumoniae ESBL  --  Klebsiella pneumoniae ESBL      Clean Catch  09-15 @ 01:09   <10,000 CFU/mL Normal Urogenital Natalie  --  --      .Blood Blood-Peripheral  09-13 @ 01:07   No growth at 5 days  --  --      .Blood Blood-Peripheral  09-13 @ 01:00   No growth at 5 days  --  --          Radiology Results      Meds    MEDICATIONS  (STANDING):  apixaban 2.5 milliGRAM(s) Oral every 12 hours  atorvastatin 10 milliGRAM(s) Oral at bedtime  dronedarone 400 milliGRAM(s) Oral two times a day  ertapenem  IVPB      ertapenem  IVPB 1000 milliGRAM(s) IV Intermittent every 24 hours  fluticasone propionate/ salmeterol 100-50 MICROgram(s) Diskus 1 Dose(s) Inhalation two times a day  insulin lispro (ADMELOG) corrective regimen sliding scale   SubCutaneous three times a day before meals  insulin lispro (ADMELOG) corrective regimen sliding scale   SubCutaneous at bedtime  metoprolol tartrate 25 milliGRAM(s) Oral two times a day  montelukast 10 milliGRAM(s) Oral at bedtime  NIFEdipine XL 30 milliGRAM(s) Oral daily  pantoprazole    Tablet 40 milliGRAM(s) Oral before breakfast      MEDICATIONS  (PRN):  acetaminophen     Tablet .. 650 milliGRAM(s) Oral every 6 hours PRN Temp greater or equal to 38C (100.4F)  albuterol/ipratropium for Nebulization 3 milliLiter(s) Nebulizer every 6 hours PRN Bronchospasm  loperamide 2 milliGRAM(s) Oral four times a day PRN Diarrhea  melatonin 3 milliGRAM(s) Oral at bedtime PRN Insomnia      Physical Exam    Neuro :  no focal deficits  Respiratory: CTA B/L  CV: RRR, S1S2, no murmurs,   Abdominal: Soft, NT, ND +BS,  Extremities: No edema, + peripheral pulses      ASSESSMENT    pneumonia r/o     sepsis,   uti,   paf   h/o HTN,   HLD,   T2DM,   osteoporosis,   Stage IIB gastric adenocarcinoma s/p distal gastrectomy (2015),   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 Resolved prior pneumonia.  Unchanged small left upper lobe nodules noted    id f/u   abx changed to ertapenem   pulm f/u   atrov and prov nebs,   supplemental oxygen prn,   robitussin prn,   Budesonide 0.25 mgs neb BID  singulair qhs   PFTs as OP.  hold outpt dm meds,   lispro ss,   hgba1c 5.5 noted     heme onc cons noted.   hold tumor directed tx during the hospital course   pt found to be in afib 11/4/24 and  spontaneously converted to sinus.  cardio f/u   cont eliquis,  added multaq,   changed lopressor 25mg bid.  cont procardia xl.  cont current meds   d/c plan pend id recs          Patient is a 89y old  Female who presents with a chief complaint of CHARISSE, lactic acidosis (07 Nov 2024 12:29)    pt seen in icu [  ], reg med floor [ x  ], bed [ x ], chair at bedside [   ], awake and responsive [x  ], lethargic [  ],    nad [x ]      Allergies    gabapentin (Unknown)        Vitals    T(F): 98.6 (11-08-24 @ 05:19), Max: 99.1 (11-07-24 @ 21:32)  HR: 92 (11-08-24 @ 05:19) (84 - 106)  BP: 108/54 (11-08-24 @ 05:19) (108/54 - 132/72)  RR: 17 (11-08-24 @ 05:19) (16 - 17)  SpO2: 96% (11-08-24 @ 05:19) (95% - 97%)  Wt(kg): --  CAPILLARY BLOOD GLUCOSE      POCT Blood Glucose.: 112 mg/dL (07 Nov 2024 21:47)      Labs                          9.8    8.68  )-----------( 177      ( 08 Nov 2024 05:20 )             29.8       11-08    147[H]  |  120[H]  |  19[H]  ----------------------------<  104[H]  3.5   |  20[L]  |  0.62    Ca    8.7      08 Nov 2024 05:20  Phos  2.9     11-07  Mg     1.4     11-07              Clean Catch  11-03 @ 04:09   >100,000 CFU/ml Klebsiella pneumoniae ESBL  --  Klebsiella pneumoniae ESBL          Radiology Results      Meds    MEDICATIONS  (STANDING):  apixaban 2.5 milliGRAM(s) Oral every 12 hours  atorvastatin 10 milliGRAM(s) Oral at bedtime  dronedarone 400 milliGRAM(s) Oral two times a day  ertapenem  IVPB      ertapenem  IVPB 1000 milliGRAM(s) IV Intermittent every 24 hours  fluticasone propionate/ salmeterol 100-50 MICROgram(s) Diskus 1 Dose(s) Inhalation two times a day  insulin lispro (ADMELOG) corrective regimen sliding scale   SubCutaneous three times a day before meals  insulin lispro (ADMELOG) corrective regimen sliding scale   SubCutaneous at bedtime  metoprolol tartrate 25 milliGRAM(s) Oral two times a day  montelukast 10 milliGRAM(s) Oral at bedtime  NIFEdipine XL 30 milliGRAM(s) Oral daily  pantoprazole    Tablet 40 milliGRAM(s) Oral before breakfast      MEDICATIONS  (PRN):  acetaminophen     Tablet .. 650 milliGRAM(s) Oral every 6 hours PRN Temp greater or equal to 38C (100.4F)  albuterol/ipratropium for Nebulization 3 milliLiter(s) Nebulizer every 6 hours PRN Bronchospasm  loperamide 2 milliGRAM(s) Oral four times a day PRN Diarrhea  melatonin 3 milliGRAM(s) Oral at bedtime PRN Insomnia      Physical Exam    Neuro :  no focal deficits  Respiratory: CTA B/L  CV: RRR, S1S2, no murmurs,   Abdominal: Soft, NT, ND +BS,  Extremities: No edema, + peripheral pulses      ASSESSMENT    pneumonia r/o     sepsis,   uti,   paf   h/o HTN,   HLD,   T2DM,   osteoporosis,   Stage IIB gastric adenocarcinoma s/p distal gastrectomy (2015),   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 Resolved prior pneumonia.  Unchanged small left upper lobe nodules noted    id f/u   cont ertapenem   f/u blood cx   pulm f/u   atrov and prov nebs,   supplemental oxygen prn,   robitussin prn,   Budesonide 0.25 mgs neb BID  singulair qhs   PFTs as OP.  hold outpt dm meds,   lispro ss,   hgba1c 5.5 noted     heme onc f/u   pt currently on quadruple therapy, last tx 11/01/24   hold tumor directed tx during the hospital course   f/u with Dr. Alvarado on d/c  pt found to be in afib 11/4/24 and  spontaneously converted to sinus.  cardio f/u   cont eliquis,  cont multaq,   cont lopressor 25mg bid.  cont procardia xl.  cont current meds   d/c plan pend blood cx and id recs

## 2024-11-08 NOTE — PROGRESS NOTE ADULT - SUBJECTIVE AND OBJECTIVE BOX
Patient is a 89y old  Female who presents with a chief complaint of CHARISSE, lactic acidosis (08 Nov 2024 06:45)  Asleep, comfortable in bed in NAD. Afebrile    INTERVAL HPI/OVERNIGHT EVENTS:      VITAL SIGNS:  T(F): 98.6 (11-08-24 @ 10:26)  HR: 88 (11-08-24 @ 10:26)  BP: 115/61 (11-08-24 @ 10:26)  RR: 17 (11-08-24 @ 10:26)  SpO2: 96% (11-08-24 @ 10:26)  Wt(kg): --  I&O's Detail          REVIEW OF SYSTEMS:    CONSTITUTIONAL:  No fevers, chills, sweats    HEENT:  Eyes:  No diplopia or blurred vision. ENT:  No earache, sore throat or runny nose.    CARDIOVASCULAR:  No pressure, squeezing, tightness, or heaviness about the chest; no palpitations.    RESPIRATORY:  Per HPI    GASTROINTESTINAL:  No abdominal pain, nausea, vomiting or diarrhea.    GENITOURINARY:  No dysuria, frequency or urgency.    NEUROLOGIC:  No paresthesias, fasciculations, seizures or weakness.    PSYCHIATRIC:  No disorder of thought or mood.      PHYSICAL EXAM:    Constitutional: Well developed and nourished  Eyes:Perrla  ENMT: normal  Neck:supple  Respiratory: good air entry  Cardiovascular: S1 S2 regular  Gastrointestinal: Soft, Non tender  Extremities: No edema  Vascular:normal  Neurological:Asleep  Musculoskeletal:Normal      MEDICATIONS  (STANDING):  apixaban 2.5 milliGRAM(s) Oral every 12 hours  atorvastatin 10 milliGRAM(s) Oral at bedtime  dronedarone 400 milliGRAM(s) Oral two times a day  ertapenem  IVPB      ertapenem  IVPB 1000 milliGRAM(s) IV Intermittent every 24 hours  fluticasone propionate/ salmeterol 100-50 MICROgram(s) Diskus 1 Dose(s) Inhalation two times a day  insulin lispro (ADMELOG) corrective regimen sliding scale   SubCutaneous three times a day before meals  insulin lispro (ADMELOG) corrective regimen sliding scale   SubCutaneous at bedtime  metoprolol tartrate 25 milliGRAM(s) Oral two times a day  montelukast 10 milliGRAM(s) Oral at bedtime  NIFEdipine XL 30 milliGRAM(s) Oral daily  pantoprazole    Tablet 40 milliGRAM(s) Oral before breakfast    MEDICATIONS  (PRN):  acetaminophen     Tablet .. 650 milliGRAM(s) Oral every 6 hours PRN Temp greater or equal to 38C (100.4F)  albuterol/ipratropium for Nebulization 3 milliLiter(s) Nebulizer every 6 hours PRN Bronchospasm  loperamide 2 milliGRAM(s) Oral four times a day PRN Diarrhea  melatonin 3 milliGRAM(s) Oral at bedtime PRN Insomnia      Allergies    gabapentin (Unknown)    Intolerances        LABS:                        9.8    8.68  )-----------( 177      ( 08 Nov 2024 05:20 )             29.8     11-08    147[H]  |  120[H]  |  19[H]  ----------------------------<  104[H]  3.5   |  20[L]  |  0.62    Ca    8.7      08 Nov 2024 05:20  Phos  2.9     11-07  Mg     1.4     11-07        Urinalysis Basic - ( 08 Nov 2024 05:20 )    Color: x / Appearance: x / SG: x / pH: x  Gluc: 104 mg/dL / Ketone: x  / Bili: x / Urobili: x   Blood: x / Protein: x / Nitrite: x   Leuk Esterase: x / RBC: x / WBC x   Sq Epi: x / Non Sq Epi: x / Bacteria: x            CAPILLARY BLOOD GLUCOSE      POCT Blood Glucose.: 114 mg/dL (08 Nov 2024 07:29)  POCT Blood Glucose.: 112 mg/dL (07 Nov 2024 21:47)  POCT Blood Glucose.: 90 mg/dL (07 Nov 2024 16:55)  POCT Blood Glucose.: 121 mg/dL (07 Nov 2024 12:08)    pro-bnp -- 11-02 @ 21:34     d-dimer 164  11-02 @ 21:34      RADIOLOGY & ADDITIONAL TESTS:    CXR:    Ct scan chest:    ekg;    echo:

## 2024-11-08 NOTE — PROGRESS NOTE ADULT - ASSESSMENT
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 On admission, she found to have no fever but Leukocytosis and positive Urine analysis. The CXR is negative. She has started on Ceftriaxone and Azithromycin, and the ID consult requested to assist with further evaluation and antibiotic management.    # UTI - Urine Cx grew ESBL  Klebsiella  # Leukocytosis - resolved    would recommend:    1. OOB to chair   2. Continue Ertapenem until 11/13/24  3. Monitor Temp and c/w supportive care    d/w Covering Pascual LOPEZ     Attending Attestation:    Spent more than 35 minutes on total encounter, more than 50 % of the visit was spent counseling and/or coordinating care by the Attending physician.

## 2024-11-09 LAB
ANION GAP SERPL CALC-SCNC: 6 MMOL/L — SIGNIFICANT CHANGE UP (ref 5–17)
BUN SERPL-MCNC: 14 MG/DL — SIGNIFICANT CHANGE UP (ref 7–18)
CALCIUM SERPL-MCNC: 8.8 MG/DL — SIGNIFICANT CHANGE UP (ref 8.4–10.5)
CHLORIDE SERPL-SCNC: 120 MMOL/L — HIGH (ref 96–108)
CO2 SERPL-SCNC: 18 MMOL/L — LOW (ref 22–31)
CREAT SERPL-MCNC: 0.62 MG/DL — SIGNIFICANT CHANGE UP (ref 0.5–1.3)
EGFR: 86 ML/MIN/1.73M2 — SIGNIFICANT CHANGE UP
GLUCOSE BLDC GLUCOMTR-MCNC: 87 MG/DL — SIGNIFICANT CHANGE UP (ref 70–99)
GLUCOSE BLDC GLUCOMTR-MCNC: 90 MG/DL — SIGNIFICANT CHANGE UP (ref 70–99)
GLUCOSE BLDC GLUCOMTR-MCNC: 93 MG/DL — SIGNIFICANT CHANGE UP (ref 70–99)
GLUCOSE BLDC GLUCOMTR-MCNC: 96 MG/DL — SIGNIFICANT CHANGE UP (ref 70–99)
GLUCOSE SERPL-MCNC: 107 MG/DL — HIGH (ref 70–99)
HCT VFR BLD CALC: 31.2 % — LOW (ref 34.5–45)
HGB BLD-MCNC: 10.1 G/DL — LOW (ref 11.5–15.5)
MCHC RBC-ENTMCNC: 31.4 PG — SIGNIFICANT CHANGE UP (ref 27–34)
MCHC RBC-ENTMCNC: 32.4 G/DL — SIGNIFICANT CHANGE UP (ref 32–36)
MCV RBC AUTO: 96.9 FL — SIGNIFICANT CHANGE UP (ref 80–100)
NRBC # BLD: 0 /100 WBCS — SIGNIFICANT CHANGE UP (ref 0–0)
PLATELET # BLD AUTO: 163 K/UL — SIGNIFICANT CHANGE UP (ref 150–400)
POTASSIUM SERPL-MCNC: 4 MMOL/L — SIGNIFICANT CHANGE UP (ref 3.5–5.3)
POTASSIUM SERPL-SCNC: 4 MMOL/L — SIGNIFICANT CHANGE UP (ref 3.5–5.3)
RBC # BLD: 3.22 M/UL — LOW (ref 3.8–5.2)
RBC # FLD: 16.8 % — HIGH (ref 10.3–14.5)
SODIUM SERPL-SCNC: 144 MMOL/L — SIGNIFICANT CHANGE UP (ref 135–145)
WBC # BLD: 8.96 K/UL — SIGNIFICANT CHANGE UP (ref 3.8–10.5)
WBC # FLD AUTO: 8.96 K/UL — SIGNIFICANT CHANGE UP (ref 3.8–10.5)

## 2024-11-09 RX ADMIN — PANTOPRAZOLE SODIUM 40 MILLIGRAM(S): 40 TABLET, DELAYED RELEASE ORAL at 05:21

## 2024-11-09 RX ADMIN — Medication 25 MILLIGRAM(S): at 15:12

## 2024-11-09 RX ADMIN — DRONEDARONE 400 MILLIGRAM(S): 400 TABLET, FILM COATED ORAL at 18:07

## 2024-11-09 RX ADMIN — Medication 10 MILLIGRAM(S): at 21:34

## 2024-11-09 RX ADMIN — DRONEDARONE 400 MILLIGRAM(S): 400 TABLET, FILM COATED ORAL at 05:21

## 2024-11-09 RX ADMIN — Medication 25 MILLIGRAM(S): at 05:21

## 2024-11-09 RX ADMIN — MONTELUKAST SODIUM 10 MILLIGRAM(S): 10 TABLET, FILM COATED ORAL at 21:34

## 2024-11-09 RX ADMIN — APIXABAN 2.5 MILLIGRAM(S): 5 TABLET, FILM COATED ORAL at 05:21

## 2024-11-09 RX ADMIN — FLUTICASONE PROPIONATE AND SALMETEROL XINAFOATE 1 DOSE(S): 230; 21 AEROSOL, METERED RESPIRATORY (INHALATION) at 10:55

## 2024-11-09 RX ADMIN — Medication 2 MILLIGRAM(S): at 18:08

## 2024-11-09 RX ADMIN — ERTAPENEM SODIUM 120 MILLIGRAM(S): 1 INJECTION, POWDER, LYOPHILIZED, FOR SOLUTION INTRAMUSCULAR; INTRAVENOUS at 15:12

## 2024-11-09 RX ADMIN — Medication 25 MILLIGRAM(S): at 21:34

## 2024-11-09 RX ADMIN — FLUTICASONE PROPIONATE AND SALMETEROL XINAFOATE 1 DOSE(S): 230; 21 AEROSOL, METERED RESPIRATORY (INHALATION) at 21:35

## 2024-11-09 RX ADMIN — APIXABAN 2.5 MILLIGRAM(S): 5 TABLET, FILM COATED ORAL at 18:08

## 2024-11-09 NOTE — PROGRESS NOTE ADULT - SUBJECTIVE AND OBJECTIVE BOX
Patient is seen and examined at the bed side, is afebrile.  The WBC count and kidney function stay normal.       REVIEW OF SYSTEMS: All other review systems are negative      ALLERGIES: gabapentin (Unknown)      Vital Signs Last 24 Hrs  T(C): 36.8 (09 Nov 2024 12:44), Max: 37.3 (08 Nov 2024 17:24)  T(F): 98.3 (09 Nov 2024 12:44), Max: 99.1 (08 Nov 2024 17:24)  HR: 87 (09 Nov 2024 12:44) (81 - 91)  BP: 145/64 (09 Nov 2024 12:44) (110/64 - 145/64)  BP(mean): 64 (09 Nov 2024 12:44) (64 - 89)  RR: 18 (09 Nov 2024 12:44) (16 - 18)  SpO2: 94% (09 Nov 2024 12:44) (94% - 99%)    Parameters below as of 09 Nov 2024 12:44  Patient On (Oxygen Delivery Method): room air      PHYSICAL EXAM:  GENERAL: Not in distress   CHEST/LUNG:  Air entry bilaterally  HEART: s1 and s2 present  ABDOMEN:  Nontender and  Nondistended  EXTREMITIES: No pedal  edema  CNS: Awake and Alert      LABS:                        10.1   8.96  )-----------( 163      ( 09 Nov 2024 05:30 )             31.2                           9.8    8.68  )-----------( 177      ( 08 Nov 2024 05:20 )             29.8                  11-09    144  |  120[H]  |  14  ----------------------------<  107[H]  4.0   |  18[L]  |  0.62    Ca    8.8      09 Nov 2024 05:30       11-08    147[H]  |  120[H]  |  19[H]  ----------------------------<  104[H]  3.5   |  20[L]  |  0.62    Ca    8.7      08 Nov 2024 05:20  Phos  2.9     11-07  Mg     1.4     11-07  TPro  6.2  /  Alb  3.0[L]  /  TBili  0.2  /  DBili  x   /  AST  24  /  ALT  17  /  AlkPhos  47  11-02      CAPILLARY BLOOD GLUCOSE  POCT Blood Glucose.: 96 mg/dL (03 Nov 2024 12:18)  POCT Blood Glucose.: 85 mg/dL (03 Nov 2024 07:59)  POCT Blood Glucose.: 93 mg/dL (03 Nov 2024 04:48)      MEDICATIONS  (STANDING):    apixaban 2.5 milliGRAM(s) Oral every 12 hours  atorvastatin 10 milliGRAM(s) Oral at bedtime  dronedarone 400 milliGRAM(s) Oral two times a day  ertapenem  IVPB      ertapenem  IVPB 1000 milliGRAM(s) IV Intermittent every 24 hours  fluticasone propionate/ salmeterol 100-50 MICROgram(s) Diskus 1 Dose(s) Inhalation two times a day  insulin lispro (ADMELOG) corrective regimen sliding scale   SubCutaneous at bedtime  insulin lispro (ADMELOG) corrective regimen sliding scale   SubCutaneous three times a day before meals  metoprolol tartrate 25 milliGRAM(s) Oral every 8 hours  montelukast 10 milliGRAM(s) Oral at bedtime  pantoprazole    Tablet 40 milliGRAM(s) Oral before breakfast        RADIOLOGY & ADDITIONAL TESTS:    11/4/24  : CT Angio Chest PE Protocol w/ IV Cont (11.04.24 @ 15:19) No pulmonary embolus.    Resolved prior pneumonia.  Unchanged small left upper lobe nodules.      11/2/24: Xray Chest 1 View- PORTABLE-Urgent (11.02.24 @ 22:22) No active parenchymal disease in the chest.      MICROBIOLOGY DATA:    Culture - Blood in AM (11.07.24 @ 05:51)   Specimen Source: .Blood BLOOD  Culture Results: No growth at 48 hours    Culture - Urine (11.03.24 @ 04:09)   - Nitrofurantoin: S <=32 Should not be used to treat pyelonephritis  - Piperacillin/Tazobactam: S <=8  - Tobramycin: S <=2  - Trimethoprim/Sulfamethoxazole: R >2/38  - Ampicillin: R >16 These ampicillin results predict results for amoxicillin  - Ampicillin/Sulbactam: R >16/8  - Aztreonam: R >16  - Cefazolin: R >16 For uncomplicated UTI with K. pneumoniae, E. coli, or P. mirablis: RYAN <=16 is sensitive and RYAN >=32 is resistant. This also predicts results for oral agents cefaclor, cefdinir, cefpodoxime, cefprozil, cefuroxime axetil, cephalexin and locarbef for uncomplicated UTI. Note that some isolates may be susceptible to these agents while testing resistant to cefazolin.  - Cefepime: R >16  - Ceftriaxone: R >32  - Cefuroxime: R >16  - Ciprofloxacin: S <=0.25  - Ertapenem: S <=0.5  - Gentamicin: S <=2  - Imipenem: S <=1  - Levofloxacin: S <=0.5  - Meropenem: S <=1  Specimen Source: Clean Catch  Culture Results:   >100,000 CFU/ml Klebsiella pneumoniae ESBL  Organism Identification: Klebsiella pneumoniae ESBL  Organism: Klebsiella pneumoniae ESBL  Method Type: RYAN    Culture - Urine (11.03.24 @ 04:09)   Specimen Source: Clean Catch  Culture Results:   >100,000 CFU/ml Gram Negative Rods    Urine Microscopic-Add On (NC) (11.03.24 @ 04:09)   Bacteria: Few /HPF  Comment - Urine: Numerous Budding yeasts found  Squamous Epithelial Cells: Present  Red Blood Cell - Urine: 5 /HPF  White Blood Cell - Urine: Too Numerous to count /HPF

## 2024-11-09 NOTE — PROGRESS NOTE ADULT - SUBJECTIVE AND OBJECTIVE BOX
Date of Service 11-09-24 @ 11:08    CHIEF COMPLAINT:Patient is a 89y old  Female who presents with a chief complaint of CHARISSE, lactic acidosis .Pt appears comfortable.    	  REVIEW OF SYSTEMS:  CONSTITUTIONAL: No fever, weight loss, or fatigue  EYES: No eye pain, visual disturbances, or discharge  ENT:  No difficulty hearing, tinnitus, vertigo; No sinus or throat pain  NECK: No pain or stiffness  RESPIRATORY: No cough, wheezing, chills or hemoptysis; No Shortness of Breath  CARDIOVASCULAR: No chest pain, palpitations, passing out, dizziness, or leg swelling  GASTROINTESTINAL: No abdominal or epigastric pain. No nausea, vomiting, or hematemesis; No diarrhea or constipation. No melena or hematochezia.  GENITOURINARY: No dysuria, frequency, hematuria, or incontinence  NEUROLOGICAL: No headaches, memory loss, loss of strength, numbness, or tremors  SKIN: No itching, burning, rashes, or lesions   LYMPH Nodes: No enlarged glands  ENDOCRINE: No heat or cold intolerance; No hair loss  MUSCULOSKELETAL: No joint pain or swelling; No muscle, back, or extremity pain  PSYCHIATRIC: No depression, anxiety, mood swings, or difficulty sleeping  HEME/LYMPH: No easy bruising, or bleeding gums  ALLERGY AND IMMUNOLOGIC: No hives or eczema	    PHYSICAL EXAM:  T(C): 36.7 (11-09-24 @ 05:13), Max: 37.5 (11-08-24 @ 13:27)  HR: 81 (11-09-24 @ 05:13) (81 - 92)  BP: 126/71 (11-09-24 @ 05:13) (110/64 - 126/71)  RR: 16 (11-09-24 @ 05:13) (16 - 18)  SpO2: 96% (11-09-24 @ 05:13) (96% - 99%)  Wt(kg): --  I&O's Summary      Appearance: Normal	  HEENT:   Normal oral mucosa, PERRL, EOMI	  Lymphatic: No lymphadenopathy  Cardiovascular: Normal S1 S2, No JVD, No murmurs, No edema  Respiratory: Lungs clear to auscultation	  Gastrointestinal:  Soft, Non-tender, + BS	  Skin: No rashes, No ecchymoses, No cyanosis	  Neurologic: Non-focal  Extremities: Normal range of motion, No clubbing, cyanosis or edema  Vascular: Peripheral pulses palpable 2+ bilaterally    MEDICATIONS  (STANDING):  apixaban 2.5 milliGRAM(s) Oral every 12 hours  atorvastatin 10 milliGRAM(s) Oral at bedtime  dronedarone 400 milliGRAM(s) Oral two times a day  ertapenem  IVPB      ertapenem  IVPB 1000 milliGRAM(s) IV Intermittent every 24 hours  fluticasone propionate/ salmeterol 100-50 MICROgram(s) Diskus 1 Dose(s) Inhalation two times a day  insulin lispro (ADMELOG) corrective regimen sliding scale   SubCutaneous three times a day before meals  insulin lispro (ADMELOG) corrective regimen sliding scale   SubCutaneous at bedtime  metoprolol tartrate 25 milliGRAM(s) Oral every 8 hours  montelukast 10 milliGRAM(s) Oral at bedtime  pantoprazole    Tablet 40 milliGRAM(s) Oral before breakfast        LABS:	 	                         10.1   8.96  )-----------( 163      ( 09 Nov 2024 05:30 )             31.2     11-09    144  |  120[H]  |  14  ----------------------------<  107[H]  4.0   |  18[L]  |  0.62    Ca    8.8      09 Nov 2024 05:30

## 2024-11-09 NOTE — PROGRESS NOTE ADULT - ASSESSMENT
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 On admission, she found to have no fever but Leukocytosis and positive Urine analysis. The CXR is negative. She has started on Ceftriaxone and Azithromycin, and the ID consult requested to assist with further evaluation and antibiotic management.    # UTI - Urine Cx grew ESBL  Klebsiella  # Leukocytosis - resolved    would recommend:    1. Monitor Temp and c/w supportive care   2. Continue Ertapenem until 11/13/24  3. OOB to chair     d/w Covering NP    Attending Attestation:    Spent more than 35 minutes on total encounter, more than 50 % of the visit was spent counseling and/or coordinating care by the Attending physician.                 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 On admission, she found to have no fever but Leukocytosis and positive Urine analysis. The CXR is negative. She has started on Ceftriaxone and Azithromycin, and the ID consult requested to assist with further evaluation and antibiotic management.    # UTI - Urine Cx grew ESBL  Klebsiella  # Leukocytosis - resolved    would recommend:    1. Monitor Temp and c/w supportive care   2. Continue Ertapenem until 11/13/24  3. OOB to chair     Attending Attestation:    Spent more than 35 minutes on total encounter, more than 50 % of the visit was spent counseling and/or coordinating care by the Attending physician.

## 2024-11-09 NOTE — PROGRESS NOTE ADULT - ASSESSMENT
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  1.Off Tele monitoring.  2.PAF-eliquis, multaq,inc lopressor 25mg tid.  3.HTN-d/c procardia xl.  4.DM-insulin.  5.CHARISSE-resolved.  6.UTI-ESBL-ABX as per ID.  7.PPI.

## 2024-11-09 NOTE — PROGRESS NOTE ADULT - SUBJECTIVE AND OBJECTIVE BOX
Patient is a 89y old  Female who presents with a chief complaint of CHARISSE, lactic acidosis (08 Nov 2024 13:33)    pt seen in icu [  ], reg med floor [ x  ], bed [ x ], chair at bedside [   ], awake and responsive [x  ], lethargic [  ],    nad [x ]      Allergies    gabapentin (Unknown)        Vitals    T(F): 98 (11-09-24 @ 05:13), Max: 99.5 (11-08-24 @ 13:27)  HR: 81 (11-09-24 @ 05:13) (81 - 92)  BP: 126/71 (11-09-24 @ 05:13) (110/64 - 126/71)  RR: 16 (11-09-24 @ 05:13) (16 - 18)  SpO2: 96% (11-09-24 @ 05:13) (96% - 99%)  Wt(kg): --  CAPILLARY BLOOD GLUCOSE      POCT Blood Glucose.: 99 mg/dL (08 Nov 2024 21:10)      Labs                          10.1   8.96  )-----------( 163      ( 09 Nov 2024 05:30 )             31.2       11-09    144  |  120[H]  |  14  ----------------------------<  107[H]  4.0   |  18[L]  |  0.62    Ca    8.8      09 Nov 2024 05:30              .Blood BLOOD  11-07 @ 05:51   No growth at 24 hours  --  --      Clean Catch  11-03 @ 04:09   >100,000 CFU/ml Klebsiella pneumoniae ESBL  --  Klebsiella pneumoniae ESBL      Clean Catch  09-15 @ 01:09   <10,000 CFU/mL Normal Urogenital Natalie  --  --      .Blood Blood-Peripheral  09-13 @ 01:07   No growth at 5 days  --  --      .Blood Blood-Peripheral  09-13 @ 01:00   No growth at 5 days  --  --          Radiology Results      Meds    MEDICATIONS  (STANDING):  apixaban 2.5 milliGRAM(s) Oral every 12 hours  atorvastatin 10 milliGRAM(s) Oral at bedtime  dronedarone 400 milliGRAM(s) Oral two times a day  ertapenem  IVPB      ertapenem  IVPB 1000 milliGRAM(s) IV Intermittent every 24 hours  fluticasone propionate/ salmeterol 100-50 MICROgram(s) Diskus 1 Dose(s) Inhalation two times a day  insulin lispro (ADMELOG) corrective regimen sliding scale   SubCutaneous at bedtime  insulin lispro (ADMELOG) corrective regimen sliding scale   SubCutaneous three times a day before meals  metoprolol tartrate 25 milliGRAM(s) Oral every 8 hours  montelukast 10 milliGRAM(s) Oral at bedtime  pantoprazole    Tablet 40 milliGRAM(s) Oral before breakfast      MEDICATIONS  (PRN):  acetaminophen     Tablet .. 650 milliGRAM(s) Oral every 6 hours PRN Temp greater or equal to 38C (100.4F)  albuterol/ipratropium for Nebulization 3 milliLiter(s) Nebulizer every 6 hours PRN Bronchospasm  loperamide 2 milliGRAM(s) Oral four times a day PRN Diarrhea  melatonin 3 milliGRAM(s) Oral at bedtime PRN Insomnia      Physical Exam    Neuro :  no focal deficits  Respiratory: CTA B/L  CV: RRR, S1S2, no murmurs,   Abdominal: Soft, NT, ND +BS,  Extremities: No edema, + peripheral pulses      ASSESSMENT    pneumonia r/o     sepsis,   uti,   paf   h/o HTN,   HLD,   T2DM,   osteoporosis,   Stage IIB gastric adenocarcinoma s/p distal gastrectomy (2015),   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 Resolved prior pneumonia.  Unchanged small left upper lobe nodules noted    id f/u   cont ertapenem   f/u blood cx   pulm f/u   atrov and prov nebs,   supplemental oxygen prn,   robitussin prn,   Budesonide 0.25 mgs neb BID  singulair qhs   PFTs as OP.  hold outpt dm meds,   lispro ss,   hgba1c 5.5 noted     heme onc f/u   pt currently on quadruple therapy, last tx 11/01/24   hold tumor directed tx during the hospital course   f/u with Dr. Alvarado on d/c  pt found to be in afib 11/4/24 and  spontaneously converted to sinus.  cardio f/u   cont eliquis,  cont multaq,   cont lopressor 25mg bid.  cont procardia xl.  cont current meds   d/c plan pend blood cx and id recs        Patient is a 89y old  Female who presents with a chief complaint of CHARISSE, lactic acidosis (08 Nov 2024 13:33)    pt seen in icu [  ], reg med floor [ x  ], bed [ x ], chair at bedside [   ], awake and responsive [x  ], lethargic [  ],    nad [x ]      Allergies    gabapentin (Unknown)        Vitals    T(F): 98 (11-09-24 @ 05:13), Max: 99.5 (11-08-24 @ 13:27)  HR: 81 (11-09-24 @ 05:13) (81 - 92)  BP: 126/71 (11-09-24 @ 05:13) (110/64 - 126/71)  RR: 16 (11-09-24 @ 05:13) (16 - 18)  SpO2: 96% (11-09-24 @ 05:13) (96% - 99%)  Wt(kg): --  CAPILLARY BLOOD GLUCOSE      POCT Blood Glucose.: 99 mg/dL (08 Nov 2024 21:10)      Labs                          10.1   8.96  )-----------( 163      ( 09 Nov 2024 05:30 )             31.2       11-09    144  |  120[H]  |  14  ----------------------------<  107[H]  4.0   |  18[L]  |  0.62    Ca    8.8      09 Nov 2024 05:30              .Blood BLOOD  11-07 @ 05:51   No growth at 24 hours  --  --      Clean Catch  11-03 @ 04:09   >100,000 CFU/ml Klebsiella pneumoniae ESBL  --  Klebsiella pneumoniae ESBL          Radiology Results      Meds    MEDICATIONS  (STANDING):  apixaban 2.5 milliGRAM(s) Oral every 12 hours  atorvastatin 10 milliGRAM(s) Oral at bedtime  dronedarone 400 milliGRAM(s) Oral two times a day  ertapenem  IVPB      ertapenem  IVPB 1000 milliGRAM(s) IV Intermittent every 24 hours  fluticasone propionate/ salmeterol 100-50 MICROgram(s) Diskus 1 Dose(s) Inhalation two times a day  insulin lispro (ADMELOG) corrective regimen sliding scale   SubCutaneous at bedtime  insulin lispro (ADMELOG) corrective regimen sliding scale   SubCutaneous three times a day before meals  metoprolol tartrate 25 milliGRAM(s) Oral every 8 hours  montelukast 10 milliGRAM(s) Oral at bedtime  pantoprazole    Tablet 40 milliGRAM(s) Oral before breakfast      MEDICATIONS  (PRN):  acetaminophen     Tablet .. 650 milliGRAM(s) Oral every 6 hours PRN Temp greater or equal to 38C (100.4F)  albuterol/ipratropium for Nebulization 3 milliLiter(s) Nebulizer every 6 hours PRN Bronchospasm  loperamide 2 milliGRAM(s) Oral four times a day PRN Diarrhea  melatonin 3 milliGRAM(s) Oral at bedtime PRN Insomnia      Physical Exam    Neuro :  no focal deficits  Respiratory: CTA B/L  CV: RRR, S1S2, no murmurs,   Abdominal: Soft, NT, ND +BS,  Extremities: No edema, + peripheral pulses      ASSESSMENT    pneumonia r/o     sepsis,   uti,   paf   h/o HTN,   HLD,   T2DM,   osteoporosis,   Stage IIB gastric adenocarcinoma s/p distal gastrectomy (2015),   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 Resolved prior pneumonia.  Unchanged small left upper lobe nodules noted    id f/u   Continue Ertapenem until 11/13/24   blood cx neg noted above  pulm f/u   atrov and prov nebs,   supplemental oxygen prn,   robitussin prn,   Budesonide 0.25 mgs neb BID  singulair qhs   PFTs as OP.  hold outpt dm meds,   lispro ss,   hgba1c 5.5 noted     heme onc f/u   pt currently on quadruple therapy, last tx 11/01/24   hold tumor directed tx during the hospital course   f/u with Dr. Alvarado on d/c  pt found to be in afib 11/4/24 and  spontaneously converted to sinus.  cardio f/u   cont eliquis,  cont multaq,   cont lopressor 25mg bid.  cont d/c'd procardia xl.  cont current meds   d/c plan for tuesday 11/13/24

## 2024-11-09 NOTE — PROGRESS NOTE ADULT - SUBJECTIVE AND OBJECTIVE BOX
Patient is a 89y old  Female who presents with a chief complaint of CHARISSE, lactic acidosis (09 Nov 2024 06:15)  Awake, alert, comfortable in bed in NAD. Still on antibiotics as per ID till 11/13    INTERVAL HPI/OVERNIGHT EVENTS:      VITAL SIGNS:  T(F): 98 (11-09-24 @ 05:13)  HR: 81 (11-09-24 @ 05:13)  BP: 126/71 (11-09-24 @ 05:13)  RR: 16 (11-09-24 @ 05:13)  SpO2: 96% (11-09-24 @ 05:13)  Wt(kg): --  I&O's Detail          REVIEW OF SYSTEMS:    CONSTITUTIONAL:  No fevers, chills, sweats    HEENT:  Eyes:  No diplopia or blurred vision. ENT:  No earache, sore throat or runny nose.    CARDIOVASCULAR:  No pressure, squeezing, tightness, or heaviness about the chest; no palpitations.    RESPIRATORY:  Per HPI    GASTROINTESTINAL:  No abdominal pain, nausea, vomiting or diarrhea.    GENITOURINARY:  No dysuria, frequency or urgency.    NEUROLOGIC:  No paresthesias, fasciculations, seizures or weakness.    PSYCHIATRIC:  No disorder of thought or mood.      PHYSICAL EXAM:    Constitutional: Well developed and nourished  Eyes:Perrla  ENMT: normal  Neck:supple  Respiratory: good air entry  Cardiovascular: S1 S2 regular  Gastrointestinal: Soft, Non tender  Extremities: No edema  Vascular:normal  Neurological:Awake, alert,Ox3  Musculoskeletal:Normal      MEDICATIONS  (STANDING):  apixaban 2.5 milliGRAM(s) Oral every 12 hours  atorvastatin 10 milliGRAM(s) Oral at bedtime  dronedarone 400 milliGRAM(s) Oral two times a day  ertapenem  IVPB      ertapenem  IVPB 1000 milliGRAM(s) IV Intermittent every 24 hours  fluticasone propionate/ salmeterol 100-50 MICROgram(s) Diskus 1 Dose(s) Inhalation two times a day  insulin lispro (ADMELOG) corrective regimen sliding scale   SubCutaneous at bedtime  insulin lispro (ADMELOG) corrective regimen sliding scale   SubCutaneous three times a day before meals  metoprolol tartrate 25 milliGRAM(s) Oral every 8 hours  montelukast 10 milliGRAM(s) Oral at bedtime  pantoprazole    Tablet 40 milliGRAM(s) Oral before breakfast    MEDICATIONS  (PRN):  acetaminophen     Tablet .. 650 milliGRAM(s) Oral every 6 hours PRN Temp greater or equal to 38C (100.4F)  albuterol/ipratropium for Nebulization 3 milliLiter(s) Nebulizer every 6 hours PRN Bronchospasm  loperamide 2 milliGRAM(s) Oral four times a day PRN Diarrhea  melatonin 3 milliGRAM(s) Oral at bedtime PRN Insomnia      Allergies    gabapentin (Unknown)    Intolerances        LABS:                        10.1   8.96  )-----------( 163      ( 09 Nov 2024 05:30 )             31.2     11-09    144  |  120[H]  |  14  ----------------------------<  107[H]  4.0   |  18[L]  |  0.62    Ca    8.8      09 Nov 2024 05:30        Urinalysis Basic - ( 09 Nov 2024 05:30 )    Color: x / Appearance: x / SG: x / pH: x  Gluc: 107 mg/dL / Ketone: x  / Bili: x / Urobili: x   Blood: x / Protein: x / Nitrite: x   Leuk Esterase: x / RBC: x / WBC x   Sq Epi: x / Non Sq Epi: x / Bacteria: x      Culture - Blood in AM (11.07.24 @ 05:51)   Specimen Source: .Blood BLOOD  Culture Results:   No growth at 24 hoursCulture Results:   >100,000 CFU/ml Klebsiella pneumoniae ESBL  Organism Identification: Klebsiella pneumoniae ESBL  Organism: Klebsiella pneumoniae ESBL  Method Type: RYAN      CAPILLARY BLOOD GLUCOSE      POCT Blood Glucose.: 96 mg/dL (09 Nov 2024 07:51)  POCT Blood Glucose.: 99 mg/dL (08 Nov 2024 21:10)  POCT Blood Glucose.: 93 mg/dL (08 Nov 2024 16:53)  POCT Blood Glucose.: 97 mg/dL (08 Nov 2024 11:49)    pro-bnp -- 11-02 @ 21:34     d-dimer 164  11-02 @ 21:34      RADIOLOGY & ADDITIONAL TESTS:  < from: CT Angio Chest PE Protocol w/ IV Cont (11.04.24 @ 15:19) >  IMPRESSION:    No pulmonary embolus.    Resolved prior pneumonia.    Unchanged small left upper lobe nodules.    < end of copied text >  < from: CT Angio Chest PE Protocol w/ IV Cont (11.04.24 @ 15:19) >      CXR:    Ct scan chest:    ekg;    echo:

## 2024-11-10 LAB
ANION GAP SERPL CALC-SCNC: 8 MMOL/L — SIGNIFICANT CHANGE UP (ref 5–17)
BUN SERPL-MCNC: 15 MG/DL — SIGNIFICANT CHANGE UP (ref 7–18)
CALCIUM SERPL-MCNC: 8.7 MG/DL — SIGNIFICANT CHANGE UP (ref 8.4–10.5)
CHLORIDE SERPL-SCNC: 113 MMOL/L — HIGH (ref 96–108)
CO2 SERPL-SCNC: 22 MMOL/L — SIGNIFICANT CHANGE UP (ref 22–31)
CREAT SERPL-MCNC: 0.67 MG/DL — SIGNIFICANT CHANGE UP (ref 0.5–1.3)
EGFR: 85 ML/MIN/1.73M2 — SIGNIFICANT CHANGE UP
GLUCOSE BLDC GLUCOMTR-MCNC: 121 MG/DL — HIGH (ref 70–99)
GLUCOSE BLDC GLUCOMTR-MCNC: 93 MG/DL — SIGNIFICANT CHANGE UP (ref 70–99)
GLUCOSE BLDC GLUCOMTR-MCNC: 97 MG/DL — SIGNIFICANT CHANGE UP (ref 70–99)
GLUCOSE BLDC GLUCOMTR-MCNC: 97 MG/DL — SIGNIFICANT CHANGE UP (ref 70–99)
GLUCOSE SERPL-MCNC: 100 MG/DL — HIGH (ref 70–99)
HCT VFR BLD CALC: 31.1 % — LOW (ref 34.5–45)
HGB BLD-MCNC: 10 G/DL — LOW (ref 11.5–15.5)
MCHC RBC-ENTMCNC: 31.1 PG — SIGNIFICANT CHANGE UP (ref 27–34)
MCHC RBC-ENTMCNC: 32.2 G/DL — SIGNIFICANT CHANGE UP (ref 32–36)
MCV RBC AUTO: 96.6 FL — SIGNIFICANT CHANGE UP (ref 80–100)
NRBC # BLD: 0 /100 WBCS — SIGNIFICANT CHANGE UP (ref 0–0)
PLATELET # BLD AUTO: 188 K/UL — SIGNIFICANT CHANGE UP (ref 150–400)
POTASSIUM SERPL-MCNC: 3.5 MMOL/L — SIGNIFICANT CHANGE UP (ref 3.5–5.3)
POTASSIUM SERPL-SCNC: 3.5 MMOL/L — SIGNIFICANT CHANGE UP (ref 3.5–5.3)
RBC # BLD: 3.22 M/UL — LOW (ref 3.8–5.2)
RBC # FLD: 16.6 % — HIGH (ref 10.3–14.5)
SODIUM SERPL-SCNC: 143 MMOL/L — SIGNIFICANT CHANGE UP (ref 135–145)
WBC # BLD: 9.79 K/UL — SIGNIFICANT CHANGE UP (ref 3.8–10.5)
WBC # FLD AUTO: 9.79 K/UL — SIGNIFICANT CHANGE UP (ref 3.8–10.5)

## 2024-11-10 RX ORDER — ERTAPENEM SODIUM 1 G/1
1000 INJECTION, POWDER, LYOPHILIZED, FOR SOLUTION INTRAMUSCULAR; INTRAVENOUS EVERY 24 HOURS
Refills: 0 | Status: COMPLETED | OUTPATIENT
Start: 2024-11-10 | End: 2024-11-12

## 2024-11-10 RX ADMIN — Medication 2 MILLIGRAM(S): at 14:45

## 2024-11-10 RX ADMIN — DRONEDARONE 400 MILLIGRAM(S): 400 TABLET, FILM COATED ORAL at 17:35

## 2024-11-10 RX ADMIN — APIXABAN 2.5 MILLIGRAM(S): 5 TABLET, FILM COATED ORAL at 05:32

## 2024-11-10 RX ADMIN — Medication 10 MILLIGRAM(S): at 21:35

## 2024-11-10 RX ADMIN — FLUTICASONE PROPIONATE AND SALMETEROL XINAFOATE 1 DOSE(S): 230; 21 AEROSOL, METERED RESPIRATORY (INHALATION) at 10:14

## 2024-11-10 RX ADMIN — Medication 25 MILLIGRAM(S): at 21:36

## 2024-11-10 RX ADMIN — Medication 25 MILLIGRAM(S): at 05:32

## 2024-11-10 RX ADMIN — PANTOPRAZOLE SODIUM 40 MILLIGRAM(S): 40 TABLET, DELAYED RELEASE ORAL at 05:32

## 2024-11-10 RX ADMIN — APIXABAN 2.5 MILLIGRAM(S): 5 TABLET, FILM COATED ORAL at 17:35

## 2024-11-10 RX ADMIN — FLUTICASONE PROPIONATE AND SALMETEROL XINAFOATE 1 DOSE(S): 230; 21 AEROSOL, METERED RESPIRATORY (INHALATION) at 21:38

## 2024-11-10 RX ADMIN — DRONEDARONE 400 MILLIGRAM(S): 400 TABLET, FILM COATED ORAL at 05:32

## 2024-11-10 RX ADMIN — ERTAPENEM SODIUM 120 MILLIGRAM(S): 1 INJECTION, POWDER, LYOPHILIZED, FOR SOLUTION INTRAMUSCULAR; INTRAVENOUS at 17:00

## 2024-11-10 RX ADMIN — MONTELUKAST SODIUM 10 MILLIGRAM(S): 10 TABLET, FILM COATED ORAL at 21:35

## 2024-11-10 RX ADMIN — Medication 25 MILLIGRAM(S): at 14:45

## 2024-11-10 NOTE — PROGRESS NOTE ADULT - SUBJECTIVE AND OBJECTIVE BOX
Date of Service 11-10-24 @ 11:58    CHIEF COMPLAINT:Patient is a 89y old  Female who presents with a chief complaint of CHARISSE, lactic acidosis .Pt appears comfortable.    	  REVIEW OF SYSTEMS:  CONSTITUTIONAL: No fever, weight loss, or fatigue  EYES: No eye pain, visual disturbances, or discharge  ENT:  No difficulty hearing, tinnitus, vertigo; No sinus or throat pain  NECK: No pain or stiffness  RESPIRATORY: No cough, wheezing, chills or hemoptysis; No Shortness of Breath  CARDIOVASCULAR: No chest pain, palpitations, passing out, dizziness, or leg swelling  GASTROINTESTINAL: No abdominal or epigastric pain. No nausea, vomiting, or hematemesis; No diarrhea or constipation. No melena or hematochezia.  GENITOURINARY: No dysuria, frequency, hematuria, or incontinence  NEUROLOGICAL: No headaches, memory loss, loss of strength, numbness, or tremors  SKIN: No itching, burning, rashes, or lesions   LYMPH Nodes: No enlarged glands  ENDOCRINE: No heat or cold intolerance; No hair loss  MUSCULOSKELETAL: No joint pain or swelling; No muscle, back, or extremity pain  PSYCHIATRIC: No depression, anxiety, mood swings, or difficulty sleeping  HEME/LYMPH: No easy bruising, or bleeding gums  ALLERGY AND IMMUNOLOGIC: No hives or eczema	        PHYSICAL EXAM:  T(C): 36.3 (11-10-24 @ 10:08), Max: 37.4 (11-09-24 @ 18:23)  HR: 79 (11-10-24 @ 10:08) (79 - 93)  BP: 111/64 (11-10-24 @ 10:08) (105/76 - 155/75)  RR: 18 (11-10-24 @ 10:08) (16 - 18)  SpO2: 98% (11-10-24 @ 10:08) (94% - 99%)  Wt(kg): --  I&O's Summary      Appearance: Normal	  HEENT:   Normal oral mucosa, PERRL, EOMI	  Lymphatic: No lymphadenopathy  Cardiovascular: Normal S1 S2, No JVD, No murmurs, No edema  Respiratory: Lungs clear to auscultation	  Psychiatry: A & O x 3, Mood & affect appropriate  Gastrointestinal:  Soft, Non-tender, + BS	  Skin: No rashes, No ecchymoses, No cyanosis	  Neurologic: Non-focal  Extremities: Normal range of motion, No clubbing, cyanosis or edema  Vascular: Peripheral pulses palpable 2+ bilaterally    MEDICATIONS  (STANDING):  apixaban 2.5 milliGRAM(s) Oral every 12 hours  atorvastatin 10 milliGRAM(s) Oral at bedtime  dronedarone 400 milliGRAM(s) Oral two times a day  fluticasone propionate/ salmeterol 100-50 MICROgram(s) Diskus 1 Dose(s) Inhalation two times a day  insulin lispro (ADMELOG) corrective regimen sliding scale   SubCutaneous at bedtime  insulin lispro (ADMELOG) corrective regimen sliding scale   SubCutaneous three times a day before meals  metoprolol tartrate 25 milliGRAM(s) Oral every 8 hours  montelukast 10 milliGRAM(s) Oral at bedtime  pantoprazole    Tablet 40 milliGRAM(s) Oral before breakfast      	  	  LABS:	 	                        10.0   9.79  )-----------( 188      ( 10 Nov 2024 07:08 )             31.1     11-10    143  |  113[H]  |  15  ----------------------------<  100[H]  3.5   |  22  |  0.67    Ca    8.7      10 Nov 2024 07:08

## 2024-11-10 NOTE — PROGRESS NOTE ADULT - SUBJECTIVE AND OBJECTIVE BOX
Patient is a 89y old  Female who presents with a chief complaint of CHARISSE, lactic acidosis (09 Nov 2024 13:08)    pt seen in icu [  ], reg med floor [ x  ], bed [ x ], chair at bedside [   ], awake and responsive [x  ], lethargic [  ],    nad [x ]        Allergies    gabapentin (Unknown)        Vitals    T(F): 98.6 (11-10-24 @ 05:27), Max: 99.3 (11-09-24 @ 18:23)  HR: 86 (11-10-24 @ 05:27) (82 - 93)  BP: 154/82 (11-10-24 @ 05:27) (105/76 - 155/75)  RR: 18 (11-10-24 @ 05:27) (16 - 18)  SpO2: 98% (11-10-24 @ 05:27) (94% - 99%)  Wt(kg): --  CAPILLARY BLOOD GLUCOSE      POCT Blood Glucose.: 93 mg/dL (09 Nov 2024 21:05)      Labs                          10.1   8.96  )-----------( 163      ( 09 Nov 2024 05:30 )             31.2       11-09    144  |  120[H]  |  14  ----------------------------<  107[H]  4.0   |  18[L]  |  0.62    Ca    8.8      09 Nov 2024 05:30              .Blood BLOOD  11-07 @ 05:51   No growth at 48 Hours  --  --      Clean Catch  11-03 @ 04:09   >100,000 CFU/ml Klebsiella pneumoniae ESBL  --  Klebsiella pneumoniae ESBL      Clean Catch  09-15 @ 01:09   <10,000 CFU/mL Normal Urogenital Natalie  --  --      .Blood Blood-Peripheral  09-13 @ 01:07   No growth at 5 days  --  --      .Blood Blood-Peripheral  09-13 @ 01:00   No growth at 5 days  --  --          Radiology Results      Meds    MEDICATIONS  (STANDING):  apixaban 2.5 milliGRAM(s) Oral every 12 hours  atorvastatin 10 milliGRAM(s) Oral at bedtime  dronedarone 400 milliGRAM(s) Oral two times a day  fluticasone propionate/ salmeterol 100-50 MICROgram(s) Diskus 1 Dose(s) Inhalation two times a day  insulin lispro (ADMELOG) corrective regimen sliding scale   SubCutaneous at bedtime  insulin lispro (ADMELOG) corrective regimen sliding scale   SubCutaneous three times a day before meals  metoprolol tartrate 25 milliGRAM(s) Oral every 8 hours  montelukast 10 milliGRAM(s) Oral at bedtime  pantoprazole    Tablet 40 milliGRAM(s) Oral before breakfast      MEDICATIONS  (PRN):  acetaminophen     Tablet .. 650 milliGRAM(s) Oral every 6 hours PRN Temp greater or equal to 38C (100.4F)  albuterol/ipratropium for Nebulization 3 milliLiter(s) Nebulizer every 6 hours PRN Bronchospasm  loperamide 2 milliGRAM(s) Oral four times a day PRN Diarrhea  melatonin 3 milliGRAM(s) Oral at bedtime PRN Insomnia      Physical Exam    Neuro :  no focal deficits  Respiratory: CTA B/L  CV: RRR, S1S2, no murmurs,   Abdominal: Soft, NT, ND +BS,  Extremities: No edema, + peripheral pulses      ASSESSMENT    pneumonia r/o     sepsis,   uti,   paf   h/o HTN,   HLD,   T2DM,   osteoporosis,   Stage IIB gastric adenocarcinoma s/p distal gastrectomy (2015),   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 Resolved prior pneumonia.  Unchanged small left upper lobe nodules noted    id f/u   Continue Ertapenem until 11/13/24   blood cx neg noted above  pulm f/u   atrov and prov nebs,   supplemental oxygen prn,   robitussin prn,   Budesonide 0.25 mgs neb BID  singulair qhs   PFTs as OP.  hold outpt dm meds,   lispro ss,   hgba1c 5.5 noted     heme onc f/u   pt currently on quadruple therapy, last tx 11/01/24   hold tumor directed tx during the hospital course   f/u with Dr. Alvarado on d/c  pt found to be in afib 11/4/24 and  spontaneously converted to sinus.  cardio f/u   cont eliquis,  cont multaq,   cont lopressor 25mg bid.  cont d/c'd procardia xl.  cont current meds   d/c plan for tuesday 11/13/24         Patient is a 89y old  Female who presents with a chief complaint of CHARISSE, lactic acidosis (09 Nov 2024 13:08)    pt seen in icu [  ], reg med floor [ x  ], bed [ x ], chair at bedside [   ], awake and responsive [x  ], lethargic [  ],    nad [x ]        Allergies    gabapentin (Unknown)        Vitals    T(F): 98.6 (11-10-24 @ 05:27), Max: 99.3 (11-09-24 @ 18:23)  HR: 86 (11-10-24 @ 05:27) (82 - 93)  BP: 154/82 (11-10-24 @ 05:27) (105/76 - 155/75)  RR: 18 (11-10-24 @ 05:27) (16 - 18)  SpO2: 98% (11-10-24 @ 05:27) (94% - 99%)  Wt(kg): --  CAPILLARY BLOOD GLUCOSE      POCT Blood Glucose.: 93 mg/dL (09 Nov 2024 21:05)      Labs                          10.1   8.96  )-----------( 163      ( 09 Nov 2024 05:30 )             31.2       11-09    144  |  120[H]  |  14  ----------------------------<  107[H]  4.0   |  18[L]  |  0.62    Ca    8.8      09 Nov 2024 05:30              .Blood BLOOD  11-07 @ 05:51   No growth at 48 Hours  --  --      Clean Catch  11-03 @ 04:09   >100,000 CFU/ml Klebsiella pneumoniae ESBL  --  Klebsiella pneumoniae ESBL        Radiology Results      Meds    MEDICATIONS  (STANDING):  apixaban 2.5 milliGRAM(s) Oral every 12 hours  atorvastatin 10 milliGRAM(s) Oral at bedtime  dronedarone 400 milliGRAM(s) Oral two times a day  fluticasone propionate/ salmeterol 100-50 MICROgram(s) Diskus 1 Dose(s) Inhalation two times a day  insulin lispro (ADMELOG) corrective regimen sliding scale   SubCutaneous at bedtime  insulin lispro (ADMELOG) corrective regimen sliding scale   SubCutaneous three times a day before meals  metoprolol tartrate 25 milliGRAM(s) Oral every 8 hours  montelukast 10 milliGRAM(s) Oral at bedtime  pantoprazole    Tablet 40 milliGRAM(s) Oral before breakfast      MEDICATIONS  (PRN):  acetaminophen     Tablet .. 650 milliGRAM(s) Oral every 6 hours PRN Temp greater or equal to 38C (100.4F)  albuterol/ipratropium for Nebulization 3 milliLiter(s) Nebulizer every 6 hours PRN Bronchospasm  loperamide 2 milliGRAM(s) Oral four times a day PRN Diarrhea  melatonin 3 milliGRAM(s) Oral at bedtime PRN Insomnia      Physical Exam    Neuro :  no focal deficits  Respiratory: CTA B/L  CV: RRR, S1S2, no murmurs,   Abdominal: Soft, NT, ND +BS,  Extremities: No edema, + peripheral pulses      ASSESSMENT    pneumonia r/o     sepsis,   uti,   paf   h/o HTN,   HLD,   T2DM,   osteoporosis,   Stage IIB gastric adenocarcinoma s/p distal gastrectomy (2015),   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 Resolved prior pneumonia.  Unchanged small left upper lobe nodules noted    id f/u   Continue Ertapenem until 11/13/24   blood cx neg noted above  pulm f/u   atrov and prov nebs,   supplemental oxygen prn,   robitussin prn,   Budesonide 0.25 mgs neb BID  singulair qhs   PFTs as OP.  hold outpt dm meds,   lispro ss,   hgba1c 5.5 noted     [facility]me onc f/u   pt currently on quadruple therapy, last tx 11/01/24   hold tumor directed tx during the hospital course   f/u with Dr. Alvarado on d/c  pt found to be in afib 11/4/24 and  spontaneously converted to sinus.  cardio f/u   cont eliquis,  cont multaq,   cont lopressor 25mg bid.  cont d/c'd procardia xl.  cont current meds   d/c plan for tuesday 11/13/24

## 2024-11-10 NOTE — PROGRESS NOTE ADULT - PROBLEM SELECTOR PLAN 3
culture noted  Repeat BC as per ID (had recurrent fever while on Atbs)  antibiotics  ID follow up culture noted  Repeat BC as per ID (had recurrent fever while on Atbs)  antibiotics till 11/13  ID follow up

## 2024-11-10 NOTE — PROGRESS NOTE ADULT - ASSESSMENT
89 y/o F, [place] speaking, from home, ambulates with wheelchair (mostly bedbound), PMH HTN, HLD, T2DM, osteoporosis, Stage IIB gastric adenocarcinoma s/p distal gastrectomy (2015), multiple myeloma/plastocytoma on chemo (follows [facility] Dr Adams), that is brought in by family for SOB,s/p CHARISSE, found to be in afib 11/4/24 and  spontaneously converted to sinus,UTI-ESBL.  1.Off Tele monitoring.  2.PAF-eliquis, multaq,lopressor 25mg tid.  3.HTN-d/c procardia xl.  4.DM-insulin.  5.CHARISSE-resolved.  6.UTI-ESBL-ABX as per ID.  7.PPI.

## 2024-11-10 NOTE — CHART NOTE - NSCHARTNOTEFT_GEN_A_CORE
NP notified by RN no order for Ertapenem.    Per ID note, pt to continue Ertapenem until 11/13.  Med reordered as noted.

## 2024-11-10 NOTE — PROGRESS NOTE ADULT - SUBJECTIVE AND OBJECTIVE BOX
Patient is seen and examined at the bed side, is afebrile. She has only one episode of loose BM today. The WBC count and kidney function stay normal.       REVIEW OF SYSTEMS: All other review systems are negative      ALLERGIES: gabapentin (Unknown)      Vital Signs Last 24 Hrs  T(C): 36.5 (10 Nov 2024 14:06), Max: 37.4 (09 Nov 2024 18:23)  T(F): 97.7 (10 Nov 2024 14:06), Max: 99.3 (09 Nov 2024 18:23)  HR: 87 (10 Nov 2024 14:06) (79 - 92)  BP: 117/63 (10 Nov 2024 14:06) (105/76 - 155/75)  BP(mean): 102 (10 Nov 2024 05:27) (80 - 102)  RR: 18 (10 Nov 2024 14:06) (16 - 18)  SpO2: 98% (10 Nov 2024 14:06) (97% - 99%)    Parameters below as of 10 Nov 2024 14:06  Patient On (Oxygen Delivery Method): room air        PHYSICAL EXAM:  GENERAL: Not in distress   CHEST/LUNG:  Air entry bilaterally  HEART: s1 and s2 present  ABDOMEN:  Nontender and  Nondistended  EXTREMITIES: No pedal  edema  CNS: Awake and Alert      LABS:                        10.0   9.79  )-----------( 188      ( 10 Nov 2024 07:08 )             31.1                           10.1   8.96  )-----------( 163      ( 09 Nov 2024 05:30 )             31.2         11-10    143  |  113[H]  |  15  ----------------------------<  100[H]  3.5   |  22  |  0.67    Ca    8.7      10 Nov 2024 07:08      11-09    144  |  120[H]  |  14  ----------------------------<  107[H]  4.0   |  18[L]  |  0.62    Ca    8.8      09 Nov 2024 05:30    TPro  6.2  /  Alb  3.0[L]  /  TBili  0.2  /  DBili  x   /  AST  24  /  ALT  17  /  AlkPhos  47  11-02      CAPILLARY BLOOD GLUCOSE  POCT Blood Glucose.: 96 mg/dL (03 Nov 2024 12:18)  POCT Blood Glucose.: 85 mg/dL (03 Nov 2024 07:59)  POCT Blood Glucose.: 93 mg/dL (03 Nov 2024 04:48)      MEDICATIONS  (STANDING):    apixaban 2.5 milliGRAM(s) Oral every 12 hours  atorvastatin 10 milliGRAM(s) Oral at bedtime  dronedarone 400 milliGRAM(s) Oral two times a day  ertapenem  IVPB 1000 milliGRAM(s) IV Intermittent every 24 hours  fluticasone propionate/ salmeterol 100-50 MICROgram(s) Diskus 1 Dose(s) Inhalation two times a day  insulin lispro (ADMELOG) corrective regimen sliding scale   SubCutaneous three times a day before meals  insulin lispro (ADMELOG) corrective regimen sliding scale   SubCutaneous at bedtime  metoprolol tartrate 25 milliGRAM(s) Oral every 8 hours  montelukast 10 milliGRAM(s) Oral at bedtime  pantoprazole    Tablet 40 milliGRAM(s) Oral before breakfast        RADIOLOGY & ADDITIONAL TESTS:    11/4/24  : CT Angio Chest PE Protocol w/ IV Cont (11.04.24 @ 15:19) No pulmonary embolus.    Resolved prior pneumonia.  Unchanged small left upper lobe nodules.      11/2/24: Xray Chest 1 View- PORTABLE-Urgent (11.02.24 @ 22:22) No active parenchymal disease in the chest.      MICROBIOLOGY DATA:    Culture - Blood in AM (11.07.24 @ 05:51)   Specimen Source: .Blood BLOOD  Culture Results: No growth at 72 hours    Culture - Urine (11.03.24 @ 04:09)   - Nitrofurantoin: S <=32 Should not be used to treat pyelonephritis  - Piperacillin/Tazobactam: S <=8  - Tobramycin: S <=2  - Trimethoprim/Sulfamethoxazole: R >2/38  - Ampicillin: R >16 These ampicillin results predict results for amoxicillin  - Ampicillin/Sulbactam: R >16/8  - Aztreonam: R >16  - Cefazolin: R >16 For uncomplicated UTI with K. pneumoniae, E. coli, or P. mirablis: RYAN <=16 is sensitive and RYAN >=32 is resistant. This also predicts results for oral agents cefaclor, cefdinir, cefpodoxime, cefprozil, cefuroxime axetil, cephalexin and locarbef for uncomplicated UTI. Note that some isolates may be susceptible to these agents while testing resistant to cefazolin.  - Cefepime: R >16  - Ceftriaxone: R >32  - Cefuroxime: R >16  - Ciprofloxacin: S <=0.25  - Ertapenem: S <=0.5  - Gentamicin: S <=2  - Imipenem: S <=1  - Levofloxacin: S <=0.5  - Meropenem: S <=1  Specimen Source: Clean Catch  Culture Results:   >100,000 CFU/ml Klebsiella pneumoniae ESBL  Organism Identification: Klebsiella pneumoniae ESBL  Organism: Klebsiella pneumoniae ESBL  Method Type: RYAN    Culture - Urine (11.03.24 @ 04:09)   Specimen Source: Clean Catch  Culture Results:   >100,000 CFU/ml Gram Negative Rods    Urine Microscopic-Add On (NC) (11.03.24 @ 04:09)   Bacteria: Few /HPF  Comment - Urine: Numerous Budding yeasts found  Squamous Epithelial Cells: Present  Red Blood Cell - Urine: 5 /HPF  White Blood Cell - Urine: Too Numerous to count /HPF

## 2024-11-10 NOTE — PROGRESS NOTE ADULT - ASSESSMENT
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 On admission, she found to have no fever but Leukocytosis and positive Urine analysis. The CXR is negative. She has started on Ceftriaxone and Azithromycin, and the ID consult requested to assist with further evaluation and antibiotic management.    # UTI - Urine Cx grew ESBL  Klebsiella  # Leukocytosis - resolved    would recommend:    1. OOB to chair   2. Continue Ertapenem until 11/13/24 X 3 more days    Attending Attestation:    Spent more than 35 minutes on total encounter, more than 50 % of the visit was spent counseling and/or coordinating care by the Attending physician.

## 2024-11-10 NOTE — PROGRESS NOTE ADULT - SUBJECTIVE AND OBJECTIVE BOX
Patient is a 89y old  Female who presents with a chief complaint of CHARISSE, lactic acidosis (10 Nov 2024 06:34)      INTERVAL HPI/OVERNIGHT EVENTS:      VITAL SIGNS:  T(F): 98.6 (11-10-24 @ 05:27)  HR: 86 (11-10-24 @ 05:27)  BP: 154/82 (11-10-24 @ 05:27)  RR: 18 (11-10-24 @ 05:27)  SpO2: 98% (11-10-24 @ 05:27)  Wt(kg): --  I&O's Detail          REVIEW OF SYSTEMS:    CONSTITUTIONAL:  No fevers, chills, sweats    HEENT:  Eyes:  No diplopia or blurred vision. ENT:  No earache, sore throat or runny nose.    CARDIOVASCULAR:  No pressure, squeezing, tightness, or heaviness about the chest; no palpitations.    RESPIRATORY:  Per HPI    GASTROINTESTINAL:  No abdominal pain, nausea, vomiting or diarrhea.    GENITOURINARY:  No dysuria, frequency or urgency.    NEUROLOGIC:  No paresthesias, fasciculations, seizures or weakness.    PSYCHIATRIC:  No disorder of thought or mood.      PHYSICAL EXAM:    Constitutional: Well developed and nourished  Eyes:Perrla  ENMT: normal  Neck:supple  Respiratory: good air entry  Cardiovascular: S1 S2 regular  Gastrointestinal: Soft, Non tender  Extremities: No edema  Vascular:normal  Neurological:Awake, alert,Ox3  Musculoskeletal:Normal      MEDICATIONS  (STANDING):  apixaban 2.5 milliGRAM(s) Oral every 12 hours  atorvastatin 10 milliGRAM(s) Oral at bedtime  dronedarone 400 milliGRAM(s) Oral two times a day  fluticasone propionate/ salmeterol 100-50 MICROgram(s) Diskus 1 Dose(s) Inhalation two times a day  insulin lispro (ADMELOG) corrective regimen sliding scale   SubCutaneous three times a day before meals  insulin lispro (ADMELOG) corrective regimen sliding scale   SubCutaneous at bedtime  metoprolol tartrate 25 milliGRAM(s) Oral every 8 hours  montelukast 10 milliGRAM(s) Oral at bedtime  pantoprazole    Tablet 40 milliGRAM(s) Oral before breakfast    MEDICATIONS  (PRN):  acetaminophen     Tablet .. 650 milliGRAM(s) Oral every 6 hours PRN Temp greater or equal to 38C (100.4F)  albuterol/ipratropium for Nebulization 3 milliLiter(s) Nebulizer every 6 hours PRN Bronchospasm  loperamide 2 milliGRAM(s) Oral four times a day PRN Diarrhea  melatonin 3 milliGRAM(s) Oral at bedtime PRN Insomnia      Allergies    gabapentin (Unknown)    Intolerances        LABS:                        10.0   9.79  )-----------( 188      ( 10 Nov 2024 07:08 )             31.1     11-10    143  |  113[H]  |  15  ----------------------------<  100[H]  3.5   |  22  |  0.67    Ca    8.7      10 Nov 2024 07:08        Urinalysis Basic - ( 10 Nov 2024 07:08 )    Color: x / Appearance: x / SG: x / pH: x  Gluc: 100 mg/dL / Ketone: x  / Bili: x / Urobili: x   Blood: x / Protein: x / Nitrite: x   Leuk Esterase: x / RBC: x / WBC x   Sq Epi: x / Non Sq Epi: x / Bacteria: x      Culture - Blood in AM (11.07.24 @ 05:51)   Specimen Source: .Blood BLOOD  Culture Results:   No growth at 48 Hours>100,000 CFU/ml Klebsiella pneumoniae ESBL  Organism Identification: Klebsiella pneumoniae ESBL  Organism: Klebsiella pneumoniae ESBL  Method Type: RYAN      CAPILLARY BLOOD GLUCOSE      POCT Blood Glucose.: 97 mg/dL (10 Nov 2024 07:52)  POCT Blood Glucose.: 93 mg/dL (09 Nov 2024 21:05)  POCT Blood Glucose.: 87 mg/dL (09 Nov 2024 16:46)  POCT Blood Glucose.: 90 mg/dL (09 Nov 2024 12:20)        RADIOLOGY & ADDITIONAL TESTS:    CXR:    Ct scan chest:    ekg;    echo: Patient is a 89y old  Female who presents with a chief complaint of CHARISSE, lactic acidosis (10 Nov 2024 06:34)  Patient is awake & alert, Sitting in bed in NAD. Doing well on RA.    INTERVAL HPI/OVERNIGHT EVENTS:      VITAL SIGNS:  T(F): 98.6 (11-10-24 @ 05:27)  HR: 86 (11-10-24 @ 05:27)  BP: 154/82 (11-10-24 @ 05:27)  RR: 18 (11-10-24 @ 05:27)  SpO2: 98% (11-10-24 @ 05:27)  Wt(kg): --  I&O's Detail          REVIEW OF SYSTEMS:    CONSTITUTIONAL:  No fevers, chills, sweats    HEENT:  Eyes:  No diplopia or blurred vision. ENT:  No earache, sore throat or runny nose.    CARDIOVASCULAR:  No pressure, squeezing, tightness, or heaviness about the chest; no palpitations.    RESPIRATORY:  Per HPI    GASTROINTESTINAL:  No abdominal pain, nausea, vomiting or diarrhea.    GENITOURINARY:  No dysuria, frequency or urgency.    NEUROLOGIC:  No paresthesias, fasciculations, seizures or weakness.    PSYCHIATRIC:  No disorder of thought or mood.      PHYSICAL EXAM:    Constitutional: Well developed and nourished  Eyes:Perrla  ENMT: normal  Neck:supple  Respiratory: good air entry  Cardiovascular: S1 S2 regular  Gastrointestinal: Soft, Non tender  Extremities: No edema  Vascular:normal  Neurological:Awake, alert,Ox3  Musculoskeletal:Normal      MEDICATIONS  (STANDING):  apixaban 2.5 milliGRAM(s) Oral every 12 hours  atorvastatin 10 milliGRAM(s) Oral at bedtime  dronedarone 400 milliGRAM(s) Oral two times a day  fluticasone propionate/ salmeterol 100-50 MICROgram(s) Diskus 1 Dose(s) Inhalation two times a day  insulin lispro (ADMELOG) corrective regimen sliding scale   SubCutaneous three times a day before meals  insulin lispro (ADMELOG) corrective regimen sliding scale   SubCutaneous at bedtime  metoprolol tartrate 25 milliGRAM(s) Oral every 8 hours  montelukast 10 milliGRAM(s) Oral at bedtime  pantoprazole    Tablet 40 milliGRAM(s) Oral before breakfast    MEDICATIONS  (PRN):  acetaminophen     Tablet .. 650 milliGRAM(s) Oral every 6 hours PRN Temp greater or equal to 38C (100.4F)  albuterol/ipratropium for Nebulization 3 milliLiter(s) Nebulizer every 6 hours PRN Bronchospasm  loperamide 2 milliGRAM(s) Oral four times a day PRN Diarrhea  melatonin 3 milliGRAM(s) Oral at bedtime PRN Insomnia      Allergies    gabapentin (Unknown)    Intolerances        LABS:                        10.0   9.79  )-----------( 188      ( 10 Nov 2024 07:08 )             31.1     11-10    143  |  113[H]  |  15  ----------------------------<  100[H]  3.5   |  22  |  0.67    Ca    8.7      10 Nov 2024 07:08        Urinalysis Basic - ( 10 Nov 2024 07:08 )    Color: x / Appearance: x / SG: x / pH: x  Gluc: 100 mg/dL / Ketone: x  / Bili: x / Urobili: x   Blood: x / Protein: x / Nitrite: x   Leuk Esterase: x / RBC: x / WBC x   Sq Epi: x / Non Sq Epi: x / Bacteria: x      Culture - Blood in AM (11.07.24 @ 05:51)   Specimen Source: .Blood BLOOD  Culture Results:   No growth at 48 Hours>100,000 CFU/ml Klebsiella pneumoniae ESBL  Organism Identification: Klebsiella pneumoniae ESBL  Organism: Klebsiella pneumoniae ESBL  Method Type: RYAN      CAPILLARY BLOOD GLUCOSE      POCT Blood Glucose.: 97 mg/dL (10 Nov 2024 07:52)  POCT Blood Glucose.: 93 mg/dL (09 Nov 2024 21:05)  POCT Blood Glucose.: 87 mg/dL (09 Nov 2024 16:46)  POCT Blood Glucose.: 90 mg/dL (09 Nov 2024 12:20)        RADIOLOGY & ADDITIONAL TESTS:    CXR:  < from: Xray Chest 1 View- PORTABLE-Urgent (11.02.24 @ 22:22) >  IMPRESSION:  No active parenchymal disease in the chest.        --- End of Report ---      < end of copied text >    Ct scan chest:    ekg;    echo:

## 2024-11-11 LAB
ANION GAP SERPL CALC-SCNC: 7 MMOL/L — SIGNIFICANT CHANGE UP (ref 5–17)
BASOPHILS # BLD AUTO: 0.05 K/UL — SIGNIFICANT CHANGE UP (ref 0–0.2)
BASOPHILS NFR BLD AUTO: 0.7 % — SIGNIFICANT CHANGE UP (ref 0–2)
BUN SERPL-MCNC: 12 MG/DL — SIGNIFICANT CHANGE UP (ref 7–18)
CALCIUM SERPL-MCNC: 8.9 MG/DL — SIGNIFICANT CHANGE UP (ref 8.4–10.5)
CHLORIDE SERPL-SCNC: 118 MMOL/L — HIGH (ref 96–108)
CO2 SERPL-SCNC: 21 MMOL/L — LOW (ref 22–31)
CREAT SERPL-MCNC: 0.7 MG/DL — SIGNIFICANT CHANGE UP (ref 0.5–1.3)
EGFR: 84 ML/MIN/1.73M2 — SIGNIFICANT CHANGE UP
EOSINOPHIL # BLD AUTO: 0.18 K/UL — SIGNIFICANT CHANGE UP (ref 0–0.5)
EOSINOPHIL NFR BLD AUTO: 2.4 % — SIGNIFICANT CHANGE UP (ref 0–6)
GLUCOSE BLDC GLUCOMTR-MCNC: 102 MG/DL — HIGH (ref 70–99)
GLUCOSE BLDC GLUCOMTR-MCNC: 83 MG/DL — SIGNIFICANT CHANGE UP (ref 70–99)
GLUCOSE BLDC GLUCOMTR-MCNC: 90 MG/DL — SIGNIFICANT CHANGE UP (ref 70–99)
GLUCOSE BLDC GLUCOMTR-MCNC: 97 MG/DL — SIGNIFICANT CHANGE UP (ref 70–99)
GLUCOSE SERPL-MCNC: 98 MG/DL — SIGNIFICANT CHANGE UP (ref 70–99)
HCT VFR BLD CALC: 30.8 % — LOW (ref 34.5–45)
HGB BLD-MCNC: 10.1 G/DL — LOW (ref 11.5–15.5)
IMM GRANULOCYTES NFR BLD AUTO: 0 % — SIGNIFICANT CHANGE UP (ref 0–0.9)
LYMPHOCYTES # BLD AUTO: 4.39 K/UL — HIGH (ref 1–3.3)
LYMPHOCYTES # BLD AUTO: 57.6 % — HIGH (ref 13–44)
MCHC RBC-ENTMCNC: 31.5 PG — SIGNIFICANT CHANGE UP (ref 27–34)
MCHC RBC-ENTMCNC: 32.8 G/DL — SIGNIFICANT CHANGE UP (ref 32–36)
MCV RBC AUTO: 96 FL — SIGNIFICANT CHANGE UP (ref 80–100)
MONOCYTES # BLD AUTO: 0.51 K/UL — SIGNIFICANT CHANGE UP (ref 0–0.9)
MONOCYTES NFR BLD AUTO: 6.7 % — SIGNIFICANT CHANGE UP (ref 2–14)
NEUTROPHILS # BLD AUTO: 2.49 K/UL — SIGNIFICANT CHANGE UP (ref 1.8–7.4)
NEUTROPHILS NFR BLD AUTO: 32.6 % — LOW (ref 43–77)
NRBC # BLD: 0 /100 WBCS — SIGNIFICANT CHANGE UP (ref 0–0)
PLATELET # BLD AUTO: 190 K/UL — SIGNIFICANT CHANGE UP (ref 150–400)
POTASSIUM SERPL-MCNC: 3.7 MMOL/L — SIGNIFICANT CHANGE UP (ref 3.5–5.3)
POTASSIUM SERPL-SCNC: 3.7 MMOL/L — SIGNIFICANT CHANGE UP (ref 3.5–5.3)
RBC # BLD: 3.21 M/UL — LOW (ref 3.8–5.2)
RBC # FLD: 16.5 % — HIGH (ref 10.3–14.5)
SODIUM SERPL-SCNC: 146 MMOL/L — HIGH (ref 135–145)
WBC # BLD: 7.62 K/UL — SIGNIFICANT CHANGE UP (ref 3.8–10.5)
WBC # FLD AUTO: 7.62 K/UL — SIGNIFICANT CHANGE UP (ref 3.8–10.5)

## 2024-11-11 RX ADMIN — ERTAPENEM SODIUM 120 MILLIGRAM(S): 1 INJECTION, POWDER, LYOPHILIZED, FOR SOLUTION INTRAMUSCULAR; INTRAVENOUS at 16:43

## 2024-11-11 RX ADMIN — MONTELUKAST SODIUM 10 MILLIGRAM(S): 10 TABLET, FILM COATED ORAL at 21:10

## 2024-11-11 RX ADMIN — DRONEDARONE 400 MILLIGRAM(S): 400 TABLET, FILM COATED ORAL at 05:34

## 2024-11-11 RX ADMIN — PANTOPRAZOLE SODIUM 40 MILLIGRAM(S): 40 TABLET, DELAYED RELEASE ORAL at 05:35

## 2024-11-11 RX ADMIN — Medication 10 MILLIGRAM(S): at 21:10

## 2024-11-11 RX ADMIN — Medication 25 MILLIGRAM(S): at 16:41

## 2024-11-11 RX ADMIN — APIXABAN 2.5 MILLIGRAM(S): 5 TABLET, FILM COATED ORAL at 05:35

## 2024-11-11 RX ADMIN — Medication 2 MILLIGRAM(S): at 18:33

## 2024-11-11 RX ADMIN — FLUTICASONE PROPIONATE AND SALMETEROL XINAFOATE 1 DOSE(S): 230; 21 AEROSOL, METERED RESPIRATORY (INHALATION) at 21:10

## 2024-11-11 RX ADMIN — DRONEDARONE 400 MILLIGRAM(S): 400 TABLET, FILM COATED ORAL at 18:29

## 2024-11-11 RX ADMIN — FLUTICASONE PROPIONATE AND SALMETEROL XINAFOATE 1 DOSE(S): 230; 21 AEROSOL, METERED RESPIRATORY (INHALATION) at 11:48

## 2024-11-11 RX ADMIN — Medication 25 MILLIGRAM(S): at 05:41

## 2024-11-11 RX ADMIN — APIXABAN 2.5 MILLIGRAM(S): 5 TABLET, FILM COATED ORAL at 18:29

## 2024-11-11 RX ADMIN — Medication 25 MILLIGRAM(S): at 21:10

## 2024-11-11 NOTE — PROGRESS NOTE ADULT - ASSESSMENT
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 Ct angio- no pe, resolved prior pna. l upper lobe nodules. Patient to be admitted for new onset CHARISSE with lactic acidosis likely in the setting of UTI and albuterol use. ID following.  Hospital course A-Fib w/RVR on 11/4/24 and spontaneously converted to sinus. Card following, now dc tele.   Remains on Ertapenem 1g qd until 11/13.      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 Ct angio- no pe, resolved prior pna. l upper lobe nodules. Patient to be admitted for new onset CHARISSE with lactic acidosis likely in the setting of UTI and albuterol use. ID following.  Hospital course A-Fib w/RVR on 11/4/24 and spontaneously converted to sinus. Card following, now dc tele.   Remains on Ertapenem 1g qd until 11/13.

## 2024-11-11 NOTE — PROGRESS NOTE ADULT - ASSESSMENT
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 On admission, she found to have no fever but Leukocytosis and positive Urine analysis. The CXR is negative. She has started on Ceftriaxone and Azithromycin, and the ID consult requested to assist with further evaluation and antibiotic management.    # UTI - Urine Cx grew ESBL  Klebsiella  # Leukocytosis - resolved    would recommend:    1. OOB to chair   2. Continue Ertapenem until 11/13/24 X 2 more days    d/w covering NP, Ray    Attending Attestation:    Spent more than 35 minutes on total encounter, more than 50 % of the visit was spent counseling and/or coordinating care by the Attending physician.

## 2024-11-11 NOTE — PROGRESS NOTE ADULT - SUBJECTIVE AND OBJECTIVE BOX
Patient is a 89y old  Female who presents with a chief complaint of CHARISSE, lactic acidosis (11 Nov 2024 13:01)    OVERNIGHT EVENTS: no acute changes.     Pt is aox2 (not time), comfortable appearing laying in bed, requires assistance with oral feedings.     REVIEW OF SYSTEMS:  unable to assess due to mental status    T(C): 36.5 (11-11-24 @ 13:33), Max: 37 (11-10-24 @ 20:24)  HR: 78 (11-11-24 @ 13:33) (69 - 87)  BP: 125/62 (11-11-24 @ 13:33) (117/63 - 132/77)  RR: 18 (11-11-24 @ 13:33) (17 - 18)  SpO2: 98% (11-11-24 @ 13:33) (96% - 98%)  Wt(kg): --Vital Signs Last 24 Hrs  T(C): 36.5 (11 Nov 2024 13:33), Max: 37 (10 Nov 2024 20:24)  T(F): 97.7 (11 Nov 2024 13:33), Max: 98.6 (10 Nov 2024 20:24)  HR: 78 (11 Nov 2024 13:33) (69 - 87)  BP: 125/62 (11 Nov 2024 13:33) (117/63 - 132/77)  BP(mean): --  RR: 18 (11 Nov 2024 13:33) (17 - 18)  SpO2: 98% (11 Nov 2024 13:33) (96% - 98%)    Parameters below as of 11 Nov 2024 13:33  Patient On (Oxygen Delivery Method): room air    MEDICATIONS  (STANDING):  apixaban 2.5 milliGRAM(s) Oral every 12 hours  atorvastatin 10 milliGRAM(s) Oral at bedtime  dronedarone 400 milliGRAM(s) Oral two times a day  ertapenem  IVPB 1000 milliGRAM(s) IV Intermittent every 24 hours  fluticasone propionate/ salmeterol 100-50 MICROgram(s) Diskus 1 Dose(s) Inhalation two times a day  insulin lispro (ADMELOG) corrective regimen sliding scale   SubCutaneous at bedtime  insulin lispro (ADMELOG) corrective regimen sliding scale   SubCutaneous three times a day before meals  metoprolol tartrate 25 milliGRAM(s) Oral every 8 hours  montelukast 10 milliGRAM(s) Oral at bedtime  pantoprazole    Tablet 40 milliGRAM(s) Oral before breakfast    MEDICATIONS  (PRN):  acetaminophen     Tablet .. 650 milliGRAM(s) Oral every 6 hours PRN Temp greater or equal to 38C (100.4F)  albuterol/ipratropium for Nebulization 3 milliLiter(s) Nebulizer every 6 hours PRN Bronchospasm  loperamide 2 milliGRAM(s) Oral four times a day PRN Diarrhea  melatonin 3 milliGRAM(s) Oral at bedtime PRN Insomnia      PHYSICAL EXAM:  GENERAL: NAD  EYES: clear conjunctiva  ENMT: Moist mucous membranes  NECK: Supple, No JVD, Normal thyroid  CHEST/LUNG: Clear to auscultation bilaterally; No rales, rhonchi, wheezing, or rubs  HEART: S1, S2, Regular rate and rhythm  ABDOMEN: Soft, Nontender, Nondistended; Bowel sounds present  NEURO: confused   EXTREMITIES: No LE edema, no calf tenderness  LYMPH: No lymphadenopathy noted  SKIN: No rashes or lesions    Consultant(s) Notes Reviewed:  [x ] YES  [ ] NO  Care Discussed with Consultants/Other Providers [ x] YES  [ ] NO    LABS:                        10.1   7.62  )-----------( 190      ( 11 Nov 2024 05:31 )             30.8     11-11    146[H]  |  118[H]  |  12  ----------------------------<  98  3.7   |  21[L]  |  0.70    Ca    8.9      11 Nov 2024 05:31        CAPILLARY BLOOD GLUCOSE      POCT Blood Glucose.: 90 mg/dL (11 Nov 2024 11:17)  POCT Blood Glucose.: 102 mg/dL (11 Nov 2024 07:35)  POCT Blood Glucose.: 97 mg/dL (10 Nov 2024 21:33)  POCT Blood Glucose.: 93 mg/dL (10 Nov 2024 17:07)        Urinalysis Basic - ( 11 Nov 2024 05:31 )    Color: x / Appearance: x / SG: x / pH: x  Gluc: 98 mg/dL / Ketone: x  / Bili: x / Urobili: x   Blood: x / Protein: x / Nitrite: x   Leuk Esterase: x / RBC: x / WBC x   Sq Epi: x / Non Sq Epi: x / Bacteria: x        RADIOLOGY & ADDITIONAL TESTS:  < from: CT Angio Chest PE Protocol w/ IV Cont (11.04.24 @ 15:19) >    ACC: [id number] EXAM:  CT ANGIO CHEST PULM [facility]   ORDERED BY: LATOYA DAWN     PROCEDURE DATE:  11/04/2024          INTERPRETATION:  INDICATION: Shortness of breath, chest tightness ,   history of    TECHNIQUE: Helical acquisition of the chest after the administration of   53 mL of Omnipaque 350. Maximum intensity projection images were   generated.    COMPARISON: CT chest 9/13/2024.    FINDINGS:    HEART/VASCULATURE: No pulmonary embolus. Normal heart size. No   pericardial effusion. Coronary artery calcification. Normal aortic size.    LUNGS/AIRWAYS/PLEURA: Patent trachea and bronchi. Unchanged unchanged   small cluster of sub-4 mm nodules in the left upper lobe (2:52-55). Image   acquisition during exhalation. Calcified granuloma in the middle lobe. No   pleural effusion.    LYMPH NODES/MEDIASTINUM: No lymphadenopathy.    UPPER ABDOMEN: Cholecystectomy.    BONES/SOFT TISSUES: Multiple old rib fractures.      IMPRESSION:    No pulmonary embolus.    Resolved prior pneumonia.    Unchanged small left upper lobe nodules.    --- End of Report ---            MENDEZ GOODWIN M.D., ATTENDING RADIOLOGIST  This document has been electronically signed. Nov 4 2024  3:36PM    < end of copied text >    Imaging Personally Reviewed:  [ ] YES  [ ] NO   Patient is a 89y old  Female who presents with a chief complaint of CHARISSE, lactic acidosis (11 Nov 2024 13:01)    OVERNIGHT EVENTS: no acute changes.     Pt is aox2 (not time), comfortable appearing laying in bed, requires assistance with oral feedings.     REVIEW OF SYSTEMS:  #[id number]  unable to assess due to mental status    T(C): 36.5 (11-11-24 @ 13:33), Max: 37 (11-10-24 @ 20:24)  HR: 78 (11-11-24 @ 13:33) (69 - 87)  BP: 125/62 (11-11-24 @ 13:33) (117/63 - 132/77)  RR: 18 (11-11-24 @ 13:33) (17 - 18)  SpO2: 98% (11-11-24 @ 13:33) (96% - 98%)  Wt(kg): --Vital Signs Last 24 Hrs  T(C): 36.5 (11 Nov 2024 13:33), Max: 37 (10 Nov 2024 20:24)  T(F): 97.7 (11 Nov 2024 13:33), Max: 98.6 (10 Nov 2024 20:24)  HR: 78 (11 Nov 2024 13:33) (69 - 87)  BP: 125/62 (11 Nov 2024 13:33) (117/63 - 132/77)  BP(mean): --  RR: 18 (11 Nov 2024 13:33) (17 - 18)  SpO2: 98% (11 Nov 2024 13:33) (96% - 98%)    Parameters below as of 11 Nov 2024 13:33  Patient On (Oxygen Delivery Method): room air    MEDICATIONS  (STANDING):  apixaban 2.5 milliGRAM(s) Oral every 12 hours  atorvastatin 10 milliGRAM(s) Oral at bedtime  dronedarone 400 milliGRAM(s) Oral two times a day  ertapenem  IVPB 1000 milliGRAM(s) IV Intermittent every 24 hours  fluticasone propionate/ salmeterol 100-50 MICROgram(s) Diskus 1 Dose(s) Inhalation two times a day  insulin lispro (ADMELOG) corrective regimen sliding scale   SubCutaneous at bedtime  insulin lispro (ADMELOG) corrective regimen sliding scale   SubCutaneous three times a day before meals  metoprolol tartrate 25 milliGRAM(s) Oral every 8 hours  montelukast 10 milliGRAM(s) Oral at bedtime  pantoprazole    Tablet 40 milliGRAM(s) Oral before breakfast    MEDICATIONS  (PRN):  acetaminophen     Tablet .. 650 milliGRAM(s) Oral every 6 hours PRN Temp greater or equal to 38C (100.4F)  albuterol/ipratropium for Nebulization 3 milliLiter(s) Nebulizer every 6 hours PRN Bronchospasm  loperamide 2 milliGRAM(s) Oral four times a day PRN Diarrhea  melatonin 3 milliGRAM(s) Oral at bedtime PRN Insomnia      PHYSICAL EXAM:  GENERAL: NAD  EYES: clear conjunctiva  ENMT: Moist mucous membranes  NECK: Supple, No JVD, Normal thyroid  CHEST/LUNG: Clear to auscultation bilaterally; No rales, rhonchi, wheezing, or rubs  HEART: S1, S2, Regular rate and rhythm  ABDOMEN: Soft, Nontender, Nondistended; Bowel sounds present  NEURO: confused   EXTREMITIES: No LE edema, no calf tenderness  LYMPH: No lymphadenopathy noted  SKIN: No rashes or lesions    Consultant(s) Notes Reviewed:  [x ] YES  [ ] NO  Care Discussed with Consultants/Other Providers [ x] YES  [ ] NO    LABS:                        10.1   7.62  )-----------( 190      ( 11 Nov 2024 05:31 )             30.8     11-11    146[H]  |  118[H]  |  12  ----------------------------<  98  3.7   |  21[L]  |  0.70    Ca    8.9      11 Nov 2024 05:31        CAPILLARY BLOOD GLUCOSE      POCT Blood Glucose.: 90 mg/dL (11 Nov 2024 11:17)  POCT Blood Glucose.: 102 mg/dL (11 Nov 2024 07:35)  POCT Blood Glucose.: 97 mg/dL (10 Nov 2024 21:33)  POCT Blood Glucose.: 93 mg/dL (10 Nov 2024 17:07)        Urinalysis Basic - ( 11 Nov 2024 05:31 )    Color: x / Appearance: x / SG: x / pH: x  Gluc: 98 mg/dL / Ketone: x  / Bili: x / Urobili: x   Blood: x / Protein: x / Nitrite: x   Leuk Esterase: x / RBC: x / WBC x   Sq Epi: x / Non Sq Epi: x / Bacteria: x        RADIOLOGY & ADDITIONAL TESTS:  < from: CT Angio Chest PE Protocol w/ IV Cont (11.04.24 @ 15:19) >    ACC: [id number] EXAM:  CT ANGIO CHEST PULM [facility]   ORDERED BY: LATOYA DAWN     PROCEDURE DATE:  11/04/2024          INTERPRETATION:  INDICATION: Shortness of breath, chest tightness ,   history of    TECHNIQUE: Helical acquisition of the chest after the administration of   53 mL of Omnipaque 350. Maximum intensity projection images were   generated.    COMPARISON: CT chest 9/13/2024.    FINDINGS:    HEART/VASCULATURE: No pulmonary embolus. Normal heart size. No   pericardial effusion. Coronary artery calcification. Normal aortic size.    LUNGS/AIRWAYS/PLEURA: Patent trachea and bronchi. Unchanged unchanged   small cluster of sub-4 mm nodules in the left upper lobe (2:52-55). Image   acquisition during exhalation. Calcified granuloma in the middle lobe. No   pleural effusion.    LYMPH NODES/MEDIASTINUM: No lymphadenopathy.    UPPER ABDOMEN: Cholecystectomy.    BONES/SOFT TISSUES: Multiple old rib fractures.      IMPRESSION:    No pulmonary embolus.    Resolved prior pneumonia.    Unchanged small left upper lobe nodules.    --- End of Report ---            MENDEZ GOODWIN M.D., ATTENDING RADIOLOGIST  This document has been electronically signed. Nov 4 2024  3:36PM    < end of copied text >    Imaging Personally Reviewed:  [ ] YES  [ ] NO

## 2024-11-11 NOTE — PROGRESS NOTE ADULT - ASSESSMENT
88 y/o F, [place] speaking, from home, ambulates with wheelchair (mostly bedbound), PMH HTN, HLD, T2DM, osteoporosis, Stage IIB gastric adenocarcinoma s/p distal gastrectomy (2015), multiple myeloma/plastocytoma on chemo (follows [facility] Dr Adams), that is brought in by family for SOB,s/p CHARISSE, found to be in afib 11/4/24 and  spontaneously converted to sinus,UTI-ESBL.  1.Off Tele monitoring.  2.PAF-eliquis, multaq,lopressor 25mg tid.  3.HTN-b blocker  4.DM-insulin.  5.CHARISSE-resolved.  6.UTI-ESBL-ABX as per ID.  7.PPI.

## 2024-11-11 NOTE — PROGRESS NOTE ADULT - PROBLEM SELECTOR PLAN 2
- 11/4: Noted to be in Afib on Tele with rate 140-160s (no hx of Afib)  - EKG Afib w/ RVR rate 144  - S/p Digoxin 0.5mg x1 dose  - C/w eliquis, multaq, change lopressor 25mg q8h  - now back in Sinus Rhythm  - Card Dr Jennings following

## 2024-11-11 NOTE — PROGRESS NOTE ADULT - PROBLEM SELECTOR PLAN 3
culture noted  Repeat BC as per ID (had recurrent fever while on Atbs)  antibiotics till 11/13  ID follow up

## 2024-11-11 NOTE — PROGRESS NOTE ADULT - SUBJECTIVE AND OBJECTIVE BOX
Patient is a 89y old  Female who presents with a chief complaint of CHARISSE, lactic acidosis (11 Nov 2024 06:21)  Awake, alert, comfortable in bed in NAD. Clinically improved    INTERVAL HPI/OVERNIGHT EVENTS:      VITAL SIGNS:  T(F): 98.6 (11-11-24 @ 04:56)  HR: 79 (11-11-24 @ 04:56)  BP: 128/60 (11-11-24 @ 04:56)  RR: 18 (11-11-24 @ 04:56)  SpO2: 97% (11-11-24 @ 04:56)  Wt(kg): --  I&O's Detail    10 Nov 2024 07:01  -  11 Nov 2024 07:00  --------------------------------------------------------  IN:  Total IN: 0 mL    OUT:    Voided (mL): 200 mL  Total OUT: 200 mL    Total NET: -200 mL              REVIEW OF SYSTEMS:    CONSTITUTIONAL:  No fevers, chills, sweats    HEENT:  Eyes:  No diplopia or blurred vision. ENT:  No earache, sore throat or runny nose.    CARDIOVASCULAR:  No pressure, squeezing, tightness, or heaviness about the chest; no palpitations.    RESPIRATORY:  Per HPI    GASTROINTESTINAL:  No abdominal pain, nausea, vomiting or diarrhea.    GENITOURINARY:  No dysuria, frequency or urgency.    NEUROLOGIC:  No paresthesias, fasciculations, seizures or weakness.    PSYCHIATRIC:  No disorder of thought or mood.      PHYSICAL EXAM:    Constitutional: Well developed and nourished  Eyes:Perrla  ENMT: normal  Neck:supple  Respiratory: good air entry  Cardiovascular: S1 S2 regular  Gastrointestinal: Soft, Non tender  Extremities: No edema  Vascular:normal  Neurological:Awake, alert,Ox3  Musculoskeletal:Normal      MEDICATIONS  (STANDING):  apixaban 2.5 milliGRAM(s) Oral every 12 hours  atorvastatin 10 milliGRAM(s) Oral at bedtime  dronedarone 400 milliGRAM(s) Oral two times a day  ertapenem  IVPB 1000 milliGRAM(s) IV Intermittent every 24 hours  fluticasone propionate/ salmeterol 100-50 MICROgram(s) Diskus 1 Dose(s) Inhalation two times a day  insulin lispro (ADMELOG) corrective regimen sliding scale   SubCutaneous three times a day before meals  insulin lispro (ADMELOG) corrective regimen sliding scale   SubCutaneous at bedtime  metoprolol tartrate 25 milliGRAM(s) Oral every 8 hours  montelukast 10 milliGRAM(s) Oral at bedtime  pantoprazole    Tablet 40 milliGRAM(s) Oral before breakfast    MEDICATIONS  (PRN):  acetaminophen     Tablet .. 650 milliGRAM(s) Oral every 6 hours PRN Temp greater or equal to 38C (100.4F)  albuterol/ipratropium for Nebulization 3 milliLiter(s) Nebulizer every 6 hours PRN Bronchospasm  loperamide 2 milliGRAM(s) Oral four times a day PRN Diarrhea  melatonin 3 milliGRAM(s) Oral at bedtime PRN Insomnia      Allergies    gabapentin (Unknown)    Intolerances        LABS:                        10.1   7.62  )-----------( 190      ( 11 Nov 2024 05:31 )             30.8     11-11    146[H]  |  118[H]  |  12  ----------------------------<  98  3.7   |  21[L]  |  0.70    Ca    8.9      11 Nov 2024 05:31        Urinalysis Basic - ( 11 Nov 2024 05:31 )    Color: x / Appearance: x / SG: x / pH: x  Gluc: 98 mg/dL / Ketone: x  / Bili: x / Urobili: x   Blood: x / Protein: x / Nitrite: x   Leuk Esterase: x / RBC: x / WBC x   Sq Epi: x / Non Sq Epi: x / Bacteria: x            CAPILLARY BLOOD GLUCOSE      POCT Blood Glucose.: 102 mg/dL (11 Nov 2024 07:35)  POCT Blood Glucose.: 97 mg/dL (10 Nov 2024 21:33)  POCT Blood Glucose.: 93 mg/dL (10 Nov 2024 17:07)  POCT Blood Glucose.: 121 mg/dL (10 Nov 2024 11:29)        RADIOLOGY & ADDITIONAL TESTS:    CXR:    Ct scan chest:    ekg;    echo:

## 2024-11-11 NOTE — PROGRESS NOTE ADULT - SUBJECTIVE AND OBJECTIVE BOX
Date of Service 11-11-24 @ 10:53    CHIEF COMPLAINT:Patient is a 89y old  Female who presents with a chief complaint of CHARISSE, lactic acidosis .Pt appears comfortable.    	  REVIEW OF SYSTEMS:  CONSTITUTIONAL: No fever, weight loss, or fatigue  EYES: No eye pain, visual disturbances, or discharge  ENT:  No difficulty hearing, tinnitus, vertigo; No sinus or throat pain  NECK: No pain or stiffness  RESPIRATORY: No cough, wheezing, chills or hemoptysis; No Shortness of Breath  CARDIOVASCULAR: No chest pain, palpitations, passing out, dizziness, or leg swelling  GASTROINTESTINAL: No abdominal or epigastric pain. No nausea, vomiting, or hematemesis; No diarrhea or constipation. No melena or hematochezia.  GENITOURINARY: No dysuria, frequency, hematuria, or incontinence  NEUROLOGICAL: No headaches, memory loss, loss of strength, numbness, or tremors  SKIN: No itching, burning, rashes, or lesions   LYMPH Nodes: No enlarged glands  ENDOCRINE: No heat or cold intolerance; No hair loss  MUSCULOSKELETAL: No joint pain or swelling; No muscle, back, or extremity pain  PSYCHIATRIC: No depression, anxiety, mood swings, or difficulty sleeping  HEME/LYMPH: No easy bruising, or bleeding gums  ALLERGY AND IMMUNOLOGIC: No hives or eczema	      PHYSICAL EXAM:  T(C): 37 (11-11-24 @ 04:56), Max: 37 (11-10-24 @ 20:24)  HR: 79 (11-11-24 @ 04:56) (69 - 87)  BP: 128/60 (11-11-24 @ 04:56) (117/63 - 132/77)  RR: 18 (11-11-24 @ 04:56) (17 - 18)  SpO2: 97% (11-11-24 @ 04:56) (96% - 98%)  Wt(kg): --  I&O's Summary    10 Nov 2024 07:01  -  11 Nov 2024 07:00  --------------------------------------------------------  IN: 0 mL / OUT: 200 mL / NET: -200 mL        Appearance: Normal	  HEENT:   Normal oral mucosa, PERRL, EOMI	  Lymphatic: No lymphadenopathy  Cardiovascular: Normal S1 S2, No JVD, No murmurs, No edema  Respiratory: Lungs clear to auscultation	  Psychiatry: A & O x 3, Mood & affect appropriate  Gastrointestinal:  Soft, Non-tender, + BS	  Skin: No rashes, No ecchymoses, No cyanosis	  Neurologic: Non-focal  Extremities: Normal range of motion, No clubbing, cyanosis or edema  Vascular: Peripheral pulses palpable 2+ bilaterally    MEDICATIONS  (STANDING):  apixaban 2.5 milliGRAM(s) Oral every 12 hours  atorvastatin 10 milliGRAM(s) Oral at bedtime  dronedarone 400 milliGRAM(s) Oral two times a day  ertapenem  IVPB 1000 milliGRAM(s) IV Intermittent every 24 hours  fluticasone propionate/ salmeterol 100-50 MICROgram(s) Diskus 1 Dose(s) Inhalation two times a day  insulin lispro (ADMELOG) corrective regimen sliding scale   SubCutaneous at bedtime  insulin lispro (ADMELOG) corrective regimen sliding scale   SubCutaneous three times a day before meals  metoprolol tartrate 25 milliGRAM(s) Oral every 8 hours  montelukast 10 milliGRAM(s) Oral at bedtime  pantoprazole    Tablet 40 milliGRAM(s) Oral before breakfast      LABS:	 	                         10.1   7.62  )-----------( 190      ( 11 Nov 2024 05:31 )             30.8     11-11    146[H]  |  118[H]  |  12  ----------------------------<  98  3.7   |  21[L]  |  0.70    Ca    8.9      11 Nov 2024 05:31

## 2024-11-11 NOTE — PROGRESS NOTE ADULT - SUBJECTIVE AND OBJECTIVE BOX
Patient is seen and examined at the bed side, is afebrile. She has normal BM.      REVIEW OF SYSTEMS: All other review systems are negative      ALLERGIES: gabapentin (Unknown)      Vital Signs Last 24 Hrs  T(C): 36.5 (11 Nov 2024 13:33), Max: 37 (10 Nov 2024 20:24)  T(F): 97.7 (11 Nov 2024 13:33), Max: 98.6 (10 Nov 2024 20:24)  HR: 78 (11 Nov 2024 13:33) (69 - 84)  BP: 125/62 (11 Nov 2024 13:33) (118/65 - 132/77)  BP(mean): --  RR: 18 (11 Nov 2024 13:33) (17 - 18)  SpO2: 98% (11 Nov 2024 13:33) (96% - 98%)    Parameters below as of 11 Nov 2024 13:33  Patient On (Oxygen Delivery Method): room air      PHYSICAL EXAM:  GENERAL: Not in distress   CHEST/LUNG:  Air entry bilaterally  HEART: s1 and s2 present  ABDOMEN:  Nontender and  Nondistended  EXTREMITIES: No pedal  edema  CNS: Awake and Alert      LABS:                        10.1   7.62  )-----------( 190      ( 11 Nov 2024 05:31 )             30.8                           10.0   9.79  )-----------( 188      ( 10 Nov 2024 07:08 )             31.1         11-11    146[H]  |  118[H]  |  12  ----------------------------<  98  3.7   |  21[L]  |  0.70    Ca    8.9      11 Nov 2024 05:31        11-10    143  |  113[H]  |  15  ----------------------------<  100[H]  3.5   |  22  |  0.67    Ca    8.7      10 Nov 2024 07:08    TPro  6.2  /  Alb  3.0[L]  /  TBili  0.2  /  DBili  x   /  AST  24  /  ALT  17  /  AlkPhos  47  11-02      CAPILLARY BLOOD GLUCOSE  POCT Blood Glucose.: 96 mg/dL (03 Nov 2024 12:18)  POCT Blood Glucose.: 85 mg/dL (03 Nov 2024 07:59)  POCT Blood Glucose.: 93 mg/dL (03 Nov 2024 04:48)      MEDICATIONS  (STANDING):    apixaban 2.5 milliGRAM(s) Oral every 12 hours  atorvastatin 10 milliGRAM(s) Oral at bedtime  dronedarone 400 milliGRAM(s) Oral two times a day  ertapenem  IVPB 1000 milliGRAM(s) IV Intermittent every 24 hours  fluticasone propionate/ salmeterol 100-50 MICROgram(s) Diskus 1 Dose(s) Inhalation two times a day  insulin lispro (ADMELOG) corrective regimen sliding scale   SubCutaneous three times a day before meals  insulin lispro (ADMELOG) corrective regimen sliding scale   SubCutaneous at bedtime  metoprolol tartrate 25 milliGRAM(s) Oral every 8 hours  montelukast 10 milliGRAM(s) Oral at bedtime  pantoprazole    Tablet 40 milliGRAM(s) Oral before breakfast        RADIOLOGY & ADDITIONAL TESTS:    11/4/24  : CT Angio Chest PE Protocol w/ IV Cont (11.04.24 @ 15:19) No pulmonary embolus.    Resolved prior pneumonia.  Unchanged small left upper lobe nodules.      11/2/24: Xray Chest 1 View- PORTABLE-Urgent (11.02.24 @ 22:22) No active parenchymal disease in the chest.      MICROBIOLOGY DATA:    Culture - Blood in AM (11.07.24 @ 05:51)   Specimen Source: .Blood BLOOD  Culture Results: No growth at 72 hours    Culture - Urine (11.03.24 @ 04:09)   - Nitrofurantoin: S <=32 Should not be used to treat pyelonephritis  - Piperacillin/Tazobactam: S <=8  - Tobramycin: S <=2  - Trimethoprim/Sulfamethoxazole: R >2/38  - Ampicillin: R >16 These ampicillin results predict results for amoxicillin  - Ampicillin/Sulbactam: R >16/8  - Aztreonam: R >16  - Cefazolin: R >16 For uncomplicated UTI with K. pneumoniae, E. coli, or P. mirablis: RYAN <=16 is sensitive and RYAN >=32 is resistant. This also predicts results for oral agents cefaclor, cefdinir, cefpodoxime, cefprozil, cefuroxime axetil, cephalexin and locarbef for uncomplicated UTI. Note that some isolates may be susceptible to these agents while testing resistant to cefazolin.  - Cefepime: R >16  - Ceftriaxone: R >32  - Cefuroxime: R >16  - Ciprofloxacin: S <=0.25  - Ertapenem: S <=0.5  - Gentamicin: S <=2  - Imipenem: S <=1  - Levofloxacin: S <=0.5  - Meropenem: S <=1  Specimen Source: Clean Catch  Culture Results:   >100,000 CFU/ml Klebsiella pneumoniae ESBL  Organism Identification: Klebsiella pneumoniae ESBL  Organism: Klebsiella pneumoniae ESBL  Method Type: RYAN    Culture - Urine (11.03.24 @ 04:09)   Specimen Source: Clean Catch  Culture Results:   >100,000 CFU/ml Gram Negative Rods    Urine Microscopic-Add On (NC) (11.03.24 @ 04:09)   Bacteria: Few /HPF  Comment - Urine: Numerous Budding yeasts found  Squamous Epithelial Cells: Present  Red Blood Cell - Urine: 5 /HPF  White Blood Cell - Urine: Too Numerous to count /HPF

## 2024-11-11 NOTE — PROGRESS NOTE ADULT - PROBLEM SELECTOR PLAN 11
DVT ppx: Eliquis 2.5 mg BID   GI ppx: Protonix 40mg    #Dispo  -awaiting completion of abx Ertapenem on 11/13

## 2024-11-11 NOTE — PROGRESS NOTE ADULT - SUBJECTIVE AND OBJECTIVE BOX
Patient is a 89y old  Female who presents with a chief complaint of CHARISSE, lactic acidosis (10 Nov 2024 14:09)    pt seen in icu [  ], reg med floor [ x  ], bed [ x ], chair at bedside [   ], awake and responsive [x  ], lethargic [  ],    nad [x ]      Allergies    gabapentin (Unknown)        Vitals    T(F): 98.6 (11-11-24 @ 04:56), Max: 98.6 (11-10-24 @ 20:24)  HR: 79 (11-11-24 @ 04:56) (69 - 87)  BP: 128/60 (11-11-24 @ 04:56) (111/64 - 132/77)  RR: 18 (11-11-24 @ 04:56) (17 - 18)  SpO2: 97% (11-11-24 @ 04:56) (96% - 98%)  Wt(kg): --  CAPILLARY BLOOD GLUCOSE      POCT Blood Glucose.: 97 mg/dL (10 Nov 2024 21:33)      Labs                          10.1   7.62  )-----------( 190      ( 11 Nov 2024 05:31 )             30.8       11-10    143  |  113[H]  |  15  ----------------------------<  100[H]  3.5   |  22  |  0.67    Ca    8.7      10 Nov 2024 07:08              .Blood BLOOD  11-07 @ 05:51   No growth at 72 Hours  --  --      Clean Catch  11-03 @ 04:09   >100,000 CFU/ml Klebsiella pneumoniae ESBL  --  Klebsiella pneumoniae ESBL      Clean Catch  09-15 @ 01:09   <10,000 CFU/mL Normal Urogenital Natalie  --  --      .Blood Blood-Peripheral  09-13 @ 01:07   No growth at 5 days  --  --      .Blood Blood-Peripheral  09-13 @ 01:00   No growth at 5 days  --  --          Radiology Results      Meds    MEDICATIONS  (STANDING):  apixaban 2.5 milliGRAM(s) Oral every 12 hours  atorvastatin 10 milliGRAM(s) Oral at bedtime  dronedarone 400 milliGRAM(s) Oral two times a day  ertapenem  IVPB 1000 milliGRAM(s) IV Intermittent every 24 hours  fluticasone propionate/ salmeterol 100-50 MICROgram(s) Diskus 1 Dose(s) Inhalation two times a day  insulin lispro (ADMELOG) corrective regimen sliding scale   SubCutaneous three times a day before meals  insulin lispro (ADMELOG) corrective regimen sliding scale   SubCutaneous at bedtime  metoprolol tartrate 25 milliGRAM(s) Oral every 8 hours  montelukast 10 milliGRAM(s) Oral at bedtime  pantoprazole    Tablet 40 milliGRAM(s) Oral before breakfast      MEDICATIONS  (PRN):  acetaminophen     Tablet .. 650 milliGRAM(s) Oral every 6 hours PRN Temp greater or equal to 38C (100.4F)  albuterol/ipratropium for Nebulization 3 milliLiter(s) Nebulizer every 6 hours PRN Bronchospasm  loperamide 2 milliGRAM(s) Oral four times a day PRN Diarrhea  melatonin 3 milliGRAM(s) Oral at bedtime PRN Insomnia      Physical Exam    Neuro :  no focal deficits  Respiratory: CTA B/L  CV: RRR, S1S2, no murmurs,   Abdominal: Soft, NT, ND +BS,  Extremities: No edema, + peripheral pulses      ASSESSMENT    pneumonia r/o     sepsis,   uti,   paf   h/o HTN,   HLD,   T2DM,   osteoporosis,   Stage IIB gastric adenocarcinoma s/p distal gastrectomy (2015),   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 Resolved prior pneumonia.  Unchanged small left upper lobe nodules noted    id f/u   Continue Ertapenem until 11/13/24   blood cx neg noted above  pulm f/u   atrov and prov nebs,   supplemental oxygen prn,   robitussin prn,   Budesonide 0.25 mgs neb BID  singulair qhs   PFTs as OP.  hold outpt dm meds,   lispro ss,   hgba1c 5.5 noted     heme onc f/u   pt currently on quadruple therapy, last tx 11/01/24   hold tumor directed tx during the hospital course   f/u with Dr. Alvarado on d/c  pt found to be in afib 11/4/24 and  spontaneously converted to sinus.  cardio f/u   cont eliquis,  cont multaq,   cont lopressor 25mg bid.  cont d/c'd procardia xl.  cont current meds   d/c plan for tuesday 11/13/24       Patient is a 89y old  Female who presents with a chief complaint of CHARISSE, lactic acidosis (10 Nov 2024 14:09)    pt seen in icu [  ], reg med floor [ x  ], bed [ x ], chair at bedside [   ], awake and responsive [x  ], lethargic [  ],    nad [x ]      Allergies    gabapentin (Unknown)        Vitals    T(F): 98.6 (11-11-24 @ 04:56), Max: 98.6 (11-10-24 @ 20:24)  HR: 79 (11-11-24 @ 04:56) (69 - 87)  BP: 128/60 (11-11-24 @ 04:56) (111/64 - 132/77)  RR: 18 (11-11-24 @ 04:56) (17 - 18)  SpO2: 97% (11-11-24 @ 04:56) (96% - 98%)  Wt(kg): --  CAPILLARY BLOOD GLUCOSE      POCT Blood Glucose.: 97 mg/dL (10 Nov 2024 21:33)      Labs                          10.1   7.62  )-----------( 190      ( 11 Nov 2024 05:31 )             30.8       11-10    143  |  113[H]  |  15  ----------------------------<  100[H]  3.5   |  22  |  0.67    Ca    8.7      10 Nov 2024 07:08              .Blood BLOOD  11-07 @ 05:51   No growth at 72 Hours  --  --      Clean Catch  11-03 @ 04:09   >100,000 CFU/ml Klebsiella pneumoniae ESBL  --  Klebsiella pneumoniae ESBL      Clean Catch  09-15 @ 01:09   <10,000 CFU/mL Normal Urogenital Natalie  --  --      .Blood Blood-Peripheral  09-13 @ 01:07   No growth at 5 days  --  --      .Blood Blood-Peripheral  09-13 @ 01:00   No growth at 5 days  --  --          Radiology Results      Meds    MEDICATIONS  (STANDING):  apixaban 2.5 milliGRAM(s) Oral every 12 hours  atorvastatin 10 milliGRAM(s) Oral at bedtime  dronedarone 400 milliGRAM(s) Oral two times a day  ertapenem  IVPB 1000 milliGRAM(s) IV Intermittent every 24 hours  fluticasone propionate/ salmeterol 100-50 MICROgram(s) Diskus 1 Dose(s) Inhalation two times a day  insulin lispro (ADMELOG) corrective regimen sliding scale   SubCutaneous three times a day before meals  insulin lispro (ADMELOG) corrective regimen sliding scale   SubCutaneous at bedtime  metoprolol tartrate 25 milliGRAM(s) Oral every 8 hours  montelukast 10 milliGRAM(s) Oral at bedtime  pantoprazole    Tablet 40 milliGRAM(s) Oral before breakfast      MEDICATIONS  (PRN):  acetaminophen     Tablet .. 650 milliGRAM(s) Oral every 6 hours PRN Temp greater or equal to 38C (100.4F)  albuterol/ipratropium for Nebulization 3 milliLiter(s) Nebulizer every 6 hours PRN Bronchospasm  loperamide 2 milliGRAM(s) Oral four times a day PRN Diarrhea  melatonin 3 milliGRAM(s) Oral at bedtime PRN Insomnia      Physical Exam    Neuro :  no focal deficits  Respiratory: CTA B/L  CV: RRR, S1S2, no murmurs,   Abdominal: Soft, NT, ND +BS,  Extremities: No edema, + peripheral pulses      ASSESSMENT    pneumonia r/o     sepsis,   uti,   paf   h/o HTN,   HLD,   T2DM,   osteoporosis,   Stage IIB gastric adenocarcinoma s/p distal gastrectomy (2015),   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 Resolved prior pneumonia.  Unchanged small left upper lobe nodules noted    id f/u   Continue Ertapenem until 11/13/24   blood cx neg noted above  pulm f/u   atrov and prov nebs,   supplemental oxygen prn,   robitussin prn,   Budesonide 0.25 mgs neb BID  singulair qhs   PFTs as OP.  hold outpt dm meds,   lispro ss,   hgba1c 5.5 noted     heme onc f/u   pt currently on quadruple therapy, last tx 11/01/24   hold tumor directed tx during the hospital course   f/u with Dr. Alvarado on d/c  pt found to be in afib 11/4/24 and  spontaneously converted to sinus.  cardio f/u   cont eliquis,  cont multaq,   cont lopressor 25mg bid.  cont d/c'd procardia xl.  cont current meds   d/c plan for wednesday 11/13/24

## 2024-11-12 DIAGNOSIS — T30.0 BURN OF UNSPECIFIED BODY REGION, UNSPECIFIED DEGREE: ICD-10-CM

## 2024-11-12 LAB
ANION GAP SERPL CALC-SCNC: 7 MMOL/L — SIGNIFICANT CHANGE UP (ref 5–17)
BUN SERPL-MCNC: 12 MG/DL — SIGNIFICANT CHANGE UP (ref 7–18)
CALCIUM SERPL-MCNC: 8.8 MG/DL — SIGNIFICANT CHANGE UP (ref 8.4–10.5)
CHLORIDE SERPL-SCNC: 119 MMOL/L — HIGH (ref 96–108)
CO2 SERPL-SCNC: 18 MMOL/L — LOW (ref 22–31)
CREAT SERPL-MCNC: 0.81 MG/DL — SIGNIFICANT CHANGE UP (ref 0.5–1.3)
CULTURE RESULTS: SIGNIFICANT CHANGE UP
EGFR: 70 ML/MIN/1.73M2 — SIGNIFICANT CHANGE UP
GLUCOSE BLDC GLUCOMTR-MCNC: 106 MG/DL — HIGH (ref 70–99)
GLUCOSE BLDC GLUCOMTR-MCNC: 109 MG/DL — HIGH (ref 70–99)
GLUCOSE BLDC GLUCOMTR-MCNC: 117 MG/DL — HIGH (ref 70–99)
GLUCOSE BLDC GLUCOMTR-MCNC: 98 MG/DL — SIGNIFICANT CHANGE UP (ref 70–99)
GLUCOSE SERPL-MCNC: 91 MG/DL — SIGNIFICANT CHANGE UP (ref 70–99)
POTASSIUM SERPL-MCNC: 4.9 MMOL/L — SIGNIFICANT CHANGE UP (ref 3.5–5.3)
POTASSIUM SERPL-SCNC: 4.9 MMOL/L — SIGNIFICANT CHANGE UP (ref 3.5–5.3)
SODIUM SERPL-SCNC: 144 MMOL/L — SIGNIFICANT CHANGE UP (ref 135–145)
SPECIMEN SOURCE: SIGNIFICANT CHANGE UP

## 2024-11-12 RX ORDER — METOPROLOL TARTRATE 50 MG
1 TABLET ORAL
Qty: 270 | Refills: 0
Start: 2024-11-12 | End: 2025-02-09

## 2024-11-12 RX ORDER — ALENDRONATE SODIUM 10 MG/1
1 TABLET ORAL
Refills: 0 | DISCHARGE

## 2024-11-12 RX ORDER — VALACYCLOVIR HYDROCHLORIDE 500 MG/1
1 TABLET ORAL
Refills: 0 | DISCHARGE

## 2024-11-12 RX ORDER — LOPERAMIDE HCL 2 MG
1 TABLET ORAL
Qty: 0 | Refills: 0 | DISCHARGE
Start: 2024-11-12

## 2024-11-12 RX ORDER — APIXABAN 5 MG/1
1 TABLET, FILM COATED ORAL
Qty: 180 | Refills: 0
Start: 2024-11-12 | End: 2025-02-09

## 2024-11-12 RX ORDER — VALACYCLOVIR HYDROCHLORIDE 500 MG/1
500 TABLET ORAL DAILY
Refills: 0 | Status: DISCONTINUED | OUTPATIENT
Start: 2024-11-12 | End: 2024-11-13

## 2024-11-12 RX ORDER — DEXAMETHASONE 1.5 MG 1.5 MG/1
5 TABLET ORAL
Refills: 0 | DISCHARGE

## 2024-11-12 RX ORDER — DRONEDARONE 400 MG/1
1 TABLET, FILM COATED ORAL
Qty: 180 | Refills: 0
Start: 2024-11-12 | End: 2025-02-09

## 2024-11-12 RX ORDER — SILVER SULFADIAZINE 10 MG/G
1 CREAM TOPICAL DAILY
Refills: 0 | Status: DISCONTINUED | OUTPATIENT
Start: 2024-11-12 | End: 2024-11-13

## 2024-11-12 RX ORDER — MECLIZINE HCL 25 MG
1 TABLET ORAL
Refills: 0 | DISCHARGE

## 2024-11-12 RX ORDER — ERTAPENEM SODIUM 1 G/1
1000 INJECTION, POWDER, LYOPHILIZED, FOR SOLUTION INTRAMUSCULAR; INTRAVENOUS EVERY 24 HOURS
Refills: 0 | Status: COMPLETED | OUTPATIENT
Start: 2024-11-13 | End: 2024-11-13

## 2024-11-12 RX ORDER — METOPROLOL TARTRATE 50 MG
1 TABLET ORAL
Refills: 0 | DISCHARGE

## 2024-11-12 RX ADMIN — APIXABAN 2.5 MILLIGRAM(S): 5 TABLET, FILM COATED ORAL at 05:16

## 2024-11-12 RX ADMIN — MONTELUKAST SODIUM 10 MILLIGRAM(S): 10 TABLET, FILM COATED ORAL at 21:06

## 2024-11-12 RX ADMIN — SILVER SULFADIAZINE 1 APPLICATION(S): 10 CREAM TOPICAL at 15:01

## 2024-11-12 RX ADMIN — FLUTICASONE PROPIONATE AND SALMETEROL XINAFOATE 1 DOSE(S): 230; 21 AEROSOL, METERED RESPIRATORY (INHALATION) at 09:10

## 2024-11-12 RX ADMIN — Medication 25 MILLIGRAM(S): at 05:16

## 2024-11-12 RX ADMIN — DRONEDARONE 400 MILLIGRAM(S): 400 TABLET, FILM COATED ORAL at 17:55

## 2024-11-12 RX ADMIN — ERTAPENEM SODIUM 120 MILLIGRAM(S): 1 INJECTION, POWDER, LYOPHILIZED, FOR SOLUTION INTRAMUSCULAR; INTRAVENOUS at 16:25

## 2024-11-12 RX ADMIN — APIXABAN 2.5 MILLIGRAM(S): 5 TABLET, FILM COATED ORAL at 17:55

## 2024-11-12 RX ADMIN — Medication 25 MILLIGRAM(S): at 21:06

## 2024-11-12 RX ADMIN — DRONEDARONE 400 MILLIGRAM(S): 400 TABLET, FILM COATED ORAL at 05:16

## 2024-11-12 RX ADMIN — Medication 25 MILLIGRAM(S): at 14:17

## 2024-11-12 RX ADMIN — Medication 10 MILLIGRAM(S): at 21:06

## 2024-11-12 RX ADMIN — PANTOPRAZOLE SODIUM 40 MILLIGRAM(S): 40 TABLET, DELAYED RELEASE ORAL at 05:16

## 2024-11-12 RX ADMIN — FLUTICASONE PROPIONATE AND SALMETEROL XINAFOATE 1 DOSE(S): 230; 21 AEROSOL, METERED RESPIRATORY (INHALATION) at 21:07

## 2024-11-12 RX ADMIN — VALACYCLOVIR HYDROCHLORIDE 500 MILLIGRAM(S): 500 TABLET ORAL at 17:56

## 2024-11-12 NOTE — PROGRESS NOTE ADULT - SUBJECTIVE AND OBJECTIVE BOX
Patient is seen and examined at the bed side, is afebrile. She is doing better, tolerating Ertapenem well.       REVIEW OF SYSTEMS: All other review systems are negative      ALLERGIES: gabapentin (Unknown)      Vital Signs Last 24 Hrs  T(C): 36.8 (12 Nov 2024 13:19), Max: 37.2 (12 Nov 2024 04:31)  T(F): 98.3 (12 Nov 2024 13:19), Max: 99 (12 Nov 2024 04:31)  HR: 78 (12 Nov 2024 13:19) (73 - 78)  BP: 112/61 (12 Nov 2024 13:19) (112/61 - 116/61)  BP(mean): --  RR: 18 (12 Nov 2024 13:19) (18 - 18)  SpO2: 96% (12 Nov 2024 13:19) (95% - 96%)    Parameters below as of 12 Nov 2024 13:19  Patient On (Oxygen Delivery Method): room air      PHYSICAL EXAM:  GENERAL: Not in distress   CHEST/LUNG:  Air entry bilaterally  HEART: s1 and s2 present  ABDOMEN:  Nontender and  Nondistended  EXTREMITIES: No pedal  edema  CNS: Awake and Alert      LABS: No new Labs                         10.1   7.62  )-----------( 190      ( 11 Nov 2024 05:31 )             30.8                  10.0   9.79  )-----------( 188      ( 10 Nov 2024 07:08 )             31.1         11-11    146[H]  |  118[H]  |  12  ----------------------------<  98  3.7   |  21[L]  |  0.70    Ca    8.9      11 Nov 2024 05:31        11-10    143  |  113[H]  |  15  ----------------------------<  100[H]  3.5   |  22  |  0.67    Ca    8.7      10 Nov 2024 07:08    TPro  6.2  /  Alb  3.0[L]  /  TBili  0.2  /  DBili  x   /  AST  24  /  ALT  17  /  AlkPhos  47  11-02      CAPILLARY BLOOD GLUCOSE  POCT Blood Glucose.: 96 mg/dL (03 Nov 2024 12:18)  POCT Blood Glucose.: 85 mg/dL (03 Nov 2024 07:59)  POCT Blood Glucose.: 93 mg/dL (03 Nov 2024 04:48)      MEDICATIONS  (STANDING):    apixaban 2.5 milliGRAM(s) Oral every 12 hours  atorvastatin 10 milliGRAM(s) Oral at bedtime  dronedarone 400 milliGRAM(s) Oral two times a day  fluticasone propionate/ salmeterol 100-50 MICROgram(s) Diskus 1 Dose(s) Inhalation two times a day  insulin lispro (ADMELOG) corrective regimen sliding scale   SubCutaneous three times a day before meals  insulin lispro (ADMELOG) corrective regimen sliding scale   SubCutaneous at bedtime  metoprolol tartrate 25 milliGRAM(s) Oral every 8 hours  montelukast 10 milliGRAM(s) Oral at bedtime  pantoprazole    Tablet 40 milliGRAM(s) Oral before breakfast  silver sulfADIAZINE 1% Cream 1 Application(s) Topical daily  valACYclovir 500 milliGRAM(s) Oral daily        RADIOLOGY & ADDITIONAL TESTS:    11/4/24  : CT Angio Chest PE Protocol w/ IV Cont (11.04.24 @ 15:19) No pulmonary embolus.    Resolved prior pneumonia.  Unchanged small left upper lobe nodules.      11/2/24: Xray Chest 1 View- PORTABLE-Urgent (11.02.24 @ 22:22) No active parenchymal disease in the chest.      MICROBIOLOGY DATA:    Culture - Blood in AM (11.07.24 @ 05:51)   Specimen Source: .Blood BLOOD  Culture Results: No growth at 4 days     Culture - Urine (11.03.24 @ 04:09)   - Nitrofurantoin: S <=32 Should not be used to treat pyelonephritis  - Piperacillin/Tazobactam: S <=8  - Tobramycin: S <=2  - Trimethoprim/Sulfamethoxazole: R >2/38  - Ampicillin: R >16 These ampicillin results predict results for amoxicillin  - Ampicillin/Sulbactam: R >16/8  - Aztreonam: R >16  - Cefazolin: R >16 For uncomplicated UTI with K. pneumoniae, E. coli, or P. mirablis: RYAN <=16 is sensitive and RYAN >=32 is resistant. This also predicts results for oral agents cefaclor, cefdinir, cefpodoxime, cefprozil, cefuroxime axetil, cephalexin and locarbef for uncomplicated UTI. Note that some isolates may be susceptible to these agents while testing resistant to cefazolin.  - Cefepime: R >16  - Ceftriaxone: R >32  - Cefuroxime: R >16  - Ciprofloxacin: S <=0.25  - Ertapenem: S <=0.5  - Gentamicin: S <=2  - Imipenem: S <=1  - Levofloxacin: S <=0.5  - Meropenem: S <=1  Specimen Source: Clean Catch  Culture Results:   >100,000 CFU/ml Klebsiella pneumoniae ESBL  Organism Identification: Klebsiella pneumoniae ESBL  Organism: Klebsiella pneumoniae ESBL  Method Type: RYAN    Culture - Urine (11.03.24 @ 04:09)   Specimen Source: Clean Catch  Culture Results:   >100,000 CFU/ml Gram Negative Rods    Urine Microscopic-Add On (NC) (11.03.24 @ 04:09)   Bacteria: Few /HPF  Comment - Urine: Numerous Budding yeasts found  Squamous Epithelial Cells: Present  Red Blood Cell - Urine: 5 /HPF  White Blood Cell - Urine: Too Numerous to count /HPF           Detail Level: Detailed

## 2024-11-12 NOTE — PROGRESS NOTE ADULT - PROBLEM SELECTOR PLAN 6
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 Alvarado outpt   - Last chemo on 11/1

## 2024-11-12 NOTE — PROGRESS NOTE ADULT - PROBLEM SELECTOR PLAN 9
C/w home inhaler symbicort 80/4.5 mcg 2 puffs bid interchange to advair  c/w home med montelukast 10 mg qhs  - DuoNeb PRN H/o PE on Eliquis 2.5mg  - C/w Home med

## 2024-11-12 NOTE — PROGRESS NOTE ADULT - PROBLEM SELECTOR PLAN 12
Pt from home  -Pending final urine cx and sensitivities for final ID recs  -Tx update discussed with daughter Ivett De Jesus and [place]occan  Mu ID # [id number] via phone [phone]
Pt from home  -Pending final ID recs
DVT ppx: Eliquis 2.5 mg BID   GI ppx: Protonix 40mg    #Dispo  -awaiting completion of abx Ertapenem on 11/13

## 2024-11-12 NOTE — PROGRESS NOTE ADULT - PROBLEM SELECTOR PLAN 7
hx of DM on Metformin + farxiga at home  -Hold oral meds while inpt   - a1c 5.5%; CONTROLLED  -C/w FS w/ [facility] ACHS - dc home med Nifedipine 30mg daily   - now on metoprolol 25 mg q8h   - controlled  - Dash diet

## 2024-11-12 NOTE — PROGRESS NOTE ADULT - ASSESSMENT
88 y/o F, Montserratian speaking, from home, ambulates with wheelchair (mostly bedbound), PMH HTN, HLD, T2DM, osteoporosis, Stage IIB gastric adenocarcinoma s/p distal gastrectomy (2015), multiple myeloma/plastocytoma on chemo (follows [facility] Dr Adams), that is brought in by family for SOB,s/p CHARISSE, found to be in afib 11/4/24 and  spontaneously converted to sinus,UTI-ESBL.  1.Off Tele monitoring.  2.PAF-eliquis, multaq,lopressor 25mg tid.  3.HTN-b blocker  4.DM-insulin.  5.CHARISSE-resolved.  6.UTI-ESBL-ABX as per ID.  7.PPI.

## 2024-11-12 NOTE — PROGRESS NOTE ADULT - ASSESSMENT
89 y/o F, Egyptian speaking, from home, ambulates with wheelchair (mostly bedbound), PMH of Dementia, HTN, HLD, T2DM, osteoporosis, Stage IIB gastric adenocarcinoma s/p distal gastrectomy (2015), 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 Ct angio- no pe, resolved prior pna. l upper lobe nodules. Patient to be admitted for new onset CHARISSE with lactic acidosis likely in the setting of UTI and albuterol use. ID following.  Hospital course A-Fib w/RVR on 11/4/24 and spontaneously converted to sinus. Card following, now dc tele.   Remains on Ertapenem 1g qd until 11/13.     Of note, this morning, pt was drinking hot water and spilled on herself, noted with redness to right breast (stage 1 burn), will apply silvadene today.  10

## 2024-11-12 NOTE — PROGRESS NOTE ADULT - ASSESSMENT
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 On admission, she found to have no fever but Leukocytosis and positive Urine analysis. The CXR is negative. She has started on Ceftriaxone and Azithromycin, and the ID consult requested to assist with further evaluation and antibiotic management.    # UTI - Urine Cx grew ESBL  Klebsiella  # Leukocytosis - resolved  # Multiple Myeloma     would recommend:    1. Continue Ertapenem until 11/13/24 X 1 more days  2. OOB to chair     d/w covering NP, Ray and family at the bed side     Attending Attestation:    Spent more than 35 minutes on total encounter, more than 50 % of the visit was spent counseling and/or coordinating care by the Attending physician.

## 2024-11-12 NOTE — PROGRESS NOTE ADULT - SUBJECTIVE AND OBJECTIVE BOX
Patient is a 89y old  Female who presents with a chief complaint of CHARISSE, lactic acidosis (12 Nov 2024 09:57)  Awake, alert, comfortable in bed in NAD. Clinically improved    INTERVAL HPI/OVERNIGHT EVENTS:      VITAL SIGNS:  T(F): 99 (11-12-24 @ 04:31)  HR: 73 (11-12-24 @ 04:31)  BP: 116/61 (11-12-24 @ 04:31)  RR: 18 (11-12-24 @ 04:31)  SpO2: 95% (11-12-24 @ 04:31)  Wt(kg): --  I&O's Detail    11 Nov 2024 07:01  -  12 Nov 2024 07:00  --------------------------------------------------------  IN:  Total IN: 0 mL    OUT:    Voided (mL): 400 mL  Total OUT: 400 mL    Total NET: -400 mL              REVIEW OF SYSTEMS:    CONSTITUTIONAL:  No fevers, chills, sweats    HEENT:  Eyes:  No diplopia or blurred vision. ENT:  No earache, sore throat or runny nose.    CARDIOVASCULAR:  No pressure, squeezing, tightness, or heaviness about the chest; no palpitations.    RESPIRATORY:  Per HPI    GASTROINTESTINAL:  No abdominal pain, nausea, vomiting or diarrhea.    GENITOURINARY:  No dysuria, frequency or urgency.    NEUROLOGIC:  No paresthesias, fasciculations, seizures or weakness.    PSYCHIATRIC:  No disorder of thought or mood.      PHYSICAL EXAM:    Constitutional: Well developed and nourished  Eyes:Perrla  ENMT: normal  Neck:supple  Respiratory: good air entry  Cardiovascular: S1 S2 regular  Gastrointestinal: Soft, Non tender  Extremities: No edema  Vascular:normal  Neurological:Awake, alert,Ox3  Musculoskeletal:Normal      MEDICATIONS  (STANDING):  apixaban 2.5 milliGRAM(s) Oral every 12 hours  atorvastatin 10 milliGRAM(s) Oral at bedtime  dronedarone 400 milliGRAM(s) Oral two times a day  ertapenem  IVPB 1000 milliGRAM(s) IV Intermittent every 24 hours  fluticasone propionate/ salmeterol 100-50 MICROgram(s) Diskus 1 Dose(s) Inhalation two times a day  insulin lispro (ADMELOG) corrective regimen sliding scale   SubCutaneous three times a day before meals  insulin lispro (ADMELOG) corrective regimen sliding scale   SubCutaneous at bedtime  metoprolol tartrate 25 milliGRAM(s) Oral every 8 hours  montelukast 10 milliGRAM(s) Oral at bedtime  pantoprazole    Tablet 40 milliGRAM(s) Oral before breakfast    MEDICATIONS  (PRN):  acetaminophen     Tablet .. 650 milliGRAM(s) Oral every 6 hours PRN Temp greater or equal to 38C (100.4F)  albuterol/ipratropium for Nebulization 3 milliLiter(s) Nebulizer every 6 hours PRN Bronchospasm  loperamide 2 milliGRAM(s) Oral four times a day PRN Diarrhea  melatonin 3 milliGRAM(s) Oral at bedtime PRN Insomnia      Allergies    gabapentin (Unknown)    Intolerances        LABS:                        10.1   7.62  )-----------( 190      ( 11 Nov 2024 05:31 )             30.8     11-12    144  |  119[H]  |  12  ----------------------------<  91  4.9   |  18[L]  |  0.81    Ca    8.8      12 Nov 2024 05:52        Urinalysis Basic - ( 12 Nov 2024 05:52 )    Color: x / Appearance: x / SG: x / pH: x  Gluc: 91 mg/dL / Ketone: x  / Bili: x / Urobili: x   Blood: x / Protein: x / Nitrite: x   Leuk Esterase: x / RBC: x / WBC x   Sq Epi: x / Non Sq Epi: x / Bacteria: x            CAPILLARY BLOOD GLUCOSE      POCT Blood Glucose.: 98 mg/dL (12 Nov 2024 07:32)  POCT Blood Glucose.: 97 mg/dL (11 Nov 2024 23:48)  POCT Blood Glucose.: 83 mg/dL (11 Nov 2024 16:50)  POCT Blood Glucose.: 90 mg/dL (11 Nov 2024 11:17)        RADIOLOGY & ADDITIONAL TESTS:    CXR:    Ct scan chest:    ekg;    echo:

## 2024-11-12 NOTE — PROGRESS NOTE ADULT - PROBLEM SELECTOR PLAN 8
H/o PE on Eliquis 2.5mg  - C/w Home med hx of DM on Metformin + farxiga at home  -Hold oral meds while inpt   - a1c 5.5%; CONTROLLED  -C/w FS w/ ISSC ACHS

## 2024-11-12 NOTE — PROGRESS NOTE ADULT - SUBJECTIVE AND OBJECTIVE BOX
Patient is a 89y old  Female who presents with a chief complaint of CHARISSE, lactic acidosis (11 Nov 2024 13:38)    pt seen in icu [  ], reg med floor [ x  ], bed [ x ], chair at bedside [   ], awake and responsive [x  ], lethargic [  ],    nad [x ]        Allergies    gabapentin (Unknown)        Vitals    T(F): 99 (11-12-24 @ 04:31), Max: 99 (11-12-24 @ 04:31)  HR: 73 (11-12-24 @ 04:31) (73 - 78)  BP: 116/61 (11-12-24 @ 04:31) (116/61 - 125/62)  RR: 18 (11-12-24 @ 04:31) (18 - 18)  SpO2: 95% (11-12-24 @ 04:31) (95% - 98%)  Wt(kg): --  CAPILLARY BLOOD GLUCOSE      POCT Blood Glucose.: 97 mg/dL (11 Nov 2024 23:48)      Labs                          10.1   7.62  )-----------( 190      ( 11 Nov 2024 05:31 )             30.8       11-11    146[H]  |  118[H]  |  12  ----------------------------<  98  3.7   |  21[L]  |  0.70    Ca    8.9      11 Nov 2024 05:31              .Blood BLOOD  11-07 @ 05:51   No growth at 4 days  --  --      Clean Catch  11-03 @ 04:09   >100,000 CFU/ml Klebsiella pneumoniae ESBL  --  Klebsiella pneumoniae ESBL      Clean Catch  09-15 @ 01:09   <10,000 CFU/mL Normal Urogenital Natalie  --  --      .Blood Blood-Peripheral  09-13 @ 01:07   No growth at 5 days  --  --      .Blood Blood-Peripheral  09-13 @ 01:00   No growth at 5 days  --  --          Radiology Results      Meds    MEDICATIONS  (STANDING):  apixaban 2.5 milliGRAM(s) Oral every 12 hours  atorvastatin 10 milliGRAM(s) Oral at bedtime  dronedarone 400 milliGRAM(s) Oral two times a day  ertapenem  IVPB 1000 milliGRAM(s) IV Intermittent every 24 hours  fluticasone propionate/ salmeterol 100-50 MICROgram(s) Diskus 1 Dose(s) Inhalation two times a day  insulin lispro (ADMELOG) corrective regimen sliding scale   SubCutaneous three times a day before meals  insulin lispro (ADMELOG) corrective regimen sliding scale   SubCutaneous at bedtime  metoprolol tartrate 25 milliGRAM(s) Oral every 8 hours  montelukast 10 milliGRAM(s) Oral at bedtime  pantoprazole    Tablet 40 milliGRAM(s) Oral before breakfast      MEDICATIONS  (PRN):  acetaminophen     Tablet .. 650 milliGRAM(s) Oral every 6 hours PRN Temp greater or equal to 38C (100.4F)  albuterol/ipratropium for Nebulization 3 milliLiter(s) Nebulizer every 6 hours PRN Bronchospasm  loperamide 2 milliGRAM(s) Oral four times a day PRN Diarrhea  melatonin 3 milliGRAM(s) Oral at bedtime PRN Insomnia      Physical Exam    Neuro :  no focal deficits  Respiratory: CTA B/L  CV: RRR, S1S2, no murmurs,   Abdominal: Soft, NT, ND +BS,  Extremities: No edema, + peripheral pulses      ASSESSMENT    pneumonia r/o     sepsis,   uti,   paf   h/o HTN,   HLD,   T2DM,   osteoporosis,   Stage IIB gastric adenocarcinoma s/p distal gastrectomy (2015),   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 Resolved prior pneumonia.  Unchanged small left upper lobe nodules noted    id f/u   Continue Ertapenem until 11/13/24   blood cx neg noted above  pulm f/u   atrov and prov nebs,   supplemental oxygen prn,   robitussin prn,   Budesonide 0.25 mgs neb BID  singulair qhs   PFTs as OP.  hold outpt dm meds,   lispro ss,   hgba1c 5.5 noted     heme onc f/u   pt currently on quadruple therapy, last tx 11/01/24   hold tumor directed tx during the hospital course   f/u with Dr. Alvarado on d/c  pt found to be in afib 11/4/24 and  spontaneously converted to sinus.  cardio f/u   cont eliquis,  cont multaq,   cont lopressor 25mg bid.  cont d/c'd procardia xl.  cont current meds   d/c plan for wednesday 11/13/24         Patient is a 89y old  Female who presents with a chief complaint of CHARISSE, lactic acidosis (11 Nov 2024 13:38)    pt seen in icu [  ], reg med floor [ x  ], bed [ x ], chair at bedside [   ], awake and responsive [x  ], lethargic [  ],    nad [x ]        Allergies    gabapentin (Unknown)        Vitals    T(F): 99 (11-12-24 @ 04:31), Max: 99 (11-12-24 @ 04:31)  HR: 73 (11-12-24 @ 04:31) (73 - 78)  BP: 116/61 (11-12-24 @ 04:31) (116/61 - 125/62)  RR: 18 (11-12-24 @ 04:31) (18 - 18)  SpO2: 95% (11-12-24 @ 04:31) (95% - 98%)  Wt(kg): --  CAPILLARY BLOOD GLUCOSE      POCT Blood Glucose.: 97 mg/dL (11 Nov 2024 23:48)      Labs                          10.1   7.62  )-----------( 190      ( 11 Nov 2024 05:31 )             30.8       11-11    146[H]  |  118[H]  |  12  ----------------------------<  98  3.7   |  21[L]  |  0.70    Ca    8.9      11 Nov 2024 05:31              .Blood BLOOD  11-07 @ 05:51   No growth at 4 days  --  --      Clean Catch  11-03 @ 04:09   >100,000 CFU/ml Klebsiella pneumoniae ESBL  --  Klebsiella pneumoniae ESBL          Radiology Results      Meds    MEDICATIONS  (STANDING):  apixaban 2.5 milliGRAM(s) Oral every 12 hours  atorvastatin 10 milliGRAM(s) Oral at bedtime  dronedarone 400 milliGRAM(s) Oral two times a day  ertapenem  IVPB 1000 milliGRAM(s) IV Intermittent every 24 hours  fluticasone propionate/ salmeterol 100-50 MICROgram(s) Diskus 1 Dose(s) Inhalation two times a day  insulin lispro (ADMELOG) corrective regimen sliding scale   SubCutaneous three times a day before meals  insulin lispro (ADMELOG) corrective regimen sliding scale   SubCutaneous at bedtime  metoprolol tartrate 25 milliGRAM(s) Oral every 8 hours  montelukast 10 milliGRAM(s) Oral at bedtime  pantoprazole    Tablet 40 milliGRAM(s) Oral before breakfast      MEDICATIONS  (PRN):  acetaminophen     Tablet .. 650 milliGRAM(s) Oral every 6 hours PRN Temp greater or equal to 38C (100.4F)  albuterol/ipratropium for Nebulization 3 milliLiter(s) Nebulizer every 6 hours PRN Bronchospasm  loperamide 2 milliGRAM(s) Oral four times a day PRN Diarrhea  melatonin 3 milliGRAM(s) Oral at bedtime PRN Insomnia      Physical Exam    Neuro :  no focal deficits  Respiratory: CTA B/L  CV: RRR, S1S2, no murmurs,   Abdominal: Soft, NT, ND +BS,  Extremities: No edema, + peripheral pulses      ASSESSMENT    pneumonia r/o     sepsis,   uti,   paf   h/o HTN,   HLD,   T2DM,   osteoporosis,   Stage IIB gastric adenocarcinoma s/p distal gastrectomy (2015),   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 Resolved prior pneumonia.  Unchanged small left upper lobe nodules noted    id f/u   Continue Ertapenem until 11/13/24   blood cx neg noted above   pulm f/u   atrov and prov nebs,   supplemental oxygen prn,   robitussin prn,   Budesonide 0.25 mgs neb BID  singulair qhs   PFTs as OP.  hold outpt dm meds,   lispro ss,   hgba1c 5.5 noted     heme onc f/u   pt currently on quadruple therapy, last tx 11/01/24   hold tumor directed tx during the hospital course   f/u with Dr. Alvarado on d/c  pt found to be in afib 11/4/24 and  spontaneously converted to sinus.  cardio f/u   cont eliquis,  cont multaq,   cont lopressor 25mg bid.  cont d/c'd procardia xl.  cont current meds   d/c plan for wednesday 11/13/24
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
## 2024-11-12 NOTE — PROGRESS NOTE ADULT - SUBJECTIVE AND OBJECTIVE BOX
Date of Service 11-12-24 @ 09:57    CHIEF COMPLAINT:Patient is a 89y old  Female who presents with a chief complaint of CHARISSE, lactic acidosis.Pt appears comfortable.    	  REVIEW OF SYSTEMS:  CONSTITUTIONAL: No fever, weight loss, or fatigue  EYES: No eye pain, visual disturbances, or discharge  ENT:  No difficulty hearing, tinnitus, vertigo; No sinus or throat pain  NECK: No pain or stiffness  RESPIRATORY: No cough, wheezing, chills or hemoptysis; No Shortness of Breath  CARDIOVASCULAR: No chest pain, palpitations, passing out, dizziness, or leg swelling  GASTROINTESTINAL: No abdominal or epigastric pain. No nausea, vomiting, or hematemesis; No diarrhea or constipation. No melena or hematochezia.  GENITOURINARY: No dysuria, frequency, hematuria, or incontinence  NEUROLOGICAL: No headaches, memory loss, loss of strength, numbness, or tremors  SKIN: No itching, burning, rashes, or lesions   LYMPH Nodes: No enlarged glands  ENDOCRINE: No heat or cold intolerance; No hair loss  MUSCULOSKELETAL: No joint pain or swelling; No muscle, back, or extremity pain  PSYCHIATRIC: No depression, anxiety, mood swings, or difficulty sleeping  HEME/LYMPH: No easy bruising, or bleeding gums  ALLERGY AND IMMUNOLOGIC: No hives or eczema	      PHYSICAL EXAM:  T(C): 37.2 (11-12-24 @ 04:31), Max: 37.2 (11-12-24 @ 04:31)  HR: 73 (11-12-24 @ 04:31) (73 - 78)  BP: 116/61 (11-12-24 @ 04:31) (116/61 - 125/62)  RR: 18 (11-12-24 @ 04:31) (18 - 18)  SpO2: 95% (11-12-24 @ 04:31) (95% - 98%)  Wt(kg): --  I&O's Summary    11 Nov 2024 07:01  -  12 Nov 2024 07:00  --------------------------------------------------------  IN: 0 mL / OUT: 400 mL / NET: -400 mL        Appearance: Normal	  HEENT:   Normal oral mucosa, PERRL, EOMI	  Lymphatic: No lymphadenopathy  Cardiovascular: Normal S1 S2, No JVD, No murmurs, No edema  Respiratory: Lungs clear to auscultation	  Psychiatry: A & O x 3, Mood & affect appropriate  Gastrointestinal:  Soft, Non-tender, + BS	  Skin: No rashes, No ecchymoses, No cyanosis	  Neurologic: Non-focal  Extremities: Normal range of motion, No clubbing, cyanosis or edema  Vascular: Peripheral pulses palpable 2+ bilaterally    MEDICATIONS  (STANDING):  apixaban 2.5 milliGRAM(s) Oral every 12 hours  atorvastatin 10 milliGRAM(s) Oral at bedtime  dronedarone 400 milliGRAM(s) Oral two times a day  ertapenem  IVPB 1000 milliGRAM(s) IV Intermittent every 24 hours  fluticasone propionate/ salmeterol 100-50 MICROgram(s) Diskus 1 Dose(s) Inhalation two times a day  insulin lispro (ADMELOG) corrective regimen sliding scale   SubCutaneous at bedtime  insulin lispro (ADMELOG) corrective regimen sliding scale   SubCutaneous three times a day before meals  metoprolol tartrate 25 milliGRAM(s) Oral every 8 hours  montelukast 10 milliGRAM(s) Oral at bedtime  pantoprazole    Tablet 40 milliGRAM(s) Oral before breakfast        LABS:	 	                       10.1   7.62  )-----------( 190      ( 11 Nov 2024 05:31 )             30.8     11-12    144  |  119[H]  |  12  ----------------------------<  91  4.9   |  18[L]  |  0.81    Ca    8.8      12 Nov 2024 05:52

## 2024-11-12 NOTE — PROGRESS NOTE ADULT - PROBLEM SELECTOR PLAN 10
H/o HLD on simvastatin 10 mg qhs  interchange to atorvastatin C/w home inhaler symbicort 80/4.5 mcg 2 puffs bid interchange to advair  c/w home med montelukast 10 mg qhs  - DuoNeb PRN
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
## 2024-11-12 NOTE — PROGRESS NOTE ADULT - PROBLEM SELECTOR PLAN 2
- 11/4: Noted to be in Afib on Tele with rate 140-160s (no hx of Afib)  - EKG Afib w/ RVR rate 144  - S/p Digoxin 0.5mg x1 dose  - C/w eliquis, multaq, change lopressor 25mg q8h (all 3 prescriptions sent to pharmacy)  - now back in Sinus Rhythm  - Card Dr Jennings following

## 2024-11-12 NOTE — PROGRESS NOTE ADULT - PROBLEM SELECTOR PLAN 5
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 Alvarado outpt   - Last chemo on 11/1 resolved

## 2024-11-12 NOTE — PROGRESS NOTE ADULT - PROBLEM SELECTOR PLAN 11
DVT ppx: Eliquis 2.5 mg BID   GI ppx: Protonix 40mg    #Dispo  -awaiting completion of abx Ertapenem on 11/13 H/o HLD on simvastatin 10 mg qhs  interchange to atorvastatin

## 2024-11-13 ENCOUNTER — TRANSCRIPTION ENCOUNTER (OUTPATIENT)
Age: 89
End: 2024-11-13

## 2024-11-13 VITALS
TEMPERATURE: 99 F | OXYGEN SATURATION: 94 % | DIASTOLIC BLOOD PRESSURE: 62 MMHG | RESPIRATION RATE: 18 BRPM | HEART RATE: 92 BPM | SYSTOLIC BLOOD PRESSURE: 126 MMHG

## 2024-11-13 LAB
ALBUMIN SERPL ELPH-MCNC: 2.6 G/DL — LOW (ref 3.5–5)
ALP SERPL-CCNC: 49 U/L — SIGNIFICANT CHANGE UP (ref 40–120)
ALT FLD-CCNC: 39 U/L DA — SIGNIFICANT CHANGE UP (ref 10–60)
ANION GAP SERPL CALC-SCNC: 6 MMOL/L — SIGNIFICANT CHANGE UP (ref 5–17)
AST SERPL-CCNC: 39 U/L — SIGNIFICANT CHANGE UP (ref 10–40)
BILIRUB SERPL-MCNC: 0.3 MG/DL — SIGNIFICANT CHANGE UP (ref 0.2–1.2)
BUN SERPL-MCNC: 13 MG/DL — SIGNIFICANT CHANGE UP (ref 7–18)
CALCIUM SERPL-MCNC: 8.7 MG/DL — SIGNIFICANT CHANGE UP (ref 8.4–10.5)
CHLORIDE SERPL-SCNC: 119 MMOL/L — HIGH (ref 96–108)
CO2 SERPL-SCNC: 21 MMOL/L — LOW (ref 22–31)
CREAT SERPL-MCNC: 0.67 MG/DL — SIGNIFICANT CHANGE UP (ref 0.5–1.3)
EGFR: 85 ML/MIN/1.73M2 — SIGNIFICANT CHANGE UP
GLUCOSE BLDC GLUCOMTR-MCNC: 109 MG/DL — HIGH (ref 70–99)
GLUCOSE BLDC GLUCOMTR-MCNC: 95 MG/DL — SIGNIFICANT CHANGE UP (ref 70–99)
GLUCOSE SERPL-MCNC: 94 MG/DL — SIGNIFICANT CHANGE UP (ref 70–99)
HCT VFR BLD CALC: 28.6 % — LOW (ref 34.5–45)
HGB BLD-MCNC: 9.4 G/DL — LOW (ref 11.5–15.5)
MAGNESIUM SERPL-MCNC: 1.5 MG/DL — LOW (ref 1.6–2.6)
MCHC RBC-ENTMCNC: 31.4 PG — SIGNIFICANT CHANGE UP (ref 27–34)
MCHC RBC-ENTMCNC: 32.9 G/DL — SIGNIFICANT CHANGE UP (ref 32–36)
MCV RBC AUTO: 95.7 FL — SIGNIFICANT CHANGE UP (ref 80–100)
NRBC # BLD: 0 /100 WBCS — SIGNIFICANT CHANGE UP (ref 0–0)
PHOSPHATE SERPL-MCNC: 3.4 MG/DL — SIGNIFICANT CHANGE UP (ref 2.5–4.5)
PLATELET # BLD AUTO: 174 K/UL — SIGNIFICANT CHANGE UP (ref 150–400)
POTASSIUM SERPL-MCNC: 3.5 MMOL/L — SIGNIFICANT CHANGE UP (ref 3.5–5.3)
POTASSIUM SERPL-SCNC: 3.5 MMOL/L — SIGNIFICANT CHANGE UP (ref 3.5–5.3)
PROT SERPL-MCNC: 5.3 G/DL — LOW (ref 6–8.3)
RBC # BLD: 2.99 M/UL — LOW (ref 3.8–5.2)
RBC # FLD: 16.5 % — HIGH (ref 10.3–14.5)
SODIUM SERPL-SCNC: 146 MMOL/L — HIGH (ref 135–145)
WBC # BLD: 6.95 K/UL — SIGNIFICANT CHANGE UP (ref 3.8–10.5)
WBC # FLD AUTO: 6.95 K/UL — SIGNIFICANT CHANGE UP (ref 3.8–10.5)

## 2024-11-13 PROCEDURE — 81001 URINALYSIS AUTO W/SCOPE: CPT

## 2024-11-13 PROCEDURE — 99285 EMERGENCY DEPT VISIT HI MDM: CPT | Mod: 25

## 2024-11-13 PROCEDURE — 87186 SC STD MICRODIL/AGAR DIL: CPT

## 2024-11-13 PROCEDURE — 84300 ASSAY OF URINE SODIUM: CPT

## 2024-11-13 PROCEDURE — 93005 ELECTROCARDIOGRAM TRACING: CPT

## 2024-11-13 PROCEDURE — 83036 HEMOGLOBIN GLYCOSYLATED A1C: CPT

## 2024-11-13 PROCEDURE — 83605 ASSAY OF LACTIC ACID: CPT

## 2024-11-13 PROCEDURE — 85025 COMPLETE CBC W/AUTO DIFF WBC: CPT

## 2024-11-13 PROCEDURE — 94640 AIRWAY INHALATION TREATMENT: CPT

## 2024-11-13 PROCEDURE — 82570 ASSAY OF URINE CREATININE: CPT

## 2024-11-13 PROCEDURE — 36415 COLL VENOUS BLD VENIPUNCTURE: CPT

## 2024-11-13 PROCEDURE — 71275 CT ANGIOGRAPHY CHEST: CPT | Mod: MC

## 2024-11-13 PROCEDURE — 84100 ASSAY OF PHOSPHORUS: CPT

## 2024-11-13 PROCEDURE — 87077 CULTURE AEROBIC IDENTIFY: CPT

## 2024-11-13 PROCEDURE — 87086 URINE CULTURE/COLONY COUNT: CPT

## 2024-11-13 PROCEDURE — G0480: CPT

## 2024-11-13 PROCEDURE — 84484 ASSAY OF TROPONIN QUANT: CPT

## 2024-11-13 PROCEDURE — 85379 FIBRIN DEGRADATION QUANT: CPT

## 2024-11-13 PROCEDURE — 82962 GLUCOSE BLOOD TEST: CPT

## 2024-11-13 PROCEDURE — 83735 ASSAY OF MAGNESIUM: CPT

## 2024-11-13 PROCEDURE — 83880 ASSAY OF NATRIURETIC PEPTIDE: CPT

## 2024-11-13 PROCEDURE — 87040 BLOOD CULTURE FOR BACTERIA: CPT

## 2024-11-13 PROCEDURE — 80048 BASIC METABOLIC PNL TOTAL CA: CPT

## 2024-11-13 PROCEDURE — 71045 X-RAY EXAM CHEST 1 VIEW: CPT

## 2024-11-13 PROCEDURE — 80053 COMPREHEN METABOLIC PANEL: CPT

## 2024-11-13 PROCEDURE — 85027 COMPLETE CBC AUTOMATED: CPT

## 2024-11-13 RX ORDER — LOSARTAN POTASSIUM 25 MG/1
1 TABLET ORAL
Refills: 0 | DISCHARGE

## 2024-11-13 RX ORDER — MAGNESIUM SULFATE IN 0.9% NACL 2 G/50 ML
2 INTRAVENOUS SOLUTION, PIGGYBACK (ML) INTRAVENOUS ONCE
Refills: 0 | Status: DISCONTINUED | OUTPATIENT
Start: 2024-11-13 | End: 2024-11-13

## 2024-11-13 RX ORDER — MAGNESIUM SULFATE IN 0.9% NACL 2 G/50 ML
1 INTRAVENOUS SOLUTION, PIGGYBACK (ML) INTRAVENOUS ONCE
Refills: 0 | Status: COMPLETED | OUTPATIENT
Start: 2024-11-13 | End: 2024-11-13

## 2024-11-13 RX ADMIN — FLUTICASONE PROPIONATE AND SALMETEROL XINAFOATE 1 DOSE(S): 230; 21 AEROSOL, METERED RESPIRATORY (INHALATION) at 11:35

## 2024-11-13 RX ADMIN — DRONEDARONE 400 MILLIGRAM(S): 400 TABLET, FILM COATED ORAL at 06:03

## 2024-11-13 RX ADMIN — Medication 100 GRAM(S): at 11:40

## 2024-11-13 RX ADMIN — Medication 3 MILLIGRAM(S): at 00:04

## 2024-11-13 RX ADMIN — APIXABAN 2.5 MILLIGRAM(S): 5 TABLET, FILM COATED ORAL at 06:04

## 2024-11-13 RX ADMIN — SILVER SULFADIAZINE 1 APPLICATION(S): 10 CREAM TOPICAL at 11:34

## 2024-11-13 RX ADMIN — ERTAPENEM SODIUM 120 MILLIGRAM(S): 1 INJECTION, POWDER, LYOPHILIZED, FOR SOLUTION INTRAMUSCULAR; INTRAVENOUS at 06:03

## 2024-11-13 RX ADMIN — PANTOPRAZOLE SODIUM 40 MILLIGRAM(S): 40 TABLET, DELAYED RELEASE ORAL at 06:04

## 2024-11-13 RX ADMIN — VALACYCLOVIR HYDROCHLORIDE 500 MILLIGRAM(S): 500 TABLET ORAL at 12:31

## 2024-11-13 RX ADMIN — Medication 25 MILLIGRAM(S): at 06:04

## 2024-11-13 NOTE — PROGRESS NOTE ADULT - REASON FOR ADMISSION
CHARISSE, lactic acidosis
uti

## 2024-11-13 NOTE — PROGRESS NOTE ADULT - SUBJECTIVE AND OBJECTIVE BOX
Patient is a 89y old  Female who presents with a chief complaint of uti (12 Nov 2024 17:23)    pt seen in icu [  ], reg med floor [ x  ], bed [ x ], chair at bedside [   ], awake and responsive [x  ], lethargic [  ],    nad [x ]      Allergies    gabapentin (Unknown)        Vitals    T(F): 99.3 (11-13-24 @ 04:34), Max: 99.3 (11-13-24 @ 04:34)  HR: 69 (11-13-24 @ 04:34) (69 - 78)  BP: 129/78 (11-13-24 @ 04:34) (112/61 - 129/78)  RR: 18 (11-13-24 @ 04:34) (18 - 18)  SpO2: 96% (11-13-24 @ 04:34) (96% - 96%)  Wt(kg): --  CAPILLARY BLOOD GLUCOSE      POCT Blood Glucose.: 106 mg/dL (12 Nov 2024 22:54)      Labs        11-12    144  |  119[H]  |  12  ----------------------------<  91  4.9   |  18[L]  |  0.81    Ca    8.8      12 Nov 2024 05:52              .Blood BLOOD  11-07 @ 05:51   No growth at 5 days  --  --      Clean Catch  11-03 @ 04:09   >100,000 CFU/ml Klebsiella pneumoniae ESBL  --  Klebsiella pneumoniae ESBL      Clean Catch  09-15 @ 01:09   <10,000 CFU/mL Normal Urogenital Natalie  --  --      .Blood Blood-Peripheral  09-13 @ 01:07   No growth at 5 days  --  --      .Blood Blood-Peripheral  09-13 @ 01:00   No growth at 5 days  --  --          Radiology Results      Meds    MEDICATIONS  (STANDING):  apixaban 2.5 milliGRAM(s) Oral every 12 hours  atorvastatin 10 milliGRAM(s) Oral at bedtime  dronedarone 400 milliGRAM(s) Oral two times a day  fluticasone propionate/ salmeterol 100-50 MICROgram(s) Diskus 1 Dose(s) Inhalation two times a day  insulin lispro (ADMELOG) corrective regimen sliding scale   SubCutaneous three times a day before meals  insulin lispro (ADMELOG) corrective regimen sliding scale   SubCutaneous at bedtime  metoprolol tartrate 25 milliGRAM(s) Oral every 8 hours  montelukast 10 milliGRAM(s) Oral at bedtime  pantoprazole    Tablet 40 milliGRAM(s) Oral before breakfast  silver sulfADIAZINE 1% Cream 1 Application(s) Topical daily  valACYclovir 500 milliGRAM(s) Oral daily      MEDICATIONS  (PRN):  acetaminophen     Tablet .. 650 milliGRAM(s) Oral every 6 hours PRN Temp greater or equal to 38C (100.4F)  albuterol/ipratropium for Nebulization 3 milliLiter(s) Nebulizer every 6 hours PRN Bronchospasm  loperamide 2 milliGRAM(s) Oral four times a day PRN Diarrhea  melatonin 3 milliGRAM(s) Oral at bedtime PRN Insomnia      Physical Exam    Neuro :  no focal deficits  Respiratory: CTA B/L  CV: RRR, S1S2, no murmurs,   Abdominal: Soft, NT, ND +BS,  Extremities: No edema, + peripheral pulses      ASSESSMENT    pneumonia r/o     sepsis,   uti,   paf   h/o HTN,   HLD,   T2DM,   osteoporosis,   Stage IIB gastric adenocarcinoma s/p distal gastrectomy (2015),   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 Resolved prior pneumonia.  Unchanged small left upper lobe nodules noted    id f/u   Continue Ertapenem until 11/13/24   blood cx neg noted above   pulm f/u   atrov and prov nebs,   supplemental oxygen prn,   robitussin prn,   Budesonide 0.25 mgs neb BID  singulair qhs   PFTs as OP.  hold outpt dm meds,   lispro ss,   hgba1c 5.5 noted     heme onc f/u   pt currently on quadruple therapy, last tx 11/01/24   hold tumor directed tx during the hospital course   f/u with Dr. Alvarado on d/c  pt found to be in afib 11/4/24 and  spontaneously converted to sinus.  cardio f/u   cont eliquis,  cont multaq,   cont lopressor 25mg bid.  cont d/c'd procardia xl.  cont current meds   d/c plan for wednesday 11/13/24       Patient is a 89y old  Female who presents with a chief complaint of uti (12 Nov 2024 17:23)    pt seen in icu [  ], reg med floor [ x  ], bed [ x ], chair at bedside [   ], awake and responsive [x  ], lethargic [  ],    nad [x ]      Allergies    gabapentin (Unknown)        Vitals    T(F): 99.3 (11-13-24 @ 04:34), Max: 99.3 (11-13-24 @ 04:34)  HR: 69 (11-13-24 @ 04:34) (69 - 78)  BP: 129/78 (11-13-24 @ 04:34) (112/61 - 129/78)  RR: 18 (11-13-24 @ 04:34) (18 - 18)  SpO2: 96% (11-13-24 @ 04:34) (96% - 96%)  Wt(kg): --  CAPILLARY BLOOD GLUCOSE      POCT Blood Glucose.: 106 mg/dL (12 Nov 2024 22:54)      Labs        11-12    144  |  119[H]  |  12  ----------------------------<  91  4.9   |  18[L]  |  0.81    Ca    8.8      12 Nov 2024 05:52              .Blood BLOOD  11-07 @ 05:51   No growth at 5 days  --  --      Clean Catch  11-03 @ 04:09   >100,000 CFU/ml Klebsiella pneumoniae ESBL  --  Klebsiella pneumoniae ESBL          Radiology Results      Meds    MEDICATIONS  (STANDING):  apixaban 2.5 milliGRAM(s) Oral every 12 hours  atorvastatin 10 milliGRAM(s) Oral at bedtime  dronedarone 400 milliGRAM(s) Oral two times a day  fluticasone propionate/ salmeterol 100-50 MICROgram(s) Diskus 1 Dose(s) Inhalation two times a day  insulin lispro (ADMELOG) corrective regimen sliding scale   SubCutaneous three times a day before meals  insulin lispro (ADMELOG) corrective regimen sliding scale   SubCutaneous at bedtime  metoprolol tartrate 25 milliGRAM(s) Oral every 8 hours  montelukast 10 milliGRAM(s) Oral at bedtime  pantoprazole    Tablet 40 milliGRAM(s) Oral before breakfast  silver sulfADIAZINE 1% Cream 1 Application(s) Topical daily  valACYclovir 500 milliGRAM(s) Oral daily      MEDICATIONS  (PRN):  acetaminophen     Tablet .. 650 milliGRAM(s) Oral every 6 hours PRN Temp greater or equal to 38C (100.4F)  albuterol/ipratropium for Nebulization 3 milliLiter(s) Nebulizer every 6 hours PRN Bronchospasm  loperamide 2 milliGRAM(s) Oral four times a day PRN Diarrhea  melatonin 3 milliGRAM(s) Oral at bedtime PRN Insomnia      Physical Exam    Neuro :  no focal deficits  Respiratory: CTA B/L  CV: RRR, S1S2, no murmurs,   Abdominal: Soft, NT, ND +BS,  Extremities: No edema, + peripheral pulses      ASSESSMENT    pneumonia r/o     sepsis,   uti,   paf   h/o HTN,   HLD,   T2DM,   osteoporosis,   Stage IIB gastric adenocarcinoma s/p distal gastrectomy (2015),   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 Resolved prior pneumonia.  Unchanged small left upper lobe nodules noted    id f/u   completed course of Ertapenem 11/13/24   blood cx neg noted above   pulm f/u   atrov and prov nebs,   supplemental oxygen prn,   robitussin prn,   Budesonide 0.25 mgs neb BID  singulair qhs   PFTs as OP.  hold outpt dm meds,   lispro ss,   hgba1c 5.5 noted     heme onc f/u   pt currently on quadruple therapy, last tx 11/01/24   hold tumor directed tx during the hospital course   f/u with Dr. Alvarado on d/c  pt found to be in afib 11/4/24 and  spontaneously converted to sinus.  cardio f/u   cont eliquis,  cont multaq,   cont lopressor 25mg bid.  cont d/c'd procardia xl.  cont current meds   d/c planing

## 2024-11-13 NOTE — PROGRESS NOTE ADULT - SUBJECTIVE AND OBJECTIVE BOX
Patient is a 89y old  Female who presents with a chief complaint of CHARISSE, lactic acidosis (13 Nov 2024 10:31)  Awake, alert, comfortable in bed in NAD. Clinically unchanged    INTERVAL HPI/OVERNIGHT EVENTS:      VITAL SIGNS:  T(F): 99.3 (11-13-24 @ 04:34)  HR: 69 (11-13-24 @ 04:34)  BP: 129/78 (11-13-24 @ 04:34)  RR: 18 (11-13-24 @ 04:34)  SpO2: 96% (11-13-24 @ 04:34)  Wt(kg): --  I&O's Detail          REVIEW OF SYSTEMS:    CONSTITUTIONAL:  No fevers, chills, sweats    HEENT:  Eyes:  No diplopia or blurred vision. ENT:  No earache, sore throat or runny nose.    CARDIOVASCULAR:  No pressure, squeezing, tightness, or heaviness about the chest; no palpitations.    RESPIRATORY:  Per HPI    GASTROINTESTINAL:  No abdominal pain, nausea, vomiting or diarrhea.    GENITOURINARY:  No dysuria, frequency or urgency.    NEUROLOGIC:  No paresthesias, fasciculations, seizures or weakness.    PSYCHIATRIC:  No disorder of thought or mood.      PHYSICAL EXAM:    Constitutional: Well developed and nourished  Eyes:Perrla  ENMT: normal  Neck:supple  Respiratory: good air entry  Cardiovascular: S1 S2 regular  Gastrointestinal: Soft, Non tender  Extremities: No edema  Vascular:normal  Neurological:Awake, alert,Ox3  Musculoskeletal:Normal      MEDICATIONS  (STANDING):  apixaban 2.5 milliGRAM(s) Oral every 12 hours  atorvastatin 10 milliGRAM(s) Oral at bedtime  dronedarone 400 milliGRAM(s) Oral two times a day  fluticasone propionate/ salmeterol 100-50 MICROgram(s) Diskus 1 Dose(s) Inhalation two times a day  insulin lispro (ADMELOG) corrective regimen sliding scale   SubCutaneous three times a day before meals  insulin lispro (ADMELOG) corrective regimen sliding scale   SubCutaneous at bedtime  magnesium sulfate  IVPB 1 Gram(s) IV Intermittent once  metoprolol tartrate 25 milliGRAM(s) Oral every 8 hours  montelukast 10 milliGRAM(s) Oral at bedtime  pantoprazole    Tablet 40 milliGRAM(s) Oral before breakfast  silver sulfADIAZINE 1% Cream 1 Application(s) Topical daily  valACYclovir 500 milliGRAM(s) Oral daily    MEDICATIONS  (PRN):  acetaminophen     Tablet .. 650 milliGRAM(s) Oral every 6 hours PRN Temp greater or equal to 38C (100.4F)  albuterol/ipratropium for Nebulization 3 milliLiter(s) Nebulizer every 6 hours PRN Bronchospasm  loperamide 2 milliGRAM(s) Oral four times a day PRN Diarrhea  melatonin 3 milliGRAM(s) Oral at bedtime PRN Insomnia      Allergies    gabapentin (Unknown)    Intolerances        LABS:                        9.4    6.95  )-----------( 174      ( 13 Nov 2024 06:22 )             28.6     11-13    146[H]  |  119[H]  |  13  ----------------------------<  94  3.5   |  21[L]  |  0.67    Ca    8.7      13 Nov 2024 06:22  Phos  3.4     11-13  Mg     1.5     11-13    TPro  5.3[L]  /  Alb  2.6[L]  /  TBili  0.3  /  DBili  x   /  AST  39  /  ALT  39  /  AlkPhos  49  11-13      Urinalysis Basic - ( 13 Nov 2024 06:22 )    Color: x / Appearance: x / SG: x / pH: x  Gluc: 94 mg/dL / Ketone: x  / Bili: x / Urobili: x   Blood: x / Protein: x / Nitrite: x   Leuk Esterase: x / RBC: x / WBC x   Sq Epi: x / Non Sq Epi: x / Bacteria: x            CAPILLARY BLOOD GLUCOSE      POCT Blood Glucose.: 95 mg/dL (13 Nov 2024 08:01)  POCT Blood Glucose.: 106 mg/dL (12 Nov 2024 22:54)  POCT Blood Glucose.: 117 mg/dL (12 Nov 2024 17:55)  POCT Blood Glucose.: 109 mg/dL (12 Nov 2024 11:20)        RADIOLOGY & ADDITIONAL TESTS:    CXR:    Ct scan chest:    ekg;    echo:

## 2024-11-13 NOTE — DISCHARGE NOTE NURSING/CASE MANAGEMENT/SOCIAL WORK - PATIENT PORTAL LINK FT
You can access the FollowMyHealth Patient Portal offered by [facility] by registering at the following website: [email or web address]. By joining [facility]’s FollowMyHealth portal, you will also be able to view your health information using other applications (apps) compatible with our system.

## 2024-11-13 NOTE — PROGRESS NOTE ADULT - SUBJECTIVE AND OBJECTIVE BOX
Date of Service 11-13-24 @ 10:31    CHIEF COMPLAINT:Patient is a 89y old  Female who presents with a chief complaint of CHARISSE, lactic acidosis.Pt appears comfortable.    	  REVIEW OF SYSTEMS:  CONSTITUTIONAL: No fever, weight loss, or fatigue  EYES: No eye pain, visual disturbances, or discharge  ENT:  No difficulty hearing, tinnitus, vertigo; No sinus or throat pain  NECK: No pain or stiffness  RESPIRATORY: No cough, wheezing, chills or hemoptysis; No Shortness of Breath  CARDIOVASCULAR: No chest pain, palpitations, passing out, dizziness, or leg swelling  GASTROINTESTINAL: No abdominal or epigastric pain. No nausea, vomiting, or hematemesis; No diarrhea or constipation. No melena or hematochezia.  GENITOURINARY: No dysuria, frequency, hematuria, or incontinence  NEUROLOGICAL: No headaches, memory loss, loss of strength, numbness, or tremors  SKIN: No itching, burning, rashes, or lesions   LYMPH Nodes: No enlarged glands  ENDOCRINE: No heat or cold intolerance; No hair loss  MUSCULOSKELETAL: No joint pain or swelling; No muscle, back, or extremity pain  PSYCHIATRIC: No depression, anxiety, mood swings, or difficulty sleeping  HEME/LYMPH: No easy bruising, or bleeding gums  ALLERGY AND IMMUNOLOGIC: No hives or eczema	      PHYSICAL EXAM:  T(C): 37.4 (11-13-24 @ 04:34), Max: 37.4 (11-13-24 @ 04:34)  HR: 69 (11-13-24 @ 04:34) (69 - 78)  BP: 129/78 (11-13-24 @ 04:34) (112/61 - 129/78)  RR: 18 (11-13-24 @ 04:34) (18 - 18)  SpO2: 96% (11-13-24 @ 04:34) (96% - 96%)  Wt(kg): --  I&O's Summary      Appearance: Normal	  HEENT:   Normal oral mucosa, PERRL, EOMI	  Lymphatic: No lymphadenopathy  Cardiovascular: Normal S1 S2, No JVD, No murmurs, No edema  Respiratory: Lungs clear to auscultation	  Psychiatry: A & O x 3, Mood & affect appropriate  Gastrointestinal:  Soft, Non-tender, + BS	  Skin: No rashes, No ecchymoses, No cyanosis	  Neurologic: Non-focal  Extremities: Normal range of motion, No clubbing, cyanosis or edema  Vascular: Peripheral pulses palpable 2+ bilaterally    MEDICATIONS  (STANDING):  apixaban 2.5 milliGRAM(s) Oral every 12 hours  atorvastatin 10 milliGRAM(s) Oral at bedtime  dronedarone 400 milliGRAM(s) Oral two times a day  fluticasone propionate/ salmeterol 100-50 MICROgram(s) Diskus 1 Dose(s) Inhalation two times a day  insulin lispro (ADMELOG) corrective regimen sliding scale   SubCutaneous at bedtime  insulin lispro (ADMELOG) corrective regimen sliding scale   SubCutaneous three times a day before meals  metoprolol tartrate 25 milliGRAM(s) Oral every 8 hours  montelukast 10 milliGRAM(s) Oral at bedtime  pantoprazole    Tablet 40 milliGRAM(s) Oral before breakfast  silver sulfADIAZINE 1% Cream 1 Application(s) Topical daily  valACYclovir 500 milliGRAM(s) Oral daily      	  	  LABS:	 	                          9.4    6.95  )-----------( 174      ( 13 Nov 2024 06:22 )             28.6     11-13    146[H]  |  119[H]  |  13  ----------------------------<  94  3.5   |  21[L]  |  0.67    Ca    8.7      13 Nov 2024 06:22  Phos  3.4     11-13  Mg     1.5     11-13    TPro  5.3[L]  /  Alb  2.6[L]  /  TBili  0.3  /  DBili  x   /  AST  39  /  ALT  39  /  AlkPhos  49  11-13

## 2024-11-13 NOTE — ED ADULT NURSE NOTE - BREATH SOUNDS, MLM
CoQ10 400mg daily for fatigue    Alpha Lipoic Acid 600mg daily for nerve pain    Magnesium glycinate 400mg nightly for sleep, spasms, and headaches    Lion's Nathan mushroom for cognitive support   
Wheezes

## 2024-11-13 NOTE — PROGRESS NOTE ADULT - PROBLEM SELECTOR PROBLEM 1
Lactic acidosis
Urinary tract infection due to ESBL Klebsiella
Lactic acidosis
Urinary tract infection due to ESBL Klebsiella
Lactic acidosis
Urinary tract infection due to ESBL Klebsiella
Lactic acidosis
Urinary tract infection due to ESBL Klebsiella

## 2024-11-13 NOTE — PROGRESS NOTE ADULT - ASSESSMENT
88 y/o F, Mexican speaking, from home, ambulates with wheelchair (mostly bedbound), PMH HTN, HLD, T2DM, osteoporosis, Stage IIB gastric adenocarcinoma s/p distal gastrectomy (2015), multiple myeloma/plastocytoma on chemo (follows [facility] Dr Adams), that is brought in by family for SOB,s/p CHARISSE, found to be in afib 11/4/24 and  spontaneously converted to sinus,UTI-ESBL.  1.Off Tele monitoring.  2.PAF-eliquis, multaq,lopressor 25mg tid.  3.HTN-b blocker  4.DM-insulin.  5.CHARISSE-resolved.  6.UTI-ESBL-ABX as per ID completed.  7.PPI.

## 2024-11-13 NOTE — PROGRESS NOTE ADULT - PROBLEM SELECTOR PLAN 3
culture noted  Repeat BC as per ID (had recurrent fever while on Atbs)  antibiotics till 11/13  ID follow up  DC planning

## 2024-11-13 NOTE — PROGRESS NOTE ADULT - PROVIDER SPECIALTY LIST ADULT
Cardiology
Infectious Disease
Internal Medicine
Pulmonology
Cardiology
Heme/Onc
Infectious Disease
Internal Medicine
Cardiology
Pulmonology
Internal Medicine
Pulmonology
Internal Medicine
Internal Medicine
Pulmonology
Internal Medicine
Pulmonology
Internal Medicine

## 2024-11-15 LAB — METFORMIN SERPL-MCNC: 1.2 MCG/ML — SIGNIFICANT CHANGE UP

## 2024-12-26 NOTE — PROGRESS NOTE ADULT - PROBLEM SELECTOR PROBLEM 4
Patient: Cuong Tiwari    Procedure Information       Anesthesia Start Date/Time: 12/26/24 0741    Procedure: Right femur removal of Precice 12.5mm femoral nail (Right: Thigh - Leg Upper) - 1.5 hour case    Location: [facility] [place]  / Virtual [facility] [place]    Surgeons: Matthew Gutierrez MD            Relevant Problems   HEENT   (+) Seasonal allergies       Clinical information reviewed:                    Physical Exam    Airway  Mallampati: I  TM distance: <3 FB  Neck ROM: full     Cardiovascular - normal exam  Rhythm: regular  Rate: normal     Dental   Comments: braces   Pulmonary - normal exam  Breath sounds clear to auscultation     Abdominal        Anesthesia Plan  History of general anesthesia?: yes  History of complications of general anesthesia?: no  ASA 1     general     intravenous induction   Premedication planned: midazolam  Anesthetic plan and risks discussed with father.  Use of blood products discussed with father who.    Plan discussed with [facility].     CHARISSE (acute kidney injury)

## 2025-02-11 NOTE — ED ADULT NURSE NOTE - OBJECTIVE STATEMENT
Can resume Coumadin tonight along with Lovenox bridging  Follow up with Cardiology Clinic in 1-2 weeks    Post Radial Cath Discharge Instructions  Keep puncture site covered with current bandage for 24 hours.  You may shower in 24 hours. Do not take a tub bath or submerge the puncture site in water for 72 hours.   You may cover the site with a Band-Aid. Keep Band-Aid clean and dry at all times.    No lifting over 5 pounds with the arm your puncture site is on for 72 hours.  No strenuous activity such as bowling, tennis, etc for 72 hours  Do not operate lawnmower, motorcycle, chainsaw, or all terrain vehicle for 72 hours.  No blood pressure or tourniquets on affected arm for 72 hours.   The puncture site may be slightly bruised and sore following your procedure.   Drink 6-8 glasses of clear liquids during the next 24 hours to flush the dye out.     If Bleeding Occurs, DO NOT PANIC  Place 1 or 2 fingers over the puncture site and hold firm pressure to stop bleeding. You may be able to feel your pulse as you hold pressure  Lift you fingers after 5 minutes to see if the bleeding stopped.  Once has stopped, gently wipe the wrist area clean and cover with a bandage.  If the bleeding does not stop after 30 minutes, or if there is a large amount of bleeding or spurting, call 911! Do not drive yourself to the hospital.     Should any of the following occur, Contact your Physician Immediately:  Redness, inflamation, swelling, chills or fever, or discolred drainage at procedure site   Coldness, discoloration, ongoing numbness, severe pain, or swelling. Expect mild tingling of hand and tenderness at the puncture site for up to 3 days. If this persists or other symptoms develop, notify your physician    patient complains of right foot pain s/p fall x 3 days ago

## 2025-02-26 NOTE — CONSULT NOTE ADULT - PROBLEM/RECOMMENDATION-3
Abdomen , soft, nontender, nondistended , no guarding or rigidity , no masses palpable , normal bowel sounds , Liver and Spleen , no hepatomegaly present , no hepatosplenomegaly , liver nontender , spleen not palpable DISPLAY PLAN FREE TEXT

## 2025-03-01 ENCOUNTER — EMERGENCY (EMERGENCY)
Facility: HOSPITAL | Age: 89
LOS: 1 days | Discharge: HOME | End: 2025-03-01
Attending: EMERGENCY MEDICINE
Payer: MEDICAID

## 2025-03-01 VITALS
SYSTOLIC BLOOD PRESSURE: 131 MMHG | TEMPERATURE: 99 F | RESPIRATION RATE: 18 BRPM | DIASTOLIC BLOOD PRESSURE: 73 MMHG | HEART RATE: 81 BPM | OXYGEN SATURATION: 96 % | WEIGHT: 139.99 LBS

## 2025-03-01 VITALS
TEMPERATURE: 99 F | OXYGEN SATURATION: 96 % | SYSTOLIC BLOOD PRESSURE: 134 MMHG | DIASTOLIC BLOOD PRESSURE: 65 MMHG | RESPIRATION RATE: 19 BRPM | HEART RATE: 79 BPM

## 2025-03-01 DIAGNOSIS — Z98.51 TUBAL LIGATION STATUS: Chronic | ICD-10-CM

## 2025-03-01 DIAGNOSIS — Z90.710 ACQUIRED ABSENCE OF BOTH CERVIX AND UTERUS: Chronic | ICD-10-CM

## 2025-03-01 LAB
ALBUMIN SERPL ELPH-MCNC: 2.6 G/DL — LOW (ref 3.5–5)
ALP SERPL-CCNC: 58 U/L — SIGNIFICANT CHANGE UP (ref 40–120)
ALT FLD-CCNC: 22 U/L DA — SIGNIFICANT CHANGE UP (ref 10–60)
ANION GAP SERPL CALC-SCNC: 6 MMOL/L — SIGNIFICANT CHANGE UP (ref 5–17)
AST SERPL-CCNC: 23 U/L — SIGNIFICANT CHANGE UP (ref 10–40)
BASOPHILS # BLD AUTO: 0.04 K/UL — SIGNIFICANT CHANGE UP (ref 0–0.2)
BASOPHILS NFR BLD AUTO: 0.3 % — SIGNIFICANT CHANGE UP (ref 0–2)
BILIRUB SERPL-MCNC: 0.3 MG/DL — SIGNIFICANT CHANGE UP (ref 0.2–1.2)
BUN SERPL-MCNC: 24 MG/DL — HIGH (ref 7–18)
CALCIUM SERPL-MCNC: 8.4 MG/DL — SIGNIFICANT CHANGE UP (ref 8.4–10.5)
CHLORIDE SERPL-SCNC: 114 MMOL/L — HIGH (ref 96–108)
CO2 SERPL-SCNC: 21 MMOL/L — LOW (ref 22–31)
CREAT SERPL-MCNC: 0.94 MG/DL — SIGNIFICANT CHANGE UP (ref 0.5–1.3)
EGFR: 58 ML/MIN/1.73M2 — LOW
EOSINOPHIL # BLD AUTO: 0.05 K/UL — SIGNIFICANT CHANGE UP (ref 0–0.5)
EOSINOPHIL NFR BLD AUTO: 0.4 % — SIGNIFICANT CHANGE UP (ref 0–6)
GLUCOSE SERPL-MCNC: 181 MG/DL — HIGH (ref 70–99)
HCT VFR BLD CALC: 32.8 % — LOW (ref 34.5–45)
HGB BLD-MCNC: 10.5 G/DL — LOW (ref 11.5–15.5)
IMM GRANULOCYTES NFR BLD AUTO: 0.6 % — SIGNIFICANT CHANGE UP (ref 0–0.9)
LYMPHOCYTES # BLD AUTO: 26.8 % — SIGNIFICANT CHANGE UP (ref 13–44)
LYMPHOCYTES # BLD AUTO: 3.56 K/UL — HIGH (ref 1–3.3)
MCHC RBC-ENTMCNC: 32 G/DL — SIGNIFICANT CHANGE UP (ref 32–36)
MCHC RBC-ENTMCNC: 32.9 PG — SIGNIFICANT CHANGE UP (ref 27–34)
MCV RBC AUTO: 102.8 FL — HIGH (ref 80–100)
MONOCYTES # BLD AUTO: 0.52 K/UL — SIGNIFICANT CHANGE UP (ref 0–0.9)
MONOCYTES NFR BLD AUTO: 3.9 % — SIGNIFICANT CHANGE UP (ref 2–14)
NEUTROPHILS # BLD AUTO: 9.02 K/UL — HIGH (ref 1.8–7.4)
NEUTROPHILS NFR BLD AUTO: 68 % — SIGNIFICANT CHANGE UP (ref 43–77)
NRBC BLD AUTO-RTO: 0 /100 WBCS — SIGNIFICANT CHANGE UP (ref 0–0)
PLATELET # BLD AUTO: 180 K/UL — SIGNIFICANT CHANGE UP (ref 150–400)
POTASSIUM SERPL-MCNC: 4 MMOL/L — SIGNIFICANT CHANGE UP (ref 3.5–5.3)
POTASSIUM SERPL-SCNC: 4 MMOL/L — SIGNIFICANT CHANGE UP (ref 3.5–5.3)
PROT SERPL-MCNC: 5.7 G/DL — LOW (ref 6–8.3)
RBC # BLD: 3.19 M/UL — LOW (ref 3.8–5.2)
RBC # FLD: 17.4 % — HIGH (ref 10.3–14.5)
SODIUM SERPL-SCNC: 141 MMOL/L — SIGNIFICANT CHANGE UP (ref 135–145)
WBC # BLD: 13.27 K/UL — HIGH (ref 3.8–10.5)
WBC # FLD AUTO: 13.27 K/UL — HIGH (ref 3.8–10.5)

## 2025-03-01 PROCEDURE — 36415 COLL VENOUS BLD VENIPUNCTURE: CPT

## 2025-03-01 PROCEDURE — 85025 COMPLETE CBC W/AUTO DIFF WBC: CPT

## 2025-03-01 PROCEDURE — 99283 EMERGENCY DEPT VISIT LOW MDM: CPT

## 2025-03-01 PROCEDURE — 80053 COMPREHEN METABOLIC PANEL: CPT

## 2025-03-01 PROCEDURE — 99284 EMERGENCY DEPT VISIT MOD MDM: CPT

## 2025-03-01 RX ORDER — FLUCONAZOLE 150 MG
1 TABLET ORAL
Qty: 1 | Refills: 0
Start: 2025-03-01 | End: 2025-03-01

## 2025-03-01 RX ORDER — FLUCONAZOLE 150 MG
150 TABLET ORAL ONCE
Refills: 0 | Status: COMPLETED | OUTPATIENT
Start: 2025-03-01 | End: 2025-03-01

## 2025-03-01 RX ADMIN — Medication 150 MILLIGRAM(S): at 17:58

## 2025-03-01 NOTE — ED PROVIDER NOTE - PATIENT PORTAL LINK FT
You can access the FollowMyHealth Patient Portal offered by [facility] by registering at the following website: [email or web address]. By joining [employer]’s FollowMyHealth portal, you will also be able to view your health information using other applications (apps) compatible with our system.

## 2025-03-01 NOTE — ED PROVIDER NOTE - OBJECTIVE STATEMENT
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 daughter states rash started at groin and gluteal area x 2 weeks. initially small now this am large and spread to upper right back side. + itchy and burning. went to derm and given Ketaconazole cream and soap- no improvement. no fever, no mouth pain.

## 2025-03-01 NOTE — ED ADULT NURSE NOTE - NSFALLHARMRISKINTERV_ED_ALL_ED
Assistance OOB with selected safe patient handling equipment if applicable/Assistance with ambulation/Communicate risk of Fall with Harm to all staff, patient, and family/Monitor gait and stability/Monitor for mental status changes and reorient to person, place, and time, as needed/Move patient closer to nursing station/within visual sight of ED staff/Provide visual cue: red socks, yellow wristband, yellow gown, etc/Reinforce activity limits and safety measures with patient and family/Toileting schedule using arm’s reach rule for commode and bathroom/Use of alarms - bed, stretcher, chair and/or video monitoring/Bed in lowest position, wheels locked, appropriate side rails in place/Call bell, personal items and telephone in reach/Instruct patient to call for assistance before getting out of bed/chair/stretcher/Non-slip footwear applied when patient is off stretcher/[place] to call system/Physically safe environment - no spills, clutter or unnecessary equipment/Purposeful Proactive Rounding/Room/bathroom lighting operational, light cord in reach

## 2025-03-01 NOTE — ED PROVIDER NOTE - CLINICAL SUMMARY MEDICAL DECISION MAKING FREE TEXT BOX
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 daughter states rash started at groin and gluteal area x 2 weeks. initially small now this am large and spread to upper right back side. + itchy and burning. went to derm and given Ketaconazole cream and soap- no improvement. no fever, no mouth pain.     rash consistent with fungal rash- doubtful it is from a tick bite or SJS/med induced. not consistent with SSSS. not PV. labs, possibly start oral antifungal and close follow up with derm

## 2025-03-01 NOTE — ED PROVIDER NOTE - ENMT, MLM
Airway patent, Nasal mucosa clear. Mouth with normal mucosa. Throat has no vesicles, no oropharyngeal exudates and uvula is midline. no ulcers, no thrush

## 2025-03-01 NOTE — ED ADULT NURSE NOTE - ASSOCIATED SYMPTOMS
skin lesions on right flank from perineal area, up the back, senior living up her ribcage (on back). lesions are blanchable. approximately 2ft by 1ft in area.

## 2025-03-01 NOTE — ED ADULT NURSE NOTE - OBJECTIVE STATEMENT
AxOx3 89y Female presents to ED with skin lesions, previously treated by PCP. Daughter reports that the lesions have continued to increase even with medication. Pt's daughter speaks only Upper sorbian,  Chris [id number] [phone].

## 2025-03-01 NOTE — ED PROVIDER NOTE - CHIEF COMPLAINT
[de-identified] : surveillance of PrEP [FreeTextEntry6] : 19 year old male presenting for surveillance of PrEP. Pt is on Descovy. \par \par Pt has been back on PrEP for 1 month. Pt denies any missed or late pills. Pt reports two weeks of intermittent diarrhea after restarting PrEP, now resolved. Pt denies any other unwanted side effects. No abdominal pain. \par \par Last sex: 1 week ago, oral sex, insertive & receptive anal sex. No condom used. Pt used lubricant. \par \par Pt reports new male sexual partner since last testing. No condom used. \par \par Pt is recently engaged with male partner. \par \par Pt reports recent illness with flu like symptoms more than 5 days. Symptoms improved. Pt reports sick contacts at home.  The patient is a 89y Female complaining of

## 2025-03-01 NOTE — ED PROVIDER NOTE - NSFOLLOWUPINSTRUCTIONS_ED_ALL_ED_FT
fungal rash- please monitor the rash. it is contagious. use glove to care for it. keep using the soap and cream as instructed by the dermatologist. make sure to call your dermatologist for follow up. take the fluconazole 150mg next saturday  3/8/25.    erupción por hongos: controle la erupción. es contagioso. Utilice guantes para cuidarlo. Continúe usando el jabón y la crema según las instrucciones del dermatólogo. asegúrese de llamar a forrest dermatólogo para realizar un seguimiento. tome fluconazol 150 mg el próximo sábado 8/3/25.

## 2025-03-01 NOTE — ED PROVIDER NOTE - PROGRESS NOTE DETAILS
chance: work up shows normal LFT. fluconzole 150mg given and will take 2nd dose 1 week later. advise to see derm. return if worsens.

## 2025-03-04 NOTE — ED PROVIDER NOTE - EMPLOYMENT
P Quality Flow/Interdisciplinary Rounds Progress Note        Quality Flow Rounds held on March 4, 2025    Disciplines Attending:  Bedside Nurse, , , Nursing Unit Leadership, and      Jostin Black was admitted on 3/3/2025 12:52 PM    Anticipated Discharge Date:   3/5/2025    Disposition: Home    Rigoberto Score:  Rigoberto Scale Score: 22    [facility] RISK OF UNPLANNED READMISSION 2.0             0 Total Score        Discussed patient goal for the day, patient clinical progression, and barriers to discharge.  The following Goal(s) of the Day/Commitment(s) have been identified:  Other  Plan for surgical intervention       Chely Radford RN  March 4, 2025         N/A

## 2025-03-13 NOTE — PATIENT PROFILE ADULT - LANGUAGE ASSISTANCE NEEDED
Future  -     CBC with Auto Differential; Future  -     TSH reflex to FT4; Future  -     Albumin/Creatinine Ratio, Urine; Future    Stable on glipizide.  No hypoglycemia reported.  Will have patient continue current therapy and we will plan to recheck labs in May.    5. Vitamin D deficiency  -     Vitamin D 25 Hydroxy; Future    Stable.  Will have patient continue vitamin D supplementation at this time.  Check lab in May.      Orders Placed This Encounter   Procedures    Vitamin D 25 Hydroxy     Standing Status:   Future     Expected Date:   5/12/2025     Expiration Date:   3/13/2026    Lipid Panel     Standing Status:   Future     Expected Date:   5/12/2025     Expiration Date:   3/13/2026    Comprehensive Metabolic Panel     Standing Status:   Future     Expected Date:   5/12/2025     Expiration Date:   3/13/2026    Hemoglobin A1C     Standing Status:   Future     Expected Date:   5/12/2025     Expiration Date:   3/13/2026    CBC with Auto Differential     Standing Status:   Future     Expected Date:   5/12/2025     Expiration Date:   3/13/2026    TSH reflex to FT4     Standing Status:   Future     Expected Date:   5/12/2025     Expiration Date:   3/13/2026    Albumin/Creatinine Ratio, Urine     Standing Status:   Future     Expected Date:   5/12/2025     Expiration Date:   3/13/2026         Elements of this note have been dictated using speech recognition software. As a result, errors of speech recognition may have occurred.    Return in about 8 weeks (around 5/8/2025) for Follow up with fasting labs prior.     Dariana Merino, APRN - CNP  
Yes-Patient/Caregiver accepts free interpretation services...

## 2025-04-01 NOTE — ED PROVIDER NOTE - PHYSICAL EXAMINATION
GENERAL: no acute distress; well-developed  HEAD:  Atraumatic, Normocephalic  EYES: EOMI, PERRLA, conjunctiva and sclera clear  ENT: MMM; oropharynx clear  NECK: Supple, No JVD  CHEST/LUNG: Clear to auscultation bilaterally; No wheeze  HEART: Regular rate and rhythm; No murmurs, rubs, or gallops  ABDOMEN: Soft, Nontender, Nondistended; Bowel sounds present  EXTREMITIES:  2+ Peripheral Pulses, No clubbing, cyanosis, or edema  PSYCH: AAOx3  NEUROLOGY: no focal motor or sensory deficits. 5/5 muscle strength in all extremities.   SKIN: No rashes or lesions
none

## 2025-04-15 NOTE — ED ADULT NURSE NOTE - NS_SISCREENINGSR_GEN_ALL_ED
Negative Litfulo Pregnancy And Lactation Text: There is insufficient data to evaluate whether or not Litfulo is safe to use during pregnancy.  Breastfeeding is not recommended during treatment. Imiquimod Pregnancy And Lactation Text: This medication is Pregnancy Category C. It is unknown if this medication is excreted in breast milk. Cimetidine Pregnancy And Lactation Text: This medication is Pregnancy Category B and is considered safe during pregnancy. It is also excreted in breast milk and breast feeding isn't recommended. Low Dose Naltrexone Counseling- I discussed with the patient the potential risks and side effects of low dose naltrexone including but not limited to: more vivid dreams, headaches, nausea, vomiting, abdominal pain, fatigue, dizziness, and anxiety. Fluconazole Counseling:  Patient counseled regarding adverse effects of fluconazole including but not limited to headache, diarrhea, nausea, upset stomach, liver function test abnormalities, taste disturbance, and stomach pain.  There is a rare possibility of liver failure that can occur when taking fluconazole.  The patient understands that monitoring of LFTs and kidney function test may be required, especially at baseline. The patient verbalized understanding of the proper use and possible adverse effects of fluconazole.  All of the patient's questions and concerns were addressed. Clindamycin Counseling: I counseled the patient regarding use of clindamycin as an antibiotic for prophylactic and/or therapeutic purposes. Clindamycin is active against numerous classes of bacteria, including skin bacteria. Side effects may include nausea, diarrhea, gastrointestinal upset, rash, hives, yeast infections, and in rare cases, colitis. Infliximab Counseling:  I discussed with the patient the risks of infliximab including but not limited to myelosuppression, immunosuppression, autoimmune hepatitis, demyelinating diseases, lymphoma, and serious infections.  The patient understands that monitoring is required including a PPD at baseline and must alert us or the primary physician if symptoms of infection or other concerning signs are noted. Colchicine Pregnancy And Lactation Text: This medication is Pregnancy Category C and isn't considered safe during pregnancy. It is excreted in breast milk. Skyrizi Pregnancy And Lactation Text: The risk during pregnancy and breastfeeding is uncertain with this medication. Cyclosporine Counseling:  I discussed with the patient the risks of cyclosporine including but not limited to hypertension, gingival hyperplasia,myelosuppression, immunosuppression, liver damage, kidney damage, neurotoxicity, lymphoma, and serious infections. The patient understands that monitoring is required including baseline blood pressure, CBC, CMP, lipid panel and uric acid, and then 1-2 times monthly CMP and blood pressure. Azelaic Acid Pregnancy And Lactation Text: This medication is considered safe during pregnancy and breast feeding. Adbry Counseling: I discussed with the patient the risks of tralokinumab including but not limited to eye infection and irritation, cold sores, injection site reactions, worsening of asthma, allergic reactions and increased risk of parasitic infection.  Live vaccines should be avoided while taking tralokinumab. The patient understands that monitoring is required and they must alert us or the primary physician if symptoms of infection or other concerning signs are noted. Spironolactone Counseling: Patient advised regarding risks of diarrhea, abdominal pain, hyperkalemia, birth defects (for female patients), liver toxicity and renal toxicity. The patient may need blood work to monitor liver and kidney function and potassium levels while on therapy. The patient verbalized understanding of the proper use and possible adverse effects of spironolactone.  All of the patient's questions and concerns were addressed. Low Dose Naltrexone Pregnancy And Lactation Text: Naltrexone is pregnancy category C.  There have been no adequate and well-controlled studies in pregnant women.  It should be used in pregnancy only if the potential benefit justifies the potential risk to the fetus.   Limited data indicates that naltrexone is minimally excreted into breastmilk. Topical Ketoconazole Counseling: Patient counseled that this medication may cause skin irritation or allergic reactions.  In the event of skin irritation, the patient was advised to reduce the amount of the drug applied or use it less frequently.   The patient verbalized understanding of the proper use and possible adverse effects of ketoconazole.  All of the patient's questions and concerns were addressed. Dupixent Pregnancy And Lactation Text: This medication likely crosses the placenta but the risk for the fetus is uncertain. This medication is excreted in breast milk. Fluconazole Pregnancy And Lactation Text: This medication is Pregnancy Category C and it isn't know if it is safe during pregnancy. It is also excreted in breast milk. Thalidomide Pregnancy And Lactation Text: This medication is Pregnancy Category X and is absolutely contraindicated during pregnancy. It is unknown if it is excreted in breast milk. Acitretin Counseling:  I discussed with the patient the risks of acitretin including but not limited to hair loss, dry lips/skin/eyes, liver damage, hyperlipidemia, depression/suicidal ideation, photosensitivity.  Serious rare side effects can include but are not limited to pancreatitis, pseudotumor cerebri, bony changes, clot formation/stroke/heart attack.  Patient understands that alcohol is contraindicated since it can result in liver toxicity and significantly prolong the elimination of the drug by many years. Dutasteride Female Counseling: Dutasteride Counseling:  I discussed with the patient the risks of use of dutasteride including but not limited to decreased libido and sexual dysfunction. Explained the teratogenic nature of the medication and stressed the importance of not getting pregnant during treatment. All of the patient's questions and concerns were addressed. Quinolones Counseling:  I discussed with the patient the risks of fluoroquinolones including but not limited to GI upset, allergic reaction, drug rash, diarrhea, dizziness, photosensitivity, yeast infections, liver function test abnormalities, tendonitis/tendon rupture. Drysol Counseling:  I discussed with the patient the risks of drysol/aluminum chloride including but not limited to skin rash, itching, irritation, burning. Rhofade Pregnancy And Lactation Text: This medication has not been assigned a Pregnancy Risk Category. It is unknown if the medication is excreted in breast milk. Otezla Counseling: The side effects of Otezla were discussed with the patient, including but not limited to worsening or new depression, weight loss, diarrhea, nausea, upper respiratory tract infection, and headache. Patient instructed to call the office should any adverse effect occur.  The patient verbalized understanding of the proper use and possible adverse effects of Otezla.  All the patient's questions and concerns were addressed. Xolair Pregnancy And Lactation Text: This medication is Pregnancy Category B and is considered safe during pregnancy. This medication is excreted in breast milk. Minoxidil Counseling: Minoxidil is a topical medication which can increase blood flow where it is applied. It is uncertain how this medication increases hair growth. Side effects are uncommon and include stinging and allergic reactions. Infliximab Pregnancy And Lactation Text: This medication is Pregnancy Category B and is considered safe during pregnancy. It is unknown if this medication is excreted in breast milk. Olumiant Counseling: I discussed with the patient the risks of Olumiant therapy including but not limited to upper respiratory tract infections, shingles, cold sores, and nausea. Live vaccines should be avoided.  This medication has been linked to serious infections; higher rate of mortality; malignancy and lymphoproliferative disorders; major adverse cardiovascular events; thrombosis; gastrointestinal perforations; neutropenia; lymphopenia; anemia; liver enzyme elevations; and lipid elevations. Doxepin Counseling:  Patient advised that the medication is sedating and not to drive a car after taking this medication. Patient informed of potential adverse effects including but not limited to dry mouth, urinary retention, and blurry vision.  The patient verbalized understanding of the proper use and possible adverse effects of doxepin.  All of the patient's questions and concerns were addressed. Topical Ketoconazole Pregnancy And Lactation Text: This medication is Pregnancy Category B and is considered safe during pregnancy. It is unknown if it is excreted in breast milk. Cyclosporine Pregnancy And Lactation Text: This medication is Pregnancy Category C and it isn't know if it is safe during pregnancy. This medication is excreted in breast milk. Clindamycin Pregnancy And Lactation Text: This medication can be used in pregnancy if certain situations. Clindamycin is also present in breast milk. Spevigo Counseling: I discussed with the patient the risks of Spevigo including but not limited to fatigue, nasuea, vomiting, headache, pruritus, urinary tract infection, an infusion related reactions.  The patient understands that monitoring is required including screening for tuberculosis at baseline and yearly screening thereafter while continuing Spevigo therapy. They should contact us if symptoms of infection or other concerning signs are noted. Enbrel Counseling:  I discussed with the patient the risks of etanercept including but not limited to myelosuppression, immunosuppression, autoimmune hepatitis, demyelinating diseases, lymphoma, and infections.  The patient understands that monitoring is required including a PPD at baseline and must alert us or the primary physician if symptoms of infection or other concerning signs are noted. Dapsone Counseling: I discussed with the patient the risks of dapsone including but not limited to hemolytic anemia, agranulocytosis, rashes, methemoglobinemia, kidney failure, peripheral neuropathy, headaches, GI upset, and liver toxicity.  Patients who start dapsone require monitoring including baseline LFTs and weekly CBCs for the first month, then every month thereafter.  The patient verbalized understanding of the proper use and possible adverse effects of dapsone.  All of the patient's questions and concerns were addressed. Adbry Pregnancy And Lactation Text: It is unknown if this medication will adversely affect pregnancy or breast feeding. Doxycycline Counseling:  Patient counseled regarding possible photosensitivity and increased risk for sunburn.  Patient instructed to avoid sunlight, if possible.  When exposed to sunlight, patients should wear protective clothing, sunglasses, and sunscreen.  The patient was instructed to call the office immediately if the following severe adverse effects occur:  hearing changes, easy bruising/bleeding, severe headache, or vision changes.  The patient verbalized understanding of the proper use and possible adverse effects of doxycycline.  All of the patient's questions and concerns were addressed. Benzoyl Peroxide Counseling: Patient counseled that medicine may cause skin irritation and bleach clothing.  In the event of skin irritation, the patient was advised to reduce the amount of the drug applied or use it less frequently.   The patient verbalized understanding of the proper use and possible adverse effects of benzoyl peroxide.  All of the patient's questions and concerns were addressed. Niacinamide Counseling: I recommended taking niacin or niacinamide, also know as vitamin B3, twice daily. Recent evidence suggests that taking vitamin B3 (500 mg twice daily) can reduce the risk of actinic keratoses and non-melanoma skin cancers. Side effects of vitamin B3 include flushing and headache. Spevigo Pregnancy And Lactation Text: The risk during pregnancy and breastfeeding is uncertain with this medication. This medication does cross the placenta. It is unknown if this medication is found in breast milk. Griseofulvin Counseling:  I discussed with the patient the risks of griseofulvin including but not limited to photosensitivity, cytopenia, liver damage, nausea/vomiting and severe allergy.  The patient understands that this medication is best absorbed when taken with a fatty meal (e.g., ice cream or french fries). Methotrexate Counseling:  Patient counseled regarding adverse effects of methotrexate including but not limited to nausea, vomiting, abnormalities in liver function tests. Patients may develop mouth sores, rash, diarrhea, and abnormalities in blood counts. The patient understands that monitoring is required including LFT's and blood counts.  There is a rare possibility of scarring of the liver and lung problems that can occur when taking methotrexate. Persistent nausea, loss of appetite, pale stools, dark urine, cough, and shortness of breath should be reported immediately. Patient advised to discontinue methotrexate treatment at least three months before attempting to become pregnant.  I discussed the need for folate supplements while taking methotrexate.  These supplements can decrease side effects during methotrexate treatment. The patient verbalized understanding of the proper use and possible adverse effects of methotrexate.  All of the patient's questions and concerns were addressed. Otezla Pregnancy And Lactation Text: This medication is Pregnancy Category C and it isn't known if it is safe during pregnancy. It is unknown if it is excreted in breast milk. Dutasteride Pregnancy And Lactation Text: This medication is absolutely contraindicated in women, especially during pregnancy and breast feeding. Feminization of male fetuses is possible if taking while pregnant. Acitretin Pregnancy And Lactation Text: This medication is Pregnancy Category X and should not be given to women who are pregnant or may become pregnant in the future. This medication is excreted in breast milk. Solaraze Counseling:  I discussed with the patient the risks of Solaraze including but not limited to erythema, scaling, itching, weeping, crusting, and pain. Doxepin Pregnancy And Lactation Text: This medication is Pregnancy Category C and it isn't known if it is safe during pregnancy. It is also excreted in breast milk and breast feeding isn't recommended. Spironolactone Pregnancy And Lactation Text: This medication can cause feminization of the male fetus and should be avoided during pregnancy. The active metabolite is also found in breast milk. Opioid Counseling: I discussed with the patient the potential side effects of opioids including but not limited to addiction, altered mental status, and depression. I stressed avoiding alcohol, benzodiazepines, muscle relaxants and sleep aids unless specifically okayed by a physician. The patient verbalized understanding of the proper use and possible adverse effects of opioids. All of the patient's questions and concerns were addressed. They were instructed to flush the remaining pills down the toilet if they did not need them for pain. Bexarotene Counseling:  I discussed with the patient the risks of bexarotene including but not limited to hair loss, dry lips/skin/eyes, liver abnormalities, hyperlipidemia, pancreatitis, depression/suicidal ideation, photosensitivity, drug rash/allergic reactions, hypothyroidism, anemia, leukopenia, infection, cataracts, and teratogenicity.  Patient understands that they will need regular blood tests to check lipid profile, liver function tests, white blood cell count, thyroid function tests and pregnancy test if applicable. Rituxan Counseling:  I discussed with the patient the risks of Rituxan infusions. Side effects can include infusion reactions, severe drug rashes including mucocutaneous reactions, reactivation of latent hepatitis and other infections and rarely progressive multifocal leukoencephalopathy.  All of the patient's questions and concerns were addressed. Dapsone Pregnancy And Lactation Text: This medication is Pregnancy Category C and is not considered safe during pregnancy or breast feeding. Erivedge Counseling- I discussed with the patient the risks of Erivedge including but not limited to nausea, vomiting, diarrhea, constipation, weight loss, changes in the sense of taste, decreased appetite, muscle spasms, and hair loss.  The patient verbalized understanding of the proper use and possible adverse effects of Erivedge.  All of the patient's questions and concerns were addressed. Olumiant Pregnancy And Lactation Text: Based on animal studies, Olumiant may cause embryo-fetal harm when administered to pregnant women.  The medication should not be used in pregnancy.  Breastfeeding is not recommended during treatment. Topical Sulfur Applications Counseling: Topical Sulfur Counseling: Patient counseled that this medication may cause skin irritation or allergic reactions.  In the event of skin irritation, the patient was advised to reduce the amount of the drug applied or use it less frequently.   The patient verbalized understanding of the proper use and possible adverse effects of topical sulfur application.  All of the patient's questions and concerns were addressed. Minoxidil Pregnancy And Lactation Text: This medication has not been assigned a Pregnancy Risk Category but animal studies failed to show danger with the topical medication. It is unknown if the medication is excreted in breast milk. Hydroxyzine Counseling: Patient advised that the medication is sedating and not to drive a car after taking this medication.  Patient informed of potential adverse effects including but not limited to dry mouth, urinary retention, and blurry vision.  The patient verbalized understanding of the proper use and possible adverse effects of hydroxyzine.  All of the patient's questions and concerns were addressed. Solaraze Pregnancy And Lactation Text: This medication is Pregnancy Category B and is considered safe. There is some data to suggest avoiding during the third trimester. It is unknown if this medication is excreted in breast milk. Gabapentin Counseling: I discussed with the patient the risks of gabapentin including but not limited to dizziness, somnolence, fatigue and ataxia. Griseofulvin Pregnancy And Lactation Text: This medication is Pregnancy Category X and is known to cause serious birth defects. It is unknown if this medication is excreted in breast milk but breast feeding should be avoided. Azathioprine Counseling:  I discussed with the patient the risks of azathioprine including but not limited to myelosuppression, immunosuppression, hepatotoxicity, lymphoma, and infections.  The patient understands that monitoring is required including baseline LFTs, Creatinine, possible TPMP genotyping and weekly CBCs for the first month and then every 2 weeks thereafter.  The patient verbalized understanding of the proper use and possible adverse effects of azathioprine.  All of the patient's questions and concerns were addressed. Azithromycin Counseling:  I discussed with the patient the risks of azithromycin including but not limited to GI upset, allergic reaction, drug rash, diarrhea, and yeast infections. Opioid Pregnancy And Lactation Text: These medications can lead to premature delivery and should be avoided during pregnancy. These medications are also present in breast milk in small amounts. Rinvoq Counseling: I discussed with the patient the risks of Rinvoq therapy including but not limited to upper respiratory tract infections, shingles, cold sores, bronchitis, nausea, cough, fever, acne, and headache. Live vaccines should be avoided.  This medication has been linked to serious infections; higher rate of mortality; malignancy and lymphoproliferative disorders; major adverse cardiovascular events; thrombosis; thrombocytopenia, anemia, and neutropenia; lipid elevations; liver enzyme elevations; and gastrointestinal perforations. Bimzelx Counseling:  I discussed with the patient the risks of Bimzelx including but not limited to depression, immunosuppression, allergic reactions and infections.  The patient understands that monitoring is required including a PPD at baseline and must alert us or the primary physician if symptoms of infection or other concerning signs are noted. Oxybutynin Counseling:  I discussed with the patient the risks of oxybutynin including but not limited to skin rash, drowsiness, dry mouth, difficulty urinating, and blurred vision. Rifampin Counseling: I discussed with the patient the risks of rifampin including but not limited to liver damage, kidney damage, red-orange body fluids, nausea/vomiting and severe allergy. Elidel Counseling: Patient may experience a mild burning sensation during topical application. Elidel is not approved in children less than 2 years of age. There have been case reports of hematologic and skin malignancies in patients using topical calcineurin inhibitors although causality is questionable. Doxycycline Pregnancy And Lactation Text: This medication is Pregnancy Category D and not consider safe during pregnancy. It is also excreted in breast milk but is considered safe for shorter treatment courses. Rituxan Pregnancy And Lactation Text: This medication is Pregnancy Category C and it isn't know if it is safe during pregnancy. It is unknown if this medication is excreted in breast milk but similar antibodies are known to be excreted. Benzoyl Peroxide Pregnancy And Lactation Text: This medication is Pregnancy Category C. It is unknown if benzoyl peroxide is excreted in breast milk. Opzelura Counseling:  I discussed with the patient the risks of Opzelura including but not limited to nasopharngitis, bronchitis, ear infection, eosinophila, hives, diarrhea, folliculitis, tonsillitis, and rhinorrhea.  Taken orally, this medication has been linked to serious infections; higher rate of mortality; malignancy and lymphoproliferative disorders; major adverse cardiovascular events; thrombosis; thrombocytopenia, anemia, and neutropenia; and lipid elevations. Niacinamide Pregnancy And Lactation Text: These medications are considered safe during pregnancy. Stelara Counseling:  I discussed with the patient the risks of ustekinumab including but not limited to immunosuppression, malignancy, posterior leukoencephalopathy syndrome, and serious infections.  The patient understands that monitoring is required including a PPD at baseline and must alert us or the primary physician if symptoms of infection or other concerning signs are noted. Methotrexate Pregnancy And Lactation Text: This medication is Pregnancy Category X and is known to cause fetal harm. This medication is excreted in breast milk. Humira Counseling:  I discussed with the patient the risks of adalimumab including but not limited to myelosuppression, immunosuppression, autoimmune hepatitis, demyelinating diseases, lymphoma, and serious infections.  The patient understands that monitoring is required including a PPD at baseline and must alert us or the primary physician if symptoms of infection or other concerning signs are noted. Rifampin Pregnancy And Lactation Text: This medication is Pregnancy Category C and it isn't know if it is safe during pregnancy. It is also excreted in breast milk and should not be used if you are breast feeding. Tranexamic Acid Counseling:  Patient advised of the small risk of bleeding problems with tranexamic acid. They were also instructed to call if they developed any nausea, vomiting or diarrhea. All of the patient's questions and concerns were addressed. Carac Counseling:  I discussed with the patient the risks of Carac including but not limited to erythema, scaling, itching, weeping, crusting, and pain. Bimzelx Pregnancy And Lactation Text: This medication crosses the placenta and the safety is uncertain during pregnancy. It is unknown if this medication is present in breast milk. Topical Sulfur Applications Pregnancy And Lactation Text: This medication is considered safe during pregnancy and breast feeding secondary to limited systemic absorption. Bexarotene Pregnancy And Lactation Text: This medication is Pregnancy Category X and should not be given to women who are pregnant or may become pregnant. This medication should not be used if you are breast feeding. Ketoconazole Counseling:   Patient counseled regarding improving absorption with orange juice.  Adverse effects include but are not limited to breast enlargement, headache, diarrhea, nausea, upset stomach, liver function test abnormalities, taste disturbance, and stomach pain.  There is a rare possibility of liver failure that can occur when taking ketoconazole. The patient understands that monitoring of LFTs may be required, especially at baseline. The patient verbalized understanding of the proper use and possible adverse effects of ketoconazole.  All of the patient's questions and concerns were addressed. Mirvaso Counseling: Mirvaso is a topical medication which can decrease superficial blood flow where applied. Side effects are uncommon and include stinging, redness and allergic reactions. Rinvoq Pregnancy And Lactation Text: Based on animal studies, Rinvoq may cause embryo-fetal harm when administered to pregnant women.  The medication should not be used in pregnancy.  Breastfeeding is not recommended during treatment and for 6 days after the last dose. Libtayo Counseling- I discussed with the patient the risks of Libtayo including but not limited to nausea, vomiting, diarrhea, and bone or muscle pain.  The patient verbalized understanding of the proper use and possible adverse effects of Libtayo.  All of the patient's questions and concerns were addressed. Opzelura Pregnancy And Lactation Text: There is insufficient data to evaluate drug-associated risk for major birth defects, miscarriage, or other adverse maternal or fetal outcomes.  There is a pregnancy registry that monitors pregnancy outcomes in pregnant persons exposed to the medication during pregnancy.  It is unknown if this medication is excreted in breast milk.  Do not breastfeed during treatment and for about 4 weeks after the last dose. Topical Retinoid counseling:  Patient advised to apply a pea-sized amount only at bedtime and wait 30 minutes after washing their face before applying.  If too drying, patient may add a non-comedogenic moisturizer. The patient verbalized understanding of the proper use and possible adverse effects of retinoids.  All of the patient's questions and concerns were addressed. Hydroxyzine Pregnancy And Lactation Text: This medication is not safe during pregnancy and should not be taken. It is also excreted in breast milk and breast feeding isn't recommended. Nsaids Counseling: NSAID Counseling: I discussed with the patient that NSAIDs should be taken with food. Prolonged use of NSAIDs can result in the development of stomach ulcers.  Patient advised to stop taking NSAIDs if abdominal pain occurs.  The patient verbalized understanding of the proper use and possible adverse effects of NSAIDs.  All of the patient's questions and concerns were addressed. Prednisone Counseling:  I discussed with the patient the risks of prolonged use of prednisone including but not limited to weight gain, insomnia, osteoporosis, mood changes, diabetes, susceptibility to infection, glaucoma and high blood pressure.  In cases where prednisone use is prolonged, patients should be monitored with blood pressure checks, serum glucose levels and an eye exam.  Additionally, the patient may need to be placed on GI prophylaxis, PCP prophylaxis, and calcium and vitamin D supplementation and/or a bisphosphonate.  The patient verbalized understanding of the proper use and the possible adverse effects of prednisone.  All of the patient's questions and concerns were addressed. Itraconazole Counseling:  I discussed with the patient the risks of itraconazole including but not limited to liver damage, nausea/vomiting, neuropathy, and severe allergy.  The patient understands that this medication is best absorbed when taken with acidic beverages such as non-diet cola or ginger ale.  The patient understands that monitoring is required including baseline LFTs and repeat LFTs at intervals.  The patient understands that they are to contact us or the primary physician if concerning signs are noted. Erythromycin Counseling:  I discussed with the patient the risks of erythromycin including but not limited to GI upset, allergic reaction, drug rash, diarrhea, increase in liver enzymes, and yeast infections. Siliq Counseling:  I discussed with the patient the risks of Siliq including but not limited to new or worsening depression, suicidal thoughts and behavior, immunosuppression, malignancy, posterior leukoencephalopathy syndrome, and serious infections.  The patient understands that monitoring is required including a PPD at baseline and must alert us or the primary physician if symptoms of infection or other concerning signs are noted. There is also a special program designed to monitor depression which is required with Siliq. Azithromycin Pregnancy And Lactation Text: This medication is considered safe during pregnancy and is also secreted in breast milk. Azathioprine Pregnancy And Lactation Text: This medication is Pregnancy Category D and isn't considered safe during pregnancy. It is unknown if this medication is excreted in breast milk. Cimzia Counseling:  I discussed with the patient the risks of Cimzia including but not limited to immunosuppression, allergic reactions and infections.  The patient understands that monitoring is required including a PPD at baseline and must alert us or the primary physician if symptoms of infection or other concerning signs are noted. Wartpeel Counseling:  I discussed with the patient the risks of Wartpeel including but not limited to erythema, scaling, itching, weeping, crusting, and pain. Nsaids Pregnancy And Lactation Text: These medications are considered safe up to 30 weeks gestation. It is excreted in breast milk. Erythromycin Pregnancy And Lactation Text: This medication is Pregnancy Category B and is considered safe during pregnancy. It is also excreted in breast milk. Taltz Counseling: I discussed with the patient the risks of ixekizumab including but not limited to immunosuppression, serious infections, worsening of inflammatory bowel disease and drug reactions.  The patient understands that monitoring is required including a PPD at baseline and must alert us or the primary physician if symptoms of infection or other concerning signs are noted. Carac Pregnancy And Lactation Text: This medication is Pregnancy Category X and contraindicated in pregnancy and in women who may become pregnant. It is unknown if this medication is excreted in breast milk. Finasteride Male Counseling: Finasteride Counseling:  I discussed with the patient the risks of use of finasteride including but not limited to decreased libido, decreased ejaculate volume, gynecomastia, and depression. Women should not handle medication.  All of the patient's questions and concerns were addressed. Bactrim Counseling:  I discussed with the patient the risks of sulfa antibiotics including but not limited to GI upset, allergic reaction, drug rash, diarrhea, dizziness, photosensitivity, and yeast infections.  Rarely, more serious reactions can occur including but not limited to aplastic anemia, agranulocytosis, methemoglobinemia, blood dyscrasias, liver or kidney failure, lung infiltrates or desquamative/blistering drug rashes. Arava Counseling:  Patient counseled regarding adverse effects of Arava including but not limited to nausea, vomiting, abnormalities in liver function tests. Patients may develop mouth sores, rash, diarrhea, and abnormalities in blood counts. The patient understands that monitoring is required including LFTs and blood counts.  There is a rare possibility of scarring of the liver and lung problems that can occur when taking methotrexate. Persistent nausea, loss of appetite, pale stools, dark urine, cough, and shortness of breath should be reported immediately. Patient advised to discontinue Arava treatment and consult with a physician prior to attempting conception. The patient will have to undergo a treatment to eliminate Arava from the body prior to conception. Isotretinoin Counseling: Patient should get monthly blood tests, not donate blood, not drive at night if vision affected, not share medication, and not undergo elective surgery for 6 months after tx completed. Side effects reviewed, pt to contact office should one occur. Picato Counseling:  I discussed with the patient the risks of Picato including but not limited to erythema, scaling, itching, weeping, crusting, and pain. Sarecycline Counseling: Patient advised regarding possible photosensitivity and discoloration of the teeth, skin, lips, tongue and gums.  Patient instructed to avoid sunlight, if possible.  When exposed to sunlight, patients should wear protective clothing, sunglasses, and sunscreen.  The patient was instructed to call the office immediately if the following severe adverse effects occur:  hearing changes, easy bruising/bleeding, severe headache, or vision changes.  The patient verbalized understanding of the proper use and possible adverse effects of sarecycline.  All of the patient's questions and concerns were addressed. Eucrisa Counseling: Patient may experience a mild burning sensation during topical application. Eucrisa is not approved in children less than 3 months of age. Propranolol Counseling:  I discussed with the patient the risks of propranolol including but not limited to low heart rate, low blood pressure, low blood sugar, restlessness and increased cold sensitivity. They should call the office if they experience any of these side effects. Ketoconazole Pregnancy And Lactation Text: This medication is Pregnancy Category C and it isn't know if it is safe during pregnancy. It is also excreted in breast milk and breast feeding isn't recommended. Tranexamic Acid Pregnancy And Lactation Text: It is unknown if this medication is safe during pregnancy or breast feeding. Isotretinoin Pregnancy And Lactation Text: This medication is Pregnancy Category X and is considered extremely dangerous during pregnancy. It is unknown if it is excreted in breast milk. Albendazole Counseling:  I discussed with the patient the risks of albendazole including but not limited to cytopenia, kidney damage, nausea/vomiting and severe allergy.  The patient understands that this medication is being used in an off-label manner. Libtayo Pregnancy And Lactation Text: This medication is contraindicated in pregnancy and when breast feeding. Hyrimoz Counseling:  I discussed with the patient the risks of adalimumab including but not limited to myelosuppression, immunosuppression, autoimmune hepatitis, demyelinating diseases, lymphoma, and serious infections.  The patient understands that monitoring is required including a PPD at baseline and must alert us or the primary physician if symptoms of infection or other concerning signs are noted. Sotyktu Counseling:  I discussed the most common side effects of Sotyktu including: common cold, sore throat, sinus infections, cold sores, canker sores, folliculitis, and acne.? I also discussed more serious side effects of Sotyktu including but not limited to: serious allergic reactions; increased risk for infections such as TB; cancers such as lymphomas; rhabdomyolysis and elevated CPK; and elevated triglycerides and liver enzymes.? Detail Level: Generalized Glycopyrrolate Counseling:  I discussed with the patient the risks of glycopyrrolate including but not limited to skin rash, drowsiness, dry mouth, difficulty urinating, and blurred vision. Cellcept Counseling:  I discussed with the patient the risks of mycophenolate mofetil including but not limited to infection/immunosuppression, GI upset, hypokalemia, hypercholesterolemia, bone marrow suppression, lymphoproliferative disorders, malignancy, GI ulceration/bleed/perforation, colitis, interstitial lung disease, kidney failure, progressive multifocal leukoencephalopathy, and birth defects.  The patient understands that monitoring is required including a baseline creatinine and regular CBC testing. In addition, patient must alert us immediately if symptoms of infection or other concerning signs are noted. Valtrex Counseling: I discussed with the patient the risks of valacyclovir including but not limited to kidney damage, nausea, vomiting and severe allergy.  The patient understands that if the infection seems to be worsening or is not improving, they are to call. Finasteride Female Counseling: Finasteride Counseling:  I discussed with the patient the risks of use of finasteride including but not limited to decreased libido and sexual dysfunction. Explained the teratogenic nature of the medication and stressed the importance of not getting pregnant during treatment. All of the patient's questions and concerns were addressed. Metronidazole Counseling:  I discussed with the patient the risks of metronidazole including but not limited to seizures, nausea/vomiting, a metallic taste in the mouth, nausea/vomiting and severe allergy. Tazorac Counseling:  Patient advised that medication is irritating and drying.  Patient may need to apply sparingly and wash off after an hour before eventually leaving it on overnight.  The patient verbalized understanding of the proper use and possible adverse effects of tazorac.  All of the patient's questions and concerns were addressed. Terbinafine Counseling: Patient counseling regarding adverse effects of terbinafine including but not limited to headache, diarrhea, rash, upset stomach, liver function test abnormalities, itching, taste/smell disturbance, nausea, abdominal pain, and flatulence.  There is a rare possibility of liver failure that can occur when taking terbinafine.  The patient understands that a baseline LFT and kidney function test may be required. The patient verbalized understanding of the proper use and possible adverse effects of terbinafine.  All of the patient's questions and concerns were addressed. Cimzia Pregnancy And Lactation Text: This medication crosses the placenta but can be considered safe in certain situations. Cimzia may be excreted in breast milk. Propranolol Pregnancy And Lactation Text: This medication is Pregnancy Category C and it isn't known if it is safe during pregnancy. It is excreted in breast milk. Sarecycline Pregnancy And Lactation Text: This medication is Pregnancy Category D and not consider safe during pregnancy. It is also excreted in breast milk. Olanzapine Counseling- I discussed with the patient the common side effects of olanzapine including but are not limited to: lack of energy, dry mouth, increased appetite, sleepiness, tremor, constipation, dizziness, changes in behavior, or restlessness.  Explained that teenagers are more likely to experience headaches, abdominal pain, pain in the arms or legs, tiredness, and sleepiness.  Serious side effects include but are not limited: increased risk of death in elderly patients who are confused, have memory loss, or dementia-related psychosis; hyperglycemia; increased cholesterol and triglycerides; and weight gain. Calcipotriene Counseling:  I discussed with the patient the risks of calcipotriene including but not limited to erythema, scaling, itching, and irritation. Hydroquinone Counseling:  Patient advised that medication may result in skin irritation, lightening (hypopigmentation), dryness, and burning.  In the event of skin irritation, the patient was advised to reduce the amount of the drug applied or use it less frequently.  Rarely, spots that are treated with hydroquinone can become darker (pseudoochronosis).  Should this occur, patient instructed to stop medication and call the office. The patient verbalized understanding of the proper use and possible adverse effects of hydroquinone.  All of the patient's questions and concerns were addressed. Cibinqo Counseling: I discussed with the patient the risks of Cibinqo therapy including but not limited to common cold, nausea, headache, cold sores, increased blood CPK levels, dizziness, UTIs, fatigue, acne, and vomitting. Live vaccines should be avoided.  This medication has been linked to serious infections; higher rate of mortality; malignancy and lymphoproliferative disorders; major adverse cardiovascular events; thrombosis; thrombocytopenia and lymphopenia; lipid elevations; and retinal detachment. Clofazimine Counseling:  I discussed with the patient the risks of clofazimine including but not limited to skin and eye pigmentation, liver damage, nausea/vomiting, gastrointestinal bleeding and allergy. SSKI Counseling:  I discussed with the patient the risks of SSKI including but not limited to thyroid abnormalities, metallic taste, GI upset, fever, headache, acne, arthralgias, paraesthesias, lymphadenopathy, easy bleeding, arrhythmias, and allergic reaction. High Dose Vitamin A Counseling: Side effects reviewed, pt to contact office should one occur. Calcipotriene Pregnancy And Lactation Text: This medication has not been proven safe during pregnancy. It is unknown if this medication is excreted in breast milk. Glycopyrrolate Pregnancy And Lactation Text: This medication is Pregnancy Category B and is considered safe during pregnancy. It is unknown if it is excreted breast milk. Tazorac Pregnancy And Lactation Text: This medication is not safe during pregnancy. It is unknown if this medication is excreted in breast milk. Simponi Counseling:  I discussed with the patient the risks of golimumab including but not limited to myelosuppression, immunosuppression, autoimmune hepatitis, demyelinating diseases, lymphoma, and serious infections.  The patient understands that monitoring is required including a PPD at baseline and must alert us or the primary physician if symptoms of infection or other concerning signs are noted. Bactrim Pregnancy And Lactation Text: This medication is Pregnancy Category D and is known to cause fetal risk.  It is also excreted in breast milk. Odomzo Counseling- I discussed with the patient the risks of Odomzo including but not limited to nausea, vomiting, diarrhea, constipation, weight loss, changes in the sense of taste, decreased appetite, muscle spasms, and hair loss.  The patient verbalized understanding of the proper use and possible adverse effects of Odomzo.  All of the patient's questions and concerns were addressed. Winlevi Counseling:  I discussed with the patient the risks of topical clascoterone including but not limited to erythema, scaling, itching, and stinging. Patient voiced their understanding. Sotyktu Pregnancy And Lactation Text: There is insufficient data to evaluate whether or not Sotyktu is safe to use during pregnancy.? ?It is not known if Sotyktu passes into breast milk and whether or not it is safe to use when breastfeeding.?? Albendazole Pregnancy And Lactation Text: This medication is Pregnancy Category C and it isn't known if it is safe during pregnancy. It is also excreted in breast milk. Cephalexin Counseling: I counseled the patient regarding use of cephalexin as an antibiotic for prophylactic and/or therapeutic purposes. Cephalexin (commonly prescribed under brand name Keflex) is a cephalosporin antibiotic which is active against numerous classes of bacteria, including most skin bacteria. Side effects may include nausea, diarrhea, gastrointestinal upset, rash, hives, yeast infections, and in rare cases, hepatitis, kidney disease, seizures, fever, confusion, neurologic symptoms, and others. Patients with severe allergies to penicillin medications are cautioned that there is about a 10% incidence of cross-reactivity with cephalosporins. When possible, patients with penicillin allergies should use alternatives to cephalosporins for antibiotic therapy. Hydroxychloroquine Counseling:  I discussed with the patient that a baseline ophthalmologic exam is needed at the start of therapy and every year thereafter while on therapy. A CBC may also be warranted for monitoring.  The side effects of this medication were discussed with the patient, including but not limited to agranulocytosis, aplastic anemia, seizures, rashes, retinopathy, and liver toxicity. Patient instructed to call the office should any adverse effect occur.  The patient verbalized understanding of the proper use and possible adverse effects of Plaquenil.  All the patient's questions and concerns were addressed. Cyclophosphamide Counseling:  I discussed with the patient the risks of cyclophosphamide including but not limited to hair loss, hormonal abnormalities, decreased fertility, abdominal pain, diarrhea, nausea and vomiting, bone marrow suppression and infection. The patient understands that monitoring is required while taking this medication. Cosentyx Counseling:  I discussed with the patient the risks of Cosentyx including but not limited to worsening of Crohn's disease, immunosuppression, allergic reactions and infections.  The patient understands that monitoring is required including a PPD at baseline and must alert us or the primary physician if symptoms of infection or other concerning signs are noted. Xeljanz Counseling: I discussed with the patient the risks of Xeljanz therapy including increased risk of infection, liver issues, headache, diarrhea, or cold symptoms. Live vaccines should be avoided. They were instructed to call if they have any problems. Tetracycline Counseling: Patient counseled regarding possible photosensitivity and increased risk for sunburn.  Patient instructed to avoid sunlight, if possible.  When exposed to sunlight, patients should wear protective clothing, sunglasses, and sunscreen.  The patient was instructed to call the office immediately if the following severe adverse effects occur:  hearing changes, easy bruising/bleeding, severe headache, or vision changes.  The patient verbalized understanding of the proper use and possible adverse effects of tetracycline.  All of the patient's questions and concerns were addressed. Patient understands to avoid pregnancy while on therapy due to potential birth defects. Finasteride Pregnancy And Lactation Text: This medication is absolutely contraindicated during pregnancy. It is unknown if it is excreted in breast milk. Valtrex Pregnancy And Lactation Text: this medication is Pregnancy Category B and is considered safe during pregnancy. This medication is not directly found in breast milk but it's metabolite acyclovir is present. Protopic Counseling: Patient may experience a mild burning sensation during topical application. Protopic is not approved in children less than 2 years of age. There have been case reports of hematologic and skin malignancies in patients using topical calcineurin inhibitors although causality is questionable. Olanzapine Pregnancy And Lactation Text: This medication is pregnancy category C.   There are no adequate and well controlled trials with olanzapine in pregnant females.  Olanzapine should be used during pregnancy only if the potential benefit justifies the potential risk to the fetus.   In a study in lactating healthy women, olanzapine was excreted in breast milk.  It is recommended that women taking olanzapine should not breast feed. Tremfya Counseling: I discussed with the patient the risks of guselkumab including but not limited to immunosuppression, serious infections, and drug reactions.  The patient understands that monitoring is required including a PPD at baseline and must alert us or the primary physician if symptoms of infection or other concerning signs are noted. Metronidazole Pregnancy And Lactation Text: This medication is Pregnancy Category B and considered safe during pregnancy.  It is also excreted in breast milk. Ilumya Counseling: I discussed with the patient the risks of tildrakizumab including but not limited to immunosuppression, malignancy, posterior leukoencephalopathy syndrome, and serious infections.  The patient understands that monitoring is required including a PPD at baseline and must alert us or the primary physician if symptoms of infection or other concerning signs are noted. Winlevi Pregnancy And Lactation Text: This medication is considered safe during pregnancy and breastfeeding. Sski Pregnancy And Lactation Text: This medication is Pregnancy Category D and isn't considered safe during pregnancy. It is excreted in breast milk. Ivermectin Counseling:  Patient instructed to take medication on an empty stomach with a full glass of water.  Patient informed of potential adverse effects including but not limited to nausea, diarrhea, dizziness, itching, and swelling of the extremities or lymph nodes.  The patient verbalized understanding of the proper use and possible adverse effects of ivermectin.  All of the patient's questions and concerns were addressed. 5-Fu Counseling: 5-Fluorouracil Counseling:  I discussed with the patient the risks of 5-fluorouracil including but not limited to erythema, scaling, itching, weeping, crusting, and pain. Birth Control Pills Counseling: Birth Control Pill Counseling: I discussed with the patient the potential side effects of OCPs including but not limited to increased risk of stroke, heart attack, thrombophlebitis, deep venous thrombosis, hepatic adenomas, breast changes, GI upset, headaches, and depression.  The patient verbalized understanding of the proper use and possible adverse effects of OCPs. All of the patient's questions and concerns were addressed. Include Pregnancy/Lactation Warning?: No Cibinqo Pregnancy And Lactation Text: It is unknown if this medication will adversely affect pregnancy or breast feeding.  You should not take this medication if you are currently pregnant or planning a pregnancy or while breastfeeding. High Dose Vitamin A Pregnancy And Lactation Text: High dose vitamin A therapy is contraindicated during pregnancy and breast feeding. Xelronz Pregnancy And Lactation Text: This medication is Pregnancy Category D and is not considered safe during pregnancy.  The risk during breast feeding is also uncertain. Cimetidine Counseling:  I discussed with the patient the risks of Cimetidine including but not limited to gynecomastia, headache, diarrhea, nausea, drowsiness, arrhythmias, pancreatitis, skin rashes, psychosis, bone marrow suppression and kidney toxicity. Protopic Pregnancy And Lactation Text: This medication is Pregnancy Category C. It is unknown if this medication is excreted in breast milk when applied topically. Litfulo Counseling: Litfulo (Ritlecitinib) Counseling: I discussed with the patient the risks of Litfulo therapy including but not limited to headache, upper respiratory infections, nausea, diarrhea, acne, and urticaria. Live vaccines should be avoided.  This medication has been linked to serious infections; higher rate of mortality; malignancy and lymphoproliferative disorders; major adverse cardiovascular events; thrombosis; decreases in lymphocytes and platelets; liver enzyme elevations; and CPK elevations. Topical Clindamycin Counseling: Patient counseled that this medication may cause skin irritation or allergic reactions.  In the event of skin irritation, the patient was advised to reduce the amount of the drug applied or use it less frequently.   The patient verbalized understanding of the proper use and possible adverse effects of clindamycin.  All of the patient's questions and concerns were addressed. Oral Minoxidil Counseling- I discussed with the patient the risks of oral minoxidil including but not limited to shortness of breath, swelling of the feet or ankles, dizziness, lightheadedness, unwanted hair growth and allergic reaction.  The patient verbalized understanding of the proper use and possible adverse effects of oral minoxidil.  All of the patient's questions and concerns were addressed. Minocycline Counseling: Patient advised regarding possible photosensitivity and discoloration of the teeth, skin, lips, tongue and gums.  Patient instructed to avoid sunlight, if possible.  When exposed to sunlight, patients should wear protective clothing, sunglasses, and sunscreen.  The patient was instructed to call the office immediately if the following severe adverse effects occur:  hearing changes, easy bruising/bleeding, severe headache, or vision changes.  The patient verbalized understanding of the proper use and possible adverse effects of minocycline.  All of the patient's questions and concerns were addressed. Cephalexin Pregnancy And Lactation Text: This medication is Pregnancy Category B and considered safe during pregnancy.  It is also excreted in breast milk but can be used safely for shorter doses. Colchicine Counseling:  Patient counseled regarding adverse effects including but not limited to stomach upset (nausea, vomiting, stomach pain, or diarrhea).  Patient instructed to limit alcohol consumption while taking this medication.  Colchicine may reduce blood counts especially with prolonged use.  The patient understands that monitoring of kidney function and blood counts may be required, especially at baseline. The patient verbalized understanding of the proper use and possible adverse effects of colchicine.  All of the patient's questions and concerns were addressed. Hydroxychloroquine Pregnancy And Lactation Text: This medication has been shown to cause fetal harm but it isn't assigned a Pregnancy Risk Category. There are small amounts excreted in breast milk. Skyrizi Counseling: I discussed with the patient the risks of risankizumab-rzaa including but not limited to immunosuppression, and serious infections.  The patient understands that monitoring is required including a PPD at baseline and must alert us or the primary physician if symptoms of infection or other concerning signs are noted. Cyclophosphamide Pregnancy And Lactation Text: This medication is Pregnancy Category D and it isn't considered safe during pregnancy. This medication is excreted in breast milk. Dupixent Counseling: I discussed with the patient the risks of dupilumab including but not limited to eye infection and irritation, cold sores, injection site reactions, worsening of asthma, allergic reactions and increased risk of parasitic infection.  Live vaccines should be avoided while taking dupilumab. Dupilumab will also interact with certain medications such as warfarin and cyclosporine. The patient understands that monitoring is required and they must alert us or the primary physician if symptoms of infection or other concerning signs are noted. Zyclara Counseling:  I discussed with the patient the risks of imiquimod including but not limited to erythema, scaling, itching, weeping, crusting, and pain.  Patient understands that the inflammatory response to imiquimod is variable from person to person and was educated regarded proper titration schedule.  If flu-like symptoms develop, patient knows to discontinue the medication and contact us. Oral Minoxidil Pregnancy And Lactation Text: This medication should only be used when clearly needed if you are pregnant, attempting to become pregnant or breast feeding. Xolair Counseling:  Patient informed of potential adverse effects including but not limited to fever, muscle aches, rash and allergic reactions.  The patient verbalized understanding of the proper use and possible adverse effects of Xolair.  All of the patient's questions and concerns were addressed. Azelaic Acid Counseling: Patient counseled that medicine may cause skin irritation and to avoid applying near the eyes.  In the event of skin irritation, the patient was advised to reduce the amount of the drug applied or use it less frequently.   The patient verbalized understanding of the proper use and possible adverse effects of azelaic acid.  All of the patient's questions and concerns were addressed. Birth Control Pills Pregnancy And Lactation Text: This medication should be avoided if pregnant and for the first 30 days post-partum. Rhofade Counseling: Rhofade is a topical medication which can decrease superficial blood flow where applied. Side effects are uncommon and include stinging, redness and allergic reactions. Dutasteride Male Counseling: Dustasteride Counseling:  I discussed with the patient the risks of use of dutasteride including but not limited to decreased libido, decreased ejaculate volume, and gynecomastia. Women who can become pregnant should not handle medication.  All of the patient's questions and concerns were addressed. Thalidomide Counseling: I discussed with the patient the risks of thalidomide including but not limited to birth defects, anxiety, weakness, chest pain, dizziness, cough and severe allergy. Imiquimod Counseling:  I discussed with the patient the risks of imiquimod including but not limited to erythema, scaling, itching, weeping, crusting, and pain.  Patient understands that the inflammatory response to imiquimod is variable from person to person and was educated regarded proper titration schedule.  If flu-like symptoms develop, patient knows to discontinue the medication and contact us. Nemluvio Counseling: I discussed with the patient the risks of nemolizumab including but not limited to headache, gastrointestinal complaints, nasopharyngitis, musculoskeletal complaints, injection site reactions, and allergic reactions. The patient understands that monitoring is required and they must alert us or the primary physician if any side effects are noted. Ebglyss Counseling: I discussed with the patient the risks of lebrikizumab including but not limited to eye inflammation and irritation, cold sores, injection site reactions, allergic reactions and increased risk of parasitic infection. The patient understands that monitoring is required and they must alert us or the primary physician if symptoms of infection or other concerning signs are noted. Ebglyss Pregnancy And Lactation Text: This medication likely crosses the placenta but the risk for the fetus is uncertain. It is unknown if this medication is excreted in breast milk. Nemluvio Pregnancy And Lactation Text: It is not known if Nemluvio causes fetal harm or is present in breast milk. Please proceed with caution if patients who are pregnant or breastfeeding. Simlandi Counseling:  I discussed with the patient the risks of adalimumab including but not limited to myelosuppression, immunosuppression, autoimmune hepatitis, demyelinating diseases, lymphoma, and serious infections.  The patient understands that monitoring is required including a PPD at baseline and must alert us or the primary physician if symptoms of infection or other concerning signs are noted.

## 2025-05-15 NOTE — DISCHARGE NOTE NURSING/CASE MANAGEMENT/SOCIAL WORK - NSCORESITESY/N_GEN_A_CORE_RD
[TextBox_4] : LMP 8/2023, no hot flashes. tested + for lupus markers but not dx with Lupus detached retina surgery 4 weeks ago lost weight on zepbound, walks  Yes [Mammogramdate] : 4/2024 [PapSmeardate] : 3/2023 [BoneDensityDate] : 10/2024 [TextBox_37] : normal fu 5 yrs [ColonoscopyDate] : 1/2023 [TextBox_43] :  fu 5 yrs

## 2025-05-22 NOTE — ED ADULT NURSE NOTE - SKIN INTEGRITY
In an effort to ensure that our patients [employer], a Team Member has reviewed your chart and identified an opportunity to provide the best care possible. An attempt was made to discuss or schedule due or overdue Preventive or Chronic Condition care.Care Gaps identified: Hypertension.    The Outcome was Contact was not made, letter/portal message sent.  We are attempting to schedule a see documentation. If you have any questions or need help with scheduling, contact your primary care provider..       LiveWell message sent requesting patient to record BPs 1 time daily 1-2 hours after taking BP medication and after resting for 5-10 minutes and message office with BP updates in 3 days for Care Management update purposes.       wound(s)

## 2025-06-06 ENCOUNTER — EMERGENCY (EMERGENCY)
Facility: HOSPITAL | Age: 89
LOS: 1 days | End: 2025-06-06
Attending: STUDENT IN AN ORGANIZED HEALTH CARE EDUCATION/TRAINING PROGRAM
Payer: MEDICAID

## 2025-06-06 VITALS
SYSTOLIC BLOOD PRESSURE: 120 MMHG | OXYGEN SATURATION: 96 % | WEIGHT: 113.1 LBS | HEART RATE: 73 BPM | DIASTOLIC BLOOD PRESSURE: 78 MMHG | RESPIRATION RATE: 17 BRPM | TEMPERATURE: 97 F

## 2025-06-06 VITALS
OXYGEN SATURATION: 96 % | SYSTOLIC BLOOD PRESSURE: 120 MMHG | HEART RATE: 76 BPM | DIASTOLIC BLOOD PRESSURE: 59 MMHG | RESPIRATION RATE: 18 BRPM | TEMPERATURE: 98 F

## 2025-06-06 DIAGNOSIS — Z90.710 ACQUIRED ABSENCE OF BOTH CERVIX AND UTERUS: Chronic | ICD-10-CM

## 2025-06-06 DIAGNOSIS — Z98.51 TUBAL LIGATION STATUS: Chronic | ICD-10-CM

## 2025-06-06 LAB
ALBUMIN SERPL ELPH-MCNC: 3.1 G/DL — LOW (ref 3.5–5)
ALP SERPL-CCNC: 63 U/L — SIGNIFICANT CHANGE UP (ref 40–120)
ALT FLD-CCNC: 30 U/L DA — SIGNIFICANT CHANGE UP (ref 10–60)
ANION GAP SERPL CALC-SCNC: 9 MMOL/L — SIGNIFICANT CHANGE UP (ref 5–17)
APTT BLD: 34.9 SEC — SIGNIFICANT CHANGE UP (ref 26.1–36.8)
AST SERPL-CCNC: 32 U/L — SIGNIFICANT CHANGE UP (ref 10–40)
BASOPHILS # BLD AUTO: 0.06 K/UL — SIGNIFICANT CHANGE UP (ref 0–0.2)
BASOPHILS NFR BLD AUTO: 0.5 % — SIGNIFICANT CHANGE UP (ref 0–2)
BILIRUB SERPL-MCNC: 0.3 MG/DL — SIGNIFICANT CHANGE UP (ref 0.2–1.2)
BUN SERPL-MCNC: 23 MG/DL — HIGH (ref 7–18)
CALCIUM SERPL-MCNC: 8.5 MG/DL — SIGNIFICANT CHANGE UP (ref 8.4–10.5)
CHLORIDE SERPL-SCNC: 113 MMOL/L — HIGH (ref 96–108)
CO2 SERPL-SCNC: 18 MMOL/L — LOW (ref 22–31)
CREAT SERPL-MCNC: 0.72 MG/DL — SIGNIFICANT CHANGE UP (ref 0.5–1.3)
EGFR: 80 ML/MIN/1.73M2 — SIGNIFICANT CHANGE UP
EGFR: 80 ML/MIN/1.73M2 — SIGNIFICANT CHANGE UP
EOSINOPHIL # BLD AUTO: 0.03 K/UL — SIGNIFICANT CHANGE UP (ref 0–0.5)
EOSINOPHIL NFR BLD AUTO: 0.2 % — SIGNIFICANT CHANGE UP (ref 0–6)
GLUCOSE SERPL-MCNC: 119 MG/DL — HIGH (ref 70–99)
HCT VFR BLD CALC: 30.3 % — LOW (ref 34.5–45)
HGB BLD-MCNC: 10 G/DL — LOW (ref 11.5–15.5)
IMM GRANULOCYTES NFR BLD AUTO: 2.1 % — HIGH (ref 0–0.9)
INR BLD: 2.24 RATIO — HIGH (ref 0.85–1.16)
LIDOCAIN IGE QN: 18 U/L — SIGNIFICANT CHANGE UP (ref 13–75)
LYMPHOCYTES # BLD AUTO: 32 % — SIGNIFICANT CHANGE UP (ref 13–44)
LYMPHOCYTES # BLD AUTO: 4.23 K/UL — HIGH (ref 1–3.3)
MCHC RBC-ENTMCNC: 32.9 PG — SIGNIFICANT CHANGE UP (ref 27–34)
MCHC RBC-ENTMCNC: 33 G/DL — SIGNIFICANT CHANGE UP (ref 32–36)
MCV RBC AUTO: 99.7 FL — SIGNIFICANT CHANGE UP (ref 80–100)
MONOCYTES # BLD AUTO: 1.28 K/UL — HIGH (ref 0–0.9)
MONOCYTES NFR BLD AUTO: 9.7 % — SIGNIFICANT CHANGE UP (ref 2–14)
NEUTROPHILS # BLD AUTO: 7.33 K/UL — SIGNIFICANT CHANGE UP (ref 1.8–7.4)
NEUTROPHILS NFR BLD AUTO: 55.5 % — SIGNIFICANT CHANGE UP (ref 43–77)
NRBC BLD AUTO-RTO: 4 /100 WBCS — HIGH (ref 0–0)
PLATELET # BLD AUTO: 133 K/UL — LOW (ref 150–400)
POTASSIUM SERPL-MCNC: 3.9 MMOL/L — SIGNIFICANT CHANGE UP (ref 3.5–5.3)
POTASSIUM SERPL-SCNC: 3.9 MMOL/L — SIGNIFICANT CHANGE UP (ref 3.5–5.3)
PROT SERPL-MCNC: 6.2 G/DL — SIGNIFICANT CHANGE UP (ref 6–8.3)
PROTHROM AB SERPL-ACNC: 26 SEC — HIGH (ref 9.9–13.4)
RBC # BLD: 3.04 M/UL — LOW (ref 3.8–5.2)
RBC # FLD: 15.2 % — HIGH (ref 10.3–14.5)
SODIUM SERPL-SCNC: 140 MMOL/L — SIGNIFICANT CHANGE UP (ref 135–145)
WBC # BLD: 13.21 K/UL — HIGH (ref 3.8–10.5)
WBC # FLD AUTO: 13.21 K/UL — HIGH (ref 3.8–10.5)

## 2025-06-06 PROCEDURE — 96374 THER/PROPH/DIAG INJ IV PUSH: CPT | Mod: XU

## 2025-06-06 PROCEDURE — 99284 EMERGENCY DEPT VISIT MOD MDM: CPT | Mod: 25

## 2025-06-06 PROCEDURE — 85730 THROMBOPLASTIN TIME PARTIAL: CPT

## 2025-06-06 PROCEDURE — 99285 EMERGENCY DEPT VISIT HI MDM: CPT

## 2025-06-06 PROCEDURE — 80053 COMPREHEN METABOLIC PANEL: CPT

## 2025-06-06 PROCEDURE — 86901 BLOOD TYPING SEROLOGIC RH(D): CPT

## 2025-06-06 PROCEDURE — 86850 RBC ANTIBODY SCREEN: CPT

## 2025-06-06 PROCEDURE — 74177 CT ABD & PELVIS W/CONTRAST: CPT

## 2025-06-06 PROCEDURE — 86900 BLOOD TYPING SEROLOGIC ABO: CPT

## 2025-06-06 PROCEDURE — 85610 PROTHROMBIN TIME: CPT

## 2025-06-06 PROCEDURE — 85025 COMPLETE CBC W/AUTO DIFF WBC: CPT

## 2025-06-06 PROCEDURE — 74177 CT ABD & PELVIS W/CONTRAST: CPT | Mod: 26

## 2025-06-06 PROCEDURE — 83690 ASSAY OF LIPASE: CPT

## 2025-06-06 PROCEDURE — 86880 COOMBS TEST DIRECT: CPT

## 2025-06-06 PROCEDURE — 36415 COLL VENOUS BLD VENIPUNCTURE: CPT

## 2025-06-06 PROCEDURE — 96375 TX/PRO/DX INJ NEW DRUG ADDON: CPT

## 2025-06-06 RX ORDER — ONDANSETRON HCL/PF 4 MG/2 ML
4 VIAL (ML) INJECTION ONCE
Refills: 0 | Status: COMPLETED | OUTPATIENT
Start: 2025-06-06 | End: 2025-06-06

## 2025-06-06 RX ADMIN — Medication 4 MILLIGRAM(S): at 15:23

## 2025-06-06 NOTE — ED PROVIDER NOTE - CLINICAL SUMMARY MEDICAL DECISION MAKING FREE TEXT BOX
patient presenting with abdominal pain otherwise abdomen soft no peritoneal but given history of gastric CA will obtain labs CT rule out surgical abdomen pain control ED observation and reassess

## 2025-06-06 NOTE — ED PROVIDER NOTE - PROGRESS NOTE DETAILS
chance: pt feels better. abd s/nt/nd. stool mainly soft NOT watery. given tylenol at home. ct shows possibly bone in duodenum- daughter at bedside states everything she eats is mashed up since she has no denture and pt did have chicken but w/o bone

## 2025-06-06 NOTE — ED ADULT NURSE NOTE - CAS EDN DISCHARGE ASSESSMENT
Patient tolerated Port flush without any complications. Discharge instructions given to patient-verbalizes understanding. Ambulated off unit per self with belongings.    Patient baseline mental status

## 2025-06-06 NOTE — ED PROVIDER NOTE - NSFOLLOWUPINSTRUCTIONS_ED_ALL_ED_FT
abdominal pain with diarrhea- possibly abdominal gas vs constipation vs intestinal lining irritation. advise to monitor symptoms. return if worsens.    Dolor abdominal con diarrea, posiblemente gases abdominales vs. estreñimiento vs. irritación del revestimiento intestinal. Se recomienda controlar los síntomas. Regrese si empeora.

## 2025-06-06 NOTE — ED PROVIDER NOTE - PATIENT PORTAL LINK FT
You can access the FollowMyHealth Patient Portal offered by [facility] by registering at the following website: [email or web address]. By joining [employer]’s FollowMyHealth portal, you will also be able to view your health information using other applications (apps) compatible with our system.

## 2025-06-06 NOTE — ED ADULT NURSE NOTE - OBJECTIVE STATEMENT
patient complains of abdominal pain that started this morning, patient comes from home with daughter. patient daughter states she has diarrhea

## 2025-06-06 NOTE — ED PROVIDER NOTE - OBJECTIVE STATEMENT
89-year-old history of gastric cancer on chemotherapy presenting with 2 weeks of abdominal pain associated with nausea denies any fever diarrhea dysuria travel sick contact

## 2025-06-30 ENCOUNTER — OUTPATIENT (OUTPATIENT)
Dept: OUTPATIENT SERVICES | Facility: HOSPITAL | Age: 89
LOS: 1 days | Discharge: HOME | End: 2025-06-30

## 2025-07-07 NOTE — ED ADULT TRIAGE NOTE - NS ED NURSE BANDS TYPE
Chief Complaint   Patient presents with    Recheck Medication     Moderate episode of recurrent major depressive disorder     - Jude JASSO, Visit Facilitator      Name band;

## 2025-07-08 DIAGNOSIS — C90.00 MULTIPLE MYELOMA NOT HAVING ACHIEVED REMISSION: ICD-10-CM

## 2025-07-09 NOTE — DISCHARGE NOTE NURSING/CASE MANAGEMENT/SOCIAL WORK - FINANCIAL ASSISTANCE
[facility] provides services at a reduced cost to those who are determined to be eligible through [facility]’s financial assistance program. Information regarding [facility]’s financial assistance program can be found by going to [email or web address] or by calling [phone]. [phone]

## 2025-07-28 NOTE — DISCHARGE NOTE ADULT - CARE PROVIDERS DIRECT ADDRESSES
[0] : 2) Feeling down, depressed, or hopeless: Not at all (0) [Never] : Never [[id number]] : 0 [Patient/Caregiver not ready to engage] : Patient/Caregiver not ready to engage ,DirectAddress_Unknown

## 2025-08-08 NOTE — ED ADULT NURSE NOTE - NSFALLRSKHRMRISKTYPE_ED_ALL_ED
[facility]/Population Health Chart Review & Patient Outreach Details For Medication Adherence Project    Reason for Outreach Encounter: 3rd Party payor non-compliance report ([employer], [employer], [facility], etc)  2.  Patient Outreach Method: Reviewed patient chart   3.   Medication in question:    Hypertension Medications              irbesartan (AVAPRO) 150 MG tablet Take 1 tablet (150 mg total) by mouth every evening.              Hyperlipidemia Medications              rosuvastatin (CRESTOR) 20 MG tablet Take 1 tablet (20 mg total) by mouth nightly.                  Irbesartan  last filled  7/25/25 for 90 day supply  Rosuvastatin  last filled 7/11/25 for 30 day supply     age(85 years old or older)

## 2025-09-02 ENCOUNTER — APPOINTMENT (OUTPATIENT)
Dept: UROLOGY | Facility: CLINIC | Age: 89
End: 2025-09-02
Payer: MEDICAID

## 2025-09-02 VITALS
DIASTOLIC BLOOD PRESSURE: 89 MMHG | TEMPERATURE: 97.4 F | OXYGEN SATURATION: 97 % | HEART RATE: 67 BPM | BODY MASS INDEX: 23.55 KG/M2 | SYSTOLIC BLOOD PRESSURE: 130 MMHG | HEIGHT: 62 IN | WEIGHT: 128 LBS

## 2025-09-02 DIAGNOSIS — Z86.39 PERSONAL HISTORY OF OTHER ENDOCRINE, NUTRITIONAL AND METABOLIC DISEASE: ICD-10-CM

## 2025-09-02 DIAGNOSIS — Z86.79 PERSONAL HISTORY OF OTHER DISEASES OF THE CIRCULATORY SYSTEM: ICD-10-CM

## 2025-09-02 DIAGNOSIS — N39.0 URINARY TRACT INFECTION, SITE NOT SPECIFIED: ICD-10-CM

## 2025-09-02 PROCEDURE — G2211 COMPLEX E/M VISIT ADD ON: CPT | Mod: NC

## 2025-09-02 PROCEDURE — 99204 OFFICE O/P NEW MOD 45 MIN: CPT

## 2025-09-02 RX ORDER — SULFAMETHOXAZOLE AND TRIMETHOPRIM 800; 160 MG/1; MG/1
800-160 TABLET ORAL TWICE DAILY
Qty: 6 | Refills: 0 | Status: COMPLETED | COMMUNITY
Start: 2025-09-02 | End: 2025-09-05